# Patient Record
Sex: FEMALE | Race: BLACK OR AFRICAN AMERICAN | NOT HISPANIC OR LATINO | Employment: UNEMPLOYED | URBAN - METROPOLITAN AREA
[De-identification: names, ages, dates, MRNs, and addresses within clinical notes are randomized per-mention and may not be internally consistent; named-entity substitution may affect disease eponyms.]

---

## 2017-03-06 ENCOUNTER — OFFICE VISIT (OUTPATIENT)
Dept: HEMATOLOGY/ONCOLOGY | Facility: CLINIC | Age: 52
End: 2017-03-06
Attending: INTERNAL MEDICINE
Payer: COMMERCIAL

## 2017-03-06 VITALS
WEIGHT: 199.06 LBS | RESPIRATION RATE: 16 BRPM | SYSTOLIC BLOOD PRESSURE: 120 MMHG | DIASTOLIC BLOOD PRESSURE: 72 MMHG | BODY MASS INDEX: 31.18 KG/M2 | TEMPERATURE: 98 F | HEART RATE: 93 BPM

## 2017-03-06 DIAGNOSIS — C50.411 BREAST CANCER OF UPPER-OUTER QUADRANT OF RIGHT FEMALE BREAST: Primary | ICD-10-CM

## 2017-03-06 PROCEDURE — 99213 OFFICE O/P EST LOW 20 MIN: CPT | Mod: S$GLB,,, | Performed by: INTERNAL MEDICINE

## 2017-03-06 PROCEDURE — 3078F DIAST BP <80 MM HG: CPT | Mod: S$GLB,,, | Performed by: INTERNAL MEDICINE

## 2017-03-06 PROCEDURE — 99999 PR PBB SHADOW E&M-EST. PATIENT-LVL III: CPT | Mod: PBBFAC,,, | Performed by: INTERNAL MEDICINE

## 2017-03-06 PROCEDURE — 1160F RVW MEDS BY RX/DR IN RCRD: CPT | Mod: S$GLB,,, | Performed by: INTERNAL MEDICINE

## 2017-03-06 PROCEDURE — 3074F SYST BP LT 130 MM HG: CPT | Mod: S$GLB,,, | Performed by: INTERNAL MEDICINE

## 2017-03-06 NOTE — PROGRESS NOTES
Subjective:       Patient ID: Anette Ricci is a 51 y.o. female.    Chief Complaint: Follow-up    HPI  Ms. Ricci 51-year-old female seen for follow-up of carcinoma of the right breast.  She has a strongly ER and CO positive T2 N0 tumor.  She is on adjuvant tamoxifen endocrine therapy.  She has been feeling well and is doing a bit more exercise.  She has no shortness of breath no unusual pain.  She did have a small scab on her right breast she noticed recently.      Mammograms in Oct 2016 were negative for malignancy    History: She noted an abnormality in  the right in her right breast  and had some additional imaging including MRI.  She had a MRI guided core needle biopsy on April 5 and then underwent lumpectomy and sentinel lymph node biopsy by Dr. Keith Chauhan on April 25, 2013.  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  4 sentinel lymph nodes were removed and all were negative for malignancy.  Margins were negative.  The tumor was ER +90% CO +90% and HER-2 negative by fish.  Pathological stage TII N0 stage IIA   She underwent Oncotype testing which showed a low Oncotype score of 10.    She began tamoxifen therapy in May of 2013.  Review of Systems   Constitutional: Negative for fatigue, fever and unexpected weight change.   Respiratory: Negative for cough and shortness of breath.    Cardiovascular: Negative for chest pain.   Gastrointestinal: Negative for abdominal pain and diarrhea.   Musculoskeletal: Negative for back pain and neck pain.   Psychiatric/Behavioral: Negative for dysphoric mood. The patient is not nervous/anxious.        Objective:      Physical Exam   Constitutional: She appears well-developed and well-nourished. No distress.   HENT:   Mouth/Throat: Oropharynx is clear and moist. No oropharyngeal exudate.   Eyes: No scleral icterus.   Cardiovascular: Normal rate, regular rhythm and normal heart sounds.     Pulmonary/Chest: Effort normal and breath sounds normal. She has no wheezes. She has no rales. Right breast exhibits no mass, no nipple discharge and no skin change. Left breast exhibits no mass, no nipple discharge and no skin change.       Abdominal: Soft. There is no tenderness.   Lymphadenopathy:     She has no cervical adenopathy.   Psychiatric: She has a normal mood and affect. Her behavior is normal. Thought content normal.       Assessment:       1. Breast cancer of upper-outer quadrant of right female breast        Plan:      Continue tamoxifen and return to clinic in approximately 6 months.    she will be due for mammograms and.

## 2017-03-15 ENCOUNTER — OFFICE VISIT (OUTPATIENT)
Dept: PLASTIC SURGERY | Facility: CLINIC | Age: 52
End: 2017-03-15
Payer: COMMERCIAL

## 2017-03-15 VITALS
BODY MASS INDEX: 31.65 KG/M2 | TEMPERATURE: 98 F | SYSTOLIC BLOOD PRESSURE: 128 MMHG | DIASTOLIC BLOOD PRESSURE: 80 MMHG | HEART RATE: 78 BPM | WEIGHT: 202.06 LBS

## 2017-03-15 DIAGNOSIS — Z85.3 PERSONAL HISTORY OF BREAST CANCER: Primary | ICD-10-CM

## 2017-03-15 PROCEDURE — 99212 OFFICE O/P EST SF 10 MIN: CPT | Mod: S$GLB,,, | Performed by: PHYSICIAN ASSISTANT

## 2017-03-15 PROCEDURE — 99999 PR PBB SHADOW E&M-EST. PATIENT-LVL III: CPT | Mod: PBBFAC,,, | Performed by: PHYSICIAN ASSISTANT

## 2017-03-15 PROCEDURE — 3079F DIAST BP 80-89 MM HG: CPT | Mod: S$GLB,,, | Performed by: PHYSICIAN ASSISTANT

## 2017-03-15 PROCEDURE — 1160F RVW MEDS BY RX/DR IN RCRD: CPT | Mod: S$GLB,,, | Performed by: PHYSICIAN ASSISTANT

## 2017-03-15 PROCEDURE — 3074F SYST BP LT 130 MM HG: CPT | Mod: S$GLB,,, | Performed by: PHYSICIAN ASSISTANT

## 2017-03-15 NOTE — PROGRESS NOTES
[facility] for a follow up visit status post L breast reduction for symmetry following R breast reconstruction     Vitals:    03/15/17 1022   BP: 128/80   Pulse: 78   Temp: 98.4 °F (36.9 °C)     Current Outpatient Prescriptions on File Prior to Visit   Medication Sig Dispense Refill    amlodipine (NORVASC) 10 MG tablet Take 10 mg by mouth once daily.       atorvastatin (LIPITOR) 40 MG tablet Take 40 mg by mouth once daily.      enalapril (VASOTEC) 20 MG tablet Take 20 mg by mouth once daily.      glimepiride (AMARYL) 4 MG tablet Take 8 mg by mouth before breakfast.       hydrochlorothiazide (HYDRODIURIL) 25 MG tablet       hydrocodone-acetaminophen 10-325mg (NORCO)  mg Tab Take 1 tablet by mouth every 6 (six) hours as needed. 31 tablet 0    insulin glargine (LANTUS) 100 unit/mL injection Inject 45 Units into the skin every evening. Takes 40 units nightly      ketorolac 0.4% (ACULAR) 0.4 % Drop       LANTUS SOLOSTAR 100 unit/mL (3 mL) InPn pen       lorazepam (ATIVAN) 0.5 MG tablet Take 0.5 mg by mouth every 12 (twelve) hours as needed.       metformin (GLUCOPHAGE) 1000 MG tablet Take 1,000 mg by mouth daily with breakfast.       NOVOLOG FLEXPEN 100 unit/mL InPn pen Inject 10 Units into the skin 2 (two) times daily with meals. With biggest meal      oxycodone-acetaminophen (PERCOCET) 5-325 mg per tablet Take 1 tablet by mouth every 4 (four) hours as needed for Pain. 31 tablet 0    prednisoLONE acetate (PRED FORTE) 1 % DrpS       quetiapine (SEROQUEL) 25 MG Tab Take 25 mg by mouth once daily.       tamoxifen (NOLVADEX) 20 MG Tab TAKE ONE TABLET BY MOUTH ONCE DAILY 90 tablet 3     No current facility-administered medications on file prior to visit.      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   PREOPERATIVE DIAGNOSIS: History of breast cancer.   POSTOPERATIVE DIAGNOSIS: History of breast cancer.   PROCEDURES PERFORMED: Left breast reduction.   SURGEON: Robbin Walters M.D., F.A.C.S.   ANESTHESIA: General.    Doing well today. She has very nice symmetry. B nipples viable. She is pleased with her results.     MMG normal from 10/2016    All questions were answered. She will return to clinic in 3 months. The patient was advised to call the clinic with any questions or concerns prior to their next visit.

## 2017-06-13 ENCOUNTER — OFFICE VISIT (OUTPATIENT)
Dept: PLASTIC SURGERY | Facility: CLINIC | Age: 52
End: 2017-06-13
Payer: COMMERCIAL

## 2017-06-13 VITALS
DIASTOLIC BLOOD PRESSURE: 81 MMHG | WEIGHT: 196 LBS | HEART RATE: 77 BPM | SYSTOLIC BLOOD PRESSURE: 115 MMHG | TEMPERATURE: 99 F | BODY MASS INDEX: 30.7 KG/M2

## 2017-06-13 DIAGNOSIS — Z09 SURGERY FOLLOW-UP EXAMINATION: Primary | ICD-10-CM

## 2017-06-13 PROCEDURE — 99212 OFFICE O/P EST SF 10 MIN: CPT | Mod: S$GLB,,, | Performed by: PHYSICIAN ASSISTANT

## 2017-06-13 PROCEDURE — 99999 PR PBB SHADOW E&M-EST. PATIENT-LVL III: CPT | Mod: PBBFAC,,, | Performed by: PHYSICIAN ASSISTANT

## 2017-06-13 NOTE — PROGRESS NOTES
[facility]ette Ricci [facility] 6[facility]13[facility]2017 for a follow up visit status post L breast reduction with history of R breast cancer. Doing well today.     Review of patient's allergies indicates:   Allergen Reactions    Codeine      Current Outpatient Prescriptions on File Prior to Visit   Medication Sig Dispense Refill    amlodipine (NORVASC) 10 MG tablet Take 10 mg by mouth once daily.       atorvastatin (LIPITOR) 40 MG tablet Take 40 mg by mouth once daily.      empagliflozin (JARDIANCE) 25 mg Tab Take 25 mg by mouth once daily.      enalapril (VASOTEC) 20 MG tablet Take 20 mg by mouth once daily.      glimepiride (AMARYL) 4 MG tablet Take 8 mg by mouth before breakfast.       hydrochlorothiazide (HYDRODIURIL) 25 MG tablet       hydrocodone-acetaminophen 10-325mg (NORCO)  mg Tab Take 1 tablet by mouth every 6 (six) hours as needed. 31 tablet 0    insulin glargine (LANTUS) 100 unit/mL injection Inject 45 Units into the skin every evening. Takes 40 units nightly      ketorolac 0.4% (ACULAR) 0.4 % Drop       LANTUS SOLOSTAR 100 unit/mL (3 mL) InPn pen       lorazepam (ATIVAN) 0.5 MG tablet Take 0.5 mg by mouth every 12 (twelve) hours as needed.       metformin (GLUCOPHAGE) 1000 MG tablet Take 1,000 mg by mouth daily with breakfast.       NOVOLOG FLEXPEN 100 unit/mL InPn pen Inject 10 Units into the skin 2 (two) times daily with meals. With biggest meal      oxycodone-acetaminophen (PERCOCET) 5-325 mg per tablet Take 1 tablet by mouth every 4 (four) hours as needed for Pain. 31 tablet 0    prednisoLONE acetate (PRED FORTE) 1 % DrpS       quetiapine (SEROQUEL) 25 MG Tab Take 25 mg by mouth once daily.       tamoxifen (NOLVADEX) 20 MG Tab TAKE ONE TABLET BY MOUTH ONCE DAILY 90 tablet 3     No current facility-administered medications on file prior to visit.      Patient Active Problem List   Diagnosis    Follow-up exam    Diabetes mellitus due to underlying condition  "with diabetic retinopathy with macular edema    Hypertension    Depression, reactive    Seizure disorder    Hyperlipidemia    Other convulsions    Breast cancer of upper-outer quadrant of right female breast    Macromastia     Past Surgical History:   Procedure Laterality Date    BREAST BIOPSY      BREAST LUMPECTOMY      EYE SURGERY       DATE OF PROCEDURE:  07/25/2016.     PREOPERATIVE DIAGNOSIS:  History of breast cancer.     POSTOPERATIVE DIAGNOSIS:  History of breast cancer.     PROCEDURES PERFORMED:  Left breast reduction.     SURGEON:  Robbin Walters M.D., F.A.C.S.     ANESTHESIA:  General.    PHYSICAL EXAMINATION    WD WN NAD  VSS  Lungs - normal resp effort  L breast  R breast  Skin warm/dry, no rash    ASSESSMENT/PLAN    51 y.o. F s/p L breast redection  - doing well, no issues  - good symmetry, pleased with her results  - advised that skin changes s/p XRT are permanent as she questioned why the R breast was still "hard in areas"  - advised to c/w regular MMG (due in October)  - All questions were answered, she will RTC as needed    The patient was advised to call the clinic with any questions or concerns in the future  "

## 2017-10-23 ENCOUNTER — HOSPITAL ENCOUNTER (OUTPATIENT)
Dept: RADIOLOGY | Facility: OTHER | Age: 52
Discharge: HOME | End: 2017-10-23
Attending: INTERNAL MEDICINE
Payer: COMMERCIAL

## 2017-10-23 ENCOUNTER — OFFICE VISIT (OUTPATIENT)
Dept: HEMATOLOGY/ONCOLOGY | Facility: CLINIC | Age: 52
End: 2017-10-23
Attending: INTERNAL MEDICINE
Payer: COMMERCIAL

## 2017-10-23 VITALS
BODY MASS INDEX: 30.66 KG/M2 | HEART RATE: 85 BPM | SYSTOLIC BLOOD PRESSURE: 160 MMHG | DIASTOLIC BLOOD PRESSURE: 89 MMHG | WEIGHT: 195.75 LBS | RESPIRATION RATE: 17 BRPM | TEMPERATURE: 98 F

## 2017-10-23 DIAGNOSIS — C50.411 BREAST CANCER OF UPPER-OUTER QUADRANT OF RIGHT FEMALE BREAST: ICD-10-CM

## 2017-10-23 DIAGNOSIS — Z17.0 MALIGNANT NEOPLASM OF UPPER-OUTER QUADRANT OF RIGHT BREAST IN FEMALE, ESTROGEN RECEPTOR POSITIVE: Primary | ICD-10-CM

## 2017-10-23 DIAGNOSIS — C50.411 MALIGNANT NEOPLASM OF UPPER-OUTER QUADRANT OF RIGHT BREAST IN FEMALE, ESTROGEN RECEPTOR POSITIVE: Primary | ICD-10-CM

## 2017-10-23 PROCEDURE — 77062 BREAST TOMOSYNTHESIS BI: CPT | Mod: 26,,, | Performed by: RADIOLOGY

## 2017-10-23 PROCEDURE — 77066 DX MAMMO INCL CAD BI: CPT | Mod: 26,,, | Performed by: RADIOLOGY

## 2017-10-23 PROCEDURE — 99213 OFFICE O/P EST LOW 20 MIN: CPT | Mod: S$GLB,,, | Performed by: INTERNAL MEDICINE

## 2017-10-23 PROCEDURE — 77066 DX MAMMO INCL CAD BI: CPT | Mod: TC

## 2017-10-23 PROCEDURE — 99999 PR PBB SHADOW E&M-EST. PATIENT-LVL III: CPT | Mod: PBBFAC,,, | Performed by: INTERNAL MEDICINE

## 2017-10-23 NOTE — PROGRESS NOTES
Subjective:       Patient ID: Anette Ricci is a 51 y.o. female.    Chief Complaint: No chief complaint on file.    HPI  Ms. Ricci 51-year-old female seen for follow-up of carcinoma of the right breast.  She has a strongly ER and AK positive T2 N0 tumor.  She is on adjuvant tamoxifen endocrine therapy.    Overall, she's been feeling well.  She reports which sounds like absence seizures and recently had one while driving.  She has seen neurology in the past.  She has some occasional right breast pain but otherwise no pain.  Appetite and bowel function have been good.      History: She noted an abnormality in  the right in her right breast  and had some additional imaging including MRI.  She had a MRI guided core needle biopsy on April 5 and then underwent lumpectomy and sentinel lymph node biopsy by Dr. Keith Chauhan on April 25, 2013.  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  4 sentinel lymph nodes were removed and all were negative for malignancy.  Margins were negative.  The tumor was ER +90% AK +90% and HER-2 negative by fish.  Pathological stage TII N0 stage IIA   She underwent Oncotype testing which showed a low Oncotype score of 10.    She began tamoxifen therapy in May of 2013.  Review of Systems   Constitutional: Negative for fatigue, fever and unexpected weight change.   Respiratory: Negative for cough and shortness of breath.    Cardiovascular: Negative for chest pain.   Gastrointestinal: Negative for abdominal pain and diarrhea.   Musculoskeletal: Negative for back pain and neck pain.   Neurological: Positive for seizures (? absence).   Psychiatric/Behavioral: Negative for dysphoric mood. The patient is not nervous/anxious.        Objective:      Physical Exam   Constitutional: She appears well-developed and well-nourished. No distress.   HENT:   Mouth/Throat: Oropharynx is clear and moist. No oropharyngeal exudate.    Eyes: No scleral icterus.   Cardiovascular: Normal rate, regular rhythm and normal heart sounds.    Pulmonary/Chest: Effort normal and breath sounds normal. She has no wheezes. She has no rales. Right breast exhibits no mass, no nipple discharge and no skin change. Left breast exhibits no mass, no nipple discharge and no skin change.       Abdominal: Soft. There is no tenderness.   Lymphadenopathy:     She has no cervical adenopathy.   Psychiatric: She has a normal mood and affect. Her behavior is normal. Thought content normal.       Assessment:     mammograms are negative  1. Malignant neoplasm of upper-outer quadrant of right breast in female, estrogen receptor positive        Plan:      Continue tamoxifen and return to clinic in approximately 6 months.

## 2017-12-30 DIAGNOSIS — C50.919 MALIGNANT NEOPLASM OF BREAST, STAGE 1, ESTROGEN RECEPTOR POSITIVE, UNSPECIFIED LATERALITY: Primary | ICD-10-CM

## 2017-12-30 DIAGNOSIS — Z17.0 MALIGNANT NEOPLASM OF BREAST, STAGE 1, ESTROGEN RECEPTOR POSITIVE, UNSPECIFIED LATERALITY: Primary | ICD-10-CM

## 2017-12-30 RX ORDER — TAMOXIFEN CITRATE 20 MG/1
TABLET ORAL
Qty: 30 TABLET | Refills: 11 | Status: SHIPPED | OUTPATIENT
Start: 2017-12-30 | End: 2019-01-31 | Stop reason: SDUPTHER

## 2018-01-19 DIAGNOSIS — C50.919 BREAST CANCER ASSOCIATED WITH MUTATION IN ATM GENE: Primary | ICD-10-CM

## 2018-04-23 ENCOUNTER — TELEPHONE (OUTPATIENT)
Dept: HEMATOLOGY/ONCOLOGY | Facility: CLINIC | Age: 53
End: 2018-04-23

## 2018-04-23 ENCOUNTER — OFFICE VISIT (OUTPATIENT)
Dept: HEMATOLOGY/ONCOLOGY | Facility: CLINIC | Age: 53
End: 2018-04-23
Attending: INTERNAL MEDICINE
Payer: COMMERCIAL

## 2018-04-23 ENCOUNTER — LAB VISIT (OUTPATIENT)
Dept: LAB | Facility: OTHER | Age: 53
End: 2018-04-23
Attending: INTERNAL MEDICINE
Payer: COMMERCIAL

## 2018-04-23 VITALS
OXYGEN SATURATION: 98 % | BODY MASS INDEX: 31.15 KG/M2 | HEART RATE: 76 BPM | TEMPERATURE: 98 F | SYSTOLIC BLOOD PRESSURE: 116 MMHG | DIASTOLIC BLOOD PRESSURE: 65 MMHG | RESPIRATION RATE: 16 BRPM | HEIGHT: 67 IN | WEIGHT: 198.44 LBS

## 2018-04-23 DIAGNOSIS — C50.411 MALIGNANT NEOPLASM OF UPPER-OUTER QUADRANT OF RIGHT BREAST IN FEMALE, ESTROGEN RECEPTOR POSITIVE: Primary | ICD-10-CM

## 2018-04-23 DIAGNOSIS — Z17.0 MALIGNANT NEOPLASM OF UPPER-OUTER QUADRANT OF RIGHT BREAST IN FEMALE, ESTROGEN RECEPTOR POSITIVE: Primary | ICD-10-CM

## 2018-04-23 DIAGNOSIS — C50.411 MALIGNANT NEOPLASM OF UPPER-OUTER QUADRANT OF RIGHT BREAST IN FEMALE, ESTROGEN RECEPTOR POSITIVE: ICD-10-CM

## 2018-04-23 DIAGNOSIS — Z17.0 MALIGNANT NEOPLASM OF UPPER-OUTER QUADRANT OF RIGHT BREAST IN FEMALE, ESTROGEN RECEPTOR POSITIVE: ICD-10-CM

## 2018-04-23 DIAGNOSIS — C50.919 BREAST CANCER ASSOCIATED WITH MUTATION IN ATM GENE: ICD-10-CM

## 2018-04-23 LAB
ALBUMIN SERPL BCP-MCNC: 3.7 G/DL
ALP SERPL-CCNC: 71 U/L
ALT SERPL W/O P-5'-P-CCNC: 12 U/L
ANION GAP SERPL CALC-SCNC: 8 MMOL/L
AST SERPL-CCNC: 17 U/L
BILIRUB SERPL-MCNC: 0.2 MG/DL
BUN SERPL-MCNC: 11 MG/DL
CALCIUM SERPL-MCNC: 9.3 MG/DL
CHLORIDE SERPL-SCNC: 100 MMOL/L
CO2 SERPL-SCNC: 27 MMOL/L
CREAT SERPL-MCNC: 1 MG/DL
EST. GFR  (AFRICAN AMERICAN): >60 ML/MIN/1.73 M^2
EST. GFR  (NON AFRICAN AMERICAN): >60 ML/MIN/1.73 M^2
ESTRADIOL SERPL-MCNC: 20 PG/ML
FSH SERPL-ACNC: 19.9 MIU/ML
GLUCOSE SERPL-MCNC: 281 MG/DL
POTASSIUM SERPL-SCNC: 4 MMOL/L
PROT SERPL-MCNC: 7.1 G/DL
SODIUM SERPL-SCNC: 135 MMOL/L

## 2018-04-23 PROCEDURE — 82670 ASSAY OF TOTAL ESTRADIOL: CPT

## 2018-04-23 PROCEDURE — 3078F DIAST BP <80 MM HG: CPT | Mod: CPTII,S$GLB,, | Performed by: INTERNAL MEDICINE

## 2018-04-23 PROCEDURE — 99999 PR PBB SHADOW E&M-EST. PATIENT-LVL III: CPT | Mod: PBBFAC,,, | Performed by: INTERNAL MEDICINE

## 2018-04-23 PROCEDURE — 99213 OFFICE O/P EST LOW 20 MIN: CPT | Mod: S$GLB,,, | Performed by: INTERNAL MEDICINE

## 2018-04-23 PROCEDURE — 83001 ASSAY OF GONADOTROPIN (FSH): CPT

## 2018-04-23 PROCEDURE — 36415 COLL VENOUS BLD VENIPUNCTURE: CPT

## 2018-04-23 PROCEDURE — 80053 COMPREHEN METABOLIC PANEL: CPT

## 2018-04-23 PROCEDURE — 3074F SYST BP LT 130 MM HG: CPT | Mod: CPTII,S$GLB,, | Performed by: INTERNAL MEDICINE

## 2018-04-23 RX ORDER — LAMOTRIGINE 25 MG/1
25 TABLET ORAL 2 TIMES DAILY
COMMUNITY

## 2018-04-23 NOTE — TELEPHONE ENCOUNTER
Changed orders to standing.  Attempted to call blood bank at [facility].  Linked orders to today's labs.  Also put in standing orders for the future.  Unable to leave message with blood bank at number given.

## 2018-04-23 NOTE — TELEPHONE ENCOUNTER
----- Message from Van De La Garza sent at 4/23/2018  3:02 PM CDT -----  Contact: Blood Bank   Will like orders for estradiol and follicle stimulating hormone to be added on to today's lab as a routine order and not future     Contact::[phone]

## 2018-04-23 NOTE — PROGRESS NOTES
Subjective:       Patient ID: Anette Ricci is a 52 y.o. female.    Chief Complaint: No chief complaint on file.    HPI  Ms. Ricci 52-year-old female seen for follow-up of carcinoma of the right breast.  She has a strongly ER and SD positive T2 N0 tumor.  She is on adjuvant tamoxifen endocrine therapy.    Other than some right breast tenderness she has no other complaints.      History: She noted an abnormality in  the right in her right breast  and had some additional imaging including MRI.  She had a MRI guided core needle biopsy on April 5 and then underwent lumpectomy and sentinel lymph node biopsy by Dr. Keith Chauhan on April 25, 2013.  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  4 sentinel lymph nodes were removed and all were negative for malignancy.  Margins were negative.  The tumor was ER +90% SD +90% and HER-2 negative by fish.  Pathological stage TII N0 stage IIA   She underwent Oncotype testing which showed a low Oncotype score of 10.    She began tamoxifen therapy in May of 2013.  Review of Systems   Constitutional: Negative for fatigue, fever and unexpected weight change.   Respiratory: Negative for cough and shortness of breath.    Cardiovascular: Negative for chest pain.   Gastrointestinal: Positive for constipation (uses a stool softener). Negative for abdominal pain and diarrhea.   Musculoskeletal: Negative for back pain and neck pain.   Neurological: Positive for seizures (? absence).   Psychiatric/Behavioral: Negative for dysphoric mood. The patient is not nervous/anxious.        Objective:      Physical Exam   Constitutional: She appears well-developed and well-nourished. No distress.   HENT:   Mouth/Throat: Oropharynx is clear and moist. No oropharyngeal exudate.   Eyes: No scleral icterus.   Cardiovascular: Normal rate, regular rhythm and normal heart sounds.    Pulmonary/Chest: Effort normal and breath  sounds normal. She has no wheezes. She has no rales. Right breast exhibits mass. Right breast exhibits no nipple discharge and no skin change. Left breast exhibits no mass, no nipple discharge and no skin change.       Abdominal: Soft. There is no tenderness.   Lymphadenopathy:     She has no cervical adenopathy.   Psychiatric: She has a normal mood and affect. Her behavior is normal. Thought content normal.       Assessment:     metabolic profile was normal other than a glucose of 281  1. Malignant neoplasm of upper-outer quadrant of right breast in female, estrogen receptor positive        Plan:      We'll check estradiol and FSH.  If she is postmenopausal we will change her to letrozole, otherwise she will stay on tamoxifen.  Right breast mammogram and ultrasound to evaluate her symptoms of discomfort and also the posttreatment scarring which seems a bit more prominent.        She will follow-up with her primary care physician regarding her hyperglycemia.

## 2018-05-10 ENCOUNTER — HOSPITAL ENCOUNTER (OUTPATIENT)
Dept: RADIOLOGY | Facility: OTHER | Age: 53
Discharge: HOME | End: 2018-05-10
Attending: INTERNAL MEDICINE
Payer: COMMERCIAL

## 2018-05-10 DIAGNOSIS — Z17.0 MALIGNANT NEOPLASM OF UPPER-OUTER QUADRANT OF RIGHT BREAST IN FEMALE, ESTROGEN RECEPTOR POSITIVE: ICD-10-CM

## 2018-05-10 DIAGNOSIS — C50.411 MALIGNANT NEOPLASM OF UPPER-OUTER QUADRANT OF RIGHT BREAST IN FEMALE, ESTROGEN RECEPTOR POSITIVE: ICD-10-CM

## 2018-05-10 PROCEDURE — 77065 DX MAMMO INCL CAD UNI: CPT | Mod: TC

## 2018-05-10 PROCEDURE — 77061 BREAST TOMOSYNTHESIS UNI: CPT | Mod: 26,,, | Performed by: RADIOLOGY

## 2018-05-10 PROCEDURE — 77061 BREAST TOMOSYNTHESIS UNI: CPT | Mod: TC

## 2018-05-10 PROCEDURE — 77065 DX MAMMO INCL CAD UNI: CPT | Mod: 26,,, | Performed by: RADIOLOGY

## 2018-10-25 ENCOUNTER — HOSPITAL ENCOUNTER (OUTPATIENT)
Dept: RADIOLOGY | Facility: OTHER | Age: 53
Discharge: HOME | End: 2018-10-25
Attending: INTERNAL MEDICINE
Payer: COMMERCIAL

## 2018-10-25 DIAGNOSIS — C50.411 MALIGNANT NEOPLASM OF UPPER-OUTER QUADRANT OF RIGHT BREAST IN FEMALE, ESTROGEN RECEPTOR POSITIVE: ICD-10-CM

## 2018-10-25 DIAGNOSIS — Z17.0 MALIGNANT NEOPLASM OF UPPER-OUTER QUADRANT OF RIGHT BREAST IN FEMALE, ESTROGEN RECEPTOR POSITIVE: ICD-10-CM

## 2018-10-25 PROCEDURE — 77062 BREAST TOMOSYNTHESIS BI: CPT | Mod: TC

## 2018-10-25 PROCEDURE — 77066 DX MAMMO INCL CAD BI: CPT | Mod: 26,,, | Performed by: RADIOLOGY

## 2018-10-25 PROCEDURE — 77062 BREAST TOMOSYNTHESIS BI: CPT | Mod: 26,,, | Performed by: RADIOLOGY

## 2018-10-29 ENCOUNTER — OFFICE VISIT (OUTPATIENT)
Dept: HEMATOLOGY/ONCOLOGY | Facility: CLINIC | Age: 53
End: 2018-10-29
Attending: INTERNAL MEDICINE
Payer: COMMERCIAL

## 2018-10-29 VITALS
HEART RATE: 80 BPM | HEIGHT: 67 IN | OXYGEN SATURATION: 100 % | RESPIRATION RATE: 18 BRPM | DIASTOLIC BLOOD PRESSURE: 84 MMHG | BODY MASS INDEX: 31.56 KG/M2 | WEIGHT: 201.06 LBS | TEMPERATURE: 99 F | SYSTOLIC BLOOD PRESSURE: 180 MMHG

## 2018-10-29 DIAGNOSIS — C50.411 MALIGNANT NEOPLASM OF UPPER-OUTER QUADRANT OF RIGHT BREAST IN FEMALE, ESTROGEN RECEPTOR POSITIVE: Primary | ICD-10-CM

## 2018-10-29 DIAGNOSIS — Z17.0 MALIGNANT NEOPLASM OF UPPER-OUTER QUADRANT OF RIGHT BREAST IN FEMALE, ESTROGEN RECEPTOR POSITIVE: Primary | ICD-10-CM

## 2018-10-29 PROCEDURE — 3077F SYST BP >= 140 MM HG: CPT | Mod: CPTII,S$GLB,, | Performed by: INTERNAL MEDICINE

## 2018-10-29 PROCEDURE — 99999 PR PBB SHADOW E&M-EST. PATIENT-LVL IV: CPT | Mod: PBBFAC,,, | Performed by: INTERNAL MEDICINE

## 2018-10-29 PROCEDURE — 99213 OFFICE O/P EST LOW 20 MIN: CPT | Mod: S$GLB,,, | Performed by: INTERNAL MEDICINE

## 2018-10-29 PROCEDURE — 3008F BODY MASS INDEX DOCD: CPT | Mod: CPTII,S$GLB,, | Performed by: INTERNAL MEDICINE

## 2018-10-29 PROCEDURE — 3079F DIAST BP 80-89 MM HG: CPT | Mod: CPTII,S$GLB,, | Performed by: INTERNAL MEDICINE

## 2018-10-29 NOTE — PROGRESS NOTES
Subjective:       Patient ID: Anette iRcci is a 52 y.o. female.    Chief Complaint: No chief complaint on file.    HPI  Ms. Ricci 52-year-old female seen for follow-up of carcinoma of the right breast.  She has a strongly ER and SD positive T2 N0 tumor.  She is on adjuvant tamoxifen endocrine therapy.  Lab April of this year showed that she was still premenopausal.    Today she reports that she has been feeling well with no new issues.  She is planning to start exercising as she has gained some weight.      History: She noted an abnormality in  the right in her right breast  and had some additional imaging including MRI.  She had a MRI guided core needle biopsy on April 5 and then underwent lumpectomy and sentinel lymph node biopsy by Dr. Keith Chauhan on April 25, 2013.  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  4 sentinel lymph nodes were removed and all were negative for malignancy.  Margins were negative.  The tumor was ER +90% SD +90% and HER-2 negative by FISH.  Pathological stage TII N0 stage IIA   She underwent Oncotype testing which showed a low Oncotype score of 10.    She began tamoxifen therapy in May of 2013.  Review of Systems   Constitutional: Negative for fatigue, fever and unexpected weight change.   Respiratory: Negative for cough and shortness of breath.    Cardiovascular: Negative for chest pain.   Gastrointestinal: Negative for abdominal pain and diarrhea.   Musculoskeletal: Negative for back pain and neck pain.   Psychiatric/Behavioral: Negative for dysphoric mood. The patient is not nervous/anxious.        Objective:      Physical Exam   Constitutional: She appears well-developed and well-nourished. No distress.   HENT:   Mouth/Throat: Oropharynx is clear and moist. No oropharyngeal exudate.   Eyes: No scleral icterus.   Cardiovascular: Normal rate, regular rhythm and normal heart sounds.    Pulmonary/Chest: Effort normal and breath sounds normal. She has no wheezes. She has no rales. Right breast exhibits mass. Right breast exhibits no nipple discharge and no skin change. Left breast exhibits no mass, no nipple discharge and no skin change.       Abdominal: Soft. There is no tenderness.   Lymphadenopathy:     She has no cervical adenopathy.   Psychiatric: She has a normal mood and affect. Her behavior is normal. Thought content normal.       Assessment:     mammogram-on October 25th was negative for malignancy  1. Malignant neoplasm of upper-outer quadrant of right breast in female, estrogen receptor positive        Plan:      Continue tamoxifen and return to clinic 6 months

## 2019-01-31 DIAGNOSIS — Z17.0 MALIGNANT NEOPLASM OF BREAST, STAGE 1, ESTROGEN RECEPTOR POSITIVE, UNSPECIFIED LATERALITY: ICD-10-CM

## 2019-01-31 DIAGNOSIS — C50.919 MALIGNANT NEOPLASM OF BREAST, STAGE 1, ESTROGEN RECEPTOR POSITIVE, UNSPECIFIED LATERALITY: ICD-10-CM

## 2019-01-31 RX ORDER — TAMOXIFEN CITRATE 20 MG/1
TABLET ORAL
Qty: 30 TABLET | Refills: 11 | Status: SHIPPED | OUTPATIENT
Start: 2019-01-31 | End: 2019-07-10 | Stop reason: ALTCHOICE

## 2019-05-16 ENCOUNTER — TELEPHONE (OUTPATIENT)
Dept: HEMATOLOGY/ONCOLOGY | Facility: CLINIC | Age: 54
End: 2019-05-16

## 2019-05-16 NOTE — TELEPHONE ENCOUNTER
----- Message from Keith Valladares MD sent at 5/13/2019  3:11 PM CDT -----  Contact: ANDRE SOLO [[id number]  No mammogram until October  ----- Message -----  From: Janet Thompson  Sent: 5/13/2019   2:45 PM  To: MD Dr. Blaine Villanueva, the patient missed her 6 month follow up visit with you, wants to know should she have a mammogram scheduled prior to following up with you? And if so, can you add the order. Patient last mammogram was in October.     ----- Message -----  From: Cata Obrien RN  Sent: 5/8/2019   2:04 PM  To: Adelaide BatistaWest MA    Could either of you help to get this patient scheduled?  Thank so much,   ALISSON Ivy    ----- Message -----  From: Montse Rosas  Sent: 5/8/2019   1:38 PM  To: Blaine LATIF Staff    Name of Who is Calling:ANDRE SOLO [[id number]        What is the request in detail: pt requesting to schedule canceled appointment. Also, please advise if she's due for her mammogram. Thanks-          Can the clinic reply by [facility]: N    What Number to Call Back if not in [facility]: [phone]

## 2019-07-05 NOTE — PROGRESS NOTES
Subjective:       Patient ID: Anette Ricci is a 53 y.o. female.    Chief Complaint: No chief complaint on file.    HPI  Ms. Ricci 53-year-old female seen for follow-up of carcinoma of the right breast.  She has a strongly ER and MA positive T2 N0 tumor.  She is on adjuvant tamoxifen endocrine therapy.        Lab April of 2018 showed that she was still premenopausal.    Physically she has been feeling well. She has some occasional aching in her breasts left and right.  Appetite and bowel function has been good.  Her diabetic control has not been very good.      History: She noted an abnormality in  the right in her right breast  and had some additional imaging including MRI.  She had a MRI guided core needle biopsy on April 5 and then underwent lumpectomy and sentinel lymph node biopsy by Dr. Keith Chauhan on April 25, 2013.  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  4 sentinel lymph nodes were removed and all were negative for malignancy.  Margins were negative.  The tumor was ER +90% MA +90% and HER-2 negative by FISH.  Pathological stage TII N0 stage IIA   She underwent Oncotype testing which showed a low Oncotype score of 10.    She began tamoxifen therapy in May of 2013.  Review of Systems   Constitutional: Negative for fatigue, fever and unexpected weight change.   Respiratory: Negative for cough and shortness of breath.    Cardiovascular: Negative for chest pain.   Gastrointestinal: Negative for abdominal pain and diarrhea.   Musculoskeletal: Negative for back pain and neck pain.   Psychiatric/Behavioral: Negative for dysphoric mood. The patient is not nervous/anxious.        Objective:      Physical Exam   Constitutional: She appears well-developed and well-nourished. No distress.   HENT:   Mouth/Throat: Oropharynx is clear and moist. No oropharyngeal exudate.   Eyes: No scleral icterus.   Cardiovascular: Normal rate and  regular rhythm.   Pulmonary/Chest: Effort normal and breath sounds normal. She has no wheezes. She has no rales. Right breast exhibits skin change. Right breast exhibits no mass and no nipple discharge. Left breast exhibits no mass, no nipple discharge and no skin change.       Abdominal: Soft. There is no tenderness.   Lymphadenopathy:     She has no cervical adenopathy.   Psychiatric: She has a normal mood and affect. Her behavior is normal. Thought content normal.       Assessment:      1. Malignant neoplasm of upper-outer quadrant of right breast in female, estrogen receptor positive        Plan:     repeat labs to check menopausal status.  (Estradiol 11, FSH 20)  She requests referral to gynecology  Will plan to change to letrozole and return to clinic 6 months.

## 2019-07-08 ENCOUNTER — OFFICE VISIT (OUTPATIENT)
Dept: HEMATOLOGY/ONCOLOGY | Facility: CLINIC | Age: 54
End: 2019-07-08
Attending: INTERNAL MEDICINE
Payer: COMMERCIAL

## 2019-07-08 ENCOUNTER — LAB VISIT (OUTPATIENT)
Dept: LAB | Facility: OTHER | Age: 54
End: 2019-07-08
Attending: INTERNAL MEDICINE
Payer: COMMERCIAL

## 2019-07-08 VITALS
SYSTOLIC BLOOD PRESSURE: 132 MMHG | OXYGEN SATURATION: 98 % | HEART RATE: 88 BPM | TEMPERATURE: 99 F | WEIGHT: 202.38 LBS | DIASTOLIC BLOOD PRESSURE: 71 MMHG | HEIGHT: 67 IN | BODY MASS INDEX: 31.76 KG/M2 | RESPIRATION RATE: 16 BRPM

## 2019-07-08 DIAGNOSIS — Z17.0 MALIGNANT NEOPLASM OF UPPER-OUTER QUADRANT OF RIGHT BREAST IN FEMALE, ESTROGEN RECEPTOR POSITIVE: ICD-10-CM

## 2019-07-08 DIAGNOSIS — Z17.0 MALIGNANT NEOPLASM OF UPPER-OUTER QUADRANT OF RIGHT BREAST IN FEMALE, ESTROGEN RECEPTOR POSITIVE: Primary | ICD-10-CM

## 2019-07-08 DIAGNOSIS — C50.411 MALIGNANT NEOPLASM OF UPPER-OUTER QUADRANT OF RIGHT BREAST IN FEMALE, ESTROGEN RECEPTOR POSITIVE: ICD-10-CM

## 2019-07-08 DIAGNOSIS — C50.411 MALIGNANT NEOPLASM OF UPPER-OUTER QUADRANT OF RIGHT BREAST IN FEMALE, ESTROGEN RECEPTOR POSITIVE: Primary | ICD-10-CM

## 2019-07-08 LAB
ESTRADIOL SERPL-MCNC: 11 PG/ML
FSH SERPL-ACNC: 20 MIU/ML

## 2019-07-08 PROCEDURE — 3008F PR BODY MASS INDEX (BMI) DOCUMENTED: ICD-10-PCS | Mod: CPTII,S$GLB,, | Performed by: INTERNAL MEDICINE

## 2019-07-08 PROCEDURE — 3078F PR MOST RECENT DIASTOLIC BLOOD PRESSURE < 80 MM HG: ICD-10-PCS | Mod: CPTII,S$GLB,, | Performed by: INTERNAL MEDICINE

## 2019-07-08 PROCEDURE — 83001 ASSAY OF GONADOTROPIN (FSH): CPT

## 2019-07-08 PROCEDURE — 99999 PR PBB SHADOW E&M-EST. PATIENT-LVL IV: ICD-10-PCS | Mod: PBBFAC,,, | Performed by: INTERNAL MEDICINE

## 2019-07-08 PROCEDURE — 36415 COLL VENOUS BLD VENIPUNCTURE: CPT

## 2019-07-08 PROCEDURE — 3078F DIAST BP <80 MM HG: CPT | Mod: CPTII,S$GLB,, | Performed by: INTERNAL MEDICINE

## 2019-07-08 PROCEDURE — 99213 PR OFFICE/OUTPT VISIT, EST, LEVL III, 20-29 MIN: ICD-10-PCS | Mod: S$GLB,,, | Performed by: INTERNAL MEDICINE

## 2019-07-08 PROCEDURE — 3075F SYST BP GE 130 - 139MM HG: CPT | Mod: CPTII,S$GLB,, | Performed by: INTERNAL MEDICINE

## 2019-07-08 PROCEDURE — 3008F BODY MASS INDEX DOCD: CPT | Mod: CPTII,S$GLB,, | Performed by: INTERNAL MEDICINE

## 2019-07-08 PROCEDURE — 82670 ASSAY OF TOTAL ESTRADIOL: CPT

## 2019-07-08 PROCEDURE — 99213 OFFICE O/P EST LOW 20 MIN: CPT | Mod: S$GLB,,, | Performed by: INTERNAL MEDICINE

## 2019-07-08 PROCEDURE — 99999 PR PBB SHADOW E&M-EST. PATIENT-LVL IV: CPT | Mod: PBBFAC,,, | Performed by: INTERNAL MEDICINE

## 2019-07-08 PROCEDURE — 3075F PR MOST RECENT SYSTOLIC BLOOD PRESS GE 130-139MM HG: ICD-10-PCS | Mod: CPTII,S$GLB,, | Performed by: INTERNAL MEDICINE

## 2019-07-08 RX ORDER — GENTAMICIN SULFATE 3 MG/ML
SOLUTION/ DROPS OPHTHALMIC
Refills: 0 | COMMUNITY
Start: 2019-04-27 | End: 2019-07-17

## 2019-07-10 RX ORDER — LETROZOLE 2.5 MG/1
2.5 TABLET, FILM COATED ORAL DAILY
Qty: 30 TABLET | Refills: 11 | Status: SHIPPED | OUTPATIENT
Start: 2019-07-10 | End: 2019-08-09 | Stop reason: ALTCHOICE

## 2019-07-14 ENCOUNTER — NURSE TRIAGE (OUTPATIENT)
Dept: ADMINISTRATIVE | Facility: CLINIC | Age: 54
End: 2019-07-14

## 2019-07-15 ENCOUNTER — TELEPHONE (OUTPATIENT)
Dept: OBSTETRICS AND GYNECOLOGY | Facility: CLINIC | Age: 54
End: 2019-07-15

## 2019-07-15 DIAGNOSIS — Z79.810 LONG-TERM CURRENT USE OF TAMOXIFEN: ICD-10-CM

## 2019-07-15 DIAGNOSIS — R10.2 PELVIC PAIN IN FEMALE: Primary | ICD-10-CM

## 2019-07-15 DIAGNOSIS — C50.411 BREAST CANCER OF UPPER-OUTER QUADRANT OF RIGHT FEMALE BREAST: ICD-10-CM

## 2019-07-15 NOTE — TELEPHONE ENCOUNTER
RN Navigator phoned patient regarding her current symptoms.. Patient c/o moderate cramping in her pelvic area that started last week. Pain is inconsistent in it's pattern. Pt has a hx of breast cancer, currently taking Tamoxifen, denies bleeding, no hx of uterine fibroids or ovarian cysts, no fevers. Pt states her prior gynecologist was Dr. Jose Stallworth of [facility], [phone]. Pt denies abnormal pap smears, no pelvic surgery/. Pt  with two miscarriages, first pregnancy at age 17, first menarche at age 11, LMP  when Tamoxifen initiated. Pt would like to see Dr. Monroy for evaluation. Patient's case reviewed with Dr. Monroy. Pt will be scheduled for 2019 at 3:15pm for examination in our office followed by a pelvic ultrasound in our imaging center. Pt aware of her plan of care. Pt will report to ER if severe pelvic pain ensues. ALISSON Hou.

## 2019-07-15 NOTE — TELEPHONE ENCOUNTER
----- Message from Alexsandra Parker sent at 7/15/2019  8:18 AM CDT -----  Contact: Pt  Name of Who is Calling:ANDRE SOLO [[id number]]    What is the request in detail: Patient would like a call back regarding abdominal pain she is in , patient states she was referred by Dr. Valladares . Please contact to further discuss and advise     Can the clinic reply by [facility]: No    [facility]: [phone]

## 2019-07-17 ENCOUNTER — OFFICE VISIT (OUTPATIENT)
Dept: OBSTETRICS AND GYNECOLOGY | Facility: CLINIC | Age: 54
End: 2019-07-17
Attending: OBSTETRICS & GYNECOLOGY
Payer: COMMERCIAL

## 2019-07-17 ENCOUNTER — HOSPITAL ENCOUNTER (OUTPATIENT)
Dept: RADIOLOGY | Facility: OTHER | Age: 54
Discharge: HOME | End: 2019-07-17
Attending: OBSTETRICS & GYNECOLOGY
Payer: COMMERCIAL

## 2019-07-17 VITALS
SYSTOLIC BLOOD PRESSURE: 128 MMHG | WEIGHT: 204.81 LBS | HEIGHT: 67 IN | DIASTOLIC BLOOD PRESSURE: 76 MMHG | BODY MASS INDEX: 32.15 KG/M2

## 2019-07-17 DIAGNOSIS — N89.8 VAGINAL DISCHARGE: ICD-10-CM

## 2019-07-17 DIAGNOSIS — R10.2 PELVIC PAIN IN FEMALE: ICD-10-CM

## 2019-07-17 DIAGNOSIS — Z12.4 SCREENING FOR MALIGNANT NEOPLASM OF CERVIX: Primary | ICD-10-CM

## 2019-07-17 DIAGNOSIS — Z85.3 HISTORY OF BREAST CANCER: ICD-10-CM

## 2019-07-17 DIAGNOSIS — R10.2 CHRONIC PELVIC PAIN IN FEMALE: ICD-10-CM

## 2019-07-17 DIAGNOSIS — C50.411 BREAST CANCER OF UPPER-OUTER QUADRANT OF RIGHT FEMALE BREAST: ICD-10-CM

## 2019-07-17 DIAGNOSIS — G89.29 CHRONIC PELVIC PAIN IN FEMALE: ICD-10-CM

## 2019-07-17 DIAGNOSIS — Z79.810 LONG-TERM CURRENT USE OF TAMOXIFEN: ICD-10-CM

## 2019-07-17 DIAGNOSIS — Z01.419 ENCOUNTER FOR GYNECOLOGICAL EXAMINATION WITHOUT ABNORMAL FINDING: ICD-10-CM

## 2019-07-17 PROCEDURE — 87624 HPV HI-RISK TYP POOLED RSLT: CPT

## 2019-07-17 PROCEDURE — 99386 PR PREVENTIVE VISIT,NEW,40-64: ICD-10-PCS | Mod: S$GLB,,, | Performed by: OBSTETRICS & GYNECOLOGY

## 2019-07-17 PROCEDURE — 76830 TRANSVAGINAL US NON-OB: CPT | Mod: TC

## 2019-07-17 PROCEDURE — 3078F PR MOST RECENT DIASTOLIC BLOOD PRESSURE < 80 MM HG: ICD-10-PCS | Mod: CPTII,S$GLB,, | Performed by: OBSTETRICS & GYNECOLOGY

## 2019-07-17 PROCEDURE — 3078F DIAST BP <80 MM HG: CPT | Mod: CPTII,S$GLB,, | Performed by: OBSTETRICS & GYNECOLOGY

## 2019-07-17 PROCEDURE — 87661 TRICHOMONAS VAGINALIS AMPLIF: CPT

## 2019-07-17 PROCEDURE — 99386 PREV VISIT NEW AGE 40-64: CPT | Mod: S$GLB,,, | Performed by: OBSTETRICS & GYNECOLOGY

## 2019-07-17 PROCEDURE — 76830 TRANSVAGINAL US NON-OB: CPT | Mod: 26,,, | Performed by: RADIOLOGY

## 2019-07-17 PROCEDURE — 76856 US PELVIS COMP WITH TRANSVAG NON-OB (XPD): ICD-10-PCS | Mod: 26,,, | Performed by: RADIOLOGY

## 2019-07-17 PROCEDURE — 88175 CYTOPATH C/V AUTO FLUID REDO: CPT

## 2019-07-17 PROCEDURE — 76830 US PELVIS COMP WITH TRANSVAG NON-OB (XPD): ICD-10-PCS | Mod: 26,,, | Performed by: RADIOLOGY

## 2019-07-17 PROCEDURE — 3074F PR MOST RECENT SYSTOLIC BLOOD PRESSURE < 130 MM HG: ICD-10-PCS | Mod: CPTII,S$GLB,, | Performed by: OBSTETRICS & GYNECOLOGY

## 2019-07-17 PROCEDURE — 76856 US EXAM PELVIC COMPLETE: CPT | Mod: 26,,, | Performed by: RADIOLOGY

## 2019-07-17 PROCEDURE — 87481 CANDIDA DNA AMP PROBE: CPT | Mod: 59

## 2019-07-17 PROCEDURE — 87801 DETECT AGNT MULT DNA AMPLI: CPT

## 2019-07-17 PROCEDURE — 3074F SYST BP LT 130 MM HG: CPT | Mod: CPTII,S$GLB,, | Performed by: OBSTETRICS & GYNECOLOGY

## 2019-07-17 NOTE — PROGRESS NOTES
SUBJECTIVE:   53 y.o. female   for annual routine Pap and to discuss pelvic pain. Patient's last menstrual period was 2015..  She reports having stomach cramps like my cycle wants to start.  This started about 1 week ago and has gotten progressively worse.  She reports the cramping now lasts all day and is in her lower abdomen up to her belly button.  She reports that radiates into her back.  She denies aggravating or alleviating factors.  She does report a slight discharge. She denies bleeding or burning with urination    Patient reports she recently was tested for menopause and Dr. Valladares has changed her from tamoxifen to aromatase inhibitor.  She has not yet started taking the aromatase inhibitor    Past Medical History:   Diagnosis Date    Breast cancer     Diabetes mellitus     Genetic testing 2013    VUS    Hyperlipidemia     Hypertension     Seizure disorder      Past Surgical History:   Procedure Laterality Date    BREAST BIOPSY      BREAST LUMPECTOMY Right     BREAST RECONSTRUCTION Bilateral     EYE SURGERY      REDUCTION-MAMMOPLASTY Bilateral Bilateral 10/5/2015    Performed by Robbin Walters MD at [facility] OR [facility]    REDUCTION-MAMMOPLASTY Left Breast Reduction Second Stage Breast Reconstruction Left 2016    Performed by Robbin Walters MD at [facility] OR 2[facility]     Social History     Socioeconomic History    Marital status:      Spouse name: Not on file    Number of children: Not on file    Years of education: Not on file    Highest education level: Not on file   Occupational History    Not on file   Social Needs    Financial resource strain: Not on file    Food insecurity:     Worry: Not on file     Inability: Not on file    Transportation needs:     Medical: Not on file     Non-medical: Not on file   Tobacco Use    Smoking status: Never Smoker    Smokeless tobacco: Never Used   Substance and Sexual Activity    Alcohol use: No      Alcohol/week: 0.0 oz    Drug use: No    Sexual activity: Yes     Partners: Male     Birth control/protection: None   Lifestyle    Physical activity:     Days per week: Not on file     Minutes per session: Not on file    Stress: Not on file   Relationships    Social connections:     Talks on phone: Not on file     Gets together: Not on file     Attends Alevism service: Not on file     Active member of club or organization: Not on file     Attends meetings of clubs or organizations: Not on file     Relationship status: Not on file   Other Topics Concern    Not on file   Social History Narrative    Not on file     Family History   Problem Relation Age of Onset    Seizures Father     Breast cancer Sister 48    Cancer Neg Hx     Ovarian cancer Neg Hx     Colon cancer Neg Hx      OB History    Para Term  AB Living   2 1 1   1 1   SAB TAB Ectopic Multiple Live Births   1              # Outcome Date GA Lbr Phani/2nd Weight Sex Delivery Anes PTL Lv   2 SAB            1 Term               Obstetric Comments   1st child at age 17; menarche at age 11; LMP in ; 1 living child, 2 miscarriages; natural birth           Current Outpatient Medications   Medication Sig Dispense Refill    amlodipine (NORVASC) 10 MG tablet Take 10 mg by mouth once daily.       atorvastatin (LIPITOR) 40 MG tablet Take 40 mg by mouth once daily.      enalapril (VASOTEC) 20 MG tablet Take 20 mg by mouth once daily.      glimepiride (AMARYL) 4 MG tablet Take 8 mg by mouth before breakfast.       hydrochlorothiazide (HYDRODIURIL) 25 MG tablet       insulin glargine (LANTUS) 100 unit/mL injection Inject 45 Units into the skin every evening. Takes 40 units nightly      lamoTRIgine (LAMICTAL) 25 MG tablet Take 25 mg by mouth 2 (two) times daily.      LANTUS SOLOSTAR 100 unit/mL (3 mL) InPn pen       letrozole (FEMARA) 2.5 mg Tab Take 1 tablet (2.5 mg total) by mouth once daily. 30 tablet 11    lorazepam (ATIVAN) 0.5 MG  tablet Take 0.5 mg by mouth every 12 (twelve) hours as needed.       metformin (GLUCOPHAGE) 1000 MG tablet Take 1,000 mg by mouth daily with breakfast.       NOVOLOG FLEXPEN 100 unit/mL InPn pen Inject 10 Units into the skin 2 (two) times daily with meals. With biggest meal      quetiapine (SEROQUEL) 25 MG Tab Take 25 mg by mouth once daily.        No current facility-administered medications for this visit.      Allergies: Codeine     The ASCVD Risk score (Margarita MILEY Jr., et al., 2013) failed to calculate for the following reasons:    Cannot find a previous HDL lab    Cannot find a previous total cholesterol lab      ROS:  Constitutional: no weight loss, weight gain, fever, fatigue  Eyes:  No vision changes, glasses/contacts  ENT/Mouth: No ulcers, sinus problems, ears ringing, headache  Cardiovascular: No inability to lie flat, chest pain, exercise intolerance, swelling, heart palpitations  Respiratory: No wheezing, coughing blood, shortness of breath, or cough  Gastrointestinal: No diarrhea, bloody stool, nausea/vomiting, constipation, gas, hemorrhoids  Genitourinary: No blood in urine, painful urination, urgency of urination, frequency of urination, incomplete emptying, incontinence, abnormal bleeding, painful periods, heavy periods, vaginal discharge, vaginal odor, painful intercourse, sexual problems, bleeding after intercourse, +pelvic pain.  Musculoskeletal: No muscle weakness  Skin/Breast: No painful breasts, nipple discharge, masses, rash, ulcers  Neurological: No passing out, seizures, numbness, headache  Endocrine: No diabetes, hypothyroid, hyperthyroid, hot flashes, hair loss, abnormal hair growth, acne  Psychiatric: No depression, crying  Hematologic: No bruises, bleeding, swollen lymph nodes, anemia.      Physical Exam:   Constitutional: She is oriented to person, place, and time. She appears well-developed and well-nourished.      Neck: Normal range of motion. No tracheal deviation present. No  thyromegaly present.    Cardiovascular: Exam reveals no edema.     Pulmonary/Chest: Effort normal.        Abdominal: Soft. She exhibits no distension and no mass. There is no tenderness. There is no rebound and no guarding. No hernia. Hernia confirmed negative in the left inguinal area.     Genitourinary: Vagina normal and uterus normal. Rectal exam shows no external hemorrhoid. There is no rash, tenderness or lesion on the right labia. There is no rash, tenderness or lesion on the left labia. Uterus is not deviated. Cervix is normal. No no adexnal prolapse. Right adnexum displays no mass, no tenderness and no fullness. Left adnexum displays no mass, no tenderness and no fullness. No tenderness, bleeding, rectocele, cystocele or unspecified prolapse of vaginal walls in the vagina. No vaginal discharge found. Cervix exhibits no motion tenderness, no discharge and no friability.           Musculoskeletal: Normal range of motion and moves all extremeties. She exhibits no edema.      Lymphadenopathy:        Right: No inguinal adenopathy present.        Left: No inguinal adenopathy present.    Neurological: She is alert and oriented to person, place, and time.    Skin: No rash noted. No erythema. No pallor.    Psychiatric: She has a normal mood and affect. Her behavior is normal. Judgment and thought content normal.           ASSESSMENT:   WWE  History of  breast cancer  Use of tamoxifen  Pelvic pain    PLAN:   Pap smear  Pelvic ultrasound

## 2019-07-19 ENCOUNTER — TELEPHONE (OUTPATIENT)
Dept: OBSTETRICS AND GYNECOLOGY | Facility: CLINIC | Age: 54
End: 2019-07-19

## 2019-07-19 LAB
BACTERIAL VAGINOSIS DNA: NEGATIVE
CANDIDA GLABRATA DNA: NEGATIVE
CANDIDA KRUSEI DNA: NEGATIVE
CANDIDA RRNA VAG QL PROBE: NEGATIVE
T VAGINALIS RRNA GENITAL QL PROBE: NEGATIVE

## 2019-07-19 NOTE — TELEPHONE ENCOUNTER
RN reviewed normal pelvic ultrasound results with patient. RN relayed Dr. Monroy's recommendations regarding follow-up with Dr. Chua, patient's PCP, regarding gastrointestinal cramping and lower abdominal pain that may be attributed to Metformin side effects. RN encouraged patient to maintain her wellness care and diabetic prevention screenings. RN offered her assistance in scheduling an appt with PCP. Pt noted she will make appt on her own for later next week. RN will fax Pelvic US report to PCP for review and Dr. Monroy's visit note for continuity purposes. RN will fax pap results to PCP once resulted.     RN discussed annual follow-up, prn if s/e from Letrozole ensue. ALISSON Hou

## 2019-07-23 LAB
HPV HR 12 DNA CVX QL NAA+PROBE: NEGATIVE
HPV16 AG SPEC QL: NEGATIVE
HPV18 DNA SPEC QL NAA+PROBE: NEGATIVE

## 2019-08-02 ENCOUNTER — TELEPHONE (OUTPATIENT)
Dept: HEMATOLOGY/ONCOLOGY | Facility: CLINIC | Age: 54
End: 2019-08-02

## 2019-08-02 NOTE — TELEPHONE ENCOUNTER
Returned call to patient. Spoke with patient about new medication she has been taking (letrozole). Pt said Dr Valladares said to call in to let him know how she was doing on the medication. Pt mentioned she has a UTI and also the medication has started to make her feel dizzy at times. She is concerned with all the possible side effects of this medication since she already has diabetes and eye issues. Pt is requesting a call from Dr Valladares to speak about starting her on a new medication as she doesn't think she would like to continue on this one.     ----- Message from Khloe Martin sent at 8/2/2019 11:19 AM CDT -----  Contact: Pt   Pt called to speak with nurse about medication have some questions   Callback#[phone]  Thank You  BARRY Martin

## 2019-08-09 ENCOUNTER — TELEPHONE (OUTPATIENT)
Dept: HEMATOLOGY/ONCOLOGY | Facility: CLINIC | Age: 54
End: 2019-08-09

## 2019-08-09 DIAGNOSIS — Z17.0 MALIGNANT NEOPLASM OF UPPER-OUTER QUADRANT OF RIGHT BREAST IN FEMALE, ESTROGEN RECEPTOR POSITIVE: Primary | ICD-10-CM

## 2019-08-09 DIAGNOSIS — C50.411 MALIGNANT NEOPLASM OF UPPER-OUTER QUADRANT OF RIGHT BREAST IN FEMALE, ESTROGEN RECEPTOR POSITIVE: Primary | ICD-10-CM

## 2019-08-09 RX ORDER — EXEMESTANE 25 MG/1
25 TABLET ORAL DAILY
Qty: 30 TABLET | Refills: 11 | Status: SHIPPED | OUTPATIENT
Start: 2019-08-09 | End: 2019-09-08

## 2019-08-09 NOTE — TELEPHONE ENCOUNTER
Spoke with patient to let her know that we are discontinuing her letrozole and will be starting her on a prescription call exemestane which works the same as letrozole, but may have less side effects for her. Pt verbalized understanding of this information and will let us know how she is doing once she starts the medication.       ----- Message from Keith Valladares MD sent at 8/9/2019  3:32 PM CDT -----  Contact: Anette Ricci  Let her know that I will send in some exemestane-works the same as letrozole but may have less side effects  ----- Message -----  From: Cata Obrien RN  Sent: 8/9/2019   1:59 PM  To: Keith Valladares MD    Is there something else you can prescribe for the patient since the letrozole doesn't seem to be working in her favor.     ----- Message -----  From: Bertha Mora  Sent: 8/9/2019   1:41 PM  To: [facility] Staff    Needs Advice    Reason for call:  - currently taking Letrozole and its making the pt feel bad. She was told to contact the Md and update us on her condition but unfortunately it hasn't improved since the initial contact 8/2. She would like a f/u and obtain the alternative medication.         Communication Preference: [phone].     Additional Information:  Pt states this is urgent  Skype message - unresponsive.

## 2019-08-19 ENCOUNTER — TELEPHONE (OUTPATIENT)
Dept: OBSTETRICS AND GYNECOLOGY | Facility: CLINIC | Age: 54
End: 2019-08-19

## 2019-08-19 NOTE — TELEPHONE ENCOUNTER
Pelvic Ultrasound, Pap smear, HPV and Vag Panel results reviewed with patient, all of which are negative.     RN discussed that although pelvic US normal, f/u recommended with GI/PCP for further evaluation of cramping if it persists.     Patient states she will call if re-appear so referral to GI can be initiated. Patient states she is not interested at this time as she is not symptomatic.     Patient expressed her appreciation for Dr. Monroy and team. Patient will return annually and prn. ALISSON Hou

## 2019-11-27 ENCOUNTER — TELEPHONE (OUTPATIENT)
Dept: RADIOLOGY | Facility: OTHER | Age: 54
End: 2019-11-27

## 2019-11-27 NOTE — TELEPHONE ENCOUNTER
Patient has not called insurance to check coverage for diagnostic mammo but would like to keep appointment as diagnostic and deal with any additional charges if necessary.

## 2019-12-02 ENCOUNTER — HOSPITAL ENCOUNTER (OUTPATIENT)
Dept: RADIOLOGY | Facility: OTHER | Age: 54
Discharge: HOME | End: 2019-12-02
Attending: INTERNAL MEDICINE
Payer: COMMERCIAL

## 2019-12-02 DIAGNOSIS — C50.411 MALIGNANT NEOPLASM OF UPPER-OUTER QUADRANT OF RIGHT BREAST IN FEMALE, ESTROGEN RECEPTOR POSITIVE: ICD-10-CM

## 2019-12-02 DIAGNOSIS — Z17.0 MALIGNANT NEOPLASM OF UPPER-OUTER QUADRANT OF RIGHT BREAST IN FEMALE, ESTROGEN RECEPTOR POSITIVE: ICD-10-CM

## 2019-12-02 PROCEDURE — 77062 BREAST TOMOSYNTHESIS BI: CPT | Mod: TC

## 2019-12-02 PROCEDURE — 77062 MAMMO DIGITAL DIAGNOSTIC BILAT WITH TOMOSYNTHESIS_CAD: ICD-10-PCS | Mod: 26,,, | Performed by: RADIOLOGY

## 2019-12-02 PROCEDURE — 77066 DX MAMMO INCL CAD BI: CPT | Mod: 26,,, | Performed by: RADIOLOGY

## 2019-12-02 PROCEDURE — 77062 BREAST TOMOSYNTHESIS BI: CPT | Mod: 26,,, | Performed by: RADIOLOGY

## 2019-12-02 PROCEDURE — 77066 MAMMO DIGITAL DIAGNOSTIC BILAT WITH TOMOSYNTHESIS_CAD: ICD-10-PCS | Mod: 26,,, | Performed by: RADIOLOGY

## 2019-12-05 ENCOUNTER — OFFICE VISIT (OUTPATIENT)
Dept: HEMATOLOGY/ONCOLOGY | Facility: CLINIC | Age: 54
End: 2019-12-05
Attending: INTERNAL MEDICINE
Payer: COMMERCIAL

## 2019-12-05 VITALS
DIASTOLIC BLOOD PRESSURE: 60 MMHG | BODY MASS INDEX: 31.52 KG/M2 | SYSTOLIC BLOOD PRESSURE: 117 MMHG | RESPIRATION RATE: 16 BRPM | HEART RATE: 93 BPM | HEIGHT: 67 IN | WEIGHT: 200.81 LBS | TEMPERATURE: 98 F | OXYGEN SATURATION: 99 %

## 2019-12-05 DIAGNOSIS — C50.411 MALIGNANT NEOPLASM OF UPPER-OUTER QUADRANT OF RIGHT BREAST IN FEMALE, ESTROGEN RECEPTOR POSITIVE: Primary | ICD-10-CM

## 2019-12-05 DIAGNOSIS — Z17.0 MALIGNANT NEOPLASM OF UPPER-OUTER QUADRANT OF RIGHT BREAST IN FEMALE, ESTROGEN RECEPTOR POSITIVE: Primary | ICD-10-CM

## 2019-12-05 PROCEDURE — 3074F SYST BP LT 130 MM HG: CPT | Mod: CPTII,S$GLB,, | Performed by: INTERNAL MEDICINE

## 2019-12-05 PROCEDURE — 99999 PR PBB SHADOW E&M-EST. PATIENT-LVL IV: ICD-10-PCS | Mod: PBBFAC,,, | Performed by: INTERNAL MEDICINE

## 2019-12-05 PROCEDURE — 3078F PR MOST RECENT DIASTOLIC BLOOD PRESSURE < 80 MM HG: ICD-10-PCS | Mod: CPTII,S$GLB,, | Performed by: INTERNAL MEDICINE

## 2019-12-05 PROCEDURE — 3078F DIAST BP <80 MM HG: CPT | Mod: CPTII,S$GLB,, | Performed by: INTERNAL MEDICINE

## 2019-12-05 PROCEDURE — 99213 OFFICE O/P EST LOW 20 MIN: CPT | Mod: S$GLB,,, | Performed by: INTERNAL MEDICINE

## 2019-12-05 PROCEDURE — 99213 PR OFFICE/OUTPT VISIT, EST, LEVL III, 20-29 MIN: ICD-10-PCS | Mod: S$GLB,,, | Performed by: INTERNAL MEDICINE

## 2019-12-05 PROCEDURE — 3074F PR MOST RECENT SYSTOLIC BLOOD PRESSURE < 130 MM HG: ICD-10-PCS | Mod: CPTII,S$GLB,, | Performed by: INTERNAL MEDICINE

## 2019-12-05 PROCEDURE — 3008F BODY MASS INDEX DOCD: CPT | Mod: CPTII,S$GLB,, | Performed by: INTERNAL MEDICINE

## 2019-12-05 PROCEDURE — 99999 PR PBB SHADOW E&M-EST. PATIENT-LVL IV: CPT | Mod: PBBFAC,,, | Performed by: INTERNAL MEDICINE

## 2019-12-05 PROCEDURE — 3008F PR BODY MASS INDEX (BMI) DOCUMENTED: ICD-10-PCS | Mod: CPTII,S$GLB,, | Performed by: INTERNAL MEDICINE

## 2019-12-05 RX ORDER — EXEMESTANE 25 MG/1
25 TABLET ORAL DAILY
Refills: 11 | COMMUNITY
Start: 2019-11-07 | End: 2020-09-04

## 2019-12-05 RX ORDER — SUCRALFATE 1 G/1
2 TABLET ORAL
COMMUNITY
Start: 2019-08-04 | End: 2020-08-03

## 2019-12-05 RX ORDER — IBUPROFEN 800 MG/1
TABLET ORAL
Refills: 0 | COMMUNITY
Start: 2019-10-08 | End: 2021-04-08

## 2019-12-05 NOTE — PROGRESS NOTES
Subjective:       Patient ID: Anette Ricci is a 54 y.o. female.    Chief Complaint: No chief complaint on file.    HPI  Ms. Ricci 54-year-old female seen for follow-up of carcinoma of the right breast.  She has a strongly ER and KS positive T2 N0 tumor.  She is on adjuvant letrozole endocrine therapy.      Physically, she has been feeling well.  She has some occasional discomfort in her right axilla.    Her son passed away recently and she has been emotionally down from that.    History: She noted an abnormality in  the right in her right breast  and had some additional imaging including MRI.  She had a MRI guided core needle biopsy on April 5 and then underwent lumpectomy and sentinel lymph node biopsy by Dr. Keith Chauhan on April 25, 2013.  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  4 sentinel lymph nodes were removed and all were negative for malignancy.  Margins were negative.  The tumor was ER +90% KS +90% and HER-2 negative by FISH.  Pathological stage TII N0 stage IIA   She underwent Oncotype testing which showed a low Oncotype score of 10.    She began tamoxifen therapy in May of 2013.    She was changed to letrozole in July 2019.  Review of Systems   Constitutional: Negative for fatigue, fever and unexpected weight change.   Respiratory: Negative for cough and shortness of breath.    Cardiovascular: Negative for chest pain.   Gastrointestinal: Negative for abdominal pain and diarrhea.   Musculoskeletal: Negative for back pain and neck pain.   Psychiatric/Behavioral: Positive for dysphoric mood. The patient is not nervous/anxious.        Objective:      Physical Exam   Constitutional: She appears well-developed and well-nourished. No distress.   HENT:   Mouth/Throat: Oropharynx is clear and moist. No oropharyngeal exudate.   Eyes: No scleral icterus.   Cardiovascular: Normal rate and regular rhythm.   Pulmonary/Chest:  Effort normal and breath sounds normal. She has no wheezes. She has no rales. Right breast exhibits skin change. Right breast exhibits no mass and no nipple discharge. Left breast exhibits no mass, no nipple discharge and no skin change.       Abdominal: Soft. There is no tenderness.   Lymphadenopathy:     She has no cervical adenopathy.   Psychiatric: She has a normal mood and affect. Her behavior is normal. Thought content normal.       Assessment:     mammogram on September 2nd was unremarkable.  1. Malignant neoplasm of upper-outer quadrant of right breast in female, estrogen receptor positive        Plan:        Continue adjuvant letrozole therapy and return to clinic in 6 months.

## 2020-07-01 NOTE — PROGRESS NOTES
Subjective:       Patient ID: Anette Ricci is a 54 y.o. female.    Chief Complaint: No chief complaint on file.    HPI  Ms. Ricci 54-year-old female seen for follow-up of carcinoma of the right breast.  She has a strongly ER and MN positive T2 N0 tumor.  She is on adjuvant letrozole endocrine therapy.    She notes some occasional right breast pain.  She has been working on getting her diabetes under better control.      History: She noted an abnormality in  the right in her right breast  and had some additional imaging including MRI.  She had a MRI guided core needle biopsy on April 5 and then underwent lumpectomy and sentinel lymph node biopsy by Dr. Keith Chauhan on April 25, 2013.  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  4 sentinel lymph nodes were removed and all were negative for malignancy.  Margins were negative.  The tumor was ER +90% MN +90% and HER-2 negative by FISH.  Pathological stage TII N0 stage IIA   She underwent Oncotype testing which showed a low Oncotype score of 10.    She began tamoxifen therapy in May of 2013.    She was changed to letrozole in July 2019.  Review of Systems   Constitutional: Negative for fatigue, fever and unexpected weight change.   Respiratory: Negative for cough and shortness of breath.    Cardiovascular: Negative for chest pain.   Gastrointestinal: Negative for abdominal pain and diarrhea.   Musculoskeletal: Negative for back pain and neck pain.   Psychiatric/Behavioral: Negative for dysphoric mood. The patient is not nervous/anxious.        Objective:      Physical Exam  Constitutional:       General: She is not in acute distress.     Appearance: She is well-developed.   Eyes:      General: No scleral icterus.  Cardiovascular:      Rate and Rhythm: Normal rate and regular rhythm.   Pulmonary:      Effort: Pulmonary effort is normal.      Breath sounds: Normal breath sounds. No  wheezing or rales.   Chest:      Breasts:         Right: Skin change present. No mass or nipple discharge.         Left: No mass, nipple discharge or skin change.       Abdominal:      Palpations: Abdomen is soft.      Tenderness: There is no abdominal tenderness.   Lymphadenopathy:      Cervical: No cervical adenopathy.      Right cervical: No superficial or deep cervical adenopathy.     Left cervical: No superficial or deep cervical adenopathy.   Psychiatric:         Behavior: Behavior normal.         Thought Content: Thought content normal.         Assessment:      1. Malignant neoplasm of upper-outer quadrant of right breast in female, estrogen receptor positive        Plan:        Continue adjuvant letrozole therapy and return to clinic in 63months.  She will be due for mammograms in September.    Will plan to consider testing of her tumor in 2021 to determine whether more than 2 years of additional aromatase inhibitor therapy is required.

## 2020-07-02 ENCOUNTER — OFFICE VISIT (OUTPATIENT)
Dept: HEMATOLOGY/ONCOLOGY | Facility: CLINIC | Age: 55
End: 2020-07-02
Attending: INTERNAL MEDICINE
Payer: COMMERCIAL

## 2020-07-02 VITALS
RESPIRATION RATE: 18 BRPM | BODY MASS INDEX: 30.72 KG/M2 | WEIGHT: 195.75 LBS | HEIGHT: 67 IN | TEMPERATURE: 99 F | SYSTOLIC BLOOD PRESSURE: 117 MMHG | OXYGEN SATURATION: 100 % | DIASTOLIC BLOOD PRESSURE: 63 MMHG | HEART RATE: 80 BPM

## 2020-07-02 DIAGNOSIS — C50.411 MALIGNANT NEOPLASM OF UPPER-OUTER QUADRANT OF RIGHT BREAST IN FEMALE, ESTROGEN RECEPTOR POSITIVE: Primary | ICD-10-CM

## 2020-07-02 DIAGNOSIS — Z17.0 MALIGNANT NEOPLASM OF UPPER-OUTER QUADRANT OF RIGHT BREAST IN FEMALE, ESTROGEN RECEPTOR POSITIVE: Primary | ICD-10-CM

## 2020-07-02 PROCEDURE — 99213 PR OFFICE/OUTPT VISIT, EST, LEVL III, 20-29 MIN: ICD-10-PCS | Mod: S$GLB,,, | Performed by: INTERNAL MEDICINE

## 2020-07-02 PROCEDURE — 3008F BODY MASS INDEX DOCD: CPT | Mod: CPTII,S$GLB,, | Performed by: INTERNAL MEDICINE

## 2020-07-02 PROCEDURE — 3078F DIAST BP <80 MM HG: CPT | Mod: CPTII,S$GLB,, | Performed by: INTERNAL MEDICINE

## 2020-07-02 PROCEDURE — 99999 PR PBB SHADOW E&M-EST. PATIENT-LVL IV: ICD-10-PCS | Mod: PBBFAC,,, | Performed by: INTERNAL MEDICINE

## 2020-07-02 PROCEDURE — 3008F PR BODY MASS INDEX (BMI) DOCUMENTED: ICD-10-PCS | Mod: CPTII,S$GLB,, | Performed by: INTERNAL MEDICINE

## 2020-07-02 PROCEDURE — 99213 OFFICE O/P EST LOW 20 MIN: CPT | Mod: S$GLB,,, | Performed by: INTERNAL MEDICINE

## 2020-07-02 PROCEDURE — 3074F PR MOST RECENT SYSTOLIC BLOOD PRESSURE < 130 MM HG: ICD-10-PCS | Mod: CPTII,S$GLB,, | Performed by: INTERNAL MEDICINE

## 2020-07-02 PROCEDURE — 3078F PR MOST RECENT DIASTOLIC BLOOD PRESSURE < 80 MM HG: ICD-10-PCS | Mod: CPTII,S$GLB,, | Performed by: INTERNAL MEDICINE

## 2020-07-02 PROCEDURE — 99999 PR PBB SHADOW E&M-EST. PATIENT-LVL IV: CPT | Mod: PBBFAC,,, | Performed by: INTERNAL MEDICINE

## 2020-07-02 PROCEDURE — 3074F SYST BP LT 130 MM HG: CPT | Mod: CPTII,S$GLB,, | Performed by: INTERNAL MEDICINE

## 2020-07-02 RX ORDER — DULAGLUTIDE 0.75 MG/.5ML
0.75 INJECTION, SOLUTION SUBCUTANEOUS
COMMUNITY
End: 2024-02-22

## 2020-10-06 ENCOUNTER — HOSPITAL ENCOUNTER (OUTPATIENT)
Dept: RADIOLOGY | Facility: OTHER | Age: 55
Discharge: HOME | End: 2020-10-06
Attending: INTERNAL MEDICINE
Payer: COMMERCIAL

## 2020-10-06 DIAGNOSIS — Z17.0 MALIGNANT NEOPLASM OF UPPER-OUTER QUADRANT OF RIGHT BREAST IN FEMALE, ESTROGEN RECEPTOR POSITIVE: ICD-10-CM

## 2020-10-06 DIAGNOSIS — Z12.31 BREAST CANCER SCREENING BY MAMMOGRAM: ICD-10-CM

## 2020-10-06 DIAGNOSIS — C50.411 MALIGNANT NEOPLASM OF UPPER-OUTER QUADRANT OF RIGHT BREAST IN FEMALE, ESTROGEN RECEPTOR POSITIVE: ICD-10-CM

## 2020-10-06 PROCEDURE — 77063 BREAST TOMOSYNTHESIS BI: CPT | Mod: 26,,, | Performed by: RADIOLOGY

## 2020-10-06 PROCEDURE — 77067 SCR MAMMO BI INCL CAD: CPT | Mod: TC

## 2020-10-06 PROCEDURE — 77067 SCR MAMMO BI INCL CAD: CPT | Mod: 26,,, | Performed by: RADIOLOGY

## 2020-10-06 PROCEDURE — 77063 MAMMO DIGITAL SCREENING BILAT WITH TOMO: ICD-10-PCS | Mod: 26,,, | Performed by: RADIOLOGY

## 2020-10-06 PROCEDURE — 77067 MAMMO DIGITAL SCREENING BILAT WITH TOMO: ICD-10-PCS | Mod: 26,,, | Performed by: RADIOLOGY

## 2020-10-07 NOTE — PROGRESS NOTES
Subjective:       Patient ID: Anette Ricci is a 54 y.o. female.    Chief Complaint: No chief complaint on file.    HPI  Ms. Ricci 54-year-old female seen for follow-up of carcinoma of the right breast.  She has a strongly ER and VT positive T2 N0 tumor.  She is on adjuvant letrozole endocrine therapy.    Her major complaint is chronic right breast tenderness.  She occasionally gets some scabbing along her right inferior breast scar.      History: She noted an abnormality in  the right in her right breast  and had some additional imaging including MRI.  She had a MRI guided core needle biopsy on April 5 and then underwent lumpectomy and sentinel lymph node biopsy by Dr. Keith Chauhan on April 25, 2013.  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  4 sentinel lymph nodes were removed and all were negative for malignancy.  Margins were negative.  The tumor was ER +90% VT +90% and HER-2 negative by FISH.  Pathological stage TII N0 stage IIA   She underwent Oncotype testing which showed a low Oncotype score of 10.    She began tamoxifen therapy in May of 2013.    She was changed to letrozole in July 2019.  Review of Systems   Constitutional: Negative for fatigue, fever and unexpected weight change.   Respiratory: Negative for cough and shortness of breath.    Cardiovascular: Negative for chest pain.   Gastrointestinal: Negative for abdominal pain and diarrhea.   Musculoskeletal: Negative for back pain and neck pain.   Psychiatric/Behavioral: Negative for dysphoric mood. The patient is not nervous/anxious.        Objective:      Physical Exam  Constitutional:       General: She is not in acute distress.     Appearance: She is well-developed.   Cardiovascular:      Rate and Rhythm: Normal rate and regular rhythm.   Pulmonary:      Effort: Pulmonary effort is normal.      Breath sounds: Normal breath sounds. No wheezing or rales.   Chest:       Breasts:         Right: Skin change present. No mass or nipple discharge.         Left: No mass, nipple discharge or skin change.       Abdominal:      Palpations: Abdomen is soft.      Tenderness: There is no abdominal tenderness.   Lymphadenopathy:      Cervical: No cervical adenopathy.      Right cervical: No superficial or deep cervical adenopathy.     Left cervical: No superficial or deep cervical adenopathy.   Psychiatric:         Behavior: Behavior normal.         Thought Content: Thought content normal.         Assessment:    mammogram - negative  1. Malignant neoplasm of upper-outer quadrant of right breast in female, estrogen receptor positive        Plan:          Continue letrozole and RTC 6 months.  Will plan to consider testing of her tumor in 2021 to determine whether more than 2 years of additional aromatase inhibitor therapy is required.

## 2020-10-08 ENCOUNTER — OFFICE VISIT (OUTPATIENT)
Dept: HEMATOLOGY/ONCOLOGY | Facility: CLINIC | Age: 55
End: 2020-10-08
Attending: INTERNAL MEDICINE
Payer: COMMERCIAL

## 2020-10-08 VITALS
HEART RATE: 85 BPM | BODY MASS INDEX: 30.27 KG/M2 | SYSTOLIC BLOOD PRESSURE: 134 MMHG | WEIGHT: 192.88 LBS | TEMPERATURE: 97 F | RESPIRATION RATE: 16 BRPM | DIASTOLIC BLOOD PRESSURE: 78 MMHG | HEIGHT: 67 IN | OXYGEN SATURATION: 100 %

## 2020-10-08 DIAGNOSIS — Z17.0 MALIGNANT NEOPLASM OF UPPER-OUTER QUADRANT OF RIGHT BREAST IN FEMALE, ESTROGEN RECEPTOR POSITIVE: Primary | ICD-10-CM

## 2020-10-08 DIAGNOSIS — C50.411 MALIGNANT NEOPLASM OF UPPER-OUTER QUADRANT OF RIGHT BREAST IN FEMALE, ESTROGEN RECEPTOR POSITIVE: Primary | ICD-10-CM

## 2020-10-08 PROCEDURE — 3078F DIAST BP <80 MM HG: CPT | Mod: CPTII,S$GLB,, | Performed by: INTERNAL MEDICINE

## 2020-10-08 PROCEDURE — 99213 OFFICE O/P EST LOW 20 MIN: CPT | Mod: S$GLB,,, | Performed by: INTERNAL MEDICINE

## 2020-10-08 PROCEDURE — 99213 PR OFFICE/OUTPT VISIT, EST, LEVL III, 20-29 MIN: ICD-10-PCS | Mod: S$GLB,,, | Performed by: INTERNAL MEDICINE

## 2020-10-08 PROCEDURE — 3075F SYST BP GE 130 - 139MM HG: CPT | Mod: CPTII,S$GLB,, | Performed by: INTERNAL MEDICINE

## 2020-10-08 PROCEDURE — 3075F PR MOST RECENT SYSTOLIC BLOOD PRESS GE 130-139MM HG: ICD-10-PCS | Mod: CPTII,S$GLB,, | Performed by: INTERNAL MEDICINE

## 2020-10-08 PROCEDURE — 3078F PR MOST RECENT DIASTOLIC BLOOD PRESSURE < 80 MM HG: ICD-10-PCS | Mod: CPTII,S$GLB,, | Performed by: INTERNAL MEDICINE

## 2020-10-08 PROCEDURE — 3008F PR BODY MASS INDEX (BMI) DOCUMENTED: ICD-10-PCS | Mod: CPTII,S$GLB,, | Performed by: INTERNAL MEDICINE

## 2020-10-08 PROCEDURE — 99999 PR PBB SHADOW E&M-EST. PATIENT-LVL IV: ICD-10-PCS | Mod: PBBFAC,,, | Performed by: INTERNAL MEDICINE

## 2020-10-08 PROCEDURE — 99999 PR PBB SHADOW E&M-EST. PATIENT-LVL IV: CPT | Mod: PBBFAC,,, | Performed by: INTERNAL MEDICINE

## 2020-10-08 PROCEDURE — 3008F BODY MASS INDEX DOCD: CPT | Mod: CPTII,S$GLB,, | Performed by: INTERNAL MEDICINE

## 2020-10-08 RX ORDER — INSULIN GLARGINE 300 U/ML
60 INJECTION, SOLUTION SUBCUTANEOUS NIGHTLY
Status: ON HOLD | COMMUNITY
End: 2024-03-30 | Stop reason: HOSPADM

## 2021-04-05 ENCOUNTER — TELEPHONE (OUTPATIENT)
Dept: HEMATOLOGY/ONCOLOGY | Facility: CLINIC | Age: 56
End: 2021-04-05

## 2021-04-08 ENCOUNTER — OFFICE VISIT (OUTPATIENT)
Dept: HEMATOLOGY/ONCOLOGY | Facility: CLINIC | Age: 56
End: 2021-04-08
Attending: INTERNAL MEDICINE
Payer: COMMERCIAL

## 2021-04-08 VITALS
HEART RATE: 93 BPM | BODY MASS INDEX: 31.11 KG/M2 | WEIGHT: 198.19 LBS | HEIGHT: 67 IN | DIASTOLIC BLOOD PRESSURE: 75 MMHG | TEMPERATURE: 98 F | RESPIRATION RATE: 16 BRPM | SYSTOLIC BLOOD PRESSURE: 138 MMHG | OXYGEN SATURATION: 99 %

## 2021-04-08 DIAGNOSIS — Z17.0 MALIGNANT NEOPLASM OF UPPER-OUTER QUADRANT OF RIGHT BREAST IN FEMALE, ESTROGEN RECEPTOR POSITIVE: Primary | ICD-10-CM

## 2021-04-08 DIAGNOSIS — C50.411 MALIGNANT NEOPLASM OF UPPER-OUTER QUADRANT OF RIGHT BREAST IN FEMALE, ESTROGEN RECEPTOR POSITIVE: Primary | ICD-10-CM

## 2021-04-08 PROCEDURE — 1125F PR PAIN SEVERITY QUANTIFIED, PAIN PRESENT: ICD-10-PCS | Mod: S$GLB,,, | Performed by: INTERNAL MEDICINE

## 2021-04-08 PROCEDURE — 3008F BODY MASS INDEX DOCD: CPT | Mod: CPTII,S$GLB,, | Performed by: INTERNAL MEDICINE

## 2021-04-08 PROCEDURE — 99213 PR OFFICE/OUTPT VISIT, EST, LEVL III, 20-29 MIN: ICD-10-PCS | Mod: S$GLB,,, | Performed by: INTERNAL MEDICINE

## 2021-04-08 PROCEDURE — 99999 PR PBB SHADOW E&M-EST. PATIENT-LVL IV: CPT | Mod: PBBFAC,,, | Performed by: INTERNAL MEDICINE

## 2021-04-08 PROCEDURE — 3008F PR BODY MASS INDEX (BMI) DOCUMENTED: ICD-10-PCS | Mod: CPTII,S$GLB,, | Performed by: INTERNAL MEDICINE

## 2021-04-08 PROCEDURE — 1125F AMNT PAIN NOTED PAIN PRSNT: CPT | Mod: S$GLB,,, | Performed by: INTERNAL MEDICINE

## 2021-04-08 PROCEDURE — 99999 PR PBB SHADOW E&M-EST. PATIENT-LVL IV: ICD-10-PCS | Mod: PBBFAC,,, | Performed by: INTERNAL MEDICINE

## 2021-04-08 PROCEDURE — 99213 OFFICE O/P EST LOW 20 MIN: CPT | Mod: S$GLB,,, | Performed by: INTERNAL MEDICINE

## 2021-04-12 ENCOUNTER — TELEPHONE (OUTPATIENT)
Dept: HEMATOLOGY/ONCOLOGY | Facility: CLINIC | Age: 56
End: 2021-04-12

## 2021-04-26 ENCOUNTER — PATIENT MESSAGE (OUTPATIENT)
Dept: RESEARCH | Facility: HOSPITAL | Age: 56
End: 2021-04-26

## 2021-04-28 ENCOUNTER — TELEPHONE (OUTPATIENT)
Dept: HEMATOLOGY/ONCOLOGY | Facility: CLINIC | Age: 56
End: 2021-04-28

## 2021-06-03 ENCOUNTER — DOCUMENTATION ONLY (OUTPATIENT)
Dept: HEMATOLOGY/ONCOLOGY | Facility: CLINIC | Age: 56
End: 2021-06-03

## 2021-10-08 ENCOUNTER — HOSPITAL ENCOUNTER (OUTPATIENT)
Dept: RADIOLOGY | Facility: OTHER | Age: 56
Discharge: HOME | End: 2021-10-08
Attending: INTERNAL MEDICINE
Payer: COMMERCIAL

## 2021-10-08 DIAGNOSIS — N64.4 BREAST PAIN, RIGHT: ICD-10-CM

## 2021-10-08 DIAGNOSIS — C50.411 MALIGNANT NEOPLASM OF UPPER-OUTER QUADRANT OF RIGHT BREAST IN FEMALE, ESTROGEN RECEPTOR POSITIVE: ICD-10-CM

## 2021-10-08 DIAGNOSIS — Z17.0 MALIGNANT NEOPLASM OF UPPER-OUTER QUADRANT OF RIGHT BREAST IN FEMALE, ESTROGEN RECEPTOR POSITIVE: ICD-10-CM

## 2021-10-08 DIAGNOSIS — Z12.31 BREAST CANCER SCREENING BY MAMMOGRAM: ICD-10-CM

## 2021-10-18 ENCOUNTER — OFFICE VISIT (OUTPATIENT)
Dept: HEMATOLOGY/ONCOLOGY | Facility: CLINIC | Age: 56
End: 2021-10-18
Attending: INTERNAL MEDICINE
Payer: COMMERCIAL

## 2021-10-18 ENCOUNTER — HOSPITAL ENCOUNTER (OUTPATIENT)
Dept: RADIOLOGY | Facility: OTHER | Age: 56
Discharge: HOME | End: 2021-10-18
Attending: INTERNAL MEDICINE
Payer: COMMERCIAL

## 2021-10-18 VITALS
OXYGEN SATURATION: 100 % | HEART RATE: 76 BPM | RESPIRATION RATE: 18 BRPM | TEMPERATURE: 98 F | HEIGHT: 67 IN | SYSTOLIC BLOOD PRESSURE: 183 MMHG | DIASTOLIC BLOOD PRESSURE: 75 MMHG | BODY MASS INDEX: 31.63 KG/M2 | WEIGHT: 201.5 LBS

## 2021-10-18 DIAGNOSIS — Z17.0 MALIGNANT NEOPLASM OF UPPER-OUTER QUADRANT OF RIGHT BREAST IN FEMALE, ESTROGEN RECEPTOR POSITIVE: ICD-10-CM

## 2021-10-18 DIAGNOSIS — Z17.0 MALIGNANT NEOPLASM OF UPPER-OUTER QUADRANT OF RIGHT BREAST IN FEMALE, ESTROGEN RECEPTOR POSITIVE: Primary | ICD-10-CM

## 2021-10-18 DIAGNOSIS — N64.4 BREAST PAIN, RIGHT: ICD-10-CM

## 2021-10-18 DIAGNOSIS — C50.411 MALIGNANT NEOPLASM OF UPPER-OUTER QUADRANT OF RIGHT BREAST IN FEMALE, ESTROGEN RECEPTOR POSITIVE: ICD-10-CM

## 2021-10-18 DIAGNOSIS — C50.411 MALIGNANT NEOPLASM OF UPPER-OUTER QUADRANT OF RIGHT BREAST IN FEMALE, ESTROGEN RECEPTOR POSITIVE: Primary | ICD-10-CM

## 2021-10-18 PROCEDURE — 99213 PR OFFICE/OUTPT VISIT, EST, LEVL III, 20-29 MIN: ICD-10-PCS | Mod: S$GLB,,, | Performed by: INTERNAL MEDICINE

## 2021-10-18 PROCEDURE — 77066 DX MAMMO INCL CAD BI: CPT | Mod: TC

## 2021-10-18 PROCEDURE — 99999 PR PBB SHADOW E&M-EST. PATIENT-LVL III: CPT | Mod: PBBFAC,,, | Performed by: INTERNAL MEDICINE

## 2021-10-18 PROCEDURE — 76642 ULTRASOUND BREAST LIMITED: CPT | Mod: TC,RT

## 2021-10-18 PROCEDURE — 99213 OFFICE O/P EST LOW 20 MIN: CPT | Mod: S$GLB,,, | Performed by: INTERNAL MEDICINE

## 2021-10-18 PROCEDURE — 77062 MAMMO DIGITAL DIAGNOSTIC BILAT WITH TOMO: ICD-10-PCS | Mod: 26,,, | Performed by: RADIOLOGY

## 2021-10-18 PROCEDURE — 77062 BREAST TOMOSYNTHESIS BI: CPT | Mod: 26,,, | Performed by: RADIOLOGY

## 2021-10-18 PROCEDURE — 76642 US BREAST RIGHT LIMITED: ICD-10-PCS | Mod: 26,RT,, | Performed by: RADIOLOGY

## 2021-10-18 PROCEDURE — 99999 PR PBB SHADOW E&M-EST. PATIENT-LVL III: ICD-10-PCS | Mod: PBBFAC,,, | Performed by: INTERNAL MEDICINE

## 2021-10-18 PROCEDURE — 77066 MAMMO DIGITAL DIAGNOSTIC BILAT WITH TOMO: ICD-10-PCS | Mod: 26,,, | Performed by: RADIOLOGY

## 2021-10-18 PROCEDURE — 99213 OFFICE O/P EST LOW 20 MIN: CPT | Mod: PBBFAC | Performed by: INTERNAL MEDICINE

## 2021-10-18 PROCEDURE — 77066 DX MAMMO INCL CAD BI: CPT | Mod: 26,,, | Performed by: RADIOLOGY

## 2021-10-18 PROCEDURE — 76642 ULTRASOUND BREAST LIMITED: CPT | Mod: 26,RT,, | Performed by: RADIOLOGY

## 2021-12-01 ENCOUNTER — OFFICE VISIT (OUTPATIENT)
Dept: OBSTETRICS AND GYNECOLOGY | Facility: CLINIC | Age: 56
End: 2021-12-01
Attending: INTERNAL MEDICINE
Payer: COMMERCIAL

## 2021-12-01 VITALS
HEIGHT: 67 IN | BODY MASS INDEX: 31.49 KG/M2 | WEIGHT: 200.63 LBS | SYSTOLIC BLOOD PRESSURE: 122 MMHG | DIASTOLIC BLOOD PRESSURE: 70 MMHG

## 2021-12-01 DIAGNOSIS — N95.0 POSTMENOPAUSAL BLEEDING: ICD-10-CM

## 2021-12-01 DIAGNOSIS — N89.8 VAGINAL DISCHARGE: ICD-10-CM

## 2021-12-01 DIAGNOSIS — Z00.00 ANNUAL PHYSICAL EXAM: Primary | ICD-10-CM

## 2021-12-01 DIAGNOSIS — Z12.4 PAP SMEAR FOR CERVICAL CANCER SCREENING: ICD-10-CM

## 2021-12-01 PROCEDURE — 99396 PREV VISIT EST AGE 40-64: CPT | Mod: S$GLB,,, | Performed by: OBSTETRICS & GYNECOLOGY

## 2021-12-01 PROCEDURE — 88175 CYTOPATH C/V AUTO FLUID REDO: CPT | Performed by: OBSTETRICS & GYNECOLOGY

## 2021-12-01 PROCEDURE — 87624 HPV HI-RISK TYP POOLED RSLT: CPT | Performed by: OBSTETRICS & GYNECOLOGY

## 2021-12-01 PROCEDURE — 99396 PR PREVENTIVE VISIT,EST,40-64: ICD-10-PCS | Mod: S$GLB,,, | Performed by: OBSTETRICS & GYNECOLOGY

## 2021-12-01 PROCEDURE — 99999 PR PBB SHADOW E&M-EST. PATIENT-LVL III: ICD-10-PCS | Mod: PBBFAC,,, | Performed by: OBSTETRICS & GYNECOLOGY

## 2021-12-01 PROCEDURE — 99213 OFFICE O/P EST LOW 20 MIN: CPT | Mod: PBBFAC | Performed by: OBSTETRICS & GYNECOLOGY

## 2021-12-01 PROCEDURE — 99999 PR PBB SHADOW E&M-EST. PATIENT-LVL III: CPT | Mod: PBBFAC,,, | Performed by: OBSTETRICS & GYNECOLOGY

## 2021-12-01 PROCEDURE — 87481 CANDIDA DNA AMP PROBE: CPT | Mod: 59 | Performed by: OBSTETRICS & GYNECOLOGY

## 2021-12-03 ENCOUNTER — HOSPITAL ENCOUNTER (OUTPATIENT)
Dept: RADIOLOGY | Facility: OTHER | Age: 56
Discharge: HOME | End: 2021-12-03
Attending: OBSTETRICS & GYNECOLOGY
Payer: COMMERCIAL

## 2021-12-03 DIAGNOSIS — N95.0 POSTMENOPAUSAL BLEEDING: ICD-10-CM

## 2021-12-03 PROCEDURE — 76856 US PELVIS COMP WITH TRANSVAG NON-OB (XPD): ICD-10-PCS | Mod: 26,,, | Performed by: STUDENT IN AN ORGANIZED HEALTH CARE EDUCATION/TRAINING PROGRAM

## 2021-12-03 PROCEDURE — 76856 US EXAM PELVIC COMPLETE: CPT | Mod: TC

## 2021-12-03 PROCEDURE — 76830 TRANSVAGINAL US NON-OB: CPT | Mod: 26,,, | Performed by: STUDENT IN AN ORGANIZED HEALTH CARE EDUCATION/TRAINING PROGRAM

## 2021-12-03 PROCEDURE — 76856 US EXAM PELVIC COMPLETE: CPT | Mod: 26,,, | Performed by: STUDENT IN AN ORGANIZED HEALTH CARE EDUCATION/TRAINING PROGRAM

## 2021-12-03 PROCEDURE — 76830 US PELVIS COMP WITH TRANSVAG NON-OB (XPD): ICD-10-PCS | Mod: 26,,, | Performed by: STUDENT IN AN ORGANIZED HEALTH CARE EDUCATION/TRAINING PROGRAM

## 2021-12-08 LAB
FINAL PATHOLOGIC DIAGNOSIS: NORMAL
HPV HR 12 DNA SPEC QL NAA+PROBE: NEGATIVE
HPV16 AG SPEC QL: NEGATIVE
HPV18 DNA SPEC QL NAA+PROBE: NEGATIVE
Lab: NORMAL

## 2021-12-09 ENCOUNTER — TELEPHONE (OUTPATIENT)
Dept: OBSTETRICS AND GYNECOLOGY | Facility: CLINIC | Age: 56
End: 2021-12-09
Payer: COMMERCIAL

## 2022-10-07 ENCOUNTER — TELEPHONE (OUTPATIENT)
Dept: HEMATOLOGY/ONCOLOGY | Facility: CLINIC | Age: 57
End: 2022-10-07
Payer: COMMERCIAL

## 2022-10-07 NOTE — TELEPHONE ENCOUNTER
"Chart reviewed, noted orders are current. Pt scheduled for mammogram at [facility] and Dr. Valladares at [facility].   Called and spoke to pt. Per pt she was not aware of appointments having been made and cannot go to [facility] for mammogram, depends on others for a ride. Requests all appts to be at [facility]. After multiple attempts, this nurse was unable to align mammo and MD appt on same day. Finally found 10/28 for mammo and 10/31 for Dr. Valladares all at [facility]. Pt verbalized that she can come for both appts. Verbally agreed to dates, times and location. Appt reminder printed and mailed to per pt request.           ----- Message from Lo Paz sent at 10/7/2022  3:15 PM CDT -----  Regarding: SPEAK WITH OFFICE  Contact: ANDRE  Consult/Advisory:           Name Of Caller: ANDRE    Contact Preference?:[phone] (home)          Does patient feel the need to be seen today? NO           What is the nature of the call?:PT CALLING TO GET ORDERS FOR HER MAMMO           Additional Notes:  "Thank you for all that you do for our patients'"     "

## 2022-10-31 ENCOUNTER — TELEPHONE (OUTPATIENT)
Dept: HEMATOLOGY/ONCOLOGY | Facility: CLINIC | Age: 57
End: 2022-10-31
Payer: COMMERCIAL

## 2022-10-31 DIAGNOSIS — C50.411 MALIGNANT NEOPLASM OF UPPER-OUTER QUADRANT OF RIGHT BREAST IN FEMALE, ESTROGEN RECEPTOR POSITIVE: Primary | ICD-10-CM

## 2022-10-31 DIAGNOSIS — Z17.0 MALIGNANT NEOPLASM OF UPPER-OUTER QUADRANT OF RIGHT BREAST IN FEMALE, ESTROGEN RECEPTOR POSITIVE: Primary | ICD-10-CM

## 2022-11-09 ENCOUNTER — HOSPITAL ENCOUNTER (OUTPATIENT)
Dept: RADIOLOGY | Facility: OTHER | Age: 57
Discharge: HOME | End: 2022-11-09
Attending: INTERNAL MEDICINE
Payer: COMMERCIAL

## 2022-11-09 DIAGNOSIS — C50.411 MALIGNANT NEOPLASM OF UPPER-OUTER QUADRANT OF RIGHT BREAST IN FEMALE, ESTROGEN RECEPTOR POSITIVE: ICD-10-CM

## 2022-11-09 DIAGNOSIS — Z17.0 MALIGNANT NEOPLASM OF UPPER-OUTER QUADRANT OF RIGHT BREAST IN FEMALE, ESTROGEN RECEPTOR POSITIVE: ICD-10-CM

## 2022-11-09 PROCEDURE — 77067 MAMMO DIGITAL SCREENING BILAT WITH TOMO: ICD-10-PCS | Mod: 26,,, | Performed by: RADIOLOGY

## 2022-11-09 PROCEDURE — 77067 SCR MAMMO BI INCL CAD: CPT | Mod: 26,,, | Performed by: RADIOLOGY

## 2022-11-09 PROCEDURE — 77063 BREAST TOMOSYNTHESIS BI: CPT | Mod: 26,,, | Performed by: RADIOLOGY

## 2022-11-09 PROCEDURE — 77063 BREAST TOMOSYNTHESIS BI: CPT | Mod: TC

## 2022-11-09 PROCEDURE — 77063 MAMMO DIGITAL SCREENING BILAT WITH TOMO: ICD-10-PCS | Mod: 26,,, | Performed by: RADIOLOGY

## 2022-11-20 NOTE — PROGRESS NOTES
Subjective:       Patient ID: Anette Ricci is a 56 y.o. female.    Chief Complaint: No chief complaint on file.    HPI  Ms. Ricci 56-year-old female seen for follow-up of a history of carcinoma of the right breast.      She is had some recent right breast pain after her dog jumped on her.    Last week she had right hand surgery for a ganglion cyst and trapped tendon.    She is also had a fall in her kitchen and has a brace on her right knee due to a fractured kneecap.      History:   She noted an abnormality in  the right in her right breast  and had some additional imaging including MRI.  She had a MRI guided core needle biopsy on April 5 and then underwent lumpectomy and sentinel lymph node biopsy by Dr. Keith Chauhan on April 25, 2013.  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  4 sentinel lymph nodes were removed and all were negative for malignancy.  Margins were negative.  The tumor was ER +90% IL +90% and HER-2 negative by FISH.  Pathological stage TII N0 stage IIA   She underwent Oncotype testing which showed a low Oncotype score of 10.    She began tamoxifen therapy in May of 2013.    She was changed to letrozole in July 2019.    Breast cancer index results showed 2.5% risk of late recurrence and no benefit from extended endocrine therapy.  Letrozole was stopped in June 2021.    Review of Systems   Constitutional:  Negative for fatigue, fever and unexpected weight change.   Respiratory:  Negative for cough and shortness of breath.    Cardiovascular:  Negative for chest pain.   Gastrointestinal:  Negative for abdominal pain and diarrhea.   Musculoskeletal:  Negative for back pain and neck pain.   Psychiatric/Behavioral:  Negative for dysphoric mood. The patient is not nervous/anxious.      Objective:      Physical Exam  Constitutional:       General: She is not in acute distress.     Appearance: She is well-developed.    Cardiovascular:      Rate and Rhythm: Normal rate and regular rhythm.   Pulmonary:      Effort: Pulmonary effort is normal.      Breath sounds: Normal breath sounds. No wheezing or rales.   Chest:   Breasts:     Right: Skin change and tenderness present. No mass or nipple discharge.      Left: Normal. No mass, nipple discharge or skin change.       Abdominal:      Palpations: Abdomen is soft.      Tenderness: There is no abdominal tenderness.   Lymphadenopathy:      Cervical: No cervical adenopathy.      Right cervical: No superficial or deep cervical adenopathy.     Left cervical: No superficial or deep cervical adenopathy.      Upper Body:      Right upper body: No supraclavicular or axillary adenopathy.      Left upper body: No supraclavicular or axillary adenopathy.   Psychiatric:         Behavior: Behavior normal.         Thought Content: Thought content normal.       Assessment:    Mammogram  11/9/22 - negative  1. Malignant neoplasm of upper-outer quadrant of right breast in female, estrogen receptor positive        Plan:          RTC 12 M with mammograms    Route Chart for Scheduling    Med Onc Chart Routing      Follow up with physician 1 year.   Follow up with [facility]    Infusion scheduling note    Injection scheduling note    Labs    Imaging Mammogram      Pharmacy appointment    Other referrals

## 2022-11-21 ENCOUNTER — OFFICE VISIT (OUTPATIENT)
Dept: HEMATOLOGY/ONCOLOGY | Facility: CLINIC | Age: 57
End: 2022-11-21
Attending: INTERNAL MEDICINE
Payer: COMMERCIAL

## 2022-11-21 VITALS
WEIGHT: 195 LBS | DIASTOLIC BLOOD PRESSURE: 80 MMHG | OXYGEN SATURATION: 100 % | TEMPERATURE: 99 F | HEIGHT: 67 IN | SYSTOLIC BLOOD PRESSURE: 183 MMHG | RESPIRATION RATE: 18 BRPM | BODY MASS INDEX: 30.61 KG/M2 | HEART RATE: 79 BPM

## 2022-11-21 DIAGNOSIS — Z17.0 MALIGNANT NEOPLASM OF UPPER-OUTER QUADRANT OF RIGHT BREAST IN FEMALE, ESTROGEN RECEPTOR POSITIVE: Primary | ICD-10-CM

## 2022-11-21 DIAGNOSIS — C50.411 MALIGNANT NEOPLASM OF UPPER-OUTER QUADRANT OF RIGHT BREAST IN FEMALE, ESTROGEN RECEPTOR POSITIVE: Primary | ICD-10-CM

## 2022-11-21 PROCEDURE — 99213 OFFICE O/P EST LOW 20 MIN: CPT | Mod: S$GLB,,, | Performed by: INTERNAL MEDICINE

## 2022-11-21 PROCEDURE — 1159F MED LIST DOCD IN RCRD: CPT | Mod: CPTII,S$GLB,, | Performed by: INTERNAL MEDICINE

## 2022-11-21 PROCEDURE — 99999 PR PBB SHADOW E&M-EST. PATIENT-LVL III: CPT | Mod: PBBFAC,,, | Performed by: INTERNAL MEDICINE

## 2022-11-21 PROCEDURE — 3079F PR MOST RECENT DIASTOLIC BLOOD PRESSURE 80-89 MM HG: ICD-10-PCS | Mod: CPTII,S$GLB,, | Performed by: INTERNAL MEDICINE

## 2022-11-21 PROCEDURE — 1159F PR MEDICATION LIST DOCUMENTED IN MEDICAL RECORD: ICD-10-PCS | Mod: CPTII,S$GLB,, | Performed by: INTERNAL MEDICINE

## 2022-11-21 PROCEDURE — 3077F SYST BP >= 140 MM HG: CPT | Mod: CPTII,S$GLB,, | Performed by: INTERNAL MEDICINE

## 2022-11-21 PROCEDURE — 4010F PR ACE/ARB THEARPY RXD/TAKEN: ICD-10-PCS | Mod: CPTII,S$GLB,, | Performed by: INTERNAL MEDICINE

## 2022-11-21 PROCEDURE — 3008F PR BODY MASS INDEX (BMI) DOCUMENTED: ICD-10-PCS | Mod: CPTII,S$GLB,, | Performed by: INTERNAL MEDICINE

## 2022-11-21 PROCEDURE — 99213 PR OFFICE/OUTPT VISIT, EST, LEVL III, 20-29 MIN: ICD-10-PCS | Mod: S$GLB,,, | Performed by: INTERNAL MEDICINE

## 2022-11-21 PROCEDURE — 1160F RVW MEDS BY RX/DR IN RCRD: CPT | Mod: CPTII,S$GLB,, | Performed by: INTERNAL MEDICINE

## 2022-11-21 PROCEDURE — 3077F PR MOST RECENT SYSTOLIC BLOOD PRESSURE >= 140 MM HG: ICD-10-PCS | Mod: CPTII,S$GLB,, | Performed by: INTERNAL MEDICINE

## 2022-11-21 PROCEDURE — 3008F BODY MASS INDEX DOCD: CPT | Mod: CPTII,S$GLB,, | Performed by: INTERNAL MEDICINE

## 2022-11-21 PROCEDURE — 3079F DIAST BP 80-89 MM HG: CPT | Mod: CPTII,S$GLB,, | Performed by: INTERNAL MEDICINE

## 2022-11-21 PROCEDURE — 99999 PR PBB SHADOW E&M-EST. PATIENT-LVL III: ICD-10-PCS | Mod: PBBFAC,,, | Performed by: INTERNAL MEDICINE

## 2022-11-21 PROCEDURE — 1160F PR REVIEW ALL MEDS BY PRESCRIBER/CLIN PHARMACIST DOCUMENTED: ICD-10-PCS | Mod: CPTII,S$GLB,, | Performed by: INTERNAL MEDICINE

## 2022-11-21 PROCEDURE — 4010F ACE/ARB THERAPY RXD/TAKEN: CPT | Mod: CPTII,S$GLB,, | Performed by: INTERNAL MEDICINE

## 2023-03-29 ENCOUNTER — TELEPHONE (OUTPATIENT)
Dept: HEMATOLOGY/ONCOLOGY | Facility: CLINIC | Age: 58
End: 2023-03-29
Payer: COMMERCIAL

## 2023-03-29 NOTE — TELEPHONE ENCOUNTER
"----- Message from Hany Flores sent at 3/29/2023  1:56 PM CDT -----  Consult/Advisory:          Name Of Caller: Self      Contact Preference?: [phone] (home)       Provider Name: Blaine      Does patient feel the need to be seen today? No      What is the nature of the call?: Requesting 100% confirmation if Dr. Valladares accepts Medicare Advantage           Additional Notes: Stating "the insurance company didn't tell me"      "Thank you for all that you do for our patients"      "

## 2023-06-02 ENCOUNTER — OFFICE VISIT (OUTPATIENT)
Dept: OBSTETRICS AND GYNECOLOGY | Facility: CLINIC | Age: 58
End: 2023-06-02
Attending: STUDENT IN AN ORGANIZED HEALTH CARE EDUCATION/TRAINING PROGRAM
Payer: COMMERCIAL

## 2023-06-02 VITALS
WEIGHT: 197.75 LBS | SYSTOLIC BLOOD PRESSURE: 120 MMHG | HEIGHT: 67 IN | DIASTOLIC BLOOD PRESSURE: 78 MMHG | BODY MASS INDEX: 31.04 KG/M2

## 2023-06-02 DIAGNOSIS — Z01.419 ENCOUNTER FOR GYNECOLOGICAL EXAMINATION: Primary | ICD-10-CM

## 2023-06-02 DIAGNOSIS — Z12.4 ENCOUNTER FOR SCREENING FOR CERVICAL CANCER: ICD-10-CM

## 2023-06-02 DIAGNOSIS — R10.2 PELVIC PAIN IN FEMALE: ICD-10-CM

## 2023-06-02 DIAGNOSIS — Z11.51 ENCOUNTER FOR SCREENING FOR HUMAN PAPILLOMAVIRUS (HPV): ICD-10-CM

## 2023-06-02 PROCEDURE — 87624 HPV HI-RISK TYP POOLED RSLT: CPT | Performed by: STUDENT IN AN ORGANIZED HEALTH CARE EDUCATION/TRAINING PROGRAM

## 2023-06-02 PROCEDURE — 3078F PR MOST RECENT DIASTOLIC BLOOD PRESSURE < 80 MM HG: ICD-10-PCS | Mod: CPTII,S$GLB,, | Performed by: STUDENT IN AN ORGANIZED HEALTH CARE EDUCATION/TRAINING PROGRAM

## 2023-06-02 PROCEDURE — 3008F PR BODY MASS INDEX (BMI) DOCUMENTED: ICD-10-PCS | Mod: CPTII,S$GLB,, | Performed by: STUDENT IN AN ORGANIZED HEALTH CARE EDUCATION/TRAINING PROGRAM

## 2023-06-02 PROCEDURE — 99396 PREV VISIT EST AGE 40-64: CPT | Mod: S$GLB,,, | Performed by: STUDENT IN AN ORGANIZED HEALTH CARE EDUCATION/TRAINING PROGRAM

## 2023-06-02 PROCEDURE — 1159F MED LIST DOCD IN RCRD: CPT | Mod: CPTII,S$GLB,, | Performed by: STUDENT IN AN ORGANIZED HEALTH CARE EDUCATION/TRAINING PROGRAM

## 2023-06-02 PROCEDURE — 99999 PR PBB SHADOW E&M-EST. PATIENT-LVL III: ICD-10-PCS | Mod: PBBFAC,,, | Performed by: STUDENT IN AN ORGANIZED HEALTH CARE EDUCATION/TRAINING PROGRAM

## 2023-06-02 PROCEDURE — 1160F RVW MEDS BY RX/DR IN RCRD: CPT | Mod: CPTII,S$GLB,, | Performed by: STUDENT IN AN ORGANIZED HEALTH CARE EDUCATION/TRAINING PROGRAM

## 2023-06-02 PROCEDURE — 3008F BODY MASS INDEX DOCD: CPT | Mod: CPTII,S$GLB,, | Performed by: STUDENT IN AN ORGANIZED HEALTH CARE EDUCATION/TRAINING PROGRAM

## 2023-06-02 PROCEDURE — 3078F DIAST BP <80 MM HG: CPT | Mod: CPTII,S$GLB,, | Performed by: STUDENT IN AN ORGANIZED HEALTH CARE EDUCATION/TRAINING PROGRAM

## 2023-06-02 PROCEDURE — 99999 PR PBB SHADOW E&M-EST. PATIENT-LVL III: CPT | Mod: PBBFAC,,, | Performed by: STUDENT IN AN ORGANIZED HEALTH CARE EDUCATION/TRAINING PROGRAM

## 2023-06-02 PROCEDURE — 88175 CYTOPATH C/V AUTO FLUID REDO: CPT | Performed by: STUDENT IN AN ORGANIZED HEALTH CARE EDUCATION/TRAINING PROGRAM

## 2023-06-02 PROCEDURE — 1159F PR MEDICATION LIST DOCUMENTED IN MEDICAL RECORD: ICD-10-PCS | Mod: CPTII,S$GLB,, | Performed by: STUDENT IN AN ORGANIZED HEALTH CARE EDUCATION/TRAINING PROGRAM

## 2023-06-02 PROCEDURE — 3074F PR MOST RECENT SYSTOLIC BLOOD PRESSURE < 130 MM HG: ICD-10-PCS | Mod: CPTII,S$GLB,, | Performed by: STUDENT IN AN ORGANIZED HEALTH CARE EDUCATION/TRAINING PROGRAM

## 2023-06-02 PROCEDURE — 1160F PR REVIEW ALL MEDS BY PRESCRIBER/CLIN PHARMACIST DOCUMENTED: ICD-10-PCS | Mod: CPTII,S$GLB,, | Performed by: STUDENT IN AN ORGANIZED HEALTH CARE EDUCATION/TRAINING PROGRAM

## 2023-06-02 PROCEDURE — 99396 PR PREVENTIVE VISIT,EST,40-64: ICD-10-PCS | Mod: S$GLB,,, | Performed by: STUDENT IN AN ORGANIZED HEALTH CARE EDUCATION/TRAINING PROGRAM

## 2023-06-02 PROCEDURE — 3074F SYST BP LT 130 MM HG: CPT | Mod: CPTII,S$GLB,, | Performed by: STUDENT IN AN ORGANIZED HEALTH CARE EDUCATION/TRAINING PROGRAM

## 2023-06-02 NOTE — PROGRESS NOTES
Chief Complaint: Well Woman Exam, Pelvic Pain     HPI:      Anette Ricci is a 57 y.o.  who presents today for well woman exam. Postmenopausal. H/o breast cancer treated with lumpectomy; was previously on tamoxifen and letrozole, not anymore. Doing well today. Has pelvic pain on and off, cramping type pain. Also has constipation and unsure if pain is related to that. Does note she had some vaginal bleeding when she discontinued tamoxifen initially >1 yr ago, but none since that time. Denies other GYN complaints. Specifically, patient denies discharge, urinary problems, vaginal dryness/irritation.     Previous Pap: NILM, HPV negative (2021)  Previous Mammogram: BiRads: 2 (2022)  Most Recent Colonoscopy: Reports WNL w/i last 10 yrs, obtains with Dr Becker     Past Medical History:   Diagnosis Date    Breast cancer     Diabetes mellitus     Genetic testing 2013    VUS    Hyperlipidemia     Hypertension     Seizure disorder      Past Surgical History:   Procedure Laterality Date    BREAST BIOPSY      BREAST LUMPECTOMY Right 2013    EYE SURGERY      TOTAL REDUCTION MAMMOPLASTY Bilateral      Social History     Socioeconomic History    Marital status:    Tobacco Use    Smoking status: Never    Smokeless tobacco: Never   Substance and Sexual Activity    Alcohol use: Yes     Alcohol/week: 0.0 standard drinks     Comment: ocassional    Drug use: No    Sexual activity: Yes     Partners: Male     Birth control/protection: None     Family History   Problem Relation Age of Onset    Seizures Father     Breast cancer Sister 48    Cancer Neg Hx     Ovarian cancer Neg Hx     Colon cancer Neg Hx      Review of patient's allergies indicates:   Allergen Reactions    Codeine      OB History          1    Para   1    Term   1            AB   0    Living   1         SAB   0    IAB        Ectopic        Multiple        Live Births               Obstetric Comments   1st child at age  "17; menarche at age 11; LMP in 2014; 1 living child, 2 miscarriages; natural birth             Physical Exam:      PHYSICAL EXAM:  /78 (BP Location: Left arm, Patient Position: Sitting, BP Method: Medium (Manual))   Ht 5' 7" (1.702 m)   Wt 89.7 kg (197 lb 12 oz)   LMP 02/09/2015   BMI 30.97 kg/m²   Body mass index is 30.97 kg/m².     APPEARANCE: Well nourished, well developed, in no acute distress.  PSYCH: Appropriate mood and affect.  SKIN: No acne or hirsutism  NECK: Neck symmetric without masses  NODES: No inguinal, axillary, or supraclavicular lymph node enlargement  ABDOMEN: Soft.  No tenderness or masses.    CARDIOVASCULAR: No edema of peripheral extremities  BREASTS: Symmetrical,  well healed surgical scar right breast, no skin changes left breast . No palpable masses. Nodular underlying scar right breast. No nipple discharge bilaterally.  PELVIC: Normal external genitalia without lesions.  Normal hair distribution.  Adequate perineal body, normal urethral meatus.  Vagina moist and smooth. Without lesions. Without discharge.  Cervix pink, without lesions, discharge or tenderness.  No significant cystocele or rectocele.  Bimanual exam shows uterus to be normal size, regular, mobile and nontender.  Adnexa without masses or tenderness.      Assessment/Plan:     Encounter for gynecological examination    Encounter for screening for human papillomavirus (HPV)  -     HPV High Risk Genotypes, PCR    Encounter for screening for cervical cancer  -     Liquid-Based Pap Smear, Screening    Pelvic pain in female  -     US Pelvis Comp with Transvag NON-OB (xpd; Future; Expected date: 06/02/2023    - pelvic exam normal  - pap/hpv collected  - pelvic US to assess adnexa/endometrial stripe  - MMG screening with breast specialists  - reaching out to Dr Sargent to assess when colonoscopy due  - routine labs with PCP    Follow up in about 1 year (around 6/2/2024) for annual exam.    Counseling:     Patient was counseled " today on current ASCCP pap guidelines, the recommendation for yearly physical exams, safe driving habits, breast self awareness and annual mammograms. She is to see her PCP for other health maintenance.     Use of the [employer] Patient Portal discussed and encouraged during today's visit.     Clara Mckinney MD  Obstetrics and Gynecology  [facility]

## 2023-06-09 LAB
HPV HR 12 DNA SPEC QL NAA+PROBE: NEGATIVE
HPV16 AG SPEC QL: NEGATIVE
HPV18 DNA SPEC QL NAA+PROBE: NEGATIVE

## 2023-06-10 LAB
FINAL PATHOLOGIC DIAGNOSIS: NORMAL
Lab: NORMAL

## 2023-06-19 ENCOUNTER — TELEPHONE (OUTPATIENT)
Dept: OBSTETRICS AND GYNECOLOGY | Facility: CLINIC | Age: 58
End: 2023-06-19
Payer: COMMERCIAL

## 2023-06-19 NOTE — TELEPHONE ENCOUNTER
Called and confirmed identity. Discussed normal pap smear result. She voiced understanding and has no questions.

## 2023-11-10 ENCOUNTER — HOSPITAL ENCOUNTER (OUTPATIENT)
Dept: RADIOLOGY | Facility: OTHER | Age: 58
Discharge: HOME | End: 2023-11-10
Attending: INTERNAL MEDICINE
Payer: MEDICARE

## 2023-11-10 DIAGNOSIS — Z12.31 ENCOUNTER FOR SCREENING MAMMOGRAM FOR BREAST CANCER: ICD-10-CM

## 2023-11-10 PROCEDURE — 77067 SCR MAMMO BI INCL CAD: CPT | Mod: TC

## 2023-11-10 PROCEDURE — 77063 MAMMO DIGITAL SCREENING BILAT WITH TOMO: ICD-10-PCS | Mod: 26,,, | Performed by: RADIOLOGY

## 2023-11-10 PROCEDURE — 77063 BREAST TOMOSYNTHESIS BI: CPT | Mod: 26,,, | Performed by: RADIOLOGY

## 2023-11-10 PROCEDURE — 77067 SCR MAMMO BI INCL CAD: CPT | Mod: 26,,, | Performed by: RADIOLOGY

## 2023-11-10 PROCEDURE — 77067 MAMMO DIGITAL SCREENING BILAT WITH TOMO: ICD-10-PCS | Mod: 26,,, | Performed by: RADIOLOGY

## 2023-11-15 NOTE — PROGRESS NOTES
Subjective:       Patient ID: Anette Ricci is a 57 y.o. female.    Chief Complaint: No chief complaint on file.      HPI  Ms. Ricci 57-year-old female seen for follow-up of a history of carcinoma of the right breast.      She has some right breast tenderness along the scar.  Fell and broke her right knee cap then had a right TKR in July!        History:   She noted an abnormality in  the right in her right breast in 2013 and had some additional imaging including MRI.  She had a MRI guided core needle biopsy on April 5, 2013 and then underwent lumpectomy and sentinel lymph node biopsy by Dr. Keith Chauhan on April 25, 2013.  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  4 sentinel lymph nodes were removed and all were negative for malignancy.  Margins were negative.  The tumor was ER +90% VT +90% and HER-2 negative by FISH.  Pathological stage TII N0 stage IIA   She underwent Oncotype testing which showed a low Oncotype score of 10.    She began tamoxifen therapy in May of 2013.    She was changed to letrozole in July 2019.    Breast cancer index results showed 2.5% risk of late recurrence and no benefit from extended endocrine therapy.  Letrozole was stopped in June 2021.    Mammogram 11/10/23 -negative    Review of Systems   Constitutional:  Negative for fatigue, fever and unexpected weight change.   Respiratory:  Negative for cough and shortness of breath.    Cardiovascular:  Negative for chest pain.   Gastrointestinal:  Negative for abdominal pain and diarrhea.   Musculoskeletal:  Negative for back pain and neck pain.   Psychiatric/Behavioral:  Negative for dysphoric mood. The patient is not nervous/anxious.        Objective:      Physical Exam  Constitutional:       General: She is not in acute distress.     Appearance: She is well-developed.   Cardiovascular:      Rate and Rhythm: Normal rate and regular rhythm.   Pulmonary:       Effort: Pulmonary effort is normal.      Breath sounds: Normal breath sounds. No wheezing or rales.   Chest:   Breasts:     Right: Skin change and tenderness present. No mass or nipple discharge.      Left: Normal. No mass, nipple discharge or skin change.       Abdominal:      Palpations: Abdomen is soft.      Tenderness: There is no abdominal tenderness.   Lymphadenopathy:      Cervical: No cervical adenopathy.      Right cervical: No superficial or deep cervical adenopathy.     Left cervical: No superficial or deep cervical adenopathy.      Upper Body:      Right upper body: No supraclavicular or axillary adenopathy.      Left upper body: No supraclavicular or axillary adenopathy.   Psychiatric:         Behavior: Behavior normal.         Thought Content: Thought content normal.       Assessment:      1. Malignant neoplasm of upper-outer quadrant of right breast in female, estrogen receptor positive        Plan:        We discussed surgical removal of the right breast scar tissue - most certainly that would lead to significant breast deformity.  RTC 12 M with mammograms    Route Chart for Scheduling    Med Onc Chart Routing      Follow up with physician 1 year.   Follow up with [facility]    Infusion scheduling note    Injection scheduling note    Labs None   Scheduling:  Preferred lab:  Lab interval:     Imaging Mammogram      Pharmacy appointment No pharmacy appointment needed      Other referrals       No additional referrals needed

## 2023-11-20 ENCOUNTER — OFFICE VISIT (OUTPATIENT)
Dept: HEMATOLOGY/ONCOLOGY | Facility: CLINIC | Age: 58
End: 2023-11-20
Attending: INTERNAL MEDICINE
Payer: MEDICARE

## 2023-11-20 VITALS
RESPIRATION RATE: 18 BRPM | SYSTOLIC BLOOD PRESSURE: 132 MMHG | WEIGHT: 184.31 LBS | BODY MASS INDEX: 28.93 KG/M2 | DIASTOLIC BLOOD PRESSURE: 72 MMHG | OXYGEN SATURATION: 100 % | HEIGHT: 67 IN | HEART RATE: 80 BPM | TEMPERATURE: 98 F

## 2023-11-20 DIAGNOSIS — C50.411 MALIGNANT NEOPLASM OF UPPER-OUTER QUADRANT OF RIGHT BREAST IN FEMALE, ESTROGEN RECEPTOR POSITIVE: Primary | ICD-10-CM

## 2023-11-20 DIAGNOSIS — Z85.3 HISTORY OF BREAST CANCER IN FEMALE: ICD-10-CM

## 2023-11-20 DIAGNOSIS — Z12.31 ENCOUNTER FOR SCREENING MAMMOGRAM FOR HIGH-RISK PATIENT: ICD-10-CM

## 2023-11-20 DIAGNOSIS — Z17.0 MALIGNANT NEOPLASM OF UPPER-OUTER QUADRANT OF RIGHT BREAST IN FEMALE, ESTROGEN RECEPTOR POSITIVE: Primary | ICD-10-CM

## 2023-11-20 PROCEDURE — 3078F PR MOST RECENT DIASTOLIC BLOOD PRESSURE < 80 MM HG: ICD-10-PCS | Mod: CPTII,S$GLB,, | Performed by: INTERNAL MEDICINE

## 2023-11-20 PROCEDURE — 4010F ACE/ARB THERAPY RXD/TAKEN: CPT | Mod: CPTII,S$GLB,, | Performed by: INTERNAL MEDICINE

## 2023-11-20 PROCEDURE — 3008F BODY MASS INDEX DOCD: CPT | Mod: CPTII,S$GLB,, | Performed by: INTERNAL MEDICINE

## 2023-11-20 PROCEDURE — 99999 PR PBB SHADOW E&M-EST. PATIENT-LVL IV: ICD-10-PCS | Mod: PBBFAC,,, | Performed by: INTERNAL MEDICINE

## 2023-11-20 PROCEDURE — 4010F PR ACE/ARB THEARPY RXD/TAKEN: ICD-10-PCS | Mod: CPTII,S$GLB,, | Performed by: INTERNAL MEDICINE

## 2023-11-20 PROCEDURE — 3078F DIAST BP <80 MM HG: CPT | Mod: CPTII,S$GLB,, | Performed by: INTERNAL MEDICINE

## 2023-11-20 PROCEDURE — 1159F MED LIST DOCD IN RCRD: CPT | Mod: CPTII,S$GLB,, | Performed by: INTERNAL MEDICINE

## 2023-11-20 PROCEDURE — 3008F PR BODY MASS INDEX (BMI) DOCUMENTED: ICD-10-PCS | Mod: CPTII,S$GLB,, | Performed by: INTERNAL MEDICINE

## 2023-11-20 PROCEDURE — 99213 PR OFFICE/OUTPT VISIT, EST, LEVL III, 20-29 MIN: ICD-10-PCS | Mod: S$GLB,,, | Performed by: INTERNAL MEDICINE

## 2023-11-20 PROCEDURE — 99213 OFFICE O/P EST LOW 20 MIN: CPT | Mod: S$GLB,,, | Performed by: INTERNAL MEDICINE

## 2023-11-20 PROCEDURE — 99999 PR PBB SHADOW E&M-EST. PATIENT-LVL IV: CPT | Mod: PBBFAC,,, | Performed by: INTERNAL MEDICINE

## 2023-11-20 PROCEDURE — 3075F SYST BP GE 130 - 139MM HG: CPT | Mod: CPTII,S$GLB,, | Performed by: INTERNAL MEDICINE

## 2023-11-20 PROCEDURE — 1159F PR MEDICATION LIST DOCUMENTED IN MEDICAL RECORD: ICD-10-PCS | Mod: CPTII,S$GLB,, | Performed by: INTERNAL MEDICINE

## 2023-11-20 PROCEDURE — 3075F PR MOST RECENT SYSTOLIC BLOOD PRESS GE 130-139MM HG: ICD-10-PCS | Mod: CPTII,S$GLB,, | Performed by: INTERNAL MEDICINE

## 2023-11-20 RX ORDER — TIRZEPATIDE 5 MG/.5ML
5 INJECTION, SOLUTION SUBCUTANEOUS
COMMUNITY

## 2024-01-01 ENCOUNTER — HOSPITAL ENCOUNTER (INPATIENT)
Facility: HOSPITAL | Age: 59
LOS: 5 days | DRG: 951 | End: 2024-07-30
Attending: STUDENT IN AN ORGANIZED HEALTH CARE EDUCATION/TRAINING PROGRAM | Admitting: STUDENT IN AN ORGANIZED HEALTH CARE EDUCATION/TRAINING PROGRAM
Payer: OTHER MISCELLANEOUS

## 2024-01-01 ENCOUNTER — HOSPITAL ENCOUNTER (OUTPATIENT)
Facility: HOSPITAL | Age: 59
LOS: 1 days | Discharge: HOSPICE | End: 2024-07-25
Attending: STUDENT IN AN ORGANIZED HEALTH CARE EDUCATION/TRAINING PROGRAM | Admitting: STUDENT IN AN ORGANIZED HEALTH CARE EDUCATION/TRAINING PROGRAM
Payer: MEDICARE

## 2024-01-01 VITALS
TEMPERATURE: 98 F | WEIGHT: 191 LBS | OXYGEN SATURATION: 100 % | BODY MASS INDEX: 29.91 KG/M2 | RESPIRATION RATE: 10 BRPM | SYSTOLIC BLOOD PRESSURE: 117 MMHG | DIASTOLIC BLOOD PRESSURE: 60 MMHG | HEART RATE: 104 BPM

## 2024-01-01 VITALS
BODY MASS INDEX: 31.83 KG/M2 | DIASTOLIC BLOOD PRESSURE: 29 MMHG | SYSTOLIC BLOOD PRESSURE: 59 MMHG | OXYGEN SATURATION: 20 % | WEIGHT: 202.81 LBS | TEMPERATURE: 98 F | HEIGHT: 67 IN

## 2024-01-01 DIAGNOSIS — C56.9 MALIGNANT NEOPLASM OF OVARY, UNSPECIFIED LATERALITY: ICD-10-CM

## 2024-01-01 DIAGNOSIS — C56.9 OVARIAN CARCINOSARCOMA: ICD-10-CM

## 2024-01-01 DIAGNOSIS — E16.2 HYPOGLYCEMIA: ICD-10-CM

## 2024-01-01 DIAGNOSIS — N17.9 ACUTE KIDNEY INJURY: Primary | ICD-10-CM

## 2024-01-01 DIAGNOSIS — D64.9 ANEMIA, UNSPECIFIED TYPE: ICD-10-CM

## 2024-01-01 DIAGNOSIS — K94.23 PEG TUBE MALFUNCTION: ICD-10-CM

## 2024-01-01 DIAGNOSIS — Z51.5 END OF LIFE CARE: ICD-10-CM

## 2024-01-01 DIAGNOSIS — N17.9 AKI (ACUTE KIDNEY INJURY): ICD-10-CM

## 2024-01-01 DIAGNOSIS — R06.02 SHORTNESS OF BREATH: ICD-10-CM

## 2024-01-01 LAB
ABO + RH BLD: NORMAL
ALBUMIN SERPL BCP-MCNC: 1.8 G/DL (ref 3.5–5.2)
ALLENS TEST: ABNORMAL
ALLENS TEST: NORMAL
ALP SERPL-CCNC: 110 U/L (ref 55–135)
ALT SERPL W/O P-5'-P-CCNC: 10 U/L (ref 10–44)
ANION GAP SERPL CALC-SCNC: 18 MMOL/L (ref 8–16)
ANISOCYTOSIS BLD QL SMEAR: SLIGHT
AST SERPL-CCNC: 193 U/L (ref 10–40)
BACTERIA #/AREA URNS AUTO: ABNORMAL /HPF
BACTERIA BLD CULT: NORMAL
BACTERIA BLD CULT: NORMAL
BACTERIA UR CULT: ABNORMAL
BACTERIA UR CULT: ABNORMAL
BASO STIPL BLD QL SMEAR: ABNORMAL
BASOPHILS # BLD AUTO: 0.02 K/UL (ref 0–0.2)
BASOPHILS NFR BLD: 0.1 % (ref 0–1.9)
BILIRUB SERPL-MCNC: 1.7 MG/DL (ref 0.1–1)
BILIRUB UR QL STRIP: NEGATIVE
BLD GP AB SCN CELLS X3 SERPL QL: NORMAL
BLD PROD TYP BPU: NORMAL
BLD PROD TYP BPU: NORMAL
BLOOD UNIT EXPIRATION DATE: NORMAL
BLOOD UNIT EXPIRATION DATE: NORMAL
BLOOD UNIT TYPE CODE: 7300
BLOOD UNIT TYPE CODE: 7300
BLOOD UNIT TYPE: NORMAL
BLOOD UNIT TYPE: NORMAL
BNP SERPL-MCNC: 81 PG/ML (ref 0–99)
BUN SERPL-MCNC: 51 MG/DL (ref 6–20)
BURR CELLS BLD QL SMEAR: ABNORMAL
CALCIUM SERPL-MCNC: 8.2 MG/DL (ref 8.7–10.5)
CHLORIDE SERPL-SCNC: 96 MMOL/L (ref 95–110)
CLARITY UR REFRACT.AUTO: ABNORMAL
CO2 SERPL-SCNC: 18 MMOL/L (ref 23–29)
CODING SYSTEM: NORMAL
CODING SYSTEM: NORMAL
COLOR UR AUTO: YELLOW
CREAT SERPL-MCNC: 2.8 MG/DL (ref 0.5–1.4)
CROSSMATCH INTERPRETATION: NORMAL
CROSSMATCH INTERPRETATION: NORMAL
DACRYOCYTES BLD QL SMEAR: ABNORMAL
DIFFERENTIAL METHOD BLD: ABNORMAL
DISPENSE STATUS: NORMAL
DISPENSE STATUS: NORMAL
DOHLE BOD BLD QL SMEAR: PRESENT
EOSINOPHIL # BLD AUTO: 0 K/UL (ref 0–0.5)
EOSINOPHIL NFR BLD: 0.2 % (ref 0–8)
ERYTHROCYTE [DISTWIDTH] IN BLOOD BY AUTOMATED COUNT: 28.4 % (ref 11.5–14.5)
EST. GFR  (NO RACE VARIABLE): 18.9 ML/MIN/1.73 M^2
GLUCOSE SERPL-MCNC: 68 MG/DL (ref 70–110)
GLUCOSE UR QL STRIP: NEGATIVE
HCO3 UR-SCNC: 24.1 MMOL/L (ref 24–28)
HCT VFR BLD AUTO: 17.5 % (ref 37–48.5)
HGB BLD-MCNC: 5.2 G/DL (ref 12–16)
HGB UR QL STRIP: ABNORMAL
HYALINE CASTS UR QL AUTO: 2 /LPF
HYPOCHROMIA BLD QL SMEAR: ABNORMAL
IMM GRANULOCYTES # BLD AUTO: 0.34 K/UL (ref 0–0.04)
IMM GRANULOCYTES NFR BLD AUTO: 1.9 % (ref 0–0.5)
KETONES UR QL STRIP: NEGATIVE
LDH SERPL L TO P-CCNC: 3.69 MMOL/L (ref 0.5–2.2)
LEUKOCYTE ESTERASE UR QL STRIP: ABNORMAL
LIPASE SERPL-CCNC: 12 U/L (ref 4–60)
LYMPHOCYTES # BLD AUTO: 1.1 K/UL (ref 1–4.8)
LYMPHOCYTES NFR BLD: 5.9 % (ref 18–48)
MAGNESIUM SERPL-MCNC: 2 MG/DL (ref 1.6–2.6)
MCH RBC QN AUTO: 28.4 PG (ref 27–31)
MCHC RBC AUTO-ENTMCNC: 29.7 G/DL (ref 32–36)
MCV RBC AUTO: 96 FL (ref 82–98)
MICROSCOPIC COMMENT: ABNORMAL
MONOCYTES # BLD AUTO: 0.8 K/UL (ref 0.3–1)
MONOCYTES NFR BLD: 4.5 % (ref 4–15)
NEUTROPHILS # BLD AUTO: 16 K/UL (ref 1.8–7.7)
NEUTROPHILS NFR BLD: 87.4 % (ref 38–73)
NITRITE UR QL STRIP: NEGATIVE
NRBC BLD-RTO: 1 /100 WBC
NUM UNITS TRANS PACKED RBC: NORMAL
NUM UNITS TRANS PACKED RBC: NORMAL
OVALOCYTES BLD QL SMEAR: ABNORMAL
PCO2 BLDA: 40 MMHG (ref 35–45)
PH SMN: 7.39 [PH] (ref 7.35–7.45)
PH UR STRIP: 5 [PH] (ref 5–8)
PLATELET # BLD AUTO: 217 K/UL (ref 150–450)
PLATELET BLD QL SMEAR: ABNORMAL
PMV BLD AUTO: 11.2 FL (ref 9.2–12.9)
PO2 BLDA: 55 MMHG (ref 40–60)
POC BE: -1 MMOL/L
POC SATURATED O2: 88 % (ref 95–100)
POC TCO2: 25 MMOL/L (ref 24–29)
POCT GLUCOSE: 175 MG/DL (ref 70–110)
POIKILOCYTOSIS BLD QL SMEAR: SLIGHT
POLYCHROMASIA BLD QL SMEAR: ABNORMAL
POTASSIUM SERPL-SCNC: 5 MMOL/L (ref 3.5–5.1)
PROT SERPL-MCNC: 6.8 G/DL (ref 6–8.4)
PROT UR QL STRIP: ABNORMAL
RBC # BLD AUTO: 1.83 M/UL (ref 4–5.4)
RBC #/AREA URNS AUTO: 22 /HPF (ref 0–4)
SAMPLE: ABNORMAL
SAMPLE: NORMAL
SARS-COV-2 RDRP RESP QL NAA+PROBE: NEGATIVE
SCHISTOCYTES BLD QL SMEAR: ABNORMAL
SCHISTOCYTES BLD QL SMEAR: PRESENT
SITE: ABNORMAL
SITE: NORMAL
SODIUM SERPL-SCNC: 132 MMOL/L (ref 136–145)
SP GR UR STRIP: 1.01 (ref 1–1.03)
SPECIMEN OUTDATE: NORMAL
SPHEROCYTES BLD QL SMEAR: ABNORMAL
SQUAMOUS #/AREA URNS AUTO: 0 /HPF
TARGETS BLD QL SMEAR: ABNORMAL
TOXIC GRANULES BLD QL SMEAR: PRESENT
TROPONIN I SERPL DL<=0.01 NG/ML-MCNC: 0.01 NG/ML (ref 0–0.03)
UNSPECIFIED CRY UR QL COMP ASSIST: 38
URN SPEC COLLECT METH UR: ABNORMAL
WBC # BLD AUTO: 18.27 K/UL (ref 3.9–12.7)
WBC #/AREA URNS AUTO: 41 /HPF (ref 0–5)
WBC CLUMPS UR QL AUTO: ABNORMAL
WBC TOXIC VACUOLES BLD QL SMEAR: PRESENT
YEAST UR QL AUTO: ABNORMAL

## 2024-01-01 PROCEDURE — U0002 COVID-19 LAB TEST NON-CDC: HCPCS | Mod: HCNC | Performed by: STUDENT IN AN ORGANIZED HEALTH CARE EDUCATION/TRAINING PROGRAM

## 2024-01-01 PROCEDURE — 63600175 PHARM REV CODE 636 W HCPCS: Performed by: STUDENT IN AN ORGANIZED HEALTH CARE EDUCATION/TRAINING PROGRAM

## 2024-01-01 PROCEDURE — 63600175 PHARM REV CODE 636 W HCPCS: Mod: HCNC | Performed by: STUDENT IN AN ORGANIZED HEALTH CARE EDUCATION/TRAINING PROGRAM

## 2024-01-01 PROCEDURE — 96365 THER/PROPH/DIAG IV INF INIT: CPT | Mod: HCNC,59

## 2024-01-01 PROCEDURE — 11000001 HC ACUTE MED/SURG PRIVATE ROOM

## 2024-01-01 PROCEDURE — P9016 RBC LEUKOCYTES REDUCED: HCPCS | Mod: HCNC | Performed by: STUDENT IN AN ORGANIZED HEALTH CARE EDUCATION/TRAINING PROGRAM

## 2024-01-01 PROCEDURE — 82803 BLOOD GASES ANY COMBINATION: CPT | Mod: HCNC

## 2024-01-01 PROCEDURE — 36430 TRANSFUSION BLD/BLD COMPNT: CPT | Mod: HCNC

## 2024-01-01 PROCEDURE — 87086 URINE CULTURE/COLONY COUNT: CPT | Mod: HCNC | Performed by: STUDENT IN AN ORGANIZED HEALTH CARE EDUCATION/TRAINING PROGRAM

## 2024-01-01 PROCEDURE — 25500020 PHARM REV CODE 255: Mod: HCNC | Performed by: STUDENT IN AN ORGANIZED HEALTH CARE EDUCATION/TRAINING PROGRAM

## 2024-01-01 PROCEDURE — 86920 COMPATIBILITY TEST SPIN: CPT | Mod: HCNC | Performed by: STUDENT IN AN ORGANIZED HEALTH CARE EDUCATION/TRAINING PROGRAM

## 2024-01-01 PROCEDURE — 99233 SBSQ HOSP IP/OBS HIGH 50: CPT | Mod: GV,,, | Performed by: INTERNAL MEDICINE

## 2024-01-01 PROCEDURE — G0378 HOSPITAL OBSERVATION PER HR: HCPCS | Mod: HCNC

## 2024-01-01 PROCEDURE — 25000003 PHARM REV CODE 250: Performed by: STUDENT IN AN ORGANIZED HEALTH CARE EDUCATION/TRAINING PROGRAM

## 2024-01-01 PROCEDURE — 86900 BLOOD TYPING SEROLOGIC ABO: CPT | Mod: HCNC | Performed by: STUDENT IN AN ORGANIZED HEALTH CARE EDUCATION/TRAINING PROGRAM

## 2024-01-01 PROCEDURE — 86850 RBC ANTIBODY SCREEN: CPT | Mod: HCNC | Performed by: STUDENT IN AN ORGANIZED HEALTH CARE EDUCATION/TRAINING PROGRAM

## 2024-01-01 PROCEDURE — 81001 URINALYSIS AUTO W/SCOPE: CPT | Mod: HCNC | Performed by: STUDENT IN AN ORGANIZED HEALTH CARE EDUCATION/TRAINING PROGRAM

## 2024-01-01 PROCEDURE — 86901 BLOOD TYPING SEROLOGIC RH(D): CPT | Mod: HCNC | Performed by: STUDENT IN AN ORGANIZED HEALTH CARE EDUCATION/TRAINING PROGRAM

## 2024-01-01 PROCEDURE — 83735 ASSAY OF MAGNESIUM: CPT | Mod: HCNC | Performed by: STUDENT IN AN ORGANIZED HEALTH CARE EDUCATION/TRAINING PROGRAM

## 2024-01-01 PROCEDURE — 96376 TX/PRO/DX INJ SAME DRUG ADON: CPT | Mod: HCNC

## 2024-01-01 PROCEDURE — 87040 BLOOD CULTURE FOR BACTERIA: CPT | Mod: HCNC | Performed by: STUDENT IN AN ORGANIZED HEALTH CARE EDUCATION/TRAINING PROGRAM

## 2024-01-01 PROCEDURE — 99223 1ST HOSP IP/OBS HIGH 75: CPT | Mod: ,,, | Performed by: STUDENT IN AN ORGANIZED HEALTH CARE EDUCATION/TRAINING PROGRAM

## 2024-01-01 PROCEDURE — 87186 SC STD MICRODIL/AGAR DIL: CPT | Mod: 59,HCNC | Performed by: STUDENT IN AN ORGANIZED HEALTH CARE EDUCATION/TRAINING PROGRAM

## 2024-01-01 PROCEDURE — 99233 SBSQ HOSP IP/OBS HIGH 50: CPT | Mod: GV,,, | Performed by: STUDENT IN AN ORGANIZED HEALTH CARE EDUCATION/TRAINING PROGRAM

## 2024-01-01 PROCEDURE — 83690 ASSAY OF LIPASE: CPT | Mod: HCNC | Performed by: STUDENT IN AN ORGANIZED HEALTH CARE EDUCATION/TRAINING PROGRAM

## 2024-01-01 PROCEDURE — 80053 COMPREHEN METABOLIC PANEL: CPT | Mod: HCNC | Performed by: STUDENT IN AN ORGANIZED HEALTH CARE EDUCATION/TRAINING PROGRAM

## 2024-01-01 PROCEDURE — 83605 ASSAY OF LACTIC ACID: CPT | Mod: HCNC

## 2024-01-01 PROCEDURE — 84484 ASSAY OF TROPONIN QUANT: CPT | Mod: HCNC | Performed by: STUDENT IN AN ORGANIZED HEALTH CARE EDUCATION/TRAINING PROGRAM

## 2024-01-01 PROCEDURE — 85007 BL SMEAR W/DIFF WBC COUNT: CPT | Mod: HCNC | Performed by: STUDENT IN AN ORGANIZED HEALTH CARE EDUCATION/TRAINING PROGRAM

## 2024-01-01 PROCEDURE — 99900035 HC TECH TIME PER 15 MIN (STAT): Mod: HCNC

## 2024-01-01 PROCEDURE — 96375 TX/PRO/DX INJ NEW DRUG ADDON: CPT | Mod: HCNC

## 2024-01-01 PROCEDURE — 85027 COMPLETE CBC AUTOMATED: CPT | Mod: HCNC | Performed by: STUDENT IN AN ORGANIZED HEALTH CARE EDUCATION/TRAINING PROGRAM

## 2024-01-01 PROCEDURE — 99223 1ST HOSP IP/OBS HIGH 75: CPT | Mod: AI,HCNC,, | Performed by: STUDENT IN AN ORGANIZED HEALTH CARE EDUCATION/TRAINING PROGRAM

## 2024-01-01 PROCEDURE — 82962 GLUCOSE BLOOD TEST: CPT | Mod: HCNC

## 2024-01-01 PROCEDURE — 25000003 PHARM REV CODE 250: Mod: HCNC | Performed by: STUDENT IN AN ORGANIZED HEALTH CARE EDUCATION/TRAINING PROGRAM

## 2024-01-01 PROCEDURE — 83880 ASSAY OF NATRIURETIC PEPTIDE: CPT | Mod: HCNC | Performed by: STUDENT IN AN ORGANIZED HEALTH CARE EDUCATION/TRAINING PROGRAM

## 2024-01-01 PROCEDURE — 99285 EMERGENCY DEPT VISIT HI MDM: CPT | Mod: HCNC,25

## 2024-01-01 PROCEDURE — 87088 URINE BACTERIA CULTURE: CPT | Mod: HCNC | Performed by: STUDENT IN AN ORGANIZED HEALTH CARE EDUCATION/TRAINING PROGRAM

## 2024-01-01 RX ORDER — LORAZEPAM 2 MG/ML
0.5 INJECTION INTRAMUSCULAR EVERY 30 MIN PRN
Status: DISCONTINUED | OUTPATIENT
Start: 2024-01-01 | End: 2024-01-01 | Stop reason: HOSPADM

## 2024-01-01 RX ORDER — LORAZEPAM 2 MG/ML
0.5 INJECTION INTRAMUSCULAR EVERY 30 MIN PRN
Status: CANCELLED | OUTPATIENT
Start: 2024-01-01

## 2024-01-01 RX ORDER — ONDANSETRON HYDROCHLORIDE 2 MG/ML
4 INJECTION, SOLUTION INTRAVENOUS
Status: COMPLETED | OUTPATIENT
Start: 2024-01-01 | End: 2024-01-01

## 2024-01-01 RX ORDER — GLYCOPYRROLATE 0.2 MG/ML
0.3 INJECTION INTRAMUSCULAR; INTRAVENOUS EVERY 4 HOURS PRN
Status: DISCONTINUED | OUTPATIENT
Start: 2024-01-01 | End: 2024-01-01 | Stop reason: HOSPADM

## 2024-01-01 RX ORDER — HALOPERIDOL 5 MG/ML
1 INJECTION INTRAMUSCULAR EVERY 6 HOURS PRN
Status: CANCELLED | OUTPATIENT
Start: 2024-01-01

## 2024-01-01 RX ORDER — HYDROMORPHONE HYDROCHLORIDE 1 MG/ML
1 INJECTION, SOLUTION INTRAMUSCULAR; INTRAVENOUS; SUBCUTANEOUS ONCE
Status: COMPLETED | OUTPATIENT
Start: 2024-01-01 | End: 2024-01-01

## 2024-01-01 RX ORDER — ONDANSETRON HYDROCHLORIDE 2 MG/ML
8 INJECTION, SOLUTION INTRAVENOUS EVERY 6 HOURS PRN
Status: DISCONTINUED | OUTPATIENT
Start: 2024-01-01 | End: 2024-01-01 | Stop reason: HOSPADM

## 2024-01-01 RX ORDER — HALOPERIDOL 5 MG/ML
1 INJECTION INTRAMUSCULAR EVERY 6 HOURS PRN
Status: DISCONTINUED | OUTPATIENT
Start: 2024-01-01 | End: 2024-01-01 | Stop reason: HOSPADM

## 2024-01-01 RX ORDER — ONDANSETRON HYDROCHLORIDE 2 MG/ML
8 INJECTION, SOLUTION INTRAVENOUS EVERY 6 HOURS PRN
Status: CANCELLED | OUTPATIENT
Start: 2024-01-01

## 2024-01-01 RX ORDER — MORPHINE SULFATE 4 MG/ML
4 INJECTION, SOLUTION INTRAMUSCULAR; INTRAVENOUS
Status: COMPLETED | OUTPATIENT
Start: 2024-01-01 | End: 2024-01-01

## 2024-01-01 RX ORDER — MORPHINE SULFATE 2 MG/ML
2 INJECTION, SOLUTION INTRAMUSCULAR; INTRAVENOUS EVERY 4 HOURS PRN
Status: DISCONTINUED | OUTPATIENT
Start: 2024-01-01 | End: 2024-01-01

## 2024-01-01 RX ORDER — GLYCOPYRROLATE 0.2 MG/ML
0.1 INJECTION INTRAMUSCULAR; INTRAVENOUS 3 TIMES DAILY PRN
Status: DISCONTINUED | OUTPATIENT
Start: 2024-01-01 | End: 2024-01-01

## 2024-01-01 RX ORDER — LORAZEPAM 2 MG/ML
1 INJECTION INTRAMUSCULAR EVERY 4 HOURS PRN
Status: DISCONTINUED | OUTPATIENT
Start: 2024-01-01 | End: 2024-01-01

## 2024-01-01 RX ORDER — GLYCOPYRROLATE 0.2 MG/ML
0.3 INJECTION INTRAMUSCULAR; INTRAVENOUS EVERY 4 HOURS PRN
Status: CANCELLED | OUTPATIENT
Start: 2024-01-01

## 2024-01-01 RX ORDER — HYDROCODONE BITARTRATE AND ACETAMINOPHEN 500; 5 MG/1; MG/1
TABLET ORAL
Status: DISCONTINUED | OUTPATIENT
Start: 2024-01-01 | End: 2024-01-01 | Stop reason: HOSPADM

## 2024-01-01 RX ORDER — MORPHINE SULFATE 4 MG/ML
4 INJECTION, SOLUTION INTRAMUSCULAR; INTRAVENOUS
Status: DISCONTINUED | OUTPATIENT
Start: 2024-01-01 | End: 2024-01-01

## 2024-01-01 RX ORDER — METOCLOPRAMIDE HYDROCHLORIDE 5 MG/ML
5 INJECTION INTRAMUSCULAR; INTRAVENOUS EVERY 6 HOURS PRN
Status: DISCONTINUED | OUTPATIENT
Start: 2024-01-01 | End: 2024-01-01

## 2024-01-01 RX ORDER — HYDROMORPHONE HYDROCHLORIDE 1 MG/ML
1 INJECTION, SOLUTION INTRAMUSCULAR; INTRAVENOUS; SUBCUTANEOUS
Status: COMPLETED | OUTPATIENT
Start: 2024-01-01 | End: 2024-01-01

## 2024-01-01 RX ORDER — LORAZEPAM 2 MG/ML
0.5 INJECTION INTRAMUSCULAR EVERY 30 MIN PRN
Status: DISCONTINUED | OUTPATIENT
Start: 2024-01-01 | End: 2024-01-01

## 2024-01-01 RX ORDER — HYDROMORPHONE HYDROCHLORIDE 1 MG/ML
0.5 INJECTION, SOLUTION INTRAMUSCULAR; INTRAVENOUS; SUBCUTANEOUS EVERY 30 MIN PRN
Status: DISCONTINUED | OUTPATIENT
Start: 2024-01-01 | End: 2024-01-01 | Stop reason: HOSPADM

## 2024-01-01 RX ORDER — ONDANSETRON HYDROCHLORIDE 2 MG/ML
8 INJECTION, SOLUTION INTRAVENOUS EVERY 8 HOURS PRN
Status: DISCONTINUED | OUTPATIENT
Start: 2024-01-01 | End: 2024-01-01

## 2024-01-01 RX ORDER — HYDROMORPHONE HYDROCHLORIDE 1 MG/ML
1 INJECTION, SOLUTION INTRAMUSCULAR; INTRAVENOUS; SUBCUTANEOUS
Status: DISCONTINUED | OUTPATIENT
Start: 2024-01-01 | End: 2024-01-01 | Stop reason: HOSPADM

## 2024-01-01 RX ADMIN — HYDROMORPHONE HYDROCHLORIDE 1 MG: 1 INJECTION, SOLUTION INTRAMUSCULAR; INTRAVENOUS; SUBCUTANEOUS at 11:07

## 2024-01-01 RX ADMIN — HYDROMORPHONE HYDROCHLORIDE 0.9 MG/HR: 10 INJECTION, SOLUTION INTRAMUSCULAR; INTRAVENOUS; SUBCUTANEOUS at 11:07

## 2024-01-01 RX ADMIN — IOHEXOL 30 ML: 350 INJECTION, SOLUTION INTRAVENOUS at 01:07

## 2024-01-01 RX ADMIN — HYDROMORPHONE HYDROCHLORIDE 0.9 MG/HR: 10 INJECTION, SOLUTION INTRAMUSCULAR; INTRAVENOUS; SUBCUTANEOUS at 12:07

## 2024-01-01 RX ADMIN — ONDANSETRON 4 MG: 2 INJECTION INTRAMUSCULAR; INTRAVENOUS at 12:07

## 2024-01-01 RX ADMIN — IOHEXOL 100 ML: 350 INJECTION, SOLUTION INTRAVENOUS at 01:07

## 2024-01-01 RX ADMIN — HYDROMORPHONE HYDROCHLORIDE 0.5 MG/HR: 10 INJECTION, SOLUTION INTRAMUSCULAR; INTRAVENOUS; SUBCUTANEOUS at 12:07

## 2024-01-01 RX ADMIN — LORAZEPAM 0.5 MG: 2 INJECTION INTRAMUSCULAR; INTRAVENOUS at 12:07

## 2024-01-01 RX ADMIN — HYDROMORPHONE HYDROCHLORIDE 1 MG: 1 INJECTION, SOLUTION INTRAMUSCULAR; INTRAVENOUS; SUBCUTANEOUS at 12:07

## 2024-01-01 RX ADMIN — HYDROMORPHONE HYDROCHLORIDE 1 MG: 1 INJECTION, SOLUTION INTRAMUSCULAR; INTRAVENOUS; SUBCUTANEOUS at 05:07

## 2024-01-01 RX ADMIN — HYDROMORPHONE HYDROCHLORIDE 0.5 MG: 1 INJECTION, SOLUTION INTRAMUSCULAR; INTRAVENOUS; SUBCUTANEOUS at 10:07

## 2024-01-01 RX ADMIN — HYDROMORPHONE HYDROCHLORIDE 0.7 MG/HR: 10 INJECTION, SOLUTION INTRAMUSCULAR; INTRAVENOUS; SUBCUTANEOUS at 12:07

## 2024-01-01 RX ADMIN — HYDROMORPHONE HYDROCHLORIDE 2.8 MG/HR: 10 INJECTION, SOLUTION INTRAMUSCULAR; INTRAVENOUS; SUBCUTANEOUS at 12:07

## 2024-01-01 RX ADMIN — HYDROMORPHONE HYDROCHLORIDE 0.5 MG: 1 INJECTION, SOLUTION INTRAMUSCULAR; INTRAVENOUS; SUBCUTANEOUS at 08:07

## 2024-01-01 RX ADMIN — LORAZEPAM 0.5 MG: 2 INJECTION INTRAMUSCULAR; INTRAVENOUS at 09:07

## 2024-01-01 RX ADMIN — HYDROMORPHONE HYDROCHLORIDE 1 MG/HR: 10 INJECTION, SOLUTION INTRAMUSCULAR; INTRAVENOUS; SUBCUTANEOUS at 04:07

## 2024-01-01 RX ADMIN — LORAZEPAM 0.5 MG: 2 INJECTION INTRAMUSCULAR; INTRAVENOUS at 11:07

## 2024-01-01 RX ADMIN — HYDROMORPHONE HYDROCHLORIDE 2.4 MG/HR: 10 INJECTION, SOLUTION INTRAMUSCULAR; INTRAVENOUS; SUBCUTANEOUS at 09:07

## 2024-01-01 RX ADMIN — HYDROMORPHONE HYDROCHLORIDE 1 MG: 1 INJECTION, SOLUTION INTRAMUSCULAR; INTRAVENOUS; SUBCUTANEOUS at 01:07

## 2024-01-01 RX ADMIN — DEXTROSE MONOHYDRATE 250 ML: 100 INJECTION, SOLUTION INTRAVENOUS at 02:07

## 2024-01-01 RX ADMIN — LORAZEPAM 0.5 MG: 2 INJECTION INTRAMUSCULAR; INTRAVENOUS at 05:07

## 2024-01-01 RX ADMIN — HYDROMORPHONE HYDROCHLORIDE 2.8 MG/HR: 10 INJECTION, SOLUTION INTRAMUSCULAR; INTRAVENOUS; SUBCUTANEOUS at 07:07

## 2024-01-01 RX ADMIN — HYDROMORPHONE HYDROCHLORIDE 2.4 MG/HR: 10 INJECTION, SOLUTION INTRAMUSCULAR; INTRAVENOUS; SUBCUTANEOUS at 05:07

## 2024-01-01 RX ADMIN — HYDROMORPHONE HYDROCHLORIDE 1 MG/HR: 10 INJECTION, SOLUTION INTRAMUSCULAR; INTRAVENOUS; SUBCUTANEOUS at 12:07

## 2024-01-01 RX ADMIN — HYDROMORPHONE HYDROCHLORIDE 1 MG: 1 INJECTION, SOLUTION INTRAMUSCULAR; INTRAVENOUS; SUBCUTANEOUS at 03:07

## 2024-01-01 RX ADMIN — MORPHINE SULFATE 4 MG: 4 INJECTION INTRAVENOUS at 12:07

## 2024-01-01 RX ADMIN — HYDROMORPHONE HYDROCHLORIDE 1.1 MG/HR: 10 INJECTION, SOLUTION INTRAMUSCULAR; INTRAVENOUS; SUBCUTANEOUS at 12:07

## 2024-01-01 RX ADMIN — HYDROMORPHONE HYDROCHLORIDE 1 MG: 1 INJECTION, SOLUTION INTRAMUSCULAR; INTRAVENOUS; SUBCUTANEOUS at 02:07

## 2024-02-07 ENCOUNTER — OFFICE VISIT (OUTPATIENT)
Dept: HEMATOLOGY/ONCOLOGY | Facility: CLINIC | Age: 59
End: 2024-02-07
Payer: MEDICARE

## 2024-02-07 ENCOUNTER — TELEPHONE (OUTPATIENT)
Dept: HEMATOLOGY/ONCOLOGY | Facility: CLINIC | Age: 59
End: 2024-02-07
Payer: MEDICARE

## 2024-02-07 ENCOUNTER — LAB VISIT (OUTPATIENT)
Dept: LAB | Facility: HOSPITAL | Age: 59
End: 2024-02-07
Attending: INTERNAL MEDICINE
Payer: MEDICARE

## 2024-02-07 VITALS
DIASTOLIC BLOOD PRESSURE: 63 MMHG | BODY MASS INDEX: 30.33 KG/M2 | SYSTOLIC BLOOD PRESSURE: 113 MMHG | HEART RATE: 94 BPM | RESPIRATION RATE: 16 BRPM | OXYGEN SATURATION: 98 % | TEMPERATURE: 99 F | WEIGHT: 193.69 LBS

## 2024-02-07 DIAGNOSIS — Z85.3 HISTORY OF BREAST CANCER IN FEMALE: ICD-10-CM

## 2024-02-07 DIAGNOSIS — C78.6 METASTASIS TO PERITONEAL CAVITY: ICD-10-CM

## 2024-02-07 DIAGNOSIS — R19.00 ABDOMINAL MASS, UNSPECIFIED ABDOMINAL LOCATION: ICD-10-CM

## 2024-02-07 DIAGNOSIS — C78.80 SECONDARY MALIGNANT NEOPLASM OF UNSPECIFIED DIGESTIVE ORGAN: ICD-10-CM

## 2024-02-07 DIAGNOSIS — Z85.3 HISTORY OF BREAST CANCER IN FEMALE: Primary | ICD-10-CM

## 2024-02-07 LAB
ALBUMIN SERPL BCP-MCNC: 2.9 G/DL (ref 3.5–5.2)
ALP SERPL-CCNC: 75 U/L (ref 55–135)
ALT SERPL W/O P-5'-P-CCNC: 9 U/L (ref 10–44)
ANION GAP SERPL CALC-SCNC: 8 MMOL/L (ref 8–16)
AST SERPL-CCNC: 23 U/L (ref 10–40)
BILIRUB SERPL-MCNC: 0.5 MG/DL (ref 0.1–1)
BUN SERPL-MCNC: 9 MG/DL (ref 6–20)
CALCIUM SERPL-MCNC: 9 MG/DL (ref 8.7–10.5)
CANCER AG19-9 SERPL-ACNC: 169.4 U/ML (ref 0–40)
CEA SERPL-MCNC: <1.7 NG/ML (ref 0–5)
CHLORIDE SERPL-SCNC: 103 MMOL/L (ref 95–110)
CO2 SERPL-SCNC: 25 MMOL/L (ref 23–29)
CREAT SERPL-MCNC: 0.8 MG/DL (ref 0.5–1.4)
ERYTHROCYTE [DISTWIDTH] IN BLOOD BY AUTOMATED COUNT: 13.2 % (ref 11.5–14.5)
EST. GFR  (NO RACE VARIABLE): >60 ML/MIN/1.73 M^2
GLUCOSE SERPL-MCNC: 141 MG/DL (ref 70–110)
HCT VFR BLD AUTO: 30.8 % (ref 37–48.5)
HGB BLD-MCNC: 9.9 G/DL (ref 12–16)
IMM GRANULOCYTES # BLD AUTO: 0.02 K/UL (ref 0–0.04)
MCH RBC QN AUTO: 28.3 PG (ref 27–31)
MCHC RBC AUTO-ENTMCNC: 32.1 G/DL (ref 32–36)
MCV RBC AUTO: 88 FL (ref 82–98)
NEUTROPHILS # BLD AUTO: 6.7 K/UL (ref 1.8–7.7)
PLATELET # BLD AUTO: 332 K/UL (ref 150–450)
PMV BLD AUTO: 11.4 FL (ref 9.2–12.9)
POTASSIUM SERPL-SCNC: 3.5 MMOL/L (ref 3.5–5.1)
PROT SERPL-MCNC: 6.7 G/DL (ref 6–8.4)
RBC # BLD AUTO: 3.5 M/UL (ref 4–5.4)
SODIUM SERPL-SCNC: 136 MMOL/L (ref 136–145)
WBC # BLD AUTO: 8.47 K/UL (ref 3.9–12.7)

## 2024-02-07 PROCEDURE — 3074F SYST BP LT 130 MM HG: CPT | Mod: CPTII,S$GLB,, | Performed by: INTERNAL MEDICINE

## 2024-02-07 PROCEDURE — 3008F BODY MASS INDEX DOCD: CPT | Mod: CPTII,S$GLB,, | Performed by: INTERNAL MEDICINE

## 2024-02-07 PROCEDURE — 85027 COMPLETE CBC AUTOMATED: CPT | Performed by: INTERNAL MEDICINE

## 2024-02-07 PROCEDURE — 82378 CARCINOEMBRYONIC ANTIGEN: CPT | Performed by: INTERNAL MEDICINE

## 2024-02-07 PROCEDURE — 86300 IMMUNOASSAY TUMOR CA 15-3: CPT | Performed by: INTERNAL MEDICINE

## 2024-02-07 PROCEDURE — 99214 OFFICE O/P EST MOD 30 MIN: CPT | Mod: S$GLB,,, | Performed by: INTERNAL MEDICINE

## 2024-02-07 PROCEDURE — 3078F DIAST BP <80 MM HG: CPT | Mod: CPTII,S$GLB,, | Performed by: INTERNAL MEDICINE

## 2024-02-07 PROCEDURE — 1159F MED LIST DOCD IN RCRD: CPT | Mod: CPTII,S$GLB,, | Performed by: INTERNAL MEDICINE

## 2024-02-07 PROCEDURE — 36415 COLL VENOUS BLD VENIPUNCTURE: CPT | Performed by: INTERNAL MEDICINE

## 2024-02-07 PROCEDURE — 99999 PR PBB SHADOW E&M-EST. PATIENT-LVL IV: CPT | Mod: PBBFAC,,, | Performed by: INTERNAL MEDICINE

## 2024-02-07 PROCEDURE — 80053 COMPREHEN METABOLIC PANEL: CPT | Performed by: INTERNAL MEDICINE

## 2024-02-07 PROCEDURE — 86301 IMMUNOASSAY TUMOR CA 19-9: CPT | Performed by: INTERNAL MEDICINE

## 2024-02-07 RX ORDER — HYDROCODONE BITARTRATE AND ACETAMINOPHEN 5; 325 MG/1; MG/1
1 TABLET ORAL EVERY 6 HOURS PRN
Qty: 60 TABLET | Refills: 0 | Status: SHIPPED | OUTPATIENT
Start: 2024-02-07 | End: 2024-04-03 | Stop reason: SDUPTHER

## 2024-02-07 NOTE — TELEPHONE ENCOUNTER
"----- Message from Alexsandra Hollis sent at 2/7/2024  8:15 AM CST -----  Regarding: Pt advice  Contact: Pt    Pt requesting call back in regards to getting an appt for today with Dr. Valladares per emergency room.   Please call and adv @          Confirmed contact below:   Contact Name: Anette Ricci  Phone Number: [phone]               Additional Notes:  "Thank you for all that you do for our patients"                                           "

## 2024-02-07 NOTE — TELEPHONE ENCOUNTER
"----- Message from Hany Flores sent at 2/7/2024 10:21 AM CST -----  Consult/Advisory        Name Of Caller: Self        Contact Preference?: [phone] (home)        What is the nature of the call?:  Running late for today's appt due to accidentally going to the [facility] location. Stating she's currently on her way. Inquiring about still being seen today,         Additional Notes:  "Thank you for all that you do for our patients"          "

## 2024-02-07 NOTE — PROGRESS NOTES
Subjective:       Patient ID: Anette Ricci is a 58 y.o. female.    Chief Complaint: No chief complaint on file.      HPI  Ms. Ricci 58-year-old female seen for abnormal abdominal scan done at [place]. She has a remote history of ER+ carcinoma of the right breast.      February 1st she developed some symptoms of abdominal bloating.  She tried a laxative without any benefit.  Because of persistence of symptoms she went to the emergency room at [place].  She had a scan  at [facility] on 2/6/24 which apparently showed multiple peritoneal masses, largest an 11 cm mass in the left upper quadrant.  There was also some ascites..     She reports some mild nausea.  Bowels have been chronically irregular.    History:   She noted an abnormality in  the right in her right breast in 2013 and had some additional imaging including MRI.  She had a MRI guided core needle biopsy on April 5, 2013 and then underwent lumpectomy and sentinel lymph node biopsy by Dr. Keith Chauhan on April 25, 2013.  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  4 sentinel lymph nodes were removed and all were negative for malignancy.  Margins were negative.  The tumor was ER +90% MO +90% and HER-2 negative by FISH.  Pathological stage TII N0 stage IIA   She underwent Oncotype testing which showed a low Oncotype score of 10.    She began tamoxifen therapy in May of 2013.    She was changed to letrozole in July 2019.    Breast cancer index results showed 2.5% risk of late recurrence and no benefit from extended endocrine therapy.  Letrozole was stopped in June 2021.    Mammogram 11/10/23 -negative    Review of Systems   Constitutional:  Negative for activity change, appetite change, fatigue, fever and unexpected weight change.   Respiratory:  Negative for cough and shortness of breath.    Cardiovascular:  Negative for chest pain.    Gastrointestinal:  Positive for abdominal distention and abdominal pain. Negative for diarrhea. Constipation: bowels irregular.  Musculoskeletal:  Negative for back pain and neck pain.   Psychiatric/Behavioral:  Negative for dysphoric mood. The patient is not nervous/anxious.        Objective:      Physical Exam  Constitutional:       General: She is not in acute distress.     Appearance: She is well-developed.   Cardiovascular:      Rate and Rhythm: Normal rate and regular rhythm.   Pulmonary:      Effort: Pulmonary effort is normal.      Breath sounds: Normal breath sounds. No wheezing or rales.   Chest:   Breasts:     Right: Skin change and tenderness present. No mass or nipple discharge.      Left: Normal. No mass, nipple discharge or skin change.       Abdominal:      Palpations: Abdomen is soft. There is no hepatomegaly.      Tenderness: There is generalized abdominal tenderness and tenderness in the left upper quadrant. There is no guarding.   Lymphadenopathy:      Cervical: No cervical adenopathy.      Right cervical: No superficial or deep cervical adenopathy.     Left cervical: No superficial or deep cervical adenopathy.      Upper Body:      Right upper body: No supraclavicular or axillary adenopathy.      Left upper body: No supraclavicular or axillary adenopathy.   Psychiatric:         Behavior: Behavior normal.         Thought Content: Thought content normal.       Assessment:      1. History of breast cancer in female        Plan:          Will get scans reviewed.  IR biopsy.  PET if possible.  Labs with tumor markers          Route Chart for Scheduling    Med Onc Chart Routing      Follow up with physician    Follow up with [facility]    Infusion scheduling note    Injection scheduling note    Labs None   Scheduling:  Preferred lab:  Lab interval:     Imaging PET scan      Pharmacy appointment No pharmacy appointment needed      Other referrals no referral to On[facility] needed -  no Massage  appointment needed    No additional referrals needed

## 2024-02-08 ENCOUNTER — TELEPHONE (OUTPATIENT)
Dept: INTERVENTIONAL RADIOLOGY/VASCULAR | Facility: CLINIC | Age: 59
End: 2024-02-08
Payer: MEDICARE

## 2024-02-09 LAB — CANCER AG27-29 SERPL-ACNC: 176 U/ML

## 2024-02-13 ENCOUNTER — HOSPITAL ENCOUNTER (EMERGENCY)
Facility: HOSPITAL | Age: 59
Discharge: HOME | End: 2024-02-14
Attending: EMERGENCY MEDICINE
Payer: MEDICARE

## 2024-02-13 ENCOUNTER — NURSE TRIAGE (OUTPATIENT)
Dept: ADMINISTRATIVE | Facility: CLINIC | Age: 59
End: 2024-02-13
Payer: MEDICARE

## 2024-02-13 DIAGNOSIS — R10.12 LEFT UPPER QUADRANT ABDOMINAL PAIN: Primary | ICD-10-CM

## 2024-02-13 DIAGNOSIS — E87.6 HYPOKALEMIA: ICD-10-CM

## 2024-02-13 DIAGNOSIS — R10.13 EPIGASTRIC ABDOMINAL PAIN: ICD-10-CM

## 2024-02-13 LAB
BASOPHILS # BLD AUTO: 0.05 K/UL (ref 0–0.2)
BASOPHILS NFR BLD: 0.4 % (ref 0–1.9)
BUN SERPL-MCNC: 7 MG/DL (ref 6–30)
CHLORIDE SERPL-SCNC: 94 MMOL/L (ref 95–110)
CREAT SERPL-MCNC: 0.8 MG/DL (ref 0.5–1.4)
DIFFERENTIAL METHOD BLD: ABNORMAL
EOSINOPHIL # BLD AUTO: 0 K/UL (ref 0–0.5)
EOSINOPHIL NFR BLD: 0.1 % (ref 0–8)
ERYTHROCYTE [DISTWIDTH] IN BLOOD BY AUTOMATED COUNT: 13.7 % (ref 11.5–14.5)
GLUCOSE SERPL-MCNC: 117 MG/DL (ref 70–110)
HCT VFR BLD AUTO: 30.6 % (ref 37–48.5)
HCT VFR BLD CALC: 31 %PCV (ref 36–54)
HGB BLD-MCNC: 10.2 G/DL (ref 12–16)
IMM GRANULOCYTES # BLD AUTO: 0.04 K/UL (ref 0–0.04)
IMM GRANULOCYTES NFR BLD AUTO: 0.4 % (ref 0–0.5)
LYMPHOCYTES # BLD AUTO: 1.2 K/UL (ref 1–4.8)
LYMPHOCYTES NFR BLD: 10.3 % (ref 18–48)
MCH RBC QN AUTO: 28.9 PG (ref 27–31)
MCHC RBC AUTO-ENTMCNC: 33.3 G/DL (ref 32–36)
MCV RBC AUTO: 87 FL (ref 82–98)
MONOCYTES # BLD AUTO: 1 K/UL (ref 0.3–1)
MONOCYTES NFR BLD: 9.2 % (ref 4–15)
NEUTROPHILS # BLD AUTO: 9 K/UL (ref 1.8–7.7)
NEUTROPHILS NFR BLD: 79.6 % (ref 38–73)
NRBC BLD-RTO: 0 /100 WBC
PLATELET # BLD AUTO: 589 K/UL (ref 150–450)
PMV BLD AUTO: 10.5 FL (ref 9.2–12.9)
POC IONIZED CALCIUM: 0.99 MMOL/L (ref 1.06–1.42)
POC TCO2 (MEASURED): 33 MMOL/L (ref 23–29)
POTASSIUM BLD-SCNC: 4.9 MMOL/L (ref 3.5–5.1)
RBC # BLD AUTO: 3.53 M/UL (ref 4–5.4)
SAMPLE: ABNORMAL
SODIUM BLD-SCNC: 134 MMOL/L (ref 136–145)
WBC # BLD AUTO: 11.27 K/UL (ref 3.9–12.7)

## 2024-02-13 PROCEDURE — 81001 URINALYSIS AUTO W/SCOPE: CPT | Performed by: EMERGENCY MEDICINE

## 2024-02-13 PROCEDURE — 93010 ELECTROCARDIOGRAM REPORT: CPT | Mod: ,,, | Performed by: INTERNAL MEDICINE

## 2024-02-13 PROCEDURE — 80048 BASIC METABOLIC PNL TOTAL CA: CPT

## 2024-02-13 PROCEDURE — 85025 COMPLETE CBC W/AUTO DIFF WBC: CPT | Performed by: EMERGENCY MEDICINE

## 2024-02-13 PROCEDURE — 93005 ELECTROCARDIOGRAM TRACING: CPT

## 2024-02-13 PROCEDURE — 99285 EMERGENCY DEPT VISIT HI MDM: CPT | Mod: 25

## 2024-02-13 RX ORDER — MORPHINE SULFATE 4 MG/ML
4 INJECTION, SOLUTION INTRAMUSCULAR; INTRAVENOUS
Status: COMPLETED | OUTPATIENT
Start: 2024-02-14 | End: 2024-02-14

## 2024-02-13 RX ORDER — ONDANSETRON HYDROCHLORIDE 2 MG/ML
4 INJECTION, SOLUTION INTRAVENOUS
Status: COMPLETED | OUTPATIENT
Start: 2024-02-14 | End: 2024-02-14

## 2024-02-14 VITALS
OXYGEN SATURATION: 94 % | DIASTOLIC BLOOD PRESSURE: 56 MMHG | SYSTOLIC BLOOD PRESSURE: 121 MMHG | RESPIRATION RATE: 16 BRPM | HEART RATE: 90 BPM | TEMPERATURE: 99 F

## 2024-02-14 LAB
ALBUMIN SERPL BCP-MCNC: 2.6 G/DL (ref 3.5–5.2)
ALP SERPL-CCNC: 73 U/L (ref 55–135)
ALT SERPL W/O P-5'-P-CCNC: 6 U/L (ref 10–44)
ANION GAP SERPL CALC-SCNC: 11 MMOL/L (ref 8–16)
AST SERPL-CCNC: 26 U/L (ref 10–40)
BACTERIA #/AREA URNS AUTO: NORMAL /HPF
BILIRUB SERPL-MCNC: 0.6 MG/DL (ref 0.1–1)
BILIRUB UR QL STRIP: NEGATIVE
BUN SERPL-MCNC: 7 MG/DL (ref 6–20)
CALCIUM SERPL-MCNC: 8.7 MG/DL (ref 8.7–10.5)
CHLORIDE SERPL-SCNC: 97 MMOL/L (ref 95–110)
CLARITY UR REFRACT.AUTO: ABNORMAL
CO2 SERPL-SCNC: 27 MMOL/L (ref 23–29)
COLOR UR AUTO: YELLOW
CREAT SERPL-MCNC: 0.7 MG/DL (ref 0.5–1.4)
EST. GFR  (NO RACE VARIABLE): >60 ML/MIN/1.73 M^2
GLUCOSE SERPL-MCNC: 110 MG/DL (ref 70–110)
GLUCOSE UR QL STRIP: ABNORMAL
HGB UR QL STRIP: ABNORMAL
HYALINE CASTS UR QL AUTO: 0 /LPF
KETONES UR QL STRIP: NEGATIVE
LEUKOCYTE ESTERASE UR QL STRIP: NEGATIVE
LIPASE SERPL-CCNC: 9 U/L (ref 4–60)
MICROSCOPIC COMMENT: NORMAL
NITRITE UR QL STRIP: NEGATIVE
OHS QRS DURATION: 78 MS
OHS QTC CALCULATION: 452 MS
PH UR STRIP: 6 [PH] (ref 5–8)
POTASSIUM SERPL-SCNC: 3.3 MMOL/L (ref 3.5–5.1)
PROT SERPL-MCNC: 6.8 G/DL (ref 6–8.4)
PROT UR QL STRIP: ABNORMAL
RBC #/AREA URNS AUTO: 4 /HPF (ref 0–4)
SODIUM SERPL-SCNC: 135 MMOL/L (ref 136–145)
SP GR UR STRIP: 1.03 (ref 1–1.03)
SQUAMOUS #/AREA URNS AUTO: 4 /HPF
UNSPECIFIED CRY UR QL COMP ASSIST: 1
URN SPEC COLLECT METH UR: ABNORMAL
WBC #/AREA URNS AUTO: 4 /HPF (ref 0–5)

## 2024-02-14 PROCEDURE — 80053 COMPREHEN METABOLIC PANEL: CPT | Performed by: EMERGENCY MEDICINE

## 2024-02-14 PROCEDURE — 96375 TX/PRO/DX INJ NEW DRUG ADDON: CPT

## 2024-02-14 PROCEDURE — 25000003 PHARM REV CODE 250

## 2024-02-14 PROCEDURE — 96374 THER/PROPH/DIAG INJ IV PUSH: CPT | Mod: 59

## 2024-02-14 PROCEDURE — 83690 ASSAY OF LIPASE: CPT | Performed by: EMERGENCY MEDICINE

## 2024-02-14 PROCEDURE — 25500020 PHARM REV CODE 255: Performed by: EMERGENCY MEDICINE

## 2024-02-14 PROCEDURE — 96361 HYDRATE IV INFUSION ADD-ON: CPT

## 2024-02-14 PROCEDURE — 63600175 PHARM REV CODE 636 W HCPCS

## 2024-02-14 RX ORDER — ONDANSETRON HYDROCHLORIDE 2 MG/ML
INJECTION, SOLUTION INTRAVENOUS
Status: DISCONTINUED
Start: 2024-02-14 | End: 2024-02-14 | Stop reason: HOSPADM

## 2024-02-14 RX ORDER — MORPHINE SULFATE 4 MG/ML
INJECTION, SOLUTION INTRAMUSCULAR; INTRAVENOUS
Status: DISCONTINUED
Start: 2024-02-14 | End: 2024-02-14 | Stop reason: HOSPADM

## 2024-02-14 RX ADMIN — POTASSIUM BICARBONATE 25 MEQ: 978 TABLET, EFFERVESCENT ORAL at 04:02

## 2024-02-14 RX ADMIN — MORPHINE SULFATE 4 MG: 4 INJECTION INTRAVENOUS at 12:02

## 2024-02-14 RX ADMIN — ONDANSETRON 4 MG: 2 INJECTION INTRAMUSCULAR; INTRAVENOUS at 12:02

## 2024-02-14 RX ADMIN — IOHEXOL 75 ML: 350 INJECTION, SOLUTION INTRAVENOUS at 02:02

## 2024-02-14 RX ADMIN — SODIUM CHLORIDE 1000 ML: 9 INJECTION, SOLUTION INTRAVENOUS at 12:02

## 2024-02-14 NOTE — ED PROVIDER NOTES
Encounter Date: 2/13/2024       History     Chief Complaint   Patient presents with    Abdominal Pain     Patient reports LLQ abdominal pain into the left back. Patient reports that she was seen with provider supposed to have scan on 21st. Patient reports increased pain. Was seen in ED and abdominal mass was found.     58-year-old female with history of breast cancer, status post lumpectomy, diabetes, hypertension and hyperlipidemia is presenting to the emergency department for left-sided abdominal and flank pain.  Patient explains that last week she had a CT scan of her abdomen that showed a mesenteric mass.  She initially presented to the emergency department because of bloating.  States that this pain has worsened.  She has been in touch with her hematology oncology provider who scheduled a biopsy of the mass and a PET scan.  They also discharge her home with [place].  Patient states that she does not like taking the Norco for pain however.  She last took a dose earlier in the afternoon yesterday.  She does report some painful urination.  Also expresses concern that this pain is worsened over the past week.  Movement was 2 days prior.  This is normal for her.  She does report mild nausea without vomiting.  No fevers, chest pain, shortness of breath.    The history is provided by the patient. No  was used.     Review of patient's allergies indicates:   Allergen Reactions    Codeine      Past Medical History:   Diagnosis Date    Breast cancer 2013    Diabetes mellitus     Genetic testing 05/29/2013    VUS    Hyperlipidemia     Hypertension     Malignant neoplasm of upper-outer quadrant of right breast in female, estrogen receptor positive 12/02/2015    Seizure disorder      Past Surgical History:   Procedure Laterality Date    BREAST BIOPSY      BREAST LUMPECTOMY Right 2013    EYE SURGERY      TOTAL REDUCTION MAMMOPLASTY Bilateral 2016     Family History   Problem Relation Age of Onset    Seizures  Father     Breast cancer Sister 48    Cancer Neg Hx     Ovarian cancer Neg Hx     Colon cancer Neg Hx      Social History     Tobacco Use    Smoking status: Never    Smokeless tobacco: Never   Substance Use Topics    Alcohol use: Yes     Alcohol/week: 0.0 standard drinks of alcohol     Comment: ocassional    Drug use: No     Physical Exam     Initial Vitals [02/13/24 2148]   BP Pulse Resp Temp SpO2   (!) 169/74 109 18 98.9 °F (37.2 °C) 97 %      MAP       --         Physical Exam    Nursing note and vitals reviewed.  Constitutional: She appears well-developed and well-nourished.   HENT:   Head: Normocephalic and atraumatic.   Eyes: Conjunctivae and EOM are normal.   Neck: Neck supple.   Normal range of motion.  Cardiovascular:  Normal rate, regular rhythm, normal heart sounds and intact distal pulses.           Pulmonary/Chest: No respiratory distress. She has no wheezes. She has no rhonchi. She has no rales.   Abdominal: Abdomen is soft. Bowel sounds are normal. She exhibits distension. There is abdominal tenderness.   Left upper abdominal tenderness to palpation with voluntary guarding.  Abdomen is soft.  Palpable ascites. There is guarding. There is no rebound.   Musculoskeletal:         General: No tenderness or edema. Normal range of motion.      Cervical back: Normal range of motion and neck supple.     Neurological: She is alert and oriented to person, place, and time. She has normal strength.   Skin: Skin is warm and dry.   Psychiatric: She has a normal mood and affect. Thought content normal.         ED Course   Procedures  Labs Reviewed   CBC W/ AUTO DIFFERENTIAL - Abnormal; Notable for the following components:       Result Value    RBC 3.53 (*)     Hemoglobin 10.2 (*)     Hematocrit 30.6 (*)     Platelets 589 (*)     Gran # (ANC) 9.0 (*)     Gran % 79.6 (*)     Lymph % 10.3 (*)     All other components within normal limits   URINALYSIS, REFLEX TO URINE CULTURE - Abnormal; Notable for the following  components:    Appearance, UA Hazy (*)     Protein, UA 1+ (*)     Glucose, UA Trace (*)     Occult Blood UA 2+ (*)     All other components within normal limits    Narrative:     Specimen Source->Urine   COMPREHENSIVE METABOLIC PANEL - Abnormal; Notable for the following components:    Sodium 135 (*)     Potassium 3.3 (*)     Albumin 2.6 (*)     ALT 6 (*)     All other components within normal limits   ISTAT PROCEDURE - Abnormal; Notable for the following components:    POC Glucose 117 (*)     POC Sodium 134 (*)     POC Chloride 94 (*)     POC TCO2 (MEASURED) 33 (*)     POC Ionized Calcium 0.99 (*)     POC Hematocrit 31 (*)     All other components within normal limits   LIPASE   URINALYSIS MICROSCOPIC    Narrative:     Specimen Source->Urine   ISTAT CHEM8          Imaging Results              CT Abdomen Pelvis With IV Contrast NO Oral Contrast (Final result)  Result time 02/14/24 03:31:00      Final result by Aixa Varghese MD (02/14/24 03:31:00)                   Impression:      1. Multiple mesenteric/peritoneal masses throughout the abdomen and pelvis the largest in the left upper quadrant measuring up to 11.6 cm.  Moderate to large volume of abdominopelvic ascites with associated peritoneal thickening.  Overall, findings highly concerning for underlying peritoneal metastatic disease.  2. Diffuse gastric wall thickening which could relate to nondistention versus gastritis or infiltrating neoplastic process.  Correlation with presentation/clinical history and further assessment with EGD as warranted.  3. Ill-defined region of hypoattenuation in the posterior right hepatic lobe which could reflect underlying hepatic lesion.  4. Incompletely visualized region of calcifications/soft tissue density in the right breast as discussed above.  Correlation/further assessment with mammography advised.  5. Additional findings as above.    Electronically signed by resident: Anna  Willy  Date:    02/14/2024  Time:    02:52    Electronically signed by: Aixa Varghese MD  Date:    02/14/2024  Time:    03:31               Narrative:    EXAMINATION:  CT ABDOMEN PELVIS WITH IV CONTRAST    CLINICAL HISTORY:  Nausea/vomiting;    TECHNIQUE:  Axial images of the abdomen and pelvis were acquired  after the use of 75 cc [id number] IV contrast.  Coronal and sagittal reconstructions were also obtained.    COMPARISON:  CT abdomen and pelvis from outside facility 02/06/2024    FINDINGS:  Heart: Normal in size. No pericardial effusion.    Lungs: Trace bilateral pleural effusions.  No focal consolidation.    Liver: There is a 3.1 cm of ill-defined hypoattenuation on the posterior aspect of the right hepatic lobe (axial series 2, images 45-56 and sagittal series 602, images 81-86).  This could reflect underlying focal lesion.  The portal vein and splenic vein are patent.    Gallbladder: No calcified stones in the gallbladder lumen.    Bile Ducts: No significant intra or extrahepatic biliary ductal dilatation.    Pancreas: No significant peripancreatic inflammatory change or fat stranding.    Spleen: Unremarkable.    Stomach and duodenum: There is a small hiatal hernia.  There is diffuse gastric wall thickening which could relate to nondistention although gastritis or infiltrating neoplastic process not excluded.  Correlation with physical exam/clinical presentation advised and further assessment with EGD as warranted.    Adrenals: Unremarkable.    Kidneys/Ureters: Kidneys are normal in size and location and enhance symmetrically.  There is a 1.2 cm right renal cortical hypodensity, likely a cyst.  No evidence of hydronephrosis bilaterally.  There are multiple calcifications within the pelvis, presumably phleboliths.    Bladder: Urinary bladder demonstrates smooth margins.    Reproductive organs: The uterus and adnexal regions are within normal limits.    Bowel/Mesentery: Small bowel is normal in caliber with no  evidence of obstruction. No evidence of inflammation or wall thickening.  Colon demonstrates no focal wall thickening. The appendix is normal.    Peritoneum: There are multiple masses, grossly stable in size and distribution compared to recent prior.  The largest mass is in the left upper quadrant measuring 11.6 x 8.1 cm (series 2, image 89) with heterogeneous attenuation.  Moderate to large volume of abdominopelvic ascites.  There is associated peritoneal thickening/enhancement.  Overall, findings highly concerning for malignant ascites/peritoneal disease.  No intraperitoneal free air.    Lymph nodes: Shotty periaortic lymph nodes present.    Vasculature: The abdominal aorta is nonaneurysmal with atherosclerosis.    Abdominal wall: Tiny fat containing umbilical hernia.  Incidentally visualized extra thoracic soft tissues demonstrate a partially calcified 3.5 cm soft tissue density lesion in the right breast.  Diffuse body wall edema.    Bones: Visualized osseous structures are intact with degenerative change                                       Medications   morphine injection 4 mg (4 mg Intravenous Given 2/14/24 0031)   ondansetron injection 4 mg (4 mg Intravenous Given 2/14/24 0031)   sodium chloride 0.9% bolus 1,000 mL 1,000 mL (0 mLs Intravenous Stopped 2/14/24 0416)   iohexoL (OMNIPAQUE 350) injection 75 mL (75 mLs Intravenous Given 2/14/24 0235)   potassium bicarbonate disintegrating tablet 25 mEq (25 mEq Oral Given 2/14/24 0427)     Medical Decision Making  58-year-old female with history of breast cancer, status post lumpectomy, diabetes, hypertension and hyperlipidemia is presenting to the emergency department for left-sided abdominal and flank pain.  Patient explains that last week she had a CT scan of her abdomen that showed a mesenteric mass.  She initially presented to the emergency department because of bloating.  States that this pain has worsened.  She presents afebrile, nontoxic appearing.   Hemodynamically stable.  She does have left-sided abdominal tenderness to palpation.    Amount and/or Complexity of Data Reviewed  Labs: ordered. Decision-making details documented in ED Course.  Radiology: ordered.    Risk  Prescription drug management.              Attending Attestation:             Attending ED Notes:   Attending Note:  I have seen the patient, have repeated the key portions of the history and physical, reviewed and agree with the medical documentation, and supervised and managed the medical care of the patient. Additionally, I was present for the critical portion of any procedure(s) performed.      58 F here for left upper quadrant pain, recent ED visit for the same which showed new intraabdominal mass concerning for metastasis. Her pain has been uncontrolled but she has been limiting her pain meds at home.  Labs stable  CT w/ findings similar to recent scan.   Recommend follow up with oncologist.     VANNA Franz MD  Staff ED Physician  02/14/2024         ED Course as of 02/14/24 0651   Wed Feb 14, 2024   0057 WBC: 11.27  No leukocytosis. [KL]   0058 Hemoglobin(!): 10.2  Patient with mild anemia.  Hemoglobin is stable from last week. [KL]   0150 NITRITE UA: Negative [KL]   0150 Leukocyte Esterase, UA: Negative [KL]   0150 Blood, UA(!): 2+  Mild hematuria.  Urinalysis overall is not concerning for infection. [KL]   0151 RBC, UA: 4 [KL]   0151 WBC, UA: 4 [KL]   0151 Bacteria, UA: Rare [KL]   0151 Squam Epithel, UA: 4  Urine sample contaminated. [KL]   0357 Patient was reassessed.  She is feeling improved after medications and fluids.  Discussed results of CT scan.  Explained we are awaiting for some final labs but anticipate discharge home.  Patient is agreeable with this plan.  Will p.o. challenge now. [KL]   0413 Potassium(!): 3.3  Mild hypokalemia.  Ordered oral repletion. [KL]   0650 Lipase: 9  Not concern for pancreatitis [KL]   0650 All results were discussed with patient and .  Strict ED precautions and return instructions were discussed. All questions were answered. Instructed to follow up with primary care doctor and her oncology. Stable for discharge and outpatient follow up.   [KL]      ED Course User Index  [KL] Bianca Lance MD                           Clinical Impression:  Final diagnoses:  [R10.13] Epigastric abdominal pain  [R10.12] Left upper quadrant abdominal pain (Primary)  [E87.6] Hypokalemia          ED Disposition Condition    Discharge Stable          ED Prescriptions    None       Follow-up Information       Follow up With Specialties Details Why Contact Info    [facility] - Emergency Dept Emergency Medicine Go to  As needed, If symptoms worsen [address] [facility] [id number] [phone]    Mark Chua MD Family Medicine Schedule an appointment as soon as possible for a visit in 2 days As needed, If symptoms worsen [address] [facility] [address]  [phone]               Bianca Lance MD  Resident  02/14/24 0651       Eunice Franz MD  02/14/24 [id number]

## 2024-02-14 NOTE — ED TRIAGE NOTES
Anette Ricci, a 58 y.o. female presents to the ED w/ complaint of upper abd pain that radiates to back, was seen at  for same complaint and told she had masses in abd, increased pain     Triage note:  Chief Complaint   Patient presents with    Abdominal Pain     Patient reports LLQ abdominal pain into the left back. Patient reports that she was seen with provider supposed to have scan on 21st. Patient reports increased pain. Was seen in ED and abdominal mass was found.     Review of patient's allergies indicates:   Allergen Reactions    Codeine      Past Medical History:   Diagnosis Date    Breast cancer 2013    Diabetes mellitus     Genetic testing 05/29/2013    VUS    Hyperlipidemia     Hypertension     Malignant neoplasm of upper-outer quadrant of right breast in female, estrogen receptor positive 12/02/2015    Seizure disorder

## 2024-02-14 NOTE — ED NOTES
I-STAT Chem-8+ Results:   Value Reference Range   Sodium 134 136-145 mmol/L   Potassium  4.9 3.5-5.1 mmol/L   Chloride 94  mmol/L   Ionized Calcium 0.99 1.06-1.42 mmol/L   CO2 (measured) 33 23-29 mmol/L   Glucose 117  mg/dL   BUN 7 6-30 mg/dL   Creatinine 0.8 0.5-1.4 mg/dL   Hematocrit 31 36-54%

## 2024-02-14 NOTE — TELEPHONE ENCOUNTER
Pt reports severe pain in her left abdomen/flank 8/10. Advised, per protocol. Verbalizes understanding.     Reason for Disposition   [1] SEVERE pain (e.g., excruciating, scale 8-10) AND [2] present > 1 hour    Additional Information   Negative: Passed out (i.e., lost consciousness, collapsed and was not responding)   Negative: Shock suspected (e.g., cold/pale/clammy skin, too weak to stand, low BP, rapid pulse)   Negative: Difficult to awaken or acting confused (e.g., disoriented, slurred speech)   Negative: Sounds like a life-threatening emergency to the triager    Protocols used: Flank Pain-A-AH

## 2024-02-14 NOTE — DISCHARGE INSTRUCTIONS
Diagnosis: abdominal pain    Take the medication prescribed by your hematologist/oncologist if you are having severe pain.  Call them to schedule an appointment for close follow-up.  Also follow up with your primary care doctor in the next 2-3 days for re-evaluation.    Tests today showed:   Labs Reviewed   CBC W/ AUTO DIFFERENTIAL - Abnormal; Notable for the following components:       Result Value    RBC 3.53 (*)     Hemoglobin 10.2 (*)     Hematocrit 30.6 (*)     Platelets 589 (*)     Gran # (ANC) 9.0 (*)     Gran % 79.6 (*)     Lymph % 10.3 (*)     All other components within normal limits   URINALYSIS, REFLEX TO URINE CULTURE - Abnormal; Notable for the following components:    Appearance, UA Hazy (*)     Protein, UA 1+ (*)     Glucose, UA Trace (*)     Occult Blood UA 2+ (*)     All other components within normal limits    Narrative:     Specimen Source->Urine   COMPREHENSIVE METABOLIC PANEL - Abnormal; Notable for the following components:    Sodium 135 (*)     Potassium 3.3 (*)     Albumin 2.6 (*)     ALT 6 (*)     All other components within normal limits   ISTAT PROCEDURE - Abnormal; Notable for the following components:    POC Glucose 117 (*)     POC Sodium 134 (*)     POC Chloride 94 (*)     POC TCO2 (MEASURED) 33 (*)     POC Ionized Calcium 0.99 (*)     POC Hematocrit 31 (*)     All other components within normal limits   URINALYSIS MICROSCOPIC    Narrative:     Specimen Source->Urine   LIPASE   ISTAT CHEM8     CT Abdomen Pelvis With IV Contrast NO Oral Contrast   Final Result      1. Multiple mesenteric/peritoneal masses throughout the abdomen and pelvis the largest in the left upper quadrant measuring up to 11.6 cm.  Moderate to large volume of abdominopelvic ascites with associated peritoneal thickening.  Overall, findings highly concerning for underlying peritoneal metastatic disease.   2. Diffuse gastric wall thickening which could relate to nondistention versus gastritis or infiltrating neoplastic  Observation/Bedded Outpatient  Occupational Therapy Plan of Care Note  Primary Insurance: [facility] MEDICAID    Secondary Insurance: N/A    Note sent for required physician co-signature: Yes         Is the patient currently receiving skilled home care services (RN or therapy)?No           Diagnosis:   1. Vomiting and diarrhea    2. Dehydration    3. Hyperglycemia    4. Generalized abdominal pain    5. Syncope, unspecified syncope type        Therapy Diagnosis: Weakness    Subjective:  Pt agreeable to session reporting dizziness.  RN taking manual BP prior to session and ok's session.  Mid session RN reports that supine BP was 70/60's prior to session.  Pt was symptomatic during entire session.    Assessment:  Pt is 41 year old female, admitted with vomiting and diarrhea.  Pt fell in ER prior to admission. Pt is from home with family, typically independent with occasional use of quad cane for mobility tasks.  Pt is presenting with dizziness, weakness, pain and low BP affecting her ability to perform ADL tasks independently.  Pt will benefit from skilled OT to maximize functional independence.  Pt is from [facility], here visiting family and will need to drive self home if d/c'd.  In this therapists opinion, pt is unsteady and at significant risk for falls due to the prior mentioned.  Other co-morbidities include the following:  DM, attention deficit disorder, Bipolar, borderline personality disorder, L4 and L5 bulging discs, HTN, and anxiety. Pt does have history of at least 5 falls in the last year and has stairs to enter her home.  Pt may require LINDY depending on progress.  Will need further assessment.    Co morbidities:   Patient Active Problem List   Diagnosis   • Hypoglycemia   • Type II or unspecified type diabetes mellitus without mention of complication, not stated as uncontrolled       OT Task Modification: Minimal to moderate task modification    Precautions:      Functional Status: (as  of date/time noted)  Self Cares/ Activities of daily living:  Grooming Assistance: Supervision;Sitting at sink (04/11/18 1703)  Grooming/Oral Hygiene Deficit: Wash/dry hands (dizziness) (04/11/18 1703)  Footwear Assistance: Supervision;Edge of bed (04/11/18 1703)  Lower Body Dressing Deficit: Don/doff L sock;Don/doff R sock;Don/doff R shoe;Don/doff L shoe (extra time, dizziness, generalized wekaness) (04/11/18 1703)  Toileting Assistance: Supervision (for safety) (04/11/18 1703)  Toileting Deficit: Perineal hygiene (04/11/18 1703)    Household Mobility:  Supine to Sit: Supervision (due to [id number]) (04/11/18 1703)  Sit to Supine: Supervision (due to dizziness ) (04/11/18 1703)  Sit to Stand: Minimal Assist (Min) (for balance, dizziness) (04/11/18 1703)  Stand to Sit: Minimal Assist (Min) (for balance, eccentric lower, safety, dizziness) (04/11/18 1703)  Toilet Transfers: Minimal Assist (Min) (for balance) (04/11/18 1703)  Transfer Equipment: ww and belt (04/11/18 1703)  Sitting - Static: Supervision (for safety, dizzy) (04/11/18 1703)  Sitting - Dynamic: Supervision (for safety, dizziness) (04/11/18 1703)  Standing - Static: Minimal Assist (Min) (dizziness) (04/11/18 1703)  Standing - Dynamic: Minimal Assist (Min) (to steady, dizziness and wekaness) (04/11/18 1703)  Household Mobility Comments #1: Pt ambulates into bathroom with hand hold assist of 1, reaching for walls, and very unsteady. Pt then ambulates to sink from toilet with min assist using ww due to dizziness, requiring to sit abruptly.  Pt ambulates back to bed with ww and min assist reporting increased dizziness. BP taken mid session, see vitals (04/11/18 1703)    Home Management Skills:       See OT (occupational therapy) flowsheet for full details regarding the OT provided.    Education: On this date, the patient was educated on functional mobility tasks, ADL tasks.   The response to education was: Needs reinforcement    Barriers to Discharge:  process.  Correlation with presentation/clinical history and further assessment with EGD as warranted.   3. Ill-defined region of hypoattenuation in the posterior right hepatic lobe which could reflect underlying hepatic lesion.   4. Incompletely visualized region of calcifications/soft tissue density in the right breast as discussed above.  Correlation/further assessment with mammography advised.   5. Additional findings as above.      Electronically signed by resident: Anna Aguilera   Date:    02/14/2024   Time:    02:52      Electronically signed by: Aixa Varghese MD   Date:    02/14/2024   Time:    03:31          Treatments you had today:   Medications   potassium bicarbonate disintegrating tablet 25 mEq (has no administration in time range)   morphine injection 4 mg (4 mg Intravenous Given 2/14/24 0031)   ondansetron injection 4 mg (4 mg Intravenous Given 2/14/24 0031)   sodium chloride 0.9% bolus 1,000 mL 1,000 mL (1,000 mLs Intravenous New Bag 2/14/24 0032)   iohexoL (OMNIPAQUE 350) injection 75 mL (75 mLs Intravenous Given 2/14/24 0235)       Follow-Up Plan:  - Follow-up with primary care doctor within 3 - 5 days  - Additional testing and/or evaluation as directed by your primary doctor    Return to the Emergency Department for symptoms including but not limited to: worsening symptoms, shortness of breath or chest pain, vomiting with inability to hold down fluids, fevers greater than 100.4°F, passing out/fainting/unconsciousness, or other concerning symptoms.     weakness, dizziness, neuropathy, low BP, fall risk, history of falls, lives in [facility]    Long Term Treatment Goals:  Feeding Discharge Goal:    Grooming Discharge Goal: mod I  Bathing Discharge Goal: full body mod I  Dressing Discharge Goal: full body mod i  Toileting Discharge Goal: mod I  Home Setting Transfer Discharge Goal: on/off toilet mod I  Home Management Discharge Goal:    Upper Extremity Function Discharge Goal:    Other Discharge Goal:    Other Discharge Goal:      Treatment Interventions: ADL retraining, Functional transfer training, UE strengthening/ROM, Patient/Family training  Amount:  31-60 minutes  Frequency:  6 days/week  Duration: until d/c or goals met    Plan for Next Session: ADL tasks at the sink, balance tasks    Recommendations for Discharge: 24 Hour assist, Home, Home therapy, Sub-acute nursing home (TBD, pt very dizzy and unsteady, Low BP this date limits )    Billing Information:    Timed Procedures:   $ ADL/Self Care : 8-22 mins,  ,  ,  ,  ,  ,  ,  ,  ,  ,  , $ Therapy Activities per 15 min: 8-22 mins,  ,  ,  ,      Untimed Procedures:   ,  ,  ,  ,  ,  ,  , $ OT Eval: Moderate Complexity: 1 Procedure,      G-Codes:   ,  ,  ,  ,  ,  ,  ,  ,  ,  ,  ,  ,  ,  ,  ,  ,  ,      Total Treatment Time:  OT Time Spent: 55 minutes    Timed Treatment Minutes:  OBS Patients Only:  OT Timed Code TX Minutes: 35 minutes    The co-signature indicates that the physician certifies the need for OT furnished under this plan of treatment while under his/her care.

## 2024-02-21 ENCOUNTER — HOSPITAL ENCOUNTER (OUTPATIENT)
Dept: RADIOLOGY | Facility: HOSPITAL | Age: 59
Discharge: HOME | End: 2024-02-21
Attending: INTERNAL MEDICINE
Payer: MEDICARE

## 2024-02-21 DIAGNOSIS — C78.6 METASTASIS TO PERITONEAL CAVITY: ICD-10-CM

## 2024-02-21 LAB — POCT GLUCOSE: 117 MG/DL (ref 70–110)

## 2024-02-21 PROCEDURE — A9552 F18 FDG: HCPCS | Performed by: INTERNAL MEDICINE

## 2024-02-21 PROCEDURE — 78815 PET IMAGE W/CT SKULL-THIGH: CPT | Mod: TC,PI

## 2024-02-21 PROCEDURE — 78815 PET IMAGE W/CT SKULL-THIGH: CPT | Mod: 26,PI,, | Performed by: STUDENT IN AN ORGANIZED HEALTH CARE EDUCATION/TRAINING PROGRAM

## 2024-02-21 RX ORDER — FLUDEOXYGLUCOSE F18 500 MCI/ML
10 INJECTION INTRAVENOUS
Status: COMPLETED | OUTPATIENT
Start: 2024-02-21 | End: 2024-02-21

## 2024-02-21 RX ADMIN — FLUDEOXYGLUCOSE F-18 14.5 MILLICURIE: 500 INJECTION INTRAVENOUS at 11:02

## 2024-02-22 ENCOUNTER — OFFICE VISIT (OUTPATIENT)
Dept: INTERVENTIONAL RADIOLOGY/VASCULAR | Facility: CLINIC | Age: 59
End: 2024-02-22
Payer: MEDICARE

## 2024-02-22 ENCOUNTER — LAB VISIT (OUTPATIENT)
Dept: LAB | Facility: HOSPITAL | Age: 59
End: 2024-02-22
Payer: MEDICARE

## 2024-02-22 VITALS
SYSTOLIC BLOOD PRESSURE: 136 MMHG | HEART RATE: 95 BPM | DIASTOLIC BLOOD PRESSURE: 88 MMHG | BODY MASS INDEX: 29.9 KG/M2 | HEIGHT: 67 IN | WEIGHT: 190.5 LBS

## 2024-02-22 DIAGNOSIS — R19.00 ABDOMINAL MASS, UNSPECIFIED ABDOMINAL LOCATION: Primary | ICD-10-CM

## 2024-02-22 DIAGNOSIS — C78.6 METASTASIS TO PERITONEAL CAVITY: Primary | ICD-10-CM

## 2024-02-22 DIAGNOSIS — D49.9 NEOPLASM: ICD-10-CM

## 2024-02-22 DIAGNOSIS — C48.8 MALIGNANT NEOPLASM OF OVERLAPPING SITES OF RETROPERITONEUM AND PERITONEUM: ICD-10-CM

## 2024-02-22 DIAGNOSIS — R19.00 ABDOMINAL MASS, UNSPECIFIED ABDOMINAL LOCATION: ICD-10-CM

## 2024-02-22 LAB
INR PPP: 1.1 (ref 0.8–1.2)
PROTHROMBIN TIME: 11.9 SEC (ref 9–12.5)

## 2024-02-22 PROCEDURE — 3008F BODY MASS INDEX DOCD: CPT | Mod: CPTII,S$GLB,, | Performed by: FAMILY MEDICINE

## 2024-02-22 PROCEDURE — 1159F MED LIST DOCD IN RCRD: CPT | Mod: CPTII,S$GLB,, | Performed by: FAMILY MEDICINE

## 2024-02-22 PROCEDURE — 1160F RVW MEDS BY RX/DR IN RCRD: CPT | Mod: CPTII,S$GLB,, | Performed by: FAMILY MEDICINE

## 2024-02-22 PROCEDURE — 36415 COLL VENOUS BLD VENIPUNCTURE: CPT | Performed by: FAMILY MEDICINE

## 2024-02-22 PROCEDURE — 3075F SYST BP GE 130 - 139MM HG: CPT | Mod: CPTII,S$GLB,, | Performed by: FAMILY MEDICINE

## 2024-02-22 PROCEDURE — 99204 OFFICE O/P NEW MOD 45 MIN: CPT | Mod: S$GLB,,, | Performed by: FAMILY MEDICINE

## 2024-02-22 PROCEDURE — 85610 PROTHROMBIN TIME: CPT | Performed by: FAMILY MEDICINE

## 2024-02-22 PROCEDURE — 3079F DIAST BP 80-89 MM HG: CPT | Mod: CPTII,S$GLB,, | Performed by: FAMILY MEDICINE

## 2024-02-22 PROCEDURE — 99999 PR PBB SHADOW E&M-EST. PATIENT-LVL III: CPT | Mod: PBBFAC,,, | Performed by: FAMILY MEDICINE

## 2024-02-22 NOTE — PROGRESS NOTES
"Subjective     Patient ID: Anette Ricci is a 58 y.o. female.    Chief Complaint: Lesion    Patient referred to Interventional Radiology by Keith Valladares MD for evaluation of peritoneal masses. These masses were identified during ER workup for abdominal bloating and abdominal pain. CT scan obtained at that time on 2/6/2024 noted "Development of peritoneal masses, compatible with metastasis.  There is also ascites, likely malignant." Patient continues to have complaints of abdominal pain and distention. She also has complaints of nausea and constipation.       Review of Systems   Constitutional:  Positive for appetite change (decreased). Negative for activity change, chills, fatigue and fever.   Respiratory:  Negative for cough, shortness of breath, wheezing and stridor.    Cardiovascular:  Negative for chest pain, palpitations and leg swelling.   Gastrointestinal:  Positive for abdominal distention, abdominal pain, constipation and nausea. Negative for diarrhea and vomiting.          Objective     Physical Exam  Constitutional:       General: She is not in acute distress.     Appearance: She is well-developed. She is not diaphoretic.   HENT:      Head: Normocephalic and atraumatic.   Pulmonary:      Effort: Pulmonary effort is normal. No respiratory distress.   Neurological:      Mental Status: She is alert and oriented to person, place, and time.   Psychiatric:         Behavior: Behavior normal.         Thought Content: Thought content normal.         Judgment: Judgment normal.       Reviewed hem/onc progress note    2/6/2024       Assessment and Plan     1. Abdominal mass, unspecified abdominal location  -     IR Biopsy Abdominal/Retroperitoneal w/Fluoro (XPD); Future; Expected date: 02/22/2024  -     Protime-INR; Future; Expected date: 02/22/2024    2. Neoplasm  -     Protime-INR; Future; Expected date: 02/22/2024        Discussed case with Dr. Frazier. Explained to patient can offer biopsy of any peritoneal " clemente. Discussed how the procedure will be performed, risks (including, but not limited to, pain, bleeding, infection, damage to nearby structures, and the need for additional procedures), benefits, possible complications, pre-post procedure expectations, and alternatives. The patient voices understanding and all questions have been answered.  The patient agrees to proceed as planned. Patient scheduled for 3/4/2024. Pre-procedure handout with clinic phone number provided.

## 2024-02-22 NOTE — Clinical Note
"Thank you for referring Ms. Ricci to Interventional Radiology at the [facility]. Please don't hesitate to contact us if there are any questions regarding this evaluation at [phone]. If you have any other patients for whom you would like a consultation, please place an order for "[id number]", and we will be happy to review their case.  Sincerely, LEROY Martinez, FNP Interventional Radiology   "

## 2024-02-22 NOTE — LETTER
February 22, 2024    Anette Ricci  [address] [facility] [address]     [facility] [address]  [address]  [facility] [address]  Phone: [phone] PRE-PROCEDURE INSTRUCTIONS    Your procedure with Interventional Radiology is scheduled for 3/4/2024. Please arrive by 6:30am. Please note your appointment time in [facility] will be different.    You must check-in and receive a wristband before going to your procedure. We will call you the day before to let you know your check-in location.    **Do not eat or drink anything between midnight and the time of your procedure. This includes gum, mints, and candy lemon drops.    **Do not smoke or drink alcoholic beverages 24 hours prior to your procedure.    **If you wear contact lenses, dentures, hearing aids, or glasses, bring a container to put them in during the procedure and give them to a family member for safekeeping.    **If you have been diagnosed with sleep apnea please bring your CPAP machine.    **If your doctor has scheduled you for an overnight stay, bring a small overnight bag with any personal items that you may need.    **Make arrangements in advance for transportation home by a responsible adult. It is not safe to drive a vehicle during the 24 hours following the procedure.    **All [facility] facilities and properties are tobacco free. Smoking is NOT allowed.    PLEASE NOTE: The procedure schedule has many variables which affect the time of your procedure. Family members should be available if your surgery time changes.    If you have any questions about these instructions call Interventional Radiology at [phone] Monday - Friday between 8:00am and 4:00pm or [phone] (ask for interventional radiology resident) for after hours.

## 2024-02-27 ENCOUNTER — NURSE TRIAGE (OUTPATIENT)
Dept: ADMINISTRATIVE | Facility: CLINIC | Age: 59
End: 2024-02-27
Payer: MEDICARE

## 2024-02-27 ENCOUNTER — HOSPITAL ENCOUNTER (OUTPATIENT)
Facility: HOSPITAL | Age: 59
Discharge: HOME | End: 2024-02-28
Attending: EMERGENCY MEDICINE | Admitting: EMERGENCY MEDICINE
Payer: MEDICARE

## 2024-02-27 DIAGNOSIS — R18.0 MALIGNANT ASCITES: Primary | ICD-10-CM

## 2024-02-27 LAB
ALBUMIN SERPL BCP-MCNC: 2.4 G/DL (ref 3.5–5.2)
ALP SERPL-CCNC: 78 U/L (ref 55–135)
ALT SERPL W/O P-5'-P-CCNC: 9 U/L (ref 10–44)
ANION GAP SERPL CALC-SCNC: 13 MMOL/L (ref 8–16)
AST SERPL-CCNC: 26 U/L (ref 10–40)
BACTERIA #/AREA URNS AUTO: ABNORMAL /HPF
BASOPHILS # BLD AUTO: 0.07 K/UL (ref 0–0.2)
BASOPHILS NFR BLD: 0.6 % (ref 0–1.9)
BILIRUB SERPL-MCNC: 0.6 MG/DL (ref 0.1–1)
BILIRUB UR QL STRIP: NEGATIVE
BUN SERPL-MCNC: 6 MG/DL (ref 6–30)
BUN SERPL-MCNC: 7 MG/DL (ref 6–20)
CALCIUM SERPL-MCNC: 9 MG/DL (ref 8.7–10.5)
CAOX CRY UR QL COMP ASSIST: ABNORMAL
CHLORIDE SERPL-SCNC: 95 MMOL/L (ref 95–110)
CHLORIDE SERPL-SCNC: 97 MMOL/L (ref 95–110)
CLARITY UR REFRACT.AUTO: ABNORMAL
CO2 SERPL-SCNC: 27 MMOL/L (ref 23–29)
COLOR UR AUTO: ABNORMAL
CREAT SERPL-MCNC: 0.6 MG/DL (ref 0.5–1.4)
CREAT SERPL-MCNC: 0.8 MG/DL (ref 0.5–1.4)
DIFFERENTIAL METHOD BLD: ABNORMAL
EOSINOPHIL # BLD AUTO: 0.1 K/UL (ref 0–0.5)
EOSINOPHIL NFR BLD: 0.4 % (ref 0–8)
ERYTHROCYTE [DISTWIDTH] IN BLOOD BY AUTOMATED COUNT: 14.4 % (ref 11.5–14.5)
EST. GFR  (NO RACE VARIABLE): >60 ML/MIN/1.73 M^2
GLUCOSE SERPL-MCNC: 128 MG/DL (ref 70–110)
GLUCOSE SERPL-MCNC: 135 MG/DL (ref 70–110)
GLUCOSE UR QL STRIP: NEGATIVE
HCT VFR BLD AUTO: 30.5 % (ref 37–48.5)
HCT VFR BLD CALC: 31 %PCV (ref 36–54)
HCV AB SERPL QL IA: NORMAL
HGB BLD-MCNC: 9.8 G/DL (ref 12–16)
HGB UR QL STRIP: NEGATIVE
HIV 1+2 AB+HIV1 P24 AG SERPL QL IA: NORMAL
HYALINE CASTS UR QL AUTO: 0 /LPF
IMM GRANULOCYTES # BLD AUTO: 0.08 K/UL (ref 0–0.04)
IMM GRANULOCYTES NFR BLD AUTO: 0.6 % (ref 0–0.5)
KETONES UR QL STRIP: ABNORMAL
LEUKOCYTE ESTERASE UR QL STRIP: NEGATIVE
LIPASE SERPL-CCNC: 10 U/L (ref 4–60)
LYMPHOCYTES # BLD AUTO: 1.4 K/UL (ref 1–4.8)
LYMPHOCYTES NFR BLD: 11.5 % (ref 18–48)
MCH RBC QN AUTO: 27.1 PG (ref 27–31)
MCHC RBC AUTO-ENTMCNC: 32.1 G/DL (ref 32–36)
MCV RBC AUTO: 84 FL (ref 82–98)
MICROSCOPIC COMMENT: ABNORMAL
MONOCYTES # BLD AUTO: 0.9 K/UL (ref 0.3–1)
MONOCYTES NFR BLD: 7.4 % (ref 4–15)
NEUTROPHILS # BLD AUTO: 9.9 K/UL (ref 1.8–7.7)
NEUTROPHILS NFR BLD: 79.5 % (ref 38–73)
NITRITE UR QL STRIP: NEGATIVE
NRBC BLD-RTO: 0 /100 WBC
PH UR STRIP: 5 [PH] (ref 5–8)
PLATELET # BLD AUTO: 647 K/UL (ref 150–450)
PMV BLD AUTO: 9.6 FL (ref 9.2–12.9)
POC IONIZED CALCIUM: 1.09 MMOL/L (ref 1.06–1.42)
POC TCO2 (MEASURED): 33 MMOL/L (ref 23–27)
POCT GLUCOSE: 106 MG/DL (ref 70–110)
POTASSIUM BLD-SCNC: 4.5 MMOL/L (ref 3.5–5.1)
POTASSIUM SERPL-SCNC: 4.5 MMOL/L (ref 3.5–5.1)
PROT SERPL-MCNC: 7.5 G/DL (ref 6–8.4)
PROT UR QL STRIP: ABNORMAL
RBC # BLD AUTO: 3.62 M/UL (ref 4–5.4)
RBC #/AREA URNS AUTO: 5 /HPF (ref 0–4)
SAMPLE: ABNORMAL
SODIUM BLD-SCNC: 135 MMOL/L (ref 136–145)
SODIUM SERPL-SCNC: 137 MMOL/L (ref 136–145)
SP GR UR STRIP: 1.03 (ref 1–1.03)
SQUAMOUS #/AREA URNS AUTO: 3 /HPF
URN SPEC COLLECT METH UR: ABNORMAL
WBC # BLD AUTO: 12.38 K/UL (ref 3.9–12.7)
WBC #/AREA URNS AUTO: 2 /HPF (ref 0–5)

## 2024-02-27 PROCEDURE — 85025 COMPLETE CBC W/AUTO DIFF WBC: CPT | Performed by: EMERGENCY MEDICINE

## 2024-02-27 PROCEDURE — 80047 BASIC METABLC PNL IONIZED CA: CPT

## 2024-02-27 PROCEDURE — 87389 HIV-1 AG W/HIV-1&-2 AB AG IA: CPT | Performed by: PHYSICIAN ASSISTANT

## 2024-02-27 PROCEDURE — 99285 EMERGENCY DEPT VISIT HI MDM: CPT | Mod: 25

## 2024-02-27 PROCEDURE — 83690 ASSAY OF LIPASE: CPT | Performed by: EMERGENCY MEDICINE

## 2024-02-27 PROCEDURE — 96374 THER/PROPH/DIAG INJ IV PUSH: CPT

## 2024-02-27 PROCEDURE — 81001 URINALYSIS AUTO W/SCOPE: CPT | Performed by: EMERGENCY MEDICINE

## 2024-02-27 PROCEDURE — 86803 HEPATITIS C AB TEST: CPT | Performed by: PHYSICIAN ASSISTANT

## 2024-02-27 PROCEDURE — 96361 HYDRATE IV INFUSION ADD-ON: CPT

## 2024-02-27 PROCEDURE — G0378 HOSPITAL OBSERVATION PER HR: HCPCS

## 2024-02-27 PROCEDURE — 63600175 PHARM REV CODE 636 W HCPCS: Performed by: EMERGENCY MEDICINE

## 2024-02-27 PROCEDURE — 82962 GLUCOSE BLOOD TEST: CPT

## 2024-02-27 PROCEDURE — 80053 COMPREHEN METABOLIC PANEL: CPT | Performed by: EMERGENCY MEDICINE

## 2024-02-27 PROCEDURE — 96376 TX/PRO/DX INJ SAME DRUG ADON: CPT

## 2024-02-27 PROCEDURE — 96375 TX/PRO/DX INJ NEW DRUG ADDON: CPT

## 2024-02-27 RX ORDER — BRIMONIDINE TARTRATE AND TIMOLOL MALEATE 2; 5 MG/ML; MG/ML
SOLUTION OPHTHALMIC
Status: ON HOLD | COMMUNITY
End: 2024-03-18

## 2024-02-27 RX ORDER — MORPHINE SULFATE 4 MG/ML
4 INJECTION, SOLUTION INTRAMUSCULAR; INTRAVENOUS EVERY 4 HOURS PRN
Status: DISCONTINUED | OUTPATIENT
Start: 2024-02-27 | End: 2024-02-28 | Stop reason: HOSPADM

## 2024-02-27 RX ORDER — IBUPROFEN 200 MG
16 TABLET ORAL
Status: DISCONTINUED | OUTPATIENT
Start: 2024-02-27 | End: 2024-02-28 | Stop reason: HOSPADM

## 2024-02-27 RX ORDER — ACETAMINOPHEN 325 MG/1
650 TABLET ORAL EVERY 8 HOURS PRN
Status: DISCONTINUED | OUTPATIENT
Start: 2024-02-27 | End: 2024-02-28 | Stop reason: HOSPADM

## 2024-02-27 RX ORDER — ONDANSETRON HYDROCHLORIDE 2 MG/ML
4 INJECTION, SOLUTION INTRAVENOUS EVERY 8 HOURS PRN
Status: DISCONTINUED | OUTPATIENT
Start: 2024-02-27 | End: 2024-02-28 | Stop reason: HOSPADM

## 2024-02-27 RX ORDER — AMLODIPINE BESYLATE 10 MG/1
10 TABLET ORAL DAILY
Status: DISCONTINUED | OUTPATIENT
Start: 2024-02-28 | End: 2024-02-28 | Stop reason: HOSPADM

## 2024-02-27 RX ORDER — LORAZEPAM 0.5 MG/1
0.5 TABLET ORAL DAILY
Status: DISCONTINUED | OUTPATIENT
Start: 2024-02-28 | End: 2024-02-28 | Stop reason: HOSPADM

## 2024-02-27 RX ORDER — LISINOPRIL 20 MG/1
40 TABLET ORAL DAILY
Status: DISCONTINUED | OUTPATIENT
Start: 2024-02-28 | End: 2024-02-28 | Stop reason: HOSPADM

## 2024-02-27 RX ORDER — HYDROCHLOROTHIAZIDE 25 MG/1
25 TABLET ORAL DAILY
Status: DISCONTINUED | OUTPATIENT
Start: 2024-02-28 | End: 2024-02-28 | Stop reason: HOSPADM

## 2024-02-27 RX ORDER — MORPHINE SULFATE 4 MG/ML
4 INJECTION, SOLUTION INTRAMUSCULAR; INTRAVENOUS
Status: COMPLETED | OUTPATIENT
Start: 2024-02-27 | End: 2024-02-27

## 2024-02-27 RX ORDER — IBUPROFEN 200 MG
24 TABLET ORAL
Status: DISCONTINUED | OUTPATIENT
Start: 2024-02-27 | End: 2024-02-28 | Stop reason: HOSPADM

## 2024-02-27 RX ORDER — SODIUM CHLORIDE 0.9 % (FLUSH) 0.9 %
10 SYRINGE (ML) INJECTION
Status: DISCONTINUED | OUTPATIENT
Start: 2024-02-27 | End: 2024-02-28 | Stop reason: HOSPADM

## 2024-02-27 RX ORDER — ATORVASTATIN CALCIUM 40 MG/1
80 TABLET, FILM COATED ORAL DAILY
Status: DISCONTINUED | OUTPATIENT
Start: 2024-02-28 | End: 2024-02-28 | Stop reason: HOSPADM

## 2024-02-27 RX ORDER — NALOXONE HCL 0.4 MG/ML
0.4 VIAL (ML) INJECTION
Status: DISCONTINUED | OUTPATIENT
Start: 2024-02-27 | End: 2024-02-28 | Stop reason: HOSPADM

## 2024-02-27 RX ORDER — LAMOTRIGINE 25 MG/1
25 TABLET ORAL 2 TIMES DAILY
Status: DISCONTINUED | OUTPATIENT
Start: 2024-02-27 | End: 2024-02-28 | Stop reason: HOSPADM

## 2024-02-27 RX ORDER — ONDANSETRON HYDROCHLORIDE 2 MG/ML
4 INJECTION, SOLUTION INTRAVENOUS ONCE
Status: COMPLETED | OUTPATIENT
Start: 2024-02-27 | End: 2024-02-27

## 2024-02-27 RX ORDER — INSULIN ASPART 100 [IU]/ML
0-10 INJECTION, SOLUTION INTRAVENOUS; SUBCUTANEOUS
Status: DISCONTINUED | OUTPATIENT
Start: 2024-02-27 | End: 2024-02-28 | Stop reason: HOSPADM

## 2024-02-27 RX ORDER — GLUCAGON 1 MG
1 KIT INJECTION
Status: DISCONTINUED | OUTPATIENT
Start: 2024-02-27 | End: 2024-02-28 | Stop reason: HOSPADM

## 2024-02-27 RX ORDER — TALC
6 POWDER (GRAM) TOPICAL NIGHTLY PRN
Status: DISCONTINUED | OUTPATIENT
Start: 2024-02-27 | End: 2024-02-28 | Stop reason: HOSPADM

## 2024-02-27 RX ADMIN — SODIUM CHLORIDE, POTASSIUM CHLORIDE, SODIUM LACTATE AND CALCIUM CHLORIDE 500 ML: 600; 310; 30; 20 INJECTION, SOLUTION INTRAVENOUS at 06:02

## 2024-02-27 RX ADMIN — MORPHINE SULFATE 4 MG: 4 INJECTION INTRAVENOUS at 08:02

## 2024-02-27 RX ADMIN — MORPHINE SULFATE 4 MG: 4 INJECTION INTRAVENOUS at 06:02

## 2024-02-27 RX ADMIN — ONDANSETRON 4 MG: 2 INJECTION INTRAMUSCULAR; INTRAVENOUS at 06:02

## 2024-02-27 NOTE — ED NOTES
I-STAT Chem-8+ Results:   Value Reference Range   Sodium 135 136-145 mmol/L   Potassium  4.5 3.5-5.1 mmol/L   Chloride 95  mmol/L   Ionized Calcium 1.9 1.06-1.42 mmol/L   CO2 (measured) 33 23-29 mmol/L   Glucose 135  mg/dL   BUN 6 6-30 mg/dL   Creatinine 0.6 0.5-1.4 mg/dL   Hematocrit 31 36-54%

## 2024-02-27 NOTE — ED NOTES
Patient identifiers verified and correct for  MS Ricci  C/C: ABd pain SEE NN  APPEARANCE: awake and alert in NAD. PAIN  10/10  SKIN: warm, dry and intact. No breakdown or bruising.  MUSCULOSKELETAL: Patient moving all extremities spontaneously, no obvious swelling or deformities noted. Ambulates independently.  RESPIRATORY: Denies shortness of breath.Respirations unlabored.   CARDIAC: Denies CP, 2+ distal pulses; no peripheral edema  ABDOMEN: ABdomen large, round, pain to all over abdomen, positive nausea, BM this am   : voids spontaneously, denies difficulty  Neurologic: AAO x 4; follows commands equal strength in all extremities; denies numbness/tingling. Denies dizziness  Denies new wekaness

## 2024-02-27 NOTE — TELEPHONE ENCOUNTER
Spoke with patient who states she is experiencing severe abdominal pain.  Patient states she is scheduled to have a upcoming biopsy on March 4th.  Patient states that she has increasing abdominal distention(skin very tight). Patient states she is very miserable.  Last bowel movement was today.  Patient has a sensation that something is sticking her inside her abdomen.  Advised ED per protocol.   Patient verbalized understanding and stated she wants to speak with Dr. Valladares. Message sent to Dr. Valladares and his staff.       Reason for Disposition   SEVERE abdominal pain (e.g., excruciating)    Additional Information   Negative: Passed out (i.e., fainted, collapsed and was not responding)   Negative: Shock suspected (e.g., cold/pale/clammy skin, too weak to stand, low BP, rapid pulse)   Negative: Sounds like a life-threatening emergency to the triager    Protocols used: Abdominal Pain - Female-A-[place]

## 2024-02-27 NOTE — ED PROVIDER NOTES
Encounter Date: 2/27/2024       History     Chief Complaint   Patient presents with    Edema     Abd swelling, hemonc pt, stomach  cancer     Anette Castillo is a 58-year-old female history of diabetes, hypertension, hyperlipidemia, history of breast cancer currently being worked up for new intra-abdominal mass concerning for metastasis here today for worsening abdominal pain and distention.  On February 2nd presented to the emergency department at an outside hospital, CT abdomen pelvis showed a mass in the left upper quadrant.  She presented again to the emergency department here on 02/13 for abdominal pain and imaging at that time was concerning for multiple mesenteric/peritoneal masses in the abdomen with associated ascites.  She followed up with Dr. Valladares and interventional radiology who was scheduled a biopsy for March 4th.  For pain without significant improvement.  Today she noticed that her abdomen felt more swollen and her pain has become worse.  She feels that her stomach is tight.  She reports burning pain of her skin, denies itching.      Review of patient's allergies indicates:   Allergen Reactions    Codeine      Past Medical History:   Diagnosis Date    Breast cancer 2013    Diabetes mellitus     Genetic testing 05/29/2013    VUS    Hyperlipidemia     Hypertension     Malignant neoplasm of upper-outer quadrant of right breast in female, estrogen receptor positive 12/02/2015    Seizure disorder      Past Surgical History:   Procedure Laterality Date    BREAST BIOPSY      BREAST LUMPECTOMY Right 2013    EYE SURGERY      TOTAL REDUCTION MAMMOPLASTY Bilateral 2016     Family History   Problem Relation Age of Onset    Seizures Father     Breast cancer Sister 48    Cancer Neg Hx     Ovarian cancer Neg Hx     Colon cancer Neg Hx      Social History     Tobacco Use    Smoking status: Never    Smokeless tobacco: Never   Substance Use Topics    Alcohol use: Yes     Alcohol/week: 0.0 standard drinks of alcohol      Comment: ocassional    Drug use: No     Review of Systems    Physical Exam     Initial Vitals [02/27/24 1531]   BP Pulse Resp Temp SpO2   (!) 140/70 (!) 112 20 98.1 °F (36.7 °C) 98 %      MAP       --         Physical Exam    Nursing note and vitals reviewed.  Constitutional: She appears well-developed and well-nourished. No distress.   Eyes: No scleral icterus.   Neck: No JVD present.   Cardiovascular:  Normal rate and regular rhythm.           Pulmonary/Chest: She has no wheezes. She has no rales.   Abdominal: She exhibits distension. There is no abdominal tenderness.   Musculoskeletal:         General: No edema.     Neurological: She is alert and oriented to person, place, and time.         ED Course   Procedures  Labs Reviewed   CBC W/ AUTO DIFFERENTIAL - Abnormal; Notable for the following components:       Result Value    RBC 3.62 (*)     Hemoglobin 9.8 (*)     Hematocrit 30.5 (*)     Platelets 647 (*)     Immature Granulocytes 0.6 (*)     Gran # (ANC) 9.9 (*)     Immature Grans (Abs) 0.08 (*)     Gran % 79.5 (*)     Lymph % 11.5 (*)     All other components within normal limits   COMPREHENSIVE METABOLIC PANEL - Abnormal; Notable for the following components:    Glucose 128 (*)     Albumin 2.4 (*)     ALT 9 (*)     All other components within normal limits   URINALYSIS, REFLEX TO URINE CULTURE - Abnormal; Notable for the following components:    Appearance, UA Cloudy (*)     Protein, UA 1+ (*)     Ketones, UA Trace (*)     All other components within normal limits    Narrative:     Specimen Source->Urine   URINALYSIS MICROSCOPIC - Abnormal; Notable for the following components:    RBC, UA 5 (*)     Bacteria Many (*)     All other components within normal limits    Narrative:     Specimen Source->Urine   ISTAT PROCEDURE - Abnormal; Notable for the following components:    POC Glucose 135 (*)     POC Sodium 135 (*)     POC TCO2 (MEASURED) 33 (*)     POC Hematocrit 31 (*)     All other components within normal  limits   HIV 1 / 2 ANTIBODY    Narrative:     Release to patient->Immediate   HEPATITIS C ANTIBODY    Narrative:     Release to patient->Immediate   LIPASE   POCT GLUCOSE   POCT GLUCOSE MONITORING CONTINUOUS          Imaging Results    None          Medications   glucose chewable tablet 16 g (has no administration in time range)   glucose chewable tablet 24 g (has no administration in time range)   glucagon (human recombinant) injection 1 mg (has no administration in time range)   insulin aspart U-100 pen 0-10 Units (has no administration in time range)   dextrose 10% bolus 125 mL 125 mL (has no administration in time range)   dextrose 10% bolus 250 mL 250 mL (has no administration in time range)   amLODIPine tablet 10 mg (has no administration in time range)   atorvastatin tablet 80 mg (has no administration in time range)   lisinopriL tablet 40 mg (has no administration in time range)   hydroCHLOROthiazide tablet 25 mg (has no administration in time range)   lamoTRIgine tablet 25 mg (has no administration in time range)   LORazepam tablet 0.5 mg (has no administration in time range)   sodium chloride 0.9% flush 10 mL (has no administration in time range)   melatonin tablet 6 mg (has no administration in time range)   morphine injection 4 mg (has no administration in time range)   acetaminophen tablet 650 mg (has no administration in time range)   ondansetron injection 4 mg (has no administration in time range)   naloxone 0.4 mg/mL injection 0.4 mg (has no administration in time range)   morphine injection 4 mg (4 mg Intravenous Given 2/27/24 1824)   ondansetron injection 4 mg (4 mg Intravenous Given 2/27/24 1824)   lactated ringers bolus 500 mL (0 mLs Intravenous Stopped 2/27/24 1925)   morphine injection 4 mg (4 mg Intravenous Given 2/27/24 2053)     Medical Decision Making  Emergent evaluation a 58-year-old female presenting today with worsening abdominal distention now with decreased p.o. intake and shortness  of breath.      Scheduled for biopsy on May 4th but symptoms became too severe.    On arrival, she is afebrile, tachycardic at 112, BP elevated at 140/70, saturation 100% on room air.    She appears uncomfortable but not in distress.    Differential includes but not limited to malignant effusion, disease progression, less likely obstruction as she had a normal bowel movement this morning.    Plan for labs, pain control, bedside ultrasound.        Amount and/or Complexity of Data Reviewed  Labs: ordered. Decision-making details documented in ED Course.    Risk  OTC drugs.  Prescription drug management.               ED Course as of 02/27/24 2332 Tue Feb 27, 2024 1844 WBC: 12.38 [GM]   1844 Hemoglobin(!): 9.8 [GM]   1844 Platelet Count(!): 647 [GM]   1844 BUN: 7 [GM]   1844 Creatinine: 0.8 [GM]      ED Course User Index  [GM] Eunice Franz MD          Discussed results with patient.  Ultrasound shows pretty large pockets of fluid.  She has no abdominal tenderness, I have a low suspicion for SBP.  Will place an EDOU for IR paracentesis with diagnostics .  If her symptoms improve, okay to be discharged after the paracentesis.  She has followed up appointment on March 14th for her outpatient biopsy.                 Clinical Impression:  Final diagnoses:  [R18.0] Malignant ascites (Primary)          ED Disposition Condition    Observation Stable                Eunice Franz MD  02/27/24 2332

## 2024-02-27 NOTE — ED NOTES
"Patient states swelling in abdomen, reports she has a " growth" in stomach, states she is having a biopsy Monday. Abdomen large round, Positive SOB, nausea. Took Laxative with positive results, Noco this am   "

## 2024-02-28 VITALS
DIASTOLIC BLOOD PRESSURE: 70 MMHG | WEIGHT: 189.38 LBS | BODY MASS INDEX: 29.72 KG/M2 | RESPIRATION RATE: 19 BRPM | SYSTOLIC BLOOD PRESSURE: 122 MMHG | HEART RATE: 100 BPM | HEIGHT: 67 IN | TEMPERATURE: 98 F | OXYGEN SATURATION: 97 %

## 2024-02-28 LAB
ALBUMIN FLD-MCNC: 2.1 G/DL
APPEARANCE FLD: ABNORMAL
BODY FLD TYPE: ABNORMAL
BODY FLUID SOURCE, LDH: NORMAL
COLOR FLD: ABNORMAL
GLUCOSE FLD-MCNC: 140 MG/DL
GRAM STN SPEC: NORMAL
GRAM STN SPEC: NORMAL
LDH FLD L TO P-CCNC: 1948 U/L
LYMPHOCYTES NFR FLD MANUAL: 21 %
MONOS+MACROS NFR FLD MANUAL: 36 %
NEUTROPHILS NFR FLD MANUAL: 41 %
OTHER CELLS FLD MANUAL: 2 %
POCT GLUCOSE: 120 MG/DL (ref 70–110)
POCT GLUCOSE: 146 MG/DL (ref 70–110)
POCT GLUCOSE: 160 MG/DL (ref 70–110)
PROT FLD-MCNC: 4.8 G/DL
SPECIMEN SOURCE: NORMAL
WBC # FLD: 1551 /CU MM

## 2024-02-28 PROCEDURE — 87205 SMEAR GRAM STAIN: CPT | Performed by: NURSE PRACTITIONER

## 2024-02-28 PROCEDURE — 63600175 PHARM REV CODE 636 W HCPCS: Performed by: PHYSICIAN ASSISTANT

## 2024-02-28 PROCEDURE — P9047 ALBUMIN (HUMAN), 25%, 50ML: HCPCS | Mod: JZ,JG

## 2024-02-28 PROCEDURE — 83615 LACTATE (LD) (LDH) ENZYME: CPT | Performed by: NURSE PRACTITIONER

## 2024-02-28 PROCEDURE — G0378 HOSPITAL OBSERVATION PER HR: HCPCS

## 2024-02-28 PROCEDURE — 25000003 PHARM REV CODE 250: Performed by: PHYSICIAN ASSISTANT

## 2024-02-28 PROCEDURE — 96372 THER/PROPH/DIAG INJ SC/IM: CPT | Mod: 59 | Performed by: PHYSICIAN ASSISTANT

## 2024-02-28 PROCEDURE — 87070 CULTURE OTHR SPECIMN AEROBIC: CPT | Performed by: NURSE PRACTITIONER

## 2024-02-28 PROCEDURE — 87075 CULTR BACTERIA EXCEPT BLOOD: CPT | Performed by: NURSE PRACTITIONER

## 2024-02-28 PROCEDURE — 82945 GLUCOSE OTHER FLUID: CPT | Performed by: NURSE PRACTITIONER

## 2024-02-28 PROCEDURE — 82042 OTHER SOURCE ALBUMIN QUAN EA: CPT | Performed by: NURSE PRACTITIONER

## 2024-02-28 PROCEDURE — 96376 TX/PRO/DX INJ SAME DRUG ADON: CPT

## 2024-02-28 PROCEDURE — 82962 GLUCOSE BLOOD TEST: CPT | Mod: 91

## 2024-02-28 PROCEDURE — 63600175 PHARM REV CODE 636 W HCPCS: Mod: JZ,JG

## 2024-02-28 PROCEDURE — 25000003 PHARM REV CODE 250: Performed by: NURSE PRACTITIONER

## 2024-02-28 PROCEDURE — 89051 BODY FLUID CELL COUNT: CPT | Performed by: NURSE PRACTITIONER

## 2024-02-28 PROCEDURE — 84157 ASSAY OF PROTEIN OTHER: CPT | Performed by: NURSE PRACTITIONER

## 2024-02-28 RX ORDER — ALUMINUM HYDROXIDE, MAGNESIUM HYDROXIDE, AND SIMETHICONE 1200; 120; 1200 MG/30ML; MG/30ML; MG/30ML
5 SUSPENSION ORAL
Status: COMPLETED | OUTPATIENT
Start: 2024-02-28 | End: 2024-02-28

## 2024-02-28 RX ORDER — ALBUMIN HUMAN 250 G/1000ML
SOLUTION INTRAVENOUS
Status: COMPLETED
Start: 2024-02-28 | End: 2024-02-28

## 2024-02-28 RX ADMIN — LORAZEPAM 0.5 MG: 0.5 TABLET ORAL at 09:02

## 2024-02-28 RX ADMIN — MORPHINE SULFATE 4 MG: 4 INJECTION INTRAVENOUS at 12:02

## 2024-02-28 RX ADMIN — INSULIN ASPART 2 UNITS: 100 INJECTION, SOLUTION INTRAVENOUS; SUBCUTANEOUS at 12:02

## 2024-02-28 RX ADMIN — MORPHINE SULFATE 4 MG: 4 INJECTION INTRAVENOUS at 06:02

## 2024-02-28 RX ADMIN — ONDANSETRON 4 MG: 2 INJECTION INTRAMUSCULAR; INTRAVENOUS at 01:02

## 2024-02-28 RX ADMIN — ATORVASTATIN CALCIUM 80 MG: 40 TABLET, FILM COATED ORAL at 09:02

## 2024-02-28 RX ADMIN — ALBUMIN (HUMAN) 25 G: 12.5 SOLUTION INTRAVENOUS at 03:02

## 2024-02-28 RX ADMIN — AMLODIPINE BESYLATE 10 MG: 10 TABLET ORAL at 09:02

## 2024-02-28 RX ADMIN — HYDROCHLOROTHIAZIDE 25 MG: 25 TABLET ORAL at 09:02

## 2024-02-28 RX ADMIN — ALBUMIN (HUMAN) 12.5 G: 25 SOLUTION INTRAVENOUS at 03:02

## 2024-02-28 RX ADMIN — ONDANSETRON 4 MG: 2 INJECTION INTRAMUSCULAR; INTRAVENOUS at 12:02

## 2024-02-28 RX ADMIN — LAMOTRIGINE 25 MG: 25 TABLET ORAL at 12:02

## 2024-02-28 RX ADMIN — ALUMINUM HYDROXIDE, MAGNESIUM HYDROXIDE, AND SIMETHICONE 5 ML: 200; 200; 20 SUSPENSION ORAL at 11:02

## 2024-02-28 RX ADMIN — LAMOTRIGINE 25 MG: 25 TABLET ORAL at 09:02

## 2024-02-28 RX ADMIN — LISINOPRIL 40 MG: 20 TABLET ORAL at 09:02

## 2024-02-28 NOTE — PROGRESS NOTES
ED Observation Unit  Progress Note      HPI   58-year-old female history of diabetes, hypertension, hyperlipidemia, history of breast cancer currently being worked up for new intra-abdominal mass concerning for metastasis here today for worsening abdominal pain and distention. On February 2nd presented to the emergency department at an outside hospital, CT abdomen pelvis showed a mass in the left upper quadrant. She presented again to the emergency department here on 02/13 for abdominal pain and imaging at that time was concerning for multiple mesenteric/peritoneal masses in the abdomen with associated ascites. She followed up with Dr. Valladares and interventional radiology who was scheduled a biopsy for March 4th. For pain without significant improvement. Today she noticed that her abdomen felt more swollen and her pain has become worse. She feels that her stomach is tight. She reports burning pain of her skin, denies itching.      In the ED, vital signs were stable.  No concerning lab abnormalities.  Patient placed in observation for paracentesis with IR tomorrow.  Received morphine in the ED with improvement in pain.      I reviewed the ED Provider Note dated 02/27/2024  prior to my evaluation of this patient.  I reviewed all labs and imaging performed in the Main ED, prior to patient being placed in Observation. Patient was placed in the ED Observation Unit for Malignant ascites.    Interval History   Pt resting in bed. Awaiting paracentesis. Per IR, does not need to be NPO. Diet ordered. She is afebrile, non toxic and in no distress.    PMHx   Past Medical History:   Diagnosis Date    Breast cancer 2013    Diabetes mellitus     Genetic testing 05/29/2013    VUS    Hyperlipidemia     Hypertension     Malignant neoplasm of upper-outer quadrant of right breast in female, estrogen receptor positive 12/02/2015    Seizure disorder       Past Surgical History:   Procedure Laterality Date    BREAST BIOPSY      BREAST  LUMPECTOMY Right 2013    EYE SURGERY      TOTAL REDUCTION MAMMOPLASTY Bilateral 2016        Family Hx   Family History   Problem Relation Age of Onset    Seizures Father     Breast cancer Sister 48    Cancer Neg Hx     Ovarian cancer Neg Hx     Colon cancer Neg Hx         Social Hx   Social History     Socioeconomic History    Marital status:    Tobacco Use    Smoking status: Never    Smokeless tobacco: Never   Substance and Sexual Activity    Alcohol use: Yes     Alcohol/week: 0.0 standard drinks of alcohol     Comment: ocassional    Drug use: No    Sexual activity: Yes     Partners: Male     Birth control/protection: None     Social Determinants of Health     Financial Resource Strain: Low Risk  (2/28/2024)    Overall Financial Resource Strain (CARDIA)     Difficulty of Paying Living Expenses: Not very hard   Food Insecurity: No Food Insecurity (2/28/2024)    Hunger Vital Sign     Worried About Running Out of Food in the Last Year: Never true     Ran Out of Food in the Last Year: Never true   Transportation Needs: No Transportation Needs (2/28/2024)    PRAPARE - Transportation     Lack of Transportation (Medical): No     Lack of Transportation (Non-Medical): No   Physical Activity: Inactive (2/28/2024)    Exercise Vital Sign     Days of Exercise per Week: 0 days     Minutes of Exercise per Session: 0 min   Stress: Stress Concern Present (2/28/2024)    [place] [employer] - Occupational Stress Questionnaire     Feeling of Stress : To some extent   Social Connections: Moderately Integrated (2/28/2024)    Social Connection and Isolation Panel [NHANES]     Frequency of Communication with Friends and Family: More than three times a week     Frequency of Social Gatherings with Friends and Family: More than three times a week     Attends Hinduism Services: More than 4 times per year     Active Member of Clubs or Organizations: No     Attends Club or Organization Meetings: Never     Marital  Status:    Housing Stability: Unknown (2/28/2024)    Housing Stability Vital Sign     Unable to Pay for Housing in the Last Year: No     Unstable Housing in the Last Year: No        Vital Signs   Vitals:    02/28/24 1234 02/28/24 1554 02/28/24 1709 02/28/24 1930   BP:  119/71 123/69 122/70   BP Location:    Left arm   Patient Position:    Sitting   Pulse:  105 108 100   Resp: 16 18 18 19   Temp:    98.3 °F (36.8 °C)   TempSrc:    Oral   SpO2:  (!) 94% 97% 97%   Weight:       Height:            Review of Systems  Per HPI    Brief Physical Exam/Reassessment   Vitals reviewed.   Constitutional:       General: She is not in acute distress.     Appearance: She is not diaphoretic.   HENT:      Head: Normocephalic and atraumatic.   Cardiovascular:      Rate and Rhythm: Normal rate and regular rhythm.      Heart sounds: Heart sounds not distant. No murmur heard.     No friction rub. No gallop.   Pulmonary:      Effort: Pulmonary effort is normal. No respiratory distress.      Breath sounds: Normal breath sounds. No decreased breath sounds, wheezing, rhonchi or rales.   Chest:      Chest wall: No tenderness.   Abdominal:      General: There is distension.      Palpations: Abdomen is soft.      Tenderness: There is generalized abdominal tenderness (mild).   Musculoskeletal:      Cervical back: Neck supple.   Skin:     General: Skin is warm and dry.   Neurological:      Mental Status: She is alert.   Psychiatric:         Mood and Affect: Mood and affect normal.     Labs/Imaging   Labs Reviewed   CBC W/ AUTO DIFFERENTIAL - Abnormal; Notable for the following components:       Result Value    RBC 3.62 (*)     Hemoglobin 9.8 (*)     Hematocrit 30.5 (*)     Platelets 647 (*)     Immature Granulocytes 0.6 (*)     Gran # (ANC) 9.9 (*)     Immature Grans (Abs) 0.08 (*)     Gran % 79.5 (*)     Lymph % 11.5 (*)     All other components within normal limits   COMPREHENSIVE METABOLIC PANEL - Abnormal; Notable for the following  components:    Glucose 128 (*)     Albumin 2.4 (*)     ALT 9 (*)     All other components within normal limits   URINALYSIS, REFLEX TO URINE CULTURE - Abnormal; Notable for the following components:    Appearance, UA Cloudy (*)     Protein, UA 1+ (*)     Ketones, UA Trace (*)     All other components within normal limits    Narrative:     Specimen Source->Urine   URINALYSIS MICROSCOPIC - Abnormal; Notable for the following components:    RBC, UA 5 (*)     Bacteria Many (*)     All other components within normal limits    Narrative:     Specimen Source->Urine   WBC & DIFF, BODY FLUID - Abnormal; Notable for the following components:    Other Cells, Fluid 2 (*)     All other components within normal limits   ISTAT PROCEDURE - Abnormal; Notable for the following components:    POC Glucose 135 (*)     POC Sodium 135 (*)     POC TCO2 (MEASURED) 33 (*)     POC Hematocrit 31 (*)     All other components within normal limits   POCT GLUCOSE - Abnormal; Notable for the following components:    POCT Glucose 146 (*)     All other components within normal limits   POCT GLUCOSE - Abnormal; Notable for the following components:    POCT Glucose 160 (*)     All other components within normal limits   POCT GLUCOSE - Abnormal; Notable for the following components:    POCT Glucose 120 (*)     All other components within normal limits   CULTURE, AEROBIC  (SPECIFY SOURCE)   CULTURE, ANAEROBIC   GRAM STAIN   HIV 1 / 2 ANTIBODY    Narrative:     Release to patient->Immediate   HEPATITIS C ANTIBODY    Narrative:     Release to patient->Immediate   LIPASE   ALBUMIN, PERITONEAL, PLEURAL FLUID OR DENISE DRAINAGE, IN-HOUSE   PROTEIN, PERITONEAL, PLEURAL FLUID OR DENISE DRAINAGE, IN-HOUSE   LDH, PERITONEAL, PLEURAL FLUID OR DENISE DRAINAGE, IN-HOUSE   GLUCOSE, PERITONEAL, PLEURAL FLUID OR DENISE DRAINAGE, IN-HOUSE   PATHOLOGIST REVIEW, BODY FLUID   POCT GLUCOSE      Imaging Results              IR Paracentesis with Imaging (Final result)  Result time 02/29/24  07:00:39      Final result by Rogelio Malave MD (02/29/24 07:00:39)                   Impression:      Ultrasound-guided paracentesis with drainage of 5000 mL of serosanguinous fluid.    Plan:    Resume inpatient care    _______________________________________________________________    Electronically signed by resident: Sendy Moreno  Date:    02/28/2024  Time:    15:54    Electronically signed by: Rogelio Malave  Date:    02/29/2024  Time:    07:00               Narrative:    EXAMINATION:  Ultrasound-guided paracentesis    Procedural Personnel    Attending physician(s): Rogelio Malave MD    Fellow physician(s): None    Resident physician(s): None    Advanced practice provider(s): Sendy Moreno PA-C    Pre-procedure diagnosis: Ascites    Post-procedure diagnosis: Same    Indication: Same    Complications: No immediate complications.    TECHNIQUE:  - Ultrasound-guided paracentesis    FINDINGS:  Pre-procedure    Consent: Informed consent for the procedure was obtained and time-out was performed prior to the procedure.    Preparation: The site was prepared and draped using maximal sterile barrier technique including cutaneous antisepsis.    Anesthesia/sedation    The IR procedural team has confirmed the patient ID and re-evaluated the patient and sedation plan confirming it is suitable for the patient's condition and procedure.    Level of anesthesia/sedation: No sedation    Anesthesia/sedation administered by: Not applicable    Total intra-service sedation time (minutes): 0    Limited abdominal ultrasound    Limited abdominal ultrasound was performed.    A safe window for paracentesis was identified.    Paracentesis    Local anesthesia was administered. The peritoneal cavity was accessed in the left lower quadrant and fluid return confirmed position. Ascites was drained.    Paracentesis access technique: Real time US guidance    Catheter placed: 5 fr one step    Closure    The catheter was removed. A sterile  bandage was applied.    Post-drainage ultrasound: Not performed    Additional Details    Additional description of procedure: None    Equipment details: None    Specimens removed: Abdominal fluid    Estimated blood loss (mL): Less than 10    Standardized report: [email or web address]    Attestation    Signer name: Sendy Moreno PA-C    I attest that I was present for the entire procedure. I reviewed the stored images and agree with the report as written.                                       I reviewed all labs, imaging and EKGs pertinent to this ED/EDOU visit.    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  Remote history of breast cancer 2013.   - Admitted for diagnostic and therapeutic paracentesis  - IR paracentesis today. Will add on ascites fluid analysis labs.  - morphine q4 prn pain  - maalox prn for indigesion    I have discussed this case with Cecilia Cassidy PA-C and the IR paracentesis team.

## 2024-02-28 NOTE — CONSULTS
"IR consult received for paracentesis. Please place order for "IR paracentesis" and orders for any labs/cultures to be drawn on the ascites that is drained during the procedure. Informed ordering provider via secure chat.     LEROY Martinez, RIVKAP  Interventional Radiology       "

## 2024-02-28 NOTE — PROCEDURES
Radiology Post-Procedure Note    Pre Op Diagnosis: Ascites  Post Op Diagnosis: Same    Procedure: Ultrasound Guided Paracentesis    Procedure performed by: Sendy Moreno PA-C    Written Informed Consent Obtained: Yes  Specimen Removed: YES, serosanguinous fluid  Estimated Blood Loss: Minimal    Findings:   Successful paracentesis. Access obtained in LLQ. Albumin administered PRN per protocol.    Patient tolerated procedure well.    Sendy Moreno PA-C  Interventional Radiology   Spectra: [id number]

## 2024-02-28 NOTE — PLAN OF CARE
Pt arrived to MPU bay 2 for paracentesis. Pt oriented to unit and staff, Pt safely transferred from stretcher to procedural table. Fall risk reviewed and comfort measures utilized with interventions. Safety strap applied, position pillows to minimize pressure points. Blankets applied. Pt prepped and draped utilizing standard sterile technique. Patient placed on continuous monitoring, as required by sedation policy. Timeouts implemented utilizing standard universal time-out per department and facility policy. Pt resting comfortably. Denies pain/discomfort. Will continue to monitor. See flow sheets for monitoring, medication administration, and updates. patient verbalizes understanding.

## 2024-02-28 NOTE — H&P
ED Observation Unit  History and Physical      I assumed care of this patient from the Main ED at onset of observation time, 1945 on 02/27/2024.       History of Present Illness:    58-year-old female history of diabetes, hypertension, hyperlipidemia, history of breast cancer currently being worked up for new intra-abdominal mass concerning for metastasis here today for worsening abdominal pain and distention. On February 2nd presented to the emergency department at an outside hospital, CT abdomen pelvis showed a mass in the left upper quadrant. She presented again to the emergency department here on 02/13 for abdominal pain and imaging at that time was concerning for multiple mesenteric/peritoneal masses in the abdomen with associated ascites. She followed up with Dr. Valladares and interventional radiology who was scheduled a biopsy for March 4th. For pain without significant improvement. Today she noticed that her abdomen felt more swollen and her pain has become worse. She feels that her stomach is tight. She reports burning pain of her skin, denies itching.     In the ED, vital signs were stable.  No concerning lab abnormalities.  Patient placed in observation for paracentesis with IR tomorrow.  Received morphine in the ED with improvement in pain.     I reviewed the ED Provider Note dated 02/27/2024  prior to my evaluation of this patient.  I reviewed all labs and imaging performed in the Main ED, prior to patient being placed in Observation. Patient was placed in the ED Observation Unit for Malignant ascites.    PMHx   Past Medical History:   Diagnosis Date    Breast cancer 2013    Diabetes mellitus     Genetic testing 05/29/2013    VUS    Hyperlipidemia     Hypertension     Malignant neoplasm of upper-outer quadrant of right breast in female, estrogen receptor positive 12/02/2015    Seizure disorder       Past Surgical History:   Procedure Laterality Date    BREAST BIOPSY      BREAST LUMPECTOMY Right 2013    EYE  SURGERY      TOTAL REDUCTION MAMMOPLASTY Bilateral 2016        Family Hx   Family History   Problem Relation Age of Onset    Seizures Father     Breast cancer Sister 48    Cancer Neg Hx     Ovarian cancer Neg Hx     Colon cancer Neg Hx         Social Hx   Social History     Socioeconomic History    Marital status:    Tobacco Use    Smoking status: Never    Smokeless tobacco: Never   Substance and Sexual Activity    Alcohol use: Yes     Alcohol/week: 0.0 standard drinks of alcohol     Comment: ocassional    Drug use: No    Sexual activity: Yes     Partners: Male     Birth control/protection: None        Vital Signs   Vitals:    02/27/24 1824 02/27/24 1839 02/27/24 2053 02/27/24 2144   BP:  138/68  128/63   Pulse:  90  83   Resp: 16 16 16 16   Temp:  98 °F (36.7 °C)  98.3 °F (36.8 °C)   TempSrc:       SpO2:  98%  100%   Weight:       Height:            Review of Systems  Review of Systems   Gastrointestinal:  Positive for abdominal pain.        Abdominal swelling        Physical Exam  Physical Exam  Vitals reviewed.   Constitutional:       General: She is not in acute distress.     Appearance: She is not diaphoretic.   HENT:      Head: Normocephalic and atraumatic.   Cardiovascular:      Rate and Rhythm: Normal rate and regular rhythm.      Heart sounds: Heart sounds not distant. No murmur heard.     No friction rub. No gallop.   Pulmonary:      Effort: Pulmonary effort is normal. No respiratory distress.      Breath sounds: Normal breath sounds. No decreased breath sounds, wheezing, rhonchi or rales.   Chest:      Chest wall: No tenderness.   Abdominal:      General: There is distension.      Palpations: Abdomen is soft.      Tenderness: There is generalized abdominal tenderness (mild).   Musculoskeletal:      Cervical back: Neck supple.   Skin:     General: Skin is warm and dry.   Neurological:      Mental Status: She is alert.   Psychiatric:         Mood and Affect: Mood and affect normal.          Medications:   Scheduled Meds:   [START ON 2/28/2024] amLODIPine  10 mg Oral Daily    [START ON 2/28/2024] atorvastatin  80 mg Oral Daily    [START ON 2/28/2024] hydroCHLOROthiazide  25 mg Oral Daily    lamoTRIgine  25 mg Oral BID    [START ON 2/28/2024] lisinopriL  40 mg Oral Daily    [START ON 2/28/2024] LORazepam  0.5 mg Oral Daily     Continuous Infusions:  PRN Meds:.acetaminophen, dextrose 10%, dextrose 10%, glucagon (human recombinant), glucose, glucose, insulin aspart U-100, melatonin, morphine, naloxone, ondansetron, sodium chloride 0.9%      Assessment/Plan:  Malignant ascites  Abdominal pain  - 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  Remote history of breast cancer 2013.   - Admitted for diagnostic and therapeutic paracentesis  - IR consult placed for the AM  - morphine q4 prn pain      Case was discussed with the ED provider, Dr. Franz

## 2024-02-28 NOTE — PLAN OF CARE
Para completed per MD.  Pt tolerated procedure well.  VSS. Site CDI.  5L of ascites removed, and patient received 150ml of albumin replacement.  Report called to Ashley.

## 2024-02-28 NOTE — H&P
Inpatient Radiology Pre-procedure Note    History of Present Illness:  Anette Ricci is a 58 y.o. female who presents for ultrasound guided paracentesis.  Admission H&P reviewed.  Past Medical History:   Diagnosis Date    Breast cancer 2013    Diabetes mellitus     Genetic testing 05/29/2013    VUS    Hyperlipidemia     Hypertension     Malignant neoplasm of upper-outer quadrant of right breast in female, estrogen receptor positive 12/02/2015    Seizure disorder      Past Surgical History:   Procedure Laterality Date    BREAST BIOPSY      BREAST LUMPECTOMY Right 2013    EYE SURGERY      TOTAL REDUCTION MAMMOPLASTY Bilateral 2016       Review of Systems:   As documented in primary team H&P    Home Meds:   Prior to Admission medications    Medication Sig Start Date End Date Taking? Authorizing Provider   amlodipine (NORVASC) 10 MG tablet Take 10 mg by mouth once daily.  12/4/15  Yes Provider, Historical   atorvastatin (LIPITOR) 40 MG tablet Take 80 mg by mouth once daily.   Yes Provider, Historical   enalapril (VASOTEC) 20 MG tablet Take 20 mg by mouth once daily.   Yes Provider, Historical   hydrochlorothiazide (HYDRODIURIL) 25 MG tablet Take 25 mg by mouth once daily.  5/1/15  Yes Provider, Historical   HYDROcodone-acetaminophen (NORCO) 5-325 mg per tablet Take 1 tablet by mouth every 6 (six) hours as needed for Pain. 2/7/24  Yes Keith Valladares MD   lamoTRIgine (LAMICTAL) 25 MG tablet Take 25 mg by mouth 2 (two) times daily.   Yes Provider, Historical   metformin (GLUCOPHAGE) 1000 MG tablet Take 1,000 mg by mouth daily with breakfast.    Yes Provider, Historical   tirzepatide (MOUNJARO) 5 mg/0.5 mL PnIj Inject 5 mg into the skin every 7 days.   Yes Provider, Historical   brimonidine-timoloL (COMBIGAN) 0.2-0.5 % Drop INSTILL 1 DROP INTO EACH EYE TWICE DAILY AS DIRECTED Ophthalmic for 40 Days    Provider, Historical   insulin glargine, TOUJEO, (TOUJEO SOLOSTAR U-300 INSULIN) 300 unit/mL (1.5 mL) InPn pen  Inject 33 Units into the skin every evening.    Provider, Historical   lorazepam (ATIVAN) 0.5 MG tablet Take 0.5 mg by mouth every 12 (twelve) hours as needed.  5/31/14   Provider, Historical   NOVOLOG FLEXPEN 100 unit/mL InPn pen Inject 10 Units into the skin 2 (two) times daily with meals. With biggest meal 5/13/15   Provider, Historical   quetiapine (SEROQUEL) 25 MG Tab Take 25 mg by mouth once daily.  2/4/16   Provider, Historical     Scheduled Meds:    amLODIPine  10 mg Oral Daily    atorvastatin  80 mg Oral Daily    hydroCHLOROthiazide  25 mg Oral Daily    lamoTRIgine  25 mg Oral BID    lisinopriL  40 mg Oral Daily    LORazepam  0.5 mg Oral Daily     Continuous Infusions:   PRN Meds:acetaminophen, dextrose 10%, dextrose 10%, glucagon (human recombinant), glucose, glucose, insulin aspart U-100, melatonin, morphine, naloxone, ondansetron, sodium chloride 0.9%  Anticoagulants/Antiplatelets: no anticoagulation    Allergies:   Review of patient's allergies indicates:   Allergen Reactions    Codeine      Sedation Hx: have not been any systemic reactions    Vitals:  Temp: 98.4 °F (36.9 °C) (02/28/24 1216)  Pulse: 102 (02/28/24 1216)  Resp: 16 (02/28/24 1234)  BP: (!) 105/59 (02/28/24 1216)  SpO2: 97 % (02/28/24 1216)     Physical Exam:  ASA: 2  Mallampati: n/a    General: no acute distress  Mental Status: alert and oriented to person, place and time  HEENT: normocephalic, atraumatic  Chest: unlabored breathing  Heart: regular heart rate  Abdomen: distended  Extremity: moves all extremities    Plan: ultrasound guided paracentesis  Sedation Plan: local    Sendy Moreno PA-C  Interventional Radiology   Spectra: [id number]

## 2024-02-28 NOTE — PLAN OF CARE
[facility] - Emergency Dept  Initial Discharge Assessment       Primary Care Provider: Mark Chua MD    Admission Diagnosis: Malignant ascites [R18.0]    Admission Date: 2/27/2024  Expected Discharge Date:     Pt stated she is independent with her ambulation and ADL's and does not require assistance or equipment.    Pt stated she can d/c home with no needs and understands she can go through the PCP if she needs services on d/c     Transition of Care Barriers: (P) None    Payor: [employer] MEDICARE / Plan: [employer] MEDICARE HMO / Product Type: Capitation /     Extended Emergency Contact Information  Primary Emergency Contact: Red Ricciddeus  Address: [address], [place] [address] [place] [address]  Home Phone: [phone]  Work Phone: [phone]  Mobile Phone: [phone]  Relation: Spouse  Secondary Emergency Contact: Bruno Branch  Address: [address] [place] [address]  Mobile Phone: [phone]  Relation: Brother    Discharge Plan A: (P) Home  Discharge Plan B: (P) Home      [facility] [place] - [address]  [address] [place]) [place] [address]  Phone: [phone] Fax: [phone]      Initial Assessment (most recent)       Adult Discharge Assessment - 02/28/24 1159          Discharge Assessment    Assessment Type Discharge Planning Assessment     Confirmed/corrected address, phone number and insurance Yes     Confirmed Demographics Correct on Facesheet     Source of Information patient     Reason For Admission Malignant ascites     People in Home spouse (P)      Facility Arrived From: home (P)      Do you expect to return to your current living situation? Yes (P)      Do you have help at home or someone to help you manage your care at home? No (P)      Prior to hospitilization cognitive status: Alert/Oriented;No Deficits (P)      Current cognitive status: No Deficits;Alert/Oriented (P)       Walking or Climbing Stairs Difficulty no (P)      Dressing/Bathing Difficulty no (P)      Home Accessibility wheelchair accessible (P)      Home Layout Able to live on 1st floor (P)      Equipment Currently Used at Home none (P)      Patient currently being followed by outpatient case management? No (P)      Do you currently have service(s) that help you manage your care at home? No (P)      Do you have any problems affording any of your prescribed medications? No (P)      Is the patient taking medications as prescribed? yes (P)      Who is going to help you get home at discharge? family/friends (P)      How do you get to doctors appointments? car, drives self (P)      Are you on dialysis? No (P)      Do you take coumadin? No (P)      Discharge Plan A Home (P)      Discharge Plan B Home (P)      DME Needed Upon Discharge  none (P)      Discharge Plan discussed with: Patient (P)      Transition of Care Barriers None (P)         Physical Activity    On average, how many days per week do you engage in moderate to strenuous exercise (like a brisk walk)? 0 days (P)      On average, how many minutes do you engage in exercise at this level? 0 min (P)         Financial Resource Strain    How hard is it for you to pay for the very basics like food, housing, medical care, and heating? Not very hard (P)         Housing Stability    In the last 12 months, was there a time when you were not able to pay the mortgage or rent on time? No (P)      In the last 12 months, was there a time when you did not have a steady place to sleep or slept in a shelter (including now)? No (P)         Transportation Needs    In the past 12 months, has lack of transportation kept you from medical appointments or from getting medications? No (P)      In the past 12 months, has lack of transportation kept you from meetings, work, or from getting things needed for daily living? No (P)         Food Insecurity    Within the past 12 months, you worried  that your food would run out before you got the money to buy more. Never true (P)      Within the past 12 months, the food you bought just didn't last and you didn't have money to get more. Never true (P)         Stress    Do you feel stress - tense, restless, nervous, or anxious, or unable to sleep at night because your mind is troubled all the time - these days? To some extent (P)         Social Connections    In a typical week, how many times do you talk on the phone with family, friends, or neighbors? More than three times a week (P)      How often do you get together with friends or relatives? More than three times a week (P)      How often do you attend Zoroastrianism or Yazdanism services? More than 4 times per year (P)      Do you belong to any clubs or organizations such as Zoroastrianism groups, unions, fraternal or athletic groups, or school groups? No (P)      How often do you attend meetings of the clubs or organizations you belong to? Never (P)      Are you , , , , never , or living with a partner?  (P)         Alcohol Use    Q1: How often do you have a drink containing alcohol? Never (P)      Q2: How many drinks containing alcohol do you have on a typical day when you are drinking? Patient does not drink (P)      Q3: How often do you have six or more drinks on one occasion? Never (P)         OTHER    Name(s) of People in Home spouse Darrell (P)                    Thao Siu CD, MSW, LMSW, RSW   Case Management  [facility]  Email: [email or web address]

## 2024-02-28 NOTE — PROGRESS NOTES
ED Observation Unit  Progress Note      HPI   58-year-old female history of diabetes, hypertension, hyperlipidemia, history of breast cancer currently being worked up for new intra-abdominal mass concerning for metastasis who presents to [facility] ED on 2/27/2024 for emergent evaluation of worsening abdominal pain and distention.     On February 2nd presented to the emergency department at an outside hospital, CT abdomen pelvis showed a mass in the left upper quadrant. She presented again to the emergency department here on 02/13 for abdominal pain and imaging at that time was concerning for multiple mesenteric/peritoneal masses in the abdomen with associated ascites. She followed up with Dr. Valladares and interventional radiology who was scheduled a biopsy for March 4th. She still has pain without significant improvement. She noticed that her abdomen felt more swollen, and her pain has become worse. She feels that her stomach is tight. She reports burning pain of her skin, denies itching.      In the ED, vital signs were stable.  No concerning lab abnormalities.  Patient placed in observation for paracentesis with IR tomorrow.  Received morphine in the ED with improvement in pain.      Interval History   In the EDOU, patient had IR paracentesis. 5L of ascites removed, and patient received 150ml of albumin replacement. She reports feeling improved.    PMHx   Past Medical History:   Diagnosis Date    Breast cancer 2013    Diabetes mellitus     Genetic testing 05/29/2013    VUS    Hyperlipidemia     Hypertension     Malignant neoplasm of upper-outer quadrant of right breast in female, estrogen receptor positive 12/02/2015    Seizure disorder       Past Surgical History:   Procedure Laterality Date    BREAST BIOPSY      BREAST LUMPECTOMY Right 2013    EYE SURGERY      TOTAL REDUCTION MAMMOPLASTY Bilateral 2016        Family Hx   Family History   Problem Relation Age of Onset    Seizures Father     Breast cancer Sister 48     Cancer Neg Hx     Ovarian cancer Neg Hx     Colon cancer Neg Hx         Social Hx   Social History     Socioeconomic History    Marital status:    Tobacco Use    Smoking status: Never    Smokeless tobacco: Never   Substance and Sexual Activity    Alcohol use: Yes     Alcohol/week: 0.0 standard drinks of alcohol     Comment: ocassional    Drug use: No    Sexual activity: Yes     Partners: Male     Birth control/protection: None     Social Determinants of Health     Financial Resource Strain: Low Risk  (2/28/2024)    Overall Financial Resource Strain (CARDIA)     Difficulty of Paying Living Expenses: Not very hard   Food Insecurity: No Food Insecurity (2/28/2024)    Hunger Vital Sign     Worried About Running Out of Food in the Last Year: Never true     Ran Out of Food in the Last Year: Never true   Transportation Needs: No Transportation Needs (2/28/2024)    PRAPARE - Transportation     Lack of Transportation (Medical): No     Lack of Transportation (Non-Medical): No   Physical Activity: Inactive (2/28/2024)    Exercise Vital Sign     Days of Exercise per Week: 0 days     Minutes of Exercise per Session: 0 min   Stress: Stress Concern Present (2/28/2024)    [employer] [place] [employer] - Occupational Stress Questionnaire     Feeling of Stress : To some extent   Social Connections: Moderately Integrated (2/28/2024)    Social Connection and Isolation Panel [NHANES]     Frequency of Communication with Friends and Family: More than three times a week     Frequency of Social Gatherings with Friends and Family: More than three times a week     Attends Hinduism Services: More than 4 times per year     Active Member of Clubs or Organizations: No     Attends Club or Organization Meetings: Never     Marital Status:    Housing Stability: Unknown (2/28/2024)    Housing Stability Vital Sign     Unable to Pay for Housing in the Last Year: No     Unstable Housing in the Last Year: No        Vital Signs    Vitals:    02/28/24 0836 02/28/24 1216 02/28/24 1234 02/28/24 1554   BP: 119/72 (!) 105/59  119/71   BP Location:       Patient Position:       Pulse: 97 102  105   Resp: 18 20 16 18   Temp: 98.6 °F (37 °C) 98.4 °F (36.9 °C)     TempSrc: Oral Oral     SpO2: (!) 94% 97%  (!) 94%   Weight:       Height:            Review of Systems  Review of Systems   Constitutional:  Negative for chills, diaphoresis, fever and malaise/fatigue.   HENT:  Negative for sore throat.    Eyes:  Negative for double vision.   Respiratory:  Negative for cough, shortness of breath and wheezing.    Cardiovascular:  Negative for chest pain, palpitations, orthopnea, leg swelling and PND.   Gastrointestinal:  Positive for abdominal pain (improved). Negative for blood in stool, constipation, heartburn, nausea and vomiting.   Genitourinary:  Negative for hematuria.   Musculoskeletal:  Negative for falls and myalgias.   Neurological:  Negative for dizziness, loss of consciousness, weakness and headaches.   Psychiatric/Behavioral:  The patient is not nervous/anxious.        Brief Physical Exam/Reassessment   Physical Exam  Constitutional:       General: She is not in acute distress.     Appearance: She is not ill-appearing, toxic-appearing or diaphoretic.   HENT:      Head: Atraumatic.      Right Ear: External ear normal.      Left Ear: External ear normal.      Nose: Nose normal.   Eyes:      Extraocular Movements: Extraocular movements intact.   Pulmonary:      Effort: No respiratory distress.      Breath sounds: No wheezing.   Abdominal:      General: There is no distension.      Palpations: Abdomen is soft.      Tenderness: There is no abdominal tenderness. There is no guarding.   Musculoskeletal:         General: No swelling or tenderness. Normal range of motion.      Cervical back: Normal range of motion. No rigidity.   Skin:     Coloration: Skin is not jaundiced.      Findings: No rash.   Neurological:      General: No focal deficit present.       Mental Status: She is alert. Mental status is at baseline.      GCS: GCS eye subscore is 4. GCS verbal subscore is 5. GCS motor subscore is 6.      Comments: SURYA.         Labs/Imaging   Labs Reviewed   CBC W/ AUTO DIFFERENTIAL - Abnormal; Notable for the following components:       Result Value    RBC 3.62 (*)     Hemoglobin 9.8 (*)     Hematocrit 30.5 (*)     Platelets 647 (*)     Immature Granulocytes 0.6 (*)     Gran # (ANC) 9.9 (*)     Immature Grans (Abs) 0.08 (*)     Gran % 79.5 (*)     Lymph % 11.5 (*)     All other components within normal limits   COMPREHENSIVE METABOLIC PANEL - Abnormal; Notable for the following components:    Glucose 128 (*)     Albumin 2.4 (*)     ALT 9 (*)     All other components within normal limits   URINALYSIS, REFLEX TO URINE CULTURE - Abnormal; Notable for the following components:    Appearance, UA Cloudy (*)     Protein, UA 1+ (*)     Ketones, UA Trace (*)     All other components within normal limits    Narrative:     Specimen Source->Urine   URINALYSIS MICROSCOPIC - Abnormal; Notable for the following components:    RBC, UA 5 (*)     Bacteria Many (*)     All other components within normal limits    Narrative:     Specimen Source->Urine   ISTAT PROCEDURE - Abnormal; Notable for the following components:    POC Glucose 135 (*)     POC Sodium 135 (*)     POC TCO2 (MEASURED) 33 (*)     POC Hematocrit 31 (*)     All other components within normal limits   POCT GLUCOSE - Abnormal; Notable for the following components:    POCT Glucose 146 (*)     All other components within normal limits   POCT GLUCOSE - Abnormal; Notable for the following components:    POCT Glucose 160 (*)     All other components within normal limits   CULTURE, AEROBIC  (SPECIFY SOURCE)   CULTURE, ANAEROBIC   GRAM STAIN   HIV 1 / 2 ANTIBODY    Narrative:     Release to patient->Immediate   HEPATITIS C ANTIBODY    Narrative:     Release to patient->Immediate   LIPASE   ALBUMIN, PERITONEAL, PLEURAL FLUID OR  DENISE DRAINAGE, IN-HOUSE   PROTEIN, PERITONEAL, PLEURAL FLUID OR DENISE DRAINAGE, IN-HOUSE   LDH, PERITONEAL, PLEURAL FLUID OR DENISE DRAINAGE, IN-HOUSE   WBC & DIFF, BODY FLUID   GLUCOSE, PERITONEAL, PLEURAL FLUID OR DENISE DRAINAGE, IN-HOUSE   POCT GLUCOSE   POCT GLUCOSE MONITORING CONTINUOUS      Imaging Results              IR Paracentesis with Imaging (In process)                      I reviewed all labs.    Plan   Malignant ascites  Abdominal pain  - Patient with malignant ascites secondary to peritoneal malignancy vs Metastasis.  Remote history of breast cancer 2013.   - Admitted to EDOU for diagnostic and therapeutic paracentesis  - IR paracentesis performed today on 2/28/2024  - Will await results  - morphine q4 prn pain    I have discussed this case with previous EDOU provider.     5:18 PM  Ana Rosa Raymundo PA-C  Emergent Department  [facility]  Spectralink #[id number] or #[phone]

## 2024-02-29 ENCOUNTER — PATIENT MESSAGE (OUTPATIENT)
Dept: INTERVENTIONAL RADIOLOGY/VASCULAR | Facility: HOSPITAL | Age: 59
End: 2024-02-29
Payer: MEDICARE

## 2024-02-29 ENCOUNTER — PATIENT OUTREACH (OUTPATIENT)
Dept: ADMINISTRATIVE | Facility: CLINIC | Age: 59
End: 2024-02-29
Payer: MEDICARE

## 2024-02-29 LAB — PATH INTERP FLD-IMP: NORMAL

## 2024-02-29 NOTE — PROGRESS NOTES
C3 nurse spoke with Anette Ricci  for a TCC post hospital discharge follow up call. The patient reports does not have a scheduled HOSFU appointment. C3 nurse was unable to schedule HOSFU appointment for [facility] PCP. Patient advised to contact their PCP to schedule a HOSPFU within 5-7 days.

## 2024-02-29 NOTE — DISCHARGE SUMMARY
ED Observation Unit  Discharge Summary        History of Present Illness:    58-year-old female history of diabetes, hypertension, hyperlipidemia, history of breast cancer currently being worked up for new intra-abdominal mass concerning for metastasis who presents to [facility] ED on 2/27/2024 for emergent evaluation of worsening abdominal pain and distention.      On February 2nd presented to the emergency department at an outside hospital, CT abdomen pelvis showed a mass in the left upper quadrant. She presented again to the emergency department here on 02/13 for abdominal pain and imaging at that time was concerning for multiple mesenteric/peritoneal masses in the abdomen with associated ascites. She followed up with Dr. Valladares and interventional radiology who was scheduled a biopsy for March 4th. She still has pain without significant improvement. She noticed that her abdomen felt more swollen, and her pain has become worse. She feels that her stomach is tight. She reports burning pain of her skin, denies itching.      In the ED, vital signs were stable.  No concerning lab abnormalities.  Patient placed in observation for paracentesis with IR tomorrow.  Received morphine in the ED with improvement in pain.      Observation Course:    In the EDOU, patient had IR paracentesis. 5L of ascites removed, and patient received 150ml of albumin replacement. She reports feeling improved.  Low suspicion for SBP causing pain so patient discharged prior to all analysis results (UPDATE: gram stain with rare wbc and no organisms and WBC at 1551.  Follow up with IR procedure for abd mass biopsy.     Consultants:    IR    Final Diagnosis:  Final diagnoses:  [R18.0] Malignant ascites (Primary)      Discharge Condition: Good    Disposition: Home or Self Care     Time spent on the discharge of the patient including review of hospital course with the patient. reviewing discharge medications and arranging follow-up care 35 minutes.  Patient  was seen and examined on the date of discharge and determined to be suitable for discharge.    Follow Up:  Future Appointments   Date Time Provider Department Center   3/4/2024  8:00 AM [facility] IR5 CT-173 LIMIT 500 LBS [facility] RAD IR [facility]      Follow-up Information       Schedule an appointment as soon as possible for a visit  with Keith Valladares MD.    Specialties: Hematology and Oncology, Hematology  Contact information:  [address]  [facility] [address]  [phone]               [facility] - Emergency Dept.    Specialty: Emergency Medicine  Why: If symptoms worsen  Contact information:  [address]  [facility] [id number] [phone]

## 2024-02-29 NOTE — DISCHARGE INSTRUCTIONS
You had 5 liters drained today.   If you notice you abdominal edema coming back, you can call your Oncologist to see if they can set up outpatient paracentesis.   Follow up with Oncology and IR procedure.  Return to the ER for new or worsening symptoms.    Future Appointments   Date Time Provider Department Center   3/4/2024  8:00 AM [facility] IR5 CT-173 LIMIT 500 LBS [facility] RAD IR [facility]

## 2024-02-29 NOTE — NURSING
Pre-procedure call complete.  Pt instructed not to eat or drink anything after midnight the night before procedure.  Pt aware will need someone to provide transport home and monitor pt 8 hours post procedure.  No driving for at least 24 hours after procedure.   Patient advised to take blood pressure and seizure medications with a sip of water morning of procedure.  Patient verbalized aware of which medications to take.  Do not take oral diabetic medication, sleep medication (including OTC) and anxiety medication the night before procedure.  Arrival time and location given.  Expected length of stay reviewed.  Covid screening completed.  Pt verbalized understanding of all pre-procedure instructions.  Written instructions and directions sent to patient in Mychart/portal.

## 2024-03-01 ENCOUNTER — PATIENT MESSAGE (OUTPATIENT)
Dept: HEMATOLOGY/ONCOLOGY | Facility: CLINIC | Age: 59
End: 2024-03-01
Payer: MEDICARE

## 2024-03-01 NOTE — TELEPHONE ENCOUNTER
Left voicemail for patient, letting her know about post post hospitalization f/u next week, my [facility] message sent as well.

## 2024-03-02 LAB — BACTERIA SPEC AEROBE CULT: NO GROWTH

## 2024-03-04 ENCOUNTER — HOSPITAL ENCOUNTER (OUTPATIENT)
Dept: INTERVENTIONAL RADIOLOGY/VASCULAR | Facility: HOSPITAL | Age: 59
Discharge: HOME | End: 2024-03-04
Attending: FAMILY MEDICINE
Payer: MEDICARE

## 2024-03-04 VITALS
BODY MASS INDEX: 27.47 KG/M2 | RESPIRATION RATE: 18 BRPM | HEART RATE: 92 BPM | SYSTOLIC BLOOD PRESSURE: 121 MMHG | HEIGHT: 67 IN | DIASTOLIC BLOOD PRESSURE: 61 MMHG | OXYGEN SATURATION: 98 % | TEMPERATURE: 97 F | WEIGHT: 175 LBS

## 2024-03-04 DIAGNOSIS — R19.00 ABDOMINAL MASS, UNSPECIFIED ABDOMINAL LOCATION: ICD-10-CM

## 2024-03-04 PROCEDURE — 63600175 PHARM REV CODE 636 W HCPCS: Performed by: STUDENT IN AN ORGANIZED HEALTH CARE EDUCATION/TRAINING PROGRAM

## 2024-03-04 PROCEDURE — 25000003 PHARM REV CODE 250: Performed by: STUDENT IN AN ORGANIZED HEALTH CARE EDUCATION/TRAINING PROGRAM

## 2024-03-04 PROCEDURE — 76942 ECHO GUIDE FOR BIOPSY: CPT | Mod: 59,TC | Performed by: STUDENT IN AN ORGANIZED HEALTH CARE EDUCATION/TRAINING PROGRAM

## 2024-03-04 PROCEDURE — 99152 MOD SED SAME PHYS/QHP 5/>YRS: CPT | Performed by: STUDENT IN AN ORGANIZED HEALTH CARE EDUCATION/TRAINING PROGRAM

## 2024-03-04 PROCEDURE — 49180 BIOPSY ABDOMINAL MASS: CPT | Performed by: STUDENT IN AN ORGANIZED HEALTH CARE EDUCATION/TRAINING PROGRAM

## 2024-03-04 PROCEDURE — 99152 MOD SED SAME PHYS/QHP 5/>YRS: CPT | Mod: ,,, | Performed by: STUDENT IN AN ORGANIZED HEALTH CARE EDUCATION/TRAINING PROGRAM

## 2024-03-04 PROCEDURE — 49083 ABD PARACENTESIS W/IMAGING: CPT | Mod: ,,, | Performed by: STUDENT IN AN ORGANIZED HEALTH CARE EDUCATION/TRAINING PROGRAM

## 2024-03-04 PROCEDURE — 27201068

## 2024-03-04 PROCEDURE — 76942 ECHO GUIDE FOR BIOPSY: CPT | Mod: 26,59,, | Performed by: STUDENT IN AN ORGANIZED HEALTH CARE EDUCATION/TRAINING PROGRAM

## 2024-03-04 PROCEDURE — 49083 ABD PARACENTESIS W/IMAGING: CPT | Performed by: STUDENT IN AN ORGANIZED HEALTH CARE EDUCATION/TRAINING PROGRAM

## 2024-03-04 RX ORDER — SODIUM CHLORIDE 9 MG/ML
INJECTION, SOLUTION INTRAVENOUS CONTINUOUS
Status: DISCONTINUED | OUTPATIENT
Start: 2024-03-04 | End: 2024-03-05 | Stop reason: HOSPADM

## 2024-03-04 RX ORDER — MIDAZOLAM HYDROCHLORIDE 1 MG/ML
INJECTION INTRAMUSCULAR; INTRAVENOUS
Status: COMPLETED | OUTPATIENT
Start: 2024-03-04 | End: 2024-03-04

## 2024-03-04 RX ORDER — LIDOCAINE HYDROCHLORIDE 10 MG/ML
1 INJECTION, SOLUTION EPIDURAL; INFILTRATION; INTRACAUDAL; PERINEURAL ONCE
Status: DISCONTINUED | OUTPATIENT
Start: 2024-03-04 | End: 2024-03-05 | Stop reason: HOSPADM

## 2024-03-04 RX ORDER — LIDOCAINE HYDROCHLORIDE 10 MG/ML
INJECTION INFILTRATION; PERINEURAL
Status: COMPLETED | OUTPATIENT
Start: 2024-03-04 | End: 2024-03-04

## 2024-03-04 RX ORDER — FENTANYL CITRATE 50 UG/ML
INJECTION, SOLUTION INTRAMUSCULAR; INTRAVENOUS
Status: COMPLETED | OUTPATIENT
Start: 2024-03-04 | End: 2024-03-04

## 2024-03-04 RX ADMIN — LIDOCAINE HYDROCHLORIDE 5 ML: 10 INJECTION, SOLUTION INFILTRATION; PERINEURAL at 08:03

## 2024-03-04 RX ADMIN — MIDAZOLAM HYDROCHLORIDE 1 MG: 2 INJECTION, SOLUTION INTRAMUSCULAR; INTRAVENOUS at 08:03

## 2024-03-04 RX ADMIN — FENTANYL CITRATE 50 MCG: 50 INJECTION, SOLUTION INTRAMUSCULAR; INTRAVENOUS at 08:03

## 2024-03-04 NOTE — DISCHARGE SUMMARY
Radiology Discharge Summary      Hospital Course: No complications    Admit Date: 3/4/2024  Discharge Date: 03/04/2024     Instructions Given to Patient: Yes  Diet: Resume prior diet  Activity: activity as tolerated and no driving for today    Description of Condition on Discharge: Stable  Vital Signs (Most Recent): Temp: 96.9 °F (36.1 °C) (03/04/24 0644)  Pulse: 95 (03/04/24 0820)  Resp: 13 (03/04/24 0820)  BP: 136/65 (03/04/24 0820)  SpO2: 100 % (03/04/24 0820)    Discharge Disposition: Home    Discharge Diagnosis: peritoneal mass     Follow-up: Pathology results, repeat paracentesis as needed for patient comfort    Flavia Moore MD

## 2024-03-04 NOTE — H&P
Radiology History & Physical      SUBJECTIVE:     Chief Complaint: peritoneal mass    History of Present Illness:  Anette Ricci is a 58 y.o. female history of breast cancer with peritoneal mass presenting for CT-guided biopsy. Patient with increasing abdominal distension and requests paracentesis.    Past Medical History:   Diagnosis Date    Breast cancer 2013    Diabetes mellitus     Genetic testing 05/29/2013    VUS    Hyperlipidemia     Hypertension     Malignant neoplasm of upper-outer quadrant of right breast in female, estrogen receptor positive 12/02/2015    Seizure disorder      Past Surgical History:   Procedure Laterality Date    BREAST BIOPSY      BREAST LUMPECTOMY Right 2013    EYE SURGERY      TOTAL REDUCTION MAMMOPLASTY Bilateral 2016       Home Meds:   Prior to Admission medications    Medication Sig Start Date End Date Taking? Authorizing Provider   amlodipine (NORVASC) 10 MG tablet Take 10 mg by mouth once daily.  12/4/15  Yes Provider, Historical   atorvastatin (LIPITOR) 40 MG tablet Take 80 mg by mouth once daily.   Yes Provider, Historical   brimonidine-timoloL (COMBIGAN) 0.2-0.5 % Drop INSTILL 1 DROP INTO EACH EYE TWICE DAILY AS DIRECTED Ophthalmic for 40 Days   Yes Provider, Historical   enalapril (VASOTEC) 20 MG tablet Take 20 mg by mouth once daily.   Yes Provider, Historical   hydrochlorothiazide (HYDRODIURIL) 25 MG tablet Take 25 mg by mouth once daily.  5/1/15  Yes Provider, Historical   HYDROcodone-acetaminophen (NORCO) 5-325 mg per tablet Take 1 tablet by mouth every 6 (six) hours as needed for Pain. 2/7/24  Yes Keith Valladares MD   insulin glargine, TOUJEO, (TOUJEO SOLOSTAR U-300 INSULIN) 300 unit/mL (1.5 mL) InPn pen Inject 33 Units into the skin every evening.   Yes Provider, Historical   lamoTRIgine (LAMICTAL) 25 MG tablet Take 25 mg by mouth 2 (two) times daily.   Yes Provider, Historical   lorazepam (ATIVAN) 0.5 MG tablet Take 0.5 mg by mouth every 12 (twelve) hours as  needed.  5/31/14  Yes Provider, Historical   metformin (GLUCOPHAGE) 1000 MG tablet Take 1,000 mg by mouth daily with breakfast.    Yes Provider, Historical   NOVOLOG FLEXPEN 100 unit/mL InPn pen Inject 10 Units into the skin 2 (two) times daily with meals. With biggest meal 5/13/15   Provider, Historical   quetiapine (SEROQUEL) 25 MG Tab Take 25 mg by mouth once daily.  2/4/16   Provider, Historical   tirzepatide (MOUNJARO) 5 mg/0.5 mL PnIj Inject 5 mg into the skin every 7 days.    Provider, Historical     Anticoagulants/Antiplatelets:  reviewed    Allergies:   Review of patient's allergies indicates:   Allergen Reactions    Codeine      Sedation History:  no adverse reactions    Review of Systems:   Hematological: no known coagulopathies  Respiratory: no shortness of breath  Cardiovascular: no chest pain  Gastrointestinal: positive for - mild abdominal pain, distension  Genito-Urinary: no dysuria, trouble voiding, or hematuria  no dysuria  Musculoskeletal: negative  Neurological: no TIA or stroke symptoms         OBJECTIVE:     Vital Signs (Most Recent)  Temp: 96.9 °F (36.1 °C) (03/04/24 0644)  Pulse: 96 (03/04/24 0644)  Resp: 18 (03/04/24 0644)  BP: 133/66 (03/04/24 0657)  SpO2: 100 % (03/04/24 0644)    Physical Exam:  ASA: II  Mallampati: II    General: no acute distress  Mental Status: alert and oriented to person, place and time  HEENT: normocephalic, atraumatic  Chest: unlabored breathing  Heart: regular heart rate  Abdomen: distended, mild TTP  Extremity: moves all extremities    Laboratory  Lab Results   Component Value Date    INR 1.1 02/22/2024       Lab Results   Component Value Date    WBC 12.38 02/27/2024    HGB 9.8 (L) 02/27/2024    HCT 31 (L) 02/27/2024    MCV 84 02/27/2024     (H) 02/27/2024      Lab Results   Component Value Date     (H) 02/27/2024     02/27/2024    K 4.5 02/27/2024    CL 97 02/27/2024    CO2 27 02/27/2024    BUN 7 02/27/2024    CREATININE 0.8 02/27/2024     CALCIUM 9.0 02/27/2024    ALT 9 (L) 02/27/2024    AST 26 02/27/2024    ALBUMIN 2.4 (L) 02/27/2024    BILITOT 0.6 02/27/2024       ASSESSMENT/PLAN:     Sedation Plan: up to moderate.  Patient will undergo CT-guided biopsy of peritoneal mass and possible paracentesis.    Claude Ortiz MD PGY-3  Department of Radiology  [facility]

## 2024-03-04 NOTE — PROCEDURES
Interventional Radiology Post-Procedure Note     Pre Op Diagnosis:  peritoneal masses, ascites     Post Op Diagnosis: same     Procedure:  Imaged guided biopsy of right upper quadrant peritoneal mass, paracentesis     Procedure performed by: Flavia Moore MD     Written Informed Consent Obtained: Yes     Specimen Removed: Yes.  18ga core samples x10; 2.4L bloody ascitic fluid     Estimated Blood Loss: Minimal     Findings:   Moderate sedation was administered.     Successful image-guided coaxial core biopsy of RUQ peritoneal mass.  Pathology confirmed sample adequacy.    Ultrasound-guided paracentesis within the RLQ.      Flavia Moore MD

## 2024-03-04 NOTE — CARE UPDATE
Pt arrived to MPU 4 for recovery of a ABD biopsy and a paracentesis. Both sites CDI and pt to recover for 1hr then d/c home.

## 2024-03-04 NOTE — DISCHARGE INSTRUCTIONS
Please call with any questions or concerns.      Monday thru Friday 8:00 am - 4:30 pm    Interventional Radiology   [phone]    After Hours    Ask for the Radiology Intern on call  [phone]

## 2024-03-04 NOTE — SEDATION DOCUMENTATION
Procedure completed. Patient tolerated well; VSS. Site CDI. Patient to be transported to MPU for hour recovery; report to be given at the bedside.

## 2024-03-04 NOTE — SEDATION DOCUMENTATION
Pt arrived to IR CT for peritoneal mass bx and potential paracentesis. Pt oriented to unit and staff, Pt safely transferred from stretcher to procedural table. Fall risk reviewed and comfort measures utilized with interventions. Safety strap applied, position pillows to minimize pressure points. Blankets applied. Pt prepped and draped utilizing standard sterile technique. Patient placed on continuous monitoring, as required by sedation policy. Timeouts implemented utilizing standard universal time-out per department and facility policy. Pt resting comfortably. Denies pain/discomfort. Will continue to monitor. See flow sheets for monitoring, medication administration, and updates. patient verbalizes understanding.

## 2024-03-04 NOTE — CARE UPDATE
Pt fully recovered from procedure and states full understanding of discharge. Pt left with transport and  for garage.

## 2024-03-05 PROBLEM — C78.6 METASTASIS TO PERITONEAL CAVITY: Status: ACTIVE | Noted: 2024-03-05

## 2024-03-05 NOTE — PROGRESS NOTES
Subjective:       Patient ID: Anette Ricci is a 58 y.o. female.    Chief Complaint: No chief complaint on file.      HPI  Ms. Ricci 58-year-old female seen for abnormal abdominal scan done at [facility]. She has a remote history of ER+ carcinoma of the right breast.      She presented to the ED on 2/27/24 with abdominal distention. She had IR paracentesis on 2/28/24.    On 3/4/24 she had IR biopsy of her peritoneal disease. That pathology is pending.    Her biggest issue has been ongoing abdominal pain and distention.  She does get some improvement when she takes hydrocodone however that makes her dizzy and lightheaded and often since her to bed.  With pain medication her pain is about 7/10 in intensity.  Her appetite has been decreased.  She does have some constipation for which she was taking Senokot; however, she has run out.          Current history:  February 1st she developed some symptoms of abdominal bloating.  She tried a laxative without any benefit.  Because of persistence of symptoms she went to the emergency room at [facility].  She had a scan  at [facility] on 2/6/24 which apparently showed multiple peritoneal masses, largest an 11 cm mass in the left upper quadrant.  There was also some ascites..     PET 2/21/24 - Multifocal hypermetabolic soft tissue masses throughout the peritoneum including lesion along posterior aspect of right hepatic lobe. Moderate to large volume ascites. Overall concerning for malignant process/peritoneal carcinomatosis.     Labs:CA 27.29 - 176, CA 19-9  - 164, CEA < 1.7          History:   She noted an abnormality in  the right in her right breast in 2013 and had some additional imaging including MRI.  She had a MRI guided core needle biopsy on April 5, 2013 and then underwent lumpectomy and sentinel lymph node biopsy by Dr. Keith Chauhan on April 25, 2013.  This showed a 2.4 cm grade 1 well differentiated invasive ductal carcinoma with some  associated ductal carcinoma in situ.  4 sentinel lymph nodes were removed and all were negative for malignancy.  Margins were negative.  The tumor was ER +90% MT +90% and HER-2 negative by FISH.  Pathological stage TII N0 stage IIA   She underwent Oncotype testing which showed a low Oncotype score of 10.    She began tamoxifen therapy in May of 2013.    She was changed to letrozole in July 2019.    Breast cancer index results showed 2.5% risk of late recurrence and no benefit from extended endocrine therapy.  Letrozole was stopped in June 2021.    Mammogram 11/10/23 -negative    Review of Systems   Constitutional:  Negative for activity change, appetite change, fatigue, fever and unexpected weight change.   Respiratory:  Negative for cough and shortness of breath.    Cardiovascular:  Negative for chest pain.   Gastrointestinal:  Positive for abdominal distention and abdominal pain. Negative for diarrhea. Constipation: bowels irregular.  Musculoskeletal:  Negative for back pain and neck pain.   Psychiatric/Behavioral:  Negative for dysphoric mood. The patient is not nervous/anxious.        Objective:      Physical Exam  Constitutional:       General: She is not in acute distress.     Appearance: She is well-developed.   HENT:      Mouth/Throat:      Pharynx: Oropharynx is clear. No oropharyngeal exudate or posterior oropharyngeal erythema.   Cardiovascular:      Rate and Rhythm: Normal rate and regular rhythm.   Pulmonary:      Effort: Pulmonary effort is normal.      Breath sounds: Normal breath sounds. No wheezing or rales.   Abdominal:      General: There is distension (mild).      Palpations: Abdomen is soft. There is mass (Right upper quadrant firmness). There is no hepatomegaly.      Tenderness: There is no guarding.   Lymphadenopathy:      Cervical: No cervical adenopathy.      Right cervical: No superficial or deep cervical adenopathy.     Left cervical: No superficial or deep cervical adenopathy.      Upper  Body:      Right upper body: No supraclavicular or axillary adenopathy.      Left upper body: No supraclavicular or axillary adenopathy.   Psychiatric:         Behavior: Behavior normal.         Thought Content: Thought content normal.         Assessment:    Labs:  -416, CBC [place] 34[address], hemoglobin 9.4 platelet count [id number], metabolic profile shows creatinine 0.7, hepatic functions normal, albumin 2.1, glucose 182.   1. 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  Most likely possibilities in this setting would be either recurrent breast cancer or ovarian/peritoneal cancer.  I discussed starting chemotherapy with Taxol plus carboplatin to cover both possibilities; however, biopsy will hopefully help us determine the etiology of her CT findings and allow for a specific plan therapy.  If this appears to be a gynecological malignancy then we will refer her to gyn Oncology.      I was recommended that we if she palliative care to help her manage her symptoms.    I did discuss the incurable nature of her illness and the paliative nature of any treatment.  She may need palliative paracentesis on an ongoing basis.    I will plan to call her with her biopsy report and then to arrange definitive treatment plans.              Route Chart for Scheduling    Med Onc Chart Routing      Follow up with physician 1 week.   Follow up with SELMA    Infusion scheduling note    Injection scheduling note    Labs None   Scheduling:  Preferred lab:  Lab interval:     Imaging None      Pharmacy appointment No pharmacy appointment needed      Other referrals no referral to Oncology Primary Care needed -  no Massage appointment needed    Additional referrals needed  palliative med

## 2024-03-06 ENCOUNTER — TELEPHONE (OUTPATIENT)
Dept: INTERVENTIONAL RADIOLOGY/VASCULAR | Facility: HOSPITAL | Age: 59
End: 2024-03-06
Payer: MEDICARE

## 2024-03-06 ENCOUNTER — LAB VISIT (OUTPATIENT)
Dept: LAB | Facility: HOSPITAL | Age: 59
End: 2024-03-06
Attending: INTERNAL MEDICINE
Payer: MEDICARE

## 2024-03-06 ENCOUNTER — OFFICE VISIT (OUTPATIENT)
Dept: HEMATOLOGY/ONCOLOGY | Facility: CLINIC | Age: 59
End: 2024-03-06
Payer: MEDICARE

## 2024-03-06 ENCOUNTER — TELEPHONE (OUTPATIENT)
Dept: INTERVENTIONAL RADIOLOGY/VASCULAR | Facility: CLINIC | Age: 59
End: 2024-03-06
Payer: MEDICARE

## 2024-03-06 VITALS
SYSTOLIC BLOOD PRESSURE: 138 MMHG | HEIGHT: 67 IN | BODY MASS INDEX: 28.95 KG/M2 | RESPIRATION RATE: 17 BRPM | HEART RATE: 94 BPM | OXYGEN SATURATION: 100 % | DIASTOLIC BLOOD PRESSURE: 68 MMHG | WEIGHT: 184.44 LBS

## 2024-03-06 DIAGNOSIS — C78.6 METASTASIS TO PERITONEAL CAVITY: ICD-10-CM

## 2024-03-06 DIAGNOSIS — C78.6 METASTASIS TO PERITONEAL CAVITY: Primary | ICD-10-CM

## 2024-03-06 DIAGNOSIS — C48.8 MALIGNANT NEOPLASM OF OVERLAPPING SITES OF RETROPERITONEUM AND PERITONEUM: ICD-10-CM

## 2024-03-06 DIAGNOSIS — Z85.3 HISTORY OF BREAST CANCER IN FEMALE: Primary | ICD-10-CM

## 2024-03-06 LAB
ALBUMIN SERPL BCP-MCNC: 2.1 G/DL (ref 3.5–5.2)
ALP SERPL-CCNC: 77 U/L (ref 55–135)
ALT SERPL W/O P-5'-P-CCNC: 7 U/L (ref 10–44)
ANION GAP SERPL CALC-SCNC: 10 MMOL/L (ref 8–16)
AST SERPL-CCNC: 19 U/L (ref 10–40)
BACTERIA SPEC ANAEROBE CULT: NORMAL
BILIRUB SERPL-MCNC: 0.4 MG/DL (ref 0.1–1)
BUN SERPL-MCNC: 7 MG/DL (ref 6–20)
CALCIUM SERPL-MCNC: 8.7 MG/DL (ref 8.7–10.5)
CANCER AG125 SERPL-ACNC: 418 U/ML (ref 0–30)
CHLORIDE SERPL-SCNC: 99 MMOL/L (ref 95–110)
CO2 SERPL-SCNC: 27 MMOL/L (ref 23–29)
CREAT SERPL-MCNC: 0.7 MG/DL (ref 0.5–1.4)
ERYTHROCYTE [DISTWIDTH] IN BLOOD BY AUTOMATED COUNT: 14.4 % (ref 11.5–14.5)
EST. GFR  (NO RACE VARIABLE): >60 ML/MIN/1.73 M^2
GLUCOSE SERPL-MCNC: 182 MG/DL (ref 70–110)
HCT VFR BLD AUTO: 30.5 % (ref 37–48.5)
HGB BLD-MCNC: 9.4 G/DL (ref 12–16)
IMM GRANULOCYTES # BLD AUTO: 0.06 K/UL (ref 0–0.04)
MCH RBC QN AUTO: 26.3 PG (ref 27–31)
MCHC RBC AUTO-ENTMCNC: 30.8 G/DL (ref 32–36)
MCV RBC AUTO: 85 FL (ref 82–98)
NEUTROPHILS # BLD AUTO: 8.1 K/UL (ref 1.8–7.7)
PLATELET # BLD AUTO: 591 K/UL (ref 150–450)
PMV BLD AUTO: 9.7 FL (ref 9.2–12.9)
POTASSIUM SERPL-SCNC: 3.5 MMOL/L (ref 3.5–5.1)
PROT SERPL-MCNC: 6.1 G/DL (ref 6–8.4)
RBC # BLD AUTO: 3.58 M/UL (ref 4–5.4)
SODIUM SERPL-SCNC: 136 MMOL/L (ref 136–145)
WBC # BLD AUTO: 10.14 K/UL (ref 3.9–12.7)

## 2024-03-06 PROCEDURE — 99999 PR PBB SHADOW E&M-EST. PATIENT-LVL V: CPT | Mod: PBBFAC,,, | Performed by: INTERNAL MEDICINE

## 2024-03-06 PROCEDURE — 3008F BODY MASS INDEX DOCD: CPT | Mod: CPTII,S$GLB,, | Performed by: INTERNAL MEDICINE

## 2024-03-06 PROCEDURE — 1159F MED LIST DOCD IN RCRD: CPT | Mod: CPTII,S$GLB,, | Performed by: INTERNAL MEDICINE

## 2024-03-06 PROCEDURE — 3078F DIAST BP <80 MM HG: CPT | Mod: CPTII,S$GLB,, | Performed by: INTERNAL MEDICINE

## 2024-03-06 PROCEDURE — 36415 COLL VENOUS BLD VENIPUNCTURE: CPT | Performed by: INTERNAL MEDICINE

## 2024-03-06 PROCEDURE — 80053 COMPREHEN METABOLIC PANEL: CPT | Performed by: INTERNAL MEDICINE

## 2024-03-06 PROCEDURE — 99214 OFFICE O/P EST MOD 30 MIN: CPT | Mod: S$GLB,,, | Performed by: INTERNAL MEDICINE

## 2024-03-06 PROCEDURE — 86304 IMMUNOASSAY TUMOR CA 125: CPT | Performed by: INTERNAL MEDICINE

## 2024-03-06 PROCEDURE — 3075F SYST BP GE 130 - 139MM HG: CPT | Mod: CPTII,S$GLB,, | Performed by: INTERNAL MEDICINE

## 2024-03-06 PROCEDURE — 85027 COMPLETE CBC AUTOMATED: CPT | Performed by: INTERNAL MEDICINE

## 2024-03-06 NOTE — TELEPHONE ENCOUNTER
Left message for pt to return my call. Need to schedule IR procedure.  Please forward call to [phone]. Thanks

## 2024-03-06 NOTE — NURSING
Pre-procedure call complete.  Arrival time 7am.  Patient instructed not to eat or drink anything after midnight the night before procedure.  Patient aware will need someone to provide transport home and monitor pt 8 hours post procedure.  No driving for at least 24 hours after procedure.   Patient advised to take blood pressure medications with a sip of water morning of procedure.  Patient verbalized aware of which medications to take.  Do not take oral diabetic medication, sleep medication (including OTC) and anxiety medication the night before procedure.  Arrival time and location given.  Expected length of stay reviewed.  Covid screening completed.  Patient verbalized understanding of all pre-procedure instructions.  Written instructions and directions sent to patient in [email or web address].

## 2024-03-07 ENCOUNTER — HOSPITAL ENCOUNTER (OUTPATIENT)
Dept: INTERVENTIONAL RADIOLOGY/VASCULAR | Facility: HOSPITAL | Age: 59
Discharge: HOME | End: 2024-03-07
Attending: PHYSICIAN ASSISTANT
Payer: MEDICARE

## 2024-03-07 VITALS
BODY MASS INDEX: 28.25 KG/M2 | SYSTOLIC BLOOD PRESSURE: 130 MMHG | WEIGHT: 180 LBS | RESPIRATION RATE: 17 BRPM | TEMPERATURE: 98 F | HEIGHT: 67 IN | HEART RATE: 89 BPM | DIASTOLIC BLOOD PRESSURE: 76 MMHG | OXYGEN SATURATION: 97 %

## 2024-03-07 DIAGNOSIS — Z85.3 HISTORY OF BREAST CANCER IN FEMALE: ICD-10-CM

## 2024-03-07 LAB
POCT GLUCOSE: 170 MG/DL (ref 70–110)
POCT GLUCOSE: 198 MG/DL (ref 70–110)

## 2024-03-07 PROCEDURE — 25000003 PHARM REV CODE 250: Performed by: PHYSICIAN ASSISTANT

## 2024-03-07 PROCEDURE — 63600175 PHARM REV CODE 636 W HCPCS: Performed by: RADIOLOGY

## 2024-03-07 PROCEDURE — 99152 MOD SED SAME PHYS/QHP 5/>YRS: CPT | Performed by: RADIOLOGY

## 2024-03-07 PROCEDURE — 77001 FLUOROGUIDE FOR VEIN DEVICE: CPT | Mod: 26,,, | Performed by: RADIOLOGY

## 2024-03-07 PROCEDURE — 77001 FLUOROGUIDE FOR VEIN DEVICE: CPT | Mod: TC | Performed by: RADIOLOGY

## 2024-03-07 PROCEDURE — 94760 N-INVAS EAR/PLS OXIMETRY 1: CPT

## 2024-03-07 PROCEDURE — 99900035 HC TECH TIME PER 15 MIN (STAT)

## 2024-03-07 PROCEDURE — 25000003 PHARM REV CODE 250: Performed by: RADIOLOGY

## 2024-03-07 PROCEDURE — C1894 INTRO/SHEATH, NON-LASER: HCPCS

## 2024-03-07 PROCEDURE — 76937 US GUIDE VASCULAR ACCESS: CPT | Mod: TC | Performed by: RADIOLOGY

## 2024-03-07 PROCEDURE — 36561 INSERT TUNNELED CV CATH: CPT | Mod: LT | Performed by: RADIOLOGY

## 2024-03-07 PROCEDURE — 99152 MOD SED SAME PHYS/QHP 5/>YRS: CPT | Mod: ,,, | Performed by: RADIOLOGY

## 2024-03-07 PROCEDURE — 82962 GLUCOSE BLOOD TEST: CPT

## 2024-03-07 PROCEDURE — C1788 PORT, INDWELLING, IMP: HCPCS

## 2024-03-07 PROCEDURE — 76937 US GUIDE VASCULAR ACCESS: CPT | Mod: 26,,, | Performed by: RADIOLOGY

## 2024-03-07 PROCEDURE — 99153 MOD SED SAME PHYS/QHP EA: CPT | Performed by: RADIOLOGY

## 2024-03-07 RX ORDER — FENTANYL CITRATE 50 UG/ML
INJECTION, SOLUTION INTRAMUSCULAR; INTRAVENOUS
Status: COMPLETED | OUTPATIENT
Start: 2024-03-07 | End: 2024-03-07

## 2024-03-07 RX ORDER — LIDOCAINE HYDROCHLORIDE 20 MG/ML
INJECTION, SOLUTION INFILTRATION; PERINEURAL
Status: COMPLETED | OUTPATIENT
Start: 2024-03-07 | End: 2024-03-07

## 2024-03-07 RX ORDER — SODIUM CHLORIDE 9 MG/ML
INJECTION, SOLUTION INTRAVENOUS CONTINUOUS
Status: DISCONTINUED | OUTPATIENT
Start: 2024-03-07 | End: 2024-03-08 | Stop reason: HOSPADM

## 2024-03-07 RX ORDER — LIDOCAINE HYDROCHLORIDE 10 MG/ML
1 INJECTION, SOLUTION EPIDURAL; INFILTRATION; INTRACAUDAL; PERINEURAL ONCE AS NEEDED
Status: DISCONTINUED | OUTPATIENT
Start: 2024-03-07 | End: 2024-03-08 | Stop reason: HOSPADM

## 2024-03-07 RX ORDER — HEPARIN SODIUM 1000 [USP'U]/ML
INJECTION, SOLUTION INTRAVENOUS; SUBCUTANEOUS
Status: COMPLETED | OUTPATIENT
Start: 2024-03-07 | End: 2024-03-07

## 2024-03-07 RX ORDER — MIDAZOLAM HYDROCHLORIDE 1 MG/ML
INJECTION INTRAMUSCULAR; INTRAVENOUS
Status: COMPLETED | OUTPATIENT
Start: 2024-03-07 | End: 2024-03-07

## 2024-03-07 RX ORDER — ONDANSETRON HYDROCHLORIDE 2 MG/ML
4 INJECTION, SOLUTION INTRAVENOUS EVERY 6 HOURS PRN
Status: DISCONTINUED | OUTPATIENT
Start: 2024-03-07 | End: 2024-03-08 | Stop reason: HOSPADM

## 2024-03-07 RX ADMIN — FENTANYL CITRATE 50 MCG: 50 INJECTION, SOLUTION INTRAMUSCULAR; INTRAVENOUS at 08:03

## 2024-03-07 RX ADMIN — LIDOCAINE HYDROCHLORIDE 20 ML: 20 INJECTION, SOLUTION INFILTRATION; PERINEURAL at 08:03

## 2024-03-07 RX ADMIN — MIDAZOLAM HYDROCHLORIDE 1 MG: 1 INJECTION INTRAMUSCULAR; INTRAVENOUS at 08:03

## 2024-03-07 RX ADMIN — HEPARIN SODIUM 500 UNITS: 1000 INJECTION, SOLUTION INTRAVENOUS; SUBCUTANEOUS at 08:03

## 2024-03-07 RX ADMIN — SODIUM CHLORIDE: 9 INJECTION, SOLUTION INTRAVENOUS at 07:03

## 2024-03-07 NOTE — DISCHARGE SUMMARY
Radiology Discharge Summary      Hospital Course: No complications    Admit Date: 3/7/2024  Discharge Date: 03/07/2024     Instructions Given to Patient: Yes  Diet: Resume prior diet  Activity: activity as tolerated and no driving for today    Description of Condition on Discharge: Stable  Vital Signs (Most Recent): Temp: 97.5 °F (36.4 °C) (03/07/24 0700)  Pulse: 93 (03/07/24 0903)  Resp: 14 (03/07/24 0903)  BP: 129/72 (03/07/24 0903)  SpO2: 99 % (03/07/24 0903)    Discharge Disposition: Home    Discharge Diagnosis: Breast cancer s/p left chest port placement.    Follow up: As scheduled    Toi Frazier M.D.  Interventional Radiology  Department of Radiology

## 2024-03-07 NOTE — H&P
Radiology History & Physical      SUBJECTIVE:     Chief Complaint: Breast cancer    History of Present Illness:  Anette Ricci is a 58 y.o. female who presents for chest port placement.  Past Medical History:   Diagnosis Date    Breast cancer 2013    Diabetes mellitus     Genetic testing 05/29/2013    VUS    Hyperlipidemia     Hypertension     Malignant neoplasm of upper-outer quadrant of right breast in female, estrogen receptor positive 12/02/2015    Seizure disorder      Past Surgical History:   Procedure Laterality Date    BREAST BIOPSY      BREAST LUMPECTOMY Right 2013    EYE SURGERY      TOTAL REDUCTION MAMMOPLASTY Bilateral 2016       Home Meds:   Prior to Admission medications    Medication Sig Start Date End Date Taking? Authorizing Provider   amlodipine (NORVASC) 10 MG tablet Take 10 mg by mouth once daily.  12/4/15  Yes Provider, Historical   atorvastatin (LIPITOR) 40 MG tablet Take 80 mg by mouth once daily.   Yes Provider, Historical   enalapril (VASOTEC) 20 MG tablet Take 20 mg by mouth once daily.   Yes Provider, Historical   hydrochlorothiazide (HYDRODIURIL) 25 MG tablet Take 25 mg by mouth once daily.  5/1/15  Yes Provider, Historical   HYDROcodone-acetaminophen (NORCO) 5-325 mg per tablet Take 1 tablet by mouth every 6 (six) hours as needed for Pain. 2/7/24  Yes Keith Valladares MD   insulin glargine, TOUJEO, (TOUJEO SOLOSTAR U-300 INSULIN) 300 unit/mL (1.5 mL) InPn pen Inject 33 Units into the skin every evening.   Yes Provider, Historical   lamoTRIgine (LAMICTAL) 25 MG tablet Take 25 mg by mouth 2 (two) times daily.   Yes Provider, Historical   lorazepam (ATIVAN) 0.5 MG tablet Take 0.5 mg by mouth every 12 (twelve) hours as needed.  5/31/14  Yes Provider, Historical   metformin (GLUCOPHAGE) 1000 MG tablet Take 1,000 mg by mouth daily with breakfast.    Yes Provider, Historical   NOVOLOG FLEXPEN 100 unit/mL InPn pen Inject 10 Units into the skin 2 (two) times daily with meals. With biggest  meal 5/13/15  Yes Provider, Historical   brimonidine-timoloL (COMBIGAN) 0.2-0.5 % Drop INSTILL 1 DROP INTO EACH EYE TWICE DAILY AS DIRECTED Ophthalmic for 40 Days    Provider, Historical   quetiapine (SEROQUEL) 25 MG Tab Take 25 mg by mouth once daily.  2/4/16   Provider, Historical   tirzepatide (MOUNJARO) 5 mg/0.5 mL PnIj Inject 5 mg into the skin every 7 days.    Provider, Historical     Anticoagulants/Antiplatelets: no anticoagulation    Allergies:   Review of patient's allergies indicates:   Allergen Reactions    Codeine      Sedation History:  no adverse reactions    Review of Systems:   Hematological: no known coagulopathies  Respiratory: no shortness of breath  Cardiovascular: no chest pain  Gastrointestinal: no abdominal pain  Genito-Urinary: no dysuria  Musculoskeletal: negative  Neurological: no TIA or stroke symptoms         OBJECTIVE:     Vital Signs (Most Recent)  Temp: 97.5 °F (36.4 °C) (03/07/24 0700)  Pulse: 93 (03/07/24 0733)  Resp: 16 (03/07/24 0733)  BP: (!) 144/65 (03/07/24 0700)  SpO2: 98 % (03/07/24 0733)    Physical Exam:  ASA: 3  Mallampati: 2    General: no acute distress  Mental Status: alert and oriented to person, place and time  HEENT: normocephalic, atraumatic  Chest: unlabored breathing  Heart: regular heart rate  Abdomen: nondistended  Extremity: moves all extremities    Laboratory  Lab Results   Component Value Date    INR 1.1 02/22/2024       Lab Results   Component Value Date    WBC 10.14 03/06/2024    HGB 9.4 (L) 03/06/2024    HCT 30.5 (L) 03/06/2024    MCV 85 03/06/2024     (H) 03/06/2024      Lab Results   Component Value Date     (H) 03/06/2024     03/06/2024    K 3.5 03/06/2024    CL 99 03/06/2024    CO2 27 03/06/2024    BUN 7 03/06/2024    CREATININE 0.7 03/06/2024    CALCIUM 8.7 03/06/2024    ALT 7 (L) 03/06/2024    AST 19 03/06/2024    ALBUMIN 2.1 (L) 03/06/2024    BILITOT 0.4 03/06/2024       ASSESSMENT/PLAN:     Sedation Plan: Moderate  Patient will  undergo left chest port placement.    Toi Frazier M.D.  Interventional Radiology  Department of Radiology

## 2024-03-07 NOTE — PLAN OF CARE
Pt in preop bay 33, VSS and IV inserted. Pt denies any open wounds on body or the use of any weight loss injections. Pt needs an updated H&P, an admit order and anesthesia consents, otherwise ready to roll.

## 2024-03-07 NOTE — PROCEDURES
Radiology Post-Procedure Note    Pre Op Diagnosis: Breast cancer    Post Op Diagnosis: Same    Procedure: PORT placement    Procedure performed by: Toi Frazier MD    Written Informed Consent Obtained: Yes    Specimen Removed: No    Estimated Blood Loss: Minimal    Findings:   Using realtime U/S guidance an 8 Fr port catheter was placed into the left IJ vein with tip of the catheter in the RA.    Port is ready for use.     Toi Frazier M.D.  Interventional Radiology  Department of Radiology

## 2024-03-07 NOTE — DISCHARGE INSTRUCTIONS
Port Placement    *PLEASE READ CAREFULLY!!*              Your port insertion/removal site is dressed with gauze and Tega-Derm (looks like Saran Wrap). Underneath the dressing is Derma-Bond (skin glue) and Steri-Strips (looks like strips of white tape). There are also sutures, or stitches, on the inside - these will dissolve on their own.         Do NOT remove the dressing today. Do NOT shower/bathe tonight.          You can shower/bathe tomorrow night. Leave the dressing ON during your shower/bath. Turn the site away from the shower's spray; if you bathe, do NOT allow the site underwater. After your shower/bath, you may remove the dressing. Gently pat the site dry.         The site may remained uncovered. Allow the Steri-Strips and Derma-Bond to come off on their own. You can use a large pad-style bandage, or a 4x4 gauze dressing and tape to cover the site if your clothing (or bra straps) rubbing against it is uncomfortable to you. These items can be found at your local pharmacy or grocery/shopping center.         Until the site heals, usually within one to two weeks, DO NOT submerge in a bath tub, swimming pool, or jacuzzi. DO NOT lift/carry anything heavier than a gallon of milk. DO NOT reach excessively or repeatedly above your head.         Hand hygiene is VERY important! Anyone, including you, who touches the port site should do so with CLEAN hands.                    For INSERTIONS: Only trained personnel should access your port. At the end of whatever activity involving your port is completed (chemo, other infusion, blood draw, etc), it should be flushed with a medicine called Heparin.Port Placement    *PLEASE READ CAREFULLY!!*              Your port insertion/removal site is dressed with gauze and Tega-Derm (looks like Saran Wrap). Underneath the dressing is Derma-Bond (skin glue) and Steri-Strips (looks like strips of white tape). There are also sutures, or stitches, on the inside - these will dissolve on  their own.         Do NOT remove the dressing today. Do NOT shower/bathe tonight.          You can shower/bathe tomorrow night. Leave the dressing ON during your shower/bath. Turn the site away from the shower's spray; if you bathe, do NOT allow the site underwater. After your shower/bath, you may remove the dressing. Gently pat the site dry.         The site may remained uncovered. Allow the Steri-Strips and Derma-Bond to come off on their own. You can use a large pad-style bandage, or a 4x4 gauze dressing and tape to cover the site if your clothing (or bra straps) rubbing against it is uncomfortable to you. These items can be found at your local pharmacy or grocery/shopping center.         Until the site heals, usually within one to two weeks, DO NOT submerge in a bath tub, swimming pool, or jacuzzi. DO NOT lift/carry anything heavier than a gallon of milk. DO NOT reach excessively or repeatedly above your head.         Hand hygiene is VERY important! Anyone, including you, who touches the port site should do so with CLEAN hands.                    For INSERTIONS: Only trained personnel should access your port. At the end of whatever activity involving your port is completed (chemo, other infusion, blood draw, etc), it should be flushed with a medicine called Heparin.

## 2024-03-07 NOTE — Clinical Note
Left: Chest.   Scrubbed with ChloroPrep With Tint.   Painted with None.  Hair: N/A.  Skin prep dry before draping.  Prepped by: Gaby Maria, RT 3/7/2024 8:30 AM.

## 2024-03-08 LAB
ADEQUACY: NORMAL
COMMENT: NORMAL
FINAL PATHOLOGIC DIAGNOSIS: NORMAL
GROSS: NORMAL
Lab: NORMAL
MICROSCOPIC EXAM: NORMAL

## 2024-03-11 ENCOUNTER — TELEPHONE (OUTPATIENT)
Dept: GYNECOLOGIC ONCOLOGY | Facility: CLINIC | Age: 59
End: 2024-03-11
Payer: MEDICARE

## 2024-03-11 ENCOUNTER — TELEPHONE (OUTPATIENT)
Dept: HEMATOLOGY/ONCOLOGY | Facility: CLINIC | Age: 59
End: 2024-03-11
Payer: MEDICARE

## 2024-03-11 ENCOUNTER — OFFICE VISIT (OUTPATIENT)
Dept: GYNECOLOGIC ONCOLOGY | Facility: CLINIC | Age: 59
End: 2024-03-11
Payer: MEDICARE

## 2024-03-11 VITALS
HEIGHT: 61 IN | WEIGHT: 198 LBS | SYSTOLIC BLOOD PRESSURE: 131 MMHG | DIASTOLIC BLOOD PRESSURE: 67 MMHG | BODY MASS INDEX: 37.38 KG/M2 | HEART RATE: 107 BPM

## 2024-03-11 DIAGNOSIS — C78.6 METASTASIS TO PERITONEAL CAVITY: Primary | ICD-10-CM

## 2024-03-11 DIAGNOSIS — C80.1 CARCINOSARCOMA: Primary | ICD-10-CM

## 2024-03-11 DIAGNOSIS — C56.9 OVARIAN CARCINOSARCOMA: Primary | ICD-10-CM

## 2024-03-11 DIAGNOSIS — C78.6 METASTASIS TO PERITONEAL CAVITY: ICD-10-CM

## 2024-03-11 PROCEDURE — 3075F SYST BP GE 130 - 139MM HG: CPT | Mod: CPTII,S$GLB,, | Performed by: STUDENT IN AN ORGANIZED HEALTH CARE EDUCATION/TRAINING PROGRAM

## 2024-03-11 PROCEDURE — 99999 PR PBB SHADOW E&M-EST. PATIENT-LVL III: CPT | Mod: PBBFAC,,, | Performed by: STUDENT IN AN ORGANIZED HEALTH CARE EDUCATION/TRAINING PROGRAM

## 2024-03-11 PROCEDURE — 99459 PELVIC EXAMINATION: CPT | Mod: S$GLB,,, | Performed by: STUDENT IN AN ORGANIZED HEALTH CARE EDUCATION/TRAINING PROGRAM

## 2024-03-11 PROCEDURE — 3078F DIAST BP <80 MM HG: CPT | Mod: CPTII,S$GLB,, | Performed by: STUDENT IN AN ORGANIZED HEALTH CARE EDUCATION/TRAINING PROGRAM

## 2024-03-11 PROCEDURE — G2211 COMPLEX E/M VISIT ADD ON: HCPCS | Mod: S$GLB,,, | Performed by: STUDENT IN AN ORGANIZED HEALTH CARE EDUCATION/TRAINING PROGRAM

## 2024-03-11 PROCEDURE — 99205 OFFICE O/P NEW HI 60 MIN: CPT | Mod: S$GLB,,, | Performed by: STUDENT IN AN ORGANIZED HEALTH CARE EDUCATION/TRAINING PROGRAM

## 2024-03-11 PROCEDURE — 3008F BODY MASS INDEX DOCD: CPT | Mod: CPTII,S$GLB,, | Performed by: STUDENT IN AN ORGANIZED HEALTH CARE EDUCATION/TRAINING PROGRAM

## 2024-03-11 PROCEDURE — 1159F MED LIST DOCD IN RCRD: CPT | Mod: CPTII,S$GLB,, | Performed by: STUDENT IN AN ORGANIZED HEALTH CARE EDUCATION/TRAINING PROGRAM

## 2024-03-11 RX ORDER — MUPIROCIN 20 MG/G
OINTMENT TOPICAL
Status: CANCELLED | OUTPATIENT
Start: 2024-03-11

## 2024-03-11 RX ORDER — LIDOCAINE HYDROCHLORIDE 10 MG/ML
1 INJECTION, SOLUTION EPIDURAL; INFILTRATION; INTRACAUDAL; PERINEURAL ONCE
Status: CANCELLED | OUTPATIENT
Start: 2024-03-11 | End: 2024-03-11

## 2024-03-11 RX ORDER — HEPARIN SODIUM 5000 [USP'U]/ML
5000 INJECTION, SOLUTION INTRAVENOUS; SUBCUTANEOUS
Status: CANCELLED | OUTPATIENT
Start: 2024-03-11 | End: 2024-03-12

## 2024-03-11 RX ORDER — POLYETHYLENE GLYCOL 3350, SODIUM SULFATE ANHYDROUS, SODIUM BICARBONATE, SODIUM CHLORIDE, POTASSIUM CHLORIDE 236; 22.74; 6.74; 5.86; 2.97 G/4L; G/4L; G/4L; G/4L; G/4L
4 POWDER, FOR SOLUTION ORAL ONCE
Qty: 4000 ML | Refills: 0 | Status: SHIPPED | OUTPATIENT
Start: 2024-03-11 | End: 2024-03-11

## 2024-03-11 RX ORDER — CEFAZOLIN SODIUM 2 G/50ML
2 SOLUTION INTRAVENOUS
Status: CANCELLED | OUTPATIENT
Start: 2024-03-11

## 2024-03-11 RX ORDER — METRONIDAZOLE 500 MG/1
500 TABLET ORAL 2 TIMES DAILY
Qty: 2 TABLET | Refills: 0 | Status: SHIPPED | OUTPATIENT
Start: 2024-03-11 | End: 2024-03-12

## 2024-03-11 RX ORDER — SODIUM CHLORIDE 0.9 % (FLUSH) 0.9 %
10 SYRINGE (ML) INJECTION
Status: CANCELLED | OUTPATIENT
Start: 2024-03-11

## 2024-03-11 NOTE — H&P (VIEW-ONLY)
Referring Provider:  Keith Valladares MD  [address] [place] [address]   Subjective:      Patient ID: Anette Ricci is a 58 y.o. female.    Chief Complaint: No chief complaint on file.    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 She presents today to discuss treatment options.    Review of Systems   Constitutional:  Negative for chills, fatigue and fever.   Respiratory:  Negative for cough and shortness of breath.    Cardiovascular:  Negative for chest pain.   Gastrointestinal:  Positive for abdominal distention and nausea. Negative for abdominal pain, constipation and diarrhea.   Genitourinary:  Negative for dysuria, pelvic pain and vaginal bleeding.   Musculoskeletal:  Negative for back pain.   Psychiatric/Behavioral:  Negative for dysphoric mood. The patient is not nervous/anxious.      Past Medical History:   Diagnosis Date    Breast cancer 2013    Diabetes mellitus     Genetic testing 05/29/2013    VUS    Hyperlipidemia     Hypertension     Malignant neoplasm of upper-outer quadrant of right breast in female, estrogen receptor positive 12/02/2015    Seizure disorder       Past Surgical History:   Procedure Laterality Date    BREAST BIOPSY      BREAST LUMPECTOMY Right 2013    EYE SURGERY      TOTAL REDUCTION MAMMOPLASTY Bilateral 2016      Family History   Problem Relation Age of Onset    Seizures Father     Breast cancer Sister 48    Cancer Neg Hx     Ovarian cancer Neg Hx     Colon cancer Neg Hx       Social History     Socioeconomic History    Marital status:         Objective:      Vitals:    03/11/24 1426   BP: 131/67   Pulse: 107      Physical Exam  Constitutional:       General: She is not in acute distress.  HENT:      Head: Normocephalic.   Eyes:      Extraocular Movements: Extraocular movements intact.      Conjunctiva/sclera: Conjunctivae normal.   Cardiovascular:      Rate and  Rhythm: Normal rate.      Pulses: Normal pulses.   Pulmonary:      Effort: Pulmonary effort is normal. No respiratory distress.      Breath sounds: No wheezing.   Abdominal:      General: There is no distension.      Tenderness: There is no abdominal tenderness. There is no guarding or rebound.      Comments: Abdomen distended. Mobile abdominal masses palpated. Exam limited by presence of ascites and body habitus.   Genitourinary:     Comments: External genitalia normal. Vagina normal. Cervix with no visible lesions. Uterus mobile.  Exam limited by body habitus. Rectovaginal fullness on exam.    Musculoskeletal:         General: No deformity.   Neurological:      Mental Status: She is alert and oriented to person, place, and time.   Psychiatric:         Mood and Affect: Mood normal.         Behavior: Behavior normal.         Thought Content: Thought content normal.         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 We reviewed imaging findings and pathology reports. Discussed options for treatment including primary debulking surgery followed by adjuvant chemotherapy vs NACT with plan for IDS. Given current symptom burden, disease distribution and histology, I recommend a primary debulking surgery with goal of resection of all visible disease. This would include a CARLOS ALBERTO/BSO, omentectomy, lymph node debulking, possible colon resection, possible ostomy, and partial hepatectomy. Reviewed risks of surgery including bleeding, infection, heart complication, blood clots, adjacent organ injury, anastomotic leak if colon resection. We also reviewed the risk of aborting the surgery  if intraoperative findings revealed that surgical resection would not be feasible. Plan for bowel prep prior to surgery. Following this discussion, patient expresses agreement with the plan.   Plan for primary debulking 3/25 at [facility]  Surgical oncology will be available to assist with hepatic resection (Discussed with Dr. Farmer).  Bowel prep sent to pharmacy  Paracentesis with IR for symptomatic relief prior to surgery.    As part of the medical decision making process I reviewed the referring provides notes, relevant labs, imaging reports and independently interpreted the  CT A/P performed on 2/14 and PET scan performed on 2/21/24.    Visit today is associated with current or anticipated ongoing medical care related to this patient's single serious condition/complex condition: metastatic carcinosarcoma of mullerian origin.     Identified risk factors include obesity and metastatic carcinosarcoma.          Александр Washington MD

## 2024-03-11 NOTE — TELEPHONE ENCOUNTER
----- Message from Letty Guerra sent at 3/11/2024  9:17 AM CDT -----  Regarding: Orders  Contact: Pt  [phone]            Name of Caller:  Anette     Contact Preference: [phone]    Nature of Call:  Requesting a call back pt states she has fluid in the abdomen area would like orders to have it drained today if possible

## 2024-03-11 NOTE — TELEPHONE ENCOUNTER
I called patient to review [employer] testing, to scheduled non fasting lab on 3/13th at 9am while there seeing Dr. Valladares in the [facility]. LVM with details and sent [employer] message with additional information and FA.

## 2024-03-11 NOTE — Clinical Note
Dr. Valladares, thanks for sending Anette to see me. She is such a sweet lady. I'm planning for a primary debulking on 3/25. I'll be sure to keep you updated in her care.  Please feel free to reach out anytime with questions or referrals, and I will always work to make sure I get patients seen as quickly as possible.  Thanks, Александр Washington Cell: [phone]

## 2024-03-11 NOTE — TELEPHONE ENCOUNTER
\Spoke to pt. Stated her abd became very distended over the weekend. Had vomited x 2 (brownish tint). Having some chest discomfort. Took hydrocodone this morning (helped with the pain). Pt went to IR 3/7 to drain fluid.     Message forwarded to Dr Valladares if appropriate to resend pt back to IR to re drain fluid.

## 2024-03-11 NOTE — PROGRESS NOTES
Referring Provider:  Keith Valladares MD  [address] [place] [address]   Subjective:      Patient ID: Anette Ricci is a 58 y.o. female.    Chief Complaint: No chief complaint on file.    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 She presents today to discuss treatment options.    Review of Systems   Constitutional:  Negative for chills, fatigue and fever.   Respiratory:  Negative for cough and shortness of breath.    Cardiovascular:  Negative for chest pain.   Gastrointestinal:  Positive for abdominal distention and nausea. Negative for abdominal pain, constipation and diarrhea.   Genitourinary:  Negative for dysuria, pelvic pain and vaginal bleeding.   Musculoskeletal:  Negative for back pain.   Psychiatric/Behavioral:  Negative for dysphoric mood. The patient is not nervous/anxious.      Past Medical History:   Diagnosis Date    Breast cancer 2013    Diabetes mellitus     Genetic testing 05/29/2013    VUS    Hyperlipidemia     Hypertension     Malignant neoplasm of upper-outer quadrant of right breast in female, estrogen receptor positive 12/02/2015    Seizure disorder       Past Surgical History:   Procedure Laterality Date    BREAST BIOPSY      BREAST LUMPECTOMY Right 2013    EYE SURGERY      TOTAL REDUCTION MAMMOPLASTY Bilateral 2016      Family History   Problem Relation Age of Onset    Seizures Father     Breast cancer Sister 48    Cancer Neg Hx     Ovarian cancer Neg Hx     Colon cancer Neg Hx       Social History     Socioeconomic History    Marital status:         Objective:      Vitals:    03/11/24 1426   BP: 131/67   Pulse: 107      Physical Exam  Constitutional:       General: She is not in acute distress.  HENT:      Head: Normocephalic.   Eyes:      Extraocular Movements: Extraocular movements intact.      Conjunctiva/sclera: Conjunctivae normal.   Cardiovascular:      Rate and  Rhythm: Normal rate.      Pulses: Normal pulses.   Pulmonary:      Effort: Pulmonary effort is normal. No respiratory distress.      Breath sounds: No wheezing.   Abdominal:      General: There is no distension.      Tenderness: There is no abdominal tenderness. There is no guarding or rebound.      Comments: Abdomen distended. Mobile abdominal masses palpated. Exam limited by presence of ascites and body habitus.   Genitourinary:     Comments: External genitalia normal. Vagina normal. Cervix with no visible lesions. Uterus mobile.  Exam limited by body habitus. Rectovaginal fullness on exam.    Musculoskeletal:         General: No deformity.   Neurological:      Mental Status: She is alert and oriented to person, place, and time.   Psychiatric:         Mood and Affect: Mood normal.         Behavior: Behavior normal.         Thought Content: Thought content normal.         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 We reviewed imaging findings and pathology reports. Discussed options for treatment including primary debulking surgery followed by adjuvant chemotherapy vs NACT with plan for IDS. Given current symptom burden, disease distribution and histology, I recommend a primary debulking surgery with goal of resection of all visible disease. This would include a CARLOS ALBERTO/BSO, omentectomy, lymph node debulking, possible colon resection, possible ostomy, and partial hepatectomy. Reviewed risks of surgery including bleeding, infection, heart complication, blood clots, adjacent organ injury, anastomotic leak if colon resection. We also reviewed the risk of aborting the surgery  if intraoperative findings revealed that surgical resection would not be feasible. Plan for bowel prep prior to surgery. Following this discussion, patient expresses agreement with the plan.   Plan for primary debulking 3/25 at [facility]  Surgical oncology will be available to assist with hepatic resection (Discussed with Dr. Farmer).  Bowel prep sent to pharmacy  Paracentesis with IR for symptomatic relief prior to surgery.    As part of the medical decision making process I reviewed the referring provides notes, relevant labs, imaging reports and independently interpreted the  CT A/P performed on 2/14 and PET scan performed on 2/21/24.    Visit today is associated with current or anticipated ongoing medical care related to this patient's single serious condition/complex condition: metastatic carcinosarcoma of mullerian origin.     Identified risk factors include obesity and metastatic carcinosarcoma.          Александр Washington MD

## 2024-03-12 ENCOUNTER — TELEPHONE (OUTPATIENT)
Dept: HEMATOLOGY/ONCOLOGY | Facility: CLINIC | Age: 59
End: 2024-03-12
Payer: MEDICARE

## 2024-03-12 DIAGNOSIS — R18.0 MALIGNANT ASCITES: Primary | ICD-10-CM

## 2024-03-12 NOTE — TELEPHONE ENCOUNTER
Left voicemail. Explained that pt's answer yes on Questionnaire Submission on portal for shortness of breath. Advised pt to go to the ER immediately.

## 2024-03-13 ENCOUNTER — HOSPITAL ENCOUNTER (OUTPATIENT)
Facility: OTHER | Age: 59
Discharge: HOME | End: 2024-03-13
Attending: STUDENT IN AN ORGANIZED HEALTH CARE EDUCATION/TRAINING PROGRAM | Admitting: STUDENT IN AN ORGANIZED HEALTH CARE EDUCATION/TRAINING PROGRAM
Payer: MEDICARE

## 2024-03-13 VITALS
WEIGHT: 198 LBS | TEMPERATURE: 98 F | SYSTOLIC BLOOD PRESSURE: 125 MMHG | RESPIRATION RATE: 18 BRPM | HEIGHT: 67 IN | BODY MASS INDEX: 31.08 KG/M2 | OXYGEN SATURATION: 100 % | HEART RATE: 96 BPM | DIASTOLIC BLOOD PRESSURE: 64 MMHG

## 2024-03-13 DIAGNOSIS — R18.0 MALIGNANT ASCITES: ICD-10-CM

## 2024-03-13 DIAGNOSIS — R18.8 ASCITES: ICD-10-CM

## 2024-03-13 RX ORDER — IBUPROFEN 800 MG/1
800 TABLET ORAL EVERY 6 HOURS PRN
Status: ON HOLD | COMMUNITY
Start: 2024-02-27 | End: 2024-04-08 | Stop reason: HOSPADM

## 2024-03-13 NOTE — H&P
H&P Note  Interventional Radiology    Reason for Consult: paracentesis    SUBJECTIVE:     Chief Complaint: ascites    History of Present Illness: 58F PMHx breast cancer with recently diagnosed metastatic carcinosarcoma of gynecologic origin with recurrent ascites. Endorses some new sharp LUQ pain, intermittent.     Past Medical History:   Diagnosis Date    Breast cancer 2013    Diabetes mellitus     Genetic testing 05/29/2013    VUS    Hyperlipidemia     Hypertension     Malignant neoplasm of upper-outer quadrant of right breast in female, estrogen receptor positive 12/02/2015    Seizure disorder      Past Surgical History:   Procedure Laterality Date    BREAST BIOPSY      BREAST LUMPECTOMY Right 2013    EYE SURGERY      TOTAL REDUCTION MAMMOPLASTY Bilateral 2016     Family History   Problem Relation Age of Onset    Seizures Father     Breast cancer Sister 48    Cancer Neg Hx     Ovarian cancer Neg Hx     Colon cancer Neg Hx      Social History     Tobacco Use    Smoking status: Never    Smokeless tobacco: Never   Substance Use Topics    Alcohol use: Yes     Alcohol/week: 0.0 standard drinks of alcohol     Comment: ocassional    Drug use: No       Review of Systems:  Constitutional/General:No fever, chills, change in appetite or weight loss.  Hematological/Immuno: no known coagulopathies  Respiratory: no shortness of breath  Cardiovascular: no chest pain  Gastrointestinal: +distension  Genito-Urinary: no dysuria  Musculoskeletal: negative  Skin: Negative for rash, itching, pigmentation changes, nail or hair changes.  Neurological: no TIA or stroke symptoms  Psychiatric: normal mood/affect, good insight/judgement      OBJECTIVE:     Vital Signs Range (Last 24H):       Physical Exam:  General- Patient AAO x3 in NAD  ENT- EOMI  Neck- No masses  CV- Normal rate  Resp-  No increased WOB  GI- Non tender, +distended  Extrem- No cyanosis, clubbing, edema  Derm- No rashes, masses, or lesions noted  Neuro-  No focal deficits  "noted    Physical Exam  There is no height or weight on file to calculate BMI.    Scheduled Meds:   Continuous Infusions:   PRN Meds:    Allergies:   Review of patient's allergies indicates:   Allergen Reactions    Codeine        Labs:  No results for input(s): "INR", "PT", "PTT" in the last 168 hours.  No results for input(s): "WBC", "HGB", "HCT", "MCV", "PLT" in the last 168 hours. No results for input(s): "GLU", "NA", "K", "CL", "CO2", "BUN", "CREATININE", "CALCIUM", "MG", "ALT", "AST", "ALBUMIN", "BILITOT", "BILIDIR" in the last 168 hours.    Vitals (Most Recent):       Consent obtained.     ASSESSMENT/PLAN:     58F PMHx breast cancer with recently diagnosed metastatic carcinosarcoma of gynecologic origin with recurrent ascites.   - Paracentesis with local anesthesia    There are no hospital problems to display for this patient.          Flavia Moore MD    "

## 2024-03-13 NOTE — DISCHARGE SUMMARY
Radiology Discharge Summary      Hospital Course: No complications    Admit Date: 3/13/2024  Discharge Date: 03/13/2024     Instructions Given to Patient: Yes  Diet: Resume prior diet  Activity: activity as tolerated    Description of Condition on Discharge: Stable  Vital Signs (Most Recent): Temp: 98.1 °F (36.7 °C) (03/13/24 1253)  Pulse: 103 (03/13/24 1253)  Resp: 16 (03/13/24 1253)  BP: (!) 140/82 (03/13/24 1253)  SpO2: 99 % (03/13/24 1253)    Discharge Disposition: Home    Discharge Diagnosis: ascites     Follow-up: Repeat paracentesis PRN for patient comfort.     Flavia Moore MD

## 2024-03-13 NOTE — PROCEDURES
Interventional Radiology Post-Procedure Note    Pre Op Diagnosis: Ascites    Post Op Diagnosis: Same    Procedure: US guided paracentesis.    Procedure performed by: Flavia Moore    Written Informed Consent Obtained: Yes  Specimen Removed: YES 3525cc red-tinged fluid  Estimated Blood Loss: Minimal    Findings:   Successful paracentesis.  Albumin administered PRN per protocol. No complications.    Patient tolerated procedure well.    Flavia Moore MD  Interventional Radiology

## 2024-03-14 ENCOUNTER — TELEPHONE (OUTPATIENT)
Dept: GYNECOLOGIC ONCOLOGY | Facility: CLINIC | Age: 59
End: 2024-03-14
Payer: MEDICARE

## 2024-03-14 DIAGNOSIS — C56.9 OVARIAN CARCINOSARCOMA: Primary | ICD-10-CM

## 2024-03-14 DIAGNOSIS — R18.0 MALIGNANT ASCITES: Primary | ICD-10-CM

## 2024-03-14 DIAGNOSIS — R18.0 MALIGNANT ASCITES: ICD-10-CM

## 2024-03-14 NOTE — TELEPHONE ENCOUNTER
----- Message from Charli Byers sent at 3/14/2024  9:54 AM CDT -----   Name of Who is Calling:     What is the request in detail:  request please contact patient to verify where post op visit is scheduled Please contact to further discuss and advise      Can the clinic reply by [facility]:     What [facility]:   [phone] / via en [facility] care [facility]

## 2024-03-15 ENCOUNTER — NURSE TRIAGE (OUTPATIENT)
Dept: ADMINISTRATIVE | Facility: CLINIC | Age: 59
End: 2024-03-15
Payer: MEDICARE

## 2024-03-15 ENCOUNTER — TELEPHONE (OUTPATIENT)
Dept: GYNECOLOGIC ONCOLOGY | Facility: CLINIC | Age: 59
End: 2024-03-15
Payer: MEDICARE

## 2024-03-15 NOTE — TELEPHONE ENCOUNTER
Schedule for surgery on 25th. She was throwing up all night and has pain on her left side of her abdomen. She stated it feels like something is grabbing and sticking her. Pain 8-9/10. Pain comes and goes. Vomited 3-4 times in last 24 hours. Vomiting does not help the pain. Care advice recommends pt go to Er. Pt refused pt is requesting to speak with MD office. Please call and advise pt.   Reason for Disposition   SEVERE abdominal pain (e.g., excruciating)    Additional Information   Negative: Passed out (i.e., fainted, collapsed and was not responding)   Negative: Shock suspected (e.g., cold/pale/clammy skin, too weak to stand, low BP, rapid pulse)   Negative: Sounds like a life-threatening emergency to the triager    Protocols used: Abdominal Pain - Female-A-[place]

## 2024-03-15 NOTE — TELEPHONE ENCOUNTER
----- Message from Tete Sean sent at 3/15/2024 11:04 AM CDT -----  Regarding: Urgent  Name of Who is Calling:  Patient          What is the request in detail:  Patient stated she is vomiting and have pain on her left side, Patient is transfer to nurse on call            Can the clinic reply by [facility]: No            What Number to Call Back if not in [facility]: [phone]

## 2024-03-17 ENCOUNTER — HOSPITAL ENCOUNTER (INPATIENT)
Facility: HOSPITAL | Age: 59
LOS: 2 days | Discharge: HOME | DRG: 754 | End: 2024-03-20
Attending: EMERGENCY MEDICINE | Admitting: OBSTETRICS & GYNECOLOGY
Payer: MEDICARE

## 2024-03-17 DIAGNOSIS — R06.02 SOB (SHORTNESS OF BREATH): ICD-10-CM

## 2024-03-17 DIAGNOSIS — R07.9 CHEST PAIN: ICD-10-CM

## 2024-03-17 DIAGNOSIS — M79.89 SWELLING OF BOTH LOWER EXTREMITIES: ICD-10-CM

## 2024-03-17 DIAGNOSIS — R18.0 MALIGNANT ASCITES: Primary | ICD-10-CM

## 2024-03-17 PROCEDURE — 99285 EMERGENCY DEPT VISIT HI MDM: CPT | Mod: 25

## 2024-03-17 PROCEDURE — 83690 ASSAY OF LIPASE: CPT

## 2024-03-17 PROCEDURE — 85025 COMPLETE CBC W/AUTO DIFF WBC: CPT

## 2024-03-17 PROCEDURE — 87502 INFLUENZA DNA AMP PROBE: CPT

## 2024-03-17 PROCEDURE — 93005 ELECTROCARDIOGRAM TRACING: CPT

## 2024-03-17 PROCEDURE — 49083 ABD PARACENTESIS W/IMAGING: CPT

## 2024-03-17 PROCEDURE — 80053 COMPREHEN METABOLIC PANEL: CPT

## 2024-03-17 PROCEDURE — 82962 GLUCOSE BLOOD TEST: CPT

## 2024-03-17 PROCEDURE — 93010 ELECTROCARDIOGRAM REPORT: CPT | Mod: ,,, | Performed by: INTERNAL MEDICINE

## 2024-03-17 PROCEDURE — 83605 ASSAY OF LACTIC ACID: CPT

## 2024-03-17 PROCEDURE — 80047 BASIC METABLC PNL IONIZED CA: CPT

## 2024-03-17 RX ORDER — FUROSEMIDE 10 MG/ML
40 INJECTION INTRAMUSCULAR; INTRAVENOUS
Status: COMPLETED | OUTPATIENT
Start: 2024-03-17 | End: 2024-03-18

## 2024-03-17 RX ORDER — ONDANSETRON HYDROCHLORIDE 2 MG/ML
4 INJECTION, SOLUTION INTRAVENOUS
Status: COMPLETED | OUTPATIENT
Start: 2024-03-17 | End: 2024-03-18

## 2024-03-17 NOTE — Clinical Note
Diagnosis: SOB (shortness of breath) [917725]   Future Attending Provider: ROLAND FRAZIER [[id number]]   Reason for IP Medical Treatment  (Clinical interventions that can only be accomplished in the IP setting? ) :: paracentesis, iv abx   I certify that Inpatient services for greater than or equal to 2 midnights are medically necessary:: Yes   Plans for Post-Acute care--if anticipated (pick the single best option):: A. No post acute care anticipated at this time

## 2024-03-18 PROBLEM — N17.9 AKI (ACUTE KIDNEY INJURY): Status: ACTIVE | Noted: 2024-03-18

## 2024-03-18 PROBLEM — R65.20 SEVERE SEPSIS: Status: ACTIVE | Noted: 2024-03-18

## 2024-03-18 PROBLEM — R06.02 SOB (SHORTNESS OF BREATH): Status: ACTIVE | Noted: 2024-03-18

## 2024-03-18 PROBLEM — A41.9 SEVERE SEPSIS: Status: ACTIVE | Noted: 2024-03-18

## 2024-03-18 LAB
ALBUMIN SERPL BCP-MCNC: 1.8 G/DL (ref 3.5–5.2)
ALP SERPL-CCNC: 100 U/L (ref 55–135)
ALT SERPL W/O P-5'-P-CCNC: 9 U/L (ref 10–44)
ANION GAP SERPL CALC-SCNC: 11 MMOL/L (ref 8–16)
ANION GAP SERPL CALC-SCNC: 12 MMOL/L (ref 8–16)
ASCENDING AORTA: 2.87 CM
AST SERPL-CCNC: 28 U/L (ref 10–40)
AV INDEX (PROSTH): 0.94
AV MEAN GRADIENT: 2 MMHG
AV PEAK GRADIENT: 5 MMHG
AV VALVE AREA BY VELOCITY RATIO: 2.43 CM²
AV VALVE AREA: 3.31 CM²
AV VELOCITY RATIO: 0.69
BASOPHILS # BLD AUTO: 0.05 K/UL (ref 0–0.2)
BASOPHILS # BLD AUTO: 0.06 K/UL (ref 0–0.2)
BASOPHILS NFR BLD: 0.4 % (ref 0–1.9)
BASOPHILS NFR BLD: 0.4 % (ref 0–1.9)
BILIRUB SERPL-MCNC: 0.6 MG/DL (ref 0.1–1)
BILIRUB UR QL STRIP: NEGATIVE
BSA FOR ECHO PROCEDURE: 1.96 M2
BUN SERPL-MCNC: 15 MG/DL (ref 6–20)
BUN SERPL-MCNC: 16 MG/DL (ref 6–20)
BUN SERPL-MCNC: 16 MG/DL (ref 6–30)
CALCIUM SERPL-MCNC: 8.1 MG/DL (ref 8.7–10.5)
CALCIUM SERPL-MCNC: 8.4 MG/DL (ref 8.7–10.5)
CHLORIDE SERPL-SCNC: 91 MMOL/L (ref 95–110)
CHLORIDE SERPL-SCNC: 92 MMOL/L (ref 95–110)
CHLORIDE SERPL-SCNC: 93 MMOL/L (ref 95–110)
CLARITY UR REFRACT.AUTO: ABNORMAL
CO2 SERPL-SCNC: 25 MMOL/L (ref 23–29)
CO2 SERPL-SCNC: 28 MMOL/L (ref 23–29)
COLOR UR AUTO: YELLOW
CREAT SERPL-MCNC: 0.9 MG/DL (ref 0.5–1.4)
CREAT SERPL-MCNC: 1.1 MG/DL (ref 0.5–1.4)
CREAT SERPL-MCNC: 1.2 MG/DL (ref 0.5–1.4)
CREAT UR-MCNC: 37 MG/DL (ref 15–325)
CV ECHO LV RWT: 0.27 CM
DIFFERENTIAL METHOD BLD: ABNORMAL
DIFFERENTIAL METHOD BLD: ABNORMAL
DOP CALC AO PEAK VEL: 1.16 M/S
DOP CALC AO VTI: 16.15 CM
DOP CALC LVOT AREA: 3.5 CM2
DOP CALC LVOT DIAMETER: 2.12 CM
DOP CALC LVOT PEAK VEL: 0.8 M/S
DOP CALC LVOT STROKE VOLUME: 53.49 CM3
DOP CALCLVOT PEAK VEL VTI: 15.16 CM
E WAVE DECELERATION TIME: 134.91 MSEC
E/A RATIO: 0.68
E/E' RATIO: 6.75 M/S
ECHO LV POSTERIOR WALL: 0.69 CM (ref 0.6–1.1)
EOSINOPHIL # BLD AUTO: 0 K/UL (ref 0–0.5)
EOSINOPHIL # BLD AUTO: 0 K/UL (ref 0–0.5)
EOSINOPHIL NFR BLD: 0.1 % (ref 0–8)
EOSINOPHIL NFR BLD: 0.1 % (ref 0–8)
ERYTHROCYTE [DISTWIDTH] IN BLOOD BY AUTOMATED COUNT: 15.9 % (ref 11.5–14.5)
ERYTHROCYTE [DISTWIDTH] IN BLOOD BY AUTOMATED COUNT: 16.1 % (ref 11.5–14.5)
EST. GFR  (NO RACE VARIABLE): 52.1 ML/MIN/1.73 M^2
EST. GFR  (NO RACE VARIABLE): >60 ML/MIN/1.73 M^2
FRACTIONAL SHORTENING: 26 % (ref 28–44)
GLOBAL LONGITUIDAL STRAIN: 18 %
GLUCOSE SERPL-MCNC: 236 MG/DL (ref 70–110)
GLUCOSE SERPL-MCNC: 274 MG/DL (ref 70–110)
GLUCOSE SERPL-MCNC: 285 MG/DL (ref 70–110)
GLUCOSE UR QL STRIP: NEGATIVE
HCT VFR BLD AUTO: 26.3 % (ref 37–48.5)
HCT VFR BLD AUTO: 27.4 % (ref 37–48.5)
HCT VFR BLD CALC: 28 %PCV (ref 36–54)
HGB BLD-MCNC: 8.3 G/DL (ref 12–16)
HGB BLD-MCNC: 8.5 G/DL (ref 12–16)
HGB UR QL STRIP: NEGATIVE
IMM GRANULOCYTES # BLD AUTO: 0.07 K/UL (ref 0–0.04)
IMM GRANULOCYTES # BLD AUTO: 0.08 K/UL (ref 0–0.04)
IMM GRANULOCYTES NFR BLD AUTO: 0.5 % (ref 0–0.5)
IMM GRANULOCYTES NFR BLD AUTO: 0.6 % (ref 0–0.5)
INFLUENZA A, MOLECULAR: NEGATIVE
INFLUENZA B, MOLECULAR: NEGATIVE
INTERVENTRICULAR SEPTUM: 0.67 CM (ref 0.6–1.1)
KETONES UR QL STRIP: NEGATIVE
LA MAJOR: 5.19 CM
LA MINOR: 5.08 CM
LA WIDTH: 4.22 CM
LACTATE SERPL-SCNC: 2 MMOL/L (ref 0.5–2.2)
LACTATE SERPL-SCNC: 2.5 MMOL/L (ref 0.5–2.2)
LEFT ATRIUM SIZE: 4.3 CM
LEFT ATRIUM VOLUME INDEX MOD: 40.9 ML/M2
LEFT ATRIUM VOLUME INDEX: 41 ML/M2
LEFT ATRIUM VOLUME MOD: 79 CM3
LEFT ATRIUM VOLUME: 79.19 CM3
LEFT INTERNAL DIMENSION IN SYSTOLE: 3.83 CM (ref 2.1–4)
LEFT VENTRICLE DIASTOLIC VOLUME INDEX: 65.57 ML/M2
LEFT VENTRICLE DIASTOLIC VOLUME: 126.55 ML
LEFT VENTRICLE MASS INDEX: 60 G/M2
LEFT VENTRICLE SYSTOLIC VOLUME INDEX: 32.7 ML/M2
LEFT VENTRICLE SYSTOLIC VOLUME: 63.13 ML
LEFT VENTRICULAR INTERNAL DIMENSION IN DIASTOLE: 5.15 CM (ref 3.5–6)
LEFT VENTRICULAR MASS: 116.5 G
LEUKOCYTE ESTERASE UR QL STRIP: NEGATIVE
LIPASE SERPL-CCNC: 6 U/L (ref 4–60)
LV LATERAL E/E' RATIO: 4.91 M/S
LV SEPTAL E/E' RATIO: 10.8 M/S
LYMPHOCYTES # BLD AUTO: 1.2 K/UL (ref 1–4.8)
LYMPHOCYTES # BLD AUTO: 1.2 K/UL (ref 1–4.8)
LYMPHOCYTES NFR BLD: 8.8 % (ref 18–48)
LYMPHOCYTES NFR BLD: 9.2 % (ref 18–48)
MCH RBC QN AUTO: 25.2 PG (ref 27–31)
MCH RBC QN AUTO: 25.9 PG (ref 27–31)
MCHC RBC AUTO-ENTMCNC: 31 G/DL (ref 32–36)
MCHC RBC AUTO-ENTMCNC: 31.6 G/DL (ref 32–36)
MCV RBC AUTO: 81 FL (ref 82–98)
MCV RBC AUTO: 82 FL (ref 82–98)
MONOCYTES # BLD AUTO: 1 K/UL (ref 0.3–1)
MONOCYTES # BLD AUTO: 1 K/UL (ref 0.3–1)
MONOCYTES NFR BLD: 7.3 % (ref 4–15)
MONOCYTES NFR BLD: 7.4 % (ref 4–15)
MV A" WAVE DURATION": 8.28 MSEC
MV PEAK A VEL: 0.8 M/S
MV PEAK E VEL: 0.54 M/S
MV STENOSIS PRESSURE HALF TIME: 39.12 MS
MV VALVE AREA P 1/2 METHOD: 5.62 CM2
NEUTROPHILS # BLD AUTO: 11.1 K/UL (ref 1.8–7.7)
NEUTROPHILS # BLD AUTO: 11.1 K/UL (ref 1.8–7.7)
NEUTROPHILS NFR BLD: 82.4 % (ref 38–73)
NEUTROPHILS NFR BLD: 82.8 % (ref 38–73)
NITRITE UR QL STRIP: NEGATIVE
NRBC BLD-RTO: 0 /100 WBC
NRBC BLD-RTO: 0 /100 WBC
OHS LV EJECTION FRACTION SIMPSONS BIPLANE MOD: 50 %
OHS QRS DURATION: 72 MS
OHS QTC CALCULATION: 435 MS
PH UR STRIP: 5 [PH] (ref 5–8)
PLATELET # BLD AUTO: 608 K/UL (ref 150–450)
PLATELET # BLD AUTO: 631 K/UL (ref 150–450)
PMV BLD AUTO: 9.5 FL (ref 9.2–12.9)
PMV BLD AUTO: 9.6 FL (ref 9.2–12.9)
POC IONIZED CALCIUM: 1.02 MMOL/L (ref 1.06–1.42)
POC TCO2 (MEASURED): 27 MMOL/L (ref 23–29)
POCT GLUCOSE: 242 MG/DL (ref 70–110)
POCT GLUCOSE: 342 MG/DL (ref 70–110)
POTASSIUM BLD-SCNC: 3.9 MMOL/L (ref 3.5–5.1)
POTASSIUM SERPL-SCNC: 3.7 MMOL/L (ref 3.5–5.1)
POTASSIUM SERPL-SCNC: 4 MMOL/L (ref 3.5–5.1)
PROT SERPL-MCNC: 6.4 G/DL (ref 6–8.4)
PROT UR QL STRIP: NEGATIVE
PULM VEIN S/D RATIO: 1.58
PV PEAK D VEL: 0.64 M/S
PV PEAK S VEL: 1.01 M/S
RA MAJOR: 4 CM
RA PRESSURE ESTIMATED: 3 MMHG
RA WIDTH: 3.44 CM
RBC # BLD AUTO: 3.21 M/UL (ref 4–5.4)
RBC # BLD AUTO: 3.37 M/UL (ref 4–5.4)
RIGHT VENTRICULAR END-DIASTOLIC DIMENSION: 3.45 CM
SAMPLE: ABNORMAL
SARS-COV-2 RDRP RESP QL NAA+PROBE: NEGATIVE
SINUS: 3.16 CM
SODIUM BLD-SCNC: 131 MMOL/L (ref 136–145)
SODIUM SERPL-SCNC: 130 MMOL/L (ref 136–145)
SODIUM SERPL-SCNC: 130 MMOL/L (ref 136–145)
SODIUM UR-SCNC: 90 MMOL/L (ref 20–250)
SP GR UR STRIP: 1.02 (ref 1–1.03)
SPECIMEN SOURCE: NORMAL
STJ: 2.47 CM
TDI LATERAL: 0.11 M/S
TDI SEPTAL: 0.05 M/S
TDI: 0.08 M/S
TRICUSPID ANNULAR PLANE SYSTOLIC EXCURSION: 1.96 CM
URN SPEC COLLECT METH UR: ABNORMAL
WBC # BLD AUTO: 13.43 K/UL (ref 3.9–12.7)
WBC # BLD AUTO: 13.51 K/UL (ref 3.9–12.7)
Z-SCORE OF LEFT VENTRICULAR DIMENSION IN END DIASTOLE: -0.59
Z-SCORE OF LEFT VENTRICULAR DIMENSION IN END SYSTOLE: 1.03

## 2024-03-18 PROCEDURE — 96372 THER/PROPH/DIAG INJ SC/IM: CPT

## 2024-03-18 PROCEDURE — 80048 BASIC METABOLIC PNL TOTAL CA: CPT

## 2024-03-18 PROCEDURE — G0378 HOSPITAL OBSERVATION PER HR: HCPCS

## 2024-03-18 PROCEDURE — 25000003 PHARM REV CODE 250

## 2024-03-18 PROCEDURE — 96375 TX/PRO/DX INJ NEW DRUG ADDON: CPT

## 2024-03-18 PROCEDURE — 96365 THER/PROPH/DIAG IV INF INIT: CPT

## 2024-03-18 PROCEDURE — 82570 ASSAY OF URINE CREATININE: CPT

## 2024-03-18 PROCEDURE — 96376 TX/PRO/DX INJ SAME DRUG ADON: CPT

## 2024-03-18 PROCEDURE — 87040 BLOOD CULTURE FOR BACTERIA: CPT | Mod: 59

## 2024-03-18 PROCEDURE — 63600175 PHARM REV CODE 636 W HCPCS

## 2024-03-18 PROCEDURE — 96366 THER/PROPH/DIAG IV INF ADDON: CPT

## 2024-03-18 PROCEDURE — 87502 INFLUENZA DNA AMP PROBE: CPT

## 2024-03-18 PROCEDURE — 83605 ASSAY OF LACTIC ACID: CPT

## 2024-03-18 PROCEDURE — 99223 1ST HOSP IP/OBS HIGH 75: CPT | Mod: AI,,, | Performed by: OBSTETRICS & GYNECOLOGY

## 2024-03-18 PROCEDURE — 25500020 PHARM REV CODE 255: Performed by: EMERGENCY MEDICINE

## 2024-03-18 PROCEDURE — 81003 URINALYSIS AUTO W/O SCOPE: CPT

## 2024-03-18 PROCEDURE — 84300 ASSAY OF URINE SODIUM: CPT

## 2024-03-18 PROCEDURE — 87086 URINE CULTURE/COLONY COUNT: CPT

## 2024-03-18 PROCEDURE — 0W9G3ZZ DRAINAGE OF PERITONEAL CAVITY, PERCUTANEOUS APPROACH: ICD-10-PCS | Performed by: EMERGENCY MEDICINE

## 2024-03-18 PROCEDURE — U0002 COVID-19 LAB TEST NON-CDC: HCPCS | Performed by: EMERGENCY MEDICINE

## 2024-03-18 PROCEDURE — 96367 TX/PROPH/DG ADDL SEQ IV INF: CPT

## 2024-03-18 PROCEDURE — 85025 COMPLETE CBC W/AUTO DIFF WBC: CPT

## 2024-03-18 RX ORDER — LORAZEPAM 0.5 MG/1
0.5 TABLET ORAL EVERY 12 HOURS PRN
Status: DISCONTINUED | OUTPATIENT
Start: 2024-03-18 | End: 2024-03-20 | Stop reason: HOSPADM

## 2024-03-18 RX ORDER — HYDROMORPHONE HYDROCHLORIDE 1 MG/ML
0.2 INJECTION, SOLUTION INTRAMUSCULAR; INTRAVENOUS; SUBCUTANEOUS EVERY 6 HOURS PRN
Status: DISCONTINUED | OUTPATIENT
Start: 2024-03-18 | End: 2024-03-20 | Stop reason: HOSPADM

## 2024-03-18 RX ORDER — ACETAMINOPHEN 325 MG/1
650 TABLET ORAL EVERY 6 HOURS PRN
Status: DISCONTINUED | OUTPATIENT
Start: 2024-03-18 | End: 2024-03-20 | Stop reason: HOSPADM

## 2024-03-18 RX ORDER — ONDANSETRON 8 MG/1
8 TABLET, ORALLY DISINTEGRATING ORAL EVERY 8 HOURS PRN
Status: DISCONTINUED | OUTPATIENT
Start: 2024-03-18 | End: 2024-03-20 | Stop reason: HOSPADM

## 2024-03-18 RX ORDER — GLUCAGON 1 MG
1 KIT INJECTION
Status: DISCONTINUED | OUTPATIENT
Start: 2024-03-18 | End: 2024-03-20 | Stop reason: HOSPADM

## 2024-03-18 RX ORDER — PROCHLORPERAZINE EDISYLATE 5 MG/ML
5 INJECTION INTRAMUSCULAR; INTRAVENOUS ONCE
Status: COMPLETED | OUTPATIENT
Start: 2024-03-18 | End: 2024-03-18

## 2024-03-18 RX ORDER — IBUPROFEN 200 MG
24 TABLET ORAL
Status: DISCONTINUED | OUTPATIENT
Start: 2024-03-18 | End: 2024-03-20 | Stop reason: HOSPADM

## 2024-03-18 RX ORDER — HYDRALAZINE HYDROCHLORIDE 20 MG/ML
10 INJECTION INTRAMUSCULAR; INTRAVENOUS EVERY 6 HOURS PRN
Status: DISCONTINUED | OUTPATIENT
Start: 2024-03-18 | End: 2024-03-20 | Stop reason: HOSPADM

## 2024-03-18 RX ORDER — SODIUM CHLORIDE 0.9 % (FLUSH) 0.9 %
3 SYRINGE (ML) INJECTION
Status: DISCONTINUED | OUTPATIENT
Start: 2024-03-18 | End: 2024-03-20 | Stop reason: HOSPADM

## 2024-03-18 RX ORDER — LIDOCAINE HYDROCHLORIDE 10 MG/ML
100 INJECTION, SOLUTION EPIDURAL; INFILTRATION; INTRACAUDAL; PERINEURAL
Status: COMPLETED | OUTPATIENT
Start: 2024-03-18 | End: 2024-03-18

## 2024-03-18 RX ORDER — FAMOTIDINE 20 MG/1
20 TABLET, FILM COATED ORAL 2 TIMES DAILY
Status: DISCONTINUED | OUTPATIENT
Start: 2024-03-18 | End: 2024-03-20 | Stop reason: HOSPADM

## 2024-03-18 RX ORDER — KETOROLAC TROMETHAMINE 5 MG/ML
1 SOLUTION OPHTHALMIC
COMMUNITY

## 2024-03-18 RX ORDER — LAMOTRIGINE 100 MG/1
100 TABLET ORAL DAILY
Status: DISCONTINUED | OUTPATIENT
Start: 2024-03-18 | End: 2024-03-20 | Stop reason: HOSPADM

## 2024-03-18 RX ORDER — MORPHINE SULFATE 4 MG/ML
4 INJECTION, SOLUTION INTRAMUSCULAR; INTRAVENOUS
Status: COMPLETED | OUTPATIENT
Start: 2024-03-18 | End: 2024-03-18

## 2024-03-18 RX ORDER — ATORVASTATIN CALCIUM 40 MG/1
80 TABLET, FILM COATED ORAL DAILY
Status: DISCONTINUED | OUTPATIENT
Start: 2024-03-18 | End: 2024-03-20 | Stop reason: HOSPADM

## 2024-03-18 RX ORDER — QUETIAPINE FUMARATE 25 MG/1
25 TABLET, FILM COATED ORAL DAILY
Status: DISCONTINUED | OUTPATIENT
Start: 2024-03-18 | End: 2024-03-20 | Stop reason: HOSPADM

## 2024-03-18 RX ORDER — OXYCODONE HYDROCHLORIDE 5 MG/1
5 TABLET ORAL EVERY 4 HOURS PRN
Status: DISCONTINUED | OUTPATIENT
Start: 2024-03-18 | End: 2024-03-20 | Stop reason: HOSPADM

## 2024-03-18 RX ORDER — ONDANSETRON HYDROCHLORIDE 2 MG/ML
4 INJECTION, SOLUTION INTRAVENOUS
Status: COMPLETED | OUTPATIENT
Start: 2024-03-18 | End: 2024-03-18

## 2024-03-18 RX ORDER — INSULIN ASPART 100 [IU]/ML
0-5 INJECTION, SOLUTION INTRAVENOUS; SUBCUTANEOUS
Status: DISCONTINUED | OUTPATIENT
Start: 2024-03-18 | End: 2024-03-20 | Stop reason: HOSPADM

## 2024-03-18 RX ORDER — IBUPROFEN 200 MG
16 TABLET ORAL
Status: DISCONTINUED | OUTPATIENT
Start: 2024-03-18 | End: 2024-03-20 | Stop reason: HOSPADM

## 2024-03-18 RX ORDER — FLUTICASONE PROPIONATE 50 MCG
1 SPRAY, SUSPENSION (ML) NASAL DAILY
COMMUNITY

## 2024-03-18 RX ADMIN — LAMOTRIGINE 100 MG: 100 TABLET ORAL at 10:03

## 2024-03-18 RX ADMIN — ONDANSETRON 8 MG: 8 TABLET, ORALLY DISINTEGRATING ORAL at 08:03

## 2024-03-18 RX ADMIN — FUROSEMIDE 40 MG: 10 INJECTION, SOLUTION INTRAVENOUS at 12:03

## 2024-03-18 RX ADMIN — OXYCODONE 5 MG: 5 TABLET ORAL at 09:03

## 2024-03-18 RX ADMIN — FAMOTIDINE 20 MG: 20 TABLET, FILM COATED ORAL at 09:03

## 2024-03-18 RX ADMIN — ONDANSETRON 4 MG: 2 INJECTION INTRAMUSCULAR; INTRAVENOUS at 04:03

## 2024-03-18 RX ADMIN — INSULIN DETEMIR 33 UNITS: 100 INJECTION, SOLUTION SUBCUTANEOUS at 10:03

## 2024-03-18 RX ADMIN — HYDROMORPHONE HYDROCHLORIDE 0.2 MG: 1 INJECTION, SOLUTION INTRAMUSCULAR; INTRAVENOUS; SUBCUTANEOUS at 10:03

## 2024-03-18 RX ADMIN — ATORVASTATIN CALCIUM 80 MG: 40 TABLET, FILM COATED ORAL at 10:03

## 2024-03-18 RX ADMIN — PROCHLORPERAZINE EDISYLATE 5 MG: 5 INJECTION INTRAMUSCULAR; INTRAVENOUS at 10:03

## 2024-03-18 RX ADMIN — INSULIN ASPART 2 UNITS: 100 INJECTION, SOLUTION INTRAVENOUS; SUBCUTANEOUS at 10:03

## 2024-03-18 RX ADMIN — INSULIN ASPART 2 UNITS: 100 INJECTION, SOLUTION INTRAVENOUS; SUBCUTANEOUS at 06:03

## 2024-03-18 RX ADMIN — OXYCODONE 5 MG: 5 TABLET ORAL at 03:03

## 2024-03-18 RX ADMIN — IOHEXOL 100 ML: 350 INJECTION, SOLUTION INTRAVENOUS at 02:03

## 2024-03-18 RX ADMIN — VANCOMYCIN HYDROCHLORIDE 1750 MG: 500 INJECTION, POWDER, LYOPHILIZED, FOR SOLUTION INTRAVENOUS at 04:03

## 2024-03-18 RX ADMIN — LIDOCAINE HYDROCHLORIDE 100 MG: 10 SOLUTION INTRAVENOUS at 01:03

## 2024-03-18 RX ADMIN — PIPERACILLIN SODIUM AND TAZOBACTAM SODIUM 4.5 G: 4; .5 INJECTION, POWDER, FOR SOLUTION INTRAVENOUS at 01:03

## 2024-03-18 RX ADMIN — MORPHINE SULFATE 4 MG: 4 INJECTION INTRAVENOUS at 04:03

## 2024-03-18 RX ADMIN — ONDANSETRON 4 MG: 2 INJECTION INTRAMUSCULAR; INTRAVENOUS at 12:03

## 2024-03-18 NOTE — H&P
[facility] - Emergency Dept  Gynecologic Oncology  H&P    Patient Name: Anette Ricci  MRN: [id number]  Admission Date: 3/17/2024  Primary Care Provider: Mark Chua MD   Principal Problem: Malignant ascites    Subjective:     Chief Complaint/Reason for Admission: SOB, Edema, Ascites     History of Present Illness:  Patient is a 57 y/o  with a history of metastatic carcinosarcoma with scheduled primary debulking scheduled for 3/25/24 with Dr. Washington, who initially presented to the ED with SOB and edema to her bilateral lower extremities, onset 3/15/23. Patient reports that she first noticed the increased leg swelling and then she began to get acutely short of breath. She states that she is short of breath with exertion and at rest. She is reporting intermittent constipation and states that her last BM was yesterday after taking Miralax. She states that her BM was hard and difficult to pass. She denies any fevers, chills, CP, URI symptoms, pain with urination, nausea, vomiting, or vaginal bleeding.     Patient is postmenopausal as of 2018 and reports no vaginal bleeding since that time    Hospital Course:  No notes on file    Oncology Treatment Plan:   OP BREAST PACLITAXEL CARBOPLATIN Q3W    Oncology History:   2013: ER+ Carcinoma of R Breast   S/p Lympectomy, SLNB- Grade 1 Well Differentiated Invasive Ductal Carcinoma with Associated Ductal Carcinoma in Situ, Negative Margins/ SLNB Negative                                       ER+ 90%, W[place]+ 90%, HER2 Neg                           TII N0 Stage 2A   [id number]: Tamoxifen   2019-2021: Letrozole   2023: Mammogram Neg   2024: ED Nathan Marinelli (Abdominal Bloating, Constipation)- imaging consistent with multiple peritoneal masses (11 cm in LUQ), large volume ascites   2024: PET consistent with multifocal hypermetabolic soft tissue masses throughout the peritoneum including lesion along posterior aspect of right hepatic lobe, moderate to  large volume ascites   02/29: IR paracentesis 5L removed   03/04: 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, paracentesis 2.4L removed               Cells positive for PAX8, CK7, AE1/AE3, SMA, CK5/6, MA,               PD1 and molecular studies pending               No loss of nuclear expression of MMR (low probability of microsatelllite instability-high   03/06: : 418   03/13: IR paracentesis 3.5L   03/21: Scheduled for IR paracentesis   03/25: Scheduled for primary debulking followed by adjuvant chemotherapy     Past Medical History:   Diagnosis Date    Breast cancer 2013    Diabetes mellitus     Genetic testing 05/29/2013    VUS    Hyperlipidemia     Hypertension     Malignant neoplasm of upper-outer quadrant of right breast in female, estrogen receptor positive 12/02/2015    Seizure disorder      Past Surgical History:   Procedure Laterality Date    BREAST BIOPSY      BREAST LUMPECTOMY Right 2013    EYE SURGERY      PERITONEOCENTESIS N/A 3/13/2024    Procedure: PARACENTESIS, ABDOMINAL;  Surgeon: Flavia Moore MD;  Location: [facility] CATH LAB;  Service: Radiology;  Laterality: N/A;    TOTAL REDUCTION MAMMOPLASTY Bilateral 2016     Family History       Problem Relation (Age of Onset)    Breast cancer Sister (48)    Seizures Father          Tobacco Use    Smoking status: Never    Smokeless tobacco: Never   Substance and Sexual Activity    Alcohol use: Yes     Alcohol/week: 0.0 standard drinks of alcohol     Comment: ocassional    Drug use: No    Sexual activity: Yes     Partners: Male     Birth control/protection: None       (Not in a hospital admission)      Review of patient's allergies indicates:   Allergen Reactions    Codeine        Review of Systems   Constitutional:  Negative for chills and fever.   Eyes:  Negative for visual disturbance.   Respiratory:  Positive for shortness of breath. Negative for cough.    Cardiovascular:  Positive for leg swelling. Negative  for chest pain and palpitations.   Gastrointestinal:  Positive for abdominal pain and constipation. Negative for diarrhea, nausea and vomiting.   Genitourinary:  Negative for dysuria, hematuria, vaginal bleeding and vaginal discharge.   Musculoskeletal:  Negative for back pain.   Neurological:  Negative for headaches.      Objective:     Vital Signs (Most Recent):  Temp: 98.4 °F (36.9 °C) (03/17/24 2214)  Pulse: 110 (03/18/24 0004)  Resp: 20 (03/18/24 0403)  BP: 114/65 (03/18/24 0002)  SpO2: 98 % (03/18/24 0004) Vital Signs (24h Range):  Temp:  [98.4 °F (36.9 °C)] 98.4 °F (36.9 °C)  Pulse:  [109-120] 110  Resp:  [15-22] 20  SpO2:  [98 %-100 %] 98 %  BP: (114-133)/(65-77) 114/65     Weight: 81.6 kg (180 lb)  Body mass index is 28.19 kg/m².       Physical Exam:   Constitutional: She is oriented to person, place, and time. She appears well-developed and well-nourished.    HENT:   Head: Normocephalic and atraumatic.      Cardiovascular:  Normal rate, regular rhythm and normal heart sounds.      Exam reveals edema.       3+ pitting edema to the BLE extending to the mid thigh region    Pulmonary/Chest: Effort normal and breath sounds normal. No respiratory distress.        Abdominal: Soft. She exhibits distension (significant abdominal distension noted). There is no abdominal tenderness. There is no rebound and no guarding.   Bowel sounds difficult to discern             Musculoskeletal: Normal range of motion.       Neurological: She is alert and oriented to person, place, and time.    Skin: Skin is warm and dry.    Psychiatric: She has a normal mood and affect. Her behavior is normal. Thought content normal.          Laboratory:  Recent Labs   Lab 03/17/24 2357 03/17/24 2358   WBC 13.43*  --    HGB 8.5*  --    HCT 27.4* 28*   MCV 81*  --    *  --       Recent Labs   Lab 03/17/24 2357   *   K 4.0   CL 93*   CO2 25   BUN 16   CREATININE 1.2   *   PROT 6.4   BILITOT 0.6   ALKPHOS 100   ALT 9*   AST  28      Lactic Acid: 2.5    Lipase: 6    UA Reflex to Culture: WNL    Urine Culture: pending    Blood Cultures: pending    COVID: Negative    Influenza: pending    Urine Cr and Na: pending    Diagnostic Results:  Results for orders placed or performed during the hospital encounter of 03/17/24   X-Ray Chest AP Portable    Narrative    EXAMINATION:  XR CHEST AP PORTABLE    CLINICAL HISTORY:  SOB;    TECHNIQUE:  Single frontal view of the chest was performed.    COMPARISON:  05/26/2021.    FINDINGS:  Left-sided port catheter present with tip overlying the upper right atrium.  No pneumothorax.    Underinflated lungs with hypoventilatory change.  No consolidation or pleural effusion.    Heart and lungs otherwise appear unchanged when allowing for differences in technique and positioning.      Impression    As above.      Electronically signed by: Eduard Araujo MD  Date:    03/18/2024  Time:    01:48     Results for orders placed or performed during the hospital encounter of 03/17/24   CTA Chest Non-Coronary (PE Studies)    Narrative    EXAMINATION:  CTA CHEST NON CORONARY (PE STUDIES)    CLINICAL HISTORY:  Pulmonary embolism (PE) suspected, unknown D-dimer;    TECHNIQUE:  Low dose axial images, sagittal and coronal reformations were obtained from the thoracic inlet to the lung bases following the IV administration of 100 mL of Omnipaque 350.  Contrast timing was optimized to evaluate the pulmonary arteries.    COMPARISON:  Chest radiograph 03/18/2024.    FINDINGS:  PULMONARY VASCULATURE: Suboptimal evaluation of the segmental and subsegmental pulmonary arteries secondary to streak artifact from dense contrast bolus, respiratory motion artifact, and suboptimal contrast mixing.The pulmonary arteries distribute normally without evidence of filling defect through the level of the lobar pulmonary arteries to indicate pulmonary thromboembolism.  Eccentric filling defect in a subsegmental branch of the right lower  lobe.    Base of Neck:  No significant abnormality.    Thoracic soft tissues:  Left anterior wall chest port.  Calcifications in the bilateral breasts.  Partially calcified 3 cm mass right breast, similar compared to PET-CT 02/21/2024.  Correlation with prior mammographic studies is recommended.  There is scattered soft tissue edema.    Aorta: Left-sided 3 vessel non-aneurysmal aortic arch with mild calcific atherosclerosis.    Heart/Pericardium: Heart is not enlarged.  No pericardial effusion.  Multivessel coronary artery calcifications.    Sita/Mediastinum:  No hilar or mediastinal lymphadenopathy.    Airways:  Trachea is midline and proximal airways are patent without significant abnormality.    Pleura/Lungs: No pneumothorax.  Small left pleural effusion.  No suspicious pulmonary mass.    Esophagus: Normal in course and caliber.  Small hiatal hernia.    Upper abdomen: Partially imaged moderate volume upper abdominal ascites.    Bones:  Degenerative changes of the visualized osseous structures without acute fracture or lytic or sclerotic lesions.      Impression    Study limitations as above.  No central pulmonary thromboembolism through the level of the lobar pulmonary arterial branches.  Segmental and subsegmental pulmonary arteries are not well evaluated.    Eccentric filling defect in a subsegmental branch of the right lower lobe.  Not optimally evaluated due to above limitations.  Typically, eccentric filling defects like this are more typical of chronic pulmonary embolism changes.  Correlate clinically.    Small left pleural effusion.    Moderate volume upper abdominal ascites.    Calcifications in the bilateral breasts.  Partially calcified 3 cm mass right breast, similar compared to PET-CT 02/21/2024.  Correlation with prior mammographic studies is recommended.    Additional findings in the body of the report.    Electronically signed by resident: Kit  Zara  Date:    03/18/2024  Time:    03:11    Electronically signed by: Eduard Araujo MD  Date:    03/18/2024  Time:    03:56     Results for orders placed or performed during the hospital encounter of 03/17/24   US Lower Extremity Veins Bilateral    Narrative    EXAMINATION:  US LOWER EXTREMITY VEINS BILATERAL    CLINICAL HISTORY:  Other specified soft tissue disorders    TECHNIQUE:  Duplex and color flow Doppler and dynamic compression was performed of the bilateral lower extremity veins was performed.    COMPARISON:  None    FINDINGS:  Right thigh veins: The common femoral, femoral, popliteal, upper greater saphenous, and deep femoral veins are patent and free of thrombus. The veins are normally compressible and have normal phasic flow and augmentation response.    Right calf veins: The visualized calf veins are patent.    Left thigh veins: The common femoral, femoral, popliteal, upper greater saphenous, and deep femoral veins are patent and free of thrombus. The veins are normally compressible and have normal phasic flow and augmentation response.    Left calf veins: The visualized calf veins are patent.    Miscellaneous: None      Impression    No evidence of deep venous thrombosis in either lower extremity.      Electronically signed by: Abdullahi Herrera MD  Date:    03/18/2024  Time:    09:42     Assessment/Plan:     * Malignant ascites  - Differential: Ascites, SBP  - Physical exam: non-peritoneal, no rebound or guarding noted  - Patient has had 3 paracentesis with 3.5-5L removed each time  - Last Paracentesis 3/13: 3525cc removed  - Patient is scheduled for repeat paracentesis 3/21/24  - Attempted paracentesis in ED without fluid return  - S/P lasix 20mg IV in the ED     Plan  - IR consulted for paracentesis with fluid/ cytology studies ordered to rule out SBP, however low suspicion at this time    SOB (shortness of breath)  - Differential: Pneumonia, Pulmonary Embolism, Pulmonary Edema, MI,   -  Saturating % on RA   - CXR: Neg   - Covid: Negative  - Flu: pending  - EKG: Normal sinus rhythm, abnormal R wave progression, low voltage QRS     Plan:  - Echo ordered  - IR Paracentesis to be performed    Carcinosarcoma  - See Onc History Above  - Scheduled for primary debulking with Dr. Washington 3/25/24    History of breast cancer in female  2013: ER+ Carcinoma of R Breast   S/p Lympectomy, SLNB- Grade 1 Well Differentiated Invasive Ductal Carcinoma with Associated Ductal Carcinoma in Situ, Negative Margins/ SLNB Negative                                       ER+ 90%, [place]+ 90%, HER2 Neg                           TII N0 Stage 2A   [id number]: Tamoxifen   07/2019-06/2021: Letrozole   11/2023: Mammogram Negative    Hyperlipidemia  - Continue home Lipitor    Seizure disorder  - Continue home Lamictal    Depression, reactive  - Continue home Seroquel, Ativan PRN      Hypertension  - Held home meds: Amlodipine, Enalapril, HCTZ  - Hydral PRN ordered       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

## 2024-03-18 NOTE — ASSESSMENT & PLAN NOTE
- See Onc History Above  - Scheduled for primary debulking with Dr. Washington 3/25/24, followed by adjuvant chemotherapy. While inpatient, will continue the optimize prior to surgery, including if any abnormalities with workup. Will consider consulting hospital medicine for additional assistance.

## 2024-03-18 NOTE — ASSESSMENT & PLAN NOTE
- Home Lantus 33u, Metformin, Mounjaro, Novalog held  - Detamir 33u QHS ordered  - SSI ordered  - POCT glucose AC/QHS  - Diet: Diabetic

## 2024-03-18 NOTE — CONSULTS
Please see H&P from 3/18 for full assessment and plan.    In short:   Patient admitted to Gyn Oncology with a history of metastatic carcinosarcoma with scheduled primary debulking scheduled for 3/25/24 with Dr. Washington, who initially presented to the ED with SOB and edema to her bilateral lower extremities, onset 3/15/23.       Plan:  Malignant ascites  - Differential: Ascites, SBP  - Physical exam: non-peritoneal, no rebound or guarding noted  - Patient has had 3 paracentesis with 3.5-5L removed each time  - Last Paracentesis 3/13: 3525cc removed  - Patient is scheduled for repeat paracentesis 3/21/24  - Attempted paracentesis in ED without fluid return  - S/P lasix 20mg IV in the ED     Plan  - IR consulted for paracentesis with fluid/ cytology studies ordered to rule out SBP, however low suspicion at this time     SOB (shortness of breath)  - Differential: Pneumonia, Pulmonary Embolism, Pulmonary Edema, MI,   - Saturating % on RA   - CXR: Neg   - CTA: No central pulmonary thromboembolism. Segmental and subsegmental pulmonary arteries are not well evaluated. Eccentric filling defect in a subsegmental branch of the right lower lobe.  Not optimally evaluated due to above limitations. Small left pleural effusion. Moderate volume upper abdominal ascites.  - Bilateral LE Dopplers: without signs of DVT  - Covid: Negative  - Flu: pending  - EKG: Normal sinus rhythm, abnormal R wave progression, low voltage QRS     Plan:  - Echo ordered  - IR Paracentesis to be performed     Carcinosarcoma  - See Onc History Above  - Scheduled for primary debulking with Dr. Washington 3/25/24     History of breast cancer in female  2013: ER+ Carcinoma of R Breast   S/p Lympectomy, SLNB- Grade 1 Well Differentiated Invasive Ductal Carcinoma with Associated Ductal Carcinoma in Situ, Negative Margins/ SLNB Negative                                       ER+ 90%, [place]+ 90%, HER2 Neg                           TII N0 Stage 2A   3498-5204:  Tamoxifen   07/2019-06/2021: Letrozole   11/2023: Mammogram Negative     Hyperlipidemia  - Continue home Lipitor     Seizure disorder  - Continue home Lamictal     Depression, reactive  - Continue home Seroquel, Ativan PRN        Hypertension  - Held home meds: Amlodipine, Enalapril, HCTZ  - Hydral PRN ordered        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

## 2024-03-18 NOTE — CARE UPDATE
Afternoon Assessment:    Resident to bedside for afternoon assessment. Patient resting in bed. She reports that she is having abdominal pain at this time. She states that the pain is similar to previous pain that she gets prior to paracentesis drainage. Transport at bedside to take patient for ECHO. Patient denies any fevers/chills, CP, nausea or vomiting at this time.     Temp:  [97.7 °F (36.5 °C)-98.4 °F (36.9 °C)] 97.7 °F (36.5 °C)  Pulse:  [100-120] 100  Resp:  [15-22] 18  SpO2:  [97 %-100 %] 97 %  BP: (107-133)/(65-77) 107/74    PE:  Abdomen: significant abdominal distension noted. Mild tenderness to palpation diffusely. No rebound or guarding noted.   Extremities: 3+ PE to the BLE extending to the mid thigh    Labs:  Recent Labs   Lab 03/18/24  1102   WBC 13.51*   RBC 3.21*   HGB 8.3*   HCT 26.3*   *   MCV 82   MCH 25.9*   MCHC 31.6*       Recent Labs   Lab 03/17/24  2357 03/18/24  1102   * 130*   K 4.0 3.7   CL 93* 91*   CO2 25 28   BUN 16 15   CREATININE 1.2 0.9   * 236*   PROT 6.4  --    BILITOT 0.6  --    ALKPHOS 100  --    ALT 9*  --    AST 28  --       Lactic Acid: 2.5 > 2.0    A/P:  Malignant ascites  - Differential: Ascites, SBP  - Physical exam: non-peritoneal, no rebound or guarding noted  - Patient has had 3 paracentesis with 3.5-5L removed each time  - Last Paracentesis 3/13: 3525cc removed  - Patient is scheduled for repeat paracentesis 3/21/24  - Attempted paracentesis in ED without fluid return  - S/P lasix 20mg IV in the ED     Plan  - IR consulted for paracentesis with fluid/ cytology studies ordered to rule out SBP, however low suspicion at this time in the setting of benign abdominal exam and afebrile     Sepsis  - Leukocytosis of 13, Tachycardia, LA of 2.5  - Patient meets criteria for sepsis, however based on review of previous admissions and prior clinic visits, the patient has had a leukocytosis and tachycardia  - Physical Exam: Abdomen distended, however not  peritoneal, and without rebound or guarding  - Patient received one dose of Vancomycin and Zosyn in the ED  - Low suspicion for SBP or other causes of infection at this time given benign abdominal exam and prior occurences of tachycardia and leukocytosis  - LA: 2.5>2.0  - CBC: 13/8.5/27/631 > 13/8.3/26.3/608  - CXR: Neg   - UA: Neg   - Blood Cultures: in process   - Urine Cultures: in process  - COVID: Negative  - Influenza: in process  - Vanc/Zosyn administered in ED  - IVF: None      Plan:   - LA normalized to 2.0  - Leukocytosis remains stable at 13  - As LA normalized and leukocytosis stable, will not continue antibiotics at this time  - AM CBC ordered     SOB (shortness of breath)  - Differential: Pneumonia, Pulmonary Embolism, Pulmonary Edema, MI,   - Saturating % on RA   - CXR: Neg   - CTA: No central pulmonary thromboembolism. Segmental and subsegmental pulmonary arteries are not well evaluated. Eccentric filling defect in a subsegmental branch of the right lower lobe.  Not optimally evaluated due to above limitations. Small left pleural effusion. Moderate volume upper abdominal ascites.  - Bilateral LE Dopplers: without signs of DVT  - Covid: Negative  - Flu: Negative  - EKG: Normal sinus rhythm, abnormal R wave progression, low voltage QRS  - Overall patient does not appear to be in acute distress and is sating % on RA   - Suspicious that SOB is related to accumulation of ascites. Following IR paracentesis, will re-evaluate patient for improvement. If not improved, will continue to determine source of SOB.     Plan:  - Echo ordered to evaluate for right heat failure. Will consult cardiology/MH if any abnormalities.   - IR Paracentesis to be performed     JASON (acute kidney injury)  - Cr: 1.2 yesterday  - Baseline Cr: 0.7-0.8  - Repeat Cr: 0.9  - Urine Na: 90  - Urine Cr: 37  - FeNa: 1.7% (intrinsic vs prerenal)     Plan:  - Resolved     Carcinosarcoma  - See Onc History Above  - Scheduled for  primary debulking with Dr. Washington 3/25/24, followed by adjuvant chemotherapy. While inpatient, will continue the optimize prior to surgery, including if any abnormalities with workup. Will consider consulting hospital medicine for additional assistance.     History of breast cancer in female  2013: ER+ Carcinoma of R Breast   S/p Lympectomy, SLNB- Grade 1 Well Differentiated Invasive Ductal Carcinoma with Associated Ductal Carcinoma in Situ, Negative Margins/ SLNB Negative                                       ER+ 90%, NM+ 90%, HER2 Neg                           TII N0 Stage 2A   [id number]: Tamoxifen   07/2019-06/2021: Letrozole   11/2023: Mammogram Negative     Hyperlipidemia  - Continue home Lipitor     Seizure disorder  - Continue home Lamictal     Depression, reactive  - Continue home Seroquel, Ativan PRN     Hypertension  - Held home meds: Amlodipine, Enalapril, HCTZ  - If hypertensive in patient, will consider adding back home medications  - Hydral PRN ordered     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

## 2024-03-18 NOTE — ASSESSMENT & PLAN NOTE
2013: ER+ Carcinoma of R Breast   S/p Lympectomy, SLNB- Grade 1 Well Differentiated Invasive Ductal Carcinoma with Associated Ductal Carcinoma in Situ, Negative Margins/ SLNB Negative                                       ER+ 90%, [place]+ 90%, HER2 Neg                           TII N0 Stage 2A   7511-8642: Tamoxifen   07/2019-06/2021: Letrozole   11/2023: Mammogram Negative

## 2024-03-18 NOTE — HPI
Patient is a 57 y/o  with a history of metastatic carcinosarcoma with scheduled primary debulking scheduled for 3/25/24 with Dr. Washington, who initially presented to the ED with SOB and edema to her bilateral lower extremities, onset 3/15/23. Patient reports that she first noticed the increased leg swelling and then she began to get acutely short of breath. She states that she is short of breath with exertion and at rest. She is reporting intermittent constipation and states that her last BM was yesterday after taking Miralax. She states that her BM was hard and difficult to pass. She denies any fevers, chills, CP, URI symptoms, pain with urination, nausea, vomiting, or vaginal bleeding.     Patient is postmenopausal as of 2018 and reports no vaginal bleeding since that time

## 2024-03-18 NOTE — ASSESSMENT & PLAN NOTE
- Leukocytosis of 13, Tachycardia, LA of 2.5  - Patient meets criteria for sepsis, however based on review of previous admissions and prior clinic visits, the patient has had a leukocytosis and tachycardia  - Physical Exam: Abdomen distended, however not peritoneal, and without rebound or guarding  - Patient received one dose of Vancomycin and Zosyn in the ED  - Low suspicion for SBP or other causes of infection at this time given benign abdominal exam and prior occurences of tachycardia and leukocytosis  - LA: 2.5  - CBC: 13/8.5/27/631  - CXR: Neg   - UA: Neg   - Blood Cultures: in process   - Urine Cultures: in process  - COVID: Negative  - Influenza: in process  - Vanc/Zosyn administered in ED   - IVF: None     Plan:   - Will repeat CBC, LA at this time  - If leukocytosis and LA down-trending will not continue antibiotics

## 2024-03-18 NOTE — ASSESSMENT & PLAN NOTE
- Differential: Pneumonia, Pulmonary Embolism, Pulmonary Edema, MI,   - Saturating % on RA   - CXR: Neg   - Covid: Negative  - Flu: pending  - EKG: Normal sinus rhythm, abnormal R wave progression, low voltage QRS     Plan:  - Echo ordered  - IR Paracentesis to be performed

## 2024-03-18 NOTE — H&P
[facility] - Emergency Dept  Gynecologic Oncology  H&P    Patient Name: Anette Ricci  MRN: [id number]  Admission Date: 3/17/2024  Primary Care Provider: Mark Chua MD   Principal Problem: Malignant ascites    Subjective:     Chief Complaint/Reason for Admission: SOB/Sepsis/Malignant Ascites/LE Edema     History of Present Illness:  Patient is a 57 y/o  with a history of metastatic carcinosarcoma with scheduled primary debulking scheduled for 3/25/24 with Dr. Washington, who initially presented to the ED with SOB and edema to her bilateral lower extremities, onset 3/15/23. Patient reports that she first noticed the increased leg swelling and then she began to get acutely short of breath. She states that she is short of breath with exertion and at rest. She is reporting intermittent constipation and states that her last BM was yesterday after taking Miralax. She states that her BM was hard and difficult to pass. She denies any fevers, chills, CP, URI symptoms, pain with urination, nausea, vomiting, or vaginal bleeding.     Patient is postmenopausal as of 2018 and reports no vaginal bleeding since that time    Hospital Course:  No notes on file    Oncology Treatment Plan:   OP BREAST PACLITAXEL CARBOPLATIN Q3W    Oncology History:   2013: ER+ Carcinoma of R Breast   S/p Lympectomy, SLNB- Grade 1 Well Differentiated Invasive Ductal Carcinoma with Associated Ductal Carcinoma in Situ, Negative Margins/ SLNB Negative                                       ER+ 90%, NC+ 90%, HER2 Neg                           TII N0 Stage 2A   5042-6294: Tamoxifen   2019-2021: Letrozole   2023: Mammogram Neg   2024: ED Nathan Marinelli (Abdominal Bloating, Constipation)- imaging consistent with multiple peritoneal masses (11 cm in LUQ), large volume ascites   2024: PET consistent with multifocal hypermetabolic soft tissue masses throughout the peritoneum including lesion along posterior aspect of right hepatic  lobe, moderate to large volume ascites   02/29: IR paracentesis 5L removed   03/04: 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, paracentesis 2.4L removed               Cells positive for PAX8, CK7, AE1/AE3, SMA, CK5/6, ME,               PD1 and molecular studies pending               No loss of nuclear expression of MMR (low probability of microsatelllite instability-high   03/06: : 418   03/13: IR paracentesis 3.5L   03/21: Scheduled for IR paracentesis   03/25: Scheduled for primary debulking followed by adjuvant chemotherapy     Past Medical History:   Diagnosis Date    Breast cancer 2013    Diabetes mellitus     Genetic testing 05/29/2013    VUS    Hyperlipidemia     Hypertension     Malignant neoplasm of upper-outer quadrant of right breast in female, estrogen receptor positive 12/02/2015    Seizure disorder      Past Surgical History:   Procedure Laterality Date    BREAST BIOPSY      BREAST LUMPECTOMY Right 2013    EYE SURGERY      PERITONEOCENTESIS N/A 3/13/2024    Procedure: PARACENTESIS, ABDOMINAL;  Surgeon: Flavia Moore MD;  Location: [facility] CATH LAB;  Service: Radiology;  Laterality: N/A;    TOTAL REDUCTION MAMMOPLASTY Bilateral 2016     Family History       Problem Relation (Age of Onset)    Breast cancer Sister (48)    Seizures Father          Tobacco Use    Smoking status: Never    Smokeless tobacco: Never   Substance and Sexual Activity    Alcohol use: Yes     Alcohol/week: 0.0 standard drinks of alcohol     Comment: ocassional    Drug use: No    Sexual activity: Yes     Partners: Male     Birth control/protection: None       (Not in a hospital admission)      Review of patient's allergies indicates:   Allergen Reactions    Codeine        Review of Systems   Constitutional:  Negative for chills and fever.   Eyes:  Negative for visual disturbance.   Respiratory:  Positive for shortness of breath. Negative for cough.    Cardiovascular:  Positive for leg  swelling. Negative for chest pain and palpitations.   Gastrointestinal:  Positive for abdominal pain and constipation. Negative for diarrhea, nausea and vomiting.   Genitourinary:  Negative for dysuria, hematuria, vaginal bleeding and vaginal discharge.   Musculoskeletal:  Negative for back pain.   Neurological:  Negative for headaches.      Objective:     Vital Signs (Most Recent):  Temp: 98.4 °F (36.9 °C) (03/17/24 2214)  Pulse: 110 (03/18/24 0004)  Resp: 20 (03/18/24 0403)  BP: 114/65 (03/18/24 0002)  SpO2: 98 % (03/18/24 0004) Vital Signs (24h Range):  Temp:  [98.4 °F (36.9 °C)] 98.4 °F (36.9 °C)  Pulse:  [109-120] 110  Resp:  [15-22] 20  SpO2:  [98 %-100 %] 98 %  BP: (114-133)/(65-77) 114/65     Weight: 81.6 kg (180 lb)  Body mass index is 28.19 kg/m².       Physical Exam:   Constitutional: She is oriented to person, place, and time. She appears well-developed and well-nourished.    HENT:   Head: Normocephalic and atraumatic.      Cardiovascular:  Normal rate, regular rhythm and normal heart sounds.      Exam reveals edema.       3+ pitting edema to the BLE extending to the mid thigh region    Pulmonary/Chest: Effort normal and breath sounds normal. No respiratory distress.        Abdominal: Soft. She exhibits distension (significant abdominal distension noted). There is no abdominal tenderness. There is no rebound and no guarding.   Bowel sounds difficult to discern             Musculoskeletal: Normal range of motion.       Neurological: She is alert and oriented to person, place, and time.    Skin: Skin is warm and dry.    Psychiatric: She has a normal mood and affect. Her behavior is normal. Thought content normal.          Laboratory:  Recent Labs   Lab 03/17/24 2357 03/17/24 2358   WBC 13.43*  --    HGB 8.5*  --    HCT 27.4* 28*   MCV 81*  --    *  --       Recent Labs   Lab 03/17/24 2357   *   K 4.0   CL 93*   CO2 25   BUN 16   CREATININE 1.2   *   PROT 6.4   BILITOT 0.6   ALKPHOS  100   ALT 9*   AST 28      Lactic Acid: 2.5    Lipase: 6    UA Reflex to Culture: WNL    Urine Culture: pending    Blood Cultures: pending    COVID: Negative    Influenza: pending    Urine Cr and Na: pending    Diagnostic Results:  Results for orders placed or performed during the hospital encounter of 03/17/24   X-Ray Chest AP Portable    Narrative    EXAMINATION:  XR CHEST AP PORTABLE    CLINICAL HISTORY:  SOB;    TECHNIQUE:  Single frontal view of the chest was performed.    COMPARISON:  05/26/2021.    FINDINGS:  Left-sided port catheter present with tip overlying the upper right atrium.  No pneumothorax.    Underinflated lungs with hypoventilatory change.  No consolidation or pleural effusion.    Heart and lungs otherwise appear unchanged when allowing for differences in technique and positioning.      Impression    As above.      Electronically signed by: Eduard Araujo MD  Date:    03/18/2024  Time:    01:48     Results for orders placed or performed during the hospital encounter of 03/17/24   CTA Chest Non-Coronary (PE Studies)    Narrative    EXAMINATION:  CTA CHEST NON CORONARY (PE STUDIES)    CLINICAL HISTORY:  Pulmonary embolism (PE) suspected, unknown D-dimer;    TECHNIQUE:  Low dose axial images, sagittal and coronal reformations were obtained from the thoracic inlet to the lung bases following the IV administration of 100 mL of Omnipaque 350.  Contrast timing was optimized to evaluate the pulmonary arteries.    COMPARISON:  Chest radiograph 03/18/2024.    FINDINGS:  PULMONARY VASCULATURE: Suboptimal evaluation of the segmental and subsegmental pulmonary arteries secondary to streak artifact from dense contrast bolus, respiratory motion artifact, and suboptimal contrast mixing.The pulmonary arteries distribute normally without evidence of filling defect through the level of the lobar pulmonary arteries to indicate pulmonary thromboembolism.  Eccentric filling defect in a subsegmental branch of the  right lower lobe.    Base of Neck:  No significant abnormality.    Thoracic soft tissues:  Left anterior wall chest port.  Calcifications in the bilateral breasts.  Partially calcified 3 cm mass right breast, similar compared to PET-CT 02/21/2024.  Correlation with prior mammographic studies is recommended.  There is scattered soft tissue edema.    Aorta: Left-sided 3 vessel non-aneurysmal aortic arch with mild calcific atherosclerosis.    Heart/Pericardium: Heart is not enlarged.  No pericardial effusion.  Multivessel coronary artery calcifications.    Sita/Mediastinum:  No hilar or mediastinal lymphadenopathy.    Airways:  Trachea is midline and proximal airways are patent without significant abnormality.    Pleura/Lungs: No pneumothorax.  Small left pleural effusion.  No suspicious pulmonary mass.    Esophagus: Normal in course and caliber.  Small hiatal hernia.    Upper abdomen: Partially imaged moderate volume upper abdominal ascites.    Bones:  Degenerative changes of the visualized osseous structures without acute fracture or lytic or sclerotic lesions.      Impression    Study limitations as above.  No central pulmonary thromboembolism through the level of the lobar pulmonary arterial branches.  Segmental and subsegmental pulmonary arteries are not well evaluated.    Eccentric filling defect in a subsegmental branch of the right lower lobe.  Not optimally evaluated due to above limitations.  Typically, eccentric filling defects like this are more typical of chronic pulmonary embolism changes.  Correlate clinically.    Small left pleural effusion.    Moderate volume upper abdominal ascites.    Calcifications in the bilateral breasts.  Partially calcified 3 cm mass right breast, similar compared to PET-CT 02/21/2024.  Correlation with prior mammographic studies is recommended.    Additional findings in the body of the report.    Electronically signed by resident: Kit  Zara  Date:    03/18/2024  Time:    03:11    Electronically signed by: Eduard Araujo MD  Date:    03/18/2024  Time:    03:56     Results for orders placed or performed during the hospital encounter of 03/17/24   US Lower Extremity Veins Bilateral    Narrative    EXAMINATION:  US LOWER EXTREMITY VEINS BILATERAL    CLINICAL HISTORY:  Other specified soft tissue disorders    TECHNIQUE:  Duplex and color flow Doppler and dynamic compression was performed of the bilateral lower extremity veins was performed.    COMPARISON:  None    FINDINGS:  Right thigh veins: The common femoral, femoral, popliteal, upper greater saphenous, and deep femoral veins are patent and free of thrombus. The veins are normally compressible and have normal phasic flow and augmentation response.    Right calf veins: The visualized calf veins are patent.    Left thigh veins: The common femoral, femoral, popliteal, upper greater saphenous, and deep femoral veins are patent and free of thrombus. The veins are normally compressible and have normal phasic flow and augmentation response.    Left calf veins: The visualized calf veins are patent.    Miscellaneous: None      Impression    No evidence of deep venous thrombosis in either lower extremity.      Electronically signed by: Abdullahi Herrera MD  Date:    03/18/2024  Time:    09:42     Assessment/Plan:     * Malignant ascites  - Differential: Ascites, SBP  - Physical exam: non-peritoneal, no rebound or guarding noted  - Patient has had 3 paracentesis with 3.5-5L removed each time  - Last Paracentesis 3/13: 3525cc removed  - Patient is scheduled for repeat paracentesis 3/21/24  - Attempted paracentesis in ED without fluid return  - S/P lasix 20mg IV in the ED     Plan  - IR consulted for paracentesis with fluid/ cytology studies ordered to rule out SBP, however low suspicion at this time in the setting of benign abdominal exam and afebrile    Sepsis  - Leukocytosis of 13, Tachycardia, LA of  2.5  - Patient meets criteria for sepsis, however based on review of previous admissions and prior clinic visits, the patient has had a leukocytosis and tachycardia  - Physical Exam: Abdomen distended, however not peritoneal, and without rebound or guarding  - Patient received one dose of Vancomycin and Zosyn in the ED  - Low suspicion for SBP or other causes of infection at this time given benign abdominal exam and prior occurences of tachycardia and leukocytosis  - LA: 2.5  - CBC: 13/8.5/27/631  - CXR: Neg   - UA: Neg   - Blood Cultures: in process   - Urine Cultures: in process  - COVID: Negative  - Influenza: in process  - Vanc/Zosyn administered in ED   - IVF: None     Plan:   - Will repeat CBC, LA at this time  - If leukocytosis and LA down-trending will not continue antibiotics    SOB (shortness of breath)  - Differential: Pneumonia, Pulmonary Embolism, Pulmonary Edema, MI,   - Saturating % on RA   - CXR: Neg   - CTA: No central pulmonary thromboembolism. Segmental and subsegmental pulmonary arteries are not well evaluated. Eccentric filling defect in a subsegmental branch of the right lower lobe.  Not optimally evaluated due to above limitations. Small left pleural effusion. Moderate volume upper abdominal ascites.  - Bilateral LE Dopplers: without signs of DVT  - Covid: Negative  - Flu: pending  - EKG: Normal sinus rhythm, abnormal R wave progression, low voltage QRS  - Overall patient does not appear to be in acute distress and is sating % on RA   - Suspicious that SOB is related to accumulation of ascites. Following IR paracentesis, will re-evaluate patient for improvement. If not improved, will continue to determine source of SOB.     Plan:  - Echo ordered to evaluate for right heat failure. Will consult cardiology/ if any abnormalities.   - IR Paracentesis to be performed    JASON (acute kidney injury)  - Cr: 1.2 yesterday  - Baseline Cr: 0.7-0.8    Plan:  - Repeat BMP pending  - Urine Na  and Urine Cr pending    Carcinosarcoma  - See Onc History Above  - Scheduled for primary debulking with Dr. Washington 3/25/24, followed by adjuvant chemotherapy. While inpatient, will continue the optimize prior to surgery, including if any abnormalities with workup. Will consider consulting hospital medicine for additional assistance.      History of breast cancer in female  2013: ER+ Carcinoma of R Breast   S/p Lympectomy, SLNB- Grade 1 Well Differentiated Invasive Ductal Carcinoma with Associated Ductal Carcinoma in Situ, Negative Margins/ SLNB Negative                                       ER+ 90%, MI+ 90%, HER2 Neg                           TII N0 Stage 2A   [id number]: Tamoxifen   07/2019-06/2021: Letrozole   11/2023: Mammogram Negative    Hyperlipidemia  - Continue home Lipitor    Seizure disorder  - Continue home Lamictal    Depression, reactive  - Continue home Seroquel, Ativan PRN      Hypertension  - Held home meds: Amlodipine, Enalapril, HCTZ  - If hypertensive in patient, will consider adding back home medications  - Hydral PRN ordered       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

## 2024-03-18 NOTE — H&P
[facility] - Emergency Dept  Gynecologic Oncology  H&P    Patient Name: Anette Ricci  MRN: [id number]  Admission Date: 3/17/2024  Primary Care Provider: Mark Chua MD   Principal Problem: Malignant ascites    Subjective:     Chief Complaint/Reason for Admission: SOB, Ascites, LE Edema    History of Present Illness:  Patient is a 57 y/o  with a history of metastatic carcinosarcoma with scheduled primary debulking scheduled for 3/25/24 with Dr. Washington, who initially presented to the ED with SOB and edema to her bilateral lower extremities, onset 3/15/23. Patient reports that she first noticed the increased leg swelling and then she began to get acutely short of breath. She states that she is short of breath with exertion and at rest. She is reporting intermittent constipation and states that her last BM was yesterday after taking Miralax. She states that her BM was hard and difficult to pass. She denies any fevers, chills, CP, URI symptoms, pain with urination, nausea, vomiting, or vaginal bleeding.     Patient is postmenopausal as of 2018 and reports no vaginal bleeding since that time    Hospital Course:  No notes on file    Oncology Treatment Plan:   OP BREAST PACLITAXEL CARBOPLATIN Q3W    Oncology History:   2013: ER+ Carcinoma of R Breast   S/p Lympectomy, SLNB- Grade 1 Well Differentiated Invasive Ductal Carcinoma with Associated Ductal Carcinoma in Situ, Negative Margins/ SLNB Negative                                       ER+ 90%, CA+ 90%, HER2 Neg                           TII N0 Stage 2A   [id number]: Tamoxifen   2019-2021: Letrozole   2023: Mammogram Neg   2024: ED Nathan Marinelli (Abdominal Bloating, Constipation)- imaging consistent with multiple peritoneal masses (11 cm in LUQ), large volume ascites   2024: PET consistent with multifocal hypermetabolic soft tissue masses throughout the peritoneum including lesion along posterior aspect of right hepatic lobe, moderate to  large volume ascites   02/29: IR paracentesis 5L removed   03/04: 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, paracentesis 2.4L removed               Cells positive for PAX8, CK7, AE1/AE3, SMA, CK5/6, RI,               PD1 and molecular studies pending               No loss of nuclear expression of MMR (low probability of microsatelllite instability-high   03/06: : 418   03/13: IR paracentesis 3.5L   03/21: Scheduled for IR paracentesis   03/25: Scheduled for primary debulking followed by adjuvant chemotherapy     Past Medical History:   Diagnosis Date    Breast cancer 2013    Diabetes mellitus     Genetic testing 05/29/2013    VUS    Hyperlipidemia     Hypertension     Malignant neoplasm of upper-outer quadrant of right breast in female, estrogen receptor positive 12/02/2015    Seizure disorder      Past Surgical History:   Procedure Laterality Date    BREAST BIOPSY      BREAST LUMPECTOMY Right 2013    EYE SURGERY      PERITONEOCENTESIS N/A 3/13/2024    Procedure: PARACENTESIS, ABDOMINAL;  Surgeon: Flavia Moore MD;  Location: [facility] CATH LAB;  Service: Radiology;  Laterality: N/A;    TOTAL REDUCTION MAMMOPLASTY Bilateral 2016     Family History       Problem Relation (Age of Onset)    Breast cancer Sister (48)    Seizures Father          Tobacco Use    Smoking status: Never    Smokeless tobacco: Never   Substance and Sexual Activity    Alcohol use: Yes     Alcohol/week: 0.0 standard drinks of alcohol     Comment: ocassional    Drug use: No    Sexual activity: Yes     Partners: Male     Birth control/protection: None       (Not in a hospital admission)      Review of patient's allergies indicates:   Allergen Reactions    Codeine        Review of Systems   Constitutional:  Negative for chills and fever.   Eyes:  Negative for visual disturbance.   Respiratory:  Positive for shortness of breath. Negative for cough.    Cardiovascular:  Positive for leg swelling. Negative  for chest pain and palpitations.   Gastrointestinal:  Positive for abdominal pain and constipation. Negative for diarrhea, nausea and vomiting.   Genitourinary:  Negative for dysuria, hematuria, vaginal bleeding and vaginal discharge.   Musculoskeletal:  Negative for back pain.   Neurological:  Negative for headaches.      Objective:     Vital Signs (Most Recent):  Temp: 98.4 °F (36.9 °C) (03/17/24 2214)  Pulse: 110 (03/18/24 0004)  Resp: 20 (03/18/24 0403)  BP: 114/65 (03/18/24 0002)  SpO2: 98 % (03/18/24 0004) Vital Signs (24h Range):  Temp:  [98.4 °F (36.9 °C)] 98.4 °F (36.9 °C)  Pulse:  [109-120] 110  Resp:  [15-22] 20  SpO2:  [98 %-100 %] 98 %  BP: (114-133)/(65-77) 114/65     Weight: 81.6 kg (180 lb)  Body mass index is 28.19 kg/m².       Physical Exam:   Constitutional: She is oriented to person, place, and time. She appears well-developed and well-nourished.    HENT:   Head: Normocephalic and atraumatic.      Cardiovascular:  Normal rate, regular rhythm and normal heart sounds.      Exam reveals edema.       3+ pitting edema to the BLE extending to the mid thigh region    Pulmonary/Chest: Effort normal and breath sounds normal. No respiratory distress.        Abdominal: Soft. She exhibits distension (significant abdominal distension noted). There is no abdominal tenderness. There is no rebound and no guarding.   Bowel sounds difficult to discern             Musculoskeletal: Normal range of motion.       Neurological: She is alert and oriented to person, place, and time.    Skin: Skin is warm and dry.    Psychiatric: She has a normal mood and affect. Her behavior is normal. Thought content normal.          Laboratory:  Recent Labs   Lab 03/17/24 2357 03/17/24 2358   WBC 13.43*  --    HGB 8.5*  --    HCT 27.4* 28*   MCV 81*  --    *  --       Recent Labs   Lab 03/17/24 2357   *   K 4.0   CL 93*   CO2 25   BUN 16   CREATININE 1.2   *   PROT 6.4   BILITOT 0.6   ALKPHOS 100   ALT 9*   AST  28      Lactic Acid: 2.5    Lipase: 6    UA Reflex to Culture: WNL    Urine Culture: pending    Blood Cultures: pending    COVID: Negative    Influenza: pending    Urine Cr and Na: pending    Diagnostic Results:  Results for orders placed or performed during the hospital encounter of 03/17/24   X-Ray Chest AP Portable    Narrative    EXAMINATION:  XR CHEST AP PORTABLE    CLINICAL HISTORY:  SOB;    TECHNIQUE:  Single frontal view of the chest was performed.    COMPARISON:  05/26/2021.    FINDINGS:  Left-sided port catheter present with tip overlying the upper right atrium.  No pneumothorax.    Underinflated lungs with hypoventilatory change.  No consolidation or pleural effusion.    Heart and lungs otherwise appear unchanged when allowing for differences in technique and positioning.      Impression    As above.      Electronically signed by: Eduard Araujo MD  Date:    03/18/2024  Time:    01:48     Results for orders placed or performed during the hospital encounter of 03/17/24   CTA Chest Non-Coronary (PE Studies)    Narrative    EXAMINATION:  CTA CHEST NON CORONARY (PE STUDIES)    CLINICAL HISTORY:  Pulmonary embolism (PE) suspected, unknown D-dimer;    TECHNIQUE:  Low dose axial images, sagittal and coronal reformations were obtained from the thoracic inlet to the lung bases following the IV administration of 100 mL of Omnipaque 350.  Contrast timing was optimized to evaluate the pulmonary arteries.    COMPARISON:  Chest radiograph 03/18/2024.    FINDINGS:  PULMONARY VASCULATURE: Suboptimal evaluation of the segmental and subsegmental pulmonary arteries secondary to streak artifact from dense contrast bolus, respiratory motion artifact, and suboptimal contrast mixing.The pulmonary arteries distribute normally without evidence of filling defect through the level of the lobar pulmonary arteries to indicate pulmonary thromboembolism.  Eccentric filling defect in a subsegmental branch of the right lower  lobe.    Base of Neck:  No significant abnormality.    Thoracic soft tissues:  Left anterior wall chest port.  Calcifications in the bilateral breasts.  Partially calcified 3 cm mass right breast, similar compared to PET-CT 02/21/2024.  Correlation with prior mammographic studies is recommended.  There is scattered soft tissue edema.    Aorta: Left-sided 3 vessel non-aneurysmal aortic arch with mild calcific atherosclerosis.    Heart/Pericardium: Heart is not enlarged.  No pericardial effusion.  Multivessel coronary artery calcifications.    Sita/Mediastinum:  No hilar or mediastinal lymphadenopathy.    Airways:  Trachea is midline and proximal airways are patent without significant abnormality.    Pleura/Lungs: No pneumothorax.  Small left pleural effusion.  No suspicious pulmonary mass.    Esophagus: Normal in course and caliber.  Small hiatal hernia.    Upper abdomen: Partially imaged moderate volume upper abdominal ascites.    Bones:  Degenerative changes of the visualized osseous structures without acute fracture or lytic or sclerotic lesions.      Impression    Study limitations as above.  No central pulmonary thromboembolism through the level of the lobar pulmonary arterial branches.  Segmental and subsegmental pulmonary arteries are not well evaluated.    Eccentric filling defect in a subsegmental branch of the right lower lobe.  Not optimally evaluated due to above limitations.  Typically, eccentric filling defects like this are more typical of chronic pulmonary embolism changes.  Correlate clinically.    Small left pleural effusion.    Moderate volume upper abdominal ascites.    Calcifications in the bilateral breasts.  Partially calcified 3 cm mass right breast, similar compared to PET-CT 02/21/2024.  Correlation with prior mammographic studies is recommended.    Additional findings in the body of the report.    Electronically signed by resident: Kit  Zara  Date:    03/18/2024  Time:    03:11    Electronically signed by: Eduard Araujo MD  Date:    03/18/2024  Time:    03:56     Results for orders placed or performed during the hospital encounter of 03/17/24   US Lower Extremity Veins Bilateral    Narrative    EXAMINATION:  US LOWER EXTREMITY VEINS BILATERAL    CLINICAL HISTORY:  Other specified soft tissue disorders    TECHNIQUE:  Duplex and color flow Doppler and dynamic compression was performed of the bilateral lower extremity veins was performed.    COMPARISON:  None    FINDINGS:  Right thigh veins: The common femoral, femoral, popliteal, upper greater saphenous, and deep femoral veins are patent and free of thrombus. The veins are normally compressible and have normal phasic flow and augmentation response.    Right calf veins: The visualized calf veins are patent.    Left thigh veins: The common femoral, femoral, popliteal, upper greater saphenous, and deep femoral veins are patent and free of thrombus. The veins are normally compressible and have normal phasic flow and augmentation response.    Left calf veins: The visualized calf veins are patent.    Miscellaneous: None      Impression    No evidence of deep venous thrombosis in either lower extremity.      Electronically signed by: Abdullahi Herrera MD  Date:    03/18/2024  Time:    09:42     Assessment/Plan:     * Malignant ascites  - Differential: Ascites, SBP  - Physical exam: non-peritoneal, no rebound or guarding noted  - Patient has had 3 paracentesis with 3.5-5L removed each time  - Last Paracentesis 3/13: 3525cc removed  - Patient is scheduled for repeat paracentesis 3/21/24  - Attempted paracentesis in ED without fluid return  - S/P lasix 20mg IV in the ED     Plan  - IR consulted for paracentesis with fluid/ cytology studies ordered to rule out SBP, however low suspicion at this time    SOB (shortness of breath)  - Differential: Pneumonia, Pulmonary Embolism, Pulmonary Edema, MI,   -  Saturating % on RA   - CXR: Neg   - CTA: No central pulmonary thromboembolism. Segmental and subsegmental pulmonary arteries are not well evaluated. Eccentric filling defect in a subsegmental branch of the right lower lobe.  Not optimally evaluated due to above limitations. Small left pleural effusion. Moderate volume upper abdominal ascites.  - Bilateral LE Dopplers: without signs of DVT  - Covid: Negative  - Flu: pending  - EKG: Normal sinus rhythm, abnormal R wave progression, low voltage QRS     Plan:  - Echo ordered  - IR Paracentesis to be performed    Carcinosarcoma  - See Onc History Above  - Scheduled for primary debulking with Dr. Washington 3/25/24    History of breast cancer in female  2013: ER+ Carcinoma of R Breast   S/p Lympectomy, SLNB- Grade 1 Well Differentiated Invasive Ductal Carcinoma with Associated Ductal Carcinoma in Situ, Negative Margins/ SLNB Negative                                       ER+ 90%, RI+ 90%, HER2 Neg                           TII N0 Stage 2A   [id number]: Tamoxifen   07/2019-06/2021: Letrozole   11/2023: Mammogram Negative    Hyperlipidemia  - Continue home Lipitor    Seizure disorder  - Continue home Lamictal    Depression, reactive  - Continue home Seroquel, Ativan PRN      Hypertension  - Held home meds: Amlodipine, Enalapril, HCTZ  - Hydral PRN ordered       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

## 2024-03-18 NOTE — CLINICAL REVIEW
IP Sepsis Screen (most recent)       Sepsis Screen (IP) - 03/18/24 6428       Is the patient's history or complaint suggestive of a possible infection? Yes  -    Are there at least two of the following signs and symptoms present? Yes  -    Sepsis signs/symptoms - Tachycardia Tachycardia     >90  -    Sepsis signs/symptoms - WBC WBC < 4,000 or WBC > 12,000  -    Are any of the following organ dysfunction criteria present and not considered to be due to a chronic condition? Yes  -    Organ Dysfunction Criteria Lactate > 2.0  -    Initiate Sepsis Protocol No  -    Reason sepsis not considered Pt. receiving appropriate management  -              User Key  (r) = Recorded By, (t) = Taken By, (c) = Cosigned By      Initials Name    Lashawn Oamlley RN

## 2024-03-18 NOTE — ASSESSMENT & PLAN NOTE
- Differential: Ascites, SBP  - Physical exam: non-peritoneal, no rebound or guarding noted  - Patient has had 3 paracentesis with 3.5-5L removed each time  - Last Paracentesis 3/13: 3525cc removed  - Patient is scheduled for repeat paracentesis 3/21/24  - Attempted paracentesis in ED without fluid return  - S/P lasix 20mg IV in the ED     Plan  - IR consulted for paracentesis with fluid/ cytology studies ordered to rule out SBP, however low suspicion at this time in the setting of benign abdominal exam and afebrile

## 2024-03-18 NOTE — ED PROVIDER NOTES
Encounter Date: 3/17/2024       History     Chief Complaint   Patient presents with    Shortness of Breath     C/o SOB and fluid retention x 2 days. Paracentesis 3/13. Ovarian Cancer, not on chemo yet     This is a very pleasant 58-year-old female presenting to [facility] ED with primary complaint of distended abdomen, lower leg swelling bilaterally and shortness of breath times 24 hours.  History pertinent for breast cancer with chart review alluding to peritoneal and gynecological metastasis not yet on chemo, diabetes mellitus, hypertension, hyperlipidemia and seizure disorder.  Patient endorses shortness of breath while ambulating and orthopnea.  She denies productive cough, fever, headache, chest pain, abdominal pain or lower extremity pain or dysuria.  Her last bowel movement was earlier this morning that was normal.  She is taken no other medications for this but is on home dose Lasix of 20 mg of which she did not take today.  Patient recently underwent IR paracentesis on 03/13.  VSS/HDS and afebrile.    The history is provided by the patient, medical records and the spouse. No  was used.     Review of patient's allergies indicates:   Allergen Reactions    Codeine      Past Medical History:   Diagnosis Date    Breast cancer 2013    Diabetes mellitus     Genetic testing 05/29/2013    VUS    Hyperlipidemia     Hypertension     Malignant neoplasm of upper-outer quadrant of right breast in female, estrogen receptor positive 12/02/2015    Seizure disorder      Past Surgical History:   Procedure Laterality Date    BREAST BIOPSY      BREAST LUMPECTOMY Right 2013    EYE SURGERY      PERITONEOCENTESIS N/A 3/13/2024    Procedure: PARACENTESIS, ABDOMINAL;  Surgeon: Flavia Moore MD;  Location: [facility] CATH LAB;  Service: Radiology;  Laterality: N/A;    TOTAL REDUCTION MAMMOPLASTY Bilateral 2016     Family History   Problem Relation Age of Onset    Seizures Father     Breast cancer Sister 48    Cancer Neg Hx      Ovarian cancer Neg Hx     Colon cancer Neg Hx      Social History     Tobacco Use    Smoking status: Never    Smokeless tobacco: Never   Substance Use Topics    Alcohol use: Yes     Alcohol/week: 0.0 standard drinks of alcohol     Comment: ocassional    Drug use: No     Review of Systems    Physical Exam     Initial Vitals [03/17/24 2214]   BP Pulse Resp Temp SpO2   131/77 (!) 120 20 98.4 °F (36.9 °C) 98 %      MAP       --         Physical Exam    Nursing note and vitals reviewed.  Constitutional: She appears well-developed and well-nourished.   HENT:   Head: Normocephalic and atraumatic.   Eyes: Conjunctivae and EOM are normal. Pupils are equal, round, and reactive to light.   Neck: Neck supple. No JVD present.   Normal range of motion.  Cardiovascular:  Normal rate, regular rhythm, normal heart sounds and intact distal pulses.           Abdominal: She exhibits distension. There is abdominal tenderness.   Large volume ascites There is no rebound and no guarding.   Musculoskeletal:         General: Edema present. Normal range of motion.      Cervical back: Normal range of motion and neck supple.      Comments: Bilateral lower leg swelling with right greater than left     Neurological: She is alert and oriented to person, place, and time. She has normal strength. GCS score is 15. GCS eye subscore is 4. GCS verbal subscore is 5. GCS motor subscore is 6.   Skin: Skin is warm and dry. Capillary refill takes less than 2 seconds.   Psychiatric: She has a normal mood and affect. Her behavior is normal. Judgment and thought content normal.         ED Course   Paracentesis    Date/Time: 3/18/2024 6:39 AM  Location procedure was performed: [facility] EMERGENCY DEPARTMENT    Performed by: Alisha Ayon MD  Authorized by: Stephie Daley MD  Assisting provider: Stephie Daley MD  Pre-operative diagnosis: Metastatic ascites  Post-operative diagnosis: Metastatic ascites  Consent Done: Yes  Consent: Written consent  "obtained.  Consent given by: patient  Patient understanding: patient states understanding of the procedure being performed  Patient consent: the patient's understanding of the procedure matches consent given  Procedure consent: procedure consent matches procedure scheduled  Relevant documents: relevant documents present and verified  Test results: test results available and properly labeled  Site marked: the operative site was marked  Imaging studies: imaging studies available  Required items: required blood products, implants, devices, and special equipment available  Patient identity confirmed: , MRN, name, provided demographic data and verbally with patient  Time out: Immediately prior to procedure a "time out" was called to verify the correct patient, procedure, equipment, support staff and site/side marked as required.  Initial or subsequent exam: initial  Procedure purpose: diagnostic  Indications: abdominal discomfort secondary to ascites  Anesthesia: local infiltration    Anesthesia:  Local Anesthetic: lidocaine 1% without epinephrine  Anesthetic total: 0 mL    Patient sedated: no  Preparation: Patient was prepped and draped in the usual sterile fashion.  Description of findings: Adequate pocket fluid on ultrasound   Needle gauge: 18  Ultrasound guidance: yes  Puncture site: right lower quadrant  Fluid removed: 0(ml)  Dressing: 4x4 sterile gauze  Patient tolerance: Patient tolerated the procedure well with no immediate complications        Labs Reviewed   CBC W/ AUTO DIFFERENTIAL - Abnormal; Notable for the following components:       Result Value    WBC 13.43 (*)     RBC 3.37 (*)     Hemoglobin 8.5 (*)     Hematocrit 27.4 (*)     MCV 81 (*)     MCH 25.2 (*)     MCHC 31.0 (*)     RDW 16.1 (*)     Platelets 631 (*)     Gran # (ANC) 11.1 (*)     Immature Grans (Abs) 0.07 (*)     Gran % 82.8 (*)     Lymph % 8.8 (*)     All other components within normal limits   COMPREHENSIVE METABOLIC PANEL - Abnormal; " Notable for the following components:    Sodium 130 (*)     Chloride 93 (*)     Glucose 274 (*)     Calcium 8.4 (*)     Albumin 1.8 (*)     ALT 9 (*)     eGFR 52.1 (*)     All other components within normal limits   URINALYSIS, REFLEX TO URINE CULTURE - Abnormal; Notable for the following components:    Appearance, UA Hazy (*)     All other components within normal limits    Narrative:     Specimen Source->Urine   LACTIC ACID, PLASMA - Abnormal; Notable for the following components:    Lactate (Lactic Acid) 2.5 (*)     All other components within normal limits   ISTAT PROCEDURE - Abnormal; Notable for the following components:    POC Glucose 285 (*)     POC Sodium 131 (*)     POC Chloride 92 (*)     POC Ionized Calcium 1.02 (*)     POC Hematocrit 28 (*)     All other components within normal limits   CULTURE, BLOOD   CULTURE, BLOOD   LIPASE   SARS-COV-2 RNA AMPLIFICATION, QUAL   ISTAT CHEM8     EKG Readings: (Independently Interpreted)   Initial Reading: No STEMI. Rhythm: Normal Sinus Rhythm. Ectopy: No Ectopy. Conduction: Normal. ST Segments: Normal ST Segments. T Waves: Normal. Clinical Impression: Normal Sinus Rhythm       Imaging Results              CTA Chest Non-Coronary (PE Studies) (Final result)  Result time 03/18/24 03:56:01      Final result by Eduard Araujo MD (03/18/24 03:56:01)                   Impression:      Study limitations as above.  No central pulmonary thromboembolism through the level of the lobar pulmonary arterial branches.  Segmental and subsegmental pulmonary arteries are not well evaluated.    Eccentric filling defect in a subsegmental branch of the right lower lobe.  Not optimally evaluated due to above limitations.  Typically, eccentric filling defects like this are more typical of chronic pulmonary embolism changes.  Correlate clinically.    Small left pleural effusion.    Moderate volume upper abdominal ascites.    Calcifications in the bilateral breasts.  Partially calcified 3  cm mass right breast, similar compared to PET-CT 02/21/2024.  Correlation with prior mammographic studies is recommended.    Additional findings in the body of the report.    Electronically signed by resident: Kit Zuñiga  Date:    03/18/2024  Time:    03:11    Electronically signed by: Eduard Araujo MD  Date:    03/18/2024  Time:    03:56               Narrative:    EXAMINATION:  CTA CHEST NON CORONARY (PE STUDIES)    CLINICAL HISTORY:  Pulmonary embolism (PE) suspected, unknown D-dimer;    TECHNIQUE:  Low dose axial images, sagittal and coronal reformations were obtained from the thoracic inlet to the lung bases following the IV administration of 100 mL of Omnipaque 350.  Contrast timing was optimized to evaluate the pulmonary arteries.    COMPARISON:  Chest radiograph 03/18/2024.    FINDINGS:  PULMONARY VASCULATURE: Suboptimal evaluation of the segmental and subsegmental pulmonary arteries secondary to streak artifact from dense contrast bolus, respiratory motion artifact, and suboptimal contrast mixing.The pulmonary arteries distribute normally without evidence of filling defect through the level of the lobar pulmonary arteries to indicate pulmonary thromboembolism.  Eccentric filling defect in a subsegmental branch of the right lower lobe.    Base of Neck:  No significant abnormality.    Thoracic soft tissues:  Left anterior wall chest port.  Calcifications in the bilateral breasts.  Partially calcified 3 cm mass right breast, similar compared to PET-CT 02/21/2024.  Correlation with prior mammographic studies is recommended.  There is scattered soft tissue edema.    Aorta: Left-sided 3 vessel non-aneurysmal aortic arch with mild calcific atherosclerosis.    Heart/Pericardium: Heart is not enlarged.  No pericardial effusion.  Multivessel coronary artery calcifications.    Sita/Mediastinum:  No hilar or mediastinal lymphadenopathy.    Airways:  Trachea is midline and proximal airways are patent without  significant abnormality.    Pleura/Lungs: No pneumothorax.  Small left pleural effusion.  No suspicious pulmonary mass.    Esophagus: Normal in course and caliber.  Small hiatal hernia.    Upper abdomen: Partially imaged moderate volume upper abdominal ascites.    Bones:  Degenerative changes of the visualized osseous structures without acute fracture or lytic or sclerotic lesions.                                       X-Ray Chest AP Portable (Final result)  Result time 03/18/24 01:48:44      Final result by Eduard Araujo MD (03/18/24 01:48:44)                   Impression:      As above.      Electronically signed by: Eduard Araujo MD  Date:    03/18/2024  Time:    01:48               Narrative:    EXAMINATION:  XR CHEST AP PORTABLE    CLINICAL HISTORY:  SOB;    TECHNIQUE:  Single frontal view of the chest was performed.    COMPARISON:  05/26/2021.    FINDINGS:  Left-sided port catheter present with tip overlying the upper right atrium.  No pneumothorax.    Underinflated lungs with hypoventilatory change.  No consolidation or pleural effusion.    Heart and lungs otherwise appear unchanged when allowing for differences in technique and positioning.                                    X-Rays:   Independently Interpreted Readings:   Chest X-Ray: Normal heart size.  No infiltrates.  No acute abnormalities.     Medications   LIDOcaine (PF) 10 mg/ml (1%) injection 100 mg (has no administration in time range)   ondansetron injection 4 mg (4 mg Intravenous Given 3/18/24 0023)   furosemide injection 40 mg (40 mg Intravenous Given 3/18/24 0024)   vancomycin 1.75 g in 5 % dextrose 500 mL IVPB (0 mg Intravenous Stopped 3/18/24 0631)   piperacillin-tazobactam (ZOSYN) 4.5 g in dextrose 5 % in water (D5W) 100 mL IVPB (MB+) (0 g Intravenous Stopped 3/18/24 0231)   iohexoL (OMNIPAQUE 350) injection 100 mL (100 mLs Intravenous Given 3/18/24 0257)   ondansetron injection 4 mg (4 mg Intravenous Given 3/18/24 0403)   morphine  injection 4 mg (4 mg Intravenous Given 3/18/24 0403)     Medical Decision Making  58F presented with SOB and lower leg edema for the past 24 hours. she takes lasix at home but has not improved her symptoms of fluid retention and swelling. pt has breast and ovarian CA and recently underwent therapeutic paracentesis with IR on 03/13. Patient is well known to gyn/onc service for debulking surgery with goal of resection of all visible disease. Patient was worked up for infection versus DVT/PE. Elevated inflammatory markers found on labs of which we started empiric antibiotics.  Diagnostic paracentesis was attempted at bedside in ED with ultrasound guidance after consent was obtained however was unsuccessful.  CTA PE inconclusive formal ultrasound for DVT study ordered for completeness. Vital signs stable and afebrile satting 98% on RA.  Spoke with gynecology/oncology team who agreed to see the patient admit to their service for ongoing care.    Amount and/or Complexity of Data Reviewed  Labs: ordered. Decision-making details documented in ED Course.  Radiology: ordered. Decision-making details documented in ED Course.  ECG/medicine tests:  Decision-making details documented in ED Course.  Discussion of management or test interpretation with external provider(s): Discussed the case with gynecology Oncology who agreed to evaluate the patient and admit to their service.  Appreciate recs    Risk  Prescription drug management.  Parenteral controlled substances.  Decision regarding hospitalization.      Additional MDM:   PERC Rule:   Age is greater than or equal to 50 = 1.0  Heart Rate is greater than or equal to 100 = 1.0  SaO2 on room air < 95% = 0.0  Unilateral leg swelling = 1.0  Hemoptysis = 0.0  Recent surgery or trauma = 1.0  Prior PE or DVT =  0.0  Hormone use = 0.00  PERC Score = 4        Well's Criteria Score:  -Clinical symptoms of DVT (leg swelling, pain with palpation) = 3.0  -PE is #1 diagnosis OR equally likely  =            3.0  -Heart Rate >100 =   1.5  -Immobilization (= or > than 3 days) or surgery in the previous 4 weeks = 0.0  -Previous DVT/PE = 0.0  -Hemoptysis =          0.0  -Malignancy =           1.0  Well's Probability Score =    8.5              ED Course as of 03/18/24 0645   Mon Mar 18, 2024   0627 EKG 12-lead  Normal sinus rhythm normal access ST elevations or depressions or ectopic morphologies [JL]   0628 CBC W/ AUTO DIFFERENTIAL(!)  Leukocytosis with left shift present.  Moderately downtrending H&H when compared to prior though not requiring transfusion.  And asymptomatic for anemia [JL]   0629 Lactic acid, plasma(!)  Elevated lactate, possible disseminated infection [JL]   0629 Comp. Metabolic Panel(!)  Hyponatremia and normal potassium [JL]   0629 COVID-19 Rapid Screening  COVID negative [JL]   0629 Urinalysis, Reflex to Urine Culture Urine, Clean Catch(!)  UA grossly normal no evidence of infection. [JL]   0630 X-Ray Chest AP Portable  Chest x-ray grossly normal when compared to prior with no acute changes. [JL]   0630 CTA Chest Non-Coronary (PE Studies)  CTA chest without blurring evidence pulmonary embolism.  We will follow up on lower extremity DVT study with ultrasound. [JL]      ED Course User Index  [JL] Alisha Ayon MD                           Clinical Impression:  Final diagnoses:  [R06.02] SOB (shortness of breath)  [M79.89] Swelling of both lower extremities          ED Disposition Condition    Admit Stable                Alisha Ayon MD  Resident  03/18/24 0645

## 2024-03-18 NOTE — ASSESSMENT & PLAN NOTE
- Differential: Ascites, SBP  - Physical exam: non-peritoneal, no rebound or guarding noted  - Patient has had 3 paracentesis with 3.5-5L removed each time  - Last Paracentesis 3/13: 3525cc removed  - Patient is scheduled for repeat paracentesis 3/21/24  - Attempted paracentesis in ED without fluid return  - S/P lasix 20mg IV in the ED     Plan  - IR consulted for paracentesis with fluid/ cytology studies ordered to rule out SBP, however low suspicion at this time

## 2024-03-18 NOTE — ED NOTES
Tele box 1136 applied to pt. Joe in war room states able to see pt on monitor, rhythm Atrial Tachycardia with .

## 2024-03-18 NOTE — CONSULTS
IR consulted for a paracentesis. Ordering team to place order for IR paracentesis and any labs/micro for send off.     Kay Polk PA-C  Interventional Radiology  Spectra: [id number]  3/18/2024

## 2024-03-18 NOTE — ASSESSMENT & PLAN NOTE
- Differential: Pneumonia, Pulmonary Embolism, Pulmonary Edema, MI,   - Saturating % on RA   - CXR: Neg   - CTA: No central pulmonary thromboembolism. Segmental and subsegmental pulmonary arteries are not well evaluated. Eccentric filling defect in a subsegmental branch of the right lower lobe.  Not optimally evaluated due to above limitations. Small left pleural effusion. Moderate volume upper abdominal ascites.  - Bilateral LE Dopplers: without signs of DVT  - Covid: Negative  - Flu: pending  - EKG: Normal sinus rhythm, abnormal R wave progression, low voltage QRS     Plan:  - Echo ordered  - IR Paracentesis to be performed

## 2024-03-18 NOTE — ASSESSMENT & PLAN NOTE
- Differential: Pneumonia, Pulmonary Embolism, Pulmonary Edema, MI,   - Saturating % on RA   - CXR: Neg   - CTA: No central pulmonary thromboembolism. Segmental and subsegmental pulmonary arteries are not well evaluated. Eccentric filling defect in a subsegmental branch of the right lower lobe.  Not optimally evaluated due to above limitations. Small left pleural effusion. Moderate volume upper abdominal ascites.  - Bilateral LE Dopplers: without signs of DVT  - Covid: Negative  - Flu: pending  - EKG: Normal sinus rhythm, abnormal R wave progression, low voltage QRS  - Overall patient does not appear to be in acute distress and is sating % on RA   - Suspicious that SOB is related to accumulation of ascites. Following IR paracentesis, will re-evaluate patient for improvement. If not improved, will continue to determine source of SOB.     Plan:  - Echo ordered to evaluate for right heat failure. Will consult cardiology/ if any abnormalities.   - IR Paracentesis to be performed

## 2024-03-18 NOTE — ED TRIAGE NOTES
C/o SOB and maya lower extremity edema x 2 days. Pt reports being compliant with lasix, but states she's never had edema like this previously. Also reporting abdominal pain. Had paracentesis 3/13. Hx ovarian Cancer, not on chemo yet

## 2024-03-18 NOTE — ED NOTES
I-STAT Chem-8+ Results:   Value Reference Range   Sodium 131 136-145 mmol/L   Potassium  3.9 3.5-5.1 mmol/L   Chloride 92  mmol/L   Ionized Calcium 1.02 1.06-1.42 mmol/L   CO2 (measured) 27 23-29 mmol/L   Glucose 285  mg/dL   BUN 16 6-30 mg/dL   Creatinine 1.1 0.5-1.4 mg/dL   Hematocrit 28 36-54%

## 2024-03-18 NOTE — ASSESSMENT & PLAN NOTE
- Leukocytosis of 13, Tachycardia, LA of 2.5  - Patient meets criteria for sepsis, however based on review of previous admissions and prior clinic visits, the patient has had a leukocytosis and tachycardia  - Physical Exam: Abdomen distended, however not peritoneal, and without rebound or guarding  - Patient received one dose of Vancomycin and Zosyn in the ED  - Low suspicion for SBP or other causes of infection at this time given benign abdominal exam and prior occurences of tachycardia and leukocytosis  - LA: 2.5 > 2.0  - CBC: 13/8.5/27/631 > 13/8.3/26.3/608 > 14.9/8/25.4/604   - CXR: Neg   - UA: Neg   - Blood Cultures: in process   - Urine Cultures: in process  - COVID: Negative  - Influenza: Negative  - Vanc/Zosyn administered in ED   - IVF: None     Plan:   - Low suspicion for SBP given abdominal exam  - Will consider adding antibiotics given increase in leukocytosis this AM

## 2024-03-18 NOTE — ED NOTES
Assumed care for pt after recieving report from nightshift RN. Pt. resting in bed in NAD, RR e/u. Vital signs stable and within desired limits at this time of assessment. Pt. offered bathroom assistance and denies need at this time. Explanation of care/wait provided. Pt verbalizes no needs at this time. Bed in low, locked position with rails up and call bell in reach. Pt's white board updated with today's care team and plan.     Patient identifiers for Anette Ricci 58 y.o. female checked and correct.  Chief Complaint   Patient presents with    Shortness of Breath     C/o SOB and fluid retention x 2 days. Paracentesis 3/13. Ovarian Cancer, not on chemo yet     Past Medical History:   Diagnosis Date    Breast cancer 2013    Diabetes mellitus     Genetic testing 05/29/2013    VUS    Hyperlipidemia     Hypertension     Malignant neoplasm of upper-outer quadrant of right breast in female, estrogen receptor positive 12/02/2015    Seizure disorder      Allergies reported:   Review of patient's allergies indicates:   Allergen Reactions    Codeine          LOC: Patient is awake, alert, and aware of environment with an appropriate affect. Patient is oriented x 4 and speaking appropriately.  APPEARANCE: Patient resting comfortably and in no acute distress. Patient is clean and well groomed, patient's clothing is properly fastened.  HEENT: WDL  SKIN: The skin is warm and dry. Patient has normal skin turgor and moist mucus membranes.   MUSKULOSKELETAL: Patient is moving all extremities well, no obvious deformities noted. Pulses intact.   RESPIRATORY: Airway is open and patent. Respirations are spontaneous and non-labored with normal effort and rate.  CARDIAC: Patient has a normal rate and rhythm. 92 on cardiac monitor. No peripheral edema noted.   ABDOMEN: Significant distention noted. Soft and tender upon palpation.  NEUROLOGICAL: pupils 3mm, PERRL. Facial expression is symmetrical. Hand grasps are equal bilaterally.  Normal sensation in all extremities when touched with finger.

## 2024-03-18 NOTE — SUBJECTIVE & OBJECTIVE
Oncology Treatment Plan:   OP BREAST PACLITAXEL CARBOPLATIN Q3W    Oncology History:   2013: ER+ Carcinoma of R Breast   S/p Lympectomy, SLNB- Grade 1 Well Differentiated Invasive Ductal Carcinoma with Associated Ductal Carcinoma in Situ, Negative Margins/ SLNB Negative                                       ER+ 90%, CT+ 90%, HER2 Neg                           TII N0 Stage 2A   [id number]: Tamoxifen   07/2019-06/2021: Letrozole   11/2023: Mammogram Neg   02/06/2024: ED Nathan Marinelli (Abdominal Bloating, Constipation)- imaging consistent with multiple peritoneal masses (11 cm in LUQ), large volume ascites   02/21/2024: PET consistent with multifocal hypermetabolic soft tissue masses throughout the peritoneum including lesion along posterior aspect of right hepatic lobe, moderate to large volume ascites   02/29: IR paracentesis 5L removed   03/04: 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, paracentesis 2.4L removed               Cells positive for PAX8, CK7, AE1/AE3, SMA, CK5/6, CT,               PD1 and molecular studies pending               No loss of nuclear expression of MMR (low probability of microsatelllite instability-high   03/06: : 418   03/13: IR paracentesis 3.5L   03/21: Scheduled for IR paracentesis   03/25: Scheduled for primary debulking followed by adjuvant chemotherapy     Past Medical History:   Diagnosis Date    Breast cancer 2013    Diabetes mellitus     Genetic testing 05/29/2013    VUS    Hyperlipidemia     Hypertension     Malignant neoplasm of upper-outer quadrant of right breast in female, estrogen receptor positive 12/02/2015    Seizure disorder      Past Surgical History:   Procedure Laterality Date    BREAST BIOPSY      BREAST LUMPECTOMY Right 2013    EYE SURGERY      PERITONEOCENTESIS N/A 3/13/2024    Procedure: PARACENTESIS, ABDOMINAL;  Surgeon: Flavia Moore MD;  Location: [facility] CATH LAB;  Service: Radiology;  Laterality: N/A;    TOTAL  REDUCTION MAMMOPLASTY Bilateral 2016     Family History       Problem Relation (Age of Onset)    Breast cancer Sister (48)    Seizures Father          Tobacco Use    Smoking status: Never    Smokeless tobacco: Never   Substance and Sexual Activity    Alcohol use: Yes     Alcohol/week: 0.0 standard drinks of alcohol     Comment: ocassional    Drug use: No    Sexual activity: Yes     Partners: Male     Birth control/protection: None       (Not in a hospital admission)      Review of patient's allergies indicates:   Allergen Reactions    Codeine        Review of Systems   Constitutional:  Negative for chills and fever.   Eyes:  Negative for visual disturbance.   Respiratory:  Positive for shortness of breath. Negative for cough.    Cardiovascular:  Positive for leg swelling. Negative for chest pain and palpitations.   Gastrointestinal:  Positive for abdominal pain and constipation. Negative for diarrhea, nausea and vomiting.   Genitourinary:  Negative for dysuria, hematuria, vaginal bleeding and vaginal discharge.   Musculoskeletal:  Negative for back pain.   Neurological:  Negative for headaches.      Objective:     Vital Signs (Most Recent):  Temp: 98.4 °F (36.9 °C) (03/17/24 2214)  Pulse: 110 (03/18/24 0004)  Resp: 20 (03/18/24 0403)  BP: 114/65 (03/18/24 0002)  SpO2: 98 % (03/18/24 0004) Vital Signs (24h Range):  Temp:  [98.4 °F (36.9 °C)] 98.4 °F (36.9 °C)  Pulse:  [109-120] 110  Resp:  [15-22] 20  SpO2:  [98 %-100 %] 98 %  BP: (114-133)/(65-77) 114/65     Weight: 81.6 kg (180 lb)  Body mass index is 28.19 kg/m².       Physical Exam:   Constitutional: She is oriented to person, place, and time. She appears well-developed and well-nourished.    HENT:   Head: Normocephalic and atraumatic.      Cardiovascular:  Normal rate, regular rhythm and normal heart sounds.      Exam reveals edema.       3+ pitting edema to the BLE extending to the mid thigh region    Pulmonary/Chest: Effort normal and breath sounds normal. No  respiratory distress.        Abdominal: Soft. She exhibits distension (significant abdominal distension noted). There is no abdominal tenderness. There is no rebound and no guarding.   Bowel sounds difficult to discern             Musculoskeletal: Normal range of motion.       Neurological: She is alert and oriented to person, place, and time.    Skin: Skin is warm and dry.    Psychiatric: She has a normal mood and affect. Her behavior is normal. Thought content normal.          Laboratory:  Recent Labs   Lab 03/17/24  2357 03/17/24  2358   WBC 13.43*  --    HGB 8.5*  --    HCT 27.4* 28*   MCV 81*  --    *  --       Recent Labs   Lab 03/17/24  2357   *   K 4.0   CL 93*   CO2 25   BUN 16   CREATININE 1.2   *   PROT 6.4   BILITOT 0.6   ALKPHOS 100   ALT 9*   AST 28      Lactic Acid: 2.5    Lipase: 6    UA Reflex to Culture: WNL    Urine Culture: pending    Blood Cultures: pending    COVID: Negative    Influenza: pending    Urine Cr and Na: pending    Diagnostic Results:  Results for orders placed or performed during the hospital encounter of 03/17/24   X-Ray Chest AP Portable    Narrative    EXAMINATION:  XR CHEST AP PORTABLE    CLINICAL HISTORY:  SOB;    TECHNIQUE:  Single frontal view of the chest was performed.    COMPARISON:  05/26/2021.    FINDINGS:  Left-sided port catheter present with tip overlying the upper right atrium.  No pneumothorax.    Underinflated lungs with hypoventilatory change.  No consolidation or pleural effusion.    Heart and lungs otherwise appear unchanged when allowing for differences in technique and positioning.      Impression    As above.      Electronically signed by: Eduard Araujo MD  Date:    03/18/2024  Time:    01:48     Results for orders placed or performed during the hospital encounter of 03/17/24   CTA Chest Non-Coronary (PE Studies)    Narrative    EXAMINATION:  CTA CHEST NON CORONARY (PE STUDIES)    CLINICAL HISTORY:  Pulmonary embolism (PE) suspected,  unknown D-dimer;    TECHNIQUE:  Low dose axial images, sagittal and coronal reformations were obtained from the thoracic inlet to the lung bases following the IV administration of 100 mL of Omnipaque 350.  Contrast timing was optimized to evaluate the pulmonary arteries.    COMPARISON:  Chest radiograph 03/18/2024.    FINDINGS:  PULMONARY VASCULATURE: Suboptimal evaluation of the segmental and subsegmental pulmonary arteries secondary to streak artifact from dense contrast bolus, respiratory motion artifact, and suboptimal contrast mixing.The pulmonary arteries distribute normally without evidence of filling defect through the level of the lobar pulmonary arteries to indicate pulmonary thromboembolism.  Eccentric filling defect in a subsegmental branch of the right lower lobe.    Base of Neck:  No significant abnormality.    Thoracic soft tissues:  Left anterior wall chest port.  Calcifications in the bilateral breasts.  Partially calcified 3 cm mass right breast, similar compared to PET-CT 02/21/2024.  Correlation with prior mammographic studies is recommended.  There is scattered soft tissue edema.    Aorta: Left-sided 3 vessel non-aneurysmal aortic arch with mild calcific atherosclerosis.    Heart/Pericardium: Heart is not enlarged.  No pericardial effusion.  Multivessel coronary artery calcifications.    Sita/Mediastinum:  No hilar or mediastinal lymphadenopathy.    Airways:  Trachea is midline and proximal airways are patent without significant abnormality.    Pleura/Lungs: No pneumothorax.  Small left pleural effusion.  No suspicious pulmonary mass.    Esophagus: Normal in course and caliber.  Small hiatal hernia.    Upper abdomen: Partially imaged moderate volume upper abdominal ascites.    Bones:  Degenerative changes of the visualized osseous structures without acute fracture or lytic or sclerotic lesions.      Impression    Study limitations as above.  No central pulmonary thromboembolism through the level  of the lobar pulmonary arterial branches.  Segmental and subsegmental pulmonary arteries are not well evaluated.    Eccentric filling defect in a subsegmental branch of the right lower lobe.  Not optimally evaluated due to above limitations.  Typically, eccentric filling defects like this are more typical of chronic pulmonary embolism changes.  Correlate clinically.    Small left pleural effusion.    Moderate volume upper abdominal ascites.    Calcifications in the bilateral breasts.  Partially calcified 3 cm mass right breast, similar compared to PET-CT 02/21/2024.  Correlation with prior mammographic studies is recommended.    Additional findings in the body of the report.    Electronically signed by resident: Kit Zuñiga  Date:    03/18/2024  Time:    03:11    Electronically signed by: Eduard Araujo MD  Date:    03/18/2024  Time:    03:56     Results for orders placed or performed during the hospital encounter of 03/17/24   US Lower Extremity Veins Bilateral    Narrative    EXAMINATION:  US LOWER EXTREMITY VEINS BILATERAL    CLINICAL HISTORY:  Other specified soft tissue disorders    TECHNIQUE:  Duplex and color flow Doppler and dynamic compression was performed of the bilateral lower extremity veins was performed.    COMPARISON:  None    FINDINGS:  Right thigh veins: The common femoral, femoral, popliteal, upper greater saphenous, and deep femoral veins are patent and free of thrombus. The veins are normally compressible and have normal phasic flow and augmentation response.    Right calf veins: The visualized calf veins are patent.    Left thigh veins: The common femoral, femoral, popliteal, upper greater saphenous, and deep femoral veins are patent and free of thrombus. The veins are normally compressible and have normal phasic flow and augmentation response.    Left calf veins: The visualized calf veins are patent.    Miscellaneous: None      Impression    No evidence of deep venous thrombosis in either  lower extremity.      Electronically signed by: Abdullahi Herrera MD  Date:    03/18/2024  Time:    09:42

## 2024-03-18 NOTE — ASSESSMENT & PLAN NOTE
- Cr: 1.2 yesterday  - Baseline Cr: 0.7-0.8    Plan:  - Repeat BMP pending  - Urine Na and Urine Cr pending

## 2024-03-18 NOTE — ASSESSMENT & PLAN NOTE
- Cr: 1.2 yesterday  - Baseline Cr: 0.7-0.8  - Repeat BMP:    - Na: 130   - Cr: 0.9   - Urine Na: 90    - Urine Cr: 37   - FeNa: 1.7% - indeterminate; could be intrinsic or prerenal     Plan:  - JASON resolved

## 2024-03-19 PROBLEM — Z01.810 PREOP CARDIOVASCULAR EXAM: Status: ACTIVE | Noted: 2024-03-19

## 2024-03-19 PROBLEM — Z01.818 PREOP EXAMINATION: Status: ACTIVE | Noted: 2024-03-19

## 2024-03-19 PROBLEM — Z01.810 PREOP CARDIOVASCULAR EXAM: Status: RESOLVED | Noted: 2024-03-19 | Resolved: 2024-03-19

## 2024-03-19 LAB
ALBUMIN FLD-MCNC: 1.5 G/DL
APPEARANCE FLD: NORMAL
BACTERIA UR CULT: NO GROWTH
BASOPHILS # BLD AUTO: 0.07 K/UL (ref 0–0.2)
BASOPHILS NFR BLD: 0.5 % (ref 0–1.9)
BODY FLD TYPE: NORMAL
BODY FLUID SOURCE, LDH: NORMAL
COLOR FLD: NORMAL
DIFFERENTIAL METHOD BLD: ABNORMAL
EOSINOPHIL # BLD AUTO: 0 K/UL (ref 0–0.5)
EOSINOPHIL NFR BLD: 0.1 % (ref 0–8)
ERYTHROCYTE [DISTWIDTH] IN BLOOD BY AUTOMATED COUNT: 16.2 % (ref 11.5–14.5)
ESTIMATED AVG GLUCOSE: 163 MG/DL (ref 68–131)
GRAM STN SPEC: NORMAL
GRAM STN SPEC: NORMAL
HBA1C MFR BLD: 7.3 % (ref 4–5.6)
HCT VFR BLD AUTO: 25.4 % (ref 37–48.5)
HGB BLD-MCNC: 8 G/DL (ref 12–16)
IMM GRANULOCYTES # BLD AUTO: 0.11 K/UL (ref 0–0.04)
IMM GRANULOCYTES NFR BLD AUTO: 0.7 % (ref 0–0.5)
LDH FLD L TO P-CCNC: 2675 U/L
LYMPHOCYTES # BLD AUTO: 1.3 K/UL (ref 1–4.8)
LYMPHOCYTES NFR BLD: 8.8 % (ref 18–48)
LYMPHOCYTES NFR FLD MANUAL: 31 %
MCH RBC QN AUTO: 25.5 PG (ref 27–31)
MCHC RBC AUTO-ENTMCNC: 31.5 G/DL (ref 32–36)
MCV RBC AUTO: 81 FL (ref 82–98)
MONOCYTES # BLD AUTO: 1.3 K/UL (ref 0.3–1)
MONOCYTES NFR BLD: 8.4 % (ref 4–15)
MONOS+MACROS NFR FLD MANUAL: 30 %
NEUTROPHILS # BLD AUTO: 12.2 K/UL (ref 1.8–7.7)
NEUTROPHILS NFR BLD: 81.5 % (ref 38–73)
NEUTROPHILS NFR FLD MANUAL: 39 %
NRBC BLD-RTO: 0 /100 WBC
PLATELET # BLD AUTO: 604 K/UL (ref 150–450)
PMV BLD AUTO: 9.6 FL (ref 9.2–12.9)
POCT GLUCOSE: 100 MG/DL (ref 70–110)
POCT GLUCOSE: 109 MG/DL (ref 70–110)
POCT GLUCOSE: 159 MG/DL (ref 70–110)
POCT GLUCOSE: 159 MG/DL (ref 70–110)
POCT GLUCOSE: 211 MG/DL (ref 70–110)
PROT FLD-MCNC: 3.9 G/DL
RBC # BLD AUTO: 3.14 M/UL (ref 4–5.4)
SPECIMEN SOURCE: NORMAL
SPECIMEN SOURCE: NORMAL
WBC # BLD AUTO: 14.92 K/UL (ref 3.9–12.7)
WBC # FLD: 981 /CU MM

## 2024-03-19 PROCEDURE — 25000003 PHARM REV CODE 250

## 2024-03-19 PROCEDURE — 87070 CULTURE OTHR SPECIMN AEROBIC: CPT

## 2024-03-19 PROCEDURE — 84157 ASSAY OF PROTEIN OTHER: CPT

## 2024-03-19 PROCEDURE — 20600001 HC STEP DOWN PRIVATE ROOM

## 2024-03-19 PROCEDURE — 63600175 PHARM REV CODE 636 W HCPCS

## 2024-03-19 PROCEDURE — 88305 TISSUE EXAM BY PATHOLOGIST: CPT | Performed by: PATHOLOGY

## 2024-03-19 PROCEDURE — 88112 CYTOPATH CELL ENHANCE TECH: CPT | Performed by: PATHOLOGY

## 2024-03-19 PROCEDURE — 88112 CYTOPATH CELL ENHANCE TECH: CPT | Mod: 26,,, | Performed by: PATHOLOGY

## 2024-03-19 PROCEDURE — 88342 IMHCHEM/IMCYTCHM 1ST ANTB: CPT | Mod: 26,,, | Performed by: PATHOLOGY

## 2024-03-19 PROCEDURE — 83615 LACTATE (LD) (LDH) ENZYME: CPT

## 2024-03-19 PROCEDURE — 88341 IMHCHEM/IMCYTCHM EA ADD ANTB: CPT | Mod: 59 | Performed by: PATHOLOGY

## 2024-03-19 PROCEDURE — 83036 HEMOGLOBIN GLYCOSYLATED A1C: CPT

## 2024-03-19 PROCEDURE — 82042 OTHER SOURCE ALBUMIN QUAN EA: CPT

## 2024-03-19 PROCEDURE — 85025 COMPLETE CBC W/AUTO DIFF WBC: CPT

## 2024-03-19 PROCEDURE — 88342 IMHCHEM/IMCYTCHM 1ST ANTB: CPT | Performed by: PATHOLOGY

## 2024-03-19 PROCEDURE — 88305 TISSUE EXAM BY PATHOLOGIST: CPT | Mod: 26,,, | Performed by: PATHOLOGY

## 2024-03-19 PROCEDURE — 87075 CULTR BACTERIA EXCEPT BLOOD: CPT

## 2024-03-19 PROCEDURE — 99233 SBSQ HOSP IP/OBS HIGH 50: CPT | Mod: ,,, | Performed by: OBSTETRICS & GYNECOLOGY

## 2024-03-19 PROCEDURE — 36415 COLL VENOUS BLD VENIPUNCTURE: CPT

## 2024-03-19 PROCEDURE — 88341 IMHCHEM/IMCYTCHM EA ADD ANTB: CPT | Mod: 26,,, | Performed by: PATHOLOGY

## 2024-03-19 PROCEDURE — 89051 BODY FLUID CELL COUNT: CPT

## 2024-03-19 PROCEDURE — 87205 SMEAR GRAM STAIN: CPT

## 2024-03-19 PROCEDURE — 96376 TX/PRO/DX INJ SAME DRUG ADON: CPT

## 2024-03-19 RX ORDER — OXYCODONE HYDROCHLORIDE 5 MG/1
5 TABLET ORAL EVERY 4 HOURS PRN
Status: DISCONTINUED | OUTPATIENT
Start: 2024-03-19 | End: 2024-03-19

## 2024-03-19 RX ORDER — OXYCODONE HYDROCHLORIDE 10 MG/1
10 TABLET ORAL EVERY 4 HOURS PRN
Status: DISCONTINUED | OUTPATIENT
Start: 2024-03-19 | End: 2024-03-20 | Stop reason: HOSPADM

## 2024-03-19 RX ORDER — ENOXAPARIN SODIUM 100 MG/ML
40 INJECTION SUBCUTANEOUS EVERY 24 HOURS
Status: DISCONTINUED | OUTPATIENT
Start: 2024-03-19 | End: 2024-03-20 | Stop reason: HOSPADM

## 2024-03-19 RX ADMIN — INSULIN ASPART 1 UNITS: 100 INJECTION, SOLUTION INTRAVENOUS; SUBCUTANEOUS at 02:03

## 2024-03-19 RX ADMIN — OXYCODONE 5 MG: 5 TABLET ORAL at 02:03

## 2024-03-19 RX ADMIN — OXYCODONE HYDROCHLORIDE 10 MG: 10 TABLET ORAL at 01:03

## 2024-03-19 RX ADMIN — HYDROMORPHONE HYDROCHLORIDE 0.2 MG: 1 INJECTION, SOLUTION INTRAMUSCULAR; INTRAVENOUS; SUBCUTANEOUS at 06:03

## 2024-03-19 RX ADMIN — QUETIAPINE FUMARATE 25 MG: 25 TABLET ORAL at 09:03

## 2024-03-19 RX ADMIN — ENOXAPARIN SODIUM 40 MG: 40 INJECTION SUBCUTANEOUS at 05:03

## 2024-03-19 RX ADMIN — FAMOTIDINE 20 MG: 20 TABLET, FILM COATED ORAL at 08:03

## 2024-03-19 RX ADMIN — INSULIN DETEMIR 33 UNITS: 100 INJECTION, SOLUTION SUBCUTANEOUS at 08:03

## 2024-03-19 RX ADMIN — ATORVASTATIN CALCIUM 80 MG: 40 TABLET, FILM COATED ORAL at 09:03

## 2024-03-19 RX ADMIN — LAMOTRIGINE 100 MG: 100 TABLET ORAL at 09:03

## 2024-03-19 RX ADMIN — FAMOTIDINE 20 MG: 20 TABLET, FILM COATED ORAL at 09:03

## 2024-03-19 NOTE — ASSESSMENT & PLAN NOTE
Home regimen: metformin, novalog 10u TID, toujeo 60u qhs, mounjaro weekly     Patient's FSGs are controlled on current medication regimen.  Last A1c reviewed-   Lab Results   Component Value Date    HGBA1C 7.3 (H) 03/19/2024     Most recent fingerstick glucose reviewed-   Recent Labs   Lab 03/18/24  1811 03/18/24  2211 03/19/24  0207 03/19/24  0938   POCTGLUCOSE 242* 342* 211* 159*     Current correctional scale  Low  Maintain anti-hyperglycemic dose as follows-   Antihyperglycemics (From admission, onward)      Start     Stop Route Frequency Ordered    03/18/24 2100  insulin detemir U-100 (Levemir) pen 33 Units         -- SubQ Nightly 03/18/24 0910    03/18/24 1006  insulin aspart U-100 pen 0-5 Units         -- SubQ Before meals & nightly PRN 03/18/24 0909          Blood glucose controlled on current regimen, will continue to monitor and adjust as necessary  - If discharged, recommend continuing home regimen AND holding prandial insulin and metformin day of surgery and reduce long acting to half on day of surgery   - If discharged prior to surgery, recommend consulting medicine or endocrine for further management when readmitted

## 2024-03-19 NOTE — ASSESSMENT & PLAN NOTE
- Per gyn onc  - Scheduled for primary debulking with Dr. Washington 3/25/24 followed by adjuvant chemotherapy   - Medical optimization  - See preop exam

## 2024-03-19 NOTE — PHARMACY MED REC
"    Admission Medication History     The home medication history was taken by Lamar Valdez.    You may go to "Admission" then "Reconcile Home Medications" tabs to review and/or act upon these items.     The home medication list has been updated by the Pharmacy department.   Please read ALL comments highlighted in yellow.   Please address this information as you see fit.    Feel free to contact us if you have any questions or require assistance.      The medications listed below were removed from the home medication list. Please reorder if appropriate:  Patient reports no longer taking the following medication(s):  BRIMONIDINE-TIMOLOL 0.2-0.5 % OPHTHALMIC SOLUTION    Medications listed below were obtained from: Patient/family and Analytic software- [employer] Medications   Medication Sig    amlodipine (NORVASC) 10 MG tablet   Take 10 mg by mouth once daily.     atorvastatin (LIPITOR) 40 MG tablet   Take 80 mg by mouth once daily.    enalapril (VASOTEC) 20 MG tablet   Take 20 mg by mouth once daily.    fluticasone propionate (FLONASE) 50 mcg/actuation nasal spray   Instill 1 spray by each nostril route once daily.    hydrochlorothiazide (HYDRODIURIL) 25 MG tablet   Take 25 mg by mouth once daily.     HYDROcodone-acetaminophen (NORCO) 5-325 mg per tablet   Take 1 tablet by mouth every 6 (six) hours as needed for pain.    ibuprofen (ADVIL,MOTRIN) 800 MG tablet   Take 800 mg by mouth every 6 (six) hours as needed for pain.    insulin glargine, TOUJEO, (TOUJEO SOLOSTAR U-300 INSULIN) 300 unit/mL (1.5 mL) InPn pen   Inject 60 Units into the skin every evening.    ketorolac 0.5% (ACULAR) 0.5 % Drop   Place 1 drop into both eyes for pain after eye injections.    lamoTRIgine (LAMICTAL) 25 MG tablet   Take 25 mg by mouth 2 (two) times daily.    lorazepam (ATIVAN) 0.5 MG tablet   Take 0.5 mg by mouth every 12 (twelve) hours as needed for anxiety.    metformin (GLUCOPHAGE) 1000 MG tablet   Take 1,000 mg by mouth daily with " breakfast.     NOVOLOG FLEXPEN 100 unit/mL InPn pen   Inject 5 Units into the skin 2 (two) times daily with meals. With biggest meal    quetiapine (SEROQUEL) 25 MG Tab   Take 25 mg by mouth daily as needed.    tirzepatide (MOUNJARO) 5 mg/0.5 mL PnIj   Inject 5 mg into the skin every 7 days on Sunday.       Potential issues to be addressed PRIOR TO DISCHARGE  Please discuss with the patient barriers to adherence with medication treatment plans  Patient requires education regarding drug therapies     Lamar Valdez  EXT [id number]              .

## 2024-03-19 NOTE — PLAN OF CARE
Patient is AAOX4. Up, independent, stand by assist, non skid shocks on, bed alarm refused, family at the bed side. Call light and personal items within reach. Patient involved in care. Diabetic diet with good intake. POCT glucose monitored, prn insulin provided. Paracentesis done, 3.6 L removed. IMANI hose applied. Telemetry monitored. Complain of left pain, notified Gyn/Onc team, prn oxycodone provided. Patient stable at this time.

## 2024-03-19 NOTE — PLAN OF CARE
Problem: Adult Inpatient Plan of Care  Goal: Plan of Care Review  Outcome: Ongoing, Progressing  Goal: Absence of Hospital-Acquired Illness or Injury  Outcome: Ongoing, Progressing  Goal: Optimal Comfort and Wellbeing  Outcome: Ongoing, Progressing     Problem: Adjustment to Illness (Sepsis/Septic Shock)  Goal: Optimal Coping  Outcome: Ongoing, Progressing     Problem: Nutrition Impaired (Sepsis/Septic Shock)  Goal: Optimal Nutrition Intake  Outcome: Ongoing, Progressing     Problem: Renal Function Impairment (Acute Kidney Injury/Impairment)  Goal: Effective Renal Function  Outcome: Ongoing, Progressing     Pt AAOx4. ST on tele. VSS. Pt vomited x3 times with relief from prn nausea meds. Pain controlled with prn pain meds. BG covered with scheduled and prn insulin. Pt planned to go to IR for para today. Safety maintained. Call bell within reach. Spouse at bedside.

## 2024-03-19 NOTE — ASSESSMENT & PLAN NOTE
2013: ER+ Carcinoma of R Breast   S/p Lympectomy, SLNB- Grade 1 Well Differentiated Invasive Ductal Carcinoma with Associated Ductal Carcinoma in Situ, Negative Margins/ SLNB Negative                          ER+ 90%, PA+ 90%, HER2 Neg              TII N0 Stage 2A   [id number]: Tamoxifen   7/2019-6/2021: Letrozole   11/2023: Mammogram Negative

## 2024-03-19 NOTE — ASSESSMENT & PLAN NOTE
Cardiovascular Risk Assessment:  Active cardiac issues:  Active decompensated heart failure? No   Unstable angina?  No   Significant uncontrolled arrhythmias? No   Severe valvular heart disease-Aortic or Mitral Stenosis? No   Recent MI or coronary revascularization < 30 days? No     Other Issues:       Anticoagulation: N/A       Coronary Stenting: N/A    Revised Cardiac Risk Index   High risk surgery: Yes  History of ischemic heart disease: No  History of congestive heart failure: No  History of stroke: No  Insulin dependent diabetes: Yes  Cr > 2: No  2 points, class III risk, 10.1% risk of cardiac event      RCRI Calculator Class and Risk percentage: 2 points, class III risk, 10.1% risk of cardiac event    Cardiac  - Denies chest pain  - EKG sinus tach  - Echo w/ 50-55% EF and normal CVP   Pulmonary  - Reports SOB improved following paracentesis  - No O2 needs   Endocrine  - See 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  - Optimizing blood glucose  - If discharged, recommend continuing home regimen AND holding prandial insulin and metformin day of surgery and reduce long acting to half on day of surgery   - If discharged prior to surgery, recommend consulting medicine or endocrine for further management when readmitted   Renal  - Cr stable  - Recommend repeat BMP tomorrow following para if pt is still inpatient   - Recommend post-op labs to ensure stable, maintain euvolemia  DVT ppx  - Hold as necessary and resume as able  - Does not smoke or use alcohol  - No prior reaction to anesthesia per pt

## 2024-03-19 NOTE — NURSING
Nurses Note -- 4 Eyes      03/18/2024   08:30 PM      Skin assessed during: Admit      [x] No Altered Skin Integrity Present    [x]Prevention Measures Documented      [] Yes- Altered Skin Integrity Present or Discovered   [] LDA Added if Not in Epic (Describe Wound)   [] New Altered Skin Integrity was Present on Admit and Documented in LDA   [] Wound Image Taken    Wound Care Consulted? No    Attending Nurse:  Heather ROD RN    Second RN/Staff Member:  Heather RIDER RN

## 2024-03-19 NOTE — ASSESSMENT & PLAN NOTE
- Differential: Ascites, SBP  - Physical exam: non-peritoneal, no rebound or guarding noted  - Patient has had 3 paracentesis with 3.5-5L removed each time  - Last Paracentesis 3/13: 3525cc removed  - Patient is scheduled for repeat paracentesis 3/21/24  - Attempted paracentesis in ED without fluid return  - S/P lasix 20mg IV in the ED     Plan  - IR consulted for paracentesis with fluid/ cytology studies ordered to rule out SBP, however low suspicion at this time in the setting of benign abdominal exam and afebrile  - Paracentesis was not performed yesterday, however anticipate today

## 2024-03-19 NOTE — HPI
Anette Ricci is a 57 yo F w/ hx of metastatic carcinosarcoma, breast cancer, seizures, T2DM, HTN, and HLD who presented with presented to the ED with SOB and edema to her bilateral lower extremities. Per chart review, she first noticed increased leg swelling and then became acutely short of breath present at rest and on exertion. Admitted to Gyn Onc for further evaluation. She is scheduled for primary debulking on 3/25/24. Prior workup done includes EKG which showed sinus tach, echo w/ EF of 50-55% and normal CVP, CTA w/ no evidence of PE but moderate ascites seen, and BLE US w/ no evidence of DVTs. Pt is currently undergoing a paracentesis. Medicine consulted for pre-op optimization.      At the time of the consult, pt is slightly hypertensive (150s/80) but other VSS, satting well on RA, and afebrile. Pt reports improvement in her SOB following paracentesis.

## 2024-03-19 NOTE — NURSING
Shelli Shah MD notified this RN regarding pts blood sugar of 342. Pt covered with SSI and scheduled Levimir per orders.    Per MD, would like to get a spot blood sugar check on pt at 2AM and cover with SSI if needed. WCTM

## 2024-03-19 NOTE — PLAN OF CARE
Post procedure report given to primary nurse, Carri, via secure message. Pt transported back to inpt room via stretcher w/ transporter.

## 2024-03-19 NOTE — ASSESSMENT & PLAN NOTE
2013: ER+ Carcinoma of R Breast   S/p Lympectomy, SLNB- Grade 1 Well Differentiated Invasive Ductal Carcinoma with Associated Ductal Carcinoma in Situ, Negative Margins/ SLNB Negative                                       ER+ 90%, MN+ 90%, HER2 Neg                           TII N0 Stage 2A   [id number]: Tamoxifen   07/2019-06/2021: Letrozole   11/2023: Mammogram Negative

## 2024-03-19 NOTE — NURSING
Patient refused bed alarm, educated about fall risk and importance of bed alarm. Spouse at the bed side. Patient says she wants her  to help her.

## 2024-03-19 NOTE — CARE UPDATE
PM assessment      S: patient reports feeling better since paracentesis. Abdominal pain and SOB have improved. Denies CP. She complains of sharp pain of her left leg. Reports this has been happening for a few weeks. Pain is over her thigh, not her calf. She ate well for lunch and is voiding and passing gas.     O: Temp:  [97.4 °F (36.3 °C)-98.3 °F (36.8 °C)] 97.4 °F (36.3 °C)  Pulse:  [] 110  Resp:  [15-20] 18  SpO2:  [92 %-100 %] 100 %  BP: (110-152)/(67-80) 112/69  Extremities: bilateral equal LE edema. Non-erythematous. Left thigh is tender to palpation. There is no calf tenderness bilaterally.       A/P:  -Paracentesis with IR today, 3.6L fluid drained. Fluid gram stain and cultures pending.   -O2 sat 100% on RA. She reports symptomatic improvement following paracentesis  -HM consulted for pre-op eval/optimization. Surgery scheduled with Dr. Washington for 3/25   - recs: upon discharge, continue home diabetic regimen. Hold metformin and meal time insulin DOS, take half of long acting insulin DOS. Re-consult HM or endocrine in post op period.    AM BMP  -Patient had negative DVT studies yesterday. Given chronic nature of pain and bilateral uniform swelling, will defer DVT studies for now. Patient will alert RN if pain is not improved with prn oxy.   -DVT ppx- Lovenox 40 qD  -Anticipate DC tomorrow.       Jeannette York MD  [facility]   OBGYN PGY2

## 2024-03-19 NOTE — ASSESSMENT & PLAN NOTE
- Differential: Pneumonia, Pulmonary Embolism, Pulmonary Edema, MI,   - Saturating % on RA   - CXR: Neg   - CTA: No central pulmonary thromboembolism. Segmental and subsegmental pulmonary arteries are not well evaluated. Eccentric filling defect in a subsegmental branch of the right lower lobe.  Not optimally evaluated due to above limitations. Small left pleural effusion. Moderate volume upper abdominal ascites.  - Bilateral LE Dopplers: without signs of DVT  - Covid: Negative  - Flu: Negative  - EKG: Normal sinus rhythm, abnormal R wave progression, low voltage QRS  - Overall patient does not appear to be in acute distress and is sating % on RA   - Suspicious that SOB is related to accumulation of ascites. Following IR paracentesis, will re-evaluate patient for improvement. If not improved, will continue to determine source of SOB.  - Echo: ejection fraction of 50 - 55%. Left atrium is mildly dilated. There is mild regurgitation. Pericardium: Left pleural effusion. Ascites present.     Plan:  - IR Paracentesis to be performed today. If no symptomatic improvement, will investigate alternatives for SOB

## 2024-03-19 NOTE — ASSESSMENT & PLAN NOTE
Chronic, controlled. Latest blood pressure and vitals reviewed-     Temp:  [97.4 °F (36.3 °C)-98.3 °F (36.8 °C)]   Pulse:  []   Resp:  [15-20]   BP: (110-152)/(67-80)   SpO2:  [92 %-100 %] .   Home meds for hypertension were reviewed and noted below.   Hypertension Medications               amlodipine (NORVASC) 10 MG tablet Take 10 mg by mouth once daily.     enalapril (VASOTEC) 20 MG tablet Take 20 mg by mouth once daily.    hydrochlorothiazide (HYDRODIURIL) 25 MG tablet Take 25 mg by mouth once daily.             While in the hospital, will manage blood pressure as follows; Adjust home antihypertensive regimen as follows- hold home meds in setting of normotension, resume when clinically appropriate     Will utilize p.r.n. blood pressure medication only if patient's blood pressure greater than 180/110 and she develops symptoms such as worsening chest pain or shortness of breath.

## 2024-03-19 NOTE — ASSESSMENT & PLAN NOTE
- Held home meds: Amlodipine, Enalapril, HCTZ  - If hypertensive in patient, will consider adding back home medications  - Hydral PRN ordered

## 2024-03-19 NOTE — HOSPITAL COURSE
3/18/2024 HD1: Patient admitted to the GYN Oncology service for management of SOB/Edema/Ascites and Sepsis. Patient got 1 dose of Vanc/Zosyn in the ED, but due to low suspicion for infection etiology this was discontinued. ECHO, EKG, CTA, BLE Dopplers and Paracentesis ordered.   03/19/2024 HD2: Patient well overnight. Paracentesis with 3.6L removed. Fluids studies not consistent with SBP  03/20/2024 HD3: Episode of CP overnight, however relieve with protonix and simethicone.

## 2024-03-19 NOTE — PROGRESS NOTES
[facility] On[facility] ([facility])  Gynecologic Oncology  Progress Note      Patient Name: Anette Ricci  MRN: [id number]  Admission Date: 3/17/2024  Hospital Length of Stay: 1 days  Attending Provider: Catia Molina MD  Primary Care Provider: Mark Chua MD  Principal Problem: Malignant ascites    Follow-up For: * No surgery found *  Post-Operative Day:    Subjective:      History of Present Illness:  Patient is a 59 y/o  with a history of metastatic carcinosarcoma with scheduled primary debulking scheduled for 3/25/24 with Dr. Washington, who initially presented to the ED with SOB and edema to her bilateral lower extremities, onset 3/15/23. Patient reports that she first noticed the increased leg swelling and then she began to get acutely short of breath. She states that she is short of breath with exertion and at rest. She is reporting intermittent constipation and states that her last BM was yesterday after taking Miralax. She states that her BM was hard and difficult to pass. She denies any fevers, chills, CP, URI symptoms, pain with urination, nausea, vomiting, or vaginal bleeding.     Patient is postmenopausal as of 2018 and reports no vaginal bleeding since that time    Hospital Course:  3/18/2024 HD1: Patient admitted to the GYN Oncology service for management of SOB/Edema/Ascites and Sepsis. Patient got 1 dose of Vanc/Zosyn in the ED, but due to low suspicion for infection etiology this was discontinued. ECHO, EKG, CTA, BLE Dopplers and Paracentesis ordered.   2024 HD2: Patient well overnight. Did not get paracentesis yesterday, anticipate procedure today.     Interval History:   Patient reports 1 episode of emesis overnight which was relieved with zofran x1. Patient states that her abdominal pain is sharp and stabbing, and is relieved by oral pain medications. She took oxy IR 5mg x3 yesterday, most recently at 0230 this AM. She did require 1 dose of dilaudid 0.2mg at 2200 last night.  She has had 2 unmeasured voids overnight. She reports a subjective improvement in her lower extremity edema.     Oncology Treatment Plan:   OP BREAST PACLITAXEL CARBOPLATIN Q3W    Oncology History:   2013: ER+ Carcinoma of R Breast   S/p Lympectomy, SLNB- Grade 1 Well Differentiated Invasive Ductal Carcinoma with Associated Ductal Carcinoma in Situ, Negative Margins/ SLNB Negative                                       ER+ 90%, AR+ 90%, HER2 Neg                           TII N0 Stage 2A   [id number]: Tamoxifen   07/2019-06/2021: Letrozole   11/2023: Mammogram Neg   02/06/2024: ED Nathan Marinelli (Abdominal Bloating, Constipation)- imaging consistent with multiple peritoneal masses (11 cm in LUQ), large volume ascites   02/21/2024: PET consistent with multifocal hypermetabolic soft tissue masses throughout the peritoneum including lesion along posterior aspect of right hepatic lobe, moderate to large volume ascites   02/29: IR paracentesis 5L removed   03/04: 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, paracentesis 2.4L removed               Cells positive for PAX8, CK7, AE1/AE3, SMA, CK5/6, AR,               PD1 and molecular studies pending               No loss of nuclear expression of MMR (low probability of microsatelllite instability-high   03/06: : 418   03/13: IR paracentesis 3.5L   03/21: Scheduled for IR paracentesis   03/25: Scheduled for primary debulking followed by adjuvant chemotherapy     Past Medical History:   Diagnosis Date    Breast cancer 2013    Diabetes mellitus     Genetic testing 05/29/2013    VUS    Hyperlipidemia     Hypertension     Malignant neoplasm of upper-outer quadrant of right breast in female, estrogen receptor positive 12/02/2015    Seizure disorder      Past Surgical History:   Procedure Laterality Date    BREAST BIOPSY      BREAST LUMPECTOMY Right 2013    EYE SURGERY      PERITONEOCENTESIS N/A 3/13/2024    Procedure: PARACENTESIS,  ABDOMINAL;  Surgeon: Flavia Moore MD;  Location: [facility] CATH LAB;  Service: Radiology;  Laterality: N/A;    TOTAL REDUCTION MAMMOPLASTY Bilateral 2016     Family History       Problem Relation (Age of Onset)    Breast cancer Sister (48)    Seizures Father          Tobacco Use    Smoking status: Never    Smokeless tobacco: Never   Substance and Sexual Activity    Alcohol use: Yes     Alcohol/week: 0.0 standard drinks of alcohol     Comment: ocassional    Drug use: No    Sexual activity: Yes     Partners: Male     Birth control/protection: None       PTA Medications   Medication Sig    amlodipine (NORVASC) 10 MG tablet Take 10 mg by mouth once daily.     atorvastatin (LIPITOR) 40 MG tablet Take 80 mg by mouth once daily.    enalapril (VASOTEC) 20 MG tablet Take 20 mg by mouth once daily.    fluticasone propionate (FLONASE) 50 mcg/actuation nasal spray 1 spray by Each Nostril route once daily.    hydrochlorothiazide (HYDRODIURIL) 25 MG tablet Take 25 mg by mouth once daily.     HYDROcodone-acetaminophen (NORCO) 5-325 mg per tablet Take 1 tablet by mouth every 6 (six) hours as needed for Pain.    ibuprofen (ADVIL,MOTRIN) 800 MG tablet Take 800 mg by mouth every 6 (six) hours as needed for Pain.    insulin glargine, TOUJEO, (TOUJEO SOLOSTAR U-300 INSULIN) 300 unit/mL (1.5 mL) InPn pen Inject 60 Units into the skin every evening.    ketorolac 0.5% (ACULAR) 0.5 % Drop Place 1 drop into both eyes. For pain after eye injections.    lamoTRIgine (LAMICTAL) 25 MG tablet Take 25 mg by mouth 2 (two) times daily.    lorazepam (ATIVAN) 0.5 MG tablet Take 0.5 mg by mouth every 12 (twelve) hours as needed for Anxiety.    metformin (GLUCOPHAGE) 1000 MG tablet Take 1,000 mg by mouth daily with breakfast.     NOVOLOG FLEXPEN 100 unit/mL InPn pen Inject 5 Units into the skin 2 (two) times daily with meals. With biggest meal    quetiapine (SEROQUEL) 25 MG Tab Take 25 mg by mouth daily as needed.    tirzepatide (MOUNJARO) 5 mg/0.5 mL  PnIj Inject 5 mg into the skin every 7 days. On Sunday.         Review of patient's allergies indicates:   Allergen Reactions    Codeine        Review of Systems   Constitutional:  Negative for chills and fever.   Eyes:  Negative for visual disturbance.   Respiratory:  Positive for shortness of breath. Negative for cough.    Cardiovascular:  Positive for leg swelling. Negative for chest pain and palpitations.   Gastrointestinal:  Positive for abdominal pain and constipation. Negative for diarrhea, nausea and vomiting.   Genitourinary:  Negative for dysuria, hematuria, vaginal bleeding and vaginal discharge.   Musculoskeletal:  Negative for back pain.   Neurological:  Negative for headaches.      Objective:     Vital Signs (Most Recent):  Temp: 98.1 °F (36.7 °C) (03/19/24 0343)  Pulse: 105 (03/19/24 0421)  Resp: 18 (03/19/24 0343)  BP: 132/71 (03/19/24 0343)  SpO2: (!) 93 % (03/19/24 0343) Vital Signs (24h Range):  Temp:  [97.7 °F (36.5 °C)-98.3 °F (36.8 °C)] 98.1 °F (36.7 °C)  Pulse:  [100-113] 105  Resp:  [15-18] 18  SpO2:  [92 %-97 %] 93 %  BP: (107-132)/(70-77) 132/71     Weight: 95.6 kg (210 lb 12.2 oz)  Body mass index is 33.01 kg/m².      Physical Exam:   Constitutional: She is oriented to person, place, and time. She appears well-developed and well-nourished.    HENT:   Head: Normocephalic and atraumatic.      Cardiovascular:  Normal rate, regular rhythm and normal heart sounds.      Exam reveals edema (1+ to the BLE (improving)).        Pulmonary/Chest: Effort normal and breath sounds normal. No respiratory distress.        Abdominal: Soft. She exhibits distension (significant abdominal distension noted). There is no abdominal tenderness. There is no rebound and no guarding.   Bowel sounds difficult to discern             Musculoskeletal: Normal range of motion.       Neurological: She is alert and oriented to person, place, and time.    Skin: Skin is warm and dry.    Psychiatric: She has a normal mood and  affect. Her behavior is normal. Thought content normal.          Laboratory:  Recent Labs   Lab 03/17/24  2357 03/17/24  2358 03/18/24  1102 03/19/24  0454   WBC 13.43*  --  13.51* 14.92*   HGB 8.5*  --  8.3* 8.0*   HCT 27.4* 28* 26.3* 25.4*   MCV 81*  --  82 81*   *  --  608* 604*        Recent Labs   Lab 03/17/24  2357 03/18/24  1102   * 130*   K 4.0 3.7   CL 93* 91*   CO2 25 28   BUN 16 15   CREATININE 1.2 0.9   * 236*   PROT 6.4  --    BILITOT 0.6  --    ALKPHOS 100  --    ALT 9*  --    AST 28  --         Lactic Acid: 2.5 > 2.0    Lipase: 6    UA Reflex to Culture: WNL    Urine Culture: pending    Blood Cultures: pending    COVID: Negative    Influenza: Negative    Urine Cr and Na: pending    Diagnostic Results:  Results for orders placed or performed during the hospital encounter of 03/17/24   X-Ray Chest AP Portable    Narrative    EXAMINATION:  XR CHEST AP PORTABLE    CLINICAL HISTORY:  SOB;    TECHNIQUE:  Single frontal view of the chest was performed.    COMPARISON:  05/26/2021.    FINDINGS:  Left-sided port catheter present with tip overlying the upper right atrium.  No pneumothorax.    Underinflated lungs with hypoventilatory change.  No consolidation or pleural effusion.    Heart and lungs otherwise appear unchanged when allowing for differences in technique and positioning.      Impression    As above.      Electronically signed by: Eduard Araujo MD  Date:    03/18/2024  Time:    01:48     Results for orders placed or performed during the hospital encounter of 03/17/24   CTA Chest Non-Coronary (PE Studies)    Narrative    EXAMINATION:  CTA CHEST NON CORONARY (PE STUDIES)    CLINICAL HISTORY:  Pulmonary embolism (PE) suspected, unknown D-dimer;    TECHNIQUE:  Low dose axial images, sagittal and coronal reformations were obtained from the thoracic inlet to the lung bases following the IV administration of 100 mL of Omnipaque 350.  Contrast timing was optimized to evaluate the  pulmonary arteries.    COMPARISON:  Chest radiograph 03/18/2024.    FINDINGS:  PULMONARY VASCULATURE: Suboptimal evaluation of the segmental and subsegmental pulmonary arteries secondary to streak artifact from dense contrast bolus, respiratory motion artifact, and suboptimal contrast mixing.The pulmonary arteries distribute normally without evidence of filling defect through the level of the lobar pulmonary arteries to indicate pulmonary thromboembolism.  Eccentric filling defect in a subsegmental branch of the right lower lobe.    Base of Neck:  No significant abnormality.    Thoracic soft tissues:  Left anterior wall chest port.  Calcifications in the bilateral breasts.  Partially calcified 3 cm mass right breast, similar compared to PET-CT 02/21/2024.  Correlation with prior mammographic studies is recommended.  There is scattered soft tissue edema.    Aorta: Left-sided 3 vessel non-aneurysmal aortic arch with mild calcific atherosclerosis.    Heart/Pericardium: Heart is not enlarged.  No pericardial effusion.  Multivessel coronary artery calcifications.    Sita/Mediastinum:  No hilar or mediastinal lymphadenopathy.    Airways:  Trachea is midline and proximal airways are patent without significant abnormality.    Pleura/Lungs: No pneumothorax.  Small left pleural effusion.  No suspicious pulmonary mass.    Esophagus: Normal in course and caliber.  Small hiatal hernia.    Upper abdomen: Partially imaged moderate volume upper abdominal ascites.    Bones:  Degenerative changes of the visualized osseous structures without acute fracture or lytic or sclerotic lesions.      Impression    Study limitations as above.  No central pulmonary thromboembolism through the level of the lobar pulmonary arterial branches.  Segmental and subsegmental pulmonary arteries are not well evaluated.    Eccentric filling defect in a subsegmental branch of the right lower lobe.  Not optimally evaluated due to above limitations.   Typically, eccentric filling defects like this are more typical of chronic pulmonary embolism changes.  Correlate clinically.    Small left pleural effusion.    Moderate volume upper abdominal ascites.    Calcifications in the bilateral breasts.  Partially calcified 3 cm mass right breast, similar compared to PET-CT 02/21/2024.  Correlation with prior mammographic studies is recommended.    Additional findings in the body of the report.    Electronically signed by resident: Kit Zuñiga  Date:    03/18/2024  Time:    03:11    Electronically signed by: Eduard Araujo MD  Date:    03/18/2024  Time:    03:56     Results for orders placed or performed during the hospital encounter of 03/17/24   US Lower Extremity Veins Bilateral    Narrative    EXAMINATION:  US LOWER EXTREMITY VEINS BILATERAL    CLINICAL HISTORY:  Other specified soft tissue disorders    TECHNIQUE:  Duplex and color flow Doppler and dynamic compression was performed of the bilateral lower extremity veins was performed.    COMPARISON:  None    FINDINGS:  Right thigh veins: The common femoral, femoral, popliteal, upper greater saphenous, and deep femoral veins are patent and free of thrombus. The veins are normally compressible and have normal phasic flow and augmentation response.    Right calf veins: The visualized calf veins are patent.    Left thigh veins: The common femoral, femoral, popliteal, upper greater saphenous, and deep femoral veins are patent and free of thrombus. The veins are normally compressible and have normal phasic flow and augmentation response.    Left calf veins: The visualized calf veins are patent.    Miscellaneous: None      Impression    No evidence of deep venous thrombosis in either lower extremity.      Electronically signed by: Abdullahi Herrera MD  Date:    03/18/2024  Time:    09:42     Echo 3/18/24:     Left Ventricle: There is low normal systolic function with a visually estimated ejection fraction of 50 - 55%.  Biplane (2D) method of discs ejection fraction is 50%. Global longitudinal strain is -18.0%.    Right Ventricle: Normal right ventricular cavity size. Wall thickness is normal. Right ventricle wall motion  is normal. Systolic function is normal.    Left Atrium: Left atrium is mildly dilated.    Mitral Valve: There is mild regurgitation.    IVC/SVC: Normal venous pressure at 3 mmHg.    Pericardium: Left pleural effusion. Ascites present.    Assessment/Plan:     * Malignant ascites  - Differential: Ascites, SBP  - Physical exam: non-peritoneal, no rebound or guarding noted  - Patient has had 3 paracentesis with 3.5-5L removed each time  - Last Paracentesis 3/13: 3525cc removed  - Patient is scheduled for repeat paracentesis 3/21/24  - Attempted paracentesis in ED without fluid return  - S/P lasix 20mg IV in the ED     Plan  - IR consulted for paracentesis with fluid/ cytology studies ordered to rule out SBP, however low suspicion at this time in the setting of benign abdominal exam and afebrile  - Paracentesis was not performed yesterday, however anticipate today    Sepsis  - Leukocytosis of 13, Tachycardia, LA of 2.5  - Patient meets criteria for sepsis, however based on review of previous admissions and prior clinic visits, the patient has had a leukocytosis and tachycardia  - Physical Exam: Abdomen distended, however not peritoneal, and without rebound or guarding  - Patient received one dose of Vancomycin and Zosyn in the ED  - Low suspicion for SBP or other causes of infection at this time given benign abdominal exam and prior occurences of tachycardia and leukocytosis  - LA: 2.5 > 2.0  - CBC: 13/8.5/27/631 > 13/8.3/26.3/608 > 14.9/8/25.4/604   - CXR: Neg   - UA: Neg   - Blood Cultures: in process   - Urine Cultures: in process  - COVID: Negative  - Influenza: Negative  - Vanc/Zosyn administered in ED   - IVF: None     Plan:   - Low suspicion for SBP given abdominal exam  - Will consider adding antibiotics given  increase in leukocytosis this AM    SOB (shortness of breath)  - Differential: Pneumonia, Pulmonary Embolism, Pulmonary Edema, MI,   - Saturating % on RA   - CXR: Neg   - CTA: No central pulmonary thromboembolism. Segmental and subsegmental pulmonary arteries are not well evaluated. Eccentric filling defect in a subsegmental branch of the right lower lobe.  Not optimally evaluated due to above limitations. Small left pleural effusion. Moderate volume upper abdominal ascites.  - Bilateral LE Dopplers: without signs of DVT  - Covid: Negative  - Flu: Negative  - EKG: Normal sinus rhythm, abnormal R wave progression, low voltage QRS  - Overall patient does not appear to be in acute distress and is sating % on RA   - Suspicious that SOB is related to accumulation of ascites. Following IR paracentesis, will re-evaluate patient for improvement. If not improved, will continue to determine source of SOB.  - Echo: ejection fraction of 50 - 55%. Left atrium is mildly dilated. There is mild regurgitation. Pericardium: Left pleural effusion. Ascites present.     Plan:  - IR Paracentesis to be performed today. If no symptomatic improvement, will investigate alternatives for SOB    JASON (acute kidney injury)  - Cr: 1.2 yesterday  - Baseline Cr: 0.7-0.8  - Repeat BMP:    - Na: 130   - Cr: 0.9   - Urine Na: 90    - Urine Cr: 37   - FeNa: 1.7% - indeterminate; could be intrinsic or prerenal     Plan:  - JASON resolved    Carcinosarcoma  - See Onc History Above  - Scheduled for primary debulking with Dr. Washington 3/25/24, followed by adjuvant chemotherapy. While inpatient, will continue the optimize prior to surgery, including if any abnormalities with workup. Will consider consulting hospital medicine for additional assistance.      History of breast cancer in female  2013: ER+ Carcinoma of R Breast   S/p Lympectomy, SLNB- Grade 1 Well Differentiated Invasive Ductal Carcinoma with Associated Ductal Carcinoma in Situ,  Negative Margins/ SLNB Negative                                       ER+ 90%, NJ+ 90%, HER2 Neg                           TII N0 Stage 2A   [id number]: Tamoxifen   07/2019-06/2021: Letrozole   11/2023: Mammogram Negative    Hyperlipidemia  - Continue home Lipitor    Seizure disorder  - Continue home Lamictal    Depression, reactive  - Continue home Seroquel, Ativan PRN      Hypertension  - Held home meds: Amlodipine, Enalapril, HCTZ  - If hypertensive in patient, will consider adding back home medications  - Hydral PRN ordered       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 - Oncology ([facility])

## 2024-03-19 NOTE — ASSESSMENT & PLAN NOTE
Cardiovascular Risk Assessment:  Active cardiac issues:  Active decompensated heart failure? No   Unstable angina?  No   Significant uncontrolled arrhythmias? No   Severe valvular heart disease-Aortic or Mitral Stenosis? No   Recent MI or coronary revascularization < 30 days? No     Other Issues:       Anticoagulation: N/A       Coronary Stenting: N/A    Revised Cardiac Risk Index   High risk surgery: Yes  History of ischemic heart disease: No  History of congestive heart failure: No  History of stroke: No  Insulin dependent diabetes: Yes  Cr > 2: No  2 points, class III risk, 10.1% risk of cardiac event      RCRI Calculator Class and Risk percentage:                        Cardiac: Denies chest pain, EKG NSR  Pulmonary: Denies SOB, no O2 needs   Endocrine: see Type 2 diabetes mellitus, without long-term current use of insulin for management of blood glucose, controlling blood glucose, recommend holding insulin day of surgery   Renal: Cr stable, recommend post-op labs to ensure stable, maintain euvolemia, recommend holding lisinopril and HCTZ morning of surgery  DVT ppx: hold as necessary and resume as able  Does not smoke or use alcohol  No prior reaction to anesthesia per pt     - Will continue to medically optimize

## 2024-03-19 NOTE — SUBJECTIVE & OBJECTIVE
Past Medical History:   Diagnosis Date    Breast cancer 2013    Diabetes mellitus     Genetic testing 05/29/2013    VUS    Hyperlipidemia     Hypertension     Malignant neoplasm of upper-outer quadrant of right breast in female, estrogen receptor positive 12/02/2015    Seizure disorder        Past Surgical History:   Procedure Laterality Date    BREAST BIOPSY      BREAST LUMPECTOMY Right 2013    EYE SURGERY      PERITONEOCENTESIS N/A 3/13/2024    Procedure: PARACENTESIS, ABDOMINAL;  Surgeon: Flavia Moore MD;  Location: [facility] CATH LAB;  Service: Radiology;  Laterality: N/A;    TOTAL REDUCTION MAMMOPLASTY Bilateral 2016       Review of patient's allergies indicates:   Allergen Reactions    Codeine        No current facility-administered medications on file prior to encounter.     Current Outpatient Medications on File Prior to Encounter   Medication Sig    amlodipine (NORVASC) 10 MG tablet Take 10 mg by mouth once daily.     atorvastatin (LIPITOR) 40 MG tablet Take 80 mg by mouth once daily.    enalapril (VASOTEC) 20 MG tablet Take 20 mg by mouth once daily.    fluticasone propionate (FLONASE) 50 mcg/actuation nasal spray 1 spray by Each Nostril route once daily.    hydrochlorothiazide (HYDRODIURIL) 25 MG tablet Take 25 mg by mouth once daily.     HYDROcodone-acetaminophen (NORCO) 5-325 mg per tablet Take 1 tablet by mouth every 6 (six) hours as needed for Pain.    ibuprofen (ADVIL,MOTRIN) 800 MG tablet Take 800 mg by mouth every 6 (six) hours as needed for Pain.    insulin glargine, TOUJEO, (TOUJEO SOLOSTAR U-300 INSULIN) 300 unit/mL (1.5 mL) InPn pen Inject 60 Units into the skin every evening.    ketorolac 0.5% (ACULAR) 0.5 % Drop Place 1 drop into both eyes. For pain after eye injections.    lamoTRIgine (LAMICTAL) 25 MG tablet Take 25 mg by mouth 2 (two) times daily.    lorazepam (ATIVAN) 0.5 MG tablet Take 0.5 mg by mouth every 12 (twelve) hours as needed for Anxiety.    metformin (GLUCOPHAGE) 1000 MG tablet  Take 1,000 mg by mouth daily with breakfast.     NOVOLOG FLEXPEN 100 unit/mL InPn pen Inject 5 Units into the skin 2 (two) times daily with meals. With biggest meal    quetiapine (SEROQUEL) 25 MG Tab Take 25 mg by mouth daily as needed.    tirzepatide (MOUNJARO) 5 mg/0.5 mL PnIj Inject 5 mg into the skin every 7 days. On Sunday.     Family History       Problem Relation (Age of Onset)    Breast cancer Sister (48)    Seizures Father          Tobacco Use    Smoking status: Never    Smokeless tobacco: Never   Substance and Sexual Activity    Alcohol use: Yes     Alcohol/week: 0.0 standard drinks of alcohol     Comment: ocassional    Drug use: No    Sexual activity: Yes     Partners: Male     Birth control/protection: None     Review of Systems   Constitutional:  Positive for activity change, appetite change and fatigue. Negative for chills and fever.   Respiratory:  Positive for shortness of breath (improved). Negative for cough.    Cardiovascular:  Positive for leg swelling. Negative for chest pain.   Gastrointestinal:  Positive for abdominal distention and nausea. Negative for abdominal pain, diarrhea and vomiting.   Musculoskeletal:  Positive for myalgias (left thigh).     Objective:     Vital Signs (Most Recent):  Temp: 98.3 °F (36.8 °C) (03/19/24 0706)  Pulse: 99 (03/19/24 0745)  Resp: 20 (03/19/24 0745)  BP: (!) 152/80 (03/19/24 0745)  SpO2: 100 % (03/19/24 0745) Vital Signs (24h Range):  Temp:  [97.7 °F (36.5 °C)-98.3 °F (36.8 °C)] 98.3 °F (36.8 °C)  Pulse:  [] 99  Resp:  [15-20] 20  SpO2:  [92 %-100 %] 100 %  BP: (107-152)/(67-80) 152/80     Weight: 95.6 kg (210 lb 12.2 oz)  Body mass index is 33.01 kg/m².     Physical Exam  Vitals and nursing note reviewed.   Constitutional:       General: She is not in acute distress.     Appearance: Normal appearance. She is obese. She is ill-appearing. She is not toxic-appearing or diaphoretic.   HENT:      Head: Normocephalic and atraumatic.      Mouth/Throat:       Mouth: Mucous membranes are moist.   Eyes:      Conjunctiva/sclera: Conjunctivae normal.   Cardiovascular:      Rate and Rhythm: Normal rate and regular rhythm.   Pulmonary:      Effort: Pulmonary effort is normal. No respiratory distress.      Breath sounds: No wheezing or rales.   Chest:      Chest wall: No tenderness.   Abdominal:      General: There is distension.      Tenderness: There is no abdominal tenderness.   Musculoskeletal:         General: No swelling or tenderness.      Right lower leg: Edema present.      Left lower leg: Edema present.      Comments: IMANI hose present bilaterally   Skin:     General: Skin is warm and dry.   Neurological:      Mental Status: She is alert and oriented to person, place, and time.          Significant Labs: All pertinent labs within the past 24 hours have been reviewed.    Significant Imaging: I have reviewed all pertinent imaging results/findings within the past 24 hours.

## 2024-03-19 NOTE — SUBJECTIVE & OBJECTIVE
Interval History:   Patient reports 1 episode of emesis overnight which was relieved with zofran x1. Patient states that her abdominal pain is sharp and stabbing, and is relieved by oral pain medications. She took oxy IR 5mg x3 yesterday, most recently at 0230 this AM. She did require 1 dose of dilaudid 0.2mg at 2200 last night. She has had 2 unmeasured voids overnight. She reports a subjective improvement in her lower extremity edema.     Oncology Treatment Plan:   OP BREAST PACLITAXEL CARBOPLATIN Q3W    Oncology History:   2013: ER+ Carcinoma of R Breast   S/p Lympectomy, SLNB- Grade 1 Well Differentiated Invasive Ductal Carcinoma with Associated Ductal Carcinoma in Situ, Negative Margins/ SLNB Negative                                       ER+ 90%, FL+ 90%, HER2 Neg                           TII N0 Stage 2A   [id number]: Tamoxifen   07/2019-06/2021: Letrozole   11/2023: Mammogram Neg   02/06/2024: ED Nathan Marinelli (Abdominal Bloating, Constipation)- imaging consistent with multiple peritoneal masses (11 cm in LUQ), large volume ascites   02/21/2024: PET consistent with multifocal hypermetabolic soft tissue masses throughout the peritoneum including lesion along posterior aspect of right hepatic lobe, moderate to large volume ascites   02/29: IR paracentesis 5L removed   03/04: 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, paracentesis 2.4L removed               Cells positive for PAX8, CK7, AE1/AE3, SMA, CK5/6, FL,               PD1 and molecular studies pending               No loss of nuclear expression of MMR (low probability of microsatelllite instability-high   03/06: : 418   03/13: IR paracentesis 3.5L   03/21: Scheduled for IR paracentesis   03/25: Scheduled for primary debulking followed by adjuvant chemotherapy     Past Medical History:   Diagnosis Date    Breast cancer 2013    Diabetes mellitus     Genetic testing 05/29/2013    VUS    Hyperlipidemia      Hypertension     Malignant neoplasm of upper-outer quadrant of right breast in female, estrogen receptor positive 12/02/2015    Seizure disorder      Past Surgical History:   Procedure Laterality Date    BREAST BIOPSY      BREAST LUMPECTOMY Right 2013    EYE SURGERY      PERITONEOCENTESIS N/A 3/13/2024    Procedure: PARACENTESIS, ABDOMINAL;  Surgeon: Flavia Moore MD;  Location: [facility] CATH LAB;  Service: Radiology;  Laterality: N/A;    TOTAL REDUCTION MAMMOPLASTY Bilateral 2016     Family History       Problem Relation (Age of Onset)    Breast cancer Sister (48)    Seizures Father          Tobacco Use    Smoking status: Never    Smokeless tobacco: Never   Substance and Sexual Activity    Alcohol use: Yes     Alcohol/week: 0.0 standard drinks of alcohol     Comment: ocassional    Drug use: No    Sexual activity: Yes     Partners: Male     Birth control/protection: None       PTA Medications   Medication Sig    amlodipine (NORVASC) 10 MG tablet Take 10 mg by mouth once daily.     atorvastatin (LIPITOR) 40 MG tablet Take 80 mg by mouth once daily.    enalapril (VASOTEC) 20 MG tablet Take 20 mg by mouth once daily.    fluticasone propionate (FLONASE) 50 mcg/actuation nasal spray 1 spray by Each Nostril route once daily.    hydrochlorothiazide (HYDRODIURIL) 25 MG tablet Take 25 mg by mouth once daily.     HYDROcodone-acetaminophen (NORCO) 5-325 mg per tablet Take 1 tablet by mouth every 6 (six) hours as needed for Pain.    ibuprofen (ADVIL,MOTRIN) 800 MG tablet Take 800 mg by mouth every 6 (six) hours as needed for Pain.    insulin glargine, TOUJEO, (TOUJEO SOLOSTAR U-300 INSULIN) 300 unit/mL (1.5 mL) InPn pen Inject 60 Units into the skin every evening.    ketorolac 0.5% (ACULAR) 0.5 % Drop Place 1 drop into both eyes. For pain after eye injections.    lamoTRIgine (LAMICTAL) 25 MG tablet Take 25 mg by mouth 2 (two) times daily.    lorazepam (ATIVAN) 0.5 MG tablet Take 0.5 mg by mouth every 12 (twelve) hours as needed  for Anxiety.    metformin (GLUCOPHAGE) 1000 MG tablet Take 1,000 mg by mouth daily with breakfast.     NOVOLOG FLEXPEN 100 unit/mL InPn pen Inject 5 Units into the skin 2 (two) times daily with meals. With biggest meal    quetiapine (SEROQUEL) 25 MG Tab Take 25 mg by mouth daily as needed.    tirzepatide (MOUNJARO) 5 mg/0.5 mL PnIj Inject 5 mg into the skin every 7 days. On Sunday.         Review of patient's allergies indicates:   Allergen Reactions    Codeine        Review of Systems   Constitutional:  Negative for chills and fever.   Eyes:  Negative for visual disturbance.   Respiratory:  Positive for shortness of breath. Negative for cough.    Cardiovascular:  Positive for leg swelling. Negative for chest pain and palpitations.   Gastrointestinal:  Positive for abdominal pain and constipation. Negative for diarrhea, nausea and vomiting.   Genitourinary:  Negative for dysuria, hematuria, vaginal bleeding and vaginal discharge.   Musculoskeletal:  Negative for back pain.   Neurological:  Negative for headaches.      Objective:     Vital Signs (Most Recent):  Temp: 98.1 °F (36.7 °C) (03/19/24 0343)  Pulse: 105 (03/19/24 0421)  Resp: 18 (03/19/24 0343)  BP: 132/71 (03/19/24 0343)  SpO2: (!) 93 % (03/19/24 0343) Vital Signs (24h Range):  Temp:  [97.7 °F (36.5 °C)-98.3 °F (36.8 °C)] 98.1 °F (36.7 °C)  Pulse:  [100-113] 105  Resp:  [15-18] 18  SpO2:  [92 %-97 %] 93 %  BP: (107-132)/(70-77) 132/71     Weight: 95.6 kg (210 lb 12.2 oz)  Body mass index is 33.01 kg/m².       Physical Exam:   Constitutional: She is oriented to person, place, and time. She appears well-developed and well-nourished.    HENT:   Head: Normocephalic and atraumatic.      Cardiovascular:  Normal rate, regular rhythm and normal heart sounds.      Exam reveals edema (1+ to the BLE (improving)).        Pulmonary/Chest: Effort normal and breath sounds normal. No respiratory distress.        Abdominal: Soft. She exhibits distension (significant  abdominal distension noted). There is no abdominal tenderness. There is no rebound and no guarding.   Bowel sounds difficult to discern             Musculoskeletal: Normal range of motion.       Neurological: She is alert and oriented to person, place, and time.    Skin: Skin is warm and dry.    Psychiatric: She has a normal mood and affect. Her behavior is normal. Thought content normal.          Laboratory:  Recent Labs   Lab 03/17/24 2357 03/17/24 2358 03/18/24  1102 03/19/24  0454   WBC 13.43*  --  13.51* 14.92*   HGB 8.5*  --  8.3* 8.0*   HCT 27.4* 28* 26.3* 25.4*   MCV 81*  --  82 81*   *  --  608* 604*        Recent Labs   Lab 03/17/24 2357 03/18/24  1102   * 130*   K 4.0 3.7   CL 93* 91*   CO2 25 28   BUN 16 15   CREATININE 1.2 0.9   * 236*   PROT 6.4  --    BILITOT 0.6  --    ALKPHOS 100  --    ALT 9*  --    AST 28  --         Lactic Acid: 2.5 > 2.0    Lipase: 6    UA Reflex to Culture: WNL    Urine Culture: pending    Blood Cultures: pending    COVID: Negative    Influenza: Negative    Urine Cr and Na: pending    Diagnostic Results:  Results for orders placed or performed during the hospital encounter of 03/17/24   X-Ray Chest AP Portable    Narrative    EXAMINATION:  XR CHEST AP PORTABLE    CLINICAL HISTORY:  SOB;    TECHNIQUE:  Single frontal view of the chest was performed.    COMPARISON:  05/26/2021.    FINDINGS:  Left-sided port catheter present with tip overlying the upper right atrium.  No pneumothorax.    Underinflated lungs with hypoventilatory change.  No consolidation or pleural effusion.    Heart and lungs otherwise appear unchanged when allowing for differences in technique and positioning.      Impression    As above.      Electronically signed by: Eduard Araujo MD  Date:    03/18/2024  Time:    01:48     Results for orders placed or performed during the hospital encounter of 03/17/24   CTA Chest Non-Coronary (PE Studies)    Narrative    EXAMINATION:  CTA CHEST NON  CORONARY (PE STUDIES)    CLINICAL HISTORY:  Pulmonary embolism (PE) suspected, unknown D-dimer;    TECHNIQUE:  Low dose axial images, sagittal and coronal reformations were obtained from the thoracic inlet to the lung bases following the IV administration of 100 mL of Omnipaque 350.  Contrast timing was optimized to evaluate the pulmonary arteries.    COMPARISON:  Chest radiograph 03/18/2024.    FINDINGS:  PULMONARY VASCULATURE: Suboptimal evaluation of the segmental and subsegmental pulmonary arteries secondary to streak artifact from dense contrast bolus, respiratory motion artifact, and suboptimal contrast mixing.The pulmonary arteries distribute normally without evidence of filling defect through the level of the lobar pulmonary arteries to indicate pulmonary thromboembolism.  Eccentric filling defect in a subsegmental branch of the right lower lobe.    Base of Neck:  No significant abnormality.    Thoracic soft tissues:  Left anterior wall chest port.  Calcifications in the bilateral breasts.  Partially calcified 3 cm mass right breast, similar compared to PET-CT 02/21/2024.  Correlation with prior mammographic studies is recommended.  There is scattered soft tissue edema.    Aorta: Left-sided 3 vessel non-aneurysmal aortic arch with mild calcific atherosclerosis.    Heart/Pericardium: Heart is not enlarged.  No pericardial effusion.  Multivessel coronary artery calcifications.    Sita/Mediastinum:  No hilar or mediastinal lymphadenopathy.    Airways:  Trachea is midline and proximal airways are patent without significant abnormality.    Pleura/Lungs: No pneumothorax.  Small left pleural effusion.  No suspicious pulmonary mass.    Esophagus: Normal in course and caliber.  Small hiatal hernia.    Upper abdomen: Partially imaged moderate volume upper abdominal ascites.    Bones:  Degenerative changes of the visualized osseous structures without acute fracture or lytic or sclerotic lesions.      Impression     Study limitations as above.  No central pulmonary thromboembolism through the level of the lobar pulmonary arterial branches.  Segmental and subsegmental pulmonary arteries are not well evaluated.    Eccentric filling defect in a subsegmental branch of the right lower lobe.  Not optimally evaluated due to above limitations.  Typically, eccentric filling defects like this are more typical of chronic pulmonary embolism changes.  Correlate clinically.    Small left pleural effusion.    Moderate volume upper abdominal ascites.    Calcifications in the bilateral breasts.  Partially calcified 3 cm mass right breast, similar compared to PET-CT 02/21/2024.  Correlation with prior mammographic studies is recommended.    Additional findings in the body of the report.    Electronically signed by resident: Kit Zuñiga  Date:    03/18/2024  Time:    03:11    Electronically signed by: Eduard Araujo MD  Date:    03/18/2024  Time:    03:56     Results for orders placed or performed during the hospital encounter of 03/17/24   US Lower Extremity Veins Bilateral    Narrative    EXAMINATION:  US LOWER EXTREMITY VEINS BILATERAL    CLINICAL HISTORY:  Other specified soft tissue disorders    TECHNIQUE:  Duplex and color flow Doppler and dynamic compression was performed of the bilateral lower extremity veins was performed.    COMPARISON:  None    FINDINGS:  Right thigh veins: The common femoral, femoral, popliteal, upper greater saphenous, and deep femoral veins are patent and free of thrombus. The veins are normally compressible and have normal phasic flow and augmentation response.    Right calf veins: The visualized calf veins are patent.    Left thigh veins: The common femoral, femoral, popliteal, upper greater saphenous, and deep femoral veins are patent and free of thrombus. The veins are normally compressible and have normal phasic flow and augmentation response.    Left calf veins: The visualized calf veins are  patent.    Miscellaneous: None      Impression    No evidence of deep venous thrombosis in either lower extremity.      Electronically signed by: Abdullahi Herrera MD  Date:    03/18/2024  Time:    09:42     Echo 3/18/24:     Left Ventricle: There is low normal systolic function with a visually estimated ejection fraction of 50 - 55%. Biplane (2D) method of discs ejection fraction is 50%. Global longitudinal strain is -18.0%.    Right Ventricle: Normal right ventricular cavity size. Wall thickness is normal. Right ventricle wall motion  is normal. Systolic function is normal.    Left Atrium: Left atrium is mildly dilated.    Mitral Valve: There is mild regurgitation.    IVC/SVC: Normal venous pressure at 3 mmHg.    Pericardium: Left pleural effusion. Ascites present.

## 2024-03-19 NOTE — CONSULTS
[facility] - Oncology ([facility])  [facility] Medicine  Consult Note    Patient Name: Anette Ricci  MRN: [id number]  Admission Date: 3/17/2024  Hospital Length of Stay: 1 days  Attending Physician: ER records  Primary Care Provider: Mark Chua MD           Patient information was obtained from patient and primary team.     Inpatient consult to [facility] Medicine-General  Consult performed by: France Urbina MD  Consult ordered by: Francheska Shirley MD        Subjective:     Principal Problem: Malignant ascites    Chief Complaint:   Chief Complaint   Patient presents with    Shortness of Breath     C/o SOB and fluid retention x 2 days. Paracentesis 3/13. Ovarian Cancer, not on chemo yet        HPI: Anette Ricci is a 57 yo F w/ hx of metastatic carcinosarcoma, breast cancer, seizures, T2DM, HTN, and HLD who presented with presented to the ED with SOB and edema to her bilateral lower extremities. Per chart review, she first noticed increased leg swelling and then became acutely short of breath present at rest and on exertion. Admitted to Gyn Onc for further evaluation. She is scheduled for primary debulking on 3/25/24. Prior workup done includes EKG which showed sinus tach, echo w/ EF of 50-55% and normal CVP, CTA w/ no evidence of PE but moderate ascites seen, and BLE US w/ no evidence of DVTs. Pt is currently undergoing a paracentesis. Medicine consulted for pre-op optimization.      At the time of the consult, pt is slightly hypertensive (150s/80) but other VSS, satting well on RA, and afebrile. Pt reports improvement in her SOB following paracentesis.               Past Medical History:   Diagnosis Date    Breast cancer 2013    Diabetes mellitus     Genetic testing 05/29/2013    VUS    Hyperlipidemia     Hypertension     Malignant neoplasm of upper-outer quadrant of right breast in female, estrogen receptor positive 12/02/2015    Seizure disorder        Past Surgical History:   Procedure Laterality Date     BREAST BIOPSY      BREAST LUMPECTOMY Right 2013    EYE SURGERY      PERITONEOCENTESIS N/A 3/13/2024    Procedure: PARACENTESIS, ABDOMINAL;  Surgeon: Flavia Moore MD;  Location: [facility] CATH LAB;  Service: Radiology;  Laterality: N/A;    TOTAL REDUCTION MAMMOPLASTY Bilateral 2016       Review of patient's allergies indicates:   Allergen Reactions    Codeine        No current facility-administered medications on file prior to encounter.     Current Outpatient Medications on File Prior to Encounter   Medication Sig    amlodipine (NORVASC) 10 MG tablet Take 10 mg by mouth once daily.     atorvastatin (LIPITOR) 40 MG tablet Take 80 mg by mouth once daily.    enalapril (VASOTEC) 20 MG tablet Take 20 mg by mouth once daily.    fluticasone propionate (FLONASE) 50 mcg/actuation nasal spray 1 spray by Each Nostril route once daily.    hydrochlorothiazide (HYDRODIURIL) 25 MG tablet Take 25 mg by mouth once daily.     HYDROcodone-acetaminophen (NORCO) 5-325 mg per tablet Take 1 tablet by mouth every 6 (six) hours as needed for Pain.    ibuprofen (ADVIL,MOTRIN) 800 MG tablet Take 800 mg by mouth every 6 (six) hours as needed for Pain.    insulin glargine, TOUJEO, (TOUJEO SOLOSTAR U-300 INSULIN) 300 unit/mL (1.5 mL) InPn pen Inject 60 Units into the skin every evening.    ketorolac 0.5% (ACULAR) 0.5 % Drop Place 1 drop into both eyes. For pain after eye injections.    lamoTRIgine (LAMICTAL) 25 MG tablet Take 25 mg by mouth 2 (two) times daily.    lorazepam (ATIVAN) 0.5 MG tablet Take 0.5 mg by mouth every 12 (twelve) hours as needed for Anxiety.    metformin (GLUCOPHAGE) 1000 MG tablet Take 1,000 mg by mouth daily with breakfast.     NOVOLOG FLEXPEN 100 unit/mL InPn pen Inject 5 Units into the skin 2 (two) times daily with meals. With biggest meal    quetiapine (SEROQUEL) 25 MG Tab Take 25 mg by mouth daily as needed.    tirzepatide (MOUNJARO) 5 mg/0.5 mL PnIj Inject 5 mg into the skin every 7 days. On Sunday.     Family  History       Problem Relation (Age of Onset)    Breast cancer Sister (48)    Seizures Father          Tobacco Use    Smoking status: Never    Smokeless tobacco: Never   Substance and Sexual Activity    Alcohol use: Yes     Alcohol/week: 0.0 standard drinks of alcohol     Comment: ocassional    Drug use: No    Sexual activity: Yes     Partners: Male     Birth control/protection: None     Review of Systems   Constitutional:  Positive for activity change, appetite change and fatigue. Negative for chills and fever.   Respiratory:  Positive for shortness of breath (improved). Negative for cough.    Cardiovascular:  Positive for leg swelling. Negative for chest pain.   Gastrointestinal:  Positive for abdominal distention and nausea. Negative for abdominal pain, diarrhea and vomiting.   Musculoskeletal:  Positive for myalgias (left thigh).     Objective:     Vital Signs (Most Recent):  Temp: 98.3 °F (36.8 °C) (03/19/24 0706)  Pulse: 99 (03/19/24 0745)  Resp: 20 (03/19/24 0745)  BP: (!) 152/80 (03/19/24 0745)  SpO2: 100 % (03/19/24 0745) Vital Signs (24h Range):  Temp:  [97.7 °F (36.5 °C)-98.3 °F (36.8 °C)] 98.3 °F (36.8 °C)  Pulse:  [] 99  Resp:  [15-20] 20  SpO2:  [92 %-100 %] 100 %  BP: (107-152)/(67-80) 152/80     Weight: 95.6 kg (210 lb 12.2 oz)  Body mass index is 33.01 kg/m².     Physical Exam  Vitals and nursing note reviewed.   Constitutional:       General: She is not in acute distress.     Appearance: Normal appearance. She is obese. She is ill-appearing. She is not toxic-appearing or diaphoretic.   HENT:      Head: Normocephalic and atraumatic.      Mouth/Throat:      Mouth: Mucous membranes are moist.   Eyes:      Conjunctiva/sclera: Conjunctivae normal.   Cardiovascular:      Rate and Rhythm: Normal rate and regular rhythm.   Pulmonary:      Effort: Pulmonary effort is normal. No respiratory distress.      Breath sounds: No wheezing or rales.   Chest:      Chest wall: No tenderness.   Abdominal:       General: There is distension.      Tenderness: There is no abdominal tenderness.   Musculoskeletal:         General: No swelling or tenderness.      Right lower leg: Edema present.      Left lower leg: Edema present.      Comments: IMANI hose present bilaterally   Skin:     General: Skin is warm and dry.   Neurological:      Mental Status: She is alert and oriented to person, place, and time.          Significant Labs: All pertinent labs within the past 24 hours have been reviewed.    Significant Imaging: I have reviewed all pertinent imaging results/findings within the past 24 hours.  Assessment/Plan:     Preop examination  Cardiovascular Risk Assessment:  Active cardiac issues:  Active decompensated heart failure? No   Unstable angina?  No   Significant uncontrolled arrhythmias? No   Severe valvular heart disease-Aortic or Mitral Stenosis? No   Recent MI or coronary revascularization < 30 days? No     Other Issues:       Anticoagulation: N/A       Coronary Stenting: N/A    Revised Cardiac Risk Index   High risk surgery: Yes  History of ischemic heart disease: No  History of congestive heart failure: No  History of stroke: No  Insulin dependent diabetes: Yes  Cr > 2: No  2 points, class III risk, 10.1% risk of cardiac event      RCRI Calculator Class and Risk percentage: 2 points, class III risk, 10.1% risk of cardiac event    Cardiac  - Denies chest pain  - EKG sinus tach  - Echo w/ 50-55% EF and normal CVP   Pulmonary  - Reports SOB improved following paracentesis  - No O2 needs   Endocrine  - See 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  - Optimizing blood glucose  - If discharged, recommend continuing home regimen AND holding prandial insulin and metformin day of surgery and reduce long acting to half on day of surgery   - If discharged prior to surgery, recommend consulting medicine or endocrine for further management when readmitted   Renal  - Cr stable  -  Recommend repeat BMP tomorrow following para if pt is still inpatient   - Recommend post-op labs to ensure stable, maintain euvolemia  DVT ppx  - Hold as necessary and resume as able  - Does not smoke or use alcohol  - No prior reaction to anesthesia per pt           Carcinosarcoma  - Per gyn onc  - Scheduled for primary debulking with Dr. Washington 3/25/24 followed by adjuvant chemotherapy   - Medical optimization  - See preop exam       Hypertension  Chronic, controlled. Latest blood pressure and vitals reviewed-     Temp:  [97.4 °F (36.3 °C)-98.3 °F (36.8 °C)]   Pulse:  []   Resp:  [15-20]   BP: (110-152)/(67-80)   SpO2:  [92 %-100 %] .   Home meds for hypertension were reviewed and noted below.   Hypertension Medications               amlodipine (NORVASC) 10 MG tablet Take 10 mg by mouth once daily.     enalapril (VASOTEC) 20 MG tablet Take 20 mg by mouth once daily.    hydrochlorothiazide (HYDRODIURIL) 25 MG tablet Take 25 mg by mouth once daily.             While in the hospital, will manage blood pressure as follows; Adjust home antihypertensive regimen as follows- hold home meds in setting of normotension, resume when clinically appropriate     Will utilize p.r.n. blood pressure medication only if patient's blood pressure greater than 180/110 and she develops symptoms such as worsening chest pain or shortness of breath.    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  Last A1c reviewed-   Lab Results   Component Value Date    HGBA1C 7.3 (H) 03/19/2024     Most recent fingerstick glucose reviewed-   Recent Labs   Lab 03/18/24  1811 03/18/24  2211 03/19/24  0207 03/19/24  0938   POCTGLUCOSE 242* 342* 211* 159*     Current correctional scale  Low  Maintain anti-hyperglycemic dose as follows-   Antihyperglycemics (From  admission, onward)      Start     Stop Route Frequency Ordered    03/18/24 2100  insulin detemir U-100 (Levemir) pen 33 Units         -- SubQ Nightly 03/18/24 0910    03/18/24 1006  insulin aspart U-100 pen 0-5 Units         -- SubQ Before meals & nightly PRN 03/18/24 0909          Blood glucose controlled on current regimen, will continue to monitor and adjust as necessary  - If discharged, recommend continuing home regimen AND holding prandial insulin and metformin day of surgery and reduce long acting to half on day of surgery   - If discharged prior to surgery, recommend consulting medicine or endocrine for further management when readmitted       VTE Risk Mitigation (From admission, onward)           Ordered     enoxaparin injection 40 mg  Every 24 hours         03/19/24 0906     IP VTE HIGH RISK PATIENT  Once         03/18/24 0911     Place sequential compression device  Until discontinued         03/18/24 0911                        Thank you for your consult. I will follow-up with patient. Please contact us if you have any additional questions.    France Urbina MD  Department of Hospital Medicine   [facility] - Oncology ([facility])

## 2024-03-20 VITALS
OXYGEN SATURATION: 95 % | TEMPERATURE: 99 F | RESPIRATION RATE: 18 BRPM | HEIGHT: 67 IN | DIASTOLIC BLOOD PRESSURE: 56 MMHG | WEIGHT: 210.75 LBS | SYSTOLIC BLOOD PRESSURE: 113 MMHG | HEART RATE: 102 BPM | BODY MASS INDEX: 33.08 KG/M2

## 2024-03-20 PROBLEM — A41.9 SEVERE SEPSIS: Status: RESOLVED | Noted: 2024-03-18 | Resolved: 2024-03-20

## 2024-03-20 PROBLEM — R65.20 SEVERE SEPSIS: Status: RESOLVED | Noted: 2024-03-18 | Resolved: 2024-03-20

## 2024-03-20 LAB
ANION GAP SERPL CALC-SCNC: 8 MMOL/L (ref 8–16)
BUN SERPL-MCNC: 17 MG/DL (ref 6–20)
CALCIUM SERPL-MCNC: 7.6 MG/DL (ref 8.7–10.5)
CHLORIDE SERPL-SCNC: 95 MMOL/L (ref 95–110)
CO2 SERPL-SCNC: 28 MMOL/L (ref 23–29)
CREAT SERPL-MCNC: 0.9 MG/DL (ref 0.5–1.4)
EST. GFR  (NO RACE VARIABLE): >60 ML/MIN/1.73 M^2
GLUCOSE SERPL-MCNC: 110 MG/DL (ref 70–110)
OHS QRS DURATION: 70 MS
OHS QTC CALCULATION: 399 MS
POCT GLUCOSE: 97 MG/DL (ref 70–110)
POTASSIUM SERPL-SCNC: 3.9 MMOL/L (ref 3.5–5.1)
SODIUM SERPL-SCNC: 131 MMOL/L (ref 136–145)

## 2024-03-20 PROCEDURE — 25000003 PHARM REV CODE 250

## 2024-03-20 PROCEDURE — 93010 ELECTROCARDIOGRAM REPORT: CPT | Mod: ,,, | Performed by: INTERNAL MEDICINE

## 2024-03-20 PROCEDURE — 63600175 PHARM REV CODE 636 W HCPCS

## 2024-03-20 PROCEDURE — 99238 HOSP IP/OBS DSCHRG MGMT 30/<: CPT | Mod: ,,, | Performed by: STUDENT IN AN ORGANIZED HEALTH CARE EDUCATION/TRAINING PROGRAM

## 2024-03-20 PROCEDURE — 36415 COLL VENOUS BLD VENIPUNCTURE: CPT

## 2024-03-20 PROCEDURE — 80048 BASIC METABOLIC PNL TOTAL CA: CPT

## 2024-03-20 PROCEDURE — C9113 INJ PANTOPRAZOLE SODIUM, VIA: HCPCS

## 2024-03-20 PROCEDURE — 93005 ELECTROCARDIOGRAM TRACING: CPT

## 2024-03-20 PROCEDURE — 1111F DSCHRG MED/CURRENT MED MERGE: CPT | Mod: CPTII,,, | Performed by: STUDENT IN AN ORGANIZED HEALTH CARE EDUCATION/TRAINING PROGRAM

## 2024-03-20 PROCEDURE — 27000492 HC SLEEVE, SCD T/L

## 2024-03-20 RX ORDER — POLYETHYLENE GLYCOL 3350 17 G/17G
17 POWDER, FOR SOLUTION ORAL DAILY
Status: DISCONTINUED | OUTPATIENT
Start: 2024-03-20 | End: 2024-03-20 | Stop reason: HOSPADM

## 2024-03-20 RX ORDER — SIMETHICONE 80 MG
1 TABLET,CHEWABLE ORAL 3 TIMES DAILY PRN
Status: DISCONTINUED | OUTPATIENT
Start: 2024-03-20 | End: 2024-03-20 | Stop reason: HOSPADM

## 2024-03-20 RX ORDER — PANTOPRAZOLE SODIUM 40 MG/10ML
40 INJECTION, POWDER, LYOPHILIZED, FOR SOLUTION INTRAVENOUS ONCE
Status: COMPLETED | OUTPATIENT
Start: 2024-03-20 | End: 2024-03-20

## 2024-03-20 RX ADMIN — SIMETHICONE 80 MG: 80 TABLET, CHEWABLE ORAL at 03:03

## 2024-03-20 RX ADMIN — POLYETHYLENE GLYCOL 3350 17 G: 17 POWDER, FOR SOLUTION ORAL at 09:03

## 2024-03-20 RX ADMIN — PANTOPRAZOLE SODIUM 40 MG: 40 INJECTION, POWDER, FOR SOLUTION INTRAVENOUS at 03:03

## 2024-03-20 RX ADMIN — LORAZEPAM 0.5 MG: 0.5 TABLET ORAL at 03:03

## 2024-03-20 RX ADMIN — FAMOTIDINE 20 MG: 20 TABLET, FILM COATED ORAL at 09:03

## 2024-03-20 RX ADMIN — LAMOTRIGINE 100 MG: 100 TABLET ORAL at 09:03

## 2024-03-20 RX ADMIN — ATORVASTATIN CALCIUM 80 MG: 40 TABLET, FILM COATED ORAL at 09:03

## 2024-03-20 RX ADMIN — OXYCODONE 5 MG: 5 TABLET ORAL at 09:03

## 2024-03-20 NOTE — ASSESSMENT & PLAN NOTE
Home regimen: metformin, novalog 10u TID, toujeo 60u qhs, mounjaro weekly     Patient's FSGs are controlled on current medication regimen.  Last A1c reviewed-   Lab Results   Component Value Date    HGBA1C 7.3 (H) 03/19/2024     Most recent fingerstick glucose reviewed-   Recent Labs   Lab 03/19/24  1300 03/19/24  1720 03/19/24  2053 03/20/24  0900   POCTGLUCOSE 109 100 159* 97       Current correctional scale  Low  Maintain anti-hyperglycemic dose as follows-   Antihyperglycemics (From admission, onward)      Start     Stop Route Frequency Ordered    03/18/24 2100  insulin detemir U-100 (Levemir) pen 33 Units         -- SubQ Nightly 03/18/24 0910    03/18/24 1006  insulin aspart U-100 pen 0-5 Units         -- SubQ Before meals & nightly PRN 03/18/24 0909          Blood glucose controlled on current regimen, will continue to monitor and adjust as necessary  - On discharge, recommend continuing home regimen then holding prandial insulin and metformin day of surgery and reduce long acting to half of normal dose night prior to surgery  - Recommend consulting medicine or endocrine for further management for insulin resumption/titration post-operatively

## 2024-03-20 NOTE — ASSESSMENT & PLAN NOTE
- Differential: Pneumonia, Pulmonary Embolism, Pulmonary Edema, MI,   - Saturating % on RA   - CXR: Neg   - CTA: No central pulmonary thromboembolism. Segmental and subsegmental pulmonary arteries are not well evaluated. Eccentric filling defect in a subsegmental branch of the right lower lobe.  Not optimally evaluated due to above limitations. Small left pleural effusion. Moderate volume upper abdominal ascites.  - Bilateral LE Dopplers: without signs of DVT  - Covid: Negative  - Flu: Negative  - EKG: Normal sinus rhythm, abnormal R wave progression, low voltage QRS  - Overall patient does not appear to be in acute distress and is sating % on RA   - Suspicious that SOB is related to accumulation of ascites. Following IR paracentesis, will re-evaluate patient for improvement. If not improved, will continue to determine source of SOB.  - Echo: ejection fraction of 50 - 55%. Left atrium is mildly dilated. There is mild regurgitation. Pericardium: Left pleural effusion. Ascites present.  - IR Paracentesis 3/19 with 3.6L removed   - Patient with significant symptomatic improvement

## 2024-03-20 NOTE — ASSESSMENT & PLAN NOTE
Cardiovascular Risk Assessment:  Active cardiac issues:  Active decompensated heart failure? No   Unstable angina?  No   Significant uncontrolled arrhythmias? No   Severe valvular heart disease-Aortic or Mitral Stenosis? No   Recent MI or coronary revascularization < 30 days? No     Other Issues:       Anticoagulation: N/A       Coronary Stenting: N/A    Revised Cardiac Risk Index   High risk surgery: Yes  History of ischemic heart disease: No  History of congestive heart failure: No  History of stroke: No  Insulin dependent diabetes: Yes  Cr > 2: No  2 points, class III risk, 10.1% risk of cardiac event      RCRI Calculator Class and Risk percentage: 2 points, class III risk, 10.1% risk of cardiac event    Cardiac  - Denies chest pain  - EKG sinus tach  - Echo w/ 50-55% EF and normal CVP   - Recommend holding enalapril and HCTZ morning of surgery   Pulmonary  - Reports SOB improved following paracentesis  - No O2 needs   Endocrine  - See 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  - Optimizing blood glucose  - On discharge, recommend continuing home regimen then holding prandial insulin and metformin day of surgery and reduce long acting to half of normal dose night prior to surgery  - Recommend consulting medicine or endocrine for further management for insulin resumption/titration post-operatively   Renal  - Cr stable  - Recommend post-op labs to ensure stable  - Maintain euvolemia  DVT ppx  - Hold as necessary and resume as able  - Does not smoke or use alcohol  - No prior reaction to anesthesia per pt

## 2024-03-20 NOTE — PLAN OF CARE
Pt AAOX4, VSS, afebrile, medications tolerated, pt walks independently, bed alarm refused, family at bedside, diabetic diet with adequate intake, blood glucose monitoring performed, scheduled insulin given, IMANI hose and SCD's applied and assessed, pt c/o chest pain that radiates to her back, MD notified and EKG ordered, results were sinus tachy, MD ordered 40mg IV protonix push and simethicone for patient, prn ativan also given, pt reported medications were effective, I/O recorded, frequent rounds performed, pt in bed resting, call light in reach, pt instructed to call for assistance, NAD, safety maintained.

## 2024-03-20 NOTE — ASSESSMENT & PLAN NOTE
- Leukocytosis of 13, Tachycardia, LA of 2.5  - Patient meets criteria for sepsis, however based on review of previous admissions and prior clinic visits, the patient has had a leukocytosis and tachycardia  - Physical Exam: Abdomen distended, however not peritoneal, and without rebound or guarding  - Patient received one dose of Vancomycin and Zosyn in the ED  - Low suspicion for SBP or other causes of infection at this time given benign abdominal exam and prior occurences of tachycardia and leukocytosis  - LA: 2.5 > 2.0  - CBC: 13/8.5/27/631 > 13/8.3/26.3/608 > 14.9/8/25.4/604   - CXR: Neg   - UA: Neg   - Blood Cultures: in process   - Urine Cultures: in process  - COVID: Negative  - Influenza: Negative  - Vanc/Zosyn administered in ED   - IVF: None   - Paracentesis 3/19 with 3.6L removed    - Fluids studies not consistent with SBP

## 2024-03-20 NOTE — ASSESSMENT & PLAN NOTE
- Cr: 1.2 yesterday  - Baseline Cr: 0.7-0.8  - Repeat BMP:    - Na: 130   - Cr: 0.9   - Urine Na: 90    - Urine Cr: 37   - FeNa: 1.7% - indeterminate; could be intrinsic or prerenal     Plan:  - JASON resolved  - Repeat BMP pending this AM

## 2024-03-20 NOTE — ASSESSMENT & PLAN NOTE
Chronic, controlled. Latest blood pressure and vitals reviewed-     Temp:  [97.4 °F (36.3 °C)-99.4 °F (37.4 °C)]   Pulse:  []   Resp:  [16-18]   BP: (112-150)/(56-74)   SpO2:  [92 %-100 %] .   Home meds for hypertension were reviewed and noted below.   Hypertension Medications               amlodipine (NORVASC) 10 MG tablet Take 10 mg by mouth once daily.     enalapril (VASOTEC) 20 MG tablet Take 20 mg by mouth once daily.    hydrochlorothiazide (HYDRODIURIL) 25 MG tablet Take 25 mg by mouth once daily.             - While in the hospital, will manage blood pressure as follows; Adjust home antihypertensive regimen as follows- hold home meds in setting of normotension, resume when clinically appropriate   - Will utilize p.r.n. blood pressure medication only if patient's blood pressure greater than 180/110 and she develops symptoms such as worsening chest pain or shortness of breat  - Recommend holding enalapril and HCTZ morning of surgery

## 2024-03-20 NOTE — ASSESSMENT & PLAN NOTE
- Differential: Ascites, SBP  - Physical exam: non-peritoneal, no rebound or guarding noted  - Patient has had 3 paracentesis with 3.5-5L removed each time  - Last Paracentesis 3/13: 3525cc removed  - Patient is scheduled for repeat paracentesis 3/21/24  - Attempted paracentesis in ED without fluid return  - S/P lasix 20mg IV in the ED  - IR Paracentesis 3/19 with 3.6L removed   - Fluid studies WNL as above   - No SBP

## 2024-03-20 NOTE — PROGRESS NOTES
[facility] - Oncology ([facility])  [facility] Medicine  Progress Note    Patient Name: Anette Ricci  MRN: [id number]  Patient Class: IP- Inpatient   Admission Date: 3/17/2024  Length of Stay: 2 days  Attending Physician: Александр Washington MD  Primary Care Provider: Mark Chua MD        Subjective:     Principal Problem:Malignant ascites        HPI:  Anette Ricci is a 57 yo F w/ hx of metastatic carcinosarcoma, breast cancer, seizures, T2DM, HTN, and HLD who presented with presented to the ED with SOB and edema to her bilateral lower extremities. Per chart review, she first noticed increased leg swelling and then became acutely short of breath present at rest and on exertion. Admitted to Gyn Onc for further evaluation. She is scheduled for primary debulking on 3/25/24. Prior workup done includes EKG which showed sinus tach, echo w/ EF of 50-55% and normal CVP, CTA w/ no evidence of PE but moderate ascites seen, and BLE US w/ no evidence of DVTs. Pt is currently undergoing a paracentesis. Medicine consulted for pre-op optimization.      At the time of the consult, pt is slightly hypertensive (150s/80) but other VSS, satting well on RA, and afebrile. Pt reports improvement in her SOB following paracentesis.               Overview/Hospital Course:  No notes on file    Interval History: NAEON. VSS. Afebrile. Reports SOB has improved. Able to ambulate w/ no SOB. Plan per primary team to d/c today and return for surgery Monday.     Review of Systems   Constitutional:  Positive for activity change, appetite change and fatigue. Negative for chills and fever.   Respiratory:  Positive for shortness of breath (improved). Negative for cough.    Cardiovascular:  Positive for leg swelling. Negative for chest pain.   Gastrointestinal:  Positive for abdominal distention and nausea. Negative for abdominal pain, diarrhea and vomiting.   Musculoskeletal:  Positive for myalgias (left thigh).   Objective:     Vital  Signs (Most Recent):  Temp: 98.8 °F (37.1 °C) (03/20/24 0733)  Pulse: 102 (03/20/24 0733)  Resp: 18 (03/20/24 0910)  BP: (!) 113/56 (03/20/24 0733)  SpO2: 95 % (03/20/24 0733) Vital Signs (24h Range):  Temp:  [97.4 °F (36.3 °C)-99.4 °F (37.4 °C)] 98.8 °F (37.1 °C)  Pulse:  [] 102  Resp:  [16-18] 18  SpO2:  [92 %-100 %] 95 %  BP: (112-150)/(56-74) 113/56     Weight: 95.6 kg (210 lb 12.2 oz)  Body mass index is 33.01 kg/m².    Intake/Output Summary (Last 24 hours) at 3/20/2024 0998  Last data filed at 3/20/2024 0433  Gross per 24 hour   Intake 988 ml   Output 1050 ml   Net -62 ml         Physical Exam  Vitals and nursing note reviewed.   Constitutional:       General: She is not in acute distress.     Appearance: Normal appearance. She is ill-appearing. She is not toxic-appearing or diaphoretic.   HENT:      Head: Normocephalic and atraumatic.      Mouth/Throat:      Mouth: Mucous membranes are moist.   Eyes:      Conjunctiva/sclera: Conjunctivae normal.   Cardiovascular:      Rate and Rhythm: Normal rate and regular rhythm.   Pulmonary:      Effort: Pulmonary effort is normal. No respiratory distress.      Breath sounds: No wheezing or rales.   Chest:      Chest wall: No tenderness.   Abdominal:      General: There is distension.      Tenderness: There is no abdominal tenderness.   Musculoskeletal:         General: No swelling or tenderness.      Right lower leg: Edema present.      Left lower leg: Edema present.   Skin:     General: Skin is warm and dry.   Neurological:      Mental Status: She is alert and oriented to person, place, and time.         Significant Labs: All pertinent labs within the past 24 hours have been reviewed.    Significant Imaging: I have reviewed all pertinent imaging results/findings within the past 24 hours.    Assessment/Plan:      Preop examination  Cardiovascular Risk Assessment:  Active cardiac issues:  Active decompensated heart failure? No   Unstable angina?  No   Significant  uncontrolled arrhythmias? No   Severe valvular heart disease-Aortic or Mitral Stenosis? No   Recent MI or coronary revascularization < 30 days? No     Other Issues:       Anticoagulation: N/A       Coronary Stenting: N/A    Revised Cardiac Risk Index   High risk surgery: Yes  History of ischemic heart disease: No  History of congestive heart failure: No  History of stroke: No  Insulin dependent diabetes: Yes  Cr > 2: No  2 points, class III risk, 10.1% risk of cardiac event      RCRI Calculator Class and Risk percentage: 2 points, class III risk, 10.1% risk of cardiac event    Cardiac  - Denies chest pain  - EKG sinus tach  - Echo w/ 50-55% EF and normal CVP   - Recommend holding enalapril and HCTZ morning of surgery   Pulmonary  - Reports SOB improved following paracentesis  - No O2 needs   Endocrine  - See 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  - Optimizing blood glucose  - On discharge, recommend continuing home regimen then holding prandial insulin and metformin day of surgery and reduce long acting to half of normal dose night prior to surgery  - Recommend consulting medicine or endocrine for further management for insulin resumption/titration post-operatively   Renal  - Cr stable  - Recommend post-op labs to ensure stable  - Maintain euvolemia  DVT ppx  - Hold as necessary and resume as able  - Does not smoke or use alcohol  - No prior reaction to anesthesia per pt           Carcinosarcoma  - Per gyn onc  - Scheduled for primary debulking with Dr. Washington 3/25/24 followed by adjuvant chemotherapy   - Medical optimization  - See preop exam       Hypertension  Chronic, controlled. Latest blood pressure and vitals reviewed-     Temp:  [97.4 °F (36.3 °C)-99.4 °F (37.4 °C)]   Pulse:  []   Resp:  [16-18]   BP: (112-150)/(56-74)   SpO2:  [92 %-100 %] .   Home meds for hypertension were reviewed and noted below.   Hypertension Medications                amlodipine (NORVASC) 10 MG tablet Take 10 mg by mouth once daily.     enalapril (VASOTEC) 20 MG tablet Take 20 mg by mouth once daily.    hydrochlorothiazide (HYDRODIURIL) 25 MG tablet Take 25 mg by mouth once daily.             - While in the hospital, will manage blood pressure as follows; Adjust home antihypertensive regimen as follows- hold home meds in setting of normotension, resume when clinically appropriate   - Will utilize p.r.n. 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  Last A1c reviewed-   Lab Results   Component Value Date    HGBA1C 7.3 (H) 03/19/2024     Most recent fingerstick glucose reviewed-   Recent Labs   Lab 03/19/24  1300 03/19/24  1720 03/19/24  2053 03/20/24  0900   POCTGLUCOSE 109 100 159* 97       Current correctional scale  Low  Maintain anti-hyperglycemic dose as follows-   Antihyperglycemics (From admission, onward)      Start     Stop Route Frequency Ordered    03/18/24 2100  insulin detemir U-100 (Levemir) pen 33 Units         -- SubQ Nightly 03/18/24 0910    03/18/24 1006  insulin aspart U-100 pen 0-5 Units         -- SubQ Before meals & nightly PRN 03/18/24 0909          Blood glucose controlled on current regimen, will continue to monitor and adjust as necessary  - On discharge, recommend continuing home regimen then holding prandial insulin and metformin day of surgery and reduce long acting to half of normal dose night prior to surgery  - Recommend consulting medicine or endocrine for further management for insulin resumption/titration post-operatively       VTE Risk Mitigation  (From admission, onward)           Ordered     enoxaparin injection 40 mg  Every 24 hours         03/19/24 0906     IP VTE HIGH RISK PATIENT  Once         03/18/24 0911     Place sequential compression device  Until discontinued         03/18/24 0911                    Discharge Planning   ROS: 3/20/2024     Code Status: Full Code   Is the patient medically ready for discharge?:     Reason for patient still in hospital (select all that apply): Treatment                     France Urbina MD  Department of Hospital Medicine   [facility] - Oncology ([facility])

## 2024-03-20 NOTE — SUBJECTIVE & OBJECTIVE
Interval History:   Patient reports 1 episode of chest pain overnight. She stated that the pain was in her epigastric region and radiated to her back. She states that the pain was aggravated by deep breathing. EKG with sinus tachycardia, anterolateral infarct (age undetermined), similar to previous. Patient reports that her pain improved after protonix, simethicone. Patient reports that overall her SOB has improved since her paracentesis yesterday. She reports that her pain is well controlled with oral pain medications. She required 1 oxy 10mg yesterday (1400) and 1 oxy 5mg overnight (0230). Patient reports that she is ambulating without difficulty and she is voiding spontaneously with a UOP of 0.7cc/kg/hr. She reports that her last BM was Sunday. She is eating and drinking without nausea or vomiting, however she reports that her appetite is diminished.     Oncology Treatment Plan:   OP BREAST PACLITAXEL CARBOPLATIN Q3W    Oncology History:   2013: ER+ Carcinoma of R Breast   S/p Lympectomy, SLNB- Grade 1 Well Differentiated Invasive Ductal Carcinoma with Associated Ductal Carcinoma in Situ, Negative Margins/ SLNB Negative                                       ER+ 90%, VT+ 90%, HER2 Neg                           TII N0 Stage 2A   [id number]: Tamoxifen   07/2019-06/2021: Letrozole   11/2023: Mammogram Neg   02/06/2024: ED Nathan Marinelli (Abdominal Bloating, Constipation)- imaging consistent with multiple peritoneal masses (11 cm in LUQ), large volume ascites   02/21/2024: PET consistent with multifocal hypermetabolic soft tissue masses throughout the peritoneum including lesion along posterior aspect of right hepatic lobe, moderate to large volume ascites   02/29: IR paracentesis 5L removed   03/04: 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, paracentesis 2.4L removed               Cells positive for PAX8, CK7, AE1/AE3, SMA, CK5/6, VT,               PD1 and molecular studies pending                No loss of nuclear expression of MMR (low probability of microsatelllite instability-high   03/06: : 418   03/13: IR paracentesis 3.5L   03/21: Scheduled for IR paracentesis   03/25: Scheduled for primary debulking followed by adjuvant chemotherapy     Past Medical History:   Diagnosis Date    Breast cancer 2013    Diabetes mellitus     Genetic testing 05/29/2013    VUS    Hyperlipidemia     Hypertension     Malignant neoplasm of upper-outer quadrant of right breast in female, estrogen receptor positive 12/02/2015    Seizure disorder      Past Surgical History:   Procedure Laterality Date    BREAST BIOPSY      BREAST LUMPECTOMY Right 2013    EYE SURGERY      PERITONEOCENTESIS N/A 3/13/2024    Procedure: PARACENTESIS, ABDOMINAL;  Surgeon: Flavia Moore MD;  Location: [facility], [place] CATH LAB;  Service: Radiology;  Laterality: N/A;    TOTAL REDUCTION MAMMOPLASTY Bilateral 2016     Family History       Problem Relation (Age of Onset)    Breast cancer Sister (48)    Seizures Father          Tobacco Use    Smoking status: Never    Smokeless tobacco: Never   Substance and Sexual Activity    Alcohol use: Yes     Alcohol/week: 0.0 standard drinks of alcohol     Comment: ocassional    Drug use: No    Sexual activity: Yes     Partners: Male     Birth control/protection: None       PTA Medications   Medication Sig    amlodipine (NORVASC) 10 MG tablet Take 10 mg by mouth once daily.     atorvastatin (LIPITOR) 40 MG tablet Take 80 mg by mouth once daily.    enalapril (VASOTEC) 20 MG tablet Take 20 mg by mouth once daily.    fluticasone propionate (FLONASE) 50 mcg/actuation nasal spray 1 spray by Each Nostril route once daily.    hydrochlorothiazide (HYDRODIURIL) 25 MG tablet Take 25 mg by mouth once daily.     HYDROcodone-acetaminophen (NORCO) 5-325 mg per tablet Take 1 tablet by mouth every 6 (six) hours as needed for Pain.    ibuprofen (ADVIL,MOTRIN) 800 MG tablet Take 800 mg by mouth every 6 (six) hours as needed  for Pain.    insulin glargine, TOUJEO, (TOUJEO SOLOSTAR U-300 INSULIN) 300 unit/mL (1.5 mL) InPn pen Inject 60 Units into the skin every evening.    ketorolac 0.5% (ACULAR) 0.5 % Drop Place 1 drop into both eyes. For pain after eye injections.    lamoTRIgine (LAMICTAL) 25 MG tablet Take 25 mg by mouth 2 (two) times daily.    lorazepam (ATIVAN) 0.5 MG tablet Take 0.5 mg by mouth every 12 (twelve) hours as needed for Anxiety.    metformin (GLUCOPHAGE) 1000 MG tablet Take 1,000 mg by mouth daily with breakfast.     NOVOLOG FLEXPEN 100 unit/mL InPn pen Inject 5 Units into the skin 2 (two) times daily with meals. With biggest meal    quetiapine (SEROQUEL) 25 MG Tab Take 25 mg by mouth daily as needed.    tirzepatide (MOUNJARO) 5 mg/0.5 mL PnIj Inject 5 mg into the skin every 7 days. On Sunday.         Review of patient's allergies indicates:   Allergen Reactions    Codeine        Review of Systems   Constitutional:  Negative for chills and fever.   Eyes:  Negative for visual disturbance.   Respiratory:  Positive for shortness of breath. Negative for cough.    Cardiovascular:  Positive for leg swelling. Negative for chest pain and palpitations.   Gastrointestinal:  Positive for abdominal pain and constipation. Negative for diarrhea, nausea and vomiting.   Genitourinary:  Negative for dysuria, hematuria, vaginal bleeding and vaginal discharge.   Musculoskeletal:  Negative for back pain.   Neurological:  Negative for headaches.      Objective:     Vital Signs (Most Recent):  Temp: 99.1 °F (37.3 °C) (03/20/24 0438)  Pulse: 106 (03/20/24 0438)  Resp: 18 (03/20/24 0438)  BP: (!) 150/74 (03/20/24 0438)  SpO2: (!) 92 % (03/20/24 0438) Vital Signs (24h Range):  Temp:  [97.4 °F (36.3 °C)-99.4 °F (37.4 °C)] 99.1 °F (37.3 °C)  Pulse:  [] 106  Resp:  [15-20] 18  SpO2:  [92 %-100 %] 92 %  BP: (112-152)/(67-80) 150/74     Weight: 95.6 kg (210 lb 12.2 oz)  Body mass index is 33.01 kg/m².       Physical Exam:   Constitutional: She  is oriented to person, place, and time. She appears well-developed and well-nourished.    HENT:   Head: Normocephalic and atraumatic.      Cardiovascular:  Normal rate, regular rhythm and normal heart sounds.      Exam reveals edema (1+ to the BLE (improving)).        Pulmonary/Chest: Effort normal and breath sounds normal. No respiratory distress.        Abdominal: Soft. She exhibits distension (abdominal distension noted, however greatly improved since paracentesis yesterday). There is no abdominal tenderness. There is no rebound and no guarding.   Bowel sounds active             Musculoskeletal: Normal range of motion.       Neurological: She is alert and oriented to person, place, and time.    Skin: Skin is warm and dry.    Psychiatric: She has a normal mood and affect. Her behavior is normal. Thought content normal.          Laboratory:  Recent Labs   Lab 03/17/24 2357 03/17/24 2358 03/18/24  1102 03/19/24  0454   WBC 13.43*  --  13.51* 14.92*   HGB 8.5*  --  8.3* 8.0*   HCT 27.4* 28* 26.3* 25.4*   MCV 81*  --  82 81*   *  --  608* 604*      Recent Labs   Lab 03/17/24 2357 03/18/24  1102   * 130*   K 4.0 3.7   CL 93* 91*   CO2 25 28   BUN 16 15   CREATININE 1.2 0.9   * 236*   PROT 6.4  --    BILITOT 0.6  --    ALKPHOS 100  --    ALT 9*  --    AST 28  --       Lactic Acid: 2.5 > 2.0    Lipase: 6    UA Reflex to Culture: WNL    Urine Culture: pending    Blood Cultures: pending    COVID: Negative    Influenza: Negative    Urine Cr and Na: pending    Ascites Fluid Studies:   Results for orders placed or performed during the hospital encounter of 03/17/24   WBC & Diff,Body Fluid Ascites   Result Value Ref Range    Body Fluid Type Ascites     Fluid Appearance Cloudy     Fluid Color Honey     WBC, Body Fluid 981 /cu mm    Segs, Fluid 39 %    Lymphs, Fluid 31 %    Monocytes/Macrophages, Fluid 30 %     Results for orders placed or performed during the hospital encounter of 03/17/24   Albumin,  Peritoneal, Pleural Fluid or DENISE Drainage, In-House Ascites   Result Value Ref Range    Body Fluid Source, Albumin Ascites     Body Fluid, Albumin 1.5 See text g/dL    Narrative    To rule out SBP order labs: Aerobic culture [OMB824],  Culture, Anaerobic [UYF181], Gram stain [VIA016], Albumin  [FDI628], Protein [WRC655], LDH [XGR990], WBC \T\ Dff  [[id number]].     Results for orders placed or performed during the hospital encounter of 03/17/24   Protein, Peritoneal, Pleural Fluid or DENISE Drainage, In-House Ascites   Result Value Ref Range    Body Fluid Source, Total Protein Ascites     Body Fluid, Protein 3.9 Not established g/dL    Narrative    To rule out SBP order labs: Aerobic culture [ZWY960],  Culture, Anaerobic [MBD349], Gram stain [XEP585], Albumin  [ZEC629], Protein [QCL463], LDH [[id number]], WBC \T\ Dff  [[id number]].       Diagnostic Results:  Results for orders placed or performed during the hospital encounter of 03/17/24   X-Ray Chest AP Portable    Narrative    EXAMINATION:  XR CHEST AP PORTABLE    CLINICAL HISTORY:  SOB;    TECHNIQUE:  Single frontal view of the chest was performed.    COMPARISON:  05/26/2021.    FINDINGS:  Left-sided port catheter present with tip overlying the upper right atrium.  No pneumothorax.    Underinflated lungs with hypoventilatory change.  No consolidation or pleural effusion.    Heart and lungs otherwise appear unchanged when allowing for differences in technique and positioning.      Impression    As above.      Electronically signed by: Eduard Araujo MD  Date:    03/18/2024  Time:    01:48     Results for orders placed or performed during the hospital encounter of 03/17/24   CTA Chest Non-Coronary (PE Studies)    Narrative    EXAMINATION:  CTA CHEST NON CORONARY (PE STUDIES)    CLINICAL HISTORY:  Pulmonary embolism (PE) suspected, unknown D-dimer;    TECHNIQUE:  Low dose axial images, sagittal and coronal reformations were obtained from the thoracic inlet to the lung bases  following the IV administration of 100 mL of Omnipaque 350.  Contrast timing was optimized to evaluate the pulmonary arteries.    COMPARISON:  Chest radiograph 03/18/2024.    FINDINGS:  PULMONARY VASCULATURE: Suboptimal evaluation of the segmental and subsegmental pulmonary arteries secondary to streak artifact from dense contrast bolus, respiratory motion artifact, and suboptimal contrast mixing.The pulmonary arteries distribute normally without evidence of filling defect through the level of the lobar pulmonary arteries to indicate pulmonary thromboembolism.  Eccentric filling defect in a subsegmental branch of the right lower lobe.    Base of Neck:  No significant abnormality.    Thoracic soft tissues:  Left anterior wall chest port.  Calcifications in the bilateral breasts.  Partially calcified 3 cm mass right breast, similar compared to PET-CT 02/21/2024.  Correlation with prior mammographic studies is recommended.  There is scattered soft tissue edema.    Aorta: Left-sided 3 vessel non-aneurysmal aortic arch with mild calcific atherosclerosis.    Heart/Pericardium: Heart is not enlarged.  No pericardial effusion.  Multivessel coronary artery calcifications.    Sita/Mediastinum:  No hilar or mediastinal lymphadenopathy.    Airways:  Trachea is midline and proximal airways are patent without significant abnormality.    Pleura/Lungs: No pneumothorax.  Small left pleural effusion.  No suspicious pulmonary mass.    Esophagus: Normal in course and caliber.  Small hiatal hernia.    Upper abdomen: Partially imaged moderate volume upper abdominal ascites.    Bones:  Degenerative changes of the visualized osseous structures without acute fracture or lytic or sclerotic lesions.      Impression    Study limitations as above.  No central pulmonary thromboembolism through the level of the lobar pulmonary arterial branches.  Segmental and subsegmental pulmonary arteries are not well evaluated.    Eccentric filling defect in  a subsegmental branch of the right lower lobe.  Not optimally evaluated due to above limitations.  Typically, eccentric filling defects like this are more typical of chronic pulmonary embolism changes.  Correlate clinically.    Small left pleural effusion.    Moderate volume upper abdominal ascites.    Calcifications in the bilateral breasts.  Partially calcified 3 cm mass right breast, similar compared to PET-CT 02/21/2024.  Correlation with prior mammographic studies is recommended.    Additional findings in the body of the report.    Electronically signed by resident: Kit Zuñiga  Date:    03/18/2024  Time:    03:11    Electronically signed by: Eduard Araujo MD  Date:    03/18/2024  Time:    03:56     Results for orders placed or performed during the hospital encounter of 03/17/24   US Lower Extremity Veins Bilateral    Narrative    EXAMINATION:  US LOWER EXTREMITY VEINS BILATERAL    CLINICAL HISTORY:  Other specified soft tissue disorders    TECHNIQUE:  Duplex and color flow Doppler and dynamic compression was performed of the bilateral lower extremity veins was performed.    COMPARISON:  None    FINDINGS:  Right thigh veins: The common femoral, femoral, popliteal, upper greater saphenous, and deep femoral veins are patent and free of thrombus. The veins are normally compressible and have normal phasic flow and augmentation response.    Right calf veins: The visualized calf veins are patent.    Left thigh veins: The common femoral, femoral, popliteal, upper greater saphenous, and deep femoral veins are patent and free of thrombus. The veins are normally compressible and have normal phasic flow and augmentation response.    Left calf veins: The visualized calf veins are patent.    Miscellaneous: None      Impression    No evidence of deep venous thrombosis in either lower extremity.      Electronically signed by: Abdullahi Herrera MD  Date:    03/18/2024  Time:    09:42     Echo 3/18/24:     Left Ventricle:  There is low normal systolic function with a visually estimated ejection fraction of 50 - 55%. Biplane (2D) method of discs ejection fraction is 50%. Global longitudinal strain is -18.0%.    Right Ventricle: Normal right ventricular cavity size. Wall thickness is normal. Right ventricle wall motion  is normal. Systolic function is normal.    Left Atrium: Left atrium is mildly dilated.    Mitral Valve: There is mild regurgitation.    IVC/SVC: Normal venous pressure at 3 mmHg.    Pericardium: Left pleural effusion. Ascites present.

## 2024-03-20 NOTE — ASSESSMENT & PLAN NOTE
2013: ER+ Carcinoma of R Breast   S/p Lympectomy, SLNB- Grade 1 Well Differentiated Invasive Ductal Carcinoma with Associated Ductal Carcinoma in Situ, Negative Margins/ SLNB Negative                                       ER+ 90%, OR+ 90%, HER2 Neg                           TII N0 Stage 2A   [id number]: Tamoxifen   07/2019-06/2021: Letrozole   11/2023: Mammogram Negative

## 2024-03-20 NOTE — PROGRESS NOTES
[facility] - Oncology ([facility])  Gynecologic Oncology  Progress Note      Patient Name: Anette Ricci  MRN: [id number]  Admission Date: 3/17/2024  Hospital Length of Stay: 2 days  Attending Provider: Александр Washington MD  Primary Care Provider: Mark Chua MD  Principal Problem: Malignant ascites    Follow-up For: * No surgery found *  Post-Operative Day:    Subjective:      History of Present Illness:  Patient is a 57 y/o  with a history of metastatic carcinosarcoma with scheduled primary debulking scheduled for 3/25/24 with Dr. Washington, who initially presented to the ED with SOB and edema to her bilateral lower extremities, onset 3/15/23. Patient reports that she first noticed the increased leg swelling and then she began to get acutely short of breath. She states that she is short of breath with exertion and at rest. She is reporting intermittent constipation and states that her last BM was yesterday after taking Miralax. She states that her BM was hard and difficult to pass. She denies any fevers, chills, CP, URI symptoms, pain with urination, nausea, vomiting, or vaginal bleeding.     Patient is postmenopausal as of 2018 and reports no vaginal bleeding since that time    Hospital Course:  3/18/2024 HD1: Patient admitted to the GYN Oncology service for management of SOB/Edema/Ascites and Sepsis. Patient got 1 dose of Vanc/Zosyn in the ED, but due to low suspicion for infection etiology this was discontinued. ECHO, EKG, CTA, BLE Dopplers and Paracentesis ordered.   2024 HD2: Patient well overnight. Paracentesis with 3.6L removed. Fluids studies not consistent with SBP  2024 HD3: Episode of CP overnight, however relieve with protonix and simethicone.    Interval History:   Patient reports 1 episode of chest pain overnight. She stated that the pain was in her epigastric region and radiated to her back. She states that the pain was aggravated by deep breathing. EKG with sinus  tachycardia, anterolateral infarct (age undetermined), similar to previous. Patient reports that her pain improved after protonix, simethicone. Patient reports that overall her SOB has improved since her paracentesis yesterday. She reports that her pain is well controlled with oral pain medications. She required 1 oxy 10mg yesterday (1400) and 1 oxy 5mg overnight (0230). Patient reports that she is ambulating without difficulty and she is voiding spontaneously with a UOP of 0.7cc/kg/hr. She reports that her last BM was Sunday. She is eating and drinking without nausea or vomiting, however she reports that her appetite is diminished.     Oncology Treatment Plan:   OP BREAST PACLITAXEL CARBOPLATIN Q3W    Oncology History:   2013: ER+ Carcinoma of R Breast   S/p Lympectomy, SLNB- Grade 1 Well Differentiated Invasive Ductal Carcinoma with Associated Ductal Carcinoma in Situ, Negative Margins/ SLNB Negative                                       ER+ 90%, NH+ 90%, HER2 Neg                           TII N0 Stage 2A   [id number]: Tamoxifen   07/2019-06/2021: Letrozole   11/2023: Mammogram Neg   02/06/2024: ED Nathan Marinelli (Abdominal Bloating, Constipation)- imaging consistent with multiple peritoneal masses (11 cm in LUQ), large volume ascites   02/21/2024: PET consistent with multifocal hypermetabolic soft tissue masses throughout the peritoneum including lesion along posterior aspect of right hepatic lobe, moderate to large volume ascites   02/29: IR paracentesis 5L removed   03/04: 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, paracentesis 2.4L removed               Cells positive for PAX8, CK7, AE1/AE3, SMA, CK5/6, NH,               PD1 and molecular studies pending               No loss of nuclear expression of MMR (low probability of microsatelllite instability-high   03/06: : 418   03/13: IR paracentesis 3.5L   03/21: Scheduled for IR paracentesis   03/25: Scheduled for primary  debulking followed by adjuvant chemotherapy     Past Medical History:   Diagnosis Date    Breast cancer 2013    Diabetes mellitus     Genetic testing 05/29/2013    VUS    Hyperlipidemia     Hypertension     Malignant neoplasm of upper-outer quadrant of right breast in female, estrogen receptor positive 12/02/2015    Seizure disorder      Past Surgical History:   Procedure Laterality Date    BREAST BIOPSY      BREAST LUMPECTOMY Right 2013    EYE SURGERY      PERITONEOCENTESIS N/A 3/13/2024    Procedure: PARACENTESIS, ABDOMINAL;  Surgeon: Flavia Moore MD;  Location: [facility] CATH LAB;  Service: Radiology;  Laterality: N/A;    TOTAL REDUCTION MAMMOPLASTY Bilateral 2016     Family History       Problem Relation (Age of Onset)    Breast cancer Sister (48)    Seizures Father          Tobacco Use    Smoking status: Never    Smokeless tobacco: Never   Substance and Sexual Activity    Alcohol use: Yes     Alcohol/week: 0.0 standard drinks of alcohol     Comment: ocassional    Drug use: No    Sexual activity: Yes     Partners: Male     Birth control/protection: None       PTA Medications   Medication Sig    amlodipine (NORVASC) 10 MG tablet Take 10 mg by mouth once daily.     atorvastatin (LIPITOR) 40 MG tablet Take 80 mg by mouth once daily.    enalapril (VASOTEC) 20 MG tablet Take 20 mg by mouth once daily.    fluticasone propionate (FLONASE) 50 mcg/actuation nasal spray 1 spray by Each Nostril route once daily.    hydrochlorothiazide (HYDRODIURIL) 25 MG tablet Take 25 mg by mouth once daily.     HYDROcodone-acetaminophen (NORCO) 5-325 mg per tablet Take 1 tablet by mouth every 6 (six) hours as needed for Pain.    ibuprofen (ADVIL,MOTRIN) 800 MG tablet Take 800 mg by mouth every 6 (six) hours as needed for Pain.    insulin glargine, TOUJEO, (TOUJEO SOLOSTAR U-300 INSULIN) 300 unit/mL (1.5 mL) InPn pen Inject 60 Units into the skin every evening.    ketorolac 0.5% (ACULAR) 0.5 % Drop Place 1 drop into both eyes. For pain  after eye injections.    lamoTRIgine (LAMICTAL) 25 MG tablet Take 25 mg by mouth 2 (two) times daily.    lorazepam (ATIVAN) 0.5 MG tablet Take 0.5 mg by mouth every 12 (twelve) hours as needed for Anxiety.    metformin (GLUCOPHAGE) 1000 MG tablet Take 1,000 mg by mouth daily with breakfast.     NOVOLOG FLEXPEN 100 unit/mL InPn pen Inject 5 Units into the skin 2 (two) times daily with meals. With biggest meal    quetiapine (SEROQUEL) 25 MG Tab Take 25 mg by mouth daily as needed.    tirzepatide (MOUNJARO) 5 mg/0.5 mL PnIj Inject 5 mg into the skin every 7 days. On Sunday.         Review of patient's allergies indicates:   Allergen Reactions    Codeine        Review of Systems   Constitutional:  Negative for chills and fever.   Eyes:  Negative for visual disturbance.   Respiratory:  Positive for shortness of breath. Negative for cough.    Cardiovascular:  Positive for leg swelling. Negative for chest pain and palpitations.   Gastrointestinal:  Positive for abdominal pain and constipation. Negative for diarrhea, nausea and vomiting.   Genitourinary:  Negative for dysuria, hematuria, vaginal bleeding and vaginal discharge.   Musculoskeletal:  Negative for back pain.   Neurological:  Negative for headaches.      Objective:     Vital Signs (Most Recent):  Temp: 99.1 °F (37.3 °C) (03/20/24 0438)  Pulse: 106 (03/20/24 0438)  Resp: 18 (03/20/24 0438)  BP: (!) 150/74 (03/20/24 0438)  SpO2: (!) 92 % (03/20/24 0438) Vital Signs (24h Range):  Temp:  [97.4 °F (36.3 °C)-99.4 °F (37.4 °C)] 99.1 °F (37.3 °C)  Pulse:  [] 106  Resp:  [15-20] 18  SpO2:  [92 %-100 %] 92 %  BP: (112-152)/(67-80) 150/74     Weight: 95.6 kg (210 lb 12.2 oz)  Body mass index is 33.01 kg/m².       Physical Exam:   Constitutional: She is oriented to person, place, and time. She appears well-developed and well-nourished.    HENT:   Head: Normocephalic and atraumatic.      Cardiovascular:  Normal rate, regular rhythm and normal heart sounds.      Exam  reveals edema (1+ to the BLE (improving)).        Pulmonary/Chest: Effort normal and breath sounds normal. No respiratory distress.        Abdominal: Soft. She exhibits distension (abdominal distension noted, however greatly improved since paracentesis yesterday). There is no abdominal tenderness. There is no rebound and no guarding.   Bowel sounds active             Musculoskeletal: Normal range of motion.       Neurological: She is alert and oriented to person, place, and time.    Skin: Skin is warm and dry.    Psychiatric: She has a normal mood and affect. Her behavior is normal. Thought content normal.          Laboratory:  Recent Labs   Lab 03/17/24 2357 03/17/24 2358 03/18/24  1102 03/19/24  0454   WBC 13.43*  --  13.51* 14.92*   HGB 8.5*  --  8.3* 8.0*   HCT 27.4* 28* 26.3* 25.4*   MCV 81*  --  82 81*   *  --  608* 604*      Recent Labs   Lab 03/17/24 2357 03/18/24  1102   * 130*   K 4.0 3.7   CL 93* 91*   CO2 25 28   BUN 16 15   CREATININE 1.2 0.9   * 236*   PROT 6.4  --    BILITOT 0.6  --    ALKPHOS 100  --    ALT 9*  --    AST 28  --       Lactic Acid: 2.5 > 2.0    Lipase: 6    UA Reflex to Culture: WNL    Urine Culture: pending    Blood Cultures: pending    COVID: Negative    Influenza: Negative    Urine Cr and Na: pending    Ascites Fluid Studies:   Results for orders placed or performed during the hospital encounter of 03/17/24   WBC & Diff,Body Fluid Ascites   Result Value Ref Range    Body Fluid Type Ascites     Fluid Appearance Cloudy     Fluid Color Honey     WBC, Body Fluid 981 /cu mm    Segs, Fluid 39 %    Lymphs, Fluid 31 %    Monocytes/Macrophages, Fluid 30 %     Results for orders placed or performed during the hospital encounter of 03/17/24   Albumin, Peritoneal, Pleural Fluid or DENISE Drainage, In-House Ascites   Result Value Ref Range    Body Fluid Source, Albumin Ascites     Body Fluid, Albumin 1.5 See text g/dL    Narrative    To rule out SBP order labs: Aerobic culture  [PLK371],  Culture, Anaerobic [ODA690], Gram stain [EKX895], Albumin  [YPA408], Protein [BYB495], LDH [ACQ248], WBC \T\ Dff  [[id number]].     Results for orders placed or performed during the hospital encounter of 03/17/24   Protein, Peritoneal, Pleural Fluid or DENISE Drainage, In-House Ascites   Result Value Ref Range    Body Fluid Source, Total Protein Ascites     Body Fluid, Protein 3.9 Not established g/dL    Narrative    To rule out SBP order labs: Aerobic culture [EHO591],  Culture, Anaerobic [RWY192], Gram stain [HWP099], Albumin  [OLF189], Protein [WWV069], LDH [BTI900], WBC \T\ Dff  [[id number]].       Diagnostic Results:  Results for orders placed or performed during the hospital encounter of 03/17/24   X-Ray Chest AP Portable    Narrative    EXAMINATION:  XR CHEST AP PORTABLE    CLINICAL HISTORY:  SOB;    TECHNIQUE:  Single frontal view of the chest was performed.    COMPARISON:  05/26/2021.    FINDINGS:  Left-sided port catheter present with tip overlying the upper right atrium.  No pneumothorax.    Underinflated lungs with hypoventilatory change.  No consolidation or pleural effusion.    Heart and lungs otherwise appear unchanged when allowing for differences in technique and positioning.      Impression    As above.      Electronically signed by: Eduard Araujo MD  Date:    03/18/2024  Time:    01:48     Results for orders placed or performed during the hospital encounter of 03/17/24   CTA Chest Non-Coronary (PE Studies)    Narrative    EXAMINATION:  CTA CHEST NON CORONARY (PE STUDIES)    CLINICAL HISTORY:  Pulmonary embolism (PE) suspected, unknown D-dimer;    TECHNIQUE:  Low dose axial images, sagittal and coronal reformations were obtained from the thoracic inlet to the lung bases following the IV administration of 100 mL of Omnipaque 350.  Contrast timing was optimized to evaluate the pulmonary arteries.    COMPARISON:  Chest radiograph 03/18/2024.    FINDINGS:  PULMONARY VASCULATURE: Suboptimal  evaluation of the segmental and subsegmental pulmonary arteries secondary to streak artifact from dense contrast bolus, respiratory motion artifact, and suboptimal contrast mixing.The pulmonary arteries distribute normally without evidence of filling defect through the level of the lobar pulmonary arteries to indicate pulmonary thromboembolism.  Eccentric filling defect in a subsegmental branch of the right lower lobe.    Base of Neck:  No significant abnormality.    Thoracic soft tissues:  Left anterior wall chest port.  Calcifications in the bilateral breasts.  Partially calcified 3 cm mass right breast, similar compared to PET-CT 02/21/2024.  Correlation with prior mammographic studies is recommended.  There is scattered soft tissue edema.    Aorta: Left-sided 3 vessel non-aneurysmal aortic arch with mild calcific atherosclerosis.    Heart/Pericardium: Heart is not enlarged.  No pericardial effusion.  Multivessel coronary artery calcifications.    Sita/Mediastinum:  No hilar or mediastinal lymphadenopathy.    Airways:  Trachea is midline and proximal airways are patent without significant abnormality.    Pleura/Lungs: No pneumothorax.  Small left pleural effusion.  No suspicious pulmonary mass.    Esophagus: Normal in course and caliber.  Small hiatal hernia.    Upper abdomen: Partially imaged moderate volume upper abdominal ascites.    Bones:  Degenerative changes of the visualized osseous structures without acute fracture or lytic or sclerotic lesions.      Impression    Study limitations as above.  No central pulmonary thromboembolism through the level of the lobar pulmonary arterial branches.  Segmental and subsegmental pulmonary arteries are not well evaluated.    Eccentric filling defect in a subsegmental branch of the right lower lobe.  Not optimally evaluated due to above limitations.  Typically, eccentric filling defects like this are more typical of chronic pulmonary embolism changes.  Correlate  clinically.    Small left pleural effusion.    Moderate volume upper abdominal ascites.    Calcifications in the bilateral breasts.  Partially calcified 3 cm mass right breast, similar compared to PET-CT 02/21/2024.  Correlation with prior mammographic studies is recommended.    Additional findings in the body of the report.    Electronically signed by resident: Kit Zuñiga  Date:    03/18/2024  Time:    03:11    Electronically signed by: Eduard Araujo MD  Date:    03/18/2024  Time:    03:56     Results for orders placed or performed during the hospital encounter of 03/17/24   US Lower Extremity Veins Bilateral    Narrative    EXAMINATION:  US LOWER EXTREMITY VEINS BILATERAL    CLINICAL HISTORY:  Other specified soft tissue disorders    TECHNIQUE:  Duplex and color flow Doppler and dynamic compression was performed of the bilateral lower extremity veins was performed.    COMPARISON:  None    FINDINGS:  Right thigh veins: The common femoral, femoral, popliteal, upper greater saphenous, and deep femoral veins are patent and free of thrombus. The veins are normally compressible and have normal phasic flow and augmentation response.    Right calf veins: The visualized calf veins are patent.    Left thigh veins: The common femoral, femoral, popliteal, upper greater saphenous, and deep femoral veins are patent and free of thrombus. The veins are normally compressible and have normal phasic flow and augmentation response.    Left calf veins: The visualized calf veins are patent.    Miscellaneous: None      Impression    No evidence of deep venous thrombosis in either lower extremity.      Electronically signed by: Abdullahi Herrera MD  Date:    03/18/2024  Time:    09:42     Echo 3/18/24:     Left Ventricle: There is low normal systolic function with a visually estimated ejection fraction of 50 - 55%. Biplane (2D) method of discs ejection fraction is 50%. Global longitudinal strain is -18.0%.    Right Ventricle: Normal  right ventricular cavity size. Wall thickness is normal. Right ventricle wall motion  is normal. Systolic function is normal.    Left Atrium: Left atrium is mildly dilated.    Mitral Valve: There is mild regurgitation.    IVC/SVC: Normal venous pressure at 3 mmHg.    Pericardium: Left pleural effusion. Ascites present.    Assessment/Plan:     * Malignant ascites  - Differential: Ascites, SBP  - Physical exam: non-peritoneal, no rebound or guarding noted  - Patient has had 3 paracentesis with 3.5-5L removed each time  - Last Paracentesis 3/13: 3525cc removed  - Patient is scheduled for repeat paracentesis 3/21/24  - Attempted paracentesis in ED without fluid return  - S/P lasix 20mg IV in the ED  - IR Paracentesis 3/19 with 3.6L removed   - Fluid studies WNL as above   - No SBP    Sepsis  - Leukocytosis of 13, Tachycardia, LA of 2.5  - Patient meets criteria for sepsis, however based on review of previous admissions and prior clinic visits, the patient has had a leukocytosis and tachycardia  - Physical Exam: Abdomen distended, however not peritoneal, and without rebound or guarding  - Patient received one dose of Vancomycin and Zosyn in the ED  - Low suspicion for SBP or other causes of infection at this time given benign abdominal exam and prior occurences of tachycardia and leukocytosis  - LA: 2.5 > 2.0  - CBC: 13/8.5/27/631 > 13/8.3/26.3/608 > 14.9/8/25.4/604   - CXR: Neg   - UA: Neg   - Blood Cultures: in process   - Urine Cultures: in process  - COVID: Negative  - Influenza: Negative  - Vanc/Zosyn administered in ED   - IVF: None   - Paracentesis 3/19 with 3.6L removed    - Fluids studies not consistent with SBP    SOB (shortness of breath)  - Differential: Pneumonia, Pulmonary Embolism, Pulmonary Edema, MI,   - Saturating % on RA   - CXR: Neg   - CTA: No central pulmonary thromboembolism. Segmental and subsegmental pulmonary arteries are not well evaluated. Eccentric filling defect in a subsegmental  branch of the right lower lobe.  Not optimally evaluated due to above limitations. Small left pleural effusion. Moderate volume upper abdominal ascites.  - Bilateral LE Dopplers: without signs of DVT  - Covid: Negative  - Flu: Negative  - EKG: Normal sinus rhythm, abnormal R wave progression, low voltage QRS  - Overall patient does not appear to be in acute distress and is sating % on RA   - Suspicious that SOB is related to accumulation of ascites. Following IR paracentesis, will re-evaluate patient for improvement. If not improved, will continue to determine source of SOB.  - Echo: ejection fraction of 50 - 55%. Left atrium is mildly dilated. There is mild regurgitation. Pericardium: Left pleural effusion. Ascites present.  - IR Paracentesis 3/19 with 3.6L removed   - Patient with significant symptomatic improvement    JASON (acute kidney injury)  - Cr: 1.2 yesterday  - Baseline Cr: 0.7-0.8  - Repeat BMP:    - Na: 130   - Cr: 0.9   - Urine Na: 90    - Urine Cr: 37   - FeNa: 1.7% - indeterminate; could be intrinsic or prerenal     Plan:  - JASON resolved  - Repeat BMP pending this AM    Carcinosarcoma  - See Onc History Above  - Scheduled for primary debulking with Dr. Washington 3/25/24, followed by adjuvant chemotherapy. While inpatient, will continue the optimize prior to surgery, including if any abnormalities with workup. Will consider consulting hospital medicine for additional assistance.      History of breast cancer in female  2013: ER+ Carcinoma of R Breast   S/p Lympectomy, SLNB- Grade 1 Well Differentiated Invasive Ductal Carcinoma with Associated Ductal Carcinoma in Situ, Negative Margins/ SLNB Negative                                       ER+ 90%, VA+ 90%, HER2 Neg                           TII N0 Stage 2A   [id number]: Tamoxifen   07/2019-06/2021: Letrozole   11/2023: Mammogram Negative    Hyperlipidemia  - Continue home Lipitor    Seizure disorder  - Continue home Lamictal    Depression,  reactive  - Continue home Seroquel, Ativan PRN      Hypertension  - Held home meds: Amlodipine, Enalapril, HCTZ  - If hypertensive in patient, will consider adding back home medications  - Hydral PRN ordered       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

## 2024-03-20 NOTE — DISCHARGE SUMMARY
[facility] - Oncology ([facility])  Gynecologic Oncology  Discharge Summary    Patient Name: Anette Ricci  MRN: [id number]  Admission Date: 3/17/2024  Hospital Length of Stay: 2 days  Discharge Date and Time:  2024 9:23 AM  Attending Physician: Александр Washington MD   Discharging Provider: Francheska Sheth MD  Primary Care Provider: Mark Chua MD    HPI:   Patient is a 59 y/o  with a history of metastatic carcinosarcoma with scheduled primary debulking scheduled for 3/25/24 with Dr. Washington, who initially presented to the ED with SOB and edema to her bilateral lower extremities, onset 3/15/23. Patient reports that she first noticed the increased leg swelling and then she began to get acutely short of breath. She states that she is short of breath with exertion and at rest. She is reporting intermittent constipation and states that her last BM was yesterday after taking Miralax. She states that her BM was hard and difficult to pass. She denies any fevers, chills, CP, URI symptoms, pain with urination, nausea, vomiting, or vaginal bleeding.     Patient is postmenopausal as of 2018 and reports no vaginal bleeding since that time    Hospital Course:  3/18/2024 HD1: Patient admitted to the GYN Oncology service for management of SOB/Edema/Ascites and Sepsis. Patient got 1 dose of Vanc/Zosyn in the ED, but due to low suspicion for infection etiology this was discontinued. ECHO, EKG, CTA, BLE Dopplers and Paracentesis ordered.   2024 HD2: Patient well overnight. Paracentesis with 3.6L removed. Fluids studies not consistent with SBP  2024 HD3: Episode of CP overnight, however relieve with protonix and simethicone.    Goals of Care Treatment Preferences:  Code Status: Full Code      * No surgery found *     Consults (From admission, onward)          Status Ordering Provider     Inpatient consult to Hospital Medicine-General  Once        Provider:  (Not yet assigned)    Completed FRANCHESKA SHETH      Inpatient consult to Interventional Radiology  Once        Provider:  (Not yet assigned)    MARQUISE Watson     Inpatient consult to Gynecologic Oncology  Once        Provider:  (Not yet assigned)    Completed MARQUISE MINAYA            Significant Diagnostic Studies:     Ascites Fluid Studies:         Results for orders placed or performed during the hospital encounter of 03/17/24   WBC & Diff,Body Fluid Ascites   Result Value Ref Range     Body Fluid Type Ascites       Fluid Appearance Cloudy       Fluid Color Honey       WBC, Body Fluid 981 /cu mm     Segs, Fluid 39 %     Lymphs, Fluid 31 %     Monocytes/Macrophages, Fluid 30 %            Results for orders placed or performed during the hospital encounter of 03/17/24   Albumin, Peritoneal, Pleural Fluid or DENISE Drainage, In-House Ascites   Result Value Ref Range     Body Fluid Source, Albumin Ascites       Body Fluid, Albumin 1.5 See text g/dL     Narrative     To rule out SBP order labs: Aerobic culture [IYA778],  Culture, Anaerobic [OGU590], Gram stain [AFY055], Albumin  [JYA585], Protein [GKM401], LDH [ITB609], WBC \T\ Dff  [[id number]].            Results for orders placed or performed during the hospital encounter of 03/17/24   Protein, Peritoneal, Pleural Fluid or DENISE Drainage, In-House Ascites   Result Value Ref Range     Body Fluid Source, Total Protein Ascites       Body Fluid, Protein 3.9 Not established g/dL     Narrative     To rule out SBP order labs: Aerobic culture [YBJ194],  Culture, Anaerobic [MFJ448], Gram stain [LPG934], Albumin  [RPH289], Protein [WTF438], LDH [DYR173], WBC \T\ Dff  [[id number]].         Diagnostic Results:      Results for orders placed or performed during the hospital encounter of 03/17/24   X-Ray Chest AP Portable     Narrative     EXAMINATION:  XR CHEST AP PORTABLE     CLINICAL HISTORY:  SOB;     TECHNIQUE:  Single frontal view of the chest was performed.     COMPARISON:  05/26/2021.     FINDINGS:  Left-sided port  catheter present with tip overlying the upper right atrium.  No pneumothorax.     Underinflated lungs with hypoventilatory change.  No consolidation or pleural effusion.     Heart and lungs otherwise appear unchanged when allowing for differences in technique and positioning.        Impression     As above.        Electronically signed by:       Eduard Araujo MD  Date:                                        03/18/2024  Time:                                       01:48          Results for orders placed or performed during the hospital encounter of 03/17/24   CTA Chest Non-Coronary (PE Studies)     Narrative     EXAMINATION:  CTA CHEST NON CORONARY (PE STUDIES)     CLINICAL HISTORY:  Pulmonary embolism (PE) suspected, unknown D-dimer;     TECHNIQUE:  Low dose axial images, sagittal and coronal reformations were obtained from the thoracic inlet to the lung bases following the IV administration of 100 mL of Omnipaque 350.  Contrast timing was optimized to evaluate the pulmonary arteries.     COMPARISON:  Chest radiograph 03/18/2024.     FINDINGS:  PULMONARY VASCULATURE: Suboptimal evaluation of the segmental and subsegmental pulmonary arteries secondary to streak artifact from dense contrast bolus, respiratory motion artifact, and suboptimal contrast mixing.The pulmonary arteries distribute normally without evidence of filling defect through the level of the lobar pulmonary arteries to indicate pulmonary thromboembolism.  Eccentric filling defect in a subsegmental branch of the right lower lobe.     Base of Neck:  No significant abnormality.     Thoracic soft tissues:  Left anterior wall chest port.  Calcifications in the bilateral breasts.  Partially calcified 3 cm mass right breast, similar compared to PET-CT 02/21/2024.  Correlation with prior mammographic studies is recommended.  There is scattered soft tissue edema.     Aorta: Left-sided 3 vessel non-aneurysmal aortic arch with mild calcific atherosclerosis.      Heart/Pericardium: Heart is not enlarged.  No pericardial effusion.  Multivessel coronary artery calcifications.     Sita/Mediastinum:  No hilar or mediastinal lymphadenopathy.     Airways:  Trachea is midline and proximal airways are patent without significant abnormality.     Pleura/Lungs: No pneumothorax.  Small left pleural effusion.  No suspicious pulmonary mass.     Esophagus: Normal in course and caliber.  Small hiatal hernia.     Upper abdomen: Partially imaged moderate volume upper abdominal ascites.     Bones:  Degenerative changes of the visualized osseous structures without acute fracture or lytic or sclerotic lesions.        Impression     Study limitations as above.  No central pulmonary thromboembolism through the level of the lobar pulmonary arterial branches.  Segmental and subsegmental pulmonary arteries are not well evaluated.     Eccentric filling defect in a subsegmental branch of the right lower lobe.  Not optimally evaluated due to above limitations.  Typically, eccentric filling defects like this are more typical of chronic pulmonary embolism changes.  Correlate clinically.     Small left pleural effusion.     Moderate volume upper abdominal ascites.     Calcifications in the bilateral breasts.  Partially calcified 3 cm mass right breast, similar compared to PET-CT 02/21/2024.  Correlation with prior mammographic studies is recommended.     Additional findings in the body of the report.     Electronically signed by resident: Kit Zuñiga  Date:                                        03/18/2024  Time:                                       03:11     Electronically signed by:       Eduard Araujo MD  Date:                                        03/18/2024  Time:                                       03:56          Results for orders placed or performed during the hospital encounter of 03/17/24   US Lower Extremity Veins Bilateral     Narrative     EXAMINATION:  US LOWER EXTREMITY VEINS  BILATERAL     CLINICAL HISTORY:  Other specified soft tissue disorders     TECHNIQUE:  Duplex and color flow Doppler and dynamic compression was performed of the bilateral lower extremity veins was performed.     COMPARISON:  None     FINDINGS:  Right thigh veins: The common femoral, femoral, popliteal, upper greater saphenous, and deep femoral veins are patent and free of thrombus. The veins are normally compressible and have normal phasic flow and augmentation response.     Right calf veins: The visualized calf veins are patent.     Left thigh veins: The common femoral, femoral, popliteal, upper greater saphenous, and deep femoral veins are patent and free of thrombus. The veins are normally compressible and have normal phasic flow and augmentation response.     Left calf veins: The visualized calf veins are patent.     Miscellaneous: None        Impression     No evidence of deep venous thrombosis in either lower extremity.        Electronically signed by:       Abdullahi Herrera MD  Date:                                        03/18/2024  Time:                                       09:42      Echo 3/18/24:     Left Ventricle: There is low normal systolic function with a visually estimated ejection fraction of 50 - 55%. Biplane (2D) method of discs ejection fraction is 50%. Global longitudinal strain is -18.0%.    Right Ventricle: Normal right ventricular cavity size. Wall thickness is normal. Right ventricle wall motion  is normal. Systolic function is normal.    Left Atrium: Left atrium is mildly dilated.    Mitral Valve: There is mild regurgitation.    IVC/SVC: Normal venous pressure at 3 mmHg.    Pericardium: Left pleural effusion. Ascites present.    Pending Diagnostic Studies:       Procedure Component Value Units Date/Time    Cytology, Fluid/Wash/Brush [[id number]] Collected: 03/19/24 0840    Order Status: Sent Lab Status: In process Updated: 03/19/24 1544    Specimen: Body Fluid     IR Paracentesis with  Imaging [[id number]] Resulted: 03/19/24 0752    Order Status: Sent Lab Status: In process Updated: 03/19/24 0910          Final Active Diagnoses:    Diagnosis Date Noted POA    PRINCIPAL PROBLEM:  Malignant ascites [R18.0] 02/27/2024 Yes    SOB (shortness of breath) [R06.02] 03/18/2024 Yes    Preop examination [Z01.818] 03/19/2024 Not Applicable    JASON (acute kidney injury) [N17.9] 03/18/2024 Yes    Carcinosarcoma [C80.1] 03/11/2024 Yes    History of breast cancer in female [Z85.3] 11/20/2023 Not Applicable    Hyperlipidemia [E78.5]  Yes    Seizure disorder [G40.909]  Yes    Depression, reactive [F32.9] 05/20/2013 Yes    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 [E08.311] 05/20/2013 Yes    Hypertension [I10] 05/20/2013 Yes      Problems Resolved During this Admission:    Diagnosis Date Noted Date Resolved POA    Sepsis [A41.9, R65.20] 03/18/2024 03/20/2024 Yes    Preop cardiovascular exam [Z01.810] 03/19/2024 03/19/2024 Not Applicable        Does this patient meet criteria for extended DVT prophylaxis? No    Discharged Condition: good    Disposition: Home or Self Care    Follow Up:   Follow-up Information       PRE-ADMIT, [facility] Follow up on 3/25/2024.    Why: You should recieve a call stating the time to present to PreOp on Monday morning for surgery                         Patient Instructions:      Diet Adult Regular     No driving until:   Order Comments: No driving until not taking narcotic pain medication.     Pelvic Rest   Order Comments: Pelvic rest until 6 weeks after discharge. Nothing in vagina -no sex, tampons, douching, etc.     Notify your health care provider if you experience any of the following:  temperature >100.4     Notify your health care provider if you experience any of the following:  persistent nausea and vomiting or diarrhea     Notify your health care provider if you experience any of the following:  severe uncontrolled pain      Notify your health care provider if you experience any of the following:  redness, tenderness, or signs of infection (pain, swelling, redness, odor or green/yellow discharge around incision site)     Notify your health care provider if you experience any of the following:  difficulty breathing or increased cough     Notify your health care provider if you experience any of the following:  severe persistent headache     Notify your health care provider if you experience any of the following:  worsening rash     Notify your health care provider if you experience any of the following:  persistent dizziness, light-headedness, or visual disturbances     Notify your health care provider if you experience any of the following:  increased confusion or weakness     Notify your health care provider if you experience any of the following:   Order Comments: Heavy vaginal bleeding saturating more than 1 pad per hr for at least consecutive 2 hrs.     Activity as tolerated     Medications:  Reconciled Home Medications:      Medication List        CONTINUE taking these medications      amLODIPine 10 MG tablet  Commonly known as: NORVASC  Take 10 mg by mouth once daily.     atorvastatin 40 MG tablet  Commonly known as: LIPITOR  Take 80 mg by mouth once daily.     enalapril 20 MG tablet  Commonly known as: VASOTEC  Take 20 mg by mouth once daily.     fluticasone propionate 50 mcg/actuation nasal spray  Commonly known as: FLONASE  1 spray by Each Nostril route once daily.     hydroCHLOROthiazide 25 MG tablet  Commonly known as: HYDRODIURIL  Take 25 mg by mouth once daily.     HYDROcodone-acetaminophen 5-325 mg per tablet  Commonly known as: NORCO  Take 1 tablet by mouth every 6 (six) hours as needed for Pain.     ibuprofen 800 MG tablet  Commonly known as: ADVIL,MOTRIN  Take 800 mg by mouth every 6 (six) hours as needed for Pain.     ketorolac 0.5% 0.5 % Drop  Commonly known as: ACULAR  Place 1 drop into both eyes. For pain after  eye injections.     lamoTRIgine 25 MG tablet  Commonly known as: LAMICTAL  Take 25 mg by mouth 2 (two) times daily.     LORazepam 0.5 MG tablet  Commonly known as: ATIVAN  Take 0.5 mg by mouth every 12 (twelve) hours as needed for Anxiety.     metFORMIN 1000 MG tablet  Commonly known as: GLUCOPHAGE  Take 1,000 mg by mouth daily with breakfast.     MOUNJARO 5 mg/0.5 mL Pnij  Generic drug: tirzepatide  Inject 5 mg into the skin every 7 days. On Sunday.     NovoLOG Flexpen U-100 Insulin 100 unit/mL (3 mL) Inpn pen  Generic drug: insulin aspart U-100  Inject 5 Units into the skin 2 (two) times daily with meals. With biggest meal     QUEtiapine 25 MG Tab  Commonly known as: SEROQUEL  Take 25 mg by mouth daily as needed.     TOUJEO SOLOSTAR U-300 INSULIN 300 unit/mL (1.5 mL) Inpn pen  Generic drug: insulin glargine (TOUJEO)  Inject 60 Units into the skin every evening.              Francheska Shirley MD  Gynecologic Oncology  [facility] - Oncology ([facility])

## 2024-03-20 NOTE — SUBJECTIVE & OBJECTIVE
Interval History: NAEON. VSS. Afebrile. Reports SOB has improved. Able to ambulate w/ no SOB. Plan per primary team to d/c today and return for surgery Monday.     Review of Systems   Constitutional:  Positive for activity change, appetite change and fatigue. Negative for chills and fever.   Respiratory:  Positive for shortness of breath (improved). Negative for cough.    Cardiovascular:  Positive for leg swelling. Negative for chest pain.   Gastrointestinal:  Positive for abdominal distention and nausea. Negative for abdominal pain, diarrhea and vomiting.   Musculoskeletal:  Positive for myalgias (left thigh).   Objective:     Vital Signs (Most Recent):  Temp: 98.8 °F (37.1 °C) (03/20/24 0733)  Pulse: 102 (03/20/24 0733)  Resp: 18 (03/20/24 0910)  BP: (!) 113/56 (03/20/24 0733)  SpO2: 95 % (03/20/24 0733) Vital Signs (24h Range):  Temp:  [97.4 °F (36.3 °C)-99.4 °F (37.4 °C)] 98.8 °F (37.1 °C)  Pulse:  [] 102  Resp:  [16-18] 18  SpO2:  [92 %-100 %] 95 %  BP: (112-150)/(56-74) 113/56     Weight: 95.6 kg (210 lb 12.2 oz)  Body mass index is 33.01 kg/m².    Intake/Output Summary (Last 24 hours) at 3/20/2024 0995  Last data filed at 3/20/2024 0433  Gross per 24 hour   Intake 988 ml   Output 1050 ml   Net -62 ml         Physical Exam  Vitals and nursing note reviewed.   Constitutional:       General: She is not in acute distress.     Appearance: Normal appearance. She is ill-appearing. She is not toxic-appearing or diaphoretic.   HENT:      Head: Normocephalic and atraumatic.      Mouth/Throat:      Mouth: Mucous membranes are moist.   Eyes:      Conjunctiva/sclera: Conjunctivae normal.   Cardiovascular:      Rate and Rhythm: Normal rate and regular rhythm.   Pulmonary:      Effort: Pulmonary effort is normal. No respiratory distress.      Breath sounds: No wheezing or rales.   Chest:      Chest wall: No tenderness.   Abdominal:      General: There is distension.      Tenderness: There is no abdominal tenderness.    Musculoskeletal:         General: No swelling or tenderness.      Right lower leg: Edema present.      Left lower leg: Edema present.   Skin:     General: Skin is warm and dry.   Neurological:      Mental Status: She is alert and oriented to person, place, and time.         Significant Labs: All pertinent labs within the past 24 hours have been reviewed.    Significant Imaging: I have reviewed all pertinent imaging results/findings within the past 24 hours.

## 2024-03-21 ENCOUNTER — TELEPHONE (OUTPATIENT)
Dept: GYNECOLOGIC ONCOLOGY | Facility: CLINIC | Age: 59
End: 2024-03-21
Payer: MEDICARE

## 2024-03-21 ENCOUNTER — HOSPITAL ENCOUNTER (OUTPATIENT)
Facility: OTHER | Age: 59
Discharge: HOME | End: 2024-03-21
Attending: RADIOLOGY | Admitting: RADIOLOGY
Payer: MEDICARE

## 2024-03-21 VITALS
RESPIRATION RATE: 16 BRPM | DIASTOLIC BLOOD PRESSURE: 64 MMHG | TEMPERATURE: 99 F | OXYGEN SATURATION: 95 % | HEART RATE: 105 BPM | SYSTOLIC BLOOD PRESSURE: 132 MMHG

## 2024-03-21 DIAGNOSIS — C56.9 OVARIAN CARCINOSARCOMA: ICD-10-CM

## 2024-03-21 DIAGNOSIS — R18.0 MALIGNANT ASCITES: ICD-10-CM

## 2024-03-21 LAB
DNA RANGE(S) EXAMINED NAR: NORMAL
GENE DIS ANL INTERP-IMP: NORMAL
GENE DIS ASSESSED: NORMAL
GENE MUT TESTED BLD/T: 5.3 M/MB
MSI CA SPEC-IMP: NORMAL
PD-L1 BY 22C3 TISS IMSTN DOC: NEGATIVE
REASON FOR STUDY: NORMAL
TEMPUS FUSIONADDENDUM: NORMAL
TEMPUS LCA: NORMAL
TEMPUS PD-L1 (22C3) COMBINED POSITIVE SCORE: <1
TEMPUS PD-L1 (22C3) TUMOR PROPORTION SCORE: <1 %
TEMPUS PORTAL: NORMAL
TEMPUS TRIAL1: NORMAL
TEMPUS TRIAL2: NORMAL
TEMPUS TRIAL3: NORMAL
TEMPUS TRIALCOUNT: 3

## 2024-03-21 NOTE — PROCEDURES
Radiology Post-Procedure Note    Pre Op Diagnosis: ascites  Post Op Diagnosis: Same    Procedure: paracentesis    Procedure performed by: Jorge Jolley MD    Written Informed Consent Obtained: Yes  Specimen Removed: YES 3.5 L bloody ascites  Estimated Blood Loss: Minimal    Findings:   Paracentesis.    Patient tolerated procedure well.    Jorge Jolley MD

## 2024-03-21 NOTE — H&P
Consult/H&P Note  Interventional Radiology    Consult Requested By: hepatology    Reason for Consult: ascites    SUBJECTIVE:     Chief Complaint: distension    History of Present Illness: 57 yo F with recurrent ascites    Past Medical History:   Diagnosis Date    Breast cancer 2013    Diabetes mellitus     Genetic testing 05/29/2013    VUS    Hyperlipidemia     Hypertension     Malignant neoplasm of upper-outer quadrant of right breast in female, estrogen receptor positive 12/02/2015    Seizure disorder      Past Surgical History:   Procedure Laterality Date    BREAST BIOPSY      BREAST LUMPECTOMY Right 2013    EYE SURGERY      PERITONEOCENTESIS N/A 3/13/2024    Procedure: PARACENTESIS, ABDOMINAL;  Surgeon: Flavia Moore MD;  Location: [facility] CATH LAB;  Service: Radiology;  Laterality: N/A;    TOTAL REDUCTION MAMMOPLASTY Bilateral 2016     Family History   Problem Relation Age of Onset    Seizures Father     Breast cancer Sister 48    Cancer Neg Hx     Ovarian cancer Neg Hx     Colon cancer Neg Hx      Social History     Tobacco Use    Smoking status: Never    Smokeless tobacco: Never   Substance Use Topics    Alcohol use: Yes     Alcohol/week: 0.0 standard drinks of alcohol     Comment: ocassional    Drug use: No       Review of Systems:  Constitutional/General:Negative for fever.  Hematological/Immuno: no known coagulopathies  Respiratory: no shortness of breath  Cardiovascular: no chest pain  Gastrointestinal: positive for - abdominal pain and gas/bloating  Genito-Urinary: no dysuria  Musculoskeletal: negative  Skin: Negative for rash, itching, pigmentation changes, nail or hair changes.  Neurological: no TIA or stroke symptoms  Psychiatric: normal mood/affect, good insight/judgement      OBJECTIVE:     Vital Signs Range (Last 24H):  Resp:  [18]   BP: (136)/(65)     Physical Exam:  General- Patient alert and oriented x3 in NAD  ENT- PERRLA,  Neck- No masses  CV- Regular rate and rhythm  Resp-  No increased  "WOB  GI- Non tender/+ distended  Extrem- No cyanosis, clubbing, edema.   Derm- No rashes, masses, or lesions noted  Neuro-  No focal deficits noted.     Physical Exam  There is no height or weight on file to calculate BMI.    Scheduled Meds:   Continuous Infusions:   PRN Meds:    Allergies:   Review of patient's allergies indicates:   Allergen Reactions    Codeine        Labs:  No results for input(s): "INR", "PT", "PTT" in the last 168 hours.    Recent Labs   Lab 03/19/24  0454   WBC 14.92*   HGB 8.0*   HCT 25.4*   MCV 81*   *      Recent Labs   Lab 03/17/24  2357 03/18/24  1102 03/20/24  0607   *   < > 110   *   < > 131*   K 4.0   < > 3.9   CL 93*   < > 95   CO2 25   < > 28   BUN 16   < > 17   CREATININE 1.2   < > 0.9   CALCIUM 8.4*   < > 7.6*   ALT 9*  --   --    AST 28  --   --    ALBUMIN 1.8*  --   --    BILITOT 0.6  --   --     < > = values in this interval not displayed.       Vitals (Most Recent):  Resp: 18 (03/21/24 0919)  BP: 136/65 (03/21/24 0919)        Consent obtained    ASSESSMENT/PLAN:     Paracentesis. Local anesthesia.    There are no hospital problems to display for this patient.          Jorge Jollye MD   "

## 2024-03-21 NOTE — DISCHARGE SUMMARY
Radiology Discharge Summary      Hospital Course: No complications    Admit Date: 3/21/2024  Discharge Date: 03/21/2024     Instructions Given to Patient: Yes  Diet: Resume prior diet  Activity: activity as tolerated    Description of Condition on Discharge: Stable  Vital Signs (Most Recent): Resp: 18 (03/21/24 0919)  BP: 136/65 (03/21/24 0919)    Discharge Disposition: Home    Discharge Diagnosis: ascites     Follow-up: paracentesis prn patient comfort    Jorge Jolley MD

## 2024-03-22 ENCOUNTER — ANESTHESIA EVENT (OUTPATIENT)
Dept: SURGERY | Facility: HOSPITAL | Age: 59
DRG: 737 | End: 2024-03-22
Payer: MEDICARE

## 2024-03-22 LAB
BACTERIA SPEC AEROBE CULT: NO GROWTH
COMMENT: ABNORMAL
FINAL PATHOLOGIC DIAGNOSIS: ABNORMAL
Lab: ABNORMAL

## 2024-03-22 NOTE — PRE-PROCEDURE INSTRUCTIONS
PreOp Instructions given:    -- Medication information (what to hold and what to take)   -- NPO guidelines as follows: (or as per your Surgeon)  Stop ALL solid food, gum, candy 8 hours before surgery/procedure time.  Stop all CLOUDY liquids: coffee with creamer, cloudy juices, 8 hours prior to surgery/procedure  time.  The patient should be ENCOURAGED to drink carbohydrate-rich clear liquids (sports drinks, clear juices) until 2 hours prior to surgery/procedure  time.  CLEAR liquids include water, black coffee NO creamer, clear oral rehydration drinks, clear sports drinks and clear fruit juices (no orange juice, no pulpy juices, no apple cider).   IF IN DOUBT, drink water instead.   NOTHING TO DRINK 2 hours before to surgery/procedure  time. If you are told to take medication on the morning of surgery, it may be taken with a sip of water.   -- *Arrival place and directions given*.  Time to be given the day before procedure by the Surgeon's Office   -- Bathe with antibacterial soap (dial or Hibiclens as instructed)  -- Don't wear any jewelry or valuables and not metals on skin or hair AM of surgery   -- No makeup or moisturizer to face   -- No perfume/cologne, powder, lotions, aftershave or deodorant    Pt verbalized understanding.     >>>pt took Mounjaro on Thurs 3/21.  She will be on a bowel prep all day Sunday<<<        *If going to , see below:     Directions and Instructions for [facility]   At [facility], we have an outstanding team of physicians, anesthesiologists, CRNAs, Registered Nurses, Surgical Technologists, and other ancillary team members all focused on your surgical and procedural care.   Before Your Procedure:   The physician's office will call you with a specific arrival time and directions a day or two before your scheduled procedure. You may also receive these instructions through your [facility] portal.   Day of Procedure:   Please be sure to arrive at the  arrival time given or you may risk your surgery being delayed or canceled. The arrival time is earlier than your scheduled surgery or procedure time. In the winter months please dress warm and bring blankets for you or your child as the waiting room may be cold. If you have difficulty locating the facility, please give us a call at [phone].   Directions:   The [facility] is located on the 1st floor of the hospital building near the [place] entrance.   Parking:   You will park in the South Parking Garage (note location on map). [facility] opens at 5:00 a.m. and has a drop off area by the entrance.  parking is available starting at 7:00 a.m. Please see below for further  parking instructions.   Directions from the parking garage elevators   Blue [facility] Elevators: From the parking garage, take the blue [employer] elevators (located in the center of the parking garage) to the 1st floor of the garage. You will then take a right once off the elevators then another right to the outside of the parking garage. You will be across from the [facility]. You will walk down the sidewalk, pass the  curve at the [place] entrance and continue to follow the sidewalk. You will pass the radiation oncology entrance on your right. Continue to follow the sidewalk to the [facility] glass door entrance.   Hospital Entrance (Inside Route): If a mostly inside route is preferred: Take the inside elevator bank (located at the far north end of the garage) from the parking garage to the 1st floor. On the 1st floor walk past [employer]. Keep walking down the center of the hallway towards the hospital elevators. Once you reach the red brick prosper, take a left and go past the hospital elevators. Take another left and follow the blue and white [place] Sheets signs around the hallway to the end. Go outside of the door. You will see the [facility]  entrance to your right.   Drop Off:   There is a drop off area at the doors of the [facility] surgery [place] for your convenience. If utilized for pediatric patients, an adult must accompany the patient into the surgery center while another adult briceno the vehicle.    (at 7:00 a.m.):   Upon check-in, please let the  know that you are utilizing [employer] parking which is free. The . will then call [employer] for your car to be picked up. Your keys and phone number will be collected and given to [employer] services. You will then be given a ticket. Upon discharge, [employer] will be notified to bring your vehicle back when you are ready.   2/6/2024      If going to 2nd floor surgery center, see below:    Directions to the 2nd floor ([facility]) Surgery Center  The hallway to get to the surgery center is on the 2nd fl between the gold elevators in the atrium.  Follow the hallway into the waiting room (has a fish tank) and check in at desk.

## 2024-03-22 NOTE — ANESTHESIA PREPROCEDURE EVALUATION
Pre-operative evaluation for Procedure(s) (LRB):  LAPAROTOMY, EXPLORATORY (N/A)  HYSTERECTOMY, TOTAL, ABDOMINAL (N/A)  SALPINGO-OOPHORECTOMY, BILATERAL (N/A)  OMENTECTOMY (N/A)  DEBULKING, NEOPLASM (N/A)  EXCISION, LIVER - partial (N/A)    Anette Ricci is a 58 y.o. female     - metastatic carcinosarcoma  - breast cancer  - seizure disorder (on AED- lamictal)  - T2DM (metformin + Mounjaro + insulin)  - HTN  - HLD  - obesity    Presents for primary debulking of large peritoneal mass. Co-surgery w/ gyn-onc/surg onc given poss liver resection. Has recently undergone paracentesis for recurrent ascites.     Most recent labs reviewed: No apparent renal dysfxn, starting Hct 25, Plt 600. Cardiac studies reviewed. EF 50-55%, no valvular dysfxn. Pleural effusion present.     She has not held her Mounjaro appropriately (last dose most recent Thursday) and symptoms today include abdominal distention, fullness, mild nausea this morning. Dr Washington, my staff Dr Phillips, and patient aware of the increased risk for aspiration given her recent GLP1 agonist dose and recurrent ascites. Patient and surgeon understand increased risk; decision made to proceed.     ECHO (if on file):    Left Ventricle: There is low normal systolic function with a visually estimated ejection fraction of 50 - 55%. Biplane (2D) method of discs ejection fraction is 50%. Global longitudinal strain is -18.0%.    Right Ventricle: Normal right ventricular cavity size. Wall thickness is normal. Right ventricle wall motion  is normal. Systolic function is normal.    Left Atrium: Left atrium is mildly dilated.    Mitral Valve: There is mild regurgitation.    IVC/SVC: Normal venous pressure at 3 mmHg.    Pericardium: Left pleural effusion. Ascites present.    CT CAP (Feb 2024)  Impression:     1. Multiple mesenteric/peritoneal masses throughout the abdomen and pelvis the largest in the left upper quadrant measuring up to 11.6 cm.  Moderate to large volume of  abdominopelvic ascites with associated peritoneal thickening.  Overall, findings highly concerning for underlying peritoneal metastatic disease.  2. Diffuse gastric wall thickening which could relate to nondistention versus gastritis or infiltrating neoplastic process.  Correlation with presentation/clinical history and further assessment with EGD as warranted.  3. Ill-defined region of hypoattenuation in the posterior right hepatic lobe which could reflect underlying hepatic lesion.  4. Incompletely visualized region of calcifications/soft tissue density in the right breast as discussed above.  Correlation/further assessment with mammography advised.  5. Additional findings as above.    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     Current Outpatient Medications on File Prior to Encounter   Medication Sig Dispense Refill    amlodipine (NORVASC) 10 MG tablet Take 10 mg by mouth once daily.       atorvastatin (LIPITOR) 40 MG tablet Take 80 mg by mouth once daily.      enalapril (VASOTEC) 20 MG tablet Take 20 mg by mouth once daily.      hydrochlorothiazide (HYDRODIURIL) 25 MG tablet Take 25 mg by mouth once daily.       HYDROcodone-acetaminophen (NORCO) 5-325 mg per tablet Take 1 tablet by mouth every 6 (six) hours as needed for Pain. 60 tablet 0    insulin glargine, TOUJEO, (TOUJEO SOLOSTAR U-300 INSULIN) 300 unit/mL  (1.5 mL) InPn pen Inject 60 Units into the skin every evening.      lamoTRIgine (LAMICTAL) 25 MG tablet Take 25 mg by mouth 2 (two) times daily.      metformin (GLUCOPHAGE) 1000 MG tablet Take 1,000 mg by mouth daily with breakfast.       NOVOLOG FLEXPEN 100 unit/mL InPn pen Inject 5 Units into the skin 2 (two) times daily with meals. With biggest meal      tirzepatide (MOUNJARO) 5 mg/0.5 mL PnIj Inject 5 mg into the skin every 7 days. On Sunday.      lorazepam (ATIVAN) 0.5 MG tablet Take 0.5 mg by mouth every 12 (twelve) hours as needed for Anxiety.      quetiapine (SEROQUEL) 25 MG Tab Take 25 mg by mouth daily as needed.         Past Surgical History:   Procedure Laterality Date    BREAST BIOPSY      BREAST LUMPECTOMY Right 2013    EYE SURGERY      PERITONEOCENTESIS N/A 3/13/2024    Procedure: PARACENTESIS, ABDOMINAL;  Surgeon: Flavia Moore MD;  Location: [facility] CATH LAB;  Service: Radiology;  Laterality: N/A;    TOTAL REDUCTION MAMMOPLASTY Bilateral 2016       Social History     Socioeconomic History    Marital status:    Tobacco Use    Smoking status: Never    Smokeless tobacco: Never   Substance and Sexual Activity    Alcohol use: Yes     Alcohol/week: 0.0 standard drinks of alcohol     Comment: ocassional    Drug use: No    Sexual activity: Yes     Partners: Male     Birth control/protection: None     Social Determinants of Health     Financial Resource Strain: Low Risk  (3/19/2024)    Overall Financial Resource Strain (CARDIA)     Difficulty of Paying Living Expenses: Not very hard   Food Insecurity: No Food Insecurity (3/19/2024)    Hunger Vital Sign     Worried About Running Out of Food in the Last Year: Never true     Ran Out of Food in the Last Year: Never true   Transportation Needs: No Transportation Needs (3/19/2024)    PRAPARE - Transportation     Lack of Transportation (Medical): No     Lack of Transportation (Non-Medical): No   Physical Activity: Inactive (2/28/2024)    Exercise Vital Sign  "    Days of Exercise per Week: 0 days     Minutes of Exercise per Session: 0 min   Stress: No Stress Concern Present (3/19/2024)    [employer] [place] [employer] - Occupational Stress Questionnaire     Feeling of Stress : Not at all   Recent Concern: Stress - Stress Concern Present (2024)    [employer] [place] [employer] - Occupational Stress Questionnaire     Feeling of Stress : To some extent   Social Connections: Moderately Integrated (2024)    Social Connection and Isolation Panel [NHANES]     Frequency of Communication with Friends and Family: More than three times a week     Frequency of Social Gatherings with Friends and Family: More than three times a week     Attends Sikhism Services: More than 4 times per year     Active Member of Clubs or Organizations: No     Attends Club or Organization Meetings: Never     Marital Status:    Housing Stability: Low Risk  (3/19/2024)    Housing Stability Vital Sign     Unable to Pay for Housing in the Last Year: No     Number of Places Lived in the Last Year: 1     Unstable Housing in the Last Year: No         CBC: No results for input(s): "WBC", "RBC", "HGB", "HCT", "PLT", "MCV", "MCH", "MCHC" in the last 72 hours.    CMP:   Recent Labs     24  0607   *   K 3.9   CL 95   CO2 28   BUN 17   CREATININE 0.9      CALCIUM 7.6*       INR  No results for input(s): "PT", "INR", "PROTIME", "APTT" in the last 72 hours.        Diagnostic Studies:      EKD Echo:  No results found for this or any previous visit.        Pre-op Assessment    I have reviewed the Patient Summary Reports.     I have reviewed the Nursing Notes. I have reviewed the NPO Status.   I have reviewed the Medications.     Review of Systems  Anesthesia Hx:   History of prior surgery of interest to airway management or planning:          Denies Family Hx of Anesthesia complications.    Denies Personal Hx of Anesthesia complications.                  "   Cardiovascular:     Hypertension                     Shortness of Breath                    Hypertension         Pulmonary:      Shortness of breath                  Renal/:  Chronic Renal Disease        Kidney Function/Disease             Neurological:       Seizures           Seizure Disorder                          Endocrine:  Diabetes    Diabetes                      Psych:  Psychiatric History                  Physical Exam  General: Cooperative, Well nourished, Alert and Oriented    Airway:  Mallampati: II / II/ I  Mouth Opening: Normal  TM Distance: Normal  Tongue: Normal  Neck ROM: Normal ROM    Dental:  Intact    Chest/Lungs:  Normal Respiratory Rate    Heart:  Rate: Normal        Anesthesia Plan  Type of Anesthesia, risks & benefits discussed:    Anesthesia Type: Gen ETT  Intra-op Monitoring Plan: Standard ASA Monitors and Art Line  Post Op Pain Control Plan: multimodal analgesia and IV/PO Opioids PRN  Induction:  IV  Airway Plan: Direct, Video and Fiberoptic, Post-Induction  Informed Consent: Informed consent signed with the Patient and all parties understand the risks and agree with anesthesia plan.  All questions answered.   ASA Score: 3  Day of Surgery Review of History & Physical: H&P Update referred to the surgeon/provider.    Ready For Surgery From Anesthesia Perspective.     .

## 2024-03-23 LAB
BACTERIA BLD CULT: NORMAL
BACTERIA BLD CULT: NORMAL

## 2024-03-25 ENCOUNTER — HOSPITAL ENCOUNTER (INPATIENT)
Facility: HOSPITAL | Age: 59
LOS: 5 days | Discharge: HOME | DRG: 737 | End: 2024-03-30
Attending: STUDENT IN AN ORGANIZED HEALTH CARE EDUCATION/TRAINING PROGRAM | Admitting: STUDENT IN AN ORGANIZED HEALTH CARE EDUCATION/TRAINING PROGRAM
Payer: MEDICARE

## 2024-03-25 ENCOUNTER — ANESTHESIA (OUTPATIENT)
Dept: SURGERY | Facility: HOSPITAL | Age: 59
DRG: 737 | End: 2024-03-25
Payer: MEDICARE

## 2024-03-25 DIAGNOSIS — Z90.722 STATUS POST TOTAL ABDOMINAL HYSTERECTOMY AND BILATERAL SALPINGO-OOPHORECTOMY (TAH-BSO): ICD-10-CM

## 2024-03-25 DIAGNOSIS — Z90.79 STATUS POST TOTAL ABDOMINAL HYSTERECTOMY AND BILATERAL SALPINGO-OOPHORECTOMY (TAH-BSO): Primary | ICD-10-CM

## 2024-03-25 DIAGNOSIS — R19.00 ABDOMINAL MASS, UNSPECIFIED ABDOMINAL LOCATION: ICD-10-CM

## 2024-03-25 DIAGNOSIS — Z98.890 POSTOPERATIVE STATE: ICD-10-CM

## 2024-03-25 DIAGNOSIS — C80.1 CARCINOSARCOMA: ICD-10-CM

## 2024-03-25 DIAGNOSIS — Z74.09 IMMOBILITY: ICD-10-CM

## 2024-03-25 DIAGNOSIS — Z90.710 STATUS POST TOTAL ABDOMINAL HYSTERECTOMY AND BILATERAL SALPINGO-OOPHORECTOMY (TAH-BSO): Primary | ICD-10-CM

## 2024-03-25 DIAGNOSIS — E08.3219: ICD-10-CM

## 2024-03-25 DIAGNOSIS — Z90.79 STATUS POST TOTAL ABDOMINAL HYSTERECTOMY AND BILATERAL SALPINGO-OOPHORECTOMY (TAH-BSO): ICD-10-CM

## 2024-03-25 DIAGNOSIS — E78.5 HYPERLIPIDEMIA, UNSPECIFIED HYPERLIPIDEMIA TYPE: ICD-10-CM

## 2024-03-25 DIAGNOSIS — C78.6 METASTASIS TO PERITONEAL CAVITY: ICD-10-CM

## 2024-03-25 DIAGNOSIS — Z90.722 STATUS POST TOTAL ABDOMINAL HYSTERECTOMY AND BILATERAL SALPINGO-OOPHORECTOMY (TAH-BSO): Primary | ICD-10-CM

## 2024-03-25 DIAGNOSIS — Z79.4: ICD-10-CM

## 2024-03-25 DIAGNOSIS — Z90.710 STATUS POST TOTAL ABDOMINAL HYSTERECTOMY AND BILATERAL SALPINGO-OOPHORECTOMY (TAH-BSO): ICD-10-CM

## 2024-03-25 LAB
ABO + RH BLD: NORMAL
ALBUMIN SERPL BCP-MCNC: 2.1 G/DL (ref 3.5–5.2)
ALP SERPL-CCNC: 52 U/L (ref 55–135)
ALT SERPL W/O P-5'-P-CCNC: 49 U/L (ref 10–44)
ANION GAP SERPL CALC-SCNC: 8 MMOL/L (ref 8–16)
APTT PPP: 49 SEC (ref 21–32)
AST SERPL-CCNC: 258 U/L (ref 10–40)
BASOPHILS # BLD AUTO: 0.03 K/UL (ref 0–0.2)
BASOPHILS # BLD AUTO: 0.04 K/UL (ref 0–0.2)
BASOPHILS # BLD AUTO: 0.06 K/UL (ref 0–0.2)
BASOPHILS NFR BLD: 0.2 % (ref 0–1.9)
BASOPHILS NFR BLD: 0.2 % (ref 0–1.9)
BASOPHILS NFR BLD: 0.3 % (ref 0–1.9)
BILIRUB SERPL-MCNC: 2.4 MG/DL (ref 0.1–1)
BLD GP AB SCN CELLS X3 SERPL QL: NORMAL
BLD PROD TYP BPU: NORMAL
BLOOD UNIT EXPIRATION DATE: NORMAL
BLOOD UNIT TYPE CODE: 7300
BLOOD UNIT TYPE: NORMAL
BUN SERPL-MCNC: 8 MG/DL (ref 6–20)
CALCIUM SERPL-MCNC: 7.5 MG/DL (ref 8.7–10.5)
CHLORIDE SERPL-SCNC: 100 MMOL/L (ref 95–110)
CO2 SERPL-SCNC: 25 MMOL/L (ref 23–29)
CODING SYSTEM: NORMAL
CREAT SERPL-MCNC: 0.6 MG/DL (ref 0.5–1.4)
CROSSMATCH INTERPRETATION: NORMAL
DIFFERENTIAL METHOD BLD: ABNORMAL
DISPENSE STATUS: NORMAL
EOSINOPHIL # BLD AUTO: 0 K/UL (ref 0–0.5)
EOSINOPHIL NFR BLD: 0.1 % (ref 0–8)
ERYTHROCYTE [DISTWIDTH] IN BLOOD BY AUTOMATED COUNT: 16.5 % (ref 11.5–14.5)
ERYTHROCYTE [DISTWIDTH] IN BLOOD BY AUTOMATED COUNT: 16.9 % (ref 11.5–14.5)
ERYTHROCYTE [DISTWIDTH] IN BLOOD BY AUTOMATED COUNT: 20.6 % (ref 11.5–14.5)
EST. GFR  (NO RACE VARIABLE): >60 ML/MIN/1.73 M^2
FIBRINOGEN PPP-MCNC: 142 MG/DL (ref 182–400)
FIBRINOGEN PPP-MCNC: 85 MG/DL (ref 182–400)
GLUCOSE SERPL-MCNC: 248 MG/DL (ref 70–110)
HCT VFR BLD AUTO: 23.6 % (ref 37–48.5)
HCT VFR BLD AUTO: 23.8 % (ref 37–48.5)
HCT VFR BLD AUTO: 25.3 % (ref 37–48.5)
HGB BLD-MCNC: 7.3 G/DL (ref 12–16)
HGB BLD-MCNC: 7.8 G/DL (ref 12–16)
HGB BLD-MCNC: 8.7 G/DL (ref 12–16)
IMM GRANULOCYTES # BLD AUTO: 0.36 K/UL (ref 0–0.04)
IMM GRANULOCYTES # BLD AUTO: 0.42 K/UL (ref 0–0.04)
IMM GRANULOCYTES # BLD AUTO: 0.51 K/UL (ref 0–0.04)
IMM GRANULOCYTES NFR BLD AUTO: 2.2 % (ref 0–0.5)
IMM GRANULOCYTES NFR BLD AUTO: 2.3 % (ref 0–0.5)
IMM GRANULOCYTES NFR BLD AUTO: 2.6 % (ref 0–0.5)
INR PPP: 1.3 (ref 0.8–1.2)
INR PPP: 1.5 (ref 0.8–1.2)
LYMPHOCYTES # BLD AUTO: 0.9 K/UL (ref 1–4.8)
LYMPHOCYTES # BLD AUTO: 1.3 K/UL (ref 1–4.8)
LYMPHOCYTES # BLD AUTO: 2.6 K/UL (ref 1–4.8)
LYMPHOCYTES NFR BLD: 11.1 % (ref 18–48)
LYMPHOCYTES NFR BLD: 6.6 % (ref 18–48)
LYMPHOCYTES NFR BLD: 6.9 % (ref 18–48)
MAGNESIUM SERPL-MCNC: 1.5 MG/DL (ref 1.6–2.6)
MCH RBC QN AUTO: 24.8 PG (ref 27–31)
MCH RBC QN AUTO: 27 PG (ref 27–31)
MCH RBC QN AUTO: 27.2 PG (ref 27–31)
MCHC RBC AUTO-ENTMCNC: 30.9 G/DL (ref 32–36)
MCHC RBC AUTO-ENTMCNC: 32.8 G/DL (ref 32–36)
MCHC RBC AUTO-ENTMCNC: 34.4 G/DL (ref 32–36)
MCV RBC AUTO: 79 FL (ref 82–98)
MCV RBC AUTO: 80 FL (ref 82–98)
MCV RBC AUTO: 83 FL (ref 82–98)
MONOCYTES # BLD AUTO: 1.1 K/UL (ref 0.3–1)
MONOCYTES # BLD AUTO: 1.5 K/UL (ref 0.3–1)
MONOCYTES # BLD AUTO: 1.7 K/UL (ref 0.3–1)
MONOCYTES NFR BLD: 7.6 % (ref 4–15)
MONOCYTES NFR BLD: 7.6 % (ref 4–15)
MONOCYTES NFR BLD: 8.4 % (ref 4–15)
NEUTROPHILS # BLD AUTO: 11.6 K/UL (ref 1.8–7.7)
NEUTROPHILS # BLD AUTO: 15.1 K/UL (ref 1.8–7.7)
NEUTROPHILS # BLD AUTO: 18.1 K/UL (ref 1.8–7.7)
NEUTROPHILS NFR BLD: 78.8 % (ref 38–73)
NEUTROPHILS NFR BLD: 82 % (ref 38–73)
NEUTROPHILS NFR BLD: 82.9 % (ref 38–73)
NRBC BLD-RTO: 1 /100 WBC
NUM UNITS TRANS FFP: NORMAL
NUM UNITS TRANS FFP: NORMAL
NUM UNITS TRANS PACKED RBC: NORMAL
PHOSPHATE SERPL-MCNC: 2.5 MG/DL (ref 2.7–4.5)
PLATELET # BLD AUTO: 172 K/UL (ref 150–450)
PLATELET # BLD AUTO: 358 K/UL (ref 150–450)
PLATELET # BLD AUTO: 484 K/UL (ref 150–450)
PMV BLD AUTO: 9.1 FL (ref 9.2–12.9)
PMV BLD AUTO: 9.3 FL (ref 9.2–12.9)
PMV BLD AUTO: 9.7 FL (ref 9.2–12.9)
POCT GLUCOSE: 213 MG/DL (ref 70–110)
POCT GLUCOSE: 227 MG/DL (ref 70–110)
POCT GLUCOSE: 236 MG/DL (ref 70–110)
POCT GLUCOSE: 250 MG/DL (ref 70–110)
POTASSIUM SERPL-SCNC: 3.9 MMOL/L (ref 3.5–5.1)
PROT SERPL-MCNC: 3.6 G/DL (ref 6–8.4)
PROTHROMBIN TIME: 14.2 SEC (ref 9–12.5)
PROTHROMBIN TIME: 15.3 SEC (ref 9–12.5)
RBC # BLD AUTO: 2.87 M/UL (ref 4–5.4)
RBC # BLD AUTO: 2.94 M/UL (ref 4–5.4)
RBC # BLD AUTO: 3.22 M/UL (ref 4–5.4)
SARS-COV-2 RDRP RESP QL NAA+PROBE: NEGATIVE
SODIUM SERPL-SCNC: 133 MMOL/L (ref 136–145)
SPECIMEN OUTDATE: NORMAL
UNIT NUMBER: NORMAL
WBC # BLD AUTO: 14.01 K/UL (ref 3.9–12.7)
WBC # BLD AUTO: 18.39 K/UL (ref 3.9–12.7)
WBC # BLD AUTO: 23.01 K/UL (ref 3.9–12.7)

## 2024-03-25 PROCEDURE — 88342 IMHCHEM/IMCYTCHM 1ST ANTB: CPT | Mod: 26,,, | Performed by: PATHOLOGY

## 2024-03-25 PROCEDURE — 27000221 HC OXYGEN, UP TO 24 HOURS

## 2024-03-25 PROCEDURE — 85025 COMPLETE CBC W/AUTO DIFF WBC: CPT | Mod: 91 | Performed by: STUDENT IN AN ORGANIZED HEALTH CARE EDUCATION/TRAINING PROGRAM

## 2024-03-25 PROCEDURE — P9016 RBC LEUKOCYTES REDUCED: HCPCS | Performed by: STUDENT IN AN ORGANIZED HEALTH CARE EDUCATION/TRAINING PROGRAM

## 2024-03-25 PROCEDURE — 36415 COLL VENOUS BLD VENIPUNCTURE: CPT | Mod: XB | Performed by: ANESTHESIOLOGY

## 2024-03-25 PROCEDURE — D9220A PRA ANESTHESIA: Mod: ,,, | Performed by: ANESTHESIOLOGY

## 2024-03-25 PROCEDURE — 64999 UNLISTED PX NERVOUS SYSTEM: CPT | Mod: ,,, | Performed by: ANESTHESIOLOGY

## 2024-03-25 PROCEDURE — 63600175 PHARM REV CODE 636 W HCPCS: Performed by: ANESTHESIOLOGY

## 2024-03-25 PROCEDURE — 76942 ECHO GUIDE FOR BIOPSY: CPT | Mod: 26,,, | Performed by: ANESTHESIOLOGY

## 2024-03-25 PROCEDURE — 86920 COMPATIBILITY TEST SPIN: CPT | Performed by: STUDENT IN AN ORGANIZED HEALTH CARE EDUCATION/TRAINING PROGRAM

## 2024-03-25 PROCEDURE — 25000003 PHARM REV CODE 250: Performed by: STUDENT IN AN ORGANIZED HEALTH CARE EDUCATION/TRAINING PROGRAM

## 2024-03-25 PROCEDURE — 84100 ASSAY OF PHOSPHORUS: CPT

## 2024-03-25 PROCEDURE — U0002 COVID-19 LAB TEST NON-CDC: HCPCS | Performed by: STUDENT IN AN ORGANIZED HEALTH CARE EDUCATION/TRAINING PROGRAM

## 2024-03-25 PROCEDURE — P9045 ALBUMIN (HUMAN), 5%, 250 ML: HCPCS | Mod: JZ,JG | Performed by: STUDENT IN AN ORGANIZED HEALTH CARE EDUCATION/TRAINING PROGRAM

## 2024-03-25 PROCEDURE — 86901 BLOOD TYPING SEROLOGIC RH(D): CPT | Performed by: STUDENT IN AN ORGANIZED HEALTH CARE EDUCATION/TRAINING PROGRAM

## 2024-03-25 PROCEDURE — 58953 TAH RAD DISSECT FOR DEBULK: CPT | Mod: 22,82,, | Performed by: OBSTETRICS & GYNECOLOGY

## 2024-03-25 PROCEDURE — 37000008 HC ANESTHESIA 1ST 15 MINUTES: Performed by: STUDENT IN AN ORGANIZED HEALTH CARE EDUCATION/TRAINING PROGRAM

## 2024-03-25 PROCEDURE — 80053 COMPREHEN METABOLIC PANEL: CPT

## 2024-03-25 PROCEDURE — 25000003 PHARM REV CODE 250

## 2024-03-25 PROCEDURE — 85610 PROTHROMBIN TIME: CPT | Performed by: ANESTHESIOLOGY

## 2024-03-25 PROCEDURE — 85384 FIBRINOGEN ACTIVITY: CPT | Performed by: ANESTHESIOLOGY

## 2024-03-25 PROCEDURE — 58953 TAH RAD DISSECT FOR DEBULK: CPT | Mod: 22,,, | Performed by: STUDENT IN AN ORGANIZED HEALTH CARE EDUCATION/TRAINING PROGRAM

## 2024-03-25 PROCEDURE — 63600175 PHARM REV CODE 636 W HCPCS

## 2024-03-25 PROCEDURE — P9017 PLASMA 1 DONOR FRZ W/IN 8 HR: HCPCS

## 2024-03-25 PROCEDURE — 0UT70ZZ RESECTION OF BILATERAL FALLOPIAN TUBES, OPEN APPROACH: ICD-10-PCS | Performed by: STUDENT IN AN ORGANIZED HEALTH CARE EDUCATION/TRAINING PROGRAM

## 2024-03-25 PROCEDURE — 82962 GLUCOSE BLOOD TEST: CPT | Performed by: STUDENT IN AN ORGANIZED HEALTH CARE EDUCATION/TRAINING PROGRAM

## 2024-03-25 PROCEDURE — 88305 TISSUE EXAM BY PATHOLOGIST: CPT | Performed by: PATHOLOGY

## 2024-03-25 PROCEDURE — 86965 POOLING BLOOD PLATELETS: CPT

## 2024-03-25 PROCEDURE — 0FB00ZZ EXCISION OF LIVER, OPEN APPROACH: ICD-10-PCS | Performed by: STUDENT IN AN ORGANIZED HEALTH CARE EDUCATION/TRAINING PROGRAM

## 2024-03-25 PROCEDURE — 37000009 HC ANESTHESIA EA ADD 15 MINS: Performed by: STUDENT IN AN ORGANIZED HEALTH CARE EDUCATION/TRAINING PROGRAM

## 2024-03-25 PROCEDURE — 39560 RESECT DIAPHRAGM SIMPLE: CPT | Mod: 51,,, | Performed by: STUDENT IN AN ORGANIZED HEALTH CARE EDUCATION/TRAINING PROGRAM

## 2024-03-25 PROCEDURE — 85610 PROTHROMBIN TIME: CPT | Mod: 91 | Performed by: STUDENT IN AN ORGANIZED HEALTH CARE EDUCATION/TRAINING PROGRAM

## 2024-03-25 PROCEDURE — 88307 TISSUE EXAM BY PATHOLOGIST: CPT | Mod: 26,,, | Performed by: PATHOLOGY

## 2024-03-25 PROCEDURE — 20000000 HC ICU ROOM

## 2024-03-25 PROCEDURE — 88304 TISSUE EXAM BY PATHOLOGIST: CPT | Performed by: PATHOLOGY

## 2024-03-25 PROCEDURE — 0UT20ZZ RESECTION OF BILATERAL OVARIES, OPEN APPROACH: ICD-10-PCS | Performed by: STUDENT IN AN ORGANIZED HEALTH CARE EDUCATION/TRAINING PROGRAM

## 2024-03-25 PROCEDURE — 85730 THROMBOPLASTIN TIME PARTIAL: CPT | Performed by: STUDENT IN AN ORGANIZED HEALTH CARE EDUCATION/TRAINING PROGRAM

## 2024-03-25 PROCEDURE — P9017 PLASMA 1 DONOR FRZ W/IN 8 HR: HCPCS | Performed by: STUDENT IN AN ORGANIZED HEALTH CARE EDUCATION/TRAINING PROGRAM

## 2024-03-25 PROCEDURE — 99223 1ST HOSP IP/OBS HIGH 75: CPT | Mod: GC,,, | Performed by: SURGERY

## 2024-03-25 PROCEDURE — 88305 TISSUE EXAM BY PATHOLOGIST: CPT | Mod: 26,,, | Performed by: PATHOLOGY

## 2024-03-25 PROCEDURE — 63600175 PHARM REV CODE 636 W HCPCS: Performed by: STUDENT IN AN ORGANIZED HEALTH CARE EDUCATION/TRAINING PROGRAM

## 2024-03-25 PROCEDURE — P9012 CRYOPRECIPITATE EACH UNIT: HCPCS

## 2024-03-25 PROCEDURE — 88304 TISSUE EXAM BY PATHOLOGIST: CPT | Mod: 26,,, | Performed by: PATHOLOGY

## 2024-03-25 PROCEDURE — 36415 COLL VENOUS BLD VENIPUNCTURE: CPT | Performed by: STUDENT IN AN ORGANIZED HEALTH CARE EDUCATION/TRAINING PROGRAM

## 2024-03-25 PROCEDURE — 85384 FIBRINOGEN ACTIVITY: CPT | Mod: 91 | Performed by: STUDENT IN AN ORGANIZED HEALTH CARE EDUCATION/TRAINING PROGRAM

## 2024-03-25 PROCEDURE — 39560 RESECT DIAPHRAGM SIMPLE: CPT | Mod: 82,51,, | Performed by: OBSTETRICS & GYNECOLOGY

## 2024-03-25 PROCEDURE — 63600175 PHARM REV CODE 636 W HCPCS: Mod: JZ,JG | Performed by: STUDENT IN AN ORGANIZED HEALTH CARE EDUCATION/TRAINING PROGRAM

## 2024-03-25 PROCEDURE — C1729 CATH, DRAINAGE: HCPCS | Performed by: STUDENT IN AN ORGANIZED HEALTH CARE EDUCATION/TRAINING PROGRAM

## 2024-03-25 PROCEDURE — 83735 ASSAY OF MAGNESIUM: CPT

## 2024-03-25 PROCEDURE — 47120 PARTIAL REMOVAL OF LIVER: CPT | Mod: ,,, | Performed by: SURGERY

## 2024-03-25 PROCEDURE — 64450 NJX AA&/STRD OTHER PN/BRANCH: CPT | Performed by: ANESTHESIOLOGY

## 2024-03-25 PROCEDURE — 0FT40ZZ RESECTION OF GALLBLADDER, OPEN APPROACH: ICD-10-PCS | Performed by: STUDENT IN AN ORGANIZED HEALTH CARE EDUCATION/TRAINING PROGRAM

## 2024-03-25 PROCEDURE — 0UT90ZZ RESECTION OF UTERUS, OPEN APPROACH: ICD-10-PCS | Performed by: STUDENT IN AN ORGANIZED HEALTH CARE EDUCATION/TRAINING PROGRAM

## 2024-03-25 PROCEDURE — 88309 TISSUE EXAM BY PATHOLOGIST: CPT | Performed by: PATHOLOGY

## 2024-03-25 PROCEDURE — 94799 UNLISTED PULMONARY SVC/PX: CPT | Mod: XB

## 2024-03-25 PROCEDURE — 36620 INSERTION CATHETER ARTERY: CPT | Mod: 59,,, | Performed by: ANESTHESIOLOGY

## 2024-03-25 PROCEDURE — 27201423 OPTIME MED/SURG SUP & DEVICES STERILE SUPPLY: Performed by: STUDENT IN AN ORGANIZED HEALTH CARE EDUCATION/TRAINING PROGRAM

## 2024-03-25 PROCEDURE — 0DBU0ZZ EXCISION OF OMENTUM, OPEN APPROACH: ICD-10-PCS | Performed by: STUDENT IN AN ORGANIZED HEALTH CARE EDUCATION/TRAINING PROGRAM

## 2024-03-25 PROCEDURE — 88342 IMHCHEM/IMCYTCHM 1ST ANTB: CPT | Performed by: PATHOLOGY

## 2024-03-25 PROCEDURE — 94761 N-INVAS EAR/PLS OXIMETRY MLT: CPT

## 2024-03-25 PROCEDURE — 36000709 HC OR TIME LEV III EA ADD 15 MIN: Performed by: STUDENT IN AN ORGANIZED HEALTH CARE EDUCATION/TRAINING PROGRAM

## 2024-03-25 PROCEDURE — 36000708 HC OR TIME LEV III 1ST 15 MIN: Performed by: STUDENT IN AN ORGANIZED HEALTH CARE EDUCATION/TRAINING PROGRAM

## 2024-03-25 PROCEDURE — 85025 COMPLETE CBC W/AUTO DIFF WBC: CPT | Performed by: ANESTHESIOLOGY

## 2024-03-25 PROCEDURE — 88309 TISSUE EXAM BY PATHOLOGIST: CPT | Mod: 26,,, | Performed by: PATHOLOGY

## 2024-03-25 PROCEDURE — 88307 TISSUE EXAM BY PATHOLOGIST: CPT | Mod: 59 | Performed by: PATHOLOGY

## 2024-03-25 RX ORDER — GLUCAGON 1 MG
1 KIT INJECTION
Status: DISCONTINUED | OUTPATIENT
Start: 2024-03-25 | End: 2024-03-26

## 2024-03-25 RX ORDER — HYDROCODONE BITARTRATE AND ACETAMINOPHEN 500; 5 MG/1; MG/1
TABLET ORAL
Status: DISCONTINUED | OUTPATIENT
Start: 2024-03-25 | End: 2024-03-26

## 2024-03-25 RX ORDER — KETAMINE HCL IN 0.9 % NACL 50 MG/5 ML
SYRINGE (ML) INTRAVENOUS
Status: DISCONTINUED | OUTPATIENT
Start: 2024-03-25 | End: 2024-03-25

## 2024-03-25 RX ORDER — ALBUMIN HUMAN 50 G/1000ML
SOLUTION INTRAVENOUS
Status: DISCONTINUED | OUTPATIENT
Start: 2024-03-25 | End: 2024-03-25

## 2024-03-25 RX ORDER — CALCIUM GLUCONATE 20 MG/ML
1 INJECTION, SOLUTION INTRAVENOUS
Status: DISCONTINUED | OUTPATIENT
Start: 2024-03-25 | End: 2024-03-26

## 2024-03-25 RX ORDER — DEXAMETHASONE SODIUM PHOSPHATE 4 MG/ML
INJECTION, SOLUTION INTRA-ARTICULAR; INTRALESIONAL; INTRAMUSCULAR; INTRAVENOUS; SOFT TISSUE
Status: DISCONTINUED | OUTPATIENT
Start: 2024-03-25 | End: 2024-03-25

## 2024-03-25 RX ORDER — LIDOCAINE HYDROCHLORIDE 10 MG/ML
1 INJECTION, SOLUTION EPIDURAL; INFILTRATION; INTRACAUDAL; PERINEURAL ONCE
Status: DISCONTINUED | OUTPATIENT
Start: 2024-03-25 | End: 2024-03-25 | Stop reason: HOSPADM

## 2024-03-25 RX ORDER — MUPIROCIN 20 MG/G
OINTMENT TOPICAL 2 TIMES DAILY
Status: COMPLETED | OUTPATIENT
Start: 2024-03-25 | End: 2024-03-27

## 2024-03-25 RX ORDER — CEFAZOLIN SODIUM 1 G/3ML
INJECTION, POWDER, FOR SOLUTION INTRAMUSCULAR; INTRAVENOUS
Status: DISCONTINUED | OUTPATIENT
Start: 2024-03-25 | End: 2024-03-25

## 2024-03-25 RX ORDER — PROCHLORPERAZINE EDISYLATE 5 MG/ML
5 INJECTION INTRAMUSCULAR; INTRAVENOUS EVERY 6 HOURS PRN
Status: DISCONTINUED | OUTPATIENT
Start: 2024-03-25 | End: 2024-03-31 | Stop reason: HOSPADM

## 2024-03-25 RX ORDER — OXYCODONE HYDROCHLORIDE 10 MG/1
10 TABLET ORAL EVERY 4 HOURS PRN
Status: DISCONTINUED | OUTPATIENT
Start: 2024-03-25 | End: 2024-03-25

## 2024-03-25 RX ORDER — PHENYLEPHRINE HYDROCHLORIDE 10 MG/ML
INJECTION INTRAVENOUS
Status: DISCONTINUED | OUTPATIENT
Start: 2024-03-25 | End: 2024-03-25

## 2024-03-25 RX ORDER — FENTANYL CITRATE 50 UG/ML
INJECTION, SOLUTION INTRAMUSCULAR; INTRAVENOUS
Status: DISCONTINUED | OUTPATIENT
Start: 2024-03-25 | End: 2024-03-25

## 2024-03-25 RX ORDER — INSULIN ASPART 100 [IU]/ML
2 INJECTION, SOLUTION INTRAVENOUS; SUBCUTANEOUS EVERY 4 HOURS PRN
Status: DISCONTINUED | OUTPATIENT
Start: 2024-03-25 | End: 2024-03-25 | Stop reason: HOSPADM

## 2024-03-25 RX ORDER — PROPOFOL 10 MG/ML
VIAL (ML) INTRAVENOUS
Status: DISCONTINUED | OUTPATIENT
Start: 2024-03-25 | End: 2024-03-25

## 2024-03-25 RX ORDER — DEXTROSE MONOHYDRATE AND SODIUM CHLORIDE 5; .45 G/100ML; G/100ML
INJECTION, SOLUTION INTRAVENOUS CONTINUOUS PRN
Status: DISCONTINUED | OUTPATIENT
Start: 2024-03-25 | End: 2024-03-25

## 2024-03-25 RX ORDER — CALCIUM GLUCONATE 20 MG/ML
2 INJECTION, SOLUTION INTRAVENOUS
Status: DISCONTINUED | OUTPATIENT
Start: 2024-03-25 | End: 2024-03-26

## 2024-03-25 RX ORDER — CALCIUM GLUCONATE 20 MG/ML
3 INJECTION, SOLUTION INTRAVENOUS
Status: DISCONTINUED | OUTPATIENT
Start: 2024-03-25 | End: 2024-03-26

## 2024-03-25 RX ORDER — ONDANSETRON 2 MG/ML
INJECTION INTRAMUSCULAR; INTRAVENOUS
Status: DISCONTINUED | OUTPATIENT
Start: 2024-03-25 | End: 2024-03-25

## 2024-03-25 RX ORDER — ROCURONIUM BROMIDE 10 MG/ML
INJECTION, SOLUTION INTRAVENOUS
Status: DISCONTINUED | OUTPATIENT
Start: 2024-03-25 | End: 2024-03-25

## 2024-03-25 RX ORDER — OXYCODONE HYDROCHLORIDE 5 MG/1
5 TABLET ORAL EVERY 4 HOURS PRN
Status: DISCONTINUED | OUTPATIENT
Start: 2024-03-25 | End: 2024-03-28

## 2024-03-25 RX ORDER — OXYMETAZOLINE HCL 0.05 %
SPRAY, NON-AEROSOL (ML) NASAL
Status: DISCONTINUED | OUTPATIENT
Start: 2024-03-25 | End: 2024-03-25

## 2024-03-25 RX ORDER — MIDAZOLAM HYDROCHLORIDE 1 MG/ML
.5-4 INJECTION, SOLUTION INTRAMUSCULAR; INTRAVENOUS
Status: DISCONTINUED | OUTPATIENT
Start: 2024-03-25 | End: 2024-03-25 | Stop reason: HOSPADM

## 2024-03-25 RX ORDER — NALOXONE HCL 0.4 MG/ML
0.02 VIAL (ML) INJECTION
Status: DISCONTINUED | OUTPATIENT
Start: 2024-03-25 | End: 2024-03-31 | Stop reason: HOSPADM

## 2024-03-25 RX ORDER — LIDOCAINE HYDROCHLORIDE 40 MG/ML
SOLUTION TOPICAL
Status: DISCONTINUED | OUTPATIENT
Start: 2024-03-25 | End: 2024-03-25

## 2024-03-25 RX ORDER — FENTANYL CITRATE 50 UG/ML
25-200 INJECTION, SOLUTION INTRAMUSCULAR; INTRAVENOUS
Status: DISCONTINUED | OUTPATIENT
Start: 2024-03-25 | End: 2024-03-25 | Stop reason: HOSPADM

## 2024-03-25 RX ORDER — MAGNESIUM SULFATE HEPTAHYDRATE 40 MG/ML
2 INJECTION, SOLUTION INTRAVENOUS
Status: DISCONTINUED | OUTPATIENT
Start: 2024-03-25 | End: 2024-03-26

## 2024-03-25 RX ORDER — LAMOTRIGINE 25 MG/1
25 TABLET ORAL 2 TIMES DAILY
Status: DISCONTINUED | OUTPATIENT
Start: 2024-03-25 | End: 2024-03-31 | Stop reason: HOSPADM

## 2024-03-25 RX ORDER — VASOPRESSIN 20 [USP'U]/ML
INJECTION, SOLUTION INTRAMUSCULAR; SUBCUTANEOUS
Status: DISCONTINUED | OUTPATIENT
Start: 2024-03-25 | End: 2024-03-25

## 2024-03-25 RX ORDER — MAGNESIUM SULFATE HEPTAHYDRATE 40 MG/ML
4 INJECTION, SOLUTION INTRAVENOUS
Status: DISCONTINUED | OUTPATIENT
Start: 2024-03-25 | End: 2024-03-26

## 2024-03-25 RX ORDER — HYDRALAZINE HYDROCHLORIDE 20 MG/ML
10 INJECTION INTRAMUSCULAR; INTRAVENOUS EVERY 6 HOURS PRN
Status: DISCONTINUED | OUTPATIENT
Start: 2024-03-25 | End: 2024-03-31 | Stop reason: HOSPADM

## 2024-03-25 RX ORDER — LIDOCAINE HYDROCHLORIDE 20 MG/ML
INJECTION, SOLUTION EPIDURAL; INFILTRATION; INTRACAUDAL; PERINEURAL
Status: DISCONTINUED | OUTPATIENT
Start: 2024-03-25 | End: 2024-03-25

## 2024-03-25 RX ORDER — SENNOSIDES 8.6 MG/1
8.6 TABLET ORAL 2 TIMES DAILY
Status: DISCONTINUED | OUTPATIENT
Start: 2024-03-25 | End: 2024-03-31 | Stop reason: HOSPADM

## 2024-03-25 RX ORDER — ADHESIVE BANDAGE
30 BANDAGE TOPICAL 2 TIMES DAILY
Status: DISCONTINUED | OUTPATIENT
Start: 2024-03-25 | End: 2024-03-29

## 2024-03-25 RX ORDER — ACETAMINOPHEN 500 MG
1000 TABLET ORAL EVERY 6 HOURS
Status: DISCONTINUED | OUTPATIENT
Start: 2024-03-26 | End: 2024-03-31 | Stop reason: HOSPADM

## 2024-03-25 RX ORDER — MIDAZOLAM HYDROCHLORIDE 5 MG/ML
INJECTION INTRAMUSCULAR; INTRAVENOUS
Status: DISCONTINUED | OUTPATIENT
Start: 2024-03-25 | End: 2024-03-25

## 2024-03-25 RX ORDER — LORAZEPAM 0.5 MG/1
0.5 TABLET ORAL EVERY 12 HOURS PRN
Status: DISCONTINUED | OUTPATIENT
Start: 2024-03-25 | End: 2024-03-31 | Stop reason: HOSPADM

## 2024-03-25 RX ORDER — SODIUM CHLORIDE 0.9 % (FLUSH) 0.9 %
10 SYRINGE (ML) INJECTION
Status: DISCONTINUED | OUTPATIENT
Start: 2024-03-25 | End: 2024-03-25

## 2024-03-25 RX ORDER — HYDROMORPHONE HYDROCHLORIDE 1 MG/ML
0.4 INJECTION, SOLUTION INTRAMUSCULAR; INTRAVENOUS; SUBCUTANEOUS
Status: DISCONTINUED | OUTPATIENT
Start: 2024-03-25 | End: 2024-03-31 | Stop reason: HOSPADM

## 2024-03-25 RX ORDER — SODIUM CHLORIDE 9 MG/ML
1000 INJECTION, SOLUTION INTRAVENOUS CONTINUOUS
Status: DISCONTINUED | OUTPATIENT
Start: 2024-03-25 | End: 2024-03-25

## 2024-03-25 RX ORDER — ATORVASTATIN CALCIUM 40 MG/1
80 TABLET, FILM COATED ORAL DAILY
Status: DISCONTINUED | OUTPATIENT
Start: 2024-03-26 | End: 2024-03-31 | Stop reason: HOSPADM

## 2024-03-25 RX ORDER — SUCCINYLCHOLINE CHLORIDE 20 MG/ML
INJECTION INTRAMUSCULAR; INTRAVENOUS
Status: DISCONTINUED | OUTPATIENT
Start: 2024-03-25 | End: 2024-03-25

## 2024-03-25 RX ORDER — FLUTICASONE PROPIONATE 50 MCG
1 SPRAY, SUSPENSION (ML) NASAL DAILY
Status: DISCONTINUED | OUTPATIENT
Start: 2024-03-26 | End: 2024-03-31 | Stop reason: HOSPADM

## 2024-03-25 RX ORDER — POTASSIUM CHLORIDE 7.45 MG/ML
60 INJECTION INTRAVENOUS
Status: DISCONTINUED | OUTPATIENT
Start: 2024-03-25 | End: 2024-03-26

## 2024-03-25 RX ORDER — LEVALBUTEROL INHALATION SOLUTION 0.63 MG/3ML
0.63 SOLUTION RESPIRATORY (INHALATION)
Status: DISCONTINUED | OUTPATIENT
Start: 2024-03-25 | End: 2024-03-25

## 2024-03-25 RX ORDER — TALC
6 POWDER (GRAM) TOPICAL NIGHTLY PRN
Status: DISCONTINUED | OUTPATIENT
Start: 2024-03-25 | End: 2024-03-31 | Stop reason: HOSPADM

## 2024-03-25 RX ORDER — ACETAMINOPHEN 500 MG
1000 TABLET ORAL
Status: COMPLETED | OUTPATIENT
Start: 2024-03-25 | End: 2024-03-25

## 2024-03-25 RX ORDER — ACETAMINOPHEN 10 MG/ML
INJECTION, SOLUTION INTRAVENOUS
Status: DISCONTINUED | OUTPATIENT
Start: 2024-03-25 | End: 2024-03-25

## 2024-03-25 RX ORDER — INSULIN ASPART 100 [IU]/ML
0-10 INJECTION, SOLUTION INTRAVENOUS; SUBCUTANEOUS EVERY 6 HOURS PRN
Status: DISCONTINUED | OUTPATIENT
Start: 2024-03-25 | End: 2024-03-26

## 2024-03-25 RX ORDER — POTASSIUM CHLORIDE 7.45 MG/ML
40 INJECTION INTRAVENOUS
Status: DISCONTINUED | OUTPATIENT
Start: 2024-03-25 | End: 2024-03-26

## 2024-03-25 RX ORDER — MUPIROCIN 20 MG/G
OINTMENT TOPICAL
Status: DISCONTINUED | OUTPATIENT
Start: 2024-03-25 | End: 2024-03-25 | Stop reason: HOSPADM

## 2024-03-25 RX ORDER — OXYCODONE HYDROCHLORIDE 10 MG/1
10 TABLET ORAL EVERY 4 HOURS PRN
Status: DISCONTINUED | OUTPATIENT
Start: 2024-03-25 | End: 2024-03-28

## 2024-03-25 RX ORDER — SODIUM CHLORIDE 0.9 % (FLUSH) 0.9 %
10 SYRINGE (ML) INJECTION
Status: DISCONTINUED | OUTPATIENT
Start: 2024-03-25 | End: 2024-03-25 | Stop reason: HOSPADM

## 2024-03-25 RX ORDER — OXYCODONE HYDROCHLORIDE 5 MG/1
5 TABLET ORAL EVERY 4 HOURS PRN
Status: DISCONTINUED | OUTPATIENT
Start: 2024-03-25 | End: 2024-03-25

## 2024-03-25 RX ORDER — POTASSIUM CHLORIDE 7.45 MG/ML
80 INJECTION INTRAVENOUS
Status: DISCONTINUED | OUTPATIENT
Start: 2024-03-25 | End: 2024-03-26

## 2024-03-25 RX ORDER — HEPARIN SODIUM 5000 [USP'U]/ML
5000 INJECTION, SOLUTION INTRAVENOUS; SUBCUTANEOUS
Status: COMPLETED | OUTPATIENT
Start: 2024-03-25 | End: 2024-03-25

## 2024-03-25 RX ORDER — HYDROCODONE BITARTRATE AND ACETAMINOPHEN 500; 5 MG/1; MG/1
TABLET ORAL
Status: DISCONTINUED | OUTPATIENT
Start: 2024-03-25 | End: 2024-03-25

## 2024-03-25 RX ORDER — SODIUM CHLORIDE, SODIUM LACTATE, POTASSIUM CHLORIDE, CALCIUM CHLORIDE 600; 310; 30; 20 MG/100ML; MG/100ML; MG/100ML; MG/100ML
INJECTION, SOLUTION INTRAVENOUS CONTINUOUS
Status: DISCONTINUED | OUTPATIENT
Start: 2024-03-25 | End: 2024-03-26

## 2024-03-25 RX ORDER — ONDANSETRON HYDROCHLORIDE 2 MG/ML
4 INJECTION, SOLUTION INTRAVENOUS EVERY 6 HOURS PRN
Status: DISCONTINUED | OUTPATIENT
Start: 2024-03-25 | End: 2024-03-31 | Stop reason: HOSPADM

## 2024-03-25 RX ORDER — METRONIDAZOLE 500 MG/100ML
INJECTION, SOLUTION INTRAVENOUS
Status: DISCONTINUED | OUTPATIENT
Start: 2024-03-25 | End: 2024-03-25

## 2024-03-25 RX ORDER — CELECOXIB 200 MG/1
400 CAPSULE ORAL
Status: COMPLETED | OUTPATIENT
Start: 2024-03-25 | End: 2024-03-25

## 2024-03-25 RX ORDER — ONDANSETRON HYDROCHLORIDE 2 MG/ML
INJECTION, SOLUTION INTRAVENOUS
Status: DISCONTINUED | OUTPATIENT
Start: 2024-03-25 | End: 2024-03-25

## 2024-03-25 RX ADMIN — ONDANSETRON 250 MG: 2 INJECTION INTRAMUSCULAR; INTRAVENOUS at 09:03

## 2024-03-25 RX ADMIN — ALBUMIN (HUMAN) 250 ML: 12.5 SOLUTION INTRAVENOUS at 01:03

## 2024-03-25 RX ADMIN — SODIUM CHLORIDE: 0.9 INJECTION, SOLUTION INTRAVENOUS at 07:03

## 2024-03-25 RX ADMIN — HYDROMORPHONE HYDROCHLORIDE 0.4 MG: 1 INJECTION, SOLUTION INTRAMUSCULAR; INTRAVENOUS; SUBCUTANEOUS at 10:03

## 2024-03-25 RX ADMIN — VASOPRESSIN 2 UNITS: 20 INJECTION INTRAVENOUS at 08:03

## 2024-03-25 RX ADMIN — ONDANSETRON 4 MG: 2 INJECTION INTRAMUSCULAR; INTRAVENOUS at 02:03

## 2024-03-25 RX ADMIN — PHENYLEPHRINE HYDROCHLORIDE 100 MCG: 10 INJECTION INTRAVENOUS at 11:03

## 2024-03-25 RX ADMIN — VASOPRESSIN 4 UNITS: 20 INJECTION INTRAVENOUS at 10:03

## 2024-03-25 RX ADMIN — VASOPRESSIN 2 UNITS: 20 INJECTION INTRAVENOUS at 11:03

## 2024-03-25 RX ADMIN — VASOPRESSIN 2 UNITS: 20 INJECTION INTRAVENOUS at 01:03

## 2024-03-25 RX ADMIN — SODIUM CHLORIDE, POTASSIUM CHLORIDE, SODIUM LACTATE AND CALCIUM CHLORIDE 1000 ML: 600; 310; 30; 20 INJECTION, SOLUTION INTRAVENOUS at 07:03

## 2024-03-25 RX ADMIN — MUPIROCIN 1 TUBE: 20 OINTMENT TOPICAL at 06:03

## 2024-03-25 RX ADMIN — ACETAMINOPHEN 1000 MG: 10 INJECTION, SOLUTION INTRAVENOUS at 02:03

## 2024-03-25 RX ADMIN — OXYCODONE 5 MG: 5 TABLET ORAL at 07:03

## 2024-03-25 RX ADMIN — VASOPRESSIN 5 UNITS: 20 INJECTION INTRAVENOUS at 10:03

## 2024-03-25 RX ADMIN — MAGNESIUM SULFATE HEPTAHYDRATE 2 G: 40 INJECTION, SOLUTION INTRAVENOUS at 10:03

## 2024-03-25 RX ADMIN — ROCURONIUM BROMIDE 50 MG: 10 INJECTION, SOLUTION INTRAVENOUS at 07:03

## 2024-03-25 RX ADMIN — PROPOFOL 120 MG: 10 INJECTION, EMULSION INTRAVENOUS at 07:03

## 2024-03-25 RX ADMIN — INSULIN DETEMIR 27 UNITS: 100 INJECTION, SOLUTION SUBCUTANEOUS at 08:03

## 2024-03-25 RX ADMIN — ROCURONIUM BROMIDE 20 MG: 10 INJECTION, SOLUTION INTRAVENOUS at 01:03

## 2024-03-25 RX ADMIN — PHENYLEPHRINE HYDROCHLORIDE 200 MCG: 10 INJECTION INTRAVENOUS at 08:03

## 2024-03-25 RX ADMIN — VASOPRESSIN 4 UNITS: 20 INJECTION INTRAVENOUS at 09:03

## 2024-03-25 RX ADMIN — INSULIN ASPART 2 UNITS: 100 INJECTION, SOLUTION INTRAVENOUS; SUBCUTANEOUS at 07:03

## 2024-03-25 RX ADMIN — SODIUM CHLORIDE, SODIUM ACETATE ANHYDROUS, SODIUM GLUCONATE, POTASSIUM CHLORIDE, AND MAGNESIUM CHLORIDE: 526; 222; 502; 37; 30 INJECTION, SOLUTION INTRAVENOUS at 07:03

## 2024-03-25 RX ADMIN — ACETAMINOPHEN 1000 MG: 500 TABLET ORAL at 06:03

## 2024-03-25 RX ADMIN — SENNOSIDES 8.6 MG: 8.6 TABLET, FILM COATED ORAL at 09:03

## 2024-03-25 RX ADMIN — CEFAZOLIN 2 G: 330 INJECTION, POWDER, FOR SOLUTION INTRAMUSCULAR; INTRAVENOUS at 12:03

## 2024-03-25 RX ADMIN — ROCURONIUM BROMIDE 40 MG: 10 INJECTION, SOLUTION INTRAVENOUS at 09:03

## 2024-03-25 RX ADMIN — MIDAZOLAM 1 MG: 5 INJECTION INTRAMUSCULAR; INTRAVENOUS at 07:03

## 2024-03-25 RX ADMIN — Medication 10 MG: at 02:03

## 2024-03-25 RX ADMIN — ONDANSETRON 250 MG: 2 INJECTION INTRAMUSCULAR; INTRAVENOUS at 01:03

## 2024-03-25 RX ADMIN — LIDOCAINE HYDROCHLORIDE 2 ML: 40 SOLUTION ORAL at 07:03

## 2024-03-25 RX ADMIN — FENTANYL CITRATE 25 MCG: 50 INJECTION, SOLUTION INTRAMUSCULAR; INTRAVENOUS at 07:03

## 2024-03-25 RX ADMIN — SUCCINYLCHOLINE CHLORIDE 120 MG: 20 INJECTION, SOLUTION INTRAMUSCULAR; INTRAVENOUS at 07:03

## 2024-03-25 RX ADMIN — ALBUMIN (HUMAN) 250 ML: 12.5 SOLUTION INTRAVENOUS at 11:03

## 2024-03-25 RX ADMIN — SUGAMMADEX 200 MG: 100 INJECTION, SOLUTION INTRAVENOUS at 03:03

## 2024-03-25 RX ADMIN — PHENYLEPHRINE HYDROCHLORIDE 100 MCG: 10 INJECTION INTRAVENOUS at 08:03

## 2024-03-25 RX ADMIN — SODIUM CHLORIDE, POTASSIUM CHLORIDE, SODIUM LACTATE AND CALCIUM CHLORIDE: 600; 310; 30; 20 INJECTION, SOLUTION INTRAVENOUS at 04:03

## 2024-03-25 RX ADMIN — VASOPRESSIN 1 UNITS: 20 INJECTION INTRAVENOUS at 08:03

## 2024-03-25 RX ADMIN — MUPIROCIN: 20 OINTMENT TOPICAL at 09:03

## 2024-03-25 RX ADMIN — VASOPRESSIN 2 UNITS: 20 INJECTION INTRAVENOUS at 02:03

## 2024-03-25 RX ADMIN — VASOPRESSIN 2 UNITS: 20 INJECTION INTRAVENOUS at 12:03

## 2024-03-25 RX ADMIN — SODIUM PHOSPHATE, MONOBASIC, MONOHYDRATE AND SODIUM PHOSPHATE, DIBASIC, ANHYDROUS 15 MMOL: 142; 276 INJECTION, SOLUTION INTRAVENOUS at 10:03

## 2024-03-25 RX ADMIN — VASOPRESSIN 2 UNITS: 20 INJECTION INTRAVENOUS at 10:03

## 2024-03-25 RX ADMIN — ROCURONIUM BROMIDE 20 MG: 10 INJECTION, SOLUTION INTRAVENOUS at 11:03

## 2024-03-25 RX ADMIN — ROCURONIUM BROMIDE 30 MG: 10 INJECTION, SOLUTION INTRAVENOUS at 10:03

## 2024-03-25 RX ADMIN — Medication 20 MG: at 10:03

## 2024-03-25 RX ADMIN — METRONIDAZOLE 500 MG: 5 INJECTION, SOLUTION INTRAVENOUS at 09:03

## 2024-03-25 RX ADMIN — HEPARIN SODIUM 5000 UNITS: 5000 INJECTION INTRAVENOUS; SUBCUTANEOUS at 06:03

## 2024-03-25 RX ADMIN — LIDOCAINE HYDROCHLORIDE 80 MG: 20 INJECTION, SOLUTION EPIDURAL; INFILTRATION; INTRACAUDAL; PERINEURAL at 07:03

## 2024-03-25 RX ADMIN — CELECOXIB 400 MG: 200 CAPSULE ORAL at 06:03

## 2024-03-25 RX ADMIN — SODIUM CHLORIDE 2 UNITS/HR: 9 INJECTION, SOLUTION INTRAVENOUS at 09:03

## 2024-03-25 RX ADMIN — LAMOTRIGINE 25 MG: 25 TABLET ORAL at 09:03

## 2024-03-25 RX ADMIN — MAGNESIUM HYDROXIDE 2400 MG: 400 SUSPENSION ORAL at 09:03

## 2024-03-25 RX ADMIN — ONDANSETRON 250 MG: 2 INJECTION INTRAMUSCULAR; INTRAVENOUS at 10:03

## 2024-03-25 RX ADMIN — DEXAMETHASONE SODIUM PHOSPHATE 4 MG: 4 INJECTION, SOLUTION INTRAMUSCULAR; INTRAVENOUS at 07:03

## 2024-03-25 RX ADMIN — INSULIN ASPART 4 UNITS: 100 INJECTION, SOLUTION INTRAVENOUS; SUBCUTANEOUS at 04:03

## 2024-03-25 RX ADMIN — CEFAZOLIN 2 G: 330 INJECTION, POWDER, FOR SOLUTION INTRAMUSCULAR; INTRAVENOUS at 08:03

## 2024-03-25 RX ADMIN — Medication 20 MG: at 11:03

## 2024-03-25 RX ADMIN — ALBUMIN (HUMAN) 250 ML: 12.5 SOLUTION INTRAVENOUS at 10:03

## 2024-03-25 RX ADMIN — ACETAMINOPHEN 1000 MG: 500 TABLET ORAL at 11:03

## 2024-03-25 RX ADMIN — FENTANYL CITRATE 75 MCG: 50 INJECTION, SOLUTION INTRAMUSCULAR; INTRAVENOUS at 07:03

## 2024-03-25 RX ADMIN — VASOPRESSIN 5 UNITS: 20 INJECTION INTRAVENOUS at 11:03

## 2024-03-25 RX ADMIN — VASOPRESSIN 2 UNITS: 20 INJECTION INTRAVENOUS at 09:03

## 2024-03-25 RX ADMIN — SODIUM CHLORIDE, POTASSIUM CHLORIDE, SODIUM LACTATE AND CALCIUM CHLORIDE: 600; 310; 30; 20 INJECTION, SOLUTION INTRAVENOUS at 08:03

## 2024-03-25 RX ADMIN — SODIUM CHLORIDE 0.5 MCG/KG/MIN: 9 INJECTION, SOLUTION INTRAVENOUS at 08:03

## 2024-03-25 RX ADMIN — OXYMETAZOLINE HYDROCHLORIDE 2 SPRAY: 0.05 SPRAY NASAL at 07:03

## 2024-03-25 RX ADMIN — INSULIN ASPART 4 UNITS: 100 INJECTION, SOLUTION INTRAVENOUS; SUBCUTANEOUS at 08:03

## 2024-03-25 NOTE — OP NOTE
[facility] - Surgery ([facility])  Surgery Department  Operative Note       Date of Procedure: 3/25/2024     Procedure: Procedure(s) (LRB):  LAPAROTOMY, EXPLORATORY (N/A)  HYSTERECTOMY, TOTAL, ABDOMINAL (N/A)  SALPINGO-OOPHORECTOMY, BILATERAL (N/A)  OMENTECTOMY (N/A)  DEBULKING, NEOPLASM (N/A)  EXCISION, LIVER - partial (N/A)  CHOLECYSTECTOMY     Surgeon(s) and Role:  Panel 1:     * Александр Washington MD - Primary     * Kelsey Ramirez MD - Resident - Assisting     * Kuldip Chen MD - Co-Surgeon  Panel 2:     * Bo Farmer MD - Primary        Pre-Operative Diagnosis:   Ovarian carcinosarcoma [C56.9]    Post-Operative Diagnosis:   Same    Comorbidities:  History of breast cancer  Diabetes mellitus  Hyperlipidemia  Hypertension  Seizure disorder  Moderate protein calorie malnutrition    Anesthesia: General    Operative Findings:   There were extensive tumor implants on the right diaphragm, invading segment 6 of the liver, on the surface of the gallbladder, surrounding the kelvin hepatis, and on the lesser omentum.    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 She presents for cytoreductive surgery with Dr. Washington. Dr. Washington asked me to assist with liver resection. I met the patient in the preoperative holding area. Risks and benefits of partial hepatectomy were reviewed including but not limited to: bleeding, infection, bile leak, damage to local structures, cardiovascular and pulmonary complications, need for additional procedures, death, and imponderables.  She understands and signed written informed consent to proceed.    Details:  When I entered the operating room, Dr. Washington had already made a midline incision and mobilized the right liver.  There was extensive tumor on the right diaphragm and tumor invading segment 6 of the liver. There was also tumor on the surface of the gallbladder. Dr. Washington thought he could achieve a complete debulking such that any remaining disease was < 1 cm, so we decided to proceed with cholecystectomy and partial hepatectomy.     I started with removing additional tumor along the falciform ligament. Thin attachments surrounding the falciform were divided with cautery into the umbilical fissure. The falciform was then ligated deep in the umbilical fissure with a silk tie and the Ligasure. The falciform was sent to pathology. The cholecystectomy was performed next. The gallbladder was removed in top down fashion. The gallbladder was dissected off the cystic plate. The cystic duct and artery were identified and ligated with silk ties. There was also tumor involving the right lateral and posterior aspects of the kelvin hepatis. The tumor was removed from the lateral aspect with the Ligasure. There was a small amount of tumor all < 1 cm posterior to the kelvin hepatis that was not resected. Attention was then turned to the liver resection. The right liver had already been mostly mobilized. Remaining attachments overlying the IVC were divided with cautery. There was tumor involving the right retroperitoneum that was invading segment 6 of the liver. This was dissected off the retroperitoneum using cautery. The tumor was left attached only to the liver. A liver wedge resection was then performed using cautery and the Ligasure. Tammy knit was placed on the transection surface.     I then assisted Dr. Washington with resection of additional tumor from the right diaphragm. See his operative note for details. The gastrohepatic ligament was opened next. There was some tumor on the lesser omentum. This was resected with the Ligasure and sent to pathology. I left at that point, and Dr. Washington completed the case. See his operative note  for details.     Implants: * No implants in log *    Specimens:   Specimen (24h ago, onward)       Start     Ordered    Pending  Specimen to Pathology, Surgery Gynecology and Obstetrics  Once        Comments: Pre-op Diagnosis: Ovarian carcinosarcoma [C56.9]Procedure(s):LAPAROTOMY, EXPLORATORYHYSTERECTOMY, TOTAL, ABDOMINALSALPINGO-OOPHORECTOMY, BILATERALOMENTECTOMYDEBULKING, NEOPLASMEXCISION, LIVER - partial Number of specimens: 6Name of specimens: 1. Omentum and tumor - perm 2. Gallbladder - perm 3. Segment 6 liver wedge resection - perm 4. GASTRIC HEPATIC LIGAMENT - PERM 5. RIGHT DIAPHRAGM PERITONEUM - PERM 6. PELVIC PERITONEUM - PERM     References:    Click here for ordering Quick Tip   Question Answer Comment   Procedure Type: Gynecology and Obstetrics    Specimen Class: Known or suspected malignancy    Which provider would you like to cc? ZULEIKA FARMER    Which provider would you like to cc? JULIANA MORA    Release to patient Immediate        Pending                            Condition: Good    Disposition:  The patient remained in the operating room with Dr. Mora.    Attestation:  I was present and scrubbed for my entire portion of the procedure.    Zuleika Farmer MD  Staff Surgeon  Surgical Oncology

## 2024-03-25 NOTE — OPERATIVE NOTE ADDENDUM
Certification of Assistant at Surgery       Surgery Date: 3/25/2024     Participating Surgeons:  Surgeon(s) and Role:  Panel 1:     * Александр Washington MD - Primary     * Kelsey Ramirez MD - Resident - Assisting     * Kuldip Chen MD - Co-Surgeon  Panel 2:     * Bo Farmer MD - Primary    Procedures:  Procedure(s) (LRB):  LAPAROTOMY, EXPLORATORY (N/A)  HYSTERECTOMY, TOTAL, ABDOMINAL (N/A)  SALPINGO-OOPHORECTOMY, BILATERAL (N/A)  OMENTECTOMY (N/A)  DEBULKING, NEOPLASM (N/A)  EXCISION, LIVER - partial (N/A)  CHOLECYSTECTOMY    Assistant Surgeon's Certification of Necessity:  I understand that section 1842 (b) (6) (d) of the Social Security Act generally prohibits Medicare Part B reasonable charge payment for the services of assistants at surgery in teaching hospitals when qualified residents are available to furnish such services. I certify that the services for which payment is claimed were medically necessary, and that no qualified resident was available to perform the services. I further understand that these services are subject to post-payment review by the Medicare carrier.      Kuldip Chen MD    03/25/2024  5:48 PM

## 2024-03-25 NOTE — ED NOTES
To OR @ 0700   Admission Dx: carcinosarcoma  POD#0 s/p Ex-lap/CARLOS ALBERTO/BSO/OMX/Partial hepatectomy/**  Onc Path: carcinosarcoma  Stage: IVB  Chemo Reg: Adjuvant C/T   PMH: HTN, HLD, T2DM, h/o BCA, seizure dz  Meds: tylenol/toradol>ibuprofen, oxy IR PRN, dilaudid BTP, nausea PRN, sim/sagar/melatonin PRN, hydral PRN, SSI  Home Meds: (amlodipine), statin, (enalapril), flonase, (HCTZ), lantus 33 QHS, lamictal, (metformin), seroquel, (mounjaro)  Diet/Fluids: DM + boost breeze  Labs/Imagin.0/25.4 > EBL**        Cr 0.9   VTE ppx: SCDs + heparin>lovenox   Code Status: Full   F/U:  [ ] AM CBC/BMP [ ] 2u held  [ ] BG TIDWM/QHS

## 2024-03-25 NOTE — ASSESSMENT & PLAN NOTE
Neuro/Psych:   -- Sedation: none   -- Pain: shelby tylenol 1 g q6h, prn oxy 5/10, dilaudid             Cards:   -- HDS  -- off pressors      Pulm:   -- Goal O2 sat > 90%  -- Wean as able      Renal:  -- Keep junior for strict I/O  -- Trend BUN/Cr   --  ml in OR      FEN / GI:   -- Will start regular diet  -- Replace lytes as needed  -- Zofran prn  -- Abdominal dressings c/d/I, appropriately tender to palpation      ID:   -- Tm: afebrile  -- Daily CBC  -- Abx none      Heme/Onc:   -- Will obtain stat postop CBC and coags  -- Daily CBC  -- Holding DVT ppx overnight for oozing in OR      Endo:   -- Pt with Hx DMII with gastroparesis  -- Gluc goal 140-180  -- insulin gtt at 1 unit/hr  -- wean drip as tolerated      PPx:   Feeding: Regular diet  Analgesia/Sedation: shelby tylenol, prn oxy 5/10, dilaudid  Thromboembolic prevention: held  HOB >30: yes  Stress Ulcer ppx: n/a  Glucose control: Critical care goal 140-180 g/dl, ISS    Lines/Drains/Airway: REJ PIV, left radial art line, PIV, junior      Dispo/Code Status/Palliative:   -- SICU / Full Code

## 2024-03-25 NOTE — SUBJECTIVE & OBJECTIVE
Follow-up For: Procedure(s) (LRB):  LAPAROTOMY, EXPLORATORY (N/A)  HYSTERECTOMY, TOTAL, ABDOMINAL (N/A)  SALPINGO-OOPHORECTOMY, BILATERAL (N/A)  OMENTECTOMY (N/A)  DEBULKING, NEOPLASM (N/A)  EXCISION, LIVER - partial (N/A)  CHOLECYSTECTOMY    Post-Operative Day: Day of Surgery     Past Medical History:   Diagnosis Date    Breast cancer 2013    Diabetes mellitus     Genetic testing 05/29/2013    VUS    Hyperlipidemia     Hypertension     Malignant neoplasm of upper-outer quadrant of right breast in female, estrogen receptor positive 12/02/2015    Seizure disorder        Past Surgical History:   Procedure Laterality Date    BREAST BIOPSY      BREAST LUMPECTOMY Right 2013    EYE SURGERY      PERITONEOCENTESIS N/A 3/13/2024    Procedure: PARACENTESIS, ABDOMINAL;  Surgeon: Flavia Moore MD;  Location: [facility] CATH LAB;  Service: Radiology;  Laterality: N/A;    PERITONEOCENTESIS N/A 3/21/2024    Procedure: PARACENTESIS, ABDOMINAL;  Surgeon: Jorge Jolley MD;  Location: [facility] CATH LAB;  Service: Radiology;  Laterality: N/A;    TOTAL REDUCTION MAMMOPLASTY Bilateral 2016       Review of patient's allergies indicates:   Allergen Reactions    Codeine Other (See Comments)     Flu like s/s       Family History       Problem Relation (Age of Onset)    Breast cancer Sister (48)    Seizures Father          Tobacco Use    Smoking status: Never    Smokeless tobacco: Never   Substance and Sexual Activity    Alcohol use: Yes     Alcohol/week: 0.0 standard drinks of alcohol     Comment: ocassional    Drug use: No    Sexual activity: Yes     Partners: Male     Birth control/protection: None      Review of Systems   All other systems reviewed and are negative.    Objective:     Vital Signs (Most Recent):  Temp: 98 °F (36.7 °C) (03/25/24 0609)  Pulse: (!) 112 (03/25/24 0609)  Resp: 18 (03/25/24 0609)  BP: 134/63 (03/25/24 0610)  SpO2: 97 % (03/25/24 0609) Vital Signs (24h Range):  Temp:  [98 °F (36.7 °C)] 98 °F (36.7 °C)  Pulse:   [112] 112  Resp:  [18] 18  SpO2:  [97 %] 97 %  BP: (134)/(63) 134/63     Weight: 95.3 kg (210 lb)  Body mass index is 32.89 kg/m².      Intake/Output Summary (Last 24 hours) at 3/25/2024 1632  Last data filed at 3/25/2024 1532  Gross per 24 hour   Intake 6300 ml   Output 3130 ml   Net 3170 ml          Physical Exam  Constitutional:       General: She is not in acute distress.     Appearance: She is not ill-appearing or toxic-appearing.   HENT:      Head: Normocephalic and atraumatic.      Mouth/Throat:      Mouth: Mucous membranes are moist.      Pharynx: Oropharynx is clear.   Eyes:      General: No scleral icterus.     Conjunctiva/sclera: Conjunctivae normal.      Pupils: Pupils are equal, round, and reactive to light.   Cardiovascular:      Rate and Rhythm: Normal rate and regular rhythm.   Pulmonary:      Effort: Pulmonary effort is normal. No respiratory distress.      Comments: Satting well on 3 L face mask  Abdominal:      General: Abdomen is flat. There is no distension.      Palpations: Abdomen is soft.      Tenderness: There is no guarding.      Comments: Abdominal dressings c/d/I, no signs of active bleeding, appropriately tender to palpation   Skin:     General: Skin is warm and dry.      Coloration: Skin is not jaundiced.   Neurological:      General: No focal deficit present.      Mental Status: She is alert. Mental status is at baseline.       Lines/Drains/Airways       Central Venous Catheter Line  Duration                  PowerPort A Cath Single Lumen 03/07/24 0853 Internal Jugular Left 18 days              Drain  Duration                  NG/OG Tube 03/25/24 0733 nasogastric 14 Fr. Right nostril <1 day         Urethral Catheter 03/25/24 Non-latex 16 Fr. <1 day              Arterial Line  Duration             Arterial Line 03/25/24 1133 Left Radial <1 day              Peripheral Intravenous Line  Duration                  Peripheral IV - Single Lumen 03/25/24 0642 18 G Anterior;Left Forearm <1 day  "        Peripheral IV - Single Lumen 03/25/24 0750 18 G;1 3/4 in Left  <1 day                    Significant Labs:    CBC/Anemia Profile:  Recent Labs   Lab 03/25/24  0647 03/25/24  0959   WBC 18.39* 23.01*   HGB 7.3* 7.8*   HCT 23.6* 23.8*   * 358   MCV 80* 83   RDW 16.9* 16.5*        Chemistries:  No results for input(s): "NA", "K", "CL", "CO2", "BUN", "CREATININE", "CALCIUM", "ALBUMIN", "PROT", "BILITOT", "ALKPHOS", "ALT", "AST", "GLUCOSE", "MG", "PHOS" in the last 48 hours.    All pertinent labs within the past 24 hours have been reviewed.    Significant Imaging: I have reviewed all pertinent imaging results/findings within the past 24 hours.  "

## 2024-03-25 NOTE — HPI
Anette Ricci is a 58 y.o. 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 She is planned for CARLOS ALBERTO/BSO, omentectomy, lymph node debulking, possible colon resection, possible ostomy, and partial hepatectomy. She did have recent paracentesis on 3/19 for symptomatic relief. Imaging demonstrates multiple mesenteric and peritoneal masses, as well as a 3.1 cm hepatic lesion in segment VI.

## 2024-03-25 NOTE — NURSING
Nurses Note -- 4 Eyes      3/25/2024   6:29 PM      Skin assessed during: Admit      [x] No Altered Skin Integrity Present    [x]Prevention Measures Documented      [] Yes- Altered Skin Integrity Present or Discovered   [] LDA Added if Not in Epic (Describe Wound)   [] New Altered Skin Integrity was Present on Admit and Documented in LDA   [] Wound Image Taken    Wound Care Consulted? No    Attending Nurse:  Sendy Leach RN/Staff Member:   Silvana PIPER

## 2024-03-25 NOTE — HPI
Anette Ricci is a 58 y.o. 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 She did have recent paracentesis on 3/19 for symptomatic relief.    The patient presents to the SICU s/p TAHBSO, omentectomy, lymph node debulking, peritoneal/diaphragmatic stripping, cholecystectomy, and segment VI liver resection with Dr. Washington and Dr. Farmer on 03/25/2024. She was extubated postoperatively and is currently satting well on 3 L face mask. She required pressor support intra-operatively but is currently maintaining MAPs > 65 off pressors.     Intraoperatively, they received 6 L of crystalloid, 5 PRBCs, 4 FFP, 1 L albumin. Urine output intraoperatively was 600 cc.     Immediate post-operative plans include hemodynamic stabilization and weaning of respiratory support. Plan to wean oxygen as tolerated, and closely monitor fluid status with strict Is/Os and continued fluid resuscitation as needed.

## 2024-03-25 NOTE — TRANSFER OF CARE
"Anesthesia Transfer of Care Note    Patient: Anette Ricci    Procedure(s) Performed: Procedure(s) (LRB):  LAPAROTOMY, EXPLORATORY (N/A)  HYSTERECTOMY, TOTAL, ABDOMINAL (N/A)  SALPINGO-OOPHORECTOMY, BILATERAL (N/A)  OMENTECTOMY (N/A)  DEBULKING, NEOPLASM (N/A)  EXCISION, LIVER - partial (N/A)  CHOLECYSTECTOMY    Patient location: ICU    Anesthesia Type: general    Transport from OR: Transported from OR on 6-10 L/min O2 by face mask with adequate spontaneous ventilation    Post pain: adequate analgesia    Post assessment: no apparent anesthetic complications and tolerated procedure well    Post vital signs: stable    Level of consciousness: awake, alert and oriented    Nausea/Vomiting: no nausea/vomiting    Complications: none    Transfer of care protocol was followedComments: Tolerated well. No complications. Brought to SICU s/p 5u prbc, 1u ffp, 5L crystalloid, 1L albumin. SICU to perform stat labs and transfuse PRN. No vasopressors. Insulin gtt at 1u per hour. Following commands in all extremities. IV sites CDI. Rest of care per SICU.      Last vitals: Visit Vitals  /63   Pulse (!) 112   Temp 36.7 °C (98 °F) (Oral)   Resp 18   Ht 5' 7" (1.702 m)   Wt 95.3 kg (210 lb)   LMP 02/09/2015   SpO2 97%   Breastfeeding No   BMI 32.89 kg/m²     "

## 2024-03-25 NOTE — ANESTHESIA PROCEDURE NOTES
Arterial    Diagnosis: Peritoneal carcinoma    Patient location during procedure: done in OR    Staffing  Authorizing Provider: Santana Phillips MD  Performing Provider: Félix Whitt MD    Staffing  Performed by: Félix Whitt MD  Authorized by: Santana Phillips MD    Anesthesiologist was present at the time of the procedure.    Preanesthetic Checklist  Completed: patient identified, IV checked, site marked, risks and benefits discussed, surgical consent, monitors and equipment checked, pre-op evaluation, timeout performed and anesthesia consent givenArterial  Skin Prep: chlorhexidine gluconate  Local Infiltration: none  Orientation: left  Location: radial    Catheter Size: 20 G  Catheter placement by Ultrasound guidance. Heme positive aspiration all ports.   Vessel Caliber: small, patent, compressibility normal  Vascular Doppler:  not done  Needle advanced into vessel with real time Ultrasound guidance.  Guidewire confirmed in vessel.Insertion Attempts: 2  Assessment  Dressing: secured with tape and tegaderm  Patient: Tolerated well

## 2024-03-25 NOTE — INTERVAL H&P NOTE
Anette Ricci is 58 y.o.  presenting for scheduled Ex-lap/CARLOS ALBERTO/BSO/Possible partial liver resection/Debulking for ovarian neoplasm. Of note, since this H&P was written, patient was admitted to GYN ONC service from 3/18-3/20 for management of leg swelling, JASON, and ascites. She underwent workup for DVT which was negative. She also had IR guided paracentesis with removal of 3.5L of malignant ascites on 3/19. She has been doing well since discharge and is ready to proceed with surgery today as planned.     Temp:  [98 °F (36.7 °C)] 98 °F (36.7 °C)  Pulse:  [112] 112  Resp:  [18] 18  SpO2:  [97 %] 97 %  BP: (134)/(63) 134/63    General: NAD, alert, oriented, cooperative  HEENT: NCAT, EOM grossly intact  Lungs: Normal WOB  Heart: regular rate  Abdomen: soft, nondistended, nontender, no rebound or guarding    Consents in chart. Pre-operative heparin ordered, to be given prior to OR. All questions answered and concerns addressed. To OR for planned procedure.    Kelsey Ramirez MD   PGY-4, OB-GYN                 There are no hospital problems to display for this patient.

## 2024-03-25 NOTE — H&P
[facility] - Surgical Intensive Care  Critical Care - Surgery  History & Physical    Patient Name: Anette Ricci  MRN: [id number]  Admission Date: 3/25/2024  Code Status: Full Code  Attending Physician: Александр Washington MD   Primary Care Provider: Mark Chua MD   Principal Problem: Status post total abdominal hysterectomy and bilateral salpingo-oophorectomy (CARLOS ALBERTO-BSO)    Subjective:     HPI:  Anette Ricci is a 58 y.o. 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 She did have recent paracentesis on 3/19 for symptomatic relief.    The patient presents to the SICU s/p TAHBSO, omentectomy, lymph node debulking, peritoneal/diaphragmatic stripping, cholecystectomy, and segment VI liver resection with Dr. Washington and Dr. Farmer on 03/25/2024. She was extubated postoperatively and is currently satting well on 3 L face mask. She required pressor support intra-operatively but is currently maintaining MAPs > 65 off pressors.     Intraoperatively, they received 6 L of crystalloid, 5 PRBCs, 4 FFP, 1 L albumin. Urine output intraoperatively was 600 cc.     Immediate post-operative plans include hemodynamic stabilization and weaning of respiratory support. Plan to wean oxygen as tolerated, and closely monitor fluid status with strict Is/Os and continued fluid resuscitation as needed.      Hospital/ICU Course:  No notes on file    Follow-up For: Procedure(s) (LRB):  LAPAROTOMY, EXPLORATORY (N/A)  HYSTERECTOMY, TOTAL, ABDOMINAL (N/A)  SALPINGO-OOPHORECTOMY, BILATERAL (N/A)  OMENTECTOMY (N/A)  DEBULKING, NEOPLASM (N/A)  EXCISION, LIVER - partial (N/A)  CHOLECYSTECTOMY    Post-Operative Day: Day of Surgery     Past Medical History:   Diagnosis Date    Breast cancer 2013    Diabetes mellitus     Genetic testing 05/29/2013    VUS    Hyperlipidemia     Hypertension     Malignant  neoplasm of upper-outer quadrant of right breast in female, estrogen receptor positive 12/02/2015    Seizure disorder        Past Surgical History:   Procedure Laterality Date    BREAST BIOPSY      BREAST LUMPECTOMY Right 2013    EYE SURGERY      PERITONEOCENTESIS N/A 3/13/2024    Procedure: PARACENTESIS, ABDOMINAL;  Surgeon: Flavia Moore MD;  Location: [facility] CATH LAB;  Service: Radiology;  Laterality: N/A;    PERITONEOCENTESIS N/A 3/21/2024    Procedure: PARACENTESIS, ABDOMINAL;  Surgeon: Jorge Jolley MD;  Location: [facility] CATH LAB;  Service: Radiology;  Laterality: N/A;    TOTAL REDUCTION MAMMOPLASTY Bilateral 2016       Review of patient's allergies indicates:   Allergen Reactions    Codeine Other (See Comments)     Flu like s/s       Family History       Problem Relation (Age of Onset)    Breast cancer Sister (48)    Seizures Father          Tobacco Use    Smoking status: Never    Smokeless tobacco: Never   Substance and Sexual Activity    Alcohol use: Yes     Alcohol/week: 0.0 standard drinks of alcohol     Comment: ocassional    Drug use: No    Sexual activity: Yes     Partners: Male     Birth control/protection: None      Review of Systems   All other systems reviewed and are negative.    Objective:     Vital Signs (Most Recent):  Temp: 98 °F (36.7 °C) (03/25/24 0609)  Pulse: (!) 112 (03/25/24 0609)  Resp: 18 (03/25/24 0609)  BP: 134/63 (03/25/24 0610)  SpO2: 97 % (03/25/24 0609) Vital Signs (24h Range):  Temp:  [98 °F (36.7 °C)] 98 °F (36.7 °C)  Pulse:  [112] 112  Resp:  [18] 18  SpO2:  [97 %] 97 %  BP: (134)/(63) 134/63     Weight: 95.3 kg (210 lb)  Body mass index is 32.89 kg/m².      Intake/Output Summary (Last 24 hours) at 3/25/2024 1632  Last data filed at 3/25/2024 1532  Gross per 24 hour   Intake 6300 ml   Output 3130 ml   Net 3170 ml          Physical Exam  Constitutional:       General: She is not in acute distress.     Appearance: She is not ill-appearing or toxic-appearing.   HENT:       "Head: Normocephalic and atraumatic.      Mouth/Throat:      Mouth: Mucous membranes are moist.      Pharynx: Oropharynx is clear.   Eyes:      General: No scleral icterus.     Conjunctiva/sclera: Conjunctivae normal.      Pupils: Pupils are equal, round, and reactive to light.   Cardiovascular:      Rate and Rhythm: Normal rate and regular rhythm.   Pulmonary:      Effort: Pulmonary effort is normal. No respiratory distress.      Comments: Satting well on 3 L face mask  Abdominal:      General: Abdomen is flat. There is no distension.      Palpations: Abdomen is soft.      Tenderness: There is no guarding.      Comments: Abdominal dressings c/d/I, no signs of active bleeding, appropriately tender to palpation   Skin:     General: Skin is warm and dry.      Coloration: Skin is not jaundiced.   Neurological:      General: No focal deficit present.      Mental Status: She is alert. Mental status is at baseline.       Lines/Drains/Airways       Central Venous Catheter Line  Duration                  PowerPort A Cath Single Lumen 03/07/24 0853 Internal Jugular Left 18 days              Drain  Duration                  NG/OG Tube 03/25/24 0733 nasogastric 14 Fr. Right nostril <1 day         Urethral Catheter 03/25/24 Non-latex 16 Fr. <1 day              Arterial Line  Duration             Arterial Line 03/25/24 1133 Left Radial <1 day              Peripheral Intravenous Line  Duration                  Peripheral IV - Single Lumen 03/25/24 0642 18 G Anterior;Left Forearm <1 day         Peripheral IV - Single Lumen 03/25/24 0750 18 G;1 3/4 in Left  <1 day                    Significant Labs:    CBC/Anemia Profile:  Recent Labs   Lab 03/25/24  0647 03/25/24  0959   WBC 18.39* 23.01*   HGB 7.3* 7.8*   HCT 23.6* 23.8*   * 358   MCV 80* 83   RDW 16.9* 16.5*        Chemistries:  No results for input(s): "NA", "K", "CL", "CO2", "BUN", "CREATININE", "CALCIUM", "ALBUMIN", "PROT", "BILITOT", "ALKPHOS", "ALT", "AST", " ""GLUCOSE", "MG", "PHOS" in the last 48 hours.    All pertinent labs within the past 24 hours have been reviewed.    Significant Imaging: I have reviewed all pertinent imaging results/findings within the past 24 hours.  Assessment/Plan:     Renal/  * S/p CARLOS ALBERTO/BSO/OMX/Debulking/Partial liver resection/Cholecystectomy    Neuro/Psych:   -- Sedation: none   -- Pain: shelby tylenol 1 g q6h, prn oxy 5/10, dilaudid             Cards:   -- HDS  -- off pressors      Pulm:   -- Goal O2 sat > 90%  -- Wean as able      Renal:  -- Keep junior for strict I/O  -- Trend BUN/Cr   --  ml in OR      FEN / GI:   -- Will start regular diet  -- Replace lytes as needed  -- Zofran prn  -- Abdominal dressings c/d/I, appropriately tender to palpation      ID:   -- Tm: afebrile  -- Daily CBC  -- Abx none      Heme/Onc:   -- Will obtain stat postop CBC and coags  -- Daily CBC  -- Holding DVT ppx overnight for oozing in OR      Endo:   -- Pt with Hx DMII with gastroparesis  -- Gluc goal 140-180  -- insulin gtt at 1 unit/hr  -- wean drip as tolerated      PPx:   Feeding: Regular diet  Analgesia/Sedation: shelby tylenol, prn oxy 5/10, dilaudid  Thromboembolic prevention: held  HOB >30: yes  Stress Ulcer ppx: n/a  Glucose control: Critical care goal 140-180 g/dl, ISS    Lines/Drains/Airway: REJ PIV, left radial art line, PIV, junior      Dispo/Code Status/Palliative:   -- SICU / Full Code       Risa Almonte MD  Critical Care - Surgery  [facility] - Surgical Intensive Care  "

## 2024-03-25 NOTE — OP NOTE
[facility] - Surgery (2nd Fl)    Procedure:   Total abdominal hysterectomy and bilateral salpingo-oophorectomy with radical dissection for tumor debulking  Omentectomy  Liver mobilization  Right diaphragm stripping  Pelvic peritoneal stripping  Hepatic wedge resection ([place])  Cholecystectomy ([place])      DATE OF SURGERY  3/25/2024     Surgeon(s) and Role  Primary: Александр Washington MD  Resident - Assisting: Kelsey Ramirez MD  Co-Surgeon: Kuldip Chen MD     ANESTHESIA TYPE  General     PRE-OPERATIVE DIAGNOSIS  Ovarian carcinosarcoma [C56.9]    POST-OPERATIVE DIAGNOSIS  Post-Op Diagnosis Codes:     * Ovarian carcinosarcoma [C56.9]    FINDINGS:  3L of bloody ascites. Bulky tumor entirely replacing the omentum and extending up to the greater curvature of the stomach. Tumor implants on right diaphragm and segment 6 liver capsule. Peritoneal disease in the pelvis. At the conclusion of the case, small volume (<1 cm) remained in the kelvin hepatis, right diaphragm, transverse colon and rectosigmoid colon.    Procedure in Detail: The patient was prepped and draped in the dorsal lithotomy position, taking care to position the patient in a manner that minimize risk of neurologic injury. A junior catheter was placed. A vertical midline incision was made and carried down to the peritoneum. The abdomen was entered. Survey revealed findings as above.     A Bookwalter retractor was inserted. Attention was turned to the omentum. The hepatic flexure of the colon was mobilized by incising the line of toldt. The omentum was mobilized off the liver and the lesser sac was entered near the greater curvature of the stomach. The splenic flexure was similarly mobilized by incising the line of toldt and the omentum was resected with use of the ligasure from the hepatic flexure to the splenic flexure. Once the omentum was freed from the stomach and tip of the spleen, the mass was elevated and this was dissected off the transverse  colon. The mass was sent for pathology. All remaining tumor implants greater than 1 cm were resected off the transverse colon. The liver was mobilized by incising the falciform ligament, right coronary, and triangular ligaments. This was challenging as the presence of tumor on the right diphragm peritoneum presented difficulties in identifyin the appropriate plane. Once the liver was medialized, Dr. Farmer and his team approached the operative field and performed the cholecystectomy, and partial hepatectomy, and resected a portion of the lesser omentum. See separate dictations. Dr. Farmer also assisted with the resection of the right diaphragm peritoneum. The involved diaphragm was circumferentially incised to incorportate the peritoneum involved with tumor. The peritoneum was grasped and the  from the underlying diaphragm muscle with electrocautery.The peritoneum was sent for pathology. After the bulk of the tumor had been resected and only small <1 cm disease remained, the remaining disease was ablated with the argon beam.     Attention was turned to the pelvis. The small bowel was packed in the upper abdomen. The round ligaments were divided, broad ligaments incised, and ureters identified. The ureters were mobilized and bilaterally and tagged with vessel loops. The infundibulopelvic ligaments were isolated, desiccated and transected with the Ligasure. The bladder was dissected off the lower uterine segment, cervix, and upper 2 cm of the vagina. The uterine arteries were desiccated and transected with the Ligasure, ensuring the ureters were out of the field of dissection. The cardinal ligaments were serially clamped desiccated and transected with the Ligasure, ensuring the ureters, bladder and colon were out of the field of dissection. Zeppelin clamps were placed across the upper vagina and the specimen was amputated from the upper vagina and sent for pathology. . The vagina was closed with interrupted  figure of eight sutures of 0 vicryl.  Hemostasis was secured. The bladder peritoneum and posterior cul de sac peritoneum were stripped taking care to avoid injury to the bladder or the rectum. Small volume <1 cm disease remained in the pelvis abutting the rectosigmoid mesentery.  The abdomen and pelvis were then irrigated with warm normal saline, and hemostasis was assured. Surgiflo was applied to the pelvis.    The fascia was closed with #1 PDS in a running fashion, the subcutaneous tissue was irrigated and hemostasis was ensured. This layer was reapproximated with 2-0 vicryl and the skin was closed with 4-0 Monocryl in a subcuticular fashion. The patient was transferred to recovery in stable condition.  Sponge, salt, needle, and instrument count was correct.  I was present and scrubbed for the entirety of the case.     [employer]SIST[employer] SURGEONS  Dr. Chen's presence was critical to the completion of this case due to it's complexity.        SPECIMENS  Specimens (From admission, onward)       Start     Ordered    03/25/24 1511  Specimen to Pathology, Surgery Gynecology and Obstetrics  Once        Comments: Pre-op Diagnosis: Ovarian carcinosarcoma [C56.9]Procedure(s):LAPAROTOMY, EXPLORATORYHYSTERECTOMY, TOTAL, ABDOMINALSALPINGO-OOPHORECTOMY, BILATERALOMENTECTOMYDEBULKING, NEOPLASMEXCISION, LIVER - partial Number of specimens: Name of specimens: 1. Omentum and tumor - perm 2. Gallbladder - perm 3. Segment 6 liver wedge resection - perm 4. GASTRIC HEPATIC LIGAMENT - PERM 5. RIGHT DIAPHRAGM PERITONEUM - PERM 6. PELVIC PERITONEUM - PERM 7. UTERUS, CERVIX, BILATERAL FALLOPIAN TUBES AND OVARIES - PERM 8. TRANSVERSE COLON IMPLANT- PERM 9. Recto sigmoid - perm     References:    Click here for ordering Quick Tip   Question Answer Comment   Procedure Type: Gynecology and Obstetrics    Specimen Class: Known or suspected malignancy    Which provider would you like to cc? ZULEIKA LAKHANI    Which provider would you like to cc?  JULIANA MORA    Release to patient Immediate        03/25/24 1511                     DRAINS       NG/OG Tube 03/25/24 0733 nasogastric 14 Fr. Right nostril (Active)            Urethral Catheter 03/25/24 Non-latex 16 Fr. (Active)        ESTIMATED BLOOD LOSS  500 ml           IMPLANTS  * No implants in log *

## 2024-03-25 NOTE — ANESTHESIA PROCEDURE NOTES
Intubation    Date/Time: 3/25/2024 7:42 AM    Performed by: Félix Whitt MD  Authorized by: Santana Phillips MD    Intubation:     Induction:  Rapid sequence induction    Intubated:  Postinduction    Mask Ventilation:  Not attempted    Attempts:  1    Attempted By:  Resident anesthesiologist    Method of Intubation:  Video laryngoscopy    Blade:  Dickson 3    Laryngeal View Grade: Grade I - full view of cords      Difficult Airway Encountered?: No      Complications:  None    Airway Device:  Oral endotracheal tube    Airway Device Size:  7.0    Style/Cuff Inflation:  Cuffed    Inflation Amount (mL):  5    Tube secured:  22    Secured at:  The lips    Placement Verified By:  Capnometry    Complicating Factors:  None    Findings Post-Intubation:  BS equal bilateral and atraumatic/condition of teeth unchanged

## 2024-03-25 NOTE — H&P
[facility] - Surgery ([facility])  General Surgery  History & Physical    Patient Name: Anette Ricci  MRN: [id number]  Admission Date: 3/25/2024  Attending Physician: Александр Washington MD   Primary Care Provider: Mark Chua MD    Patient information was obtained from patient and past medical records.     Subjective:     Chief Complaint/Reason for Admission: Carcinosarcoma    History of Present Illness: Anette Ricci is a 58 y.o. 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 She is planned for CARLOS ALBERTO/BSO, omentectomy, lymph node debulking, possible colon resection, possible ostomy, and partial hepatectomy. She did have recent paracentesis on 3/19 for symptomatic relief. Imaging demonstrates multiple mesenteric and peritoneal masses, as well as a 3.1 cm hepatic lesion in segment VI.     No current facility-administered medications on file prior to encounter.     Current Outpatient Medications on File Prior to Encounter   Medication Sig    amlodipine (NORVASC) 10 MG tablet Take 10 mg by mouth once daily.     atorvastatin (LIPITOR) 40 MG tablet Take 80 mg by mouth once daily.    enalapril (VASOTEC) 20 MG tablet Take 20 mg by mouth once daily.    hydrochlorothiazide (HYDRODIURIL) 25 MG tablet Take 25 mg by mouth once daily.     HYDROcodone-acetaminophen (NORCO) 5-325 mg per tablet Take 1 tablet by mouth every 6 (six) hours as needed for Pain.    insulin glargine, TOUJEO, (TOUJEO SOLOSTAR U-300 INSULIN) 300 unit/mL (1.5 mL) InPn pen Inject 60 Units into the skin every evening.    lamoTRIgine (LAMICTAL) 25 MG tablet Take 25 mg by mouth 2 (two) times daily.    metformin (GLUCOPHAGE) 1000 MG tablet Take 1,000 mg by mouth daily with breakfast.     NOVOLOG FLEXPEN 100 unit/mL InPn pen Inject 5 Units into the skin 2 (two) times daily with meals. With biggest meal    tirzepatide (MOUNJARO) 5 mg/0.5 mL PnIj Inject 5  mg into the skin every 7 days. On Sunday.    lorazepam (ATIVAN) 0.5 MG tablet Take 0.5 mg by mouth every 12 (twelve) hours as needed for Anxiety.    quetiapine (SEROQUEL) 25 MG Tab Take 25 mg by mouth daily as needed.       Review of patient's allergies indicates:   Allergen Reactions    Codeine Other (See Comments)     Flu like s/s       Past Medical History:   Diagnosis Date    Breast cancer 2013    Diabetes mellitus     Genetic testing 05/29/2013    VUS    Hyperlipidemia     Hypertension     Malignant neoplasm of upper-outer quadrant of right breast in female, estrogen receptor positive 12/02/2015    Seizure disorder      Past Surgical History:   Procedure Laterality Date    BREAST BIOPSY      BREAST LUMPECTOMY Right 2013    EYE SURGERY      PERITONEOCENTESIS N/A 3/13/2024    Procedure: PARACENTESIS, ABDOMINAL;  Surgeon: Flavia Moore MD;  Location: [facility] CATH LAB;  Service: Radiology;  Laterality: N/A;    PERITONEOCENTESIS N/A 3/21/2024    Procedure: PARACENTESIS, ABDOMINAL;  Surgeon: Jorge Jolley MD;  Location: [facility] CATH LAB;  Service: Radiology;  Laterality: N/A;    TOTAL REDUCTION MAMMOPLASTY Bilateral 2016     Family History       Problem Relation (Age of Onset)    Breast cancer Sister (48)    Seizures Father          Tobacco Use    Smoking status: Never    Smokeless tobacco: Never   Substance and Sexual Activity    Alcohol use: Yes     Alcohol/week: 0.0 standard drinks of alcohol     Comment: ocassional    Drug use: No    Sexual activity: Yes     Partners: Male     Birth control/protection: None     Review of Systems   All other systems reviewed and are negative.    Objective:     Vital Signs (Most Recent):  Temp: 98 °F (36.7 °C) (03/25/24 0609)  Pulse: (!) 112 (03/25/24 0609)  Resp: 18 (03/25/24 0609)  BP: 134/63 (03/25/24 0610)  SpO2: 97 % (03/25/24 0609) Vital Signs (24h Range):  Temp:  [98 °F (36.7 °C)] 98 °F (36.7 °C)  Pulse:  [112] 112  Resp:  [18] 18  SpO2:  [97 %] 97 %  BP: (134)/(63)  134/63     Weight: 95.3 kg (210 lb)  Body mass index is 32.89 kg/m².     Physical Exam  Constitutional:       General: She is not in acute distress.     Appearance: She is well-developed.   HENT:      Head: Normocephalic and atraumatic.   Eyes:      General:         Right eye: No discharge.         Left eye: No discharge.      Conjunctiva/sclera: Conjunctivae normal.   Cardiovascular:      Rate and Rhythm: Normal rate and regular rhythm.   Pulmonary:      Effort: Pulmonary effort is normal. No respiratory distress.   Abdominal:      General: There is no distension.      Palpations: Abdomen is soft.      Tenderness: There is no abdominal tenderness.   Musculoskeletal:         General: No deformity. Normal range of motion.      Cervical back: Normal range of motion.   Skin:     General: Skin is warm and dry.   Neurological:      Mental Status: She is alert and oriented to person, place, and time.   Psychiatric:         Behavior: Behavior normal.            I have reviewed all pertinent lab results within the past 24 hours.  CBC:   Recent Labs   Lab 03/19/24  0454   WBC 14.92*   RBC 3.14*   HGB 8.0*   HCT 25.4*   *   MCV 81*   MCH 25.5*   MCHC 31.5*     CMP:   Recent Labs   Lab 03/20/24  0607      CALCIUM 7.6*   *   K 3.9   CO2 28   CL 95   BUN 17   CREATININE 0.9       Significant Diagnostics:  I have reviewed all pertinent imaging results/findings within the past 24 hours.    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

## 2024-03-25 NOTE — ASSESSMENT & PLAN NOTE
58 y.o. 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

## 2024-03-25 NOTE — SUBJECTIVE & OBJECTIVE
No current facility-administered medications on file prior to encounter.     Current Outpatient Medications on File Prior to Encounter   Medication Sig    amlodipine (NORVASC) 10 MG tablet Take 10 mg by mouth once daily.     atorvastatin (LIPITOR) 40 MG tablet Take 80 mg by mouth once daily.    enalapril (VASOTEC) 20 MG tablet Take 20 mg by mouth once daily.    hydrochlorothiazide (HYDRODIURIL) 25 MG tablet Take 25 mg by mouth once daily.     HYDROcodone-acetaminophen (NORCO) 5-325 mg per tablet Take 1 tablet by mouth every 6 (six) hours as needed for Pain.    insulin glargine, TOUJEO, (TOUJEO SOLOSTAR U-300 INSULIN) 300 unit/mL (1.5 mL) InPn pen Inject 60 Units into the skin every evening.    lamoTRIgine (LAMICTAL) 25 MG tablet Take 25 mg by mouth 2 (two) times daily.    metformin (GLUCOPHAGE) 1000 MG tablet Take 1,000 mg by mouth daily with breakfast.     NOVOLOG FLEXPEN 100 unit/mL InPn pen Inject 5 Units into the skin 2 (two) times daily with meals. With biggest meal    tirzepatide (MOUNJARO) 5 mg/0.5 mL PnIj Inject 5 mg into the skin every 7 days. On Sunday.    lorazepam (ATIVAN) 0.5 MG tablet Take 0.5 mg by mouth every 12 (twelve) hours as needed for Anxiety.    quetiapine (SEROQUEL) 25 MG Tab Take 25 mg by mouth daily as needed.       Review of patient's allergies indicates:   Allergen Reactions    Codeine Other (See Comments)     Flu like s/s       Past Medical History:   Diagnosis Date    Breast cancer 2013    Diabetes mellitus     Genetic testing 05/29/2013    VUS    Hyperlipidemia     Hypertension     Malignant neoplasm of upper-outer quadrant of right breast in female, estrogen receptor positive 12/02/2015    Seizure disorder      Past Surgical History:   Procedure Laterality Date    BREAST BIOPSY      BREAST LUMPECTOMY Right 2013    EYE SURGERY      PERITONEOCENTESIS N/A 3/13/2024    Procedure: PARACENTESIS, ABDOMINAL;  Surgeon: Flavia Moore MD;  Location: [facility] CATH LAB;  Service: Radiology;   Laterality: N/A;    PERITONEOCENTESIS N/A 3/21/2024    Procedure: PARACENTESIS, ABDOMINAL;  Surgeon: Jorge Jolley MD;  Location: [facility] CATH LAB;  Service: Radiology;  Laterality: N/A;    TOTAL REDUCTION MAMMOPLASTY Bilateral 2016     Family History       Problem Relation (Age of Onset)    Breast cancer Sister (48)    Seizures Father          Tobacco Use    Smoking status: Never    Smokeless tobacco: Never   Substance and Sexual Activity    Alcohol use: Yes     Alcohol/week: 0.0 standard drinks of alcohol     Comment: ocassional    Drug use: No    Sexual activity: Yes     Partners: Male     Birth control/protection: None     Review of Systems   All other systems reviewed and are negative.    Objective:     Vital Signs (Most Recent):  Temp: 98 °F (36.7 °C) (03/25/24 0609)  Pulse: (!) 112 (03/25/24 0609)  Resp: 18 (03/25/24 0609)  BP: 134/63 (03/25/24 0610)  SpO2: 97 % (03/25/24 0609) Vital Signs (24h Range):  Temp:  [98 °F (36.7 °C)] 98 °F (36.7 °C)  Pulse:  [112] 112  Resp:  [18] 18  SpO2:  [97 %] 97 %  BP: (134)/(63) 134/63     Weight: 95.3 kg (210 lb)  Body mass index is 32.89 kg/m².     Physical Exam  Constitutional:       General: She is not in acute distress.     Appearance: She is well-developed.   HENT:      Head: Normocephalic and atraumatic.   Eyes:      General:         Right eye: No discharge.         Left eye: No discharge.      Conjunctiva/sclera: Conjunctivae normal.   Cardiovascular:      Rate and Rhythm: Normal rate and regular rhythm.   Pulmonary:      Effort: Pulmonary effort is normal. No respiratory distress.   Abdominal:      General: There is no distension.      Palpations: Abdomen is soft.      Tenderness: There is no abdominal tenderness.   Musculoskeletal:         General: No deformity. Normal range of motion.      Cervical back: Normal range of motion.   Skin:     General: Skin is warm and dry.   Neurological:      Mental Status: She is alert and oriented to person, place, and time.    Psychiatric:         Behavior: Behavior normal.            I have reviewed all pertinent lab results within the past 24 hours.  CBC:   Recent Labs   Lab 03/19/24  0454   WBC 14.92*   RBC 3.14*   HGB 8.0*   HCT 25.4*   *   MCV 81*   MCH 25.5*   MCHC 31.5*     CMP:   Recent Labs   Lab 03/20/24  0607      CALCIUM 7.6*   *   K 3.9   CO2 28   CL 95   BUN 17   CREATININE 0.9       Significant Diagnostics:  I have reviewed all pertinent imaging results/findings within the past 24 hours.

## 2024-03-25 NOTE — ANESTHESIA PROCEDURE NOTES
BL QL single shot    Patient location during procedure: pre-op   Block not for primary anesthetic.  Reason for block: at surgeon's request and post-op pain management   Post-op Pain Location: abdominal wall pain   Start time: 3/25/2024 7:53 AM  Timeout: 3/25/2024 7:53 AM   End time: 3/25/2024 8:03 AM    Staffing  Authorizing Provider: Mercy Mercado MD  Performing Provider: Mercy Mercado MD    Staffing  Performed by: Mercy Mercado MD  Authorized by: Mercy Mercado MD    Preanesthetic Checklist  Completed: patient identified, IV checked, site marked, risks and benefits discussed, surgical consent, monitors and equipment checked, pre-op evaluation and timeout performed  Peripheral Block  Patient position: supine  Prep: ChloraPrep  Patient monitoring: cardiac monitor, heart rate, continuous pulse ox, continuous capnometry and frequent blood pressure checks  Block type: Quadratus Lumborum  Laterality: bilateral  Injection technique: single shot  Needle  Needle type: Stimuplex   Needle gauge: 21 G  Needle length: 4 in  Needle localization: ultrasound guidance   -ultrasound image captured on disc.  Assessment  Injection assessment: negative aspiration, negative parasthesia and local visualized surrounding nerve  Paresthesia pain: none  Heart rate change: no  Slow fractionated injection: yes  Pain Tolerance: no complaints and comfortable throughout block      Additional Notes  VSS.  [facility] RN monitoring vitals throughout procedure.  Patient tolerated procedure well.    30 cc of 0.375% bupivacaine with 1:200,000 epi administered bilaterally

## 2024-03-26 DIAGNOSIS — C56.9 OVARIAN CARCINOSARCOMA: Primary | ICD-10-CM

## 2024-03-26 PROBLEM — D62 ACUTE POSTOPERATIVE ANEMIA DUE TO EXPECTED BLOOD LOSS: Status: ACTIVE | Noted: 2024-03-26

## 2024-03-26 PROBLEM — T45.1X5A ANTINEOPLASTIC CHEMOTHERAPY INDUCED ANEMIA: Status: ACTIVE | Noted: 2024-03-26

## 2024-03-26 PROBLEM — Z98.890 POSTOPERATIVE STATE: Status: ACTIVE | Noted: 2024-03-26

## 2024-03-26 PROBLEM — Z78.9 ON SUPPLEMENTAL OXYGEN BY NASAL CANNULA: Status: ACTIVE | Noted: 2024-03-26

## 2024-03-26 PROBLEM — D64.81 ANTINEOPLASTIC CHEMOTHERAPY INDUCED ANEMIA: Status: ACTIVE | Noted: 2024-03-26

## 2024-03-26 PROBLEM — E87.1 HYPONATREMIA: Status: ACTIVE | Noted: 2024-03-26

## 2024-03-26 PROBLEM — E88.09 HYPOALBUMINEMIA: Status: ACTIVE | Noted: 2024-03-26

## 2024-03-26 LAB
ANION GAP SERPL CALC-SCNC: 4 MMOL/L (ref 8–16)
ANION GAP SERPL CALC-SCNC: 4 MMOL/L (ref 8–16)
APTT PPP: 36.2 SEC (ref 21–32)
BACTERIA SPEC ANAEROBE CULT: NORMAL
BASOPHILS # BLD AUTO: 0.01 K/UL (ref 0–0.2)
BASOPHILS # BLD AUTO: 0.01 K/UL (ref 0–0.2)
BASOPHILS # BLD AUTO: 0.02 K/UL (ref 0–0.2)
BASOPHILS NFR BLD: 0.1 % (ref 0–1.9)
BLD PROD TYP BPU: NORMAL
BLD PROD TYP BPU: NORMAL
BLOOD UNIT EXPIRATION DATE: NORMAL
BLOOD UNIT EXPIRATION DATE: NORMAL
BLOOD UNIT TYPE CODE: 7300
BLOOD UNIT TYPE CODE: 7300
BLOOD UNIT TYPE: NORMAL
BLOOD UNIT TYPE: NORMAL
BUN SERPL-MCNC: 8 MG/DL (ref 6–20)
BUN SERPL-MCNC: 8 MG/DL (ref 6–20)
CALCIUM SERPL-MCNC: 7.1 MG/DL (ref 8.7–10.5)
CALCIUM SERPL-MCNC: 7.1 MG/DL (ref 8.7–10.5)
CHLORIDE SERPL-SCNC: 98 MMOL/L (ref 95–110)
CHLORIDE SERPL-SCNC: 98 MMOL/L (ref 95–110)
CO2 SERPL-SCNC: 28 MMOL/L (ref 23–29)
CO2 SERPL-SCNC: 28 MMOL/L (ref 23–29)
CODING SYSTEM: NORMAL
CODING SYSTEM: NORMAL
CREAT SERPL-MCNC: 0.6 MG/DL (ref 0.5–1.4)
CREAT SERPL-MCNC: 0.6 MG/DL (ref 0.5–1.4)
CROSSMATCH INTERPRETATION: NORMAL
CROSSMATCH INTERPRETATION: NORMAL
DIFFERENTIAL METHOD BLD: ABNORMAL
DISPENSE STATUS: NORMAL
DISPENSE STATUS: NORMAL
EOSINOPHIL # BLD AUTO: 0 K/UL (ref 0–0.5)
EOSINOPHIL NFR BLD: 0 % (ref 0–8)
EOSINOPHIL NFR BLD: 0 % (ref 0–8)
EOSINOPHIL NFR BLD: 0.1 % (ref 0–8)
ERYTHROCYTE [DISTWIDTH] IN BLOOD BY AUTOMATED COUNT: 20.6 % (ref 11.5–14.5)
ERYTHROCYTE [DISTWIDTH] IN BLOOD BY AUTOMATED COUNT: 21.6 % (ref 11.5–14.5)
ERYTHROCYTE [DISTWIDTH] IN BLOOD BY AUTOMATED COUNT: 21.6 % (ref 11.5–14.5)
EST. GFR  (NO RACE VARIABLE): >60 ML/MIN/1.73 M^2
EST. GFR  (NO RACE VARIABLE): >60 ML/MIN/1.73 M^2
FIBRINOGEN PPP-MCNC: 219 MG/DL (ref 182–400)
GLUCOSE SERPL-MCNC: 124 MG/DL (ref 70–110)
GLUCOSE SERPL-MCNC: 124 MG/DL (ref 70–110)
GLUCOSE SERPL-MCNC: 186 MG/DL (ref 70–110)
GLUCOSE SERPL-MCNC: 225 MG/DL (ref 70–110)
GLUCOSE SERPL-MCNC: 250 MG/DL (ref 70–110)
GLUCOSE SERPL-MCNC: 255 MG/DL (ref 70–110)
GLUCOSE SERPL-MCNC: 262 MG/DL (ref 70–110)
HCO3 UR-SCNC: 23.9 MMOL/L (ref 24–28)
HCO3 UR-SCNC: 24.5 MMOL/L (ref 24–28)
HCO3 UR-SCNC: 25 MMOL/L (ref 24–28)
HCO3 UR-SCNC: 25.6 MMOL/L (ref 24–28)
HCO3 UR-SCNC: 29.6 MMOL/L (ref 24–28)
HCT VFR BLD AUTO: 20.9 % (ref 37–48.5)
HCT VFR BLD AUTO: 20.9 % (ref 37–48.5)
HCT VFR BLD AUTO: 25.7 % (ref 37–48.5)
HCT VFR BLD CALC: 15 %PCV (ref 36–54)
HCT VFR BLD CALC: 15 %PCV (ref 36–54)
HCT VFR BLD CALC: 19 %PCV (ref 36–54)
HCT VFR BLD CALC: 20 %PCV (ref 36–54)
HCT VFR BLD CALC: 25 %PCV (ref 36–54)
HGB BLD-MCNC: 7.2 G/DL (ref 12–16)
HGB BLD-MCNC: 7.2 G/DL (ref 12–16)
HGB BLD-MCNC: 8.6 G/DL (ref 12–16)
IMM GRANULOCYTES # BLD AUTO: 0.19 K/UL (ref 0–0.04)
IMM GRANULOCYTES # BLD AUTO: 0.19 K/UL (ref 0–0.04)
IMM GRANULOCYTES # BLD AUTO: 0.33 K/UL (ref 0–0.04)
IMM GRANULOCYTES NFR BLD AUTO: 1.6 % (ref 0–0.5)
IMM GRANULOCYTES NFR BLD AUTO: 1.6 % (ref 0–0.5)
IMM GRANULOCYTES NFR BLD AUTO: 1.9 % (ref 0–0.5)
INR PPP: 1.2 (ref 0.8–1.2)
LYMPHOCYTES # BLD AUTO: 0.9 K/UL (ref 1–4.8)
LYMPHOCYTES # BLD AUTO: 0.9 K/UL (ref 1–4.8)
LYMPHOCYTES # BLD AUTO: 1 K/UL (ref 1–4.8)
LYMPHOCYTES NFR BLD: 5.6 % (ref 18–48)
LYMPHOCYTES NFR BLD: 7.3 % (ref 18–48)
LYMPHOCYTES NFR BLD: 7.3 % (ref 18–48)
MCH RBC QN AUTO: 26.1 PG (ref 27–31)
MCHC RBC AUTO-ENTMCNC: 33.5 G/DL (ref 32–36)
MCHC RBC AUTO-ENTMCNC: 34.4 G/DL (ref 32–36)
MCHC RBC AUTO-ENTMCNC: 34.4 G/DL (ref 32–36)
MCV RBC AUTO: 76 FL (ref 82–98)
MCV RBC AUTO: 76 FL (ref 82–98)
MCV RBC AUTO: 78 FL (ref 82–98)
MONOCYTES # BLD AUTO: 0.7 K/UL (ref 0.3–1)
MONOCYTES # BLD AUTO: 0.7 K/UL (ref 0.3–1)
MONOCYTES # BLD AUTO: 1.1 K/UL (ref 0.3–1)
MONOCYTES NFR BLD: 5.9 % (ref 4–15)
MONOCYTES NFR BLD: 5.9 % (ref 4–15)
MONOCYTES NFR BLD: 6.1 % (ref 4–15)
NEUTROPHILS # BLD AUTO: 10.4 K/UL (ref 1.8–7.7)
NEUTROPHILS # BLD AUTO: 10.4 K/UL (ref 1.8–7.7)
NEUTROPHILS # BLD AUTO: 15.2 K/UL (ref 1.8–7.7)
NEUTROPHILS NFR BLD: 85.1 % (ref 38–73)
NEUTROPHILS NFR BLD: 85.1 % (ref 38–73)
NEUTROPHILS NFR BLD: 86.2 % (ref 38–73)
NRBC BLD-RTO: 1 /100 WBC
NRBC BLD-RTO: 2 /100 WBC
NRBC BLD-RTO: 2 /100 WBC
NUM UNITS TRANS PACKED RBC: NORMAL
NUM UNITS TRANS PACKED RBC: NORMAL
PCO2 BLDA: 37.6 MMHG (ref 35–45)
PCO2 BLDA: 37.8 MMHG (ref 35–45)
PCO2 BLDA: 38.5 MMHG (ref 35–45)
PCO2 BLDA: 41.5 MMHG (ref 35–45)
PCO2 BLDA: 42.2 MMHG (ref 35–45)
PH SMN: 7.39 [PH] (ref 7.35–7.45)
PH SMN: 7.41 [PH] (ref 7.35–7.45)
PH SMN: 7.41 [PH] (ref 7.35–7.45)
PH SMN: 7.44 [PH] (ref 7.35–7.45)
PH SMN: 7.45 [PH] (ref 7.35–7.45)
PLATELET # BLD AUTO: 234 K/UL (ref 150–450)
PLATELET # BLD AUTO: 234 K/UL (ref 150–450)
PLATELET # BLD AUTO: 261 K/UL (ref 150–450)
PMV BLD AUTO: 9.4 FL (ref 9.2–12.9)
PMV BLD AUTO: 9.7 FL (ref 9.2–12.9)
PMV BLD AUTO: 9.7 FL (ref 9.2–12.9)
PO2 BLDA: 170 MMHG (ref 80–100)
PO2 BLDA: 220 MMHG (ref 80–100)
PO2 BLDA: 222 MMHG (ref 80–100)
PO2 BLDA: 227 MMHG (ref 80–100)
PO2 BLDA: 235 MMHG (ref 80–100)
POC BE: -1 MMOL/L
POC BE: 0 MMOL/L
POC BE: 0 MMOL/L
POC BE: 1 MMOL/L
POC BE: 6 MMOL/L
POC IONIZED CALCIUM: 0.96 MMOL/L (ref 1.06–1.42)
POC IONIZED CALCIUM: 0.96 MMOL/L (ref 1.06–1.42)
POC IONIZED CALCIUM: 1.02 MMOL/L (ref 1.06–1.42)
POC IONIZED CALCIUM: 1.1 MMOL/L (ref 1.06–1.42)
POC IONIZED CALCIUM: 1.22 MMOL/L (ref 1.06–1.42)
POC SATURATED O2: 100 % (ref 95–100)
POC TCO2: 25 MMOL/L (ref 23–27)
POC TCO2: 26 MMOL/L (ref 23–27)
POC TCO2: 26 MMOL/L (ref 23–27)
POC TCO2: 27 MMOL/L (ref 23–27)
POC TCO2: 31 MMOL/L (ref 23–27)
POCT GLUCOSE: 124 MG/DL (ref 70–110)
POCT GLUCOSE: 66 MG/DL (ref 70–110)
POCT GLUCOSE: 76 MG/DL (ref 70–110)
POCT GLUCOSE: 79 MG/DL (ref 70–110)
POCT GLUCOSE: 80 MG/DL (ref 70–110)
POCT GLUCOSE: 83 MG/DL (ref 70–110)
POTASSIUM BLD-SCNC: 3.7 MMOL/L (ref 3.5–5.1)
POTASSIUM BLD-SCNC: 3.8 MMOL/L (ref 3.5–5.1)
POTASSIUM BLD-SCNC: 4.1 MMOL/L (ref 3.5–5.1)
POTASSIUM BLD-SCNC: 4.4 MMOL/L (ref 3.5–5.1)
POTASSIUM BLD-SCNC: 4.6 MMOL/L (ref 3.5–5.1)
POTASSIUM SERPL-SCNC: 4.3 MMOL/L (ref 3.5–5.1)
POTASSIUM SERPL-SCNC: 4.3 MMOL/L (ref 3.5–5.1)
PROTHROMBIN TIME: 12.3 SEC (ref 9–12.5)
RBC # BLD AUTO: 2.76 M/UL (ref 4–5.4)
RBC # BLD AUTO: 2.76 M/UL (ref 4–5.4)
RBC # BLD AUTO: 3.29 M/UL (ref 4–5.4)
SAMPLE: ABNORMAL
SODIUM BLD-SCNC: 129 MMOL/L (ref 136–145)
SODIUM BLD-SCNC: 130 MMOL/L (ref 136–145)
SODIUM BLD-SCNC: 131 MMOL/L (ref 136–145)
SODIUM BLD-SCNC: 132 MMOL/L (ref 136–145)
SODIUM BLD-SCNC: 133 MMOL/L (ref 136–145)
SODIUM SERPL-SCNC: 130 MMOL/L (ref 136–145)
SODIUM SERPL-SCNC: 130 MMOL/L (ref 136–145)
WBC # BLD AUTO: 12.16 K/UL (ref 3.9–12.7)
WBC # BLD AUTO: 12.16 K/UL (ref 3.9–12.7)
WBC # BLD AUTO: 17.66 K/UL (ref 3.9–12.7)

## 2024-03-26 PROCEDURE — 25000003 PHARM REV CODE 250: Performed by: STUDENT IN AN ORGANIZED HEALTH CARE EDUCATION/TRAINING PROGRAM

## 2024-03-26 PROCEDURE — 20600001 HC STEP DOWN PRIVATE ROOM

## 2024-03-26 PROCEDURE — 25000242 PHARM REV CODE 250 ALT 637 W/ HCPCS: Performed by: STUDENT IN AN ORGANIZED HEALTH CARE EDUCATION/TRAINING PROGRAM

## 2024-03-26 PROCEDURE — 25000003 PHARM REV CODE 250: Performed by: PHYSICIAN ASSISTANT

## 2024-03-26 PROCEDURE — P9016 RBC LEUKOCYTES REDUCED: HCPCS | Performed by: STUDENT IN AN ORGANIZED HEALTH CARE EDUCATION/TRAINING PROGRAM

## 2024-03-26 PROCEDURE — 63600175 PHARM REV CODE 636 W HCPCS

## 2024-03-26 PROCEDURE — 51798 US URINE CAPACITY MEASURE: CPT

## 2024-03-26 PROCEDURE — 36430 TRANSFUSION BLD/BLD COMPNT: CPT

## 2024-03-26 PROCEDURE — 99223 1ST HOSP IP/OBS HIGH 75: CPT | Mod: ,,, | Performed by: PHYSICIAN ASSISTANT

## 2024-03-26 PROCEDURE — 99024 POSTOP FOLLOW-UP VISIT: CPT | Mod: ,,, | Performed by: OBSTETRICS & GYNECOLOGY

## 2024-03-26 PROCEDURE — 80048 BASIC METABOLIC PNL TOTAL CA: CPT | Performed by: STUDENT IN AN ORGANIZED HEALTH CARE EDUCATION/TRAINING PROGRAM

## 2024-03-26 PROCEDURE — 99233 SBSQ HOSP IP/OBS HIGH 50: CPT | Mod: GC,,, | Performed by: SURGERY

## 2024-03-26 PROCEDURE — 36415 COLL VENOUS BLD VENIPUNCTURE: CPT

## 2024-03-26 PROCEDURE — 25000003 PHARM REV CODE 250

## 2024-03-26 PROCEDURE — 85610 PROTHROMBIN TIME: CPT

## 2024-03-26 PROCEDURE — 63600175 PHARM REV CODE 636 W HCPCS: Performed by: STUDENT IN AN ORGANIZED HEALTH CARE EDUCATION/TRAINING PROGRAM

## 2024-03-26 PROCEDURE — 94761 N-INVAS EAR/PLS OXIMETRY MLT: CPT

## 2024-03-26 PROCEDURE — 85025 COMPLETE CBC W/AUTO DIFF WBC: CPT | Performed by: STUDENT IN AN ORGANIZED HEALTH CARE EDUCATION/TRAINING PROGRAM

## 2024-03-26 PROCEDURE — 85025 COMPLETE CBC W/AUTO DIFF WBC: CPT | Mod: 91

## 2024-03-26 PROCEDURE — 85384 FIBRINOGEN ACTIVITY: CPT

## 2024-03-26 PROCEDURE — 85730 THROMBOPLASTIN TIME PARTIAL: CPT

## 2024-03-26 RX ORDER — ENOXAPARIN SODIUM 100 MG/ML
40 INJECTION SUBCUTANEOUS EVERY 24 HOURS
Status: DISCONTINUED | OUTPATIENT
Start: 2024-03-26 | End: 2024-03-26

## 2024-03-26 RX ORDER — GLUCAGON 1 MG
1 KIT INJECTION
Status: DISCONTINUED | OUTPATIENT
Start: 2024-03-26 | End: 2024-03-31 | Stop reason: HOSPADM

## 2024-03-26 RX ORDER — IBUPROFEN 200 MG
16 TABLET ORAL
Status: DISCONTINUED | OUTPATIENT
Start: 2024-03-26 | End: 2024-03-31 | Stop reason: HOSPADM

## 2024-03-26 RX ORDER — SODIUM CHLORIDE 9 MG/ML
INJECTION, SOLUTION INTRAVENOUS CONTINUOUS
Status: DISCONTINUED | OUTPATIENT
Start: 2024-03-26 | End: 2024-03-26

## 2024-03-26 RX ORDER — GUAIFENESIN 600 MG/1
600 TABLET, EXTENDED RELEASE ORAL 2 TIMES DAILY
Status: DISCONTINUED | OUTPATIENT
Start: 2024-03-26 | End: 2024-03-31 | Stop reason: HOSPADM

## 2024-03-26 RX ORDER — HEPARIN SODIUM 5000 [USP'U]/ML
5000 INJECTION, SOLUTION INTRAVENOUS; SUBCUTANEOUS EVERY 8 HOURS
Status: DISCONTINUED | OUTPATIENT
Start: 2024-03-26 | End: 2024-03-27

## 2024-03-26 RX ORDER — INSULIN ASPART 100 [IU]/ML
0-10 INJECTION, SOLUTION INTRAVENOUS; SUBCUTANEOUS
Status: DISCONTINUED | OUTPATIENT
Start: 2024-03-26 | End: 2024-03-27

## 2024-03-26 RX ORDER — IBUPROFEN 200 MG
24 TABLET ORAL
Status: DISCONTINUED | OUTPATIENT
Start: 2024-03-26 | End: 2024-03-31 | Stop reason: HOSPADM

## 2024-03-26 RX ORDER — HYDROCODONE BITARTRATE AND ACETAMINOPHEN 500; 5 MG/1; MG/1
TABLET ORAL
Status: DISCONTINUED | OUTPATIENT
Start: 2024-03-26 | End: 2024-03-26

## 2024-03-26 RX ADMIN — MUPIROCIN: 20 OINTMENT TOPICAL at 08:03

## 2024-03-26 RX ADMIN — SODIUM CHLORIDE, POTASSIUM CHLORIDE, SODIUM LACTATE AND CALCIUM CHLORIDE: 600; 310; 30; 20 INJECTION, SOLUTION INTRAVENOUS at 02:03

## 2024-03-26 RX ADMIN — FLUTICASONE PROPIONATE 50 MCG: 50 SPRAY, METERED NASAL at 08:03

## 2024-03-26 RX ADMIN — HEPARIN SODIUM 5000 UNITS: 5000 INJECTION INTRAVENOUS; SUBCUTANEOUS at 10:03

## 2024-03-26 RX ADMIN — ATORVASTATIN CALCIUM 80 MG: 40 TABLET, FILM COATED ORAL at 08:03

## 2024-03-26 RX ADMIN — Medication 16 G: at 10:03

## 2024-03-26 RX ADMIN — SODIUM CHLORIDE: 9 INJECTION, SOLUTION INTRAVENOUS at 08:03

## 2024-03-26 RX ADMIN — MAGNESIUM HYDROXIDE 2400 MG: 400 SUSPENSION ORAL at 08:03

## 2024-03-26 RX ADMIN — ACETAMINOPHEN 1000 MG: 500 TABLET ORAL at 06:03

## 2024-03-26 RX ADMIN — ACETAMINOPHEN 1000 MG: 500 TABLET ORAL at 11:03

## 2024-03-26 RX ADMIN — MUPIROCIN: 20 OINTMENT TOPICAL at 11:03

## 2024-03-26 RX ADMIN — GUAIFENESIN 600 MG: 600 TABLET, EXTENDED RELEASE ORAL at 10:03

## 2024-03-26 RX ADMIN — ACETAMINOPHEN 1000 MG: 500 TABLET ORAL at 12:03

## 2024-03-26 RX ADMIN — SODIUM CHLORIDE, POTASSIUM CHLORIDE, SODIUM LACTATE AND CALCIUM CHLORIDE: 600; 310; 30; 20 INJECTION, SOLUTION INTRAVENOUS at 05:03

## 2024-03-26 RX ADMIN — HYDROMORPHONE HYDROCHLORIDE 0.4 MG: 1 INJECTION, SOLUTION INTRAMUSCULAR; INTRAVENOUS; SUBCUTANEOUS at 05:03

## 2024-03-26 RX ADMIN — GUAIFENESIN 600 MG: 600 TABLET, EXTENDED RELEASE ORAL at 08:03

## 2024-03-26 RX ADMIN — POTASSIUM CHLORIDE 40 MEQ: 7.46 INJECTION, SOLUTION INTRAVENOUS at 12:03

## 2024-03-26 RX ADMIN — ACETAMINOPHEN 1000 MG: 500 TABLET ORAL at 05:03

## 2024-03-26 RX ADMIN — OXYCODONE HYDROCHLORIDE 10 MG: 10 TABLET ORAL at 01:03

## 2024-03-26 RX ADMIN — SENNOSIDES 8.6 MG: 8.6 TABLET, FILM COATED ORAL at 08:03

## 2024-03-26 RX ADMIN — HYDROMORPHONE HYDROCHLORIDE 0.4 MG: 1 INJECTION, SOLUTION INTRAMUSCULAR; INTRAVENOUS; SUBCUTANEOUS at 10:03

## 2024-03-26 RX ADMIN — INSULIN ASPART 2 UNITS: 100 INJECTION, SOLUTION INTRAVENOUS; SUBCUTANEOUS at 12:03

## 2024-03-26 RX ADMIN — MAGNESIUM HYDROXIDE 2400 MG: 400 SUSPENSION ORAL at 10:03

## 2024-03-26 RX ADMIN — LAMOTRIGINE 25 MG: 25 TABLET ORAL at 10:03

## 2024-03-26 RX ADMIN — HYDROMORPHONE HYDROCHLORIDE 0.4 MG: 1 INJECTION, SOLUTION INTRAMUSCULAR; INTRAVENOUS; SUBCUTANEOUS at 03:03

## 2024-03-26 RX ADMIN — OXYCODONE 5 MG: 5 TABLET ORAL at 07:03

## 2024-03-26 RX ADMIN — SENNOSIDES 8.6 MG: 8.6 TABLET, FILM COATED ORAL at 10:03

## 2024-03-26 RX ADMIN — INSULIN DETEMIR 22 UNITS: 100 INJECTION, SOLUTION SUBCUTANEOUS at 11:03

## 2024-03-26 RX ADMIN — LAMOTRIGINE 25 MG: 25 TABLET ORAL at 08:03

## 2024-03-26 NOTE — ASSESSMENT & PLAN NOTE
Neuro/Psych:   -- Sedation: none   -- Pain: shelby tylenol 1 g q6h, prn oxy 5/10, dilaudid breakthrough  -- Home lamotrigine             Cards:   -- HDS  -- off pressors  -- Home atorvastatin      Pulm:   -- Goal O2 sat > 90%      Renal:  -- Keep junior for strict I/O  -- Trend BUN/Cr   -- d/c mIVF  -- UOP 1L since OR, dropped to 30cc/hr recently - overall +8L, consider 1x lasix 40 3/26      FEN / GI:   -- Regular diet  -- Replace lytes as needed  -- Zofran prn  -- Abdominal dressings c/d/I, appropriately tender to palpation  -- Bowel reg: senna      ID:   -- Tm: afebrile  -- Daily CBC  -- Abx none      Heme/Onc:   -- Will obtain stat postop CBC and coags  -- Daily CBC  -- Holding DVT ppx overnight for oozing in OR      Endo:   -- Pt with Hx DMII with gastroparesis  -- Gluc goal 140-180  -- insulin gtt at 1 unit/hr -> transitioned to flat dose subq insulin 27 qhs, start SSI       PPx:   Feeding: Regular diet  Analgesia/Sedation: shelby tylenol, prn oxy 5/10, dilaudid  Thromboembolic prevention: ok for lovenox 40 qd  HOB >30: yes  Stress Ulcer ppx: n/a  Glucose control: Critical care goal 140-180 g/dl, SSI    Lines/Drains/Airway: REJ PIV (consider removal and midline placement), left radial art line (remove), PIV, junior      Dispo/Code Status/Palliative:   -- step down to floor / Full Code

## 2024-03-26 NOTE — ASSESSMENT & PLAN NOTE
BG goal: 140-180  T2DM.  s/p Ex-lap/CARLOS ALBERTO/BSO/OMX/Segment 6 liver resection/Cholecystectomy/Peritoneal stripping/Diaphragm stripping. Antionette op dex.  On high doses of insulin at home.  Below goal here on much reduced regimen.  Poor appetite.  Will decrease basal and monitor on correction.  Suspect pt will require mealtime insulin but given bg below goal throughout the day will hold off for now.    - Decrease Levemir 22 units daily (20% reduction)  - Continue Novolog Moderate dose correction with ISF 25 starting at 150   - POCT Glucose before meals, at bedtime and at 2 am  - Hypoglycemia protocol in place      ** Please notify Endocrine for any change and/or advance in diet**  ** Please call Endocrine for any BG related issues **     Discharge Planning:   TBD. Please notify endocrinology prior to discharge.

## 2024-03-26 NOTE — HPI
Reason for Consult: Management of T2DM, Hyperglycemia     Surgical Procedure and Date: CARLOS ALBERTO-BSO  3/26/24    Diabetes diagnosis year: > 5 years ago    Home Diabetes Medications:  Toujeo 60 units evening, Novolog 10 units w/ meals Breakfast and Dinner, Metformin 1000 mg , Mounjaro 7.5 mg.    How often checking glucose at home? 1-3 x day   BG readings on regimen: 100s-200s  Hypoglycemia on the regimen?  No  Missed doses on regimen?  No    Diabetes Complications include:     Hyperglycemia and Diabetic retinopathy     Complicating diabetes co morbidities:   HLD, HTN      HPI:   Patient is a 58 y.o. female with  presenting for scheduled Ex-lap/CARLOS ALBERTO/BSO/Possible partial liver resection/Debulking for ovarian neoplasm. 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 Of note, since this H&P was written, patient was admitted to GYN ONC service from 3/18-3/20 for management of leg swelling, JASON, and ascites. S/p  Ex-lap/CARLOS ALBERTO/BSO/OMX/Segment 6 liver resection/Cholecystectomy/Peritoneal stripping/Diaphragm stripping.  Endocrine consulted for bg management.

## 2024-03-26 NOTE — PROGRESS NOTES
[facility]  Gynecologic Oncology  Progress Note      Patient Name: Anette Ricci  MRN: [id number]  Admission Date: 3/25/2024  Hospital Length of Stay: 1 days  Attending Provider: Александр Washington MD  Primary Care Provider: Mark Chua MD  Principal Problem: Status post total abdominal hysterectomy and bilateral salpingo-oophorectomy (CARLOS ALBERTO-BSO)    Follow-up For: Procedure(s) (LRB):  LAPAROTOMY, EXPLORATORY (N/A)  HYSTERECTOMY, TOTAL, ABDOMINAL (N/A)  SALPINGO-OOPHORECTOMY, BILATERAL (N/A)  OMENTECTOMY (N/A)  DEBULKING, NEOPLASM (N/A)  EXCISION, LIVER - partial (N/A)  CHOLECYSTECTOMY  Post-Operative Day: 1 Day Post-Op  Subjective:      History of Present Illness:  Anette Ricci is 58 y.o.  presenting for scheduled Ex-lap/CARLOS ALBERTO/BSO/Possible partial liver resection/Debulking for ovarian neoplasm. 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 Of note, since this H&P was written, patient was admitted to GYN ONC service from 3/18-3/20 for management of leg swelling, JSAON, and ascites. She underwent workup for DVT which was negative. She also had IR guided paracentesis with removal of 3.5L of malignant ascites on 3/19. She has been doing well since discharge and is ready to proceed with surgery today as planned.     Hospital Course:  2024 POD#1 s/p Ex-lap/CARLOS ALBERTO/BSO/OMX/Segment 6 liver resection/Cholecystectomy/Peritoneal stripping/Diaphragm stripping. Transported to ICU immediately postop for close monitoring overnight. Pain well controlled overnight. Denies n/v. Tolerated bites of pudding and sips of water. Has not ambulated. Endorses some chest congestion. UOP 0.8 cc/kg/hr overnight. BG elevated to 200s overnight, SSI ordered.     Interval History: POD#1. NAEON. Pain well controlled on scheduled and PRN regimen. Tolerated two bites of sugar free pudding and several sips of water. No nausea or vomiting. Endorses slight  chest congestion this AM, denies cough, fevers, chills. Has not attempted to ambulating. Voiding via junior. Unsure about vaginal bleeding, but none reported by nursing.     Scheduled Meds:   acetaminophen  1,000 mg Oral Q6H    atorvastatin  80 mg Oral Daily    fluticasone propionate  1 spray Each Nostril Daily    insulin detemir U-100  27 Units Subcutaneous QHS    lamoTRIgine  25 mg Oral BID    magnesium hydroxide 400 mg/5 ml  30 mL Oral BID    mupirocin   Nasal BID    senna  8.6 mg Oral BID     Continuous Infusions:   lactated ringers 100 mL/hr at 03/26/24 0701     PRN Meds:0.9%  NaCl infusion (for blood administration), 0.9%  NaCl infusion (for blood administration), 0.9%  NaCl infusion (for blood administration), calcium gluconate IVPB, calcium gluconate IVPB, calcium gluconate IVPB, dextrose 10%, dextrose 10%, glucagon (human recombinant), hydrALAZINE, HYDROmorphone, insulin aspart U-100, LORazepam, magnesium sulfate IVPB, magnesium sulfate IVPB, melatonin, naloxone, ondansetron, oxyCODONE, oxyCODONE, potassium chloride **AND** potassium chloride **AND** potassium chloride, prochlorperazine, sodium phosphate 15 mmol in dextrose 5 % (D5W) 250 mL IVPB, sodium phosphate 20.01 mmol in dextrose 5 % (D5W) 250 mL IVPB, sodium phosphate 30 mmol in dextrose 5 % (D5W) 250 mL IVPB    Review of patient's allergies indicates:   Allergen Reactions    Codeine Other (See Comments)     Flu like s/s       Objective:     Vital Signs (Most Recent):  Temp: 97.9 °F (36.6 °C) (03/26/24 0300)  Pulse: 96 (03/26/24 0715)  Resp: (!) 22 (03/26/24 0715)  BP: (!) 99/59 (03/26/24 0700)  SpO2: 100 % (03/26/24 0715) Vital Signs (24h Range):  Temp:  [97.3 °F (36.3 °C)-98.1 °F (36.7 °C)] 97.9 °F (36.6 °C)  Pulse:  [] 96  Resp:  [11-32] 22  SpO2:  [97 %-100 %] 100 %  BP: ()/(50-77) 99/59  Arterial Line BP: (100-147)/(48-69) 105/52     Weight: 97 kg (213 lb 13.5 oz)  Body mass index is 33.49 kg/m².    Intake/Output - Last 3 Shifts          03/24 0700  03/25 0659 03/25 0700  03/26 0659 03/26 0700  03/27 0659    P.O.  480     I.V. (mL/kg)  4897.3 (50.5) 101.7 (1)    Blood  1829.4     IV Piggyback  5132.6     Total Intake(mL/kg)  49620.3 (127.2) 101.7 (1)    Urine (mL/kg/hr)  1875 (0.8) 30 (0.6)    Drains  2500     Total Output  4375 30    Net  +7964.3 +71.7                         Physical Exam:   Constitutional: She is oriented to person, place, and time. She appears well-developed and well-nourished.    HENT:   Head: Normocephalic and atraumatic.     Neck:   L EJ in place, covered w/ dressing    Cardiovascular:  Normal rate.      Exam reveals no edema.        Pulmonary/Chest: Effort normal. No respiratory distress. She has no wheezes. She has no rales.        Abdominal: Soft. Bowel sounds are normal. She exhibits abdominal incision (VML covered with island dressing with minimal, dry shadowing present). She exhibits no distension. There is no abdominal tenderness. There is no rebound and no guarding.     Genitourinary:    Genitourinary Comments: Mccartney in place, concentrated, clear yellow urine in bag  (recently emptied)             Musculoskeletal: No edema.       Neurological: She is alert and oriented to person, place, and time.    Skin: Skin is warm and dry.    Psychiatric: She has a normal mood and affect. Her behavior is normal.          Lines/Drains/Airways       Central Venous Catheter Line  Duration                  PowerPort A Cath Single Lumen 03/07/24 0853 Internal Jugular Left 18 days              Drain  Duration                  Urethral Catheter 03/25/24 Non-latex 16 Fr. 1 day              Arterial Line  Duration             Arterial Line 03/25/24 1133 Left Radial <1 day              Peripheral Intravenous Line  Duration                  Peripheral IV - Single Lumen 03/25/24 0642 18 G Anterior;Left Forearm 1 day         Peripheral IV - Single Lumen 03/25/24 0750 18 G;1 3/4 in Left  <1 day                    Laboratory:  BMP:   Recent  Labs   Lab 03/25/24  1948 03/26/24  0508   * 124*  124*   * 130*  130*   K 3.9 4.3  4.3    98  98   CO2 25 28  28   BUN 8 8  8   CREATININE 0.6 0.6  0.6   CALCIUM 7.5* 7.1*  7.1*   MG 1.5*  --    , CBC:   Recent Labs   Lab 03/25/24  0959 03/25/24  1042 03/25/24  1416 03/25/24  1626 03/26/24  0508   WBC 23.01*  --   --  14.01* 12.16  12.16   HGB 7.8*  --   --  8.7* 7.2*  7.2*   HCT 23.8*   < > 15* 25.3* 20.9*  20.9*     --   --  172 234  234    < > = values in this interval not displayed.   , and Coagulation:   Recent Labs   Lab 03/25/24  0959 03/25/24  1626 03/26/24  0508   INR 1.3* 1.5* 1.2   APTT  --  49.0* 36.2*       Diagnostic Results:  No new results  Assessment/Plan:     * S/p CARLOS ALBERTO/BSO/OMX/Debulking/Partial liver resection/Cholecystectomy  - POD#1, overall doing well this AM   - Pain well controlled. NSAIDs held overnight in the setting of low fibrinogen/ABLA. Can consider restarting today if pain not well controlled   - Labs as above. H/H stable this AM 7.2/20.9, one additional unit pRBC held if needs additional transfusion.  - Total blood products: 5u pRBC, 2 FFP, 1 cryo   - UOP adequate, 0.8 cc/kg/hr overnight   - mIVF @ 100cc overnight given hypovolemia, decrease to 40cc/hr this AM (NS) and stop when tolerating full diet   - VTE ppx: SCD on LLE (bp cuff RLE 2/2 limb alert). Begin lovenox when H/H deemed stable for pharmacologic ppx.   - Plan for void trial later today, await spontaneous void   - Patient doing well this AM. Feel she can likely be stepped down to the floor today.     Hypoalbuminemia  - Most recent albumin 2.1   - continue mIVF until tolerating full diet   - Boost ordered TIDWM to promote optimal nutrition   - consider nutrition consult when moved to floor if concern for persistent hypoalbuminemia     Hyponatremia  - Na 130 this AM  - continue NS infusion     Acute postoperative anemia due to expected blood loss  - Preop H/H 7.3/23.8  - S/p 5u pRBC, 1  FFP, 1L albumin intra-op. Suspect combination of acute hypovolemia secondary to large volume ascites (3L intra-op) and blood loss (EBL 500cc). Received additional 1 FFP, 1 cryo overnight.   - AM CBC stable 7.2/20.9   - 1 additional unit prBC held   - VSS   - Plan for repeat CBC in AM POD#2    History of breast cancer in female  - no home meds  - Limb alert RUE. 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 No: to be removed later today.     Kelsey Ramirez MD  Gynecologic Oncology  [facility]

## 2024-03-26 NOTE — SUBJECTIVE & OBJECTIVE
Interval History/Significant Events: NAEON, resuscitated with 3L LR and 1u of cryo for low fibrinogen. Feels well overall, pain is controlled. Tolerating regular diet.    Follow-up For: Procedure(s) (LRB):  LAPAROTOMY, EXPLORATORY (N/A)  HYSTERECTOMY, TOTAL, ABDOMINAL (N/A)  SALPINGO-OOPHORECTOMY, BILATERAL (N/A)  OMENTECTOMY (N/A)  DEBULKING, NEOPLASM (N/A)  EXCISION, LIVER - partial (N/A)  CHOLECYSTECTOMY    Post-Operative Day: 1 Day Post-Op    Objective:     Vital Signs (Most Recent):  Temp: 97.9 °F (36.6 °C) (03/26/24 0300)  Pulse: 92 (03/26/24 0630)  Resp: 16 (03/26/24 0630)  BP: 104/60 (03/26/24 0600)  SpO2: 100 % (03/26/24 0630) Vital Signs (24h Range):  Temp:  [97.3 °F (36.3 °C)-98.1 °F (36.7 °C)] 97.9 °F (36.6 °C)  Pulse:  [] 92  Resp:  [11-32] 16  SpO2:  [97 %-100 %] 100 %  BP: ()/(50-77) 104/60  Arterial Line BP: (100-147)/(48-69) 105/52     Weight: 97 kg (213 lb 13.5 oz)  Body mass index is 33.49 kg/m².      Intake/Output Summary (Last 24 hours) at 3/26/2024 0720  Last data filed at 3/26/2024 0600  Gross per 24 hour   Intake 45562.3 ml   Output 4375 ml   Net 7964.3 ml          Physical Exam  Constitutional:       Appearance: Normal appearance.   Neck:      Comments: LIJ port a cath  Cardiovascular:      Rate and Rhythm: Normal rate and regular rhythm.      Comments: L radial lovely  Pulmonary:      Effort: Pulmonary effort is normal. No respiratory distress.   Abdominal:      General: Abdomen is flat. There is no distension.      Palpations: Abdomen is soft.      Tenderness: There is no abdominal tenderness.      Comments: Incision clean, dry, intact     Genitourinary:     Comments: Mccartney catheter in place draining clear yellow urine    Musculoskeletal:      Right lower leg: No edema.      Left lower leg: No edema.   Skin:     General: Skin is warm and dry.   Neurological:      General: No focal deficit present.      Mental Status: She is alert and oriented to person, place, and time.             Vents:       Lines/Drains/Airways       Central Venous Catheter Line  Duration                  PowerPort A Cath Single Lumen 03/07/24 0853 Internal Jugular Left 18 days              Drain  Duration                  Urethral Catheter 03/25/24 Non-latex 16 Fr. 1 day              Arterial Line  Duration             Arterial Line 03/25/24 1133 Left Radial <1 day              Peripheral Intravenous Line  Duration                  Peripheral IV - Single Lumen 03/25/24 0642 18 G Anterior;Left Forearm 1 day         Peripheral IV - Single Lumen 03/25/24 0750 18 G;1 3/4 in Left  <1 day                    Significant Labs:    CBC/Anemia Profile:  Recent Labs   Lab 03/25/24  0959 03/25/24  1042 03/25/24  1416 03/25/24  1626 03/26/24  0508   WBC 23.01*  --   --  14.01* 12.16  12.16   HGB 7.8*  --   --  8.7* 7.2*  7.2*   HCT 23.8*   < > 15* 25.3* 20.9*  20.9*     --   --  172 234  234   MCV 83  --   --  79* 76*  76*   RDW 16.5*  --   --  20.6* 21.6*  21.6*    < > = values in this interval not displayed.        Chemistries:  Recent Labs   Lab 03/25/24  1948 03/26/24  0508   * 130*  130*   K 3.9 4.3  4.3    98  98   CO2 25 28  28   BUN 8 8  8   CREATININE 0.6 0.6  0.6   CALCIUM 7.5* 7.1*  7.1*   ALBUMIN 2.1*  --    PROT 3.6*  --    BILITOT 2.4*  --    ALKPHOS 52*  --    ALT 49*  --    *  --    MG 1.5*  --    PHOS 2.5*  --        All pertinent labs within the past 24 hours have been reviewed.    Significant Imaging:  I have reviewed all pertinent imaging results/findings within the past 24 hours.

## 2024-03-26 NOTE — PROGRESS NOTES
[facility]  Critical Care - Surgery  Progress Note    Patient Name: Anette Ricci  MRN: [id number]  Admission Date: 3/25/2024  Hospital Length of Stay: 1 days  Code Status: Full Code  Attending Provider: Александр Washington MD  Primary Care Provider: Mark Chua MD   Principal Problem: Status post total abdominal hysterectomy and bilateral salpingo-oophorectomy (CARLOS ALBERTO-BSO)    Subjective:     Hospital/ICU Course:  No notes on file    Interval History/Significant Events: NAEON, resuscitated with 3L LR and 1u of cryo for low fibrinogen. Feels well overall, pain is controlled. Tolerating regular diet.    Follow-up For: Procedure(s) (LRB):  LAPAROTOMY, EXPLORATORY (N/A)  HYSTERECTOMY, TOTAL, ABDOMINAL (N/A)  SALPINGO-OOPHORECTOMY, BILATERAL (N/A)  OMENTECTOMY (N/A)  DEBULKING, NEOPLASM (N/A)  EXCISION, LIVER - partial (N/A)  CHOLECYSTECTOMY    Post-Operative Day: 1 Day Post-Op    Objective:     Vital Signs (Most Recent):  Temp: 97.9 °F (36.6 °C) (03/26/24 0300)  Pulse: 92 (03/26/24 0630)  Resp: 16 (03/26/24 0630)  BP: 104/60 (03/26/24 0600)  SpO2: 100 % (03/26/24 0630) Vital Signs (24h Range):  Temp:  [97.3 °F (36.3 °C)-98.1 °F (36.7 °C)] 97.9 °F (36.6 °C)  Pulse:  [] 92  Resp:  [11-32] 16  SpO2:  [97 %-100 %] 100 %  BP: ()/(50-77) 104/60  Arterial Line BP: (100-147)/(48-69) 105/52     Weight: 97 kg (213 lb 13.5 oz)  Body mass index is 33.49 kg/m².      Intake/Output Summary (Last 24 hours) at 3/26/2024 0720  Last data filed at 3/26/2024 0600  Gross per 24 hour   Intake 16871.3 ml   Output 4375 ml   Net 7964.3 ml          Physical Exam  Constitutional:       Appearance: Normal appearance.   Neck:      Comments: LIJ port a cath  Cardiovascular:      Rate and Rhythm: Normal rate and regular rhythm.      Comments: L radial lovely  Pulmonary:      Effort: Pulmonary effort is normal. No respiratory distress.   Abdominal:      General: Abdomen is flat. There is no distension.       Palpations: Abdomen is soft.      Tenderness: There is no abdominal tenderness.      Comments: Incision clean, dry, intact     Genitourinary:     Comments: Mccartney catheter in place draining clear yellow urine    Musculoskeletal:      Right lower leg: No edema.      Left lower leg: No edema.   Skin:     General: Skin is warm and dry.   Neurological:      General: No focal deficit present.      Mental Status: She is alert and oriented to person, place, and time.            Vents:       Lines/Drains/Airways       Central Venous Catheter Line  Duration                  PowerPort A Cath Single Lumen 03/07/24 0853 Internal Jugular Left 18 days              Drain  Duration                  Urethral Catheter 03/25/24 Non-latex 16 Fr. 1 day              Arterial Line  Duration             Arterial Line 03/25/24 1133 Left Radial <1 day              Peripheral Intravenous Line  Duration                  Peripheral IV - Single Lumen 03/25/24 0642 18 G Anterior;Left Forearm 1 day         Peripheral IV - Single Lumen 03/25/24 0750 18 G;1 3/4 in Left  <1 day                    Significant Labs:    CBC/Anemia Profile:  Recent Labs   Lab 03/25/24  0959 03/25/24  1042 03/25/24  1416 03/25/24  1626 03/26/24  0508   WBC 23.01*  --   --  14.01* 12.16  12.16   HGB 7.8*  --   --  8.7* 7.2*  7.2*   HCT 23.8*   < > 15* 25.3* 20.9*  20.9*     --   --  172 234  234   MCV 83  --   --  79* 76*  76*   RDW 16.5*  --   --  20.6* 21.6*  21.6*    < > = values in this interval not displayed.        Chemistries:  Recent Labs   Lab 03/25/24  1948 03/26/24  0508   * 130*  130*   K 3.9 4.3  4.3    98  98   CO2 25 28  28   BUN 8 8  8   CREATININE 0.6 0.6  0.6   CALCIUM 7.5* 7.1*  7.1*   ALBUMIN 2.1*  --    PROT 3.6*  --    BILITOT 2.4*  --    ALKPHOS 52*  --    ALT 49*  --    *  --    MG 1.5*  --    PHOS 2.5*  --        All pertinent labs within the past 24 hours have been reviewed.    Significant Imaging:  I have  reviewed all pertinent imaging results/findings within the past 24 hours.  Assessment/Plan:     Renal/  * S/p CARLOS ALBERTO/BSO/OMX/Debulking/Partial liver resection/Cholecystectomy    Neuro/Psych:   -- Sedation: none   -- Pain: shelby tylenol 1 g q6h, prn oxy 5/10, dilaudid breakthrough  -- Home lamotrigine             Cards:   -- HDS  -- off pressors  -- Home atorvastatin      Pulm:   -- Goal O2 sat > 90%      Renal:  -- Keep junior for strict I/O  -- Trend BUN/Cr   -- d/c mIVF  -- UOP 1L since OR, dropped to 30cc/hr recently - overall +8L, consider 1x lasix 40 3/26      FEN / GI:   -- Regular diet  -- Replace lytes as needed  -- Zofran prn  -- Abdominal dressings c/d/I, appropriately tender to palpation  -- Bowel reg: senna      ID:   -- Tm: afebrile  -- Daily CBC  -- Abx none      Heme/Onc:   -- Will obtain stat postop CBC and coags  -- Daily CBC  -- Holding DVT ppx overnight for oozing in OR      Endo:   -- Pt with Hx DMII with gastroparesis  -- Gluc goal 140-180  -- insulin gtt at 1 unit/hr -> transitioned to flat dose subq insulin 27 qhs, start SSI       PPx:   Feeding: Regular diet  Analgesia/Sedation: shelby tylenol, prn oxy 5/10, dilaudid  Thromboembolic prevention: ok for lovenox 40 qd  HOB >30: yes  Stress Ulcer ppx: n/a  Glucose control: Critical care goal 140-180 g/dl, SSI    Lines/Drains/Airway: REJ PIV (consider removal and midline placement), left radial art line (remove), PIV, junior      Dispo/Code Status/Palliative:   -- step down to floor / Full Code         Shelli Vasquez MD  Critical Care - Surgery  [facility]

## 2024-03-26 NOTE — ASSESSMENT & PLAN NOTE
- Home regimen: Lantus 33 QHS, aspart 5u BID WM (largest meals), mounjaro, metformin 1000mg QD  - Current regimen: Detemir 27u QHS (20% reduction per endocrine), SSI   - BG elevated overnight, persistently >200  -  this AM   - Will plan for Endocrinology consult upon stepdown to the floor for comanagement of complex diabetes

## 2024-03-26 NOTE — PLAN OF CARE
Pt A&Ox4. Transferred from ICU s/p Lap., total Abd Hysterectomy, partial liver resection. Ambulates from wheelchair to bed. Pt able to sit on bedside chair. Attempted to void in bedside commode without success. Bladder scan shows 20ml of urine. Will continue to try to void in bedside commode. Incision bandage has small amount of drainage which was outlined by ICU nurse. 0.4mg mg Dilaudid IV push given for pain. Bed remains in lowest position with wheels locked and call light within reach. Plan of care reviewed.

## 2024-03-26 NOTE — ASSESSMENT & PLAN NOTE
- Most recent albumin 2.1   - continue mIVF until tolerating full diet   - Boost ordered TIDWM to promote optimal nutrition   - consider nutrition consult when moved to floor if concern for persistent hypoalbuminemia

## 2024-03-26 NOTE — ASSESSMENT & PLAN NOTE
58 y.o. female with PMHx of DM, HTN, HLD, seizure disorder and metastatic carcinosarcoma managed by Gyn Onc, surgical oncology was consulted in her care for resection of the segment VI hepatic lesions    - patient seen and examined  - labs reviewed  - pain appropriately controlled  - on diabetic diet  - rest of care per primary team  - please call with questions

## 2024-03-26 NOTE — SUBJECTIVE & OBJECTIVE
Interval History: Patient seen and examined. No acute events overnight. Pain controlled. H/H down to 7.2 this morning. No other complaints.     Medications:  Continuous Infusions:   lactated ringers 100 mL/hr at 03/26/24 0600     Scheduled Meds:   acetaminophen  1,000 mg Oral Q6H    atorvastatin  80 mg Oral Daily    fluticasone propionate  1 spray Each Nostril Daily    insulin detemir U-100  27 Units Subcutaneous QHS    lamoTRIgine  25 mg Oral BID    magnesium hydroxide 400 mg/5 ml  30 mL Oral BID    mupirocin   Nasal BID    senna  8.6 mg Oral BID     PRN Meds:0.9%  NaCl infusion (for blood administration), 0.9%  NaCl infusion (for blood administration), 0.9%  NaCl infusion (for blood administration), calcium gluconate IVPB, calcium gluconate IVPB, calcium gluconate IVPB, dextrose 10%, dextrose 10%, glucagon (human recombinant), hydrALAZINE, HYDROmorphone, insulin aspart U-100, LORazepam, magnesium sulfate IVPB, magnesium sulfate IVPB, melatonin, naloxone, ondansetron, oxyCODONE, oxyCODONE, potassium chloride **AND** potassium chloride **AND** potassium chloride, prochlorperazine, sodium phosphate 15 mmol in dextrose 5 % (D5W) 250 mL IVPB, sodium phosphate 20.01 mmol in dextrose 5 % (D5W) 250 mL IVPB, sodium phosphate 30 mmol in dextrose 5 % (D5W) 250 mL IVPB     Review of patient's allergies indicates:   Allergen Reactions    Codeine Other (See Comments)     Flu like s/s     Objective:     Vital Signs (Most Recent):  Temp: 97.9 °F (36.6 °C) (03/26/24 0300)  Pulse: 92 (03/26/24 0630)  Resp: 16 (03/26/24 0630)  BP: 104/60 (03/26/24 0600)  SpO2: 100 % (03/26/24 0630) Vital Signs (24h Range):  Temp:  [97.3 °F (36.3 °C)-98.1 °F (36.7 °C)] 97.9 °F (36.6 °C)  Pulse:  [] 92  Resp:  [11-32] 16  SpO2:  [97 %-100 %] 100 %  BP: ()/(50-77) 104/60  Arterial Line BP: (100-147)/(48-69) 105/52     Weight: 97 kg (213 lb 13.5 oz)  Body mass index is 33.49 kg/m².    Intake/Output - Last 3 Shifts         03/24 0700  03/25  0659 03/25 0700  03/26 0659    P.O.  480    I.V. (mL/kg)  4897.3 (50.5)    Blood  1829.4    IV Piggyback  5132.6    Total Intake(mL/kg)  18940.3 (127.2)    Urine (mL/kg/hr)  1875 (0.8)    Drains  2500    Total Output  4375    Net  +7964.3                   Physical Exam  Vitals and nursing note reviewed.   Constitutional:       General: She is not in acute distress.     Appearance: Normal appearance.   HENT:      Head: Normocephalic and atraumatic.      Mouth/Throat:      Mouth: Mucous membranes are moist.   Cardiovascular:      Rate and Rhythm: Normal rate and regular rhythm.   Pulmonary:      Effort: Pulmonary effort is normal. No respiratory distress.   Abdominal:      Comments: Abdomen soft, non-distended  Appropriately tender to palpation  Incision without concern   Musculoskeletal:         General: Normal range of motion.   Skin:     General: Skin is warm and dry.   Neurological:      Mental Status: She is alert.          Significant Labs:  I have reviewed all pertinent lab results within the past 24 hours.  CBC:   Recent Labs   Lab 03/26/24  0508   WBC 12.16  12.16   RBC 2.76*  2.76*   HGB 7.2*  7.2*   HCT 20.9*  20.9*     234   MCV 76*  76*   MCH 26.1*  26.1*   MCHC 34.4  34.4     BMP:   Recent Labs   Lab 03/25/24  1948 03/26/24  0508   * 124*  124*   * 130*  130*   K 3.9 4.3  4.3    98  98   CO2 25 28  28   BUN 8 8  8   CREATININE 0.6 0.6  0.6   CALCIUM 7.5* 7.1*  7.1*   MG 1.5*  --        Significant Diagnostics:  I have reviewed all pertinent imaging results/findings within the past 24 hours.

## 2024-03-26 NOTE — PLAN OF CARE
SICU PLAN OF CARE NOTE    Dx: Status post total abdominal hysterectomy and bilateral salpingo-oophorectomy (CARLOS ALBERTO-BSO)    Shift Events: no acute events this shift    Gtts: LR @ 100ml/hr, x3 Liters of LR boluses, x1 cryo    Access: x2 PIVs, portacath not accessed, x1 a line.    Neuro: AAO x4, Follows Commands, and Moves All Extremities    Cardiac: SR-ST 90s-115    Respiratory: Room Air    GI: Diabetic Diet    : Urinary Catheter 1020 cc/shift    Drains: none     Labs/Accuchecks: daily labs and q4hr accuchecks    Skin: midline surgical site incision covered with island boarder dressing with light colored drainage marked/outlined    Last documentation =  Temp: 97.9 °F (36.6 °C) (03/26/24 0300)  Pulse: 96 (03/26/24 0600)  Resp: 13 (03/26/24 0600)  BP: 104/60 (03/26/24 0600)  SpO2: 98 % (03/26/24 0600)       normal...

## 2024-03-26 NOTE — ASSESSMENT & PLAN NOTE
- Preop H/H 7.3/23.8  - S/p 5u pRBC, 1 FFP, 1L albumin intra-op. Suspect combination of acute hypovolemia secondary to large volume ascites (3L intra-op) and blood loss (EBL 500cc). Received additional 1 FFP, 1 cryo overnight.   - AM CBC stable 7.2/20.9   - 1 additional unit prBC held   - VSS   - Plan for repeat CBC in AM POD#2

## 2024-03-26 NOTE — ANESTHESIA POSTPROCEDURE EVALUATION
Anesthesia Post Evaluation    Patient: Anette Ricci    Procedure(s) Performed: Procedure(s) (LRB):  LAPAROTOMY, EXPLORATORY (N/A)  HYSTERECTOMY, TOTAL, ABDOMINAL (N/A)  SALPINGO-OOPHORECTOMY, BILATERAL (N/A)  OMENTECTOMY (N/A)  DEBULKING, NEOPLASM (N/A)  EXCISION, LIVER - partial (N/A)  CHOLECYSTECTOMY    Final Anesthesia Type: general      Patient location during evaluation: ICU  Patient participation: Yes- Able to Participate  Level of consciousness: awake and alert  Post-procedure vital signs: reviewed and stable  Pain management: adequate  Airway patency: patent    PONV status at discharge: No PONV  Anesthetic complications: no      Cardiovascular status: blood pressure returned to baseline  Respiratory status: unassisted  Hydration status: euvolemic  Follow-up not needed.            Vitals Value Taken Time   /60 03/26/24 0602   Temp 36.6 °C (97.9 °F) 03/26/24 0300   Pulse 95 03/26/24 0610   Resp 16 03/26/24 0610   SpO2 98 % 03/26/24 0610   Vitals shown include unvalidated device data.      No case tracking events are documented in the log.      Pain/Hugo Score: Pain Rating Prior to Med Admin: 8 (3/26/2024  5:19 AM)  Pain Rating Post Med Admin: 0 (3/26/2024 12:02 AM)

## 2024-03-26 NOTE — HPI
Anette Ricci is 58 y.o.  presenting for scheduled Ex-lap/CARLOS ALBERTO/BSO/Possible partial liver resection/Debulking for ovarian neoplasm. 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 Of note, since this H&P was written, patient was admitted to GYN ONC service from 3/18-3/20 for management of leg swelling, JASON, and ascites. She underwent workup for DVT which was negative. She also had IR guided paracentesis with removal of 3.5L of malignant ascites on 3/19. She has been doing well since discharge and is ready to proceed with surgery today as planned.    Fax was received in regards to a refill requesting for Baclofen 5mg to be sent to [facility] [address].      Last prescription refill: 02.25.2020 # 30, 0 refill.  Per fax it states last refill 01.28.2020

## 2024-03-26 NOTE — SUBJECTIVE & OBJECTIVE
Interval HPI:   No acute events overnight. Patient in room 20142/00659 A. Blood glucose stable. BG at and below goal on current insulin regimen (SSI, prandial, and basal insulin ). Steroid use- Dexamethasone  perio.   1 Day Post-Op  Renal function- Normal   Vasopressors-  None     Diet diabetic 2000 Calorie     Eating:   <25%  Nausea: No  Hypoglycemia and intervention: No  Fever: No  TPN and/or TF: No    PMH, PSH, FH, SH updated and reviewed     ROS:    Review of Systems   Constitutional:  Negative for chills, fatigue and fever.   Respiratory:  Negative for cough and shortness of breath.    Gastrointestinal:  Negative for nausea and vomiting.       Current Medications and/or Treatments Impacting Glycemic Control  Immunotherapy:    Immunosuppressants       None          Steroids:   Hormones (From admission, onward)      Start     Stop Route Frequency Ordered    03/25/24 1705  melatonin tablet 6 mg         -- Oral Nightly PRN 03/25/24 1623          Pressors:    Autonomic Drugs (From admission, onward)      None          Hyperglycemia/Diabetes Medications:   Antihyperglycemics (From admission, onward)      Start     Stop Route Frequency Ordered    03/26/24 2100  insulin detemir U-100 (Levemir) pen 22 Units         -- SubQ Nightly 03/26/24 1415    03/26/24 1514  insulin aspart U-100 pen 0-10 Units         -- SubQ Before meals, nightly and at 0200 PRN 03/26/24 1415             PHYSICAL EXAMINATION:  Vitals:    03/26/24 1330   BP: (!) 129/56   Pulse: 93   Resp: 19   Temp:      Body mass index is 33.49 kg/m².     Physical Exam  Constitutional:       General: She is not in acute distress.  HENT:      Head: Normocephalic and atraumatic.      Right Ear: External ear normal.      Left Ear: External ear normal.   Pulmonary:      Effort: No respiratory distress.   Psychiatric:         Mood and Affect: Mood normal.         Behavior: Behavior normal.

## 2024-03-26 NOTE — SUBJECTIVE & OBJECTIVE
Interval History: POD#1. NAEON. Pain well controlled on scheduled and PRN regimen. Tolerated two bites of sugar free pudding and several sips of water. No nausea or vomiting. Endorses slight chest congestion this AM, denies cough, fevers, chills. Has not attempted to ambulating. Voiding via junior. Unsure about vaginal bleeding, but none reported by nursing.     Scheduled Meds:   acetaminophen  1,000 mg Oral Q6H    atorvastatin  80 mg Oral Daily    fluticasone propionate  1 spray Each Nostril Daily    insulin detemir U-100  27 Units Subcutaneous QHS    lamoTRIgine  25 mg Oral BID    magnesium hydroxide 400 mg/5 ml  30 mL Oral BID    mupirocin   Nasal BID    senna  8.6 mg Oral BID     Continuous Infusions:   lactated ringers 100 mL/hr at 03/26/24 0701     PRN Meds:0.9%  NaCl infusion (for blood administration), 0.9%  NaCl infusion (for blood administration), 0.9%  NaCl infusion (for blood administration), calcium gluconate IVPB, calcium gluconate IVPB, calcium gluconate IVPB, dextrose 10%, dextrose 10%, glucagon (human recombinant), hydrALAZINE, HYDROmorphone, insulin aspart U-100, LORazepam, magnesium sulfate IVPB, magnesium sulfate IVPB, melatonin, naloxone, ondansetron, oxyCODONE, oxyCODONE, potassium chloride **AND** potassium chloride **AND** potassium chloride, prochlorperazine, sodium phosphate 15 mmol in dextrose 5 % (D5W) 250 mL IVPB, sodium phosphate 20.01 mmol in dextrose 5 % (D5W) 250 mL IVPB, sodium phosphate 30 mmol in dextrose 5 % (D5W) 250 mL IVPB    Review of patient's allergies indicates:   Allergen Reactions    Codeine Other (See Comments)     Flu like s/s       Objective:     Vital Signs (Most Recent):  Temp: 97.9 °F (36.6 °C) (03/26/24 0300)  Pulse: 96 (03/26/24 0715)  Resp: (!) 22 (03/26/24 0715)  BP: (!) 99/59 (03/26/24 0700)  SpO2: 100 % (03/26/24 0715) Vital Signs (24h Range):  Temp:  [97.3 °F (36.3 °C)-98.1 °F (36.7 °C)] 97.9 °F (36.6 °C)  Pulse:  [] 96  Resp:  [11-32] 22  SpO2:  [97  %-100 %] 100 %  BP: ()/(50-77) 99/59  Arterial Line BP: (100-147)/(48-69) 105/52     Weight: 97 kg (213 lb 13.5 oz)  Body mass index is 33.49 kg/m².    Intake/Output - Last 3 Shifts         03/24 0700  03/25 0659 03/25 0700  03/26 0659 03/26 0700  03/27 0659    P.O.  480     I.V. (mL/kg)  4897.3 (50.5) 101.7 (1)    Blood  1829.4     IV Piggyback  5132.6     Total Intake(mL/kg)  45242.3 (127.2) 101.7 (1)    Urine (mL/kg/hr)  1875 (0.8) 30 (0.6)    Drains  2500     Total Output  4375 30    Net  +7964.3 +71.7                         Physical Exam:   Constitutional: She is oriented to person, place, and time. She appears well-developed and well-nourished.    HENT:   Head: Normocephalic and atraumatic.     Neck:   L EJ in place, covered w/ dressing    Cardiovascular:  Normal rate.      Exam reveals no edema.        Pulmonary/Chest: Effort normal. No respiratory distress. She has no wheezes. She has no rales.        Abdominal: Soft. Bowel sounds are normal. She exhibits abdominal incision (VML covered with island dressing with minimal, dry shadowing present). She exhibits no distension. There is no abdominal tenderness. There is no rebound and no guarding.     Genitourinary:    Genitourinary Comments: Mccartney in place, concentrated, clear yellow urine in bag  (recently emptied)             Musculoskeletal: No edema.       Neurological: She is alert and oriented to person, place, and time.    Skin: Skin is warm and dry.    Psychiatric: She has a normal mood and affect. Her behavior is normal.          Lines/Drains/Airways       Central Venous Catheter Line  Duration                  PowerPort A Cath Single Lumen 03/07/24 0853 Internal Jugular Left 18 days              Drain  Duration                  Urethral Catheter 03/25/24 Non-latex 16 Fr. 1 day              Arterial Line  Duration             Arterial Line 03/25/24 1133 Left Radial <1 day              Peripheral Intravenous Line  Duration                   Peripheral IV - Single Lumen 03/25/24 0642 18 G Anterior;Left Forearm 1 day         Peripheral IV - Single Lumen 03/25/24 0750 18 G;1 3/4 in Left  <1 day                    Laboratory:  BMP:   Recent Labs   Lab 03/25/24  1948 03/26/24  0508   * 124*  124*   * 130*  130*   K 3.9 4.3  4.3    98  98   CO2 25 28  28   BUN 8 8  8   CREATININE 0.6 0.6  0.6   CALCIUM 7.5* 7.1*  7.1*   MG 1.5*  --    , CBC:   Recent Labs   Lab 03/25/24  0959 03/25/24  1042 03/25/24  1416 03/25/24  1626 03/26/24  0508   WBC 23.01*  --   --  14.01* 12.16  12.16   HGB 7.8*  --   --  8.7* 7.2*  7.2*   HCT 23.8*   < > 15* 25.3* 20.9*  20.9*     --   --  172 234  234    < > = values in this interval not displayed.   , and Coagulation:   Recent Labs   Lab 03/25/24  0959 03/25/24  1626 03/26/24  0508   INR 1.3* 1.5* 1.2   APTT  --  49.0* 36.2*       Diagnostic Results:  No new results

## 2024-03-26 NOTE — CARE UPDATE
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

## 2024-03-26 NOTE — CONSULTS
[facility] ([facility])  Endocrinology  Diabetes Consult Note    Consult Requested by: Александр Washington MD   Reason for admit: Status post total abdominal hysterectomy and bilateral salpingo-oophorectomy (CARLOS ALBERTO-BSO)    HISTORY OF PRESENT ILLNESS:  Reason for Consult: Management of T2DM, Hyperglycemia     Surgical Procedure and Date: CARLOS ALBERTO-BSO  3/26/24    Diabetes diagnosis year: > 5 years ago    Home Diabetes Medications:  Toujeo 60 units evening, Novolog 10 units w/ meals Breakfast and Dinner, Metformin 1000 mg , Mounjaro 7.5 mg.    How often checking glucose at home? 1-3 x day   BG readings on regimen: 100s-200s  Hypoglycemia on the regimen?  No  Missed doses on regimen?  No    Diabetes Complications include:     Hyperglycemia and Diabetic retinopathy     Complicating diabetes co morbidities:   HLD, HTN      HPI:   Patient is a 58 y.o. female with  presenting for scheduled Ex-lap/CARLOS ALBERTO/BSO/Possible partial liver resection/Debulking for ovarian neoplasm. 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 Of note, since this H&P was written, patient was admitted to GYN ONC service from 3/18-3/20 for management of leg swelling, JASON, and ascites. S/p  Ex-lap/CARLOS ALBERTO/BSO/OMX/Segment 6 liver resection/Cholecystectomy/Peritoneal stripping/Diaphragm stripping.  Endocrine consulted for bg management.     Interval HPI:   No acute events overnight. Patient in room 92205/23239 A. Blood glucose stable. BG at and below goal on current insulin regimen (SSI, prandial, and basal insulin ). Steroid use- Dexamethasone  perio.   1 Day Post-Op  Renal function- Normal   Vasopressors-  None     Diet diabetic  Calorie     Eating:   <25%  Nausea: No  Hypoglycemia and intervention: No  Fever: No  TPN and/or TF: No    PMH, PSH, FH, SH updated and reviewed     ROS:    Review of Systems   Constitutional:  Negative for chills, fatigue and fever.   Respiratory:  Negative for cough and  shortness of breath.    Gastrointestinal:  Negative for nausea and vomiting.       Current Medications and/or Treatments Impacting Glycemic Control  Immunotherapy:    Immunosuppressants       None          Steroids:   Hormones (From admission, onward)      Start     Stop Route Frequency Ordered    03/25/24 1705  melatonin tablet 6 mg         -- Oral Nightly PRN 03/25/24 1623          Pressors:    Autonomic Drugs (From admission, onward)      None          Hyperglycemia/Diabetes Medications:   Antihyperglycemics (From admission, onward)      Start     Stop Route Frequency Ordered    03/26/24 2100  insulin detemir U-100 (Levemir) pen 22 Units         -- SubQ Nightly 03/26/24 1415    03/26/24 1514  insulin aspart U-100 pen 0-10 Units         -- SubQ Before meals, nightly and at 0200 PRN 03/26/24 1415             PHYSICAL EXAMINATION:  Vitals:    03/26/24 1330   BP: (!) 129/56   Pulse: 93   Resp: 19   Temp:      Body mass index is 33.49 kg/m².     Physical Exam  Constitutional:       General: She is not in acute distress.  HENT:      Head: Normocephalic and atraumatic.      Right Ear: External ear normal.      Left Ear: External ear normal.   Pulmonary:      Effort: No respiratory distress.   Psychiatric:         Mood and Affect: Mood normal.         Behavior: Behavior normal.            Labs Reviewed and Include   Recent Labs   Lab 03/25/24 1948 03/26/24  0508   * 124*  124*   CALCIUM 7.5* 7.1*  7.1*   ALBUMIN 2.1*  --    PROT 3.6*  --    * 130*  130*   K 3.9 4.3  4.3   CO2 25 28  28    98  98   BUN 8 8  8   CREATININE 0.6 0.6  0.6   ALKPHOS 52*  --    ALT 49*  --    *  --    BILITOT 2.4*  --      Lab Results   Component Value Date    WBC 12.16 03/26/2024    WBC 12.16 03/26/2024    HGB 7.2 (L) 03/26/2024    HGB 7.2 (L) 03/26/2024    HCT 20.9 (L) 03/26/2024    HCT 20.9 (L) 03/26/2024    MCV 76 (L) 03/26/2024    MCV 76 (L) 03/26/2024     03/26/2024     03/26/2024     No  "results for input(s): "TSH", "FREET4" in the last 168 hours.  Lab Results   Component Value Date    HGBA1C 7.3 (H) 03/19/2024       Nutritional status:   Body mass index is 33.49 kg/m².  Lab Results   Component Value Date    ALBUMIN 2.1 (L) 03/25/2024    ALBUMIN 1.8 (L) 03/17/2024    ALBUMIN 2.1 (L) 03/06/2024     No results found for: "PREALBUMIN"    Estimated Creatinine Clearance: 120.8 mL/min (based on SCr of 0.6 mg/dL).    Accu-Checks  Recent Labs     03/25/24  0634 03/25/24  1618 03/25/24  1952 03/25/24  2305 03/26/24  0511 03/26/24  0817 03/26/24  1210   POCTGLUCOSE 227* 236* 250* 213* 124* 83 79        ASSESSMENT and PLAN    Cardiac/Vascular  Hyperlipidemia    Managed per primary.   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  s/p Ex-lap/CARLOS ALBERTO/BSO/OMX/Segment 6 liver resection/Cholecystectomy/Peritoneal stripping/Diaphragm stripping. Antionette op dex.  On high doses of insulin at home.  Below goal here on much reduced regimen.  Poor appetite.  Will decrease basal and monitor on correction.  Suspect pt will require mealtime insulin but given bg below goal throughout the day will hold off for now.    - Decrease Levemir 22 units daily (20% reduction)  - Continue Novolog Moderate dose correction with ISF 25 starting at 150   - POCT Glucose before meals, at bedtime and at 2 am  - Hypoglycemia protocol in place      ** Please notify Endocrine for any change and/or advance in diet**  ** Please call Endocrine for any BG related issues **     Discharge Planning:   TBD. Please notify endocrinology prior to discharge.            Plan discussed with patient, family, and RN at bedside.     Robbin Rios PA-C  Endocrinology  [facility] - Oncology ([facility])  "

## 2024-03-26 NOTE — PROGRESS NOTES
[facility] - [facility]  General Surgery  Progress Note    Subjective:     History of Present Illness:  Anette Ricci is a 58 y.o. 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 She is planned for CARLOS ALBERTO/BSO, omentectomy, lymph node debulking, possible colon resection, possible ostomy, and partial hepatectomy. She did have recent paracentesis on 3/19 for symptomatic relief. Imaging demonstrates multiple mesenteric and peritoneal masses, as well as a 3.1 cm hepatic lesion in segment VI.     Post-Op Info:  Procedure(s) (LRB):  LAPAROTOMY, EXPLORATORY (N/A)  HYSTERECTOMY, TOTAL, ABDOMINAL (N/A)  SALPINGO-OOPHORECTOMY, BILATERAL (N/A)  OMENTECTOMY (N/A)  DEBULKING, NEOPLASM (N/A)  EXCISION, LIVER - partial (N/A)  CHOLECYSTECTOMY   1 Day Post-Op     Interval History: Patient seen and examined. No acute events overnight. Pain controlled. H/H down to 7.2 this morning. No other complaints.     Medications:  Continuous Infusions:   lactated ringers 100 mL/hr at 03/26/24 0600     Scheduled Meds:   acetaminophen  1,000 mg Oral Q6H    atorvastatin  80 mg Oral Daily    fluticasone propionate  1 spray Each Nostril Daily    insulin detemir U-100  27 Units Subcutaneous QHS    lamoTRIgine  25 mg Oral BID    magnesium hydroxide 400 mg/5 ml  30 mL Oral BID    mupirocin   Nasal BID    senna  8.6 mg Oral BID     PRN Meds:0.9%  NaCl infusion (for blood administration), 0.9%  NaCl infusion (for blood administration), 0.9%  NaCl infusion (for blood administration), calcium gluconate IVPB, calcium gluconate IVPB, calcium gluconate IVPB, dextrose 10%, dextrose 10%, glucagon (human recombinant), hydrALAZINE, HYDROmorphone, insulin aspart U-100, LORazepam, magnesium sulfate IVPB, magnesium sulfate IVPB, melatonin, naloxone, ondansetron, oxyCODONE, oxyCODONE, potassium chloride **AND** potassium chloride **AND** potassium  chloride, prochlorperazine, sodium phosphate 15 mmol in dextrose 5 % (D5W) 250 mL IVPB, sodium phosphate 20.01 mmol in dextrose 5 % (D5W) 250 mL IVPB, sodium phosphate 30 mmol in dextrose 5 % (D5W) 250 mL IVPB     Review of patient's allergies indicates:   Allergen Reactions    Codeine Other (See Comments)     Flu like s/s     Objective:     Vital Signs (Most Recent):  Temp: 97.9 °F (36.6 °C) (03/26/24 0300)  Pulse: 92 (03/26/24 0630)  Resp: 16 (03/26/24 0630)  BP: 104/60 (03/26/24 0600)  SpO2: 100 % (03/26/24 0630) Vital Signs (24h Range):  Temp:  [97.3 °F (36.3 °C)-98.1 °F (36.7 °C)] 97.9 °F (36.6 °C)  Pulse:  [] 92  Resp:  [11-32] 16  SpO2:  [97 %-100 %] 100 %  BP: ()/(50-77) 104/60  Arterial Line BP: (100-147)/(48-69) 105/52     Weight: 97 kg (213 lb 13.5 oz)  Body mass index is 33.49 kg/m².    Intake/Output - Last 3 Shifts         03/24 0700  03/25 0659 03/25 0700 03/26 0659    P.O.  480    I.V. (mL/kg)  4897.3 (50.5)    Blood  1829.4    IV Piggyback  5132.6    Total Intake(mL/kg)  54401.3 (127.2)    Urine (mL/kg/hr)  1875 (0.8)    Drains  2500    Total Output  4375    Net  +7964.3                   Physical Exam  Vitals and nursing note reviewed.   Constitutional:       General: She is not in acute distress.     Appearance: Normal appearance.   HENT:      Head: Normocephalic and atraumatic.      Mouth/Throat:      Mouth: Mucous membranes are moist.   Cardiovascular:      Rate and Rhythm: Normal rate and regular rhythm.   Pulmonary:      Effort: Pulmonary effort is normal. No respiratory distress.   Abdominal:      Comments: Abdomen soft, non-distended  Appropriately tender to palpation  Incision without concern   Musculoskeletal:         General: Normal range of motion.   Skin:     General: Skin is warm and dry.   Neurological:      Mental Status: She is alert.          Significant Labs:  I have reviewed all pertinent lab results within the past 24 hours.  CBC:   Recent Labs   Lab 03/26/24  0500    WBC 12.16  12.16   RBC 2.76*  2.76*   HGB 7.2*  7.2*   HCT 20.9*  20.9*     234   MCV 76*  76*   MCH 26.1*  26.1*   MCHC 34.4  34.4     BMP:   Recent Labs   Lab 03/25/24 1948 03/26/24  0508   * 124*  124*   * 130*  130*   K 3.9 4.3  4.3    98  98   CO2 25 28  28   BUN 8 8  8   CREATININE 0.6 0.6  0.6   CALCIUM 7.5* 7.1*  7.1*   MG 1.5*  --        Significant Diagnostics:  I have reviewed all pertinent imaging results/findings within the past 24 hours.  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 female with PMHx of DM, HTN, HLD, seizure disorder and metastatic carcinosarcoma managed by Gyn Onc, surgical oncology was consulted in her care for resection of the segment VI hepatic lesions    - patient seen and examined  - labs reviewed  - pain appropriately controlled  - on diabetic diet  - rest of care per primary team  - please call with questions        Bo Bello MD  General Surgery  [facility] - [facility]

## 2024-03-26 NOTE — ASSESSMENT & PLAN NOTE
- POD#1, overall doing well this AM   - Pain well controlled. NSAIDs held overnight in the setting of low fibrinogen/ABLA. Can consider restarting today if pain not well controlled   - Labs as above. H/H stable this AM 7.2/20.9, one additional unit pRBC held if needs additional transfusion.  - Total blood products: 5u pRBC, 2 FFP, 1 cryo   - UOP adequate, 0.8 cc/kg/hr overnight   - mIVF @ 100cc overnight given hypovolemia, decrease to 40cc/hr this AM (NS) and stop when tolerating full diet   - VTE ppx: SCD on LLE (bp cuff RLE 2/2 limb alert). Begin lovenox when H/H deemed stable for pharmacologic ppx.   - Plan for void trial later today, await spontaneous void   - Patient doing well this AM. Feel she can likely be stepped down to the floor today.

## 2024-03-26 NOTE — HOSPITAL COURSE
03/26/2024 POD#1 s/p Ex-lap/CARLOS ALBERTO/BSO/OMX/Segment 6 liver resection/Cholecystectomy/Peritoneal stripping/Diaphragm stripping. Transported to ICU immediately postop for close monitoring overnight. Pain well controlled overnight. Denies n/v. Tolerated bites of pudding and sips of water. Has not ambulated. Endorses some chest congestion. UOP 0.8 cc/kg/hr overnight. BG elevated to 200s overnight, SSI ordered.   03/27/2024 POD#2. NAEON. Stepped down to floor on PM POD#1. Pain well controlled. Tolerating regular diet, appetite still diminished. Ambulating minimally around room. Failed void trial on POD#1, junior replaced overnight. UOP 0.3cc/kg/hr overnight. Endocrine consulted for BG control.  03/28/2024 POD#3. NAEON. Patient resting comfortably in bed. Pain is well controlled with pain medications. She is tolerating PO without nausea or vomiting. She is voiding via junior catheter. UOP 40 cc/hr overnight  03/29/2024 POD#4: NAEON. Patient resting comfortably in bed. Pain well controlled with pain medications. Tolerating PO without nausea or vomiting. She is voiding spontaneously. UOP 45cc/hr overnight. New found leaking of ascites through old paracentesis spot. Noticeable serosanguinous draiange from top 3cm of MVL. Will obtain CTAP to further assess physical exam findings. CMP ordered. Lasix 40mg.    03/30/2024 POD#5: NAEON. Patient resting comfortably in bed. Pain well controlled with pain medications. Tolerating PO without nausea or vomiting. She is voiding spontaneously. UOP 80cc/hr for last 24 hours. 80cc output over last 24 hrs from paracentesis site. Wound similar to yesterday. Will re-eval in PM.      Dutasteride Pregnancy And Lactation Text: This medication is absolutely contraindicated in women, especially during pregnancy and breast feeding. Feminization of male fetuses is possible if taking while pregnant.

## 2024-03-26 NOTE — NURSING TRANSFER
Nursing Transfer Note      3/26/2024   3:03 PM    Nurse giving handoff:YURY Vega RN  Nurse receiving handoff:ALISSON Younger    Reason patient is being transferred: Stepdown    Transfer To: Oncology    Transfer via wheelchair    Transfer with cardiac monitoring    Transported by RN    Transfer Vital Signs:  Blood Pressure:115/71  Heart Rate:89  O2:99  Temperature:97.9  Respirations:18    Telemetry: Box Number 0252, Rate 89, Rhythm NSR, and Telemetry  La  Order for Tele Monitor? Yes    4eyes on Skin: yes    Medicines sent: Insulin, mupirocin, flonase    Any special needs or follow-up needed: N/a    Patient belongings transferred with patient: Yes    Chart send with patient: Yes    Notified: spouse at bedside    Patient reassessed at: 1505     Upon arrival to floor: cardiac monitor applied, patient oriented to room, and call bell in reach, pt OOBTC

## 2024-03-27 LAB
ANION GAP SERPL CALC-SCNC: 8 MMOL/L (ref 8–16)
BASOPHILS # BLD AUTO: 0.03 K/UL (ref 0–0.2)
BASOPHILS NFR BLD: 0.2 % (ref 0–1.9)
BUN SERPL-MCNC: 11 MG/DL (ref 6–20)
CALCIUM SERPL-MCNC: 7.3 MG/DL (ref 8.7–10.5)
CHLORIDE SERPL-SCNC: 99 MMOL/L (ref 95–110)
CO2 SERPL-SCNC: 27 MMOL/L (ref 23–29)
CREAT SERPL-MCNC: 0.7 MG/DL (ref 0.5–1.4)
DIFFERENTIAL METHOD BLD: ABNORMAL
EOSINOPHIL # BLD AUTO: 0 K/UL (ref 0–0.5)
EOSINOPHIL NFR BLD: 0.2 % (ref 0–8)
ERYTHROCYTE [DISTWIDTH] IN BLOOD BY AUTOMATED COUNT: 21 % (ref 11.5–14.5)
EST. GFR  (NO RACE VARIABLE): >60 ML/MIN/1.73 M^2
GLUCOSE SERPL-MCNC: 32 MG/DL (ref 70–110)
HCT VFR BLD AUTO: 27.2 % (ref 37–48.5)
HGB BLD-MCNC: 9 G/DL (ref 12–16)
IMM GRANULOCYTES # BLD AUTO: 0.35 K/UL (ref 0–0.04)
IMM GRANULOCYTES NFR BLD AUTO: 1.8 % (ref 0–0.5)
LYMPHOCYTES # BLD AUTO: 1.3 K/UL (ref 1–4.8)
LYMPHOCYTES NFR BLD: 6.4 % (ref 18–48)
MCH RBC QN AUTO: 26.5 PG (ref 27–31)
MCHC RBC AUTO-ENTMCNC: 33.1 G/DL (ref 32–36)
MCV RBC AUTO: 80 FL (ref 82–98)
MONOCYTES # BLD AUTO: 1 K/UL (ref 0.3–1)
MONOCYTES NFR BLD: 5.1 % (ref 4–15)
NEUTROPHILS # BLD AUTO: 17.1 K/UL (ref 1.8–7.7)
NEUTROPHILS NFR BLD: 86.3 % (ref 38–73)
NRBC BLD-RTO: 1 /100 WBC
PLATELET # BLD AUTO: 327 K/UL (ref 150–450)
PMV BLD AUTO: 9.6 FL (ref 9.2–12.9)
POCT GLUCOSE: 105 MG/DL (ref 70–110)
POCT GLUCOSE: 114 MG/DL (ref 70–110)
POCT GLUCOSE: 120 MG/DL (ref 70–110)
POCT GLUCOSE: 48 MG/DL (ref 70–110)
POCT GLUCOSE: 62 MG/DL (ref 70–110)
POCT GLUCOSE: 70 MG/DL (ref 70–110)
POCT GLUCOSE: 73 MG/DL (ref 70–110)
POCT GLUCOSE: 84 MG/DL (ref 70–110)
POCT GLUCOSE: 95 MG/DL (ref 70–110)
POCT GLUCOSE: 98 MG/DL (ref 70–110)
POTASSIUM SERPL-SCNC: 3.8 MMOL/L (ref 3.5–5.1)
RBC # BLD AUTO: 3.39 M/UL (ref 4–5.4)
SODIUM SERPL-SCNC: 134 MMOL/L (ref 136–145)
WBC # BLD AUTO: 19.73 K/UL (ref 3.9–12.7)

## 2024-03-27 PROCEDURE — 97530 THERAPEUTIC ACTIVITIES: CPT

## 2024-03-27 PROCEDURE — 97116 GAIT TRAINING THERAPY: CPT

## 2024-03-27 PROCEDURE — 97535 SELF CARE MNGMENT TRAINING: CPT

## 2024-03-27 PROCEDURE — 25000003 PHARM REV CODE 250: Performed by: PHYSICIAN ASSISTANT

## 2024-03-27 PROCEDURE — 20600001 HC STEP DOWN PRIVATE ROOM

## 2024-03-27 PROCEDURE — 97112 NEUROMUSCULAR REEDUCATION: CPT

## 2024-03-27 PROCEDURE — 97161 PT EVAL LOW COMPLEX 20 MIN: CPT

## 2024-03-27 PROCEDURE — 25000003 PHARM REV CODE 250: Performed by: STUDENT IN AN ORGANIZED HEALTH CARE EDUCATION/TRAINING PROGRAM

## 2024-03-27 PROCEDURE — 85025 COMPLETE CBC W/AUTO DIFF WBC: CPT | Performed by: STUDENT IN AN ORGANIZED HEALTH CARE EDUCATION/TRAINING PROGRAM

## 2024-03-27 PROCEDURE — 80048 BASIC METABOLIC PNL TOTAL CA: CPT

## 2024-03-27 PROCEDURE — P9045 ALBUMIN (HUMAN), 5%, 250 ML: HCPCS | Mod: JG | Performed by: STUDENT IN AN ORGANIZED HEALTH CARE EDUCATION/TRAINING PROGRAM

## 2024-03-27 PROCEDURE — 63600175 PHARM REV CODE 636 W HCPCS: Performed by: STUDENT IN AN ORGANIZED HEALTH CARE EDUCATION/TRAINING PROGRAM

## 2024-03-27 PROCEDURE — 99232 SBSQ HOSP IP/OBS MODERATE 35: CPT | Mod: ,,, | Performed by: PHYSICIAN ASSISTANT

## 2024-03-27 PROCEDURE — 97165 OT EVAL LOW COMPLEX 30 MIN: CPT

## 2024-03-27 PROCEDURE — 36415 COLL VENOUS BLD VENIPUNCTURE: CPT

## 2024-03-27 RX ORDER — ENOXAPARIN SODIUM 100 MG/ML
40 INJECTION SUBCUTANEOUS EVERY 24 HOURS
Status: DISCONTINUED | OUTPATIENT
Start: 2024-03-27 | End: 2024-03-31 | Stop reason: HOSPADM

## 2024-03-27 RX ORDER — ALBUMIN HUMAN 50 G/1000ML
12.5 SOLUTION INTRAVENOUS ONCE
Status: COMPLETED | OUTPATIENT
Start: 2024-03-27 | End: 2024-03-27

## 2024-03-27 RX ORDER — IBUPROFEN 600 MG/1
600 TABLET ORAL
Status: DISCONTINUED | OUTPATIENT
Start: 2024-03-27 | End: 2024-03-31 | Stop reason: HOSPADM

## 2024-03-27 RX ORDER — INSULIN ASPART 100 [IU]/ML
0-5 INJECTION, SOLUTION INTRAVENOUS; SUBCUTANEOUS
Status: DISCONTINUED | OUTPATIENT
Start: 2024-03-27 | End: 2024-03-31 | Stop reason: HOSPADM

## 2024-03-27 RX ADMIN — ONDANSETRON 4 MG: 2 INJECTION INTRAMUSCULAR; INTRAVENOUS at 05:03

## 2024-03-27 RX ADMIN — LAMOTRIGINE 25 MG: 25 TABLET ORAL at 09:03

## 2024-03-27 RX ADMIN — MAGNESIUM HYDROXIDE 2400 MG: 400 SUSPENSION ORAL at 09:03

## 2024-03-27 RX ADMIN — SENNOSIDES 8.6 MG: 8.6 TABLET, FILM COATED ORAL at 09:03

## 2024-03-27 RX ADMIN — IBUPROFEN 600 MG: 600 TABLET, FILM COATED ORAL at 09:03

## 2024-03-27 RX ADMIN — FLUTICASONE PROPIONATE 50 MCG: 50 SPRAY, METERED NASAL at 09:03

## 2024-03-27 RX ADMIN — IBUPROFEN 600 MG: 600 TABLET, FILM COATED ORAL at 03:03

## 2024-03-27 RX ADMIN — Medication 16 G: at 05:03

## 2024-03-27 RX ADMIN — GUAIFENESIN 600 MG: 600 TABLET, EXTENDED RELEASE ORAL at 09:03

## 2024-03-27 RX ADMIN — MUPIROCIN: 20 OINTMENT TOPICAL at 09:03

## 2024-03-27 RX ADMIN — ATORVASTATIN CALCIUM 80 MG: 40 TABLET, FILM COATED ORAL at 09:03

## 2024-03-27 RX ADMIN — ALBUMIN (HUMAN) 12.5 G: 12.5 SOLUTION INTRAVENOUS at 09:03

## 2024-03-27 RX ADMIN — ENOXAPARIN SODIUM 40 MG: 40 INJECTION SUBCUTANEOUS at 05:03

## 2024-03-27 RX ADMIN — PROCHLORPERAZINE EDISYLATE 5 MG: 5 INJECTION INTRAMUSCULAR; INTRAVENOUS at 07:03

## 2024-03-27 RX ADMIN — ACETAMINOPHEN 1000 MG: 500 TABLET ORAL at 05:03

## 2024-03-27 RX ADMIN — HEPARIN SODIUM 5000 UNITS: 5000 INJECTION INTRAVENOUS; SUBCUTANEOUS at 05:03

## 2024-03-27 RX ADMIN — Medication 16 G: at 10:03

## 2024-03-27 RX ADMIN — ACETAMINOPHEN 1000 MG: 500 TABLET ORAL at 11:03

## 2024-03-27 RX ADMIN — LORAZEPAM 0.5 MG: 0.5 TABLET ORAL at 10:03

## 2024-03-27 RX ADMIN — Medication 24 G: at 07:03

## 2024-03-27 NOTE — PROGRESS NOTES
"[facility] - Oncology ([facility])  Endocrinology  Progress Note    Admit Date: 3/25/2024     Reason for Consult: Management of T2DM, Hyperglycemia     Surgical Procedure and Date: CARLOS ALBERTO-BSO  3/26/24    Diabetes diagnosis year: > 5 years ago    Home Diabetes Medications:  Toujeo 60 units evening, Novolog 10 units w/ meals Breakfast and Dinner, Metformin 1000 mg , Mounjaro 7.5 mg.    How often checking glucose at home? 1-3 x day   BG readings on regimen: 100s-200s  Hypoglycemia on the regimen? Yes bg  32 serum, required glucose tablets  Missed doses on regimen?  No    Diabetes Complications include:     Hyperglycemia and Diabetic retinopathy     Complicating diabetes co morbidities:   HLD, HTN      HPI:   Patient is a 58 y.o. female with  presenting for scheduled Ex-lap/CARLOS ALBERTO/BSO/Possible partial liver resection/Debulking for ovarian neoplasm. 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 Of note, since this H&P was written, patient was admitted to GYN ONC service from 3/18-3/20 for management of leg swelling, JASON, and ascites. S/p  Ex-lap/CARLOS ALBERTO/BSO/OMX/Segment 6 liver resection/Cholecystectomy/Peritoneal stripping/Diaphragm stripping.  Endocrine consulted for bg management.     Interval HPI:   No acute events overnight. Patient in room 87123/75376 A. Blood glucose stable. BG below goal on current insulin regimen (SSI, and basal insulin ). Steroid use- Dexamethasone  perio.   1 Day Post-Op  Renal function- Normal   Vasopressors-  None      Diet diabetic  Calorie      Eating:   <25%  Nausea: No  Hypoglycemia and intervention: No  Fever: No  TPN and/or TF: No    BP (!) 117/55 (BP Location: Left arm, Patient Position: Lying)   Pulse 90   Temp 98.4 °F (36.9 °C) (Oral)   Resp 16   Ht 5' 7" (1.702 m)   Wt 98.1 kg (216 lb 4.3 oz)   LMP 2015   SpO2 97%   Breastfeeding No   BMI 33.87 kg/m²     Labs Reviewed and Include    Recent Labs   Lab 24  0526   GLU " "32*   CALCIUM 7.3*   *   K 3.8   CO2 27   CL 99   BUN 11   CREATININE 0.7     Lab Results   Component Value Date    WBC 19.73 (H) 03/27/2024    HGB 9.0 (L) 03/27/2024    HCT 27.2 (L) 03/27/2024    MCV 80 (L) 03/27/2024     03/27/2024     No results for input(s): "TSH", "FREET4" in the last 168 hours.  Lab Results   Component Value Date    HGBA1C 7.3 (H) 03/19/2024       Nutritional status:   Body mass index is 33.87 kg/m².  Lab Results   Component Value Date    ALBUMIN 2.1 (L) 03/25/2024    ALBUMIN 1.8 (L) 03/17/2024    ALBUMIN 2.1 (L) 03/06/2024     No results found for: "PREALBUMIN"    Estimated Creatinine Clearance: 104.1 mL/min (based on SCr of 0.7 mg/dL).    Accu-Checks  Recent Labs     03/25/24  1952 03/25/24  2305 03/26/24  0511 03/26/24  0817 03/26/24  1210 03/26/24  1830 03/26/24  2229 03/26/24  2339 03/27/24  0748 03/27/24  0811   POCTGLUCOSE 250* 213* 124* 83 79 76 66* 80 48* 62*       Current Medications and/or Treatments Impacting Glycemic Control  Immunotherapy:    Immunosuppressants       None          Steroids:   Hormones (From admission, onward)      Start     Stop Route Frequency Ordered    03/25/24 1705  melatonin tablet 6 mg         -- Oral Nightly PRN 03/25/24 1623          Pressors:    Autonomic Drugs (From admission, onward)      None          Hyperglycemia/Diabetes Medications:   Antihyperglycemics (From admission, onward)      Start     Stop Route Frequency Ordered    03/26/24 2100  insulin detemir U-100 (Levemir) pen 22 Units         -- SubQ Nightly 03/26/24 1415    03/26/24 1514  insulin aspart U-100 pen 0-10 Units         -- SubQ Before meals, nightly and at 0200 PRN 03/26/24 1415            ASSESSMENT and PLAN    Cardiac/Vascular  Hyperlipidemia    Managed per primary.   On statin per ADA      Renal/  * S/p CARLOS ALBERTO/BSO/OMX/Debulking/Partial liver resection/Cholecystectomy  Managed per primary team  Optimize bg control        Endocrine  Diabetes mellitus due to underlying " condition with diabetic retinopathy with macular edema  BG goal: 140-180  T2DM.  s/p Ex-lap/CARLOS ALBERTO/BSO/OMX/Segment 6 liver resection/Cholecystectomy/Peritoneal stripping/Diaphragm stripping. Antionette op dex.  On high doses of insulin at home.  Below goal here with lows on much reduced regimen.  Appetite improving.  Will decrease basa by 50%l and monitor on correction.      - Decrease Levemir 11 units daily (50% reduction)  - Adjust to Novolog Moderate dose correction with ISF 50 starting at 200   - POCT Glucose before meals, at bedtime and at 2 am  - Hypoglycemia protocol in place      ** Please notify Endocrine for any change and/or advance in diet**  ** Please call Endocrine for any BG related issues **     Discharge Planning:   TBD. Please notify endocrinology prior to discharge.            Robbin Rios PA-C  Endocrinology  [facility] - Oncology ([facility])

## 2024-03-27 NOTE — ASSESSMENT & PLAN NOTE
- Preop H/H 7.3/23.8  - Total blood products: 6u pRBC (5 intra-op, 1 POD#1), 2 FFP, 1 cryo.   - Repeat CBC on POD#1 PM 8.6/25.7  - VSS   - AM CBC pending   - UOP decreased overnight 0.3cc/kg/hr

## 2024-03-27 NOTE — ASSESSMENT & PLAN NOTE
- Most recent albumin 2.1    - Boost ordered TIDWM to promote optimal nutrition   - consider nutrition consult when moved to floor if concern for persistent hypoalbuminemia

## 2024-03-27 NOTE — PT/OT/SLP EVAL
"Physical Therapy Co-Evaluation    Patient Name:  Anette Ricci   MRN:  [id number]    Recommendations:     Discharge Recommendations: Low Intensity Therapy   Discharge Equipment Recommendations: none   Barriers to discharge: None    Assessment:     Anette Ricci is a 58 y.o. female admitted with a medical diagnosis of Status post total abdominal hysterectomy and bilateral salpingo-oophorectomy (CARLOS ALBERTO-BSO).  She presents with the following impairments/functional limitations: weakness, impaired endurance, impaired functional mobility, gait instability, impaired balance, decreased upper extremity function, decreased lower extremity function, pain, edema.    Pt presented with abdominal pain limiting mobility this session. Pt amb 12' RW with 1 person for safety and 1 mild increased postural sway towards end of bout however no LOB. Pt will benefit from skilled PT services while in house in order to address the aforementioned deficits.      Rehab Prognosis: Good; patient would benefit from acute skilled PT services to address these deficits and reach maximum level of function.    Recent Surgery: Procedure(s) (LRB):  LAPAROTOMY, EXPLORATORY (N/A)  HYSTERECTOMY, TOTAL, ABDOMINAL (N/A)  SALPINGO-OOPHORECTOMY, BILATERAL (N/A)  OMENTECTOMY (N/A)  DEBULKING, NEOPLASM (N/A)  EXCISION, LIVER - partial (N/A)  CHOLECYSTECTOMY 2 Days Post-Op    Plan:     During this hospitalization, patient to be seen 3 x/week to address the identified rehab impairments via gait training, therapeutic activities, therapeutic exercises, neuromuscular re-education and progress toward the following goals:    Plan of Care Expires:  04/27/24    Subjective     "I feel weak"    Pain/Comfort:  Pain Rating 1: 6/10  Location - Orientation 1: generalized  Location 1: abdomen  Pain Addressed 1: Reposition, Distraction  Pain Rating Post-Intervention 1: 8/10    Patients cultural, spiritual, Muslim conflicts given the current situation: no    Living " Environment:  Per pt, pt and spouse reside in [facility] with no JIA. Pt has both walk-in shower and tub/shower combo.  Prior to admission, patients level of function was I.  Equipment used at home: walker, rolling, shower chair.  DME owned (not currently used): rolling walker.  Upon discharge, patient will have assistance from spouse.    Objective:     Communicated with RN prior to session.  Patient found HOB elevated with junior catheter, peripheral IV  upon PT entry to room.    General Precautions: Standard, fall  Orthopedic Precautions:N/A   Braces: N/A  Respiratory Status: Room air    Exams:  RLE ROM: WFL  RLE Strength: grossly 3/5  LLE ROM: WFL  LLE Strength: grossly 3/5    Functional Mobility:  Bed Mobility:     Supine to Sit: minimum assistance  Transfers:     Sit to Stand:  contact guard assistance with rolling walker  Bed to Chair: contact guard assistance with  rolling walker  using  Step Transfer  Gait: pt amb 12' RW CGA and presented with mild antalgic gait, decreased edie, mild increased postural sway with turns.  Balance:   Good sitting balance  Fair-good standing balance      AM-PAC 6 CLICK MOBILITY  Total Score:18       Treatment & Education:  Discussed sitting upright in chair >1hr, pt agreeable  Discussed RW management, fall prevention, and safety    Educated pt on PT role/POC  Educated pt on importance of OOB activity and daily ambulation   Pt educated on proper body mechanics, safety techniques, and energy conservation with PT facilitation and cueing throughout session   Pt verbalized understanding      Patient left up in chair with all lines intact, call button in reach, RN notified, and OT and spouse present.    GOALS:   Multidisciplinary Problems       Physical Therapy Goals          Problem: Physical Therapy    Goal Priority Disciplines Outcome Goal Variances Interventions   Physical Therapy Goal     PT, PT/OT Ongoing, Progressing     Description: Goals to be met by: 04/27/2024     Patient will  increase functional independence with mobility by performin. Supine to sit with Modified [place]  2. Sit to supine with Modified [place]  3. Sit to stand transfer with Supervision  4. Bed to chair transfer with Supervision using LRAD  5. Gait  x 100 feet with Supervision using LRAD.                          History:     Past Medical History:   Diagnosis Date    Breast cancer     Diabetes mellitus     Genetic testing 2013    VUS    Hyperlipidemia     Hypertension     Malignant neoplasm of upper-outer quadrant of right breast in female, estrogen receptor positive 2015    Seizure disorder        Past Surgical History:   Procedure Laterality Date    BILATERAL SALPINGO-OOPHORECTOMY (BSO) N/A 3/25/2024    Procedure: SALPINGO-OOPHORECTOMY, BILATERAL;  Surgeon: Александр Washington MD;  [facility];  Service: OB/GYN;  Laterality: N/A;    BREAST BIOPSY      BREAST LUMPECTOMY Right     CHOLECYSTECTOMY  3/25/2024    Procedure: CHOLECYSTECTOMY;  Surgeon: Bo Farmer MD;  [facility];  Service: General;;    DEBULKING OF TUMOR N/A 3/25/2024    Procedure: DEBULKING, NEOPLASM;  Surgeon: Александр Washington MD;  [facility];  Service: OB/GYN;  Laterality: N/A;    EXCISION, LIVER N/A 3/25/2024    Procedure: EXCISION, LIVER - partial;  Surgeon: Bo Farmer MD;  [facility];  Service: General;  Laterality: N/A;  bk ultrasound  [place] book by TA on 3-21-24  7:00 a.m.  Conf #  [id number]    EYE SURGERY      LAPAROTOMY, EXPLORATORY N/A 3/25/2024    Procedure: LAPAROTOMY, EXPLORATORY;  Surgeon: Александр Washington MD;  [facility];  Service: OB/GYN;  Laterality: N/A;    OMENTECTOMY N/A 3/25/2024    Procedure: OMENTECTOMY;  Surgeon: Александр Washington MD;  [facility];  Service: OB/GYN;  Laterality: N/A;    PERITONEOCENTESIS N/A 3/13/2024    Procedure: PARACENTESIS, ABDOMINAL;  Surgeon: Flavia Moore  MD RAGHU;  Location: [facility] CATH LAB;  Service: Radiology;  Laterality: N/A;    PERITONEOCENTESIS N/A 3/21/2024    Procedure: PARACENTESIS, ABDOMINAL;  Surgeon: Jorge Jolley MD;  Location: [facility] CATH LAB;  Service: Radiology;  Laterality: N/A;    TOTAL ABDOMINAL HYSTERECTOMY N/A 3/25/2024    Procedure: HYSTERECTOMY, TOTAL, ABDOMINAL;  Surgeon: Александр Washington MD;  Location: [facility] OR [address] [place] [address];  Service: OB/GYN;  Laterality: N/A;    TOTAL REDUCTION MAMMOPLASTY Bilateral 2016       Time Tracking:     PT Received On: 03/27/24  PT Start Time: 1019     PT Stop Time: 1050  PT Total Time (min): 31 min     Billable Minutes: Evaluation 8, Gait Training 13, and Neuromuscular Re-education 10    Co-treatment performed due to patient's multiple deficits requiring two skilled therapists to appropriately and safely assess patient's strength and endurance while facilitating functional tasks in addition to accommodating for patient's activity tolerance.         03/27/2024

## 2024-03-27 NOTE — PLAN OF CARE
Evaluation completed, plan of care established.     Problem: Occupational Therapy  Goal: Occupational Therapy Goal  Description: Goals to be met by: 4/27/24     Patient will increase functional independence with ADLs by performing:    UE Dressing with Modified [place].  LE Dressing with Modified [place].  Grooming while standing at sink with Modified [place].  Toileting from toilet with Modified [place] for hygiene and clothing management.   Supine to sit with Modified [place].  Step transfer with Modified [place]  Toilet transfer to toilet with Modified [place].    Outcome: Ongoing, Progressing

## 2024-03-27 NOTE — ASSESSMENT & PLAN NOTE
- POD#2, overall doing well   - Pain moderately controlled, patient declined PO narcotics overnight. Discussed importance of PO narcotics to help w/ pain control and progressing toward postop milestones. Reassured pt of strict bowel regimen to help prevent constipation.  Will plan to add ibuprofen Q6h if AM CBC stable   - Labs as above. AM CBC, BMP pending   - Total blood products: 6u pRBC, 2 FFP, 1 cryo   - UOP diminished, 0.3 cc/kg/hr overnight. Awaiting AM CBC to assess if related to ABLA.   - Diabetic diet + Boost TIDWM   - VTE ppx: SCDs + heparin. Plan to transition to lovenox today if AM CBC stable   - Failed void trial on POD#1, junior replaced overnight. Plan for repeat VT today.

## 2024-03-27 NOTE — PLAN OF CARE
PT Evaluation completed and PT POC established.    Problem: Physical Therapy  Goal: Physical Therapy Goal  Description: Goals to be met by: 2024     Patient will increase functional independence with mobility by performin. Supine to sit with Modified [place]  2. Sit to supine with Modified [place]  3. Sit to stand transfer with Supervision  4. Bed to chair transfer with Supervision using LRAD  5. Gait  x 100 feet with Supervision using LRAD.     Outcome: Ongoing, Progressing

## 2024-03-27 NOTE — PROGRESS NOTES
[facility] ([facility])  Gynecologic Oncology  Progress Note      Patient Name: Anette Ricci  MRN: [id number]  Admission Date: 3/25/2024  Hospital Length of Stay: 2 days  Attending Provider: Александр Washington MD  Primary Care Provider: Mark Chua MD  Principal Problem: Status post total abdominal hysterectomy and bilateral salpingo-oophorectomy (CARLOS ALBERTO-BSO)    Follow-up For: Procedure(s) (LRB):  LAPAROTOMY, EXPLORATORY (N/A)  HYSTERECTOMY, TOTAL, ABDOMINAL (N/A)  SALPINGO-OOPHORECTOMY, BILATERAL (N/A)  OMENTECTOMY (N/A)  DEBULKING, NEOPLASM (N/A)  EXCISION, LIVER - partial (N/A)  CHOLECYSTECTOMY  Post-Operative Day: 2 Days Post-Op  Subjective:      History of Present Illness:  Anette Ricci is 58 y.o.  presenting for scheduled Ex-lap/CARLOS ALBERTO/BSO/Possible partial liver resection/Debulking for ovarian neoplasm. 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 Of note, since this H&P was written, patient was admitted to GYN ONC service from 3/18-3/20 for management of leg swelling, JASON, and ascites. She underwent workup for DVT which was negative. She also had IR guided paracentesis with removal of 3.5L of malignant ascites on 3/19. She has been doing well since discharge and is ready to proceed with surgery today as planned.     Hospital Course:  2024 POD#1 s/p Ex-lap/CARLOS ALBERTO/BSO/OMX/Segment 6 liver resection/Cholecystectomy/Peritoneal stripping/Diaphragm stripping. Transported to ICU immediately postop for close monitoring overnight. Pain well controlled overnight. Denies n/v. Tolerated bites of pudding and sips of water. Has not ambulated. Endorses some chest congestion. UOP 0.8 cc/kg/hr overnight. BG elevated to 200s overnight, SSI ordered.   2024 POD#2. NAEON. Stepped down to floor on PM POD#1. Pain well controlled. Tolerating regular diet, appetite still diminished. Ambulating minimally around room. Failed void trial on POD#1,  junior replaced overnight. UOP 0.3cc/kg/hr overnight. Endocrine consulted for BG control.     Interval History: POD#2.  NAEON. Pain mostly controlled, though patient did decline all PO narcotics overnight due to history of constipation with these medications. Reports pain is 6/10 this AM after PRN dilaudid. Ate a turkey sandwich and cranberry juice for dinner. Attempted to have another turkey sandwich overnight and this caused some nausea. Endorses persistent nausea currently after PRN zofran. Denies emesis. Sat in chair a good bit yesterday, ambulating only to and from bedside commode.  Voiding via junior. Reports minimal vaginal bleeding.     Scheduled Meds:   acetaminophen  1,000 mg Oral Q6H    atorvastatin  80 mg Oral Daily    fluticasone propionate  1 spray Each Nostril Daily    guaiFENesin  600 mg Oral BID    heparin (porcine)  5,000 Units Subcutaneous Q8H    insulin detemir U-100  22 Units Subcutaneous QHS    lamoTRIgine  25 mg Oral BID    magnesium hydroxide 400 mg/5 ml  30 mL Oral BID    mupirocin   Nasal BID    senna  8.6 mg Oral BID     Continuous Infusions:      PRN Meds:dextrose 10%, dextrose 10%, glucagon (human recombinant), glucose, glucose, hydrALAZINE, HYDROmorphone, insulin aspart U-100, LORazepam, melatonin, naloxone, ondansetron, oxyCODONE, oxyCODONE, prochlorperazine    Review of patient's allergies indicates:   Allergen Reactions    Codeine Other (See Comments)     Flu like s/s       Objective:     Vital Signs (Most Recent):  Temp: 97.5 °F (36.4 °C) (03/27/24 0349)  Pulse: 87 (03/27/24 0349)  Resp: 20 (03/27/24 0349)  BP: (!) 117/56 (03/27/24 0349)  SpO2: (!) 94 % (03/27/24 0349) Vital Signs (24h Range):  Temp:  [97.5 °F (36.4 °C)-98 °F (36.7 °C)] 97.5 °F (36.4 °C)  Pulse:  [85-96] 87  Resp:  [10-24] 20  SpO2:  [94 %-100 %] 94 %  BP: ()/(51-77) 117/56  Arterial Line BP: ()/(51-61) 122/54     Weight: 98.1 kg (216 lb 4.3 oz)  Body mass index is 33.87 kg/m².    Intake/Output - Last 3  Shifts         03/25 0700  03/26 0659 03/26 0700  03/27 0659 03/27 0700 03/28 0659    P.O. 480 584     I.V. (mL/kg) 4897.3 (50.5) 242.5 (2.5)     Blood 1829.4 331     IV Piggyback 5132.6      Total Intake(mL/kg) 30391.3 (127.2) 1157.5 (11.8)     Urine (mL/kg/hr) 1875 (0.8) 580 (0.2)     Drains 2500      Stool  0     Total Output 4375 580     Net +7964.3 +577.5            Stool Occurrence  0 x           Bladder Scan Volume (mL): 25 mL (03/26/24 1630)       Physical Exam:   Constitutional: She is oriented to person, place, and time. She appears well-developed and well-nourished.    HENT:   Head: Normocephalic and atraumatic.     Neck:   L EJ in place, covered w/ dressing    Cardiovascular:  Normal rate.      Exam reveals no edema.        Pulmonary/Chest: Effort normal. No respiratory distress. She has no wheezes. She has no rales.        Abdominal: Soft. Bowel sounds are normal. She exhibits abdominal incision (VML clean, dry, intact. no surrounding erythema or drainage.). She exhibits no distension. There is no abdominal tenderness. There is no rebound and no guarding.     Genitourinary:    Genitourinary Comments: Mccartney in place, concentrated, clear yellow urine in bag  (recently emptied)             Musculoskeletal: No edema.       Neurological: She is alert and oriented to person, place, and time.    Skin: Skin is warm and dry.    Psychiatric: She has a normal mood and affect. Her behavior is normal.          Lines/Drains/Airways       Central Venous Catheter Line  Duration                  PowerPort A Cath Single Lumen 03/07/24 0853 Internal Jugular Left 19 days              Drain  Duration                  Urethral Catheter 03/26/24 2100 <1 day              Peripheral Intravenous Line  Duration                  Peripheral IV - Single Lumen 03/25/24 0642 18 G Anterior;Left Forearm 2 days         Peripheral IV - Single Lumen 03/25/24 0750 18 G;1 3/4 in Left  1 day                    Laboratory:  BMP:   Recent Labs    Lab 03/25/24  1948 03/26/24  0508   * 124*  124*   * 130*  130*   K 3.9 4.3  4.3    98  98   CO2 25 28  28   BUN 8 8  8   CREATININE 0.6 0.6  0.6   CALCIUM 7.5* 7.1*  7.1*   MG 1.5*  --    , CBC:   Recent Labs   Lab 03/25/24  1626 03/26/24  0508 03/26/24  1716   WBC 14.01* 12.16  12.16 17.66*   HGB 8.7* 7.2*  7.2* 8.6*   HCT 25.3* 20.9*  20.9* 25.7*    234  234 261   , and Coagulation:   Recent Labs   Lab 03/25/24  0959 03/25/24  1626 03/26/24  0508   INR 1.3* 1.5* 1.2   APTT  --  49.0* 36.2*       Diagnostic Results:  No new results  Assessment/Plan:     * S/p CARLOS ALBERTO/BSO/OMX/Debulking/Partial liver resection/Cholecystectomy  - POD#2, overall doing well   - Pain moderately controlled, patient declined PO narcotics overnight. Discussed importance of PO narcotics to help w/ pain control and progressing toward postop milestones. Reassured pt of strict bowel regimen to help prevent constipation.  Will plan to add ibuprofen Q6h if AM CBC stable   - Labs as above. AM CBC, BMP pending   - Total blood products: 6u pRBC, 2 FFP, 1 cryo   - UOP diminished, 0.3 cc/kg/hr overnight. Awaiting AM CBC to assess if related to ABLA.   - Diabetic diet + Boost TIDWM   - VTE ppx: SCDs + heparin. Plan to transition to lovenox today if AM CBC stable   - Failed void trial on POD#1, junior replaced overnight. Plan for repeat VT today.     Hypoalbuminemia  - Most recent albumin 2.1    - Boost ordered TIDWM to promote optimal nutrition   - consider nutrition consult when moved to floor if concern for persistent hypoalbuminemia     Hyponatremia  - Na 130 yesterday AM, awaiting AM BMP   - continue NS infusion     Acute postoperative anemia due to expected blood loss  - Preop H/H 7.3/23.8  - Total blood products: 6u pRBC (5 intra-op, 1 POD#1), 2 FFP, 1 cryo.   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 BG 66-83 over last 24h      VTE Risk Mitigation (From admission, onward)           Ordered     heparin (porcine) injection 5,000 Units  Every 8 hours         03/26/24 1802     IP VTE HIGH RISK PATIENT  Once         03/25/24 1623     Place sequential compression device  Until discontinued         03/25/24 1623                    Was junior catheter removed? No: Will attempt second VT later today.     Kelsey Ramirez MD  Gynecologic Oncology  [facility] - On[facility] ([facility])

## 2024-03-27 NOTE — ASSESSMENT & PLAN NOTE
BG goal: 140-180  T2DM.  s/p Ex-lap/CARLOS ALBERTO/BSO/OMX/Segment 6 liver resection/Cholecystectomy/Peritoneal stripping/Diaphragm stripping. Antionette op dex.  On high doses of insulin at home.  Below goal here on much reduced regimen.  Appetite improving.  Will decrease basal and monitor on correction.  S    - Decrease Levemir 11 units daily (50% reduction)  - Adjust to Novolog Moderate dose correction with ISF 50 starting at 200   - POCT Glucose before meals, at bedtime and at 2 am  - Hypoglycemia protocol in place      ** Please notify Endocrine for any change and/or advance in diet**  ** Please call Endocrine for any BG related issues **     Discharge Planning:   TBD. Please notify endocrinology prior to discharge.

## 2024-03-27 NOTE — SUBJECTIVE & OBJECTIVE
"Interval HPI:   No acute events overnight. Patient in room 38240/54516 A. Blood glucose stable. BG below goal on current insulin regimen (SSI, and basal insulin ). Steroid use- Dexamethasone  perio.   1 Day Post-Op  Renal function- Normal   Vasopressors-  None      Diet diabetic 2000 Calorie      Eating:   <25%  Nausea: No  Hypoglycemia and intervention: No  Fever: No  TPN and/or TF: No    BP (!) 117/55 (BP Location: Left arm, Patient Position: Lying)   Pulse 90   Temp 98.4 °F (36.9 °C) (Oral)   Resp 16   Ht 5' 7" (1.702 m)   Wt 98.1 kg (216 lb 4.3 oz)   LMP 02/09/2015   SpO2 97%   Breastfeeding No   BMI 33.87 kg/m²     Labs Reviewed and Include    Recent Labs   Lab 03/27/24  0526   GLU 32*   CALCIUM 7.3*   *   K 3.8   CO2 27   CL 99   BUN 11   CREATININE 0.7     Lab Results   Component Value Date    WBC 19.73 (H) 03/27/2024    HGB 9.0 (L) 03/27/2024    HCT 27.2 (L) 03/27/2024    MCV 80 (L) 03/27/2024     03/27/2024     No results for input(s): "TSH", "FREET4" in the last 168 hours.  Lab Results   Component Value Date    HGBA1C 7.3 (H) 03/19/2024       Nutritional status:   Body mass index is 33.87 kg/m².  Lab Results   Component Value Date    ALBUMIN 2.1 (L) 03/25/2024    ALBUMIN 1.8 (L) 03/17/2024    ALBUMIN 2.1 (L) 03/06/2024     No results found for: "PREALBUMIN"    Estimated Creatinine Clearance: 104.1 mL/min (based on SCr of 0.7 mg/dL).    Accu-Checks  Recent Labs     03/25/24  1952 03/25/24  2305 03/26/24  0511 03/26/24  0817 03/26/24  1210 03/26/24  1830 03/26/24  2229 03/26/24  2339 03/27/24  0748 03/27/24  0811   POCTGLUCOSE 250* 213* 124* 83 79 76 66* 80 48* 62*       Current Medications and/or Treatments Impacting Glycemic Control  Immunotherapy:    Immunosuppressants       None          Steroids:   Hormones (From admission, onward)      Start     Stop Route Frequency Ordered    03/25/24 1705  melatonin tablet 6 mg         -- Oral Nightly PRN 03/25/24 1623          Pressors:  "   Autonomic Drugs (From admission, onward)      None          Hyperglycemia/Diabetes Medications:   Antihyperglycemics (From admission, onward)      Start     Stop Route Frequency Ordered    03/26/24 2100  insulin detemir U-100 (Levemir) pen 22 Units         -- SubQ Nightly 03/26/24 1415    03/26/24 1514  insulin aspart U-100 pen 0-10 Units         -- SubQ Before meals, nightly and at 0200 PRN 03/26/24 1415

## 2024-03-27 NOTE — PROGRESS NOTES
Nurses Note -- 4 Eyes      3/26/2024   1700 PM      Skin assessed during: Transfer      [x] No Altered Skin Integrity Present    []Prevention Measures Documented      [] Yes- Altered Skin Integrity Present or Discovered   [] LDA Added if Not in Epic (Describe Wound)   [] New Altered Skin Integrity was Present on Admit and Documented in LDA   [] Wound Image Taken    Wound Care Consulted? No    Attending Nurse:  Mo Sultana RN     Second RN/Staff Member:  ISAMAR Younger

## 2024-03-27 NOTE — PLAN OF CARE
Plan of care reviewed with pt. Pt with difficulty to void, as per provider order, junior placed. Pt with c/o pain, PRN dilaudid administered as pt refusing oxy due to past experiences with severe constipation. BG -66, 4 tablets of glucose administered, rechecked BG-80. On call provider notified about hypoglycemic episode, provider approved to administer Levemir insulin. Incision dressing dry & intact, marked old drainage noted, no new drainage. All VSS, afebrile, free from falls or injuries; no acute events so far this shift. Pt in bed with non-skid socks on, bed locked and in lowest position, side rails up x2, call light and personal items within reach. Pt instructed to call for assistance as needed.

## 2024-03-27 NOTE — ASSESSMENT & PLAN NOTE
- Home regimen: Lantus 33 QHS, aspart 5u BID WM (largest meals), mounjaro, metformin 1000mg QD  - Endocrinology consulted on POD#1 to assist with BG control, appreciate assistance   - Current regimen: Detemir 22u QHS, SSI   - isolate hypoglycemic episode overnight w/ BG 66, asymptomatic and improved with GC. BG 66-83 over last 24h

## 2024-03-27 NOTE — SUBJECTIVE & OBJECTIVE
Interval History: POD#2.  NAEON. Pain mostly controlled, though patient did decline all PO narcotics overnight due to history of constipation with these medications. Reports pain is 6/10 this AM after PRN dilaudid. Ate a turkey sandwich and cranberry juice for dinner. Attempted to have another turkey sandwich overnight and this caused some nausea. Endorses persistent nausea currently after PRN zofran. Denies emesis. Sat in chair a good bit yesterday, ambulating only to and from bedside commode.  Voiding via junior. Reports minimal vaginal bleeding.     Scheduled Meds:   acetaminophen  1,000 mg Oral Q6H    atorvastatin  80 mg Oral Daily    fluticasone propionate  1 spray Each Nostril Daily    guaiFENesin  600 mg Oral BID    heparin (porcine)  5,000 Units Subcutaneous Q8H    insulin detemir U-100  22 Units Subcutaneous QHS    lamoTRIgine  25 mg Oral BID    magnesium hydroxide 400 mg/5 ml  30 mL Oral BID    mupirocin   Nasal BID    senna  8.6 mg Oral BID     Continuous Infusions:      PRN Meds:dextrose 10%, dextrose 10%, glucagon (human recombinant), glucose, glucose, hydrALAZINE, HYDROmorphone, insulin aspart U-100, LORazepam, melatonin, naloxone, ondansetron, oxyCODONE, oxyCODONE, prochlorperazine    Review of patient's allergies indicates:   Allergen Reactions    Codeine Other (See Comments)     Flu like s/s       Objective:     Vital Signs (Most Recent):  Temp: 97.5 °F (36.4 °C) (03/27/24 0349)  Pulse: 87 (03/27/24 0349)  Resp: 20 (03/27/24 0349)  BP: (!) 117/56 (03/27/24 0349)  SpO2: (!) 94 % (03/27/24 0349) Vital Signs (24h Range):  Temp:  [97.5 °F (36.4 °C)-98 °F (36.7 °C)] 97.5 °F (36.4 °C)  Pulse:  [85-96] 87  Resp:  [10-24] 20  SpO2:  [94 %-100 %] 94 %  BP: ()/(51-77) 117/56  Arterial Line BP: ()/(51-61) 122/54     Weight: 98.1 kg (216 lb 4.3 oz)  Body mass index is 33.87 kg/m².    Intake/Output - Last 3 Shifts         03/25 0700 03/26 0659 03/26 0700 03/27 0659 03/27 0700 03/28 0659    P.O. 480  584     I.V. (mL/kg) 4897.3 (50.5) 242.5 (2.5)     Blood 1829.4 331     IV Piggyback 5132.6      Total Intake(mL/kg) 58033.3 (127.2) 1157.5 (11.8)     Urine (mL/kg/hr) 1875 (0.8) 580 (0.2)     Drains 2500      Stool  0     Total Output 4375 580     Net +7964.3 +577.5            Stool Occurrence  0 x           Bladder Scan Volume (mL): 25 mL (03/26/24 1630)       Physical Exam:   Constitutional: She is oriented to person, place, and time. She appears well-developed and well-nourished.    HENT:   Head: Normocephalic and atraumatic.     Neck:   L EJ in place, covered w/ dressing    Cardiovascular:  Normal rate.      Exam reveals no edema.        Pulmonary/Chest: Effort normal. No respiratory distress. She has no wheezes. She has no rales.        Abdominal: Soft. Bowel sounds are normal. She exhibits abdominal incision (VML clean, dry, intact. no surrounding erythema or drainage.). She exhibits no distension. There is no abdominal tenderness. There is no rebound and no guarding.     Genitourinary:    Genitourinary Comments: Mccartney in place, concentrated, clear yellow urine in bag  (recently emptied)             Musculoskeletal: No edema.       Neurological: She is alert and oriented to person, place, and time.    Skin: Skin is warm and dry.    Psychiatric: She has a normal mood and affect. Her behavior is normal.          Lines/Drains/Airways       Central Venous Catheter Line  Duration                  PowerPort A Cath Single Lumen 03/07/24 0853 Internal Jugular Left 19 days              Drain  Duration                  Urethral Catheter 03/26/24 2100 <1 day              Peripheral Intravenous Line  Duration                  Peripheral IV - Single Lumen 03/25/24 0642 18 G Anterior;Left Forearm 2 days         Peripheral IV - Single Lumen 03/25/24 0750 18 G;1 3/4 in Left  1 day                    Laboratory:  BMP:   Recent Labs   Lab 03/25/24 1948 03/26/24  0508   * 124*  124*   * 130*  130*   K 3.9 4.3   4.3    98  98   CO2 25 28  28   BUN 8 8  8   CREATININE 0.6 0.6  0.6   CALCIUM 7.5* 7.1*  7.1*   MG 1.5*  --    , CBC:   Recent Labs   Lab 03/25/24  1626 03/26/24  0508 03/26/24  1716   WBC 14.01* 12.16  12.16 17.66*   HGB 8.7* 7.2*  7.2* 8.6*   HCT 25.3* 20.9*  20.9* 25.7*    234  234 261   , and Coagulation:   Recent Labs   Lab 03/25/24  0959 03/25/24  1626 03/26/24  0508   INR 1.3* 1.5* 1.2   APTT  --  49.0* 36.2*       Diagnostic Results:  No new results

## 2024-03-27 NOTE — PT/OT/SLP EVAL
"Occupational Therapy   Evaluation    Name: Anette Ricci  MRN: [id number]  Admitting Diagnosis: Status post total abdominal hysterectomy and bilateral salpingo-oophorectomy (CARLOS ALBERTO-BSO)  Recent Surgery: Procedure(s) (LRB):  LAPAROTOMY, EXPLORATORY (N/A)  HYSTERECTOMY, TOTAL, ABDOMINAL (N/A)  SALPINGO-OOPHORECTOMY, BILATERAL (N/A)  OMENTECTOMY (N/A)  DEBULKING, NEOPLASM (N/A)  EXCISION, LIVER - partial (N/A)  CHOLECYSTECTOMY 2 Days Post-Op    Recommendations:     Discharge Recommendations: Low Intensity Therapy  Discharge Equipment Recommendations:  none  Barriers to discharge:   none    Assessment:     Anette Ricci is a 58 y.o. female with a medical diagnosis of Status post total abdominal hysterectomy and bilateral salpingo-oophorectomy (CARLOS ALBERTO-BSO).  She presents with the following performance deficits affecting function: weakness, impaired endurance, impaired self care skills, impaired functional mobility, gait instability, impaired balance, decreased lower extremity function, pain, impaired cardiopulmonary response to activity, edema, impaired coordination.      Rehab Prognosis: Good; patient would benefit from acute skilled OT services to address these deficits and reach maximum level of function.       Plan:     Patient to be seen   to address the above listed problems via self-care/home management, therapeutic activities, therapeutic exercises, neuromuscular re-education  Plan of Care Expires:    Plan of Care Reviewed with: patient, spouse    Subjective     Chief Complaint: "It hurts to cough."  Patient/Family Comments/goals: Participate with therapy/return home    Occupational Profile:  Living Environment: Pt lives with spouse, SSH, 0STE, WIC and t/s combo available  Previous level of function: Independent  Roles and Routines: None stated  Equipment Used at Home: walker, rolling, shower chair  Assistance upon Discharge: Spouse    Pain/Comfort:  Pain Rating 1: 6/10  Location - Orientation 1: " generalized  Location 1: abdomen  Pain Addressed 1: Reposition, Distraction  Pain Rating Post-Intervention 1: 8/10    Patients cultural, spiritual, Sikh conflicts given the current situation: no    Objective:     Communicated with: Nursing prior to session.  Patient found HOB elevated with junior catheter, peripheral IV upon OT entry to room.    General Precautions: Standard, fall  Orthopedic Precautions: N/A  Braces: N/A  Respiratory Status: Room air    Occupational Performance:    Bed Mobility:    Patient completed Scooting/Bridging with minimum assistance  Patient completed Supine to Sit with minimum assistance    Functional Mobility/Transfers:  Patient completed Sit <> Stand Transfer with contact guard assistance  with  rolling walker   Patient completed Bed <> Chair Transfer using Step Transfer technique with contact guard assistance with rolling walker  Functional Mobility: CGA with rolling walker; mild sway upon attempt to change direction, corrected with cues for safewty with rolling walker use     Activities of Daily Living:  Grooming: stand by assistance seated EIOB  Upper Body Dressing: minimum assistance seated EOB  Lower Body Dressing: maximal assistance seated EOB; patient BLE edema and pain, unable to attempt adaptive strategy on this date     Cognitive/Visual Perceptual:  A&Ox4    Physical Exam:  Postural examination/scapula alignment:    -       No postural abnormalities identified  Upper Extremity Range of Motion:     -       Right Upper Extremity: WFL  -       Left Upper Extremity: WFL   Strength:    -       Right Upper Extremity: WFL  -       Left Upper Extremity: WFL    AMPAC 6 Click ADL:  AMPAC Total Score: 15    Treatment & Education:  -Evaluation completed, plan of care established.  -Patient and family educated on roles/goals of OT and POC.  -White board updated.  -Therapist provided time for questions and answered within scope of practice.  -Patient educated on importance of  EOB/OOB activity to maximize recovery.    Patient left up in chair with all lines intact, call button in reach, nurinsg notified, and patient's spouse present    GOALS:   Multidisciplinary Problems       Occupational Therapy Goals          Problem: Occupational Therapy    Goal Priority Disciplines Outcome Interventions   Occupational Therapy Goal     OT, PT/OT Ongoing, Progressing    Description: Goals to be met by: 4/27/24     Patient will increase functional independence with ADLs by performing:    UE Dressing with Modified [place].  LE Dressing with Modified [place].  Grooming while standing at sink with Modified [place].  Toileting from toilet with Modified [place] for hygiene and clothing management.   Supine to sit with Modified [place].  Step transfer with Modified [place]  Toilet transfer to toilet with Modified [place].                         History:     Past Medical History:   Diagnosis Date    Breast cancer 2013    Diabetes mellitus     Genetic testing 05/29/2013    VUS    Hyperlipidemia     Hypertension     Malignant neoplasm of upper-outer quadrant of right breast in female, estrogen receptor positive 12/02/2015    Seizure disorder          Past Surgical History:   Procedure Laterality Date    BILATERAL SALPINGO-OOPHORECTOMY (BSO) N/A 3/25/2024    Procedure: SALPINGO-OOPHORECTOMY, BILATERAL;  Surgeon: Александр Washington MD;  Location: [address];  Service: OB/GYN;  Laterality: N/A;    BREAST BIOPSY      BREAST LUMPECTOMY Right 2013    CHOLECYSTECTOMY  3/25/2024    Procedure: CHOLECYSTECTOMY;  Surgeon: Bo Farmer MD;  Location: [address];  Service: General;;    DEBULKING OF TUMOR N/A 3/25/2024    Procedure: DEBULKING, NEOPLASM;  Surgeon: Александр Washington MD;  Location: [address];  Service: OB/GYN;  Laterality: N/A;    EXCISION, LIVER N/A 3/25/2024    Procedure: EXCISION, LIVER - partial;  Surgeon: Bo Farmer MD;  Location: [facility]  OR 2ND FLR;  Service: General;  Laterality: N/A;  bk ultrasound  [place] book by TA on 3-21-24  7:00 a.m.  Conf #  [id number]    EYE SURGERY      LAPAROTOMY, EXPLORATORY N/A 3/25/2024    Procedure: LAPAROTOMY, EXPLORATORY;  Surgeon: Александр Washington MD;  Location: [facility] OR 2ND FLR;  Service: OB/GYN;  Laterality: N/A;    OMENTECTOMY N/A 3/25/2024    Procedure: OMENTECTOMY;  Surgeon: Александр Washington MD;  Location: [facility] OR 2ND FLR;  Service: OB/GYN;  Laterality: N/A;    PERITONEOCENTESIS N/A 3/13/2024    Procedure: PARACENTESIS, ABDOMINAL;  Surgeon: Flavia Moore MD;  Location: [facility] CATH LAB;  Service: Radiology;  Laterality: N/A;    PERITONEOCENTESIS N/A 3/21/2024    Procedure: PARACENTESIS, ABDOMINAL;  Surgeon: Jorge Jolley MD;  Location: [facility] CATH LAB;  Service: Radiology;  Laterality: N/A;    TOTAL ABDOMINAL HYSTERECTOMY N/A 3/25/2024    Procedure: HYSTERECTOMY, TOTAL, ABDOMINAL;  Surgeon: Александр Washington MD;  Location: [facility] OR 2ND FLR;  Service: OB/GYN;  Laterality: N/A;    TOTAL REDUCTION MAMMOPLASTY Bilateral 2016       Time Tracking:     OT Date of Treatment: 03/27/24  OT Start Time: 1019  OT Stop Time: 1050  OT Total Time (min): 31 min    Billable Minutes:Evaluation 1 unit  Self Care/Home Management 1 unit  Therapeutic Activity 1 unit    3/27/2024

## 2024-03-28 DIAGNOSIS — C56.9 OVARIAN CARCINOSARCOMA: Primary | ICD-10-CM

## 2024-03-28 LAB
POCT GLUCOSE: 181 MG/DL (ref 70–110)
POCT GLUCOSE: 181 MG/DL (ref 70–110)
POCT GLUCOSE: 192 MG/DL (ref 70–110)
POCT GLUCOSE: 41 MG/DL (ref 70–110)
POCT GLUCOSE: 85 MG/DL (ref 70–110)

## 2024-03-28 PROCEDURE — 25000242 PHARM REV CODE 250 ALT 637 W/ HCPCS

## 2024-03-28 PROCEDURE — 20600001 HC STEP DOWN PRIVATE ROOM

## 2024-03-28 PROCEDURE — 63600175 PHARM REV CODE 636 W HCPCS: Performed by: STUDENT IN AN ORGANIZED HEALTH CARE EDUCATION/TRAINING PROGRAM

## 2024-03-28 PROCEDURE — 25000003 PHARM REV CODE 250: Performed by: STUDENT IN AN ORGANIZED HEALTH CARE EDUCATION/TRAINING PROGRAM

## 2024-03-28 PROCEDURE — 99232 SBSQ HOSP IP/OBS MODERATE 35: CPT | Mod: ,,, | Performed by: NURSE PRACTITIONER

## 2024-03-28 PROCEDURE — 25000003 PHARM REV CODE 250: Performed by: PHYSICIAN ASSISTANT

## 2024-03-28 PROCEDURE — 97535 SELF CARE MNGMENT TRAINING: CPT

## 2024-03-28 PROCEDURE — 97116 GAIT TRAINING THERAPY: CPT

## 2024-03-28 PROCEDURE — 97530 THERAPEUTIC ACTIVITIES: CPT

## 2024-03-28 RX ORDER — OXYCODONE HCL 5 MG/5 ML
2.5 SOLUTION, ORAL ORAL EVERY 4 HOURS PRN
Status: DISCONTINUED | OUTPATIENT
Start: 2024-03-28 | End: 2024-03-31 | Stop reason: HOSPADM

## 2024-03-28 RX ORDER — TRAMADOL HYDROCHLORIDE 50 MG/1
50 TABLET ORAL EVERY 4 HOURS PRN
Status: DISCONTINUED | OUTPATIENT
Start: 2024-03-28 | End: 2024-03-28

## 2024-03-28 RX ADMIN — FLUTICASONE PROPIONATE 50 MCG: 50 SPRAY, METERED NASAL at 08:03

## 2024-03-28 RX ADMIN — OXYCODONE HYDROCHLORIDE 2.5 MG: 5 SOLUTION ORAL at 08:03

## 2024-03-28 RX ADMIN — ENOXAPARIN SODIUM 40 MG: 40 INJECTION SUBCUTANEOUS at 05:03

## 2024-03-28 RX ADMIN — SENNOSIDES 8.6 MG: 8.6 TABLET, FILM COATED ORAL at 08:03

## 2024-03-28 RX ADMIN — GUAIFENESIN 600 MG: 600 TABLET, EXTENDED RELEASE ORAL at 08:03

## 2024-03-28 RX ADMIN — IBUPROFEN 600 MG: 600 TABLET, FILM COATED ORAL at 08:03

## 2024-03-28 RX ADMIN — LAMOTRIGINE 25 MG: 25 TABLET ORAL at 08:03

## 2024-03-28 RX ADMIN — MAGNESIUM HYDROXIDE 2400 MG: 400 SUSPENSION ORAL at 08:03

## 2024-03-28 RX ADMIN — IBUPROFEN 600 MG: 600 TABLET, FILM COATED ORAL at 03:03

## 2024-03-28 RX ADMIN — ATORVASTATIN CALCIUM 80 MG: 40 TABLET, FILM COATED ORAL at 08:03

## 2024-03-28 RX ADMIN — OXYCODONE HYDROCHLORIDE 2.5 MG: 5 SOLUTION ORAL at 12:03

## 2024-03-28 RX ADMIN — HYDROMORPHONE HYDROCHLORIDE 0.4 MG: 1 INJECTION, SOLUTION INTRAMUSCULAR; INTRAVENOUS; SUBCUTANEOUS at 01:03

## 2024-03-28 RX ADMIN — IBUPROFEN 600 MG: 600 TABLET, FILM COATED ORAL at 04:03

## 2024-03-28 RX ADMIN — Medication 24 G: at 07:03

## 2024-03-28 NOTE — PT/OT/SLP PROGRESS
Occupational Therapy   Treatment    Name: Anette Ricci  MRN: [id number]  Admitting Diagnosis:  Status post total abdominal hysterectomy and bilateral salpingo-oophorectomy (CARLOS ALBERTO-BSO)  3 Days Post-Op    Recommendations:     Discharge Recommendations: Low Intensity Therapy  Discharge Equipment Recommendations:  none  Barriers to discharge:  None    Assessment:     Anette Ricci is a 58 y.o. female with a medical diagnosis of Status post total abdominal hysterectomy and bilateral salpingo-oophorectomy (CARLOS ALBERTO-BSO).  She presents with deficits in self-care tasks as well as mobility. Pt. Making good improvements with self-care tasks. Patient would benefit from continued OT services to maximize level of safety and independence with self-care tasks.   . Performance deficits affecting function are weakness, impaired endurance, impaired self care skills, impaired functional mobility, gait instability.     Rehab Prognosis:  Good; patient would benefit from acute skilled OT services to address these deficits and reach maximum level of function.       Plan:     Patient to be seen 3 x/week to address the above listed problems via self-care/home management, therapeutic activities, therapeutic exercises  Plan of Care Expires:    Plan of Care Reviewed with: patient, spouse    Subjective     Chief Complaint: Pt. Reported feeling better after washing face by sink  Patient/Family Comments/goals: to get better  Pain/Comfort:  Pain Rating 1: 0/10  Pain Rating Post-Intervention 1: 0/10    Objective:     Communicated with: nurse prior to session.  Patient found up in chair with junior catheter upon OT entry to room. Spouse present    General Precautions: Standard, fall    Orthopedic Precautions:N/A  Braces: N/A  Respiratory Status: Room air     Occupational Performance:     Bed Mobility:    Not tested     Functional Mobility/Transfers:  Patient completed Sit <> Stand Transfer with minimum assistance  with  rolling walker    Patient completed Toilet Transfer Step Transfer technique with minimum assistance with  rolling walker and grab bars  Functional Mobility: Pt. Ambulated to and from the bathroom with use of RW and CGA    Activities of Daily Living:  Grooming: stand by assistance in stand at sink to wash face and brush teeth        UBD: Min A to don gown        Toileting: Min A for balance when managing clothing in stand    [facility] 6 Click ADL: 19    Treatment & Education:  Pt. Educated on importance of OOB/therapy  Pt. Educated on need for staff assist/transfers    Patient left up in chair with all lines intact, call button in reach, nurse notified, and family present    GOALS:   Multidisciplinary Problems       Occupational Therapy Goals          Problem: Occupational Therapy    Goal Priority Disciplines Outcome Interventions   Occupational Therapy Goal     OT, PT/OT Ongoing, Progressing    Description: Goals to be met by: 4/27/24     Patient will increase functional independence with ADLs by performing:    UE Dressing with Modified [place].  LE Dressing with Modified [place].  Grooming while standing at sink with Modified [place].  Toileting from toilet with Modified [place] for hygiene and clothing management.   Supine to sit with Modified [place].  Step transfer with Modified [place]  Toilet transfer to toilet with Modified [place].                         Time Tracking:     OT Date of Treatment: 03/28/24  OT Start Time: 0958  OT Stop Time: 1036  OT Total Time (min): 38 min    Billable Minutes:Self Care/Home Management 38    OT/ZAHEER: OT          3/28/2024

## 2024-03-28 NOTE — SUBJECTIVE & OBJECTIVE
Interval History: POD#3. NAEON. Patient resting comfortably in bed. Pain is well controlled with pain medications. She received 0.4mg of dilaudid overnight. She is tolerating PO without nausea or vomiting. She is voiding via junior catheter. UOP 40 cc/hr overnight. Patient is ambulating with assistance, and has not yet passed flatus.     Scheduled Meds:   acetaminophen  1,000 mg Oral Q6H    atorvastatin  80 mg Oral Daily    enoxparin  40 mg Subcutaneous Q24H (prophylaxis, 1700)    fluticasone propionate  1 spray Each Nostril Daily    guaiFENesin  600 mg Oral BID    ibuprofen  600 mg Oral Q6H    insulin detemir U-100  11 Units Subcutaneous QHS    lamoTRIgine  25 mg Oral BID    magnesium hydroxide 400 mg/5 ml  30 mL Oral BID    senna  8.6 mg Oral BID     Continuous Infusions:      PRN Meds:dextrose 10%, dextrose 10%, glucagon (human recombinant), glucose, glucose, hydrALAZINE, HYDROmorphone, insulin aspart U-100, LORazepam, melatonin, naloxone, ondansetron, oxyCODONE, oxyCODONE, prochlorperazine    Review of patient's allergies indicates:   Allergen Reactions    Codeine Other (See Comments)     Flu like s/s       Objective:     Vital Signs (Most Recent):  Temp: 98.1 °F (36.7 °C) (03/28/24 0339)  Pulse: 102 (03/28/24 0339)  Resp: 18 (03/28/24 0339)  BP: 124/65 (03/28/24 0339)  SpO2: 95 % (03/28/24 0339) Vital Signs (24h Range):  Temp:  [98.1 °F (36.7 °C)-98.6 °F (37 °C)] 98.1 °F (36.7 °C)  Pulse:  [] 102  Resp:  [16-19] 18  SpO2:  [95 %-99 %] 95 %  BP: (117-146)/(55-73) 124/65     Weight: 98.1 kg (216 lb 4.3 oz)  Body mass index is 33.87 kg/m².    Intake/Output - Last 3 Shifts         03/26 0700  03/27 0659 03/27 0700  03/28 0659    P.O. 584 1080    I.V. (mL/kg) 242.5 (2.5)     Blood 331     Total Intake(mL/kg) 1157.5 (11.8) 1080 (11)    Urine (mL/kg/hr) 580 (0.2) 1900 (0.8)    Stool 0     Total Output 580 1900    Net +577.5 -820          Stool Occurrence 0 x 0 x          Bladder Scan Volume (mL): 25 mL (03/26/24  1630)       Physical Exam:   Constitutional: She is oriented to person, place, and time. She appears well-developed and well-nourished.    HENT:   Head: Normocephalic and atraumatic.     Neck:   L EJ in place, covered w/ dressing    Cardiovascular:  Normal rate.      Exam reveals no edema.        Pulmonary/Chest: Effort normal. No respiratory distress. She has no wheezes. She has no rales.        Abdominal: Soft. Bowel sounds are normal. She exhibits abdominal incision (VML clean, dry, intact. no surrounding erythema or drainage. ABD covering incision for patient comfort). She exhibits no distension. There is no abdominal tenderness. There is no rebound and no guarding.     Genitourinary:    Genitourinary Comments: [place] in place draining clear yellow urine in bag             Musculoskeletal: No edema.       Neurological: She is alert and oriented to person, place, and time.    Skin: Skin is warm and dry.    Psychiatric: She has a normal mood and affect. Her behavior is normal.          Lines/Drains/Airways       Drain  Duration                  Urethral Catheter 03/26/24 2100 1 day              Peripheral Intravenous Line  Duration                  Peripheral IV - Single Lumen 03/25/24 0642 18 G Anterior;Left Forearm 2 days                    Laboratory:  BMP:   Recent Labs   Lab 03/27/24  0526   GLU 32*   *   K 3.8   CL 99   CO2 27   BUN 11   CREATININE 0.7   CALCIUM 7.3*     , CBC:   Recent Labs   Lab 03/26/24  1716 03/27/24  0526   WBC 17.66* 19.73*   HGB 8.6* 9.0*   HCT 25.7* 27.2*    327

## 2024-03-28 NOTE — PLAN OF CARE
Plan of care reviewed with pt.Pt with c/o pain, BG was low tablets of glucose administered,as per prn order and rechecked. On call provider notified about hypoglycemic episode. Patient worked with PT/OT Incision dressing dry & intact, marked old drainage noted, no new drainage. All VSS, afebrile, free from falls or injuries; no acute events so far this shift. Pt in bed with non-skid socks on, bed locked and in lowest position, side rails up x2, call light and personal items within reach. Pt instructed to call for assistance as needed.

## 2024-03-28 NOTE — PT/OT/SLP PROGRESS
Physical Therapy Treatment    Patient Name:  Anette Ricci   MRN:  [id number]    Recommendations:     Discharge Recommendations: Low Intensity Therapy  Discharge Equipment Recommendations: none  Barriers to discharge: None    Assessment:     Anette Ricci is a 58 y.o. female admitted with a medical diagnosis of Status post total abdominal hysterectomy and bilateral salpingo-oophorectomy (CARLOS ALBERTO-BSO).  She presents with the following impairments/functional limitations: weakness, impaired endurance, impaired functional mobility, gait instability, decreased lower extremity function, pain, orthopedic precautions. Patient demonstrating increased motivation to participate in PT session, with good response to completed activities and provided education. Session emphasis on gait training, including in room & hallway ambulation, both of which were of good quality. Patient deferring supine<>sit attempt this date in order to trial log roll technique, however patient & spouse education provided. Patient demonstrates that they will greatly benefit from low intensity skilled physical therapy services post-acutely    Rehab Prognosis: Good; patient would benefit from acute skilled PT services to address these deficits and reach maximum level of function.    Recent Surgery: Procedure(s) (LRB):  LAPAROTOMY, EXPLORATORY (N/A)  HYSTERECTOMY, TOTAL, ABDOMINAL (N/A)  SALPINGO-OOPHORECTOMY, BILATERAL (N/A)  OMENTECTOMY (N/A)  DEBULKING, NEOPLASM (N/A)  EXCISION, LIVER - partial (N/A)  CHOLECYSTECTOMY 3 Days Post-Op    Plan:     During this hospitalization, patient to be seen 3 x/week to address the identified rehab impairments via gait training, therapeutic activities, neuromuscular re-education and progress toward the following goals:    Plan of Care Expires:  03/29/24    Subjective     Chief Complaint: None stated  Patient/Family Comments/goals: Hallway ambulation  Pain/Comfort:  Pain Rating 1: Intensity not specified, however  "pt indicating minimal pain  Location - Orientation 1: generalized  Location 1: abdomen  Pain Addressed 1: Pre-medicate for activity  Pain Rating Post-Intervention 1: Unchanged      Objective:     Communicated with RN prior to session.  Patient found up in chair with  (no active lines) upon PT entry to room.     General Precautions: Standard, fall (abdominal sx)   Orthopedic Precautions: (abdominal sx)   Braces: N/A   Body mass index is 33.87 kg/m².  Oxygen Device: Room Air  Vitals: /60 (BP Location: Left arm, Patient Position: Sitting)   Pulse 105   Temp 97.9 °F (36.6 °C) (Oral)   Resp 18   Ht 5' 7" (1.702 m)   Wt 98.1 kg (216 lb 4.3 oz)   LMP 02/09/2015   SpO2 97%   Breastfeeding No   BMI 33.87 kg/m²      Functional Mobility:  Bed Mobility:     EOB Scooting:   Anterior: SBA  Supine>Sit: Not assessed d/t to PT receiving pt UIC & pt deferred trial utilizing log roll technique. Verbal review & demonstration completed.  Sit>Supine: Not assessed d/t pt deferred trial utilizing log roll technique. Verbal review & demonstration completed.    Transfers:     Sit<>Stand:   x1, CGA from Bedside Chair (raised surface) with Rolling Walker  x1, ModA from Toilet with Rolling Walker and LUE utilizing grab bar    Gait:   x8ft, CGA with Rolling Walker  x200ft, CGA with Rolling Walker and chair follow    Total Distance: 208ft  Gait Assessment: decreased step length , decreased edie, and decreased gait speed    Balance:   Static Sitting: Independent  Dynamic Sitting:  SBA-Supervision  Static Standing: CGA with Rolling Walker  Dynamic Standing:  CGA  with Rolling Walker    AM-PAC 6 CLICK MOBILITY  Turning over in bed (including adjusting bedclothes, sheets and blankets)?: 3  Sitting down on and standing up from a chair with arms (e.g., wheelchair, bedside commode, etc.): 3  Moving from lying on back to sitting on the side of the bed?: 3  Moving to and from a bed to a chair (including a wheelchair)?: 3  Need to walk in " hospital room?: 3  Climbing 3-5 steps with a railing?: 3  Basic Mobility Total Score: 18     Treatment & Education:  Increased time required d/t RN removing junior  Increased time required for toileting    RW adjustment completed this session    Patient Education Provided on:  The role of physical therapy and how the patient can benefit from skilled services  The negative effects of prolonged bed rest/sedentary behavior, along with the importance of OOB activity & patient participation with PT  The importance of contacting RN, via call light, for mobility throughout the day  Pt white board updated with current therapists name and level of mobility assistance needed.     Patient and spouse Verbalized and Demonstrated understanding of all topics touched on this date. All questions answered within the PT scope of practice    Patient left up in chair with all lines intact, call button in reach, and spouse present..    GOALS:   Multidisciplinary Problems       Physical Therapy Goals          Problem: Physical Therapy    Goal Priority Disciplines Outcome Goal Variances Interventions   Physical Therapy Goal     PT, PT/OT Ongoing, Progressing     Description: Goals to be met by: 2024     Patient will increase functional independence with mobility by performin. Supine to sit with Modified [place]  2. Sit to supine with Modified [place]  3. Sit to stand transfer with Supervision  4. Bed to chair transfer with Supervision using LRAD  5. Gait  x 100 feet with Supervision using LRAD.                          Time Tracking:     PT Received On: 24  PT Start Time: 1342     PT Stop Time: 1413  PT Total Time (min): 31 min     Billable Minutes: Gait Training 15 and Therapeutic Activity 16    Treatment Type: Treatment  PT/PTA: PT     Number of PTA visits since last PT visit: 0     2024

## 2024-03-28 NOTE — ASSESSMENT & PLAN NOTE
BG goal: 140-180  T2DM.  s/p Ex-lap/CARLOS ALBERTO/BSO/OMX/Segment 6 liver resection/Cholecystectomy/Peritoneal stripping/Diaphragm stripping. Antionette op dex.  On high doses of insulin at home.  Below goal here on much reduced regimen.  Appetite poor per patient. Hypoglycemic this morning. Will d/c basal insulin and monitor closely.     - Discontinue Levemir 11 units daily   - Continue Low Dose Correction Scale   - POCT Glucose before meals, at bedtime and at 2 am  - Hypoglycemia protocol in place      ** Please notify Endocrine for any change and/or advance in diet**  ** Please call Endocrine for any BG related issues **     Discharge Planning:   TBD. Please notify endocrinology prior to discharge.

## 2024-03-28 NOTE — PROGRESS NOTES
"[facility] ([facility])  Endocrinology  Progress Note    Admit Date: 3/25/2024     Reason for Consult: Management of T2DM, Hyperglycemia     Surgical Procedure and Date: CARLOS ALBERTO-BSO  3/26/24    Diabetes diagnosis year: > 5 years ago    Home Diabetes Medications:  Toujeo 60 units evening, Novolog 10 units w/ meals Breakfast and Dinner, Metformin 1000 mg , Mounjaro 7.5 mg.    How often checking glucose at home? 1-3 x day   BG readings on regimen: 100s-200s  Hypoglycemia on the regimen?  No  Missed doses on regimen?  No    Diabetes Complications include:     Hyperglycemia and Diabetic retinopathy     Complicating diabetes co morbidities:   HLD, HTN      HPI:   Patient is a 58 y.o. female with  presenting for scheduled Ex-lap/CARLOS ALBERTO/BSO/Possible partial liver resection/Debulking for ovarian neoplasm. 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 Of note, since this H&P was written, patient was admitted to GYN ONC service from 3/18-3/20 for management of leg swelling, JASON, and ascites. S/p  Ex-lap/CARLOS ALBERTO/BSO/OMX/Segment 6 liver resection/Cholecystectomy/Peritoneal stripping/Diaphragm stripping.  Endocrine consulted for bg management.     Interval HPI:   Overnight events: NAEON. Remains in 804A. BG below goal ranges on current SQ insulin regimen. POD 2. Diet diabetic 2000 Calorie    Eatin%  Nausea: No  Hypoglycemia and intervention: Yes, BG 41 this morning. Received oral glucose tabs   Fever: No  TPN and/or TF: No  If yes, type of TF/TPN and rate: n/a    BP (!) 120/56 (BP Location: Left arm, Patient Position: Lying)   Pulse 104   Temp 97.4 °F (36.3 °C) (Oral)   Resp 16   Ht 5' 7" (1.702 m)   Wt 98.1 kg (216 lb 4.3 oz)   LMP 2015   SpO2 95%   Breastfeeding No   BMI 33.87 kg/m²     Labs Reviewed and Include    No results for input(s): "GLU", "CALCIUM", "ALBUMIN", "PROT", "NA", "K", "CO2", "CL", "BUN", "CREATININE", "ALKPHOS", "ALT", "AST", "BILITOT" " "in the last 24 hours.  Lab Results   Component Value Date    WBC 19.73 (H) 03/27/2024    HGB 9.0 (L) 03/27/2024    HCT 27.2 (L) 03/27/2024    MCV 80 (L) 03/27/2024     03/27/2024     No results for input(s): "TSH", "FREET4" in the last 168 hours.  Lab Results   Component Value Date    HGBA1C 7.3 (H) 03/19/2024       Nutritional status:   Body mass index is 33.87 kg/m².  Lab Results   Component Value Date    ALBUMIN 2.1 (L) 03/25/2024    ALBUMIN 1.8 (L) 03/17/2024    ALBUMIN 2.1 (L) 03/06/2024     No results found for: "PREALBUMIN"    Estimated Creatinine Clearance: 104.1 mL/min (based on SCr of 0.7 mg/dL).    Accu-Checks  Recent Labs     03/27/24  0811 03/27/24  0845 03/27/24  0912 03/27/24  1110 03/27/24  1724 03/27/24  1801 03/27/24  1927 03/27/24  2120 03/27/24  2247 03/28/24  0747   POCTGLUCOSE 62* 95 105 120* 70 73 84 98 114* 41*       Current Medications and/or Treatments Impacting Glycemic Control  Immunotherapy:    Immunosuppressants       None          Steroids:   Hormones (From admission, onward)      Start     Stop Route Frequency Ordered    03/25/24 1705  melatonin tablet 6 mg         -- Oral Nightly PRN 03/25/24 1623          Pressors:    Autonomic Drugs (From admission, onward)      None          Hyperglycemia/Diabetes Medications:   Antihyperglycemics (From admission, onward)      Start     Stop Route Frequency Ordered    03/27/24 1112  insulin aspart U-100 pen 0-5 Units         -- SubQ Before meals, nightly and at 0200 PRN 03/27/24 1013            ASSESSMENT and PLAN    Cardiac/Vascular  Hyperlipidemia    Managed per primary.   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  s/p Ex-lap/CARLOS ALBERTO/BSO/OMX/Segment 6 liver resection/Cholecystectomy/Peritoneal " stripping/Diaphragm stripping. Antionette op dex.  On high doses of insulin at home.  Below goal here on much reduced regimen.  Appetite poor per patient. Hypoglycemic this morning. Will d/c basal insulin and monitor closely.     - Discontinue Levemir 11 units daily   - Continue Low Dose Correction Scale   - POCT Glucose before meals, at bedtime and at 2 am  - Hypoglycemia protocol in place      ** Please notify Endocrine for any change and/or advance in diet**  ** Please call Endocrine for any BG related issues **     Discharge Planning:   TBD. Please notify endocrinology prior to discharge.            Beatrice Hunter NP  Endocrinology  [facility] ([facility])

## 2024-03-28 NOTE — PLAN OF CARE
Plan of care reviewed with pt.Pt with c/o pain gave oxycodone x 1. BG was low tablets of glucose administered,as per prn order and rechecked.Mccartney's removed voiding spontaneously.provider notified about hypoglycemic episode. Patient sit up on  chair most of the shift.Patient worked with PT/OT . Incision dressing dry & intact, marked old drainage noted.All VSS, afebrile, free from falls or injuries; no acute events so far this shift. Pt in bed with non-skid socks on, bed locked and in lowest position, side rails up x2, call light and personal items within reach. Pt instructed to call for assistance as needed.

## 2024-03-28 NOTE — ASSESSMENT & PLAN NOTE
- Home regimen: Lantus 33 QHS, aspart 5u BID WM (largest meals), mounjaro, metformin 1000mg QD  - Endocrinology consulted on POD#1 to assist with BG control, appreciate assistance   - Current regimen: Detemir 11u QHS, SSI   - Endocrine decreased detemir from 22u to 11u on 3/27 due to episode of hypoglycemia overnight  - BG  over last 24h

## 2024-03-28 NOTE — PROGRESS NOTES
[facility] ([facility])  Gynecologic Oncology  Progress Note      Patient Name: Anette Ricci  MRN: [id number]  Admission Date: 3/25/2024  Hospital Length of Stay: 3 days  Attending Provider: Александр Washington MD  Primary Care Provider: Mark Chua MD  Principal Problem: Status post total abdominal hysterectomy and bilateral salpingo-oophorectomy (CARLOS ALBERTO-BSO)    Follow-up For: Procedure(s) (LRB):  LAPAROTOMY, EXPLORATORY (N/A)  HYSTERECTOMY, TOTAL, ABDOMINAL (N/A)  SALPINGO-OOPHORECTOMY, BILATERAL (N/A)  OMENTECTOMY (N/A)  DEBULKING, NEOPLASM (N/A)  EXCISION, LIVER - partial (N/A)  CHOLECYSTECTOMY  Post-Operative Day: 3 Days Post-Op  Subjective:      History of Present Illness:  Anette Ricci is 58 y.o.  presenting for scheduled Ex-lap/CARLOS ALBERTO/BSO/Possible partial liver resection/Debulking for ovarian neoplasm. 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 Of note, since this H&P was written, patient was admitted to GYN ONC service from 3/18-3/20 for management of leg swelling, JASON, and ascites. She underwent workup for DVT which was negative. She also had IR guided paracentesis with removal of 3.5L of malignant ascites on 3/19. She has been doing well since discharge and is ready to proceed with surgery today as planned.     Hospital Course:  2024 POD#1 s/p Ex-lap/CARLOS ALBERTO/BSO/OMX/Segment 6 liver resection/Cholecystectomy/Peritoneal stripping/Diaphragm stripping. Transported to ICU immediately postop for close monitoring overnight. Pain well controlled overnight. Denies n/v. Tolerated bites of pudding and sips of water. Has not ambulated. Endorses some chest congestion. UOP 0.8 cc/kg/hr overnight. BG elevated to 200s overnight, SSI ordered.   2024 POD#2. NAEON. Stepped down to floor on PM POD#1. Pain well controlled. Tolerating regular diet, appetite still diminished. Ambulating minimally around room. Failed void trial on POD#1,  junior replaced overnight. UOP 0.3cc/kg/hr overnight. Endocrine consulted for BG control.  03/28/2024 POD#3. NAEON. Patient resting comfortably in bed. Pain is well controlled with pain medications. She is tolerating PO without nausea or vomiting. She is voiding via junior catheter. UOP 40 cc/hr overnight    Interval History: POD#3. NAEON. Patient resting comfortably in bed. Pain is well controlled with pain medications. She received 0.4mg of dilaudid overnight. She is tolerating PO without nausea or vomiting. She is voiding via junior catheter. UOP 40 cc/hr overnight. Patient is ambulating with assistance, and has not yet passed flatus.     Scheduled Meds:   acetaminophen  1,000 mg Oral Q6H    atorvastatin  80 mg Oral Daily    enoxparin  40 mg Subcutaneous Q24H (prophylaxis, 1700)    fluticasone propionate  1 spray Each Nostril Daily    guaiFENesin  600 mg Oral BID    ibuprofen  600 mg Oral Q6H    insulin detemir U-100  11 Units Subcutaneous QHS    lamoTRIgine  25 mg Oral BID    magnesium hydroxide 400 mg/5 ml  30 mL Oral BID    senna  8.6 mg Oral BID     Continuous Infusions:      PRN Meds:dextrose 10%, dextrose 10%, glucagon (human recombinant), glucose, glucose, hydrALAZINE, HYDROmorphone, insulin aspart U-100, LORazepam, melatonin, naloxone, ondansetron, oxyCODONE, oxyCODONE, prochlorperazine    Review of patient's allergies indicates:   Allergen Reactions    Codeine Other (See Comments)     Flu like s/s       Objective:     Vital Signs (Most Recent):  Temp: 98.1 °F (36.7 °C) (03/28/24 0339)  Pulse: 102 (03/28/24 0339)  Resp: 18 (03/28/24 0339)  BP: 124/65 (03/28/24 0339)  SpO2: 95 % (03/28/24 0339) Vital Signs (24h Range):  Temp:  [98.1 °F (36.7 °C)-98.6 °F (37 °C)] 98.1 °F (36.7 °C)  Pulse:  [] 102  Resp:  [16-19] 18  SpO2:  [95 %-99 %] 95 %  BP: (117-146)/(55-73) 124/65     Weight: 98.1 kg (216 lb 4.3 oz)  Body mass index is 33.87 kg/m².    Intake/Output - Last 3 Shifts         03/26 0700  03/27 0659  03/27 0700  03/28 0659    P.O. 584 1080    I.V. (mL/kg) 242.5 (2.5)     Blood 331     Total Intake(mL/kg) 1157.5 (11.8) 1080 (11)    Urine (mL/kg/hr) 580 (0.2) 1900 (0.8)    Stool 0     Total Output 580 1900    Net +577.5 -820          Stool Occurrence 0 x 0 x          Bladder Scan Volume (mL): 25 mL (03/26/24 1630)      Physical Exam:   Constitutional: She is oriented to person, place, and time. She appears well-developed and well-nourished.    HENT:   Head: Normocephalic and atraumatic.     Neck:   L EJ in place, covered w/ dressing    Cardiovascular:  Normal rate.      Exam reveals no edema.        Pulmonary/Chest: Effort normal. No respiratory distress. She has no wheezes. She has no rales.        Abdominal: Soft. Bowel sounds are normal. She exhibits abdominal incision (VML clean, dry, intact. no surrounding erythema or drainage. ABD covering incision for patient comfort). She exhibits no distension. There is no abdominal tenderness. There is no rebound and no guarding.     Genitourinary:    Genitourinary Comments: Mccartney in place draining clear yellow urine in bag             Musculoskeletal: No edema.       Neurological: She is alert and oriented to person, place, and time.    Skin: Skin is warm and dry.    Psychiatric: She has a normal mood and affect. Her behavior is normal.          Lines/Drains/Airways       Drain  Duration                  Urethral Catheter 03/26/24 2100 1 day              Peripheral Intravenous Line  Duration                  Peripheral IV - Single Lumen 03/25/24 0642 18 G Anterior;Left Forearm 2 days                    Laboratory:  BMP:   Recent Labs   Lab 03/27/24  0526   GLU 32*   *   K 3.8   CL 99   CO2 27   BUN 11   CREATININE 0.7   CALCIUM 7.3*     , CBC:   Recent Labs   Lab 03/26/24  1716 03/27/24  0526   WBC 17.66* 19.73*   HGB 8.6* 9.0*   HCT 25.7* 27.2*    327         Assessment/Plan:     * S/p CARLOS ALBERTO/BSO/OMX/Debulking/Partial liver resection/Cholecystectomy  - POD#3,  overall doing well   - Pain moderately controlled this Am - patient requiring dilaudid 0.4mg overnight  - no new labs this AM  - Total blood products: 6u pRBC, 2 FFP, 1 cryo   - UOP 40 cc/hr  - Diabetic diet + Boost TIDWM   - VTE ppx: SCDs + Lovenox  - Failed void trial on POD#1, Plan for repeat void trial today    Hypoalbuminemia  - Most recent albumin 2.1    - Boost ordered TIDWM to promote optimal nutrition   - consider nutrition consult when moved to floor if concern for persistent hypoalbuminemia      Hyponatremia  - Na 130 on 3/26  - Na 134 on 3/27  - NS discontinued  - Patient tolerating PO at this time    Acute postoperative anemia due to expected blood loss  - Preop H/H 7.3/23.8  - Total blood products: 6u pRBC (5 intra-op, 1 POD#1), 2 FFP, 1 cryo.   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  - Home regimen: Lantus 33 QHS, aspart 5u BID WM (largest meals), mounjaro, metformin 1000mg QD  - Endocrinology consulted on POD#1 to assist with BG control, appreciate assistance   - Current regimen: Detemir 11u QHS, SSI   - Endocrine decreased detemir from 22u to 11u on 3/27 due to episode of hypoglycemia overnight  - BG  over last 24h      VTE Risk Mitigation (From admission, onward)           Ordered     enoxaparin injection 40 mg  Every 24 hours         03/27/24 0750     IP VTE HIGH RISK PATIENT  Once         03/25/24 1623     Place sequential compression device  Until discontinued         03/25/24 1623                  Francheska Shirley MD  Gynecologic Oncology  [facility] (Hospital)

## 2024-03-28 NOTE — ASSESSMENT & PLAN NOTE
- POD#3, overall doing well   - Pain moderately controlled this Am - patient requiring dilaudid 0.4mg overnight  - no new labs this AM  - Total blood products: 6u pRBC, 2 FFP, 1 cryo   - UOP 40 cc/hr  - Diabetic diet + Boost TIDWM   - VTE ppx: SCDs + Lovenox  - Failed void trial on POD#1, Plan for repeat void trial today

## 2024-03-28 NOTE — PLAN OF CARE
Problem: Physical Therapy  Goal: Physical Therapy Goal  Description: Goals to be met by: 2024     Patient will increase functional independence with mobility by performin. Supine to sit with Modified [place]  2. Sit to supine with Modified [place]  3. Sit to stand transfer with Supervision  4. Bed to chair transfer with Supervision using LRAD  5. Gait  x 100 feet with Supervision using LRAD.     Outcome: Ongoing, Progressing

## 2024-03-28 NOTE — ASSESSMENT & PLAN NOTE
- HM consulted for PreOp Evaluation  RCRI Calculator Class and Risk percentage: 2 points, class III risk, 10.1% risk of cardiac event     Cardiac  - Denies chest pain  - EKG sinus tach  - Echo w/ 50-55% EF and normal CVP   Pulmonary  - Reports SOB improved following paracentesis  - No O2 needs   Endocrine  - Optimizing blood glucose  - If discharged, recommend continuing home regimen AND holding prandial insulin and metformin day of surgery and reduce long acting to half on day of surgery   - If discharged prior to surgery, recommend consulting medicine or endocrine for further management when readmitted   Renal  - Cr stable  - Recommend repeat BMP today following para if pt is still inpatient   - Recommend post-op labs to ensure stable, maintain euvolemia  DVT ppx  - Hold as necessary and resume as able  - Does not smoke or use alcohol  - No prior reaction to anesthesia per pt     - Appreciate recommendations   Female

## 2024-03-28 NOTE — SUBJECTIVE & OBJECTIVE
"Interval HPI:   Overnight events: NAEON. Remains in 804A. BG below goal ranges on current SQ insulin regimen. POD 2. Diet diabetic  Calorie    Eatin%  Nausea: No  Hypoglycemia and intervention: Yes, BG 41 this morning. Received oral glucose tabs   Fever: No  TPN and/or TF: No  If yes, type of TF/TPN and rate: n/a    BP (!) 120/56 (BP Location: Left arm, Patient Position: Lying)   Pulse 104   Temp 97.4 °F (36.3 °C) (Oral)   Resp 16   Ht 5' 7" (1.702 m)   Wt 98.1 kg (216 lb 4.3 oz)   LMP 2015   SpO2 95%   Breastfeeding No   BMI 33.87 kg/m²     Labs Reviewed and Include    No results for input(s): "GLU", "CALCIUM", "ALBUMIN", "PROT", "NA", "K", "CO2", "CL", "BUN", "CREATININE", "ALKPHOS", "ALT", "AST", "BILITOT" in the last 24 hours.  Lab Results   Component Value Date    WBC 19.73 (H) 2024    HGB 9.0 (L) 2024    HCT 27.2 (L) 2024    MCV 80 (L) 2024     2024     No results for input(s): "TSH", "FREET4" in the last 168 hours.  Lab Results   Component Value Date    HGBA1C 7.3 (H) 2024       Nutritional status:   Body mass index is 33.87 kg/m².  Lab Results   Component Value Date    ALBUMIN 2.1 (L) 2024    ALBUMIN 1.8 (L) 2024    ALBUMIN 2.1 (L) 2024     No results found for: "PREALBUMIN"    Estimated Creatinine Clearance: 104.1 mL/min (based on SCr of 0.7 mg/dL).    Accu-Checks  Recent Labs     24  0811 24  0845 24  0912 24  1110 24  1724 24  1801 24  1927 24  2120 24  2247 24  0747   POCTGLUCOSE 62* 95 105 120* 70 73 84 98 114* 41*       Current Medications and/or Treatments Impacting Glycemic Control  Immunotherapy:    Immunosuppressants       None          Steroids:   Hormones (From admission, onward)      Start     Stop Route Frequency Ordered    24 1705  melatonin tablet 6 mg         -- Oral Nightly PRN 24 1623          Pressors:    Autonomic Drugs (From " admission, onward)      None          Hyperglycemia/Diabetes Medications:   Antihyperglycemics (From admission, onward)      Start     Stop Route Frequency Ordered    03/27/24 1112  insulin aspart U-100 pen 0-5 Units         -- SubQ Before meals, nightly and at 0200 PRN 03/27/24 1013

## 2024-03-28 NOTE — ASSESSMENT & PLAN NOTE
- Preop H/H 7.3/23.8  - Total blood products: 6u pRBC (5 intra-op, 1 POD#1), 2 FFP, 1 cryo.   - Repeat CBC on POD#1 PM 8.6/25.7  - VSS   - CBC 9/27 9/27 - stable  - UOP overnight 40 cc/hr   - No repeat labs this AM

## 2024-03-29 LAB
ALBUMIN SERPL BCP-MCNC: 1.2 G/DL (ref 3.5–5.2)
ALP SERPL-CCNC: 119 U/L (ref 55–135)
ALT SERPL W/O P-5'-P-CCNC: 27 U/L (ref 10–44)
ANION GAP SERPL CALC-SCNC: 7 MMOL/L (ref 8–16)
AST SERPL-CCNC: 56 U/L (ref 10–40)
BILIRUB SERPL-MCNC: 0.7 MG/DL (ref 0.1–1)
BLD PROD TYP BPU: NORMAL
BLD PROD TYP BPU: NORMAL
BLOOD UNIT EXPIRATION DATE: NORMAL
BLOOD UNIT EXPIRATION DATE: NORMAL
BLOOD UNIT TYPE CODE: 7300
BLOOD UNIT TYPE CODE: 7300
BLOOD UNIT TYPE: NORMAL
BLOOD UNIT TYPE: NORMAL
BUN SERPL-MCNC: 11 MG/DL (ref 6–20)
CALCIUM SERPL-MCNC: 7.4 MG/DL (ref 8.7–10.5)
CHLORIDE SERPL-SCNC: 97 MMOL/L (ref 95–110)
CO2 SERPL-SCNC: 30 MMOL/L (ref 23–29)
CODING SYSTEM: NORMAL
CODING SYSTEM: NORMAL
CREAT SERPL-MCNC: 0.6 MG/DL (ref 0.5–1.4)
CROSSMATCH INTERPRETATION: NORMAL
CROSSMATCH INTERPRETATION: NORMAL
DISPENSE STATUS: NORMAL
DISPENSE STATUS: NORMAL
EST. GFR  (NO RACE VARIABLE): >60 ML/MIN/1.73 M^2
GLUCOSE SERPL-MCNC: 168 MG/DL (ref 70–110)
NUM UNITS TRANS PACKED RBC: NORMAL
NUM UNITS TRANS PACKED RBC: NORMAL
POCT GLUCOSE: 181 MG/DL (ref 70–110)
POCT GLUCOSE: 182 MG/DL (ref 70–110)
POCT GLUCOSE: 185 MG/DL (ref 70–110)
POCT GLUCOSE: 198 MG/DL (ref 70–110)
POCT GLUCOSE: 234 MG/DL (ref 70–110)
POTASSIUM SERPL-SCNC: 4 MMOL/L (ref 3.5–5.1)
PROT SERPL-MCNC: 4.2 G/DL (ref 6–8.4)
SODIUM SERPL-SCNC: 134 MMOL/L (ref 136–145)

## 2024-03-29 PROCEDURE — 36415 COLL VENOUS BLD VENIPUNCTURE: CPT

## 2024-03-29 PROCEDURE — 25000242 PHARM REV CODE 250 ALT 637 W/ HCPCS

## 2024-03-29 PROCEDURE — 80053 COMPREHEN METABOLIC PANEL: CPT

## 2024-03-29 PROCEDURE — 63600175 PHARM REV CODE 636 W HCPCS: Performed by: STUDENT IN AN ORGANIZED HEALTH CARE EDUCATION/TRAINING PROGRAM

## 2024-03-29 PROCEDURE — 99232 SBSQ HOSP IP/OBS MODERATE 35: CPT | Mod: ,,, | Performed by: NURSE PRACTITIONER

## 2024-03-29 PROCEDURE — 63600175 PHARM REV CODE 636 W HCPCS

## 2024-03-29 PROCEDURE — 20600001 HC STEP DOWN PRIVATE ROOM

## 2024-03-29 PROCEDURE — 63600175 PHARM REV CODE 636 W HCPCS: Performed by: PHYSICIAN ASSISTANT

## 2024-03-29 PROCEDURE — 25000003 PHARM REV CODE 250: Performed by: NURSE PRACTITIONER

## 2024-03-29 PROCEDURE — 25000003 PHARM REV CODE 250: Performed by: STUDENT IN AN ORGANIZED HEALTH CARE EDUCATION/TRAINING PROGRAM

## 2024-03-29 RX ORDER — FUROSEMIDE 10 MG/ML
40 INJECTION INTRAMUSCULAR; INTRAVENOUS ONCE
Status: COMPLETED | OUTPATIENT
Start: 2024-03-29 | End: 2024-03-29

## 2024-03-29 RX ADMIN — SENNOSIDES 8.6 MG: 8.6 TABLET, FILM COATED ORAL at 08:03

## 2024-03-29 RX ADMIN — FUROSEMIDE 40 MG: 10 INJECTION, SOLUTION INTRAVENOUS at 10:03

## 2024-03-29 RX ADMIN — GUAIFENESIN 600 MG: 600 TABLET, EXTENDED RELEASE ORAL at 08:03

## 2024-03-29 RX ADMIN — IBUPROFEN 600 MG: 600 TABLET, FILM COATED ORAL at 03:03

## 2024-03-29 RX ADMIN — LAMOTRIGINE 25 MG: 25 TABLET ORAL at 08:03

## 2024-03-29 RX ADMIN — FLUTICASONE PROPIONATE 50 MCG: 50 SPRAY, METERED NASAL at 09:03

## 2024-03-29 RX ADMIN — ENOXAPARIN SODIUM 40 MG: 40 INJECTION SUBCUTANEOUS at 05:03

## 2024-03-29 RX ADMIN — INSULIN DETEMIR 5 UNITS: 100 INJECTION, SOLUTION SUBCUTANEOUS at 12:03

## 2024-03-29 RX ADMIN — OXYCODONE HYDROCHLORIDE 2.5 MG: 5 SOLUTION ORAL at 05:03

## 2024-03-29 RX ADMIN — IBUPROFEN 600 MG: 600 TABLET, FILM COATED ORAL at 02:03

## 2024-03-29 RX ADMIN — IBUPROFEN 600 MG: 600 TABLET, FILM COATED ORAL at 08:03

## 2024-03-29 RX ADMIN — OXYCODONE HYDROCHLORIDE 2.5 MG: 5 SOLUTION ORAL at 02:03

## 2024-03-29 RX ADMIN — ATORVASTATIN CALCIUM 80 MG: 40 TABLET, FILM COATED ORAL at 08:03

## 2024-03-29 RX ADMIN — INSULIN ASPART 2 UNITS: 100 INJECTION, SOLUTION INTRAVENOUS; SUBCUTANEOUS at 08:03

## 2024-03-29 NOTE — ASSESSMENT & PLAN NOTE
- Preop H/H 7.3/23.8  - Total blood products: 6u pRBC (5 intra-op, 1 POD#1), 2 FFP, 1 cryo.   - Repeat CBC on POD#1 PM 8.6/25.7  - VSS   - CBC 9/27 9/27 - stable  - UOP overnight 50 cc/hr   - No repeat labs this AM

## 2024-03-29 NOTE — SUBJECTIVE & OBJECTIVE
Interval History: Tolerating regular diet overnight without nausea or vomiting. Pain well controlled on oral pain medication. Voiding spontaneously. Having bowel function. Ambulating. Patient complaining of significant leakage through old paracentesis site.    Scheduled Meds:   acetaminophen  1,000 mg Oral Q6H    atorvastatin  80 mg Oral Daily    enoxparin  40 mg Subcutaneous Q24H (prophylaxis, 1700)    fluticasone propionate  1 spray Each Nostril Daily    furosemide (LASIX) injection  40 mg Intravenous Once    guaiFENesin  600 mg Oral BID    ibuprofen  600 mg Oral Q6H    lamoTRIgine  25 mg Oral BID    senna  8.6 mg Oral BID     Continuous Infusions:  PRN Meds:dextrose 10%, dextrose 10%, glucagon (human recombinant), glucose, glucose, hydrALAZINE, HYDROmorphone, insulin aspart U-100, LORazepam, melatonin, naloxone, ondansetron, oxyCODONE, prochlorperazine    Review of patient's allergies indicates:   Allergen Reactions    Codeine Other (See Comments)     Flu like s/s       Objective:     Vital Signs (Most Recent):  Temp: 98.2 °F (36.8 °C) (03/29/24 0822)  Pulse: 99 (03/29/24 0822)  Resp: 18 (03/29/24 0822)  BP: 109/61 (03/29/24 0822)  SpO2: 98 % (03/29/24 0822) Vital Signs (24h Range):  Temp:  [97.6 °F (36.4 °C)-98.3 °F (36.8 °C)] 98.2 °F (36.8 °C)  Pulse:  [] 99  Resp:  [18-20] 18  SpO2:  [97 %-99 %] 98 %  BP: (107-129)/(60-70) 109/61     Weight: 97.5 kg (214 lb 15.2 oz)  Body mass index is 33.67 kg/m².    Intake/Output - Last 3 Shifts         03/27 0700 03/28 0659 03/28 0700 03/29 0659 03/29 0700 03/30 0659    P.O. 1080 770     I.V. (mL/kg)       Blood       Total Intake(mL/kg) 1080 (11) 770 (7.9)     Urine (mL/kg/hr) 1900 (0.8) 1450 (0.6)     Stool  0     Total Output 1900 1450     Net -820 -680            Urine Occurrence  1 x     Stool Occurrence 0 x 1 x           Bladder Scan Volume (mL): 25 mL (03/26/24 1630)       Physical Exam:   Constitutional: She is oriented to person, place, and time. She  appears well-developed and well-nourished.    HENT:   Head: Normocephalic.      Cardiovascular:  Normal rate.      Exam reveals edema.       2+ pitting edema to mid thigh    Pulmonary/Chest: Effort normal.        Abdominal: She exhibits abdominal incision.   Abdomen soft and mildly distended. MVL covered in abd pad with shadowing present above umbilicus. Abd pad removed. Very superficial breakdown of top 3cm of MVL noted. On left flank, small pinprick prior paracentesis site note with active clear drainage. Small drops of fluid seen when expressed             Musculoskeletal: Normal range of motion.       Neurological: She is alert and oriented to person, place, and time.    Skin: Skin is warm and dry.    Psychiatric: She has a normal mood and affect.          Lines/Drains/Airways       Peripheral Intravenous Line  Duration                  Peripheral IV - Single Lumen 03/25/24 0642 18 G Anterior;Left Forearm 4 days                    Laboratory:  Blood glucoses 180s overnight

## 2024-03-29 NOTE — PROGRESS NOTES
[facility] ([facility])  Gynecologic Oncology  Progress Note      Patient Name: Anette Ricci  MRN: [id number]  Admission Date: 3/25/2024  Hospital Length of Stay: 4 days  Attending Provider: Александр Washington MD  Primary Care Provider: Mark Chua MD  Principal Problem: Status post total abdominal hysterectomy and bilateral salpingo-oophorectomy (CARLOS ALBERTO-BSO)    Follow-up For: Procedure(s) (LRB):  LAPAROTOMY, EXPLORATORY (N/A)  HYSTERECTOMY, TOTAL, ABDOMINAL (N/A)  SALPINGO-OOPHORECTOMY, BILATERAL (N/A)  OMENTECTOMY (N/A)  DEBULKING, NEOPLASM (N/A)  EXCISION, LIVER - partial (N/A)  CHOLECYSTECTOMY  Post-Operative Day: 4 Days Post-Op  Subjective:      History of Present Illness:  Anette Ricci is 58 y.o.  presenting for scheduled Ex-lap/CARLOS ALBERTO/BSO/Possible partial liver resection/Debulking for ovarian neoplasm. 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 Of note, since this H&P was written, patient was admitted to GYN ONC service from 3/18-3/20 for management of leg swelling, JASON, and ascites. She underwent workup for DVT which was negative. She also had IR guided paracentesis with removal of 3.5L of malignant ascites on 3/19. She has been doing well since discharge and is ready to proceed with surgery today as planned.     Hospital Course:  2024 POD#1 s/p Ex-lap/CARLOS ALBERTO/BSO/OMX/Segment 6 liver resection/Cholecystectomy/Peritoneal stripping/Diaphragm stripping. Transported to ICU immediately postop for close monitoring overnight. Pain well controlled overnight. Denies n/v. Tolerated bites of pudding and sips of water. Has not ambulated. Endorses some chest congestion. UOP 0.8 cc/kg/hr overnight. BG elevated to 200s overnight, SSI ordered.   2024 POD#2. NAEON. Stepped down to floor on PM POD#1. Pain well controlled. Tolerating regular diet, appetite still diminished. Ambulating minimally around room. Failed void trial on POD#1,  junior replaced overnight. UOP 0.3cc/kg/hr overnight. Endocrine consulted for BG control.  03/28/2024 POD#3. NAEON. Patient resting comfortably in bed. Pain is well controlled with pain medications. She is tolerating PO without nausea or vomiting. She is voiding via junior catheter. UOP 40 cc/hr overnight  03/29/2024 POD#4: NAEON. Patient resting comfortably in bed. Pain well controlled with pain medications. Tolerating PO without nausea or vomiting. She is voiding spontaneously. UOP 45cc/hr overnight. New found leaking of ascites through old paracentesis spot. Noticeable serosanguinous draiange from top 3cm of MVL. Will obtain CTAP to further assess physical exam findings. CMP ordered. Lasix 40mg.        Interval History: Tolerating regular diet overnight without nausea or vomiting. Pain well controlled on oral pain medication. Voiding spontaneously. Having bowel function. Ambulating. Patient complaining of significant leakage through old paracentesis site.    Scheduled Meds:   acetaminophen  1,000 mg Oral Q6H    atorvastatin  80 mg Oral Daily    enoxparin  40 mg Subcutaneous Q24H (prophylaxis, 1700)    fluticasone propionate  1 spray Each Nostril Daily    furosemide (LASIX) injection  40 mg Intravenous Once    guaiFENesin  600 mg Oral BID    ibuprofen  600 mg Oral Q6H    lamoTRIgine  25 mg Oral BID    senna  8.6 mg Oral BID     Continuous Infusions:  PRN Meds:dextrose 10%, dextrose 10%, glucagon (human recombinant), glucose, glucose, hydrALAZINE, HYDROmorphone, insulin aspart U-100, LORazepam, melatonin, naloxone, ondansetron, oxyCODONE, prochlorperazine    Review of patient's allergies indicates:   Allergen Reactions    Codeine Other (See Comments)     Flu like s/s       Objective:     Vital Signs (Most Recent):  Temp: 98.2 °F (36.8 °C) (03/29/24 0822)  Pulse: 99 (03/29/24 0822)  Resp: 18 (03/29/24 0822)  BP: 109/61 (03/29/24 0822)  SpO2: 98 % (03/29/24 0822) Vital Signs (24h Range):  Temp:  [97.6 °F (36.4 °C)-98.3  °F (36.8 °C)] 98.2 °F (36.8 °C)  Pulse:  [] 99  Resp:  [18-20] 18  SpO2:  [97 %-99 %] 98 %  BP: (107-129)/(60-70) 109/61     Weight: 97.5 kg (214 lb 15.2 oz)  Body mass index is 33.67 kg/m².    Intake/Output - Last 3 Shifts         03/27 0700 03/28 0659 03/28 0700 03/29 0659 03/29 0700 03/30 0659    P.O. 1080 770     I.V. (mL/kg)       Blood       Total Intake(mL/kg) 1080 (11) 770 (7.9)     Urine (mL/kg/hr) 1900 (0.8) 1450 (0.6)     Stool  0     Total Output 1900 1450     Net -820 -680            Urine Occurrence  1 x     Stool Occurrence 0 x 1 x           Bladder Scan Volume (mL): 25 mL (03/26/24 1630)       Physical Exam:   Constitutional: She is oriented to person, place, and time. She appears well-developed and well-nourished.    HENT:   Head: Normocephalic.      Cardiovascular:  Normal rate.      Exam reveals edema.       2+ pitting edema to mid thigh    Pulmonary/Chest: Effort normal.        Abdominal: She exhibits abdominal incision.   Abdomen soft and mildly distended. MVL covered in abd pad with shadowing present above umbilicus. Abd pad removed. Very superficial breakdown of top 3cm of MVL noted. On left flank, small pinprick prior paracentesis site note with active clear drainage. Small drops of fluid seen when expressed             Musculoskeletal: Normal range of motion.       Neurological: She is alert and oriented to person, place, and time.    Skin: Skin is warm and dry.    Psychiatric: She has a normal mood and affect.          Lines/Drains/Airways       Peripheral Intravenous Line  Duration                  Peripheral IV - Single Lumen 03/25/24 0642 18 G Anterior;Left Forearm 4 days                    Laboratory:  Blood glucoses 180s overnight   Assessment/Plan:     * S/p CARLOS ALBERTO/BSO/OMX/Debulking/Partial liver resection/Cholecystectomy  - POD#4 from above surgery  - Pain well controlled on oral pain medication. Received oxy 5 x2 overnight  - no new labs this AM  - Total blood products: 6u  pRBC, 2 FFP, 1 cryo   - Voiding spontaneously. UOP 50cc/hr overnight  - Diabetic diet + Boost TIDWM   - VTE ppx: SCDs + Lovenox    Hypoalbuminemia  - Most recent albumin 2.1    - Boost ordered TIDWM to promote optimal nutrition   - consider nutrition consult when moved to floor if concern for persistent hypoalbuminemia      Hyponatremia  - Na 130 on 3/26  - Na 134 on 3/27  - NS discontinued  - Patient tolerating PO at this time    Acute postoperative anemia due to expected blood loss  - Preop H/H 7.3/23.8  - Total blood products: 6u pRBC (5 intra-op, 1 POD#1), 2 FFP, 1 cryo.   - Repeat CBC on POD#1 PM 8.6/25.7  - VSS   - CBC 9/27 9/27 - stable  - UOP overnight 50 cc/hr   - No repeat labs this AM        Malignant ascites  - Concern for leaking of ascites through prior paracentesis site on POD#4  - CMP ordered to assess albumin and Cr  - CTAP ordered to assess fistula formation   - Lasix 40mg ordered  - Strict I&Os. Will monitor output from paracentesis site.   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       VTE Risk Mitigation (From admission, onward)           Ordered     enoxaparin injection 40 mg  Every 24 hours         03/27/24 0750     IP VTE HIGH RISK PATIENT  Once         03/25/24 1623     Place sequential compression device  Until  discontinued         03/25/24 6040                    Was junior catheter removed? Yes    Mel Boateng MD  Gynecologic Oncology  [facility] - Oncology ([facility])

## 2024-03-29 NOTE — NURSING
Received callback from Dr. Boateng in regards to pt's left side of abdomen with clear drainage come from it. It appears to be a pinprick sized hole with clear drainage leaking. Per Dr. Boateng to place a 4x4 dressing with tape over it and she will assess it in the am. RB and verbalized. Monitoring continues.

## 2024-03-29 NOTE — ASSESSMENT & PLAN NOTE
- Concern for leaking of ascites through prior paracentesis site on POD#4  - CMP ordered to assess albumin and Cr  - CTAP ordered to assess fistula formation   - Lasix 40mg ordered  - Strict I&Os. Will monitor output from paracentesis site.   - Can consider IR drain placement pending CTAP results

## 2024-03-29 NOTE — SUBJECTIVE & OBJECTIVE
"Interval HPI:   Overnight events: Remains in 804A. POD 4. BG on higher end of goal ranges with prn SQ insulin given this morning. Diet diabetic  Calorie    Eatin- 50%  Nausea: No  Hypoglycemia and intervention: No  Fever: No  TPN and/or TF: No  If yes, type of TF/TPN and rate: n/a    /61 (BP Location: Left arm, Patient Position: Lying)   Pulse 99   Temp 98.2 °F (36.8 °C) (Oral)   Resp 18   Ht 5' 7" (1.702 m)   Wt 97.5 kg (214 lb 15.2 oz)   LMP 2015   SpO2 98%   Breastfeeding No   BMI 33.67 kg/m²     Labs Reviewed and Include    Recent Labs   Lab 24  0847   *   CALCIUM 7.4*   ALBUMIN 1.2*   PROT 4.2*   *   K 4.0   CO2 30*   CL 97   BUN 11   CREATININE 0.6   ALKPHOS 119   ALT 27   AST 56*   BILITOT 0.7     Lab Results   Component Value Date    WBC 19.73 (H) 2024    HGB 9.0 (L) 2024    HCT 27.2 (L) 2024    MCV 80 (L) 2024     2024     No results for input(s): "TSH", "FREET4" in the last 168 hours.  Lab Results   Component Value Date    HGBA1C 7.3 (H) 2024       Nutritional status:   Body mass index is 33.67 kg/m².  Lab Results   Component Value Date    ALBUMIN 1.2 (L) 2024    ALBUMIN 2.1 (L) 2024    ALBUMIN 1.8 (L) 2024     No results found for: "PREALBUMIN"    Estimated Creatinine Clearance: 121.1 mL/min (based on SCr of 0.6 mg/dL).    Accu-Checks  Recent Labs     24  1927 24  2120 24  2247 24  0747 24  0852 24  1136 24  1711 24  2101 24  0208 24  0811   POCTGLUCOSE 84 98 114* 41* 85 192* 181* 181* 185* 234*       Current Medications and/or Treatments Impacting Glycemic Control  Immunotherapy:    Immunosuppressants       None          Steroids:   Hormones (From admission, onward)      Start     Stop Route Frequency Ordered    24 1705  melatonin tablet 6 mg         -- Oral Nightly PRN 24 1623          Pressors:    Autonomic Drugs (From " admission, onward)      None          Hyperglycemia/Diabetes Medications:   Antihyperglycemics (From admission, onward)      Start     Stop Route Frequency Ordered    03/29/24 1100  insulin detemir U-100 (Levemir) pen 5 Units         -- SubQ Daily 03/29/24 1055    03/27/24 1112  insulin aspart U-100 pen 0-5 Units         -- SubQ Before meals, nightly and at 0200 PRN 03/27/24 1013

## 2024-03-29 NOTE — PLAN OF CARE
[facility] (Hospital)    HOME HEALTH ORDERS  FACE TO FACE ENCOUNTER    Patient Name: Anette Ricci  YOB: 1965    PCP: Mark Chua MD   PCP Address: [address] / [place]  PCP Phone Number: [phone]  PCP Fax: [phone]    Discharging Team(s): [facility] ENDOCRINOLOGY    Encounter Date: 03/29/2024    Admit to Home Health    Diagnoses:  Active Hospital Problems    Diagnosis  POA    *S/p CARLOS ALBERTO/BSO/OMX/Debulking/Partial liver resection/Cholecystectomy [Z90.710, Z90.79, Z90.722]  Yes    Acute postoperative anemia due to expected blood loss [D62]  No    Hyponatremia [E87.1]  Yes    Hypoalbuminemia [E88.09]  Yes    History of breast cancer in female [Z85.3]  Not Applicable    Hyperlipidemia [E78.5]  Yes    Seizure disorder [G40.909]  Yes    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       Future Appointments   Date Time Provider Department Center   4/8/2024  9:30 AM Александр Washington MD [facility] GYN ONC [facility]   4/11/2024  8:00 AM Asuncion Jurado MD [facility]   4/22/2024  9:30 AM [facility]   4/22/2024  9:45 AM [facility]   4/22/2024 10:45 AM Александр Washington MD [facility] GYN ONC [facility]   4/23/2024 11:00 AM CHAIR 13, [facility] BMT INF [facility] BMT INF [facility]           I have seen and examined this patient face to face today. My clinical findings that support the need for the home health skilled services and home bound status are the following:  Weakness/numbness causing balance and gait disturbance due to Malignancy/Cancer making it taxing to leave home.    Allergies:  Review of patient's allergies indicates:   Allergen Reactions    Codeine Other (See Comments)     Flu like s/s       Diet: regular  diet    Activities: activity as tolerated    Nursing:   SN to complete comprehensive assessment including routine vital signs. Instruct on disease process and s/s of complications to report to MD. Review/verify medication list sent home with the patient at time of discharge  and instruct patient/caregiver as needed. Frequency may be adjusted depending on start of care date.    Notify MD if SBP > 160 or < 90; DBP > 90 or < 50; HR > 120 or < 50; Temp > 101;       CONSULTS:    Physical Therapy to evaluate and treat. Evaluate for home safety and equipment needs; Establish/upgrade home exercise program. Perform / instruct on therapeutic exercises, gait training, transfer training, and Range of Motion.  Occupational Therapy to evaluate and treat. Evaluate home environment for safety and equipment needs. Perform/Instruct on transfers, ADL training, ROM, and therapeutic exercises.    Equipment: he mobility limitation cannot be sufficiently resolved by the use of a cane. Patient's functional mobility deficit can be sufficiently resolved with the use of a (Rolling Walker or Walker). Patient's mobility limitation significantly impairs their ability to participate in one of more activities of daily living. The use of a (RW or Walker) will significantly improve the patient's ability to participate in MRADLS and the patient will use it on regular basis in the home.     Medications: Review discharge medications with patient and family and provide education.      Current Discharge Medication List        CONTINUE these medications which have NOT CHANGED    Details   amlodipine (NORVASC) 10 MG tablet Take 10 mg by mouth once daily.       atorvastatin (LIPITOR) 40 MG tablet Take 80 mg by mouth once daily.      enalapril (VASOTEC) 20 MG tablet Take 20 mg by mouth once daily.      fluticasone propionate (FLONASE) 50 mcg/actuation nasal spray 1 spray by Each Nostril route once daily.      hydrochlorothiazide (HYDRODIURIL) 25 MG tablet  Take 25 mg by mouth once daily.       HYDROcodone-acetaminophen (NORCO) 5-325 mg per tablet Take 1 tablet by mouth every 6 (six) hours as needed for Pain.  Qty: 60 tablet, Refills: 0    Comments: Quantity prescribed more than 7 day supply? Yes, quantity medically necessary  Associated Diagnoses: Abdominal mass, unspecified abdominal location      ibuprofen (ADVIL,MOTRIN) 800 MG tablet Take 800 mg by mouth every 6 (six) hours as needed for Pain.      insulin glargine, TOUJEO, (TOUJEO SOLOSTAR U-300 INSULIN) 300 unit/mL (1.5 mL) InPn pen Inject 60 Units into the skin every evening.      lamoTRIgine (LAMICTAL) 25 MG tablet Take 25 mg by mouth 2 (two) times daily.      metformin (GLUCOPHAGE) 1000 MG tablet Take 1,000 mg by mouth daily with breakfast.       NOVOLOG FLEXPEN 100 unit/mL InPn pen Inject 5 Units into the skin 2 (two) times daily with meals. With biggest meal      tirzepatide (MOUNJARO) 5 mg/0.5 mL PnIj Inject 5 mg into the skin every 7 days. On Sunday.      ketorolac 0.5% (ACULAR) 0.5 % Drop Place 1 drop into both eyes. For pain after eye injections.      lorazepam (ATIVAN) 0.5 MG tablet Take 0.5 mg by mouth every 12 (twelve) hours as needed for Anxiety.      quetiapine (SEROQUEL) 25 MG Tab Take 25 mg by mouth daily as needed.             I certify that this patient is confined to her home and needs physical therapy and occupational therapy.

## 2024-03-29 NOTE — ASSESSMENT & PLAN NOTE
BG goal: 140-180  T2DM.  s/p Ex-lap/CARLOS ALBERTO/BSO/OMX/Segment 6 liver resection/Cholecystectomy/Peritoneal stripping/Diaphragm stripping. Antionette op dex.  On high doses of insulin at home.  Below goal here on much reduced regimen.  Appetite poor per patient. Basal insulin d/c as patient was hypoglycemic.    Plan:  - Start Levemir 5 units daily   - Continue Low Dose Correction Scale   - POCT Glucose before meals, at bedtime and at 2 am  - Hypoglycemia protocol in place      ** Please notify Endocrine for any change and/or advance in diet**  ** Please call Endocrine for any BG related issues **     Discharge Planning:   TBD. Please notify endocrinology prior to discharge.

## 2024-03-29 NOTE — ASSESSMENT & PLAN NOTE
- POD#4 from above surgery  - Pain well controlled on oral pain medication. Received oxy 5 x2 overnight  - no new labs this AM  - Total blood products: 6u pRBC, 2 FFP, 1 cryo   - Voiding spontaneously. UOP 50cc/hr overnight  - Diabetic diet + Boost TIDWM   - VTE ppx: SCDs + Lovenox

## 2024-03-29 NOTE — PROGRESS NOTES
"[facility] - Oncology ([facility])  Endocrinology  Progress Note    Admit Date: 3/25/2024     Reason for Consult: Management of T2DM, Hyperglycemia     Surgical Procedure and Date: CARLOS ALBERTO-BSO  3/26/24    Diabetes diagnosis year: > 5 years ago    Home Diabetes Medications:  Toujeo 60 units evening, Novolog 10 units w/ meals Breakfast and Dinner, Metformin 1000 mg , Mounjaro 7.5 mg.    How often checking glucose at home? 1-3 x day   BG readings on regimen: 100s-200s  Hypoglycemia on the regimen?  No  Missed doses on regimen?  No    Diabetes Complications include:     Hyperglycemia and Diabetic retinopathy     Complicating diabetes co morbidities:   HLD, HTN      HPI:   Patient is a 58 y.o. female with  presenting for scheduled Ex-lap/CARLOS ALBERTO/BSO/Possible partial liver resection/Debulking for ovarian neoplasm. 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 Of note, since this H&P was written, patient was admitted to GYN ONC service from 3/18-3/20 for management of leg swelling, JASON, and ascites. S/p  Ex-lap/CARLOS ALBERTO/BSO/OMX/Segment 6 liver resection/Cholecystectomy/Peritoneal stripping/Diaphragm stripping.  Endocrine consulted for bg management.     Interval HPI:   Overnight events: Remains in 804A. POD 4. BG on higher end of goal ranges with prn SQ insulin given this morning. Diet diabetic  Calorie    Eatin- 50%  Nausea: No  Hypoglycemia and intervention: No  Fever: No  TPN and/or TF: No  If yes, type of TF/TPN and rate: n/a    /61 (BP Location: Left arm, Patient Position: Lying)   Pulse 99   Temp 98.2 °F (36.8 °C) (Oral)   Resp 18   Ht 5' 7" (1.702 m)   Wt 97.5 kg (214 lb 15.2 oz)   LMP 2015   SpO2 98%   Breastfeeding No   BMI 33.67 kg/m²     Labs Reviewed and Include    Recent Labs   Lab 24  0847   *   CALCIUM 7.4*   ALBUMIN 1.2*   PROT 4.2*   *   K 4.0   CO2 30*   CL 97   BUN 11   CREATININE 0.6   ALKPHOS 119   ALT 27   AST 56* " "  BILITOT 0.7     Lab Results   Component Value Date    WBC 19.73 (H) 03/27/2024    HGB 9.0 (L) 03/27/2024    HCT 27.2 (L) 03/27/2024    MCV 80 (L) 03/27/2024     03/27/2024     No results for input(s): "TSH", "FREET4" in the last 168 hours.  Lab Results   Component Value Date    HGBA1C 7.3 (H) 03/19/2024       Nutritional status:   Body mass index is 33.67 kg/m².  Lab Results   Component Value Date    ALBUMIN 1.2 (L) 03/29/2024    ALBUMIN 2.1 (L) 03/25/2024    ALBUMIN 1.8 (L) 03/17/2024     No results found for: "PREALBUMIN"    Estimated Creatinine Clearance: 121.1 mL/min (based on SCr of 0.6 mg/dL).    Accu-Checks  Recent Labs     03/27/24  1927 03/27/24  2120 03/27/24  2247 03/28/24  0747 03/28/24  0852 03/28/24  1136 03/28/24  1711 03/28/24  2101 03/29/24  0208 03/29/24  0811   POCTGLUCOSE 84 98 114* 41* 85 192* 181* 181* 185* 234*       Current Medications and/or Treatments Impacting Glycemic Control  Immunotherapy:    Immunosuppressants       None          Steroids:   Hormones (From admission, onward)      Start     Stop Route Frequency Ordered    03/25/24 1705  melatonin tablet 6 mg         -- Oral Nightly PRN 03/25/24 1623          Pressors:    Autonomic Drugs (From admission, onward)      None          Hyperglycemia/Diabetes Medications:   Antihyperglycemics (From admission, onward)      Start     Stop Route Frequency Ordered    03/29/24 1100  insulin detemir U-100 (Levemir) pen 5 Units         -- SubQ Daily 03/29/24 1055    03/27/24 1112  insulin aspart U-100 pen 0-5 Units         -- SubQ Before meals, nightly and at 0200 PRN 03/27/24 1013            ASSESSMENT and PLAN    Cardiac/Vascular  Hyperlipidemia    Managed per primary.   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  s/p Ex-lap/CARLOS ALBERTO/BSO/OMX/Segment 6 liver resection/Cholecystectomy/Peritoneal stripping/Diaphragm stripping. Antionette op dex.  On high doses of insulin at home.  Below goal here on much reduced regimen.  Appetite poor per patient. Basal insulin d/c as patient was hypoglycemic.    Plan:  - Start Levemir 5 units daily   - Continue Low Dose Correction Scale   - POCT Glucose before meals, at bedtime and at 2 am  - Hypoglycemia protocol in place      ** Please notify Endocrine for any change and/or advance in diet**  ** Please call Endocrine for any BG related issues **     Discharge Planning:   TBD. Please notify endocrinology prior to discharge.            Beatrice Hunter, NP  Endocrinology  [facility] ([facility])

## 2024-03-29 NOTE — ASSESSMENT & PLAN NOTE
- Home regimen: Lantus 33 QHS, aspart 5u BID WM (largest meals), mounjaro, metformin 1000mg QD  - Endocrinology consulted on POD#1 to assist with BG control, appreciate assistance   - Current regimen: SSI   - Endocrine discontinued levermir on 03/28 due to episode of hypoglycemia overnight  - BG  over last 24h.

## 2024-03-29 NOTE — PLAN OF CARE
Problem: Adult Inpatient Plan of Care  Goal: Plan of Care Review  Outcome: Ongoing, Progressing  Goal: Patient-Specific Goal (Individualized)  Outcome: Ongoing, Progressing  Goal: Absence of Hospital-Acquired Illness or Injury  Outcome: Ongoing, Progressing  Goal: Optimal Comfort and Wellbeing  Outcome: Ongoing, Progressing  Goal: Readiness for Transition of Care  Outcome: Ongoing, Progressing     Problem: Adjustment to Illness (Sepsis/Septic Shock)  Goal: Optimal Coping  Outcome: Ongoing, Progressing     Problem: Glycemic Control Impaired (Sepsis/Septic Shock)  Goal: Blood Glucose Level Within Desired Range  Outcome: Ongoing, Progressing     Problem: Fall Injury Risk  Goal: Absence of Fall and Fall-Related Injury  Outcome: Ongoing, Progressing     Problem: Skin Injury Risk Increased  Goal: Skin Health and Integrity  Outcome: Ongoing, Progressing     Problem: Pain Acute  Goal: Acceptable Pain Control and Functional Ability  Outcome: Ongoing, Progressing    Pt AAOx4. RA. Blood glucose continues to be monitored before means, at bedtime and 0200; s/s insulin available if needed. Midline incision C/D/I. Pt ambulates to bathroom with walker; standby assist. 4x4 gauzed dressing placed to L side of abdomen as directed by Dr. Boateng to stop drainage (clear) from pin prick sized hole. Bed in lowest position, wheels locked, side rails up x2 and call light within reach. Spouse at bedside. Pain controlled w/ Ibuprofen, Oxy and Dilaudid. No s/s of distress noted. Monitoring continues.

## 2024-03-30 VITALS
OXYGEN SATURATION: 99 % | DIASTOLIC BLOOD PRESSURE: 56 MMHG | HEIGHT: 67 IN | SYSTOLIC BLOOD PRESSURE: 122 MMHG | TEMPERATURE: 99 F | RESPIRATION RATE: 20 BRPM | BODY MASS INDEX: 33.1 KG/M2 | WEIGHT: 210.88 LBS | HEART RATE: 99 BPM

## 2024-03-30 LAB
ANION GAP SERPL CALC-SCNC: 5 MMOL/L (ref 8–16)
BUN SERPL-MCNC: 10 MG/DL (ref 6–20)
CA-I BLDV-SCNC: 0.95 MMOL/L (ref 1.06–1.42)
CALCIUM SERPL-MCNC: 6.8 MG/DL (ref 8.7–10.5)
CHLORIDE SERPL-SCNC: 98 MMOL/L (ref 95–110)
CO2 SERPL-SCNC: 29 MMOL/L (ref 23–29)
CREAT SERPL-MCNC: 0.6 MG/DL (ref 0.5–1.4)
EST. GFR  (NO RACE VARIABLE): >60 ML/MIN/1.73 M^2
GLUCOSE SERPL-MCNC: 126 MG/DL (ref 70–110)
POCT GLUCOSE: 174 MG/DL (ref 70–110)
POCT GLUCOSE: 208 MG/DL (ref 70–110)
POCT GLUCOSE: 209 MG/DL (ref 70–110)
POTASSIUM SERPL-SCNC: 3.9 MMOL/L (ref 3.5–5.1)
SODIUM SERPL-SCNC: 132 MMOL/L (ref 136–145)

## 2024-03-30 PROCEDURE — 94760 N-INVAS EAR/PLS OXIMETRY 1: CPT

## 2024-03-30 PROCEDURE — 25000003 PHARM REV CODE 250: Performed by: STUDENT IN AN ORGANIZED HEALTH CARE EDUCATION/TRAINING PROGRAM

## 2024-03-30 PROCEDURE — 20600001 HC STEP DOWN PRIVATE ROOM

## 2024-03-30 PROCEDURE — 25000242 PHARM REV CODE 250 ALT 637 W/ HCPCS

## 2024-03-30 PROCEDURE — 80048 BASIC METABOLIC PNL TOTAL CA: CPT

## 2024-03-30 PROCEDURE — 25000003 PHARM REV CODE 250

## 2024-03-30 PROCEDURE — 82330 ASSAY OF CALCIUM: CPT

## 2024-03-30 PROCEDURE — 97530 THERAPEUTIC ACTIVITIES: CPT | Mod: CO

## 2024-03-30 PROCEDURE — 99232 SBSQ HOSP IP/OBS MODERATE 35: CPT | Mod: ,,, | Performed by: NURSE PRACTITIONER

## 2024-03-30 PROCEDURE — 87186 SC STD MICRODIL/AGAR DIL: CPT

## 2024-03-30 PROCEDURE — 87086 URINE CULTURE/COLONY COUNT: CPT

## 2024-03-30 PROCEDURE — 63600175 PHARM REV CODE 636 W HCPCS: Performed by: STUDENT IN AN ORGANIZED HEALTH CARE EDUCATION/TRAINING PROGRAM

## 2024-03-30 PROCEDURE — 87088 URINE BACTERIA CULTURE: CPT

## 2024-03-30 PROCEDURE — 87077 CULTURE AEROBIC IDENTIFY: CPT

## 2024-03-30 PROCEDURE — 97535 SELF CARE MNGMENT TRAINING: CPT | Mod: CO

## 2024-03-30 PROCEDURE — 36415 COLL VENOUS BLD VENIPUNCTURE: CPT

## 2024-03-30 PROCEDURE — 63600175 PHARM REV CODE 636 W HCPCS

## 2024-03-30 RX ORDER — INSULIN LISPRO 100 [IU]/ML
INJECTION, SOLUTION INTRAVENOUS; SUBCUTANEOUS
Qty: 10 ML | Refills: 1 | Status: SHIPPED | OUTPATIENT
Start: 2024-03-30 | End: 2024-03-30

## 2024-03-30 RX ORDER — INSULIN DETEMIR 100 [IU]/ML
5 INJECTION, SOLUTION SUBCUTANEOUS NIGHTLY
Qty: 4.5 ML | Refills: 3 | Status: SHIPPED | OUTPATIENT
Start: 2024-03-30 | End: 2024-03-30

## 2024-03-30 RX ORDER — HYDROCODONE BITARTRATE AND ACETAMINOPHEN 5; 325 MG/1; MG/1
1 TABLET ORAL EVERY 6 HOURS PRN
Qty: 30 TABLET | Refills: 0 | Status: SHIPPED | OUTPATIENT
Start: 2024-03-30 | End: 2024-04-03 | Stop reason: ALTCHOICE

## 2024-03-30 RX ORDER — HYDROCODONE BITARTRATE AND ACETAMINOPHEN 5; 325 MG/1; MG/1
1 TABLET ORAL EVERY 6 HOURS PRN
Qty: 30 TABLET | Refills: 0 | Status: SHIPPED | OUTPATIENT
Start: 2024-03-30 | End: 2024-03-30

## 2024-03-30 RX ORDER — INSULIN LISPRO 100 [IU]/ML
INJECTION, SOLUTION INTRAVENOUS; SUBCUTANEOUS
Qty: 10 ML | Refills: 1 | OUTPATIENT
Start: 2024-03-30

## 2024-03-30 RX ORDER — FUROSEMIDE 10 MG/ML
40 INJECTION INTRAMUSCULAR; INTRAVENOUS ONCE
Status: COMPLETED | OUTPATIENT
Start: 2024-03-30 | End: 2024-03-30

## 2024-03-30 RX ORDER — IBUPROFEN 600 MG/1
600 TABLET ORAL EVERY 6 HOURS PRN
Qty: 30 TABLET | Refills: 1 | Status: SHIPPED | OUTPATIENT
Start: 2024-03-30 | End: 2024-05-21 | Stop reason: SDUPTHER

## 2024-03-30 RX ORDER — IBUPROFEN 600 MG/1
600 TABLET ORAL EVERY 6 HOURS PRN
Qty: 30 TABLET | Refills: 1 | Status: SHIPPED | OUTPATIENT
Start: 2024-03-30 | End: 2024-03-30

## 2024-03-30 RX ORDER — SIMETHICONE 80 MG
2 TABLET,CHEWABLE ORAL ONCE
Status: COMPLETED | OUTPATIENT
Start: 2024-03-30 | End: 2024-03-30

## 2024-03-30 RX ORDER — INSULIN DETEMIR 100 [IU]/ML
5 INJECTION, SOLUTION SUBCUTANEOUS NIGHTLY
Qty: 4.5 ML | Refills: 3 | Status: ON HOLD | OUTPATIENT
Start: 2024-03-30 | End: 2024-04-08

## 2024-03-30 RX ADMIN — IBUPROFEN 600 MG: 600 TABLET, FILM COATED ORAL at 02:03

## 2024-03-30 RX ADMIN — INSULIN DETEMIR 5 UNITS: 100 INJECTION, SOLUTION SUBCUTANEOUS at 09:03

## 2024-03-30 RX ADMIN — GUAIFENESIN 600 MG: 600 TABLET, EXTENDED RELEASE ORAL at 09:03

## 2024-03-30 RX ADMIN — IBUPROFEN 600 MG: 600 TABLET, FILM COATED ORAL at 09:03

## 2024-03-30 RX ADMIN — LAMOTRIGINE 25 MG: 25 TABLET ORAL at 09:03

## 2024-03-30 RX ADMIN — OXYCODONE HYDROCHLORIDE 2.5 MG: 5 SOLUTION ORAL at 09:03

## 2024-03-30 RX ADMIN — SIMETHICONE 160 MG: 80 TABLET, CHEWABLE ORAL at 09:03

## 2024-03-30 RX ADMIN — ENOXAPARIN SODIUM 40 MG: 40 INJECTION SUBCUTANEOUS at 04:03

## 2024-03-30 RX ADMIN — FUROSEMIDE 40 MG: 10 INJECTION, SOLUTION INTRAVENOUS at 12:03

## 2024-03-30 RX ADMIN — IBUPROFEN 600 MG: 600 TABLET, FILM COATED ORAL at 03:03

## 2024-03-30 RX ADMIN — SENNOSIDES 8.6 MG: 8.6 TABLET, FILM COATED ORAL at 09:03

## 2024-03-30 RX ADMIN — INSULIN ASPART 1 UNITS: 100 INJECTION, SOLUTION INTRAVENOUS; SUBCUTANEOUS at 09:03

## 2024-03-30 RX ADMIN — OXYCODONE HYDROCHLORIDE 2.5 MG: 5 SOLUTION ORAL at 02:03

## 2024-03-30 RX ADMIN — ATORVASTATIN CALCIUM 80 MG: 40 TABLET, FILM COATED ORAL at 09:03

## 2024-03-30 RX ADMIN — INSULIN ASPART 2 UNITS: 100 INJECTION, SOLUTION INTRAVENOUS; SUBCUTANEOUS at 12:03

## 2024-03-30 NOTE — PLAN OF CARE
Problem: Adult Inpatient Plan of Care  Goal: Plan of Care Review  Outcome: Ongoing, Progressing  Goal: Patient-Specific Goal (Individualized)  Outcome: Ongoing, Progressing  Goal: Absence of Hospital-Acquired Illness or Injury  Outcome: Ongoing, Progressing  Goal: Optimal Comfort and Wellbeing  Outcome: Ongoing, Progressing  Goal: Readiness for Transition of Care  Outcome: Ongoing, Progressing     Problem: Adjustment to Illness (Sepsis/Septic Shock)  Goal: Optimal Coping  Outcome: Ongoing, Progressing     Problem: Glycemic Control Impaired (Sepsis/Septic Shock)  Goal: Blood Glucose Level Within Desired Range  Outcome: Ongoing, Progressing     Problem: Infection Progression (Sepsis/Septic Shock)  Goal: Absence of Infection Signs and Symptoms  Outcome: Ongoing, Progressing     Problem: Fall Injury Risk  Goal: Absence of Fall and Fall-Related Injury  Outcome: Ongoing, Progressing     Problem: Skin Injury Risk Increased  Goal: Skin Health and Integrity  Outcome: Ongoing, Progressing     Problem: Pain Acute  Goal: Acceptable Pain Control and Functional Ability  Outcome: Ongoing, Progressing     Patient ambulated in swan with sister, pain controlled. Addressed patients pain, pump, position, need for potty, and possessions in reach. Patient remains free of falls, and verbalizes understanding in fall prevention and safety. Safety measures remain in place. Reinforced fall prevention and safety education. Call light and personal belongings within reach, bed in lowest position with wheels locked, side rails up x2, Instructed to call with any needs or complaints, verbalized understanding. Continue current plan of care.

## 2024-03-30 NOTE — ASSESSMENT & PLAN NOTE
- POD#4 from above surgery  - Pain well controlled on oral pain medication. Received oxy 5 x2 overnight  - Total blood products: 6u pRBC, 2 FFP, 1 cryo   - Voiding spontaneously. UOP 50cc/hr overnight  - Diabetic diet + Boost TIDWM   - VTE ppx: SCDs + Lovenox

## 2024-03-30 NOTE — ASSESSMENT & PLAN NOTE
- Concern for leaking of ascites through prior paracentesis site on POD#4  - 80cc of output over last 24 hrs  - CTAP yesterday notable for moderate ascites  - Lasix 40mg x2 with appropriate UOP  - Strict I&Os. Will monitor output from paracentesis site.

## 2024-03-30 NOTE — PLAN OF CARE
Plan of care reviewed with patient and .  Fall precautions maintained, side rails upx2, call light in reach, bed in low position and locked, nonskid socks on.  Patient sat up today in her chair at the bedside for hours.  Need assistance getting up to the bedside commode.  Had ct of abdomen and pelvis today. Voiding without difficulty.

## 2024-03-30 NOTE — PROGRESS NOTES
"[facility] ([facility])  Endocrinology  Progress Note    Admit Date: 3/25/2024     Reason for Consult: Management of T2DM, Hyperglycemia     Surgical Procedure and Date: CARLOS ALBERTO-BSO  3/26/24    Diabetes diagnosis year: > 5 years ago    Home Diabetes Medications:  Toujeo 60 units evening, Novolog 10 units w/ meals Breakfast and Dinner, Metformin 1000 mg , Mounjaro 7.5 mg.    How often checking glucose at home? 1-3 x day   BG readings on regimen: 100s-200s  Hypoglycemia on the regimen?  No  Missed doses on regimen?  No    Diabetes Complications include:     Hyperglycemia and Diabetic retinopathy     Complicating diabetes co morbidities:   HLD, HTN      HPI:   Patient is a 58 y.o. female with  presenting for scheduled Ex-lap/CARLOS ALBERTO/BSO/Possible partial liver resection/Debulking for ovarian neoplasm. 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 Of note, since this H&P was written, patient was admitted to GYN ONC service from 3/18-3/20 for management of leg swelling, JASON, and ascites. S/p  Ex-lap/CARLOS ALBERTO/BSO/OMX/Segment 6 liver resection/Cholecystectomy/Peritoneal stripping/Diaphragm stripping.  Endocrine consulted for bg management.     Interval HPI:   Overnight events: Remains in 804A. POD 5. BG within goal ranges on current SQ insulin regimen (basal and prn correction scale). Diet diabetic  Calorie    Eatin%  Nausea: No  Hypoglycemia and intervention: No  Fever: No  TPN and/or TF: No  If yes, type of TF/TPN and rate: n/a    /60 (BP Location: Left arm, Patient Position: Lying)   Pulse 92   Temp 97.8 °F (36.6 °C) (Oral)   Resp 18   Ht 5' 7" (1.702 m)   Wt 95.7 kg (210 lb 13.9 oz)   LMP 2015   SpO2 99%   Breastfeeding No   BMI 33.03 kg/m²     Labs Reviewed and Include    Recent Labs   Lab 24  0458   *   CALCIUM 6.8*   *   K 3.9   CO2 29   CL 98   BUN 10   CREATININE 0.6     Lab Results   Component Value Date    WBC " "19.73 (H) 03/27/2024    HGB 9.0 (L) 03/27/2024    HCT 27.2 (L) 03/27/2024    MCV 80 (L) 03/27/2024     03/27/2024     No results for input(s): "TSH", "FREET4" in the last 168 hours.  Lab Results   Component Value Date    HGBA1C 7.3 (H) 03/19/2024       Nutritional status:   Body mass index is 33.03 kg/m².  Lab Results   Component Value Date    ALBUMIN 1.2 (L) 03/29/2024    ALBUMIN 2.1 (L) 03/25/2024    ALBUMIN 1.8 (L) 03/17/2024     No results found for: "PREALBUMIN"    Estimated Creatinine Clearance: 119.9 mL/min (based on SCr of 0.6 mg/dL).    Accu-Checks  Recent Labs     03/28/24  0852 03/28/24  1136 03/28/24  1711 03/28/24  2101 03/29/24  0208 03/29/24  0811 03/29/24  1214 03/29/24  1628 03/29/24  2046 03/30/24  0220   POCTGLUCOSE 85 192* 181* 181* 185* 234* 181* 182* 198* 174*       Current Medications and/or Treatments Impacting Glycemic Control  Immunotherapy:    Immunosuppressants       None          Steroids:   Hormones (From admission, onward)      Start     Stop Route Frequency Ordered    03/25/24 1705  melatonin tablet 6 mg         -- Oral Nightly PRN 03/25/24 1623          Pressors:    Autonomic Drugs (From admission, onward)      None          Hyperglycemia/Diabetes Medications:   Antihyperglycemics (From admission, onward)      Start     Stop Route Frequency Ordered    03/29/24 1100  insulin detemir U-100 (Levemir) pen 5 Units         -- SubQ Daily 03/29/24 1055    03/27/24 1112  insulin aspart U-100 pen 0-5 Units         -- SubQ Before meals, nightly and at 0200 PRN 03/27/24 1013            ASSESSMENT and PLAN    Cardiac/Vascular  Hyperlipidemia    Managed per primary.   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  s/p Ex-lap/CARLOS ALBERTO/BSO/OMX/Segment 6 liver " resection/Cholecystectomy/Peritoneal stripping/Diaphragm stripping. Antionette op dex.  On high doses of insulin at home.  Below goal here on much reduced regimen.  Appetite improving some.     Plan:  - Continue Levemir 5 units daily   - Continue Low Dose Correction Scale   - POCT Glucose before meals, at bedtime and at 2 am  - Hypoglycemia protocol in place      ** Please notify Endocrine for any change and/or advance in diet**  ** Please call Endocrine for any BG related issues **     Discharge Planning:   TBD. Please notify endocrinology prior to discharge.            Beatrice Hunter NP  Endocrinology  [facility] ([facility])

## 2024-03-30 NOTE — ASSESSMENT & PLAN NOTE
BG goal: 140-180  T2DM.  s/p Ex-lap/CARLOS ALBERTO/BSO/OMX/Segment 6 liver resection/Cholecystectomy/Peritoneal stripping/Diaphragm stripping. Antionette op dex.  On high doses of insulin at home.  Below goal here on much reduced regimen.  Appetite improving some.     Plan:  - Continue Levemir 5 units daily   - Continue Low Dose Correction Scale   - POCT Glucose before meals, at bedtime and at 2 am  - Hypoglycemia protocol in place      ** Please notify Endocrine for any change and/or advance in diet**  ** Please call Endocrine for any BG related issues **     Discharge Planning:   TBD. Please notify endocrinology prior to discharge.

## 2024-03-30 NOTE — PROGRESS NOTES
ANDREY notified that patient will dc today and needs HH arranged. ANDREY spoke to patient, explained HH. Patient open to HH but request SW choose an in network agency.     ANDREY reached out to [facility], answering service explained SW will receive a call back. No call received, ANDREY called again to request a call back.    ANDREY notified patient who decided she did not need HH. She did request a walker because her floors a slippery. [facility] contacted and working with MD to obtain the walker.     [facility] spoke to patient, co-pay and delivery will be set up on Monday.

## 2024-03-30 NOTE — PT/OT/SLP PROGRESS
Occupational Therapy   Treatment  A client care conference was completed by the OTR and the SAHU prior to treatment by the OTR to discuss the patient's POC and current status.     Name: Anette Ricci  MRN: [id number]  Admitting Diagnosis:  Status post total abdominal hysterectomy and bilateral salpingo-oophorectomy (CARLOS ALBERTO-BSO)  5 Days Post-Op    Recommendations:     Discharge Recommendations: Low Intensity Therapy  Discharge Equipment Recommendations:  none  Barriers to discharge:  None    Assessment:     Anette Ricci is a 58 y.o. female with a medical diagnosis of Status post total abdominal hysterectomy and bilateral salpingo-oophorectomy (CARLOS ALBERTO-BSO).  She presents with the following performance deficits affecting function are weakness, impaired endurance, impaired self care skills, impaired functional mobility, decreased lower extremity function, decreased ROM, decreased safety awareness, pain.   Pt demonstrated ability to perform showering in standing with no LOB and requires varying levels of  assistance for B LE's. Edema noted of B LEs and decreased ROM of B LE's. Functional mobility performed from bathroom >chair with no LOB observed and slightly forward flexed posture.     Rehab Prognosis:  Good; patient would benefit from acute skilled OT services to address these deficits and reach maximum level of function.       Plan:     Patient to be seen 3 x/week to address the above listed problems via self-care/home management, therapeutic activities, therapeutic exercises  Plan of Care Expires:    Plan of Care Reviewed with: patient, spouse, family    Subjective     Chief Complaint: Pt reports her RW she was given for knee surgery is not sturdy enough  Patient/Family Comments/goals: Pt says she needs a RW for home and would like dressings to be changed after completion of shower  Pain/Comfort:  Pain Rating 1: other (see comments) (Not provided by pt)  Location - Orientation 1: generalized  Location 1:  abdomen    Objective:     Communicated with: Nurse (Randi) prior to session.  Patient found  standing in shower  with  (no active lines) and spouse present in bathroom upon OT entry to room. Mepilex dressings on heels of B feet and sacral spine region. Family member, a lady present in room. Incision of abdominal region appears to be healing well with no drainage.    General Precautions: Standard, fall    Orthopedic Precautions: (s/p CARLOS ALBERTO)  Braces: N/A  Respiratory Status: Room air     Occupational Performance:     Bed Mobility:    Not assessed this date 2/2 pt found in shower     Functional Mobility/Transfers:  Patient completed Sit <> Stand Transfer with stand by assistance  with  rolling walker x 2 trials  Patient completed  Shower Transfer Step Transfer technique with stand by assistance with no AD over threshold  Pt performed static standing with grab bars in shower while drying of B LE's and back  Functional Mobility: Pt performed in room mobility with RW SBA (bathroom > chair) to simulate household/community distances to improve safety, endurance, static/dynamic standing balance, and be able to perform occupations of choice.     Activities of Daily Living:  Grooming: pt reports she brushed teeth prior to session   Bathing: pt performed showering in standing with varying levels of assistance, SBA to maximal assistance to dry B LEs in standing  Upper Body Dressing: minimum assistance to tie back of  gown standing  Lower Body Dressing: total assistance to don socks on B feet      AMPAC 6 Click ADL: 18    Treatment & Education:  Pt performed bed mobility, functional mobility/transfers, and ADLs as documented above.   Treatment session focused on functional mobility, transfers, and ADLs.  Pt educated and instructed on the following:  OT POC  Role of SAHU  Use of call bell for all assistance  Log roll for bed mobility 2/2 abdominal incision and to ensure proper body mechanics  Addressed questions and /or concerns  within SAHU scope of practice  Pt and family verbalized understanding     Patient left up in chair with call button in reach, nurse notified about pt's request for dressing changes, and pt's nurse present in room for dressing changes. Pt's family present in room and appears in NAD.    GOALS:   Multidisciplinary Problems       Occupational Therapy Goals          Problem: Occupational Therapy    Goal Priority Disciplines Outcome Interventions   Occupational Therapy Goal     OT, PT/OT Ongoing, Progressing    Description: Goals to be met by: 4/27/24     Patient will increase functional independence with ADLs by performing:    UE Dressing with Modified [place].  LE Dressing with Modified [place].  Grooming while standing at sink with Modified [place].  Toileting from toilet with Modified [place] for hygiene and clothing management.   Supine to sit with Modified [place].  Step transfer with Modified [place]  Toilet transfer to toilet with Modified [place].                         Time Tracking:     OT Date of Treatment: 03/30/24  OT Start Time: 1025  OT Stop Time: 1050  OT Total Time (min): 25 min    Billable Minutes:Self Care/Home Management 15  Therapeutic Activity 10    OT/ZAHEER: ZAHEER     Number of ZAHEER visits since last OT visit: 1    3/30/2024

## 2024-03-30 NOTE — PROGRESS NOTES
[facility] ([facility])  Gynecologic Oncology  Progress Note      Patient Name: Anette Ricci  MRN: [id number]  Admission Date: 3/25/2024  Hospital Length of Stay: 5 days  Attending Provider: Александр Washington MD  Primary Care Provider: Mark Chua MD  Principal Problem: Status post total abdominal hysterectomy and bilateral salpingo-oophorectomy (CARLOS ALBERTO-BSO)    Follow-up For: Procedure(s) (LRB):  LAPAROTOMY, EXPLORATORY (N/A)  HYSTERECTOMY, TOTAL, ABDOMINAL (N/A)  SALPINGO-OOPHORECTOMY, BILATERAL (N/A)  OMENTECTOMY (N/A)  DEBULKING, NEOPLASM (N/A)  EXCISION, LIVER - partial (N/A)  CHOLECYSTECTOMY  Post-Operative Day: 5 Days Post-Op  Subjective:      History of Present Illness:  Anette Ricci is 58 y.o.  presenting for scheduled Ex-lap/CARLOS ALBERTO/BSO/Possible partial liver resection/Debulking for ovarian neoplasm. 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 Of note, since this H&P was written, patient was admitted to GYN ONC service from 3/18-3/20 for management of leg swelling, JASON, and ascites. She underwent workup for DVT which was negative. She also had IR guided paracentesis with removal of 3.5L of malignant ascites on 3/19. She has been doing well since discharge and is ready to proceed with surgery today as planned.     Hospital Course:  2024 POD#1 s/p Ex-lap/CARLOS ALBERTO/BSO/OMX/Segment 6 liver resection/Cholecystectomy/Peritoneal stripping/Diaphragm stripping. Transported to ICU immediately postop for close monitoring overnight. Pain well controlled overnight. Denies n/v. Tolerated bites of pudding and sips of water. Has not ambulated. Endorses some chest congestion. UOP 0.8 cc/kg/hr overnight. BG elevated to 200s overnight, SSI ordered.   2024 POD#2. NAEON. Stepped down to floor on PM POD#1. Pain well controlled. Tolerating regular diet, appetite still diminished. Ambulating minimally around room. Failed void trial on POD#1,  junior replaced overnight. UOP 0.3cc/kg/hr overnight. Endocrine consulted for BG control.  03/28/2024 POD#3. NAEON. Patient resting comfortably in bed. Pain is well controlled with pain medications. She is tolerating PO without nausea or vomiting. She is voiding via junior catheter. UOP 40 cc/hr overnight  03/29/2024 POD#4: NAEON. Patient resting comfortably in bed. Pain well controlled with pain medications. Tolerating PO without nausea or vomiting. She is voiding spontaneously. UOP 45cc/hr overnight. New found leaking of ascites through old paracentesis spot. Noticeable serosanguinous draiange from top 3cm of MVL. Will obtain CTAP to further assess physical exam findings. CMP ordered. Lasix 40mg.    03/30/2024 POD#5: NAEON. Patient resting comfortably in bed. Pain well controlled with pain medications. Tolerating PO without nausea or vomiting. She is voiding spontaneously. UOP 80cc/hr for last 24 hours. 80cc output over last 24 hrs from paracentesis site. Wound similar to yesterday. Will re-eval in PM.       Interval History: Resting comfortably in bed. Tolerating PO without nausea/vomiting. Reports some ongoing hematuria since junior removal. + bowel fnc. Pain well controlled with po pain meds.    Scheduled Meds:   acetaminophen  1,000 mg Oral Q6H    atorvastatin  80 mg Oral Daily    enoxparin  40 mg Subcutaneous Q24H (prophylaxis, 1700)    fluticasone propionate  1 spray Each Nostril Daily    furosemide (LASIX) injection  40 mg Intravenous Once    guaiFENesin  600 mg Oral BID    ibuprofen  600 mg Oral Q6H    insulin detemir U-100  5 Units Subcutaneous Daily    lamoTRIgine  25 mg Oral BID    senna  8.6 mg Oral BID     Continuous Infusions:  PRN Meds:dextrose 10%, dextrose 10%, glucagon (human recombinant), glucose, glucose, hydrALAZINE, HYDROmorphone, insulin aspart U-100, LORazepam, melatonin, naloxone, ondansetron, oxyCODONE, prochlorperazine    Review of patient's allergies indicates:   Allergen Reactions     Codeine Other (See Comments)     Flu like s/s       Objective:     Vital Signs (Most Recent):  Temp: 97.9 °F (36.6 °C) (03/30/24 1133)  Pulse: 92 (03/30/24 1133)  Resp: 20 (03/30/24 1133)  BP: (!) 117/59 (03/30/24 1133)  SpO2: 100 % (03/30/24 1133) Vital Signs (24h Range):  Temp:  [97.6 °F (36.4 °C)-98.4 °F (36.9 °C)] 97.9 °F (36.6 °C)  Pulse:  [90-99] 92  Resp:  [16-20] 20  SpO2:  [93 %-100 %] 100 %  BP: (106-121)/(55-72) 117/59     Weight: 95.7 kg (210 lb 13.9 oz)  Body mass index is 33.03 kg/m².    Intake/Output - Last 3 Shifts         03/28 0700  03/29 0659 03/29 0700  03/30 0659 03/30 0700  03/31 0659    P.O. 770 740 240    Total Intake(mL/kg) 770 (7.9) 740 (7.7) 240 (2.5)    Urine (mL/kg/hr) 1450 (0.6) 2150 (0.9) 400 (0.9)    Stool 0 80 100    Total Output 1450 2230 500    Net -680 -1490 -260           Urine Occurrence 1 x      Stool Occurrence 1 x 1 x           Bladder Scan Volume (mL): 25 mL (03/26/24 1630)       Physical Exam:   Constitutional: She is oriented to person, place, and time. She appears well-developed and well-nourished.    HENT:   Head: Normocephalic.      Cardiovascular:  Normal rate.             Pulmonary/Chest: Effort normal.        Abdominal: Soft. She exhibits abdominal incision. There is abdominal tenderness. There is no guarding.   Abdomen soft and mildly distended. MVL covered in abd pad with shadowing present above umbilicus. Abd pad removed. Very superficial breakdown of top 3cm of MVL noted. On left flank, small pinprick prior paracentesis site note with active clear drainage into colostomy bag.             Musculoskeletal: Normal range of motion.       Neurological: She is alert and oriented to person, place, and time.    Skin: Skin is warm and dry.    Psychiatric: She has a normal mood and affect.          Lines/Drains/Airways       Drain  Duration                  Colostomy 03/29/24 1740 LUQ <1 day              Peripheral Intravenous Line  Duration                  Peripheral IV  - Single Lumen 03/25/24 0642 18 G Anterior;Left Forearm 5 days                    Laboratory:  BMP  Lab Results   Component Value Date     (L) 03/30/2024    K 3.9 03/30/2024    CL 98 03/30/2024    CO2 29 03/30/2024    BUN 10 03/30/2024    CREATININE 0.6 03/30/2024    CALCIUM 6.8 (LL) 03/30/2024    ANIONGAP 5 (L) 03/30/2024    EGFRNORACEVR >60.0 03/30/2024         Diagnostic Results:  CT Abdomen Pelvis  Without Contrast  Order: [id number]  Status: Final result       Visible to patient: Yes (not seen)       Next appt: 04/08/2024 at 09:30 AM in Gynecologic Oncology (Александр Washington MD)    0 Result Notes  Details    Reading Physician Reading Date Result Priority   Abdullahi Herrera MD  [phone] 3/29/2024 STAT     Narrative & Impression  EXAMINATION:  CT ABDOMEN PELVIS WITHOUT CONTRAST     CLINICAL HISTORY:  Abdominal abscess/infection suspected;     TECHNIQUE:  Low dose axial images, sagittal and coronal reformations were obtained from the lung bases to the pubic symphysis.  Contrast was not administered.     COMPARISON:  CT abdomen pelvis from 02/14/2024     FINDINGS:  Inferior thorax: Partially calcified focus within the right breast measuring 2.1 x 3.0 cm similar to prior exam.     Lung Bases: Small bilateral pleural effusions with atelectasis of the lower lobes bilaterally.     Liver: Normal in size and attenuation, with no focal hepatic lesions.     Gallbladder: Not definitively visualized.     Bile Ducts: No evidence of dilated ducts.     Pancreas: No mass or peripancreatic fat stranding.     Spleen: Unremarkable.     Adrenals: Unremarkable.     Kidneys/ Ureters: No hydronephrosis.  No renal stones.  No significant abnormalities.     Bladder: No evidence of wall thickening.     Reproductive organs: Unremarkable.     GI Tract/Mesentery: No evidence of bowel obstruction or inflammation.     Moderate volume of ascites with scattered free air, findings may be iatrogenic from prior paracentesis versus  perforation.     Previously identified peritoneal thickening and peritoneal mass is not visualized on today's exam.     Lymph nodes: No lymphadenopathy.     Vasculature: No aneurysm.  Moderate calcific atherosclerosis.     Abdominal wall: Diffuse body wall anasarca.  Postsurgical changes of the midline.     Bones: No acute fracture.     Impression:     Moderate volume of ascites with scattered intraperitoneal free air.  Findings may be iatrogenic from prior paracentesis versus perforation.  Clinical correlation is recommended.     Previously identified mesenteric masses and peritoneal thickening is not well appreciated on today's exam.     Previously identified right hepatic hypodensity is not well appreciated on today's exam.     Small bilateral pleural effusions, increased compared to prior exam from 02/14/2024.     Additional findings as described above.     This report was flagged in Epic as abnormal.        Electronically signed by: Abdullahi Herrera MD  Date:                                            03/29/2024  Time:                                           16:06           Exam Ended: 03/29/24 15:41 CDT Last Resulted: 03/29/24 16:06 CDT           Assessment/Plan:     * S/p CARLOS ALBERTO/BSO/OMX/Debulking/Partial liver resection/Cholecystectomy  - POD#4 from above surgery  - Pain well controlled on oral pain medication. Received oxy 5 x2 overnight  - Total blood products: 6u pRBC, 2 FFP, 1 cryo   - Voiding spontaneously. UOP 50cc/hr overnight  - Diabetic diet + Boost TIDWM   - VTE ppx: SCDs + Lovenox    Hypoalbuminemia  - Most recent albumin 2.1    - Boost ordered TIDWM to promote optimal nutrition   - consider nutrition consult when moved to floor if concern for persistent hypoalbuminemia      Hyponatremia  - Na 130 on 3/26  - Na 134 on 3/27  - NS discontinued  - Patient tolerating PO at this time    Acute postoperative anemia due to expected blood loss  - Preop H/H 7.3/23.8  - Total blood products: 6u pRBC (5  intra-op, 1 POD#1), 2 FFP, 1 cryo.   - Repeat CBC on POD#1 PM 8.6/25.7  - VSS   - CBC 9/27 9/27 - stable  - UOP overnight 50 cc/hr   - No repeat labs this AM        Malignant ascites  - Concern for leaking of ascites through prior paracentesis site on POD#4  - 80cc of output over last 24 hrs  - CTAP yesterday notable for moderate ascites  - Lasix 40mg x2 with appropriate UOP  - Strict I&Os. Will monitor output from paracentesis site.     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     Mobility limitation requiring walker  -The mobility limitation cannot be sufficiently resolved by the use of a cane. Patient's functional mobility deficit can be sufficiently resolved with the use of a (Rolling Walker or Walker). Patient's mobility limitation significantly impairs their ability to participate in one of more activities of daily living. The use of a (RW or Walker) will significantly improve the patient's ability to participate in MRADLS and the patient will use it on regular basis in the home.       VTE Risk Mitigation (From admission, onward)           Ordered     enoxaparin injection 40 mg  Every 24 hours         03/27/24 0750     IP VTE HIGH RISK PATIENT  Once         03/25/24 1623     Place sequential compression device  Until discontinued         03/25/24 1623                    Was junior catheter removed? Yes    Mel Boateng  MD  Gynecologic Oncology  [facility] ([facility])

## 2024-03-30 NOTE — PLAN OF CARE
No acute events this shift, pt AAOX4, VSS, afebrile, medications tolerated, family at bedside, pt ambulates to bathroom, hat in toilet, I/O recorded, blood glucose monitored, SCD's applied, pain medication given, medication effective, tylenol refused, extension given on IV, pt in bed resting, call light in reach, pt instructed to call for assistance, NAD, safety maintained

## 2024-03-30 NOTE — SUBJECTIVE & OBJECTIVE
"Interval HPI:   Overnight events: Remains in 804A. POD 5. BG within goal ranges on current SQ insulin regimen (basal and prn correction scale). Diet diabetic 2000 Calorie    Eatin%  Nausea: No  Hypoglycemia and intervention: No  Fever: No  TPN and/or TF: No  If yes, type of TF/TPN and rate: n/a    /60 (BP Location: Left arm, Patient Position: Lying)   Pulse 92   Temp 97.8 °F (36.6 °C) (Oral)   Resp 18   Ht 5' 7" (1.702 m)   Wt 95.7 kg (210 lb 13.9 oz)   LMP 2015   SpO2 99%   Breastfeeding No   BMI 33.03 kg/m²     Labs Reviewed and Include    Recent Labs   Lab 24  0458   *   CALCIUM 6.8*   *   K 3.9   CO2 29   CL 98   BUN 10   CREATININE 0.6     Lab Results   Component Value Date    WBC 19.73 (H) 2024    HGB 9.0 (L) 2024    HCT 27.2 (L) 2024    MCV 80 (L) 2024     2024     No results for input(s): "TSH", "FREET4" in the last 168 hours.  Lab Results   Component Value Date    HGBA1C 7.3 (H) 2024       Nutritional status:   Body mass index is 33.03 kg/m².  Lab Results   Component Value Date    ALBUMIN 1.2 (L) 2024    ALBUMIN 2.1 (L) 2024    ALBUMIN 1.8 (L) 2024     No results found for: "PREALBUMIN"    Estimated Creatinine Clearance: 119.9 mL/min (based on SCr of 0.6 mg/dL).    Accu-Checks  Recent Labs     24  0852 24  1136 24  1711 24  2101 24  0208 24  0811 24  1214 24  1628 24  2046 24  0220   POCTGLUCOSE 85 192* 181* 181* 185* 234* 181* 182* 198* 174*       Current Medications and/or Treatments Impacting Glycemic Control  Immunotherapy:    Immunosuppressants       None          Steroids:   Hormones (From admission, onward)      Start     Stop Route Frequency Ordered    24 1705  melatonin tablet 6 mg         -- Oral Nightly PRN 24 1623          Pressors:    Autonomic Drugs (From admission, onward)      None          Hyperglycemia/Diabetes " Medications:   Antihyperglycemics (From admission, onward)      Start     Stop Route Frequency Ordered    03/29/24 1100  insulin detemir U-100 (Levemir) pen 5 Units         -- SubQ Daily 03/29/24 1055    03/27/24 1112  insulin aspart U-100 pen 0-5 Units         -- SubQ Before meals, nightly and at 0200 PRN 03/27/24 1013

## 2024-03-30 NOTE — ASSESSMENT & PLAN NOTE
- Home regimen: Lantus 33 QHS, aspart 5u BID WM (largest meals), mounjaro, metformin 1000mg QD  - Endocrinology consulted on POD#1 to assist with BG control, appreciate assistance   - Current regimen: SSI   - Endocrine recommended levemir 5  - BG BGS: 181-198over last 24h.

## 2024-03-31 ENCOUNTER — NURSE TRIAGE (OUTPATIENT)
Dept: ADMINISTRATIVE | Facility: CLINIC | Age: 59
End: 2024-03-31
Payer: MEDICARE

## 2024-03-31 RX ORDER — ONDANSETRON 4 MG/1
4 TABLET, ORALLY DISINTEGRATING ORAL EVERY 6 HOURS PRN
Qty: 20 TABLET | Refills: 0 | Status: SHIPPED | OUTPATIENT
Start: 2024-03-31

## 2024-03-31 NOTE — TELEPHONE ENCOUNTER
Patient states she was discharged last night, 3/30/24, and is s/p Total Abdominal Hysterectomy and Bilateral Salpingo-Oophorectomy on 3/25/24. Patient states c/o severe nausea and is requesting an anti-nausea prescription be sent to her Pharmacy. Patient denies fever and vomiting.    On Call Provider, Dr. BENIGNO Boateng, advised of patient's request and states she will call in a prescription for Zofran to patient's Pharmacy.     Patient advised that a prescription will be submitted to her Pharmacy for nausea/Zofran for  today. Patient also advised to contact the O Triage Service for any worsening symptoms. Patient states understanding of care advice.     Reason for Disposition   Taking prescription medication that could cause nausea (e.g., narcotics/opiates, antibiotics, OCPs, many others)    Additional Information   Negative: Shock suspected (e.g., cold/pale/clammy skin, too weak to stand, low BP, rapid pulse)   Negative: Sounds like a life-threatening emergency to the triager   Negative: [1] Nausea or vomiting AND [2] pregnancy < 20 weeks   Negative: Menstrual Period - Missed or Late (i.e., pregnancy suspected)   Negative: Heat exhaustion suspected (i.e., dehydration from heat exposure)   Negative: Motion sickness suspected (i.e., nausea with car, plane, boat, or train travel)   Negative: Anxiety or stress suspected (i.e., nausea with anxiety attacks or stressful situations)   Negative: Traumatic Brain Injury (TBI) suspected   Negative: Nausea (or Vomiting) in a cancer patient who is currently (or recently) receiving chemotherapy or radiation therapy, or cancer patient who has metastatic or end-stage cancer and is receiving palliative care   Negative: Vomiting occurs   Negative: Other symptom is present, see that guideline (e.g., chest pain, headache, dizziness, abdominal pain, colds, sore throat, etc.).   Negative: Unable to walk, or can only walk with assistance (e.g., requires support)   Negative:  Difficulty breathing   Negative: [1] Insulin-dependent diabetes (Type I) AND [2] glucose > 400 mg/dl (22 mmol/l)   Negative: [1] Drinking very little AND [2] dehydration suspected (e.g., no urine > 12 hours, very dry mouth, very lightheaded)   Negative: Patient sounds very sick or weak to the triager   Negative: Fever > 104 F (40 C)   Negative: [1] Fever > 101 F (38.3 C) AND [2] age > 60 years   Negative: [1] Fever > 100.0 F (37.8 C) AND [2] bedridden (e.g., CVA, chronic illness, recovering from surgery)   Negative: [1] Fever > 100.0 F (37.8 C) AND [2] diabetes mellitus or weak immune system (e.g., HIV positive, cancer chemo, splenectomy, organ transplant, chronic steroids)   Negative: Taking any of the following medications: digoxin (Lanoxin), lithium, theophylline, phenytoin (Dilantin)   Negative: Yellowish color of the skin or white of the eye (i.e., jaundice)   Negative: Fever present > 3 days (72 hours)   Negative: Receiving cancer chemotherapy medication    Protocols used: Nausea-A-AH

## 2024-03-31 NOTE — NURSING
Patient was discharged home. AVS completed and went over with patient and spouse at bedside. Night meds given before discharge. Patient belongings packed, IV removed. Patient left unit via transport by wheelchair to the garage.

## 2024-04-01 ENCOUNTER — TELEPHONE (OUTPATIENT)
Dept: GYNECOLOGIC ONCOLOGY | Facility: CLINIC | Age: 59
End: 2024-04-01
Payer: MEDICARE

## 2024-04-01 NOTE — TELEPHONE ENCOUNTER
----- Message from Shane Hollis sent at 4/1/2024  3:44 PM CDT -----      Name of Who is Calling: ANDRE SOLO [[id number]]      What is the request in detail: Pt called regarding elevated BP and would like to speak with the office regarding what to do during the day time.Pt also want what she was receiving in the hospital as well.Please contact to further discuss and advise.          Can the clinic reply by [facility]: Y      What Number to Call Back if not in [facility]: [phone]

## 2024-04-02 LAB — BACTERIA UR CULT: ABNORMAL

## 2024-04-03 ENCOUNTER — TELEPHONE (OUTPATIENT)
Dept: GYNECOLOGIC ONCOLOGY | Facility: CLINIC | Age: 59
End: 2024-04-03
Payer: MEDICARE

## 2024-04-03 DIAGNOSIS — G89.18 POST-OP PAIN: ICD-10-CM

## 2024-04-03 DIAGNOSIS — C56.9 OVARIAN CARCINOSARCOMA: Primary | ICD-10-CM

## 2024-04-03 DIAGNOSIS — C56.9 OVARIAN CARCINOSARCOMA: ICD-10-CM

## 2024-04-03 RX ORDER — HYDROMORPHONE HYDROCHLORIDE 2 MG/1
2 TABLET ORAL EVERY 8 HOURS PRN
Qty: 20 TABLET | Refills: 0 | Status: SHIPPED | OUTPATIENT
Start: 2024-04-03 | End: 2024-04-03 | Stop reason: SDUPTHER

## 2024-04-03 RX ORDER — HYDROCODONE BITARTRATE AND ACETAMINOPHEN 7.5; 325 MG/15ML; MG/15ML
15 SOLUTION ORAL EVERY 8 HOURS PRN
Qty: 118 ML | Refills: 0 | Status: SHIPPED | OUTPATIENT
Start: 2024-04-03 | End: 2024-04-03 | Stop reason: ALTCHOICE

## 2024-04-03 RX ORDER — HYDROMORPHONE HYDROCHLORIDE 2 MG/1
2 TABLET ORAL EVERY 8 HOURS PRN
Qty: 20 TABLET | Refills: 0 | Status: SHIPPED | OUTPATIENT
Start: 2024-04-03 | End: 2025-04-03

## 2024-04-03 NOTE — TELEPHONE ENCOUNTER
Returned call and checked with pt. Pharmacy changed to [facility]    ----- Message from Donya Tobin sent at 4/3/2024 10:24 AM CDT -----  Name of Who is Calling:[facility] calling for ANDRE SOLO [[id number]]                   What is the request in detail:the pharmacy doesn't have the meds in stock and wanted to know how long it can be before PT starts the meds please assist                   Can the clinic reply by [facility]: No                   What Number to Call Back if not in [facility]:[phone]

## 2024-04-03 NOTE — TELEPHONE ENCOUNTER
Returned call. Has been taking ibuprofen ATC but doesn't tolerate codeine. Reports flu like symptoms but did not relay this to Dr. Valladares or resident post op when prescribed. Also reports cannot take tramadol.  Those meds deleted from her med list and allergies updated. Will try a short course of dilaudid prn while she recovers and encouraged continued ibuprofen ATC.  She'll see Dr. Washington on 4/8.  Mary Mccloud, FNP-C, AOCNP  Gynecologic Oncology    ----- Message from Tello Tang RN sent at 4/3/2024  9:37 AM CDT -----  PA request.      ----- Message -----  From: Juliet Robles  Sent: 4/3/2024   8:48 AM CDT  To: Александр Washington Staff    Type:  Needs Medical Advice/PA    Who Called: pt    Pharmacy name and phone #:  [facility] - [place] - [address]  [address] [place] [address]  Phone: [phone] Fax: [phone]  Hours: Not open 24 hours      Would the patient rather a call back or a response via [facility]? Call     Best Call Back Number: [phone]    Additional Information: pt requesting to have PA sent into pharmacy HYDROcodone-acetaminophen (NORCO) 5-325 mg per tablet pt would like to change medication to liquid if possible

## 2024-04-05 ENCOUNTER — NURSE TRIAGE (OUTPATIENT)
Dept: ADMINISTRATIVE | Facility: CLINIC | Age: 59
End: 2024-04-05
Payer: MEDICARE

## 2024-04-05 ENCOUNTER — HOSPITAL ENCOUNTER (INPATIENT)
Facility: HOSPITAL | Age: 59
LOS: 2 days | Discharge: HOME | DRG: 863 | End: 2024-04-08
Attending: EMERGENCY MEDICINE | Admitting: STUDENT IN AN ORGANIZED HEALTH CARE EDUCATION/TRAINING PROGRAM
Payer: MEDICARE

## 2024-04-05 DIAGNOSIS — E87.6 HYPOKALEMIA: ICD-10-CM

## 2024-04-05 DIAGNOSIS — E08.311 DIABETES MELLITUS DUE TO UNDERLYING CONDITION WITH RETINOPATHY AND MACULAR EDEMA, WITH LONG-TERM CURRENT USE OF INSULIN, UNSPECIFIED LATERALITY, UNSPECIFIED RETINOPATHY SEVERITY: ICD-10-CM

## 2024-04-05 DIAGNOSIS — R73.9 HYPERGLYCEMIA: ICD-10-CM

## 2024-04-05 DIAGNOSIS — Z79.4 DIABETES MELLITUS DUE TO UNDERLYING CONDITION WITH RETINOPATHY AND MACULAR EDEMA, WITH LONG-TERM CURRENT USE OF INSULIN, UNSPECIFIED LATERALITY, UNSPECIFIED RETINOPATHY SEVERITY: ICD-10-CM

## 2024-04-05 DIAGNOSIS — N73.9 PELVIC ABSCESS IN FEMALE: ICD-10-CM

## 2024-04-05 DIAGNOSIS — Z90.722 STATUS POST TOTAL ABDOMINAL HYSTERECTOMY AND BILATERAL SALPINGO-OOPHORECTOMY (TAH-BSO): ICD-10-CM

## 2024-04-05 DIAGNOSIS — Z90.710 STATUS POST TOTAL ABDOMINAL HYSTERECTOMY AND BILATERAL SALPINGO-OOPHORECTOMY (TAH-BSO): ICD-10-CM

## 2024-04-05 DIAGNOSIS — Z90.79 STATUS POST TOTAL ABDOMINAL HYSTERECTOMY AND BILATERAL SALPINGO-OOPHORECTOMY (TAH-BSO): ICD-10-CM

## 2024-04-05 DIAGNOSIS — M79.662 PAIN OF LEFT CALF: ICD-10-CM

## 2024-04-05 DIAGNOSIS — N30.00 ACUTE CYSTITIS WITHOUT HEMATURIA: ICD-10-CM

## 2024-04-05 DIAGNOSIS — M79.89 LEG SWELLING: ICD-10-CM

## 2024-04-05 DIAGNOSIS — D72.829 LEUKOCYTOSIS, UNSPECIFIED TYPE: ICD-10-CM

## 2024-04-05 DIAGNOSIS — D72.829 LEUKOCYTOSIS: ICD-10-CM

## 2024-04-05 DIAGNOSIS — N73.9 PELVIC ABSCESS IN FEMALE: Primary | ICD-10-CM

## 2024-04-05 DIAGNOSIS — D64.9 ACUTE ANEMIA: ICD-10-CM

## 2024-04-05 PROBLEM — E87.1 HYPONATREMIA: Status: RESOLVED | Noted: 2024-03-26 | Resolved: 2024-04-05

## 2024-04-05 PROBLEM — N39.0 UTI (URINARY TRACT INFECTION): Status: ACTIVE | Noted: 2024-04-05

## 2024-04-05 PROBLEM — T81.30XA SUPERFICIAL DEHISCENCE OF WOUND: Status: ACTIVE | Noted: 2024-04-05

## 2024-04-05 PROBLEM — N17.9 AKI (ACUTE KIDNEY INJURY): Status: RESOLVED | Noted: 2024-03-18 | Resolved: 2024-04-05

## 2024-04-05 PROBLEM — Z01.818 PREOP EXAMINATION: Status: RESOLVED | Noted: 2024-03-19 | Resolved: 2024-04-05

## 2024-04-05 LAB
ABO + RH BLD: NORMAL
ALBUMIN SERPL BCP-MCNC: 1.1 G/DL (ref 3.5–5.2)
ALP SERPL-CCNC: 165 U/L (ref 55–135)
ALT SERPL W/O P-5'-P-CCNC: 12 U/L (ref 10–44)
ANION GAP SERPL CALC-SCNC: 9 MMOL/L (ref 8–16)
AST SERPL-CCNC: 20 U/L (ref 10–40)
B-OH-BUTYR BLD STRIP-SCNC: 0 MMOL/L (ref 0–0.5)
BACTERIA #/AREA URNS AUTO: ABNORMAL /HPF
BILIRUB SERPL-MCNC: 0.3 MG/DL (ref 0.1–1)
BILIRUB UR QL STRIP: NEGATIVE
BLD GP AB SCN CELLS X3 SERPL QL: NORMAL
BLD PROD TYP BPU: NORMAL
BLOOD UNIT EXPIRATION DATE: NORMAL
BLOOD UNIT TYPE CODE: 7300
BLOOD UNIT TYPE: NORMAL
BNP SERPL-MCNC: 40 PG/ML (ref 0–99)
BUN SERPL-MCNC: 7 MG/DL (ref 6–20)
CALCIUM SERPL-MCNC: 7.2 MG/DL (ref 8.7–10.5)
CHLORIDE SERPL-SCNC: 92 MMOL/L (ref 95–110)
CLARITY UR REFRACT.AUTO: ABNORMAL
CO2 SERPL-SCNC: 34 MMOL/L (ref 23–29)
CODING SYSTEM: NORMAL
COLOR UR AUTO: ABNORMAL
CREAT SERPL-MCNC: 0.6 MG/DL (ref 0.5–1.4)
CROSSMATCH INTERPRETATION: NORMAL
DISPENSE STATUS: NORMAL
ERYTHROCYTE [DISTWIDTH] IN BLOOD BY AUTOMATED COUNT: 21.6 % (ref 11.5–14.5)
EST. GFR  (NO RACE VARIABLE): >60 ML/MIN/1.73 M^2
GLUCOSE SERPL-MCNC: 273 MG/DL (ref 70–110)
GLUCOSE UR QL STRIP: ABNORMAL
HCT VFR BLD AUTO: 23.1 % (ref 37–48.5)
HGB BLD-MCNC: 7.2 G/DL (ref 12–16)
HGB UR QL STRIP: ABNORMAL
HYALINE CASTS UR QL AUTO: 0 /LPF
KETONES UR QL STRIP: NEGATIVE
LACTATE SERPL-SCNC: 2.2 MMOL/L (ref 0.5–2.2)
LEUKOCYTE ESTERASE UR QL STRIP: ABNORMAL
MCH RBC QN AUTO: 25.8 PG (ref 27–31)
MCHC RBC AUTO-ENTMCNC: 31.2 G/DL (ref 32–36)
MCV RBC AUTO: 83 FL (ref 82–98)
MICROSCOPIC COMMENT: ABNORMAL
NITRITE UR QL STRIP: POSITIVE
NON-SQ EPI CELLS #/AREA URNS AUTO: 2 /HPF
NUM UNITS TRANS PACKED RBC: NORMAL
PH UR STRIP: 5 [PH] (ref 5–8)
PLATELET # BLD AUTO: 784 K/UL (ref 150–450)
PMV BLD AUTO: 8.7 FL (ref 9.2–12.9)
POCT GLUCOSE: 201 MG/DL (ref 70–110)
POCT GLUCOSE: 226 MG/DL (ref 70–110)
POCT GLUCOSE: 294 MG/DL (ref 70–110)
POTASSIUM SERPL-SCNC: 2.9 MMOL/L (ref 3.5–5.1)
PROT SERPL-MCNC: 5.1 G/DL (ref 6–8.4)
PROT UR QL STRIP: ABNORMAL
RBC # BLD AUTO: 2.79 M/UL (ref 4–5.4)
RBC #/AREA URNS AUTO: 6 /HPF (ref 0–4)
SODIUM SERPL-SCNC: 135 MMOL/L (ref 136–145)
SP GR UR STRIP: 1.02 (ref 1–1.03)
SPECIMEN OUTDATE: NORMAL
SQUAMOUS #/AREA URNS AUTO: 1 /HPF
TROPONIN I SERPL DL<=0.01 NG/ML-MCNC: <0.006 NG/ML (ref 0–0.03)
URN SPEC COLLECT METH UR: ABNORMAL
WBC # BLD AUTO: 21.53 K/UL (ref 3.9–12.7)
WBC #/AREA URNS AUTO: >100 /HPF (ref 0–5)
WBC CLUMPS UR QL AUTO: ABNORMAL

## 2024-04-05 PROCEDURE — 96365 THER/PROPH/DIAG IV INF INIT: CPT

## 2024-04-05 PROCEDURE — G0378 HOSPITAL OBSERVATION PER HR: HCPCS

## 2024-04-05 PROCEDURE — 63600175 PHARM REV CODE 636 W HCPCS: Performed by: STUDENT IN AN ORGANIZED HEALTH CARE EDUCATION/TRAINING PROGRAM

## 2024-04-05 PROCEDURE — 87186 SC STD MICRODIL/AGAR DIL: CPT | Performed by: EMERGENCY MEDICINE

## 2024-04-05 PROCEDURE — 87040 BLOOD CULTURE FOR BACTERIA: CPT | Mod: 59 | Performed by: EMERGENCY MEDICINE

## 2024-04-05 PROCEDURE — 84484 ASSAY OF TROPONIN QUANT: CPT | Performed by: EMERGENCY MEDICINE

## 2024-04-05 PROCEDURE — 86850 RBC ANTIBODY SCREEN: CPT

## 2024-04-05 PROCEDURE — 80053 COMPREHEN METABOLIC PANEL: CPT | Performed by: EMERGENCY MEDICINE

## 2024-04-05 PROCEDURE — 25500020 PHARM REV CODE 255: Performed by: EMERGENCY MEDICINE

## 2024-04-05 PROCEDURE — 85027 COMPLETE CBC AUTOMATED: CPT | Performed by: EMERGENCY MEDICINE

## 2024-04-05 PROCEDURE — 96366 THER/PROPH/DIAG IV INF ADDON: CPT

## 2024-04-05 PROCEDURE — P9016 RBC LEUKOCYTES REDUCED: HCPCS

## 2024-04-05 PROCEDURE — 96372 THER/PROPH/DIAG INJ SC/IM: CPT | Mod: 59

## 2024-04-05 PROCEDURE — 25000003 PHARM REV CODE 250

## 2024-04-05 PROCEDURE — 87088 URINE BACTERIA CULTURE: CPT | Performed by: EMERGENCY MEDICINE

## 2024-04-05 PROCEDURE — 96367 TX/PROPH/DG ADDL SEQ IV INF: CPT

## 2024-04-05 PROCEDURE — 99285 EMERGENCY DEPT VISIT HI MDM: CPT | Mod: 25

## 2024-04-05 PROCEDURE — 83605 ASSAY OF LACTIC ACID: CPT | Performed by: EMERGENCY MEDICINE

## 2024-04-05 PROCEDURE — 82962 GLUCOSE BLOOD TEST: CPT

## 2024-04-05 PROCEDURE — 25000003 PHARM REV CODE 250: Performed by: STUDENT IN AN ORGANIZED HEALTH CARE EDUCATION/TRAINING PROGRAM

## 2024-04-05 PROCEDURE — 83880 ASSAY OF NATRIURETIC PEPTIDE: CPT | Performed by: EMERGENCY MEDICINE

## 2024-04-05 PROCEDURE — 25000003 PHARM REV CODE 250: Performed by: EMERGENCY MEDICINE

## 2024-04-05 PROCEDURE — 86920 COMPATIBILITY TEST SPIN: CPT

## 2024-04-05 PROCEDURE — 81001 URINALYSIS AUTO W/SCOPE: CPT | Performed by: EMERGENCY MEDICINE

## 2024-04-05 PROCEDURE — 82010 KETONE BODYS QUAN: CPT | Performed by: EMERGENCY MEDICINE

## 2024-04-05 PROCEDURE — 63600175 PHARM REV CODE 636 W HCPCS: Performed by: EMERGENCY MEDICINE

## 2024-04-05 PROCEDURE — 63600175 PHARM REV CODE 636 W HCPCS

## 2024-04-05 PROCEDURE — 87086 URINE CULTURE/COLONY COUNT: CPT | Performed by: EMERGENCY MEDICINE

## 2024-04-05 PROCEDURE — 87077 CULTURE AEROBIC IDENTIFY: CPT | Performed by: EMERGENCY MEDICINE

## 2024-04-05 RX ORDER — LAMOTRIGINE 25 MG/1
25 TABLET ORAL 2 TIMES DAILY
Status: DISCONTINUED | OUTPATIENT
Start: 2024-04-05 | End: 2024-04-08 | Stop reason: HOSPADM

## 2024-04-05 RX ORDER — PROCHLORPERAZINE MALEATE 5 MG
5 TABLET ORAL EVERY 6 HOURS PRN
Status: DISCONTINUED | OUTPATIENT
Start: 2024-04-05 | End: 2024-04-08 | Stop reason: HOSPADM

## 2024-04-05 RX ORDER — HYDROCODONE BITARTRATE AND ACETAMINOPHEN 500; 5 MG/1; MG/1
TABLET ORAL
Status: DISCONTINUED | OUTPATIENT
Start: 2024-04-05 | End: 2024-04-07

## 2024-04-05 RX ORDER — IBUPROFEN 200 MG
16 TABLET ORAL
Status: DISCONTINUED | OUTPATIENT
Start: 2024-04-05 | End: 2024-04-06

## 2024-04-05 RX ORDER — OXYCODONE HYDROCHLORIDE 5 MG/1
5 TABLET ORAL EVERY 6 HOURS PRN
Status: DISCONTINUED | OUTPATIENT
Start: 2024-04-05 | End: 2024-04-08 | Stop reason: HOSPADM

## 2024-04-05 RX ORDER — HYDRALAZINE HYDROCHLORIDE 20 MG/ML
10 INJECTION INTRAMUSCULAR; INTRAVENOUS EVERY 6 HOURS PRN
Status: DISCONTINUED | OUTPATIENT
Start: 2024-04-05 | End: 2024-04-08 | Stop reason: HOSPADM

## 2024-04-05 RX ORDER — ATORVASTATIN CALCIUM 40 MG/1
80 TABLET, FILM COATED ORAL DAILY
Status: DISCONTINUED | OUTPATIENT
Start: 2024-04-06 | End: 2024-04-08 | Stop reason: HOSPADM

## 2024-04-05 RX ORDER — IBUPROFEN 600 MG/1
600 TABLET ORAL EVERY 6 HOURS PRN
Status: DISCONTINUED | OUTPATIENT
Start: 2024-04-05 | End: 2024-04-05

## 2024-04-05 RX ORDER — POTASSIUM CHLORIDE 20 MEQ/1
40 TABLET, EXTENDED RELEASE ORAL ONCE
Status: COMPLETED | OUTPATIENT
Start: 2024-04-05 | End: 2024-04-05

## 2024-04-05 RX ORDER — SODIUM CHLORIDE 0.9 % (FLUSH) 0.9 %
10 SYRINGE (ML) INJECTION
Status: DISCONTINUED | OUTPATIENT
Start: 2024-04-05 | End: 2024-04-08 | Stop reason: HOSPADM

## 2024-04-05 RX ORDER — POTASSIUM CHLORIDE 7.45 MG/ML
10 INJECTION INTRAVENOUS
Status: COMPLETED | OUTPATIENT
Start: 2024-04-05 | End: 2024-04-05

## 2024-04-05 RX ORDER — GLUCAGON 1 MG
1 KIT INJECTION
Status: DISCONTINUED | OUTPATIENT
Start: 2024-04-05 | End: 2024-04-06

## 2024-04-05 RX ORDER — ACETAMINOPHEN 325 MG/1
650 TABLET ORAL EVERY 6 HOURS PRN
Status: DISCONTINUED | OUTPATIENT
Start: 2024-04-05 | End: 2024-04-08 | Stop reason: HOSPADM

## 2024-04-05 RX ORDER — ONDANSETRON 4 MG/1
8 TABLET, FILM COATED ORAL EVERY 6 HOURS PRN
Status: DISCONTINUED | OUTPATIENT
Start: 2024-04-05 | End: 2024-04-08 | Stop reason: HOSPADM

## 2024-04-05 RX ORDER — OXYCODONE HYDROCHLORIDE 10 MG/1
10 TABLET ORAL EVERY 6 HOURS PRN
Status: DISCONTINUED | OUTPATIENT
Start: 2024-04-05 | End: 2024-04-08 | Stop reason: HOSPADM

## 2024-04-05 RX ORDER — AMOXICILLIN 250 MG
1 CAPSULE ORAL 2 TIMES DAILY
Status: DISCONTINUED | OUTPATIENT
Start: 2024-04-05 | End: 2024-04-08 | Stop reason: HOSPADM

## 2024-04-05 RX ORDER — IBUPROFEN 200 MG
24 TABLET ORAL
Status: DISCONTINUED | OUTPATIENT
Start: 2024-04-05 | End: 2024-04-06

## 2024-04-05 RX ORDER — LORAZEPAM 0.5 MG/1
0.5 TABLET ORAL EVERY 12 HOURS PRN
Status: DISCONTINUED | OUTPATIENT
Start: 2024-04-05 | End: 2024-04-08 | Stop reason: HOSPADM

## 2024-04-05 RX ORDER — ADHESIVE BANDAGE
30 BANDAGE TOPICAL DAILY PRN
Status: DISCONTINUED | OUTPATIENT
Start: 2024-04-05 | End: 2024-04-08 | Stop reason: HOSPADM

## 2024-04-05 RX ORDER — SULFAMETHOXAZOLE AND TRIMETHOPRIM 800; 160 MG/1; MG/1
1 TABLET ORAL 2 TIMES DAILY
Status: DISCONTINUED | OUTPATIENT
Start: 2024-04-05 | End: 2024-04-06

## 2024-04-05 RX ORDER — INSULIN ASPART 100 [IU]/ML
0-5 INJECTION, SOLUTION INTRAVENOUS; SUBCUTANEOUS
Status: DISCONTINUED | OUTPATIENT
Start: 2024-04-05 | End: 2024-04-06

## 2024-04-05 RX ADMIN — OXYCODONE HYDROCHLORIDE 10 MG: 10 TABLET ORAL at 07:04

## 2024-04-05 RX ADMIN — CEFTRIAXONE 1 G: 1 INJECTION, POWDER, FOR SOLUTION INTRAMUSCULAR; INTRAVENOUS at 11:04

## 2024-04-05 RX ADMIN — POTASSIUM CHLORIDE 40 MEQ: 1500 TABLET, EXTENDED RELEASE ORAL at 12:04

## 2024-04-05 RX ADMIN — IOHEXOL 100 ML: 350 INJECTION, SOLUTION INTRAVENOUS at 12:04

## 2024-04-05 RX ADMIN — OXYCODONE 5 MG: 5 TABLET ORAL at 09:04

## 2024-04-05 RX ADMIN — POTASSIUM CHLORIDE 10 MEQ: 7.46 INJECTION, SOLUTION INTRAVENOUS at 10:04

## 2024-04-05 RX ADMIN — ONDANSETRON HYDROCHLORIDE 8 MG: 4 TABLET, FILM COATED ORAL at 07:04

## 2024-04-05 RX ADMIN — PIPERACILLIN SODIUM AND TAZOBACTAM SODIUM 4.5 G: 4; .5 INJECTION, POWDER, FOR SOLUTION INTRAVENOUS at 07:04

## 2024-04-05 RX ADMIN — INSULIN DETEMIR 5 UNITS: 100 INJECTION, SOLUTION SUBCUTANEOUS at 01:04

## 2024-04-05 RX ADMIN — SULFAMETHOXAZOLE AND TRIMETHOPRIM 1 TABLET: 800; 160 TABLET ORAL at 09:04

## 2024-04-05 RX ADMIN — POTASSIUM CHLORIDE 40 MEQ: 1500 TABLET, EXTENDED RELEASE ORAL at 10:04

## 2024-04-05 RX ADMIN — INSULIN ASPART 1 UNITS: 100 INJECTION, SOLUTION INTRAVENOUS; SUBCUTANEOUS at 09:04

## 2024-04-05 RX ADMIN — DOCUSATE SODIUM AND SENNOSIDES 1 TABLET: 8.6; 5 TABLET, FILM COATED ORAL at 09:04

## 2024-04-05 NOTE — DISCHARGE SUMMARY
[facility] - Oncology ([facility])  Gynecologic Oncology  Discharge Summary    Patient Name: Anette Ricci  MRN: [id number]  Admission Date: 3/25/2024  Hospital Length of Stay: 5 days  Discharge Date and Time: 3/30/2024 10:35 PM  Attending Physician: Annel att. providers found   Discharging Provider: Mel Boateng MD  Primary Care Provider: Mark Chua MD    HPI:   Anette Ricci is 58 y.o.  presenting for scheduled Ex-lap/CARLOS ALBERTO/BSO/Possible partial liver resection/Debulking for ovarian neoplasm. 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 Of note, since this H&P was written, patient was admitted to GYN ONC service from 3/18-3/20 for management of leg swelling, JASON, and ascites. She underwent workup for DVT which was negative. She also had IR guided paracentesis with removal of 3.5L of malignant ascites on 3/19. She has been doing well since discharge and is ready to proceed with surgery today as planned.     Hospital Course:  2024 POD#1 s/p Ex-lap/CARLOS ALBERTO/BSO/OMX/Segment 6 liver resection/Cholecystectomy/Peritoneal stripping/Diaphragm stripping. Transported to ICU immediately postop for close monitoring overnight. Pain well controlled overnight. Denies n/v. Tolerated bites of pudding and sips of water. Has not ambulated. Endorses some chest congestion. UOP 0.8 cc/kg/hr overnight. BG elevated to 200s overnight, SSI ordered.   2024 POD#2. NAEON. Stepped down to floor on PM POD#1. Pain well controlled. Tolerating regular diet, appetite still diminished. Ambulating minimally around room. Failed void trial on POD#1, junoir replaced overnight. UOP 0.3cc/kg/hr overnight. Endocrine consulted for BG control.  2024 POD#3. NAEON. Patient resting comfortably in bed. Pain is well controlled with pain medications. She is tolerating PO without nausea or vomiting. She is voiding via junior catheter. UOP 40 cc/hr overnight  2024 POD#4: NAEON.  Patient resting comfortably in bed. Pain well controlled with pain medications. Tolerating PO without nausea or vomiting. She is voiding spontaneously. UOP 45cc/hr overnight. New found leaking of ascites through old paracentesis spot. Noticeable serosanguinous draiange from top 3cm of MVL. Will obtain CTAP to further assess physical exam findings. CMP ordered. Lasix 40mg.    03/30/2024 POD#5: NAEON. Patient resting comfortably in bed. Pain well controlled with pain medications. Tolerating PO without nausea or vomiting. She is voiding spontaneously. UOP 80cc/hr for last 24 hours. 80cc output over last 24 hrs from paracentesis site. Wound similar to yesterday. Will re-eval in PM for discharge.       Goals of Care Treatment Preferences:  Code Status: Full Code      Procedure(s) (LRB):  LAPAROTOMY, EXPLORATORY (N/A)  HYSTERECTOMY, TOTAL, ABDOMINAL (N/A)  SALPINGO-OOPHORECTOMY, BILATERAL (N/A)  OMENTECTOMY (N/A)  DEBULKING, NEOPLASM (N/A)  EXCISION, LIVER - partial (N/A)  CHOLECYSTECTOMY     Consults (From admission, onward)          Status Ordering Provider     Inpatient consult to Endocrinology  Once        Provider:  (Not yet assigned)    Completed STACY KRISHNAN            Pending Diagnostic Studies:       Procedure Component Value Units Date/Time    Specimen to Pathology, Surgery Gynecology and Obstetrics [[id number]] Collected: 03/25/24 1512    Order Status: Sent Lab Status: In process Updated: 03/26/24 0622    Specimen: Tissue     Urinalysis [[id number]] Collected: 03/30/24 1320    Order Status: Sent Lab Status: In process Updated: 03/30/24 1350    Specimen: Urine           Final Active Diagnoses:    Diagnosis Date Noted POA    PRINCIPAL PROBLEM:  S/p CARLOS ALBERTO/BSO/OMX/Debulking/Partial liver resection/Cholecystectomy [Z90.710, Z90.79, Z90.722] 03/25/2024 Yes    Acute postoperative anemia due to expected blood loss [D62] 03/26/2024 No    Hyponatremia [E87.1] 03/26/2024 Yes    Hypoalbuminemia [E88.09] 03/26/2024 Yes  "   Malignant ascites [R18.0] 02/27/2024 Yes    History of breast cancer in female [Z85.3] 11/20/2023 Not Applicable    Hyperlipidemia [E78.5]  Yes    Seizure disorder [G40.909]  Yes    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 [E08.311] 05/20/2013 Yes    Hypertension [I10] 05/20/2013 Yes      Problems Resolved During this Admission:        Does this patient meet criteria for extended DVT prophylaxis? Yes, prescribed elliquis     Discharged Condition: good    Disposition: Home or Self Care    Follow Up:   Follow-up Information       Александр Washington MD Follow up in 6 week(s).    Specialty: Obstetrics and Gynecology  Why: Post-operative visit  Contact information:  [address]  [facility] [address]  [place] [address] [phone]                           Patient Instructions:      WALKER FOR HOME USE     Order Specific Question Answer Comments   Type of Walker: Adult (5'4"-6'6")    With wheels? Yes    Height: 5' 7" (1.702 m)    Weight: 95.7 kg (210 lb 13.9 oz)    Length of need (1-99 months): 12    Does patient have medical equipment at home? shower chair    Does patient have medical equipment at home? walker, rolling    Please check all that apply: Patient's condition impairs ambulation.    Please check all that apply: Patient is unable to safely ambulate without equipment.      Diet general     Lifting restrictions   Order Comments: No lifting greater than 15 pounds for six weeks.     Other restrictions (specify):   Order Comments: PELVIC REST (IF YOU HAD A HYSTERECTOMY):  No douching, tampons, or intercourse for 6 weeks.    If prescribed, vaginal estrogen cream may be used during the postoperative period.     DRIVING:  No driving while on narcotics. Driving may be resumed initially with a competent passenger one to two weeks after surgery if no longer taking narcotics.     EXERCISE:  For six weeks your exercise should be limited to walking. You may walk as far as " you wish, as long as you increase your level of exertion gradually and avoid slippery surfaces. You may climb stairs as needed to get around, but should not use stair climbing for exercise.     Remove dressing in 24 hours   Order Comments: If you have a bandage on wound, you may remove it the day after dismissal.  If you had steri-strips remove them once they begin to peel off (usually 2 weeks).  If your steri-strips still haven't come off in 2 weeks, please remove them.  Keep incision clean and dry.  Inspect the incision daily for signs and symptoms of infection.     Wound care routine (specify)   Order Comments: WOUND CARE:  If you have a band-aid or bandage on your wound, you may remove it the day after dismissal.  You may wash the wound with mild soap and water.   You may shower at any time but should avoid immersing any abdominal incisions in water for at least two weeks after surgery or until the wound is completely healed. If given, please shower with Hibiclens soap until bottle is completely finished. Keep your wound clean and dry.  You should observe your incision for signs of infection which include redness, warmth, drainage or fever.     Call MD for:  temperature >100.4     Call MD for:  persistent nausea and vomiting     Call MD for:  severe uncontrolled pain     Call MD for:  difficulty breathing, headache or visual disturbances     Call MD for:  redness, tenderness, or signs of infection (pain, swelling, redness, odor or green/yellow discharge around incision site)     Call MD for:  hives     Call MD for:   Order Comments: inability to void,urine is ketchup colored or you have large clots, vaginal bleeding is heavier than a period.    VAGINAL DISCHARGE: You may develop a vaginal discharge and intermittent vaginal spotting after surgery and up to 6 weeks postoperatively.  The discharge may have an odor and may change in color but it is normal.  This is due to dissolving stiches.  Contact your surgical  team if you develop vaginal or vulvar irritation along with a discharge.  Also contact your surgical team if you have vaginal discharge that smells like urine or stool.    REMOVE SUPPOSITORY AND ENEMA IF THEY HAD A COLON RESECTION  CONSTIPATION REMEDIES: Patients are often constipated after surgery or with use of oral narcotic medicine. You should continue to take the stool softener, Senokot-S during the next six weeks, and consume adequate amounts of water.  If you have not had a bowel movement for 3 days after dismissal, or are uncomfortable and unable to pass stool, please try one or all of the following measures:  1.  Milk of Magnesia - 30 cc by mouth every 12 hours   2.  Dulcolax suppository - One suppository per rectum every 4-6 hours   3.  Metamucil, Fibercon or other bulk former - use as directed  4.  Fleets Enema      PAIN MEDICATIONS:     Take your pain medications as instructed. It is best to take pain medications before your pain becomes severe. This will allow you to take less medication yet have better pain relief. For the first 2 or 3 days it may be helpful to take your pain medications on a regular schedule (e.g. every 4 to 6 hours). This will help you to keep your pain under better control. You should then begin to take fewer medications each day until you no longer need them. Do not take pain medication on an empty stomach. This may lead to nausea and vomiting.     Activity as tolerated   Order Comments: PELVIC REST:  No douching, tampons, or intercourse for 6 weeks.    If prescribed, vaginal estrogen cream may be used during the postoperative period.     DRIVING:  No driving while on narcotics. Driving may be resumed initially with a competent passenger one to two weeks after surgery if no longer taking narcotics.     EXERCISE:  For six weeks your exercise should be limited to walking. You may walk as far as you wish, as long as you increase your level of exertion gradually and avoid slippery  surfaces. You may climb stairs as needed to get around, but should not use stair climbing for exercise.     Shower on day dressing removed (No bath)   Order Comments: You may shower at any time but should avoid immersing any abdominal incisions in water for at least 2 weeks after surgery or until the wound is completely healed.  If given, please shower with Hibaclens soap until bottle is completely finish     Medications:  Reconciled Home Medications:      Medication List        START taking these medications      apixaban 2.5 mg Tab  Commonly known as: ELIQUIS  Take 1 tablet (2.5 mg total) by mouth 2 (two) times daily.     insulin lispro 100 unit/mL injection  Commonly known as: HumaLOG U-100 Insulin  **LOW CORRECTION DOSE**  Blood Glucose  mg/dL                  Pre-meal                  151-200                0 unit                        201-250                2 units                      251-300                3 units                      301-350                4 units                      >350                     5 units                      Administer subcutaneously if needed at times designated by monitoring schedule.     LEVEMIR FLEXPEN 100 unit/mL (3 mL) Inpn pen  Generic drug: insulin detemir U-100 (Levemir)  Inject 5 Units into the skin every evening.            CHANGE how you take these medications      * ibuprofen 800 MG tablet  Commonly known as: ADVIL,MOTRIN  Take 800 mg by mouth every 6 (six) hours as needed for Pain.  What changed: Another medication with the same name was added. Make sure you understand how and when to take each.     * ibuprofen 600 MG tablet  Commonly known as: ADVIL,MOTRIN  Take 1 tablet (600 mg total) by mouth every 6 (six) hours as needed for Pain.  What changed: You were already taking a medication with the same name, and this prescription was added. Make sure you understand how and when to take each.           * This list has 2 medication(s) that are the same as other  medications prescribed for you. Read the directions carefully, and ask your doctor or other care provider to review them with you.                CONTINUE taking these medications      amLODIPine 10 MG tablet  Commonly known as: NORVASC  Take 10 mg by mouth once daily.     atorvastatin 40 MG tablet  Commonly known as: LIPITOR  Take 80 mg by mouth once daily.     enalapril 20 MG tablet  Commonly known as: VASOTEC  Take 20 mg by mouth once daily.     fluticasone propionate 50 mcg/actuation nasal spray  Commonly known as: FLONASE  1 spray by Each Nostril route once daily.     hydroCHLOROthiazide 25 MG tablet  Commonly known as: HYDRODIURIL  Take 25 mg by mouth once daily.     ketorolac 0.5% 0.5 % Drop  Commonly known as: ACULAR  Place 1 drop into both eyes. For pain after eye injections.     lamoTRIgine 25 MG tablet  Commonly known as: LAMICTAL  Take 25 mg by mouth 2 (two) times daily.     LORazepam 0.5 MG tablet  Commonly known as: ATIVAN  Take 0.5 mg by mouth every 12 (twelve) hours as needed for Anxiety.     metFORMIN 1000 MG tablet  Commonly known as: GLUCOPHAGE  Take 1,000 mg by mouth daily with breakfast.     MOUNJARO 5 mg/0.5 mL Pnij  Generic drug: tirzepatide  Inject 5 mg into the skin every 7 days. On Sunday.     QUEtiapine 25 MG Tab  Commonly known as: SEROQUEL  Take 25 mg by mouth daily as needed.            STOP taking these medications      NovoLOG Flexpen U-100 Insulin 100 unit/mL (3 mL) Inpn pen  Generic drug: insulin aspart U-100     TOUJEO SOLOSTAR U-300 INSULIN 300 unit/mL (1.5 mL) Inpn pen  Generic drug: insulin glargine (TOUJEO)              Mel Boateng MD  Gynecologic Oncology  [facility] - Oncology ([facility])

## 2024-04-05 NOTE — ASSESSMENT & PLAN NOTE
- K on admit 2.9   - EKG pending   - Replaced in ED with IV 10mEq and PO 40 mEq  - Will give additional 40 mEq   - Will obtain BMP in AM and replace to maintain K > 3.0

## 2024-04-05 NOTE — PROGRESS NOTES
Pharmacist Renal Dose Adjustment Note    Anette Ricci is a 58 y.o. female being treated with piperacillin / tazobactam for intra-abdominal infection.      Patient Data:    Vital Signs (Most Recent):  Temp: 98.2 °F (36.8 °C) (04/05/24 1634)  Pulse: 101 (04/05/24 1634)  Resp: 18 (04/05/24 1634)  BP: (!) 133/58 (04/05/24 1634)  SpO2: 98 % (04/05/24 1634) Vital Signs (72h Range):  Temp:  [98 °F (36.7 °C)-98.8 °F (37.1 °C)]   Pulse:  []   Resp:  [15-20]   BP: (118-147)/(58-74)   SpO2:  [95 %-100 %]      Recent Labs   Lab 03/30/24  0458 04/05/24  0746   CREATININE 0.6 0.6     Serum creatinine:  0.6 mg/dL 04/05/24 0746  Estimated creatinine clearance:  119.7 mL/min    Piperacillin / tazobactam 3.375 grams IVPB every 6 hours will be changed to piperacillin / tazobactam 4.5 grams IVPB every 8 hours extended infusion.      Pharmacist's Name:  Prasanna Ying  Pharmacist's Extension:  [phone]

## 2024-04-05 NOTE — ASSESSMENT & PLAN NOTE
- CTAP with ascites visualized.   - CXR: consistent with small bilateral pleural effusions   - Will consider consult for paracentesis

## 2024-04-05 NOTE — SUBJECTIVE & OBJECTIVE
Oncology Treatment Plan:   OP GYN bevacizumab PACLItaxel CARBOplatin (AUC 5) followed by bevacizumab maintenance Q3W    Oncology History:     2013: ER+ Carcinoma of R Breast   S/p Lympectomy, SLNB- Grade 1 Well Differentiated Invasive Ductal Carcinoma with Associated Ductal Carcinoma in Situ, Negative Margins/ SLNB Negative                                       ER+ 90%, LA+ 90%, HER2 Neg                           TII N0 Stage 2A   [id number]: Tamoxifen   07/2019-06/2021: Letrozole   11/2023: Mammogram Neg   02/06/2024: ED Nathan Marinelli (Abdominal Bloating, Constipation)- imaging consistent with multiple peritoneal masses (11 cm in LUQ), large volume ascites   02/21/2024: PET consistent with multifocal hypermetabolic soft tissue masses throughout the peritoneum including lesion along posterior aspect of right hepatic lobe, moderate to large volume ascites   02/29: IR paracentesis 5L removed   03/04: 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, paracentesis 2.4L removed               Cells positive for PAX8, CK7, AE1/AE3, SMA, CK5/6, LA,               PD1 and molecular studies pending               No loss of nuclear expression of MMR (low probability of microsatelllite instability-high   03/06: : 418   03/13: IR paracentesis 3.5L   03/21: Scheduled for IR paracentesis   03/25: s/p CARLOS ALBERTO/BSO/Omx/Segment 6 liver resection/Cholecystectomy/Peritoneal and Diaphragm stripping. 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    Past Medical History:   Diagnosis Date    Breast cancer 2013    Diabetes mellitus     Genetic testing 05/29/2013    VUS    Hyperlipidemia     Hypertension     Malignant neoplasm of upper-outer quadrant of right breast in female, estrogen receptor positive 12/02/2015    Seizure disorder      Past Surgical History:   Procedure Laterality Date    BILATERAL SALPINGO-OOPHORECTOMY (BSO) N/A 3/25/2024    Procedure: SALPINGO-OOPHORECTOMY, BILATERAL;   Surgeon: Александр Washington MD;  [facility];  Service: OB/GYN;  Laterality: N/A;    BREAST BIOPSY      BREAST LUMPECTOMY Right 2013    CHOLECYSTECTOMY  3/25/2024    Procedure: CHOLECYSTECTOMY;  Surgeon: Bo Farmer MD;  [facility];  Service: General;;    DEBULKING OF TUMOR N/A 3/25/2024    Procedure: DEBULKING, NEOPLASM;  Surgeon: Александр Washington MD;  [facility];  Service: OB/GYN;  Laterality: N/A;    EXCISION, LIVER N/A 3/25/2024    Procedure: EXCISION, LIVER - partial;  Surgeon: Bo Farmer MD;  Location: [facility] OR [address] [place] [address];  Service: General;  Laterality: N/A;  bk ultrasound  [place] book by TA on 3-21-24  7:00 a.m.  Conf #  [id number]    EYE SURGERY      LAPAROTOMY, EXPLORATORY N/A 3/25/2024    Procedure: LAPAROTOMY, EXPLORATORY;  Surgeon: Александр Washington MD;  [facility];  Service: OB/GYN;  Laterality: N/A;    OMENTECTOMY N/A 3/25/2024    Procedure: OMENTECTOMY;  Surgeon: Александр Washington MD;  [facility];  Service: OB/GYN;  Laterality: N/A;    PERITONEOCENTESIS N/A 3/13/2024    Procedure: PARACENTESIS, ABDOMINAL;  Surgeon: Flavia Moore MD;  Location: [facility] CATH LAB;  Service: Radiology;  Laterality: N/A;    PERITONEOCENTESIS N/A 3/21/2024    Procedure: PARACENTESIS, ABDOMINAL;  Surgeon: Jorge Jolley MD;  Location: [facility] CATH LAB;  Service: Radiology;  Laterality: N/A;    TOTAL ABDOMINAL HYSTERECTOMY N/A 3/25/2024    Procedure: HYSTERECTOMY, TOTAL, ABDOMINAL;  Surgeon: Александр Washington MD;  [facility];  Service: OB/GYN;  Laterality: N/A;    TOTAL REDUCTION MAMMOPLASTY Bilateral 2016     Family History       Problem Relation (Age of Onset)    Breast cancer Sister (48)    Seizures Father          Tobacco Use    Smoking status: Never    Smokeless tobacco: Never   Substance and Sexual Activity    Alcohol use: Yes     Alcohol/week: 0.0 standard drinks of alcohol     Comment: ocassional     Drug use: No    Sexual activity: Yes     Partners: Male     Birth control/protection: None       (Not in a hospital admission)      Review of patient's allergies indicates:   Allergen Reactions    Codeine Other (See Comments)     Flu like s/s    Tramadol Other (See Comments)     Flu like symptoms similar to codeine reaction       Review of Systems   Constitutional:  Positive for activity change and fatigue. Negative for chills, diaphoresis and fever.   HENT:  Negative for nasal congestion.    Eyes:  Negative for visual disturbance.   Respiratory:  Positive for cough and shortness of breath. Negative for wheezing.    Cardiovascular:  Negative for chest pain.   Gastrointestinal:  Positive for bloating. Negative for diarrhea, nausea and vomiting.   Genitourinary:  Positive for dysuria. Negative for pelvic pain, vaginal bleeding, vaginal discharge and vaginal pain.   Musculoskeletal:  Negative for back pain.   Integumentary:  Negative for rash.   Neurological:  Negative for syncope.   Hematological:  Negative for adenopathy.   Psychiatric/Behavioral:  Negative for depression.       Objective:     Vital Signs (Most Recent):  Temp: 98.2 °F (36.8 °C) (04/05/24 1634)  Pulse: 101 (04/05/24 1634)  Resp: 18 (04/05/24 1634)  BP: (!) 133/58 (04/05/24 1634)  SpO2: 98 % (04/05/24 1634) Vital Signs (24h Range):  Temp:  [98 °F (36.7 °C)-98.8 °F (37.1 °C)] 98.2 °F (36.8 °C)  Pulse:  [] 101  Resp:  [15-20] 18  SpO2:  [95 %-100 %] 98 %  BP: (118-147)/(58-74) 133/58     Weight: 95.3 kg (210 lb)  Body mass index is 32.89 kg/m².       Physical Exam:   Constitutional: She is oriented to person, place, and time. She appears well-developed and well-nourished.    HENT:   Head: Normocephalic.      Cardiovascular:  Normal rate.             Pulmonary/Chest: Effort normal.        Abdominal: She exhibits distension and abdominal incision. There is abdominal tenderness. There is no rebound and no guarding.   Abdomen tympanic and mildly  distended. Midline vertical incision superificially dehisced as seen in photos below. Exudate noted, no evidence of serosanguinous fluid. Incision probed in length and no fascial defect noted.              Musculoskeletal: Normal range of motion.       Neurological: She is alert and oriented to person, place, and time.    Skin: Skin is warm and dry.    Psychiatric: She has a normal mood and affect.          Laboratory:  Lab Results   Component Value Date    WBC 21.53 (H) 04/05/2024    HGB 7.2 (L) 04/05/2024    HCT 23.1 (L) 04/05/2024    MCV 83 04/05/2024     (H) 04/05/2024       CMP  Sodium   Date Value Ref Range Status   04/05/2024 135 (L) 136 - 145 mmol/L Final     Potassium   Date Value Ref Range Status   04/05/2024 2.9 (L) 3.5 - 5.1 mmol/L Final     Chloride   Date Value Ref Range Status   04/05/2024 92 (L) 95 - 110 mmol/L Final     CO2   Date Value Ref Range Status   04/05/2024 34 (H) 23 - 29 mmol/L Final     Glucose   Date Value Ref Range Status   04/05/2024 273 (H) 70 - 110 mg/dL Final     BUN   Date Value Ref Range Status   04/05/2024 7 6 - 20 mg/dL Final     Creatinine   Date Value Ref Range Status   04/05/2024 0.6 0.5 - 1.4 mg/dL Final     Calcium   Date Value Ref Range Status   04/05/2024 7.2 (L) 8.7 - 10.5 mg/dL Final     Total Protein   Date Value Ref Range Status   04/05/2024 5.1 (L) 6.0 - 8.4 g/dL Final     Albumin   Date Value Ref Range Status   04/05/2024 1.1 (L) 3.5 - 5.2 g/dL Final     Total Bilirubin   Date Value Ref Range Status   04/05/2024 0.3 0.1 - 1.0 mg/dL Final     Comment:     For infants and newborns, interpretation of results should be based  on gestational age, weight and in agreement with clinical  observations.    Premature Infant recommended reference ranges:  Up to 24 hours.............<8.0 mg/dL  Up to 48 hours............<12.0 mg/dL  3-5 days..................<15.0 mg/dL  6-29 days.................<15.0 mg/dL       Alkaline Phosphatase   Date Value Ref Range Status    04/05/2024 165 (H) 55 - 135 U/L Final     AST   Date Value Ref Range Status   04/05/2024 20 10 - 40 U/L Final     ALT   Date Value Ref Range Status   04/05/2024 12 10 - 44 U/L Final     Anion Gap   Date Value Ref Range Status   04/05/2024 9 8 - 16 mmol/L Final     eGFR   Date Value Ref Range Status   04/05/2024 >60.0 >60 mL/min/1.73 m^2 Final     Lactic Acid 2.2     Trop/BNP neg     BHS neg     UA with + nitrites, + 3 leukocytes    Diagnostic Results:  CT Abdomen Pelvis With IV Contrast NO Oral Contrast  Order: [id number]  Status: Final result       Visible to patient: Yes (not seen)       Next appt: 04/08/2024 at 09:30 AM in Gynecologic Oncology (Александр Washington MD)    0 Result Notes  Details    Reading Physician Reading Date Result Priority   Emperatriz Ochoa MD  [phone] 4/5/2024 STAT     Narrative & Impression  EXAMINATION:  CT ABDOMEN PELVIS WITH IV CONTRAST     CLINICAL HISTORY:  Abdominal abscess/infection suspected;Abdominal pain, post-op;     TECHNIQUE:  Low dose axial images, sagittal and coronal reformations were obtained from the lung bases to the pubic symphysis following the IV administration of 100 mL of Omnipaque 350 .  minimal oral contrast noted in the stomach.  Axial and coronal images reformatted.     COMPARISON:  03/29/2024     FINDINGS:  Bilateral small pleural effusion and subsegmental atelectasis at the bilateral lung basis, unchanged.  Trace of pericardial effusion, unchanged.  Partially calcified right breast mass 2.9 cm, unchanged.     The liver appears normal.  The biliary ducts are not dilated.  The gallbladder is removed.     The distal esophagus and stomach appear normal.     The pancreas, spleen and bilateral adrenal glands appear normal.     The bilateral kidneys enhance normally.  No hydronephrosis or kidney mass.     The abdominal aorta tapers normally, there is circumferential calcified atherosclerotic plaque.  No para-aortic lymphadenopathy.     Mild stool  retention.  The small bowel is not dilated.  The appendix is not seen.  Fluid and air scattered throughout the abdomen similar compared to the prior exam.  Anterior midline incision containing minimal amount of air, no measurable fluid collection.  The inguinal regions appear normal.     The bladder is nondistended.  The uterus is removed.  There is an air fluid collection at the surgical site measuring 7.8 x 6.6 x 5.2 cm.  The ovaries are not definitely seen.     There is body wall anasarca.     The osseous structures demonstrate no osseous lesions.     Impression:     Air and fluid scattered within the abdomen and pelvis, it is unchanged from 03/29/2024.  There is a measurable air and fluid collection within the mid pelvis 7.8 x 6.6 cm x 5.2 cm.  Dehiscence of the surgical site cannot be excluded or other origin perforation.  An abscess could have this appearance.  The patient had a total abdominal hysterectomy and bilateral salpingo-oophorectomy, omentectomy, cholecystectomy.     Unchanged partially calcified right breast mass, is which correlates with mammogram 11/10/2023.        Electronically signed by: Emperatriz Ochoa MD  Date:                                            04/05/2024  Time:                                           12:56           Exam Ended: 04/05/24 12:23 CDT Last Resulted: 04/05/24 12:56 CDT               X-Ray Chest AP Portable  Order: [id number]  Status: Final result       Visible to patient: Yes (not seen)       Next appt: 04/08/2024 at 09:30 AM in Gynecologic Oncology (Александр Washington MD)    0 Result Notes  Details    Reading Physician Reading Date Result Priority   Abdullahi Arana MD  [phone] 4/5/2024 STAT     Narrative & Impression  EXAMINATION:  XR CHEST AP PORTABLE     CLINICAL HISTORY:  hyperglycemia;     TECHNIQUE:  Single frontal view of the chest was performed.     COMPARISON:  Chest radiograph performed 03/18/2024, 01:24 hours.     FINDINGS:  Grossly unchanged  position of the left chest port.  Similar cardiomediastinal contours.  Atherosclerosis of the aorta.     Grossly unchanged cardiomediastinal contours, again noting enlargement the cardiac silhouette.     Layering at least small bilateral pleural effusions appear new when compared to 03/18/2024 radiograph.  Ill-defined opacities the lung bases may relate to atelectasis, noting that aspiration or pneumonia would be difficult to exclude.     No definite pneumothorax.     No acute findings in the visualized abdomen.     Osseous and soft tissue structures appear without definite acute change.  Operative sequela in the left chest wall again appreciated.     Impression:     Please see discussion above.        Electronically signed by: Abdullahi Arana  Date:                                            04/05/2024  Time:                                           07:29           Exam Ended: 04/05/24 07:02 CDT Last Resulted: 04/05/24 07:29 CDT

## 2024-04-05 NOTE — ASSESSMENT & PLAN NOTE
- Glucose >250 on admit; Reports not taking meds following discharge due to confusion about medications   - BHB: 0.0   - A   - BG AC/qHS   - Patient previously on Levemir 5u qD, SSI; Restarted   - Will consider Endocrinology consult if continued uncontrolled blood glucoses

## 2024-04-05 NOTE — NURSING
Nurses Note -- 4 Eyes      4/5/2024   6:33 PM      Skin assessed during: Admit      [] No Altered Skin Integrity Present    []Prevention Measures Documented      [x] Yes- Altered Skin Integrity Present or Discovered   [] LDA Added if Not in Epic (Describe Wound)   [] New Altered Skin Integrity was Present on Admit and Documented in LDA   [x] Wound Image Taken    Wound Care Consulted? Yes    Attending Nurse:  Kimberly Leach RN/Staff Member:  Elly

## 2024-04-05 NOTE — ASSESSMENT & PLAN NOTE
- UA: Positive Nitrite, 3+ Leuks   - Received rocephin 1g in ED, however previous Ucx resistent to rocephin and zosyn.  - Will begin bactrim treatment.

## 2024-04-05 NOTE — H&P
[facility] Emergency Dept  Gynecologic Oncology  H&P    Patient Name: Anette Ricci  MRN: [id number]  Admission Date: 2024  Primary Care Provider: Mark Chua MD   Principal Problem: Leukocytosis    Subjective:     Chief Complaint/Reason for Admission: Hyperglycemia and Weakness    History of Present Illness:  Anette Ricci is a 59yo  who is POD#11 from an Ex-lap/CARLOS ALBERTO/BSO/Omx/Segment 6 liver resection/Cholecystectomy/Peritoneal and Diaphragm stripping for ovarian neoplasm who presents to the ED for hyperglycemia, fatigue, and generalized weakness. Her post-op course was complicated by ICU stay and failed void trial on POD#1. She was discharged on POD#5. During her hospitalization, blood sugars were closely monitored with assistance of endocrine team. She was discharged on the following insulin regimen: levemir 5 units nightly and sliding scale insulin for meals. Per history in ED, patient reports taking her nightly 5 units of levemir. Her blood sugar this morning was 400 so she took 4 units of leftover novolog and presented to the ED.      Patient has known abdominal ascites and bilateral leg swelling likely from malignancy. She was admitted inpatient - for these concerns. Full work-up including echo, EKG, CTA, BLE dopplers were negative. She had paracentesis with 3.6L removed. Fluid studies at that time showed no sign of SBP  .   Today she endorses worsening bilateral leg swelling left greater than right and SOB. Denies chest pain. Denies fever/cills. Denies nausea/vomiting. Denies abdominal pain. Reports ambulating at home. Reports mild dysuria. Denies any vaginal bleeding, discharge, or pain.     Hospital Course:  No notes on file    Oncology Treatment Plan:   OP GYN bevacizumab PACLItaxel CARBOplatin (AUC 5) followed by bevacizumab maintenance Q3W    Oncology History:     2013: ER+ Carcinoma of R Breast   S/p Lympectomy, SLNB- Grade 1 Well Differentiated Invasive Ductal Carcinoma with  Associated Ductal Carcinoma in Situ, Negative Margins/ SLNB Negative                                       ER+ 90%, VT+ 90%, HER2 Neg                           TII N0 Stage 2A   [id number]: Tamoxifen   07/2019-06/2021: Letrozole   11/2023: Mammogram Neg   02/06/2024: ED Nathan Marinelli (Abdominal Bloating, Constipation)- imaging consistent with multiple peritoneal masses (11 cm in LUQ), large volume ascites   02/21/2024: PET consistent with multifocal hypermetabolic soft tissue masses throughout the peritoneum including lesion along posterior aspect of right hepatic lobe, moderate to large volume ascites   02/29: IR paracentesis 5L removed   03/04: 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, paracentesis 2.4L removed               Cells positive for PAX8, CK7, AE1/AE3, SMA, CK5/6, VT,               PD1 and molecular studies pending               No loss of nuclear expression of MMR (low probability of microsatelllite instability-high   03/06: : 418   03/13: IR paracentesis 3.5L   03/21: Scheduled for IR paracentesis   03/25: s/p CARLOS ALBERTO/BSO/Omx/Segment 6 liver resection/Cholecystectomy/Peritoneal and Diaphragm stripping. 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    Past Medical History:   Diagnosis Date    Breast cancer 2013    Diabetes mellitus     Genetic testing 05/29/2013    VUS    Hyperlipidemia     Hypertension     Malignant neoplasm of upper-outer quadrant of right breast in female, estrogen receptor positive 12/02/2015    Seizure disorder      Past Surgical History:   Procedure Laterality Date    BILATERAL SALPINGO-OOPHORECTOMY (BSO) N/A 3/25/2024    Procedure: SALPINGO-OOPHORECTOMY, BILATERAL;  Surgeon: Александр Washington MD;  Location: [facility] OR [address] [place] [address];  Service: OB/GYN;  Laterality: N/A;    BREAST BIOPSY      BREAST LUMPECTOMY Right 2013    CHOLECYSTECTOMY  3/25/2024    Procedure: CHOLECYSTECTOMY;  Surgeon: Bo Farmer MD;  Location: [facility] OR  2ND FLR;  Service: General;;    DEBULKING OF TUMOR N/A 3/25/2024    Procedure: DEBULKING, NEOPLASM;  Surgeon: Александр Washington MD;  Location: [facility] OR 2ND FLR;  Service: OB/GYN;  Laterality: N/A;    EXCISION, LIVER N/A 3/25/2024    Procedure: EXCISION, LIVER - partial;  Surgeon: Bo Farmer MD;  [facility];  Service: General;  Laterality: N/A;  bk ultrasound  [place] book by TA on 3-21-24  7:00 a.m.  Conf #  [id number]    EYE SURGERY      LAPAROTOMY, EXPLORATORY N/A 3/25/2024    Procedure: LAPAROTOMY, EXPLORATORY;  Surgeon: Александр Washington MD;  [facility];  Service: OB/GYN;  Laterality: N/A;    OMENTECTOMY N/A 3/25/2024    Procedure: OMENTECTOMY;  Surgeon: Александр Washington MD;  [facility];  Service: OB/GYN;  Laterality: N/A;    PERITONEOCENTESIS N/A 3/13/2024    Procedure: PARACENTESIS, ABDOMINAL;  Surgeon: Flavia Moore MD;  Location: [facility] CATH LAB;  Service: Radiology;  Laterality: N/A;    PERITONEOCENTESIS N/A 3/21/2024    Procedure: PARACENTESIS, ABDOMINAL;  Surgeon: Jorge Jolley MD;  Location: [facility] CATH LAB;  Service: Radiology;  Laterality: N/A;    TOTAL ABDOMINAL HYSTERECTOMY N/A 3/25/2024    Procedure: HYSTERECTOMY, TOTAL, ABDOMINAL;  Surgeon: Александр Washington MD;  [facility];  Service: OB/GYN;  Laterality: N/A;    TOTAL REDUCTION MAMMOPLASTY Bilateral 2016     Family History       Problem Relation (Age of Onset)    Breast cancer Sister (48)    Seizures Father          Tobacco Use    Smoking status: Never    Smokeless tobacco: Never   Substance and Sexual Activity    Alcohol use: Yes     Alcohol/week: 0.0 standard drinks of alcohol     Comment: ocassional    Drug use: No    Sexual activity: Yes     Partners: Male     Birth control/protection: None       (Not in a hospital admission)      Review of patient's allergies indicates:   Allergen Reactions    Codeine Other (See Comments)     Flu like s/s    Tramadol Other  (See Comments)     Flu like symptoms similar to codeine reaction       Review of Systems   Constitutional:  Positive for activity change and fatigue. Negative for chills, diaphoresis and fever.   HENT:  Negative for nasal congestion.    Eyes:  Negative for visual disturbance.   Respiratory:  Positive for cough and shortness of breath. Negative for wheezing.    Cardiovascular:  Negative for chest pain.   Gastrointestinal:  Positive for bloating. Negative for diarrhea, nausea and vomiting.   Genitourinary:  Positive for dysuria. Negative for pelvic pain, vaginal bleeding, vaginal discharge and vaginal pain.   Musculoskeletal:  Negative for back pain.   Integumentary:  Negative for rash.   Neurological:  Negative for syncope.   Hematological:  Negative for adenopathy.   Psychiatric/Behavioral:  Negative for depression.       Objective:     Vital Signs (Most Recent):  Temp: 98.2 °F (36.8 °C) (04/05/24 1634)  Pulse: 101 (04/05/24 1634)  Resp: 18 (04/05/24 1634)  BP: (!) 133/58 (04/05/24 1634)  SpO2: 98 % (04/05/24 1634) Vital Signs (24h Range):  Temp:  [98 °F (36.7 °C)-98.8 °F (37.1 °C)] 98.2 °F (36.8 °C)  Pulse:  [] 101  Resp:  [15-20] 18  SpO2:  [95 %-100 %] 98 %  BP: (118-147)/(58-74) 133/58     Weight: 95.3 kg (210 lb)  Body mass index is 32.89 kg/m².       Physical Exam:   Constitutional: She is oriented to person, place, and time. She appears well-developed and well-nourished.    HENT:   Head: Normocephalic.      Cardiovascular:  Normal rate.             Pulmonary/Chest: Effort normal.        Abdominal: She exhibits distension and abdominal incision. There is abdominal tenderness. There is no rebound and no guarding.   Abdomen tympanic and mildly distended. Midline vertical incision superificially dehisced as seen in photos below. Exudate noted, no evidence of serosanguinous fluid. Incision probed in length and no fascial defect noted.              Musculoskeletal: Normal range of motion.       Neurological:  She is alert and oriented to person, place, and time.    Skin: Skin is warm and dry.    Psychiatric: She has a normal mood and affect.          Laboratory:  Lab Results   Component Value Date    WBC 21.53 (H) 04/05/2024    HGB 7.2 (L) 04/05/2024    HCT 23.1 (L) 04/05/2024    MCV 83 04/05/2024     (H) 04/05/2024       CMP  Sodium   Date Value Ref Range Status   04/05/2024 135 (L) 136 - 145 mmol/L Final     Potassium   Date Value Ref Range Status   04/05/2024 2.9 (L) 3.5 - 5.1 mmol/L Final     Chloride   Date Value Ref Range Status   04/05/2024 92 (L) 95 - 110 mmol/L Final     CO2   Date Value Ref Range Status   04/05/2024 34 (H) 23 - 29 mmol/L Final     Glucose   Date Value Ref Range Status   04/05/2024 273 (H) 70 - 110 mg/dL Final     BUN   Date Value Ref Range Status   04/05/2024 7 6 - 20 mg/dL Final     Creatinine   Date Value Ref Range Status   04/05/2024 0.6 0.5 - 1.4 mg/dL Final     Calcium   Date Value Ref Range Status   04/05/2024 7.2 (L) 8.7 - 10.5 mg/dL Final     Total Protein   Date Value Ref Range Status   04/05/2024 5.1 (L) 6.0 - 8.4 g/dL Final     Albumin   Date Value Ref Range Status   04/05/2024 1.1 (L) 3.5 - 5.2 g/dL Final     Total Bilirubin   Date Value Ref Range Status   04/05/2024 0.3 0.1 - 1.0 mg/dL Final     Comment:     For infants and newborns, interpretation of results should be based  on gestational age, weight and in agreement with clinical  observations.    Premature Infant recommended reference ranges:  Up to 24 hours.............<8.0 mg/dL  Up to 48 hours............<12.0 mg/dL  3-5 days..................<15.0 mg/dL  6-29 days.................<15.0 mg/dL       Alkaline Phosphatase   Date Value Ref Range Status   04/05/2024 165 (H) 55 - 135 U/L Final     AST   Date Value Ref Range Status   04/05/2024 20 10 - 40 U/L Final     ALT   Date Value Ref Range Status   04/05/2024 12 10 - 44 U/L Final     Anion Gap   Date Value Ref Range Status   04/05/2024 9 8 - 16 mmol/L Final     eGFR    Date Value Ref Range Status   04/05/2024 >60.0 >60 mL/min/1.73 m^2 Final     Lactic Acid 2.2     Trop/BNP neg     BHS neg     UA with + nitrites, + 3 leukocytes    Diagnostic Results:  CT Abdomen Pelvis With IV Contrast NO Oral Contrast  Order: [id number]  Status: Final result       Visible to patient: Yes (not seen)       Next appt: 04/08/2024 at 09:30 AM in Gynecologic Oncology (Александр Washington MD)    0 Result Notes  Details    Reading Physician Reading Date Result Priority   Emperatriz Ochoa MD  [phone] 4/5/2024 STAT     Narrative & Impression  EXAMINATION:  CT ABDOMEN PELVIS WITH IV CONTRAST     CLINICAL HISTORY:  Abdominal abscess/infection suspected;Abdominal pain, post-op;     TECHNIQUE:  Low dose axial images, sagittal and coronal reformations were obtained from the lung bases to the pubic symphysis following the IV administration of 100 mL of Omnipaque 350 .  minimal oral contrast noted in the stomach.  Axial and coronal images reformatted.     COMPARISON:  03/29/2024     FINDINGS:  Bilateral small pleural effusion and subsegmental atelectasis at the bilateral lung basis, unchanged.  Trace of pericardial effusion, unchanged.  Partially calcified right breast mass 2.9 cm, unchanged.     The liver appears normal.  The biliary ducts are not dilated.  The gallbladder is removed.     The distal esophagus and stomach appear normal.     The pancreas, spleen and bilateral adrenal glands appear normal.     The bilateral kidneys enhance normally.  No hydronephrosis or kidney mass.     The abdominal aorta tapers normally, there is circumferential calcified atherosclerotic plaque.  No para-aortic lymphadenopathy.     Mild stool retention.  The small bowel is not dilated.  The appendix is not seen.  Fluid and air scattered throughout the abdomen similar compared to the prior exam.  Anterior midline incision containing minimal amount of air, no measurable fluid collection.  The inguinal regions appear  normal.     The bladder is nondistended.  The uterus is removed.  There is an air fluid collection at the surgical site measuring 7.8 x 6.6 x 5.2 cm.  The ovaries are not definitely seen.     There is body wall anasarca.     The osseous structures demonstrate no osseous lesions.     Impression:     Air and fluid scattered within the abdomen and pelvis, it is unchanged from 03/29/2024.  There is a measurable air and fluid collection within the mid pelvis 7.8 x 6.6 cm x 5.2 cm.  Dehiscence of the surgical site cannot be excluded or other origin perforation.  An abscess could have this appearance.  The patient had a total abdominal hysterectomy and bilateral salpingo-oophorectomy, omentectomy, cholecystectomy.     Unchanged partially calcified right breast mass, is which correlates with mammogram 11/10/2023.        Electronically signed by: Emperatriz Ochoa MD  Date:                                            04/05/2024  Time:                                           12:56           Exam Ended: 04/05/24 12:23 CDT Last Resulted: 04/05/24 12:56 CDT               X-Ray Chest AP Portable  Order: [id number]  Status: Final result       Visible to patient: Yes (not seen)       Next appt: 04/08/2024 at 09:30 AM in Gynecologic Oncology (Александр Washington MD)    0 Result Notes  Details    Reading Physician Reading Date Result Priority   Abdullahi Arana MD  [phone] 4/5/2024 STAT     Narrative & Impression  EXAMINATION:  XR CHEST AP PORTABLE     CLINICAL HISTORY:  hyperglycemia;     TECHNIQUE:  Single frontal view of the chest was performed.     COMPARISON:  Chest radiograph performed 03/18/2024, 01:24 hours.     FINDINGS:  Grossly unchanged position of the left chest port.  Similar cardiomediastinal contours.  Atherosclerosis of the aorta.     Grossly unchanged cardiomediastinal contours, again noting enlargement the cardiac silhouette.     Layering at least small bilateral pleural effusions appear new when  compared to 03/18/2024 radiograph.  Ill-defined opacities the lung bases may relate to atelectasis, noting that aspiration or pneumonia would be difficult to exclude.     No definite pneumothorax.     No acute findings in the visualized abdomen.     Osseous and soft tissue structures appear without definite acute change.  Operative sequela in the left chest wall again appreciated.     Impression:     Please see discussion above.        Electronically signed by: Abdullahi Arana  Date:                                            04/05/2024  Time:                                           07:29           Exam Ended: 04/05/24 07:02 CDT Last Resulted: 04/05/24 07:29 CDT           Assessment/Plan:     * Leukocytosis  - Leukocytosis on admit.   - No signs/symptoms of sepsis.   - Abdominal exam in ED overall benign. On exam, superficial wound dehiscence noted. On probing, no signs of facial dehiscence noted.   - CTAP consistent with measurable air and fluid collection within the mid pelvis 7.8 x 6.6 x 5.2 cm at the surgical site, could be abscess.   - UA consistent with UTI.   - LA: 2.2   - Ucx/ Bcx in process.   - Will empirically treat fluid and air collection seen on CT with IV zosyn. Can consider IR drainage if signs of clinical deterioration.     Superficial dehiscence of wound  - Pictures in media.    - Exam consistent with superficial wound dehiscence at the superior and inferior portion of midline vertical incision, exudate noted, no evidence of serosanguinous fluid. Probed with no evidence of fascial dehiscence.    - Wound care consult placed for possible wound vac placement. Appreciate assistance.     Hypokalemia  - K on admit 2.9   - EKG pending   - Replaced in ED with IV 10mEq and PO 40 mEq  - Will give additional 40 mEq   - Will obtain BMP in AM and replace to maintain K > 3.0     UTI (urinary tract infection)  - UA: Positive Nitrite, 3+ Leuks   - Received rocephin 1g in ED, however previous Ucx resistent to  rocephin and zosyn.  - Will begin bactrim treatment.     Hyperglycemia  - Glucose >250 on admit; Reports not taking meds following discharge due to confusion about medications   - BHB: 0.0   - A   - BG AC/qHS   - Patient previously on Levemir 5u qD, SSI; Restarted   - Will consider Endocrinology consult if continued uncontrolled blood glucoses    Acute postoperative anemia due to expected blood loss  - H/H on admit 7.   - Endorses weakness, Denies other symptoms of anemia  - 1u pRBC given in ED  - Will repeat CBC in AM and continue to transfuse to maintain H/H > 8/24     S/p CARLOS ALBERTO/BSO/OMX/Debulking/Partial liver resection/Cholecystectomy  - Diet: Diabetic Diet + Boost TIDWM  - IS ordered   - Pain Regimen: Tylenol PRN, Ibuprofen PRN, Oxy IR 5/10 PRN   - Anti-Emetics: Zofran PRN, Compazine PRN    - Bowel Regimen: Senna qD, MoM PRN   - Consults: PT/OT (while inpatient)     Stage 4B Carcinoscaroma, Suspected ovarian origin  - POD#11 s/p Ex-Lap/CARLOS ALBERTO/BSO/Partial Liver Resection (Segment 6)/ Omentectomy/Diaphragm Stripping/Peritoneal Stripping/Cholecystectomy   - Beginning IV paclitaxel/carboplatin outpatient  - VTE ppx: SCDs. Holding lovenox for possible IR drainage in AM.  - Will hold prophylactic eliquis while inpatient     Malignant ascites  - CTAP with ascites visualized.   - CXR: consistent with small bilateral pleural effusions   - Will consider consult for paracentesis     History of breast cancer in female  - No home meds     Hyperlipidemia  - Continue home statin     Seizure disorder  - Continue home lamictal     Hypertension  - Hold home meds (amlodipine, enalapril, HCTZ)  - Hydral 10 PRN for SBP > 180         Mel Boateng MD  Gynecologic Oncology  [facility] - [facility] Dept

## 2024-04-05 NOTE — ED PROVIDER NOTES
"Emergency Department Encounter  Provider Note    Anette Ricci  [id number]  4/5/2024    Evaluation:    History Acquisition:     Chief Complaint   Patient presents with    Hyperglycemia     Reports cbg PTA >400. Took 5units of novolog. 294 CBG in triage . Hx of Ca. "04/19/24 I start chemo for ovarian cancer. "       History of Present Illness:  Anette Ricci is a 58 y.o. female who has a past medical history of Breast cancer (2013), Diabetes mellitus, Genetic testing (05/29/2013), Hyperlipidemia, Hypertension, Malignant neoplasm of upper-outer quadrant of right breast in female, estrogen receptor positive (12/02/2015), and Seizure disorder.    The patient presents to the ED due to elevated blood sugar.   Patient reports symptoms started after her recent surgery.  She has a history of ovarian cancer and recently underwent hysterectomy, partial hepatectomy, cholecystectomy.  She was taken off of her diabetes medications including Mounjaro and NovoLog prior to surgery, and she was not restarted on her medications afterward.  She had some leftover NovoLog that she took this morning.  Her blood sugar was over 400.    She also endorses worsening bilateral leg swelling, cough, and shortness of breath.  Denies any chest pain, fever, nausea vomiting/diarrhea, or urinary complaints.  No history of cardiac issues.  She feels her abdominal incision has been healing well and she is continuing to recover from her surgery.    Additional historians utilized:   at bedside, states her breathing has been more labored recently.    Prior medical records were reviewed:   Ex-lap for hysterectomy, debulking, partial liver resection, cholecystectomy on 03/25 with OB-GYN.  History of malignant ascites from ovarian cancer, last paracentesis 3/21  Admitted 03/17 to 3/24 management of shortness of breath, edema, ascites, underwent paracentesis while inpatient, SBP was ruled out.  Paracentesis 3/13, 3/7, 3/4, and 2/28    The " patient's list of active medical history, family/social history, medications, and allergies as documented has been reviewed.     Past Medical History:   Diagnosis Date    Breast cancer 2013    Diabetes mellitus     Genetic testing 05/29/2013    VUS    Hyperlipidemia     Hypertension     Malignant neoplasm of upper-outer quadrant of right breast in female, estrogen receptor positive 12/02/2015    Seizure disorder      Past Surgical History:   Procedure Laterality Date    BILATERAL SALPINGO-OOPHORECTOMY (BSO) N/A 3/25/2024    Procedure: SALPINGO-OOPHORECTOMY, BILATERAL;  Surgeon: Александр Washington MD;  [facility];  Service: OB/GYN;  Laterality: N/A;    BREAST BIOPSY      BREAST LUMPECTOMY Right 2013    CHOLECYSTECTOMY  3/25/2024    Procedure: CHOLECYSTECTOMY;  Surgeon: Bo Farmer MD;  [facility];  Service: General;;    DEBULKING OF TUMOR N/A 3/25/2024    Procedure: DEBULKING, NEOPLASM;  Surgeon: Александр Washington MD;  [facility];  Service: OB/GYN;  Laterality: N/A;    EXCISION, LIVER N/A 3/25/2024    Procedure: EXCISION, LIVER - partial;  Surgeon: Bo Farmer MD;  [facility];  Service: General;  Laterality: N/A;  bk ultrasound  [place] book by TA on 3-21-24  7:00 a.m.  Conf #  [id number]    EYE SURGERY      LAPAROTOMY, EXPLORATORY N/A 3/25/2024    Procedure: LAPAROTOMY, EXPLORATORY;  Surgeon: Александр Washington MD;  [facility];  Service: OB/GYN;  Laterality: N/A;    OMENTECTOMY N/A 3/25/2024    Procedure: OMENTECTOMY;  Surgeon: Александр Washington MD;  [facility];  Service: OB/GYN;  Laterality: N/A;    PERITONEOCENTESIS N/A 3/13/2024    Procedure: PARACENTESIS, ABDOMINAL;  Surgeon: Flavia Moore MD;  Location: [facility] CATH LAB;  Service: Radiology;  Laterality: N/A;    PERITONEOCENTESIS N/A 3/21/2024    Procedure: PARACENTESIS, ABDOMINAL;  Surgeon: Jorge Jolley MD;  Location: [facility] CATH LAB;   Service: Radiology;  Laterality: N/A;    TOTAL ABDOMINAL HYSTERECTOMY N/A 3/25/2024    Procedure: HYSTERECTOMY, TOTAL, ABDOMINAL;  Surgeon: Александр Washington MD;  Location: [facility] OR [address] [place] [address];  Service: OB/GYN;  Laterality: N/A;    TOTAL REDUCTION MAMMOPLASTY Bilateral 2016     Family History   Problem Relation Age of Onset    Seizures Father     Breast cancer Sister 48    Cancer Neg Hx     Ovarian cancer Neg Hx     Colon cancer Neg Hx      Social History     Socioeconomic History    Marital status:    Tobacco Use    Smoking status: Never    Smokeless tobacco: Never   Substance and Sexual Activity    Alcohol use: Yes     Alcohol/week: 0.0 standard drinks of alcohol     Comment: ocassional    Drug use: No    Sexual activity: Yes     Partners: Male     Birth control/protection: None     Social Determinants of Health     Financial Resource Strain: Patient Declined (3/26/2024)    Overall Financial Resource Strain (CARDIA)     Difficulty of Paying Living Expenses: Patient declined   Food Insecurity: Patient Declined (3/26/2024)    Hunger Vital Sign     Worried About Running Out of Food in the Last Year: Patient declined     Ran Out of Food in the Last Year: Patient declined   Transportation Needs: Patient Declined (3/26/2024)    PRAPARE - Transportation     Lack of Transportation (Medical): Patient declined     Lack of Transportation (Non-Medical): Patient declined   Physical Activity: Inactive (2/28/2024)    Exercise Vital Sign     Days of Exercise per Week: 0 days     Minutes of Exercise per Session: 0 min   Stress: Patient Declined (3/26/2024)    [employer] - Occupational Stress Questionnaire     Feeling of Stress : Patient declined   Recent Concern: Stress - Stress Concern Present (2/28/2024)    [employer] - Occupational Stress Questionnaire     Feeling of Stress : To some extent   Social Connections: Moderately Integrated (2/28/2024)    Social  Connection and Isolation Panel [NHANES]     Frequency of Communication with Friends and Family: More than three times a week     Frequency of Social Gatherings with Friends and Family: More than three times a week     Attends Mosque Services: More than 4 times per year     Active Member of Clubs or Organizations: No     Attends Club or Organization Meetings: Never     Marital Status:    Housing Stability: Patient Declined (3/26/2024)    Housing Stability Vital Sign     Unable to Pay for Housing in the Last Year: Patient declined     Number of Places Lived in the Last Year: 1     Unstable Housing in the Last Year: Patient declined       Medications:  Current Discharge Medication List        CONTINUE these medications which have NOT CHANGED    Details   amlodipine (NORVASC) 10 MG tablet Take 10 mg by mouth once daily.       apixaban (ELIQUIS) 2.5 mg Tab Take 1 tablet (2.5 mg total) by mouth 2 (two) times daily.  Qty: 56 tablet, Refills: 0      atorvastatin (LIPITOR) 40 MG tablet Take 80 mg by mouth once daily.      enalapril (VASOTEC) 20 MG tablet Take 20 mg by mouth once daily.      fluticasone propionate (FLONASE) 50 mcg/actuation nasal spray 1 spray by Each Nostril route once daily.      hydrochlorothiazide (HYDRODIURIL) 25 MG tablet Take 25 mg by mouth once daily.       HYDROmorphone (DILAUDID) 2 MG tablet Take 1 tablet (2 mg total) by mouth every 8 (eight) hours as needed for Pain.  Qty: 20 tablet, Refills: 0    Comments: Quantity prescribed more than 7 day supply? Yes, quantity medically necessary  Associated Diagnoses: Ovarian carcinosarcoma; Post-op pain      !! ibuprofen (ADVIL,MOTRIN) 600 MG tablet Take 1 tablet (600 mg total) by mouth every 6 (six) hours as needed for Pain.  Qty: 30 tablet, Refills: 1      !! ibuprofen (ADVIL,MOTRIN) 800 MG tablet Take 800 mg by mouth every 6 (six) hours as needed for Pain.      insulin detemir U-100, Levemir, (LEVEMIR FLEXPEN) 100 unit/mL (3 mL) InPn pen Inject 5  Units into the skin every evening.  Qty: 4.5 mL, Refills: 3      insulin lispro (HUMALOG U-100 INSULIN) 100 unit/mL injection **LOW CORRECTION DOSE**  Blood Glucose  mg/dL                  Pre-meal                  151-200                0 unit                        201-250                2 units                      251-300                3 units                      301-350                4 units                      >350                     5 units                      Administer subcutaneously if needed at times designated by monitoring schedule.  Qty: 10 mL, Refills: 1      ketorolac 0.5% (ACULAR) 0.5 % Drop Place 1 drop into both eyes. For pain after eye injections.      lamoTRIgine (LAMICTAL) 25 MG tablet Take 25 mg by mouth 2 (two) times daily.      lorazepam (ATIVAN) 0.5 MG tablet Take 0.5 mg by mouth every 12 (twelve) hours as needed for Anxiety.      metformin (GLUCOPHAGE) 1000 MG tablet Take 1,000 mg by mouth daily with breakfast.       ondansetron (ZOFRAN-ODT) 4 MG TbDL Take 1 tablet (4 mg total) by mouth every 6 (six) hours as needed (for nausea).  Qty: 20 tablet, Refills: 0      quetiapine (SEROQUEL) 25 MG Tab Take 25 mg by mouth daily as needed.      tirzepatide (MOUNJARO) 5 mg/0.5 mL PnIj Inject 5 mg into the skin every 7 days. On Sunday.       !! - Potential duplicate medications found. Please discuss with provider.          Allergies:  Review of patient's allergies indicates:   Allergen Reactions    Codeine Other (See Comments)     Flu like s/s    Tramadol Other (See Comments)     Flu like symptoms similar to codeine reaction       Review of Systems   Respiratory:  Positive for cough and shortness of breath.    Cardiovascular:  Positive for leg swelling.         Physical Exam:     Initial Vitals [04/05/24 0622]   BP Pulse Resp Temp SpO2   (!) 143/64 97 16 98.1 °F (36.7 °C) 100 %      MAP       --         Physical Exam    Nursing note and vitals reviewed.  Constitutional: She appears  well-developed and well-nourished. She is not diaphoretic. No distress.   Pleasant, well-appearing, in no distress.   HENT:   Head: Normocephalic and atraumatic.   Mouth/Throat: Oropharynx is clear and moist.   Eyes: EOM are normal. Pupils are equal, round, and reactive to light.   Neck: No tracheal deviation present.   Cardiovascular:  Normal rate, regular rhythm, normal heart sounds and intact distal pulses.           Pulmonary/Chest: Breath sounds normal. No stridor. No respiratory distress. She has no wheezes.   Abdominal: Abdomen is soft and protuberant. Bowel sounds are normal. She exhibits no distension and no mass. A surgical scar is present. There is no abdominal tenderness.   Obese abdomen, nontender.  Vertical ex-lap incision, with multiple areas of mild, superficial wound dehiscence.  Well-appearing granulation tissue present, no erythema, purulence, drainage, or bleeding noted.       Musculoskeletal:         General: No edema. Normal range of motion.     Neurological: She is alert and oriented to person, place, and time. She has normal strength. No cranial nerve deficit or sensory deficit.   Skin: Skin is warm and dry. Capillary refill takes less than 2 seconds. No pallor.   Psychiatric: She has a normal mood and affect. Her behavior is normal. Thought content normal.         Differential Diagnoses:   Based on available information and initial assessment, Differential Diagnosis includes, but is not limited to:  DKA, hyperglycemic hyperosmotic non-ketotic state, sepsis/infection, dehydration, UTI, dietary cause/increased sugar intake, device malfunction, alcohol/drug abuse, medication non-compliance.      ED Management:   Procedures    Orders Placed This Encounter    Blood Culture #1 **CANNOT BE ORDERED STAT**    Blood Culture #2 **CANNOT BE ORDERED STAT**    Urine culture    X-Ray Chest AP Portable    CT Abdomen Pelvis With IV Contrast NO Oral Contrast    CBC Without Differential    Comprehensive  metabolic panel    Urinalysis, Reflex to Urine Culture Urine, Clean Catch    Beta-Hydroxybutyrate, Serum    Troponin I    Brain natriuretic peptide    Troponin I    BNP    Lactic Acid, Plasma    Urinalysis Microscopic    Basic metabolic panel    CBC auto differential    Diet diabetic 2000 Calorie    If any glucose result is less than 50 or greater than 400:    If 2nd result is less than 50 or greater than 400:    If glucose greater than 400 mg/dL treat per correction scale.  If glucose remains elevated above 400 mg/dL at next scheduled check, notify provider    Do not admin Aspart correction between scheduled prandial Aspart    Recheck Blood Glucose:    Place sequential compression device    Vital signs    Vital signs    Notify Clinical Provider    Full code    Inpatient consult to Gynecologic Oncology    IP Wound Care consult Nurse    Dietary nutrition supplements Boost Glucose Control - Any flavor    Incentive spirometry    CV Ultrasound doppler venous DVT leg left    Type & Screen    Insert peripheral IV    Insert peripheral IV    Possible Hospitalization    Place in Observation    Transfuse RBC 1 Unit    POCT glucose    POCT glucose    POCT glucose    POCT glucose    potassium chloride 10 mEq in 100 mL IVPB    potassium chloride SA CR tablet 40 mEq    cefTRIAXone (Rocephin) 1 g in dextrose 5 % in water (D5W) 100 mL IVPB (MB+)    0.9%  NaCl infusion (for blood administration)    potassium chloride SA CR tablet 40 mEq    iohexoL (OMNIPAQUE 350) injection 100 mL    insulin detemir U-100 (Levemir) pen 5 Units    acetaminophen tablet 650 mg    oxyCODONE immediate release tablet 5 mg    oxyCODONE immediate release tablet Tab 10 mg    ondansetron tablet 8 mg    prochlorperazine tablet 5 mg    glucose chewable tablet 16 g    glucose chewable tablet 24 g    glucagon (human recombinant) injection 1 mg    insulin aspart U-100 pen 0-5 Units    dextrose 10% bolus 125 mL 125 mL    dextrose 10% bolus 250 mL 250 mL     hydrALAZINE injection 10 mg    atorvastatin tablet 80 mg    lamoTRIgine tablet 25 mg    LORazepam tablet 0.5 mg    sodium chloride 0.9% flush 10 mL    senna-docusate 8.6-50 mg per tablet 1 tablet    magnesium hydroxide 400 mg/5 ml suspension 2,400 mg    sulfamethoxazole-trimethoprim 800-160mg per tablet 1 tablet    piperacillin-tazobactam (ZOSYN) 4.5 g in dextrose 5 % in water (D5W) 100 mL IVPB (MB+)    Prepare RBC 1 Unit    IP VTE HIGH RISK PATIENT          EKG:       Labs:     Labs Reviewed   CBC WITHOUT DIFFERENTIAL - Abnormal; Notable for the following components:       Result Value    WBC 21.53 (*)     RBC 2.79 (*)     Hemoglobin 7.2 (*)     Hematocrit 23.1 (*)     MCH 25.8 (*)     MCHC 31.2 (*)     RDW 21.6 (*)     Platelets 784 (*)     MPV 8.7 (*)     All other components within normal limits   COMPREHENSIVE METABOLIC PANEL - Abnormal; Notable for the following components:    Sodium 135 (*)     Potassium 2.9 (*)     Chloride 92 (*)     CO2 34 (*)     Glucose 273 (*)     Calcium 7.2 (*)     Total Protein 5.1 (*)     Albumin 1.1 (*)     Alkaline Phosphatase 165 (*)     All other components within normal limits    Narrative:     add on troponin per Monique Aceves RN/Mark Will MD order#   [id number] 04/05/2024 @ 08:06    URINALYSIS, REFLEX TO URINE CULTURE - Abnormal; Notable for the following components:    Appearance, UA Cloudy (*)     Protein, UA 2+ (*)     Glucose, UA 2+ (*)     Occult Blood UA 1+ (*)     Nitrite, UA Positive (*)     Leukocytes, UA 3+ (*)     All other components within normal limits    Narrative:     Specimen Source->Urine   URINALYSIS MICROSCOPIC - Abnormal; Notable for the following components:    RBC, UA 6 (*)     WBC, UA >100 (*)     WBC Clumps, UA Few (*)     Bacteria Many (*)     Non-Squam Epith 2 (*)     All other components within normal limits    Narrative:     Specimen Source->Urine   POCT GLUCOSE - Abnormal; Notable for the following components:    POCT Glucose 294 (*)     All  other components within normal limits   POCT GLUCOSE - Abnormal; Notable for the following components:    POCT Glucose 201 (*)     All other components within normal limits   CULTURE, BLOOD   CULTURE, BLOOD   CULTURE, URINE   BETA - HYDROXYBUTYRATE, SERUM   TROPONIN I   B-TYPE NATRIURETIC PEPTIDE   TROPONIN I    Narrative:     add on troponin per Monique Aceves RN/Mark Will MD order#   [id number] 04/05/2024 @ 08:06    B-TYPE NATRIURETIC PEPTIDE    Narrative:     add on troponin per Monique Aceves RN/Mark Will MD order#   [id number] 04/05/2024 @ 08:06     add on BNP per Monique Aceves RN  04/05/2024  08:32    LACTIC ACID, PLASMA   TYPE & SCREEN   POCT GLUCOSE MONITORING CONTINUOUS   PREPARE RBC SOFT     Independent review of the labs ordered include:   See ED course    Imaging:     Imaging Results              CT Abdomen Pelvis With IV Contrast NO Oral Contrast (Final result)  Result time 04/05/24 12:56:34      Final result by Emperatriz Ochoa MD (04/05/24 12:56:34)                   Impression:      Air and fluid scattered within the abdomen and pelvis, it is unchanged from 03/29/2024.  There is a measurable air and fluid collection within the mid pelvis 7.8 x 6.6 cm x 5.2 cm.  Dehiscence of the surgical site cannot be excluded or other origin perforation.  An abscess could have this appearance.  The patient had a total abdominal hysterectomy and bilateral salpingo-oophorectomy, omentectomy, cholecystectomy.    Unchanged partially calcified right breast mass, is which correlates with mammogram 11/10/2023.      Electronically signed by: Emperatriz Ochoa MD  Date:    04/05/2024  Time:    12:56               Narrative:    EXAMINATION:  CT ABDOMEN PELVIS WITH IV CONTRAST    CLINICAL HISTORY:  Abdominal abscess/infection suspected;Abdominal pain, post-op;    TECHNIQUE:  Low dose axial images, sagittal and coronal reformations were obtained from the lung bases to the pubic symphysis following the IV  administration of 100 mL of Omnipaque 350 .  minimal oral contrast noted in the stomach.  Axial and coronal images reformatted.    COMPARISON:  03/29/2024    FINDINGS:  Bilateral small pleural effusion and subsegmental atelectasis at the bilateral lung basis, unchanged.  Trace of pericardial effusion, unchanged.  Partially calcified right breast mass 2.9 cm, unchanged.    The liver appears normal.  The biliary ducts are not dilated.  The gallbladder is removed.    The distal esophagus and stomach appear normal.    The pancreas, spleen and bilateral adrenal glands appear normal.    The bilateral kidneys enhance normally.  No hydronephrosis or kidney mass.    The abdominal aorta tapers normally, there is circumferential calcified atherosclerotic plaque.  No para-aortic lymphadenopathy.    Mild stool retention.  The small bowel is not dilated.  The appendix is not seen.  Fluid and air scattered throughout the abdomen similar compared to the prior exam.  Anterior midline incision containing minimal amount of air, no measurable fluid collection.  The inguinal regions appear normal.    The bladder is nondistended.  The uterus is removed.  There is an air fluid collection at the surgical site measuring 7.8 x 6.6 x 5.2 cm.  The ovaries are not definitely seen.    There is body wall anasarca.    The osseous structures demonstrate no osseous lesions.                                       X-Ray Chest AP Portable (Final result)  Result time 04/05/24 07:29:12      Final result by Abdullahi Arana MD (04/05/24 07:29:12)                   Impression:      Please see discussion above.      Electronically signed by: Abdullahi Arana  Date:    04/05/2024  Time:    07:29               Narrative:    EXAMINATION:  XR CHEST AP PORTABLE    CLINICAL HISTORY:  hyperglycemia;    TECHNIQUE:  Single frontal view of the chest was performed.    COMPARISON:  Chest radiograph performed 03/18/2024, 01:24 hours.    FINDINGS:  Grossly unchanged  position of the left chest port.  Similar cardiomediastinal contours.  Atherosclerosis of the aorta.    Grossly unchanged cardiomediastinal contours, again noting enlargement the cardiac silhouette.    Layering at least small bilateral pleural effusions appear new when compared to 03/18/2024 radiograph.  Ill-defined opacities the lung bases may relate to atelectasis, noting that aspiration or pneumonia would be difficult to exclude.    No definite pneumothorax.    No acute findings in the visualized abdomen.    Osseous and soft tissue structures appear without definite acute change.  Operative sequela in the left chest wall again appreciated.                                    X-Rays:   Independently Interpreted Readings:   Other Readings:  CT A/P independently interpreted: intra-abdominal fluid collection vs post-op findings. No high-grade bowel obstruction.  Agree with radiologist interpretation.         Medications Given:     Medications   0.9%  NaCl infusion (for blood administration) (has no administration in time range)   acetaminophen tablet 650 mg (has no administration in time range)   oxyCODONE immediate release tablet 5 mg (5 mg Oral Not Given 4/5/24 1928)   oxyCODONE immediate release tablet Tab 10 mg (10 mg Oral Given 4/5/24 1940)   ondansetron tablet 8 mg (8 mg Oral Given 4/5/24 1940)   prochlorperazine tablet 5 mg (has no administration in time range)   glucose chewable tablet 16 g (has no administration in time range)   glucose chewable tablet 24 g (has no administration in time range)   glucagon (human recombinant) injection 1 mg (has no administration in time range)   insulin aspart U-100 pen 0-5 Units (has no administration in time range)   dextrose 10% bolus 125 mL 125 mL (has no administration in time range)   dextrose 10% bolus 250 mL 250 mL (has no administration in time range)   hydrALAZINE injection 10 mg (has no administration in time range)   atorvastatin tablet 80 mg (has no  administration in time range)   lamoTRIgine tablet 25 mg (has no administration in time range)   LORazepam tablet 0.5 mg (has no administration in time range)   sodium chloride 0.9% flush 10 mL (has no administration in time range)   senna-docusate 8.6-50 mg per tablet 1 tablet (has no administration in time range)   magnesium hydroxide 400 mg/5 ml suspension 2,400 mg (has no administration in time range)   sulfamethoxazole-trimethoprim 800-160mg per tablet 1 tablet (has no administration in time range)   piperacillin-tazobactam (ZOSYN) 4.5 g in dextrose 5 % in water (D5W) 100 mL IVPB (MB+) (4.5 g Intravenous New Bag 4/5/24 1955)   potassium chloride 10 mEq in 100 mL IVPB (0 mEq Intravenous Stopped 4/5/24 1100)   potassium chloride SA CR tablet 40 mEq (40 mEq Oral Given 4/5/24 1004)   cefTRIAXone (Rocephin) 1 g in dextrose 5 % in water (D5W) 100 mL IVPB (MB+) (0 g Intravenous Stopped 4/5/24 1133)   potassium chloride SA CR tablet 40 mEq (40 mEq Oral Given 4/5/24 1237)   iohexoL (OMNIPAQUE 350) injection 100 mL (100 mLs Intravenous Given 4/5/24 1229)   insulin detemir U-100 (Levemir) pen 5 Units (5 Units Subcutaneous Given 4/5/24 1304)        Medical Decision Making:    Additional Consideration:   Additional testing considered during clinical course: abdominal imaging considered given recent surgery, however, no signs of infection, no focal tenderness, vitals normal, imaging deferred at this time.    Social determinants of health considered during development of treatment plan include: poor access to care    Current co-morbidities considered which impacted clinical decision making:  ovarian cancer, malignant ascites, hypertension, diabetes, seizures, hyperlipidemia, depression    Case discussed with additional provider: Gyn-Onc service, will evaluate and admit for further management of anemia, hypokalemia, UTI, post-op infection    ED Course as of 04/05/24 2103 Fri Apr 05, 2024   0730 SpO2: 100 % [SS]   0730 Resp:  16 [SS]   0730 Pulse: 97 [SS]   0730 Temp Source: Oral [SS]   0730 Temp: 98.1 °F (36.7 °C) [SS]   0730 BP(!): 143/64  Vitals reassuring [SS]   0730 POCT glucose(!)  Glucose elevated [SS]   0925 SpO2: 100 % [SS]   0925 Resp: 16 [SS]   0925 Pulse: 97 [SS]   0925 Temp Source: Oral [SS]   0925 Temp: 98.1 °F (36.7 °C) [SS]   0925 BP(!): 143/64  Vitals reassuring [SS]   0925 Comprehensive metabolic panel(!)  Glucose elevated, K low [SS]   0925 Troponin I  WNL [SS]   0925 Beta-Hydroxybutyrate, Serum  WNL [SS]   0925 CBC Without Differential(!)  WBC elevated, H/H 7.2/23 from prior 9/27.  [SS]   1021 Urinalysis, Reflex to Urine Culture Urine, Clean Catch(!) [SS]   1021 Urinalysis Microscopic(!)  UA consistent with UTI, IV Rocephin given [SS]   1222 Discussed patient with Gyn-Onc resident on call, who will evaluate at bedside. Planning on observation for management of hypokalemia, UTI, weakness.  [SS]      ED Course User Index  [SS] Mark Will MD            Medical Decision Making  57 yo F with history of ovarian cancer s/p ex-lap with hysterectomy, partial hepatectomy, cholecystectomy presenting with worsening fatigue, weakness, blood sugar lability, and leg swelling.  Vitals stable.  Labs with acute on chronic anemia, hypokalemia, IV and PO replacement ordered.  UA + UTI. IV ABX given.  CT A/P ordered. Gyn Onc service consulted and will admit for further management.       Problems Addressed:  Acute anemia: acute illness or injury  Acute cystitis without hematuria: acute illness or injury  Hyperglycemia: acute illness or injury  Hypokalemia: acute illness or injury  Leg swelling: chronic illness or injury with exacerbation, progression, or side effects of treatment  Leukocytosis: acute illness or injury    Amount and/or Complexity of Data Reviewed  External Data Reviewed: notes.  Labs: ordered. Decision-making details documented in ED Course.  Radiology: ordered and independent interpretation performed. Decision-making  details documented in ED Course.    Risk  Prescription drug management.  Decision regarding hospitalization.  Diagnosis or treatment significantly limited by social determinants of health.    Critical Care  Total time providing critical care: 60 minutes        Clinical Impression:       ICD-10-CM ICD-9-CM   1. Acute cystitis without hematuria  N30.00 595.0   2. Hyperglycemia  R73.9 790.29   3. Leg swelling  M79.89 729.81   4. Hypokalemia  E87.6 276.8   5. Acute anemia  D64.9 285.9   6. Pain of left calf  M79.662 729.5   7. Leukocytosis  D72.829 288.60         Follow-up Information    None          ED Disposition Condition    Observation Stable              On re-evaluation, the patient's status has remained stable.  At this time, I believe the patient should be admitted to the hospital for further evaluation and management.  The consulting physician/team agrees with plan and will admit under their service.   The patient and family were updated with test results, overall impression, and further plan of care. All questions were answered.       Mark Will MD  04/05/24 [id number]

## 2024-04-05 NOTE — ED NOTES
Nurses Note -- 4 Eyes      4/5/2024   9:13 AM      Skin assessed during: Admit      [] No Altered Skin Integrity Present    []Prevention Measures Documented      [x] Yes- Altered Skin Integrity Present or Discovered   [] LDA Added if Not in Epic (Describe Wound)   [] New Altered Skin Integrity was Present on Admit and Documented in LDA   [x] Wound Image Taken    Wound Care Consulted? No    Attending Nurse:  ALISSON Elaine    Second RN/Staff Member:   ALISSON Amaya

## 2024-04-05 NOTE — ASSESSMENT & PLAN NOTE
- Diet: Diabetic Diet + Boost TIDWM  - IS ordered   - Pain Regimen: Tylenol PRN, Ibuprofen PRN, Oxy IR 5/10 PRN   - Anti-Emetics: Zofran PRN, Compazine PRN    - Bowel Regimen: Senna qD, MoM PRN   - Consults: PT/OT (while inpatient)

## 2024-04-05 NOTE — ED NOTES
"Anette Ricci, a 58 y.o. female presents to the ED w/ complaint of hyperglycemia in the 400s at home, BLE edema, and HA. Reports having Sx on 3/25 to remove ovarian CA, gallbladder, and part of liver. Since Sx has been having worsening abd swelling and BLE. Nonintact midline abdominal incision with clear/yellow drainage noted. No redness or warmth noted to incision. Denies fever/chills. Denies n/v    Triage note:  Chief Complaint   Patient presents with    Hyperglycemia     Reports cbg PTA >400. Took 5units of novolog. 294 CBG in triage . Hx of Ca. "04/19/24 I start chemo for ovarian cancer. "     Review of patient's allergies indicates:   Allergen Reactions    Codeine Other (See Comments)     Flu like s/s    Tramadol Other (See Comments)     Flu like symptoms similar to codeine reaction     Past Medical History:   Diagnosis Date    Breast cancer 2013    Diabetes mellitus     Genetic testing 05/29/2013    VUS    Hyperlipidemia     Hypertension     Malignant neoplasm of upper-outer quadrant of right breast in female, estrogen receptor positive 12/02/2015    Seizure disorder    Patient identifiers for Anette Ricci checked and correct.    LOC: The patient is awake, alert and aware of environment with an appropriate affect, the patient is oriented x 4 and speaking appropriately.  APPEARANCE: Patient resting comfortably and in no acute distress, patient is clean and well groomed, patient's clothing is properly fastened.  SKIN: The skin is warm and dry, color consistent with ethnicity, patient has normal skin turgor and moist mucus membranes, +incision to abdomen with drainage  MUSCULOSKELETAL: Patient moving all extremities well, no obvious swelling or deformities noted.  RESPIRATORY: Airway is open and patent, respirations are spontaneous and even, patient has a normal effort and rate.  CARDIAC: Patient has a normal rate and rhythm, BLE peripheral edema noted, capillary refill < 3 seconds. Normal +2 " pedal pulses present.  ABDOMEN: Soft and tender to palpation, distention noted. Patient denies any nausea, vomiting, diarrhea, or constipation. +abd pain  NEUROLOGIC: Eyes open spontaneously, PERRL, behavior appropriate to situation, follows commands, facial expression symmetrical, bilateral hand grasp equal and even, purposeful motor response noted, normal sensation in all extremities.

## 2024-04-05 NOTE — HPI
Anette Ricci is a 59yo  who is POD#14 from an Ex-lap/CARLOS ALBERTO/BSO/Omx/Segment 6 liver resection/Cholecystectomy/Peritoneal and Diaphragm stripping for ovarian neoplasm who presents to the ED for hyperglycemia, fatigue, and generalized weakness. Her post-op course was complicated by ICU stay and failed void trial on POD#1. She was discharged on POD#5. During her hospitalization, blood sugars were closely monitored with assistance of endocrine team. She was discharged on the following insulin regimen: levemir 5 units nightly and sliding scale insulin for meals. Per history in ED, patient reports taking her nightly 5 units of levemir. Her blood sugar this morning was 400 so she took 4 units of leftover novolog and presented to the ED.      Patient has known abdominal ascites and bilateral leg swelling likely from malignancy. She was admitted inpatient /- for these concerns. Full work-up including echo, EKG, CTA, BLE dopplers were negative. She had paracentesis with 3.6L removed. Fluid studies at that time showed no sign of SBP  .   Today she endorses worsening bilateral leg swelling left greater than right and SOB. Denies chest pain. Denies fever/cills. Denies nausea/vomiting. Denies abdominal pain. Reports ambulating at home. Reports mild dysuria. Denies any vaginal bleeding, discharge, or pain.

## 2024-04-05 NOTE — ASSESSMENT & PLAN NOTE
- Leukocytosis on admit.   - No signs/symptoms of sepsis.   - Abdominal exam in ED overall benign. On exam, superficial wound dehiscence noted. On probing, no signs of facial dehiscence noted.   - CTAP consistent with measurable air and fluid collection within the mid pelvis 7.8 x 6.6 x 5.2 cm at the surgical site, could be abscess.   - UA consistent with UTI.   - LA: 2.2   - Ucx/ Bcx in process.   - Will empirically treat fluid and air collection seen on CT with IV zosyn. Can consider IR drainage if signs of clinical deterioration.

## 2024-04-05 NOTE — ASSESSMENT & PLAN NOTE
- POD#11 s/p Ex-Lap/CARLOS ALBERTO/BSO/Partial Liver Resection (Segment 6)/ Omentectomy/Diaphragm Stripping/Peritoneal Stripping/Cholecystectomy   - Beginning IV paclitaxel/carboplatin outpatient  - VTE ppx: SCDs. Holding lovenox for possible IR drainage in AM.  - Will hold prophylactic eliquis while inpatient

## 2024-04-05 NOTE — TELEPHONE ENCOUNTER
Had surgery on Monday 3/25. Now experiencing a lot of swelling in her leg and CBG is registering high. Was taken off of her Novolog and was started on Levemir.   Reason for Disposition   Blood glucose > 500 mg/dL (27.8 mmol/L)    Additional Information   Negative: Unconscious or difficult to awaken   Negative: Acting confused (e.g., disoriented, slurred speech)   Negative: Very weak (e.g., can't stand)   Negative: Sounds like a life-threatening emergency to the triager   Negative: [1] Vomiting AND [2] signs of dehydration (e.g., very dry mouth, lightheaded, dark urine)   Negative: [1] Blood glucose > 240 mg/dL (13.3 mmol/L) AND [2] rapid breathing    Protocols used: Diabetes - High Blood Sugar-A-AH

## 2024-04-05 NOTE — ASSESSMENT & PLAN NOTE
- Pictures in media.    - Exam consistent with superficial wound dehiscence at the superior and inferior portion of midline vertical incision, exudate noted, no evidence of serosanguinous fluid. Probed with no evidence of fascial dehiscence.    - Wound care consult placed for possible wound vac placement. Appreciate assistance.

## 2024-04-05 NOTE — ED NOTES
Per night shift, unable to obtain IV access/blood work. Able to obtain iv access at this time, but unable to obtain blood work after 2 RN attempts. Awaiting U/S IV.

## 2024-04-05 NOTE — ED NOTES
"Blood bank contacted at this time, stated "we received the type and screen and we are working on the one unit of blood right now".  "

## 2024-04-06 LAB
ANION GAP SERPL CALC-SCNC: 11 MMOL/L (ref 8–16)
BASOPHILS # BLD AUTO: 0.09 K/UL (ref 0–0.2)
BASOPHILS NFR BLD: 0.4 % (ref 0–1.9)
BUN SERPL-MCNC: 6 MG/DL (ref 6–20)
CALCIUM SERPL-MCNC: 7.3 MG/DL (ref 8.7–10.5)
CHLORIDE SERPL-SCNC: 88 MMOL/L (ref 95–110)
CO2 SERPL-SCNC: 32 MMOL/L (ref 23–29)
CREAT SERPL-MCNC: 0.6 MG/DL (ref 0.5–1.4)
DIFFERENTIAL METHOD BLD: ABNORMAL
EOSINOPHIL # BLD AUTO: 0.2 K/UL (ref 0–0.5)
EOSINOPHIL NFR BLD: 0.7 % (ref 0–8)
ERYTHROCYTE [DISTWIDTH] IN BLOOD BY AUTOMATED COUNT: 21 % (ref 11.5–14.5)
EST. GFR  (NO RACE VARIABLE): >60 ML/MIN/1.73 M^2
GLUCOSE SERPL-MCNC: 382 MG/DL (ref 70–110)
HCT VFR BLD AUTO: 27.2 % (ref 37–48.5)
HGB BLD-MCNC: 8.7 G/DL (ref 12–16)
IMM GRANULOCYTES # BLD AUTO: 0.25 K/UL (ref 0–0.04)
IMM GRANULOCYTES NFR BLD AUTO: 1.2 % (ref 0–0.5)
LYMPHOCYTES # BLD AUTO: 1.2 K/UL (ref 1–4.8)
LYMPHOCYTES NFR BLD: 5.8 % (ref 18–48)
MCH RBC QN AUTO: 26 PG (ref 27–31)
MCHC RBC AUTO-ENTMCNC: 32 G/DL (ref 32–36)
MCV RBC AUTO: 81 FL (ref 82–98)
MONOCYTES # BLD AUTO: 1.1 K/UL (ref 0.3–1)
MONOCYTES NFR BLD: 5.6 % (ref 4–15)
NEUTROPHILS # BLD AUTO: 17.5 K/UL (ref 1.8–7.7)
NEUTROPHILS NFR BLD: 86.3 % (ref 38–73)
NRBC BLD-RTO: 0 /100 WBC
PLATELET # BLD AUTO: 772 K/UL (ref 150–450)
PMV BLD AUTO: 8.8 FL (ref 9.2–12.9)
POCT GLUCOSE: 130 MG/DL (ref 70–110)
POCT GLUCOSE: 172 MG/DL (ref 70–110)
POCT GLUCOSE: 289 MG/DL (ref 70–110)
POTASSIUM SERPL-SCNC: 3 MMOL/L (ref 3.5–5.1)
RBC # BLD AUTO: 3.35 M/UL (ref 4–5.4)
SODIUM SERPL-SCNC: 131 MMOL/L (ref 136–145)
WBC # BLD AUTO: 20.28 K/UL (ref 3.9–12.7)

## 2024-04-06 PROCEDURE — 63600175 PHARM REV CODE 636 W HCPCS: Mod: JZ,JG

## 2024-04-06 PROCEDURE — 25000003 PHARM REV CODE 250: Performed by: PHYSICIAN ASSISTANT

## 2024-04-06 PROCEDURE — 96372 THER/PROPH/DIAG INJ SC/IM: CPT | Performed by: PHYSICIAN ASSISTANT

## 2024-04-06 PROCEDURE — 63600175 PHARM REV CODE 636 W HCPCS: Performed by: STUDENT IN AN ORGANIZED HEALTH CARE EDUCATION/TRAINING PROGRAM

## 2024-04-06 PROCEDURE — 96366 THER/PROPH/DIAG IV INF ADDON: CPT

## 2024-04-06 PROCEDURE — 63600175 PHARM REV CODE 636 W HCPCS: Performed by: PHYSICIAN ASSISTANT

## 2024-04-06 PROCEDURE — 94640 AIRWAY INHALATION TREATMENT: CPT

## 2024-04-06 PROCEDURE — 85025 COMPLETE CBC W/AUTO DIFF WBC: CPT

## 2024-04-06 PROCEDURE — 25000242 PHARM REV CODE 250 ALT 637 W/ HCPCS

## 2024-04-06 PROCEDURE — 80048 BASIC METABOLIC PNL TOTAL CA: CPT

## 2024-04-06 PROCEDURE — 99222 1ST HOSP IP/OBS MODERATE 55: CPT | Mod: ,,, | Performed by: PHYSICIAN ASSISTANT

## 2024-04-06 PROCEDURE — 36415 COLL VENOUS BLD VENIPUNCTURE: CPT

## 2024-04-06 PROCEDURE — 20600001 HC STEP DOWN PRIVATE ROOM

## 2024-04-06 PROCEDURE — 25000003 PHARM REV CODE 250

## 2024-04-06 PROCEDURE — 25000003 PHARM REV CODE 250: Performed by: STUDENT IN AN ORGANIZED HEALTH CARE EDUCATION/TRAINING PROGRAM

## 2024-04-06 RX ORDER — IBUPROFEN 200 MG
16 TABLET ORAL
Status: DISCONTINUED | OUTPATIENT
Start: 2024-04-06 | End: 2024-04-07

## 2024-04-06 RX ORDER — ENOXAPARIN SODIUM 100 MG/ML
40 INJECTION SUBCUTANEOUS EVERY 24 HOURS
Status: DISCONTINUED | OUTPATIENT
Start: 2024-04-06 | End: 2024-04-06

## 2024-04-06 RX ORDER — IBUPROFEN 200 MG
24 TABLET ORAL
Status: DISCONTINUED | OUTPATIENT
Start: 2024-04-06 | End: 2024-04-07

## 2024-04-06 RX ORDER — GLUCAGON 1 MG
1 KIT INJECTION
Status: DISCONTINUED | OUTPATIENT
Start: 2024-04-06 | End: 2024-04-07

## 2024-04-06 RX ORDER — IBUPROFEN 600 MG/1
600 TABLET ORAL EVERY 6 HOURS PRN
Status: DISCONTINUED | OUTPATIENT
Start: 2024-04-06 | End: 2024-04-08 | Stop reason: HOSPADM

## 2024-04-06 RX ORDER — ALBUTEROL SULFATE 90 UG/1
1 AEROSOL, METERED RESPIRATORY (INHALATION) EVERY 6 HOURS PRN
Status: DISCONTINUED | OUTPATIENT
Start: 2024-04-06 | End: 2024-04-08 | Stop reason: HOSPADM

## 2024-04-06 RX ORDER — CIPROFLOXACIN 2 MG/ML
400 INJECTION, SOLUTION INTRAVENOUS
Status: DISCONTINUED | OUTPATIENT
Start: 2024-04-06 | End: 2024-04-08

## 2024-04-06 RX ORDER — BENZONATATE 100 MG/1
100 CAPSULE ORAL 3 TIMES DAILY PRN
Status: DISCONTINUED | OUTPATIENT
Start: 2024-04-06 | End: 2024-04-08 | Stop reason: HOSPADM

## 2024-04-06 RX ORDER — METRONIDAZOLE 500 MG/100ML
500 INJECTION, SOLUTION INTRAVENOUS
Status: DISCONTINUED | OUTPATIENT
Start: 2024-04-06 | End: 2024-04-08

## 2024-04-06 RX ORDER — INSULIN ASPART 100 [IU]/ML
3 INJECTION, SOLUTION INTRAVENOUS; SUBCUTANEOUS
Status: DISCONTINUED | OUTPATIENT
Start: 2024-04-06 | End: 2024-04-07

## 2024-04-06 RX ORDER — INSULIN ASPART 100 [IU]/ML
0-5 INJECTION, SOLUTION INTRAVENOUS; SUBCUTANEOUS
Status: DISCONTINUED | OUTPATIENT
Start: 2024-04-06 | End: 2024-04-07

## 2024-04-06 RX ADMIN — IBUPROFEN 600 MG: 600 TABLET, FILM COATED ORAL at 08:04

## 2024-04-06 RX ADMIN — ACETAMINOPHEN 650 MG: 325 TABLET ORAL at 09:04

## 2024-04-06 RX ADMIN — INSULIN DETEMIR 10 UNITS: 100 INJECTION, SOLUTION SUBCUTANEOUS at 11:04

## 2024-04-06 RX ADMIN — DOCUSATE SODIUM AND SENNOSIDES 1 TABLET: 8.6; 5 TABLET, FILM COATED ORAL at 09:04

## 2024-04-06 RX ADMIN — PROCHLORPERAZINE MALEATE 5 MG: 5 TABLET ORAL at 10:04

## 2024-04-06 RX ADMIN — PIPERACILLIN SODIUM AND TAZOBACTAM SODIUM 4.5 G: 4; .5 INJECTION, POWDER, FOR SOLUTION INTRAVENOUS at 12:04

## 2024-04-06 RX ADMIN — ATORVASTATIN CALCIUM 80 MG: 40 TABLET, FILM COATED ORAL at 09:04

## 2024-04-06 RX ADMIN — ONDANSETRON HYDROCHLORIDE 8 MG: 4 TABLET, FILM COATED ORAL at 09:04

## 2024-04-06 RX ADMIN — POTASSIUM BICARBONATE 25 MEQ: 978 TABLET, EFFERVESCENT ORAL at 02:04

## 2024-04-06 RX ADMIN — OXYCODONE HYDROCHLORIDE 10 MG: 10 TABLET ORAL at 08:04

## 2024-04-06 RX ADMIN — LAMOTRIGINE 25 MG: 25 TABLET ORAL at 09:04

## 2024-04-06 RX ADMIN — ALBUTEROL SULFATE 1 PUFF: 108 INHALANT RESPIRATORY (INHALATION) at 04:04

## 2024-04-06 RX ADMIN — INSULIN ASPART 5 UNITS: 100 INJECTION, SOLUTION INTRAVENOUS; SUBCUTANEOUS at 11:04

## 2024-04-06 RX ADMIN — INSULIN ASPART 3 UNITS: 100 INJECTION, SOLUTION INTRAVENOUS; SUBCUTANEOUS at 11:04

## 2024-04-06 RX ADMIN — POTASSIUM BICARBONATE 25 MEQ: 978 TABLET, EFFERVESCENT ORAL at 09:04

## 2024-04-06 RX ADMIN — BENZONATATE 100 MG: 100 CAPSULE ORAL at 03:04

## 2024-04-06 RX ADMIN — SULFAMETHOXAZOLE AND TRIMETHOPRIM 1 TABLET: 800; 160 TABLET ORAL at 09:04

## 2024-04-06 RX ADMIN — METRONIDAZOLE 500 MG: 5 INJECTION, SOLUTION INTRAVENOUS at 09:04

## 2024-04-06 RX ADMIN — OXYCODONE HYDROCHLORIDE 10 MG: 10 TABLET ORAL at 01:04

## 2024-04-06 RX ADMIN — PIPERACILLIN SODIUM AND TAZOBACTAM SODIUM 4.5 G: 4; .5 INJECTION, POWDER, FOR SOLUTION INTRAVENOUS at 03:04

## 2024-04-06 RX ADMIN — CIPROFLOXACIN 400 MG: 400 INJECTION, SOLUTION INTRAVENOUS at 09:04

## 2024-04-06 RX ADMIN — ENOXAPARIN SODIUM 40 MG: 40 INJECTION SUBCUTANEOUS at 05:04

## 2024-04-06 RX ADMIN — INSULIN ASPART 3 UNITS: 100 INJECTION, SOLUTION INTRAVENOUS; SUBCUTANEOUS at 05:04

## 2024-04-06 NOTE — HOSPITAL COURSE
4/5/2024 HD#1: Admitted for leukocytosis, fluid collection seen on CT, and hyperglycemia. IV zosyn given. 1u pRBC given. Levemir 5 and sliding scale ordered.   4/6/2024 HD#2: Febrile to 100.4. Starting cipro/flagyl. Considering IR drainage. H/H zac appropriately. Continuing blood glucose management.  4/7/2024 HD#3: POD#0 from IR drainage and drain placement. Continuing IV cipro/flagyl. Continuing wound care and blood glucose management.   4/8/2024 HD#4: POD#1 from IR drainage and drain placement. Drain output 55/24hr. Continuing IV cipro/flagyl. Afebrile > 24 hrs. Continuing wound care and blood glucose management. Anticipate discharge home today.

## 2024-04-06 NOTE — NURSING
Patient with intermittent dry cough and wheezing, Dr. Mel Boateng was notified and albuterol inhaler PRN and tessalon perles PRN were ordered

## 2024-04-06 NOTE — CONSULTS
For full consult note, please see H&P by myself dated 24.     In short, 58 y.o.  who is POD#11 from an Ex-lap/CARLOS ALBERTO/BSO/Omx/Segment 6 liver resection/Cholecystectomy/Peritoneal and Diaphragm stripping for ovarian neoplasm who presents to the ED for hyperglycemia, fatigue, and generalized weakness. ED workup significant for leukocytosis, possible abscess seen on CT, superficial wound dehiscence and hyperglycemia. Patient admitted for IV abx, wound care consult, and blood glucose monitoring.     Mel Boateng MD PGY-2  Obstetrics and Gynecology  [facility]

## 2024-04-06 NOTE — HPI
Reason for Consult: Management of T2DM, Hyperglycemia      Surgical Procedure and Date: CARLOS ALBERTO-BSO  3/26/24     Diabetes diagnosis year: > 5 years ago     Home Diabetes Medications:      Discharged on Levemir 5 units and Humalog SSI    Prev on: Toujeo 60 units evening, Novolog 10 units w/ meals Breakfast and Dinner, Metformin 1000 mg , Mounjaro 7.5 mg.     How often checking glucose at home? Candice  BG readings on regimen: 200s-400s  Hypoglycemia on the regimen?  No  Missed doses on regimen?  No     Diabetes Complications include:     Hyperglycemia and Diabetic retinopathy      Complicating diabetes co morbidities:   HLD, HTN        HPI:   Patient is a 58 y.o. 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 Her post-op course was complicated by ICU stay and failed void trial on POD#1. She was discharged on POD#5. During her hospitalization, blood sugars were closely monitored with assistance of endocrine team. She was discharged on the following insulin regimen: levemir 5 units nightly and sliding scale insulin for meals. Per history in ED, patient reports taking her nightly 5 units of levemir. Her blood sugar this morning was 400 so she took 4 units of leftover novolog and presented to the ED.   Endocrine consulted for bg management.

## 2024-04-06 NOTE — PLAN OF CARE
[facility] - Telemetry Stepdown    HOME HEALTH ORDERS  FACE TO FACE ENCOUNTER    Patient Name: Anette Ricci  YOB: 1965    PCP: Mark Chua MD   PCP Address: [address] / [place] [address]  PCP Phone Number: [phone]  PCP Fax: [phone]    Discharging Team(s): [facility]OLOG[facility]    Encounter Date: 04/06/2024    Admit to Home Health    Diagnoses:  Active Hospital Problems    Diagnosis  POA    *Leukocytosis [D72.829]  Yes    Hyperglycemia [R73.9]  Yes    UTI (urinary tract infection) [N39.0]  Yes    Hypokalemia [E87.6]  Yes    Superficial dehiscence of wound [T81.30XA]  Yes    Acute postoperative anemia due to expected blood loss [D62]  Yes    S/p CARLOS ALBERTO/BSO/OMX/[place]ulking/Partial liver resection/Cholecystectomy [Z90.710, Z90.79, Z90.722]  Yes    Stage 4B Carcinoscaroma, 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       Future Appointments   Date Time Provider Department Center   4/8/2024  9:30 AM Александр Washington MD [facility] GYN ONC [facility]   4/8/2024 10:30 AM [facility] CHEMO CLASS [facility] GYN ON[facility]   4/11/2024  8:00 AM Asuncion Jurado MD [facility]   4/19/2024  9:00 AM LAB, [facility]DR[facility]   4/19/2024 10:00 AM NURSE Monse [facility] CHEMO [facility] CHEMO [facility]           I have seen and examined this patient face to face today. My clinical findings that support the need for the home health skilled services and home bound status are the following:  Medical restrictions requiring assistance of another human to leave home due to  Wound care  needs.    Allergies:  Review of patient's allergies indicates:   Allergen Reactions    Codeine Other (See Comments)     Flu like s/s    Tramadol Other (See Comments)     Flu like symptoms similar to codeine reaction       Diet: regular diet    Activities: activity as tolerated    Nursing:   SN to complete comprehensive assessment including routine vital signs. Instruct on disease process and s/s of complications to report to MD. Review/verify medication list sent home with the patient at time of discharge  and instruct patient/caregiver as needed. Frequency may be adjusted depending on start of care date.    Notify MD if SBP > 160 or < 90; DBP > 90 or < 50; HR > 120 or < 50; Temp > 101;         WOUND CARE ORDERS  yes:  Surgical Wound:  Location: midline vertical incision.     Consult ET nurse        Apply the following to wound:   Other: aquacel dressing once a day (frequency)      Medications: Review discharge medications with patient and family and provide education.      Current Discharge Medication List        CONTINUE these medications which have NOT CHANGED    Details   amlodipine (NORVASC) 10 MG tablet Take 10 mg by mouth once daily.       apixaban (ELIQUIS) 2.5 mg Tab Take 1 tablet (2.5 mg total) by mouth 2 (two) times daily.  Qty: 56 tablet, Refills: 0      atorvastatin (LIPITOR) 40 MG tablet Take 80 mg by mouth once daily.      enalapril (VASOTEC) 20 MG tablet Take 20 mg by mouth once daily.      fluticasone propionate (FLONASE) 50 mcg/actuation nasal spray 1 spray by Each Nostril route once daily.      hydrochlorothiazide (HYDRODIURIL) 25 MG tablet Take 25 mg by mouth once daily.       HYDROmorphone (DILAUDID) 2 MG tablet Take 1 tablet (2 mg total) by mouth every 8 (eight) hours as needed for Pain.  Qty: 20 tablet, Refills: 0    Comments: Quantity prescribed more than 7 day supply? Yes, quantity medically necessary  Associated Diagnoses: Ovarian carcinosarcoma; Post-op pain      !! ibuprofen  (ADVIL,MOTRIN) 600 MG tablet Take 1 tablet (600 mg total) by mouth every 6 (six) hours as needed for Pain.  Qty: 30 tablet, Refills: 1      !! ibuprofen (ADVIL,MOTRIN) 800 MG tablet Take 800 mg by mouth every 6 (six) hours as needed for Pain.      insulin detemir U-100, Levemir, (LEVEMIR FLEXPEN) 100 unit/mL (3 mL) InPn pen Inject 5 Units into the skin every evening.  Qty: 4.5 mL, Refills: 3      insulin lispro (HUMALOG U-100 INSULIN) 100 unit/mL injection **LOW CORRECTION DOSE**  Blood Glucose  mg/dL                  Pre-meal                  151-200                0 unit                        201-250                2 units                      251-300                3 units                      301-350                4 units                      >350                     5 units                      Administer subcutaneously if needed at times designated by monitoring schedule.  Qty: 10 mL, Refills: 1      ketorolac 0.5% (ACULAR) 0.5 % Drop Place 1 drop into both eyes. For pain after eye injections.      lamoTRIgine (LAMICTAL) 25 MG tablet Take 25 mg by mouth 2 (two) times daily.      lorazepam (ATIVAN) 0.5 MG tablet Take 0.5 mg by mouth every 12 (twelve) hours as needed for Anxiety.      metformin (GLUCOPHAGE) 1000 MG tablet Take 1,000 mg by mouth daily with breakfast.       ondansetron (ZOFRAN-ODT) 4 MG TbDL Take 1 tablet (4 mg total) by mouth every 6 (six) hours as needed (for nausea).  Qty: 20 tablet, Refills: 0      quetiapine (SEROQUEL) 25 MG Tab Take 25 mg by mouth daily as needed.      tirzepatide (MOUNJARO) 5 mg/0.5 mL PnIj Inject 5 mg into the skin every 7 days. On Sunday.       !! - Potential duplicate medications found. Please discuss with provider.          I certify that this patient is confined to her home and needs intermittent skilled nursing care.      Mel Boateng MD PGY-2  Obstetrics and Gynecology  [facility]

## 2024-04-06 NOTE — ASSESSMENT & PLAN NOTE
- Leukocytosis on admit.   - No signs/symptoms of sepsis.   - Abdominal exam in ED overall benign. On exam, superficial wound dehiscence noted. On probing, no signs of facial dehiscence noted.   - CTAP consistent with measurable air and fluid collection within the mid pelvis 7.8 x 6.6 x 5.2 cm at the surgical site, could be abscess.   - UA consistent with UTI.   - LA: 2.2   - Ucx/ Bcx in process.   - Will empirically treat fluid and air   - Empirically treating with IV zosyn.   - Can consider IR drainage if signs of clinical deterioration.

## 2024-04-06 NOTE — CONSULTS
[facility] - Telemetry Stepdown  Endocrinology  Diabetes Consult Note    Consult Requested by: Александр Washington MD   Reason for admit: Leukocytosis    HISTORY OF PRESENT ILLNESS:  Reason for Consult: Management of T2DM, Hyperglycemia      Surgical Procedure and Date: CARLOS ALBERTO-BSO  3/26/24     Diabetes diagnosis year: > 5 years ago     Home Diabetes Medications:      Discharged on Levemir 5 units and Humalog SSI    Prev on: Toujeo 60 units evening, Novolog 10 units w/ meals Breakfast and Dinner, Metformin 1000 mg , Mounjaro 7.5 mg.     How often checking glucose at home? Candice  BG readings on regimen: 200s-400s  Hypoglycemia on the regimen?  No  Missed doses on regimen?  No     Diabetes Complications include:     Hyperglycemia and Diabetic retinopathy      Complicating diabetes co morbidities:   HLD, HTN        HPI:   Patient is a 58 y.o. 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 Her post-op course was complicated by ICU stay and failed void trial on POD#1. She was discharged on POD#5. During her hospitalization, blood sugars were closely monitored with assistance of endocrine team. She was discharged on the following insulin regimen: levemir 5 units nightly and sliding scale insulin for meals. Per history in ED, patient reports taking her nightly 5 units of levemir. Her blood sugar this morning was 400 so she took 4 units of leftover novolog and presented to the ED.   Endocrine consulted for bg management.      Interval HPI:   No acute events overnight. Patient in room 8069/8069 A. Blood glucose worsening. BG above goal on current insulin regimen (SSI and basal). Steroid use- None .      Renal function- Normal   Vasopressors-  None     Diet diabetic  Calorie     Eatin%  Nausea: No  Hypoglycemia and intervention: No  Fever:  "No  TPN and/or TF: No    PMH, PSH, FH, SH updated and reviewed     ROS:    Review of Systems   Constitutional:  Positive for fatigue. Negative for chills.   Respiratory:  Negative for cough and shortness of breath.    Gastrointestinal:  Positive for abdominal distention and abdominal pain. Negative for nausea and vomiting.       Current Medications and/or Treatments Impacting Glycemic Control  Immunotherapy:    Immunosuppressants       None          Steroids:   Hormones (From admission, onward)      None          Pressors:    Autonomic Drugs (From admission, onward)      None          Hyperglycemia/Diabetes Medications:   Antihyperglycemics (From admission, onward)      Start     Stop Route Frequency Ordered    04/05/24 1738  insulin aspart U-100 pen 0-5 Units         -- SubQ Before meals & nightly PRN 04/05/24 1639             PHYSICAL EXAMINATION:  Vitals:    04/06/24 0806   BP: 119/74   Pulse: 102   Resp: 20   Temp: 99.6 °F (37.6 °C)     Body mass index is 32.89 kg/m².     Physical Exam  HENT:      Head: Normocephalic and atraumatic.      Right Ear: External ear normal.      Left Ear: External ear normal.   Pulmonary:      Effort: No respiratory distress.   Abdominal:      General: There is distension.      Tenderness: There is abdominal tenderness.         Labs Reviewed and Include   Recent Labs   Lab 04/06/24  0424   *   CALCIUM 7.3*   *   K 3.0*   CO2 32*   CL 88*   BUN 6   CREATININE 0.6     Lab Results   Component Value Date    WBC 20.28 (H) 04/06/2024    HGB 8.7 (L) 04/06/2024    HCT 27.2 (L) 04/06/2024    MCV 81 (L) 04/06/2024     (H) 04/06/2024     No results for input(s): "TSH", "FREET4" in the last 168 hours.  Lab Results   Component Value Date    HGBA1C 7.3 (H) 03/19/2024       Nutritional status:   Body mass index is 32.89 kg/m².  Lab Results   Component Value Date    ALBUMIN 1.1 (L) 04/05/2024    ALBUMIN 1.2 (L) 03/29/2024    ALBUMIN 2.1 (L) 03/25/2024     No results found for: " ""PREALBUMIN"    Estimated Creatinine Clearance: 119.7 mL/min (based on SCr of 0.6 mg/dL).    Accu-Checks  Recent Labs     04/05/24  0621 04/05/24  1514 04/05/24 2024 04/06/24  0833   POCTGLUCOSE 294* 201* 226* 289*        ASSESSMENT and PLAN    Cardiac/Vascular  Hyperlipidemia    Managed per primary.   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 Hx of  recent CARLOS ALBERTO BSO w/ liver resection.  Prev to sx pt reported high insulin requirements, but needs post sx were minimal with frequent lows and eventually d/x on Levemir 5 units+ SSI.  Pt now w/ reported hyperglycemia in 400s at home, here above goal in 200s-300s. Will start on increased regimen conservatively given lows at recent admission. WBD ~0.2.      - Start Levemir 10 units daily,   - Start Novolog 3 units with meals,  - Start Novolog correction dose with ISF 50 starting at 200    - POCT Glucose before meals, at bedtime and at 2 am  - Hypoglycemia protocol in place      ** Please notify Endocrine for any change and/or advance in diet**  ** Please call Endocrine for any BG related issues **     Discharge Planning:   TBD. Please notify endocrinology prior to discharge.          Plan discussed with patient, family, and RN at bedside.     Robbin Rios PA-C  Endocrinology  [facility] - Tele[facility] Step[facility]  "

## 2024-04-06 NOTE — CLINICAL REVIEW
IP Sepsis Screen (most recent)       Sepsis Screen (IP) - 04/06/24 0850       Is the patient's history or complaint suggestive of a possible infection? Yes  -CB    Are there at least two of the following signs and symptoms present? Yes  -CB    Sepsis signs/symptoms - Tachycardia Tachycardia     >90  -CB    Sepsis signs/symptoms - WBC WBC < 4,000 or WBC > 12,000  -CB    Are any of the following organ dysfunction criteria present and not considered to be due to a chronic condition? Yes  -CB    Organ Dysfunction Criteria Lactate > 2.0  -CB    Initiate Sepsis Protocol No  -CB    Reason sepsis not considered Pt. receiving appropriate management  -CB              User Key  (r) = Recorded By, (t) = Taken By, (c) = Cosigned By      Initials Name    CB Sia Ahuja, RN

## 2024-04-06 NOTE — ASSESSMENT & PLAN NOTE
- K on admit 2.9 > replacement > 3.0  - EKG wnl  - Replaced in ED with IV 10mEq, PO 40 mEq  - Will replace again, with goal > 4.0

## 2024-04-06 NOTE — SUBJECTIVE & OBJECTIVE
Interval History: Patient resting comfortably overnight. No fevers/chills. Tolerating regular diet without nausea/vomiting. Voiding spontaneously. Passing flatus. Reports of cough that worsened overnight. Required two oxy 10 overnight.     Past Medical History:   Diagnosis Date    Breast cancer 2013    Diabetes mellitus     Genetic testing 05/29/2013    VUS    Hyperlipidemia     Hypertension     Malignant neoplasm of upper-outer quadrant of right breast in female, estrogen receptor positive 12/02/2015    Seizure disorder      Past Surgical History:   Procedure Laterality Date    BILATERAL SALPINGO-OOPHORECTOMY (BSO) N/A 3/25/2024    Procedure: SALPINGO-OOPHORECTOMY, BILATERAL;  Surgeon: Александр Washington MD;  Location: [facility] OR [address] [place] [address];  Service: OB/GYN;  Laterality: N/A;    BREAST BIOPSY      BREAST LUMPECTOMY Right 2013    CHOLECYSTECTOMY  3/25/2024    Procedure: CHOLECYSTECTOMY;  Surgeon: Bo Farmer MD;  Location: [facility] OR [address] [place] [address];  Service: General;;    DEBULKING OF TUMOR N/A 3/25/2024    Procedure: DEBULKING, NEOPLASM;  Surgeon: Александр Washington MD;  Location: [facility] OR [address] [place] [address];  Service: OB/GYN;  Laterality: N/A;    EXCISION, LIVER N/A 3/25/2024    Procedure: EXCISION, LIVER - partial;  Surgeon: Bo Farmer MD;  Location: [facility] OR [address] [place] [address];  Service: General;  Laterality: N/A;  bk ultrasound  [place] book by TA on 3-21-24  7:00 a.m.  Conf #  [id number]    EYE SURGERY      LAPAROTOMY, EXPLORATORY N/A 3/25/2024    Procedure: LAPAROTOMY, EXPLORATORY;  Surgeon: Александр Washington MD;  [facility];  Service: OB/GYN;  Laterality: N/A;    OMENTECTOMY N/A 3/25/2024    Procedure: OMENTECTOMY;  Surgeon: Александр Washington MD;  [facility];  Service: OB/GYN;  Laterality: N/A;    PERITONEOCENTESIS N/A 3/13/2024    Procedure: PARACENTESIS, ABDOMINAL;  Surgeon: Flavia Moore MD;  Location: [facility] CATH LAB;  Service: Radiology;  Laterality: N/A;    PERITONEOCENTESIS N/A  3/21/2024    Procedure: PARACENTESIS, ABDOMINAL;  Surgeon: Jorge Jolley MD;  Location: [facility] CATH LAB;  Service: Radiology;  Laterality: N/A;    TOTAL ABDOMINAL HYSTERECTOMY N/A 3/25/2024    Procedure: HYSTERECTOMY, TOTAL, ABDOMINAL;  Surgeon: Александр Washington MD;  Location: [facility] OR [address] [place] [address];  Service: OB/GYN;  Laterality: N/A;    TOTAL REDUCTION MAMMOPLASTY Bilateral 2016     Family History       Problem Relation (Age of Onset)    Breast cancer Sister (48)    Seizures Father          Tobacco Use    Smoking status: Never    Smokeless tobacco: Never   Substance and Sexual Activity    Alcohol use: Yes     Alcohol/week: 0.0 standard drinks of alcohol     Comment: ocassional    Drug use: No    Sexual activity: Yes     Partners: Male     Birth control/protection: None       PTA Medications   Medication Sig    amlodipine (NORVASC) 10 MG tablet Take 10 mg by mouth once daily.     apixaban (ELIQUIS) 2.5 mg Tab Take 1 tablet (2.5 mg total) by mouth 2 (two) times daily.    atorvastatin (LIPITOR) 40 MG tablet Take 80 mg by mouth once daily.    enalapril (VASOTEC) 20 MG tablet Take 20 mg by mouth once daily.    fluticasone propionate (FLONASE) 50 mcg/actuation nasal spray 1 spray by Each Nostril route once daily.    hydrochlorothiazide (HYDRODIURIL) 25 MG tablet Take 25 mg by mouth once daily.     HYDROmorphone (DILAUDID) 2 MG tablet Take 1 tablet (2 mg total) by mouth every 8 (eight) hours as needed for Pain.    ibuprofen (ADVIL,MOTRIN) 600 MG tablet Take 1 tablet (600 mg total) by mouth every 6 (six) hours as needed for Pain.    ibuprofen (ADVIL,MOTRIN) 800 MG tablet Take 800 mg by mouth every 6 (six) hours as needed for Pain.    insulin detemir U-100, Levemir, (LEVEMIR FLEXPEN) 100 unit/mL (3 mL) InPn pen Inject 5 Units into the skin every evening.    insulin lispro (HUMALOG U-100 INSULIN) 100 unit/mL injection **LOW CORRECTION DOSE**  Blood Glucose  mg/dL                  Pre-meal                  151-200                 0 unit                        201-250                2 units                      251-300                3 units                      301-350                4 units                      >350                     5 units                      Administer subcutaneously if needed at times designated by monitoring schedule.    ketorolac 0.5% (ACULAR) 0.5 % Drop Place 1 drop into both eyes. For pain after eye injections.    lamoTRIgine (LAMICTAL) 25 MG tablet Take 25 mg by mouth 2 (two) times daily.    lorazepam (ATIVAN) 0.5 MG tablet Take 0.5 mg by mouth every 12 (twelve) hours as needed for Anxiety.    metformin (GLUCOPHAGE) 1000 MG tablet Take 1,000 mg by mouth daily with breakfast.     ondansetron (ZOFRAN-ODT) 4 MG TbDL Take 1 tablet (4 mg total) by mouth every 6 (six) hours as needed (for nausea).    quetiapine (SEROQUEL) 25 MG Tab Take 25 mg by mouth daily as needed.    tirzepatide (MOUNJARO) 5 mg/0.5 mL PnIj Inject 5 mg into the skin every 7 days. On Sunday.         Review of patient's allergies indicates:   Allergen Reactions    Codeine Other (See Comments)     Flu like s/s    Tramadol Other (See Comments)     Flu like symptoms similar to codeine reaction       Review of Systems   Constitutional:  Positive for activity change and fatigue. Negative for chills, diaphoresis and fever.   HENT:  Negative for nasal congestion.    Eyes:  Negative for visual disturbance.   Respiratory:  Positive for cough and shortness of breath. Negative for wheezing.    Cardiovascular:  Negative for chest pain.   Gastrointestinal:  Positive for bloating. Negative for diarrhea, nausea and vomiting.   Genitourinary:  Positive for dysuria. Negative for pelvic pain, vaginal bleeding, vaginal discharge and vaginal pain.   Musculoskeletal:  Negative for back pain.   Integumentary:  Negative for rash.   Neurological:  Negative for syncope.   Hematological:  Negative for adenopathy.   Psychiatric/Behavioral:  Negative for depression.        Objective:     Vital Signs (Most Recent):  Temp: 98.4 °F (36.9 °C) (04/06/24 0328)  Pulse: 103 (04/06/24 0413)  Resp: 19 (04/06/24 0413)  BP: 130/63 (04/06/24 0328)  SpO2: (!) 93 % (04/06/24 0413) Vital Signs (24h Range):  Temp:  [98 °F (36.7 °C)-98.8 °F (37.1 °C)] 98.4 °F (36.9 °C)  Pulse:  [] 103  Resp:  [15-20] 19  SpO2:  [93 %-100 %] 93 %  BP: (110-147)/(57-70) 130/63     Weight: 95.3 kg (210 lb)  Body mass index is 32.89 kg/m².       Physical Exam:   Constitutional: She is oriented to person, place, and time. She appears well-developed and well-nourished.    HENT:   Head: Normocephalic.      Cardiovascular:  Normal rate.             Pulmonary/Chest: Effort normal.        Abdominal: She exhibits distension and abdominal incision. There is abdominal tenderness. There is no rebound and no guarding.   Abdomen tympanic and mildly distended. Midline vertical incision superificially dehisced as seen in photos below. Exudate noted, no evidence of serosanguinous fluid. Incision probed in length and no fascial defect noted.              Musculoskeletal: Normal range of motion.       Neurological: She is alert and oriented to person, place, and time.    Skin: Skin is warm and dry.    Psychiatric: She has a normal mood and affect.          Laboratory:  Lab Results   Component Value Date    WBC 20.28 (H) 04/06/2024    HGB 8.7 (L) 04/06/2024    HCT 27.2 (L) 04/06/2024    MCV 81 (L) 04/06/2024     (H) 04/06/2024       BMP  Lab Results   Component Value Date     (L) 04/06/2024    K 3.0 (L) 04/06/2024    CL 88 (L) 04/06/2024    CO2 32 (H) 04/06/2024    BUN 6 04/06/2024    CREATININE 0.6 04/06/2024    CALCIUM 7.3 (L) 04/06/2024    ANIONGAP 11 04/06/2024    EGFRNORACEVR >60.0 04/06/2024         Diagnostic Results:  CT Abdomen Pelvis With IV Contrast NO Oral Contrast  Order: [id number]  Status: Final result       Visible to patient: Yes (not seen)       Next appt: 04/08/2024 at 09:30 AM in Gynecologic Oncology  (Александр Washington MD)    0 Result Notes  Details    Reading Physician Reading Date Result Priority   Emperatriz Ochoa MD  [phone] 4/5/2024 STAT     Narrative & Impression  EXAMINATION:  CT ABDOMEN PELVIS WITH IV CONTRAST     CLINICAL HISTORY:  Abdominal abscess/infection suspected;Abdominal pain, post-op;     TECHNIQUE:  Low dose axial images, sagittal and coronal reformations were obtained from the lung bases to the pubic symphysis following the IV administration of 100 mL of Omnipaque 350 .  minimal oral contrast noted in the stomach.  Axial and coronal images reformatted.     COMPARISON:  03/29/2024     FINDINGS:  Bilateral small pleural effusion and subsegmental atelectasis at the bilateral lung basis, unchanged.  Trace of pericardial effusion, unchanged.  Partially calcified right breast mass 2.9 cm, unchanged.     The liver appears normal.  The biliary ducts are not dilated.  The gallbladder is removed.     The distal esophagus and stomach appear normal.     The pancreas, spleen and bilateral adrenal glands appear normal.     The bilateral kidneys enhance normally.  No hydronephrosis or kidney mass.     The abdominal aorta tapers normally, there is circumferential calcified atherosclerotic plaque.  No para-aortic lymphadenopathy.     Mild stool retention.  The small bowel is not dilated.  The appendix is not seen.  Fluid and air scattered throughout the abdomen similar compared to the prior exam.  Anterior midline incision containing minimal amount of air, no measurable fluid collection.  The inguinal regions appear normal.     The bladder is nondistended.  The uterus is removed.  There is an air fluid collection at the surgical site measuring 7.8 x 6.6 x 5.2 cm.  The ovaries are not definitely seen.     There is body wall anasarca.     The osseous structures demonstrate no osseous lesions.     Impression:     Air and fluid scattered within the abdomen and pelvis, it is unchanged from 03/29/2024.   There is a measurable air and fluid collection within the mid pelvis 7.8 x 6.6 cm x 5.2 cm.  Dehiscence of the surgical site cannot be excluded or other origin perforation.  An abscess could have this appearance.  The patient had a total abdominal hysterectomy and bilateral salpingo-oophorectomy, omentectomy, cholecystectomy.     Unchanged partially calcified right breast mass, is which correlates with mammogram 11/10/2023.        Electronically signed by: Emperatriz Ochoa MD  Date:                                            04/05/2024  Time:                                           12:56           Exam Ended: 04/05/24 12:23 CDT Last Resulted: 04/05/24 12:56 CDT               X-Ray Chest AP Portable  Order: [id number]  Status: Final result       Visible to patient: Yes (not seen)       Next appt: 04/08/2024 at 09:30 AM in Gynecologic Oncology (Александр Washington MD)    0 Result Notes  Details    Reading Physician Reading Date Result Priority   Abdullahi Arana MD  [phone] 4/5/2024 STAT     Narrative & Impression  EXAMINATION:  XR CHEST AP PORTABLE     CLINICAL HISTORY:  hyperglycemia;     TECHNIQUE:  Single frontal view of the chest was performed.     COMPARISON:  Chest radiograph performed 03/18/2024, 01:24 hours.     FINDINGS:  Grossly unchanged position of the left chest port.  Similar cardiomediastinal contours.  Atherosclerosis of the aorta.     Grossly unchanged cardiomediastinal contours, again noting enlargement the cardiac silhouette.     Layering at least small bilateral pleural effusions appear new when compared to 03/18/2024 radiograph.  Ill-defined opacities the lung bases may relate to atelectasis, noting that aspiration or pneumonia would be difficult to exclude.     No definite pneumothorax.     No acute findings in the visualized abdomen.     Osseous and soft tissue structures appear without definite acute change.  Operative sequela in the left chest wall again appreciated.      Impression:     Please see discussion above.        Electronically signed by: Abdullahi Arana  Date:                                            04/05/2024  Time:                                           07:29           Exam Ended: 04/05/24 07:02 CDT Last Resulted: 04/05/24 07:29 CDT

## 2024-04-06 NOTE — PLAN OF CARE
Patient in bed, no complaints voiced at this time, safety measures in place, call light in reach, NADN, POC ongoing, and  at the bedside    Problem: Adult Inpatient Plan of Care  Goal: Plan of Care Review  Outcome: Ongoing, Progressing  Goal: Patient-Specific Goal (Individualized)  Outcome: Ongoing, Progressing  Goal: Absence of Hospital-Acquired Illness or Injury  Outcome: Ongoing, Progressing  Goal: Optimal Comfort and Wellbeing  Outcome: Ongoing, Progressing  Goal: Readiness for Transition of Care  Outcome: Ongoing, Progressing     Problem: Fall Injury Risk  Goal: Absence of Fall and Fall-Related Injury  Outcome: Ongoing, Progressing

## 2024-04-06 NOTE — PROGRESS NOTES
[facility] - Telemetry Stepdown  Gynecologic Oncology  Progress Note      Patient Name: Anette Ricci  MRN: [id number]  Admission Date: 2024  Hospital Length of Stay: 0 days  Attending Provider: Александр Washington MD  Primary Care Provider: Mark Chua MD  Principal Problem: Leukocytosis    Follow-up For: * No surgery found *  Post-Operative Day:    Subjective:      History of Present Illness:  Anette Ricci is a 59yo  who is POD#11 from an Ex-lap/CARLOS ALBERTO/BSO/Omx/Segment 6 liver resection/Cholecystectomy/Peritoneal and Diaphragm stripping for ovarian neoplasm who presents to the ED for hyperglycemia, fatigue, and generalized weakness. Her post-op course was complicated by ICU stay and failed void trial on POD#1. She was discharged on POD#5. During her hospitalization, blood sugars were closely monitored with assistance of endocrine team. She was discharged on the following insulin regimen: levemir 5 units nightly and sliding scale insulin for meals. Per history in ED, patient reports taking her nightly 5 units of levemir. Her blood sugar this morning was 400 so she took 4 units of leftover novolog and presented to the ED.      Patient has known abdominal ascites and bilateral leg swelling likely from malignancy. She was admitted inpatient - for these concerns. Full work-up including echo, EKG, CTA, BLE dopplers were negative. She had paracentesis with 3.6L removed. Fluid studies at that time showed no sign of SBP  .   Today she endorses worsening bilateral leg swelling left greater than right and SOB. Denies chest pain. Denies fever/cills. Denies nausea/vomiting. Denies abdominal pain. Reports ambulating at home. Reports mild dysuria. Denies any vaginal bleeding, discharge, or pain.     Hospital Course:  2024 HD#1: Admitted for leukocytosis, fluid collection seen on CT, and hyperglycemia. IV zosyn given. 1u pRBC given. Levemir 5 and sliding scale ordered.   2024 HD#2: Continuing IV  zosyn. Considering IR drainage. H/H zac appropriately. Continuing blood glucose management.       Interval History: Patient resting comfortably overnight. No fevers/chills. Tolerating regular diet without nausea/vomiting. Voiding spontaneously. Passing flatus. Reports of cough that worsened overnight. Required two oxy 10 overnight.     Past Medical History:   Diagnosis Date    Breast cancer 2013    Diabetes mellitus     Genetic testing 05/29/2013    VUS    Hyperlipidemia     Hypertension     Malignant neoplasm of upper-outer quadrant of right breast in female, estrogen receptor positive 12/02/2015    Seizure disorder      Past Surgical History:   Procedure Laterality Date    BILATERAL SALPINGO-OOPHORECTOMY (BSO) N/A 3/25/2024    Procedure: SALPINGO-OOPHORECTOMY, BILATERAL;  Surgeon: Александр Washington MD;  Location: [facility] OR [address] [place] [address];  Service: OB/GYN;  Laterality: N/A;    BREAST BIOPSY      BREAST LUMPECTOMY Right 2013    CHOLECYSTECTOMY  3/25/2024    Procedure: CHOLECYSTECTOMY;  Surgeon: Bo Farmer MD;  Location: [facility] OR [address] [place] [address];  Service: General;;    DEBULKING OF TUMOR N/A 3/25/2024    Procedure: DEBULKING, NEOPLASM;  Surgeon: Александр Washington MD;  [facility];  Service: OB/GYN;  Laterality: N/A;    EXCISION, LIVER N/A 3/25/2024    Procedure: EXCISION, LIVER - partial;  Surgeon: Bo Farmer MD;  [facility];  Service: General;  Laterality: N/A;  bk ultrasound  [place] book by SAMINA on 3-21-24  7:00 a.m.  Conf #  [id number]    EYE SURGERY      LAPAROTOMY, EXPLORATORY N/A 3/25/2024    Procedure: LAPAROTOMY, EXPLORATORY;  Surgeon: Александр Washington MD;  [facility];  Service: OB/GYN;  Laterality: N/A;    OMENTECTOMY N/A 3/25/2024    Procedure: OMENTECTOMY;  Surgeon: Александр Washington MD;  [facility];  Service: OB/GYN;  Laterality: N/A;    PERITONEOCENTESIS N/A 3/13/2024    Procedure: PARACENTESIS, ABDOMINAL;  Surgeon: Flavia Moore  MD;  Location: [facility] CATH LAB;  Service: Radiology;  Laterality: N/A;    PERITONEOCENTESIS N/A 3/21/2024    Procedure: PARACENTESIS, ABDOMINAL;  Surgeon: Jorge Jolley MD;  Location: [facility] CATH LAB;  Service: Radiology;  Laterality: N/A;    TOTAL ABDOMINAL HYSTERECTOMY N/A 3/25/2024    Procedure: HYSTERECTOMY, TOTAL, ABDOMINAL;  Surgeon: Александр Washington MD;  Location: [facility] OR [address] [place] [address];  Service: OB/GYN;  Laterality: N/A;    TOTAL REDUCTION MAMMOPLASTY Bilateral 2016     Family History       Problem Relation (Age of Onset)    Breast cancer Sister (48)    Seizures Father          Tobacco Use    Smoking status: Never    Smokeless tobacco: Never   Substance and Sexual Activity    Alcohol use: Yes     Alcohol/week: 0.0 standard drinks of alcohol     Comment: ocassional    Drug use: No    Sexual activity: Yes     Partners: Male     Birth control/protection: None       PTA Medications   Medication Sig    amlodipine (NORVASC) 10 MG tablet Take 10 mg by mouth once daily.     apixaban (ELIQUIS) 2.5 mg Tab Take 1 tablet (2.5 mg total) by mouth 2 (two) times daily.    atorvastatin (LIPITOR) 40 MG tablet Take 80 mg by mouth once daily.    enalapril (VASOTEC) 20 MG tablet Take 20 mg by mouth once daily.    fluticasone propionate (FLONASE) 50 mcg/actuation nasal spray 1 spray by Each Nostril route once daily.    hydrochlorothiazide (HYDRODIURIL) 25 MG tablet Take 25 mg by mouth once daily.     HYDROmorphone (DILAUDID) 2 MG tablet Take 1 tablet (2 mg total) by mouth every 8 (eight) hours as needed for Pain.    ibuprofen (ADVIL,MOTRIN) 600 MG tablet Take 1 tablet (600 mg total) by mouth every 6 (six) hours as needed for Pain.    ibuprofen (ADVIL,MOTRIN) 800 MG tablet Take 800 mg by mouth every 6 (six) hours as needed for Pain.    insulin detemir U-100, Levemir, (LEVEMIR FLEXPEN) 100 unit/mL (3 mL) InPn pen Inject 5 Units into the skin every evening.    insulin lispro (HUMALOG U-100 INSULIN) 100 unit/mL injection **LOW  CORRECTION DOSE**  Blood Glucose  mg/dL                  Pre-meal                  151-200                0 unit                        201-250                2 units                      251-300                3 units                      301-350                4 units                      >350                     5 units                      Administer subcutaneously if needed at times designated by monitoring schedule.    ketorolac 0.5% (ACULAR) 0.5 % Drop Place 1 drop into both eyes. For pain after eye injections.    lamoTRIgine (LAMICTAL) 25 MG tablet Take 25 mg by mouth 2 (two) times daily.    lorazepam (ATIVAN) 0.5 MG tablet Take 0.5 mg by mouth every 12 (twelve) hours as needed for Anxiety.    metformin (GLUCOPHAGE) 1000 MG tablet Take 1,000 mg by mouth daily with breakfast.     ondansetron (ZOFRAN-ODT) 4 MG TbDL Take 1 tablet (4 mg total) by mouth every 6 (six) hours as needed (for nausea).    quetiapine (SEROQUEL) 25 MG Tab Take 25 mg by mouth daily as needed.    tirzepatide (MOUNJARO) 5 mg/0.5 mL PnIj Inject 5 mg into the skin every 7 days. On Sunday.         Review of patient's allergies indicates:   Allergen Reactions    Codeine Other (See Comments)     Flu like s/s    Tramadol Other (See Comments)     Flu like symptoms similar to codeine reaction       Review of Systems   Constitutional:  Positive for activity change and fatigue. Negative for chills, diaphoresis and fever.   HENT:  Negative for nasal congestion.    Eyes:  Negative for visual disturbance.   Respiratory:  Positive for cough and shortness of breath. Negative for wheezing.    Cardiovascular:  Negative for chest pain.   Gastrointestinal:  Positive for bloating. Negative for diarrhea, nausea and vomiting.   Genitourinary:  Positive for dysuria. Negative for pelvic pain, vaginal bleeding, vaginal discharge and vaginal pain.   Musculoskeletal:  Negative for back pain.   Integumentary:  Negative for rash.   Neurological:  Negative for syncope.    Hematological:  Negative for adenopathy.   Psychiatric/Behavioral:  Negative for depression.       Objective:     Vital Signs (Most Recent):  Temp: 98.4 °F (36.9 °C) (04/06/24 0328)  Pulse: 103 (04/06/24 0413)  Resp: 19 (04/06/24 0413)  BP: 130/63 (04/06/24 0328)  SpO2: (!) 93 % (04/06/24 0413) Vital Signs (24h Range):  Temp:  [98 °F (36.7 °C)-98.8 °F (37.1 °C)] 98.4 °F (36.9 °C)  Pulse:  [] 103  Resp:  [15-20] 19  SpO2:  [93 %-100 %] 93 %  BP: (110-147)/(57-70) 130/63     Weight: 95.3 kg (210 lb)  Body mass index is 32.89 kg/m².      Physical Exam:   Constitutional: She is oriented to person, place, and time. She appears well-developed and well-nourished.    HENT:   Head: Normocephalic.      Cardiovascular:  Normal rate.             Pulmonary/Chest: Effort normal.        Abdominal: She exhibits distension and abdominal incision. There is abdominal tenderness. There is no rebound and no guarding.   Abdomen tympanic and mildly distended. Midline vertical incision superificially dehisced as seen in photos below. Exudate noted, no evidence of serosanguinous fluid. Incision probed in length and no fascial defect noted.              Musculoskeletal: Normal range of motion.       Neurological: She is alert and oriented to person, place, and time.    Skin: Skin is warm and dry.    Psychiatric: She has a normal mood and affect.          Laboratory:  Lab Results   Component Value Date    WBC 20.28 (H) 04/06/2024    HGB 8.7 (L) 04/06/2024    HCT 27.2 (L) 04/06/2024    MCV 81 (L) 04/06/2024     (H) 04/06/2024       BMP  Lab Results   Component Value Date     (L) 04/06/2024    K 3.0 (L) 04/06/2024    CL 88 (L) 04/06/2024    CO2 32 (H) 04/06/2024    BUN 6 04/06/2024    CREATININE 0.6 04/06/2024    CALCIUM 7.3 (L) 04/06/2024    ANIONGAP 11 04/06/2024    EGFRNORACEVR >60.0 04/06/2024         Diagnostic Results:  CT Abdomen Pelvis With IV Contrast NO Oral Contrast  Order: [id number]  Status: Final result        Visible to patient: Yes (not seen)       Next appt: 04/08/2024 at 09:30 AM in Gynecologic Oncology (Александр Washington MD)    0 Result Notes  Details    Reading Physician Reading Date Result Priority   Emperatriz Ochoa MD  [phone] 4/5/2024 STAT     Narrative & Impression  EXAMINATION:  CT ABDOMEN PELVIS WITH IV CONTRAST     CLINICAL HISTORY:  Abdominal abscess/infection suspected;Abdominal pain, post-op;     TECHNIQUE:  Low dose axial images, sagittal and coronal reformations were obtained from the lung bases to the pubic symphysis following the IV administration of 100 mL of Omnipaque 350 .  minimal oral contrast noted in the stomach.  Axial and coronal images reformatted.     COMPARISON:  03/29/2024     FINDINGS:  Bilateral small pleural effusion and subsegmental atelectasis at the bilateral lung basis, unchanged.  Trace of pericardial effusion, unchanged.  Partially calcified right breast mass 2.9 cm, unchanged.     The liver appears normal.  The biliary ducts are not dilated.  The gallbladder is removed.     The distal esophagus and stomach appear normal.     The pancreas, spleen and bilateral adrenal glands appear normal.     The bilateral kidneys enhance normally.  No hydronephrosis or kidney mass.     The abdominal aorta tapers normally, there is circumferential calcified atherosclerotic plaque.  No para-aortic lymphadenopathy.     Mild stool retention.  The small bowel is not dilated.  The appendix is not seen.  Fluid and air scattered throughout the abdomen similar compared to the prior exam.  Anterior midline incision containing minimal amount of air, no measurable fluid collection.  The inguinal regions appear normal.     The bladder is nondistended.  The uterus is removed.  There is an air fluid collection at the surgical site measuring 7.8 x 6.6 x 5.2 cm.  The ovaries are not definitely seen.     There is body wall anasarca.     The osseous structures demonstrate no osseous lesions.      Impression:     Air and fluid scattered within the abdomen and pelvis, it is unchanged from 03/29/2024.  There is a measurable air and fluid collection within the mid pelvis 7.8 x 6.6 cm x 5.2 cm.  Dehiscence of the surgical site cannot be excluded or other origin perforation.  An abscess could have this appearance.  The patient had a total abdominal hysterectomy and bilateral salpingo-oophorectomy, omentectomy, cholecystectomy.     Unchanged partially calcified right breast mass, is which correlates with mammogram 11/10/2023.        Electronically signed by: Emperatriz Ochoa MD  Date:                                            04/05/2024  Time:                                           12:56           Exam Ended: 04/05/24 12:23 CDT Last Resulted: 04/05/24 12:56 CDT               X-Ray Chest AP Portable  Order: [id number]  Status: Final result       Visible to patient: Yes (not seen)       Next appt: 04/08/2024 at 09:30 AM in Gynecologic Oncology (Александр Washington MD)    0 Result Notes  Details    Reading Physician Reading Date Result Priority   Abdullahi Arana MD  [phone] 4/5/2024 STAT     Narrative & Impression  EXAMINATION:  XR CHEST AP PORTABLE     CLINICAL HISTORY:  hyperglycemia;     TECHNIQUE:  Single frontal view of the chest was performed.     COMPARISON:  Chest radiograph performed 03/18/2024, 01:24 hours.     FINDINGS:  Grossly unchanged position of the left chest port.  Similar cardiomediastinal contours.  Atherosclerosis of the aorta.     Grossly unchanged cardiomediastinal contours, again noting enlargement the cardiac silhouette.     Layering at least small bilateral pleural effusions appear new when compared to 03/18/2024 radiograph.  Ill-defined opacities the lung bases may relate to atelectasis, noting that aspiration or pneumonia would be difficult to exclude.     No definite pneumothorax.     No acute findings in the visualized abdomen.     Osseous and soft tissue structures  appear without definite acute change.  Operative sequela in the left chest wall again appreciated.     Impression:     Please see discussion above.        Electronically signed by: Abdullahi Arana  Date:                                            2024  Time:                                           07:29           Exam Ended: 24 07:02 CDT Last Resulted: 24 07:29 CDT           Assessment/Plan:     * Leukocytosis  - Leukocytosis on admit.   - No signs/symptoms of sepsis.   - Abdominal exam in ED overall benign. On exam, superficial wound dehiscence noted. On probing, no signs of facial dehiscence noted.   - CTAP consistent with measurable air and fluid collection within the mid pelvis 7.8 x 6.6 x 5.2 cm at the surgical site, could be abscess.   - UA consistent with UTI.   - LA: 2.2   - Ucx/ Bcx in process.   - Empirically treating with IV zosyn.   - Can consider IR drainage if signs of clinical deterioration.     Superficial dehiscence of wound  - Pictures in media.    - Exam consistent with superficial wound dehiscence at the superior and inferior portion of midline vertical incision, exudate noted, no evidence of serosanguinous fluid. Probed with no evidence of fascial dehiscence.    - Wound care consult placed for possible wound vac placement. Appreciate assistance.     Hypokalemia  - K on admit 2.9 > replacement > 3.0  - EKG wnl  - Replaced in ED with IV 10mEq, PO 40 mEq  - Will replace again, with goal > 4.0    UTI (urinary tract infection)  - UA: Positive Nitrite, 3+ Leuks   - Received rocephin 1g in ED, however previous Ucx resistent to rocephin and zosyn.  - Will begin bactrim treatment.     Hyperglycemia  - Glucose >250 on admit; Reports not taking meds following discharge due to confusion about medications   - BHB: 0.0   - A   - BG AC/qHS   - Patient previously on Levemir 5u qD, SSI; Restarted   - Sugars persistently above 200 overnight and this morning. Will consult endocrinology,  appreciate recommendations.    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 Holding lovenox for possible IR drainage in AM.  - Will hold prophylactic eliquis while inpatient     Malignant ascites  - CTAP with ascites visualized.   - CXR: consistent with small bilateral pleural effusions   - Will consider consult for paracentesis     History of breast cancer in female  - No home meds     Hyperlipidemia  - Continue home statin     Seizure disorder  - Continue home lamictal     Hypertension  - Hold home meds (amlodipine, enalapril, HCTZ)  - Hydral 10 PRN for SBP > 180       VTE Risk Mitigation (From admission, onward)           Ordered     IP VTE HIGH RISK PATIENT  Once         04/05/24 1643     Place sequential compression device  Until discontinued         04/05/24 1641                    Was junior catheter removed? Yes    Mel Boateng MD  Gynecologic Oncology  [facility] - Telemetry Stepdown

## 2024-04-06 NOTE — ASSESSMENT & PLAN NOTE
- H/H  7.2/23 > 1u pRBC> 8.7/27.2  - Endorses weakness, Denies other symptoms of anemia  - Will continue to transfuse to maintain > 8/24

## 2024-04-06 NOTE — ASSESSMENT & PLAN NOTE
- Glucose >250 on admit; Reports not taking meds following discharge due to confusion about medications   - BHB: 0.0   - A   - BG AC/qHS   - Patient previously on Levemir 5u qD, SSI; Restarted   - Sugars persistently above 200 overnight and this morning. Will consult endocrinology, appreciate recommendations.

## 2024-04-06 NOTE — SUBJECTIVE & OBJECTIVE
Interval HPI:   No acute events overnight. Patient in room 8069/8069 A. Blood glucose worsening. BG above goal on current insulin regimen (SSI and basal). Steroid use- None .      Renal function- Normal   Vasopressors-  None     Diet diabetic  Calorie     Eatin%  Nausea: No  Hypoglycemia and intervention: No  Fever: No  TPN and/or TF: No    PMH, PSH, FH, SH updated and reviewed     ROS:    Review of Systems   Constitutional:  Positive for fatigue. Negative for chills.   Respiratory:  Negative for cough and shortness of breath.    Gastrointestinal:  Positive for abdominal distention and abdominal pain. Negative for nausea and vomiting.       Current Medications and/or Treatments Impacting Glycemic Control  Immunotherapy:    Immunosuppressants       None          Steroids:   Hormones (From admission, onward)      None          Pressors:    Autonomic Drugs (From admission, onward)      None          Hyperglycemia/Diabetes Medications:   Antihyperglycemics (From admission, onward)      Start     Stop Route Frequency Ordered    24 1738  insulin aspart U-100 pen 0-5 Units         -- SubQ Before meals & nightly PRN 24 1639             PHYSICAL EXAMINATION:  Vitals:    24 0806   BP: 119/74   Pulse: 102   Resp: 20   Temp: 99.6 °F (37.6 °C)     Body mass index is 32.89 kg/m².     Physical Exam  HENT:      Head: Normocephalic and atraumatic.      Right Ear: External ear normal.      Left Ear: External ear normal.   Pulmonary:      Effort: No respiratory distress.   Abdominal:      General: There is distension.      Tenderness: There is abdominal tenderness.

## 2024-04-06 NOTE — ASSESSMENT & PLAN NOTE
BG goal: 140-180  T2DM. Hx of  recent CARLOS ALBERTO BSO w/ liver resection.  Prev to sx pt reported high insulin requirements, but needs post sx were minimal with frequent lows and eventually d/x on Levemir 5 units+ SSI.  Pt now w/ reported hyperglycemia in 400s at home, here above goal in 200s-300s. Will start on increased regimen conservatively given lows at recent admission. WBD ~0.2.      - Start Levemir 10 units daily,   - Start Novolog 3 units with meals,  - Start Novolog correction dose with ISF 50 starting at 200    - POCT Glucose before meals, at bedtime and at 2 am  - Hypoglycemia protocol in place      ** Please notify Endocrine for any change and/or advance in diet**  ** Please call Endocrine for any BG related issues **     Discharge Planning:   TBD. Please notify endocrinology prior to discharge.

## 2024-04-07 PROBLEM — N73.9 PELVIC ABSCESS IN FEMALE: Status: ACTIVE | Noted: 2024-04-07

## 2024-04-07 LAB
ANION GAP SERPL CALC-SCNC: 8 MMOL/L (ref 8–16)
APPEARANCE FLD: NORMAL
BACTERIA UR CULT: ABNORMAL
BASOPHILS # BLD AUTO: 0.11 K/UL (ref 0–0.2)
BASOPHILS NFR BLD: 0.6 % (ref 0–1.9)
BODY FLD TYPE: NORMAL
BODY FLUID COMMENTS: NORMAL
BUN SERPL-MCNC: 6 MG/DL (ref 6–20)
CALCIUM SERPL-MCNC: 7.8 MG/DL (ref 8.7–10.5)
CHLORIDE SERPL-SCNC: 94 MMOL/L (ref 95–110)
CO2 SERPL-SCNC: 29 MMOL/L (ref 23–29)
COLOR FLD: NORMAL
CREAT SERPL-MCNC: 0.6 MG/DL (ref 0.5–1.4)
DIFFERENTIAL METHOD BLD: ABNORMAL
EOSINOPHIL # BLD AUTO: 0.1 K/UL (ref 0–0.5)
EOSINOPHIL NFR BLD: 0.6 % (ref 0–8)
ERYTHROCYTE [DISTWIDTH] IN BLOOD BY AUTOMATED COUNT: 20.8 % (ref 11.5–14.5)
EST. GFR  (NO RACE VARIABLE): >60 ML/MIN/1.73 M^2
GLUCOSE SERPL-MCNC: 82 MG/DL (ref 70–110)
GRAM STN SPEC: NORMAL
GRAM STN SPEC: NORMAL
HCT VFR BLD AUTO: 31.3 % (ref 37–48.5)
HGB BLD-MCNC: 9.7 G/DL (ref 12–16)
IMM GRANULOCYTES # BLD AUTO: 0.16 K/UL (ref 0–0.04)
IMM GRANULOCYTES NFR BLD AUTO: 0.9 % (ref 0–0.5)
INFLUENZA A, MOLECULAR: NEGATIVE
INFLUENZA B, MOLECULAR: NEGATIVE
LYMPHOCYTES # BLD AUTO: 1.4 K/UL (ref 1–4.8)
LYMPHOCYTES NFR BLD: 7.3 % (ref 18–48)
MCH RBC QN AUTO: 25.9 PG (ref 27–31)
MCHC RBC AUTO-ENTMCNC: 31 G/DL (ref 32–36)
MCV RBC AUTO: 84 FL (ref 82–98)
MONOCYTES # BLD AUTO: 1 K/UL (ref 0.3–1)
MONOCYTES NFR BLD: 5.5 % (ref 4–15)
NEUTROPHILS # BLD AUTO: 15.8 K/UL (ref 1.8–7.7)
NEUTROPHILS NFR BLD: 85.1 % (ref 38–73)
NEUTROPHILS NFR FLD MANUAL: 100 %
NRBC BLD-RTO: 0 /100 WBC
PLATELET # BLD AUTO: 682 K/UL (ref 150–450)
PMV BLD AUTO: 8.5 FL (ref 9.2–12.9)
POCT GLUCOSE: 138 MG/DL (ref 70–110)
POCT GLUCOSE: 367 MG/DL (ref 70–110)
POCT GLUCOSE: 86 MG/DL (ref 70–110)
POCT GLUCOSE: 90 MG/DL (ref 70–110)
POCT GLUCOSE: 92 MG/DL (ref 70–110)
POCT GLUCOSE: 96 MG/DL (ref 70–110)
POTASSIUM SERPL-SCNC: 3.8 MMOL/L (ref 3.5–5.1)
RBC # BLD AUTO: 3.75 M/UL (ref 4–5.4)
SARS-COV-2 RNA RESP QL NAA+PROBE: NOT DETECTED
SODIUM SERPL-SCNC: 131 MMOL/L (ref 136–145)
SPECIMEN SOURCE: NORMAL
WBC # BLD AUTO: 18.52 K/UL (ref 3.9–12.7)
WBC # FLD: NORMAL /CU MM

## 2024-04-07 PROCEDURE — 80048 BASIC METABOLIC PNL TOTAL CA: CPT

## 2024-04-07 PROCEDURE — 25000003 PHARM REV CODE 250: Performed by: STUDENT IN AN ORGANIZED HEALTH CARE EDUCATION/TRAINING PROGRAM

## 2024-04-07 PROCEDURE — 87102 FUNGUS ISOLATION CULTURE: CPT | Performed by: STUDENT IN AN ORGANIZED HEALTH CARE EDUCATION/TRAINING PROGRAM

## 2024-04-07 PROCEDURE — 20600001 HC STEP DOWN PRIVATE ROOM

## 2024-04-07 PROCEDURE — 87635 SARS-COV-2 COVID-19 AMP PRB: CPT

## 2024-04-07 PROCEDURE — 87205 SMEAR GRAM STAIN: CPT | Performed by: STUDENT IN AN ORGANIZED HEALTH CARE EDUCATION/TRAINING PROGRAM

## 2024-04-07 PROCEDURE — 36415 COLL VENOUS BLD VENIPUNCTURE: CPT

## 2024-04-07 PROCEDURE — 87075 CULTR BACTERIA EXCEPT BLOOD: CPT | Performed by: STUDENT IN AN ORGANIZED HEALTH CARE EDUCATION/TRAINING PROGRAM

## 2024-04-07 PROCEDURE — 25000003 PHARM REV CODE 250

## 2024-04-07 PROCEDURE — 99222 1ST HOSP IP/OBS MODERATE 55: CPT | Mod: ,,, | Performed by: STUDENT IN AN ORGANIZED HEALTH CARE EDUCATION/TRAINING PROGRAM

## 2024-04-07 PROCEDURE — 87070 CULTURE OTHR SPECIMN AEROBIC: CPT | Performed by: STUDENT IN AN ORGANIZED HEALTH CARE EDUCATION/TRAINING PROGRAM

## 2024-04-07 PROCEDURE — 0W9J30Z DRAINAGE OF PELVIC CAVITY WITH DRAINAGE DEVICE, PERCUTANEOUS APPROACH: ICD-10-PCS | Performed by: STUDENT IN AN ORGANIZED HEALTH CARE EDUCATION/TRAINING PROGRAM

## 2024-04-07 PROCEDURE — 85025 COMPLETE CBC W/AUTO DIFF WBC: CPT

## 2024-04-07 PROCEDURE — 99232 SBSQ HOSP IP/OBS MODERATE 35: CPT | Mod: ,,, | Performed by: PHYSICIAN ASSISTANT

## 2024-04-07 PROCEDURE — 87186 SC STD MICRODIL/AGAR DIL: CPT | Performed by: STUDENT IN AN ORGANIZED HEALTH CARE EDUCATION/TRAINING PROGRAM

## 2024-04-07 PROCEDURE — 87077 CULTURE AEROBIC IDENTIFY: CPT | Performed by: STUDENT IN AN ORGANIZED HEALTH CARE EDUCATION/TRAINING PROGRAM

## 2024-04-07 PROCEDURE — 87502 INFLUENZA DNA AMP PROBE: CPT

## 2024-04-07 PROCEDURE — 89051 BODY FLUID CELL COUNT: CPT | Performed by: STUDENT IN AN ORGANIZED HEALTH CARE EDUCATION/TRAINING PROGRAM

## 2024-04-07 PROCEDURE — 63600175 PHARM REV CODE 636 W HCPCS: Performed by: STUDENT IN AN ORGANIZED HEALTH CARE EDUCATION/TRAINING PROGRAM

## 2024-04-07 PROCEDURE — 63600175 PHARM REV CODE 636 W HCPCS

## 2024-04-07 PROCEDURE — 87107 FUNGI IDENTIFICATION MOLD: CPT | Performed by: STUDENT IN AN ORGANIZED HEALTH CARE EDUCATION/TRAINING PROGRAM

## 2024-04-07 RX ORDER — INSULIN ASPART 100 [IU]/ML
3 INJECTION, SOLUTION INTRAVENOUS; SUBCUTANEOUS
Status: DISCONTINUED | OUTPATIENT
Start: 2024-04-07 | End: 2024-04-08 | Stop reason: HOSPADM

## 2024-04-07 RX ORDER — INSULIN ASPART 100 [IU]/ML
3 INJECTION, SOLUTION INTRAVENOUS; SUBCUTANEOUS
Status: DISCONTINUED | OUTPATIENT
Start: 2024-04-07 | End: 2024-04-07

## 2024-04-07 RX ORDER — GLUCAGON 1 MG
1 KIT INJECTION
Status: DISCONTINUED | OUTPATIENT
Start: 2024-04-07 | End: 2024-04-08

## 2024-04-07 RX ORDER — LIDOCAINE HYDROCHLORIDE 10 MG/ML
INJECTION INFILTRATION; PERINEURAL
Status: COMPLETED | OUTPATIENT
Start: 2024-04-07 | End: 2024-04-07

## 2024-04-07 RX ORDER — INSULIN ASPART 100 [IU]/ML
0-5 INJECTION, SOLUTION INTRAVENOUS; SUBCUTANEOUS EVERY 4 HOURS PRN
Status: DISCONTINUED | OUTPATIENT
Start: 2024-04-07 | End: 2024-04-08

## 2024-04-07 RX ORDER — DIPHENHYDRAMINE HYDROCHLORIDE 50 MG/ML
INJECTION INTRAMUSCULAR; INTRAVENOUS
Status: COMPLETED | OUTPATIENT
Start: 2024-04-07 | End: 2024-04-07

## 2024-04-07 RX ORDER — ENOXAPARIN SODIUM 100 MG/ML
40 INJECTION SUBCUTANEOUS EVERY 24 HOURS
Status: DISCONTINUED | OUTPATIENT
Start: 2024-04-07 | End: 2024-04-08 | Stop reason: HOSPADM

## 2024-04-07 RX ORDER — FENTANYL CITRATE 50 UG/ML
INJECTION, SOLUTION INTRAMUSCULAR; INTRAVENOUS
Status: COMPLETED | OUTPATIENT
Start: 2024-04-07 | End: 2024-04-07

## 2024-04-07 RX ADMIN — ATORVASTATIN CALCIUM 80 MG: 40 TABLET, FILM COATED ORAL at 09:04

## 2024-04-07 RX ADMIN — LAMOTRIGINE 25 MG: 25 TABLET ORAL at 09:04

## 2024-04-07 RX ADMIN — DOCUSATE SODIUM AND SENNOSIDES 1 TABLET: 8.6; 5 TABLET, FILM COATED ORAL at 09:04

## 2024-04-07 RX ADMIN — LIDOCAINE HYDROCHLORIDE 10 ML: 10 INJECTION, SOLUTION INFILTRATION; PERINEURAL at 08:04

## 2024-04-07 RX ADMIN — DIPHENHYDRAMINE HYDROCHLORIDE 25 MG: 50 INJECTION, SOLUTION INTRAMUSCULAR; INTRAVENOUS at 08:04

## 2024-04-07 RX ADMIN — FENTANYL CITRATE 50 MCG: 50 INJECTION, SOLUTION INTRAMUSCULAR; INTRAVENOUS at 08:04

## 2024-04-07 RX ADMIN — METRONIDAZOLE 500 MG: 5 INJECTION, SOLUTION INTRAVENOUS at 05:04

## 2024-04-07 RX ADMIN — ENOXAPARIN SODIUM 40 MG: 40 INJECTION SUBCUTANEOUS at 05:04

## 2024-04-07 RX ADMIN — METRONIDAZOLE 500 MG: 5 INJECTION, SOLUTION INTRAVENOUS at 01:04

## 2024-04-07 RX ADMIN — CIPROFLOXACIN 400 MG: 400 INJECTION, SOLUTION INTRAVENOUS at 09:04

## 2024-04-07 RX ADMIN — IBUPROFEN 600 MG: 600 TABLET, FILM COATED ORAL at 04:04

## 2024-04-07 RX ADMIN — IBUPROFEN 600 MG: 600 TABLET, FILM COATED ORAL at 06:04

## 2024-04-07 RX ADMIN — INSULIN DETEMIR 10 UNITS: 100 INJECTION, SOLUTION SUBCUTANEOUS at 09:04

## 2024-04-07 RX ADMIN — METRONIDAZOLE 500 MG: 5 INJECTION, SOLUTION INTRAVENOUS at 10:04

## 2024-04-07 NOTE — PLAN OF CARE
Patient in bed, no complaints voiced at this time, safety measures in place, patient now NPO, call light in reach, NADN, POC ongoing    Problem: Adult Inpatient Plan of Care  Goal: Plan of Care Review  Outcome: Ongoing, Progressing  Goal: Patient-Specific Goal (Individualized)  Outcome: Ongoing, Progressing  Goal: Absence of Hospital-Acquired Illness or Injury  Outcome: Ongoing, Progressing  Goal: Optimal Comfort and Wellbeing  Outcome: Ongoing, Progressing  Goal: Readiness for Transition of Care  Outcome: Ongoing, Progressing     Problem: Fall Injury Risk  Goal: Absence of Fall and Fall-Related Injury  Outcome: Ongoing, Progressing

## 2024-04-07 NOTE — PROGRESS NOTES
"[facility] - Telemetry Stepdown  Endocrinology  Progress Note    Admit Date: 2024     Reason for Consult: Management of T2DM, Hyperglycemia      Surgical Procedure and Date: CARLOS ALBERTO-BSO  3/26/24     Diabetes diagnosis year: > 5 years ago     Home Diabetes Medications:      Discharged on Levemir 5 units and Humalog SSI    Prev on: Toujeo 60 units evening, Novolog 10 units w/ meals Breakfast and Dinner, Metformin 1000 mg , Mounjaro 7.5 mg.     How often checking glucose at home? Candice  BG readings on regimen: 200s-400s  Hypoglycemia on the regimen?  No  Missed doses on regimen?  No     Diabetes Complications include:     Hyperglycemia and Diabetic retinopathy      Complicating diabetes co morbidities:   HLD, HTN        HPI:   Patient is a 58 y.o. 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 Her post-op course was complicated by ICU stay and failed void trial on POD#1. She was discharged on POD#5. During her hospitalization, blood sugars were closely monitored with assistance of endocrine team. She was discharged on the following insulin regimen: levemir 5 units nightly and sliding scale insulin for meals. Per history in ED, patient reports taking her nightly 5 units of levemir. Her blood sugar this morning was 400 so she took 4 units of leftover novolog and presented to the ED.   Endocrine consulted for bg management.      Interval HPI:   No acute events overnight. Patient in room 8069/8069 A. Blood glucose improving BG at and below goal on current insulin regimen (SS, prandialI and basal). Steroid use- None .   Renal function- Normal   Vasopressors-  None      NPO     Eating:   NPO  Nausea: No  Hypoglycemia and intervention: No  Fever: No  TPN and/or TF: No       /70   Pulse 82   Temp 98.2 °F (36.8 °C) (Oral)   Resp 18   Ht 5' 7" " "(1.702 m)   Wt 95.3 kg (210 lb)   LMP 02/09/2015   SpO2 97%   Breastfeeding No   BMI 32.89 kg/m²     Labs Reviewed and Include    No results for input(s): "GLU", "CALCIUM", "ALBUMIN", "PROT", "NA", "K", "CO2", "CL", "BUN", "CREATININE", "ALKPHOS", "ALT", "AST", "BILITOT" in the last 24 hours.  Lab Results   Component Value Date    WBC 20.28 (H) 04/06/2024    HGB 8.7 (L) 04/06/2024    HCT 27.2 (L) 04/06/2024    MCV 81 (L) 04/06/2024     (H) 04/06/2024     No results for input(s): "TSH", "FREET4" in the last 168 hours.  Lab Results   Component Value Date    HGBA1C 7.3 (H) 03/19/2024       Nutritional status:   Body mass index is 32.89 kg/m².  Lab Results   Component Value Date    ALBUMIN 1.1 (L) 04/05/2024    ALBUMIN 1.2 (L) 03/29/2024    ALBUMIN 2.1 (L) 03/25/2024     No results found for: "PREALBUMIN"    Estimated Creatinine Clearance: 119.7 mL/min (based on SCr of 0.6 mg/dL).    Accu-Checks  Recent Labs     04/05/24  0621 04/05/24  1514 04/05/24  2024 04/06/24  0833 04/06/24  1102 04/06/24  1715 04/06/24  2214 04/07/24  0248   POCTGLUCOSE 294* 201* 226* 289* 367* 172* 130* 86       Current Medications and/or Treatments Impacting Glycemic Control  Immunotherapy:    Immunosuppressants       None          Steroids:   Hormones (From admission, onward)      None          Pressors:    Autonomic Drugs (From admission, onward)      None          Hyperglycemia/Diabetes Medications:   Antihyperglycemics (From admission, onward)      Start     Stop Route Frequency Ordered    04/07/24 0900  insulin detemir U-100 (Levemir) pen 5 Units         -- SubQ Daily 04/07/24 0722    04/07/24 0821  insulin aspart U-100 pen 0-5 Units         -- SubQ Every 4 hours PRN 04/07/24 0721            ASSESSMENT and PLAN    Renal/  S/p CARLOS ALBERTO/BSO/OMX/Debulking/Partial liver resection/Cholecystectomy  Managed per primary team  Optimize bg control        Oncology  * Leukocytosis  Managed per primary team  Optimize bg " control          Endocrine  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 Hx of  recent CARLOS ALBERTO BSO w/ liver resection.  Prev to sx pt reported high insulin requirements, but needs post sx were minimal with frequent lows and eventually d/x on Levemir 5 units+ SSI.  Pt now w/ reported hyperglycemia in 400s at home.  BG improved here, now below goal.  Made NPO.  Will adjust regimen given below goal and NPO status.      - Decrease Levemir 7 units daily,   - Hold Novolog 3 units with meals while NPO  - Continue Novolog correction dose with ISF 50 starting at 200    - POCT Glucose every 4 hours  - Hypoglycemia protocol in place      ** Please notify Endocrine for any change and/or advance in diet**  ** Please call Endocrine for any BG related issues **     Discharge Planning:   TBD. Please notify endocrinology prior to discharge.          Robbin Rios PA-C  Endocrinology  [facility] - Telemetry Stepdown

## 2024-04-07 NOTE — ASSESSMENT & PLAN NOTE
- Leukocytosis on admit.   - No signs/symptoms of sepsis on admit, however on HD#1, patient febrile to 100.4  - Abdominal exam in ED overall benign. On exam, superficial wound dehiscence noted. On probing, no signs of facial dehiscence noted.   - CTAP consistent with measurable air and fluid collection within the mid pelvis 7.8 x 6.6 x 5.2 cm at the surgical site, could be abscess.   - UA consistent with UTI.   - LA: 2.2   - Ucx/ Bcx in process.   - Covid/Flu negative     Plan:   - POD #1 from IR drainage and drain placement. 75cc purulent fluid evacuated. Gram stain + for gram negative rods. Cultures pending.   - Continue IV cipro/flagyl  - Continue to monitor drain output

## 2024-04-07 NOTE — PLAN OF CARE
[facility] - Telemetry Stepdown  Initial Discharge Assessment       Primary Care Provider: Mark Chua MD    Admission Diagnosis: Hypokalemia [E87.6]  Leukocytosis [D72.829]  Leg swelling [M79.89]  Hyperglycemia [R73.9]  Acute cystitis without hematuria [N30.00]  Pain of left calf [M79.662]  Acute anemia [D64.9]    Admission Date: 4/5/2024  Expected Discharge Date:     Transition of Care Barriers: None    Payor: [employer] MANAGED MEDICARE / Plan: [employer] MEDICARE HMO / Product Type: Capitation /     Extended Emergency Contact Information  Primary Emergency Contact: Darrell Ricci  Address: [address] [place] [address] [facility]  Home Phone: [phone]  Work Phone: [phone]  Mobile Phone: [phone]  Relation: Spouse  Secondary Emergency Contact: Bruno Branch  Address: [address] [place] [address]  Mobile Phone: [phone]  Relation: Brother    Discharge Plan A: Home Health  Discharge Plan B: Skilled Nursing Facility      [facility] [place] - [address]  [address] [place] [address]  Phone: [phone] Fax: [phone]    [facility] [address] [place] [address]  Phone: [phone] Fax: [phone]    [address] [facility]  [address] [facility] [address]  Phone: [phone] Fax: [phone]     met with patient and patient's spouse Darrell Ricci at bedside to complete discharge planning assessment.  Patient alert and oriented xs 4.  Patient verified all demographic information on facesheet is correct.  Patient verified PCP is Dr. hCua.  Patient verified primary health insurance is [employer].  Patient with NO home health but has listed DME.  Patient states rolling walker to be delivered to home address.  Patient with NO POA or Living Will.  Patient not on dialysis or  medication coumadin.  Patient with no 30 day admission.  Patient with no financial issues at this time.  Patient family will provide transportation upon discharge from facility.  Patient independent with ADLs, live with spouse, drives self.      Initial Assessment (most recent)       Adult Discharge Assessment - 04/07/24 1437          Discharge Assessment    Assessment Type Discharge Planning Assessment     Confirmed/corrected address, phone number and insurance Yes     Confirmed Demographics Correct on Facesheet     Source of Information patient;family     Communicated ROS with patient/caregiver Date not available/Unable to determine     People in Home spouse     Facility Arrived From: home     Do you expect to return to your current living situation? Yes     Do you have help at home or someone to help you manage your care at home? Yes     Who are your caregiver(s) and their phone number(s)? spouse     Prior to hospitilization cognitive status: Alert/Oriented     Current cognitive status: Alert/Oriented     Walking or Climbing Stairs Difficulty no     Dressing/Bathing Difficulty no     Equipment Currently Used at Home walker, rolling     Readmission within 30 days? No     Patient currently being followed by outpatient case management? No     Do you currently have service(s) that help you manage your care at home? No     Do you take prescription medications? Yes     Do you have prescription coverage? Yes     Do you have any problems affording any of your prescribed medications? No     Is the patient taking medications as prescribed? yes     Who is going to help you get home at discharge? spouse     How do you get to doctors appointments? car, drives self     Are you on dialysis? No     Do you take coumadin? No     Discharge Plan A Home Health     Discharge Plan B Skilled Nursing Facility     DME Needed Upon Discharge  negative pressure wound therapy device     Discharge Plan discussed with: Patient;Spouse/sig other      Transition of Care Barriers None

## 2024-04-07 NOTE — ASSESSMENT & PLAN NOTE
- Glucose >250 on admit; Reports not taking meds following discharge due to confusion about medications   - BG AC/qHS   - Patient previously on Levemir 5u qD, SSI; Restarted   - Current regimen: 7u levemir, 3 aspart TIDWM.   - endocrinology following, appreciate assistance.

## 2024-04-07 NOTE — PT/OT/SLP PROGRESS
Physical Therapy      Patient Name:  Anette Ricci   MRN:  [id number]    Patient not seen today secondary to Patient fatigue post-IR procedure. 1st attempt 9:49 RN hold as patient had just returned from IR for drain placement, 2nd attempt 10:08 patient drowsy from procedure, 3rd attempt 14:10 patient eating lunch and reported fatigue and pain- requested to defer eval. Will follow-up as appropriate.

## 2024-04-07 NOTE — PT/OT/SLP PROGRESS
Occupational Therapy      Patient Name:  Anette Ricci   MRN:  [id number]    Patient not seen today secondary to patient unwilling to participate during am and pm attempts due to fatigue and pain from drain placement procedure earlier in the day. Will follow-up 4/8/2024.    4/7/2024

## 2024-04-07 NOTE — PROCEDURES
"  Pre Op Diagnosis: Pelvic abscess  Post Op Diagnosis: Same    Procedure: drain placement    Procedure performed by: Frieda    Written Informed Consent Obtained: Yes  Specimen Removed: YES 75 cc of purulent material  Estimated Blood Loss: Minimal    Findings:   Successful placement of 10 Dr drain in pelvic fluid collection. 75cc of purulent material was collected and sent to lab.    Patient tolerated procedure well.    Rogelio Malave MD ([facility])  Interventional Radiology  [phone]      "

## 2024-04-07 NOTE — PLAN OF CARE
Patient AAOx3, no distress noted, respirations even and unlabored, will continue to monitor. VSS. Acceptance of education, consents signed, H/P done. Labs reviewed. Patient in IR Room CT for image guided abdominal abscess insertion procedure. Warm blankets applied to patient. Patient prepped and draped in sterile fashion.

## 2024-04-07 NOTE — ASSESSMENT & PLAN NOTE
- Pictures in media.    - Exam consistent with superficial wound dehiscence at the superior and inferior portion of midline vertical incision, exudate noted, no evidence of serosanguinous fluid. Probed with no evidence of fascial dehiscence.    - Wound care consulted. Recommend daily aquacel dressing changes. Appreciate assistance.

## 2024-04-07 NOTE — PROGRESS NOTES
[facility] - Telemetry Stepdown  Gynecologic Oncology  Progress Note      Patient Name: Anette Ricci  MRN: [id number]  Admission Date: 2024  Hospital Length of Stay: 1 days  Attending Provider: Александр Washington MD  Primary Care Provider: Mark Chua MD  Principal Problem: Leukocytosis    Follow-up For: * No surgery found *  Post-Operative Day:    Subjective:      History of Present Illness:  Anette Ricci is a 59yo  who is POD#11 from an Ex-lap/CARLOS ALBERTO/BSO/Omx/Segment 6 liver resection/Cholecystectomy/Peritoneal and Diaphragm stripping for ovarian neoplasm who presents to the ED for hyperglycemia, fatigue, and generalized weakness. Her post-op course was complicated by ICU stay and failed void trial on POD#1. She was discharged on POD#5. During her hospitalization, blood sugars were closely monitored with assistance of endocrine team. She was discharged on the following insulin regimen: levemir 5 units nightly and sliding scale insulin for meals. Per history in ED, patient reports taking her nightly 5 units of levemir. Her blood sugar this morning was 400 so she took 4 units of leftover novolog and presented to the ED.      Patient has known abdominal ascites and bilateral leg swelling likely from malignancy. She was admitted inpatient - for these concerns. Full work-up including echo, EKG, CTA, BLE dopplers were negative. She had paracentesis with 3.6L removed. Fluid studies at that time showed no sign of SBP  .   Today she endorses worsening bilateral leg swelling left greater than right and SOB. Denies chest pain. Denies fever/cills. Denies nausea/vomiting. Denies abdominal pain. Reports ambulating at home. Reports mild dysuria. Denies any vaginal bleeding, discharge, or pain.     Hospital Course:  2024 HD#1: Admitted for leukocytosis, fluid collection seen on CT, and hyperglycemia. IV zosyn given. 1u pRBC given. Levemir 5 and sliding scale ordered.   2024 HD#2: Continuing IV  zosyn. Considering IR drainage. H/H zac appropriately. Continuing blood glucose management.   4/7/2024: HD#3: Febrile yesterday afternoon. POD#1 from IR drainage and drain placement. Continuing IV cipro/flagyl. Continuing blood glucose management.       Interval History: Reports fevers and chills overnight. Has been NPO since midnight for IR procedure. Voiding spontaneously. Passing flatus. Required two oxy 10s for pain control.     Past Medical History:   Diagnosis Date    Breast cancer 2013    Diabetes mellitus     Genetic testing 05/29/2013    VUS    Hyperlipidemia     Hypertension     Malignant neoplasm of upper-outer quadrant of right breast in female, estrogen receptor positive 12/02/2015    Seizure disorder      Past Surgical History:   Procedure Laterality Date    BILATERAL SALPINGO-OOPHORECTOMY (BSO) N/A 3/25/2024    Procedure: SALPINGO-OOPHORECTOMY, BILATERAL;  Surgeon: Александр Washington MD;  Location: [facility] OR [address] [place] [address];  Service: OB/GYN;  Laterality: N/A;    BREAST BIOPSY      BREAST LUMPECTOMY Right 2013    CHOLECYSTECTOMY  3/25/2024    Procedure: CHOLECYSTECTOMY;  Surgeon: Bo Farmer MD;  Location: [facility] OR [address] [place] [address];  Service: General;;    DEBULKING OF TUMOR N/A 3/25/2024    Procedure: DEBULKING, NEOPLASM;  Surgeon: Александр Washington MD;  Location: [facility] OR [address] [place] [address];  Service: OB/GYN;  Laterality: N/A;    EXCISION, LIVER N/A 3/25/2024    Procedure: EXCISION, LIVER - partial;  Surgeon: Bo Farmer MD;  Location: [facility] OR [address] [place] [address];  Service: General;  Laterality: N/A;  bk ultrasound  [place] book by SAMINA on 3-21-24  7:00 a.m.  Conf #  [id number]    EYE SURGERY      LAPAROTOMY, EXPLORATORY N/A 3/25/2024    Procedure: LAPAROTOMY, EXPLORATORY;  Surgeon: Александр Washington MD;  Location: [facility] OR [address] [place] [address];  Service: OB/GYN;  Laterality: N/A;    OMENTECTOMY N/A 3/25/2024    Procedure: OMENTECTOMY;  Surgeon: Александр Washington MD;  Location: [facility] OR [address] [place] [address];  Service: OB/GYN;  Laterality: N/A;     PERITONEOCENTESIS N/A 3/13/2024    Procedure: PARACENTESIS, ABDOMINAL;  Surgeon: Flavia Moore MD;  Location: [facility] CATH LAB;  Service: Radiology;  Laterality: N/A;    PERITONEOCENTESIS N/A 3/21/2024    Procedure: PARACENTESIS, ABDOMINAL;  Surgeon: Jorge Jolley MD;  Location: [facility] CATH LAB;  Service: Radiology;  Laterality: N/A;    TOTAL ABDOMINAL HYSTERECTOMY N/A 3/25/2024    Procedure: HYSTERECTOMY, TOTAL, ABDOMINAL;  Surgeon: Александр Washington MD;  [facility];  Service: OB/GYN;  Laterality: N/A;    TOTAL REDUCTION MAMMOPLASTY Bilateral 2016     Family History       Problem Relation (Age of Onset)    Breast cancer Sister (48)    Seizures Father          Tobacco Use    Smoking status: Never    Smokeless tobacco: Never   Substance and Sexual Activity    Alcohol use: Yes     Alcohol/week: 0.0 standard drinks of alcohol     Comment: ocassional    Drug use: No    Sexual activity: Yes     Partners: Male     Birth control/protection: None       PTA Medications   Medication Sig    amlodipine (NORVASC) 10 MG tablet Take 10 mg by mouth once daily.     apixaban (ELIQUIS) 2.5 mg Tab Take 1 tablet (2.5 mg total) by mouth 2 (two) times daily.    atorvastatin (LIPITOR) 40 MG tablet Take 80 mg by mouth once daily.    enalapril (VASOTEC) 20 MG tablet Take 20 mg by mouth once daily.    fluticasone propionate (FLONASE) 50 mcg/actuation nasal spray 1 spray by Each Nostril route once daily.    hydrochlorothiazide (HYDRODIURIL) 25 MG tablet Take 25 mg by mouth once daily.     HYDROmorphone (DILAUDID) 2 MG tablet Take 1 tablet (2 mg total) by mouth every 8 (eight) hours as needed for Pain.    ibuprofen (ADVIL,MOTRIN) 600 MG tablet Take 1 tablet (600 mg total) by mouth every 6 (six) hours as needed for Pain.    ibuprofen (ADVIL,MOTRIN) 800 MG tablet Take 800 mg by mouth every 6 (six) hours as needed for Pain.    insulin detemir U-100, Levemir, (LEVEMIR FLEXPEN) 100 unit/mL (3 mL) InPn pen Inject 5 Units  into the skin every evening.    insulin lispro (HUMALOG U-100 INSULIN) 100 unit/mL injection **LOW CORRECTION DOSE**  Blood Glucose  mg/dL                  Pre-meal                  151-200                0 unit                        201-250                2 units                      251-300                3 units                      301-350                4 units                      >350                     5 units                      Administer subcutaneously if needed at times designated by monitoring schedule.    ketorolac 0.5% (ACULAR) 0.5 % Drop Place 1 drop into both eyes. For pain after eye injections.    lamoTRIgine (LAMICTAL) 25 MG tablet Take 25 mg by mouth 2 (two) times daily.    lorazepam (ATIVAN) 0.5 MG tablet Take 0.5 mg by mouth every 12 (twelve) hours as needed for Anxiety.    metformin (GLUCOPHAGE) 1000 MG tablet Take 1,000 mg by mouth daily with breakfast.     ondansetron (ZOFRAN-ODT) 4 MG TbDL Take 1 tablet (4 mg total) by mouth every 6 (six) hours as needed (for nausea).    quetiapine (SEROQUEL) 25 MG Tab Take 25 mg by mouth daily as needed.    tirzepatide (MOUNJARO) 5 mg/0.5 mL PnIj Inject 5 mg into the skin every 7 days. On Sunday.         Review of patient's allergies indicates:   Allergen Reactions    Codeine Other (See Comments)     Flu like s/s    Tramadol Other (See Comments)     Flu like symptoms similar to codeine reaction       Review of Systems   Constitutional:  Positive for activity change, chills, fatigue and fever. Negative for diaphoresis.   HENT:  Negative for nasal congestion.    Eyes:  Negative for visual disturbance.   Respiratory:  Positive for cough and shortness of breath. Negative for wheezing.    Cardiovascular:  Negative for chest pain.   Gastrointestinal:  Positive for bloating. Negative for diarrhea, nausea and vomiting.   Genitourinary:  Positive for dysuria. Negative for pelvic pain, vaginal bleeding, vaginal discharge and vaginal pain.   Musculoskeletal:   Negative for back pain.   Integumentary:  Negative for rash.   Neurological:  Negative for syncope.   Hematological:  Negative for adenopathy.   Psychiatric/Behavioral:  Negative for depression.       Objective:     Vital Signs (Most Recent):  Temp: 98.3 °F (36.8 °C) (04/07/24 1134)  Pulse: 85 (04/07/24 1134)  Resp: 20 (04/07/24 1134)  BP: 125/74 (04/07/24 1134)  SpO2: 97 % (04/07/24 1134) Vital Signs (24h Range):  Temp:  [98.2 °F (36.8 °C)-100.4 °F (38 °C)] 98.3 °F (36.8 °C)  Pulse:  [] 85  Resp:  [17-20] 20  SpO2:  [95 %-100 %] 97 %  BP: (111-146)/(66-84) 125/74     Weight: 95.3 kg (210 lb)  Body mass index is 32.89 kg/m².      Physical Exam:   Constitutional: She is oriented to person, place, and time. She appears well-developed and well-nourished.    HENT:   Head: Normocephalic.      Cardiovascular:  Normal rate.             Pulmonary/Chest: Effort normal.        Abdominal: She exhibits distension and abdominal incision. There is abdominal tenderness. There is no rebound and no guarding.   Abdomen tympanic and mildly distended. Midline vertical incision superificially dehisced as seen in photos below. Exudate noted, no evidence of serosanguinous fluid. Incision probed in length and no fascial defect noted.              Musculoskeletal: Normal range of motion.       Neurological: She is alert and oriented to person, place, and time.    Skin: Skin is warm and dry.    Psychiatric: She has a normal mood and affect.          Laboratory:  Lab Results   Component Value Date    WBC 20.28 (H) 04/06/2024    HGB 8.7 (L) 04/06/2024    HCT 27.2 (L) 04/06/2024    MCV 81 (L) 04/06/2024     (H) 04/06/2024       BMP  Lab Results   Component Value Date     (L) 04/06/2024    K 3.0 (L) 04/06/2024    CL 88 (L) 04/06/2024    CO2 32 (H) 04/06/2024    BUN 6 04/06/2024    CREATININE 0.6 04/06/2024    CALCIUM 7.3 (L) 04/06/2024    ANIONGAP 11 04/06/2024    EGFRNORACEVR >60.0 04/06/2024         Diagnostic Results:  CT  Abdomen Pelvis With IV Contrast NO Oral Contrast  Order: [id number]  Status: Final result       Visible to patient: Yes (not seen)       Next appt: 04/08/2024 at 09:30 AM in Gynecologic Oncology (Александр Washington MD)    0 Result Notes  Details    Reading Physician Reading Date Result Priority   Emperatriz Ochoa MD  [phone] 4/5/2024 STAT     Narrative & Impression  EXAMINATION:  CT ABDOMEN PELVIS WITH IV CONTRAST     CLINICAL HISTORY:  Abdominal abscess/infection suspected;Abdominal pain, post-op;     TECHNIQUE:  Low dose axial images, sagittal and coronal reformations were obtained from the lung bases to the pubic symphysis following the IV administration of 100 mL of Omnipaque 350 .  minimal oral contrast noted in the stomach.  Axial and coronal images reformatted.     COMPARISON:  03/29/2024     FINDINGS:  Bilateral small pleural effusion and subsegmental atelectasis at the bilateral lung basis, unchanged.  Trace of pericardial effusion, unchanged.  Partially calcified right breast mass 2.9 cm, unchanged.     The liver appears normal.  The biliary ducts are not dilated.  The gallbladder is removed.     The distal esophagus and stomach appear normal.     The pancreas, spleen and bilateral adrenal glands appear normal.     The bilateral kidneys enhance normally.  No hydronephrosis or kidney mass.     The abdominal aorta tapers normally, there is circumferential calcified atherosclerotic plaque.  No para-aortic lymphadenopathy.     Mild stool retention.  The small bowel is not dilated.  The appendix is not seen.  Fluid and air scattered throughout the abdomen similar compared to the prior exam.  Anterior midline incision containing minimal amount of air, no measurable fluid collection.  The inguinal regions appear normal.     The bladder is nondistended.  The uterus is removed.  There is an air fluid collection at the surgical site measuring 7.8 x 6.6 x 5.2 cm.  The ovaries are not definitely seen.      There is body wall anasarca.     The osseous structures demonstrate no osseous lesions.     Impression:     Air and fluid scattered within the abdomen and pelvis, it is unchanged from 03/29/2024.  There is a measurable air and fluid collection within the mid pelvis 7.8 x 6.6 cm x 5.2 cm.  Dehiscence of the surgical site cannot be excluded or other origin perforation.  An abscess could have this appearance.  The patient had a total abdominal hysterectomy and bilateral salpingo-oophorectomy, omentectomy, cholecystectomy.     Unchanged partially calcified right breast mass, is which correlates with mammogram 11/10/2023.        Electronically signed by: Emperatriz Ochoa MD  Date:                                            04/05/2024  Time:                                           12:56           Exam Ended: 04/05/24 12:23 CDT Last Resulted: 04/05/24 12:56 CDT               X-Ray Chest AP Portable  Order: [id number]  Status: Final result       Visible to patient: Yes (not seen)       Next appt: 04/08/2024 at 09:30 AM in Gynecologic Oncology (Александр Washington MD)    0 Result Notes  Details    Reading Physician Reading Date Result Priority   Abdullahi Arana MD  [phone] 4/5/2024 STAT     Narrative & Impression  EXAMINATION:  XR CHEST AP PORTABLE     CLINICAL HISTORY:  hyperglycemia;     TECHNIQUE:  Single frontal view of the chest was performed.     COMPARISON:  Chest radiograph performed 03/18/2024, 01:24 hours.     FINDINGS:  Grossly unchanged position of the left chest port.  Similar cardiomediastinal contours.  Atherosclerosis of the aorta.     Grossly unchanged cardiomediastinal contours, again noting enlargement the cardiac silhouette.     Layering at least small bilateral pleural effusions appear new when compared to 03/18/2024 radiograph.  Ill-defined opacities the lung bases may relate to atelectasis, noting that aspiration or pneumonia would be difficult to exclude.     No definite  pneumothorax.     No acute findings in the visualized abdomen.     Osseous and soft tissue structures appear without definite acute change.  Operative sequela in the left chest wall again appreciated.     Impression:     Please see discussion above.        Electronically signed by: Abdullahi Arana  Date:                                            04/05/2024  Time:                                           07:29           Exam Ended: 04/05/24 07:02 CDT Last Resulted: 04/05/24 07:29 CDT           Assessment/Plan:     * Pelvic abscess  - Leukocytosis on admit.   - No signs/symptoms of sepsis on admit, however on HD#1, patient febrile to 100.4  - Abdominal exam in ED overall benign. On exam, superficial wound dehiscence noted. On probing, no signs of facial dehiscence noted.   - CTAP consistent with measurable air and fluid collection within the mid pelvis 7.8 x 6.6 x 5.2 cm at the surgical site, could be abscess.   - UA consistent with UTI.   - LA: 2.2   - Ucx/ Bcx in process.   - Covid/Flu negative     Plan:   - POD #1 from IR drainage and drain placement. 75cc purulent fluid evacuated. Gram stain + for gram negative rods. Cultures pending.   - Continue IV cipro/flagyl  - Continue to monitor drain output    Superficial dehiscence of wound  - Pictures in media.    - Exam consistent with superficial wound dehiscence at the superior and inferior portion of midline vertical incision, exudate noted, no evidence of serosanguinous fluid. Probed with no evidence of fascial dehiscence.    - Wound care consulted. Recommend daily aquacel dressing changes. Appreciate assistance.     Hypokalemia  - AM BMP Pending   - EKG wnl  - K 2.9 > replacement > 3.0  - Will replace again, with goal > 4.0    UTI (urinary tract infection)  - UA: Positive Nitrite, 3+ Leuks   - Received rocephin 1g in ED, however previous Ucx resistent to rocephin and zosyn.  - Continuing IV cipro/flagyl    Hyperglycemia  - Glucose >250 on admit; Reports not taking  meds following discharge due to confusion about medications   - BG AC/qHS   - Patient previously on Levemir 5u qD, SSI; Restarted   - Current regimen: 7u levemir, 3 aspart TIDWM.   - endocrinology following, appreciate assistance.     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 Continue prophylactic lovenox  - Will hold prophylactic eliquis while inpatient     Malignant ascites  - CTAP with ascites visualized.   - CXR: consistent with small bilateral pleural effusions   - Symptomatically stable    History of breast cancer in female  - No home meds     Hyperlipidemia  - Continue home statin     Seizure disorder  - Continue home lamictal     Hypertension  - Hold home meds (amlodipine, enalapril, HCTZ)  - Hydral 10 PRN for SBP > 180       VTE Risk Mitigation (From admission, onward)           Ordered     IP VTE HIGH RISK PATIENT  Once         04/05/24 1643     Place sequential compression device  Until discontinued         04/05/24 1641                    Was junior catheter removed? Yes    Mel Boateng MD  Gynecologic Oncology  [facility] - Telemetry Stepdown

## 2024-04-07 NOTE — SUBJECTIVE & OBJECTIVE
"Interval HPI:   No acute events overnight. Patient in room 8069/8069 A. Blood glucose improving BG at and below goal on current insulin regimen (SS, prandialI and basal). Steroid use- None .   Renal function- Normal   Vasopressors-  None      NPO     Eating:   NPO  Nausea: No  Hypoglycemia and intervention: No  Fever: No  TPN and/or TF: No       /70   Pulse 82   Temp 98.2 °F (36.8 °C) (Oral)   Resp 18   Ht 5' 7" (1.702 m)   Wt 95.3 kg (210 lb)   LMP 02/09/2015   SpO2 97%   Breastfeeding No   BMI 32.89 kg/m²     Labs Reviewed and Include    No results for input(s): "GLU", "CALCIUM", "ALBUMIN", "PROT", "NA", "K", "CO2", "CL", "BUN", "CREATININE", "ALKPHOS", "ALT", "AST", "BILITOT" in the last 24 hours.  Lab Results   Component Value Date    WBC 20.28 (H) 04/06/2024    HGB 8.7 (L) 04/06/2024    HCT 27.2 (L) 04/06/2024    MCV 81 (L) 04/06/2024     (H) 04/06/2024     No results for input(s): "TSH", "FREET4" in the last 168 hours.  Lab Results   Component Value Date    HGBA1C 7.3 (H) 03/19/2024       Nutritional status:   Body mass index is 32.89 kg/m².  Lab Results   Component Value Date    ALBUMIN 1.1 (L) 04/05/2024    ALBUMIN 1.2 (L) 03/29/2024    ALBUMIN 2.1 (L) 03/25/2024     No results found for: "PREALBUMIN"    Estimated Creatinine Clearance: 119.7 mL/min (based on SCr of 0.6 mg/dL).    Accu-Checks  Recent Labs     04/05/24  0621 04/05/24  1514 04/05/24 2024 04/06/24  0833 04/06/24  1102 04/06/24  1715 04/06/24  2214 04/07/24  0248   POCTGLUCOSE 294* 201* 226* 289* 367* 172* 130* 86       Current Medications and/or Treatments Impacting Glycemic Control  Immunotherapy:    Immunosuppressants       None          Steroids:   Hormones (From admission, onward)      None          Pressors:    Autonomic Drugs (From admission, onward)      None          Hyperglycemia/Diabetes Medications:   Antihyperglycemics (From admission, onward)      Start     Stop Route Frequency Ordered    04/07/24 0900  " insulin detemir U-100 (Levemir) pen 5 Units         -- SubQ Daily 04/07/24 0722 04/07/24 0821  insulin aspart U-100 pen 0-5 Units         -- SubQ Every 4 hours PRN 04/07/24 0721

## 2024-04-07 NOTE — PLAN OF CARE
Met with patient and patient's spouse to review discharge recommendation of home health and is agreeable to plan    Patient/family provided list of facilities in-network with patient's payor plan. Providers that are owned, operated, or affiliated with [facility] are included on the list.     Notified that referral sent to below listed facilities from in-network list based on proximity to home/family support:    [facility]  2.    [facility]  3.    [facility]  4.    [facility]  5.    [facility]    Patient/family instructed to identify preference.    Preferred Facility: (if more than 1, listed in order of descending preference)   No Preference    If an additional preferred facility not listed above is identified, additional referral to be sent. If above facilities unable to accept, will send additional referrals to in-network providers.        04/07/24 1445   Post-Acute Status   Post-Acute Authorization Home Health   Home Health Status Referrals Sent   Patient choice form signed by patient/caregiver List with quality metrics by geographic area provided

## 2024-04-07 NOTE — ASSESSMENT & PLAN NOTE
- CTAP with ascites visualized.   - CXR: consistent with small bilateral pleural effusions   - Symptomatically stable

## 2024-04-07 NOTE — ASSESSMENT & PLAN NOTE
- POD#11 s/p Ex-Lap/CARLOS ALBERTO/BSO/Partial Liver Resection (Segment 6)/ Omentectomy/Diaphragm Stripping/Peritoneal Stripping/Cholecystectomy   - Beginning IV paclitaxel/carboplatin outpatient  - VTE ppx: SCDs. Continue prophylactic lovenox  - Will hold prophylactic eliquis while inpatient

## 2024-04-07 NOTE — ASSESSMENT & PLAN NOTE
- UA: Positive Nitrite, 3+ Leuks   - Received rocephin 1g in ED, however previous Ucx resistent to rocephin and zosyn.  - Continuing IV cipro/flagyl

## 2024-04-07 NOTE — ASSESSMENT & PLAN NOTE
BG goal: 140-180  T2DM. Hx of  recent CARLOS ALBERTO BSO w/ liver resection.  Prev to sx pt reported high insulin requirements, but needs post sx were minimal with frequent lows and eventually d/x on Levemir 5 units+ SSI.  Pt now w/ reported hyperglycemia in 400s at home.  BG improved here, now below goal.  Made NPO.  Will adjust regimen given below goal and NPO status.      - Decrease Levemir 5 units daily,   - Hold Novolog 3 units with meals while NPO  - Continue Novolog correction dose with ISF 50 starting at 200    - POCT Glucose every 4 hours  - Hypoglycemia protocol in place      ** Please notify Endocrine for any change and/or advance in diet**  ** Please call Endocrine for any BG related issues **     Discharge Planning:   TBD. Please notify endocrinology prior to discharge.

## 2024-04-07 NOTE — SUBJECTIVE & OBJECTIVE
Interval History: Reports fevers and chills overnight. Has been NPO since midnight for IR procedure. Voiding spontaneously. Passing flatus. Required two oxy 10s for pain control.     Past Medical History:   Diagnosis Date    Breast cancer 2013    Diabetes mellitus     Genetic testing 05/29/2013    VUS    Hyperlipidemia     Hypertension     Malignant neoplasm of upper-outer quadrant of right breast in female, estrogen receptor positive 12/02/2015    Seizure disorder      Past Surgical History:   Procedure Laterality Date    BILATERAL SALPINGO-OOPHORECTOMY (BSO) N/A 3/25/2024    Procedure: SALPINGO-OOPHORECTOMY, BILATERAL;  Surgeon: Александр Washington MD;  Location: [facility] OR [address] [place] [address];  Service: OB/GYN;  Laterality: N/A;    BREAST BIOPSY      BREAST LUMPECTOMY Right 2013    CHOLECYSTECTOMY  3/25/2024    Procedure: CHOLECYSTECTOMY;  Surgeon: Bo Farmer MD;  Location: [facility] OR [address] [place] [address];  Service: General;;    DEBULKING OF TUMOR N/A 3/25/2024    Procedure: DEBULKING, NEOPLASM;  Surgeon: Александр Washington MD;  Location: [facility] OR [address] [place] [address];  Service: OB/GYN;  Laterality: N/A;    EXCISION, LIVER N/A 3/25/2024    Procedure: EXCISION, LIVER - partial;  Surgeon: Bo Farmer MD;  Location: [facility] OR [address] [place] [address];  Service: General;  Laterality: N/A;  bk ultrasound  [place] book by TA on 3-21-24  7:00 a.m.  Conf #  [id number]    EYE SURGERY      LAPAROTOMY, EXPLORATORY N/A 3/25/2024    Procedure: LAPAROTOMY, EXPLORATORY;  Surgeon: Александр Washington MD;  Location: [facility] OR [address] [place] [address];  Service: OB/GYN;  Laterality: N/A;    OMENTECTOMY N/A 3/25/2024    Procedure: OMENTECTOMY;  Surgeon: Александр Washington MD;  Location: [facility] OR [address] [place] [address];  Service: OB/GYN;  Laterality: N/A;    PERITONEOCENTESIS N/A 3/13/2024    Procedure: PARACENTESIS, ABDOMINAL;  Surgeon: Flavia Moore MD;  Location: [facility] CATH LAB;  Service: Radiology;  Laterality: N/A;    PERITONEOCENTESIS N/A 3/21/2024    Procedure: PARACENTESIS, ABDOMINAL;  Surgeon:  Jorge Jolley MD;  Location: [facility] CATH LAB;  Service: Radiology;  Laterality: N/A;    TOTAL ABDOMINAL HYSTERECTOMY N/A 3/25/2024    Procedure: HYSTERECTOMY, TOTAL, ABDOMINAL;  Surgeon: Александр Washington MD;  Location: [facility] OR [address] [place] [address];  Service: OB/GYN;  Laterality: N/A;    TOTAL REDUCTION MAMMOPLASTY Bilateral 2016     Family History       Problem Relation (Age of Onset)    Breast cancer Sister (48)    Seizures Father          Tobacco Use    Smoking status: Never    Smokeless tobacco: Never   Substance and Sexual Activity    Alcohol use: Yes     Alcohol/week: 0.0 standard drinks of alcohol     Comment: ocassional    Drug use: No    Sexual activity: Yes     Partners: Male     Birth control/protection: None       PTA Medications   Medication Sig    amlodipine (NORVASC) 10 MG tablet Take 10 mg by mouth once daily.     apixaban (ELIQUIS) 2.5 mg Tab Take 1 tablet (2.5 mg total) by mouth 2 (two) times daily.    atorvastatin (LIPITOR) 40 MG tablet Take 80 mg by mouth once daily.    enalapril (VASOTEC) 20 MG tablet Take 20 mg by mouth once daily.    fluticasone propionate (FLONASE) 50 mcg/actuation nasal spray 1 spray by Each Nostril route once daily.    hydrochlorothiazide (HYDRODIURIL) 25 MG tablet Take 25 mg by mouth once daily.     HYDROmorphone (DILAUDID) 2 MG tablet Take 1 tablet (2 mg total) by mouth every 8 (eight) hours as needed for Pain.    ibuprofen (ADVIL,MOTRIN) 600 MG tablet Take 1 tablet (600 mg total) by mouth every 6 (six) hours as needed for Pain.    ibuprofen (ADVIL,MOTRIN) 800 MG tablet Take 800 mg by mouth every 6 (six) hours as needed for Pain.    insulin detemir U-100, Levemir, (LEVEMIR FLEXPEN) 100 unit/mL (3 mL) InPn pen Inject 5 Units into the skin every evening.    insulin lispro (HUMALOG U-100 INSULIN) 100 unit/mL injection **LOW CORRECTION DOSE**  Blood Glucose  mg/dL                  Pre-meal                  151-200                0 unit                        201-250                 2 units                      251-300                3 units                      301-350                4 units                      >350                     5 units                      Administer subcutaneously if needed at times designated by monitoring schedule.    ketorolac 0.5% (ACULAR) 0.5 % Drop Place 1 drop into both eyes. For pain after eye injections.    lamoTRIgine (LAMICTAL) 25 MG tablet Take 25 mg by mouth 2 (two) times daily.    lorazepam (ATIVAN) 0.5 MG tablet Take 0.5 mg by mouth every 12 (twelve) hours as needed for Anxiety.    metformin (GLUCOPHAGE) 1000 MG tablet Take 1,000 mg by mouth daily with breakfast.     ondansetron (ZOFRAN-ODT) 4 MG TbDL Take 1 tablet (4 mg total) by mouth every 6 (six) hours as needed (for nausea).    quetiapine (SEROQUEL) 25 MG Tab Take 25 mg by mouth daily as needed.    tirzepatide (MOUNJARO) 5 mg/0.5 mL PnIj Inject 5 mg into the skin every 7 days. On Sunday.         Review of patient's allergies indicates:   Allergen Reactions    Codeine Other (See Comments)     Flu like s/s    Tramadol Other (See Comments)     Flu like symptoms similar to codeine reaction       Review of Systems   Constitutional:  Positive for activity change, chills, fatigue and fever. Negative for diaphoresis.   HENT:  Negative for nasal congestion.    Eyes:  Negative for visual disturbance.   Respiratory:  Positive for cough and shortness of breath. Negative for wheezing.    Cardiovascular:  Negative for chest pain.   Gastrointestinal:  Positive for bloating. Negative for diarrhea, nausea and vomiting.   Genitourinary:  Positive for dysuria. Negative for pelvic pain, vaginal bleeding, vaginal discharge and vaginal pain.   Musculoskeletal:  Negative for back pain.   Integumentary:  Negative for rash.   Neurological:  Negative for syncope.   Hematological:  Negative for adenopathy.   Psychiatric/Behavioral:  Negative for depression.       Objective:     Vital Signs (Most Recent):  Temp: 98.3 °F  (36.8 °C) (04/07/24 1134)  Pulse: 85 (04/07/24 1134)  Resp: 20 (04/07/24 1134)  BP: 125/74 (04/07/24 1134)  SpO2: 97 % (04/07/24 1134) Vital Signs (24h Range):  Temp:  [98.2 °F (36.8 °C)-100.4 °F (38 °C)] 98.3 °F (36.8 °C)  Pulse:  [] 85  Resp:  [17-20] 20  SpO2:  [95 %-100 %] 97 %  BP: (111-146)/(66-84) 125/74     Weight: 95.3 kg (210 lb)  Body mass index is 32.89 kg/m².       Physical Exam:   Constitutional: She is oriented to person, place, and time. She appears well-developed and well-nourished.    HENT:   Head: Normocephalic.      Cardiovascular:  Normal rate.             Pulmonary/Chest: Effort normal.        Abdominal: She exhibits distension and abdominal incision. There is abdominal tenderness. There is no rebound and no guarding.   Abdomen tympanic and mildly distended. Midline vertical incision superificially dehisced as seen in photos below. Exudate noted, no evidence of serosanguinous fluid. Incision probed in length and no fascial defect noted.              Musculoskeletal: Normal range of motion.       Neurological: She is alert and oriented to person, place, and time.    Skin: Skin is warm and dry.    Psychiatric: She has a normal mood and affect.          Laboratory:  Lab Results   Component Value Date    WBC 20.28 (H) 04/06/2024    HGB 8.7 (L) 04/06/2024    HCT 27.2 (L) 04/06/2024    MCV 81 (L) 04/06/2024     (H) 04/06/2024       BMP  Lab Results   Component Value Date     (L) 04/06/2024    K 3.0 (L) 04/06/2024    CL 88 (L) 04/06/2024    CO2 32 (H) 04/06/2024    BUN 6 04/06/2024    CREATININE 0.6 04/06/2024    CALCIUM 7.3 (L) 04/06/2024    ANIONGAP 11 04/06/2024    EGFRNORACEVR >60.0 04/06/2024         Diagnostic Results:  CT Abdomen Pelvis With IV Contrast NO Oral Contrast  Order: [id number]  Status: Final result       Visible to patient: Yes (not seen)       Next appt: 04/08/2024 at 09:30 AM in Gynecologic Oncology (Александр Washington MD)    0 Result Notes  Details    Reading  Physician Reading Date Result Priority   Emperatriz Ochoa MD  [phone] 4/5/2024 STAT     Narrative & Impression  EXAMINATION:  CT ABDOMEN PELVIS WITH IV CONTRAST     CLINICAL HISTORY:  Abdominal abscess/infection suspected;Abdominal pain, post-op;     TECHNIQUE:  Low dose axial images, sagittal and coronal reformations were obtained from the lung bases to the pubic symphysis following the IV administration of 100 mL of Omnipaque 350 .  minimal oral contrast noted in the stomach.  Axial and coronal images reformatted.     COMPARISON:  03/29/2024     FINDINGS:  Bilateral small pleural effusion and subsegmental atelectasis at the bilateral lung basis, unchanged.  Trace of pericardial effusion, unchanged.  Partially calcified right breast mass 2.9 cm, unchanged.     The liver appears normal.  The biliary ducts are not dilated.  The gallbladder is removed.     The distal esophagus and stomach appear normal.     The pancreas, spleen and bilateral adrenal glands appear normal.     The bilateral kidneys enhance normally.  No hydronephrosis or kidney mass.     The abdominal aorta tapers normally, there is circumferential calcified atherosclerotic plaque.  No para-aortic lymphadenopathy.     Mild stool retention.  The small bowel is not dilated.  The appendix is not seen.  Fluid and air scattered throughout the abdomen similar compared to the prior exam.  Anterior midline incision containing minimal amount of air, no measurable fluid collection.  The inguinal regions appear normal.     The bladder is nondistended.  The uterus is removed.  There is an air fluid collection at the surgical site measuring 7.8 x 6.6 x 5.2 cm.  The ovaries are not definitely seen.     There is body wall anasarca.     The osseous structures demonstrate no osseous lesions.     Impression:     Air and fluid scattered within the abdomen and pelvis, it is unchanged from 03/29/2024.  There is a measurable air and fluid collection within the mid  pelvis 7.8 x 6.6 cm x 5.2 cm.  Dehiscence of the surgical site cannot be excluded or other origin perforation.  An abscess could have this appearance.  The patient had a total abdominal hysterectomy and bilateral salpingo-oophorectomy, omentectomy, cholecystectomy.     Unchanged partially calcified right breast mass, is which correlates with mammogram 11/10/2023.        Electronically signed by: Emperatriz Ochoa MD  Date:                                            04/05/2024  Time:                                           12:56           Exam Ended: 04/05/24 12:23 CDT Last Resulted: 04/05/24 12:56 CDT               X-Ray Chest AP Portable  Order: [id number]  Status: Final result       Visible to patient: Yes (not seen)       Next appt: 04/08/2024 at 09:30 AM in Gynecologic Oncology (Александр Washington MD)    0 Result Notes  Details    Reading Physician Reading Date Result Priority   Abdullahi Arana MD  [phone] 4/5/2024 STAT     Narrative & Impression  EXAMINATION:  XR CHEST AP PORTABLE     CLINICAL HISTORY:  hyperglycemia;     TECHNIQUE:  Single frontal view of the chest was performed.     COMPARISON:  Chest radiograph performed 03/18/2024, 01:24 hours.     FINDINGS:  Grossly unchanged position of the left chest port.  Similar cardiomediastinal contours.  Atherosclerosis of the aorta.     Grossly unchanged cardiomediastinal contours, again noting enlargement the cardiac silhouette.     Layering at least small bilateral pleural effusions appear new when compared to 03/18/2024 radiograph.  Ill-defined opacities the lung bases may relate to atelectasis, noting that aspiration or pneumonia would be difficult to exclude.     No definite pneumothorax.     No acute findings in the visualized abdomen.     Osseous and soft tissue structures appear without definite acute change.  Operative sequela in the left chest wall again appreciated.     Impression:     Please see discussion above.        Electronically  signed by: Abdullahi Arana  Date:                                            04/05/2024  Time:                                           07:29           Exam Ended: 04/05/24 07:02 CDT Last Resulted: 04/05/24 07:29 CDT

## 2024-04-08 VITALS
HEIGHT: 67 IN | BODY MASS INDEX: 32.96 KG/M2 | HEART RATE: 81 BPM | OXYGEN SATURATION: 98 % | SYSTOLIC BLOOD PRESSURE: 126 MMHG | DIASTOLIC BLOOD PRESSURE: 70 MMHG | TEMPERATURE: 98 F | RESPIRATION RATE: 18 BRPM | WEIGHT: 210 LBS

## 2024-04-08 LAB
POCT GLUCOSE: 105 MG/DL (ref 70–110)
POCT GLUCOSE: 110 MG/DL (ref 70–110)
POCT GLUCOSE: 122 MG/DL (ref 70–110)
POCT GLUCOSE: 131 MG/DL (ref 70–110)
POCT GLUCOSE: 153 MG/DL (ref 70–110)

## 2024-04-08 PROCEDURE — 25000003 PHARM REV CODE 250

## 2024-04-08 PROCEDURE — 99231 SBSQ HOSP IP/OBS SF/LOW 25: CPT | Mod: ,,, | Performed by: OBSTETRICS & GYNECOLOGY

## 2024-04-08 PROCEDURE — 97530 THERAPEUTIC ACTIVITIES: CPT

## 2024-04-08 PROCEDURE — 97165 OT EVAL LOW COMPLEX 30 MIN: CPT

## 2024-04-08 PROCEDURE — 63600175 PHARM REV CODE 636 W HCPCS: Mod: JZ,JG

## 2024-04-08 PROCEDURE — 97116 GAIT TRAINING THERAPY: CPT

## 2024-04-08 PROCEDURE — 99232 SBSQ HOSP IP/OBS MODERATE 35: CPT | Mod: ,,, | Performed by: PHYSICIAN ASSISTANT

## 2024-04-08 PROCEDURE — 97162 PT EVAL MOD COMPLEX 30 MIN: CPT

## 2024-04-08 PROCEDURE — 63600175 PHARM REV CODE 636 W HCPCS: Performed by: STUDENT IN AN ORGANIZED HEALTH CARE EDUCATION/TRAINING PROGRAM

## 2024-04-08 RX ORDER — METRONIDAZOLE 500 MG/1
500 TABLET ORAL EVERY 12 HOURS
Qty: 20 TABLET | Refills: 0 | Status: ON HOLD | OUTPATIENT
Start: 2024-04-08 | End: 2024-05-22 | Stop reason: HOSPADM

## 2024-04-08 RX ORDER — INSULIN ASPART 100 [IU]/ML
3 INJECTION, SOLUTION INTRAVENOUS; SUBCUTANEOUS
Qty: 3 ML | Refills: 4 | Status: SHIPPED | OUTPATIENT
Start: 2024-04-08 | End: 2025-04-08

## 2024-04-08 RX ORDER — METRONIDAZOLE 500 MG/1
500 TABLET ORAL EVERY 12 HOURS
Status: DISCONTINUED | OUTPATIENT
Start: 2024-04-08 | End: 2024-04-08 | Stop reason: HOSPADM

## 2024-04-08 RX ORDER — INSULIN DETEMIR 100 [IU]/ML
7 INJECTION, SOLUTION SUBCUTANEOUS NIGHTLY
Qty: 6 ML | Refills: 3 | Status: SHIPPED | OUTPATIENT
Start: 2024-04-08 | End: 2025-04-08

## 2024-04-08 RX ORDER — INSULIN ASPART 100 [IU]/ML
0-5 INJECTION, SOLUTION INTRAVENOUS; SUBCUTANEOUS
Status: DISCONTINUED | OUTPATIENT
Start: 2024-04-08 | End: 2024-04-08 | Stop reason: HOSPADM

## 2024-04-08 RX ORDER — IBUPROFEN 200 MG
24 TABLET ORAL
Status: DISCONTINUED | OUTPATIENT
Start: 2024-04-08 | End: 2024-04-08 | Stop reason: HOSPADM

## 2024-04-08 RX ORDER — IBUPROFEN 200 MG
16 TABLET ORAL
Status: DISCONTINUED | OUTPATIENT
Start: 2024-04-08 | End: 2024-04-08 | Stop reason: HOSPADM

## 2024-04-08 RX ORDER — CIPROFLOXACIN 500 MG/1
500 TABLET ORAL EVERY 12 HOURS
Status: DISCONTINUED | OUTPATIENT
Start: 2024-04-08 | End: 2024-04-08 | Stop reason: HOSPADM

## 2024-04-08 RX ORDER — GLUCAGON 1 MG
1 KIT INJECTION
Status: DISCONTINUED | OUTPATIENT
Start: 2024-04-08 | End: 2024-04-08 | Stop reason: HOSPADM

## 2024-04-08 RX ORDER — CIPROFLOXACIN 500 MG/1
500 TABLET ORAL EVERY 12 HOURS
Qty: 20 TABLET | Refills: 0 | Status: ON HOLD | OUTPATIENT
Start: 2024-04-08 | End: 2024-05-22 | Stop reason: HOSPADM

## 2024-04-08 RX ORDER — AMOXICILLIN AND CLAVULANATE POTASSIUM 875; 125 MG/1; MG/1
1 TABLET, FILM COATED ORAL EVERY 12 HOURS
Qty: 20 TABLET | Refills: 0 | Status: SHIPPED | OUTPATIENT
Start: 2024-04-08 | End: 2024-04-08 | Stop reason: HOSPADM

## 2024-04-08 RX ADMIN — LAMOTRIGINE 25 MG: 25 TABLET ORAL at 08:04

## 2024-04-08 RX ADMIN — INSULIN ASPART 3 UNITS: 100 INJECTION, SOLUTION INTRAVENOUS; SUBCUTANEOUS at 08:04

## 2024-04-08 RX ADMIN — CIPROFLOXACIN 500 MG: 500 TABLET ORAL at 08:04

## 2024-04-08 RX ADMIN — ONDANSETRON HYDROCHLORIDE 8 MG: 4 TABLET, FILM COATED ORAL at 08:04

## 2024-04-08 RX ADMIN — INSULIN ASPART 3 UNITS: 100 INJECTION, SOLUTION INTRAVENOUS; SUBCUTANEOUS at 05:04

## 2024-04-08 RX ADMIN — ATORVASTATIN CALCIUM 80 MG: 40 TABLET, FILM COATED ORAL at 08:04

## 2024-04-08 RX ADMIN — INSULIN ASPART 3 UNITS: 100 INJECTION, SOLUTION INTRAVENOUS; SUBCUTANEOUS at 12:04

## 2024-04-08 RX ADMIN — IBUPROFEN 600 MG: 600 TABLET, FILM COATED ORAL at 06:04

## 2024-04-08 RX ADMIN — METRONIDAZOLE 500 MG: 5 INJECTION, SOLUTION INTRAVENOUS at 06:04

## 2024-04-08 RX ADMIN — IBUPROFEN 600 MG: 600 TABLET, FILM COATED ORAL at 12:04

## 2024-04-08 NOTE — PROGRESS NOTES
[facility] - Telemetry Stepdown  Endocrinology  Progress Note    Admit Date: 2024     Reason for Consult: Management of T2DM, Hyperglycemia      Surgical Procedure and Date: CARLOS ALBERTO-BSO  3/26/24     Diabetes diagnosis year: > 5 years ago     Home Diabetes Medications:      Discharged on Levemir 5 units and Humalog SSI    Prev on: Toujeo 60 units evening, Novolog 10 units w/ meals Breakfast and Dinner, Metformin 1000 mg , Mounjaro 7.5 mg.     How often checking glucose at home? Candice  BG readings on regimen: 200s-400s  Hypoglycemia on the regimen?  No  Missed doses on regimen?  No     Diabetes Complications include:     Hyperglycemia and Diabetic retinopathy      Complicating diabetes co morbidities:   HLD, HTN        HPI:   Patient is a 58 y.o. 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 Her post-op course was complicated by ICU stay and failed void trial on POD#1. She was discharged on POD#5. During her hospitalization, blood sugars were closely monitored with assistance of endocrine team. She was discharged on the following insulin regimen: levemir 5 units nightly and sliding scale insulin for meals. Per history in ED, patient reports taking her nightly 5 units of levemir. Her blood sugar this morning was 400 so she took 4 units of leftover novolog and presented to the ED.   Endocrine consulted for bg management.      Interval HPI:   No acute events overnight. Patient in room 8069/8069 A. Blood glucose stable. BG at and below goal on current insulin regimen (SSI, prandial, and basal insulin ). Steroid use- None .      Renal function- Normal   Vasopressors-  None     Diet diabetic  Calorie     Eatin%  Nausea: No  Hypoglycemia and intervention: No  Fever: No  TPN and/or TF: No    /69 (BP Location: Left arm, Patient  "Position: Lying)   Pulse 88   Temp 98.2 °F (36.8 °C) (Oral)   Resp 18   Ht 5' 7" (1.702 m)   Wt 95.3 kg (210 lb)   LMP 02/09/2015   SpO2 99%   Breastfeeding No   BMI 32.89 kg/m²     Labs Reviewed and Include    Recent Labs   Lab 04/07/24  1416   GLU 82   CALCIUM 7.8*   *   K 3.8   CO2 29   CL 94*   BUN 6   CREATININE 0.6     Lab Results   Component Value Date    WBC 18.52 (H) 04/07/2024    HGB 9.7 (L) 04/07/2024    HCT 31.3 (L) 04/07/2024    MCV 84 04/07/2024     (H) 04/07/2024     No results for input(s): "TSH", "FREET4" in the last 168 hours.  Lab Results   Component Value Date    HGBA1C 7.3 (H) 03/19/2024       Nutritional status:   Body mass index is 32.89 kg/m².  Lab Results   Component Value Date    ALBUMIN 1.1 (L) 04/05/2024    ALBUMIN 1.2 (L) 03/29/2024    ALBUMIN 2.1 (L) 03/25/2024     No results found for: "PREALBUMIN"    Estimated Creatinine Clearance: 119.7 mL/min (based on SCr of 0.6 mg/dL).    Accu-Checks  Recent Labs     04/05/24 2024 04/06/24  0833 04/06/24  1102 04/06/24  1715 04/06/24  2214 04/07/24  0248 04/07/24  1004 04/07/24  1228 04/07/24  1704 04/07/24  2149   POCTGLUCOSE 226* 289* 367* 172* 130* 86 92 90 96 138*       Current Medications and/or Treatments Impacting Glycemic Control  Immunotherapy:    Immunosuppressants       None          Steroids:   Hormones (From admission, onward)      None          Pressors:    Autonomic Drugs (From admission, onward)      None          Hyperglycemia/Diabetes Medications:   Antihyperglycemics (From admission, onward)      Start     Stop Route Frequency Ordered    04/08/24 2100  insulin detemir U-100 (Levemir) pen 7 Units         -- SubQ Nightly 04/08/24 0625    04/08/24 0720  insulin aspart U-100 pen 0-5 Units         -- SubQ Before meals & nightly PRN 04/08/24 0621    04/07/24 1815  insulin aspart U-100 pen 3 Units         -- SubQ 3 times daily with meals 04/07/24 1800            ASSESSMENT and PLAN    Renal/  S/p " CARLOS ALBERTO/BSO/OMX/[place]ulking/Partial liver resection/Cholecystectomy  Managed per primary team  Optimize bg control        Oncology  * Pelvic abscess  Managed per primary team  Optimize bg control          Endocrine  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 Hx of  recent CARLOS ALBERTO BSO w/ liver resection.  Prev to sx pt reported high insulin requirements, but needs post sx were minimal with frequent lows and eventually d/c on Levemir 5 units+ SSI.  Pt w/ reported hyperglycemia in 400s at home.  BG improved here, now below goal.      - Continue Levemir 7 units daily,   - Continue Novolog 3 units with meals   - Continue Novolog correction dose with ISF 50 starting at 200    - POCT Glucose before meals and at bedtime  - Hypoglycemia protocol in place      ** Please notify Endocrine for any change and/or advance in diet**  ** Please call Endocrine for any BG related issues **     Discharge Planning:   Notified by gyn onc that pt to be discharged on the below regimen.  Given very poor po intake, recommended that pt hold her Mounjaro until followup    Toujeo 7 units daily  Novolog 3 units with meals  Add correction scale as needed.  Blood sugar 200 to 250 add 2 units  Blood sugar 251 to 300 add 3 units  Blood sugar 301 to 350 add 4 units  Blood sugar greater than 350 add 5 units              Robbin Rios PA-C  Endocrinology  [facility] - Telemetry Stepdown

## 2024-04-08 NOTE — ASSESSMENT & PLAN NOTE
- POD#14 s/p Ex-Lap/CARLOS ALBERTO/BSO/Partial Liver Resection (Segment 6)/ Omentectomy/Diaphragm Stripping/Peritoneal Stripping/Cholecystectomy   - Beginning IV paclitaxel/carboplatin outpatient  - VTE ppx: SCDs. Continue prophylactic lovenox  - Will hold prophylactic eliquis while inpatient

## 2024-04-08 NOTE — PT/OT/SLP EVAL
Physical Therapy Evaluation    Patient Name:  Anette Ricci   MRN:  [id number]    Recommendations:     Discharge Recommendations: Low Intensity Therapy   Discharge Equipment Recommendations: none   Barriers to discharge: None    Assessment:     Anette Ricci is a 58 y.o. female admitted with a medical diagnosis of Leukocytosis.  She presents with the following impairments/functional limitations: weakness, impaired balance, pain, impaired endurance, impaired functional mobility, gait instability, decreased lower extremity function, edema, decreased coordination, decreased ROM, impaired self care skills Patient with motivation to participate in therapy evaluation with encouragement from supportive . She tolerated the session well, which consisted of bed mobility, ambulation, and multiple transfers from various surface heights. Patient will continue to benefit from skilled PT during this admit to address BLE strength and endurance deficits, and maximize independence with functional mobility.    Rehab Prognosis: Good; patient would benefit from acute skilled PT services to address these deficits and reach maximum level of function.    Recent Surgery: * No surgery found *      Plan:     During this hospitalization, patient to be seen 4 x/week to address the identified rehab impairments via gait training, therapeutic activities, therapeutic exercises, neuromuscular re-education and progress toward the following goals:    Plan of Care Expires:  05/08/24    Subjective     Chief Complaint: needing to have BM  Patient/Family Comments/goals: return to PLOF, decrease BLE edema  Pain/Comfort:  Pain Rating 1: 5/10  Location - Orientation 1: generalized  Location 1: abdomen  Pain Addressed 1: Reposition, Distraction  Pain Rating Post-Intervention 1: 5/10    Patients cultural, spiritual, Anglican conflicts given the current situation: no    Living Environment:  Patient lives with her  in a [facility] with 0  JIA. The bathroom contains a walk-in shower as well as a tub-shower combo with a shower chair inside.   Prior to admission, patients level of function was independent prior to surgery,  assisting since surgery. Equipment used at home: shower chair, walker, rolling ((RW to be delivered at patient residence.)).  DME owned (not currently used): none.  Upon discharge, patient will have assistance from .    Objective:     Communicated with RN prior to session.  Patient found HOB elevated with DENISE drain, peripheral IV, colostomy, PureWick  upon PT entry to room.    General Precautions: Standard, fall, diabetic  Orthopedic Precautions:N/A   Braces: N/A  Respiratory Status: Room air    Exams:  Cognitive Exam:  Patient is oriented to Person, Place, Time, and Situation  Gross Motor Coordination:  WFL  Postural Exam:  Patient presented with the following abnormalities:    -       Rounded shoulders  -       Forward head  Skin Integrity/Edema:      -       Edema: Pitting BLE  RLE ROM: WFL  RLE Strength: WFL  LLE ROM: WFL  LLE Strength: WFL    Functional Mobility:  Bed Mobility:     Rolling Left:  minimum assistance and using log roll technique  Scooting: minimum assistance and anterior direction toward EOB in seated to plant feet on floor   Supine to Sit: minimum assistance and using log roll technique, HOB slightly elevated, no bed rail to simulate home environment  Transfers:     Sit to Stand:  contact guard assistance with rolling walker and from EOB  Toilet Transfer: minimum assistance with  rolling walker and grab bars  using  Step Transfer (for standing from lower surface)  Gait: 15 ft, 28 ft in room with CGA and RW, decreased edie, decreased floor clearance, no LOB  Balance:   Sitting: SBA at EOB  Standing: CGA with BUE support on RW      AM-PAC 6 CLICK MOBILITY  Total Score:16       Treatment & Education:  Patient educated on calling for assistance for any needs to improve overall safety awareness.    Patient educated on importance of OOB activity to promote overall endurance.  Patient educated on role of PT in acute care, PT POC, and PT goals.  Patient and  educated on safety awareness in home environment  X20 ankle pumps, ankle circumduction    Patient left up in chair with all lines intact, call button in reach, RN notified, and  present.    GOALS:   Multidisciplinary Problems       Physical Therapy Goals          Problem: Physical Therapy    Goal Priority Disciplines Outcome Goal Variances Interventions   Physical Therapy Goal     PT, PT/OT Ongoing, Progressing     Description: Goals to be met by: 24     Patient will increase functional independence with mobility by performin. Supine to sit with Stand-by Assistance  2. Sit to supine with Stand-by Assistance  3. Sit to stand transfer with Stand-by Assistance  4. Bed to chair transfer with Stand-by Assistance using Rolling Walker  5. Gait  x 100 feet with Stand-by Assistance using Rolling Walker.   6. Lower extremity exercise program x15 reps per handout, with assistance as needed                         History:     Past Medical History:   Diagnosis Date    Breast cancer     Diabetes mellitus     Genetic testing 2013    VUS    Hyperlipidemia     Hypertension     Malignant neoplasm of upper-outer quadrant of right breast in female, estrogen receptor positive 2015    Seizure disorder        Past Surgical History:   Procedure Laterality Date    BILATERAL SALPINGO-OOPHORECTOMY (BSO) N/A 3/25/2024    Procedure: SALPINGO-OOPHORECTOMY, BILATERAL;  Surgeon: Александр Washington MD;  Location: [address];  Service: OB/GYN;  Laterality: N/A;    BREAST BIOPSY      BREAST LUMPECTOMY Right     CHOLECYSTECTOMY  3/25/2024    Procedure: CHOLECYSTECTOMY;  Surgeon: Bo Farmer MD;  Location: [facility] OR [address] [place] [address];  Service: General;;    DEBULKING OF TUMOR N/A 3/25/2024    Procedure: DEBULKING, NEOPLASM;  Surgeon:  Александр Washington MD;  [facility];  Service: OB/GYN;  Laterality: N/A;    EXCISION, LIVER N/A 3/25/2024    Procedure: EXCISION, LIVER - partial;  Surgeon: Bo Farmer MD;  [facility];  Service: General;  Laterality: N/A;  bk ultrasound  [place] book by TA on 3-21-24  7:00 a.m.  Conf #  [id number]    EYE SURGERY      LAPAROTOMY, EXPLORATORY N/A 3/25/2024    Procedure: LAPAROTOMY, EXPLORATORY;  Surgeon: Александр Washington MD;  [facility];  Service: OB/GYN;  Laterality: N/A;    OMENTECTOMY N/A 3/25/2024    Procedure: OMENTECTOMY;  Surgeon: Александр Washington MD;  [facility];  Service: OB/GYN;  Laterality: N/A;    PERITONEOCENTESIS N/A 3/13/2024    Procedure: PARACENTESIS, ABDOMINAL;  Surgeon: Flavia Moore MD;  Location: [facility] CATH LAB;  Service: Radiology;  Laterality: N/A;    PERITONEOCENTESIS N/A 3/21/2024    Procedure: PARACENTESIS, ABDOMINAL;  Surgeon: Jorge Jolley MD;  Location: [facility] CATH LAB;  Service: Radiology;  Laterality: N/A;    TOTAL ABDOMINAL HYSTERECTOMY N/A 3/25/2024    Procedure: HYSTERECTOMY, TOTAL, ABDOMINAL;  Surgeon: Александр Washington MD;  [facility];  Service: OB/GYN;  Laterality: N/A;    TOTAL REDUCTION MAMMOPLASTY Bilateral 2016       Time Tracking:     PT Received On: 04/08/24  PT Start Time: 0814     PT Stop Time: 0848  PT Total Time (min): 34 min     Billable Minutes: Evaluation 10, Gait Training 14, and Therapeutic Activity 10      04/08/2024

## 2024-04-08 NOTE — PLAN OF CARE
PT alert and oriented x 4. Able to voice all wants and needs. All needs met.  Drain cont to have a small amount of tanish pink drainage noted/ She has remained free of falls and injuries. Safety eduction and plan of care reviewed with PT and she voiced understanding. Bed is low and locked with call light with in easy reach.

## 2024-04-08 NOTE — PLAN OF CARE
[facility] - Telemetry Stepdown    HOME HEALTH ORDERS  FACE TO FACE ENCOUNTER    Patient Name: Anette Ricci  YOB: 1965    PCP: Mark Chua MD   PCP Address: [address]  PCP Phone Number: [phone]  PCP Fax: [phone]    Discharging Team(s): [facility]OCRINOLOGY    Encounter Date: 04/08/2024    Admit to Home Health    Diagnoses:  Active Hospital Problems    Diagnosis  POA    *Pelvic abscess [D72.829]  Yes    Pelvic abscess in female [N73.9]  Yes    Hyperglycemia [R73.9]  Yes    UTI (urinary tract infection) [N39.0]  Yes    Hypokalemia [E87.6]  Yes    Superficial dehiscence of wound [T81.30XA]  Yes    Acute postoperative anemia due to expected blood loss [D62]  Yes    S/p CARLOS ALBERTO/BSO/OMX/Debulking/Partial liver resection/Cholecystectomy [Z90.710, Z90.79, Z90.722]  Yes    Stage 4B Carcinoscaroma, 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       Future Appointments   Date Time Provider Department Center   4/8/2024  9:30 AM Александр Washington MD [facility] GYN ONC [facility]   4/8/2024 10:30 AM [facility] CHEMO CLASS [facility] GYN ON[facility]   4/11/2024  8:00 AM Asuncion Jurado MD [facility] KRISTYN [facility]aly   4/19/2024  9:00 AM LAB, [facility]   4/19/2024 10:00 AM NURSE [facility] CHEMO [facility] CARYN Daly           I have seen and examined this patient face to face today. My clinical findings that support the need for the home health skilled services and home bound status are the following:  Weakness/numbness causing balance and gait disturbance due to  Malignancy/Cancer making it taxing to leave home.    Allergies:  Review of patient's allergies indicates:   Allergen Reactions    Codeine Other (See Comments)     Flu like s/s    Tramadol Other (See Comments)     Flu like symptoms similar to codeine reaction       Diet: regular diet    Activities: activity as tolerated    Nursing:   SN to complete comprehensive assessment including routine vital signs. Instruct on disease process and s/s of complications to report to MD. Review/verify medication list sent home with the patient at time of discharge  and instruct patient/caregiver as needed. Frequency may be adjusted depending on start of care date.    Notify MD if SBP > 160 or < 90; DBP > 90 or < 50; HR > 120 or < 50; Temp > 101;      CONSULTS:    Physical Therapy to evaluate and treat. Evaluate for home safety and equipment needs; Establish/upgrade home exercise program. Perform / instruct on therapeutic exercises, gait training, transfer training, and Range of Motion.  Occupational Therapy to evaluate and treat. Evaluate home environment for safety and equipment needs. Perform/Instruct on transfers, ADL training, ROM, and therapeutic exercises.    MISCELLANEOUS CARE:  Drain Care:  Instruct patient/caregiver to empty bag when full and PRN and change and clean site every 48 hours    WOUND CARE ORDERS  Clean wound with Vashe wound cleanser. Apply hydrafera blue ready transfer to wound bed. Cover with dry sterile dressing (eg ABD pad) and secure with tape. If dressing becomes saturated may change outer dressing but change whole dressing including hydrafera blue ready transfer Monday, Wednesday, Friday     Medications: Review discharge medications with patient and family and provide education.      Current Discharge Medication List        CONTINUE these medications which have NOT CHANGED    Details   amlodipine (NORVASC) 10 MG tablet Take 10 mg by mouth once daily.       apixaban (ELIQUIS) 2.5 mg Tab Take 1 tablet (2.5  mg total) by mouth 2 (two) times daily.  Qty: 56 tablet, Refills: 0      atorvastatin (LIPITOR) 40 MG tablet Take 80 mg by mouth once daily.      enalapril (VASOTEC) 20 MG tablet Take 20 mg by mouth once daily.      fluticasone propionate (FLONASE) 50 mcg/actuation nasal spray 1 spray by Each Nostril route once daily.      hydrochlorothiazide (HYDRODIURIL) 25 MG tablet Take 25 mg by mouth once daily.       HYDROmorphone (DILAUDID) 2 MG tablet Take 1 tablet (2 mg total) by mouth every 8 (eight) hours as needed for Pain.  Qty: 20 tablet, Refills: 0    Comments: Quantity prescribed more than 7 day supply? Yes, quantity medically necessary  Associated Diagnoses: Ovarian carcinosarcoma; Post-op pain      !! ibuprofen (ADVIL,MOTRIN) 600 MG tablet Take 1 tablet (600 mg total) by mouth every 6 (six) hours as needed for Pain.  Qty: 30 tablet, Refills: 1      !! ibuprofen (ADVIL,MOTRIN) 800 MG tablet Take 800 mg by mouth every 6 (six) hours as needed for Pain.      insulin detemir U-100, Levemir, (LEVEMIR FLEXPEN) 100 unit/mL (3 mL) InPn pen Inject 5 Units into the skin every evening.  Qty: 4.5 mL, Refills: 3      insulin lispro (HUMALOG U-100 INSULIN) 100 unit/mL injection **LOW CORRECTION DOSE**  Blood Glucose  mg/dL                  Pre-meal                  151-200                0 unit                        201-250                2 units                      251-300                3 units                      301-350                4 units                      >350                     5 units                      Administer subcutaneously if needed at times designated by monitoring schedule.  Qty: 10 mL, Refills: 1      ketorolac 0.5% (ACULAR) 0.5 % Drop Place 1 drop into both eyes. For pain after eye injections.      lamoTRIgine (LAMICTAL) 25 MG tablet Take 25 mg by mouth 2 (two) times daily.      lorazepam (ATIVAN) 0.5 MG tablet Take 0.5 mg by mouth every 12 (twelve) hours as needed for Anxiety.      metformin  (GLUCOPHAGE) 1000 MG tablet Take 1,000 mg by mouth daily with breakfast.       ondansetron (ZOFRAN-ODT) 4 MG TbDL Take 1 tablet (4 mg total) by mouth every 6 (six) hours as needed (for nausea).  Qty: 20 tablet, Refills: 0      quetiapine (SEROQUEL) 25 MG Tab Take 25 mg by mouth daily as needed.      tirzepatide (MOUNJARO) 5 mg/0.5 mL PnIj Inject 5 mg into the skin every 7 days. On Sunday.       !! - Potential duplicate medications found. Please discuss with provider.          I certify that this patient is confined to her home and needs intermittent skilled nursing care, physical therapy, and occupational therapy.

## 2024-04-08 NOTE — DISCHARGE SUMMARY
[facility] - Telemetry Stepdown  Gynecologic Oncology  Discharge Summary    Patient Name: Anette Ricci  MRN: [id number]  Admission Date: 2024  Hospital Length of Stay: 2 days  Discharge Date and Time:  2024 4:41 PM  Attending Physician: Александр Washington MD   Discharging Provider: Mel Boateng MD  Primary Care Provider: Mark Chua MD    HPI:   Anette Ricci is a 57yo  who is POD#14 from an Ex-lap/CARLOS ALBERTO/BSO/Omx/Segment 6 liver resection/Cholecystectomy/Peritoneal and Diaphragm stripping for ovarian neoplasm who presents to the ED for hyperglycemia, fatigue, and generalized weakness. Her post-op course was complicated by ICU stay and failed void trial on POD#1. She was discharged on POD#5. During her hospitalization, blood sugars were closely monitored with assistance of endocrine team. She was discharged on the following insulin regimen: levemir 5 units nightly and sliding scale insulin for meals. Per history in ED, patient reports taking her nightly 5 units of levemir. Her blood sugar this morning was 400 so she took 4 units of leftover novolog and presented to the ED.      Patient has known abdominal ascites and bilateral leg swelling likely from malignancy. She was admitted inpatient - for these concerns. Full work-up including echo, EKG, CTA, BLE dopplers were negative. She had paracentesis with 3.6L removed. Fluid studies at that time showed no sign of SBP  .   Today she endorses worsening bilateral leg swelling left greater than right and SOB. Denies chest pain. Denies fever/cills. Denies nausea/vomiting. Denies abdominal pain. Reports ambulating at home. Reports mild dysuria. Denies any vaginal bleeding, discharge, or pain.     Hospital Course:  2024 HD#1: Admitted for leukocytosis, fluid collection seen on CT, and hyperglycemia. IV zosyn given. 1u pRBC given. Levemir 5 and sliding scale ordered.   2024 HD#2: Febrile to 100.4. Starting cipro/flagyl. Considering  IR drainage. H/H zac appropriately. Continuing blood glucose management.  4/7/2024 HD#3: POD#0 from IR drainage and drain placement. Continuing IV cipro/flagyl. Continuing wound care and blood glucose management.   4/8/2024 HD#4: POD#1 from IR drainage and drain placement. Drain output 55/24hr. Continuing IV cipro/flagyl. Afebrile > 24 hrs. Continuing wound care and blood glucose management. Will discharge home on PO cipro/flagyl given newly diagnosed ESBL UTI. Home health set up. Will follow-up with Dr. Washington on 04/11/24.       Goals of Care Treatment Preferences:  Code Status: Full Code    Consults (From admission, onward)          Status Ordering Provider     Inpatient consult to Interventional Radiology  Once        Provider:  (Not yet assigned)    Completed EBVERLY HARRISON     Inpatient consult to Endocrinology  Once        Provider:  (Not yet assigned)    Completed BEVERLY HARRISON     Inpatient consult to Gynecologic Oncology  Once        Provider:  (Not yet assigned)    Completed BEVERLY HARRISON            Significant Diagnostic Studies: N/A    Pending Diagnostic Studies:       None          Final Active Diagnoses:    Diagnosis Date Noted POA    PRINCIPAL PROBLEM:  Pelvic abscess [D72.829] 04/05/2024 Yes    Pelvic abscess in female [N73.9] 04/07/2024 Yes    Hyperglycemia [R73.9] 04/05/2024 Yes    UTI (urinary tract infection) [N39.0] 04/05/2024 Yes    Hypokalemia [E87.6] 04/05/2024 Yes    Superficial dehiscence of wound [T81.30XA] 04/05/2024 Yes    Acute postoperative anemia due to expected blood loss [D62] 03/26/2024 Yes    S/p CARLOS ALBERTO/BSO/OMX/Debulking/Partial liver resection/Cholecystectomy [Z90.710, Z90.79, Z90.722] 03/25/2024 Yes    Stage 4B Carcinoscaroma, 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 Yes, patient was prescribed elliquis. She is continuing her past prescription to complete a 28 day post-operative course of prophylactic eliquis.    Discharged Condition: good    Disposition: Home or Self Care    Follow Up:   Follow-up Information       Александр Washington MD Follow up on 4/11/2024.    Specialty: Gynecologic Oncology  Why: Please attend post-hopsital follow-up with Dr. Washington on 04/11/2024.  Contact information:  [address]  [address] [address] [phone]                           Patient Instructions:      Diet Adult Regular     Notify your health care provider if you experience any of the following:  temperature >100.4     Notify your health care provider if you experience any of the following:  persistent nausea and vomiting or diarrhea     Notify your health care provider if you experience any of the following:  severe uncontrolled pain     Notify your health care provider if you experience any of the following:  redness, tenderness, or signs of infection (pain, swelling, redness, odor or green/yellow discharge around incision site)     Notify your health care provider if you experience any of the following:  persistent dizziness, light-headedness, or visual disturbances     Notify your health care provider if you experience any of the following:  increased confusion or weakness     Activity as tolerated     Medications:  Reconciled Home Medications:      Medication List        START taking these medications      ciprofloxacin HCl 500 MG tablet  Commonly known as: CIPRO  Take 1 tablet (500 mg total) by mouth every 12 (twelve) hours.     insulin aspart U-100 100 unit/mL (3 mL) Inpn pen  Commonly known as:  "NovoLOG  Inject 3 Units into the skin 3 (three) times daily with meals.  (Discard pen 28 days after initial use)     metroNIDAZOLE 500 MG tablet  Commonly known as: FLAGYL  Take 1 tablet (500 mg total) by mouth every 12 (twelve) hours.     pen needle, diabetic 32 gauge x 5/32" Ndle  Use to inject insulin 4 time daily            CHANGE how you take these medications      ibuprofen 600 MG tablet  Commonly known as: ADVIL,MOTRIN  Take 1 tablet (600 mg total) by mouth every 6 (six) hours as needed for Pain.  What changed: Another medication with the same name was removed. Continue taking this medication, and follow the directions you see here.     LEVEMIR FLEXPEN 100 unit/mL (3 mL) Inpn pen  Generic drug: insulin detemir U-100 (Levemir)  Inject 7 Units into the skin every evening.  (Discard pen 28 days after initial use)  What changed: how much to take            CONTINUE taking these medications      amLODIPine 10 MG tablet  Commonly known as: NORVASC  Take 10 mg by mouth once daily.     apixaban 2.5 mg Tab  Commonly known as: ELIQUIS  Take 1 tablet (2.5 mg total) by mouth 2 (two) times daily.     atorvastatin 40 MG tablet  Commonly known as: LIPITOR  Take 80 mg by mouth once daily.     enalapril 20 MG tablet  Commonly known as: VASOTEC  Take 20 mg by mouth once daily.     fluticasone propionate 50 mcg/actuation nasal spray  Commonly known as: FLONASE  1 spray by Each Nostril route once daily.     hydroCHLOROthiazide 25 MG tablet  Commonly known as: HYDRODIURIL  Take 25 mg by mouth once daily.     HYDROmorphone 2 MG tablet  Commonly known as: DILAUDID  Take 1 tablet (2 mg total) by mouth every 8 (eight) hours as needed for Pain.     insulin lispro 100 unit/mL injection  Commonly known as: HumaLOG U-100 Insulin  **LOW CORRECTION DOSE**  Blood Glucose  mg/dL                  Pre-meal                  151-200                0 unit                        201-250                2 units                      251-300           "      3 units                      301-350                4 units                      >350                     5 units                      Administer subcutaneously if needed at times designated by monitoring schedule.     ketorolac 0.5% 0.5 % Drop  Commonly known as: ACULAR  Place 1 drop into both eyes. For pain after eye injections.     lamoTRIgine 25 MG tablet  Commonly known as: LAMICTAL  Take 25 mg by mouth 2 (two) times daily.     LORazepam 0.5 MG tablet  Commonly known as: ATIVAN  Take 0.5 mg by mouth every 12 (twelve) hours as needed for Anxiety.     metFORMIN 1000 MG tablet  Commonly known as: GLUCOPHAGE  Take 1,000 mg by mouth daily with breakfast.     MOUNJARO 5 mg/0.5 mL Pnij  Generic drug: tirzepatide  Inject 5 mg into the skin every 7 days. On Sunday.     ondansetron 4 MG Tbdl  Commonly known as: ZOFRAN-ODT  Take 1 tablet (4 mg total) by mouth every 6 (six) hours as needed (for nausea).     QUEtiapine 25 MG Tab  Commonly known as: SEROQUEL  Take 25 mg by mouth daily as needed.              Mel Boateng MD  Gynecologic Oncology  [facility] - Telemetry Stepdown

## 2024-04-08 NOTE — ASSESSMENT & PLAN NOTE
BG goal: 140-180  T2DM. Hx of  recent CARLOS ALBERTO BSO w/ liver resection.  Prev to sx pt reported high insulin requirements, but needs post sx were minimal with frequent lows and eventually d/c on Levemir 5 units+ SSI.  Pt w/ reported hyperglycemia in 400s at home.  BG improved here, now below goal.      - Continue Levemir 7 units daily,   - Continue Novolog 3 units with meals   - Continue Novolog correction dose with ISF 50 starting at 200    - POCT Glucose before meals and at bedtime  - Hypoglycemia protocol in place      ** Please notify Endocrine for any change and/or advance in diet**  ** Please call Endocrine for any BG related issues **     Discharge Planning:   Notified by gyn onc that pt to be discharged on the below regimen.  Toujeo 7 units daily  Novolog 3 units with meals  Add correction scale as needed.  Blood sugar 200 to 250 add 2 units  Blood sugar 251 to 300 add 3 units  Blood sugar 301 to 350 add 4 units  Blood sugar greater than 350 add 5 units

## 2024-04-08 NOTE — PLAN OF CARE
Problem: Adult Inpatient Plan of Care  Goal: Plan of Care Review  Outcome: Adequate for Care Transition  Goal: Patient-Specific Goal (Individualized)  Outcome: Adequate for Care Transition  Goal: Absence of Hospital-Acquired Illness or Injury  Outcome: Adequate for Care Transition  Goal: Optimal Comfort and Wellbeing  Outcome: Adequate for Care Transition  Goal: Readiness for Transition of Care  Outcome: Adequate for Care Transition     Problem: Fall Injury Risk  Goal: Absence of Fall and Fall-Related Injury  Outcome: Adequate for Care Transition     Problem: Pain Acute  Goal: Acceptable Pain Control and Functional Ability  Outcome: Adequate for Care Transition     Problem: Infection  Goal: Absence of Infection Signs and Symptoms  Outcome: Adequate for Care Transition     Problem: Fatigue  Goal: Improved Activity Tolerance  Outcome: Adequate for Care Transition     Problem: Skin Injury Risk Increased  Goal: Skin Health and Integrity  Outcome: Adequate for Care Transition   Pt discharged home with home health via personal car at 1820 . All discharge instructions where reviewed, bedside medications delivered and reviewed. Informed of follow up appointments and personally transported pt to car via wheel chair.

## 2024-04-08 NOTE — PLAN OF CARE
Problem: Occupational Therapy  Goal: Occupational Therapy Goal  Description: Goals to be met by: 4/22/2024     Patient will increase functional independence with ADLs by performing:    UE Dressing with Set-up Assistance.  LE Dressing with Stand-by Assistance.  Grooming while standing at sink with Supervision.  Toileting from toilet with Supervision for hygiene and clothing management.   Supine to sit with Supervision.  Step transfer with Supervision with RW.  Toilet transfer to toilet with Supervision with RW.    Outcome: Ongoing, Progressing     Pt evaluated and OT goals established.

## 2024-04-08 NOTE — PT/OT/SLP EVAL
"Occupational Therapy   Evaluation and Treatment    Name: Anette Ricci  MRN: [id number]  Admitting Diagnosis: Leukocytosis  Recent Surgery: * No surgery found *      Recommendations:     Discharge Recommendations: Low Intensity Therapy  Discharge Equipment Recommendations:  none  Barriers to discharge:  None    Assessment:     Anette Ricci is a 58 y.o. female with a medical diagnosis of Leukocytosis.  Pt tolerated session well and without incident, but she requires assistance with ADLs and mobility and is performing below her functional baseline.  Due to her current level of function compared to her PLOF and her home environment, low intensity therapy recommended at d/c to ensure pt's safety in her home.  She presents with the following.  Performance deficits affecting function: weakness, impaired endurance, impaired self care skills, impaired functional mobility, gait instability, impaired balance, pain, impaired skin, edema, decreased lower extremity function, decreased ROM.      Rehab Prognosis: Good; patient would benefit from acute skilled OT services to address these deficits and reach maximum level of function.       Plan:     Patient to be seen 3 x/week to address the above listed problems via self-care/home management, therapeutic exercises, therapeutic activities  Plan of Care Expires: 05/06/24  Plan of Care Reviewed with: patient, spouse    Subjective     Chief Complaint: abdominal and drain site pain  Patient/Family Comments/goals: "My legs are so heavy because of all the fluid."    Occupational Profile:  Living Environment: Pt lives with her  in a [facility] with one threshold to enter.  She has a walk-in shower with a shower chair as well as a tub/shower combo.    Previous level of function: Independent with ADLs and mobility; pt drives.  Roles and Routines: wife;retired   Equipment Used at Home: shower chair, other (see comments) (Pt is already having a RW delivered.)  Assistance upon " Discharge: Her     Pain/Comfort:  Pain Rating 1: 5/10  Location - Orientation 1: generalized  Location 1: abdomen (and at drain site in buttocks)  Pain Addressed 1: Reposition, Distraction  Pain Rating Post-Intervention 1: other (see comments) (not rated)    Patients cultural, spiritual, Uatsdin conflicts given the current situation: no    Objective:     Communicated with: nurse prior to session.  Patient found up in chair with her  present with peripheral IV (gluteal drain) upon OT entry to room.    General Precautions: Standard, fall, diabetic  Orthopedic Precautions: N/A  Braces: N/A  Respiratory Status: Room air    Occupational Performance:    Bed Mobility:    Patient completed Sit to Supine from the L with moderate assistance for BLE management    Functional Mobility/Transfers:  Patient completed Sit <> Stand Transfer from bedside chair and from toilet x 1 trial each with minimum assistance  with  rolling walker and with grab bar at the toilet  Patient completed Chair <> Bed Transfer using Step Transfer technique with contact guard assistance with rolling walker  Patient completed Toilet Transfer using Step Transfer technique with Min A with  rolling walker and grab bar  Functional Mobility: Pt ambulated ~40 ft in her room with CGA with RW.  She had no LOB     Activities of Daily Living:  Grooming: stand by assistance to wash her hand while seated on the toilet  Upper Body Dressing: minimum assistance to doff back gown/manage lines while seated EOB  Toileting: Min A for gown management.  SBA to perform hygiene while seated on toilet.  Pt was able to urinate.    Cognitive/Visual Perceptual:  Cognitive/Psychosocial Skills:     -       Oriented to: Person, Place, Time, and Situation   -       Follows Commands/attention:Follows multistep  commands  -       Communication: clear/fluent    Physical Exam:  Upper Extremity Range of Motion:     -       Right Upper Extremity: WFL  -       Left Upper  Extremity: WFL  Upper Extremity Strength:    -       Right Upper Extremity: WFL  -       Left Upper Extremity: WFL   Strength:    -       Right Upper Extremity: WFL  -       Left Upper Extremity: WFL    AMPAC 6 Click ADL:  AMPAC Total Score: 17    Treatment & Education:  Pt edu on role of OT, POC, safety when performing self care tasks, benefit of performing OOB activity, and safety when performing functional transfers and mobility.    - Self care tasks completed-- as noted above      Patient left left sidelying with HOB elevated with all lines intact, call button in reach, nurse notified, and her nurse and  present    GOALS:   Multidisciplinary Problems       Occupational Therapy Goals          Problem: Occupational Therapy    Goal Priority Disciplines Outcome Interventions   Occupational Therapy Goal     OT, PT/OT Ongoing, Progressing    Description: Goals to be met by: 4/22/2024     Patient will increase functional independence with ADLs by performing:    UE Dressing with Set-up Assistance.  LE Dressing with Stand-by Assistance.  Grooming while standing at sink with Supervision.  Toileting from toilet with Supervision for hygiene and clothing management.   Supine to sit with Supervision.  Step transfer with Supervision with RW.  Toilet transfer to toilet with Supervision with RW.                         History:     Past Medical History:   Diagnosis Date    Breast cancer 2013    Diabetes mellitus     Genetic testing 05/29/2013    VUS    Hyperlipidemia     Hypertension     Malignant neoplasm of upper-outer quadrant of right breast in female, estrogen receptor positive 12/02/2015    Seizure disorder          Past Surgical History:   Procedure Laterality Date    BILATERAL SALPINGO-OOPHORECTOMY (BSO) N/A 3/25/2024    Procedure: SALPINGO-OOPHORECTOMY, BILATERAL;  Surgeon: Александр Washington MD;  Location: [facility] OR [address] [place] [address];  Service: OB/GYN;  Laterality: N/A;    BREAST BIOPSY      BREAST LUMPECTOMY Right  2013    CHOLECYSTECTOMY  3/25/2024    Procedure: CHOLECYSTECTOMY;  Surgeon: Bo Farmer MD;  Location: [facility] OR 2ND FLR;  Service: General;;    DEBULKING OF TUMOR N/A 3/25/2024    Procedure: DEBULKING, NEOPLASM;  Surgeon: Александр Washington MD;  Location: [facility] OR 2ND FLR;  Service: OB/GYN;  Laterality: N/A;    EXCISION, LIVER N/A 3/25/2024    Procedure: EXCISION, LIVER - partial;  Surgeon: Bo Farmer MD;  Location: [facility] OR [facility] FLR;  Service: General;  Laterality: N/A;  bk ultrasound  [place] book by TA on 3-21-24  7:00 a.m.  Conf #  [id number]    EYE SURGERY      LAPAROTOMY, EXPLORATORY N/A 3/25/2024    Procedure: LAPAROTOMY, EXPLORATORY;  Surgeon: Александр Washington MD;  [facility];  Service: OB/GYN;  Laterality: N/A;    OMENTECTOMY N/A 3/25/2024    Procedure: OMENTECTOMY;  Surgeon: Александр Washington MD;  Location: [facility];  Service: OB/GYN;  Laterality: N/A;    PERITONEOCENTESIS N/A 3/13/2024    Procedure: PARACENTESIS, ABDOMINAL;  Surgeon: Flavia Moore MD;  Location: [facility] CATH LAB;  Service: Radiology;  Laterality: N/A;    PERITONEOCENTESIS N/A 3/21/2024    Procedure: PARACENTESIS, ABDOMINAL;  Surgeon: Jorge Jolley MD;  Location: [facility] CATH LAB;  Service: Radiology;  Laterality: N/A;    TOTAL ABDOMINAL HYSTERECTOMY N/A 3/25/2024    Procedure: HYSTERECTOMY, TOTAL, ABDOMINAL;  Surgeon: Александр Washington MD;  Location: [facility] OR [facility];  Service: OB/GYN;  Laterality: N/A;    TOTAL REDUCTION MAMMOPLASTY Bilateral 2016       Time Tracking:     OT Date of Treatment: 04/08/24  OT Start Time: 1059  OT Stop Time: 1127  OT Total Time (min): 28 min    Billable Minutes:Evaluation 10 min  Therapeutic Activity 18 min    4/8/2024

## 2024-04-08 NOTE — PROGRESS NOTES
Consult received to arrange for home health for wound care. Referral for HH made to several different agencies ([facility], [facility], [facility], [facility], [facility], [facility] and [facility]. [facility] ([phone])  able to accept referral. Spoke with Linda who informs that they can accept the pt. With SOC on 4/10/24. All the other agencies did not have staff to assist. Pt. Informed of the above and in agreement with the plan. Will follow as needed.

## 2024-04-08 NOTE — SUBJECTIVE & OBJECTIVE
"Interval HPI:   No acute events overnight. Patient in room 8069/8069 A. Blood glucose stable. BG at and below goal on current insulin regimen (SSI, prandial, and basal insulin ). Steroid use- None .      Renal function- Normal   Vasopressors-  None     Diet diabetic  Calorie     Eatin%  Nausea: No  Hypoglycemia and intervention: No  Fever: No  TPN and/or TF: No    /69 (BP Location: Left arm, Patient Position: Lying)   Pulse 88   Temp 98.2 °F (36.8 °C) (Oral)   Resp 18   Ht 5' 7" (1.702 m)   Wt 95.3 kg (210 lb)   LMP 2015   SpO2 99%   Breastfeeding No   BMI 32.89 kg/m²     Labs Reviewed and Include    Recent Labs   Lab 24  1416   GLU 82   CALCIUM 7.8*   *   K 3.8   CO2 29   CL 94*   BUN 6   CREATININE 0.6     Lab Results   Component Value Date    WBC 18.52 (H) 2024    HGB 9.7 (L) 2024    HCT 31.3 (L) 2024    MCV 84 2024     (H) 2024     No results for input(s): "TSH", "FREET4" in the last 168 hours.  Lab Results   Component Value Date    HGBA1C 7.3 (H) 2024       Nutritional status:   Body mass index is 32.89 kg/m².  Lab Results   Component Value Date    ALBUMIN 1.1 (L) 2024    ALBUMIN 1.2 (L) 2024    ALBUMIN 2.1 (L) 2024     No results found for: "PREALBUMIN"    Estimated Creatinine Clearance: 119.7 mL/min (based on SCr of 0.6 mg/dL).    Accu-Checks  Recent Labs     24  0833 24  1102 24  1715 24  2214 24  0248 24  1004 24  1228 24  1704 24  2149   POCTGLUCOSE 226* 289* 367* 172* 130* 86 92 90 96 138*       Current Medications and/or Treatments Impacting Glycemic Control  Immunotherapy:    Immunosuppressants       None          Steroids:   Hormones (From admission, onward)      None          Pressors:    Autonomic Drugs (From admission, onward)      None          Hyperglycemia/Diabetes Medications:   Antihyperglycemics (From admission, onward) "      Start     Stop Route Frequency Ordered    04/08/24 2100  insulin detemir U-100 (Levemir) pen 7 Units         -- SubQ Nightly 04/08/24 0625    04/08/24 0720  insulin aspart U-100 pen 0-5 Units         -- SubQ Before meals & nightly PRN 04/08/24 0621    04/07/24 1815  insulin aspart U-100 pen 3 Units         -- SubQ 3 times daily with meals 04/07/24 1800

## 2024-04-08 NOTE — SUBJECTIVE & OBJECTIVE
Interval History: NAEON. Denies fevers/chills overnight. Tolerating dinner with some nausea but no vomiting. Voiding spontaneously. Did not ambulate with PT yesterday 2/2 fatigue. Passing flatus and reports one episode of diarrhea overnight.    Past Medical History:   Diagnosis Date    Breast cancer 2013    Diabetes mellitus     Genetic testing 05/29/2013    VUS    Hyperlipidemia     Hypertension     Malignant neoplasm of upper-outer quadrant of right breast in female, estrogen receptor positive 12/02/2015    Seizure disorder      Past Surgical History:   Procedure Laterality Date    BILATERAL SALPINGO-OOPHORECTOMY (BSO) N/A 3/25/2024    Procedure: SALPINGO-OOPHORECTOMY, BILATERAL;  Surgeon: Александр Washington MD;  Location: [facility] OR [address] [place] [address];  Service: OB/GYN;  Laterality: N/A;    BREAST BIOPSY      BREAST LUMPECTOMY Right 2013    CHOLECYSTECTOMY  3/25/2024    Procedure: CHOLECYSTECTOMY;  Surgeon: Bo Farmer MD;  Location: [facility] OR [address] [place] [address];  Service: General;;    DEBULKING OF TUMOR N/A 3/25/2024    Procedure: DEBULKING, NEOPLASM;  Surgeon: Александр Washington MD;  Location: [facility] OR [address] [place] [address];  Service: OB/GYN;  Laterality: N/A;    EXCISION, LIVER N/A 3/25/2024    Procedure: EXCISION, LIVER - partial;  Surgeon: Bo Farmer MD;  Location: [facility] OR [address] [place] [address];  Service: General;  Laterality: N/A;  bk ultrasound  [place] book by TA on 3-21-24  7:00 a.m.  Conf #  [id number]    EYE SURGERY      LAPAROTOMY, EXPLORATORY N/A 3/25/2024    Procedure: LAPAROTOMY, EXPLORATORY;  Surgeon: Александр Washington MD;  Location: [facility] OR [address] [place] [address];  Service: OB/GYN;  Laterality: N/A;    OMENTECTOMY N/A 3/25/2024    Procedure: OMENTECTOMY;  Surgeon: Александр Washington MD;  Location: [facility] OR [address] [place] [address];  Service: OB/GYN;  Laterality: N/A;    PERITONEOCENTESIS N/A 3/13/2024    Procedure: PARACENTESIS, ABDOMINAL;  Surgeon: Flavia Moore MD;  Location: [facility] CATH LAB;  Service: Radiology;  Laterality: N/A;    PERITONEOCENTESIS N/A  3/21/2024    Procedure: PARACENTESIS, ABDOMINAL;  Surgeon: Jorge Jolley MD;  Location: [facility] CATH LAB;  Service: Radiology;  Laterality: N/A;    TOTAL ABDOMINAL HYSTERECTOMY N/A 3/25/2024    Procedure: HYSTERECTOMY, TOTAL, ABDOMINAL;  Surgeon: Александр Washington MD;  Location: [facility] OR [address] [place] [address];  Service: OB/GYN;  Laterality: N/A;    TOTAL REDUCTION MAMMOPLASTY Bilateral 2016     Family History       Problem Relation (Age of Onset)    Breast cancer Sister (48)    Seizures Father          Tobacco Use    Smoking status: Never    Smokeless tobacco: Never   Substance and Sexual Activity    Alcohol use: Yes     Alcohol/week: 0.0 standard drinks of alcohol     Comment: ocassional    Drug use: No    Sexual activity: Yes     Partners: Male     Birth control/protection: None       PTA Medications   Medication Sig    amlodipine (NORVASC) 10 MG tablet Take 10 mg by mouth once daily.     apixaban (ELIQUIS) 2.5 mg Tab Take 1 tablet (2.5 mg total) by mouth 2 (two) times daily.    atorvastatin (LIPITOR) 40 MG tablet Take 80 mg by mouth once daily.    enalapril (VASOTEC) 20 MG tablet Take 20 mg by mouth once daily.    fluticasone propionate (FLONASE) 50 mcg/actuation nasal spray 1 spray by Each Nostril route once daily.    hydrochlorothiazide (HYDRODIURIL) 25 MG tablet Take 25 mg by mouth once daily.     HYDROmorphone (DILAUDID) 2 MG tablet Take 1 tablet (2 mg total) by mouth every 8 (eight) hours as needed for Pain.    ibuprofen (ADVIL,MOTRIN) 600 MG tablet Take 1 tablet (600 mg total) by mouth every 6 (six) hours as needed for Pain.    ibuprofen (ADVIL,MOTRIN) 800 MG tablet Take 800 mg by mouth every 6 (six) hours as needed for Pain.    insulin detemir U-100, Levemir, (LEVEMIR FLEXPEN) 100 unit/mL (3 mL) InPn pen Inject 5 Units into the skin every evening.    insulin lispro (HUMALOG U-100 INSULIN) 100 unit/mL injection **LOW CORRECTION DOSE**  Blood Glucose  mg/dL                  Pre-meal                  151-200                 0 unit                        201-250                2 units                      251-300                3 units                      301-350                4 units                      >350                     5 units                      Administer subcutaneously if needed at times designated by monitoring schedule.    ketorolac 0.5% (ACULAR) 0.5 % Drop Place 1 drop into both eyes. For pain after eye injections.    lamoTRIgine (LAMICTAL) 25 MG tablet Take 25 mg by mouth 2 (two) times daily.    lorazepam (ATIVAN) 0.5 MG tablet Take 0.5 mg by mouth every 12 (twelve) hours as needed for Anxiety.    metformin (GLUCOPHAGE) 1000 MG tablet Take 1,000 mg by mouth daily with breakfast.     ondansetron (ZOFRAN-ODT) 4 MG TbDL Take 1 tablet (4 mg total) by mouth every 6 (six) hours as needed (for nausea).    quetiapine (SEROQUEL) 25 MG Tab Take 25 mg by mouth daily as needed.    tirzepatide (MOUNJARO) 5 mg/0.5 mL PnIj Inject 5 mg into the skin every 7 days. On Sunday.         Review of patient's allergies indicates:   Allergen Reactions    Codeine Other (See Comments)     Flu like s/s    Tramadol Other (See Comments)     Flu like symptoms similar to codeine reaction     Objective:     Vital Signs (Most Recent):  Temp: 98.1 °F (36.7 °C) (04/08/24 0453)  Pulse: 91 (04/08/24 0453)  Resp: 18 (04/08/24 0453)  BP: 127/74 (04/08/24 0453)  SpO2: 97 % (04/08/24 0453) Vital Signs (24h Range):  Temp:  [98.1 °F (36.7 °C)-99.2 °F (37.3 °C)] 98.1 °F (36.7 °C)  Pulse:  [] 91  Resp:  [17-20] 18  SpO2:  [95 %-100 %] 97 %  BP: (117-145)/(66-80) 127/74     Weight: 95.3 kg (210 lb)  Body mass index is 32.89 kg/m².       Physical Exam:   Constitutional: She is oriented to person, place, and time. She appears well-developed and well-nourished.    HENT:   Head: Normocephalic.      Cardiovascular:  Normal rate.             Pulmonary/Chest: Effort normal.        Abdominal: She exhibits distension and abdominal incision. There is  abdominal tenderness. There is no rebound and no guarding.   Abdomen soft and mildly distended. Superior and inferior aspects of MVI were opened 04/06 and dressed with aquacel dressing and covered in abd pad. Mildly tender to palpation.              Musculoskeletal: Normal range of motion.       Neurological: She is alert and oriented to person, place, and time.    Skin: Skin is warm and dry.    Psychiatric: She has a normal mood and affect.          Laboratory:  Lab Results   Component Value Date    WBC 18.52 (H) 04/07/2024    HGB 9.7 (L) 04/07/2024    HCT 31.3 (L) 04/07/2024    MCV 84 04/07/2024     (H) 04/07/2024       BMP  Lab Results   Component Value Date     (L) 04/07/2024    K 3.8 04/07/2024    CL 94 (L) 04/07/2024    CO2 29 04/07/2024    BUN 6 04/07/2024    CREATININE 0.6 04/07/2024    CALCIUM 7.8 (L) 04/07/2024    ANIONGAP 8 04/07/2024    EGFRNORACEVR >60.0 04/07/2024         Diagnostic Results:  CT Abdomen Pelvis With IV Contrast NO Oral Contrast  Order: [id number]  Status: Final result       Visible to patient: Yes (not seen)       Next appt: 04/08/2024 at 09:30 AM in Gynecologic Oncology (Александр Washington MD)    0 Result Notes  Details    Reading Physician Reading Date Result Priority   Emperatriz Ochoa MD  [phone] 4/5/2024 STAT     Narrative & Impression  EXAMINATION:  CT ABDOMEN PELVIS WITH IV CONTRAST     CLINICAL HISTORY:  Abdominal abscess/infection suspected;Abdominal pain, post-op;     TECHNIQUE:  Low dose axial images, sagittal and coronal reformations were obtained from the lung bases to the pubic symphysis following the IV administration of 100 mL of Omnipaque 350 .  minimal oral contrast noted in the stomach.  Axial and coronal images reformatted.     COMPARISON:  03/29/2024     FINDINGS:  Bilateral small pleural effusion and subsegmental atelectasis at the bilateral lung basis, unchanged.  Trace of pericardial effusion, unchanged.  Partially calcified right breast  mass 2.9 cm, unchanged.     The liver appears normal.  The biliary ducts are not dilated.  The gallbladder is removed.     The distal esophagus and stomach appear normal.     The pancreas, spleen and bilateral adrenal glands appear normal.     The bilateral kidneys enhance normally.  No hydronephrosis or kidney mass.     The abdominal aorta tapers normally, there is circumferential calcified atherosclerotic plaque.  No para-aortic lymphadenopathy.     Mild stool retention.  The small bowel is not dilated.  The appendix is not seen.  Fluid and air scattered throughout the abdomen similar compared to the prior exam.  Anterior midline incision containing minimal amount of air, no measurable fluid collection.  The inguinal regions appear normal.     The bladder is nondistended.  The uterus is removed.  There is an air fluid collection at the surgical site measuring 7.8 x 6.6 x 5.2 cm.  The ovaries are not definitely seen.     There is body wall anasarca.     The osseous structures demonstrate no osseous lesions.     Impression:     Air and fluid scattered within the abdomen and pelvis, it is unchanged from 03/29/2024.  There is a measurable air and fluid collection within the mid pelvis 7.8 x 6.6 cm x 5.2 cm.  Dehiscence of the surgical site cannot be excluded or other origin perforation.  An abscess could have this appearance.  The patient had a total abdominal hysterectomy and bilateral salpingo-oophorectomy, omentectomy, cholecystectomy.     Unchanged partially calcified right breast mass, is which correlates with mammogram 11/10/2023.        Electronically signed by: Emperatriz Ochoa MD  Date:                                            04/05/2024  Time:                                           12:56           Exam Ended: 04/05/24 12:23 CDT Last Resulted: 04/05/24 12:56 CDT               X-Ray Chest AP Portable  Order: [id number]  Status: Final result       Visible to patient: Yes (not seen)       Next appt:  04/08/2024 at 09:30 AM in Gynecologic Oncology (Александр Washington MD)    0 Result Notes  Details    Reading Physician Reading Date Result Priority   Abdullahi Arana MD  [phone] 4/5/2024 STAT     Narrative & Impression  EXAMINATION:  XR CHEST AP PORTABLE     CLINICAL HISTORY:  hyperglycemia;     TECHNIQUE:  Single frontal view of the chest was performed.     COMPARISON:  Chest radiograph performed 03/18/2024, 01:24 hours.     FINDINGS:  Grossly unchanged position of the left chest port.  Similar cardiomediastinal contours.  Atherosclerosis of the aorta.     Grossly unchanged cardiomediastinal contours, again noting enlargement the cardiac silhouette.     Layering at least small bilateral pleural effusions appear new when compared to 03/18/2024 radiograph.  Ill-defined opacities the lung bases may relate to atelectasis, noting that aspiration or pneumonia would be difficult to exclude.     No definite pneumothorax.     No acute findings in the visualized abdomen.     Osseous and soft tissue structures appear without definite acute change.  Operative sequela in the left chest wall again appreciated.     Impression:     Please see discussion above.        Electronically signed by: Abdullahi Arana  Date:                                            04/05/2024  Time:                                           07:29           Exam Ended: 04/05/24 07:02 CDT Last Resulted: 04/05/24 07:29 CDT

## 2024-04-08 NOTE — PLAN OF CARE
Problem: Physical Therapy  Goal: Physical Therapy Goal  Description: Goals to be met by: 24     Patient will increase functional independence with mobility by performin. Supine to sit with Stand-by Assistance  2. Sit to supine with Stand-by Assistance  3. Sit to stand transfer with Stand-by Assistance  4. Bed to chair transfer with Stand-by Assistance using Rolling Walker  5. Gait  x 100 feet with Stand-by Assistance using Rolling Walker.   6. Lower extremity exercise program x15 reps per handout, with assistance as needed    PT Evaluation completed 2024   PT goals and POC established.     Outcome: Ongoing, Progressing

## 2024-04-08 NOTE — PROGRESS NOTES
[facility] - Telemetry Stepdown  Gynecologic Oncology  Progress Note      Patient Name: Anette Ricci  MRN: [id number]  Admission Date: 2024  Hospital Length of Stay: 2 days  Attending Provider: Александр Washington MD  Primary Care Provider: Mark Chua MD  Principal Problem: Leukocytosis    Follow-up For: * No surgery found *  Post-Operative Day:    Subjective:      History of Present Illness:  Anette Ricci is a 59yo  who is POD#14 from an Ex-lap/CARLOS ALBERTO/BSO/Omx/Segment 6 liver resection/Cholecystectomy/Peritoneal and Diaphragm stripping for ovarian neoplasm who presents to the ED for hyperglycemia, fatigue, and generalized weakness. Her post-op course was complicated by ICU stay and failed void trial on POD#1. She was discharged on POD#5. During her hospitalization, blood sugars were closely monitored with assistance of endocrine team. She was discharged on the following insulin regimen: levemir 5 units nightly and sliding scale insulin for meals. Per history in ED, patient reports taking her nightly 5 units of levemir. Her blood sugar this morning was 400 so she took 4 units of leftover novolog and presented to the ED.      Patient has known abdominal ascites and bilateral leg swelling likely from malignancy. She was admitted inpatient - for these concerns. Full work-up including echo, EKG, CTA, BLE dopplers were negative. She had paracentesis with 3.6L removed. Fluid studies at that time showed no sign of SBP  .   Today she endorses worsening bilateral leg swelling left greater than right and SOB. Denies chest pain. Denies fever/cills. Denies nausea/vomiting. Denies abdominal pain. Reports ambulating at home. Reports mild dysuria. Denies any vaginal bleeding, discharge, or pain.     Hospital Course:  2024 HD#1: Admitted for leukocytosis, fluid collection seen on CT, and hyperglycemia. IV zosyn given. 1u pRBC given. Levemir 5 and sliding scale ordered.   2024 HD#2: Febrile to  100.4. Starting cipro/flagyl. Considering IR drainage. H/H zac appropriately. Continuing blood glucose management.  4/7/2024 HD#3: POD#0 from IR drainage and drain placement. Continuing IV cipro/flagyl. Continuing wound care and blood glucose management.   4/8/2024 HD#4: POD#1 from IR drainage and drain placement. Drain output 55/24hr. Continuing IV cipro/flagyl. Afebrile > 24 hrs. Continuing wound care and blood glucose management. Anticipate discharge home today.       Interval History: NAEON. Denies fevers/chills overnight. Tolerating dinner with some nausea but no vomiting. Voiding spontaneously. Did not ambulate with PT yesterday 2/2 fatigue. Passing flatus and reports one episode of diarrhea overnight.    Past Medical History:   Diagnosis Date    Breast cancer 2013    Diabetes mellitus     Genetic testing 05/29/2013    VUS    Hyperlipidemia     Hypertension     Malignant neoplasm of upper-outer quadrant of right breast in female, estrogen receptor positive 12/02/2015    Seizure disorder      Past Surgical History:   Procedure Laterality Date    BILATERAL SALPINGO-OOPHORECTOMY (BSO) N/A 3/25/2024    Procedure: SALPINGO-OOPHORECTOMY, BILATERAL;  Surgeon: Александр Washington MD;  Location: [facility] OR [address] [place] [address];  Service: OB/GYN;  Laterality: N/A;    BREAST BIOPSY      BREAST LUMPECTOMY Right 2013    CHOLECYSTECTOMY  3/25/2024    Procedure: CHOLECYSTECTOMY;  Surgeon: Bo Farmer MD;  Location: [address];  Service: General;;    DEBULKING OF TUMOR N/A 3/25/2024    Procedure: DEBULKING, NEOPLASM;  Surgeon: Александр Washington MD;  Location: [facility] OR [address] [place] [address];  Service: OB/GYN;  Laterality: N/A;    EXCISION, LIVER N/A 3/25/2024    Procedure: EXCISION, LIVER - partial;  Surgeon: Bo Farmer MD;  Location: [facility] OR [address] [place] [address];  Service: General;  Laterality: N/A;  bk ultrasound  [place] book by SAMINA on 3-21-24  7:00 a.m.  Conf #  [id number]    EYE SURGERY      LAPAROTOMY, EXPLORATORY N/A 3/25/2024     Procedure: LAPAROTOMY, EXPLORATORY;  Surgeon: Александр Washington MD;  Location: [facility] OR 2ND FLR;  Service: OB/GYN;  Laterality: N/A;    OMENTECTOMY N/A 3/25/2024    Procedure: OMENTECTOMY;  Surgeon: Александр Washington MD;  Location: [facility] OR 2ND FLR;  Service: OB/GYN;  Laterality: N/A;    PERITONEOCENTESIS N/A 3/13/2024    Procedure: PARACENTESIS, ABDOMINAL;  Surgeon: Flavia Moore MD;  Location: [facility] CATH LAB;  Service: Radiology;  Laterality: N/A;    PERITONEOCENTESIS N/A 3/21/2024    Procedure: PARACENTESIS, ABDOMINAL;  Surgeon: Jorge Jolley MD;  Location: [facility] CATH LAB;  Service: Radiology;  Laterality: N/A;    TOTAL ABDOMINAL HYSTERECTOMY N/A 3/25/2024    Procedure: HYSTERECTOMY, TOTAL, ABDOMINAL;  Surgeon: Александр Washington MD;  [facility];  Service: OB/GYN;  Laterality: N/A;    TOTAL REDUCTION MAMMOPLASTY Bilateral 2016     Family History       Problem Relation (Age of Onset)    Breast cancer Sister (48)    Seizures Father          Tobacco Use    Smoking status: Never    Smokeless tobacco: Never   Substance and Sexual Activity    Alcohol use: Yes     Alcohol/week: 0.0 standard drinks of alcohol     Comment: ocassional    Drug use: No    Sexual activity: Yes     Partners: Male     Birth control/protection: None       PTA Medications   Medication Sig    amlodipine (NORVASC) 10 MG tablet Take 10 mg by mouth once daily.     apixaban (ELIQUIS) 2.5 mg Tab Take 1 tablet (2.5 mg total) by mouth 2 (two) times daily.    atorvastatin (LIPITOR) 40 MG tablet Take 80 mg by mouth once daily.    enalapril (VASOTEC) 20 MG tablet Take 20 mg by mouth once daily.    fluticasone propionate (FLONASE) 50 mcg/actuation nasal spray 1 spray by Each Nostril route once daily.    hydrochlorothiazide (HYDRODIURIL) 25 MG tablet Take 25 mg by mouth once daily.     HYDROmorphone (DILAUDID) 2 MG tablet Take 1 tablet (2 mg total) by mouth every 8 (eight) hours as needed for Pain.    ibuprofen (ADVIL,MOTRIN)  600 MG tablet Take 1 tablet (600 mg total) by mouth every 6 (six) hours as needed for Pain.    ibuprofen (ADVIL,MOTRIN) 800 MG tablet Take 800 mg by mouth every 6 (six) hours as needed for Pain.    insulin detemir U-100, Levemir, (LEVEMIR FLEXPEN) 100 unit/mL (3 mL) InPn pen Inject 5 Units into the skin every evening.    insulin lispro (HUMALOG U-100 INSULIN) 100 unit/mL injection **LOW CORRECTION DOSE**  Blood Glucose  mg/dL                  Pre-meal                  151-200                0 unit                        201-250                2 units                      251-300                3 units                      301-350                4 units                      >350                     5 units                      Administer subcutaneously if needed at times designated by monitoring schedule.    ketorolac 0.5% (ACULAR) 0.5 % Drop Place 1 drop into both eyes. For pain after eye injections.    lamoTRIgine (LAMICTAL) 25 MG tablet Take 25 mg by mouth 2 (two) times daily.    lorazepam (ATIVAN) 0.5 MG tablet Take 0.5 mg by mouth every 12 (twelve) hours as needed for Anxiety.    metformin (GLUCOPHAGE) 1000 MG tablet Take 1,000 mg by mouth daily with breakfast.     ondansetron (ZOFRAN-ODT) 4 MG TbDL Take 1 tablet (4 mg total) by mouth every 6 (six) hours as needed (for nausea).    quetiapine (SEROQUEL) 25 MG Tab Take 25 mg by mouth daily as needed.    tirzepatide (MOUNJARO) 5 mg/0.5 mL PnIj Inject 5 mg into the skin every 7 days. On Sunday.         Review of patient's allergies indicates:   Allergen Reactions    Codeine Other (See Comments)     Flu like s/s    Tramadol Other (See Comments)     Flu like symptoms similar to codeine reaction     Objective:     Vital Signs (Most Recent):  Temp: 98.1 °F (36.7 °C) (04/08/24 0453)  Pulse: 91 (04/08/24 0453)  Resp: 18 (04/08/24 0453)  BP: 127/74 (04/08/24 0453)  SpO2: 97 % (04/08/24 0453) Vital Signs (24h Range):  Temp:  [98.1 °F (36.7 °C)-99.2 °F (37.3 °C)] 98.1 °F  (36.7 °C)  Pulse:  [] 91  Resp:  [17-20] 18  SpO2:  [95 %-100 %] 97 %  BP: (117-145)/(66-80) 127/74     Weight: 95.3 kg (210 lb)  Body mass index is 32.89 kg/m².      Physical Exam:   Constitutional: She is oriented to person, place, and time. She appears well-developed and well-nourished.    HENT:   Head: Normocephalic.      Cardiovascular:  Normal rate.             Pulmonary/Chest: Effort normal.        Abdominal: She exhibits distension and abdominal incision. There is abdominal tenderness. There is no rebound and no guarding.   Abdomen soft and mildly distended. Superior and inferior aspects of MVI were opened 04/06 and dressed with aquacel dressing and covered in abd pad. Mildly tender to palpation.              Musculoskeletal: Normal range of motion.       Neurological: She is alert and oriented to person, place, and time.    Skin: Skin is warm and dry.    Psychiatric: She has a normal mood and affect.          Laboratory:  Lab Results   Component Value Date    WBC 18.52 (H) 04/07/2024    HGB 9.7 (L) 04/07/2024    HCT 31.3 (L) 04/07/2024    MCV 84 04/07/2024     (H) 04/07/2024       BMP  Lab Results   Component Value Date     (L) 04/07/2024    K 3.8 04/07/2024    CL 94 (L) 04/07/2024    CO2 29 04/07/2024    BUN 6 04/07/2024    CREATININE 0.6 04/07/2024    CALCIUM 7.8 (L) 04/07/2024    ANIONGAP 8 04/07/2024    EGFRNORACEVR >60.0 04/07/2024         Diagnostic Results:  CT Abdomen Pelvis With IV Contrast NO Oral Contrast  Order: [id number]  Status: Final result       Visible to patient: Yes (not seen)       Next appt: 04/08/2024 at 09:30 AM in Gynecologic Oncology (Александр Washington MD)    0 Result Notes  Details    Reading Physician Reading Date Result Priority   Emperatriz Ochoa MD  [phone] 4/5/2024 STAT     Narrative & Impression  EXAMINATION:  CT ABDOMEN PELVIS WITH IV CONTRAST     CLINICAL HISTORY:  Abdominal abscess/infection suspected;Abdominal pain, post-op;      TECHNIQUE:  Low dose axial images, sagittal and coronal reformations were obtained from the lung bases to the pubic symphysis following the IV administration of 100 mL of Omnipaque 350 .  minimal oral contrast noted in the stomach.  Axial and coronal images reformatted.     COMPARISON:  03/29/2024     FINDINGS:  Bilateral small pleural effusion and subsegmental atelectasis at the bilateral lung basis, unchanged.  Trace of pericardial effusion, unchanged.  Partially calcified right breast mass 2.9 cm, unchanged.     The liver appears normal.  The biliary ducts are not dilated.  The gallbladder is removed.     The distal esophagus and stomach appear normal.     The pancreas, spleen and bilateral adrenal glands appear normal.     The bilateral kidneys enhance normally.  No hydronephrosis or kidney mass.     The abdominal aorta tapers normally, there is circumferential calcified atherosclerotic plaque.  No para-aortic lymphadenopathy.     Mild stool retention.  The small bowel is not dilated.  The appendix is not seen.  Fluid and air scattered throughout the abdomen similar compared to the prior exam.  Anterior midline incision containing minimal amount of air, no measurable fluid collection.  The inguinal regions appear normal.     The bladder is nondistended.  The uterus is removed.  There is an air fluid collection at the surgical site measuring 7.8 x 6.6 x 5.2 cm.  The ovaries are not definitely seen.     There is body wall anasarca.     The osseous structures demonstrate no osseous lesions.     Impression:     Air and fluid scattered within the abdomen and pelvis, it is unchanged from 03/29/2024.  There is a measurable air and fluid collection within the mid pelvis 7.8 x 6.6 cm x 5.2 cm.  Dehiscence of the surgical site cannot be excluded or other origin perforation.  An abscess could have this appearance.  The patient had a total abdominal hysterectomy and bilateral salpingo-oophorectomy, omentectomy,  cholecystectomy.     Unchanged partially calcified right breast mass, is which correlates with mammogram 11/10/2023.        Electronically signed by: Emperatriz Ochoa MD  Date:                                            04/05/2024  Time:                                           12:56           Exam Ended: 04/05/24 12:23 CDT Last Resulted: 04/05/24 12:56 CDT               X-Ray Chest AP Portable  Order: [id number]  Status: Final result       Visible to patient: Yes (not seen)       Next appt: 04/08/2024 at 09:30 AM in Gynecologic Oncology (Александр Washington MD)    0 Result Notes  Details    Reading Physician Reading Date Result Priority   Abdullahi Arana MD  [phone] 4/5/2024 STAT     Narrative & Impression  EXAMINATION:  XR CHEST AP PORTABLE     CLINICAL HISTORY:  hyperglycemia;     TECHNIQUE:  Single frontal view of the chest was performed.     COMPARISON:  Chest radiograph performed 03/18/2024, 01:24 hours.     FINDINGS:  Grossly unchanged position of the left chest port.  Similar cardiomediastinal contours.  Atherosclerosis of the aorta.     Grossly unchanged cardiomediastinal contours, again noting enlargement the cardiac silhouette.     Layering at least small bilateral pleural effusions appear new when compared to 03/18/2024 radiograph.  Ill-defined opacities the lung bases may relate to atelectasis, noting that aspiration or pneumonia would be difficult to exclude.     No definite pneumothorax.     No acute findings in the visualized abdomen.     Osseous and soft tissue structures appear without definite acute change.  Operative sequela in the left chest wall again appreciated.     Impression:     Please see discussion above.        Electronically signed by: Abdullahi Arana  Date:                                            04/05/2024  Time:                                           07:29           Exam Ended: 04/05/24 07:02 CDT Last Resulted: 04/05/24 07:29 CDT           Assessment/Plan:     *  Pelvic abscess  - Leukocytosis on admit.   - No signs/symptoms of sepsis on admit, however on HD#1, patient febrile to 100.4  - Abdominal exam in ED overall benign. On exam, superficial wound dehiscence noted. On probing, no signs of facial dehiscence noted.   - CTAP consistent with measurable air and fluid collection within the mid pelvis 7.8 x 6.6 x 5.2 cm at the surgical site, could be abscess.   - UA consistent with UTI.   - LA: 2.2   - Ucx positive for MDR e. Coli   - Bcx NGTD   - Covid/Flu negative     Plan:   - POD #1 from IR drainage and drain placement. 75cc purulent fluid evacuated. Gram stain + for gram negative rods. Cultures pending.   - Continue IV cipro/flagyl. Abscess cultures are still pending. Recent Ucx showed ESBL e. Coli resistant to Augmentin. Thus, will plan to transition to PO cipro/flagyl.  - Drain output 55cc/24hr. Continue to monitor drain output.     Superficial dehiscence of wound  - Pictures in media.    - Exam consistent with superficial wound dehiscence at the superior and inferior portion of midline vertical incision, exudate noted, no evidence of serosanguinous fluid. Probed with no evidence of fascial dehiscence.    - Wound care consulted. Recommend daily aquacel dressing changes. Appreciate assistance.     Hypokalemia  - K 2.9 >> 3.8   - EKG wnl  - Now resolved    UTI (urinary tract infection)  - UA: Positive Nitrite, 3+ Leuks   - Received rocephin 1g in ED, however previous Ucx resistent to rocephin and zosyn.  - Continuing IV cipro/flagyl    Hyperglycemia  - Glucose >250 on admit; Reports not taking meds following discharge due to confusion about medications   - BG AC/qHS   - Patient previously on Levemir 5u qD, SSI; Restarted   - Current regimen: 7u levemir, 3 aspart TIDWM.   - endocrinology following, appreciate assistance.     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 Continue prophylactic lovenox  - Will hold prophylactic eliquis while inpatient     Malignant ascites  - CTAP with ascites visualized.   - CXR: consistent with small bilateral pleural effusions   - Symptomatically stable    History of breast cancer in female  - No home meds     Hyperlipidemia  - Continue home statin     Seizure disorder  - Continue home lamictal     Hypertension  - Hold home meds (amlodipine, enalapril, HCTZ)  - Hydral 10 PRN for SBP > 180       VTE Risk Mitigation (From admission, onward)           Ordered     enoxaparin injection 40 mg  Every 24 hours         04/07/24 1209     IP VTE HIGH RISK PATIENT  Once         04/05/24 1643     Place sequential compression device  Until discontinued         04/05/24 1641                    Was junior catheter removed? Yes    Mel Boateng MD  Gynecologic Oncology  [facility] - Telemetry Stepdown

## 2024-04-08 NOTE — ASSESSMENT & PLAN NOTE
- Leukocytosis on admit.   - No signs/symptoms of sepsis on admit, however on HD#1, patient febrile to 100.4  - Abdominal exam in ED overall benign. On exam, superficial wound dehiscence noted. On probing, no signs of facial dehiscence noted.   - CTAP consistent with measurable air and fluid collection within the mid pelvis 7.8 x 6.6 x 5.2 cm at the surgical site, could be abscess.   - UA consistent with UTI.   - LA: 2.2   - Ucx positive for MDR e. Coli   - Bcx NGTD   - Covid/Flu negative     Plan:   - POD #1 from IR drainage and drain placement. 75cc purulent fluid evacuated. Gram stain + for gram negative rods. Cultures pending.   - Continue IV cipro/flagyl. Plan to transition to PO augmentin on discharge.   - Drain output 55cc/24hr. Continue to monitor drain output.

## 2024-04-09 ENCOUNTER — PATIENT MESSAGE (OUTPATIENT)
Dept: ENDOCRINOLOGY | Facility: HOSPITAL | Age: 59
End: 2024-04-09
Payer: MEDICARE

## 2024-04-09 LAB — BACTERIA SPEC AEROBE CULT: ABNORMAL

## 2024-04-10 LAB
BACTERIA BLD CULT: NORMAL
BACTERIA BLD CULT: NORMAL

## 2024-04-11 ENCOUNTER — LAB VISIT (OUTPATIENT)
Dept: LAB | Facility: HOSPITAL | Age: 59
End: 2024-04-11
Attending: STUDENT IN AN ORGANIZED HEALTH CARE EDUCATION/TRAINING PROGRAM
Payer: MEDICARE

## 2024-04-11 ENCOUNTER — TELEPHONE (OUTPATIENT)
Dept: GYNECOLOGIC ONCOLOGY | Facility: CLINIC | Age: 59
End: 2024-04-11
Payer: MEDICARE

## 2024-04-11 ENCOUNTER — OFFICE VISIT (OUTPATIENT)
Dept: GYNECOLOGIC ONCOLOGY | Facility: CLINIC | Age: 59
End: 2024-04-11
Payer: MEDICARE

## 2024-04-11 ENCOUNTER — PATIENT MESSAGE (OUTPATIENT)
Dept: PALLIATIVE MEDICINE | Facility: OTHER | Age: 59
End: 2024-04-11
Payer: MEDICARE

## 2024-04-11 VITALS
HEIGHT: 65 IN | SYSTOLIC BLOOD PRESSURE: 130 MMHG | WEIGHT: 204.81 LBS | HEART RATE: 100 BPM | BODY MASS INDEX: 34.12 KG/M2 | DIASTOLIC BLOOD PRESSURE: 60 MMHG

## 2024-04-11 DIAGNOSIS — C56.9 OVARIAN CARCINOSARCOMA: Primary | ICD-10-CM

## 2024-04-11 DIAGNOSIS — C56.9 OVARIAN CARCINOSARCOMA: ICD-10-CM

## 2024-04-11 DIAGNOSIS — C78.6 METASTASIS TO PERITONEAL CAVITY: ICD-10-CM

## 2024-04-11 DIAGNOSIS — C80.1 CARCINOSARCOMA: Primary | ICD-10-CM

## 2024-04-11 DIAGNOSIS — C80.1 CARCINOSARCOMA: ICD-10-CM

## 2024-04-11 LAB
ALBUMIN SERPL BCP-MCNC: 1.4 G/DL (ref 3.5–5.2)
ALP SERPL-CCNC: 150 U/L (ref 55–135)
ALT SERPL W/O P-5'-P-CCNC: 12 U/L (ref 10–44)
ANION GAP SERPL CALC-SCNC: 9 MMOL/L (ref 8–16)
AST SERPL-CCNC: 24 U/L (ref 10–40)
BACTERIA SPEC ANAEROBE CULT: NORMAL
BILIRUB SERPL-MCNC: 0.2 MG/DL (ref 0.1–1)
BUN SERPL-MCNC: 4 MG/DL (ref 6–20)
CALCIUM SERPL-MCNC: 7.7 MG/DL (ref 8.7–10.5)
CANCER AG125 SERPL-ACNC: 248 U/ML (ref 0–30)
CHLORIDE SERPL-SCNC: 98 MMOL/L (ref 95–110)
CO2 SERPL-SCNC: 26 MMOL/L (ref 23–29)
CREAT SERPL-MCNC: 0.6 MG/DL (ref 0.5–1.4)
ERYTHROCYTE [DISTWIDTH] IN BLOOD BY AUTOMATED COUNT: 19.9 % (ref 11.5–14.5)
EST. GFR  (NO RACE VARIABLE): >60 ML/MIN/1.73 M^2
GLUCOSE SERPL-MCNC: 237 MG/DL (ref 70–110)
HCT VFR BLD AUTO: 29.7 % (ref 37–48.5)
HGB BLD-MCNC: 9.1 G/DL (ref 12–16)
IMM GRANULOCYTES # BLD AUTO: 0.16 K/UL (ref 0–0.04)
MCH RBC QN AUTO: 25.3 PG (ref 27–31)
MCHC RBC AUTO-ENTMCNC: 30.6 G/DL (ref 32–36)
MCV RBC AUTO: 83 FL (ref 82–98)
NEUTROPHILS # BLD AUTO: 10.6 K/UL (ref 1.8–7.7)
PLATELET # BLD AUTO: 713 K/UL (ref 150–450)
PMV BLD AUTO: 8.6 FL (ref 9.2–12.9)
POTASSIUM SERPL-SCNC: 3.9 MMOL/L (ref 3.5–5.1)
PROT SERPL-MCNC: 5.9 G/DL (ref 6–8.4)
RBC # BLD AUTO: 3.59 M/UL (ref 4–5.4)
SODIUM SERPL-SCNC: 133 MMOL/L (ref 136–145)
WBC # BLD AUTO: 13.18 K/UL (ref 3.9–12.7)

## 2024-04-11 PROCEDURE — 3075F SYST BP GE 130 - 139MM HG: CPT | Mod: CPTII,S$GLB,, | Performed by: STUDENT IN AN ORGANIZED HEALTH CARE EDUCATION/TRAINING PROGRAM

## 2024-04-11 PROCEDURE — 36415 COLL VENOUS BLD VENIPUNCTURE: CPT | Performed by: STUDENT IN AN ORGANIZED HEALTH CARE EDUCATION/TRAINING PROGRAM

## 2024-04-11 PROCEDURE — G2211 COMPLEX E/M VISIT ADD ON: HCPCS | Mod: S$GLB,,, | Performed by: STUDENT IN AN ORGANIZED HEALTH CARE EDUCATION/TRAINING PROGRAM

## 2024-04-11 PROCEDURE — 1111F DSCHRG MED/CURRENT MED MERGE: CPT | Mod: CPTII,S$GLB,, | Performed by: STUDENT IN AN ORGANIZED HEALTH CARE EDUCATION/TRAINING PROGRAM

## 2024-04-11 PROCEDURE — 1159F MED LIST DOCD IN RCRD: CPT | Mod: CPTII,S$GLB,, | Performed by: STUDENT IN AN ORGANIZED HEALTH CARE EDUCATION/TRAINING PROGRAM

## 2024-04-11 PROCEDURE — 85027 COMPLETE CBC AUTOMATED: CPT | Performed by: STUDENT IN AN ORGANIZED HEALTH CARE EDUCATION/TRAINING PROGRAM

## 2024-04-11 PROCEDURE — 3051F HG A1C>EQUAL 7.0%<8.0%: CPT | Mod: CPTII,S$GLB,, | Performed by: STUDENT IN AN ORGANIZED HEALTH CARE EDUCATION/TRAINING PROGRAM

## 2024-04-11 PROCEDURE — 3078F DIAST BP <80 MM HG: CPT | Mod: CPTII,S$GLB,, | Performed by: STUDENT IN AN ORGANIZED HEALTH CARE EDUCATION/TRAINING PROGRAM

## 2024-04-11 PROCEDURE — 80053 COMPREHEN METABOLIC PANEL: CPT | Performed by: STUDENT IN AN ORGANIZED HEALTH CARE EDUCATION/TRAINING PROGRAM

## 2024-04-11 PROCEDURE — 99999 PR PBB SHADOW E&M-EST. PATIENT-LVL III: CPT | Mod: PBBFAC,,, | Performed by: STUDENT IN AN ORGANIZED HEALTH CARE EDUCATION/TRAINING PROGRAM

## 2024-04-11 PROCEDURE — 86304 IMMUNOASSAY TUMOR CA 125: CPT | Performed by: STUDENT IN AN ORGANIZED HEALTH CARE EDUCATION/TRAINING PROGRAM

## 2024-04-11 PROCEDURE — 3008F BODY MASS INDEX DOCD: CPT | Mod: CPTII,S$GLB,, | Performed by: STUDENT IN AN ORGANIZED HEALTH CARE EDUCATION/TRAINING PROGRAM

## 2024-04-11 PROCEDURE — 99215 OFFICE O/P EST HI 40 MIN: CPT | Mod: 24,S$GLB,, | Performed by: STUDENT IN AN ORGANIZED HEALTH CARE EDUCATION/TRAINING PROGRAM

## 2024-04-11 RX ORDER — SODIUM CHLORIDE 0.9 % (FLUSH) 0.9 %
10 SYRINGE (ML) INJECTION
Status: CANCELLED | OUTPATIENT
Start: 2024-04-19

## 2024-04-11 RX ORDER — HEPARIN 100 UNIT/ML
500 SYRINGE INTRAVENOUS
Status: CANCELLED | OUTPATIENT
Start: 2024-04-19

## 2024-04-11 RX ORDER — EPINEPHRINE 0.3 MG/.3ML
0.3 INJECTION SUBCUTANEOUS ONCE AS NEEDED
Status: CANCELLED | OUTPATIENT
Start: 2024-04-19

## 2024-04-11 RX ORDER — PROCHLORPERAZINE EDISYLATE 5 MG/ML
10 INJECTION INTRAMUSCULAR; INTRAVENOUS ONCE AS NEEDED
Status: CANCELLED | OUTPATIENT
Start: 2024-04-19

## 2024-04-11 RX ORDER — DEXAMETHASONE 4 MG/1
8 TABLET ORAL DAILY
Qty: 6 TABLET | Refills: 5 | Status: SHIPPED | OUTPATIENT
Start: 2024-04-23

## 2024-04-11 RX ORDER — DIPHENHYDRAMINE HYDROCHLORIDE 50 MG/ML
50 INJECTION INTRAMUSCULAR; INTRAVENOUS ONCE AS NEEDED
Status: CANCELLED | OUTPATIENT
Start: 2024-04-19

## 2024-04-11 RX ORDER — FAMOTIDINE 10 MG/ML
20 INJECTION INTRAVENOUS
Status: CANCELLED | OUTPATIENT
Start: 2024-04-19

## 2024-04-11 RX ORDER — OLANZAPINE 5 MG/1
5 TABLET ORAL NIGHTLY
Qty: 4 TABLET | Refills: 5 | Status: SHIPPED | OUTPATIENT
Start: 2024-04-22

## 2024-04-11 RX ORDER — PROCHLORPERAZINE MALEATE 10 MG
10 TABLET ORAL EVERY 6 HOURS PRN
Qty: 20 TABLET | Refills: 5 | Status: SHIPPED | OUTPATIENT
Start: 2024-04-22

## 2024-04-11 NOTE — PROGRESS NOTES
Referring Provider:  Mark Chua MD  [facility]  [place] [address]   Subjective:      Patient ID: Anette Ricci is a 58 y.o. female.    Chief Complaint: No chief complaint on file.    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 (<1cm residual disease: sigmoid, transverse colon, diaphragm, kelvin hepatis)    Adjuvant therapy: Carboplatin AUC 6, paclitaxel 175 mg/m2, bevacizumab 15 mg/kg  C1D1: (holding yaneli) planned for 4/19/24, CA-125 248         Relevant Medications    OLANZapine (ZYPREXA) 5 MG tablet (Start on 4/22/2024)    dexAMETHasone (DECADRON) 4 MG Tab (Start on 4/23/2024)    prochlorperazine (COMPAZINE) 10 MG tablet (Start on 4/22/2024)      HPI Reports her appetite and strength are improving. She is trying to walk more at home. Home health has not been set up for PT, wound care, and drain care. Denies fevers.     Review of Systems   Constitutional:  Negative for chills, fatigue and fever.   Respiratory:  Negative for cough and shortness of breath.    Cardiovascular:  Negative for chest pain.   Gastrointestinal:  Positive for abdominal distention. Negative for abdominal pain, constipation and diarrhea.   Genitourinary:  Negative for dysuria, pelvic pain and vaginal bleeding.   Musculoskeletal:  Negative for back pain.   Psychiatric/Behavioral:  Negative for dysphoric mood. The patient is not nervous/anxious.      Past Medical History:   Diagnosis Date    Breast cancer 2013    Diabetes mellitus     Genetic  testing 05/29/2013    VUS    Hyperlipidemia     Hypertension     Malignant neoplasm of upper-outer quadrant of right breast in female, estrogen receptor positive 12/02/2015    Seizure disorder       Past Surgical History:   Procedure Laterality Date    BILATERAL SALPINGO-OOPHORECTOMY (BSO) N/A 3/25/2024    Procedure: SALPINGO-OOPHORECTOMY, BILATERAL;  Surgeon: Александр Washington MD;  [facility];  Service: OB/GYN;  Laterality: N/A;    BREAST BIOPSY      BREAST LUMPECTOMY Right 2013    CHOLECYSTECTOMY  3/25/2024    Procedure: CHOLECYSTECTOMY;  Surgeon: Bo Farmer MD;  Location: [facility] OR [address] [place] [address];  Service: General;;    DEBULKING OF TUMOR N/A 3/25/2024    Procedure: DEBULKING, NEOPLASM;  Surgeon: Александр Washington MD;  Location: [facility] OR [address] [place] [address];  Service: OB/GYN;  Laterality: N/A;    EXCISION, LIVER N/A 3/25/2024    Procedure: EXCISION, LIVER - partial;  Surgeon: Bo Farmer MD;  Location: [facility] OR [address] [place] [address];  Service: General;  Laterality: N/A;  bk ultrasound  [place] book by TA on 3-21-24  7:00 a.m.  Conf #  [id number]    EYE SURGERY      LAPAROTOMY, EXPLORATORY N/A 3/25/2024    Procedure: LAPAROTOMY, EXPLORATORY;  Surgeon: Александр Washington MD;  Location: [facility] OR [address] [place] [address];  Service: OB/GYN;  Laterality: N/A;    OMENTECTOMY N/A 3/25/2024    Procedure: OMENTECTOMY;  Surgeon: Александр Washington MD;  [facility];  Service: OB/GYN;  Laterality: N/A;    PERITONEOCENTESIS N/A 3/13/2024    Procedure: PARACENTESIS, ABDOMINAL;  Surgeon: Flavia Moore MD;  Location: [facility] CATH LAB;  Service: Radiology;  Laterality: N/A;    PERITONEOCENTESIS N/A 3/21/2024    Procedure: PARACENTESIS, ABDOMINAL;  Surgeon: Jorge Jolley MD;  Location: [facility] CATH LAB;  Service: Radiology;  Laterality: N/A;    TOTAL ABDOMINAL HYSTERECTOMY N/A 3/25/2024    Procedure: HYSTERECTOMY, TOTAL, ABDOMINAL;  Surgeon: Александр Washington MD;  Location: [facility] OR [address] [place] [address];  Service: OB/GYN;  Laterality:  N/A;    TOTAL REDUCTION MAMMOPLASTY Bilateral 2016      Family History   Problem Relation Age of Onset    Hypertension Mother     Seizures Father     Breast cancer Sister 48    Colon cancer Sister     Hypertension Brother     Hypertension Brother     Cancer Neg Hx     Ovarian cancer Neg Hx       Social History     Socioeconomic History    Marital status:         Objective:      Vitals:    04/11/24 1034   BP: 130/60   Pulse: 100      Physical Exam  Constitutional:       General: She is not in acute distress.  HENT:      Head: Normocephalic.   Eyes:      Extraocular Movements: Extraocular movements intact.      Conjunctiva/sclera: Conjunctivae normal.   Cardiovascular:      Rate and Rhythm: Normal rate.      Pulses: Normal pulses.   Pulmonary:      Effort: Pulmonary effort is normal. No respiratory distress.      Breath sounds: No wheezing.   Abdominal:      General: There is distension.      Tenderness: There is no abdominal tenderness. There is no guarding or rebound.      Comments: Incision with healthy appearing granulation tissue.    Musculoskeletal:         General: No deformity.   Neurological:      Mental Status: She is alert and oriented to person, place, and time.   Psychiatric:         Mood and Affect: Mood normal.         Behavior: Behavior normal.         Thought Content: Thought content normal.         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 nightly on days 1-4 of each chemotherapy cycle.  Dispense: 4 tablet; Refill: 5  -     dexAMETHasone (DECADRON) 4 MG Tab; Take 2 tablets (8 mg total) by mouth once daily. daily on days 2-4 of your chemotherapy cycle.  Dispense: 6 tablet; Refill: 5  -     prochlorperazine  (COMPAZINE) 10 MG tablet; Take 1 tablet (10 mg total) by mouth every 6 (six) hours as needed (Nausea).  Dispense: 20 tablet; Refill: 5    Carcinosarcoma  -     OLANZapine (ZYPREXA) 5 MG tablet; Take 1 tablet (5 mg total) by mouth every evening. nightly on days 1-4 of each chemotherapy cycle.  Dispense: 4 tablet; Refill: 5  -     dexAMETHasone (DECADRON) 4 MG Tab; Take 2 tablets (8 mg total) by mouth once daily. daily on days 2-4 of your chemotherapy cycle.  Dispense: 6 tablet; Refill: 5  -     prochlorperazine (COMPAZINE) 10 MG tablet; Take 1 tablet (10 mg total) by mouth every 6 (six) hours as needed (Nausea).  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 nightly on days 1-4 of each chemotherapy cycle.  Dispense: 4 tablet; Refill: 5  -     dexAMETHasone (DECADRON) 4 MG Tab; Take 2 tablets (8 mg total) by mouth once daily. daily on days 2-4 of your chemotherapy cycle.  Dispense: 6 tablet; Refill: 5  -     prochlorperazine (COMPAZINE) 10 MG tablet; Take 1 tablet (10 mg total) by mouth every 6 (six) hours as needed (Nausea).  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 Path IVB ovarian carcinosarcoma. HER2 1+. pMMR, TP53 mutated. We reviewed her pathologic findings in detail and discussed the rationale for adjuvant chemotherapy. We reviewed the high risk of relapse of ovarian cancer. I recommend adjuvant therapy with carboplatin AUC 6, paclitaxel 175 mg/m2 and bevacizumab 15 mg/kg every 3 weeks. Will plan to omit bevacizumab with C1 to allow for additional post operative healing. We reviewed the potential side effects of chemotherapy including hematologic effects, alopecia, neuropathy,  hypersensitivity reactions, arthralgias, nausea, and fatigue. We reviewed the risks of bevacizumab including HTN, rhinorrhea, proteinuria, and rarely fistula formation and GI perforation. Following this discussion patient is agreeable to the plan.  C1D1 planned for 4/19  [facility] today  Caris testing pending  Chemo class with Lashawn  Refer to palliative care    Though this visit took place within the global post op period, counseling today covered issues not related to the surgery, specifically the prognosis of her pathology and the pros and cons of adjuvant treatment including chemotherapy, radiation, or additional surgery.    Visit today is associated with current or anticipated ongoing medical care related to this patient's single serious condition/complex condition: ovarian carcinosarcoma.          Александр Washington MD

## 2024-04-16 ENCOUNTER — TELEPHONE (OUTPATIENT)
Dept: GYNECOLOGIC ONCOLOGY | Facility: CLINIC | Age: 59
End: 2024-04-16
Payer: MEDICARE

## 2024-04-16 ENCOUNTER — OFFICE VISIT (OUTPATIENT)
Dept: PALLIATIVE MEDICINE | Facility: CLINIC | Age: 59
End: 2024-04-16
Payer: MEDICARE

## 2024-04-16 VITALS
BODY MASS INDEX: 35.59 KG/M2 | WEIGHT: 213.88 LBS | DIASTOLIC BLOOD PRESSURE: 67 MMHG | SYSTOLIC BLOOD PRESSURE: 156 MMHG | HEART RATE: 104 BPM

## 2024-04-16 DIAGNOSIS — Z71.89 ADVANCED CARE PLANNING/COUNSELING DISCUSSION: Primary | ICD-10-CM

## 2024-04-16 DIAGNOSIS — C56.9 OVARIAN CARCINOSARCOMA: Primary | ICD-10-CM

## 2024-04-16 DIAGNOSIS — G89.3 CANCER RELATED PAIN: ICD-10-CM

## 2024-04-16 DIAGNOSIS — F43.20 ADJUSTMENT DISORDER, UNSPECIFIED TYPE: ICD-10-CM

## 2024-04-16 DIAGNOSIS — R53.0 NEOPLASTIC (MALIGNANT) RELATED FATIGUE: ICD-10-CM

## 2024-04-16 DIAGNOSIS — R18.0 MALIGNANT ASCITES: ICD-10-CM

## 2024-04-16 DIAGNOSIS — C80.1 CARCINOSARCOMA: ICD-10-CM

## 2024-04-16 DIAGNOSIS — R19.4 ALTERED BOWEL HABITS: ICD-10-CM

## 2024-04-16 PROCEDURE — 3078F DIAST BP <80 MM HG: CPT | Mod: CPTII,S$GLB,, | Performed by: FAMILY MEDICINE

## 2024-04-16 PROCEDURE — 3077F SYST BP >= 140 MM HG: CPT | Mod: CPTII,S$GLB,, | Performed by: FAMILY MEDICINE

## 2024-04-16 PROCEDURE — 99215 OFFICE O/P EST HI 40 MIN: CPT | Mod: S$GLB,,, | Performed by: FAMILY MEDICINE

## 2024-04-16 PROCEDURE — 3051F HG A1C>EQUAL 7.0%<8.0%: CPT | Mod: CPTII,S$GLB,, | Performed by: FAMILY MEDICINE

## 2024-04-16 PROCEDURE — 3008F BODY MASS INDEX DOCD: CPT | Mod: CPTII,S$GLB,, | Performed by: FAMILY MEDICINE

## 2024-04-16 PROCEDURE — 1111F DSCHRG MED/CURRENT MED MERGE: CPT | Mod: CPTII,S$GLB,, | Performed by: FAMILY MEDICINE

## 2024-04-16 PROCEDURE — 99497 ADVNCD CARE PLAN 30 MIN: CPT | Mod: S$GLB,,, | Performed by: FAMILY MEDICINE

## 2024-04-16 PROCEDURE — 99999 PR PBB SHADOW E&M-EST. PATIENT-LVL I: CPT | Mod: PBBFAC,,, | Performed by: FAMILY MEDICINE

## 2024-04-16 NOTE — PROGRESS NOTES
"Consult Note  Palliative Care      Consult Requested By: Dr. Александр Washington  Reason for Consult: Goals of care/ symptom management       ASSESSMENT/PLAN:     Plan/Recommendations:  Diagnoses and all orders for this visit:    Advanced care planning/counseling discussion  - Patient is decisional and accompanied by her , Darrell, on today's visit.  - Seen alongside Felicita Walsh RN  - ACP documents not uploaded into EMR  - Goals: continuing disease directed treatment, spending time with family, remaining independent.   - Philosophy of palliative medicine reviewed with patient at first visit  - New patient folder and ACP booklet given to and reviewed with patient at first visit  - Discussed HCPOA. Patient would desire her , Vineet, to be HCPOA. Vineet is her legally  spouse and surrogate decision maker would default to him.   - Code status not specifically discussed today   - Discussed with patient about what is important to her at first visit. Patient spoke about spending time with her family, continuing to be independent, and continue disease directed treatment.   - Mrs. Ricci has been  to her spouse for 38 years. They stated the secret to a successful marriage is "give and take". They enjoy going to dinner and spending time with family. Mrs. Ricci is number 8 out of ten children and Vineet is number 11 of 11 children. They had one son, Vineet, who is . Mrs. Ricci spoke about the loss of her father who suffered a stroke in , 10 months later her mother  of a massive heart attack, and 4 months later her son was killed. Mrs. Ricci shared the schools her son went to and how he was working at a [employer] shop and robbed and killed by someone who was aware he recently was paid. Mrs. Ricci stated "I wonder why me". Emotional support provided  - She identifies as Congregational and they are active in their Oriental orthodox.   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  - She also helps care for her brother with early onset dementia  - She spoke about her cancer diagnosis and how she has struggled with constipation for years and she presented to the ED for what she thought was constipation, but she was diagnosed with cancer.     Cancer related pain  - Opioid safety sheet in new patient folder  - Patient reports incisional pain is improving. She reports a constant, aching pain, which is improving. She also reports skin is tender to touch. She is taking tylenol and has hydrocodone liquid, which she may take 1 x day. She reports she has taken oxy in the past and it made her feel "flu like".     Adjustment disorder, unspecified type  - Patient spoke about her previous diagnosis of breast cancer and new diagnosis of ovarian cancer. She was appropriately tearful during today's visit.  - She understandably feels overwhelmed and nervous about what to expect from chemo. She has not taken the chemo class. I will reach out to Dr. Washington's office and ask them to call Mrs. Ricci and discuss what she can expect when beginning chemo/ how to take medications.     Neoplastic (malignant) related fatigue  - Discussed with patient about being as active as possible and maximizing pain relief with functionality   - Sleep training, such as a set schedule and how to adjust schedule for early sleep time and tips for better sleep in new patient folder for patient review    Altered bowel habits  - Continue adequate hydration for good bowel movements  - Constipation tip sheet in new patient folder for patient to review   - She reports constipation has been manageable since surgery.     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 Washington, last seen on 4/11/24  - Patient recently diagnosed in March 2024, she underwent a primary debulking surgery on 3/25/24 with pathology revealing stage IVB ovarian Carcinosarcoma. Patient is starting Carboplatin/ Paclitaxel on " "4/19/24. Plan is to add on Bevacizumab once she has wound healing.   - Mrs. Ricci is very insightful regarding seriousness of disease. Despite the several losses of family members, she stated she has told her son, Vineet, "I am not ready to come yet".   - She also reports associated lower extremity swelling and warmth to legs. We discussed low albumin and nutrition. Mrs. Ricci reports her appetite is improving, she is drinking 1 Shade a day. I discussed increasing this to two.   - Mrs. Ricci reports trouble with home health agency not coming. She reports when home health does not come to perform wound care, then her  is doing her wound care.     Malignant ascites  - Known abdominal ascites and bilateral leg swelling  - Paracentesis on 4/7/24 with 3.6 L fluid removed   - Mrs. Cheema reports increased abdominal distention and pain with associated SOB. She reports this was not present when she saw Dr. Washington. I will reach out to his office to see if Mrs. Ricci needs paracentesis     Understanding of illness: Very insightful     Prognosis: Fair    Goals of care: Continuing disease directed treatment, remaining independent, spending time with family.     Follow up: May 21 at 10 AM     Patient's encounter and above plan of care discussed with patient and her .     SUBJECTIVE:     History of Present Illness:  Patient is a 58 y.o. year old female presenting with stage IV ovarian cancer recently diagnosed in March 2024 and subsequently had debulking surgery, patient is beginning adjunct chemotherapy this week.     LA  reviewed and summarized:  4/3/24 Hydromorphone 2mg Disp: 20 was prescribed by Mary Mccloud, however it does not show up on  and I am unsure if the patient ever received this medication-- appears it may not have been dispensed due to needing a prior auth. However, she is not taking it.   4/6/24 Hydrocodone- Acetaminophen 7.5/325 mg solution Disp: 118 for 3 days   2/7/24 " Hydrocodone- Acetaminophen 5/325 mg Disp: 60 for 15 days   11/6/23 Lorazepam 0.5 mg Disp: 60 for 30 days  8/30/23 Lorazepam 0.5 mg Disp: 60 for 30 days    Past Medical History:   Diagnosis Date    Breast cancer 2013    Diabetes mellitus     Genetic testing 05/29/2013    VUS    Hyperlipidemia     Hypertension     Malignant neoplasm of upper-outer quadrant of right breast in female, estrogen receptor positive 12/02/2015    Seizure disorder      Past Surgical History:   Procedure Laterality Date    BILATERAL SALPINGO-OOPHORECTOMY (BSO) N/A 3/25/2024    Procedure: SALPINGO-OOPHORECTOMY, BILATERAL;  Surgeon: Александр Washington MD;  Location: [facility] OR [address] [place] [address];  Service: OB/GYN;  Laterality: N/A;    BREAST BIOPSY      BREAST LUMPECTOMY Right 2013    CHOLECYSTECTOMY  3/25/2024    Procedure: CHOLECYSTECTOMY;  Surgeon: Bo Farmer MD;  Location: [facility] OR [address] [place] [address];  Service: General;;    DEBULKING OF TUMOR N/A 3/25/2024    Procedure: DEBULKING, NEOPLASM;  Surgeon: Александр Washington MD;  Location: [facility] OR [address] [place] [address];  Service: OB/GYN;  Laterality: N/A;    EXCISION, LIVER N/A 3/25/2024    Procedure: EXCISION, LIVER - partial;  Surgeon: Bo Farmer MD;  Location: [facility] OR [address] [place] [address];  Service: General;  Laterality: N/A;  bk ultrasound  [place] book by TA on 3-21-24  7:00 a.m.  Conf #  [id number]    EYE SURGERY      LAPAROTOMY, EXPLORATORY N/A 3/25/2024    Procedure: LAPAROTOMY, EXPLORATORY;  Surgeon: Александр Washington MD;  [facility];  Service: OB/GYN;  Laterality: N/A;    OMENTECTOMY N/A 3/25/2024    Procedure: OMENTECTOMY;  Surgeon: Александр Washington MD;  [facility];  Service: OB/GYN;  Laterality: N/A;    PERITONEOCENTESIS N/A 3/13/2024    Procedure: PARACENTESIS, ABDOMINAL;  Surgeon: Flavia Moore MD;  Location: [facility] CATH LAB;  Service: Radiology;  Laterality: N/A;    PERITONEOCENTESIS N/A 3/21/2024    Procedure: PARACENTESIS, ABDOMINAL;  Surgeon: Jorge Jolley MD;   Location: [facility] CATH LAB;  Service: Radiology;  Laterality: N/A;    TOTAL ABDOMINAL HYSTERECTOMY N/A 3/25/2024    Procedure: HYSTERECTOMY, TOTAL, ABDOMINAL;  Surgeon: Александр Washington MD;  Location: [facility] OR [address] [place] [address];  Service: OB/GYN;  Laterality: N/A;    TOTAL REDUCTION MAMMOPLASTY Bilateral 2016     Family History   Problem Relation Name Age of Onset    Hypertension Mother      Seizures Father      Breast cancer Sister  48    Colon cancer Sister      Hypertension Brother      Hypertension Brother      Cancer Neg Hx      Ovarian cancer Neg Hx       Review of patient's allergies indicates:   Allergen Reactions    Codeine Other (See Comments)     Flu like s/s    Tramadol Other (See Comments)     Flu like symptoms similar to codeine reaction       Medications:    Current Outpatient Medications:     amlodipine (NORVASC) 10 MG tablet, Take 10 mg by mouth once daily. , Disp: , Rfl:     apixaban (ELIQUIS) 2.5 mg Tab, Take 1 tablet (2.5 mg total) by mouth 2 (two) times daily., Disp: 56 tablet, Rfl: 0    atorvastatin (LIPITOR) 40 MG tablet, Take 80 mg by mouth once daily., Disp: , Rfl:     ciprofloxacin HCl (CIPRO) 500 MG tablet, Take 1 tablet (500 mg total) by mouth every 12 (twelve) hours., Disp: 20 tablet, Rfl: 0    [START ON 4/23/2024] dexAMETHasone (DECADRON) 4 MG Tab, Take 2 tablets (8 mg total) by mouth once daily. daily on days 2-4 of your chemotherapy cycle., Disp: 6 tablet, Rfl: 5    enalapril (VASOTEC) 20 MG tablet, Take 20 mg by mouth once daily., Disp: , Rfl:     fluticasone propionate (FLONASE) 50 mcg/actuation nasal spray, 1 spray by Each Nostril route once daily., Disp: , Rfl:     hydrochlorothiazide (HYDRODIURIL) 25 MG tablet, Take 25 mg by mouth once daily. , Disp: , Rfl:     HYDROmorphone (DILAUDID) 2 MG tablet, Take 1 tablet (2 mg total) by mouth every 8 (eight) hours as needed for Pain., Disp: 20 tablet, Rfl: 0    ibuprofen (ADVIL,MOTRIN) 600 MG tablet, Take 1 tablet (600 mg total) by mouth every 6  "(six) hours as needed for Pain., Disp: 30 tablet, Rfl: 1    insulin aspart U-100 (NOVOLOG) 100 unit/mL (3 mL) InPn pen, Inject 3 Units into the skin 3 (three) times daily with meals.  (Discard pen 28 days after initial use), Disp: 3 mL, Rfl: 4    insulin detemir U-100, Levemir, (LEVEMIR FLEXPEN) 100 unit/mL (3 mL) InPn pen, Inject 7 Units into the skin every evening.  (Discard pen 28 days after initial use), Disp: 6 mL, Rfl: 3    insulin lispro (HUMALOG U-100 INSULIN) 100 unit/mL injection, **LOW CORRECTION DOSE** Blood Glucose mg/dL                  Pre-meal                 151-200                0 unit                       201-250                2 units                     251-300                3 units                     301-350                4 units                     >350                     5 units                     Administer subcutaneously if needed at times designated by monitoring schedule., Disp: 10 mL, Rfl: 1    ketorolac 0.5% (ACULAR) 0.5 % Drop, Place 1 drop into both eyes. For pain after eye injections., Disp: , Rfl:     lamoTRIgine (LAMICTAL) 25 MG tablet, Take 25 mg by mouth 2 (two) times daily., Disp: , Rfl:     lorazepam (ATIVAN) 0.5 MG tablet, Take 0.5 mg by mouth every 12 (twelve) hours as needed for Anxiety., Disp: , Rfl:     metformin (GLUCOPHAGE) 1000 MG tablet, Take 1,000 mg by mouth daily with breakfast. , Disp: , Rfl:     metroNIDAZOLE (FLAGYL) 500 MG tablet, Take 1 tablet (500 mg total) by mouth every 12 (twelve) hours., Disp: 20 tablet, Rfl: 0    [START ON 4/22/2024] OLANZapine (ZYPREXA) 5 MG tablet, Take 1 tablet (5 mg total) by mouth every evening. nightly on days 1-4 of each chemotherapy cycle., Disp: 4 tablet, Rfl: 5    ondansetron (ZOFRAN-ODT) 4 MG TbDL, Take 1 tablet (4 mg total) by mouth every 6 (six) hours as needed (for nausea)., Disp: 20 tablet, Rfl: 0    pen needle, diabetic 32 gauge x 5/32" Ndle, Use to inject insulin 4 time daily, Disp: 100 each, Rfl: 0    [START ON " 4/22/2024] prochlorperazine (COMPAZINE) 10 MG tablet, Take 1 tablet (10 mg total) by mouth every 6 (six) hours as needed (Nausea)., Disp: 20 tablet, Rfl: 5    quetiapine (SEROQUEL) 25 MG Tab, Take 25 mg by mouth daily as needed., Disp: , Rfl:     tirzepatide (MOUNJARO) 5 mg/0.5 mL PnIj, Inject 5 mg into the skin every 7 days. On Sunday., Disp: , Rfl:     OBJECTIVE:       ROS:  Review of Systems   Constitutional:  Positive for activity change, appetite change and fatigue.   Respiratory:  Positive for shortness of breath. Negative for cough.    Cardiovascular:  Positive for leg swelling.   Gastrointestinal:  Positive for abdominal distention.       Review of Symptoms      Symptom Assessment (ESAS 0-10 Scale)  Dyspnea:  3  Anorexia:  3  Fatigue:  4         Living Arrangements:  Lives with spouse    Psychosocial/Cultural:   See Palliative Psychosocial Note: Yes  - Lives with her , Vineet  - Worked as a nursing assistant at [employer]   **Primary  to Follow**  Palliative Care  Consult: No      Advance Care Planning   Advance Directives:   Goals of Care: The patient endorses that what is most important right now is to focus on spending time at home, remaining as independent as possible, and extending life as long as possible.    Accordingly, we have decided that the best plan to meet the patient's goals includes continuing with treatment          Physical Exam:  Vitals:    Physical Exam  Constitutional:       Comments: Chronically ill appearing   Cardiovascular:      Rate and Rhythm: Normal rate.   Pulmonary:      Effort: No respiratory distress.   Abdominal:      General: There is distension.   Neurological:      Mental Status: She is alert and oriented to person, place, and time.      Comments: Alert, awake, pleasant  Appropriately tearful at times   Using a walker          Labs:  CBC:   WBC   Date Value Ref Range Status   04/11/2024 13.18 (H) 3.90 - 12.70 K/uL Final  "    Hemoglobin   Date Value Ref Range Status   04/11/2024 9.1 (L) 12.0 - 16.0 g/dL Final     POC Hematocrit   Date Value Ref Range Status   03/25/2024 15 (LL) 36 - 54 %PCV Final     Hematocrit   Date Value Ref Range Status   04/11/2024 29.7 (L) 37.0 - 48.5 % Final     MCV   Date Value Ref Range Status   04/11/2024 83 82 - 98 fL Final     Platelets   Date Value Ref Range Status   04/11/2024 713 (H) 150 - 450 K/uL Final       LFT:   Lab Results   Component Value Date    AST 24 04/11/2024    ALKPHOS 150 (H) 04/11/2024    BILITOT 0.2 04/11/2024       Albumin:   Albumin   Date Value Ref Range Status   04/11/2024 1.4 (L) 3.5 - 5.2 g/dL Final     Protein:   Total Protein   Date Value Ref Range Status   04/11/2024 5.9 (L) 6.0 - 8.4 g/dL Final       Radiology: I have reviewed the CT Abdomen pelvis from 4/5/24: "Air and fluid scattered within the abdomen and pelvis, it is unchanged from 03/29/2024.  There is a measurable air and fluid collection within the mid pelvis 7.8 x 6.6 cm x 5.2 cm.  Dehiscence of the surgical site cannot be excluded or other origin perforation.  An abscess could have this appearance.  The patient had a total abdominal hysterectomy and bilateral salpingo-oophorectomy, omentectomy, cholecystectomy.     Unchanged partially calcified right breast mass, is which correlates with mammogram 11/10/2023."    Total visit time: 80 minutes   > 50% of 60 min visit spent in chart review, face to face discussion of symptom assessment, coordination of care with other specialists, and d/c planning  20 minutes ACP time spent: goals of care, emotional support, formulating and communication prognosis and goals of care, exploring burden/ benefit of various approaches of treatment.       Signature: Rachel Altamirano DNP    "

## 2024-04-17 DIAGNOSIS — C56.9 OVARIAN CARCINOSARCOMA: Primary | ICD-10-CM

## 2024-04-17 DIAGNOSIS — R06.02 SOB (SHORTNESS OF BREATH): ICD-10-CM

## 2024-04-17 DIAGNOSIS — R18.0 MALIGNANT ASCITES: Primary | ICD-10-CM

## 2024-04-18 ENCOUNTER — PATIENT MESSAGE (OUTPATIENT)
Dept: GYNECOLOGIC ONCOLOGY | Facility: CLINIC | Age: 59
End: 2024-04-18
Payer: MEDICARE

## 2024-04-18 ENCOUNTER — HOSPITAL ENCOUNTER (OUTPATIENT)
Facility: OTHER | Age: 59
Discharge: HOME | End: 2024-04-18
Attending: STUDENT IN AN ORGANIZED HEALTH CARE EDUCATION/TRAINING PROGRAM | Admitting: STUDENT IN AN ORGANIZED HEALTH CARE EDUCATION/TRAINING PROGRAM
Payer: MEDICARE

## 2024-04-18 ENCOUNTER — DOCUMENTATION ONLY (OUTPATIENT)
Dept: GYNECOLOGIC ONCOLOGY | Facility: CLINIC | Age: 59
End: 2024-04-18
Payer: MEDICARE

## 2024-04-18 ENCOUNTER — TELEPHONE (OUTPATIENT)
Dept: GYNECOLOGIC ONCOLOGY | Facility: CLINIC | Age: 59
End: 2024-04-18
Payer: MEDICARE

## 2024-04-18 VITALS
HEART RATE: 94 BPM | OXYGEN SATURATION: 99 % | DIASTOLIC BLOOD PRESSURE: 63 MMHG | TEMPERATURE: 98 F | RESPIRATION RATE: 18 BRPM | SYSTOLIC BLOOD PRESSURE: 130 MMHG

## 2024-04-18 DIAGNOSIS — R18.0 MALIGNANT ASCITES: ICD-10-CM

## 2024-04-18 NOTE — DISCHARGE SUMMARY
Radiology Discharge Summary      Hospital Course: No complications    Admit Date: 4/18/2024  Discharge Date: 04/18/2024     Instructions Given to Patient: Yes  Diet: Resume prior diet  Activity: activity as tolerated    Description of Condition on Discharge: Stable  Vital Signs (Most Recent): Temp: 98.1 °F (36.7 °C) (04/18/24 0738)  Pulse: 94 (04/18/24 0738)  Resp: 18 (04/18/24 0738)  BP: 130/63 (04/18/24 0738)  SpO2: 99 % (04/18/24 0738)    Discharge Disposition: Home    Discharge Diagnosis: ascites    Follow up: As scheduled    Yeimi Rodriguez MD  Interventional Radiology

## 2024-04-18 NOTE — TELEPHONE ENCOUNTER
Contacted pt via phone to provide chemotherapy education prior to scheduled infusion tomorrow. Pt sent my chart message that contains chemo education virtual class, slides and [facility] handouts on ordered regimen (Carbo/Taxol and Avastin starting at C2). Common side effects reviewed, when to contact provider/present to ED and reviewed how to administer home medication ordered by provider (Decadron, Zyprexa, and Compazine). Pt decided to come in today to bring in disability paperwork. Offered pt option to have labs today versus waiting until tomorrow prior to infusion. Pt agreed and arrived in office to deliver disability paperwork (form labeled and emailed to [facility] disability dept. Requested original completed form be mailed to pt and once provider signs completed form requested it be sent to me via email so that I can upload to pt via portal.) Reviewed pt portal message with her and showed her how to open attachment sent today. Pt scheduled for upcoming appt with palliative care and notified of shared services available; she and spouse expressed verbal understanding for any future desired referrals to contact provider's office. Pt informed me paracentesis was not able to be completed this morning due to their not being adequately fluid collection to drain. Pt informed her chemo class appt will remain on schedule for 4/22, and she can attend class if she desires. Pt aware of infusion appt tomorrow at [facility]- 10am.    Yes

## 2024-04-18 NOTE — H&P
Interventional Radiology Pre-Procedure History & Physical      Chief Complaint/Reason for Referral: ascites    History of Present Illness:  Anette Ricci is a 58 y.o. female who presents for paracentesis    Past Medical History:   Diagnosis Date    Breast cancer 2013    Diabetes mellitus     Genetic testing 05/29/2013    VUS    Hyperlipidemia     Hypertension     Malignant neoplasm of upper-outer quadrant of right breast in female, estrogen receptor positive 12/02/2015    Seizure disorder      Past Surgical History:   Procedure Laterality Date    BILATERAL SALPINGO-OOPHORECTOMY (BSO) N/A 3/25/2024    Procedure: SALPINGO-OOPHORECTOMY, BILATERAL;  Surgeon: Александр Washington MD;  Location: [facility] OR [address] [place] [address];  Service: OB/GYN;  Laterality: N/A;    BREAST BIOPSY      BREAST LUMPECTOMY Right 2013    CHOLECYSTECTOMY  3/25/2024    Procedure: CHOLECYSTECTOMY;  Surgeon: Bo Farmer MD;  Location: [facility] OR [address] [place] [address];  Service: General;;    DEBULKING OF TUMOR N/A 3/25/2024    Procedure: DEBULKING, NEOPLASM;  Surgeon: Александр Washington MD;  Location: [facility] OR [address] [place] [address];  Service: OB/GYN;  Laterality: N/A;    EXCISION, LIVER N/A 3/25/2024    Procedure: EXCISION, LIVER - partial;  Surgeon: Bo Farmer MD;  Location: [facility] OR [address] [place] [address];  Service: General;  Laterality: N/A;  bk ultrasound  [place] book by TA on 3-21-24  7:00 a.m.  Conf #  [id number]    EYE SURGERY      LAPAROTOMY, EXPLORATORY N/A 3/25/2024    Procedure: LAPAROTOMY, EXPLORATORY;  Surgeon: Александр Washington MD;  [facility];  Service: OB/GYN;  Laterality: N/A;    OMENTECTOMY N/A 3/25/2024    Procedure: OMENTECTOMY;  Surgeon: Александр Washington MD;  Location: [facility] OR [address] [place] [address];  Service: OB/GYN;  Laterality: N/A;    PERITONEOCENTESIS N/A 3/13/2024    Procedure: PARACENTESIS, ABDOMINAL;  Surgeon: Flavia Moore MD;  Location: [facility] CATH LAB;  Service: Radiology;  Laterality: N/A;    PERITONEOCENTESIS N/A 3/21/2024    Procedure:  PARACENTESIS, ABDOMINAL;  Surgeon: Jorge Jolley MD;  Location: [facility] CATH LAB;  Service: Radiology;  Laterality: N/A;    TOTAL ABDOMINAL HYSTERECTOMY N/A 3/25/2024    Procedure: HYSTERECTOMY, TOTAL, ABDOMINAL;  Surgeon: Александр Washington MD;  Location: [facility] OR [address] [place] [address];  Service: OB/GYN;  Laterality: N/A;    TOTAL REDUCTION MAMMOPLASTY Bilateral 2016       Allergies:   Review of patient's allergies indicates:   Allergen Reactions    Codeine Other (See Comments)     Flu like s/s    Tramadol Other (See Comments)     Flu like symptoms similar to codeine reaction        Home Meds:   Prior to Admission medications    Medication Sig Start Date End Date Taking? Authorizing Provider   amlodipine (NORVASC) 10 MG tablet Take 10 mg by mouth once daily.  12/4/15  Yes Provider, Historical   apixaban (ELIQUIS) 2.5 mg Tab Take 1 tablet (2.5 mg total) by mouth 2 (two) times daily. 3/30/24 4/27/24 Yes Mel Boateng MD   atorvastatin (LIPITOR) 40 MG tablet Take 80 mg by mouth once daily.   Yes Provider, Historical   ciprofloxacin HCl (CIPRO) 500 MG tablet Take 1 tablet (500 mg total) by mouth every 12 (twelve) hours. 4/8/24  Yes Mel Boateng MD   hydrochlorothiazide (HYDRODIURIL) 25 MG tablet Take 25 mg by mouth once daily.  5/1/15  Yes Provider, Historical   ibuprofen (ADVIL,MOTRIN) 600 MG tablet Take 1 tablet (600 mg total) by mouth every 6 (six) hours as needed for Pain. 3/30/24  Yes Mel Boateng MD   insulin aspart U-100 (NOVOLOG) 100 unit/mL (3 mL) InPn pen Inject 3 Units into the skin 3 (three) times daily with meals.  (Discard pen 28 days after initial use) 4/8/24 4/8/25 Yes Mel Boateng MD   lamoTRIgine (LAMICTAL) 25 MG tablet Take 25 mg by mouth 2 (two) times daily.   Yes Provider, Historical   lorazepam (ATIVAN) 0.5 MG tablet Take 0.5 mg by mouth every 12 (twelve) hours as needed for Anxiety. 5/31/14  Yes Provider, Historical   metroNIDAZOLE (FLAGYL) 500 MG tablet Take 1 tablet (500 mg  "total) by mouth every 12 (twelve) hours. 4/8/24  Yes Mel Boateng MD   ondansetron (ZOFRAN-ODT) 4 MG TbDL Take 1 tablet (4 mg total) by mouth every 6 (six) hours as needed (for nausea). 3/31/24  Yes Mel Boateng MD   dexAMETHasone (DECADRON) 4 MG Tab Take 2 tablets (8 mg total) by mouth once daily. daily on days 2-4 of your chemotherapy cycle. 4/23/24   Александр Washington MD   enalapril (VASOTEC) 20 MG tablet Take 20 mg by mouth once daily.    Provider, Historical   fluticasone propionate (FLONASE) 50 mcg/actuation nasal spray 1 spray by Each Nostril route once daily.    Provider, Historical   HYDROmorphone (DILAUDID) 2 MG tablet Take 1 tablet (2 mg total) by mouth every 8 (eight) hours as needed for Pain. 4/3/24 4/3/25  Mary Mccloud, NP   insulin detemir U-100, Levemir, (LEVEMIR FLEXPEN) 100 unit/mL (3 mL) InPn pen Inject 7 Units into the skin every evening.  (Discard pen 28 days after initial use) 4/8/24 4/8/25  Mel Boateng MD   insulin lispro (HUMALOG U-100 INSULIN) 100 unit/mL injection **LOW CORRECTION DOSE**  Blood Glucose  mg/dL                  Pre-meal                  151-200                0 unit                        201-250                2 units                      251-300                3 units                      301-350                4 units                      >350                     5 units                      Administer subcutaneously if needed at times designated by monitoring schedule. 3/30/24   Mel Boateng MD   ketorolac 0.5% (ACULAR) 0.5 % Drop Place 1 drop into both eyes. For pain after eye injections.    Provider, Historical   metformin (GLUCOPHAGE) 1000 MG tablet Take 1,000 mg by mouth daily with breakfast.     Provider, Historical   OLANZapine (ZYPREXA) 5 MG tablet Take 1 tablet (5 mg total) by mouth every evening. nightly on days 1-4 of each chemotherapy cycle. 4/22/24   Александр Washington MD   pen needle, diabetic 32 gauge x 5/32" " Ndle Use to inject insulin 4 time daily 4/8/24   eMl Boateng MD   prochlorperazine (COMPAZINE) 10 MG tablet Take 1 tablet (10 mg total) by mouth every 6 (six) hours as needed (Nausea). 4/22/24   Александр Washington MD   quetiapine (SEROQUEL) 25 MG Tab Take 25 mg by mouth daily as needed. 2/4/16   Provider, Historical   tirzepatide (MOUNJARO) 5 mg/0.5 mL PnIj Inject 5 mg into the skin every 7 days. On Sunday.    Provider, Historical       Anticoagulation/Antiplatelet Meds:  as above    Review of Systems:   Hematological: no known coagulopathies  Respiratory: no shortness of breath  Cardiovascular: no chest pain  Gastrointestinal: no abdominal pain  Genitourinary: no dysuria  Musculoskeletal: negative  Neurological: no TIA or stroke symptoms     Physical Exam:  Temp: 98.1 °F (36.7 °C) (04/18/24 0738)  Pulse: 94 (04/18/24 0738)  Resp: 18 (04/18/24 0738)  BP: 130/63 (04/18/24 0738)  SpO2: 99 % (04/18/24 0738)    General: WNWD, NAD  HEENT: Normocephalic, sclera anicteric  Neck: Supple, no palpable lymphadenopathy  Heart: RRR  Lungs:  breathing unlabored  Abd: slightly distended   Neuro: Gross nonfocal    Laboratory:  Lab Results   Component Value Date    INR 1.2 03/26/2024       Lab Results   Component Value Date    WBC 13.18 (H) 04/11/2024    HGB 9.1 (L) 04/11/2024    HCT 29.7 (L) 04/11/2024    MCV 83 04/11/2024     (H) 04/11/2024      Lab Results   Component Value Date     (H) 04/11/2024     (L) 04/11/2024    K 3.9 04/11/2024    CL 98 04/11/2024    CO2 26 04/11/2024    BUN 4 (L) 04/11/2024    CREATININE 0.6 04/11/2024    CALCIUM 7.7 (L) 04/11/2024    MG 1.5 (L) 03/25/2024    ALT 12 04/11/2024    AST 24 04/11/2024    ALBUMIN 1.4 (L) 04/11/2024    BILITOT 0.2 04/11/2024       Imaging:  CT was reviewed.    Assessment/Plan:  58 y.o. female with ascites. Will undergo paracentesis today.    Sedation:  local    Risks (including, but not limited to, pain, bleeding, infection, damage to nearby  structures, treatment failure/recurrence, and the need for additional procedures), potential benefits, and alternatives were discussed with the patient. All questions were answered to the best of my abilities. The patient wishes to proceed. Written informed consent was obtained.      Yeimi Rodriguez MD

## 2024-04-18 NOTE — BRIEF OP NOTE
Limited ultrasound of all four quadrants was performed which demonstrated lack of sufficient fluid for paracentesis.     Yeimi Rodriguez

## 2024-04-19 ENCOUNTER — INFUSION (OUTPATIENT)
Dept: INFUSION THERAPY | Facility: HOSPITAL | Age: 59
End: 2024-04-19
Payer: MEDICARE

## 2024-04-19 VITALS
HEIGHT: 67 IN | BODY MASS INDEX: 33.59 KG/M2 | SYSTOLIC BLOOD PRESSURE: 155 MMHG | HEART RATE: 90 BPM | TEMPERATURE: 99 F | DIASTOLIC BLOOD PRESSURE: 74 MMHG | RESPIRATION RATE: 18 BRPM | OXYGEN SATURATION: 99 % | WEIGHT: 214 LBS

## 2024-04-19 DIAGNOSIS — C80.1 CARCINOSARCOMA: Primary | ICD-10-CM

## 2024-04-19 DIAGNOSIS — C78.6 METASTASIS TO PERITONEAL CAVITY: ICD-10-CM

## 2024-04-19 PROCEDURE — A4216 STERILE WATER/SALINE, 10 ML: HCPCS | Performed by: STUDENT IN AN ORGANIZED HEALTH CARE EDUCATION/TRAINING PROGRAM

## 2024-04-19 PROCEDURE — 96367 TX/PROPH/DG ADDL SEQ IV INF: CPT

## 2024-04-19 PROCEDURE — 63600175 PHARM REV CODE 636 W HCPCS: Performed by: STUDENT IN AN ORGANIZED HEALTH CARE EDUCATION/TRAINING PROGRAM

## 2024-04-19 PROCEDURE — 96415 CHEMO IV INFUSION ADDL HR: CPT

## 2024-04-19 PROCEDURE — 96417 CHEMO IV INFUS EACH ADDL SEQ: CPT

## 2024-04-19 PROCEDURE — 96375 TX/PRO/DX INJ NEW DRUG ADDON: CPT

## 2024-04-19 PROCEDURE — 96413 CHEMO IV INFUSION 1 HR: CPT

## 2024-04-19 PROCEDURE — 25000003 PHARM REV CODE 250: Performed by: STUDENT IN AN ORGANIZED HEALTH CARE EDUCATION/TRAINING PROGRAM

## 2024-04-19 RX ORDER — FAMOTIDINE 10 MG/ML
20 INJECTION INTRAVENOUS
Status: COMPLETED | OUTPATIENT
Start: 2024-04-19 | End: 2024-04-19

## 2024-04-19 RX ORDER — HEPARIN 100 UNIT/ML
500 SYRINGE INTRAVENOUS
Status: DISCONTINUED | OUTPATIENT
Start: 2024-04-19 | End: 2024-04-19 | Stop reason: HOSPADM

## 2024-04-19 RX ORDER — EPINEPHRINE 0.3 MG/.3ML
0.3 INJECTION SUBCUTANEOUS ONCE AS NEEDED
Status: DISCONTINUED | OUTPATIENT
Start: 2024-04-19 | End: 2024-04-19 | Stop reason: HOSPADM

## 2024-04-19 RX ORDER — SODIUM CHLORIDE 0.9 % (FLUSH) 0.9 %
10 SYRINGE (ML) INJECTION
Status: DISCONTINUED | OUTPATIENT
Start: 2024-04-19 | End: 2024-04-19 | Stop reason: HOSPADM

## 2024-04-19 RX ORDER — DIPHENHYDRAMINE HYDROCHLORIDE 50 MG/ML
50 INJECTION INTRAMUSCULAR; INTRAVENOUS ONCE AS NEEDED
Status: DISCONTINUED | OUTPATIENT
Start: 2024-04-19 | End: 2024-04-19 | Stop reason: HOSPADM

## 2024-04-19 RX ORDER — PROCHLORPERAZINE EDISYLATE 5 MG/ML
10 INJECTION INTRAMUSCULAR; INTRAVENOUS ONCE AS NEEDED
Status: DISCONTINUED | OUTPATIENT
Start: 2024-04-19 | End: 2024-04-19 | Stop reason: HOSPADM

## 2024-04-19 RX ADMIN — CARBOPLATIN 900 MG: 10 INJECTION, SOLUTION INTRAVENOUS at 03:04

## 2024-04-19 RX ADMIN — PACLITAXEL 372 MG: 6 INJECTION, SOLUTION INTRAVENOUS at 12:04

## 2024-04-19 RX ADMIN — APREPITANT 130 MG: 130 INJECTION, EMULSION INTRAVENOUS at 10:04

## 2024-04-19 RX ADMIN — HEPARIN 500 UNITS: 100 SYRINGE at 03:04

## 2024-04-19 RX ADMIN — DIPHENHYDRAMINE HYDROCHLORIDE 50 MG: 50 INJECTION, SOLUTION INTRAMUSCULAR; INTRAVENOUS at 11:04

## 2024-04-19 RX ADMIN — FAMOTIDINE 20 MG: 10 INJECTION INTRAVENOUS at 10:04

## 2024-04-19 RX ADMIN — Medication 10 ML: at 03:04

## 2024-04-19 RX ADMIN — DEXAMETHASONE SODIUM PHOSPHATE 0.25 MG: 4 INJECTION, SOLUTION INTRA-ARTICULAR; INTRALESIONAL; INTRAMUSCULAR; INTRAVENOUS; SOFT TISSUE at 10:04

## 2024-04-19 RX ADMIN — SODIUM CHLORIDE: 9 INJECTION, SOLUTION INTRAVENOUS at 10:04

## 2024-04-19 NOTE — PLAN OF CARE
0008 Pt tolerated C1 Taxol/Carbo infusion well with no complaints or complications, VSS throughout treatment. Educated patient on medications administered including side effects, possible reactions, and chemotherapy precautions, verbalized understanding. Pt aware to call provider with any questions or concerns, aware of upcoming appts, escorted from clinic via wheel chair, no distress noted.

## 2024-04-19 NOTE — PLAN OF CARE
1000 Labs, hx, and medications reviewed, pt meets parameters for treatment today. Assessment completed and plan of care reviewed. Pt verbalized understanding. PAC accessed with no complications. Pt voices no new complaints or concerns, will continue to monitor for safety.

## 2024-04-19 NOTE — PROGRESS NOTES
In basket message received from MD cespedes: status of HH and services that they are providing. Contacted pt. To discuss, pt. Reports that she is supposed to have nursing for wound care 3x/week but that HH has not shown up for the requested visits and she and her  have been trying to manage the wound care as best they can. Pt. Concerned because she did have a visit Wednesday (4/17) and is due for one tomorrow (4/19) but she has chemo and she is not sure if she will miss the visit and when HH will return. Encouraged pt. To call [facility] in the morning before she leaves for chemo appt. To inform of treatment and need to re-schedule. This 'er will also call the office in the am to discuss and re-assure that the pt. Is on schedule. MD notified. Will follow.

## 2024-04-20 ENCOUNTER — TELEPHONE (OUTPATIENT)
Dept: GYNECOLOGIC ONCOLOGY | Facility: OTHER | Age: 59
End: 2024-04-20
Payer: MEDICARE

## 2024-04-20 ENCOUNTER — NURSE TRIAGE (OUTPATIENT)
Dept: ADMINISTRATIVE | Facility: CLINIC | Age: 59
End: 2024-04-20
Payer: MEDICARE

## 2024-04-20 RX ORDER — HYDROCODONE BITARTRATE AND ACETAMINOPHEN 7.5; 325 MG/15ML; MG/15ML
30 SOLUTION ORAL EVERY 6 HOURS PRN
Qty: 473 ML | Refills: 0 | Status: SHIPPED | OUTPATIENT
Start: 2024-04-20

## 2024-04-21 NOTE — TELEPHONE ENCOUNTER
Reason for Disposition   Sounds like a serious complication to the triager    Additional Information   Negative: Sounds like a life-threatening emergency to the triager   Negative: Chest pain   Negative: Difficulty breathing   Negative: Acting confused (e.g., disoriented, slurred speech) or excessively sleepy   Negative: Post-Op tonsil and adenoid surgery, symptoms or questions about   Negative: Surgical incision symptoms and questions   Negative: [1] Pain or burning with passing urine (urination) AND [2] male   Negative: [1] Pain or burning with passing urine (urination) AND [2] female   Negative: Constipation   Negative: New or worsening leg (calf, thigh) pain   Negative: New or worsening leg swelling   Negative: Dizziness is severe, or persists > 24 hours after surgery   Negative: Pain, redness, swelling, or pus at IV Site   Negative: Symptoms arising from use of a urinary catheter (e.g., Coude, Mccartney)   Negative: Cast problems or questions   Negative: Medication question   Negative: [1] Widespread rash AND [2] bright red, sunburn-like   Negative: [1] SEVERE headache AND [2] after spinal (epidural) anesthesia   Negative: [1] Vomiting AND [2] persists > 4 hours   Negative: [1] Vomiting AND [2] abdomen looks much more swollen than usual   Negative: [1] Drinking very little AND [2] dehydration suspected (e.g., no urine > 12 hours, very dry mouth, very lightheaded)   Negative: Patient sounds very sick or weak to the triager    Protocols used: Post-Op Symptoms and [id number]  Admit 4/18   Pt called re recent total hyst, pt states she also had GB out and part of liver. now pt having vaginal bleeding only noticed when wiping in past 30 mins. Pt states blood is not from rectum or urine. Fri had first chemo. pt states she is not really in pain. took ibuprofen this evening at 10pm. blood on tissue when wiping twice -noticed bleeding once and then wiped again and noticed less blood on tissue. no flatus. passing urine.  last BM yest. has drain in backside-placed due to pocket of fluid in abd area - pt reports she is emptying drain once daily. last emptied earlier today. Pt reports drain is not putting out a lot. Material in drain is clear fluid with a little blood in it. legs at bottom joe sore to touch due to swelling - swelling & leg pain ongoing. dr aware. Drinking liquids. eating reg and diabetic diet. rec ED or option to get in touch with dr. pt asking to speak with dr.  Spoke with dr Rivers re above. MD suarez transferred to speak with pt.

## 2024-04-21 NOTE — TELEPHONE ENCOUNTER
Returned call through nurse on call line. Patient is now POD#26 s/p CARLOS ALBERTO/BSO/OMX/Segment 6 liver resection/cholecystectomy for management of stage IVB ovarian carcinosarcoma. Called nurse on call as earlier this evening she noticed two streaks of bright red blood on the toilet tissue after urinating. States the blood was bright red, but did not fill a tissue nor drip into the toilet. Denies any pelvic pain, abnormal discharge, dysuria, fevers, chills. She is also C1D2 from Carbo/Taxol on 4/19 that she tolerated well. Tolerating regular diet, denies n/v, having regular bladder and bowel function. Has DENISE drain in place for persistent ascites that has been putting out clear/serosanguinous fluid, requiring drainage once per day.     Given that this is her first episode of vaginal bleeding, discussed that this could be due to either normal healing process vs. Postop complication. Given first episode of bleeding with overall minimal blood loss and absence of other concerning symptoms (I.e. discharge, pelvic pain, continued episodes of bleeding), do not feel she needs to seek emergency medical attention. Recommended she continue to monitor bleeding overnight and tomorrow and if persistent or worsening, call back nurse on call to speak with provider again. Advised if acute change in bleeding or new onset pain or other concerning symptoms to present to nearest ED. If no further bleeding occurs, advised to call Dr. Washington's office on Monday to schedule follow up for cuff check.     All questions answered. ED precautions reviewed. Patient also requesting refill of Hycet 7.5-325 for pain control as she is almost out. She is taking on average about twice per day and states it works well to control her pain. Refill sent to pharmacy.     Kelsey Ramirez MD   PGY-4, OB-GYN

## 2024-04-22 ENCOUNTER — TELEPHONE (OUTPATIENT)
Dept: GYNECOLOGIC ONCOLOGY | Facility: CLINIC | Age: 59
End: 2024-04-22
Payer: MEDICARE

## 2024-04-22 ENCOUNTER — CLINICAL SUPPORT (OUTPATIENT)
Dept: GYNECOLOGIC ONCOLOGY | Facility: CLINIC | Age: 59
End: 2024-04-22
Payer: MEDICARE

## 2024-04-22 ENCOUNTER — TELEPHONE (OUTPATIENT)
Dept: GYNECOLOGIC ONCOLOGY | Facility: CLINIC | Age: 59
End: 2024-04-22

## 2024-04-22 DIAGNOSIS — C56.9 OVARIAN CARCINOSARCOMA: Primary | ICD-10-CM

## 2024-04-22 NOTE — TELEPHONE ENCOUNTER
----- Message from Tete Sean sent at 4/22/2024  3:14 PM CDT -----  Regarding: Telephone Call  Name of Who is Calling:  Patient          What is the request in detail:  Patient would like to have Sheron give her a call, Patient did  not state the reason            Can the clinic reply by [facility]: No            What Number to Call Back [facility]:[phone]

## 2024-04-22 NOTE — TELEPHONE ENCOUNTER
Followed up with pt to inquire how chemo went and she stated it went well and she had an episode of vaginal bleeding that was reported to provider and she was informed to contact office if it continued. Pt denies continuance of vaginal bleeding and confirmed she will attend chemo education in person today. Requested pt bring chemo education binder and handouts provided last week with her to class.

## 2024-04-22 NOTE — TELEPHONE ENCOUNTER
F/u w/pt regarding triage call @ [phone] on 4/20.. swelling to ble and pain now subsiding. Spotting present, but continues to be minimal, when wiping. Pt advised to call if symptoms persist or worsen. Understands to report to ED with emergent syptoms. PA notified.

## 2024-04-23 ENCOUNTER — TELEPHONE (OUTPATIENT)
Dept: OBSTETRICS AND GYNECOLOGY | Facility: HOSPITAL | Age: 59
End: 2024-04-23
Payer: MEDICARE

## 2024-04-24 ENCOUNTER — PATIENT MESSAGE (OUTPATIENT)
Dept: GYNECOLOGIC ONCOLOGY | Facility: CLINIC | Age: 59
End: 2024-04-24
Payer: MEDICARE

## 2024-04-24 ENCOUNTER — TELEPHONE (OUTPATIENT)
Dept: GYNECOLOGIC ONCOLOGY | Facility: CLINIC | Age: 59
End: 2024-04-24
Payer: MEDICARE

## 2024-04-24 NOTE — PROGRESS NOTES
Met with patient in person to discuss upcoming systemic therapy initiation. Also discussed that therapy regimen prescribed involves the following drugs: Carboplatin/Taxol/Avastin, and timeline of therapy administration.  Reiterated that Avastin would only be initiated at C2 if wound healing/drain has improved. Went over what to expect on first day of treatment, including what to bring with you, visitor policy, and infusion suite guidelines. Also discussed supportive and shared services available to patient, including social work, financial counseling, oncology nutrition, and oncology psychology.       Covered with patient potential side effects and symptom management. Reviewed supportive medications that will be given before, during, and after treatment. Also stressed that other side effects are possible outside of those listed. Spent additional time on signs of infection, infection prevention, and proper nutrition/hydration.     Education provided to patient via chemotherapy education binder and [facility] handouts on ordered regimen. Also provided contact information for clinic and information related to [facility] communication. Discussed communication process for after-hours needs and some common scenarios in which patient should call provider for guidance vs. immediately report to the emergency room.      Pt stated she is feeling fatigued after receiving C1 on Friday. Pt informed this is to be expected at day 3 and symptoms should resolve over the next 3-5 days. Pt expressed verbal understanding to stay physically active despite fatigue and contact office for worsening or new/concerning symptoms. Pt informed provider's staff will be scheduling upcoming C2. Patient verbalized understanding of all above information.

## 2024-04-24 NOTE — TELEPHONE ENCOUNTER
----- Message from Katherine C Boecking, MD sent at 4/23/2024 10:03 PM CDT -----  Regarding: Follow up  Hi!    This patient called with concerns for vaginal spotting. She is s/p hysterectomy on 3/24. Can she be scheduled for follow up in clinic in the next day or so?     Thank you!    Katherine Boecking MD   Ob/Gyn PGY-4

## 2024-04-24 NOTE — TELEPHONE ENCOUNTER
Returned patient's call to on call pager.    Patient is s/p CARLOS ALBERTO/BSO/OMX/Segment 6 liver resection/cholecystectomy for management of stage IVB ovarian carcinosarcoma on 3/25/24. She is C1D5 Carbo/Taxol which she tolerated well.     Reporting intermittent vaginal spotting with wiping.  She denies heavy bleeding or abnormal discharge. She denies bright red bleeding. She denies anything in vagina since surgery.  She is also reporting low BP with dizziness. She reports mild abdominal cramping, she has not taken any pain medications. Patient denies fever/chills, tolerating PO, passing flatus, urinating without difficulty. Last BM yesterday. She is ambulating with walker without difficulty. Patient reporting DENISE drain continuing to drain clear fluid.     Patient took blood pressure while on phone and reported /70. Discussed reassured by blood pressure, however if she continues to have low blood pressure and dizziness she should present to ED for evaluation. Discussed concerning symptoms for post op complications. Strict ED precautions given including abdominal pain, persistent vaginal bleeding, N/V, fevers/chills. Message sent to Dr. Washington's clinic to schedule follow up given patient's second report of vaginal spotting.     All questions answered. Precautions again discussed.    Katherine Boecking MD   Ob/Gyn PGY-4

## 2024-05-01 ENCOUNTER — TELEPHONE (OUTPATIENT)
Dept: GYNECOLOGIC ONCOLOGY | Facility: CLINIC | Age: 59
End: 2024-05-01
Payer: MEDICARE

## 2024-05-01 NOTE — TELEPHONE ENCOUNTER
----- Message from Camelia Harper sent at 5/1/2024  8:52 AM CDT -----  Contact: [phone]  PATIENTCALL     Pt is calling to speak with the providers nurse office she is asking for a return call please call.

## 2024-05-02 ENCOUNTER — OFFICE VISIT (OUTPATIENT)
Dept: GYNECOLOGIC ONCOLOGY | Facility: CLINIC | Age: 59
End: 2024-05-02
Payer: MEDICARE

## 2024-05-02 VITALS
WEIGHT: 214 LBS | HEIGHT: 67 IN | HEART RATE: 97 BPM | SYSTOLIC BLOOD PRESSURE: 140 MMHG | BODY MASS INDEX: 33.59 KG/M2 | DIASTOLIC BLOOD PRESSURE: 80 MMHG

## 2024-05-02 DIAGNOSIS — C80.1 CARCINOSARCOMA: Primary | ICD-10-CM

## 2024-05-02 PROCEDURE — 1111F DSCHRG MED/CURRENT MED MERGE: CPT | Mod: HCNC,CPTII,S$GLB, | Performed by: STUDENT IN AN ORGANIZED HEALTH CARE EDUCATION/TRAINING PROGRAM

## 2024-05-02 PROCEDURE — 99459 PELVIC EXAMINATION: CPT | Mod: HCNC,S$GLB,, | Performed by: STUDENT IN AN ORGANIZED HEALTH CARE EDUCATION/TRAINING PROGRAM

## 2024-05-02 PROCEDURE — G2211 COMPLEX E/M VISIT ADD ON: HCPCS | Mod: HCNC,S$GLB,, | Performed by: STUDENT IN AN ORGANIZED HEALTH CARE EDUCATION/TRAINING PROGRAM

## 2024-05-02 PROCEDURE — 99999 PR PBB SHADOW E&M-EST. PATIENT-LVL IV: CPT | Mod: PBBFAC,HCNC,, | Performed by: STUDENT IN AN ORGANIZED HEALTH CARE EDUCATION/TRAINING PROGRAM

## 2024-05-02 PROCEDURE — 3051F HG A1C>EQUAL 7.0%<8.0%: CPT | Mod: HCNC,CPTII,S$GLB, | Performed by: STUDENT IN AN ORGANIZED HEALTH CARE EDUCATION/TRAINING PROGRAM

## 2024-05-02 PROCEDURE — 3008F BODY MASS INDEX DOCD: CPT | Mod: HCNC,CPTII,S$GLB, | Performed by: STUDENT IN AN ORGANIZED HEALTH CARE EDUCATION/TRAINING PROGRAM

## 2024-05-02 PROCEDURE — 3079F DIAST BP 80-89 MM HG: CPT | Mod: HCNC,CPTII,S$GLB, | Performed by: STUDENT IN AN ORGANIZED HEALTH CARE EDUCATION/TRAINING PROGRAM

## 2024-05-02 PROCEDURE — 1159F MED LIST DOCD IN RCRD: CPT | Mod: HCNC,CPTII,S$GLB, | Performed by: STUDENT IN AN ORGANIZED HEALTH CARE EDUCATION/TRAINING PROGRAM

## 2024-05-02 PROCEDURE — 99215 OFFICE O/P EST HI 40 MIN: CPT | Mod: HCNC,24,S$GLB, | Performed by: STUDENT IN AN ORGANIZED HEALTH CARE EDUCATION/TRAINING PROGRAM

## 2024-05-02 PROCEDURE — 3077F SYST BP >= 140 MM HG: CPT | Mod: HCNC,CPTII,S$GLB, | Performed by: STUDENT IN AN ORGANIZED HEALTH CARE EDUCATION/TRAINING PROGRAM

## 2024-05-02 NOTE — PROGRESS NOTES
Referring Provider:  Mark Chua MD  [facility]  [place] [address]   Subjective:      Patient ID: Anette Ricci is a 58 y.o. female.    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 (<1cm residual disease: sigmoid, transverse colon, diaphragm, kelvin hepatis)    Adjuvant therapy: Carboplatin AUC 6, paclitaxel 175 mg/m2, bevacizumab 15 mg/kg  C1D1: (holding yaneli) planned for 4/19/24, CA-125 248             HPI Reports her appetite and strength are continuing to improve. Leg swelling much improved. Reported some light vaginal spotting. Drain output is minimal. Abdominal incision continues to heal. Had some fatigue D2-3 following chemotherapy but otherwise tolerated very well.    Review of Systems   Constitutional:  Negative for chills, fatigue and fever.   Respiratory:  Negative for cough and shortness of breath.    Cardiovascular:  Negative for chest pain.   Gastrointestinal:  Negative for abdominal distention, abdominal pain, constipation and diarrhea.   Genitourinary:  Negative for dysuria, pelvic pain and vaginal bleeding.   Musculoskeletal:  Negative for back pain.   Psychiatric/Behavioral:  Negative for dysphoric mood. The patient is not nervous/anxious.      Past Medical History:   Diagnosis Date    Breast cancer 2013    Diabetes mellitus     Genetic testing 05/29/2013    VUS    Hyperlipidemia     Hypertension     Malignant neoplasm of upper-outer quadrant of right breast in female, estrogen receptor positive 12/02/2015    Seizure disorder       Past Surgical History:   Procedure Laterality Date    BILATERAL SALPINGO-OOPHORECTOMY (BSO) N/A 3/25/2024    Procedure: SALPINGO-OOPHORECTOMY, BILATERAL;   Surgeon: Александр Washington MD;  [facility];  Service: OB/GYN;  Laterality: N/A;    BREAST BIOPSY      BREAST LUMPECTOMY Right 2013    CHOLECYSTECTOMY  3/25/2024    Procedure: CHOLECYSTECTOMY;  Surgeon: Bo Farmer MD;  [facility];  Service: General;;    DEBULKING OF TUMOR N/A 3/25/2024    Procedure: DEBULKING, NEOPLASM;  Surgeon: Александр Washington MD;  [facility];  Service: OB/GYN;  Laterality: N/A;    EXCISION, LIVER N/A 3/25/2024    Procedure: EXCISION, LIVER - partial;  Surgeon: Bo Farmer MD;  Location: [facility] OR [address] [place] [address];  Service: General;  Laterality: N/A;  bk ultrasound  [place] book by TA on 3-21-24  7:00 a.m.  Conf #  [id number]    EYE SURGERY      LAPAROTOMY, EXPLORATORY N/A 3/25/2024    Procedure: LAPAROTOMY, EXPLORATORY;  Surgeon: Александр Washington MD;  [facility];  Service: OB/GYN;  Laterality: N/A;    OMENTECTOMY N/A 3/25/2024    Procedure: OMENTECTOMY;  Surgeon: Александр Washington MD;  [facility];  Service: OB/GYN;  Laterality: N/A;    PERITONEOCENTESIS N/A 3/13/2024    Procedure: PARACENTESIS, ABDOMINAL;  Surgeon: Flavia Moore MD;  Location: [facility] CATH LAB;  Service: Radiology;  Laterality: N/A;    PERITONEOCENTESIS N/A 3/21/2024    Procedure: PARACENTESIS, ABDOMINAL;  Surgeon: Jorge Jolley MD;  Location: [facility] CATH LAB;  Service: Radiology;  Laterality: N/A;    TOTAL ABDOMINAL HYSTERECTOMY N/A 3/25/2024    Procedure: HYSTERECTOMY, TOTAL, ABDOMINAL;  Surgeon: Александр Washington MD;  [facility];  Service: OB/GYN;  Laterality: N/A;    TOTAL REDUCTION MAMMOPLASTY Bilateral 2016      Family History   Problem Relation Name Age of Onset    Hypertension Mother      Seizures Father      Breast cancer Sister  48    Colon cancer Sister      Hypertension Brother      Hypertension Brother      Cancer Neg Hx      Ovarian cancer Neg Hx        Social History     Socioeconomic History     Marital status:         Objective:      Vitals:    05/02/24 0819   BP: (!) 140/80   Pulse: 97      Physical Exam  Constitutional:       General: She is not in acute distress.  HENT:      Head: Normocephalic.   Eyes:      Extraocular Movements: Extraocular movements intact.      Conjunctiva/sclera: Conjunctivae normal.   Cardiovascular:      Rate and Rhythm: Normal rate.      Pulses: Normal pulses.   Pulmonary:      Effort: Pulmonary effort is normal. No respiratory distress.      Breath sounds: No wheezing.   Abdominal:      General: There is no distension.      Tenderness: There is no abdominal tenderness. There is no guarding or rebound.      Comments: Incision with healthy appearing granulation tissue.    Genitourinary:     Comments: External genitalia normal. Vagina normal. Uterus, cervix, and adnexa surgically absent. Vaginal cuff intact. A female staff member was present for the exam.   Musculoskeletal:         General: No deformity.   Skin:     Comments: Pelvic drain removed and gluteal site covered with bandage     Neurological:      Mental Status: She is alert and oriented to person, place, and time.   Psychiatric:         Mood and Affect: Mood normal.         Behavior: Behavior normal.         Thought Content: Thought content normal.         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 Path IVB ovarian carcinosarcoma. HER2 1+. pMMR, TP53 mutated. Adjuvant chemotherapy carboplatin AUC 6, paclitaxel 175 mg/m2 and bevacizumab 15 mg/kg every 3 weeks. (Velma omitted with C1). We again reviewed  the potential side effects of chemotherapy including hematologic effects, alopecia, neuropathy, hypersensitivity reactions, arthralgias, nausea, and fatigue. We reviewed the risks of bevacizumab including HTN, rhinorrhea, proteinuria, and rarely fistula formation and GI perforation. Proceed with chemotherapy as planned.  C1D1 4/19  C2 D1 planned 5/10/24.  Inna Summers pending  Caris testing pending  Palliative care with Rachel 5/21      Visit today is associated with current or anticipated ongoing medical care related to this patient's single serious condition/complex condition: ovarian carcinosarcoma.     She is tolerating treatment without unexpected or unmanageable side effects. I will review all relevant labs and diagnostic tests prior to signing chemotherapy orders. Proceed with cycle #2 without modification or dose reduction (including bevacizumab with this cycle and going forwards) pending labs.            Александр Washington MD

## 2024-05-06 LAB — FUNGUS SPEC CULT: ABNORMAL

## 2024-05-07 ENCOUNTER — PATIENT MESSAGE (OUTPATIENT)
Dept: INTERVENTIONAL RADIOLOGY/VASCULAR | Facility: OTHER | Age: 59
End: 2024-05-07
Payer: MEDICARE

## 2024-05-07 LAB
FINAL PATHOLOGIC DIAGNOSIS: NORMAL
GROSS: NORMAL
Lab: NORMAL
SUPPLEMENTAL DIAGNOSIS: NORMAL

## 2024-05-10 ENCOUNTER — INFUSION (OUTPATIENT)
Dept: INFUSION THERAPY | Facility: HOSPITAL | Age: 59
End: 2024-05-10
Payer: MEDICARE

## 2024-05-10 VITALS
DIASTOLIC BLOOD PRESSURE: 78 MMHG | TEMPERATURE: 98 F | RESPIRATION RATE: 18 BRPM | HEIGHT: 67 IN | OXYGEN SATURATION: 99 % | BODY MASS INDEX: 27.71 KG/M2 | WEIGHT: 176.56 LBS | HEART RATE: 78 BPM | SYSTOLIC BLOOD PRESSURE: 150 MMHG

## 2024-05-10 DIAGNOSIS — C80.1 CARCINOSARCOMA: Primary | ICD-10-CM

## 2024-05-10 DIAGNOSIS — C78.6 METASTASIS TO PERITONEAL CAVITY: ICD-10-CM

## 2024-05-10 DIAGNOSIS — C56.9 OVARIAN CARCINOSARCOMA: ICD-10-CM

## 2024-05-10 PROCEDURE — 96413 CHEMO IV INFUSION 1 HR: CPT | Mod: HCNC

## 2024-05-10 PROCEDURE — 63600175 PHARM REV CODE 636 W HCPCS: Mod: JZ,JG,HCNC | Performed by: STUDENT IN AN ORGANIZED HEALTH CARE EDUCATION/TRAINING PROGRAM

## 2024-05-10 PROCEDURE — 96367 TX/PROPH/DG ADDL SEQ IV INF: CPT | Mod: HCNC

## 2024-05-10 PROCEDURE — 96417 CHEMO IV INFUS EACH ADDL SEQ: CPT | Mod: HCNC

## 2024-05-10 PROCEDURE — 96415 CHEMO IV INFUSION ADDL HR: CPT | Mod: HCNC

## 2024-05-10 PROCEDURE — 25000003 PHARM REV CODE 250: Mod: HCNC | Performed by: STUDENT IN AN ORGANIZED HEALTH CARE EDUCATION/TRAINING PROGRAM

## 2024-05-10 PROCEDURE — 96375 TX/PRO/DX INJ NEW DRUG ADDON: CPT | Mod: HCNC

## 2024-05-10 RX ORDER — FAMOTIDINE 10 MG/ML
20 INJECTION INTRAVENOUS
Status: CANCELLED | OUTPATIENT
Start: 2024-05-10

## 2024-05-10 RX ORDER — PROCHLORPERAZINE EDISYLATE 5 MG/ML
10 INJECTION INTRAMUSCULAR; INTRAVENOUS ONCE AS NEEDED
Status: CANCELLED | OUTPATIENT
Start: 2024-05-10

## 2024-05-10 RX ORDER — SODIUM CHLORIDE 0.9 % (FLUSH) 0.9 %
10 SYRINGE (ML) INJECTION
Status: CANCELLED | OUTPATIENT
Start: 2024-05-10

## 2024-05-10 RX ORDER — DIPHENHYDRAMINE HYDROCHLORIDE 50 MG/ML
50 INJECTION INTRAMUSCULAR; INTRAVENOUS ONCE AS NEEDED
Status: DISCONTINUED | OUTPATIENT
Start: 2024-05-10 | End: 2024-05-10 | Stop reason: HOSPADM

## 2024-05-10 RX ORDER — HEPARIN 100 UNIT/ML
500 SYRINGE INTRAVENOUS
Status: DISCONTINUED | OUTPATIENT
Start: 2024-05-10 | End: 2024-05-10 | Stop reason: HOSPADM

## 2024-05-10 RX ORDER — PROCHLORPERAZINE EDISYLATE 5 MG/ML
10 INJECTION INTRAMUSCULAR; INTRAVENOUS ONCE AS NEEDED
Status: DISCONTINUED | OUTPATIENT
Start: 2024-05-10 | End: 2024-05-10 | Stop reason: HOSPADM

## 2024-05-10 RX ORDER — DIPHENHYDRAMINE HYDROCHLORIDE 50 MG/ML
50 INJECTION INTRAMUSCULAR; INTRAVENOUS ONCE AS NEEDED
Status: CANCELLED | OUTPATIENT
Start: 2024-05-10

## 2024-05-10 RX ORDER — EPINEPHRINE 0.3 MG/.3ML
0.3 INJECTION SUBCUTANEOUS ONCE AS NEEDED
Status: CANCELLED | OUTPATIENT
Start: 2024-05-10

## 2024-05-10 RX ORDER — SODIUM CHLORIDE 0.9 % (FLUSH) 0.9 %
10 SYRINGE (ML) INJECTION
Status: DISCONTINUED | OUTPATIENT
Start: 2024-05-10 | End: 2024-05-10 | Stop reason: HOSPADM

## 2024-05-10 RX ORDER — EPINEPHRINE 0.3 MG/.3ML
0.3 INJECTION SUBCUTANEOUS ONCE AS NEEDED
Status: DISCONTINUED | OUTPATIENT
Start: 2024-05-10 | End: 2024-05-10 | Stop reason: HOSPADM

## 2024-05-10 RX ORDER — HEPARIN 100 UNIT/ML
500 SYRINGE INTRAVENOUS
Status: CANCELLED | OUTPATIENT
Start: 2024-05-10

## 2024-05-10 RX ORDER — FAMOTIDINE 10 MG/ML
20 INJECTION INTRAVENOUS
Status: COMPLETED | OUTPATIENT
Start: 2024-05-10 | End: 2024-05-10

## 2024-05-10 RX ADMIN — DEXAMETHASONE SODIUM PHOSPHATE 0.25 MG: 4 INJECTION, SOLUTION INTRA-ARTICULAR; INTRALESIONAL; INTRAMUSCULAR; INTRAVENOUS; SOFT TISSUE at 12:05

## 2024-05-10 RX ADMIN — BEVACIZUMAB-AWWB 1200 MG: 400 INJECTION, SOLUTION INTRAVENOUS at 11:05

## 2024-05-10 RX ADMIN — APREPITANT 130 MG: 130 INJECTION, EMULSION INTRAVENOUS at 12:05

## 2024-05-10 RX ADMIN — CARBOPLATIN 725 MG: 10 INJECTION, SOLUTION INTRAVENOUS at 04:05

## 2024-05-10 RX ADMIN — DIPHENHYDRAMINE HYDROCHLORIDE 50 MG: 50 INJECTION, SOLUTION INTRAMUSCULAR; INTRAVENOUS at 12:05

## 2024-05-10 RX ADMIN — PACLITAXEL 342 MG: 6 INJECTION, SOLUTION, CONCENTRATE INTRAVENOUS at 01:05

## 2024-05-10 RX ADMIN — SODIUM CHLORIDE: 9 INJECTION, SOLUTION INTRAVENOUS at 10:05

## 2024-05-10 RX ADMIN — HEPARIN 500 UNITS: 100 SYRINGE at 04:05

## 2024-05-10 RX ADMIN — FAMOTIDINE 20 MG: 10 INJECTION INTRAVENOUS at 12:05

## 2024-05-10 NOTE — PLAN OF CARE
Pt admitted for C2 Mvasi/Taxol/Carbo. Labs reviewed and assessment performed. Fatigue addressed. Side effects and self care tips reviewed. Pt tolerated treatment well. Port de-accessed and Pt discharged home in W/C with , AVS reviewed with patient and her spouse.

## 2024-05-11 ENCOUNTER — NURSE TRIAGE (OUTPATIENT)
Dept: ADMINISTRATIVE | Facility: CLINIC | Age: 59
End: 2024-05-11
Payer: MEDICARE

## 2024-05-11 NOTE — TELEPHONE ENCOUNTER
Pt had Chemo on yesterday and 4 medications were suppose to be called in but one of them needs Md authorization. Pt doesn't know what medication needs authorization. She needs a refill on her Eliquis 2.5 mg twice a day that was prescribed by Dr Tasneem Leal per pt. Pt is also asking for ibuprofen 800 mg instead of 600 mg. She said the 600 mg is not working like the 800 mg. Pt will call the pharmacist then to find out which meds needs aurthrization and call the triage line back.

## 2024-05-11 NOTE — TELEPHONE ENCOUNTER
Reason for Disposition   Information only question and nurse able to answer    Protocols used: No Protocol Available - Information Only-A-[place]

## 2024-05-11 NOTE — TELEPHONE ENCOUNTER
Patient states she received Chemotherapy on yesterday, 5/10/24 and four (4) medications were submitted to her Pharmacy. Patient states a prescription for Decadron, Zyprexa, Compazine and Zofran were submitted and the prescription for Zofran needs a Prior Authorization completed. Patient states her Pharmacy has submitted the request to her ordering Provider, Dr. Александр Washington. Patient states the prescriptions for Decadron, Zyprexa and Compazine have been filled.     Patient also states she needs a refill of Eliquis 2.5 mg and is requesting to increase her dosage of Ibuprofen 600 mg to Ibuprofen 800 mg. Patient does not have refills available for Eliquis 2.5 mg. Patient states she was prescribed Eliquis after a surgical procedure and received 56 tabs for post op care.     Patient advised that case encounter will be sent to the offices of Dr. Mel Boateng to follow up with refill of Eliquis 2.5 mg and Dr. Mark Chua regarding an increase in her dosage of Ibuprofen. Case encounter will also be sent to Dr. Александр Washington regarding the Prior Authorization for Zofran. Patient advised to follow up with Providers on Monday, 5/13/24 and to contact the [facility] Triage Service for any additional questions or worsening symptoms. Patient states understanding of care advice.     Reason for Disposition   [1] Caller has NON-URGENT medicine question about med that PCP prescribed AND [2] triager unable to answer question    Additional Information   Negative: [1] Intentional drug overdose AND [2] suicidal thoughts or ideas   Negative: Drug overdose and triager unable to answer question   Negative: Caller requesting a renewal or refill of a medicine patient is currently taking   Negative: Caller requesting information unrelated to medicine   Negative: Caller requesting information about COVID-19 Vaccine   Negative: Caller requesting information about Emergency Contraception   Negative: Caller requesting information about Combined  Birth Control Pills   Negative: Caller requesting information about Progestin Birth Control Pills   Negative: Caller requesting information about Post-Op pain or medicines   Negative: Caller requesting a prescription antibiotic (such as Penicillin) for Strep throat and has a positive culture result   Negative: Caller requesting a prescription anti-viral med (such as Tamiflu) and has influenza (flu) symptoms   Negative: Immunization reaction suspected   Negative: Rash while taking a medicine or within 3 days of stopping it   Negative: [1] Asthma and [2] having symptoms of asthma (cough, wheezing, etc.)   Negative: [1] Symptom of illness (e.g., headache, abdominal pain, earache, vomiting) AND [2] more than mild   Negative: Breastfeeding questions about mother's medicines and diet   Negative: MORE THAN A DOUBLE DOSE of a prescription or over-the-counter (OTC) drug   Negative: [1] DOUBLE DOSE (an extra dose or lesser amount) of prescription drug AND [2] any symptoms (e.g., dizziness, nausea, pain, sleepiness)   Negative: [1] DOUBLE DOSE (an extra dose or lesser amount) of over-the-counter (OTC) drug AND [2] any symptoms (e.g., dizziness, nausea, pain, sleepiness)   Negative: Took another person's prescription drug   Negative: [1] DOUBLE DOSE (an extra dose or lesser amount) of prescription drug AND [2] NO symptoms  (Exception: A double dose of antibiotics.)   Negative: Diabetes drug error or overdose (e.g., took wrong type of insulin or took extra dose)   Negative: [1] Prescription not at pharmacy AND [2] was prescribed by PCP recently (Exception: Triager has access to EMR and prescription is recorded there. Go to Home Care and confirm for pharmacy.)   Negative: [1] Pharmacy calling with prescription question AND [2] triager unable to answer question   Negative: [1] Caller has URGENT medicine question about med that PCP or specialist prescribed AND [2] triager unable to answer question   Negative: Medicine patch causing  local rash or itching   Negative: [1] Caller has medicine question about med NOT prescribed by PCP AND [2] triager unable to answer question (e.g., compatibility with other med, storage)   Negative: Prescription request for new medicine (not a refill)    Protocols used: Medication Question Call-A-

## 2024-05-13 ENCOUNTER — TELEPHONE (OUTPATIENT)
Dept: GYNECOLOGIC ONCOLOGY | Facility: CLINIC | Age: 59
End: 2024-05-13
Payer: MEDICARE

## 2024-05-13 NOTE — TELEPHONE ENCOUNTER
"----- Message from Nava Maya sent at 5/13/2024  1:33 PM CDT -----  Regarding: Rx  RX Name and Strength:      apixaban (ELIQUIS) 2.5 mg Tab    How is the patient currently taking it?    Take 1 tablet (2.5 mg total) by mouth 2 (two) times daily.     Is this a 30 day or 90 day Rx?        56 tablet      RX Name and Strength:    ibuprofen (ADVIL,MOTRIN) 800 mg    How is the patient currently taking it?   Take 1 tablet (800 mg total) by mouth every 6 (six) hours as needed for Pain.     Is this a 30 day or 90 day Rx?     30 tablet        Preferred Pharmacy with phone number:    [facility] - [place] - [address]  [address] [address]  Phone: [phone] Fax: [phone]      Local or Mail Order:   Local      Ordering Provider:   Dr. Washington      Contact Preference:   Anette @ [phone]     Additional Information:   Pt is requesting a script for Ibuprofen 800 mg tablets and for Eliquis sent to the pharmacy above.      "Thank you for all that you do for our patients"  "

## 2024-05-21 ENCOUNTER — OFFICE VISIT (OUTPATIENT)
Dept: PALLIATIVE MEDICINE | Facility: CLINIC | Age: 59
End: 2024-05-21
Payer: MEDICARE

## 2024-05-21 VITALS
HEART RATE: 121 BPM | SYSTOLIC BLOOD PRESSURE: 169 MMHG | BODY MASS INDEX: 26.72 KG/M2 | DIASTOLIC BLOOD PRESSURE: 73 MMHG | WEIGHT: 170.63 LBS

## 2024-05-21 DIAGNOSIS — Z71.89 ADVANCED CARE PLANNING/COUNSELING DISCUSSION: Primary | ICD-10-CM

## 2024-05-21 PROBLEM — M65.331 TRIGGER MIDDLE FINGER OF RIGHT HAND: Status: ACTIVE | Noted: 2022-11-14

## 2024-05-21 PROBLEM — M65.4 DE QUERVAIN'S SYNDROME (TENOSYNOVITIS): Status: ACTIVE | Noted: 2022-11-14

## 2024-05-21 LAB
OHS QRS DURATION: 66 MS
OHS QTC CALCULATION: 459 MS

## 2024-05-21 PROCEDURE — 3077F SYST BP >= 140 MM HG: CPT | Mod: HCNC,CPTII,S$GLB, | Performed by: FAMILY MEDICINE

## 2024-05-21 PROCEDURE — 99999 PR PBB SHADOW E&M-EST. PATIENT-LVL I: CPT | Mod: PBBFAC,HCNC,, | Performed by: FAMILY MEDICINE

## 2024-05-21 PROCEDURE — 3051F HG A1C>EQUAL 7.0%<8.0%: CPT | Mod: HCNC,CPTII,S$GLB, | Performed by: FAMILY MEDICINE

## 2024-05-21 PROCEDURE — 99215 OFFICE O/P EST HI 40 MIN: CPT | Mod: HCNC,S$GLB,, | Performed by: FAMILY MEDICINE

## 2024-05-21 PROCEDURE — 3008F BODY MASS INDEX DOCD: CPT | Mod: HCNC,CPTII,S$GLB, | Performed by: FAMILY MEDICINE

## 2024-05-21 PROCEDURE — 3078F DIAST BP <80 MM HG: CPT | Mod: HCNC,CPTII,S$GLB, | Performed by: FAMILY MEDICINE

## 2024-05-21 RX ORDER — IBUPROFEN 600 MG/1
600 TABLET ORAL EVERY 6 HOURS PRN
Qty: 30 TABLET | Refills: 1 | Status: ON HOLD | OUTPATIENT
Start: 2024-05-21 | End: 2024-05-22 | Stop reason: HOSPADM

## 2024-05-21 NOTE — PROGRESS NOTES
"Consult Note  Palliative Care      Consult Requested By: No ref. provider found  Reason for Consult: Goals of care/ symptom management       ASSESSMENT/PLAN:     Plan/Recommendations:  Diagnoses and all orders for this visit:    Advanced care planning/counseling discussion  - Patient is decisional and accompanied by her , Darrell, on today's visit.  - Seen alongside Felicita Walsh RN  - ACP documents not uploaded into EMR  - Goals: continuing disease directed treatment, spending time with family, remaining independent.   - Philosophy of palliative medicine reviewed with patient at first visit  - New patient folder and ACP booklet given to and reviewed with patient at first visit  - Discussed HCPOA. Patient would desire her , Vineet, to be HCPOA. Vineet is her legally  spouse and surrogate decision maker would default to him.   - Code status not specifically discussed today   - Discussed with patient about what is important to her at first visit. Patient spoke about spending time with her family, continuing to be independent, and continue disease directed treatment.   - Mrs. Ricci has been  to her spouse for 38 years. They stated the secret to a successful marriage is "give and take". They enjoy going to dinner and spending time with family. Mrs. Ricci is number 8 out of ten children and Vineet is number 11 of 11 children. They had one son, Vineet, who is . Mrs. Ricci spoke about the loss of her father who suffered a stroke in , 10 months later her mother  of a massive heart attack, and 4 months later her son was killed. Mrs. Ricci shared the schools her son went to and how he was working at a chowdary shop and robbed and killed by someone who was aware he recently was paid. Mrs. Ricci stated "I wonder why me". Emotional support provided  - She identifies as Congregational and they are active in their Mosque.   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  - She also helps care for her brother with early onset dementia  - She spoke about her cancer diagnosis and how she has struggled with constipation for years and she presented to the ED for what she thought was constipation, but she was diagnosed with cancer.     Cancer related pain  - Opioid safety sheet in new patient folder  - Patient reports incisional pain is improving.     Adjustment disorder, unspecified type  - Patient spoke about her previous diagnosis of breast cancer and new diagnosis of ovarian cancer.    Neoplastic (malignant) related fatigue  - Discussed with patient about being as active as possible and maximizing pain relief with functionality   - Mrs. Cheema reports she is sleeping well  - Sleep training, such as a set schedule and how to adjust schedule for early sleep time and tips for better sleep in new patient folder for patient review    Altered bowel habits  - Continue adequate hydration for good bowel movements  - Constipation tip sheet in new patient folder for patient to review   - She reports constipation has been manageable. She took Miralax on Sunday and reports soft bowel movement today. We discussed adjusting miralax dose as needed.     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 Washington, last seen on 5/2/24  - Patient recently diagnosed in March 2024, she underwent a primary debulking surgery on 3/25/24 with pathology revealing stage IVB ovarian Carcinosarcoma. Patient is currently on Mvasi/Taxol/Carbo, last infusion 5/10/24.  - Mrs. Ricci is very insightful regarding seriousness of disease. Despite the several losses of family members, she stated she has told her son, Vineet, "I am not ready to come yet".   : 89, previously 418    Malignant ascites  - Known abdominal ascites and bilateral leg swelling  - Paracentesis on 4/7/24 with 3.6 L fluid removed   - At my last visit with Mrs. Cheema she reported increasing abdominal distention and pain " associated with SOB. I spoke to Dr. Washington's office and para was attempted with IR, but she did have enough fluid present.      5/21/24:  Mrs. Cheema reports improvement in SOB and lower extremity swelling. She has not needed another paracentesis. Her drain was removed recently, which she is glad about. She is still receiving HH for wound care, however the RN coming out has been coming inconsistent. She reports her appetite is improving and she is drinking 2 Shade per day. Albumin improved from 1.1 to 2.3, which I discussed with Mrs. Cheema. She reports incisional pain is improving also. She is still using a walker due to weakness.    This weekend it was her brothers birthday and she was able to dance with him, which she enjoyed. She is hopeful to continue getting her strength back.    She reports second cycle of chemo she was much more symptomatic than the first one. She reports fatigue/ weakness. We discussed that her  is improving, which is good. We also discussed prior CT and that we remain hopeful that next imaging will show decrease in size of masses and no new growth.    Mrs. Cheema reports feeling fatigue/ weak and that she experienced a chest pain from port site. Overall, she has lost a significant amount of weight and she does appear to not be feeling well. She appears pale and her HR is elevated at 120. She stated she is planning to go to the ED at [facility] to get checked out. I did let Dr. Washington's staff know this as well.     She will follow up with me in 10 weeks. She has my number to contact if any issues/ concerns arise.     Understanding of illness: Very insightful     Prognosis: Fair    Goals of care: Continuing disease directed treatment, remaining independent, spending time with family.     Follow up: July 23 at 10 AM     Patient's encounter and above plan of care discussed with patient and her .     SUBJECTIVE:     History of Present Illness:  Patient is a 58 y.o. year old  female presenting with stage IV ovarian cancer recently diagnosed in March 2024 and subsequently had debulking surgery, patient is beginning adjunct chemotherapy this week.     LA  reviewed and summarized:  4/22/24 Hydrocodone- Acetaminophen 7.5/325 mg solution Disp: 473 for 4 days   4/3/24 Hydromorphone 2mg Disp: 20 was prescribed by Mary Mccloud, however it does not show up on  and I am unsure if the patient ever received this medication-- appears it may not have been dispensed due to needing a prior auth. However, she is not taking it.   4/6/24 Hydrocodone- Acetaminophen 7.5/325 mg solution Disp: 118 for 3 days   2/7/24 Hydrocodone- Acetaminophen 5/325 mg Disp: 60 for 15 days   11/6/23 Lorazepam 0.5 mg Disp: 60 for 30 days  8/30/23 Lorazepam 0.5 mg Disp: 60 for 30 days    Past Medical History:   Diagnosis Date    Breast cancer 2013    Diabetes mellitus     Genetic testing 05/29/2013    VUS    Hyperlipidemia     Hypertension     Malignant neoplasm of upper-outer quadrant of right breast in female, estrogen receptor positive 12/02/2015    Seizure disorder      Past Surgical History:   Procedure Laterality Date    BILATERAL SALPINGO-OOPHORECTOMY (BSO) N/A 3/25/2024    Procedure: SALPINGO-OOPHORECTOMY, BILATERAL;  Surgeon: Александр Washington MD;  Location: [facility] OR [address] [place] [address];  Service: OB/GYN;  Laterality: N/A;    BREAST BIOPSY      BREAST LUMPECTOMY Right 2013    CHOLECYSTECTOMY  3/25/2024    Procedure: CHOLECYSTECTOMY;  Surgeon: Bo Farmer MD;  Location: [facility] OR [address] [place] [address];  Service: General;;    DEBULKING OF TUMOR N/A 3/25/2024    Procedure: DEBULKING, NEOPLASM;  Surgeon: Александр Washington MD;  [facility];  Service: OB/GYN;  Laterality: N/A;    EXCISION, LIVER N/A 3/25/2024    Procedure: EXCISION, LIVER - partial;  Surgeon: Bo Farmer MD;  [facility];  Service: General;  Laterality: N/A;  bk ultrasound  [place] book by SAMINA on 3-21-24  7:00 a.m.  Conf #   [id number]    EYE SURGERY      LAPAROTOMY, EXPLORATORY N/A 3/25/2024    Procedure: LAPAROTOMY, EXPLORATORY;  Surgeon: Александр Washington MD;  [facility];  Service: OB/GYN;  Laterality: N/A;    OMENTECTOMY N/A 3/25/2024    Procedure: OMENTECTOMY;  Surgeon: Александр Washington MD;  [facility];  Service: OB/GYN;  Laterality: N/A;    PERITONEOCENTESIS N/A 3/13/2024    Procedure: PARACENTESIS, ABDOMINAL;  Surgeon: Flavia Moore MD;  Location: [facility] CATH LAB;  Service: Radiology;  Laterality: N/A;    PERITONEOCENTESIS N/A 3/21/2024    Procedure: PARACENTESIS, ABDOMINAL;  Surgeon: Jorge Jolley MD;  Location: [facility] CATH LAB;  Service: Radiology;  Laterality: N/A;    TOTAL ABDOMINAL HYSTERECTOMY N/A 3/25/2024    Procedure: HYSTERECTOMY, TOTAL, ABDOMINAL;  Surgeon: Александр Washington MD;  [facility];  Service: OB/GYN;  Laterality: N/A;    TOTAL REDUCTION MAMMOPLASTY Bilateral 2016     Family History   Problem Relation Name Age of Onset    Hypertension Mother      Seizures Father      Breast cancer Sister  48    Colon cancer Sister      Hypertension Brother      Hypertension Brother      Cancer Neg Hx      Ovarian cancer Neg Hx       Review of patient's allergies indicates:   Allergen Reactions    Codeine Other (See Comments)     Flu like s/s    Tramadol Other (See Comments)     Flu like symptoms similar to codeine reaction       Medications:    Current Outpatient Medications:     amlodipine (NORVASC) 10 MG tablet, Take 10 mg by mouth once daily. , Disp: , Rfl:     atorvastatin (LIPITOR) 40 MG tablet, Take 80 mg by mouth once daily., Disp: , Rfl:     ciprofloxacin HCl (CIPRO) 500 MG tablet, Take 1 tablet (500 mg total) by mouth every 12 (twelve) hours., Disp: 20 tablet, Rfl: 0    dexAMETHasone (DECADRON) 4 MG Tab, Take 2 tablets (8 mg total) by mouth once daily. daily on days 2-4 of your chemotherapy cycle., Disp: 6 tablet, Rfl: 5    enalapril (VASOTEC) 20 MG tablet,  Take 20 mg by mouth once daily., Disp: , Rfl:     fluticasone propionate (FLONASE) 50 mcg/actuation nasal spray, 1 spray by Each Nostril route once daily., Disp: , Rfl:     hydrochlorothiazide (HYDRODIURIL) 25 MG tablet, Take 25 mg by mouth once daily. , Disp: , Rfl:     hydrocodone-acetaminophen (HYCET) solution 7.5-325 mg/15mL, Take 30 mLs by mouth every 6 (six) hours as needed for Pain., Disp: 473 mL, Rfl: 0    HYDROmorphone (DILAUDID) 2 MG tablet, Take 1 tablet (2 mg total) by mouth every 8 (eight) hours as needed for Pain., Disp: 20 tablet, Rfl: 0    ibuprofen (ADVIL,MOTRIN) 600 MG tablet, Take 1 tablet (600 mg total) by mouth every 6 (six) hours as needed for Pain., Disp: 30 tablet, Rfl: 1    insulin aspart U-100 (NOVOLOG) 100 unit/mL (3 mL) InPn pen, Inject 3 Units into the skin 3 (three) times daily with meals.  (Discard pen 28 days after initial use), Disp: 3 mL, Rfl: 4    insulin detemir U-100, Levemir, (LEVEMIR FLEXPEN) 100 unit/mL (3 mL) InPn pen, Inject 7 Units into the skin every evening.  (Discard pen 28 days after initial use), Disp: 6 mL, Rfl: 3    insulin lispro (HUMALOG U-100 INSULIN) 100 unit/mL injection, **LOW CORRECTION DOSE** Blood Glucose mg/dL                  Pre-meal                 151-200                0 unit                       201-250                2 units                     251-300                3 units                     301-350                4 units                     >350                     5 units                     Administer subcutaneously if needed at times designated by monitoring schedule., Disp: 10 mL, Rfl: 1    ketorolac 0.5% (ACULAR) 0.5 % Drop, Place 1 drop into both eyes. For pain after eye injections., Disp: , Rfl:     lamoTRIgine (LAMICTAL) 25 MG tablet, Take 25 mg by mouth 2 (two) times daily., Disp: , Rfl:     lorazepam (ATIVAN) 0.5 MG tablet, Take 0.5 mg by mouth every 12 (twelve) hours as needed for Anxiety., Disp: , Rfl:     metformin (GLUCOPHAGE) 1000  "MG tablet, Take 1,000 mg by mouth daily with breakfast. , Disp: , Rfl:     metroNIDAZOLE (FLAGYL) 500 MG tablet, Take 1 tablet (500 mg total) by mouth every 12 (twelve) hours., Disp: 20 tablet, Rfl: 0    OLANZapine (ZYPREXA) 5 MG tablet, Take 1 tablet (5 mg total) by mouth every evening. nightly on days 1-4 of each chemotherapy cycle., Disp: 4 tablet, Rfl: 5    ondansetron (ZOFRAN-ODT) 4 MG TbDL, Take 1 tablet (4 mg total) by mouth every 6 (six) hours as needed (for nausea)., Disp: 20 tablet, Rfl: 0    pen needle, diabetic 32 gauge x 5/32" Ndle, Use to inject insulin 4 time daily, Disp: 100 each, Rfl: 0    prochlorperazine (COMPAZINE) 10 MG tablet, Take 1 tablet (10 mg total) by mouth every 6 (six) hours as needed (Nausea)., Disp: 20 tablet, Rfl: 5    quetiapine (SEROQUEL) 25 MG Tab, Take 25 mg by mouth daily as needed., Disp: , Rfl:     tirzepatide (MOUNJARO) 5 mg/0.5 mL PnIj, Inject 5 mg into the skin every 7 days. On Sunday., Disp: , Rfl:     OBJECTIVE:       ROS:  Review of Systems   Constitutional:  Positive for activity change and fatigue. Negative for appetite change.   Respiratory:  Negative for cough and shortness of breath.    Cardiovascular:  Negative for leg swelling.   Gastrointestinal:  Negative for abdominal distention.       Review of Symptoms      Symptom Assessment (ESAS 0-10 Scale)  Dyspnea:  0  Anorexia:  3  Fatigue:  4         Living Arrangements:  Lives with spouse    Psychosocial/Cultural:   See Palliative Psychosocial Note: Yes  - Lives with her , Vineet  - Worked as a nursing assistant at [employer]   **Primary  to Follow**  Palliative Care  Consult: No      Advance Care Planning   Advance Directives:   Goals of Care: The patient endorses that what is most important right now is to focus on spending time at home, remaining as independent as possible, and extending life as long as possible.    Accordingly, we have decided that the best plan to meet the " "patient's goals includes continuing with treatment          Physical Exam:  Vitals:    Physical Exam  Constitutional:       Appearance: She is ill-appearing.      Comments: Chronically ill appearing with temporal lobe wasting    Cardiovascular:      Rate and Rhythm: Normal rate.   Pulmonary:      Effort: No respiratory distress.   Abdominal:      General: There is no distension.   Skin:     Coloration: Skin is pale.   Neurological:      Mental Status: She is alert and oriented to person, place, and time.      Comments: Alert, awake, pleasant  Using a walker          Labs:  CBC:   WBC   Date Value Ref Range Status   05/10/2024 13.74 (H) 3.90 - 12.70 K/uL Final     Hemoglobin   Date Value Ref Range Status   05/10/2024 9.0 (L) 12.0 - 16.0 g/dL Final     POC Hematocrit   Date Value Ref Range Status   03/25/2024 15 (LL) 36 - 54 %PCV Final     Hematocrit   Date Value Ref Range Status   05/10/2024 29.4 (L) 37.0 - 48.5 % Final     MCV   Date Value Ref Range Status   05/10/2024 84 82 - 98 fL Final     Platelets   Date Value Ref Range Status   05/10/2024 464 (H) 150 - 450 K/uL Final       LFT:   Lab Results   Component Value Date    AST 16 05/10/2024    ALKPHOS 132 05/10/2024    BILITOT 0.4 05/10/2024       Albumin:   Albumin   Date Value Ref Range Status   05/10/2024 2.3 (L) 3.5 - 5.2 g/dL Final     Protein:   Total Protein   Date Value Ref Range Status   05/10/2024 6.8 6.0 - 8.4 g/dL Final       Radiology: I have reviewed the CT Abdomen pelvis from 4/5/24: "Air and fluid scattered within the abdomen and pelvis, it is unchanged from 03/29/2024.  There is a measurable air and fluid collection within the mid pelvis 7.8 x 6.6 cm x 5.2 cm.  Dehiscence of the surgical site cannot be excluded or other origin perforation.  An abscess could have this appearance.  The patient had a total abdominal hysterectomy and bilateral salpingo-oophorectomy, omentectomy, cholecystectomy.     Unchanged partially calcified right breast mass, is " "which correlates with mammogram 11/10/2023."    I have reviewed the US Lower extremity from 4/6/24: "No evidence of deep venous thrombosis in either lower extremity."    > 50% of 65 min visit spent in chart review, face to face discussion of symptom assessment, coordination of care with other specialists, and d/c planning    Signature: Rachel Altamirano DNP      "

## 2024-05-22 ENCOUNTER — TELEPHONE (OUTPATIENT)
Dept: GYNECOLOGIC ONCOLOGY | Facility: HOSPITAL | Age: 59
End: 2024-05-22
Payer: MEDICARE

## 2024-05-22 PROBLEM — J98.8 VIRAL RESPIRATORY INFECTION: Status: ACTIVE | Noted: 2024-05-22

## 2024-05-22 PROBLEM — J18.9 COMMUNITY ACQUIRED PNEUMONIA OF RIGHT LOWER LOBE OF LUNG: Status: RESOLVED | Noted: 2024-05-22 | Resolved: 2024-05-22

## 2024-05-22 PROBLEM — J18.9 COMMUNITY ACQUIRED PNEUMONIA OF RIGHT LOWER LOBE OF LUNG: Status: ACTIVE | Noted: 2024-05-22

## 2024-05-22 PROBLEM — B97.89 VIRAL RESPIRATORY INFECTION: Status: ACTIVE | Noted: 2024-05-22

## 2024-05-23 ENCOUNTER — HOSPITAL ENCOUNTER (INPATIENT)
Facility: HOSPITAL | Age: 59
LOS: 1 days | Discharge: HOME | DRG: 872 | End: 2024-05-24
Attending: STUDENT IN AN ORGANIZED HEALTH CARE EDUCATION/TRAINING PROGRAM | Admitting: STUDENT IN AN ORGANIZED HEALTH CARE EDUCATION/TRAINING PROGRAM
Payer: MEDICARE

## 2024-05-23 DIAGNOSIS — J18.9 LUNG INFECTION: ICD-10-CM

## 2024-05-23 DIAGNOSIS — R78.81 BACTEREMIA: Primary | ICD-10-CM

## 2024-05-23 DIAGNOSIS — R00.0 TACHYCARDIA: ICD-10-CM

## 2024-05-23 LAB
ADENOVIRUS: NOT DETECTED
ALBUMIN SERPL BCP-MCNC: 2.3 G/DL (ref 3.5–5.2)
ALBUMIN SERPL BCP-MCNC: 2.6 G/DL (ref 3.5–5.2)
ALLENS TEST: NORMAL
ALP SERPL-CCNC: 100 U/L (ref 55–135)
ALP SERPL-CCNC: 104 U/L (ref 55–135)
ALT SERPL W/O P-5'-P-CCNC: 10 U/L (ref 10–44)
ALT SERPL W/O P-5'-P-CCNC: 11 U/L (ref 10–44)
ANION GAP SERPL CALC-SCNC: 8 MMOL/L (ref 8–16)
ANION GAP SERPL CALC-SCNC: 9 MMOL/L (ref 8–16)
AST SERPL-CCNC: 15 U/L (ref 10–40)
AST SERPL-CCNC: 19 U/L (ref 10–40)
BASOPHILS # BLD AUTO: 0.03 K/UL (ref 0–0.2)
BASOPHILS NFR BLD: 0.6 % (ref 0–1.9)
BILIRUB SERPL-MCNC: 0.2 MG/DL (ref 0.1–1)
BILIRUB SERPL-MCNC: 0.2 MG/DL (ref 0.1–1)
BORDETELLA PARAPERTUSSIS (IS1001): NOT DETECTED
BORDETELLA PERTUSSIS (PTXP): NOT DETECTED
BUN SERPL-MCNC: 6 MG/DL (ref 6–20)
BUN SERPL-MCNC: 9 MG/DL (ref 6–20)
CALCIUM SERPL-MCNC: 7.9 MG/DL (ref 8.7–10.5)
CALCIUM SERPL-MCNC: 8.7 MG/DL (ref 8.7–10.5)
CHLAMYDIA PNEUMONIAE: NOT DETECTED
CHLORIDE SERPL-SCNC: 100 MMOL/L (ref 95–110)
CHLORIDE SERPL-SCNC: 102 MMOL/L (ref 95–110)
CO2 SERPL-SCNC: 20 MMOL/L (ref 23–29)
CO2 SERPL-SCNC: 27 MMOL/L (ref 23–29)
CORONAVIRUS 229E, COMMON COLD VIRUS: NOT DETECTED
CORONAVIRUS HKU1, COMMON COLD VIRUS: NOT DETECTED
CORONAVIRUS NL63, COMMON COLD VIRUS: NOT DETECTED
CORONAVIRUS OC43, COMMON COLD VIRUS: NOT DETECTED
CREAT SERPL-MCNC: 0.7 MG/DL (ref 0.5–1.4)
CREAT SERPL-MCNC: 0.7 MG/DL (ref 0.5–1.4)
DIFFERENTIAL METHOD BLD: ABNORMAL
EOSINOPHIL # BLD AUTO: 0.1 K/UL (ref 0–0.5)
EOSINOPHIL NFR BLD: 1.2 % (ref 0–8)
ERYTHROCYTE [DISTWIDTH] IN BLOOD BY AUTOMATED COUNT: 18.8 % (ref 11.5–14.5)
EST. GFR  (NO RACE VARIABLE): >60 ML/MIN/1.73 M^2
EST. GFR  (NO RACE VARIABLE): >60 ML/MIN/1.73 M^2
FLUBV RNA NPH QL NAA+NON-PROBE: NOT DETECTED
GLUCOSE SERPL-MCNC: 215 MG/DL (ref 70–110)
GLUCOSE SERPL-MCNC: 482 MG/DL (ref 70–110)
HCT VFR BLD AUTO: 27 % (ref 37–48.5)
HGB BLD-MCNC: 8.4 G/DL (ref 12–16)
HPIV1 RNA NPH QL NAA+NON-PROBE: NOT DETECTED
HPIV2 RNA NPH QL NAA+NON-PROBE: NOT DETECTED
HPIV3 RNA NPH QL NAA+NON-PROBE: DETECTED
HPIV4 RNA NPH QL NAA+NON-PROBE: NOT DETECTED
HUMAN METAPNEUMOVIRUS: NOT DETECTED
IMM GRANULOCYTES # BLD AUTO: 0.02 K/UL (ref 0–0.04)
IMM GRANULOCYTES NFR BLD AUTO: 0.4 % (ref 0–0.5)
INFLUENZA A (SUBTYPES H1,H1-2009,H3): NOT DETECTED
INFLUENZA A, MOLECULAR: NOT DETECTED
INFLUENZA B, MOLECULAR: NOT DETECTED
LACTATE SERPL-SCNC: 1.7 MMOL/L (ref 0.5–2.2)
LDH SERPL L TO P-CCNC: 2.1 MMOL/L (ref 0.5–2.2)
LYMPHOCYTES # BLD AUTO: 2 K/UL (ref 1–4.8)
LYMPHOCYTES NFR BLD: 40.6 % (ref 18–48)
MCH RBC QN AUTO: 25.5 PG (ref 27–31)
MCHC RBC AUTO-ENTMCNC: 31.1 G/DL (ref 32–36)
MCV RBC AUTO: 82 FL (ref 82–98)
MONOCYTES # BLD AUTO: 0.8 K/UL (ref 0.3–1)
MONOCYTES NFR BLD: 17.2 % (ref 4–15)
MYCOPLASMA PNEUMONIAE: NOT DETECTED
NEUTROPHILS # BLD AUTO: 2 K/UL (ref 1.8–7.7)
NEUTROPHILS NFR BLD: 40 % (ref 38–73)
NRBC BLD-RTO: 0 /100 WBC
OHS QRS DURATION: 74 MS
OHS QTC CALCULATION: 461 MS
PLATELET # BLD AUTO: 474 K/UL (ref 150–450)
PMV BLD AUTO: 9.6 FL (ref 9.2–12.9)
POCT GLUCOSE: 144 MG/DL (ref 70–110)
POCT GLUCOSE: 200 MG/DL (ref 70–110)
POCT GLUCOSE: 225 MG/DL (ref 70–110)
POCT GLUCOSE: 226 MG/DL (ref 70–110)
POCT GLUCOSE: 329 MG/DL (ref 70–110)
POTASSIUM SERPL-SCNC: 3.3 MMOL/L (ref 3.5–5.1)
POTASSIUM SERPL-SCNC: 3.6 MMOL/L (ref 3.5–5.1)
PROT SERPL-MCNC: 5.9 G/DL (ref 6–8.4)
PROT SERPL-MCNC: 6.8 G/DL (ref 6–8.4)
RBC # BLD AUTO: 3.3 M/UL (ref 4–5.4)
RESPIRATORY INFECTION PANEL SOURCE: ABNORMAL
RSV AG BY MOLECULAR METHOD: NOT DETECTED
RSV RNA NPH QL NAA+NON-PROBE: NOT DETECTED
RV+EV RNA NPH QL NAA+NON-PROBE: NOT DETECTED
SAMPLE: NORMAL
SARS-COV-2 RNA RESP QL NAA+PROBE: NOT DETECTED
SARS-COV-2 RNA RESP QL NAA+PROBE: NOT DETECTED
SITE: NORMAL
SODIUM SERPL-SCNC: 131 MMOL/L (ref 136–145)
SODIUM SERPL-SCNC: 135 MMOL/L (ref 136–145)
WBC # BLD AUTO: 4.88 K/UL (ref 3.9–12.7)

## 2024-05-23 PROCEDURE — 93005 ELECTROCARDIOGRAM TRACING: CPT | Mod: HCNC

## 2024-05-23 PROCEDURE — 80053 COMPREHEN METABOLIC PANEL: CPT | Mod: HCNC | Performed by: STUDENT IN AN ORGANIZED HEALTH CARE EDUCATION/TRAINING PROGRAM

## 2024-05-23 PROCEDURE — 63600175 PHARM REV CODE 636 W HCPCS: Mod: HCNC | Performed by: GENERAL PRACTICE

## 2024-05-23 PROCEDURE — 25000003 PHARM REV CODE 250: Mod: HCNC

## 2024-05-23 PROCEDURE — 0241U SARS-COV2 (COVID) WITH FLU/RSV BY PCR: CPT | Mod: HCNC

## 2024-05-23 PROCEDURE — 63600175 PHARM REV CODE 636 W HCPCS: Mod: HCNC | Performed by: STUDENT IN AN ORGANIZED HEALTH CARE EDUCATION/TRAINING PROGRAM

## 2024-05-23 PROCEDURE — 96365 THER/PROPH/DIAG IV INF INIT: CPT | Mod: HCNC

## 2024-05-23 PROCEDURE — 25000242 PHARM REV CODE 250 ALT 637 W/ HCPCS: Mod: HCNC | Performed by: GENERAL PRACTICE

## 2024-05-23 PROCEDURE — 25000003 PHARM REV CODE 250: Mod: HCNC | Performed by: GENERAL PRACTICE

## 2024-05-23 PROCEDURE — 85025 COMPLETE CBC W/AUTO DIFF WBC: CPT | Mod: HCNC

## 2024-05-23 PROCEDURE — 99223 1ST HOSP IP/OBS HIGH 75: CPT | Mod: HCNC,,, | Performed by: INTERNAL MEDICINE

## 2024-05-23 PROCEDURE — 25000003 PHARM REV CODE 250: Mod: HCNC | Performed by: STUDENT IN AN ORGANIZED HEALTH CARE EDUCATION/TRAINING PROGRAM

## 2024-05-23 PROCEDURE — 80053 COMPREHEN METABOLIC PANEL: CPT | Mod: 91,HCNC | Performed by: GENERAL PRACTICE

## 2024-05-23 PROCEDURE — 93010 ELECTROCARDIOGRAM REPORT: CPT | Mod: HCNC,,, | Performed by: INTERNAL MEDICINE

## 2024-05-23 PROCEDURE — 83605 ASSAY OF LACTIC ACID: CPT | Mod: HCNC

## 2024-05-23 PROCEDURE — 99900035 HC TECH TIME PER 15 MIN (STAT): Mod: HCNC

## 2024-05-23 PROCEDURE — 87798 DETECT AGENT NOS DNA AMP: CPT | Mod: 59,HCNC | Performed by: INTERNAL MEDICINE

## 2024-05-23 PROCEDURE — 96366 THER/PROPH/DIAG IV INF ADDON: CPT | Mod: HCNC

## 2024-05-23 PROCEDURE — 99285 EMERGENCY DEPT VISIT HI MDM: CPT | Mod: 25,HCNC

## 2024-05-23 PROCEDURE — 83605 ASSAY OF LACTIC ACID: CPT | Mod: HCNC | Performed by: PHYSICIAN ASSISTANT

## 2024-05-23 PROCEDURE — 87040 BLOOD CULTURE FOR BACTERIA: CPT | Mod: HCNC

## 2024-05-23 PROCEDURE — 63600175 PHARM REV CODE 636 W HCPCS: Mod: HCNC

## 2024-05-23 PROCEDURE — 87486 CHLMYD PNEUM DNA AMP PROBE: CPT | Mod: HCNC | Performed by: INTERNAL MEDICINE

## 2024-05-23 PROCEDURE — 20600001 HC STEP DOWN PRIVATE ROOM: Mod: HCNC

## 2024-05-23 PROCEDURE — 99223 1ST HOSP IP/OBS HIGH 75: CPT | Mod: AI,HCNC,24, | Performed by: STUDENT IN AN ORGANIZED HEALTH CARE EDUCATION/TRAINING PROGRAM

## 2024-05-23 RX ORDER — SODIUM CHLORIDE 0.9 % (FLUSH) 0.9 %
10 SYRINGE (ML) INJECTION
Status: DISCONTINUED | OUTPATIENT
Start: 2024-05-23 | End: 2024-05-24 | Stop reason: HOSPADM

## 2024-05-23 RX ORDER — IBUPROFEN 600 MG/1
600 TABLET ORAL EVERY 6 HOURS PRN
Status: DISCONTINUED | OUTPATIENT
Start: 2024-05-23 | End: 2024-05-24 | Stop reason: HOSPADM

## 2024-05-23 RX ORDER — IBUPROFEN 200 MG
16 TABLET ORAL
Status: DISCONTINUED | OUTPATIENT
Start: 2024-05-23 | End: 2024-05-24 | Stop reason: HOSPADM

## 2024-05-23 RX ORDER — VANCOMYCIN HCL IN 5 % DEXTROSE 1.25 G/25
15 PLASTIC BAG, INJECTION (ML) INTRAVENOUS
Status: DISCONTINUED | OUTPATIENT
Start: 2024-05-23 | End: 2024-05-23

## 2024-05-23 RX ORDER — PROMETHAZINE HYDROCHLORIDE 25 MG/1
25 TABLET ORAL EVERY 6 HOURS PRN
Status: DISCONTINUED | OUTPATIENT
Start: 2024-05-23 | End: 2024-05-24 | Stop reason: HOSPADM

## 2024-05-23 RX ORDER — AMLODIPINE BESYLATE 10 MG/1
10 TABLET ORAL DAILY
Status: DISCONTINUED | OUTPATIENT
Start: 2024-05-23 | End: 2024-05-24 | Stop reason: HOSPADM

## 2024-05-23 RX ORDER — INSULIN ASPART 100 [IU]/ML
3 INJECTION, SOLUTION INTRAVENOUS; SUBCUTANEOUS
Status: DISCONTINUED | OUTPATIENT
Start: 2024-05-23 | End: 2024-05-24 | Stop reason: HOSPADM

## 2024-05-23 RX ORDER — HYDROCODONE BITARTRATE AND ACETAMINOPHEN 5; 325 MG/1; MG/1
1 TABLET ORAL EVERY 4 HOURS PRN
Status: DISCONTINUED | OUTPATIENT
Start: 2024-05-23 | End: 2024-05-24 | Stop reason: HOSPADM

## 2024-05-23 RX ORDER — INSULIN GLARGINE 100 [IU]/ML
7 INJECTION, SOLUTION SUBCUTANEOUS NIGHTLY
Status: DISCONTINUED | OUTPATIENT
Start: 2024-05-23 | End: 2024-05-24 | Stop reason: HOSPADM

## 2024-05-23 RX ORDER — INSULIN ASPART 100 [IU]/ML
0-5 INJECTION, SOLUTION INTRAVENOUS; SUBCUTANEOUS
Status: DISCONTINUED | OUTPATIENT
Start: 2024-05-23 | End: 2024-05-23

## 2024-05-23 RX ORDER — POTASSIUM CHLORIDE 20 MEQ/1
40 TABLET, EXTENDED RELEASE ORAL ONCE
Status: COMPLETED | OUTPATIENT
Start: 2024-05-23 | End: 2024-05-23

## 2024-05-23 RX ORDER — INSULIN ASPART 100 [IU]/ML
0-5 INJECTION, SOLUTION INTRAVENOUS; SUBCUTANEOUS EVERY 4 HOURS PRN
Status: DISCONTINUED | OUTPATIENT
Start: 2024-05-23 | End: 2024-05-23

## 2024-05-23 RX ORDER — POTASSIUM CHLORIDE 7.45 MG/ML
10 INJECTION INTRAVENOUS
Status: COMPLETED | OUTPATIENT
Start: 2024-05-23 | End: 2024-05-23

## 2024-05-23 RX ORDER — IBUPROFEN 200 MG
24 TABLET ORAL
Status: DISCONTINUED | OUTPATIENT
Start: 2024-05-23 | End: 2024-05-24 | Stop reason: HOSPADM

## 2024-05-23 RX ORDER — SIMETHICONE 80 MG
1 TABLET,CHEWABLE ORAL 3 TIMES DAILY PRN
Status: DISCONTINUED | OUTPATIENT
Start: 2024-05-23 | End: 2024-05-24 | Stop reason: HOSPADM

## 2024-05-23 RX ORDER — ENOXAPARIN SODIUM 100 MG/ML
40 INJECTION SUBCUTANEOUS EVERY 24 HOURS
Status: DISCONTINUED | OUTPATIENT
Start: 2024-05-23 | End: 2024-05-24 | Stop reason: HOSPADM

## 2024-05-23 RX ORDER — INSULIN ASPART 100 [IU]/ML
0-5 INJECTION, SOLUTION INTRAVENOUS; SUBCUTANEOUS
Status: DISCONTINUED | OUTPATIENT
Start: 2024-05-23 | End: 2024-05-24 | Stop reason: HOSPADM

## 2024-05-23 RX ORDER — LAMOTRIGINE 25 MG/1
100 TABLET ORAL DAILY
Status: DISCONTINUED | OUTPATIENT
Start: 2024-05-23 | End: 2024-05-24 | Stop reason: HOSPADM

## 2024-05-23 RX ORDER — ACETAMINOPHEN 325 MG/1
650 TABLET ORAL EVERY 4 HOURS PRN
Status: DISCONTINUED | OUTPATIENT
Start: 2024-05-23 | End: 2024-05-24 | Stop reason: HOSPADM

## 2024-05-23 RX ORDER — GUAIFENESIN AND DEXTROMETHORPHAN HYDROBROMIDE 10; 100 MG/5ML; MG/5ML
5 SYRUP ORAL EVERY 4 HOURS PRN
Status: DISCONTINUED | OUTPATIENT
Start: 2024-05-23 | End: 2024-05-24 | Stop reason: HOSPADM

## 2024-05-23 RX ORDER — LISINOPRIL 5 MG/1
40 TABLET ORAL DAILY
Status: DISCONTINUED | OUTPATIENT
Start: 2024-05-23 | End: 2024-05-24 | Stop reason: HOSPADM

## 2024-05-23 RX ORDER — GLUCAGON 1 MG
1 KIT INJECTION
Status: DISCONTINUED | OUTPATIENT
Start: 2024-05-23 | End: 2024-05-24 | Stop reason: HOSPADM

## 2024-05-23 RX ORDER — CALCIUM CARBONATE 200(500)MG
500 TABLET,CHEWABLE ORAL 3 TIMES DAILY PRN
Status: DISCONTINUED | OUTPATIENT
Start: 2024-05-23 | End: 2024-05-24 | Stop reason: HOSPADM

## 2024-05-23 RX ORDER — ONDANSETRON 8 MG/1
8 TABLET, ORALLY DISINTEGRATING ORAL EVERY 8 HOURS PRN
Status: DISCONTINUED | OUTPATIENT
Start: 2024-05-23 | End: 2024-05-24 | Stop reason: HOSPADM

## 2024-05-23 RX ORDER — BENZONATATE 100 MG/1
100 CAPSULE ORAL 3 TIMES DAILY
Status: DISCONTINUED | OUTPATIENT
Start: 2024-05-23 | End: 2024-05-24 | Stop reason: HOSPADM

## 2024-05-23 RX ORDER — ATORVASTATIN CALCIUM 40 MG/1
80 TABLET, FILM COATED ORAL DAILY
Status: DISCONTINUED | OUTPATIENT
Start: 2024-05-23 | End: 2024-05-24 | Stop reason: HOSPADM

## 2024-05-23 RX ORDER — OLANZAPINE 2.5 MG/1
5 TABLET ORAL NIGHTLY
Status: DISCONTINUED | OUTPATIENT
Start: 2024-05-23 | End: 2024-05-24 | Stop reason: HOSPADM

## 2024-05-23 RX ORDER — HYDROCODONE BITARTRATE AND ACETAMINOPHEN 10; 325 MG/1; MG/1
1 TABLET ORAL EVERY 4 HOURS PRN
Status: DISCONTINUED | OUTPATIENT
Start: 2024-05-23 | End: 2024-05-24 | Stop reason: HOSPADM

## 2024-05-23 RX ORDER — GUAIFENESIN 600 MG/1
1200 TABLET, EXTENDED RELEASE ORAL 2 TIMES DAILY
Status: DISCONTINUED | OUTPATIENT
Start: 2024-05-23 | End: 2024-05-24 | Stop reason: HOSPADM

## 2024-05-23 RX ORDER — TALC
6 POWDER (GRAM) TOPICAL NIGHTLY PRN
Status: DISCONTINUED | OUTPATIENT
Start: 2024-05-23 | End: 2024-05-24 | Stop reason: HOSPADM

## 2024-05-23 RX ORDER — FLUTICASONE PROPIONATE 50 MCG
1 SPRAY, SUSPENSION (ML) NASAL DAILY
Status: DISCONTINUED | OUTPATIENT
Start: 2024-05-23 | End: 2024-05-24 | Stop reason: HOSPADM

## 2024-05-23 RX ORDER — QUETIAPINE FUMARATE 25 MG/1
25 TABLET, FILM COATED ORAL DAILY PRN
Status: DISCONTINUED | OUTPATIENT
Start: 2024-05-23 | End: 2024-05-24 | Stop reason: HOSPADM

## 2024-05-23 RX ORDER — HYDROCHLOROTHIAZIDE 12.5 MG/1
25 TABLET ORAL DAILY
Status: DISCONTINUED | OUTPATIENT
Start: 2024-05-23 | End: 2024-05-24 | Stop reason: HOSPADM

## 2024-05-23 RX ADMIN — VANCOMYCIN HYDROCHLORIDE 1250 MG: 1.25 INJECTION, POWDER, LYOPHILIZED, FOR SOLUTION INTRAVENOUS at 07:05

## 2024-05-23 RX ADMIN — ENOXAPARIN SODIUM 40 MG: 40 INJECTION SUBCUTANEOUS at 06:05

## 2024-05-23 RX ADMIN — POTASSIUM CHLORIDE 10 MEQ: 7.46 INJECTION, SOLUTION INTRAVENOUS at 06:05

## 2024-05-23 RX ADMIN — POTASSIUM CHLORIDE 40 MEQ: 1500 TABLET, EXTENDED RELEASE ORAL at 12:05

## 2024-05-23 RX ADMIN — Medication 6 MG: at 10:05

## 2024-05-23 RX ADMIN — IBUPROFEN 600 MG: 600 TABLET ORAL at 09:05

## 2024-05-23 RX ADMIN — POTASSIUM BICARBONATE 25 MEQ: 978 TABLET, EFFERVESCENT ORAL at 05:05

## 2024-05-23 RX ADMIN — POTASSIUM CHLORIDE 10 MEQ: 7.46 INJECTION, SOLUTION INTRAVENOUS at 05:05

## 2024-05-23 RX ADMIN — BENZONATATE 100 MG: 100 CAPSULE ORAL at 04:05

## 2024-05-23 RX ADMIN — GUAIFENESIN 1200 MG: 600 TABLET, EXTENDED RELEASE ORAL at 09:05

## 2024-05-23 RX ADMIN — LISINOPRIL 40 MG: 20 TABLET ORAL at 09:05

## 2024-05-23 RX ADMIN — BENZONATATE 100 MG: 100 CAPSULE ORAL at 09:05

## 2024-05-23 RX ADMIN — INSULIN ASPART 3 UNITS: 100 INJECTION, SOLUTION INTRAVENOUS; SUBCUTANEOUS at 12:05

## 2024-05-23 RX ADMIN — INSULIN ASPART 3 UNITS: 100 INJECTION, SOLUTION INTRAVENOUS; SUBCUTANEOUS at 06:05

## 2024-05-23 RX ADMIN — INSULIN ASPART 4 UNITS: 100 INJECTION, SOLUTION INTRAVENOUS; SUBCUTANEOUS at 06:05

## 2024-05-23 RX ADMIN — VANCOMYCIN HYDROCHLORIDE 2000 MG: 500 INJECTION, POWDER, LYOPHILIZED, FOR SOLUTION INTRAVENOUS at 02:05

## 2024-05-23 RX ADMIN — OLANZAPINE 5 MG: 2.5 TABLET, FILM COATED ORAL at 09:05

## 2024-05-23 RX ADMIN — SODIUM CHLORIDE, POTASSIUM CHLORIDE, SODIUM LACTATE AND CALCIUM CHLORIDE 500 ML: 600; 310; 30; 20 INJECTION, SOLUTION INTRAVENOUS at 05:05

## 2024-05-23 RX ADMIN — LAMOTRIGINE 100 MG: 100 TABLET ORAL at 09:05

## 2024-05-23 RX ADMIN — ATORVASTATIN CALCIUM 80 MG: 40 TABLET, FILM COATED ORAL at 09:05

## 2024-05-23 RX ADMIN — INSULIN GLARGINE 7 UNITS: 100 INJECTION, SOLUTION SUBCUTANEOUS at 09:05

## 2024-05-23 RX ADMIN — IBUPROFEN 600 MG: 600 TABLET ORAL at 10:05

## 2024-05-23 RX ADMIN — FLUTICASONE PROPIONATE 50 MCG: 50 SPRAY, METERED NASAL at 10:05

## 2024-05-23 NOTE — ED TRIAGE NOTES
Anette Castillo Radhames, a 58 y.o. female presents to the ED w/ complaint of positive blood cultures.     Triage note:  Chief Complaint   Patient presents with    doctor referral     Referred by pcp for abnormal labs, unsure what it was. Hx of ovarian CA. +blood culture results noted in chart     Review of patient's allergies indicates:   Allergen Reactions    Codeine Other (See Comments)     Flu like s/s    Tramadol Other (See Comments)     Flu like symptoms similar to codeine reaction     Past Medical History:   Diagnosis Date    Breast cancer 2013    Diabetes mellitus     Genetic testing 05/29/2013    VUS    Hyperlipidemia     Hypertension     Malignant neoplasm of upper-outer quadrant of right breast in female, estrogen receptor positive 12/02/2015    Seizure disorder

## 2024-05-23 NOTE — CARE UPDATE
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

## 2024-05-23 NOTE — SUBJECTIVE & OBJECTIVE
Past Medical History:   Diagnosis Date    Breast cancer 2013    Diabetes mellitus     Genetic testing 05/29/2013    VUS    Hyperlipidemia     Hypertension     Malignant neoplasm of upper-outer quadrant of right breast in female, estrogen receptor positive 12/02/2015    Seizure disorder        Past Surgical History:   Procedure Laterality Date    BILATERAL SALPINGO-OOPHORECTOMY (BSO) N/A 3/25/2024    Procedure: SALPINGO-OOPHORECTOMY, BILATERAL;  Surgeon: Александр Washington MD;  Location: [facility] OR [address] [place] [address];  Service: OB/GYN;  Laterality: N/A;    BREAST BIOPSY      BREAST LUMPECTOMY Right 2013    CHOLECYSTECTOMY  3/25/2024    Procedure: CHOLECYSTECTOMY;  Surgeon: Bo Farmer MD;  Location: [facility] OR [address] [place] [address];  Service: General;;    DEBULKING OF TUMOR N/A 3/25/2024    Procedure: DEBULKING, NEOPLASM;  Surgeon: Александр Washington MD;  Location: [facility] OR [address] [place] [address];  Service: OB/GYN;  Laterality: N/A;    EXCISION, LIVER N/A 3/25/2024    Procedure: EXCISION, LIVER - partial;  Surgeon: Bo Farmer MD;  Location: [facility] OR [address] [place] [address];  Service: General;  Laterality: N/A;  bk ultrasound  [place] book by TA on 3-21-24  7:00 a.m.  Conf #  [id number]    EYE SURGERY      LAPAROTOMY, EXPLORATORY N/A 3/25/2024    Procedure: LAPAROTOMY, EXPLORATORY;  Surgeon: Александр Washington MD;  Location: [facility] OR [address] [place] [address];  Service: OB/GYN;  Laterality: N/A;    OMENTECTOMY N/A 3/25/2024    Procedure: OMENTECTOMY;  Surgeon: Александр Washington MD;  Location: [facility] OR [address] [place] [address];  Service: OB/GYN;  Laterality: N/A;    PERITONEOCENTESIS N/A 3/13/2024    Procedure: PARACENTESIS, ABDOMINAL;  Surgeon: Flavia Moore MD;  Location: [facility] CATH LAB;  Service: Radiology;  Laterality: N/A;    PERITONEOCENTESIS N/A 3/21/2024    Procedure: PARACENTESIS, ABDOMINAL;  Surgeon: Jorge Jolley MD;  Location: [facility] CATH LAB;  Service: Radiology;  Laterality: N/A;    TOTAL ABDOMINAL HYSTERECTOMY N/A 3/25/2024    Procedure: HYSTERECTOMY, TOTAL, ABDOMINAL;   "Surgeon: Александр Washington MD;  Location: [facility] OR [address] [place] [address];  Service: OB/GYN;  Laterality: N/A;    TOTAL REDUCTION MAMMOPLASTY Bilateral 2016       Review of patient's allergies indicates:   Allergen Reactions    Codeine Other (See Comments)     Flu like s/s    Tramadol Other (See Comments)     Flu like symptoms similar to codeine reaction       Medications:  (Not in a hospital admission)    Antibiotics (From admission, onward)      Start     Stop Route Frequency Ordered    05/23/24 1800  vancomycin 1,250 mg in dextrose 5 % (D5W) 250 mL IVPB (Vial-Mate)         -- IV Every 12 hours (non-standard times) 05/23/24 1117    05/23/24 1010  vancomycin - pharmacy to dose  (vancomycin IVPB (PEDS and ADULTS))        Placed in "And" Linked Group    -- IV pharmacy to manage frequency 05/23/24 0911          Antifungals (From admission, onward)      None          Antivirals (From admission, onward)      None             Immunization History   Administered Date(s) Administered    COVID-19 MRNA, LN-S PF (MODERNA HALF 0.25 ML DOSE) 12/11/2021    COVID-19, MRNA, LN-S, PF (MODERNA FULL 0.5 ML DOSE) 03/01/2021, 03/29/2021    COVID-19, mRNA, LNP-S, PF (Moderna 2023)Ages 12+ 01/15/2024    Influenza - Quadrivalent - MDCK - PF 10/06/2017       Family History       Problem Relation (Age of Onset)    Breast cancer Sister (48)    Colon cancer Sister    Hypertension Mother, Brother, Brother    Seizures Father          Social History     Socioeconomic History    Marital status:    Tobacco Use    Smoking status: Never    Smokeless tobacco: Never   Substance and Sexual Activity    Alcohol use: Yes     Alcohol/week: 0.0 standard drinks of alcohol     Comment: ocassional    Drug use: No    Sexual activity: Not Currently     Partners: Male     Birth control/protection: None     Social Determinants of Health     Financial Resource Strain: Low Risk  (4/7/2024)    Overall Financial Resource Strain (CARDIA)     Difficulty of Paying Living " Expenses: Not hard at all   Food Insecurity: No Food Insecurity (4/7/2024)    Hunger Vital Sign     Worried About Running Out of Food in the Last Year: Never true     Ran Out of Food in the Last Year: Never true   Transportation Needs: No Transportation Needs (4/7/2024)    PRAPARE - Transportation     Lack of Transportation (Medical): No     Lack of Transportation (Non-Medical): No   Physical Activity: Inactive (2/28/2024)    Exercise Vital Sign     Days of Exercise per Week: 0 days     Minutes of Exercise per Session: 0 min   Stress: No Stress Concern Present (4/7/2024)    [employer] [employer] [employer] [facility] [employer] - Occupational Stress Questionnaire     Feeling of Stress : Not at all   Recent Concern: Stress - Stress Concern Present (2/28/2024)    [employer] [employer] [facility] [employer] - Occupational Stress Questionnaire     Feeling of Stress : To some extent   Housing Stability: Low Risk  (4/7/2024)    Housing Stability Vital Sign     Unable to Pay for Housing in the Last Year: No     Number of Places Lived in the Last Year: 1     Unstable Housing in the Last Year: No     Review of Systems   Constitutional:  Negative for activity change, chills and fever.   HENT:  Positive for postnasal drip, sinus pressure and sore throat. Negative for congestion, mouth sores and rhinorrhea.    Eyes:  Negative for photophobia, pain and redness.   Respiratory:  Positive for cough. Negative for chest tightness, shortness of breath and wheezing.    Cardiovascular:  Negative for chest pain and leg swelling.   Gastrointestinal:  Negative for abdominal distention, abdominal pain, diarrhea, nausea and vomiting.   Endocrine: Negative for polyuria.   Genitourinary:  Negative for decreased urine volume, dysuria and flank pain.   Musculoskeletal:  Negative for joint swelling and neck pain.   Skin:  Positive for wound. Negative for color change.   Allergic/Immunologic: Negative for food allergies.   Neurological:  Negative for dizziness,  weakness and headaches.   Hematological:  Negative for adenopathy.   Psychiatric/Behavioral:  Negative for agitation and confusion. The patient is not nervous/anxious.      Objective:     Vital Signs (Most Recent):  Temp: 98.4 °F (36.9 °C) (05/23/24 1310)  Pulse: 103 (05/23/24 1310)  Resp: 20 (05/23/24 1310)  BP: 135/78 (05/23/24 1310)  SpO2: 99 % (05/23/24 1310) Vital Signs (24h Range):  Temp:  [98.1 °F (36.7 °C)-98.8 °F (37.1 °C)] 98.4 °F (36.9 °C)  Pulse:  [] 103  Resp:  [18-21] 20  SpO2:  [96 %-100 %] 99 %  BP: (134-180)/() 135/78     Weight: 78 kg (171 lb 15.3 oz)  Body mass index is 26.93 kg/m².    Estimated Creatinine Clearance: 93.2 mL/min (based on SCr of 0.7 mg/dL).     Physical Exam  Constitutional:       Appearance: She is well-developed.   HENT:      Head: Normocephalic and atraumatic.      Comments: Maxillary sinus TTP  Eyes:      Pupils: Pupils are equal, round, and reactive to light.   Cardiovascular:      Rate and Rhythm: Normal rate.   Pulmonary:      Effort: Pulmonary effort is normal.      Breath sounds: Normal breath sounds.   Abdominal:      General: Bowel sounds are normal.      Palpations: Abdomen is soft.      Comments: Lower abd op site completely healed, midline superior op site healing well without drainage or surrounding erythema.   Musculoskeletal:         General: No tenderness.      Cervical back: Normal range of motion and neck supple.   Skin:     General: Skin is warm and dry.      Comments: L chest wall port unremarkable   Neurological:      Mental Status: She is alert and oriented to person, place, and time.          Significant Labs: CBC:   Recent Labs   Lab 05/22/24  0606 05/23/24  0236   WBC 4.76 4.88   HGB 8.2* 8.4*   HCT 26.4* 27.0*   * 474*     CMP:   Recent Labs   Lab 05/22/24  0606 05/23/24  0331 05/23/24  0929    131* 135*   K 3.2* 3.3* 3.6    102 100   CO2 26 20* 27   * 482* 215*   BUN 9 9 6   CREATININE 0.6 0.7 0.7   CALCIUM 8.5*  7.9* 8.7   PROT  --  5.9* 6.8   ALBUMIN  --  2.3* 2.6*   BILITOT  --  0.2 0.2   ALKPHOS  --  100 104   AST  --  15 19   ALT  --  10 11   ANIONGAP 9 9 8       Significant Imaging: I have reviewed all pertinent imaging results/findings within the past 24 hours.

## 2024-05-23 NOTE — HPI
59 y/o F h/o stage IVB ovarian carcinosarcoma s/p PDS on 3/25/24 (CARLOS ALBERTO BSO omx liver mobilization, peritoneal & R diaphragm stripping, hepatic wedge resection, cholecystectomy) malignant ascites, C2 carbo/taxol/bevacizumab, post-op infected pelvic fluid collection drained by IR which grew ESBL Ecoli and wound grew aspergillus sp (has not been treated), presented to the ED after sent from clinic with tachycardia, tachypnea and URI symptoms (cough, SOB, general malaise) x 2 days with blood cultures growing staph epi.  CXR with R pleural effusion, surgical abdominal wounds healing very well and she denies abdominal pain

## 2024-05-23 NOTE — ASSESSMENT & PLAN NOTE
57 y/o F h/o stage IVB ovarian carcinosarcoma s/p PDS on 3/25/24 (CARLOS ALBERTO BSO omx liver mobilization, peritoneal & R diaphragm stripping, hepatic wedge resection, cholecystectomy) malignant ascites, C2 carbo/taxol/bevacizumab, post-op infected pelvic fluid collection drained by IR which grew ESBL Ecoli and wound grew aspergillus sp (has not been treated), presented to the ED after sent from clinic with tachycardia, tachypnea and URI symptoms (cough, SOB, general malaise) x 2 days with blood cultures growing staph epi.  CXR with R pleural effusion, surgical abdominal wounds healing very well and she denies abdominal pain  - unclear if staph epi is pathogen given only having URI symptoms  - check RIP   - can continue vancomycin for now  - f/u repeat cultures  - at this time her abdominal wounds have healed nicely, her abdominal exam is unremarkable and therefore it is extremely unlikely that aspergillus sp growing from wound swab represents pathogen.  The wounds would not have improved without antifungal therapy.  She also has no other signs/symptoms of invasive aspergillosis and at this time would monitor and repeat imaging at some point in future per gyn onc team.   - discussed with team will follow   NP Note discussed with  Primary Attending    Patient is a 79y old  Male who presents with a chief complaint of low hb level in NH (05 Oct 2017 19:36)      INTERVAL HPI/OVERNIGHT EVENTS: no new complaints    MEDICATIONS  (STANDING):  acetaminophen  IVPB. 1000 milliGRAM(s) IV Intermittent once  ALBUTerol    90 MICROgram(s) HFA Inhaler 2 Puff(s) Inhalation every 6 hours  ascorbic acid 500 milliGRAM(s) Oral daily  aspirin enteric coated 81 milliGRAM(s) Oral daily  dextrose 5%. 1000 milliLiter(s) (50 mL/Hr) IV Continuous <Continuous>  dextrose 50% Injectable 12.5 Gram(s) IV Push once  docusate sodium 100 milliGRAM(s) Oral two times a day  ertapenem  IVPB      ertapenem  IVPB 1000 milliGRAM(s) IV Intermittent every 24 hours  ferrous    sulfate 325 milliGRAM(s) Oral two times a day with meals  folic acid 1 milliGRAM(s) Oral daily  heparin  Injectable 5000 Unit(s) SubCutaneous every 12 hours  influenza   Vaccine 0.5 milliLiter(s) IntraMuscular once  insulin lispro (HumaLOG) corrective regimen sliding scale   SubCutaneous three times a day before meals  multivitamin 1 Tablet(s) Oral daily  pantoprazole    Tablet 40 milliGRAM(s) Oral before breakfast  polyethylene glycol 3350 17 Gram(s) Oral daily  sucralfate suspension 1 Gram(s) Oral every 6 hours  tamsulosin 0.4 milliGRAM(s) Oral at bedtime  tiotropium 18 MICROgram(s) Capsule 1 Capsule(s) Inhalation daily  warfarin 5 milliGRAM(s) Oral once  zinc sulfate 220 milliGRAM(s) Oral daily    MEDICATIONS  (PRN):  dextrose Gel 1 Dose(s) Oral once PRN Blood Glucose LESS THAN 70 milliGRAM(s)/deciLiter  glucagon  Injectable 1 milliGRAM(s) IntraMuscular once PRN Glucose <70 milliGRAM(s)/deciLiter      __________________________________________________  REVIEW OF SYSTEMS:    CONSTITUTIONAL: No fever,   EYES: no acute visual disturbances  NECK: No pain or stiffness  RESPIRATORY: No cough; No shortness of breath  CARDIOVASCULAR: No chest pain, no palpitations  GASTROINTESTINAL: No pain. No nausea or vomiting; No diarrhea   NEUROLOGICAL: No headache or numbness, no tremors  MUSCULOSKELETAL: No joint pain, no muscle pain  GENITOURINARY: no dysuria, no frequency, no hesitancy  PSYCHIATRY: no depression , no anxiety  ALL OTHER  ROS negative    T(C): 37.3 (10-14-17 @ 05:39), Max: 37.3 (10-14-17 @ 05:39)  HR: 110 (10-14-17 @ 05:39) (99 - 110)  BP: 117/88 (10-14-17 @ 05:39) (117/88 - 127/93)  RR: 16 (10-14-17 @ 05:39) (16 - 17)  SpO2: 99% (10-14-17 @ 05:39) (99% - 100%)  Wt(kg): --    ________________________________________________  PHYSICAL EXAM:  GENERAL: NAD  HEENT: Normocephalic;  conjunctivae and sclerae clear; moist mucous membranes;   NECK : supple  CHEST/LUNG: Clear to auscultation bilaterally with good air entry   HEART: S1 S2  regular; no murmurs, gallops or rubs  ABDOMEN: Soft, Nontender, Nondistended; Bowel sounds present.    EXTREMITIES: no cyanosis; no edema; no calf tenderness  SKIN: warm and dry; no rash.  Sacral dressing changed  NERVOUS SYSTEM:  Awake and alert; Oriented  to place, person and time ; no new deficits    _________________________________________________  LABS:  10-13    145  |  111<H>  |  26<H>  ----------------------------<  148<H>  4.7   |  25  |  0.96    Ca    9.0      13 Oct 2017 07:29    10-13    145  |  111<H>  |  26<H>  ----------------------------<  148<H>  4.7   |  25  |  0.96    Ca    9.0      13 Oct 2017 07:29          PT/INR - ( 13 Oct 2017 07:29 )   PT: 16.5 sec;   INR: 1.50 ratio             CAPILLARY BLOOD GLUCOSE      POCT Blood Glucose.: 169 mg/dL (13 Oct 2017 07:27)  POCT Blood Glucose.: 153 mg/dL (12 Oct 2017 21:45)  POCT Blood Glucose.: 128 mg/dL (12 Oct 2017 16:14)        RADIOLOGY & ADDITIONAL TESTS:    Imaging Personally Reviewed:  YES    Consultant(s) Notes Reviewed:   YES    Care Discussed with Consultants :     Plan of care was discussed with patient and /or primary care giver; all questions and concerns were addressed and care was aligned with patient's wishes.

## 2024-05-23 NOTE — CARE UPDATE
Afternoon Assessment: Patient reports she is coughing less. Continues to endorse headache/congestion and sore throat. Denies fever or chills. Tolerating a regular diet without nausea or vomitting. Overall, feeling better and has no further complaints.     Temp:  [98.1 °F (36.7 °C)-98.8 °F (37.1 °C)] 98.4 °F (36.9 °C)  Pulse:  [] 103  Resp:  [18-21] 20  SpO2:  [96 %-100 %] 99 %  BP: (134-180)/() 135/78    PE:  Abdomen: Midline incision bandage clean/dry/intact    Labs:  Recent Labs   Lab 05/23/24  0236   WBC 4.88   RBC 3.30*   HGB 8.4*   HCT 27.0*   *   MCV 82   MCH 25.5*   MCHC 31.1*       A/P:  - Patient afebrile, VSS.   - Continue IV Vanc  - Continue supportive care.   - Will f/u respiratory cultures and blood cultures.      Joel Green MD  OBGYN PGY-2

## 2024-05-23 NOTE — TELEPHONE ENCOUNTER
Called patient to discuss prelim positive blood culture results resembling staph. Recommend patient return for IV antibiotics. Patient voiced understanding and will report to ED. All questions answered.    Joel Green MD  OBGYN PGY-2

## 2024-05-23 NOTE — ED TRIAGE NOTES
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  She came to the ED with symptoms of an upper respiratory infection and her workup was largely unremarkable apart from a slight elevation in her lactic acid.  She was discharged with symptomatic medications after a discussion with the gynecology oncology service.  She was negative for COVID and flu a and B at that time. Blood cultures collected on 5/21 positive for gram positive cocci in clusters resempling staph. Rapid ID showing staph epidermidis, mec A/C.    Her last chemotherapy 5/10/24.  Other notable history includes a poorly healing abdominal incision after her abdominal hysterectomy. Surgery complicated by readmission at POD#14 for pelvic fluid accumulation and leukocytosis requiring abxs and IR drainage. She denies any changes in her port site including erythema, purulent discharge, or tenderness

## 2024-05-23 NOTE — ED PROVIDER NOTES
Encounter Date: 5/23/2024       History     Chief Complaint   Patient presents with    doctor referral     Referred by pcp for abnormal labs, unsure what it was. Hx of ovarian CA. 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  She came to the ED with symptoms of an upper respiratory infection and her workup was largely unremarkable apart from a slight elevation in her lactic acid.  She was discharged with symptomatic medications after a discussion with the gynecology oncology service.  She was negative for COVID and flu a and B at that time. Blood cultures collected on 5/21 positive for gram positive cocci in clusters resempling staph. Rapid ID showing staph epidermidis, mec A/C.     Her last chemotherapy 5/10/24.  Other notable history includes a poorly healing abdominal incision after her abdominal hysterectomy. Surgery complicated by readmission at POD#14 for pelvic fluid accumulation and leukocytosis requiring abxs and IR drainage. She denies any changes in her port site including erythema, purulent discharge, or tenderness.        The history is provided by the patient and medical records.     Review of patient's allergies indicates:   Allergen Reactions    Codeine Other (See Comments)     Flu like s/s    Tramadol Other (See Comments)     Flu like symptoms similar to codeine reaction     Past Medical History:   Diagnosis Date    Breast cancer 2013    Diabetes mellitus     Genetic testing 05/29/2013    VUS    Hyperlipidemia     Hypertension     Malignant neoplasm of upper-outer quadrant of right breast in female, estrogen receptor positive 12/02/2015    Seizure disorder       Past Surgical History:   Procedure Laterality Date    BILATERAL SALPINGO-OOPHORECTOMY (BSO) N/A 3/25/2024    Procedure: SALPINGO-OOPHORECTOMY, BILATERAL;  Surgeon: Александр Washington MD;  [facility];  Service: OB/GYN;  Laterality: N/A;    BREAST BIOPSY      BREAST LUMPECTOMY Right 2013    CHOLECYSTECTOMY  3/25/2024    Procedure: CHOLECYSTECTOMY;  Surgeon: Bo Farmer MD;  [facility];  Service: General;;    DEBULKING OF TUMOR N/A 3/25/2024    Procedure: DEBULKING, NEOPLASM;  Surgeon: Александр Washington MD;  [facility];  Service: OB/GYN;  Laterality: N/A;    EXCISION, LIVER N/A 3/25/2024    Procedure: EXCISION, LIVER - partial;  Surgeon: Bo Farmer MD;  [facility];  Service: General;  Laterality: N/A;  bk ultrasound  [employer] book by TA on 3-21-24  7:00 a.m.  Conf #  [id number]    EYE SURGERY      LAPAROTOMY, EXPLORATORY N/A 3/25/2024    Procedure: LAPAROTOMY, EXPLORATORY;  Surgeon: Александр Washington MD;  [facility];  Service: OB/GYN;  Laterality: N/A;    OMENTECTOMY N/A 3/25/2024    Procedure: OMENTECTOMY;  Surgeon: Александр Washington MD;  [facility];  Service: OB/GYN;  Laterality: N/A;    PERITONEOCENTESIS N/A 3/13/2024    Procedure: PARACENTESIS, ABDOMINAL;  Surgeon: Flavia Moore MD;  Location: [facility] CATH LAB;  Service: Radiology;  Laterality: N/A;    PERITONEOCENTESIS N/A 3/21/2024    Procedure: PARACENTESIS, ABDOMINAL;  Surgeon: Jorge Jolley MD;  Location: [facility] CATH LAB;  Service: Radiology;  Laterality: N/A;    TOTAL ABDOMINAL HYSTERECTOMY N/A 3/25/2024    Procedure: HYSTERECTOMY, TOTAL, ABDOMINAL;  Surgeon: Александр Washington MD;  [facility];  Service: OB/GYN;  Laterality: N/A;    TOTAL REDUCTION MAMMOPLASTY Bilateral 2016     Family History   Problem Relation Name Age of Onset    Hypertension Mother      Seizures Father      Breast cancer Sister  48    Colon cancer  Sister      Hypertension Brother      Hypertension Brother      Cancer Neg Hx      Ovarian cancer Neg Hx       Social History     Tobacco Use    Smoking status: Never    Smokeless tobacco: Never   Substance Use Topics    Alcohol use: Yes     Alcohol/week: 0.0 standard drinks of alcohol     Comment: ocassional    Drug use: No     Review of Systems   Constitutional:  Positive for fatigue. Negative for chills, diaphoresis and fever.   HENT:  Positive for congestion, rhinorrhea, sore throat and voice change.    Eyes:  Negative for visual disturbance.   Respiratory:  Positive for cough and shortness of breath.    Cardiovascular:  Negative for chest pain and leg swelling.   Gastrointestinal:  Positive for constipation and nausea. Negative for abdominal pain, diarrhea and vomiting.   Genitourinary:  Negative for dysuria.   Neurological:  Negative for dizziness, syncope and light-headedness.   Psychiatric/Behavioral:  Negative for confusion.        Physical Exam     Initial Vitals [05/23/24 0014]   BP Pulse Resp Temp SpO2   (!) 180/116 (!) 122 20 98.2 °F (36.8 °C) 100 %      MAP       --         Physical Exam    Nursing note and vitals reviewed.  Constitutional: She appears well-developed. No distress.   HENT:   Head: Normocephalic and atraumatic.   Nose: Rhinorrhea present.   Mouth/Throat: Mucous membranes are normal.   Eyes: Conjunctivae and EOM are normal. Pupils are equal, round, and reactive to light. No scleral icterus.   Neck:   Normal range of motion.  Cardiovascular:  Regular rhythm, normal heart sounds and intact distal pulses.   Tachycardia present.   Exam reveals no friction rub.       No murmur heard.  Pulmonary/Chest: Breath sounds normal. No respiratory distress. She has no wheezes.   Wet sounding cough   Abdominal: Abdomen is soft. Bowel sounds are normal. She exhibits no distension. There is abdominal tenderness.   Midline abdominal incision. Well healed bottom half. Top half covered with bandage. Picture  of wound placed in media.    Musculoskeletal:         General: No edema.      Cervical back: Normal range of motion.     Neurological: She is alert and oriented to person, place, and time. She has normal strength. No cranial nerve deficit.   Skin: Skin is warm.   Left sided chest port without erythema, purulence or tenderness to palpation    Psychiatric: She has a normal mood and affect.         ED Course   Procedures  Labs Reviewed   CBC W/ AUTO DIFFERENTIAL - Abnormal; Notable for the following components:       Result Value    RBC 3.30 (*)     Hemoglobin 8.4 (*)     Hematocrit 27.0 (*)     MCH 25.5 (*)     MCHC 31.1 (*)     RDW 18.8 (*)     Platelets 474 (*)     Mono % 17.2 (*)     All other components within normal limits   CULTURE, BLOOD   CULTURE, BLOOD   COMPREHENSIVE METABOLIC PANEL   ISTAT LACTATE          Imaging Results              X-Ray Chest AP Portable (In process)                      Medications   vancomycin 2 g in dextrose 5 % 500 mL IVPB (2,000 mg Intravenous New Bag 5/23/24 0256)     Medical Decision Making  58F hx 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  Blood cultures positive for staph epidermidis.    Repeat blood cultures drawn.  Patient given weight based vancomycin dosing NAD.  We will order CBC and CMP pending admission for IV antibiotics and possible ID consult. CXR ordered to assess for possible URI progression to lung infection given immunocompromised state secondary to chemotherapy. At this time suspect possible infection source could be from open incision and upper abdomen from prior surgery, intra-abdominal fluid collection given she required IR drainage and had postoperative complications, or much less likely a port associated infection, though on physical exam this  appears to be without erythema, purulence, or tenderness.    At the time of sign-out, patient is pending chest x-ray read, and CMP.  Anticipate admission to gynecology oncology service. Signed out to Dr. Jossy Hollis.     Amount and/or Complexity of Data Reviewed  Labs: ordered. Decision-making details documented in ED Course.  Radiology: ordered.     Details: CXR read pending.     Risk  Prescription drug management.               ED Course as of 05/23/24 0335   Thu May 23, 2024   0319 ISTAT Lactate [JS]      ED Course User Index  [JS] Fabian Ordoñez MD                           Clinical Impression:  Final diagnoses:  [R00.0] Tachycardia  [J18.9] Lung infection  [R78.81] Bacteremia (Primary)                 Fabian Ordoñez MD  Resident  05/23/24 0335

## 2024-05-23 NOTE — CONSULTS
[facility] - Emergency Dept  Infectious Disease  Consult Note    Patient Name: Anette Ricci  MRN: [id number]  Admission Date: 5/23/2024  Hospital Length of Stay: 0 days  Attending Physician: Александр Washington MD  Primary Care Provider: Mark Chua MD     Isolation Status: No active isolations    Patient information was obtained from patient and past medical records.      Inpatient consult to Infectious Diseases  Consult performed by: Ivan Epstein MD  Consult ordered by: Supriya Bell MD  Reason for consult: staph epi bacteremia        Assessment/Plan:     ID  * Bacteremia  59 y/o F h/o stage IVB ovarian carcinosarcoma s/p PDS on 3/25/24 (CARLOS ALBERTO BSO omx liver mobilization, peritoneal & R diaphragm stripping, hepatic wedge resection, cholecystectomy) malignant ascites, C2 carbo/taxol/bevacizumab, post-op infected pelvic fluid collection drained by IR which grew ESBL Ecoli and wound grew aspergillus sp (has not been treated), presented to the ED after sent from clinic with tachycardia, tachypnea and URI symptoms (cough, SOB, general malaise) x 2 days with blood cultures growing staph epi.  CXR with R pleural effusion, surgical abdominal wounds healing very well and she denies abdominal pain  - unclear if staph epi is pathogen given only having URI symptoms  - check RIP   - can continue vancomycin for now  - f/u repeat cultures  - at this time her abdominal wounds have healed nicely, her abdominal exam is unremarkable and therefore it is extremely unlikely that aspergillus sp growing from wound swab represents pathogen.  The wounds would not have improved without antifungal therapy.  She also has no other signs/symptoms of invasive aspergillosis and at this time would monitor and repeat imaging at some point in future per gyn onc team.   - discussed with team will follow        Thank you for your consult. I will follow-up with patient. Please contact us if you have any additional questions.    Ivan  NISHA Epstein MD  Infectious Disease  [facility] - Emergency Dept    Subjective:     Principal Problem: Bacteremia    HPI: 57 y/o F h/o stage IVB ovarian carcinosarcoma s/p PDS on 3/25/24 (CARLOS ALBERTO BSO omx liver mobilization, peritoneal & R diaphragm stripping, hepatic wedge resection, cholecystectomy) malignant ascites, C2 carbo/taxol/bevacizumab, post-op infected pelvic fluid collection drained by IR which grew ESBL Ecoli and wound grew aspergillus sp (has not been treated), presented to the ED after sent from clinic with tachycardia, tachypnea and URI symptoms (cough, SOB, general malaise) x 2 days with blood cultures growing staph epi.  CXR with R pleural effusion, surgical abdominal wounds healing very well and she denies abdominal pain    Past Medical History:   Diagnosis Date    Breast cancer 2013    Diabetes mellitus     Genetic testing 05/29/2013    VUS    Hyperlipidemia     Hypertension     Malignant neoplasm of upper-outer quadrant of right breast in female, estrogen receptor positive 12/02/2015    Seizure disorder        Past Surgical History:   Procedure Laterality Date    BILATERAL SALPINGO-OOPHORECTOMY (BSO) N/A 3/25/2024    Procedure: SALPINGO-OOPHORECTOMY, BILATERAL;  Surgeon: Александр Washington MD;  [place] [facility] [address] [place] [address];  Service: OB/GYN;  Laterality: N/A;    BREAST BIOPSY      BREAST LUMPECTOMY Right 2013    CHOLECYSTECTOMY  3/25/2024    Procedure: CHOLECYSTECTOMY;  Surgeon: Bo Farmer MD;  [facility] [address] [place] [address];  Service: General;;    DEBULKING OF TUMOR N/A 3/25/2024    Procedure: DEBULKING, NEOPLASM;  Surgeon: Александр Washington MD;  [facility] [address] [place] [address];  Service: OB/GYN;  Laterality: N/A;    EXCISION, LIVER N/A 3/25/2024    Procedure: EXCISION, LIVER - partial;  Surgeon: Bo Farmer MD;  [facility] [address] [place] [address];  Service: General;  Laterality: N/A;  bk ultrasound  [employer] book by SAMINA on 3-21-24  7:00 a.m.  Conf #  [id number]    EYE SURGERY      LAPAROTOMY, EXPLORATORY N/A  "3/25/2024    Procedure: LAPAROTOMY, EXPLORATORY;  Surgeon: Александр Washington MD;  Location: [facility] OR [facility]ND FLR;  Service: OB/GYN;  Laterality: N/A;    OMENTECTOMY N/A 3/25/2024    Procedure: OMENTECTOMY;  Surgeon: Александр Washington MD;  [facility]: [facility];  Service: OB/GYN;  Laterality: N/A;    PERITONEOCENTESIS N/A 3/13/2024    Procedure: PARACENTESIS, ABDOMINAL;  Surgeon: Flavia Moore MD;  Location: [facility] CATH LAB;  Service: Radiology;  Laterality: N/A;    PERITONEOCENTESIS N/A 3/21/2024    Procedure: PARACENTESIS, ABDOMINAL;  Surgeon: Jorge Jolley MD;  Location: [facility] CATH LAB;  Service: Radiology;  Laterality: N/A;    TOTAL ABDOMINAL HYSTERECTOMY N/A 3/25/2024    Procedure: HYSTERECTOMY, TOTAL, ABDOMINAL;  Surgeon: Александр Washington MD;  [facility];  Service: OB/GYN;  Laterality: N/A;    TOTAL REDUCTION MAMMOPLASTY Bilateral 2016       Review of patient's allergies indicates:   Allergen Reactions    Codeine Other (See Comments)     Flu like s/s    Tramadol Other (See Comments)     Flu like symptoms similar to codeine reaction       Medications:  (Not in a hospital admission)    Antibiotics (From admission, onward)      Start     Stop Route Frequency Ordered    05/23/24 1800  vancomycin 1,250 mg in dextrose 5 % (D5W) 250 mL IVPB (Vial-Mate)         -- IV Every 12 hours (non-standard times) 05/23/24 1117    05/23/24 1010  vancomycin - pharmacy to dose  (vancomycin IVPB (PEDS and ADULTS))        Placed in "And" Linked Group    -- IV pharmacy to manage frequency 05/23/24 0911          Antifungals (From admission, onward)      None          Antivirals (From admission, onward)      None             Immunization History   Administered Date(s) Administered    COVID-19 MRNA, LN-S PF (MODERNA HALF 0.25 ML DOSE) 12/11/2021    COVID-19, MRNA, LN-S, PF (MODERNA FULL 0.5 ML DOSE) 03/01/2021, 03/29/2021    COVID-19, mRNA, LNP-S, PF (Moderna 2023)Ages 12+ 01/15/2024    Influenza - " Quadrivalent - MDCK - PF 10/06/2017       Family History       Problem Relation (Age of Onset)    Breast cancer Sister (48)    Colon cancer Sister    Hypertension Mother, Brother, Brother    Seizures Father          Social History     Socioeconomic History    Marital status:    Tobacco Use    Smoking status: Never    Smokeless tobacco: Never   Substance and Sexual Activity    Alcohol use: Yes     Alcohol/week: 0.0 standard drinks of alcohol     Comment: ocassional    Drug use: No    Sexual activity: Not Currently     Partners: Male     Birth control/protection: None     Social Determinants of Health     Financial Resource Strain: Low Risk  (4/7/2024)    Overall Financial Resource Strain (CARDIA)     Difficulty of Paying Living Expenses: Not hard at all   Food Insecurity: No Food Insecurity (4/7/2024)    Hunger Vital Sign     Worried About Running Out of Food in the Last Year: Never true     Ran Out of Food in the Last Year: Never true   Transportation Needs: No Transportation Needs (4/7/2024)    PRAPARE - Transportation     Lack of Transportation (Medical): No     Lack of Transportation (Non-Medical): No   Physical Activity: Inactive (2/28/2024)    Exercise Vital Sign     Days of Exercise per Week: 0 days     Minutes of Exercise per Session: 0 min   Stress: No Stress Concern Present (4/7/2024)    [employer] [place] [employer] - Occupational Stress Questionnaire     Feeling of Stress : Not at all   Recent Concern: Stress - Stress Concern Present (2/28/2024)    [employer] [place] [employer] - Occupational Stress Questionnaire     Feeling of Stress : To some extent   Housing Stability: Low Risk  (4/7/2024)    Housing Stability Vital Sign     Unable to Pay for Housing in the Last Year: No     Number of Places Lived in the Last Year: 1     Unstable Housing in the Last Year: No     Review of Systems   Constitutional:  Negative for activity change, chills and fever.   HENT:  Positive for  postnasal drip, sinus pressure and sore throat. Negative for congestion, mouth sores and rhinorrhea.    Eyes:  Negative for photophobia, pain and redness.   Respiratory:  Positive for cough. Negative for chest tightness, shortness of breath and wheezing.    Cardiovascular:  Negative for chest pain and leg swelling.   Gastrointestinal:  Negative for abdominal distention, abdominal pain, diarrhea, nausea and vomiting.   Endocrine: Negative for polyuria.   Genitourinary:  Negative for decreased urine volume, dysuria and flank pain.   Musculoskeletal:  Negative for joint swelling and neck pain.   Skin:  Positive for wound. Negative for color change.   Allergic/Immunologic: Negative for food allergies.   Neurological:  Negative for dizziness, weakness and headaches.   Hematological:  Negative for adenopathy.   Psychiatric/Behavioral:  Negative for agitation and confusion. The patient is not nervous/anxious.      Objective:     Vital Signs (Most Recent):  Temp: 98.4 °F (36.9 °C) (05/23/24 1310)  Pulse: 103 (05/23/24 1310)  Resp: 20 (05/23/24 1310)  BP: 135/78 (05/23/24 1310)  SpO2: 99 % (05/23/24 1310) Vital Signs (24h Range):  Temp:  [98.1 °F (36.7 °C)-98.8 °F (37.1 °C)] 98.4 °F (36.9 °C)  Pulse:  [] 103  Resp:  [18-21] 20  SpO2:  [96 %-100 %] 99 %  BP: (134-180)/() 135/78     Weight: 78 kg (171 lb 15.3 oz)  Body mass index is 26.93 kg/m².    Estimated Creatinine Clearance: 93.2 mL/min (based on SCr of 0.7 mg/dL).     Physical Exam  Constitutional:       Appearance: She is well-developed.   HENT:      Head: Normocephalic and atraumatic.      Comments: Maxillary sinus TTP  Eyes:      Pupils: Pupils are equal, round, and reactive to light.   Cardiovascular:      Rate and Rhythm: Normal rate.   Pulmonary:      Effort: Pulmonary effort is normal.      Breath sounds: Normal breath sounds.   Abdominal:      General: Bowel sounds are normal.      Palpations: Abdomen is soft.      Comments: Lower abd op site  completely healed, midline superior op site healing well without drainage or surrounding erythema.   Musculoskeletal:         General: No tenderness.      Cervical back: Normal range of motion and neck supple.   Skin:     General: Skin is warm and dry.      Comments: L chest wall port unremarkable   Neurological:      Mental Status: She is alert and oriented to person, place, and time.          Significant Labs: CBC:   Recent Labs   Lab 05/22/24  0606 05/23/24  0236   WBC 4.76 4.88   HGB 8.2* 8.4*   HCT 26.4* 27.0*   * 474*     CMP:   Recent Labs   Lab 05/22/24  0606 05/23/24  0331 05/23/24  0929    131* 135*   K 3.2* 3.3* 3.6    102 100   CO2 26 20* 27   * 482* 215*   BUN 9 9 6   CREATININE 0.6 0.7 0.7   CALCIUM 8.5* 7.9* 8.7   PROT  --  5.9* 6.8   ALBUMIN  --  2.3* 2.6*   BILITOT  --  0.2 0.2   ALKPHOS  --  100 104   AST  --  15 19   ALT  --  10 11   ANIONGAP 9 9 8       Significant Imaging: I have reviewed all pertinent imaging results/findings within the past 24 hours.

## 2024-05-23 NOTE — PROGRESS NOTES
Pharmacokinetic Initial Assessment: IV Vancomycin    Assessment/Plan:    Initiate intravenous vancomycin with loading dose of 2000 mg once in ED followed by a maintenance dose of vancomycin 1250 mg IV every 12 hours  Desired empiric serum trough concentration is 15 to 20 mcg/mL  Draw vancomycin trough level 60 min prior to fourth dose on 05/24/24 at approximately 1700.  Pharmacy will continue to follow and monitor vancomycin.      Please contact pharmacy at extension [phone] with any questions regarding this assessment.     Thank you for the consult,   Ruddy Calle       Patient brief summary:  Anette Ricci is a 58 y.o. female initiated on antimicrobial therapy with IV Vancomycin for treatment of suspected bacteremia    Drug Allergies:   Review of patient's allergies indicates:   Allergen Reactions    Codeine Other (See Comments)     Flu like s/s    Tramadol Other (See Comments)     Flu like symptoms similar to codeine reaction       Actual Body Weight:   78 kg    Renal Function:   Estimated Creatinine Clearance: 93.2 mL/min (based on SCr of 0.7 mg/dL).,     Dialysis Method (if applicable):  N/A    CBC (last 72 hours):  Recent Labs   Lab Result Units 05/21/24  1324 05/22/24  0606 05/23/24  0236   WBC K/uL 4.20 4.76 4.88   Hemoglobin g/dL 9.1* 8.2* 8.4*   Hematocrit % 30.4* 26.4* 27.0*   Platelets K/uL 536* 480* 474*   Gran % % 51.8  --  40.0   Lymph % % 28.6  --  40.6   Mono % % 17.4*  --  17.2*   Eosinophil % % 0.5  --  1.2   Basophil % % 1.0  --  0.6   Differential Method  Automated  --  Automated       Metabolic Panel (last 72 hours):  Recent Labs   Lab Result Units 05/21/24  1324 05/21/24  2051 05/22/24  0606 05/23/24  0331 05/23/24  0929   Sodium mmol/L 132*  --  138 131* 135*   Potassium mmol/L 4.1  --  3.2* 3.3* 3.6   Chloride mmol/L 97  --  103 102 100   CO2 mmol/L 27  --  26 20* 27   Glucose mg/dL 246*  --  165* 482* 215*   Glucose, UA   --  Negative  --   --   --    BUN mg/dL 14  --  9 9 6  "  Creatinine mg/dL 0.9  --  0.6 0.7 0.7   Albumin g/dL 2.7*  --   --  2.3* 2.6*   Total Bilirubin mg/dL 0.3  --   --  0.2 0.2   Alkaline Phosphatase U/L 112  --   --  100 104   AST U/L 18  --   --  15 19   ALT U/L 15  --   --  10 11       Drug levels (last 3 results):  No results for input(s): "VANCOMYCINRA", "VANCORANDOM", "VANCOMYCINPE", "VANCOPEAK", "VANCOMYCINTR", "VANCOTROUGH" in the last 72 hours.    Microbiologic Results:  Microbiology Results (last 7 days)       Procedure Component Value Units Date/Time    Blood Culture #1 **CANNOT BE ORDERED STAT** [[id number]] Collected: 05/23/24 0236    Order Status: Sent Specimen: Blood from Peripheral, Hand, Left Updated: 05/23/24 0239    Blood Culture #1 **CANNOT BE ORDERED STAT** [[id number]] Collected: 05/23/24 0236    Order Status: Sent Specimen: Blood from Peripheral, Antecubital, Left Updated: 05/23/24 0239            "

## 2024-05-23 NOTE — H&P
.[facility] - Emergency Dept  Gynecologic Oncology  H&P    Patient Name: Anette Ricci  MRN: [id number]  Admission Date: 2024  Primary Care Provider: Mark Chua MD   Principal Problem: Bacteremia    Subjective:     Chief Complaint/Reason for Admission: bacteremia     History of Present Illness:     Anette Ricci is a 58 y.o.  with stage IVB ovarian carcinosacoma, s/p PDS on 3/25/24 with Dr Washington ([facility] omx liver mobilization, peritoneal & R diaphragm stripping, hepatic wedge resection, cholecystectomy) C2D13 carbo/taxol/bevacizumab who presented to the ED for tachycardia, tachypnea and URI symptoms on . Code sepsis was initiated and patient was subsequently admitted. Her hospital course was uncomplicated. On HD#2, lactate normalized, tachypnea and tachycardia resolved. Patient also had improvement in symptoms and blood cultures at that time were NGTD. Antibiotics were discontinued. Patient was subsequently discharged in stable condition with return precautions.     Following discharge, blood cultures collected on  returned positive for gram positive cocci in clusters resempling staph. Rapid ID PCR showed staph epidermidis, mec A/C. Patient was contacted by gynecology oncology team to return for admission for IV antibiotics     Upon arrival, patient reports her symptoms have continued to improve. Continues to endorse cough, congestion, and sore throat. But reports the supportive measures are helping. Denies fever, chills, nausea, vomiting, CP, or SOB.      Onc History:  3/4/24: peritoneal biopsy with carcinosarcoma  3/6/24: CA-125 418  3/25/24: Primary debulking surgery [facility]/BSO, omentectomy, partial hepatectomy, debulking. (<1cm residual disease: sigmoid, transverse colon, diaphragm, kelvin hepatis)     Adjuvant therapy: Carboplatin AUC 6, paclitaxel 175 mg/m2, bevacizumab 15 mg/kg  24: C1D1: Carbo/Taxol/(Velma held), CA-125 248  5/10/24: C2D1: Carbo/Taxol/Velma  23:  C3D1: pending     Past Medical History:   Diagnosis Date    Breast cancer 2013    Diabetes mellitus     Genetic testing 05/29/2013    VUS    Hyperlipidemia     Hypertension     Malignant neoplasm of upper-outer quadrant of right breast in female, estrogen receptor positive 12/02/2015    Seizure disorder      Past Surgical History:   Procedure Laterality Date    BILATERAL SALPINGO-OOPHORECTOMY (BSO) N/A 3/25/2024    Procedure: SALPINGO-OOPHORECTOMY, BILATERAL;  Surgeon: Александр Washington MD;  [facility];  Service: OB/GYN;  Laterality: N/A;    BREAST BIOPSY      BREAST LUMPECTOMY Right 2013    CHOLECYSTECTOMY  3/25/2024    Procedure: CHOLECYSTECTOMY;  Surgeon: Bo Farmer MD;  Location: [facility] OR [address] [place] [address];  Service: General;;    DEBULKING OF TUMOR N/A 3/25/2024    Procedure: DEBULKING, NEOPLASM;  Surgeon: Александр Washington MD;  Location: [facility] OR [address] [place] [address];  Service: OB/GYN;  Laterality: N/A;    EXCISION, LIVER N/A 3/25/2024    Procedure: EXCISION, LIVER - partial;  Surgeon: Bo Farmer MD;  Location: [facility] OR [address] [place] [address];  Service: General;  Laterality: N/A;  bk ultrasound  [place] book by TA on 3-21-24  7:00 a.m.  Conf #  [id number]    EYE SURGERY      LAPAROTOMY, EXPLORATORY N/A 3/25/2024    Procedure: LAPAROTOMY, EXPLORATORY;  Surgeon: Александр Washington MD;  [facility];  Service: OB/GYN;  Laterality: N/A;    OMENTECTOMY N/A 3/25/2024    Procedure: OMENTECTOMY;  Surgeon: Александр Washington MD;  Location: [facility] OR [address] [place] [address];  Service: OB/GYN;  Laterality: N/A;    PERITONEOCENTESIS N/A 3/13/2024    Procedure: PARACENTESIS, ABDOMINAL;  Surgeon: Flavia Moore MD;  Location: [facility] CATH LAB;  Service: Radiology;  Laterality: N/A;    PERITONEOCENTESIS N/A 3/21/2024    Procedure: PARACENTESIS, ABDOMINAL;  Surgeon: Jorge Jolley MD;  Location: [facility] CATH LAB;  Service: Radiology;  Laterality: N/A;    TOTAL ABDOMINAL HYSTERECTOMY N/A 3/25/2024    Procedure: HYSTERECTOMY,  TOTAL, ABDOMINAL;  Surgeon: Александр Washington MD;  Location: [facility] OR [address] [place] [address];  Service: OB/GYN;  Laterality: N/A;    TOTAL REDUCTION MAMMOPLASTY Bilateral 2016     Family History       Problem Relation (Age of Onset)    Breast cancer Sister (48)    Colon cancer Sister    Hypertension Mother, Brother, Brother    Seizures Father          Tobacco Use    Smoking status: Never    Smokeless tobacco: Never   Substance and Sexual Activity    Alcohol use: Yes     Alcohol/week: 0.0 standard drinks of alcohol     Comment: ocassional    Drug use: No    Sexual activity: Not Currently     Partners: Male     Birth control/protection: None       (Not in a hospital admission)      Review of patient's allergies indicates:   Allergen Reactions    Codeine Other (See Comments)     Flu like s/s    Tramadol Other (See Comments)     Flu like symptoms similar to codeine reaction       Review of Systems   Constitutional:  Positive for fatigue. Negative for fever.   HENT:  Positive for nasal congestion.         Sore throat   Respiratory:  Positive for cough. Negative for shortness of breath.    Cardiovascular:  Negative for chest pain.   Gastrointestinal:  Negative for abdominal pain, nausea and vomiting.   Endocrine: Negative for hot flashes.   Genitourinary:  Positive for vaginal bleeding (spotting). Negative for dysuria, flank pain and vaginal discharge.   Integumentary:  Negative for breast mass, nipple discharge and breast skin changes.   Neurological:  Positive for headaches.   Hematological:  Does not bruise/bleed easily.   Psychiatric/Behavioral:  Negative for depression.    Breast: Negative for mass, mastodynia, nipple discharge and skin changes     Objective:     Vital Signs (Most Recent):  Temp: 98.3 °F (36.8 °C) (05/23/24 0700)  Pulse: 96 (05/23/24 0700)  Resp: 20 (05/23/24 0700)  BP: (!) 163/83 (05/23/24 0700)  SpO2: 99 % (05/23/24 0700) Vital Signs (24h Range):  Temp:  [98.1 °F (36.7 °C)-98.8 °F (37.1 °C)] 98.3 °F (36.8  °C)  Pulse:  [] 96  Resp:  [18-21] 20  SpO2:  [96 %-100 %] 99 %  BP: (134-180)/() 163/83        There is no height or weight on file to calculate BMI.    Physical Exam:   Constitutional: She is oriented to person, place, and time. She appears well-developed and well-nourished.    HENT:   Head: Normocephalic and atraumatic.    Eyes: EOM are normal.     Cardiovascular:  Normal rate.             Pulmonary/Chest: Effort normal. No respiratory distress.        Abdominal: She exhibits abdominal incision (midline bandage c/d/i). There is abdominal tenderness (minimal). There is no guarding.                 Neurological: She is alert and oriented to person, place, and time.    Skin: Skin is warm and dry.    Psychiatric: She has a normal mood and affect. Her behavior is normal. Thought content normal.       Laboratory:  Recent Lab Results  (Last 5 results in the past 24 hours)        05/23/24  0612   05/23/24  0340   05/23/24  0331   05/23/24  0238   05/23/24  0236        RSV Ag by Molecular Method   Not Detected             Influenza A, Molecular   Not Detected             Influenza B, Molecular   Not Detected             Albumin     2.3           ALP     100           Allens Test       N/A         ALT     10           Anion Gap     9           AST     15           Baso #         0.03       Basophil %         0.6       BILIRUBIN TOTAL     0.2  Comment: For infants and newborns, interpretation of results should be based  on gestational age, weight and in agreement with clinical  observations.    Premature Infant recommended reference ranges:  Up to 24 hours.............<8.0 mg/dL  Up to 48 hours............<12.0 mg/dL  3-5 days..................<15.0 mg/dL  6-29 days.................<15.0 mg/dL             Site       Other         BUN     9           Calcium     7.9           Chloride     102           CO2     20           Creatinine     0.7           Differential Method         Automated       eGFR     >60.0            Eos #         0.1       Eos %         1.2       Glucose     482  Comment: _ acknowledged and accepted results on test(s) _ via secure chat.     penny saleem rn by WPT 05/23/2024 04:31             Gran # (ANC)         2.0       Gran %         40.0       Hematocrit         27.0       Hemoglobin         8.4       Immature Grans (Abs)         0.02  Comment: Mild elevation in immature granulocytes is non specific and   can be seen in a variety of conditions including stress response,   acute inflammation, trauma and pregnancy. Correlation with other   laboratory and clinical findings is essential.         Immature Granulocytes         0.4       Lymph #         2.0       Lymph %         40.6       MCH         25.5       MCHC         31.1       MCV         82       Mono #         0.8       Mono %         17.2       MPV         9.6       nRBC         0       Platelet Count         474       POC Lactate       2.10         POCT Glucose 329               Potassium     3.3           PROTEIN TOTAL     5.9           RBC         3.30       RDW         18.8       Sample       VENOUS         SARS-CoV2 (COVID-19) Qualitative PCR   Not Detected  Comment: This test utilizes a real-time reverse transcription  polymerase chain reaction procedure to amplify and  detect the SARS-CoV-2 and detect the SARS-CoV-2 N2 and E nucleic  acid targets. The analytical sensitivity (limit of detection) of  this assay is 250 copies/mL.    A Detected result implies that the patient is infected with the  SARS-CoV-2 virus and is presumed to be contagious.  A Not Detected result implies that the SARS-CoV-2 target nucleic  acids are not present above the limit of detection. It does not  rule out the possibility of COVID-19 and should not be the sole  basis for treatment decisions. If COVID-19 is strongly suspected  based on clinical and epidemiological history, re-testing should  be considered.    This test is Food and Drug Administration (FDA)  "approved. Performance   characteristics of this has been independently verified by [facility] Department of Pathology and Laboratory Medicine.               Sodium     131           WBC         4.88                            Diagnostic Results:    Imaging Results              X-Ray Chest AP Portable (Final result)  Result time 24 03:37:18      Final result by Michael Duckworth MD (24 03:37:18)                   Impression:      No detrimental change when compared with 2024.      Electronically signed by: Michael Duckworth MD  Date:    2024  Time:    03:37               Narrative:    EXAMINATION:  XR CHEST AP PORTABLE    CLINICAL HISTORY:  Provided history is "  Pneumonia, unspecified organism".    TECHNIQUE:  One view of the chest.    COMPARISON:  2024.    FINDINGS:  Cardiac wires overlie the chest.  Cardiomediastinal silhouette is stable.  Atherosclerotic calcifications overlie the aortic arch.  Left chest wall port catheter is present with the tip overlying the SVC.  Central vascular congestion.  Small right pleural effusion with passive atelectasis in the right lung base.  Superimposed airspace disease felt less likely but difficult to entirely exclude on this limited single view.  Mild left basilar subsegmental atelectasis.  No confluent area of consolidation.  No sizable left pleural effusion.  No distinct pneumothorax.                                       Assessment/Plan:     Active Diagnoses:    Diagnosis Date Noted POA    PRINCIPAL PROBLEM:  Bacteremia [R78.81] 2024 Unknown      Problems Resolved During this Admission:     Patient is a 58 y.o.  with stage IVB ovarian carcinosacoma, s/p PDS on 3/25/24 with Dr Washington ([facility] BSO omx liver mobilization, peritoneal & R diaphragm stripping, hepatic wedge resection, cholecystectomy) C2D13 carbo/taxol/bevacizumab who presents for treatment of bacteremia.      1.Bacteremia   - On admit, vitals tachycardic " and tachypneic. HR normalized with fluids, Tachypnea resolved. Normal O2 saturation. BP hypertensive. Afebrile.   - PE:  Abdomen,soft, NT, ND. Midline bandage in place.   - Labs:   -           - Bood cultures Rapid ID PCR showed staph epidermidis, mec A/C.               - CBC 4/8.4/27/474; ANC 2.0               - Lactate 2.1              - BMP Cr 0.6 Na 131 K 3.3 (replaced) LFTs wnl               - Repeat blood cultures collected              - Flu/RSV/GAS/COVID19 negative;   - Imaging:               CXR with R sided atelectasis and small pleural effusion. No detrimental changes from 5/21.      Plan:  - Admit for 24-48 hours of IV antibiotics  - IV Vanc first dose initiated in ED.   - Infectious disease consulted. Appreciate recommendations.   - Daily CBC with oncology  - Tessalon pearls and mucinex for respiratory symptoms.      2. HTN   - Cont home meds; amlodipine/enalapril/HCTZ     3. HLD   - Cont home statin     4. DM2   - Holding home meds: metformin, monjouro   - Cont home levemir 7u QHS, aspart 3u TID WM  - SSI   - POCT BG Q4h     5. Stage IV ovarian carcinosarcoma   - s/p PDS on 3/25/24 with Dr Washington ([facility] BSO omx liver mobilization, peritoneal & R diaphragm stripping, hepatic wedge resection, cholecystectomy) suboptimal <1cm residual disease: sigmoid, transverse colon, diaphragm, kelvin hepatis   - currently receiving carbo/taxol/bevacizumab   4/19/24: C1D1: Carbo/Taxol/(Velma held), CA-125 248  5/10/24: C2D1: Carbo/Taxol/Velma     6. Seizure disorder   - Cont home lamotrigine 25mg      7. Mood disorder   - Cont Zyprexa QHS, Seroquel PRN     Joel Green MD - PGY-2   Gynecologic Oncology  [facility] - Emergency Dept

## 2024-05-23 NOTE — PROVIDER PROGRESS NOTES - EMERGENCY DEPT.
Encounter Date: 5/23/2024    ED Physician Progress Notes            ED Resident HAND-OFF NOTE:  3:23 AM 5/23/2024  Anette Ricci is a 58 y.o. female who presented to the ED on 5/23/2024 and has been managed by Dr. Whitehead, who reports patient C/O blood cultures . I assumed care of patient from off-going ED physician team at 3:23 AM.    58 year old F with past medical history of ovarian sarcoma with peritoneum mets upper respiratory infection, blood cultures positive.  Started on IV vancomycin, repeat cultures ordered.  Lactate within normal limits.  CXR pending.    [ ] CXR   [ ] CMP admit to gyn onc    - She was admitted to gyn onc in stable condition.   On my evaluation, Anette Ricci appears well, hemodynamically stable and in NAD. Thus far, Anette Ricci has received:  Medications   atorvastatin tablet 80 mg (has no administration in time range)   hydroCHLOROthiazide tablet 25 mg (has no administration in time range)   amLODIPine tablet 10 mg (has no administration in time range)   QUEtiapine tablet 25 mg (has no administration in time range)   fluticasone propionate 50 mcg/actuation nasal spray 50 mcg (has no administration in time range)   insulin aspart U-100 pen 3 Units (has no administration in time range)   OLANZapine tablet 5 mg (has no administration in time range)   insulin glargine U-100 (Lantus) pen 7 Units (has no administration in time range)   lisinopriL tablet 40 mg (has no administration in time range)   sodium chloride 0.9% flush 10 mL (has no administration in time range)   ondansetron disintegrating tablet 8 mg (has no administration in time range)   acetaminophen tablet 650 mg (has no administration in time range)   promethazine tablet 25 mg (has no administration in time range)   HYDROcodone-acetaminophen 5-325 mg per tablet 1 tablet (has no administration in time range)   HYDROcodone-acetaminophen  mg per tablet 1 tablet (has no administration in time range)    simethicone chewable tablet 80 mg (has no administration in time range)   calcium carbonate 200 mg calcium (500 mg) chewable tablet 500 mg (has no administration in time range)   melatonin tablet 6 mg (has no administration in time range)   benzonatate capsule 100 mg (has no administration in time range)   ibuprofen tablet 600 mg (has no administration in time range)   glucose chewable tablet 16 g (has no administration in time range)   glucose chewable tablet 24 g (has no administration in time range)   glucagon (human recombinant) injection 1 mg (has no administration in time range)   dextrose 10% bolus 125 mL 125 mL (has no administration in time range)   dextrose 10% bolus 250 mL 250 mL (has no administration in time range)   dextromethorphan-guaiFENesin  mg/5 ml liquid 5 mL (has no administration in time range)   enoxaparin injection 40 mg (has no administration in time range)   (Magic mouthwash) 1:1:1 diphenhydrAMINE(Benadryl) 12.5mg/5ml liq, aluminum & magnesium hydroxide-simethicone (Maalox), LIDOcaine viscous 2% (has no administration in time range)   lamoTRIgine tablet 100 mg (has no administration in time range)   guaiFENesin 12 hr tablet 1,200 mg (has no administration in time range)   sodium chloride 0.9% flush 10 mL (has no administration in time range)   insulin aspart U-100 pen 0-5 Units (4 Units Subcutaneous Given 5/23/24 0613)   vancomycin 2 g in dextrose 5 % 500 mL IVPB (0 mg Intravenous Stopped 5/23/24 0550)   potassium chloride 10 mEq in 100 mL IVPB (10 mEq Intravenous New Bag 5/23/24 0526)   potassium chloride 10 mEq in 100 mL IVPB (10 mEq Intravenous New Bag 5/23/24 0649)   lactated ringers bolus 500 mL (0 mLs Intravenous Stopped 5/23/24 0624)   potassium bicarbonate disintegrating tablet 25 mEq (25 mEq Oral Given 5/23/24 0516)       On my exam, I appreciate:  BP (!) 163/83   Pulse 96   Temp 98.3 °F (36.8 °C) (Oral)   Resp 20   LMP 02/09/2015   SpO2 99%   Breastfeeding No   ED  Course as of 05/23/24 0804   Thu May 23, 2024   0319 ISTAT Lactate [JS]   0359 X-Ray Chest AP Portable  R pleural effusion  [KB]      ED Course User Index  [JS] Fabian Ordoñez MD  [KB] Jossy Hollis MD         Disposition Patient was admitted   I have discussed and counseled Anette Ricci regarding exam, results, diagnosis, treatment, and plan.  ______________________  Jossy Hollis DO  Emergency Medicine Resident  3:23 AM 5/23/2024

## 2024-05-24 VITALS
BODY MASS INDEX: 27.16 KG/M2 | SYSTOLIC BLOOD PRESSURE: 132 MMHG | WEIGHT: 173.06 LBS | HEART RATE: 109 BPM | HEIGHT: 67 IN | TEMPERATURE: 98 F | OXYGEN SATURATION: 98 % | RESPIRATION RATE: 18 BRPM | DIASTOLIC BLOOD PRESSURE: 80 MMHG

## 2024-05-24 PROBLEM — B34.8 PARAINFLUENZA INFECTION: Status: ACTIVE | Noted: 2024-05-24

## 2024-05-24 PROBLEM — R78.81 BACTEREMIA: Status: RESOLVED | Noted: 2024-05-23 | Resolved: 2024-05-24

## 2024-05-24 LAB
ALBUMIN SERPL BCP-MCNC: 2.3 G/DL (ref 3.5–5.2)
ALP SERPL-CCNC: 92 U/L (ref 55–135)
ALT SERPL W/O P-5'-P-CCNC: 9 U/L (ref 10–44)
ANION GAP SERPL CALC-SCNC: 9 MMOL/L (ref 8–16)
AST SERPL-CCNC: 16 U/L (ref 10–40)
BILIRUB SERPL-MCNC: 0.2 MG/DL (ref 0.1–1)
BUN SERPL-MCNC: 7 MG/DL (ref 6–20)
CALCIUM SERPL-MCNC: 8.1 MG/DL (ref 8.7–10.5)
CHLORIDE SERPL-SCNC: 103 MMOL/L (ref 95–110)
CO2 SERPL-SCNC: 23 MMOL/L (ref 23–29)
CREAT SERPL-MCNC: 0.7 MG/DL (ref 0.5–1.4)
ERYTHROCYTE [DISTWIDTH] IN BLOOD BY AUTOMATED COUNT: 18.4 % (ref 11.5–14.5)
EST. GFR  (NO RACE VARIABLE): >60 ML/MIN/1.73 M^2
GLUCOSE SERPL-MCNC: 154 MG/DL (ref 70–110)
HCT VFR BLD AUTO: 26.5 % (ref 37–48.5)
HGB BLD-MCNC: 8.3 G/DL (ref 12–16)
IMM GRANULOCYTES # BLD AUTO: 0.03 K/UL (ref 0–0.04)
MAGNESIUM SERPL-MCNC: 1.2 MG/DL (ref 1.6–2.6)
MCH RBC QN AUTO: 25.1 PG (ref 27–31)
MCHC RBC AUTO-ENTMCNC: 31.3 G/DL (ref 32–36)
MCV RBC AUTO: 80 FL (ref 82–98)
NEUTROPHILS # BLD AUTO: 2.9 K/UL (ref 1.8–7.7)
PHOSPHATE SERPL-MCNC: 3.5 MG/DL (ref 2.7–4.5)
PLATELET # BLD AUTO: 506 K/UL (ref 150–450)
PMV BLD AUTO: 10.2 FL (ref 9.2–12.9)
POCT GLUCOSE: 182 MG/DL (ref 70–110)
POCT GLUCOSE: 208 MG/DL (ref 70–110)
POTASSIUM SERPL-SCNC: 3.7 MMOL/L (ref 3.5–5.1)
PROT SERPL-MCNC: 6 G/DL (ref 6–8.4)
RBC # BLD AUTO: 3.31 M/UL (ref 4–5.4)
SODIUM SERPL-SCNC: 135 MMOL/L (ref 136–145)
WBC # BLD AUTO: 5.95 K/UL (ref 3.9–12.7)

## 2024-05-24 PROCEDURE — 63600175 PHARM REV CODE 636 W HCPCS: Mod: HCNC

## 2024-05-24 PROCEDURE — 84100 ASSAY OF PHOSPHORUS: CPT | Mod: HCNC | Performed by: GENERAL PRACTICE

## 2024-05-24 PROCEDURE — 85027 COMPLETE CBC AUTOMATED: CPT | Mod: HCNC | Performed by: GENERAL PRACTICE

## 2024-05-24 PROCEDURE — 99233 SBSQ HOSP IP/OBS HIGH 50: CPT | Mod: 24,HCNC,, | Performed by: OBSTETRICS & GYNECOLOGY

## 2024-05-24 PROCEDURE — 83735 ASSAY OF MAGNESIUM: CPT | Mod: HCNC | Performed by: GENERAL PRACTICE

## 2024-05-24 PROCEDURE — 80053 COMPREHEN METABOLIC PANEL: CPT | Mod: HCNC | Performed by: GENERAL PRACTICE

## 2024-05-24 PROCEDURE — 25000003 PHARM REV CODE 250: Mod: HCNC | Performed by: GENERAL PRACTICE

## 2024-05-24 PROCEDURE — 25000003 PHARM REV CODE 250: Mod: HCNC

## 2024-05-24 PROCEDURE — 25000003 PHARM REV CODE 250: Mod: HCNC | Performed by: STUDENT IN AN ORGANIZED HEALTH CARE EDUCATION/TRAINING PROGRAM

## 2024-05-24 PROCEDURE — 63600175 PHARM REV CODE 636 W HCPCS: Mod: HCNC | Performed by: STUDENT IN AN ORGANIZED HEALTH CARE EDUCATION/TRAINING PROGRAM

## 2024-05-24 PROCEDURE — 99233 SBSQ HOSP IP/OBS HIGH 50: CPT | Mod: HCNC,,, | Performed by: INTERNAL MEDICINE

## 2024-05-24 PROCEDURE — 63600175 PHARM REV CODE 636 W HCPCS: Mod: HCNC | Performed by: GENERAL PRACTICE

## 2024-05-24 PROCEDURE — 36415 COLL VENOUS BLD VENIPUNCTURE: CPT | Mod: HCNC | Performed by: GENERAL PRACTICE

## 2024-05-24 RX ORDER — BENZONATATE 100 MG/1
100 CAPSULE ORAL 3 TIMES DAILY
Qty: 30 CAPSULE | Refills: 0 | Status: SHIPPED | OUTPATIENT
Start: 2024-05-24 | End: 2024-06-03

## 2024-05-24 RX ORDER — IBUPROFEN 600 MG/1
600 TABLET ORAL EVERY 6 HOURS PRN
Qty: 30 TABLET | Refills: 1 | Status: SHIPPED | OUTPATIENT
Start: 2024-05-24

## 2024-05-24 RX ORDER — ACETAMINOPHEN 325 MG/1
650 TABLET ORAL EVERY 4 HOURS PRN
Qty: 30 TABLET | Refills: 1 | Status: SHIPPED | OUTPATIENT
Start: 2024-05-24

## 2024-05-24 RX ORDER — SODIUM CHLORIDE 0.9 % (FLUSH) 0.9 %
20 SYRINGE (ML) INJECTION
Status: DISCONTINUED | OUTPATIENT
Start: 2024-05-24 | End: 2024-05-24 | Stop reason: HOSPADM

## 2024-05-24 RX ORDER — LANOLIN ALCOHOL/MO/W.PET/CERES
400 CREAM (GRAM) TOPICAL 4 TIMES DAILY
Status: DISCONTINUED | OUTPATIENT
Start: 2024-05-24 | End: 2024-05-24 | Stop reason: HOSPADM

## 2024-05-24 RX ORDER — SODIUM CHLORIDE 0.9 % (FLUSH) 0.9 %
10 SYRINGE (ML) INJECTION
Status: DISCONTINUED | OUTPATIENT
Start: 2024-05-24 | End: 2024-05-24 | Stop reason: HOSPADM

## 2024-05-24 RX ORDER — HEPARIN 100 UNIT/ML
500 SYRINGE INTRAVENOUS
Status: DISCONTINUED | OUTPATIENT
Start: 2024-05-24 | End: 2024-05-24 | Stop reason: HOSPADM

## 2024-05-24 RX ADMIN — BENZONATATE 100 MG: 100 CAPSULE ORAL at 02:05

## 2024-05-24 RX ADMIN — INSULIN ASPART 2 UNITS: 100 INJECTION, SOLUTION INTRAVENOUS; SUBCUTANEOUS at 02:05

## 2024-05-24 RX ADMIN — LISINOPRIL 40 MG: 20 TABLET ORAL at 09:05

## 2024-05-24 RX ADMIN — Medication 400 MG: at 06:05

## 2024-05-24 RX ADMIN — ATORVASTATIN CALCIUM 80 MG: 40 TABLET, FILM COATED ORAL at 09:05

## 2024-05-24 RX ADMIN — LAMOTRIGINE 100 MG: 100 TABLET ORAL at 09:05

## 2024-05-24 RX ADMIN — INSULIN ASPART 3 UNITS: 100 INJECTION, SOLUTION INTRAVENOUS; SUBCUTANEOUS at 09:05

## 2024-05-24 RX ADMIN — Medication 400 MG: at 02:05

## 2024-05-24 RX ADMIN — VANCOMYCIN HYDROCHLORIDE 1250 MG: 1.25 INJECTION, POWDER, LYOPHILIZED, FOR SOLUTION INTRAVENOUS at 05:05

## 2024-05-24 RX ADMIN — BENZONATATE 100 MG: 100 CAPSULE ORAL at 09:05

## 2024-05-24 RX ADMIN — IBUPROFEN 600 MG: 600 TABLET ORAL at 07:05

## 2024-05-24 RX ADMIN — HEPARIN 500 UNITS: 100 SYRINGE at 05:05

## 2024-05-24 RX ADMIN — GUAIFENESIN 1200 MG: 600 TABLET, EXTENDED RELEASE ORAL at 09:05

## 2024-05-24 RX ADMIN — AMLODIPINE BESYLATE 10 MG: 10 TABLET ORAL at 09:05

## 2024-05-24 RX ADMIN — INSULIN ASPART 3 UNITS: 100 INJECTION, SOLUTION INTRAVENOUS; SUBCUTANEOUS at 02:05

## 2024-05-24 RX ADMIN — Medication 400 MG: at 11:05

## 2024-05-24 RX ADMIN — FLUTICASONE PROPIONATE 50 MCG: 50 SPRAY, METERED NASAL at 09:05

## 2024-05-24 NOTE — PLAN OF CARE
Afebrile. Parainfluenza virus 3 +. Contact and droplet isolation implemented this am. Tolerating po well. Voiding without difficulty. ML abd incision dressing dry and intact. K+=3.7. Cr=0.7. Phos=3.5. Mg+2=1.2. MgOx po started. Glucoses monitored. Insulin given as ordered. C/O HA. Pain dec with Ibuprofen. Plan to discharge home this evening.

## 2024-05-24 NOTE — DISCHARGE SUMMARY
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 y.o.  female who presented on 2024 per provider request secondary to positive blood cultures after recent hospital admission. PMH is significant for stage IVB ovarian carcinosarcoma, breast cancer, T2DM, HLD, HTN and seizure disorder. She was started on empiric antibiotics, however, blood cultures were found to likely be contaminated and her initial symptoms later attributed to parainfluenza virus infection. Hospital course was uncomplicated.  On day of discharge (HD#2), patient was in stable condition, having met all discharge criteria. She was urinating spontaneously, ambulating, and tolerating a regular diet without nausea/vomiting. Pain was well-controlled on oral medication. Her port was appropriately de-accessed and heparin-locked.  She was discharged with medications and follow up as  listed below.     On afternoon rounding the day of discharge,    Temp:  [97.4 °F (36.3 °C)-98.6 °F (37 °C)] 98.3 °F (36.8 °C)  Pulse:  [] 109  Resp:  [16-20] 18  SpO2:  [93 %-99 %] 98 %  BP: (132-178)/(74-92) 132/80    Physical Exam: wnl; VMI continuing to heal well.  Port site w/o signs of infection.    Consults: Infectious Disease, please see notes and recommendations    Significant Diagnostic Studies:  Recent Labs   Lab 05/22/24  0606 05/23/24  0236 05/24/24  0607   WBC 4.76 4.88 5.95   HGB 8.2* 8.4* 8.3*   HCT 26.4* 27.0* 26.5*   MCV 81* 82 80*   * 474* 506*      Treatments:   1. none    Disposition: Home or Self Care    Patient Instructions:   Current Discharge Medication List        START taking these medications    Details   acetaminophen (TYLENOL) 325 MG tablet Take 2 tablets (650 mg total) by mouth every 4 (four) hours as needed for Pain.  Qty: 30 tablet, Refills: 1           CONTINUE these medications which have CHANGED    Details   benzonatate (TESSALON) 100 MG capsule Take 1 capsule (100 mg total) by mouth 3 (three) times daily. for 10 days  Qty: 30 capsule, Refills: 0      ibuprofen (ADVIL,MOTRIN) 600 MG tablet Take 1 tablet (600 mg total) by mouth every 6 (six) hours as needed for Pain.  Qty: 30 tablet, Refills: 1           CONTINUE these medications which have NOT CHANGED    Details   amlodipine (NORVASC) 10 MG tablet Take 10 mg by mouth once daily.       atorvastatin (LIPITOR) 40 MG tablet Take 80 mg by mouth once daily.      dexAMETHasone (DECADRON) 4 MG Tab Take 2 tablets (8 mg total) by mouth once daily. daily on days 2-4 of your chemotherapy cycle.  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      fluticasone propionate (FLONASE) 50 mcg/actuation nasal spray 1 spray by Each Nostril route once daily.      guaiFENesin (MUCINEX) 600 mg 12 hr tablet Take 2 tablets  (1,200 mg total) by mouth 2 (two) times daily. for 5 days  Qty: 20 tablet, Refills: 0      hydrochlorothiazide (HYDRODIURIL) 25 MG tablet Take 25 mg by mouth once daily.       hydrocodone-acetaminophen (HYCET) solution 7.5-325 mg/15mL Take 30 mLs by mouth every 6 (six) hours as needed for Pain.  Qty: 473 mL, Refills: 0    Comments: Quantity prescribed more than 7 day supply? No      HYDROmorphone (DILAUDID) 2 MG tablet Take 1 tablet (2 mg total) by mouth every 8 (eight) hours as needed for Pain.  Qty: 20 tablet, Refills: 0    Comments: Quantity prescribed more than 7 day supply? Yes, quantity medically necessary  Associated Diagnoses: Ovarian carcinosarcoma; Post-op pain      insulin aspart U-100 (NOVOLOG) 100 unit/mL (3 mL) InPn pen Inject 3 Units into the skin 3 (three) times daily with meals.  (Discard pen 28 days after initial use)  Qty: 3 mL, Refills: 4      insulin detemir U-100, Levemir, (LEVEMIR FLEXPEN) 100 unit/mL (3 mL) InPn pen Inject 7 Units into the skin every evening.  (Discard pen 28 days after initial use)  Qty: 6 mL, Refills: 3      insulin lispro (HUMALOG U-100 INSULIN) 100 unit/mL injection **LOW CORRECTION DOSE**  Blood Glucose  mg/dL                  Pre-meal                  151-200                0 unit                        201-250                2 units                      251-300                3 units                      301-350                4 units                      >350                     5 units                      Administer subcutaneously if needed at times designated by monitoring schedule.  Qty: 10 mL, Refills: 1      ketorolac 0.5% (ACULAR) 0.5 % Drop Place 1 drop into both eyes. For pain after eye injections.      lamoTRIgine (LAMICTAL) 25 MG tablet Take 25 mg by mouth 2 (two) times daily.      lorazepam (ATIVAN) 0.5 MG tablet Take 0.5 mg by mouth every 12 (twelve) hours as needed for Anxiety.      metformin (GLUCOPHAGE) 1000 MG tablet Take 1,000 mg by mouth daily with  breakfast.       OLANZapine (ZYPREXA) 5 MG tablet Take 1 tablet (5 mg total) by mouth every evening. nightly on days 1-4 of each chemotherapy cycle.  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  Qty: 20 tablet, Refills: 0      prochlorperazine (COMPAZINE) 10 MG tablet Take 1 tablet (10 mg total) by mouth every 6 (six) hours as needed (Nausea).  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            Follow-up Information       Александр Washington MD Follow up.    Specialty: Gynecologic Oncology  Why: As needed  Contact information:  [address]  [address] [id number] [phone]                             Art Dao MD MS  OB/Gyn  PGY-1

## 2024-05-24 NOTE — PROGRESS NOTES
[facility] - Transplant Stepdown  Gynecologic Oncology  Progress Note    Patient Name: Anette Ricci  MRN: [id number]  Admission Date: 5/23/2024  Hospital Length of Stay: 1 days  Attending Provider: Александр Washington MD  Primary Care Provider: Mark Chua MD  Principal Problem: Bacteremia    Subjective:     Interval History: NAEON.  Denies significant pain.  Denies N/V/F/C.  Was able to sleep through the night. Reports still having a cough, but considerably less frequent nor bothersome over the last 24 hours. Tolerating diet w/o issue.  Ambulating to restroom w/o issue.  Last BM yesterday.  Endorses a HA this morning, which has been relieved with IBU prior.  No other concerns at this time.    Scheduled Meds:   amLODIPine  10 mg Oral Daily    atorvastatin  80 mg Oral Daily    benzonatate  100 mg Oral TID    enoxparin  40 mg Subcutaneous Q24H (prophylaxis, 1700)    fluticasone propionate  1 spray Each Nostril Daily    guaiFENesin  1,200 mg Oral BID    hydroCHLOROthiazide  25 mg Oral Daily    insulin aspart U-100  0-5 Units Subcutaneous TIDWM    insulin aspart U-100  3 Units Subcutaneous TID WM    insulin glargine U-100  7 Units Subcutaneous QHS    lamoTRIgine  100 mg Oral Daily    lisinopriL  40 mg Oral Daily    OLANZapine  5 mg Oral QHS    vancomycin (VANCOCIN) IV (PEDS and ADULTS)  15 mg/kg Intravenous Q12H     Continuous Infusions:  PRN Meds:  Current Facility-Administered Medications:     (Magic mouthwash) 1:1:1 diphenhydrAMINE(Benadryl) 12.5mg/5ml liq, aluminum & magnesium hydroxide-simethicone (Maalox), LIDOcaine viscous 2%, 10 mL, Swish & Spit, Q4H PRN    acetaminophen, 650 mg, Oral, Q4H PRN    calcium carbonate, 500 mg, Oral, TID PRN    dextromethorphan-guaiFENesin  mg/5 ml, 5 mL, Oral, Q4H PRN    dextrose 10%, 12.5 g, Intravenous, PRN    dextrose 10%, 25 g, Intravenous, PRN    glucagon (human recombinant), 1 mg, Intramuscular, PRN    glucose, 16 g, Oral, PRN    glucose, 24 g, Oral, PRN     HYDROcodone-acetaminophen, 1 tablet, Oral, Q4H PRN    HYDROcodone-acetaminophen, 1 tablet, Oral, Q4H PRN    ibuprofen, 600 mg, Oral, Q6H PRN    melatonin, 6 mg, Oral, Nightly PRN    ondansetron, 8 mg, Oral, Q8H PRN    promethazine, 25 mg, Oral, Q6H PRN    QUEtiapine, 25 mg, Oral, Daily PRN    simethicone, 1 tablet, Oral, TID PRN    sodium chloride 0.9%, 10 mL, Intravenous, PRN    sodium chloride 0.9%, 10 mL, Intravenous, PRN    Pharmacy to dose Vancomycin consult, , , Once **AND** vancomycin - pharmacy to dose, , Intravenous, pharmacy to manage frequency    Review of patient's allergies indicates:   Allergen Reactions    Codeine Other (See Comments)     Flu like s/s    Tramadol Other (See Comments)     Flu like symptoms similar to codeine reaction     Objective:     Vital Signs (Most Recent):  Temp: 98.2 °F (36.8 °C) (05/24/24 0531)  Pulse: 96 (05/24/24 0531)  Resp: 20 (05/24/24 0531)  BP: 137/81 (05/24/24 0531)  SpO2: (!) 93 % (05/24/24 0531) Vital Signs (24h Range):  Temp:  [98.2 °F (36.8 °C)-98.6 °F (37 °C)] 98.2 °F (36.8 °C)  Pulse:  [] 96  Resp:  [16-20] 20  SpO2:  [93 %-99 %] 93 %  BP: (135-178)/(74-92) 137/81     Weight: 78.5 kg (173 lb 1 oz)  Body mass index is 27.11 kg/m².    Intake/Output - Last 3 Shifts         05/22 0700 05/23 0659 05/23 0700 05/24 0659 05/24 0700 05/25 0659    P.O.  220     IV Piggyback 500 445.5     Total Intake(mL/kg) 500 665.5 (8.5)     Net +500 +665.5            Urine Occurrence  2 x     Stool Occurrence  0 x           Physical Exam:   Constitutional: She is oriented to person, place, and time. No distress.    HENT:   Head: Normocephalic.      Cardiovascular:  Normal rate.             Pulmonary/Chest: Effort normal. No respiratory distress.        Abdominal: Soft. She exhibits no distension. There is no abdominal tenderness. There is no rebound and no guarding.   Well healing VMI; covered with NAB; wound bed pink and moist; no surrounding erythema              Musculoskeletal: Normal range of motion.       Neurological: She is alert and oriented to person, place, and time.    Skin: Skin is warm and dry. She is not diaphoretic.    Psychiatric: She has a normal mood and affect. Her behavior is normal. Judgment and thought content normal.     Lines/Drains/Airways       Central Venous Catheter Line  Duration             Port A Cath Single Lumen Subclavian Left -- days              Peripheral Intravenous Line  Duration                  Peripheral IV - Single Lumen 05/23/24 0241 22 G Anterior;Left Hand 1 day                  Laboratory:  CBC:   Recent Labs   Lab 05/23/24  0236   WBC 4.88   HGB 8.4*   HCT 27.0*   *    and CMP:   Recent Labs   Lab 05/23/24  0331 05/23/24  0929   * 135*   K 3.3* 3.6    100   CO2 20* 27   * 215*   BUN 9 6   CREATININE 0.7 0.7   CALCIUM 7.9* 8.7   PROT 5.9* 6.8   ALBUMIN 2.3* 2.6*   BILITOT 0.2 0.2   ALKPHOS 100 104   AST 15 19   ALT 10 11   ANIONGAP 9 8     Diagnostic Results:  X-Ray: Reviewed  (5/23/24): Cardiac wires overlie the chest. Cardiomediastinal silhouette is stable. Atherosclerotic calcifications overlie the aortic arch. Left chest wall port catheter is present with the tip overlying the SVC. Central vascular congestion. Small right pleural effusion with passive atelectasis in the right lung base. Superimposed airspace disease felt less likely but difficult to entirely exclude on this limited single view. Mild left basilar subsegmental atelectasis. No confluent area of consolidation. No sizable left pleural effusion. No distinct pneumothorax.     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 Ricci is a 59yo  with stage IVB ovarian carcinosacoma, s/p PDS on 3/25/24 with Dr Washington (CARLOS ALBERTO BSO omx liver mobilization, peritoneal & R diaphragm stripping, hepatic wedge resection, cholecystectomy) C2D14 carbo/taxol/bevacizumab who presents for treatment of bacteremia.      1.Bacteremia   - On admit, vitals tachycardic and tachypneic. HR normalized with fluids, Tachypnea resolved. Normal O2 saturation. BP hypertensive. Afebrile.  - Has remained afebrile and hypertensive (has cHTN) over the last 24 hours; all other VSS  - PE:  Abdomen,soft, NT, ND. Midline bandage in place.   - Labs:   Bood cultures Rapid ID PCR showed staph epidermidis, mec A/C.   CBC ./; ANC 2.0 >  pending  Lactate 2.1  CMP Cr 0.7 Na 135 K 3.7 LFTs wnl Mg 1.2 Phos wnl  Repeat blood cultures collected  Flu/RSV/GAS/COVID19 negative; respiratory panel positive for parainfluenza virus 3  Imaging:   CXR with R sided atelectasis and small pleural effusion. No detrimental changes from .      Plan:  - Admit for 24-48 hours of IV antibiotics  - IV Vanc first dose initiated in ED; last dose scheduled for 1800 today  - Infectious disease consulted. Recs as follow:  - unclear if staph epi is pathogen given only having URI symptoms  - check RIP   - can continue vancomycin for now  - f/u repeat cultures; currently NGTD  - Daily CBC with oncology  - Tessalon pearls and mucinex for respiratory symptoms  - Replace Mag     2. HTN   - Cont home meds; amlodipine/enalapril/HCTZ     3. HLD   - Cont home statin     4. DM2   - Holding home meds: metformin, monjouro   - Cont home levemir 7u QHS, aspart 3u TID WM  - SSI   - POCT BG Q4h     5. Stage IV ovarian carcinosarcoma   - s/p PDS on 3/25/24 with Dr Washington (CARLOS ALBERTO BSO omx liver mobilization, peritoneal & R diaphragm stripping, hepatic wedge resection, cholecystectomy) suboptimal <1cm residual disease: sigmoid, transverse colon,  diaphragm, kelvin hepatis   - currently receiving carbo/taxol/bevacizumab   4/19/24: C1D1: Carbo/Taxol/(Velma held), CA-125 248  5/10/24: C2D1: Carbo/Taxol/Velma     6. Seizure disorder   - Cont home lamotrigine 25mg      7. Mood disorder   - Cont Zyprexa QHS, Seroquel PRN     VTE Risk Mitigation (From admission, onward)           Ordered     enoxaparin injection 40 mg  Every 24 hours         05/23/24 0507     Place sequential compression device  Until discontinued         05/23/24 0507                  Was junior catheter removed? Yes      Art Dao MD  Gynecologic Oncology  PGY 1

## 2024-05-24 NOTE — ED NOTES
Received report from Stephen VINSON. Assumed care of patient. Patient is alert and resting comfortably in bed in NAD. BP, cardiac, and O2 monitoring continued. Family member at bedside. Patient updated on plan of care, pt denies any needs or complaints at this time. Bed locked in lowest position, side rails up x2, call light within reach. VSS.

## 2024-05-24 NOTE — NURSING
Discharge instructions given and reviewed with pt and . Needed home meds delivered to pt room. [facility] has glucose monitor and all supplies needed to monitor glucose at home. States has all needed Insulin. Plan to check glucose at home and then eat supper. Will give self needed Insulin. SW arranged HH to be resumed. Verbalized understanding.

## 2024-05-24 NOTE — PROGRESS NOTES
Admit Assessment    Patient Identification  Anette Ricci   :  1965  Admit Date:  2024  Attending Provider:  Александр Washington MD              Referral:   Pt was admitted to  with a diagnosis of Bacteremia, and was admitted this hospital stay due to Bacteremia [R78.81]  Tachycardia [R00.0]  Lung infection [J18.9].   is involved was referred to the Social Work Department via routine referral.  Patient presents as a 58 y.o. year old  female.    Persons interviewed: patient    Living Situation:  Pt. States that she resides at home with her spouse (Darrell Ricci-[phone]). Pt. States that she has been fairly independent with all adl's prior to admission. She states that her  is retired and home with her full time and helps with all of her needs.    Resides at [address] [place] [address], phone: [phone] (home).      (RETIRED) Functional Status Prior  Ambulation Prior: 0-->independent  Transferrin-->independent  Toiletin-->independent  Bathin-->independent  Dressin-->independent  Eatin-->independent  Communication: understands/communicates without difficulty  Swallowing: swallows foods/liquids without difficulty    Current or Past Agencies and Description of Services/Supplies    DME  Agency Name: none  Agency Phone Number: n/a       Home Health  Agency Name: [facility]  Agency Phone Number: [phone]  Services: nursing    IV Infusion  Agency Name: none  Agency Phone Number: n/a    Nutrition: oral    Outpatient Pharmacy:     [facility]  [place] - [address]  [address] [place]) [place] [address]  Phone: [phone] Fax: [phone]    [facility] [address] [place] [address]  Phone: [phone] Fax: [phone]    [address], [place]  [address] [facility]  [address] [facility]  [place]  [place] [address]  Phone: [phone] Fax: [phone]      Patient Preference of agencies include: None noted    Patient/Caregiver informed of right to choose providers or agencies.  Patient provides permission to release any necessary information to [facility] and to Non-[facility] agencies as needed to facilitate patient care, treatment planning, and patient discharge planning.  Written and verbal resources provided.      Coping: doing well. Spouse very supportive          Adjustment to Diagnosis and Treatment: appropriate      Emotional/Behavioral/Cognitive Issues: none noted            History/Current Symptoms of Anxiety/Depression: No:   History/Current Substance Use:   Social History     Tobacco Use    Smoking status: Never    Smokeless tobacco: Never   Substance and Sexual Activity    Alcohol use: Yes     Alcohol/week: 0.0 standard drinks of alcohol     Comment: ocassional    Drug use: No    Sexual activity: Not Currently     Partners: Male     Birth control/protection: None       Indications of Abuse/Neglect: No:   Abuse Screen (yes response referral indicated)  Feels Unsafe at Home or Work/School: no  Feels Threatened by Someone: no  Does anyone try to keep you from having contact with others or doing things outside your home?: no  Physical Signs of Abuse Present: no    Financial:  Payer/Plan Subscr  Sex Relation Sub. Ins. ID Effective Group Num   1. [employer] MANAGE* EDILANDRE SI* 1965 Female Self [id number] 10/1/23 [id number]                                   [address] [address]   2. [employer] * EDILANDRE SI* 1965 Female Self [id number] 4/15/13                                    [address] [id number], [facility] [address]                            Other identified concerns/needs: May need to resume HH upon Dc for wound care.     Plan: to return home with help from spouse.    Interventions/Referrals: TBD  Patient/caregiver engaged in treatment planning process.     providing  psychosocial and supportive counseling, resources, education, assistance and discharge planning as appropriate.  Patient/caregiver state understanding of  available resources,  following, remains available. Provided pt. With sw'er phone# and encouraged her to call if needed. Will follow.

## 2024-05-24 NOTE — PROGRESS NOTES
[facility] Transplant Stepdown  Infectious Disease  Progress Note    Patient Name: Anette Ricci  MRN: [id number]  Admission Date: 5/23/2024  Length of Stay: 1 days  Attending Physician: Александр Washington MD  Primary Care Provider: Mark Chua MD    Isolation Status: Contact and Droplet  Assessment/Plan:      ID  * Bacteremia  59 y/o F h/o stage IVB ovarian carcinosarcoma s/p PDS on 3/25/24 ([facility] omx liver mobilization, peritoneal & R diaphragm stripping, hepatic wedge resection, cholecystectomy) malignant ascites, C2 carbo/taxol/bevacizumab, post-op infected pelvic fluid collection drained by IR which grew ESBL Ecoli and wound grew aspergillus sp (has not been treated), presented to the ED after sent from clinic with tachycardia, tachypnea and URI symptoms (cough, SOB, general malaise) x 2 days with blood cultures growing staph epi.  CXR with R pleural effusion, surgical abdominal wounds healing very well and she denies abdominal pain.  Repeat cultures NGTD prior to active antimicrobials (vancomycin) suspect staph epi is likely contaminant.  PIV3 + - which suspect is most likely reason for her presentation  - can stop antibiotics  - at this time her abdominal wounds have healed nicely, her abdominal exam is unremarkable and therefore it is extremely unlikely that aspergillus sp growing from wound swab represents pathogen.  The wounds would not have improved without antifungal therapy.  She also has no other signs/symptoms of invasive aspergillosis and at this time would monitor and repeat imaging at some point in future per gyn onc team.   - discussed with team will sign off        Anticipated Disposition: pending    Thank you for your consult. I will sign off. Please contact us if you have any additional questions.    Ivan Epstein MD  Infectious Disease  [facility]    Subjective:     Principal Problem:Bacteremia    HPI: 59 y/o F h/o stage IVB ovarian carcinosarcoma s/p PDS  on 3/25/24 ([facility] omx liver mobilization, peritoneal & R diaphragm stripping, hepatic wedge resection, cholecystectomy) malignant ascites, C2 carbo/taxol/bevacizumab, post-op infected pelvic fluid collection drained by IR which grew ESBL Ecoli and wound grew aspergillus sp (has not been treated), presented to the ED after sent from clinic with tachycardia, tachypnea and URI symptoms (cough, SOB, general malaise) x 2 days with blood cultures growing staph epi.  CXR with R pleural effusion, surgical abdominal wounds healing very well and she denies abdominal pain  Interval History: PIV3+, repeat blood cultures nGTD, she feels better    Review of Systems   Constitutional:  Negative for activity change, chills and fever.   HENT:  Positive for postnasal drip, sinus pressure and sore throat. Negative for congestion, mouth sores and rhinorrhea.    Eyes:  Negative for photophobia, pain and redness.   Respiratory:  Positive for cough. Negative for chest tightness, shortness of breath and wheezing.    Cardiovascular:  Negative for chest pain and leg swelling.   Gastrointestinal:  Negative for abdominal distention, abdominal pain, diarrhea, nausea and vomiting.   Endocrine: Negative for polyuria.   Genitourinary:  Negative for decreased urine volume, dysuria and flank pain.   Musculoskeletal:  Negative for joint swelling and neck pain.   Skin:  Positive for wound. Negative for color change.   Allergic/Immunologic: Negative for food allergies.   Neurological:  Negative for dizziness, weakness and headaches.   Hematological:  Negative for adenopathy.   Psychiatric/Behavioral:  Negative for agitation and confusion. The patient is not nervous/anxious.      Objective:     Vital Signs (Most Recent):  Temp: 97.4 °F (36.3 °C) (05/24/24 0810)  Pulse: 95 (05/24/24 0810)  Resp: 18 (05/24/24 0810)  BP: (!) 166/85 (05/24/24 0810)  SpO2: 98 % (05/24/24 0810) Vital Signs (24h Range):  Temp:  [97.4 °F (36.3 °C)-98.6 °F (37 °C)] 97.4 °F  (36.3 °C)  Pulse:  [] 95  Resp:  [16-20] 18  SpO2:  [93 %-99 %] 98 %  BP: (135-178)/(74-92) 166/85     Weight: 78.5 kg (173 lb 1 oz)  Body mass index is 27.11 kg/m².    Estimated Creatinine Clearance: 93.4 mL/min (based on SCr of 0.7 mg/dL).     Physical Exam  Constitutional:       Appearance: She is well-developed.   HENT:      Head: Normocephalic and atraumatic.      Comments: Maxillary sinus TTP  Eyes:      Pupils: Pupils are equal, round, and reactive to light.   Cardiovascular:      Rate and Rhythm: Normal rate.   Pulmonary:      Effort: Pulmonary effort is normal.      Breath sounds: Normal breath sounds.   Abdominal:      General: Bowel sounds are normal.      Palpations: Abdomen is soft.      Comments: Lower abd op site completely healed, midline superior op site healing well without drainage or surrounding erythema.   Musculoskeletal:         General: No tenderness.      Cervical back: Normal range of motion and neck supple.   Skin:     General: Skin is warm and dry.      Comments: L chest wall port unremarkable   Neurological:      Mental Status: She is alert and oriented to person, place, and time.          Significant Labs: CBC:   Recent Labs   Lab 05/23/24  0236   WBC 4.88   HGB 8.4*   HCT 27.0*   *     CMP:   Recent Labs   Lab 05/23/24  0331 05/23/24  0929 05/24/24  0608   * 135* 135*   K 3.3* 3.6 3.7    100 103   CO2 20* 27 23   * 215* 154*   BUN 9 6 7   CREATININE 0.7 0.7 0.7   CALCIUM 7.9* 8.7 8.1*   PROT 5.9* 6.8 6.0   ALBUMIN 2.3* 2.6* 2.3*   BILITOT 0.2 0.2 0.2   ALKPHOS 100 104 92   AST 15 19 16   ALT 10 11 9*   ANIONGAP 9 8 9       Significant Imaging: I have reviewed all pertinent imaging results/findings within the past 24 hours.

## 2024-05-24 NOTE — SUBJECTIVE & OBJECTIVE
Interval History: PIV3+, repeat blood cultures nGTD, she feels better    Review of Systems   Constitutional:  Negative for activity change, chills and fever.   HENT:  Positive for postnasal drip, sinus pressure and sore throat. Negative for congestion, mouth sores and rhinorrhea.    Eyes:  Negative for photophobia, pain and redness.   Respiratory:  Positive for cough. Negative for chest tightness, shortness of breath and wheezing.    Cardiovascular:  Negative for chest pain and leg swelling.   Gastrointestinal:  Negative for abdominal distention, abdominal pain, diarrhea, nausea and vomiting.   Endocrine: Negative for polyuria.   Genitourinary:  Negative for decreased urine volume, dysuria and flank pain.   Musculoskeletal:  Negative for joint swelling and neck pain.   Skin:  Positive for wound. Negative for color change.   Allergic/Immunologic: Negative for food allergies.   Neurological:  Negative for dizziness, weakness and headaches.   Hematological:  Negative for adenopathy.   Psychiatric/Behavioral:  Negative for agitation and confusion. The patient is not nervous/anxious.      Objective:     Vital Signs (Most Recent):  Temp: 97.4 °F (36.3 °C) (05/24/24 0810)  Pulse: 95 (05/24/24 0810)  Resp: 18 (05/24/24 0810)  BP: (!) 166/85 (05/24/24 0810)  SpO2: 98 % (05/24/24 0810) Vital Signs (24h Range):  Temp:  [97.4 °F (36.3 °C)-98.6 °F (37 °C)] 97.4 °F (36.3 °C)  Pulse:  [] 95  Resp:  [16-20] 18  SpO2:  [93 %-99 %] 98 %  BP: (135-178)/(74-92) 166/85     Weight: 78.5 kg (173 lb 1 oz)  Body mass index is 27.11 kg/m².    Estimated Creatinine Clearance: 93.4 mL/min (based on SCr of 0.7 mg/dL).     Physical Exam  Constitutional:       Appearance: She is well-developed.   HENT:      Head: Normocephalic and atraumatic.      Comments: Maxillary sinus TTP  Eyes:      Pupils: Pupils are equal, round, and reactive to light.   Cardiovascular:      Rate and Rhythm: Normal rate.   Pulmonary:      Effort: Pulmonary effort is  normal.      Breath sounds: Normal breath sounds.   Abdominal:      General: Bowel sounds are normal.      Palpations: Abdomen is soft.      Comments: Lower abd op site completely healed, midline superior op site healing well without drainage or surrounding erythema.   Musculoskeletal:         General: No tenderness.      Cervical back: Normal range of motion and neck supple.   Skin:     General: Skin is warm and dry.      Comments: L chest wall port unremarkable   Neurological:      Mental Status: She is alert and oriented to person, place, and time.          Significant Labs: CBC:   Recent Labs   Lab 05/23/24  0236   WBC 4.88   HGB 8.4*   HCT 27.0*   *     CMP:   Recent Labs   Lab 05/23/24  0331 05/23/24  0929 05/24/24  0608   * 135* 135*   K 3.3* 3.6 3.7    100 103   CO2 20* 27 23   * 215* 154*   BUN 9 6 7   CREATININE 0.7 0.7 0.7   CALCIUM 7.9* 8.7 8.1*   PROT 5.9* 6.8 6.0   ALBUMIN 2.3* 2.6* 2.3*   BILITOT 0.2 0.2 0.2   ALKPHOS 100 104 92   AST 15 19 16   ALT 10 11 9*   ANIONGAP 9 8 9       Significant Imaging: I have reviewed all pertinent imaging results/findings within the past 24 hours.

## 2024-05-24 NOTE — PROGRESS NOTES
Consult received to arrange for HH for wound care. Pt. Currently seen by [facility] ([phone]). Contacted [facility] and spoke with Linda, all necessary information given and orders faxed to the office ([phone]). Pt. Will be seen following DC. Will follow as needed.

## 2024-05-24 NOTE — ASSESSMENT & PLAN NOTE
59 y/o F h/o stage IVB ovarian carcinosarcoma s/p PDS on 3/25/24 ([facility] omx liver mobilization, peritoneal & R diaphragm stripping, hepatic wedge resection, cholecystectomy) malignant ascites, C2 carbo/taxol/bevacizumab, post-op infected pelvic fluid collection drained by IR which grew ESBL Ecoli and wound grew aspergillus sp (has not been treated), presented to the ED after sent from clinic with tachycardia, tachypnea and URI symptoms (cough, SOB, general malaise) x 2 days with blood cultures growing staph epi.  CXR with R pleural effusion, surgical abdominal wounds healing very well and she denies abdominal pain.  Repeat cultures NGTD prior to active antimicrobials (vancomycin) suspect staph epi is likely contaminant.  PIV3 + - which suspect is most likely reason for her presentation  - can stop antibiotics  - at this time her abdominal wounds have healed nicely, her abdominal exam is unremarkable and therefore it is extremely unlikely that aspergillus sp growing from wound swab represents pathogen.  The wounds would not have improved without antifungal therapy.  She also has no other signs/symptoms of invasive aspergillosis and at this time would monitor and repeat imaging at some point in future per gyn onc team.   - discussed with team will sign off

## 2024-05-24 NOTE — DISCHARGE INSTRUCTIONS
MLI- 1)clean wound with Vashe solution. 2)fill wound space with Aquacel Ag. 3)cover with border foam dressing. Change MWF.

## 2024-05-24 NOTE — NURSING
Nurses Note -- 4 Eyes      5/24/2024   3:04 AM      Skin assessed during: Admit      [] No Altered Skin Integrity Present    []Prevention Measures Documented      [x] Yes- Altered Skin Integrity Present or Discovered   [] LDA Added if Not in Epic (Describe Wound)   [] New Altered Skin Integrity was Present on Admit and Documented in LDA   [x] Wound Image Taken    Wound Care Consulted? Yes    Attending Nurse:  De Leach RN/Staff Member:   Edilma

## 2024-05-24 NOTE — PLAN OF CARE
- Patient is admitted on 5/23/024 for blood culture positive to Staphylococcus epidermidis.   - Currently getting treated with antibiotic Vancomycin for the positive blood culture.  - Patient is calm, cooperative and cheerful.  - VSS. Afebrile. Spo2 maintained@RA.  - Accucheck AC HS. HS PG=683. Scheduled insulin Lantus 7 Units given as per the order.  - Medicines given as per MAR.  - Ibuprofen given for mild headache rating 3 on pain score. Moderate relief obtained.  - Diabetic diet, 2000 calorie.  - Ambulatory.  -  at bedside attentive to patient.  - Bed in low position. Wheels locked. Call light within patient's reach. Patient aware of calling for assistance.  - No s/s of acute distress noted this shift.  - Plan of care reviewed and discussed with the patient and her .

## 2024-05-24 NOTE — NURSING
Patient came to the department on a wheelchair. VSS. Afebrile. Spo2 maintained@RA. Patient is alert and orientedX4.  at bedside. Plan of care discussed with the patient and her . Call light within patient's reach.

## 2024-05-28 ENCOUNTER — TELEPHONE (OUTPATIENT)
Dept: GYNECOLOGIC ONCOLOGY | Facility: CLINIC | Age: 59
End: 2024-05-28
Payer: MEDICARE

## 2024-05-28 LAB
BACTERIA BLD CULT: NORMAL
BACTERIA BLD CULT: NORMAL

## 2024-05-28 NOTE — TELEPHONE ENCOUNTER
----- Message from Letty Guerra sent at 5/28/2024 11:06 AM CDT -----  Regarding: Patient advice  Contact: Pt  [phone]              Name of Caller: Anette      Contact Preference: [phone]    Nature of Call:  Requesting supporting documents she can  from  stating patient is receiving Chemo treatments and treatment plan she would also like copies of Pathology report

## 2024-05-30 ENCOUNTER — LAB VISIT (OUTPATIENT)
Dept: LAB | Facility: HOSPITAL | Age: 59
End: 2024-05-30
Attending: STUDENT IN AN ORGANIZED HEALTH CARE EDUCATION/TRAINING PROGRAM
Payer: MEDICARE

## 2024-05-30 DIAGNOSIS — C56.9 OVARIAN CARCINOSARCOMA: ICD-10-CM

## 2024-05-30 LAB
ALBUMIN SERPL BCP-MCNC: 2.5 G/DL (ref 3.5–5.2)
ALP SERPL-CCNC: 105 U/L (ref 55–135)
ALT SERPL W/O P-5'-P-CCNC: 10 U/L (ref 10–44)
ANION GAP SERPL CALC-SCNC: 8 MMOL/L (ref 8–16)
AST SERPL-CCNC: 14 U/L (ref 10–40)
BILIRUB SERPL-MCNC: 0.4 MG/DL (ref 0.1–1)
BUN SERPL-MCNC: 11 MG/DL (ref 6–20)
CALCIUM SERPL-MCNC: 9.2 MG/DL (ref 8.7–10.5)
CANCER AG125 SERPL-ACNC: 90 U/ML (ref 0–30)
CHLORIDE SERPL-SCNC: 99 MMOL/L (ref 95–110)
CO2 SERPL-SCNC: 28 MMOL/L (ref 23–29)
CREAT SERPL-MCNC: 0.8 MG/DL (ref 0.5–1.4)
ERYTHROCYTE [DISTWIDTH] IN BLOOD BY AUTOMATED COUNT: 18.2 % (ref 11.5–14.5)
EST. GFR  (NO RACE VARIABLE): >60 ML/MIN/1.73 M^2
GLUCOSE SERPL-MCNC: 208 MG/DL (ref 70–110)
HCT VFR BLD AUTO: 30 % (ref 37–48.5)
HGB BLD-MCNC: 9 G/DL (ref 12–16)
IMM GRANULOCYTES # BLD AUTO: 0.05 K/UL (ref 0–0.04)
MCH RBC QN AUTO: 24.5 PG (ref 27–31)
MCHC RBC AUTO-ENTMCNC: 30 G/DL (ref 32–36)
MCV RBC AUTO: 82 FL (ref 82–98)
NEUTROPHILS # BLD AUTO: 8.1 K/UL (ref 1.8–7.7)
PLATELET # BLD AUTO: 527 K/UL (ref 150–450)
PMV BLD AUTO: 9.9 FL (ref 9.2–12.9)
POTASSIUM SERPL-SCNC: 4.1 MMOL/L (ref 3.5–5.1)
PROT SERPL-MCNC: 7 G/DL (ref 6–8.4)
RBC # BLD AUTO: 3.68 M/UL (ref 4–5.4)
SODIUM SERPL-SCNC: 135 MMOL/L (ref 136–145)
WBC # BLD AUTO: 10.88 K/UL (ref 3.9–12.7)

## 2024-05-30 PROCEDURE — 80053 COMPREHEN METABOLIC PANEL: CPT | Mod: HCNC | Performed by: STUDENT IN AN ORGANIZED HEALTH CARE EDUCATION/TRAINING PROGRAM

## 2024-05-30 PROCEDURE — 36415 COLL VENOUS BLD VENIPUNCTURE: CPT | Mod: HCNC | Performed by: STUDENT IN AN ORGANIZED HEALTH CARE EDUCATION/TRAINING PROGRAM

## 2024-05-30 PROCEDURE — 85027 COMPLETE CBC AUTOMATED: CPT | Mod: HCNC | Performed by: STUDENT IN AN ORGANIZED HEALTH CARE EDUCATION/TRAINING PROGRAM

## 2024-05-30 PROCEDURE — 86304 IMMUNOASSAY TUMOR CA 125: CPT | Mod: HCNC | Performed by: STUDENT IN AN ORGANIZED HEALTH CARE EDUCATION/TRAINING PROGRAM

## 2024-05-31 ENCOUNTER — INFUSION (OUTPATIENT)
Dept: INFUSION THERAPY | Facility: HOSPITAL | Age: 59
End: 2024-05-31
Payer: MEDICARE

## 2024-05-31 VITALS
RESPIRATION RATE: 18 BRPM | WEIGHT: 169.75 LBS | TEMPERATURE: 98 F | HEART RATE: 104 BPM | SYSTOLIC BLOOD PRESSURE: 140 MMHG | BODY MASS INDEX: 26.59 KG/M2 | DIASTOLIC BLOOD PRESSURE: 78 MMHG

## 2024-05-31 DIAGNOSIS — C78.6 METASTASIS TO PERITONEAL CAVITY: ICD-10-CM

## 2024-05-31 DIAGNOSIS — C80.1 CARCINOSARCOMA: Primary | ICD-10-CM

## 2024-05-31 PROCEDURE — 96417 CHEMO IV INFUS EACH ADDL SEQ: CPT | Mod: HCNC

## 2024-05-31 PROCEDURE — 25000003 PHARM REV CODE 250: Mod: HCNC | Performed by: OBSTETRICS & GYNECOLOGY

## 2024-05-31 PROCEDURE — 63600175 PHARM REV CODE 636 W HCPCS: Mod: HCNC | Performed by: OBSTETRICS & GYNECOLOGY

## 2024-05-31 PROCEDURE — A4216 STERILE WATER/SALINE, 10 ML: HCPCS | Mod: HCNC | Performed by: OBSTETRICS & GYNECOLOGY

## 2024-05-31 PROCEDURE — 96415 CHEMO IV INFUSION ADDL HR: CPT | Mod: HCNC

## 2024-05-31 PROCEDURE — 96375 TX/PRO/DX INJ NEW DRUG ADDON: CPT | Mod: HCNC

## 2024-05-31 PROCEDURE — 96367 TX/PROPH/DG ADDL SEQ IV INF: CPT | Mod: HCNC

## 2024-05-31 PROCEDURE — 96413 CHEMO IV INFUSION 1 HR: CPT | Mod: HCNC

## 2024-05-31 RX ORDER — HEPARIN 100 UNIT/ML
500 SYRINGE INTRAVENOUS
Status: CANCELLED | OUTPATIENT
Start: 2024-05-31

## 2024-05-31 RX ORDER — DIPHENHYDRAMINE HYDROCHLORIDE 50 MG/ML
50 INJECTION INTRAMUSCULAR; INTRAVENOUS ONCE AS NEEDED
Status: CANCELLED | OUTPATIENT
Start: 2024-05-31

## 2024-05-31 RX ORDER — FAMOTIDINE 10 MG/ML
20 INJECTION INTRAVENOUS
Status: COMPLETED | OUTPATIENT
Start: 2024-05-31 | End: 2024-05-31

## 2024-05-31 RX ORDER — DIPHENHYDRAMINE HYDROCHLORIDE 50 MG/ML
50 INJECTION INTRAMUSCULAR; INTRAVENOUS ONCE AS NEEDED
Status: DISCONTINUED | OUTPATIENT
Start: 2024-05-31 | End: 2024-05-31 | Stop reason: HOSPADM

## 2024-05-31 RX ORDER — EPINEPHRINE 0.3 MG/.3ML
0.3 INJECTION SUBCUTANEOUS ONCE AS NEEDED
Status: CANCELLED | OUTPATIENT
Start: 2024-05-31

## 2024-05-31 RX ORDER — PROCHLORPERAZINE EDISYLATE 5 MG/ML
10 INJECTION INTRAMUSCULAR; INTRAVENOUS ONCE AS NEEDED
Status: DISCONTINUED | OUTPATIENT
Start: 2024-05-31 | End: 2024-05-31 | Stop reason: HOSPADM

## 2024-05-31 RX ORDER — HEPARIN 100 UNIT/ML
500 SYRINGE INTRAVENOUS
Status: DISCONTINUED | OUTPATIENT
Start: 2024-05-31 | End: 2024-05-31 | Stop reason: HOSPADM

## 2024-05-31 RX ORDER — FAMOTIDINE 10 MG/ML
20 INJECTION INTRAVENOUS
Status: CANCELLED | OUTPATIENT
Start: 2024-05-31

## 2024-05-31 RX ORDER — SODIUM CHLORIDE 0.9 % (FLUSH) 0.9 %
10 SYRINGE (ML) INJECTION
Status: CANCELLED | OUTPATIENT
Start: 2024-05-31

## 2024-05-31 RX ORDER — SODIUM CHLORIDE 0.9 % (FLUSH) 0.9 %
10 SYRINGE (ML) INJECTION
Status: DISCONTINUED | OUTPATIENT
Start: 2024-05-31 | End: 2024-05-31 | Stop reason: HOSPADM

## 2024-05-31 RX ORDER — EPINEPHRINE 0.3 MG/.3ML
0.3 INJECTION SUBCUTANEOUS ONCE AS NEEDED
Status: DISCONTINUED | OUTPATIENT
Start: 2024-05-31 | End: 2024-05-31 | Stop reason: HOSPADM

## 2024-05-31 RX ORDER — PROCHLORPERAZINE EDISYLATE 5 MG/ML
10 INJECTION INTRAMUSCULAR; INTRAVENOUS ONCE AS NEEDED
Status: CANCELLED | OUTPATIENT
Start: 2024-05-31

## 2024-05-31 RX ADMIN — APREPITANT 130 MG: 130 INJECTION, EMULSION INTRAVENOUS at 01:05

## 2024-05-31 RX ADMIN — BEVACIZUMAB-AWWB 1100 MG: 400 INJECTION, SOLUTION INTRAVENOUS at 01:05

## 2024-05-31 RX ADMIN — SODIUM CHLORIDE: 9 INJECTION, SOLUTION INTRAVENOUS at 11:05

## 2024-05-31 RX ADMIN — DIPHENHYDRAMINE HYDROCHLORIDE 50 MG: 50 INJECTION, SOLUTION INTRAMUSCULAR; INTRAVENOUS at 02:05

## 2024-05-31 RX ADMIN — CARBOPLATIN 585 MG: 10 INJECTION, SOLUTION INTRAVENOUS at 05:05

## 2024-05-31 RX ADMIN — FAMOTIDINE 20 MG: 10 INJECTION INTRAVENOUS at 01:05

## 2024-05-31 RX ADMIN — HEPARIN 500 UNITS: 100 SYRINGE at 06:05

## 2024-05-31 RX ADMIN — DEXAMETHASONE SODIUM PHOSPHATE 0.25 MG: 4 INJECTION, SOLUTION INTRA-ARTICULAR; INTRALESIONAL; INTRAMUSCULAR; INTRAVENOUS; SOFT TISSUE at 01:05

## 2024-05-31 RX ADMIN — PACLITAXEL 336 MG: 6 INJECTION, SOLUTION INTRAVENOUS at 02:05

## 2024-05-31 RX ADMIN — SODIUM CHLORIDE, PRESERVATIVE FREE 10 ML: 5 INJECTION INTRAVENOUS at 06:05

## 2024-05-31 NOTE — PLAN OF CARE
Problem: Chemotherapy Effects  Goal: Anemia Symptom Improvement  5/31/2024 1833 by Altagracia Brown RN  Outcome: Progressing  5/31/2024 1831 by Altagracia Brown RN  Outcome: Progressing  Goal: Safety Maintained  5/31/2024 1833 by Altagracia Brown RN  Outcome: Progressing  5/31/2024 1831 by Altagracia Brown RN  Outcome: Progressing  Goal: Absence of Hematuria  5/31/2024 1833 by Altagracia Brown RN  Outcome: Progressing  5/31/2024 1831 by Altagracia Brown RN  Outcome: Progressing  Goal: Nausea and Vomiting Relief  5/31/2024 1833 by Altagracia Brown RN  Outcome: Progressing  5/31/2024 1831 by Altagracia Brown RN  Outcome: Progressing  Goal: Neurotoxicity Symptom Control  5/31/2024 1833 by Altagracia Brown RN  Outcome: Progressing  5/31/2024 1831 by Altagracia Brown RN  Outcome: Progressing  Goal: Absence of Infection  5/31/2024 1833 by Altagracia Brown RN  Outcome: Progressing  5/31/2024 1831 by Altagracia Brown RN  Outcome: Progressing  Goal: Absence of Bleeding  5/31/2024 1833 by Altagracia Brown RN  Outcome: Progressing  5/31/2024 1831 by Altagracia Brown RN  Outcome: Progressing     Problem: Fatigue  Goal: Improved Activity Tolerance  5/31/2024 1833 by Altagracia Brown RN  Outcome: Progressing  5/31/2024 1831 by Altagracia Brown RN  Outcome: Progressing     Problem: Anemia  Goal: Anemia Symptom Improvement  5/31/2024 1833 by Altagracia Brown RN  Outcome: Progressing  5/31/2024 1831 by Altagracia Brown RN  Outcome: Progressing     Problem: Infection  Goal: Absence of Infection Signs and Symptoms  5/31/2024 1833 by Altagracia Brown RN  Outcome: Progressing  5/31/2024 1831 by Altagracia Brown RN  Outcome: Progressing   Pt completed C3  of MVASI/Taxol/Carboplatin via chest port without adverse effects. VS WNL Discharged AAOX4 in W/C with  as escort

## 2024-06-10 ENCOUNTER — HOSPITAL ENCOUNTER (OUTPATIENT)
Facility: OTHER | Age: 59
Discharge: HOME | End: 2024-06-10
Attending: STUDENT IN AN ORGANIZED HEALTH CARE EDUCATION/TRAINING PROGRAM | Admitting: STUDENT IN AN ORGANIZED HEALTH CARE EDUCATION/TRAINING PROGRAM
Payer: MEDICARE

## 2024-06-10 VITALS
WEIGHT: 162 LBS | TEMPERATURE: 98 F | RESPIRATION RATE: 16 BRPM | BODY MASS INDEX: 25.43 KG/M2 | OXYGEN SATURATION: 100 % | HEART RATE: 90 BPM | SYSTOLIC BLOOD PRESSURE: 150 MMHG | HEIGHT: 67 IN | DIASTOLIC BLOOD PRESSURE: 83 MMHG

## 2024-06-10 DIAGNOSIS — R18.0 MALIGNANT ASCITES: ICD-10-CM

## 2024-06-10 DIAGNOSIS — R18.8 ASCITES: ICD-10-CM

## 2024-06-10 DIAGNOSIS — R06.02 SOB (SHORTNESS OF BREATH): ICD-10-CM

## 2024-06-10 LAB — POCT GLUCOSE: 230 MG/DL (ref 70–110)

## 2024-06-10 PROCEDURE — 82962 GLUCOSE BLOOD TEST: CPT | Mod: HCNC | Performed by: STUDENT IN AN ORGANIZED HEALTH CARE EDUCATION/TRAINING PROGRAM

## 2024-06-10 NOTE — DISCHARGE INSTRUCTIONS
Diet Instructions:  Resume your normal diet.    Site Instructions:  Remove dressing in 24 hours.  Do not soak the site for 24 hours.    Notify your provider:  If you experience any redness, tenderness, or signs of infection at the procedure site (uncontrolled pain, swelling, redness, odor or green/yellow discharge from the site).

## 2024-06-10 NOTE — DISCHARGE SUMMARY
"Radiology Discharge Summary      Hospital Course: No complications    Admit Date: 6/10/2024  Discharge Date: 6/10/2024     Instructions Given to Patient: Yes  Diet: Resume prior diet  Activity: activity as tolerated and no driving for today    Description of Condition on Discharge: Stable  Vital Signs (Most Recent): Temp: 97.7 °F (36.5 °C) (06/10/24 0837)  Pulse: 90 (06/10/24 0935)  Resp: 16 (06/10/24 0935)  BP: (!) 150/83 (06/10/24 0935)  SpO2: 100 % (06/10/24 0935)    Discharge Disposition: Home    Discharge Diagnosis: hemoperitoneum     Follow-up: patient should have repeat ct of abd and pelvis prior to repeat paracentesius scheduling.    Rogelio Malave MD ([facility])  Interventional Radiology  [phone]      "

## 2024-06-10 NOTE — H&P
Radiology History & Physical      SUBJECTIVE:     Chief Complaint: ascites    History of Present Illness:  Anette Ricci is a 58 y.o. female who presents for paracentesis.    Past Medical History:   Diagnosis Date    Breast cancer 2013    Diabetes mellitus     Genetic testing 05/29/2013    VUS    Hyperlipidemia     Hypertension     Malignant neoplasm of upper-outer quadrant of right breast in female, estrogen receptor positive 12/02/2015    Seizure disorder      Past Surgical History:   Procedure Laterality Date    BILATERAL SALPINGO-OOPHORECTOMY (BSO) N/A 3/25/2024    Procedure: SALPINGO-OOPHORECTOMY, BILATERAL;  Surgeon: Александр Washington MD;  Location: [facility] OR [address] [place] [address];  Service: OB/GYN;  Laterality: N/A;    BREAST BIOPSY      BREAST LUMPECTOMY Right 2013    CHOLECYSTECTOMY  3/25/2024    Procedure: CHOLECYSTECTOMY;  Surgeon: Bo Farmer MD;  Location: [facility] OR [address] [place] [address];  Service: General;;    DEBULKING OF TUMOR N/A 3/25/2024    Procedure: DEBULKING, NEOPLASM;  Surgeon: Александр Washington MD;  [facility];  Service: OB/GYN;  Laterality: N/A;    EXCISION, LIVER N/A 3/25/2024    Procedure: EXCISION, LIVER - partial;  Surgeon: Bo Farmer MD;  Location: [facility] OR [address] [place] [address];  Service: General;  Laterality: N/A;  bk ultrasound  [place] book by TA on 3-21-24  7:00 a.m.  Conf #  [id number]    EYE SURGERY      LAPAROTOMY, EXPLORATORY N/A 3/25/2024    Procedure: LAPAROTOMY, EXPLORATORY;  Surgeon: Александр Washington MD;  [facility];  Service: OB/GYN;  Laterality: N/A;    OMENTECTOMY N/A 3/25/2024    Procedure: OMENTECTOMY;  Surgeon: Александр Washington MD;  [facility];  Service: OB/GYN;  Laterality: N/A;    PERITONEOCENTESIS N/A 3/13/2024    Procedure: PARACENTESIS, ABDOMINAL;  Surgeon: Flavia Moore MD;  Location: [facility] CATH LAB;  Service: Radiology;  Laterality: N/A;    PERITONEOCENTESIS N/A 3/21/2024    Procedure: PARACENTESIS, ABDOMINAL;  Surgeon:  Jorge Jolley MD;  Location: [facility] C[facility] LAB;  Service: Radiology;  Laterality: N/A;    TOTAL ABDOMINAL HYSTERECTOMY N/A 3/25/2024    Procedure: HYSTERECTOMY, TOTAL, ABDOMINAL;  Surgeon: Александр Washington MD;  Location: [facility] OR [address] [place] [address];  Service: OB/GYN;  Laterality: N/A;    TOTAL REDUCTION MAMMOPLASTY Bilateral 2016       Home Meds:   Prior to Admission medications    Medication Sig Start Date End Date Taking? Authorizing Provider   atorvastatin (LIPITOR) 40 MG tablet Take 80 mg by mouth once daily.   Yes Provider, Historical   dexAMETHasone (DECADRON) 4 MG Tab Take 2 tablets (8 mg total) by mouth once daily. daily on days 2-4 of your chemotherapy cycle. 4/23/24  Yes Александр Washington MD   enalapril (VASOTEC) 20 MG tablet Take 20 mg by mouth once daily.   Yes Provider, Historical   hydrochlorothiazide (HYDRODIURIL) 25 MG tablet Take 25 mg by mouth once daily.  5/1/15  Yes Provider, Historical   ibuprofen (ADVIL,MOTRIN) 600 MG tablet Take 1 tablet (600 mg total) by mouth every 6 (six) hours as needed for Pain. 5/24/24  Yes Art Dao MD   insulin aspart U-100 (NOVOLOG) 100 unit/mL (3 mL) InPn pen Inject 3 Units into the skin 3 (three) times daily with meals.  (Discard pen 28 days after initial use) 4/8/24 4/8/25 Yes Mel Boateng MD   insulin lispro (HUMALOG U-100 INSULIN) 100 unit/mL injection **LOW CORRECTION DOSE**  Blood Glucose  mg/dL                  Pre-meal                  151-200                0 unit                        201-250                2 units                      251-300                3 units                      301-350                4 units                      >350                     5 units                      Administer subcutaneously if needed at times designated by monitoring schedule. 3/30/24  Yes Mel Boateng MD   ketorolac 0.5% (ACULAR) 0.5 % Drop Place 1 drop into both eyes. For pain after eye injections.   Yes Provider, Historical   lamoTRIgine  (LAMICTAL) 25 MG tablet Take 25 mg by mouth 2 (two) times daily.   Yes Provider, Historical   lorazepam (ATIVAN) 0.5 MG tablet Take 0.5 mg by mouth every 12 (twelve) hours as needed for Anxiety. 5/31/14  Yes Provider, Historical   metformin (GLUCOPHAGE) 1000 MG tablet Take 1,000 mg by mouth daily with breakfast.    Yes Provider, Historical   OLANZapine (ZYPREXA) 5 MG tablet Take 1 tablet (5 mg total) by mouth every evening. nightly on days 1-4 of each chemotherapy cycle. 4/22/24  Yes Александр Washington MD   ondansetron (ZOFRAN-ODT) 4 MG TbDL Take 1 tablet (4 mg total) by mouth every 6 (six) hours as needed (for nausea). 3/31/24  Yes Mel Boateng MD   prochlorperazine (COMPAZINE) 10 MG tablet Take 1 tablet (10 mg total) by mouth every 6 (six) hours as needed (Nausea). 4/22/24  Yes Александр Washington MD   acetaminophen (TYLENOL) 325 MG tablet Take 2 tablets (650 mg total) by mouth every 4 (four) hours as needed for Pain. 5/24/24   Art Dao MD   amlodipine (NORVASC) 10 MG tablet Take 10 mg by mouth once daily.  12/4/15   Provider, Historical   fluticasone propionate (FLONASE) 50 mcg/actuation nasal spray 1 spray by Each Nostril route once daily.    Provider, Historical   hydrocodone-acetaminophen (HYCET) solution 7.5-325 mg/15mL Take 30 mLs by mouth every 6 (six) hours as needed for Pain. 4/20/24   Kelsey Ramirez MD   HYDROmorphone (DILAUDID) 2 MG tablet Take 1 tablet (2 mg total) by mouth every 8 (eight) hours as needed for Pain. 4/3/24 4/3/25  Mary Mccloud NP   insulin detemir U-100, Levemir, (LEVEMIR FLEXPEN) 100 unit/mL (3 mL) InPn pen Inject 7 Units into the skin every evening.  (Discard pen 28 days after initial use) 4/8/24 4/8/25  Mel Boateng MD   quetiapine (SEROQUEL) 25 MG Tab Take 25 mg by mouth daily as needed. 2/4/16   Provider, Historical     Anticoagulants/Antiplatelets: no anticoagulation    Allergies:   Review of patient's allergies indicates:   Allergen  "Reactions    Codeine Other (See Comments)     Flu like s/s    Tramadol Other (See Comments)     Flu like symptoms similar to codeine reaction     Sedation History:  no adverse reactions    Review of Systems:   Hematological: no known coagulopathies  Respiratory: no shortness of breath  Cardiovascular: no chest pain  Gastrointestinal: no abdominal pain  Genito-Urinary: no dysuria  Musculoskeletal: negative  Neurological: no TIA or stroke symptoms         OBJECTIVE:     Vital Signs (Most Recent)  Temp: 97.7 °F (36.5 °C) (06/10/24 0837)  Pulse: 89 (06/10/24 0920)  Resp: 14 (06/10/24 0920)  BP: (!) 154/85 (06/10/24 0920)  SpO2: 100 % (06/10/24 0920)    Physical Exam:  ASA: 2  Mallampati: 2    General: no acute distress  Mental Status: alert and oriented to person, place and time  HEENT: normocephalic, atraumatic  Chest: unlabored breathing  Heart: regular heart rate  Abdomen: nondistended  Extremity: moves all extremities    Laboratory  Lab Results   Component Value Date    INR 1.2 03/26/2024       Lab Results   Component Value Date    WBC 10.88 05/30/2024    HGB 9.0 (L) 05/30/2024    HCT 30.0 (L) 05/30/2024    MCV 82 05/30/2024     (H) 05/30/2024      Lab Results   Component Value Date     (H) 05/30/2024     (L) 05/30/2024    K 4.1 05/30/2024    CL 99 05/30/2024    CO2 28 05/30/2024    BUN 11 05/30/2024    CREATININE 0.8 05/30/2024    CALCIUM 9.2 05/30/2024    MG 1.2 (L) 05/24/2024    ALT 10 05/30/2024    AST 14 05/30/2024    ALBUMIN 2.5 (L) 05/30/2024    BILITOT 0.4 05/30/2024       ASSESSMENT/PLAN:     Sedation Plan: local  Patient will undergo paracentesis.    Rogelio Malave MD ([facility])  Interventional Radiology          "

## 2024-06-10 NOTE — Clinical Note
The site was marked. The left abdomen was prepped. The site was prepped with ChloraPrep. The patient was draped.

## 2024-06-10 NOTE — PROCEDURES
"  Pre Op Diagnosis: abdominal fluid  Post Op Diagnosis: Same    Procedure: paracentesis    Procedure performed by: Frieda    Written Informed Consent Obtained: Yes  Specimen Removed: YES 20cc  Estimated Blood Loss: Minimal    Findings:   US imaging demosntrated very little abdominal fluid, which appeared complex. 5 fr one step needle placed and only 20cc of old hemorrhagic material was able to be obtained.    Patient tolerated procedure well. Patient will need CT of abd and pelvis before repeat paracentesis should be performed again.     Rogelio Malave MD ([facility])  Interventional Radiology  [phone]      "

## 2024-06-14 ENCOUNTER — TELEPHONE (OUTPATIENT)
Dept: GYNECOLOGIC ONCOLOGY | Facility: CLINIC | Age: 59
End: 2024-06-14
Payer: MEDICARE

## 2024-06-14 NOTE — TELEPHONE ENCOUNTER
----- Message from Letty Guerra sent at 6/14/2024  9:22 AM CDT -----  Regarding: Appt  Contact: pT  [phone]            Appt Type:  Ep      Date/Time Preference:  06/20/24     Treating Provider: Александр Washington MD    Caller Name:  Anette      Contact Prefer: [phone]    Comments/Notes:  Called to schedule labs and urinalysis

## 2024-06-20 ENCOUNTER — HOSPITAL ENCOUNTER (INPATIENT)
Facility: HOSPITAL | Age: 59
LOS: 7 days | Discharge: HOME | DRG: 375 | End: 2024-06-27
Attending: EMERGENCY MEDICINE | Admitting: OBSTETRICS & GYNECOLOGY
Payer: MEDICARE

## 2024-06-20 DIAGNOSIS — E08.311 DIABETES MELLITUS DUE TO UNDERLYING CONDITION WITH RETINOPATHY AND MACULAR EDEMA, WITH LONG-TERM CURRENT USE OF INSULIN, UNSPECIFIED LATERALITY, UNSPECIFIED RETINOPATHY SEVERITY: ICD-10-CM

## 2024-06-20 DIAGNOSIS — J18.9 PNEUMONIA OF RIGHT LOWER LOBE DUE TO INFECTIOUS ORGANISM: ICD-10-CM

## 2024-06-20 DIAGNOSIS — J91.0 PLEURAL EFFUSION, MALIGNANT: Primary | ICD-10-CM

## 2024-06-20 DIAGNOSIS — E78.5 HYPERLIPIDEMIA, UNSPECIFIED HYPERLIPIDEMIA TYPE: ICD-10-CM

## 2024-06-20 DIAGNOSIS — Z79.4 DIABETES MELLITUS DUE TO UNDERLYING CONDITION WITH RETINOPATHY AND MACULAR EDEMA, WITH LONG-TERM CURRENT USE OF INSULIN, UNSPECIFIED LATERALITY, UNSPECIFIED RETINOPATHY SEVERITY: ICD-10-CM

## 2024-06-20 DIAGNOSIS — R06.02 SHORTNESS OF BREATH: ICD-10-CM

## 2024-06-20 DIAGNOSIS — C56.9 MALIGNANT NEOPLASM OF OVARY, UNSPECIFIED LATERALITY: ICD-10-CM

## 2024-06-20 DIAGNOSIS — J90 PLEURAL EFFUSION ON RIGHT: ICD-10-CM

## 2024-06-20 DIAGNOSIS — D64.9 ANEMIA, UNSPECIFIED TYPE: ICD-10-CM

## 2024-06-20 LAB
ABO + RH BLD: NORMAL
ALBUMIN SERPL BCP-MCNC: 2.4 G/DL (ref 3.5–5.2)
ALLENS TEST: ABNORMAL
ALP SERPL-CCNC: 90 U/L (ref 55–135)
ALT SERPL W/O P-5'-P-CCNC: 9 U/L (ref 10–44)
ANION GAP SERPL CALC-SCNC: 11 MMOL/L (ref 8–16)
ANISOCYTOSIS BLD QL SMEAR: SLIGHT
AST SERPL-CCNC: 17 U/L (ref 10–40)
BASOPHILS # BLD AUTO: 0.04 K/UL (ref 0–0.2)
BASOPHILS # BLD AUTO: 0.04 K/UL (ref 0–0.2)
BASOPHILS NFR BLD: 0.3 % (ref 0–1.9)
BASOPHILS NFR BLD: 0.4 % (ref 0–1.9)
BILIRUB SERPL-MCNC: 0.4 MG/DL (ref 0.1–1)
BLD GP AB SCN CELLS X3 SERPL QL: NORMAL
BLD PROD TYP BPU: NORMAL
BLD PROD TYP BPU: NORMAL
BLOOD UNIT EXPIRATION DATE: NORMAL
BLOOD UNIT EXPIRATION DATE: NORMAL
BLOOD UNIT TYPE CODE: 1700
BLOOD UNIT TYPE CODE: 7300
BLOOD UNIT TYPE: NORMAL
BLOOD UNIT TYPE: NORMAL
BNP SERPL-MCNC: 476 PG/ML (ref 0–99)
BUN SERPL-MCNC: 10 MG/DL (ref 6–20)
BUN SERPL-MCNC: 9 MG/DL (ref 6–30)
CALCIUM SERPL-MCNC: 8.5 MG/DL (ref 8.7–10.5)
CHLORIDE SERPL-SCNC: 100 MMOL/L (ref 95–110)
CHLORIDE SERPL-SCNC: 97 MMOL/L (ref 95–110)
CO2 SERPL-SCNC: 24 MMOL/L (ref 23–29)
CODING SYSTEM: NORMAL
CODING SYSTEM: NORMAL
CREAT SERPL-MCNC: 0.5 MG/DL (ref 0.5–1.4)
CREAT SERPL-MCNC: 0.7 MG/DL (ref 0.5–1.4)
CROSSMATCH INTERPRETATION: NORMAL
CROSSMATCH INTERPRETATION: NORMAL
DELSYS: ABNORMAL
DIFFERENTIAL METHOD BLD: ABNORMAL
DIFFERENTIAL METHOD BLD: ABNORMAL
DISPENSE STATUS: NORMAL
DISPENSE STATUS: NORMAL
EOSINOPHIL # BLD AUTO: 0 K/UL (ref 0–0.5)
EOSINOPHIL # BLD AUTO: 0 K/UL (ref 0–0.5)
EOSINOPHIL NFR BLD: 0.2 % (ref 0–8)
EOSINOPHIL NFR BLD: 0.3 % (ref 0–8)
ERYTHROCYTE [DISTWIDTH] IN BLOOD BY AUTOMATED COUNT: 18.9 % (ref 11.5–14.5)
ERYTHROCYTE [DISTWIDTH] IN BLOOD BY AUTOMATED COUNT: 20.3 % (ref 11.5–14.5)
EST. GFR  (NO RACE VARIABLE): >60 ML/MIN/1.73 M^2
FIO2: 21
GIANT PLATELETS BLD QL SMEAR: PRESENT
GLUCOSE SERPL-MCNC: 257 MG/DL (ref 70–110)
GLUCOSE SERPL-MCNC: 277 MG/DL (ref 70–110)
HCT VFR BLD AUTO: 21.7 % (ref 37–48.5)
HCT VFR BLD AUTO: 23.6 % (ref 37–48.5)
HCT VFR BLD CALC: 20 %PCV (ref 36–54)
HGB BLD-MCNC: 6.4 G/DL (ref 12–16)
HGB BLD-MCNC: 7.6 G/DL (ref 12–16)
IMM GRANULOCYTES # BLD AUTO: 0.03 K/UL (ref 0–0.04)
IMM GRANULOCYTES # BLD AUTO: 0.09 K/UL (ref 0–0.04)
IMM GRANULOCYTES NFR BLD AUTO: 0.3 % (ref 0–0.5)
IMM GRANULOCYTES NFR BLD AUTO: 0.8 % (ref 0–0.5)
LDH SERPL L TO P-CCNC: 1214 U/L (ref 110–260)
LDH SERPL L TO P-CCNC: 2.27 MMOL/L (ref 0.5–2.2)
LYMPHOCYTES # BLD AUTO: 1.4 K/UL (ref 1–4.8)
LYMPHOCYTES # BLD AUTO: 1.7 K/UL (ref 1–4.8)
LYMPHOCYTES NFR BLD: 12.5 % (ref 18–48)
LYMPHOCYTES NFR BLD: 18 % (ref 18–48)
MCH RBC QN AUTO: 24.6 PG (ref 27–31)
MCH RBC QN AUTO: 25.9 PG (ref 27–31)
MCHC RBC AUTO-ENTMCNC: 29.5 G/DL (ref 32–36)
MCHC RBC AUTO-ENTMCNC: 32.2 G/DL (ref 32–36)
MCV RBC AUTO: 80 FL (ref 82–98)
MCV RBC AUTO: 84 FL (ref 82–98)
MODE: ABNORMAL
MONOCYTES # BLD AUTO: 1 K/UL (ref 0.3–1)
MONOCYTES # BLD AUTO: 1.3 K/UL (ref 0.3–1)
MONOCYTES NFR BLD: 10.8 % (ref 4–15)
MONOCYTES NFR BLD: 11.2 % (ref 4–15)
NEUTROPHILS # BLD AUTO: 6.5 K/UL (ref 1.8–7.7)
NEUTROPHILS # BLD AUTO: 8.6 K/UL (ref 1.8–7.7)
NEUTROPHILS NFR BLD: 70.2 % (ref 38–73)
NEUTROPHILS NFR BLD: 75 % (ref 38–73)
NRBC BLD-RTO: 0 /100 WBC
NRBC BLD-RTO: 0 /100 WBC
NUM UNITS TRANS PACKED RBC: NORMAL
NUM UNITS TRANS PACKED RBC: NORMAL
OHS QRS DURATION: 70 MS
OHS QRS DURATION: 70 MS
OHS QTC CALCULATION: 446 MS
OHS QTC CALCULATION: 476 MS
OVALOCYTES BLD QL SMEAR: ABNORMAL
PLATELET # BLD AUTO: 272 K/UL (ref 150–450)
PLATELET # BLD AUTO: 288 K/UL (ref 150–450)
PLATELET BLD QL SMEAR: ABNORMAL
PMV BLD AUTO: 11.3 FL (ref 9.2–12.9)
PMV BLD AUTO: 12.9 FL (ref 9.2–12.9)
POC IONIZED CALCIUM: 1.1 MMOL/L (ref 1.06–1.42)
POC TCO2 (MEASURED): 24 MMOL/L (ref 23–29)
POCT GLUCOSE: 174 MG/DL (ref 70–110)
POCT GLUCOSE: 268 MG/DL (ref 70–110)
POCT GLUCOSE: 319 MG/DL (ref 70–110)
POCT GLUCOSE: 331 MG/DL (ref 70–110)
POIKILOCYTOSIS BLD QL SMEAR: SLIGHT
POTASSIUM BLD-SCNC: 3.5 MMOL/L (ref 3.5–5.1)
POTASSIUM SERPL-SCNC: 3.6 MMOL/L (ref 3.5–5.1)
PROT SERPL-MCNC: 6.5 G/DL (ref 6–8.4)
RBC # BLD AUTO: 2.6 M/UL (ref 4–5.4)
RBC # BLD AUTO: 2.94 M/UL (ref 4–5.4)
SAMPLE: ABNORMAL
SAMPLE: ABNORMAL
SARS-COV-2 RDRP RESP QL NAA+PROBE: NEGATIVE
SITE: ABNORMAL
SODIUM BLD-SCNC: 135 MMOL/L (ref 136–145)
SODIUM SERPL-SCNC: 135 MMOL/L (ref 136–145)
SPECIMEN OUTDATE: NORMAL
TROPONIN I SERPL DL<=0.01 NG/ML-MCNC: 0.01 NG/ML (ref 0–0.03)
WBC # BLD AUTO: 11.47 K/UL (ref 3.9–12.7)
WBC # BLD AUTO: 9.24 K/UL (ref 3.9–12.7)

## 2024-06-20 PROCEDURE — 96365 THER/PROPH/DIAG IV INF INIT: CPT | Mod: HCNC

## 2024-06-20 PROCEDURE — 25500020 PHARM REV CODE 255: Mod: HCNC | Performed by: EMERGENCY MEDICINE

## 2024-06-20 PROCEDURE — 87040 BLOOD CULTURE FOR BACTERIA: CPT | Mod: HCNC

## 2024-06-20 PROCEDURE — 93010 ELECTROCARDIOGRAM REPORT: CPT | Mod: HCNC,,, | Performed by: INTERNAL MEDICINE

## 2024-06-20 PROCEDURE — 83615 LACTATE (LD) (LDH) ENZYME: CPT | Mod: HCNC

## 2024-06-20 PROCEDURE — 86920 COMPATIBILITY TEST SPIN: CPT | Mod: HCNC

## 2024-06-20 PROCEDURE — 85025 COMPLETE CBC W/AUTO DIFF WBC: CPT | Mod: 91,HCNC

## 2024-06-20 PROCEDURE — 25000003 PHARM REV CODE 250: Mod: HCNC

## 2024-06-20 PROCEDURE — 86900 BLOOD TYPING SEROLOGIC ABO: CPT | Mod: HCNC

## 2024-06-20 PROCEDURE — 99900035 HC TECH TIME PER 15 MIN (STAT): Mod: HCNC

## 2024-06-20 PROCEDURE — 96367 TX/PROPH/DG ADDL SEQ IV INF: CPT | Mod: HCNC

## 2024-06-20 PROCEDURE — P9016 RBC LEUKOCYTES REDUCED: HCPCS | Mod: HCNC

## 2024-06-20 PROCEDURE — 99285 EMERGENCY DEPT VISIT HI MDM: CPT | Mod: 25,HCNC

## 2024-06-20 PROCEDURE — 84484 ASSAY OF TROPONIN QUANT: CPT | Mod: HCNC

## 2024-06-20 PROCEDURE — 80053 COMPREHEN METABOLIC PANEL: CPT | Mod: HCNC

## 2024-06-20 PROCEDURE — 85025 COMPLETE CBC W/AUTO DIFF WBC: CPT | Mod: HCNC

## 2024-06-20 PROCEDURE — 63600175 PHARM REV CODE 636 W HCPCS: Mod: HCNC

## 2024-06-20 PROCEDURE — 25000003 PHARM REV CODE 250: Mod: HCNC | Performed by: STUDENT IN AN ORGANIZED HEALTH CARE EDUCATION/TRAINING PROGRAM

## 2024-06-20 PROCEDURE — 20600001 HC STEP DOWN PRIVATE ROOM: Mod: HCNC

## 2024-06-20 PROCEDURE — 83880 ASSAY OF NATRIURETIC PEPTIDE: CPT | Mod: HCNC

## 2024-06-20 PROCEDURE — 63600175 PHARM REV CODE 636 W HCPCS: Mod: HCNC | Performed by: STUDENT IN AN ORGANIZED HEALTH CARE EDUCATION/TRAINING PROGRAM

## 2024-06-20 PROCEDURE — U0002 COVID-19 LAB TEST NON-CDC: HCPCS | Mod: HCNC | Performed by: EMERGENCY MEDICINE

## 2024-06-20 PROCEDURE — 93005 ELECTROCARDIOGRAM TRACING: CPT | Mod: HCNC

## 2024-06-20 PROCEDURE — 86850 RBC ANTIBODY SCREEN: CPT | Mod: HCNC

## 2024-06-20 PROCEDURE — 99233 SBSQ HOSP IP/OBS HIGH 50: CPT | Mod: 24,HCNC,95, | Performed by: OBSTETRICS & GYNECOLOGY

## 2024-06-20 PROCEDURE — 36415 COLL VENOUS BLD VENIPUNCTURE: CPT | Mod: HCNC

## 2024-06-20 PROCEDURE — 83605 ASSAY OF LACTIC ACID: CPT | Mod: HCNC

## 2024-06-20 RX ORDER — IBUPROFEN 200 MG
16 TABLET ORAL
Status: DISCONTINUED | OUTPATIENT
Start: 2024-06-20 | End: 2024-06-27 | Stop reason: HOSPADM

## 2024-06-20 RX ORDER — ACETAMINOPHEN 325 MG/1
650 TABLET ORAL ONCE
Status: DISCONTINUED | OUTPATIENT
Start: 2024-06-20 | End: 2024-06-26

## 2024-06-20 RX ORDER — HYDROCHLOROTHIAZIDE 25 MG/1
25 TABLET ORAL DAILY
Status: DISCONTINUED | OUTPATIENT
Start: 2024-06-20 | End: 2024-06-27

## 2024-06-20 RX ORDER — INSULIN GLARGINE 100 [IU]/ML
7 INJECTION, SOLUTION SUBCUTANEOUS NIGHTLY
Status: DISCONTINUED | OUTPATIENT
Start: 2024-06-20 | End: 2024-06-23

## 2024-06-20 RX ORDER — IBUPROFEN 200 MG
24 TABLET ORAL
Status: DISCONTINUED | OUTPATIENT
Start: 2024-06-20 | End: 2024-06-27 | Stop reason: HOSPADM

## 2024-06-20 RX ORDER — ATORVASTATIN CALCIUM 40 MG/1
80 TABLET, FILM COATED ORAL DAILY
Status: DISCONTINUED | OUTPATIENT
Start: 2024-06-20 | End: 2024-06-27 | Stop reason: HOSPADM

## 2024-06-20 RX ORDER — ENOXAPARIN SODIUM 100 MG/ML
40 INJECTION SUBCUTANEOUS EVERY 24 HOURS
Status: DISCONTINUED | OUTPATIENT
Start: 2024-06-21 | End: 2024-06-22

## 2024-06-20 RX ORDER — AMLODIPINE BESYLATE 10 MG/1
10 TABLET ORAL DAILY
Status: DISCONTINUED | OUTPATIENT
Start: 2024-06-20 | End: 2024-06-27 | Stop reason: HOSPADM

## 2024-06-20 RX ORDER — LISINOPRIL 20 MG/1
20 TABLET ORAL DAILY
Status: DISCONTINUED | OUTPATIENT
Start: 2024-06-20 | End: 2024-06-27 | Stop reason: HOSPADM

## 2024-06-20 RX ORDER — ONDANSETRON 8 MG/1
8 TABLET, ORALLY DISINTEGRATING ORAL EVERY 8 HOURS PRN
Status: DISCONTINUED | OUTPATIENT
Start: 2024-06-20 | End: 2024-06-27 | Stop reason: HOSPADM

## 2024-06-20 RX ORDER — INSULIN ASPART 100 [IU]/ML
3 INJECTION, SOLUTION INTRAVENOUS; SUBCUTANEOUS
Status: DISCONTINUED | OUTPATIENT
Start: 2024-06-20 | End: 2024-06-24

## 2024-06-20 RX ORDER — LAMOTRIGINE 25 MG/1
25 TABLET ORAL 2 TIMES DAILY
Status: DISCONTINUED | OUTPATIENT
Start: 2024-06-20 | End: 2024-06-27 | Stop reason: HOSPADM

## 2024-06-20 RX ORDER — INSULIN ASPART 100 [IU]/ML
0-10 INJECTION, SOLUTION INTRAVENOUS; SUBCUTANEOUS
Status: DISCONTINUED | OUTPATIENT
Start: 2024-06-20 | End: 2024-06-23

## 2024-06-20 RX ORDER — DIPHENHYDRAMINE HYDROCHLORIDE 50 MG/ML
25 INJECTION INTRAMUSCULAR; INTRAVENOUS ONCE
Status: COMPLETED | OUTPATIENT
Start: 2024-06-20 | End: 2024-06-20

## 2024-06-20 RX ORDER — OLANZAPINE 5 MG/1
5 TABLET ORAL NIGHTLY
Status: DISCONTINUED | OUTPATIENT
Start: 2024-06-20 | End: 2024-06-27 | Stop reason: HOSPADM

## 2024-06-20 RX ORDER — QUETIAPINE FUMARATE 25 MG/1
25 TABLET, FILM COATED ORAL DAILY PRN
Status: DISCONTINUED | OUTPATIENT
Start: 2024-06-20 | End: 2024-06-27 | Stop reason: HOSPADM

## 2024-06-20 RX ORDER — HYDRALAZINE HYDROCHLORIDE 20 MG/ML
10 INJECTION INTRAMUSCULAR; INTRAVENOUS EVERY 6 HOURS PRN
Status: DISCONTINUED | OUTPATIENT
Start: 2024-06-20 | End: 2024-06-27 | Stop reason: HOSPADM

## 2024-06-20 RX ORDER — HYDROCODONE BITARTRATE AND ACETAMINOPHEN 500; 5 MG/1; MG/1
TABLET ORAL
Status: DISCONTINUED | OUTPATIENT
Start: 2024-06-20 | End: 2024-06-20

## 2024-06-20 RX ORDER — GLUCAGON 1 MG
1 KIT INJECTION
Status: DISCONTINUED | OUTPATIENT
Start: 2024-06-20 | End: 2024-06-27 | Stop reason: HOSPADM

## 2024-06-20 RX ORDER — HYDROCODONE BITARTRATE AND ACETAMINOPHEN 500; 5 MG/1; MG/1
TABLET ORAL
Status: DISCONTINUED | OUTPATIENT
Start: 2024-06-20 | End: 2024-06-26

## 2024-06-20 RX ORDER — BENZONATATE 100 MG/1
100 CAPSULE ORAL 3 TIMES DAILY PRN
Status: DISCONTINUED | OUTPATIENT
Start: 2024-06-20 | End: 2024-06-27 | Stop reason: HOSPADM

## 2024-06-20 RX ADMIN — INSULIN ASPART 8 UNITS: 100 INJECTION, SOLUTION INTRAVENOUS; SUBCUTANEOUS at 11:06

## 2024-06-20 RX ADMIN — INSULIN ASPART 3 UNITS: 100 INJECTION, SOLUTION INTRAVENOUS; SUBCUTANEOUS at 09:06

## 2024-06-20 RX ADMIN — PIPERACILLIN SODIUM AND TAZOBACTAM SODIUM 4.5 G: 4; .5 INJECTION, POWDER, FOR SOLUTION INTRAVENOUS at 03:06

## 2024-06-20 RX ADMIN — LAMOTRIGINE 25 MG: 25 TABLET ORAL at 10:06

## 2024-06-20 RX ADMIN — LAMOTRIGINE 25 MG: 25 TABLET ORAL at 08:06

## 2024-06-20 RX ADMIN — BENZONATATE 100 MG: 100 CAPSULE ORAL at 08:06

## 2024-06-20 RX ADMIN — INSULIN GLARGINE 7 UNITS: 100 INJECTION, SOLUTION SUBCUTANEOUS at 09:06

## 2024-06-20 RX ADMIN — LISINOPRIL 20 MG: 20 TABLET ORAL at 10:06

## 2024-06-20 RX ADMIN — VANCOMYCIN HYDROCHLORIDE 1500 MG: 1.5 INJECTION, POWDER, LYOPHILIZED, FOR SOLUTION INTRAVENOUS at 04:06

## 2024-06-20 RX ADMIN — SODIUM CHLORIDE 1000 ML: 9 INJECTION, SOLUTION INTRAVENOUS at 03:06

## 2024-06-20 RX ADMIN — VANCOMYCIN HYDROCHLORIDE 1250 MG: 1.25 INJECTION, POWDER, LYOPHILIZED, FOR SOLUTION INTRAVENOUS at 08:06

## 2024-06-20 RX ADMIN — IOHEXOL 75 ML: 350 INJECTION, SOLUTION INTRAVENOUS at 04:06

## 2024-06-20 RX ADMIN — DIPHENHYDRAMINE HYDROCHLORIDE 25 MG: 50 INJECTION, SOLUTION INTRAMUSCULAR; INTRAVENOUS at 10:06

## 2024-06-20 RX ADMIN — INSULIN ASPART 2 UNITS: 100 INJECTION, SOLUTION INTRAVENOUS; SUBCUTANEOUS at 04:06

## 2024-06-20 RX ADMIN — ATORVASTATIN CALCIUM 80 MG: 40 TABLET, FILM COATED ORAL at 10:06

## 2024-06-20 RX ADMIN — PIPERACILLIN SODIUM AND TAZOBACTAM SODIUM 4.5 G: 4; .5 INJECTION, POWDER, FOR SOLUTION INTRAVENOUS at 06:06

## 2024-06-20 RX ADMIN — OLANZAPINE 5 MG: 5 TABLET, FILM COATED ORAL at 08:06

## 2024-06-20 NOTE — H&P
Inpatient Radiology Pre-procedure Note    History of Present Illness:  Anette Ricci is a 58 y.o. female who presents for US guided paracentesis and Right sided thoracentesis.    Admission H&P reviewed.  Past Medical History:   Diagnosis Date    Breast cancer 2013    Diabetes mellitus     Genetic testing 05/29/2013    VUS    Hyperlipidemia     Hypertension     Malignant neoplasm of upper-outer quadrant of right breast in female, estrogen receptor positive 12/02/2015    Seizure disorder      Past Surgical History:   Procedure Laterality Date    BILATERAL SALPINGO-OOPHORECTOMY (BSO) N/A 3/25/2024    Procedure: SALPINGO-OOPHORECTOMY, BILATERAL;  Surgeon: Александр Washington MD;  Location: [facility] OR [address] [place] [address];  Service: OB/GYN;  Laterality: N/A;    BREAST BIOPSY      BREAST LUMPECTOMY Right 2013    CHOLECYSTECTOMY  3/25/2024    Procedure: CHOLECYSTECTOMY;  Surgeon: Bo Farmer MD;  Location: [facility] OR [address] [place] [address];  Service: General;;    DEBULKING OF TUMOR N/A 3/25/2024    Procedure: DEBULKING, NEOPLASM;  Surgeon: Александр Washington MD;  Location: [facility] OR [address] [place] [address];  Service: OB/GYN;  Laterality: N/A;    EXCISION, LIVER N/A 3/25/2024    Procedure: EXCISION, LIVER - partial;  Surgeon: Bo Farmer MD;  Location: [facility] OR [address] [place] [address];  Service: General;  Laterality: N/A;  bk ultrasound  [place] book by TA on 3-21-24  7:00 a.m.  Conf #  [id number]    EYE SURGERY      LAPAROTOMY, EXPLORATORY N/A 3/25/2024    Procedure: LAPAROTOMY, EXPLORATORY;  Surgeon: Александр Washington MD;  [facility];  Service: OB/GYN;  Laterality: N/A;    OMENTECTOMY N/A 3/25/2024    Procedure: OMENTECTOMY;  Surgeon: Александр Washington MD;  [facility];  Service: OB/GYN;  Laterality: N/A;    PERITONEOCENTESIS N/A 3/13/2024    Procedure: PARACENTESIS, ABDOMINAL;  Surgeon: Flavia Moore MD;  Location: [facility] CATH LAB;  Service: Radiology;  Laterality: N/A;    PERITONEOCENTESIS N/A 3/21/2024    Procedure:  PARACENTESIS, ABDOMINAL;  Surgeon: Jorge Jolley MD;  Location: [facility] CATH LAB;  Service: Radiology;  Laterality: N/A;    PERITONEOCENTESIS N/A 6/10/2024    Procedure: PARACENTESIS, ABDOMINAL;  Surgeon: Rogelio Malave MD;  Location: [facility] CATH LAB;  Service: Radiology;  Laterality: N/A;    TOTAL ABDOMINAL HYSTERECTOMY N/A 3/25/2024    Procedure: HYSTERECTOMY, TOTAL, ABDOMINAL;  Surgeon: Александр Washington MD;  Location: [facility] OR [address] [place] [address];  Service: OB/GYN;  Laterality: N/A;    TOTAL REDUCTION MAMMOPLASTY Bilateral 2016       Review of Systems:   As documented in primary team H&P    Home Meds:   Prior to Admission medications    Medication Sig Start Date End Date Taking? Authorizing Provider   atorvastatin (LIPITOR) 40 MG tablet Take 80 mg by mouth once daily.   Yes Provider, Historical   enalapril (VASOTEC) 20 MG tablet Take 20 mg by mouth once daily.   Yes Provider, Historical   ibuprofen (ADVIL,MOTRIN) 600 MG tablet Take 1 tablet (600 mg total) by mouth every 6 (six) hours as needed for Pain. 5/24/24  Yes Art Dao MD   insulin lispro (HUMALOG U-100 INSULIN) 100 unit/mL injection **LOW CORRECTION DOSE**  Blood Glucose  mg/dL                  Pre-meal                  151-200                0 unit                        201-250                2 units                      251-300                3 units                      301-350                4 units                      >350                     5 units                      Administer subcutaneously if needed at times designated by monitoring schedule. 3/30/24  Yes Mel Boateng MD   lamoTRIgine (LAMICTAL) 25 MG tablet Take 25 mg by mouth 2 (two) times daily.   Yes Provider, Historical   lorazepam (ATIVAN) 0.5 MG tablet Take 0.5 mg by mouth every 12 (twelve) hours as needed for Anxiety. 5/31/14  Yes Provider, Historical   metformin (GLUCOPHAGE) 1000 MG tablet Take 1,000 mg by mouth daily with breakfast.    Yes Provider, Historical    ondansetron (ZOFRAN-ODT) 4 MG TbDL Take 1 tablet (4 mg total) by mouth every 6 (six) hours as needed (for nausea). 3/31/24  Yes Mel Boateng MD   quetiapine (SEROQUEL) 25 MG Tab Take 25 mg by mouth daily as needed. 2/4/16  Yes Provider, Historical   acetaminophen (TYLENOL) 325 MG tablet Take 2 tablets (650 mg total) by mouth every 4 (four) hours as needed for Pain. 5/24/24   Art Dao MD   amlodipine (NORVASC) 10 MG tablet Take 10 mg by mouth once daily.  12/4/15   Provider, Historical   dexAMETHasone (DECADRON) 4 MG Tab Take 2 tablets (8 mg total) by mouth once daily. daily on days 2-4 of your chemotherapy cycle. 4/23/24   Александр Washington MD   fluticasone propionate (FLONASE) 50 mcg/actuation nasal spray 1 spray by Each Nostril route once daily.    Provider, Historical   hydrochlorothiazide (HYDRODIURIL) 25 MG tablet Take 25 mg by mouth once daily.  5/1/15   Provider, Historical   hydrocodone-acetaminophen (HYCET) solution 7.5-325 mg/15mL Take 30 mLs by mouth every 6 (six) hours as needed for Pain. 4/20/24   Kelsey Ramirez MD   HYDROmorphone (DILAUDID) 2 MG tablet Take 1 tablet (2 mg total) by mouth every 8 (eight) hours as needed for Pain. 4/3/24 4/3/25  Mary Mccloud, NP   insulin aspart U-100 (NOVOLOG) 100 unit/mL (3 mL) InPn pen Inject 3 Units into the skin 3 (three) times daily with meals.  (Discard pen 28 days after initial use) 4/8/24 4/8/25  Mel Boateng MD   insulin detemir U-100, Levemir, (LEVEMIR FLEXPEN) 100 unit/mL (3 mL) InPn pen Inject 7 Units into the skin every evening.  (Discard pen 28 days after initial use) 4/8/24 4/8/25  Mel Boateng MD   ketorolac 0.5% (ACULAR) 0.5 % Drop Place 1 drop into both eyes. For pain after eye injections.    Provider, Historical   OLANZapine (ZYPREXA) 5 MG tablet Take 1 tablet (5 mg total) by mouth every evening. nightly on days 1-4 of each chemotherapy cycle. 4/22/24   Александр Washington MD prochlorperazine  (COMPAZINE) 10 MG tablet Take 1 tablet (10 mg total) by mouth every 6 (six) hours as needed (Nausea). 4/22/24   Александр Washington MD     Scheduled Meds:    amLODIPine  10 mg Oral Daily    atorvastatin  80 mg Oral Daily    hydroCHLOROthiazide  25 mg Oral Daily    insulin aspart U-100  3 Units Subcutaneous TID WM    insulin glargine U-100  7 Units Subcutaneous QHS    lamoTRIgine  25 mg Oral BID    lisinopriL  20 mg Oral Daily    OLANZapine  5 mg Oral QHS     Continuous Infusions:   PRN Meds:  Current Facility-Administered Medications:     dextrose 10%, 12.5 g, Intravenous, PRN    dextrose 10%, 25 g, Intravenous, PRN    glucagon (human recombinant), 1 mg, Intramuscular, PRN    glucose, 16 g, Oral, PRN    glucose, 24 g, Oral, PRN    hydrALAZINE, 10 mg, Intravenous, Q6H PRN    insulin aspart U-100, 0-10 Units, Subcutaneous, QID (AC + HS) PRN    ondansetron, 8 mg, Oral, Q8H PRN    QUEtiapine, 25 mg, Oral, Daily PRN  Anticoagulants/Antiplatelets: no anticoagulation    Allergies:   Review of patient's allergies indicates:   Allergen Reactions    Codeine Other (See Comments)     Flu like s/s    Tramadol Other (See Comments)     Flu like symptoms similar to codeine reaction     Sedation Hx: have not been any systemic reactions    Vitals:  Temp: 98.5 °F (36.9 °C) (06/20/24 1045)  Pulse: (!) 115 (06/20/24 1500)  Resp: 20 (06/20/24 1500)  BP: (!) 178/93 (06/20/24 1500)  SpO2: 100 % (06/20/24 1500)     Physical Exam:  ASA: 3  Mallampati: n/a    General: no acute distress  Mental Status: alert and oriented to person, place and time  HEENT: normocephalic, atraumatic  Chest: unlabored breathing  Heart: regular heart rate  Abdomen: nondistended  Extremity: moves all extremities    Plan: US guided paracentesis  Sedation Plan: Local    Enma Saucedo PA-C  Interventional Radiology  [phone]

## 2024-06-20 NOTE — CONSULTS
IR consulted for a paracentesis and thoracentesis. Ordering team to place order for IR paracentesis and IR thoracentesis as well as any labs/micro for send off.     Kay Polk PA-C  Interventional Radiology  Spectra: [phone]  6/20/2024

## 2024-06-20 NOTE — PLAN OF CARE
Pt arrived to U room 3 via astrethcer w/ transporter. AAO x4. Name//allergies/procedure verified. Pt will be monitored by Rn throughout procedure.

## 2024-06-20 NOTE — PROGRESS NOTES
Progress Note  Gynecology Oncology      SUBJECTIVE:     History of Present Illness:  Anette Ricci is a 58 y.o.   is a 58 y.o.  with stage IVB ovarian carcinosacoma, s/p PDS on 3/25/24 with Dr Washington ([facility] BSO omx liver mobilization, peritoneal & R diaphragm stripping, hepatic wedge resection, cholecystectomy) C3D22 carbo/taxol/bevacizumab who presented to the ED with worsening SOB and CTA showing right sided pleural effusion.     Onc History:  3/4/24: peritoneal biopsy with carcinosarcoma  3/6/24: CA-125 418  3/25/24: Primary debulking surgery CARLOS ALBERTO/BSO, omentectomy, partial hepatectomy, debulking. (<1cm residual disease: sigmoid, transverse colon, diaphragm, kelivn hepatis)     Adjuvant therapy: Carboplatin AUC 6, paclitaxel 175 mg/m2, bevacizumab 15 mg/kg  24: C1D1: Carbo/Taxol/(Velma held), CA-125 248  5/10/24: C2D1: Carbo/Taxol/Velma  23: C3D1: Carbo/Taxol/Velma  24: C4D1: scheduled.      Interval History  Patient had a temperature of 100.8 around 4am today. She reports feeling well, SOB mildly improved from yesterday. Tolerating regular diet although appetite is minimal. She is ambulating in the room. She is passing gas and voiding without difficulty. Denies chills, denies chest pain, denies HA, dysuria, hematuria.     Review of patient's allergies indicates:   Allergen Reactions    Codeine Other (See Comments)     Flu like s/s    Tramadol Other (See Comments)     Flu like symptoms similar to codeine reaction       Past Medical History:   Diagnosis Date    Breast cancer 2013    Diabetes mellitus     Genetic testing 2013    VUS    Hyperlipidemia     Hypertension     Malignant neoplasm of upper-outer quadrant of right breast in female, estrogen receptor positive 2015    Seizure disorder      Past Surgical History:   Procedure Laterality Date    BILATERAL SALPINGO-OOPHORECTOMY (BSO) N/A 3/25/2024    Procedure: SALPINGO-OOPHORECTOMY, BILATERAL;  Surgeon: Александр Washington  MD YURY;  Location: [facility] OR [facility];  Service: OB/GYN;  Laterality: N/A;    BREAST BIOPSY      BREAST LUMPECTOMY Right 2013    CHOLECYSTECTOMY  3/25/2024    Procedure: CHOLECYSTECTOMY;  Surgeon: Bo Farmer MD;  [facility];  Service: General;;    DEBULKING OF TUMOR N/A 3/25/2024    Procedure: DEBULKING, NEOPLASM;  Surgeon: Александр Washington MD;  [facility];  Service: OB/GYN;  Laterality: N/A;    EXCISION, LIVER N/A 3/25/2024    Procedure: EXCISION, LIVER - partial;  Surgeon: Bo Farmer MD;  [facility];  Service: General;  Laterality: N/A;  bk ultrasound  [place] book by TA on 3-21-24  7:00 a.m.  Conf #  [id number]    EYE SURGERY      LAPAROTOMY, EXPLORATORY N/A 3/25/2024    Procedure: LAPAROTOMY, EXPLORATORY;  Surgeon: Александр Washington MD;  [facility];  Service: OB/GYN;  Laterality: N/A;    OMENTECTOMY N/A 3/25/2024    Procedure: OMENTECTOMY;  Surgeon: Александр Washington MD;  [facility];  Service: OB/GYN;  Laterality: N/A;    PERITONEOCENTESIS N/A 3/13/2024    Procedure: PARACENTESIS, ABDOMINAL;  Surgeon: Flavia Moore MD;  Location: [facility] CATH LAB;  Service: Radiology;  Laterality: N/A;    PERITONEOCENTESIS N/A 3/21/2024    Procedure: PARACENTESIS, ABDOMINAL;  Surgeon: Jorge Jolley MD;  Location: [facility] CATH LAB;  Service: Radiology;  Laterality: N/A;    PERITONEOCENTESIS N/A 6/10/2024    Procedure: PARACENTESIS, ABDOMINAL;  Surgeon: Rogelio Malave MD;  Location: [facility] CATH LAB;  Service: Radiology;  Laterality: N/A;    TOTAL ABDOMINAL HYSTERECTOMY N/A 3/25/2024    Procedure: HYSTERECTOMY, TOTAL, ABDOMINAL;  Surgeon: Александр Washington MD;  [facility];  Service: OB/GYN;  Laterality: N/A;    TOTAL REDUCTION MAMMOPLASTY Bilateral 2016     OB History          1    Para   1    Term   1            AB   0    Living   1         SAB   0    IAB        Ectopic        Multiple         Live Births               Obstetric Comments   1st child at age 17; menarche at age 11; LMP in 2014; 1 living child, 2 miscarriages; natural birth             Family History   Problem Relation Name Age of Onset    Hypertension Mother      Seizures Father      Breast cancer Sister  48    Colon cancer Sister      Hypertension Brother      Hypertension Brother      Cancer Neg Hx      Ovarian cancer Neg Hx       Social History     Tobacco Use    Smoking status: Never    Smokeless tobacco: Never   Substance Use Topics    Alcohol use: Yes     Alcohol/week: 0.0 standard drinks of alcohol     Comment: ocassional    Drug use: No       Current Facility-Administered Medications   Medication    amLODIPine tablet 10 mg    atorvastatin tablet 80 mg    dextrose 10% bolus 125 mL 125 mL    dextrose 10% bolus 250 mL 250 mL    [START ON 6/21/2024] enoxaparin injection 40 mg    glucagon (human recombinant) injection 1 mg    glucose chewable tablet 16 g    glucose chewable tablet 24 g    hydrALAZINE injection 10 mg    hydroCHLOROthiazide tablet 25 mg    insulin aspart U-100 pen 0-10 Units    insulin aspart U-100 pen 3 Units    insulin glargine U-100 (Lantus) pen 7 Units    lamoTRIgine tablet 25 mg    lisinopriL tablet 20 mg    OLANZapine tablet 5 mg    ondansetron disintegrating tablet 8 mg    QUEtiapine tablet 25 mg       Review of Systems:  Constitutional: no significant weight change, fever, fatigue  Eyes:  No vision changes  Cardiovascular: No chest pain  Respiratory: + shortness of breath + cough  Gastrointestinal: No diarrhea, bloody stool, nausea/vomiting, constipation  Genitourinary: No blood in urine, painful urination, urgency of urination, frequency of urination  Skin/Breast: No painful breasts, nipple discharge, masses  Neurological: No headache  Psychiatric: No depression or anxiety     OBJECTIVE:     Vital Signs  Temp:  [98.1 °F (36.7 °C)-98.5 °F (36.9 °C)] 98.5 °F (36.9 °C)  Pulse:  [104-115] 112  Resp:  [16-34] 18  SpO2:   [95 %-100 %] 95 %  BP: (125-178)/(71-95) 146/76    Physical Exam:  Gen: A&Ox3, NAD  CV: mildly tachycardic  Pulm: Reduced Breath sounds in right lung base.  Abd: Distended abdomen, but non-tender to palpation. Midline incision covered with gauze, when removed, it looks clean and clear, healing from secondary intention, no drainage, no erythema.   Ext: no peripheral edema, no calf tenderness.    Laboratory  Recent Results (from the past 96 hour(s))   EKG 12-lead    Collection Time: 06/20/24  1:11 AM   Result Value Ref Range    QRS Duration 70 ms    [facility] QTC Calculation 446 ms   EKG 12-lead    Collection Time: 06/20/24  1:54 AM   Result Value Ref Range    QRS Duration 70 ms    [facility] QTC Calculation 476 ms   COVID-19 Rapid Screening    Collection Time: 06/20/24  1:55 AM   Result Value Ref Range    SARS-CoV-2 RNA, Amplification, Qual Negative Negative   CBC Auto Differential    Collection Time: 06/20/24  2:28 AM   Result Value Ref Range    WBC 9.24 3.90 - 12.70 K/uL    RBC 2.60 (L) 4.00 - 5.40 M/uL    Hemoglobin 6.4 (L) 12.0 - 16.0 g/dL    Hematocrit 21.7 (L) 37.0 - 48.5 %    MCV 84 82 - 98 fL    MCH 24.6 (L) 27.0 - 31.0 pg    MCHC 29.5 (L) 32.0 - 36.0 g/dL    RDW 20.3 (H) 11.5 - 14.5 %    Platelets 272 150 - 450 K/uL    MPV 11.3 9.2 - 12.9 fL    Immature Granulocytes 0.3 0.0 - 0.5 %    Gran # (ANC) 6.5 1.8 - 7.7 K/uL    Immature Grans (Abs) 0.03 0.00 - 0.04 K/uL    Lymph # 1.7 1.0 - 4.8 K/uL    Mono # 1.0 0.3 - 1.0 K/uL    Eos # 0.0 0.0 - 0.5 K/uL    Baso # 0.04 0.00 - 0.20 K/uL    nRBC 0 0 /100 WBC    Gran % 70.2 38.0 - 73.0 %    Lymph % 18.0 18.0 - 48.0 %    Mono % 10.8 4.0 - 15.0 %    Eosinophil % 0.3 0.0 - 8.0 %    Basophil % 0.4 0.0 - 1.9 %    Differential Method Automated    Comprehensive Metabolic Panel    Collection Time: 06/20/24  2:28 AM   Result Value Ref Range    Sodium 135 (L) 136 - 145 mmol/L    Potassium 3.6 3.5 - 5.1 mmol/L    Chloride 100 95 - 110 mmol/L    CO2 24 23 - 29 mmol/L    Glucose 257 (H) 70 -  110 mg/dL    BUN 10 6 - 20 mg/dL    Creatinine 0.7 0.5 - 1.4 mg/dL    Calcium 8.5 (L) 8.7 - 10.5 mg/dL    Total Protein 6.5 6.0 - 8.4 g/dL    Albumin 2.4 (L) 3.5 - 5.2 g/dL    Total Bilirubin 0.4 0.1 - 1.0 mg/dL    Alkaline Phosphatase 90 55 - 135 U/L    AST 17 10 - 40 U/L    ALT 9 (L) 10 - 44 U/L    eGFR >60.0 >60 mL/min/1.73 m^2    Anion Gap 11 8 - 16 mmol/L   Troponin I    Collection Time: 06/20/24  2:28 AM   Result Value Ref Range    Troponin I 0.010 0.000 - 0.026 ng/mL   BNP    Collection Time: 06/20/24  2:28 AM   Result Value Ref Range     (H) 0 - 99 pg/mL   Blood culture #1 **CANNOT BE ORDERED STAT**    Collection Time: 06/20/24  2:28 AM    Specimen: Peripheral, Upper Arm, Left; Blood   Result Value Ref Range    Blood Culture, Routine No Growth to date    Blood culture #2 **CANNOT BE ORDERED STAT**    Collection Time: 06/20/24  2:28 AM    Specimen: Peripheral, Hand, Left; Blood   Result Value Ref Range    Blood Culture, Routine No Growth to date    ISTAT PROCEDURE    Collection Time: 06/20/24  2:43 AM   Result Value Ref Range    POC Glucose 277 (H) 70 - 110 mg/dL    POC BUN 9 6 - 30 mg/dL    POC Creatinine 0.5 0.5 - 1.4 mg/dL    POC Sodium 135 (L) 136 - 145 mmol/L    POC Potassium 3.5 3.5 - 5.1 mmol/L    POC Chloride 97 95 - 110 mmol/L    POC TCO2 (MEASURED) 24 23 - 29 mmol/L    POC Ionized Calcium 1.10 1.06 - 1.42 mmol/L    POC Hematocrit 20 (LL) 36 - 54 %PCV    Sample SAMEERA    ISTAT Lactate    Collection Time: 06/20/24  2:49 AM   Result Value Ref Range    POC Lactate 2.27 (H) 0.5 - 2.2 mmol/L    Sample VENOUS     Site Other     Allens Test N/A     DelSys Room Air     Mode SPONT     FiO2 21    Type & Screen    Collection Time: 06/20/24  3:24 AM   Result Value Ref Range    Group & Rh B POS     Indirect Juan NEG     Specimen Outdate 06/23/2024 23:59    Prepare RBC 1 Unit    Collection Time: 06/20/24  3:24 AM   Result Value Ref Range    UNIT NUMBER [id number]     Product Code N5431M26     DISPENSE  STATUS ISSUED     CODING SYSTEM [id number]     Unit Blood Type Code 1700     Unit Blood Type B NEG     Unit Expiration [id number]     CROSSMATCH INTERPRETATION Compatible    POCT glucose    Collection Time: 06/20/24  9:33 AM   Result Value Ref Range    POCT Glucose 319 (H) 70 - 110 mg/dL   Lactate dehydrogenase    Collection Time: 06/20/24 10:45 AM   Result Value Ref Range    LD 1,214 (H) 110 - 260 U/L   POCT glucose    Collection Time: 06/20/24 11:30 AM   Result Value Ref Range    POCT Glucose 331 (H) 70 - 110 mg/dL   CBC auto differential    Collection Time: 06/20/24  2:28 PM   Result Value Ref Range    WBC 11.47 3.90 - 12.70 K/uL    RBC 2.94 (L) 4.00 - 5.40 M/uL    Hemoglobin 7.6 (L) 12.0 - 16.0 g/dL    Hematocrit 23.6 (L) 37.0 - 48.5 %    MCV 80 (L) 82 - 98 fL    MCH 25.9 (L) 27.0 - 31.0 pg    MCHC 32.2 32.0 - 36.0 g/dL    RDW 18.9 (H) 11.5 - 14.5 %    Platelets 288 150 - 450 K/uL    MPV 12.9 9.2 - 12.9 fL    Immature Granulocytes 0.8 (H) 0.0 - 0.5 %    Gran # (ANC) 8.6 (H) 1.8 - 7.7 K/uL    Immature Grans (Abs) 0.09 (H) 0.00 - 0.04 K/uL    Lymph # 1.4 1.0 - 4.8 K/uL    Mono # 1.3 (H) 0.3 - 1.0 K/uL    Eos # 0.0 0.0 - 0.5 K/uL    Baso # 0.04 0.00 - 0.20 K/uL    nRBC 0 0 /100 WBC    Gran % 75.0 (H) 38.0 - 73.0 %    Lymph % 12.5 (L) 18.0 - 48.0 %    Mono % 11.2 4.0 - 15.0 %    Eosinophil % 0.2 0.0 - 8.0 %    Basophil % 0.3 0.0 - 1.9 %    Platelet Estimate Appears normal     Aniso Slight     Poik Slight     Ovalocytes Occasional     Large/Giant Platelets Present     Differential Method Automated    POCT glucose    Collection Time: 06/20/24  4:56 PM   Result Value Ref Range    POCT Glucose 174 (H) 70 - 110 mg/dL       Diagnostic Results:    CTA (6/20/24)  Impression:     Motion and artifact limited study.  No large or central pulmonary embolus.  No convincing CTA findings of right heart strain.     Questionable small pulmonary emboli in the right lower lobe as seen on prior studies.     Moderate right pleural  effusion with elevated right hemidiaphragm. Passive atelectasis in the right lung base. No consolidation.      Suboptimally evaluated large loculated subdiaphragmatic fluid collection between the liver and the right hemidiaphragm.  Ill defied hypoattenuation in the posterior right hepatic lobe and soft tissue nodule adjacent to the posteroinferior right hepatic lobe.  Abdominal free fluid.  Peritoneal metastatic disease is included in the differential, noting that findings are worse when compared with prior CT.  Suggest correlation with clinical findings and short-term follow-up with dedicated abdominal CT when clinically appropriate.     Mild anasarca.  Similar partially calcified right breast nodule.    Chest X-ray  Impression:     Mildly worse right-sided pleural effusion with passive atelectasis or airspace disease in the right lung base.      ASSESSMENT/PLAN:     Active Hospital Problems    Diagnosis  POA    *Pleural effusion, malignant [J91.0]  Unknown    Stage 4B Carcinoscaroma, Suspected ovarian origin [C80.1]  Yes     3/4/24: peritoneal biopsy with carcinosarcoma  3/6/24: CA-125 418  3/25/24: Primary debulking surgery CARLOS ALBERTO/BSO, omentectomy, partial hepatectomy, debulking. 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       Anette Ricci is a 58 y.o.  with stage IV ovarian carcinosacoma, s/p debulking surgery with [facility]ERTO BSO omx liver mobilization, peritoneal & R diaphragm stripping, hepatic wedge resection,  cholecystectomy, currently on C3D21 carbo/taxol/bevacizumab, who presented with SOB and imaging consistent with right pleural effusion, most likely secondary to malignancy.     Malignant pleural effusion (Right)  - Satting 100%, mild tachycardia -110, Temp 100.8 today @0430  - No leukocytosis, WBC 9   - , trop neg, lactate 2.2  - CTA with Moderate right pleural effusion with elevated right hemidiaphragm. Passive atelectasis in the right lung base. No consolidation.   - Plan to continue Vanc/zosyn, until afebrile for 48h, consider DC vancomycin.   - Patient requested supplemental oxygen 3L despite satting 100% on RA. Plan to wean off today.   - Consulted IR for possible thoracentesis/paracentesis for symptomatic relief but a safe window was not identified to complete the thoracentesis, also not enough fluid seen for paracentesis.   - Added UA,Urine Cx, Chest X-ray to complete infectious work up.     Ovarian Carcinosarcoma Stage IV   - s/p PDS on 3/25/24 with Dr Washington ([facility] BSO omx liver mobilization, peritoneal & R diaphragm stripping, hepatic wedge resection, cholecystectomy) suboptimal <1cm residual disease: sigmoid, transverse colon, diaphragm, kelvin hepatis   - currently receiving carbo/taxol/bevacizumab. Due for C4 today. Plan to reschedule for next week.   - PT consulted    Anemia  - H/H 6.4/22 > 1u pRBC > 7.6/23 > 1u pRBC > AM CBC pending  - Likely secondary to chronic disease and chemotherapy.    Diabetes   - Hold Metformin and Continue home levemir 7u QHS, aspart 3u TID WM   - Mod SSI insulin and diabetic diet.  - POCT BG 4 times daily, before meal and before bedtime.    HTN  - Continue home BP meds: amlodipine, enalapril>lisinopril, HTCZ  - Hydralazine PRN (BP>180/110)    HLD  - continue atorvastatin    Seizure disorder  - continue Lamotrigine    Depression/Anxiety  - continue Olanzapine, Seroquel PRN    VTE proph  - SCDs, will transition to Lovenox today.      Quinton Dave,  MD  Obstetrics and Gynecology- PGY1

## 2024-06-20 NOTE — PROGRESS NOTES
Initial US imaging of the abdomen was performed. No ascites was noted for paracentesis. No paracentesis performed.     Initial US imaging of the chest was performed. Small volume pleural fluid was noted in the in the upper chest but a safe percutaneous window could not be identified. Thoracentesis not completed.     Enma Saucedo PA-C  Interventional Radiology  [phone]'

## 2024-06-20 NOTE — H&P
H&P  Gynecology Oncology      SUBJECTIVE:     History of Present Illness:  Anette Ricci is a 58 y.o.   is a 58 y.o.  with stage IVB ovarian carcinosacoma, s/p PDS on 3/25/24 with Dr Washington (CARLOS ALBERTO BSO omx liver mobilization, peritoneal & R diaphragm stripping, hepatic wedge resection, cholecystectomy) C3D21 carbo/taxol/bevacizumab who presented to the ED with worsening SOB and some back pain for 1 day. SOB is worse when laying flat. She endorse a cough for the last 4-5 days. She also reports abdominal bloating, without N/V. She last saw IR for paracentesis on 6/10, but they did not find enough fluid for paracentesis. She denies chest pain, fevers, chills, leg swelling.     Onc History:  3/4/24: peritoneal biopsy with carcinosarcoma  3/6/24: CA-125 418  3/25/24: Primary debulking surgery CARLOS ALBERTO/BSO, omentectomy, partial hepatectomy, debulking. (<1cm residual disease: sigmoid, transverse colon, diaphragm, kelvin hepatis)     Adjuvant therapy: Carboplatin AUC 6, paclitaxel 175 mg/m2, bevacizumab 15 mg/kg  24: C1D1: Carbo/Taxol/(Velma held), CA-125 248  5/10/24: C2D1: Carbo/Taxol/Velma  23: C3D1: Carbo/Taxol/Velma  24: C4D1: scheduled.        Review of patient's allergies indicates:   Allergen Reactions    Codeine Other (See Comments)     Flu like s/s    Tramadol Other (See Comments)     Flu like symptoms similar to codeine reaction       Past Medical History:   Diagnosis Date    Breast cancer 2013    Diabetes mellitus     Genetic testing 2013    VUS    Hyperlipidemia     Hypertension     Malignant neoplasm of upper-outer quadrant of right breast in female, estrogen receptor positive 2015    Seizure disorder      Past Surgical History:   Procedure Laterality Date    BILATERAL SALPINGO-OOPHORECTOMY (BSO) N/A 3/25/2024    Procedure: SALPINGO-OOPHORECTOMY, BILATERAL;  Surgeon: Александр Washington MD;  Location: [facility] OR [address] [place] [address];  Service: OB/GYN;  Laterality: N/A;    BREAST BIOPSY       BREAST LUMPECTOMY Right 2013    CHOLECYSTECTOMY  3/25/2024    Procedure: CHOLECYSTECTOMY;  Surgeon: Bo Farmer MD;  [facility];  Service: General;;    DEBULKING OF TUMOR N/A 3/25/2024    Procedure: DEBULKING, NEOPLASM;  Surgeon: Александр Washington MD;  [facility];  Service: OB/GYN;  Laterality: N/A;    EXCISION, LIVER N/A 3/25/2024    Procedure: EXCISION, LIVER - partial;  Surgeon: Bo Farmer MD;  [facility];  Service: General;  Laterality: N/A;  bk ultrasound  [place] book by TA on 3-21-24  7:00 a.m.  Conf #  [id number]    EYE SURGERY      LAPAROTOMY, EXPLORATORY N/A 3/25/2024    Procedure: LAPAROTOMY, EXPLORATORY;  Surgeon: Александр Washington MD;  Location: [facility] OR [address] [place] [address];  Service: OB/GYN;  Laterality: N/A;    OMENTECTOMY N/A 3/25/2024    Procedure: OMENTECTOMY;  Surgeon: Александр Washington MD;  Location: [facility] OR [address] [place] [address];  Service: OB/GYN;  Laterality: N/A;    PERITONEOCENTESIS N/A 3/13/2024    Procedure: PARACENTESIS, ABDOMINAL;  Surgeon: Flavia Moore MD;  Location: [facility], [place], operated [facility] CATH LAB;  Service: Radiology;  Laterality: N/A;    PERITONEOCENTESIS N/A 3/21/2024    Procedure: PARACENTESIS, ABDOMINAL;  Surgeon: Jorge Jolley MD;  Location: [facility], [place], operated by [facility] CATH LAB;  Service: Radiology;  Laterality: N/A;    PERITONEOCENTESIS N/A 6/10/2024    Procedure: PARACENTESIS, ABDOMINAL;  Surgeon: Rogelio Malave MD;  Location: [facility], [place], operated by [facility] CATH LAB;  Service: Radiology;  Laterality: N/A;    TOTAL ABDOMINAL HYSTERECTOMY N/A 3/25/2024    Procedure: HYSTERECTOMY, TOTAL, ABDOMINAL;  Surgeon: Александр Washington MD;  Location: [facility] OR [address] [place] [address];  Service: OB/GYN;  Laterality: N/A;    TOTAL REDUCTION MAMMOPLASTY Bilateral      OB History          1    Para   1    Term   1            AB   0    Living   1         SAB   0    IAB        Ectopic        Multiple        Live Births               Obstetric Comments   1st child at age 17; menarche at age 11; LMP  in 2014; 1 living child, 2 miscarriages; natural birth             Family History   Problem Relation Name Age of Onset    Hypertension Mother      Seizures Father      Breast cancer Sister  48    Colon cancer Sister      Hypertension Brother      Hypertension Brother      Cancer Neg Hx      Ovarian cancer Neg Hx       Social History     Tobacco Use    Smoking status: Never    Smokeless tobacco: Never   Substance Use Topics    Alcohol use: Yes     Alcohol/week: 0.0 standard drinks of alcohol     Comment: ocassional    Drug use: No       Current Facility-Administered Medications   Medication    atorvastatin tablet 80 mg    dextrose 10% bolus 125 mL 125 mL    dextrose 10% bolus 250 mL 250 mL    glucagon (human recombinant) injection 1 mg    glucose chewable tablet 16 g    glucose chewable tablet 24 g    insulin aspart U-100 pen 0-10 Units    lamoTRIgine tablet 25 mg    OLANZapine tablet 5 mg    ondansetron disintegrating tablet 8 mg     Current Outpatient Medications   Medication Sig    acetaminophen (TYLENOL) 325 MG tablet Take 2 tablets (650 mg total) by mouth every 4 (four) hours as needed for Pain.    amlodipine (NORVASC) 10 MG tablet Take 10 mg by mouth once daily.     atorvastatin (LIPITOR) 40 MG tablet Take 80 mg by mouth once daily.    dexAMETHasone (DECADRON) 4 MG Tab Take 2 tablets (8 mg total) by mouth once daily. daily on days 2-4 of your chemotherapy cycle.    enalapril (VASOTEC) 20 MG tablet Take 20 mg by mouth once daily.    fluticasone propionate (FLONASE) 50 mcg/actuation nasal spray 1 spray by Each Nostril route once daily.    hydrochlorothiazide (HYDRODIURIL) 25 MG tablet Take 25 mg by mouth once daily.     hydrocodone-acetaminophen (HYCET) solution 7.5-325 mg/15mL Take 30 mLs by mouth every 6 (six) hours as needed for Pain.    HYDROmorphone (DILAUDID) 2 MG tablet Take 1 tablet (2 mg total) by mouth every 8 (eight) hours as needed for Pain.    ibuprofen (ADVIL,MOTRIN) 600 MG tablet Take 1 tablet (600  mg total) by mouth every 6 (six) hours as needed for Pain.    insulin aspart U-100 (NOVOLOG) 100 unit/mL (3 mL) InPn pen Inject 3 Units into the skin 3 (three) times daily with meals.  (Discard pen 28 days after initial use)    insulin detemir U-100, Levemir, (LEVEMIR FLEXPEN) 100 unit/mL (3 mL) InPn pen Inject 7 Units into the skin every evening.  (Discard pen 28 days after initial use)    insulin lispro (HUMALOG U-100 INSULIN) 100 unit/mL injection **LOW CORRECTION DOSE**  Blood Glucose  mg/dL                  Pre-meal                  151-200                0 unit                        201-250                2 units                      251-300                3 units                      301-350                4 units                      >350                     5 units                      Administer subcutaneously if needed at times designated by monitoring schedule.    ketorolac 0.5% (ACULAR) 0.5 % Drop Place 1 drop into both eyes. For pain after eye injections.    lamoTRIgine (LAMICTAL) 25 MG tablet Take 25 mg by mouth 2 (two) times daily.    lorazepam (ATIVAN) 0.5 MG tablet Take 0.5 mg by mouth every 12 (twelve) hours as needed for Anxiety.    metformin (GLUCOPHAGE) 1000 MG tablet Take 1,000 mg by mouth daily with breakfast.     OLANZapine (ZYPREXA) 5 MG tablet Take 1 tablet (5 mg total) by mouth every evening. nightly on days 1-4 of each chemotherapy cycle.    ondansetron (ZOFRAN-ODT) 4 MG TbDL Take 1 tablet (4 mg total) by mouth every 6 (six) hours as needed (for nausea).    prochlorperazine (COMPAZINE) 10 MG tablet Take 1 tablet (10 mg total) by mouth every 6 (six) hours as needed (Nausea).    quetiapine (SEROQUEL) 25 MG Tab Take 25 mg by mouth daily as needed.       Review of Systems:  Constitutional: no significant weight change, fever, fatigue  Eyes:  No vision changes  Cardiovascular: No chest pain  Respiratory: + shortness of breath + cough  Gastrointestinal: No diarrhea, bloody stool,  nausea/vomiting, constipation  Genitourinary: No blood in urine, painful urination, urgency of urination, frequency of urination  Skin/Breast: No painful breasts, nipple discharge, masses  Neurological: No headache  Psychiatric: No depression or anxiety     OBJECTIVE:     Vital Signs  Temp:  [98.1 °F (36.7 °C)] 98.1 °F (36.7 °C)  Pulse:  [104-114] 111  Resp:  [18-34] 20  SpO2:  [100 %] 100 %  BP: (133-152)/(71-95) 152/78    Physical Exam:  Gen: A&Ox3, NAD  CV: mildly tachycardic  Pulm: Reduced Breath sounds in right lung base.  Abd: Distended abdomen, but non-tender to palpation. Midline incision covered with gauze.    Ext: no peripheral edema, no calf tenderness.    Laboratory  Recent Results (from the past 96 hour(s))   COVID-19 Rapid Screening    Collection Time: 06/20/24  1:55 AM   Result Value Ref Range    SARS-CoV-2 RNA, Amplification, Qual Negative Negative   CBC Auto Differential    Collection Time: 06/20/24  2:28 AM   Result Value Ref Range    WBC 9.24 3.90 - 12.70 K/uL    RBC 2.60 (L) 4.00 - 5.40 M/uL    Hemoglobin 6.4 (L) 12.0 - 16.0 g/dL    Hematocrit 21.7 (L) 37.0 - 48.5 %    MCV 84 82 - 98 fL    MCH 24.6 (L) 27.0 - 31.0 pg    MCHC 29.5 (L) 32.0 - 36.0 g/dL    RDW 20.3 (H) 11.5 - 14.5 %    Platelets 272 150 - 450 K/uL    MPV 11.3 9.2 - 12.9 fL    Immature Granulocytes 0.3 0.0 - 0.5 %    Gran # (ANC) 6.5 1.8 - 7.7 K/uL    Immature Grans (Abs) 0.03 0.00 - 0.04 K/uL    Lymph # 1.7 1.0 - 4.8 K/uL    Mono # 1.0 0.3 - 1.0 K/uL    Eos # 0.0 0.0 - 0.5 K/uL    Baso # 0.04 0.00 - 0.20 K/uL    nRBC 0 0 /100 WBC    Gran % 70.2 38.0 - 73.0 %    Lymph % 18.0 18.0 - 48.0 %    Mono % 10.8 4.0 - 15.0 %    Eosinophil % 0.3 0.0 - 8.0 %    Basophil % 0.4 0.0 - 1.9 %    Differential Method Automated    Comprehensive Metabolic Panel    Collection Time: 06/20/24  2:28 AM   Result Value Ref Range    Sodium 135 (L) 136 - 145 mmol/L    Potassium 3.6 3.5 - 5.1 mmol/L    Chloride 100 95 - 110 mmol/L    CO2 24 23 - 29 mmol/L     Glucose 257 (H) 70 - 110 mg/dL    BUN 10 6 - 20 mg/dL    Creatinine 0.7 0.5 - 1.4 mg/dL    Calcium 8.5 (L) 8.7 - 10.5 mg/dL    Total Protein 6.5 6.0 - 8.4 g/dL    Albumin 2.4 (L) 3.5 - 5.2 g/dL    Total Bilirubin 0.4 0.1 - 1.0 mg/dL    Alkaline Phosphatase 90 55 - 135 U/L    AST 17 10 - 40 U/L    ALT 9 (L) 10 - 44 U/L    eGFR >60.0 >60 mL/min/1.73 m^2    Anion Gap 11 8 - 16 mmol/L   Troponin I    Collection Time: 06/20/24  2:28 AM   Result Value Ref Range    Troponin I 0.010 0.000 - 0.026 ng/mL   BNP    Collection Time: 06/20/24  2:28 AM   Result Value Ref Range     (H) 0 - 99 pg/mL   ISTAT PROCEDURE    Collection Time: 06/20/24  2:43 AM   Result Value Ref Range    POC Glucose 277 (H) 70 - 110 mg/dL    POC BUN 9 6 - 30 mg/dL    POC Creatinine 0.5 0.5 - 1.4 mg/dL    POC Sodium 135 (L) 136 - 145 mmol/L    POC Potassium 3.5 3.5 - 5.1 mmol/L    POC Chloride 97 95 - 110 mmol/L    POC TCO2 (MEASURED) 24 23 - 29 mmol/L    POC Ionized Calcium 1.10 1.06 - 1.42 mmol/L    POC Hematocrit 20 (LL) 36 - 54 %PCV    Sample SAMEERA    ISTAT Lactate    Collection Time: 06/20/24  2:49 AM   Result Value Ref Range    POC Lactate 2.27 (H) 0.5 - 2.2 mmol/L    Sample VENOUS     Site Other     Allens Test N/A     DelSys Room Air     Mode SPONT     FiO2 21    Type & Screen    Collection Time: 06/20/24  3:24 AM   Result Value Ref Range    Group & Rh B POS     Indirect Juan NEG     Specimen Outdate 06/23/2024 23:59    Prepare RBC 1 Unit    Collection Time: 06/20/24  3:24 AM   Result Value Ref Range    UNIT NUMBER [id number]     Product Code [id number]     DISPENSE STATUS ISSUED     CODING SYSTEM [id number]     Unit Blood Type Code 1700     Unit Blood Type B NEG     Unit Expiration [id number]     CROSSMATCH INTERPRETATION Compatible        Diagnostic Results:    CTA (6/20/24)  Impression:     Motion and artifact limited study.  No large or central pulmonary embolus.  No convincing CTA findings of right heart strain.     Questionable small  pulmonary emboli in the right lower lobe as seen on prior studies.     Moderate right pleural effusion with elevated right hemidiaphragm. Passive atelectasis in the right lung base. No consolidation.      Suboptimally evaluated large loculated subdiaphragmatic fluid collection between the liver and the right hemidiaphragm.  Ill defied hypoattenuation in the posterior right hepatic lobe and soft tissue nodule adjacent to the posteroinferior right hepatic lobe.  Abdominal free fluid.  Peritoneal metastatic disease is included in the differential, noting that findings are worse when compared with prior CT.  Suggest correlation with clinical findings and short-term follow-up with dedicated abdominal CT when clinically appropriate.     Mild anasarca.  Similar partially calcified right breast nodule.    Chest X-ray  Impression:     Mildly worse right-sided pleural effusion with passive atelectasis or airspace disease in the right lung base.    ASSESSMENT/PLAN:     Active Hospital Problems    Diagnosis  POA    *Pleural effusion, malignant [J91.0]  Unknown    Acute postoperative anemia due to expected blood loss [D62]  Yes    Stage 4B Carcinoscaroma, Suspected ovarian origin [C80.1]  Yes     3/4/24: peritoneal biopsy with carcinosarcoma  3/6/24: CA-125 418  3/25/24: Primary debulking surgery CARLOS ALBERTO/BSO, omentectomy, partial hepatectomy, debulking. 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       Anette Ricci is a 58 y.o.  with  stage IV ovarian carcinosacoma, s/p debulking surgery with CARLOS ALBERTO BSO omx liver mobilization, peritoneal & R diaphragm stripping, hepatic wedge resection, cholecystectomy, currently on C3D21 carbo/taxol/bevacizumab, who presented with SOB and imaging consistent with right pleural effusion, most likely secondary to malignancy.     Malignant pleural effusion  - Afebrile, satting 100% RA, mild tachycardia -110  - No leukocytosis, WBC 9   - , trop neg, lactate 2.2  - CTA with Moderate right pleural effusion with elevated right hemidiaphragm. Passive atelectasis in the right lung base. No consolidation.   - Received vanc/zosyn in ED for possible PNA. Will continue for 24h even thought low suspicion for PNA pleural effusion.   - Consult IR for possible thoracentesis/paracentesis for symptomatic relief and consider pleural fluid studies.     Ovarian Carcinosarcoma Stage IV   - s/p PDS on 3/25/24 with Dr Washington (CARLOS ALBERTO BSO omx liver mobilization, peritoneal & R diaphragm stripping, hepatic wedge resection, cholecystectomy) suboptimal <1cm residual disease: sigmoid, transverse colon, diaphragm, kelvin hepatis   - currently receiving carbo/taxol/bevacizumab. Due for next infusion tomorrow.     Anemia  - H/H 6.4/22, s/p 1u pRBC in ED  - Likely contributing to SOB and tachycardia.    Diabetes   - Hold Metformin and Continue home levemir 7u QHS, aspart 3u TID WM   - Mod SSI insulin and diabetic diet.  - POCT BG 4 times daily, before meal and before bedtime.    HTN  - Continue home BP meds: amlodipine, enalapril>lisinopril, HTCZ  - Hydralazine PRN (BP>180/110)    HLD  - continue atorvastatin    Seizure disorder  - continue Lamotrigine    Depression/Anxiety  - continue Olanzapine, Seroquel PRN    VTE proph  - SCDs, will consider medication anticoagulation after IR procedure.      Quinton Dave MD  Obstetrics and Gynecology- PGY1

## 2024-06-20 NOTE — ED NOTES
I-STAT Chem-8+ Results:   Value Reference Range   Sodium 135 136-145 mmol/L   Potassium  3.5 3.5-5.1 mmol/L   Chloride 97  mmol/L   Ionized Calcium 1.1 1.06-1.42 mmol/L   CO2 (measured) 24 23-29 mmol/L   Glucose 277  mg/dL   BUN 9 6-30 mg/dL   Creatinine 0.5 0.5-1.4 mg/dL   Hematocrit 20 36-54%

## 2024-06-20 NOTE — PROVIDER PROGRESS NOTES - EMERGENCY DEPT.
"Encounter Date: 6/20/2024    ED Physician Progress Notes        Physician Note:   ED Resident HAND-OFF NOTE:  6:30 AM 6/20/2024    Anette Ricci is a 58 y.o. female who presented to the ED on 6/20/2024 and has been managed by Dr. Daley and Dr. Sky, who reports patient C/O SOB. I assumed care of patient from off-going ED physician team at 6:30 AM pending gyn onc recommendation.  On my evaluation, Anette Ricci appears hemodynamically stable and in NAD.  Patient will be admitted to the hospital by the gynecology oncology team in the setting of her dyspnea and peritoneal metastatic disease.    Thus far, Anette Ricci has received:  Medications  atorvastatin tablet 80 mg (has no administration in time range)  lamoTRIgine tablet 25 mg (has no administration in time range)  OLANZapine tablet 5 mg (has no administration in time range)  ondansetron disintegrating tablet 8 mg (has no administration in time range)   glucose chewable tablet 16 g (has no administration in time range)  glucose chewable tablet 24 g (has no administration in time range)  glucagon (human recombinant) injection 1 mg (has no administration in time range)  insulin aspart U-100 pen 0-10 Units (has no administration in time range)  dextrose 10% bolus 125 mL 125 mL (has no administration in time range)  dextrose 10% bolus 250 mL 250 mL (has no administration in time range)  vancomycin 1,500 mg in dextrose 5 % (D5W) 250 mL IVPB (Vial-Mate) (0 mg Intravenous Stopped 6/20/24 0702)  piperacillin-tazobactam (ZOSYN) 4.5 g in dextrose 5 % in water (D5W) 100 mL IVPB (MB+) (0 g Intravenous Stopped 6/20/24 0410)  sodium chloride 0.9% bolus 1,000 mL 1,000 mL ( Intravenous Stopped 6/20/24 0513)  iohexoL (OMNIPAQUE 350) injection 75 mL (75 mLs Intravenous Given 6/20/24 0436)    On my exam, I appreciate:  BP (!) 152/78   Pulse (!) 111   Temp 98.1 °F (36.7 °C) (Oral)   Resp 20   Ht 5' 7" (1.702 m)   Wt 77.1 kg (170 lb)   LMP 02/09/2015  "  SpO2 100%   BMI 26.63 kg/m²     ED Course as of 06/20/24 0835  ------------------------------------------------------------  Time: 06/20 0508  Value: Comprehensive Metabolic Panel(!)  Comment: Hyperglycemia  By: Jose Sky MD  ------------------------------------------------------------  Time: 06/20 0508  Value: BNP(!)  Comment: Elevated, concerning for CHF  By: Jose Sky MD  ------------------------------------------------------------  Time: 06/20 0508  Value: Troponin I  Comment: Within normal limits, lowering suspicion for ACS  By: Jose Sky MD  ------------------------------------------------------------  Time: 06/20 0508  Value: ISTAT Lactate(!)  Comment: Elevated.  Concerning for infection in this clinical setting  By: Jose Sky MD  ------------------------------------------------------------  Time: 06/20 0508  Value: CBC Auto Differential(!)  Comment: Anemic.  Blood transfusion ordered  By: Jose Sky MD  ------------------------------------------------------------  Time: 06/20 6910  Value: X-Ray Chest 1 View  Comment: Independent interpretation of this chest x-ray:  Right lung opacification concerning for infection  By: Jose Sky MD      Disposition:  Patient will be admitted to the hospital by the gynecology oncology team.  ______________________  Jason Huffman MD   Emergency Medicine Resident  6/20/2024

## 2024-06-20 NOTE — PROGRESS NOTES
"Pharmacokinetic Initial Assessment: IV Vancomycin    Assessment/Plan:    Initiate intravenous vancomycin with loading dose of 1500 mg once followed by a maintenance dose of vancomycin 1250 mg IV every 12 hours  Desired empiric serum trough concentration is 15 to 20 mcg/mL  Draw vancomycin trough level 60 min prior to fourth dose on 6/21 at approximately 1930  Pharmacy will continue to follow and monitor vancomycin.      Please contact pharmacy at extension [phone] with any questions regarding this assessment.     Thank you for the consult,   Cata Hollis, PharmD       Patient brief summary:  Anette Ricci is a 58 y.o. female initiated on antimicrobial therapy with IV Vancomycin for treatment of suspected lower respiratory infection, suspected pneumonia     Drug Allergies:   Review of patient's allergies indicates:   Allergen Reactions    Codeine Other (See Comments)     Flu like s/s    Tramadol Other (See Comments)     Flu like symptoms similar to codeine reaction       Actual Body Weight:   81.5 kg    Renal Function:   Estimated Creatinine Clearance: 95.1 mL/min (based on SCr of 0.7 mg/dL).,     Dialysis Method (if applicable):  N/A    CBC (last 72 hours):  Recent Labs   Lab Result Units 06/20/24  0228 06/20/24  1428   WBC K/uL 9.24 11.47   Hemoglobin g/dL 6.4* 7.6*   Hematocrit % 21.7* 23.6*   Platelets K/uL 272 288   Gran % % 70.2 75.0*   Lymph % % 18.0 12.5*   Mono % % 10.8 11.2   Eosinophil % % 0.3 0.2   Basophil % % 0.4 0.3   Differential Method  Automated Automated       Metabolic Panel (last 72 hours):  Recent Labs   Lab Result Units 06/20/24  0228   Sodium mmol/L 135*   Potassium mmol/L 3.6   Chloride mmol/L 100   CO2 mmol/L 24   Glucose mg/dL 257*   BUN mg/dL 10   Creatinine mg/dL 0.7   Albumin g/dL 2.4*   Total Bilirubin mg/dL 0.4   Alkaline Phosphatase U/L 90   AST U/L 17   ALT U/L 9*       Drug levels (last 3 results):  No results for input(s): "VANCOMYCINRA", "VANCORANDOM", "VANCOMYCINPE", " ""VANCOPEAK", "VANCOMYCINTR", "VANCOTROUGH" in the last 72 hours.    Microbiologic Results:  Microbiology Results (last 7 days)       Procedure Component Value Units Date/Time    Culture, Anaerobe [[id number]]     Order Status: No result Specimen: Body Fluid from Pleural Fluid     Aerobic culture [[id number]]     Order Status: No result Specimen: Pleural Fluid     Blood culture #2 **CANNOT BE ORDERED STAT** [[id number]] Collected: 06/20/24 0228    Order Status: Completed Specimen: Blood from Peripheral, Hand, Left Updated: 06/20/24 0915     Blood Culture, Routine No Growth to date    Blood culture #1 **CANNOT BE ORDERED STAT** [[id number]] Collected: 06/20/24 0228    Order Status: Completed Specimen: Blood from Peripheral, Upper Arm, Left Updated: 06/20/24 0915     Blood Culture, Routine No Growth to date            "

## 2024-06-20 NOTE — ED NOTES
Pt requested oxygen, her oxygen saturation is 100% on room air, patient placed on 2 lpm intially then asked me to put it on 3 lpm.

## 2024-06-20 NOTE — ED NOTES
Nurses Note -- 4 Eyes      6/20/2024   5:59 AM      Skin assessed during: Daily Assessment      [] No Altered Skin Integrity Present    []Prevention Measures Documented      [x] Yes- Altered Skin Integrity Present or Discovered   [] LDA Added if Not in Epic (Describe Wound)   [] New Altered Skin Integrity was Present on Admit and Documented in LDA   [x] Wound Image Taken    Wound Care Consulted? Yes    Attending Nurse:  Claudia Leach RN/Staff Member:   Saritha

## 2024-06-20 NOTE — ED PROVIDER NOTES
Encounter Date: 6/20/2024       History     Chief Complaint   Patient presents with    Shortness of Breath     Pt complaining of shortness of breath and upper back pain that started yesterday. Pt has history of ovarian cancer and is currently on chemo, last treatment was 3 weeks ago, next treatment is Friday     58-year-old female with past medical history ovarian cancer last chemotherapy 3 weeks ago and additionally as below presents for evaluation of 1 day of shortness of breath and back pain.  Patient states her back pain is located level where she feels short of breath.  Patient reports chronic chills that are at baseline without any recent fevers, and cough.  She denies chest pain, abdominal pain, nausea/vomiting, dysuria, any other symptoms.  Patient had a hysterectomy 3 months ago.    The history is provided by the patient, the spouse and medical records.     Review of patient's allergies indicates:   Allergen Reactions    Codeine Other (See Comments)     Flu like s/s    Tramadol Other (See Comments)     Flu like symptoms similar to codeine reaction     Past Medical History:   Diagnosis Date    Breast cancer 2013    Diabetes mellitus     Genetic testing 05/29/2013    VUS    Hyperlipidemia     Hypertension     Malignant neoplasm of upper-outer quadrant of right breast in female, estrogen receptor positive 12/02/2015    Seizure disorder      Past Surgical History:   Procedure Laterality Date    BILATERAL SALPINGO-OOPHORECTOMY (BSO) N/A 3/25/2024    Procedure: SALPINGO-OOPHORECTOMY, BILATERAL;  Surgeon: Александр Washington MD;  Location: [address];  Service: OB/GYN;  Laterality: N/A;    BREAST BIOPSY      BREAST LUMPECTOMY Right 2013    CHOLECYSTECTOMY  3/25/2024    Procedure: CHOLECYSTECTOMY;  Surgeon: Bo Farmer MD;  Location: [address];  Service: General;;    DEBULKING OF TUMOR N/A 3/25/2024    Procedure: DEBULKING, NEOPLASM;  Surgeon: Александр Washington MD;  Location: [address]  [facility]R;  Service: OB/GYN;  Laterality: N/A;    EXCISION, LIVER N/A 3/25/2024    Procedure: EXCISION, LIVER - partial;  Surgeon: Bo Farmer MD;  Location: [facility] OR [facility];  Service: General;  Laterality: N/A;  bk ultrasound  [place] book by TA on 3-21-24  7:00 a.m.  Conf #  [id number]    EYE SURGERY      LAPAROTOMY, EXPLORATORY N/A 3/25/2024    Procedure: LAPAROTOMY, EXPLORATORY;  Surgeon: Александр Washington MD;  Location: [facility] OR [facility];  Service: OB/GYN;  Laterality: N/A;    OMENTECTOMY N/A 3/25/2024    Procedure: OMENTECTOMY;  Surgeon: Александр Washington MD;  [facility];  Service: OB/GYN;  Laterality: N/A;    PERITONEOCENTESIS N/A 3/13/2024    Procedure: PARACENTESIS, ABDOMINAL;  Surgeon: Flavia Moore MD;  Location: [facility] CATH LAB;  Service: Radiology;  Laterality: N/A;    PERITONEOCENTESIS N/A 3/21/2024    Procedure: PARACENTESIS, ABDOMINAL;  Surgeon: Jorge Jolley MD;  Location: [facility] CATH LAB;  Service: Radiology;  Laterality: N/A;    PERITONEOCENTESIS N/A 6/10/2024    Procedure: PARACENTESIS, ABDOMINAL;  Surgeon: Rogelio Malave MD;  Location: [facility] CATH LAB;  Service: Radiology;  Laterality: N/A;    TOTAL ABDOMINAL HYSTERECTOMY N/A 3/25/2024    Procedure: HYSTERECTOMY, TOTAL, ABDOMINAL;  Surgeon: Александр Washington MD;  [facility];  Service: OB/GYN;  Laterality: N/A;    TOTAL REDUCTION MAMMOPLASTY Bilateral 2016     Family History   Problem Relation Name Age of Onset    Hypertension Mother      Seizures Father      Breast cancer Sister  48    Colon cancer Sister      Hypertension Brother      Hypertension Brother      Cancer Neg Hx      Ovarian cancer Neg Hx       Social History     Tobacco Use    Smoking status: Never    Smokeless tobacco: Never   Substance Use Topics    Alcohol use: Yes     Alcohol/week: 0.0 standard drinks of alcohol     Comment: ocassional    Drug use: No         Physical Exam     Initial Vitals [06/20/24 0105]   BP Pulse Resp Temp  SpO2   (!) 146/80 (!) 111 18 98.1 °F (36.7 °C) 100 %      MAP       --         Physical Exam    Nursing note and vitals reviewed.  Constitutional: She is cooperative. She appears ill.   Cardiovascular:  Regular rhythm and normal heart sounds.   Tachycardia present.         Pulmonary/Chest: Breath sounds normal. No respiratory distress.   Abdominal: Abdomen is soft. There is no abdominal tenderness.   Dressing in place over the midline superior to the umbilicus.  Dressing is clean, dry, intact   Musculoskeletal:      Comments: Left calf substantially larger than right calf without tenderness or palpable cord.  Patient reports her left calf is chronically larger than her right     Neurological: She is alert. GCS score is 15. GCS eye subscore is 4. GCS verbal subscore is 5. GCS motor subscore is 6.         ED Course   Procedures  Labs Reviewed   CBC W/ AUTO DIFFERENTIAL - Abnormal; Notable for the following components:       Result Value    RBC 2.60 (*)     Hemoglobin 6.4 (*)     Hematocrit 21.7 (*)     MCH 24.6 (*)     MCHC 29.5 (*)     RDW 20.3 (*)     All other components within normal limits   COMPREHENSIVE METABOLIC PANEL - Abnormal; Notable for the following components:    Sodium 135 (*)     Glucose 257 (*)     Calcium 8.5 (*)     Albumin 2.4 (*)     ALT 9 (*)     All other components within normal limits   B-TYPE NATRIURETIC PEPTIDE - Abnormal; Notable for the following components:     (*)     All other components within normal limits   ISTAT LACTATE - Abnormal; Notable for the following components:    POC Lactate 2.27 (*)     All other components within normal limits   ISTAT PROCEDURE - Abnormal; Notable for the following components:    POC Glucose 277 (*)     POC Sodium 135 (*)     POC Hematocrit 20 (*)     All other components within normal limits   CULTURE, BLOOD   CULTURE, BLOOD   TROPONIN I   SARS-COV-2 RNA AMPLIFICATION, QUAL   TYPE & SCREEN   ISTAT CHEM8   PREPARE RBC SOFT     EKG Readings:  (Independently Interpreted)   Initial Reading: No STEMI. Previous EKG: Compared with most recent EKG Previous EKG Date: 05/23/2024. Rhythm: Sinus Tachycardia. Heart Rate: 110. Axis: Normal.       Imaging Results               CTA Chest Non-Coronary (PE Studies) (Final result)  Result time 06/20/24 05:15:05      Final result by Michael Duckworth MD (06/20/24 05:15:05)                   Impression:      Motion and artifact limited study.  No large or central pulmonary embolus.  No convincing CTA findings of right heart strain.    Questionable small pulmonary emboli in the right lower lobe as seen on prior studies.    Moderate right pleural effusion with elevated right hemidiaphragm.    Suboptimally evaluated large loculated subdiaphragmatic fluid collection between the liver and the right hemidiaphragm.  Ill defied hypoattenuation in the posterior right hepatic lobe and soft tissue nodule adjacent to the posteroinferior right hepatic lobe.  Abdominal free fluid.  Peritoneal metastatic disease is included in the differential, noting that findings are worse when compared with prior CT.  Suggest correlation with clinical findings and short-term follow-up with dedicated abdominal CT when clinically appropriate.    Mild anasarca.  Similar partially calcified right breast nodule.    Additional findings as above.    This report was flagged in Epic as abnormal.    Electronically signed by resident: Rafaela Mcdaniel  Date:    06/20/2024  Time:    04:38    Electronically signed by: Michael Duckworth MD  Date:    06/20/2024  Time:    05:15               Narrative:    EXAMINATION:  CTA CHEST NON CORONARY (PE STUDIES)    CLINICAL HISTORY:  Pulmonary embolism (PE) suspected, unknown D-dimer;    TECHNIQUE:  Low dose axial images, sagittal and coronal reformations were obtained from the thoracic inlet to the lung bases following the IV administration of 75 mL of Omnipaque 350.  MIP images were performed.    COMPARISON:  CTA chest  03/18/2024    Mammo screening 11/10/2023    CT abdomen pelvis 04/05/2024    FINDINGS:  Exam quality is limited by significant respiratory motion and extensive streak artifact.    Base of Neck: No significant abnormality.    Thoracic soft tissues: Right breast soft tissue lesion with coarse calcification and left breast coarse calcification also seen on prior mammogram.  Mild anasarca.    Aorta/vasculature: No aneurysm.  Mild arch calcified atherosclerosis.    Heart: Normal size. No pericardial effusion.  Left-sided chest port tip within left subclavian vein.    Pulmonary vasculature: Exam quality is limited by significant respiratory motion and streak artifact.  Suspect chronic nonocclusive pulmonary embolus in the right lower lobe as seen on prior CT abdomen pelvis and prior CTA chest.  No obvious new pulmonary emboli allowing for limitations.  No large or central pulmonary embolus.  No abnormal bowing of the interventricular septum.    Sita/Mediastinum: No pathologic awilda enlargement.    Central airways: Patent.    Lungs/Pleura: Limited evaluation due to motion artifact.  Right hemidiaphragm is again elevated.  Passive atelectasis in the right lung base.  No consolidation.  Mild right middle lobe subsegmental atelectasis.  Moderate volume right pleural effusion.  Resolved left pleural effusion when compared with prior CT abdomen.    Esophagus: Unremarkable.    Upper Abdomen: Right-sided loculated subdiaphragmatic fluid collection measuring 7.3 x 11.2 cm abutting the hepatic dome.  Ill-defined region of hypoattenuation along the posterior aspect of the right hepatic lobe in the region of previously seen air/fluid collection on prior abdominal CT (axial series 2, image 360).  Poorly characterized soft tissue density nodule abutting the posteroinferior right hepatic lobe (series 2, image 442).  Nonspecific free fluid and questionable peritoneal nodularity in the upper abdomen.    Bones: Degenerative changes with no  "acute fracture.    Mild diffuse body wall edema.                                       X-Ray Chest 1 View (Final result)  Result time 06/20/24 01:45:20      Final result by Michael Duckworth MD (06/20/24 01:45:20)                   Impression:      Mildly worse right-sided pleural effusion with passive atelectasis or airspace disease in the right lung base.      Electronically signed by: Michael Duckworth MD  Date:    06/20/2024  Time:    01:45               Narrative:    EXAMINATION:  XR CHEST 1 VIEW    CLINICAL HISTORY:  Provided history is "shortness of breath;  ".    TECHNIQUE:  One view of the chest.    COMPARISON:  05/23/2024.    FINDINGS:  Cardiomediastinal silhouette is stable.  Atherosclerotic calcifications overlie the aortic arch.  Left-sided Port-A-Cath is present with the tip overlying the SVC.  Moderate-sized right pleural effusion with right basilar subsegmental atelectasis or airspace disease.  Right basilar aeration is mildly worse when compared with the prior study.  Right hemidiaphragm is elevated.  No other confluent area of consolidation.  No sizable left pleural effusion.  No distinct pneumothorax.                                       Medications   0.9%  NaCl infusion (for blood administration) (has no administration in time range)   vancomycin 1,500 mg in dextrose 5 % (D5W) 250 mL IVPB (Vial-Mate) (0 mg Intravenous Stopped 6/20/24 0702)   piperacillin-tazobactam (ZOSYN) 4.5 g in dextrose 5 % in water (D5W) 100 mL IVPB (MB+) (0 g Intravenous Stopped 6/20/24 0410)   sodium chloride 0.9% bolus 1,000 mL 1,000 mL ( Intravenous Stopped 6/20/24 0513)   iohexoL (OMNIPAQUE 350) injection 75 mL (75 mLs Intravenous Given 6/20/24 0436)     Medical Decision Making  58-year-old female with past medical history ovarian cancer last chemotherapy 3 weeks ago presents for evaluation of 1 day of shortness of breath and back pain.    Pathologies I have considered my differential diagnosis include, but are not " limited to, ACS, pericardial effusion, pneumonia, pneumothorax, CHF, pulmonary embolism, anemia.    Low suspicion for ACS in this patient without chest pain, no STEMI on EKG, troponin within normal limits.  BNP elevated, concerning for possible CHF.  However, effusion on chest x-ray is unilateral and not bilateral, which is less consistent with a cardiogenic cause pleural effusion.  Lactate elevated.  Started patient on IV antibiotics for pneumonia.  Additionally, patient's hemoglobin was 6.4, likely contributing to shortness of breath.  Transfused 1 unit PRBCs.  CT showing stable chronic pulmonary embolism and right lung as well as free fluid between the liver in the right hemidiaphragm.  Discussed patient with Gynecology Oncology, who agreed to admit patient.    Amount and/or Complexity of Data Reviewed  Labs: ordered. Decision-making details documented in ED Course.  Radiology: ordered. Decision-making details documented in ED Course.  ECG/medicine tests: ordered and independent interpretation performed. Decision-making details documented in ED Course.  Discussion of management or test interpretation with external provider(s): Discussed patient with Gyn Onc, who agreed to admit patient    Risk  Prescription drug management.  Decision regarding hospitalization.  Risk Details: Patient received vancomycin, Zosyn, blood products while in the emergency department.               ED Course as of 06/20/24 0741   u Jun 20, 2024   0508 Comprehensive Metabolic Panel(!)  Hyperglycemia [BH]   0508 BNP(!)  Elevated, concerning for CHF []   0508 Troponin I  Within normal limits, lowering suspicion for ACS [BH]   0508 ISTAT Lactate(!)  Elevated.  Concerning for infection in this clinical setting []   0508 CBC Auto Differential(!)  Anemic.  Blood transfusion ordered []   0509 X-Ray Chest 1 View  Independent interpretation of this chest x-ray:  Right lung opacification concerning for infection []      ED Course User  Index  [BH] Jose Sky MD                           Clinical Impression:  Final diagnoses:  [R06.02] Shortness of breath          ED Disposition Condition    Admit Stable                Jose Sky MD  Resident  06/20/24 0738     Abdominal Pain, N/V/D

## 2024-06-20 NOTE — ED NOTES
Anette Ricci, an 58 y.o. female presents to the ED c/o SOB and upper back pain x 1 day. Hx ovarian CA, last chemo 3 wks ago. Denies fevers, endorses nausea      Review of patient's allergies indicates:   Allergen Reactions    Codeine Other (See Comments)     Flu like s/s    Tramadol Other (See Comments)     Flu like symptoms similar to codeine reaction     Chief Complaint   Patient presents with    Shortness of Breath     Pt complaining of shortness of breath and upper back pain that started yesterday. Pt has history of ovarian cancer and is currently on chemo, last treatment was 3 weeks ago, next treatment is Friday     Past Medical History:   Diagnosis Date    Breast cancer 2013    Diabetes mellitus     Genetic testing 05/29/2013    VUS    Hyperlipidemia     Hypertension     Malignant neoplasm of upper-outer quadrant of right breast in female, estrogen receptor positive 12/02/2015    Seizure disorder

## 2024-06-21 LAB
ALBUMIN SERPL BCP-MCNC: 2.4 G/DL (ref 3.5–5.2)
ALP SERPL-CCNC: 89 U/L (ref 55–135)
ALT SERPL W/O P-5'-P-CCNC: 10 U/L (ref 10–44)
ANION GAP SERPL CALC-SCNC: 13 MMOL/L (ref 8–16)
AST SERPL-CCNC: 21 U/L (ref 10–40)
BASOPHILS # BLD AUTO: 0.02 K/UL (ref 0–0.2)
BASOPHILS NFR BLD: 0.2 % (ref 0–1.9)
BILIRUB SERPL-MCNC: 0.7 MG/DL (ref 0.1–1)
BILIRUB UR QL STRIP: NEGATIVE
BUN SERPL-MCNC: 6 MG/DL (ref 6–20)
CALCIUM SERPL-MCNC: 8.7 MG/DL (ref 8.7–10.5)
CHLORIDE SERPL-SCNC: 97 MMOL/L (ref 95–110)
CLARITY UR REFRACT.AUTO: CLEAR
CO2 SERPL-SCNC: 23 MMOL/L (ref 23–29)
COLOR UR AUTO: COLORLESS
CREAT SERPL-MCNC: 0.7 MG/DL (ref 0.5–1.4)
DIFFERENTIAL METHOD BLD: ABNORMAL
EOSINOPHIL # BLD AUTO: 0 K/UL (ref 0–0.5)
EOSINOPHIL NFR BLD: 0.2 % (ref 0–8)
ERYTHROCYTE [DISTWIDTH] IN BLOOD BY AUTOMATED COUNT: 18.1 % (ref 11.5–14.5)
EST. GFR  (NO RACE VARIABLE): >60 ML/MIN/1.73 M^2
GLUCOSE SERPL-MCNC: 104 MG/DL (ref 70–110)
GLUCOSE UR QL STRIP: NEGATIVE
HCT VFR BLD AUTO: 27.1 % (ref 37–48.5)
HGB BLD-MCNC: 8.8 G/DL (ref 12–16)
HGB UR QL STRIP: ABNORMAL
IMM GRANULOCYTES # BLD AUTO: 0.05 K/UL (ref 0–0.04)
IMM GRANULOCYTES NFR BLD AUTO: 0.4 % (ref 0–0.5)
INFLUENZA A, MOLECULAR: NEGATIVE
INFLUENZA B, MOLECULAR: NEGATIVE
KETONES UR QL STRIP: NEGATIVE
LEUKOCYTE ESTERASE UR QL STRIP: NEGATIVE
LYMPHOCYTES # BLD AUTO: 1.6 K/UL (ref 1–4.8)
LYMPHOCYTES NFR BLD: 13.7 % (ref 18–48)
MCH RBC QN AUTO: 26.7 PG (ref 27–31)
MCHC RBC AUTO-ENTMCNC: 32.5 G/DL (ref 32–36)
MCV RBC AUTO: 82 FL (ref 82–98)
MONOCYTES # BLD AUTO: 1.1 K/UL (ref 0.3–1)
MONOCYTES NFR BLD: 9.1 % (ref 4–15)
NEUTROPHILS # BLD AUTO: 9.1 K/UL (ref 1.8–7.7)
NEUTROPHILS NFR BLD: 76.4 % (ref 38–73)
NITRITE UR QL STRIP: NEGATIVE
NRBC BLD-RTO: 0 /100 WBC
PH UR STRIP: 5 [PH] (ref 5–8)
PLATELET # BLD AUTO: 422 K/UL (ref 150–450)
PMV BLD AUTO: 10.9 FL (ref 9.2–12.9)
POCT GLUCOSE: 127 MG/DL (ref 70–110)
POCT GLUCOSE: 181 MG/DL (ref 70–110)
POCT GLUCOSE: 213 MG/DL (ref 70–110)
POCT GLUCOSE: 240 MG/DL (ref 70–110)
POTASSIUM SERPL-SCNC: 3.3 MMOL/L (ref 3.5–5.1)
PROT SERPL-MCNC: 6.7 G/DL (ref 6–8.4)
PROT UR QL STRIP: NEGATIVE
RBC # BLD AUTO: 3.3 M/UL (ref 4–5.4)
SODIUM SERPL-SCNC: 133 MMOL/L (ref 136–145)
SP GR UR STRIP: 1.01 (ref 1–1.03)
SPECIMEN SOURCE: NORMAL
URN SPEC COLLECT METH UR: ABNORMAL
WBC # BLD AUTO: 11.86 K/UL (ref 3.9–12.7)

## 2024-06-21 PROCEDURE — 87502 INFLUENZA DNA AMP PROBE: CPT | Mod: HCNC

## 2024-06-21 PROCEDURE — 63600175 PHARM REV CODE 636 W HCPCS: Mod: HCNC

## 2024-06-21 PROCEDURE — 25000003 PHARM REV CODE 250: Mod: HCNC

## 2024-06-21 PROCEDURE — 25000242 PHARM REV CODE 250 ALT 637 W/ HCPCS: Mod: HCNC

## 2024-06-21 PROCEDURE — 85025 COMPLETE CBC W/AUTO DIFF WBC: CPT | Mod: HCNC | Performed by: STUDENT IN AN ORGANIZED HEALTH CARE EDUCATION/TRAINING PROGRAM

## 2024-06-21 PROCEDURE — 20600001 HC STEP DOWN PRIVATE ROOM: Mod: HCNC

## 2024-06-21 PROCEDURE — 36415 COLL VENOUS BLD VENIPUNCTURE: CPT | Mod: HCNC | Performed by: STUDENT IN AN ORGANIZED HEALTH CARE EDUCATION/TRAINING PROGRAM

## 2024-06-21 PROCEDURE — 81003 URINALYSIS AUTO W/O SCOPE: CPT | Mod: HCNC

## 2024-06-21 PROCEDURE — 36415 COLL VENOUS BLD VENIPUNCTURE: CPT | Mod: HCNC

## 2024-06-21 PROCEDURE — 25000003 PHARM REV CODE 250: Mod: HCNC | Performed by: STUDENT IN AN ORGANIZED HEALTH CARE EDUCATION/TRAINING PROGRAM

## 2024-06-21 PROCEDURE — 63600175 PHARM REV CODE 636 W HCPCS: Mod: HCNC | Performed by: STUDENT IN AN ORGANIZED HEALTH CARE EDUCATION/TRAINING PROGRAM

## 2024-06-21 PROCEDURE — 80053 COMPREHEN METABOLIC PANEL: CPT | Mod: HCNC

## 2024-06-21 RX ORDER — LORAZEPAM 0.5 MG/1
0.5 TABLET ORAL EVERY 12 HOURS PRN
Status: DISCONTINUED | OUTPATIENT
Start: 2024-06-21 | End: 2024-06-27

## 2024-06-21 RX ORDER — ACETAMINOPHEN 500 MG
1000 TABLET ORAL ONCE
Status: DISCONTINUED | OUTPATIENT
Start: 2024-06-21 | End: 2024-06-26

## 2024-06-21 RX ORDER — GUAIFENESIN 600 MG/1
600 TABLET, EXTENDED RELEASE ORAL 2 TIMES DAILY
Status: DISCONTINUED | OUTPATIENT
Start: 2024-06-21 | End: 2024-06-27 | Stop reason: HOSPADM

## 2024-06-21 RX ORDER — FLUTICASONE PROPIONATE 50 MCG
2 SPRAY, SUSPENSION (ML) NASAL DAILY
Status: DISCONTINUED | OUTPATIENT
Start: 2024-06-21 | End: 2024-06-27 | Stop reason: HOSPADM

## 2024-06-21 RX ORDER — GUAIFENESIN AND DEXTROMETHORPHAN HYDROBROMIDE 10; 100 MG/5ML; MG/5ML
10 SYRUP ORAL EVERY 4 HOURS PRN
Status: DISCONTINUED | OUTPATIENT
Start: 2024-06-21 | End: 2024-06-27 | Stop reason: HOSPADM

## 2024-06-21 RX ADMIN — PIPERACILLIN SODIUM AND TAZOBACTAM SODIUM 4.5 G: 4; .5 INJECTION, POWDER, FOR SOLUTION INTRAVENOUS at 02:06

## 2024-06-21 RX ADMIN — SODIUM CHLORIDE, POTASSIUM CHLORIDE, SODIUM LACTATE AND CALCIUM CHLORIDE 500 ML: 600; 310; 30; 20 INJECTION, SOLUTION INTRAVENOUS at 09:06

## 2024-06-21 RX ADMIN — INSULIN ASPART 2 UNITS: 100 INJECTION, SOLUTION INTRAVENOUS; SUBCUTANEOUS at 04:06

## 2024-06-21 RX ADMIN — HYDROCHLOROTHIAZIDE 25 MG: 25 TABLET ORAL at 08:06

## 2024-06-21 RX ADMIN — LAMOTRIGINE 25 MG: 25 TABLET ORAL at 09:06

## 2024-06-21 RX ADMIN — LISINOPRIL 20 MG: 20 TABLET ORAL at 08:06

## 2024-06-21 RX ADMIN — INSULIN ASPART 3 UNITS: 100 INJECTION, SOLUTION INTRAVENOUS; SUBCUTANEOUS at 04:06

## 2024-06-21 RX ADMIN — VANCOMYCIN HYDROCHLORIDE 1250 MG: 1.25 INJECTION, POWDER, LYOPHILIZED, FOR SOLUTION INTRAVENOUS at 08:06

## 2024-06-21 RX ADMIN — OLANZAPINE 5 MG: 5 TABLET, FILM COATED ORAL at 09:06

## 2024-06-21 RX ADMIN — FLUTICASONE PROPIONATE 100 MCG: 50 SPRAY, METERED NASAL at 08:06

## 2024-06-21 RX ADMIN — ATORVASTATIN CALCIUM 80 MG: 40 TABLET, FILM COATED ORAL at 08:06

## 2024-06-21 RX ADMIN — INSULIN ASPART 2 UNITS: 100 INJECTION, SOLUTION INTRAVENOUS; SUBCUTANEOUS at 10:06

## 2024-06-21 RX ADMIN — LORAZEPAM 0.5 MG: 0.5 TABLET ORAL at 09:06

## 2024-06-21 RX ADMIN — INSULIN ASPART 4 UNITS: 100 INJECTION, SOLUTION INTRAVENOUS; SUBCUTANEOUS at 08:06

## 2024-06-21 RX ADMIN — PIPERACILLIN SODIUM AND TAZOBACTAM SODIUM 4.5 G: 4; .5 INJECTION, POWDER, FOR SOLUTION INTRAVENOUS at 10:06

## 2024-06-21 RX ADMIN — LAMOTRIGINE 25 MG: 25 TABLET ORAL at 08:06

## 2024-06-21 RX ADMIN — INSULIN ASPART 3 UNITS: 100 INJECTION, SOLUTION INTRAVENOUS; SUBCUTANEOUS at 11:06

## 2024-06-21 RX ADMIN — INSULIN ASPART 3 UNITS: 100 INJECTION, SOLUTION INTRAVENOUS; SUBCUTANEOUS at 08:06

## 2024-06-21 RX ADMIN — GUAIFENESIN AND DEXTROMETHORPHAN 10 ML: 100; 10 SYRUP ORAL at 09:06

## 2024-06-21 RX ADMIN — ENOXAPARIN SODIUM 40 MG: 40 INJECTION SUBCUTANEOUS at 04:06

## 2024-06-21 RX ADMIN — PIPERACILLIN SODIUM AND TAZOBACTAM SODIUM 4.5 G: 4; .5 INJECTION, POWDER, FOR SOLUTION INTRAVENOUS at 06:06

## 2024-06-21 RX ADMIN — GUAIFENESIN 600 MG: 600 TABLET, EXTENDED RELEASE ORAL at 09:06

## 2024-06-21 RX ADMIN — BENZONATATE 100 MG: 100 CAPSULE ORAL at 04:06

## 2024-06-21 RX ADMIN — INSULIN GLARGINE 7 UNITS: 100 INJECTION, SOLUTION SUBCUTANEOUS at 10:06

## 2024-06-21 NOTE — CONSULTS
[facility] - Oncology ([facility])    Wound Care     Patient Name:  Anette Ricci  MRN:  [id number]  Date: 6/21/2024  Diagnosis: Pleural effusion, malignant     History:  Past Medical History:   Diagnosis Date    Breast cancer 2013    Diabetes mellitus     Genetic testing 05/29/2013    VUS    Hyperlipidemia     Hypertension     Malignant neoplasm of upper-outer quadrant of right breast in female, estrogen receptor positive 12/02/2015    Seizure disorder      Social History     Socioeconomic History    Marital status:    Tobacco Use    Smoking status: Never    Smokeless tobacco: Never   Substance and Sexual Activity    Alcohol use: Yes     Alcohol/week: 0.0 standard drinks of alcohol     Comment: ocassional    Drug use: No    Sexual activity: Not Currently     Partners: Male     Birth control/protection: None     Social Determinants of Health     Financial Resource Strain: Low Risk  (6/20/2024)    Overall Financial Resource Strain (CARDIA)     Difficulty of Paying Living Expenses: Not hard at all   Food Insecurity: No Food Insecurity (6/20/2024)    Hunger Vital Sign     Worried About Running Out of Food in the Last Year: Never true     Ran Out of Food in the Last Year: Never true   Transportation Needs: No Transportation Needs (6/20/2024)    TRANSPORTATION NEEDS     Transportation : No   Physical Activity: Inactive (2/28/2024)    Exercise Vital Sign     Days of Exercise per Week: 0 days     Minutes of Exercise per Session: 0 min   Stress: No Stress Concern Present (6/20/2024)    [employer] [place] [employer] - Occupational Stress Questionnaire     Feeling of Stress : Only a little   Housing Stability: Low Risk  (6/20/2024)    Housing Stability Vital Sign     Unable to Pay for Housing in the Last Year: No     Homeless in the Last Year: No     Precautions:  Allergies as of 06/20/2024 - Reviewed 06/20/2024   Allergen Reaction Noted    Codeine Other (See Comments) 04/15/2013    Tramadol Other (See  Comments) 04/03/2024       WO Assessment Details / Treatment:    Patient seen for wound care: New Consult   Chart reviewed for this encounter.   Labs:   WBC (K/uL)   Date Value   06/21/2024 11.86   06/20/2024 11.47     Glucose (mg/dL)   Date Value   06/21/2024 104   06/20/2024 257 (H)     Albumin (g/dL)   Date Value   06/21/2024 2.4 (L)   06/20/2024 2.4 (L)     Juan Score: 19    Narrative:  Pt seen for WC consultation / follow-up and agreement of visit/assessment : Yes  Pt position: lying down  Pt currently on specialized surface: Low Air Loss    Removed island border, tape, and hydrofera ready, cleansed area with NS, wound appears pink and dry, no drainage, keloid scar to borders. Cavilon to per-wound, hydrofera ready to wound bed, covered w/foam border.     RECOMMENDATIONS:  Bedside nurse assess for acute changes (purulence, increased redness/swelling, increased drainage, malodor, increased pain, pallor, necrosis) please contact physician on any acute changes.  Change dressing q3d  Shade - daily    Discussed POC with patient and primary nurse.   See EMR for orders & patient education.     Discussed nutrition and the role of protein in wound healing with the patient. Instructed patient to optimize protein for wound healing.     Bedside nursing to continue care & monitoring.  Bedside nursing to maintain pressure injury prevention interventions.    See Flowsheet for additional documentation.      Goals: Promote moist wound healing, Reduce pain, Reduce bioburden, and Educate on proper wound management post D/C     Barriers to Wound Healing: multiple co-morbidities immunosuppression diabetes dry wound bed fragile skin     Recommendations provided? Yes     Thank you for the consult. Wound Care will sign off.  Please place a new consult if needed.       06/21/24 1200   WOCN Assessment   WOCN Total Time (mins) 30   Visit Date 06/21/24   Visit Time 1200   Consult Type New   WO Speciality Wound   Wound surgical    Intervention assessed;changed;applied;chart review;orders   Teaching on-going;complication        Incision/Site 04/06/24 1130 Upper quadrant midline vertical   Date First Assessed/Time First Assessed: 04/06/24 1130   Present Prior to Hospital Arrival?: Yes  Location: Upper quadrant  Orientation: midline  Incision Type: vertical   Wound Image    Dressing Appearance Clean;Dry;Intact   Drainage Amount None   Drainage Characteristics/Odor No odor   Appearance Intact;Pink;Dry   Periwound Area Intact;Scar tissue   Wound Edges Rolled/closed   Wound Length (cm) 4.5 cm   Wound Width (cm) 1 cm   Wound Depth (cm) 0.4 cm   Wound Volume (cm^3) 1.8 cm^3   Wound Surface Area (cm^2) 4.5 cm^2   Care Cleansed with:;Antimicrobial agent   Dressing Removed;Applied;Methylene blue/gentian violet;Foam   Dressing Change Due 06/24/24

## 2024-06-21 NOTE — PLAN OF CARE
Assumed care of pt. 06:45-19:15  Pt involved in plan of care and communicating needs throughout shift.     - Dx: Ovarian carcinosacoma   - AAOx4;   - PRN ativan/tesslon pearls given x1   - Ambulates w/SBA;   - Diabetic diet + ngozi supplement   - Remains afebrile  - Voids via RR  - No BM this shift  - RA; ACHS;  - Pt. W/productive cough; MD aware; Influenza neg;   - Abd. [facility] dsg changed by wound care; c/d/i    - q1h patient rounds. All VSS; no acute events so far this shift. Non skid socks on; Pt. Instructed to call if any assistance is needed & prior to getting OOB. Pt remaining free from falls or injury; Bed locked in lowest position w/side rails up x2 & all belongings including call light within reach; Plan of care ongoing; All needs met at this time.

## 2024-06-21 NOTE — PROGRESS NOTES
VANCOMYCIN DOSING BY PHARMACY DISCONTINUATION NOTE    Anette Ricci is a 58 y.o. female who had been consulted for vancomycin dosing.    The pharmacy consult for vancomycin dosing has been discontinued.     Vancomycin Dosing by Pharmacy Consult will sign-off. Please reconsult if necessary. Thank you for allowing us to participate in this patient's care.       Elizabeth Melchor, PharmD  [phone]

## 2024-06-21 NOTE — PLAN OF CARE
AAOX4, verbal and able to make needs known. T-max 100.1, unsustained. Up x1 stand by assist. On 3L O2 via nasal canula. Received 1unit RBCs. Tachycardic. IV ABX given. Island dressing intact on abdomen. Stomach round and distended. Denies nausea and pain at this time. C/o persistent cough, gave PRN benzonatate. Bed in lowest position, side rails up x2, call light in reach.

## 2024-06-21 NOTE — PROGRESS NOTES
Admit Assessment    Patient Identification  Anette Ricci   :  1965  Admit Date:  2024  Attending Provider:  Александр Washington MD              Referral:   Pt was admitted to  with a diagnosis of Pleural effusion, malignant, and was admitted this hospital stay due to Shortness of breath [R06.02]  Pleural effusion on right [J90]  Malignant neoplasm of ovary, unspecified laterality [C56.9]  Pneumonia of right lower lobe due to infectious organism [J18.9]  Anemia, unspecified type [D64.9].   is involved was referred to the Social Work Department via routine referral.  Patient presents as a 58 y.o. year old  female.    Persons interviewed: patient    Living Situation:  Pt. States that she resides at home with her spouse (Darrell Ricci-[phone]). Pt. States that she has been fairly independent with all adl's prior to admission. She states that her  is retired and home with her full time and helps with all of her needs.    Resides at [address] [place], [place] [id number], phone: [phone] (home).      (RETIRED) Functional Status Prior  Ambulation Prior: 0-->independent  Transferrin-->assistive person  Toiletin-->independent    Current or Past Agencies and Description of Services/Supplies    DME  Agency Name: none  Agency Phone Number: n/a  Equipment Currently Used at Home: walker, rolling    Home Health  Agency Name: [facility]   Agency Phone Number: [phone]  Services: nursing    IV Infusion  Agency Name: none  Agency Phone Number: n/a    Nutrition: oral    Outpatient Pharmacy:     [facility] [place] - [address]  [address] [place]) [place] [address]  Phone: [phone] Fax: [phone]    [facility] [address] [place] [address]  Phone: [phone] Fax: [phone]    [facility] [address] [place] [address], [place] [facility] J[place] CHALINO  [facility]  Rosalio ALLRED [facility]  [place] [address]  Phone: [phone] Fax: [phone]      Patient Preference of agencies include: None noted    Patient/Caregiver informed of right to choose providers or agencies.  Patient provides permission to release any necessary information to [facility] and to [facility] agencies as needed to facilitate patient care, treatment planning, and patient discharge planning.  Written and verbal resources provided.      Coping: doing well. Spouse very supportive          Adjustment to Diagnosis and Treatment: appropriate      Emotional/Behavioral/Cognitive Issues: none noted            History/Current Symptoms of Anxiety/Depression: No:   History/Current Substance Use:   Social History     Tobacco Use    Smoking status: Never    Smokeless tobacco: Never   Substance and Sexual Activity    Alcohol use: Yes     Alcohol/week: 0.0 standard drinks of alcohol     Comment: ocassional    Drug use: No    Sexual activity: Not Currently     Partners: Male     Birth control/protection: None       Indications of Abuse/Neglect: No:   Abuse Screen (yes response referral indicated)  Feels Unsafe at Home or Work/School: no  Feels Threatened by Someone: no  Does anyone try to keep you from having contact with others or doing things outside your home?: no  Physical Signs of Abuse Present: no    Financial:  Payer/Plan Subscr  Sex Relation Sub. Ins. ID Effective Group Num   1. [employer] [facility]* ANDRE SOLO SI* 1965 Female Self [id number] 10/1/23 [id number]                                   [address] [address]   2. [employer] * EDILANDRE SI* 1965 Female Self [id number] 4/15/13                                    [address] [id number], [place] [address]                            Other identified concerns/needs: May need to resume HH upon Dc for wound care.     Plan: to return home with help from spouse.    Interventions/Referrals: TBD  Patient/caregiver engaged in treatment planning  process.     providing psychosocial and supportive counseling, resources, education, assistance and discharge planning as appropriate.  Patient/caregiver state understanding of  available resources,  following, remains available. Provided pt. With sw'er phone# and encouraged her to call if needed. Will follow.

## 2024-06-21 NOTE — DISCHARGE INSTRUCTIONS
After hospital discharge, watch wound for acute (sudden) changes (increased redness/swelling, increased drainage, foul smells, increased pain, change in color). Please contact your doctor with these concerns.     Please follow up with Primary Care Physician  / Outpatient Wound Care    [facility] Phone Numbers:    [place]: [phone]    [facility] [place]: [phone] (Press 4 to speak to a nurse)    If after clinic hours or over the weekend, please go to the ER.

## 2024-06-22 PROBLEM — I27.82 CHRONIC PULMONARY EMBOLISM WITHOUT ACUTE COR PULMONALE: Status: ACTIVE | Noted: 2024-06-22

## 2024-06-22 LAB
POCT GLUCOSE: 124 MG/DL (ref 70–110)
POCT GLUCOSE: 134 MG/DL (ref 70–110)
POCT GLUCOSE: 251 MG/DL (ref 70–110)
POCT GLUCOSE: 79 MG/DL (ref 70–110)
SARS-COV-2 RDRP RESP QL NAA+PROBE: NEGATIVE

## 2024-06-22 PROCEDURE — 99223 1ST HOSP IP/OBS HIGH 75: CPT | Mod: HCNC,,, | Performed by: INTERNAL MEDICINE

## 2024-06-22 PROCEDURE — 25000003 PHARM REV CODE 250: Mod: HCNC

## 2024-06-22 PROCEDURE — U0002 COVID-19 LAB TEST NON-CDC: HCPCS | Mod: HCNC

## 2024-06-22 PROCEDURE — 87205 SMEAR GRAM STAIN: CPT | Mod: HCNC

## 2024-06-22 PROCEDURE — 87086 URINE CULTURE/COLONY COUNT: CPT | Mod: HCNC

## 2024-06-22 PROCEDURE — 36415 COLL VENOUS BLD VENIPUNCTURE: CPT | Mod: HCNC

## 2024-06-22 PROCEDURE — 87641 MR-STAPH DNA AMP PROBE: CPT | Mod: HCNC

## 2024-06-22 PROCEDURE — 63600175 PHARM REV CODE 636 W HCPCS: Mod: HCNC

## 2024-06-22 PROCEDURE — 87070 CULTURE OTHR SPECIMN AEROBIC: CPT | Mod: HCNC

## 2024-06-22 PROCEDURE — 87632 RESP VIRUS 6-11 TARGETS: CPT | Mod: HCNC

## 2024-06-22 PROCEDURE — 20600001 HC STEP DOWN PRIVATE ROOM: Mod: HCNC

## 2024-06-22 PROCEDURE — 87040 BLOOD CULTURE FOR BACTERIA: CPT | Mod: 59,HCNC

## 2024-06-22 RX ORDER — IBUPROFEN 600 MG/1
600 TABLET ORAL ONCE
Status: DISCONTINUED | OUTPATIENT
Start: 2024-06-23 | End: 2024-06-23

## 2024-06-22 RX ORDER — IBUPROFEN 600 MG/1
600 TABLET ORAL ONCE
Status: COMPLETED | OUTPATIENT
Start: 2024-06-22 | End: 2024-06-22

## 2024-06-22 RX ORDER — ENOXAPARIN SODIUM 100 MG/ML
1 INJECTION SUBCUTANEOUS EVERY 12 HOURS
Status: DISCONTINUED | OUTPATIENT
Start: 2024-06-23 | End: 2024-06-25

## 2024-06-22 RX ORDER — POTASSIUM CHLORIDE 20 MEQ/1
40 TABLET, EXTENDED RELEASE ORAL
Status: COMPLETED | OUTPATIENT
Start: 2024-06-22 | End: 2024-06-22

## 2024-06-22 RX ADMIN — GUAIFENESIN 600 MG: 600 TABLET, EXTENDED RELEASE ORAL at 09:06

## 2024-06-22 RX ADMIN — POTASSIUM CHLORIDE 40 MEQ: 1500 TABLET, EXTENDED RELEASE ORAL at 09:06

## 2024-06-22 RX ADMIN — IBUPROFEN 600 MG: 600 TABLET, FILM COATED ORAL at 01:06

## 2024-06-22 RX ADMIN — INSULIN ASPART 3 UNITS: 100 INJECTION, SOLUTION INTRAVENOUS; SUBCUTANEOUS at 09:06

## 2024-06-22 RX ADMIN — INSULIN ASPART 6 UNITS: 100 INJECTION, SOLUTION INTRAVENOUS; SUBCUTANEOUS at 12:06

## 2024-06-22 RX ADMIN — LAMOTRIGINE 25 MG: 25 TABLET ORAL at 09:06

## 2024-06-22 RX ADMIN — POTASSIUM CHLORIDE 40 MEQ: 1500 TABLET, EXTENDED RELEASE ORAL at 12:06

## 2024-06-22 RX ADMIN — PIPERACILLIN SODIUM AND TAZOBACTAM SODIUM 4.5 G: 4; .5 INJECTION, POWDER, FOR SOLUTION INTRAVENOUS at 06:06

## 2024-06-22 RX ADMIN — PIPERACILLIN SODIUM AND TAZOBACTAM SODIUM 4.5 G: 4; .5 INJECTION, POWDER, FOR SOLUTION INTRAVENOUS at 11:06

## 2024-06-22 RX ADMIN — INSULIN ASPART 3 UNITS: 100 INJECTION, SOLUTION INTRAVENOUS; SUBCUTANEOUS at 12:06

## 2024-06-22 RX ADMIN — OLANZAPINE 5 MG: 5 TABLET, FILM COATED ORAL at 09:06

## 2024-06-22 RX ADMIN — FLUTICASONE PROPIONATE 100 MCG: 50 SPRAY, METERED NASAL at 09:06

## 2024-06-22 RX ADMIN — LISINOPRIL 20 MG: 20 TABLET ORAL at 09:06

## 2024-06-22 RX ADMIN — ATORVASTATIN CALCIUM 80 MG: 40 TABLET, FILM COATED ORAL at 09:06

## 2024-06-22 RX ADMIN — GUAIFENESIN AND DEXTROMETHORPHAN 10 ML: 100; 10 SYRUP ORAL at 01:06

## 2024-06-22 RX ADMIN — IBUPROFEN 600 MG: 600 TABLET, FILM COATED ORAL at 05:06

## 2024-06-22 RX ADMIN — PIPERACILLIN SODIUM AND TAZOBACTAM SODIUM 4.5 G: 4; .5 INJECTION, POWDER, FOR SOLUTION INTRAVENOUS at 01:06

## 2024-06-22 RX ADMIN — ENOXAPARIN SODIUM 40 MG: 40 INJECTION SUBCUTANEOUS at 05:06

## 2024-06-22 RX ADMIN — INSULIN ASPART 3 UNITS: 100 INJECTION, SOLUTION INTRAVENOUS; SUBCUTANEOUS at 05:06

## 2024-06-22 RX ADMIN — INSULIN GLARGINE 7 UNITS: 100 INJECTION, SOLUTION SUBCUTANEOUS at 09:06

## 2024-06-22 RX ADMIN — GUAIFENESIN AND DEXTROMETHORPHAN 10 ML: 100; 10 SYRUP ORAL at 09:06

## 2024-06-22 NOTE — HOSPITAL COURSE
Patient admitted and treated with IV antibiotics and improving.  Is currently on room air and has no significant shortness of breath.  Oxygen saturations are within normal limits

## 2024-06-22 NOTE — PLAN OF CARE
AAOX4. Afebrile this shift. IS given and use encouraged QH. Ibuprofen given for abd pain.Nonskid footwear on with bed locked and low, bed rails up x2. Ambulates independently and instructed to call if assistance is needed, voiced understanding. Call light and personal items within reach.

## 2024-06-22 NOTE — PT/OT/SLP EVAL
Physical Therapy Evaluation and Treatment    Patient Name:  Anette Ricci   MRN:  [id number]    Recommendations:     Discharge Recommendations: No PT required  Discharge Equipment Recommendations: none   Barriers to discharge: increased level of skilled needs     Assessment:     Anette Ricci is a 58 y.o. female admitted with a medical diagnosis of Pleural effusion, malignant.  She presents with the following impairments/functional limitations: weakness, decreased coordination, impaired endurance, impaired functional mobility, gait instability. Pt tolerated evaluation and treatment very well performing bed mobility training, transfer training, balance training, and gait training with SBA. Pt would continue to benefit from skilled PT until medically stable. Pt should be able to discharge home without the need for further PT.    Rehab Prognosis: Good; patient would benefit from acute skilled PT services to address these deficits and reach maximum level of function.    Recent Surgery: * No surgery found *      Plan:     During this hospitalization, patient to be seen 3 x/week to address the identified rehab impairments via gait training, therapeutic activities and progress toward the following goals:    Plan of Care Expires:  07/21/24    Subjective     Chief Complaint: none  Patient/Family Comments/goals: go home   Pain/Comfort:  Pain Rating 1: 5/10  Location 1: abdomen  Pain Addressed 1: Reposition, Distraction    Patients cultural, spiritual, Ep[employer]opalian conflicts given the current situation: no    Living Environment: Pt lives in a [facility] with her  (who is retired and can provide assistance) and [facility]. Pt has a walkin shower with a shower chair but no bath bench. Pt is not working and is on disability.  Prior to admission, patients level of function was Independent.  Equipment used at home: none.  DME owned (not currently used): rolling walker.  Upon discharge, patient will have assistance from  .    Objective:     Communicated with nsg prior to session.  Patient found supine with Other (comments) (portacath IV)  upon PT entry to room.    General Precautions: Standard, fall, other (see comments) (wear a mask)  Orthopedic Precautions:N/A   Braces: N/A  Respiratory Status: Room air    Exams:  Cognitive Exam:  Patient is oriented to Person, Place, Time, and Situation  RLE ROM: WNL  RLE Strength: WNL  LLE ROM: WNL  LLE Strength: WNL    Functional Mobility: Pt required cueing of the hands and feet to facilitate sequencing for functional mobility  Bed Mobility:     Rolling Left:  stand by assistance  Scooting: stand by assistance  Supine to Sit: stand by assistance  Sit to Supine: stand by assistance  Transfers:     Sit to Stand:  stand by assistance and x 2 reps. 1 rep from EOB and 1 repetition from chair in hallway with no AD  Gait: Pt performed gait training for 162ft + 162 ft  with SBA and a seated rest-break in between  Balance: Pt performed sitting balance EOB and standing balance with SBA      AM-PAC 6 CLICK MOBILITY  Total Score:24       Treatment & Education: Pt verbally educated on PT POC. Pt verbally confirmed her understanding.    Patient left supine with all lines intact and call button in reach.    GOALS:   Multidisciplinary Problems       Physical Therapy Goals          Problem: Physical Therapy    Goal Priority Disciplines Outcome Goal Variances Interventions   Physical Therapy Goal     PT, PT/OT Progressing     Description: Goals to be met by: 24     Patient will increase functional independence with mobility by performin. Supine to sit with Braxton  2. Sit to supine with Braxton  3. Rolling to either side with Braxton.  4. Sit to stand transfer with Braxton  5. Bed to chair transfer with Braxton using no AD  6. Gait  x 500 feet with Braxton using No Assistive Device.                          History:     Past Medical History:   Diagnosis Date     Breast cancer 2013    Diabetes mellitus     Genetic testing 05/29/2013    VUS    Hyperlipidemia     Hypertension     Malignant neoplasm of upper-outer quadrant of right breast in female, estrogen receptor positive 12/02/2015    Seizure disorder        Past Surgical History:   Procedure Laterality Date    BILATERAL SALPINGO-OOPHORECTOMY (BSO) N/A 3/25/2024    Procedure: SALPINGO-OOPHORECTOMY, BILATERAL;  Surgeon: Александр Washington MD;  Location: [facility] OR [address] [place] [address];  Service: OB/GYN;  Laterality: N/A;    BREAST BIOPSY      BREAST LUMPECTOMY Right 2013    CHOLECYSTECTOMY  3/25/2024    Procedure: CHOLECYSTECTOMY;  Surgeon: Bo Farmer MD;  Location: [facility] OR [address] [place] [address];  Service: General;;    DEBULKING OF TUMOR N/A 3/25/2024    Procedure: DEBULKING, NEOPLASM;  Surgeon: Александр Washington MD;  Location: [facility] OR [address] [place] [address];  Service: OB/GYN;  Laterality: N/A;    EXCISION, LIVER N/A 3/25/2024    Procedure: EXCISION, LIVER - partial;  Surgeon: Bo Farmer MD;  Location: [facility] OR [address] [place] [address];  Service: General;  Laterality: N/A;  bk ultrasound  [place] book by TA on 3-21-24  7:00 a.m.  Conf #  [id number]    EYE SURGERY      LAPAROTOMY, EXPLORATORY N/A 3/25/2024    Procedure: LAPAROTOMY, EXPLORATORY;  Surgeon: Александр Washington MD;  Location: [facility] OR [address] [place] [address];  Service: OB/GYN;  Laterality: N/A;    OMENTECTOMY N/A 3/25/2024    Procedure: OMENTECTOMY;  Surgeon: Александр Washington MD;  Location: [facility] OR [address] [place] [address];  Service: OB/GYN;  Laterality: N/A;    PERITONEOCENTESIS N/A 3/13/2024    Procedure: PARACENTESIS, ABDOMINAL;  Surgeon: Flavia Moore MD;  Location: [facility] CATH LAB;  Service: Radiology;  Laterality: N/A;    PERITONEOCENTESIS N/A 3/21/2024    Procedure: PARACENTESIS, ABDOMINAL;  Surgeon: Jorge Jolley MD;  Location: [facility] CATH LAB;  Service: Radiology;  Laterality: N/A;    PERITONEOCENTESIS N/A 6/10/2024    Procedure: PARACENTESIS, ABDOMINAL;  Surgeon: Rogelio Malave MD;  Location: [facility] CATH LAB;   Service: Radiology;  Laterality: N/A;    TOTAL ABDOMINAL HYSTERECTOMY N/A 3/25/2024    Procedure: HYSTERECTOMY, TOTAL, ABDOMINAL;  Surgeon: Александр Washington MD;  Location: [facility] OR [address] [place] [address];  Service: OB/GYN;  Laterality: N/A;    TOTAL REDUCTION MAMMOPLASTY Bilateral 2016       Time Tracking:     PT Received On: 06/22/24  PT Start Time: 1354     PT Stop Time: 1414  PT Total Time (min): 20 min     Billable Minutes: Evaluation 10 mins  and Gait Training 10 mins       06/22/2024

## 2024-06-22 NOTE — PLAN OF CARE
Plan of care reviewed. Patient is oriented X4. Ambulates independently; some weakness observed. Frequent cough and unable to expel phlegm; MD notified. Guaifenesin administered. PIV infusing antibiotics. No complaints of pain or discomfort at this time. T-Max 100.9; Refused tylenol. Accepted ibuprofen. All questions and concerns addressed.  at bedside. All safety precautions in place. Remains free of injury. Patient is stable. All needs met at this time.

## 2024-06-22 NOTE — PROGRESS NOTES
Progress Note  Gynecology Oncology      SUBJECTIVE:     History of Present Illness:  Anette Ricci is a 58 y.o.  is a 58 y.o.  with stage IVB ovarian carcinosacoma, s/p PDS on 3/25/24 with Dr Washington (CARLOS ALBERTO BSO omx liver mobilization, peritoneal & R diaphragm stripping, hepatic wedge resection, cholecystectomy), s/p C3 on  carbo/taxol/bevacizumab admitted for management of malignant pleural effusion.    Onc History:  3/4/24: peritoneal biopsy with carcinosarcoma  3/6/24: CA-125 418  3/25/24: Primary debulking surgery CARLOS ALBERTO/BSO, omentectomy, partial hepatectomy, debulking. (<1cm residual disease: sigmoid, transverse colon, diaphragm, kelvin hepatis)     Adjuvant therapy: Carboplatin AUC 6, paclitaxel 175 mg/m2, bevacizumab 15 mg/kg  24: C1D1: Carbo/Taxol/(Velma held), CA-125 248  5/10/24: C2D1: Carbo/Taxol/Velma  23: C3D1: Carbo/Taxol/Velma  24: C4D1: scheduled. - TO BE RESCHEDULED      Interval History  NAEON, patient resting comfortably in bed. Denies f/c, n/v, pain overnight. Reports cough and SOB improving. Tolerating regular diet. Ambulating without difficulty. Denies CP, HA, abdominal or pelvic pain, dysuria, hematuria, diarrhea, constipation.    Current Facility-Administered Medications   Medication    0.9%  NaCl infusion (for blood administration)    acetaminophen tablet 1,000 mg    acetaminophen tablet 650 mg    alteplase injection 2 mg    amLODIPine tablet 10 mg    atorvastatin tablet 80 mg    benzonatate capsule 100 mg    dextromethorphan-guaiFENesin  mg/5 ml liquid 10 mL    dextrose 10% bolus 125 mL 125 mL    dextrose 10% bolus 250 mL 250 mL    enoxaparin injection 80 mg    fluticasone propionate 50 mcg/actuation nasal spray 100 mcg    glucagon (human recombinant) injection 1 mg    glucose chewable tablet 16 g    glucose chewable tablet 24 g    guaiFENesin 12 hr tablet 600 mg    hydrALAZINE injection 10 mg    hydroCHLOROthiazide tablet 25 mg    insulin aspart U-100  pen 0-10 Units    insulin aspart U-100 pen 3 Units    insulin glargine U-100 (Lantus) pen 7 Units    lamoTRIgine tablet 25 mg    lisinopriL tablet 20 mg    LORazepam tablet 0.5 mg    OLANZapine tablet 5 mg    ondansetron disintegrating tablet 8 mg    piperacillin-tazobactam (ZOSYN) 4.5 g in D5W 100 mL IVPB (MB+)    QUEtiapine tablet 25 mg      OBJECTIVE:     Vital Signs  Temp:  [97.7 °F (36.5 °C)-99.4 °F (37.4 °C)] 99 °F (37.2 °C)  Pulse:  [] 103  Resp:  [16-18] 16  SpO2:  [94 %-99 %] 99 %  BP: (108-127)/(61-83) 127/83    Physical Exam:  Gen: A&Ox3, NAD  CV: mildly tachycardic  Pulm: reduced breath sounds in right lung base.  Abd: mildly distended abdomen, but non-tender to palpation. Midline incision covered with gauze, last dressing change by wound care on 6/21, photos show incision is clean and healing well by secondary intention with  no drainage or erythema.   Ext: no peripheral edema, no calf tenderness    Laboratory  Recent Labs   Lab 06/20/24  0228 06/20/24  0243 06/20/24  1428 06/21/24  0859   WBC 9.24  --  11.47 11.86   HGB 6.4*  --  7.6* 8.8*   HCT 21.7* 20* 23.6* 27.1*   MCV 84  --  80* 82     --  288 422      Recent Labs   Lab 06/20/24  0228 06/21/24  1147 06/23/24  0624   * 133* 135*   K 3.6 3.3* 3.3*    97 99   CO2 24 23 27   BUN 10 6 12   CREATININE 0.7 0.7 0.7   * 104 83   PROT 6.5 6.7  --    BILITOT 0.4 0.7  --    ALKPHOS 90 89  --    ALT 9* 10  --    AST 17 21  --       Blood Sugars (AccuCheck):  Recent Labs     06/21/24  0844 06/21/24  1155 06/21/24  1639 06/21/24  2217 06/22/24  0907 06/22/24  1223 06/22/24  1722 06/22/24  2128   POCTGLUCOSE 213* 127* 181* 240* 134* 251* 79 124*     UA negative    Trop neg  Lactate 2.2    Diagnostic Results:  6/20/24 CTA  Impression:  Motion and artifact limited study.  No large or central pulmonary embolus.  No convincing CTA findings of right heart strain.  Questionable small pulmonary emboli in the right lower lobe as  seen on prior studies.  Moderate right pleural effusion with elevated right hemidiaphragm. Passive atelectasis in the right lung base. No consolidation.   Suboptimally evaluated large loculated subdiaphragmatic fluid collection between the liver and the right hemidiaphragm.  Ill defied hypoattenuation in the posterior right hepatic lobe and soft tissue nodule adjacent to the posteroinferior right hepatic lobe.  Abdominal free fluid.  Peritoneal metastatic disease is included in the differential, noting that findings are worse when compared with prior CT.  Suggest correlation with clinical findings and short-term follow-up with dedicated abdominal CT when clinically appropriate.  Mild anasarca.  Similar partially calcified right breast nodule.    6/21/24 CXR  Impression:  Mildly worse right-sided pleural effusion with passive atelectasis or airspace disease in the right lung base.    ASSESSMENT/PLAN:     Active Hospital Problems    Diagnosis  POA    *Pleural effusion, malignant [J91.0]  Yes    Chronic pulmonary embolism without acute cor pulmonale [I27.82]  Yes     Patient with small right lower lobe pulmonary embolism noted on June 2024 CT chest and also noted on prior films as well.  Patient has not been on full-dose anticoagulation only prophylaxis dosing.  Given her advanced cancer and PE noted on CT, it would not be unreasonable to treat with full-dose anticoagulation.  However patient is breathing well on room air and her prior respiratory issues are likely related to her right-sided pleural effusion.  If a decision is made to treat with full-dose anticoagulation, patient should be treated with Lovenox 1 milligram/kilogram b.i.d and sent home on this regimen.  I see no contraindications to anticoagulation however if other procedures are planned then it may be beneficial to wait until discharge.      Stage 4B Carcinoscaroma, Suspected ovarian origin [C80.1]  Yes     3/4/24: peritoneal biopsy with  carcinosarcoma  3/6/24: CA-125 418  3/25/24: Primary debulking surgery CARLOS ALBERTO/BSO, omentectomy, partial hepatectomy, debulking. 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       Anette Ricci is a 58 y.o.  with stage IV ovarian carcinosacoma, s/p PDS with CARLOS ALBERTO BSO omx liver mobilization, peritoneal & R diaphragm stripping, hepatic wedge resection, cholecystectomy, currently on C3 carbo/taxol/bevacizumab, who presented with SOB and imaging consistent with right pleural effusion, most likely 2/2 malignancy.     Malignant pleural effusion (Right)  - tachycardia resolved, O2sat WNL, afebrile >24h  - CBC WNL  - CTA: moderate right pleural effusion with elevated right hemidiaphragm, passive atelectasis in the right lung base, no consolidation  - initially started on vanc/zosyn; vanc d/c HD#2; continue zosyn until afebrile for 24-48h  - s/p IR consult for possible thoracentesis/paracentesis for symptomatic relief, however no safe window for thora, and minimal fluid for para  - clinical picture significantly improved  - encourage IS  - strict I&O    Chronic PE  - small right lower lobe pulmonary embolism noted on 2024 CT chest, also noted on prior imaging  - LE dopplers  negative   - s/p Pulm Crit Care consult:   - patient is breathing well on room air and her prior respiratory issues are likely related to her right-sided pleural effusion  - Given advanced cancer and PE noted on CT, it would not be unreasonable to treat  with full-dose anticoagulation. If a decision is made to treat with full-dose anticoagulation, patient should be treated with Lovenox 1 milligram/kilogram b.i.d and sent home on this regimen. I see no contraindications to anticoagulation however if other procedures are planned then it may be beneficial to wait until discharge.   - VTE ppx: SCDs + lovenox qD > lovenox 1mg/kg BID initiated on 6/23    Fever  - patient febrile on HD#2, had been on broad spectrum abx since admission  - WBC 9>11  - further infectious w/u performed; UA neg, Covid/Flu neg, CXR with mild worsening R pleural effusion, COVID/Flu neg  - repeat blood cultures collected 6/22, from both her port site and a peripheral site; prelim NGTD x2  - respiratory panel pending   - Tmax/Tlast 100.9 6/21 at 2355  - she has now been afebrile >24h    Hypokalemia  - 3.3 on 6/21  - replaced  - repeat BMP pending    Ovarian Carcinosarcoma Stage IV   - s/p PDS on 3/25/24 with Dr Washington ([facility] BSO omx liver mobilization, peritoneal & R diaphragm stripping, hepatic wedge resection, cholecystectomy) suboptimal <1cm residual disease: sigmoid, transverse colon, diaphragm, kelvin hepatis   - currently receiving carbo/taxol/bevacizumab; was due for C4 6/21 however will plan to reschedule on discharge  - Wound care following, VML incision healing well by secondary intention, plan to change dressing q3d, will need HH, see consult note for full details  - s/p PT consult; no outpatient needs    Anemia  - H/H 6.4/22 > 1u pRBC > 7.6/23 > 1u pRBC > 8.8/27  - denies sx of anemia  - likely 2/2 chronic disease and chemotherapy    Diabetes   - hold Metformin  - continue home levemir 7u QHS, aspart 3u TID WM   - mod SSI ordered  - diabetic diet  - POCT BG pre-meal/qHS    HTN  - continue home BP meds: amlodipine, enalapril>lisinopril, HTCZ  - hydralazine PRN (BP>180/110)    HLD  - continue atorvastatin    Seizure disorder  - continue Lamotrigine    Depression/Anxiety  - continue  Olanzapine, Seroquel PRN      Jayla Holguin MD   OB/GYN PGY-2

## 2024-06-22 NOTE — ASSESSMENT & PLAN NOTE
Patient with right-sided pleural effusion however evaluated by IR and no appropriate collection was visible for drainage.

## 2024-06-22 NOTE — ASSESSMENT & PLAN NOTE
Patient with small right lower lobe pulmonary embolism noted on June 2024 CT chest and also noted on prior films as well.  Patient has not been on full-dose anticoagulation only prophylaxis dosing.  Given her advanced cancer and PE noted on CT, it would not be unreasonable to treat with full-dose anticoagulation.  However patient is breathing well on room air and her prior respiratory issues are likely related to her right-sided pleural effusion.  If a decision is made to treat with full-dose anticoagulation, patient should be treated with Lovenox 1 milligram/kilogram b.i.d and sent home on this regimen.  I see no contraindications to anticoagulation however if other procedures are planned then it may be beneficial to wait until discharge.

## 2024-06-22 NOTE — SUBJECTIVE & OBJECTIVE
Past Medical History:   Diagnosis Date    Breast cancer 2013    Diabetes mellitus     Genetic testing 05/29/2013    VUS    Hyperlipidemia     Hypertension     Malignant neoplasm of upper-outer quadrant of right breast in female, estrogen receptor positive 12/02/2015    Seizure disorder        Past Surgical History:   Procedure Laterality Date    BILATERAL SALPINGO-OOPHORECTOMY (BSO) N/A 3/25/2024    Procedure: SALPINGO-OOPHORECTOMY, BILATERAL;  Surgeon: Александр Washington MD;  Location: [facility] OR [address] [place] [address];  Service: OB/GYN;  Laterality: N/A;    BREAST BIOPSY      BREAST LUMPECTOMY Right 2013    CHOLECYSTECTOMY  3/25/2024    Procedure: CHOLECYSTECTOMY;  Surgeon: Bo Farmer MD;  Location: [facility] OR [address] [place] [address];  Service: General;;    DEBULKING OF TUMOR N/A 3/25/2024    Procedure: DEBULKING, NEOPLASM;  Surgeon: Александр Washington MD;  Location: [facility] OR [address] [place] [address];  Service: OB/GYN;  Laterality: N/A;    EXCISION, LIVER N/A 3/25/2024    Procedure: EXCISION, LIVER - partial;  Surgeon: Bo Farmer MD;  Location: [facility] OR [address] [place] [address];  Service: General;  Laterality: N/A;  bk ultrasound  [place] book by TA on 3-21-24  7:00 a.m.  Conf #  [id number]    EYE SURGERY      LAPAROTOMY, EXPLORATORY N/A 3/25/2024    Procedure: LAPAROTOMY, EXPLORATORY;  Surgeon: Александр Washington MD;  Location: [facility] OR [address] [place] [address];  Service: OB/GYN;  Laterality: N/A;    OMENTECTOMY N/A 3/25/2024    Procedure: OMENTECTOMY;  Surgeon: Александр Washington MD;  Location: [facility] OR [address] [place] [address];  Service: OB/GYN;  Laterality: N/A;    PERITONEOCENTESIS N/A 3/13/2024    Procedure: PARACENTESIS, ABDOMINAL;  Surgeon: Flavia Moore MD;  Location: [facility] CATH LAB;  Service: Radiology;  Laterality: N/A;    PERITONEOCENTESIS N/A 3/21/2024    Procedure: PARACENTESIS, ABDOMINAL;  Surgeon: Jorge Jolley MD;  Location: [facility] CATH LAB;  Service: Radiology;  Laterality: N/A;    PERITONEOCENTESIS N/A 6/10/2024    Procedure: PARACENTESIS, ABDOMINAL;  Surgeon: Frieda  Rogelio KELLEY MD;  Location: [facility] CATH LAB;  Service: Radiology;  Laterality: N/A;    TOTAL ABDOMINAL HYSTERECTOMY N/A 3/25/2024    Procedure: HYSTERECTOMY, TOTAL, ABDOMINAL;  Surgeon: Александр Washington MD;  Location: [facility] OR [address] [place] [address];  Service: OB/GYN;  Laterality: N/A;    TOTAL REDUCTION MAMMOPLASTY Bilateral 2016       Review of patient's allergies indicates:   Allergen Reactions    Codeine Other (See Comments)     Flu like s/s    Tramadol Other (See Comments)     Flu like symptoms similar to codeine reaction       Family History       Problem Relation (Age of Onset)    Breast cancer Sister (48)    Colon cancer Sister    Hypertension Mother, Brother, Brother    Seizures Father          Tobacco Use    Smoking status: Never    Smokeless tobacco: Never   Substance and Sexual Activity    Alcohol use: Yes     Alcohol/week: 0.0 standard drinks of alcohol     Comment: ocassional    Drug use: No    Sexual activity: Not Currently     Partners: Male     Birth control/protection: None      Review of Systems  Objective:     Vital Signs (Most Recent):  Temp: 98.5 °F (36.9 °C) (06/22/24 1645)  Pulse: 107 (06/22/24 1514)  Resp: 18 (06/22/24 1514)  BP: 125/82 (06/22/24 1514)  SpO2: (!) 94 % (06/22/24 1645) Vital Signs (24h Range):  Temp:  [97.7 °F (36.5 °C)-100.9 °F (38.3 °C)] 98.5 °F (36.9 °C)  Pulse:  [] 107  Resp:  [17-22] 18  SpO2:  [94 %-99 %] 94 %  BP: (108-137)/(65-97) 125/82   Weight: 81.5 kg (179 lb 10.8 oz)  Body mass index is 28.14 kg/m².      Intake/Output Summary (Last 24 hours) at 6/22/2024 1721  Last data filed at 6/22/2024 1026  Gross per 24 hour   Intake 1012.43 ml   Output 600 ml   Net 412.43 ml          Physical Exam       Vents:     Lines/Drains/Airways       Central Venous Catheter Line  Duration             Port A Cath Single Lumen Subclavian Left -- days                  Significant Labs:    CBC/Anemia Profile:  Recent Labs   Lab 06/21/24  0859   WBC 11.86   HGB 8.8*   HCT 27.1*      MCV 82    RDW 18.1*        Chemistries:  Recent Labs   Lab 06/21/24  1147   *   K 3.3*   CL 97   CO2 23   BUN 6   CREATININE 0.7   CALCIUM 8.7   ALBUMIN 2.4*   PROT 6.7   BILITOT 0.7   ALKPHOS 89   ALT 10   AST 21       All pertinent labs within the past 24 hours have been reviewed.    Significant Imaging: I have reviewed all pertinent imaging results/findings within the past 24 hours.  I have reviewed and interpreted all pertinent imaging results/findings within the past 24 hours.

## 2024-06-22 NOTE — HPI
Anette Ricci is a 58 y.o.   is a 58 y.o.  with stage IVB ovarian carcinosacoma, s/p PDS on 3/25/24 with Dr Washington (CARLOS ALBERTO BSO omx liver mobilization, peritoneal & R diaphragm stripping, hepatic wedge resection, cholecystectomy) C3D21 carbo/taxol/bevacizumab who presented to the ED with worsening SOB and some back pain for 1 day. SOB is worse when laying flat. She endorse a cough for the last 4-5 days. She also reports abdominal bloating, without N/V. She last saw IR for paracentesis on 6/10, but they did not find enough fluid for paracentesis. She denies chest pain, fevers, chills, leg swelling.   Noted to have pleural effusion, however evaluated by IR and no pocket for drainage. Patient noted to have RLL small pulmonary emboluse that appears to be chronic and noted on prior images. We are asked to make recommendations for chronic PE changes management noted on prior CT chest and CT on admission.     Onc History:  3/4/24: peritoneal biopsy with carcinosarcoma  3/6/24: CA-125 418  3/25/24: Primary debulking surgery CARLOS ALBERTO/BSO, omentectomy, partial hepatectomy, debulking. (<1cm residual disease: sigmoid, transverse colon, diaphragm, kelvin hepatis)     Adjuvant therapy: Carboplatin AUC 6, paclitaxel 175 mg/m2, bevacizumab 15 mg/kg  24: C1D1: Carbo/Taxol/(Velma held), CA-125 248  5/10/24: C2D1: Carbo/Taxol/Velma  23: C3D1: Carbo/Taxol/Velma  24: C4D1: scheduled.

## 2024-06-22 NOTE — PLAN OF CARE
Problem: Physical Therapy  Goal: Physical Therapy Goal  Description: Goals to be met by: 24     Patient will increase functional independence with mobility by performin. Supine to sit with Owen  2. Sit to supine with Owen  3. Rolling to either side with Owen.  4. Sit to stand transfer with Owen  5. Bed to chair transfer with Owen using no AD  6. Gait  x 500 feet with Owen using No Assistive Device.     Outcome: Progressing   Evaluation complete and goals are appropriate 2024

## 2024-06-22 NOTE — PROGRESS NOTES
Progress Note  Gynecology Oncology      SUBJECTIVE:     History of Present Illness:  Anette Ricci is a 58 y.o.  is a 58 y.o.  with stage IVB ovarian carcinosacoma, s/p PDS on 3/25/24 with Dr Washington (CARLOS ALBERTO BSO omx liver mobilization, peritoneal & R diaphragm stripping, hepatic wedge resection, cholecystectomy), C3D23 carbo/taxol/bevacizumab admitted for management of malignant pleural effusion.    Onc History:  3/4/24: peritoneal biopsy with carcinosarcoma  3/6/24: CA-125 418  3/25/24: Primary debulking surgery CARLOS ALBERTO/BSO, omentectomy, partial hepatectomy, debulking. (<1cm residual disease: sigmoid, transverse colon, diaphragm, kelvin hepatis)     Adjuvant therapy: Carboplatin AUC 6, paclitaxel 175 mg/m2, bevacizumab 15 mg/kg  24: C1D1: Carbo/Taxol/(Velma held), CA-125 248  5/10/24: C2D1: Carbo/Taxol/Velma  23: C3D1: Carbo/Taxol/Velma  24: C4D1: scheduled.      Interval History  Patient resting comfortably in bed. Denies feeling feverish or chills overnight, despite T 100.9 at [place]. Patient reports cough was bothersome overnight, robitussin helped relieve symptoms and she was able to sleep well. Denies pain. Reports SOB has continued to improve. Tolerating regular diet, however still has low appetite. Ambulating without difficulty. Denies CP, HA, abdominal or pelvic pain, dysuria, hematuria, diarrhea, constipation.    Review of patient's allergies indicates:   Allergen Reactions    Codeine Other (See Comments)     Flu like s/s    Tramadol Other (See Comments)     Flu like symptoms similar to codeine reaction       Past Medical History:   Diagnosis Date    Breast cancer     Diabetes mellitus     Genetic testing 2013    VUS    Hyperlipidemia     Hypertension     Malignant neoplasm of upper-outer quadrant of right breast in female, estrogen receptor positive 2015    Seizure disorder      Past Surgical History:   Procedure Laterality Date    BILATERAL SALPINGO-OOPHORECTOMY  (BSO) N/A 3/25/2024    Procedure: SALPINGO-OOPHORECTOMY, BILATERAL;  Surgeon: Александр Washington MD;  Location: [facility] OR 2ND FLR;  Service: OB/GYN;  Laterality: N/A;    BREAST BIOPSY      BREAST LUMPECTOMY Right     CHOLECYSTECTOMY  3/25/2024    Procedure: CHOLECYSTECTOMY;  Surgeon: Bo Farmer MD;  [facility];  Service: General;;    DEBULKING OF TUMOR N/A 3/25/2024    Procedure: DEBULKING, NEOPLASM;  Surgeon: Александр Washington MD;  Location: [facility] OR 2ND FLR;  Service: OB/GYN;  Laterality: N/A;    EXCISION, LIVER N/A 3/25/2024    Procedure: EXCISION, LIVER - partial;  Surgeon: Bo Farmer MD;  Location: [facility] OR 2ND FLR;  Service: General;  Laterality: N/A;  bk ultrasound  [place] book by TA on 3-21-24  7:00 a.m.  Conf #  [id number]    EYE SURGERY      LAPAROTOMY, EXPLORATORY N/A 3/25/2024    Procedure: LAPAROTOMY, EXPLORATORY;  Surgeon: Александр Washington MD;  Location: [facility] OR 2ND FLR;  Service: OB/GYN;  Laterality: N/A;    OMENTECTOMY N/A 3/25/2024    Procedure: OMENTECTOMY;  Surgeon: Александр Washington MD;  Location: [facility] OR 2ND FLR;  Service: OB/GYN;  Laterality: N/A;    PERITONEOCENTESIS N/A 3/13/2024    Procedure: PARACENTESIS, ABDOMINAL;  Surgeon: Flavia Moore MD;  Location: [facility] CATH LAB;  Service: Radiology;  Laterality: N/A;    PERITONEOCENTESIS N/A 3/21/2024    Procedure: PARACENTESIS, ABDOMINAL;  Surgeon: Jorge Jolley MD;  Location: [facility] CATH LAB;  Service: Radiology;  Laterality: N/A;    PERITONEOCENTESIS N/A 6/10/2024    Procedure: PARACENTESIS, ABDOMINAL;  Surgeon: Rogelio Malave MD;  Location: [facility] CATH LAB;  Service: Radiology;  Laterality: N/A;    TOTAL ABDOMINAL HYSTERECTOMY N/A 3/25/2024    Procedure: HYSTERECTOMY, TOTAL, ABDOMINAL;  Surgeon: Александр Washington MD;  Location: [facility] OR [address] [place] [address];  Service: OB/GYN;  Laterality: N/A;    TOTAL REDUCTION MAMMOPLASTY Bilateral      OB History          1    Para   1    Term   1             AB   0    Living   1         SAB   0    IAB        Ectopic        Multiple        Live Births               Obstetric Comments   1st child at age 17; menarche at age 11; LMP in 2014; 1 living child, 2 miscarriages; natural birth             Family History   Problem Relation Name Age of Onset    Hypertension Mother      Seizures Father      Breast cancer Sister  48    Colon cancer Sister      Hypertension Brother      Hypertension Brother      Cancer Neg Hx      Ovarian cancer Neg Hx       Social History     Tobacco Use    Smoking status: Never    Smokeless tobacco: Never   Substance Use Topics    Alcohol use: Yes     Alcohol/week: 0.0 standard drinks of alcohol     Comment: ocassional    Drug use: No       Current Facility-Administered Medications   Medication    0.9%  NaCl infusion (for blood administration)    acetaminophen tablet 1,000 mg    acetaminophen tablet 650 mg    amLODIPine tablet 10 mg    atorvastatin tablet 80 mg    benzonatate capsule 100 mg    dextromethorphan-guaiFENesin  mg/5 ml liquid 10 mL    dextrose 10% bolus 125 mL 125 mL    dextrose 10% bolus 250 mL 250 mL    enoxaparin injection 40 mg    fluticasone propionate 50 mcg/actuation nasal spray 100 mcg    glucagon (human recombinant) injection 1 mg    glucose chewable tablet 16 g    glucose chewable tablet 24 g    guaiFENesin 12 hr tablet 600 mg    hydrALAZINE injection 10 mg    hydroCHLOROthiazide tablet 25 mg    insulin aspart U-100 pen 0-10 Units    insulin aspart U-100 pen 3 Units    insulin glargine U-100 (Lantus) pen 7 Units    lamoTRIgine tablet 25 mg    lisinopriL tablet 20 mg    LORazepam tablet 0.5 mg    OLANZapine tablet 5 mg    ondansetron disintegrating tablet 8 mg    piperacillin-tazobactam (ZOSYN) 4.5 g in D5W 100 mL IVPB (MB+)    potassium chloride SA CR tablet 40 mEq    QUEtiapine tablet 25 mg       Review of Systems:  Constitutional: no significant weight change, fever, fatigue  Eyes:  No vision  changes  Cardiovascular: No chest pain  Respiratory: + shortness of breath, + cough  Gastrointestinal: No diarrhea, bloody stool, nausea/vomiting, constipation  Genitourinary: No blood in urine, painful urination, urgency of urination, frequency of urination  Skin/Breast: No painful breasts, nipple discharge, masses  Neurological: No headache  Psychiatric: No depression or anxiety     OBJECTIVE:     Vital Signs  Temp:  [98.7 °F (37.1 °C)-100.9 °F (38.3 °C)] 98.9 °F (37.2 °C)  Pulse:  [] 93  Resp:  [17-22] 17  SpO2:  [93 %-99 %] 98 %  BP: (114-150)/(73-97) 114/73    Physical Exam:  Gen: A&Ox3, NAD  CV: mildly tachycardic  Pulm: reduced breath sounds in right lung base.  Abd: mildly distended abdomen, but non-tender to palpation. Midline incision covered with gauze, last dressing change by wound care on 6/21, photos show incision is clean and healing well by secondary intention with  no drainage or erythema.   Ext: no peripheral edema, no calf tenderness    Laboratory  Recent Labs   Lab 06/20/24  0228 06/20/24  0243 06/20/24  1428 06/21/24  0859   WBC 9.24  --  11.47 11.86   HGB 6.4*  --  7.6* 8.8*   HCT 21.7* 20* 23.6* 27.1*   MCV 84  --  80* 82     --  288 422      Recent Labs   Lab 06/20/24  0228 06/21/24  1147   * 133*   K 3.6 3.3*    97   CO2 24 23   BUN 10 6   CREATININE 0.7 0.7   * 104   PROT 6.5 6.7   BILITOT 0.4 0.7   ALKPHOS 90 89   ALT 9* 10   AST 17 21      Blood Sugars (AccuCheck):  Recent Labs     06/20/24  0933 06/20/24  1130 06/20/24  1656 06/20/24  2101 06/21/24  0844 06/21/24  1155 06/21/24  1639 06/21/24  2217   POCTGLUCOSE 319* 331* 174* 268* 213* 127* 181* 240*     UA negative    Trop neg  Lactate 2.2    Diagnostic Results:  6/20/24 CTA  Impression:  Motion and artifact limited study.  No large or central pulmonary embolus.  No convincing CTA findings of right heart strain.  Questionable small pulmonary emboli in the right lower lobe as seen on prior  studies.  Moderate right pleural effusion with elevated right hemidiaphragm. Passive atelectasis in the right lung base. No consolidation.   Suboptimally evaluated large loculated subdiaphragmatic fluid collection between the liver and the right hemidiaphragm.  Ill defied hypoattenuation in the posterior right hepatic lobe and soft tissue nodule adjacent to the posteroinferior right hepatic lobe.  Abdominal free fluid.  Peritoneal metastatic disease is included in the differential, noting that findings are worse when compared with prior CT.  Suggest correlation with clinical findings and short-term follow-up with dedicated abdominal CT when clinically appropriate.  Mild anasarca.  Similar partially calcified right breast nodule.    24 CXR  Impression:  Mildly worse right-sided pleural effusion with passive atelectasis or airspace disease in the right lung base.    ASSESSMENT/PLAN:     Active Hospital Problems    Diagnosis  POA    *Pleural effusion, malignant [J91.0]  Unknown    Stage 4B Carcinoscaroma, Suspected ovarian origin [C80.1]  Yes     3/4/24: peritoneal biopsy with carcinosarcoma  3/6/24: CA-125 418  3/25/24: Primary debulking surgery CARLOS ALBERTO/BSO, omentectomy, partial hepatectomy, debulking. 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       Anette Ricci is a 58 y.o.  with stage IV ovarian carcinosacoma, s/p PDS with CARLOS ALBERTO BSO omx liver mobilization, peritoneal & R diaphragm stripping, hepatic wedge resection,  cholecystectomy, currently on C3 carbo/taxol/bevacizumab, who presented with SOB and imaging consistent with right pleural effusion, most likely 2/2 malignancy.     Malignant pleural effusion (Right)  - intermittent mild tachycardia, O2sat WNL, febrile (see below)  - CBC WNL  - , trop neg, lactate 2.2  - CTA: moderate right pleural effusion with elevated right hemidiaphragm, passive atelectasis in the right lung base, no consolidation  - initially started on vanc/zosyn; vanc d/c HD#2; continue zosyn until afebrile for approx 48h  - s/p IR consult for possible thoracentesis/paracentesis for symptomatic relief, however no safe window for thora, and minimal fluid for para  - VTE ppx: SCDs + lovenox qD    Fever  - patient febrile on HD#2  - Tmax/Tlast 100.9 6/21 at 2355  - had been on vanc/zosyn since admission  - WBC 9>11  - further infectious w/u performed; UA neg, Covid/Flu neg, CXR with mild worsening R pleural effusion    Hypokalemia  - 3.3 on 6/21  - replaced  - repeat BMP in AM    Ovarian Carcinosarcoma Stage IV   - s/p PDS on 3/25/24 with Dr Washington (CARLOS ALBERTO BSO omx liver mobilization, peritoneal & R diaphragm stripping, hepatic wedge resection, cholecystectomy) suboptimal <1cm residual disease: sigmoid, transverse colon, diaphragm, kelvin hepatis   - currently receiving carbo/taxol/bevacizumab; was due for C4 6/21 however will plan to reschedule on discharge  - Wound care following, VML incision healing well by secondary intention, plan to change dressing q3d, will need HH, see consult note for full details  - PT consulted    Anemia  - H/H 6.4/22 > 1u pRBC > 7.6/23 > 1u pRBC > 8.8/27  - denies sx of anemia  - likely 2/2 chronic disease and chemotherapy    Diabetes   - hold Metformin  - continue home levemir 7u QHS, aspart 3u TID WM   - mod SSI ordered  - diabetic diet  - POCT BG pre-meal/qHS    HTN  - continue home BP meds: amlodipine, enalapril>lisinopril, HTCZ  - hydralazine PRN (BP>180/110)    HLD  -  continue atorvastatin    Seizure disorder  - continue Lamotrigine    Depression/Anxiety  - continue Olanzapine, Seroquel PRN      Jayla Holguin MD   OB/GYN PGY-2

## 2024-06-22 NOTE — CONSULTS
[facility] - Oncology ([facility])  Critical Care Medicine  Consult Note    Patient Name: Anette Ricci  MRN: [id number]  Admission Date: 2024  Hospital Length of Stay: 2 days  Code Status: Full Code  Attending Physician: Александр Washington MD   Primary Care Provider: Mark Chua MD   Principal Problem: Pleural effusion, malignant    Consults  Subjective:     HPI:  Anette Ricci is a 58 y.o.   is a 58 y.o.  with stage IVB ovarian carcinosacoma, s/p PDS on 3/25/24 with Dr Washington ([facility] B[facility] omx liver mobilization, peritoneal & R diaphragm stripping, hepatic wedge resection, cholecystectomy) C3D21 carbo/taxol/bevacizumab who presented to the ED with worsening SOB and some back pain for 1 day. SOB is worse when laying flat. She endorse a cough for the last 4-5 days. She also reports abdominal bloating, without N/V. She last saw IR for paracentesis on 6/10, but they did not find enough fluid for paracentesis. She denies chest pain, fevers, chills, leg swelling.   Noted to have pleural effusion, however evaluated by IR and no pocket for drainage. Patient noted to have RLL small pulmonary emboluse that appears to be chronic and noted on prior images. We are asked to make recommendations for chronic PE changes management noted on prior CT chest and CT on admission.     Onc History:  3/4/24: peritoneal biopsy with carcinosarcoma  3/6/24: CA-125 418  3/25/24: Primary debulking surgery [facility]/BSO, omentectomy, partial hepatectomy, debulking. (<1cm residual disease: sigmoid, transverse colon, diaphragm, kelvin hepatis)     Adjuvant therapy: Carboplatin AUC 6, paclitaxel 175 mg/m2, bevacizumab 15 mg/kg  24: C1D1: Carbo/Taxol/(Velma held), CA-125 248  5/10/24: C2D1: Carbo/Taxol/Velma  23: C3D1: Carbo/Taxol/Velma  24: C4D1: scheduled.         Hospital/ICU Course:  Patient admitted and treated with IV antibiotics and improving.  Is currently on room air and has no significant shortness of  breath.  Oxygen saturations are within normal limits    Past Medical History:   Diagnosis Date    Breast cancer 2013    Diabetes mellitus     Genetic testing 05/29/2013    VUS    Hyperlipidemia     Hypertension     Malignant neoplasm of upper-outer quadrant of right breast in female, estrogen receptor positive 12/02/2015    Seizure disorder        Past Surgical History:   Procedure Laterality Date    BILATERAL SALPINGO-OOPHORECTOMY (BSO) N/A 3/25/2024    Procedure: SALPINGO-OOPHORECTOMY, BILATERAL;  Surgeon: Александр Washington MD;  [facility];  Service: OB/GYN;  Laterality: N/A;    BREAST BIOPSY      BREAST LUMPECTOMY Right 2013    CHOLECYSTECTOMY  3/25/2024    Procedure: CHOLECYSTECTOMY;  Surgeon: Bo Farmer MD;  Location: [facility] OR [address] [place] [address];  Service: General;;    DEBULKING OF TUMOR N/A 3/25/2024    Procedure: DEBULKING, NEOPLASM;  Surgeon: Александр Washington MD;  Location: [facility] OR [address] [place] [address];  Service: OB/GYN;  Laterality: N/A;    EXCISION, LIVER N/A 3/25/2024    Procedure: EXCISION, LIVER - partial;  Surgeon: Bo Farmer MD;  Location: [facility] OR [address] [place] [address];  Service: General;  Laterality: N/A;  bk ultrasound  [place] book by TA on 3-21-24  7:00 a.m.  Conf #  [id number]    EYE SURGERY      LAPAROTOMY, EXPLORATORY N/A 3/25/2024    Procedure: LAPAROTOMY, EXPLORATORY;  Surgeon: Александр Washington MD;  Location: [facility] OR [address] [place] [address];  Service: OB/GYN;  Laterality: N/A;    OMENTECTOMY N/A 3/25/2024    Procedure: OMENTECTOMY;  Surgeon: Александр Washington MD;  [facility];  Service: OB/GYN;  Laterality: N/A;    PERITONEOCENTESIS N/A 3/13/2024    Procedure: PARACENTESIS, ABDOMINAL;  Surgeon: Flavia Moore MD;  Location: [facility] CATH LAB;  Service: Radiology;  Laterality: N/A;    PERITONEOCENTESIS N/A 3/21/2024    Procedure: PARACENTESIS, ABDOMINAL;  Surgeon: Jorge Jolley MD;  Location: [facility] CATH LAB;  Service: Radiology;  Laterality: N/A;    PERITONEOCENTESIS N/A 6/10/2024     Procedure: PARACENTESIS, ABDOMINAL;  Surgeon: Rogelio Malave MD;  Location: [facility] CATH LAB;  Service: Radiology;  Laterality: N/A;    TOTAL ABDOMINAL HYSTERECTOMY N/A 3/25/2024    Procedure: HYSTERECTOMY, TOTAL, ABDOMINAL;  Surgeon: Александр Washington MD;  Location: [facility] OR [address] [place] [address];  Service: OB/GYN;  Laterality: N/A;    TOTAL REDUCTION MAMMOPLASTY Bilateral 2016       Review of patient's allergies indicates:   Allergen Reactions    Codeine Other (See Comments)     Flu like s/s    Tramadol Other (See Comments)     Flu like symptoms similar to codeine reaction       Family History       Problem Relation (Age of Onset)    Breast cancer Sister (48)    Colon cancer Sister    Hypertension Mother, Brother, Brother    Seizures Father          Tobacco Use    Smoking status: Never    Smokeless tobacco: Never   Substance and Sexual Activity    Alcohol use: Yes     Alcohol/week: 0.0 standard drinks of alcohol     Comment: ocassional    Drug use: No    Sexual activity: Not Currently     Partners: Male     Birth control/protection: None      Review of Systems  Objective:     Vital Signs (Most Recent):  Temp: 98.5 °F (36.9 °C) (06/22/24 1645)  Pulse: 107 (06/22/24 1514)  Resp: 18 (06/22/24 1514)  BP: 125/82 (06/22/24 1514)  SpO2: (!) 94 % (06/22/24 1645) Vital Signs (24h Range):  Temp:  [97.7 °F (36.5 °C)-100.9 °F (38.3 °C)] 98.5 °F (36.9 °C)  Pulse:  [] 107  Resp:  [17-22] 18  SpO2:  [94 %-99 %] 94 %  BP: (108-137)/(65-97) 125/82   Weight: 81.5 kg (179 lb 10.8 oz)  Body mass index is 28.14 kg/m².      Intake/Output Summary (Last 24 hours) at 6/22/2024 1721  Last data filed at 6/22/2024 1026  Gross per 24 hour   Intake 1012.43 ml   Output 600 ml   Net 412.43 ml          Physical Exam       Vents:     Lines/Drains/Airways       Central Venous Catheter Line  Duration             Port A Cath Single Lumen Subclavian Left -- days                  Significant Labs:    CBC/Anemia Profile:  Recent Labs   Lab 06/21/24  0852  "  WBC 11.86   HGB 8.8*   HCT 27.1*      MCV 82   RDW 18.1*        Chemistries:  Recent Labs   Lab 06/21/24  1147   *   K 3.3*   CL 97   CO2 23   BUN 6   CREATININE 0.7   CALCIUM 8.7   ALBUMIN 2.4*   PROT 6.7   BILITOT 0.7   ALKPHOS 89   ALT 10   AST 21       All pertinent labs within the past 24 hours have been reviewed.    Significant Imaging: I have reviewed all pertinent imaging results/findings within the past 24 hours.  I have reviewed and interpreted all pertinent imaging results/findings within the past 24 hours.    ABG  No results for input(s): "PH", "PO2", "PCO2", "HCO3", "BE" in the last 168 hours.  Assessment/Plan:     Pulmonary  * Pleural effusion, malignant  Patient with right-sided pleural effusion however evaluated by IR and no appropriate collection was visible for drainage.    Hematology  Chronic pulmonary embolism without acute cor pulmonale  Patient with small right lower lobe pulmonary embolism noted on June 2024 CT chest and also noted on prior films as well.  Patient has not been on full-dose anticoagulation only prophylaxis dosing.  Given her advanced cancer and PE noted on CT, it would not be unreasonable to treat with full-dose anticoagulation.  However patient is breathing well on room air and her prior respiratory issues are likely related to her right-sided pleural effusion.  If a decision is made to treat with full-dose anticoagulation, patient should be treated with Lovenox 1 milligram/kilogram b.i.d and sent home on this regimen.  I see no contraindications to anticoagulation however if other procedures are planned then it may be beneficial to wait until discharge.          Thank you for your consult. I will follow-up with patient. Please contact us if you have any additional questions.     Cinthia Costello MD  Critical Care Medicine  [facility] - Oncology ([facility])  "

## 2024-06-23 LAB
ANION GAP SERPL CALC-SCNC: 9 MMOL/L (ref 8–16)
BACTERIA UR CULT: NO GROWTH
BUN SERPL-MCNC: 12 MG/DL (ref 6–20)
CALCIUM SERPL-MCNC: 8.3 MG/DL (ref 8.7–10.5)
CHLORIDE SERPL-SCNC: 99 MMOL/L (ref 95–110)
CO2 SERPL-SCNC: 27 MMOL/L (ref 23–29)
CREAT SERPL-MCNC: 0.7 MG/DL (ref 0.5–1.4)
EST. GFR  (NO RACE VARIABLE): >60 ML/MIN/1.73 M^2
GLUCOSE SERPL-MCNC: 83 MG/DL (ref 70–110)
POCT GLUCOSE: 157 MG/DL (ref 70–110)
POCT GLUCOSE: 166 MG/DL (ref 70–110)
POCT GLUCOSE: 232 MG/DL (ref 70–110)
POCT GLUCOSE: 92 MG/DL (ref 70–110)
POTASSIUM SERPL-SCNC: 3.3 MMOL/L (ref 3.5–5.1)
SODIUM SERPL-SCNC: 135 MMOL/L (ref 136–145)

## 2024-06-23 PROCEDURE — 36415 COLL VENOUS BLD VENIPUNCTURE: CPT | Mod: HCNC

## 2024-06-23 PROCEDURE — 20600001 HC STEP DOWN PRIVATE ROOM: Mod: HCNC

## 2024-06-23 PROCEDURE — 80048 BASIC METABOLIC PNL TOTAL CA: CPT | Mod: HCNC

## 2024-06-23 PROCEDURE — 25000003 PHARM REV CODE 250: Mod: HCNC

## 2024-06-23 PROCEDURE — 63600175 PHARM REV CODE 636 W HCPCS: Mod: HCNC

## 2024-06-23 PROCEDURE — 99222 1ST HOSP IP/OBS MODERATE 55: CPT | Mod: HCNC,,,

## 2024-06-23 PROCEDURE — 63600175 PHARM REV CODE 636 W HCPCS: Mod: JZ,JG,HCNC

## 2024-06-23 PROCEDURE — 87040 BLOOD CULTURE FOR BACTERIA: CPT | Mod: HCNC

## 2024-06-23 PROCEDURE — 36593 DECLOT VASCULAR DEVICE: CPT | Mod: HCNC

## 2024-06-23 PROCEDURE — 99233 SBSQ HOSP IP/OBS HIGH 50: CPT | Mod: HCNC,24,, | Performed by: OBSTETRICS & GYNECOLOGY

## 2024-06-23 RX ORDER — INSULIN ASPART 100 [IU]/ML
0-5 INJECTION, SOLUTION INTRAVENOUS; SUBCUTANEOUS
Status: DISCONTINUED | OUTPATIENT
Start: 2024-06-23 | End: 2024-06-27 | Stop reason: HOSPADM

## 2024-06-23 RX ORDER — POTASSIUM CHLORIDE 20 MEQ/1
40 TABLET, EXTENDED RELEASE ORAL
Status: DISPENSED | OUTPATIENT
Start: 2024-06-23 | End: 2024-06-23

## 2024-06-23 RX ORDER — IBUPROFEN 600 MG/1
600 TABLET ORAL ONCE
Status: COMPLETED | OUTPATIENT
Start: 2024-06-23 | End: 2024-06-23

## 2024-06-23 RX ORDER — INSULIN GLARGINE 100 [IU]/ML
5 INJECTION, SOLUTION SUBCUTANEOUS NIGHTLY
Status: DISCONTINUED | OUTPATIENT
Start: 2024-06-23 | End: 2024-06-24

## 2024-06-23 RX ADMIN — GUAIFENESIN 600 MG: 600 TABLET, EXTENDED RELEASE ORAL at 09:06

## 2024-06-23 RX ADMIN — ENOXAPARIN SODIUM 80 MG: 80 INJECTION SUBCUTANEOUS at 08:06

## 2024-06-23 RX ADMIN — INSULIN GLARGINE 5 UNITS: 100 INJECTION, SOLUTION SUBCUTANEOUS at 09:06

## 2024-06-23 RX ADMIN — IBUPROFEN 600 MG: 600 TABLET, FILM COATED ORAL at 08:06

## 2024-06-23 RX ADMIN — ENOXAPARIN SODIUM 80 MG: 80 INJECTION SUBCUTANEOUS at 09:06

## 2024-06-23 RX ADMIN — LISINOPRIL 20 MG: 20 TABLET ORAL at 09:06

## 2024-06-23 RX ADMIN — PIPERACILLIN SODIUM AND TAZOBACTAM SODIUM 4.5 G: 4; .5 INJECTION, POWDER, FOR SOLUTION INTRAVENOUS at 03:06

## 2024-06-23 RX ADMIN — GUAIFENESIN AND DEXTROMETHORPHAN 10 ML: 100; 10 SYRUP ORAL at 08:06

## 2024-06-23 RX ADMIN — OLANZAPINE 5 MG: 5 TABLET, FILM COATED ORAL at 08:06

## 2024-06-23 RX ADMIN — PIPERACILLIN SODIUM AND TAZOBACTAM SODIUM 4.5 G: 4; .5 INJECTION, POWDER, FOR SOLUTION INTRAVENOUS at 12:06

## 2024-06-23 RX ADMIN — LAMOTRIGINE 25 MG: 25 TABLET ORAL at 08:06

## 2024-06-23 RX ADMIN — INSULIN ASPART 3 UNITS: 100 INJECTION, SOLUTION INTRAVENOUS; SUBCUTANEOUS at 12:06

## 2024-06-23 RX ADMIN — ALTEPLASE 2 MG: 2.2 INJECTION, POWDER, LYOPHILIZED, FOR SOLUTION INTRAVENOUS at 09:06

## 2024-06-23 RX ADMIN — GUAIFENESIN AND DEXTROMETHORPHAN 10 ML: 100; 10 SYRUP ORAL at 09:06

## 2024-06-23 RX ADMIN — ONDANSETRON 8 MG: 8 TABLET, ORALLY DISINTEGRATING ORAL at 12:06

## 2024-06-23 RX ADMIN — GUAIFENESIN 600 MG: 600 TABLET, EXTENDED RELEASE ORAL at 08:06

## 2024-06-23 RX ADMIN — ATORVASTATIN CALCIUM 80 MG: 40 TABLET, FILM COATED ORAL at 09:06

## 2024-06-23 RX ADMIN — POTASSIUM CHLORIDE 40 MEQ: 1500 TABLET, EXTENDED RELEASE ORAL at 10:06

## 2024-06-23 RX ADMIN — INSULIN ASPART 3 UNITS: 100 INJECTION, SOLUTION INTRAVENOUS; SUBCUTANEOUS at 09:06

## 2024-06-23 RX ADMIN — FLUTICASONE PROPIONATE 100 MCG: 50 SPRAY, METERED NASAL at 09:06

## 2024-06-23 RX ADMIN — INSULIN ASPART 1 UNITS: 100 INJECTION, SOLUTION INTRAVENOUS; SUBCUTANEOUS at 09:06

## 2024-06-23 RX ADMIN — LAMOTRIGINE 25 MG: 25 TABLET ORAL at 09:06

## 2024-06-23 RX ADMIN — PIPERACILLIN SODIUM AND TAZOBACTAM SODIUM 4.5 G: 4; .5 INJECTION, POWDER, FOR SOLUTION INTRAVENOUS at 09:06

## 2024-06-23 NOTE — SUBJECTIVE & OBJECTIVE
Interval HPI:   No acute events overnight. Patient in room 806/806 A. Blood glucose stable. BG at and below goal on current insulin regimen (SSI, prandial, and basal insulin ). Steroid use- None      Renal function-   Lab Results   Component Value Date    CREATININE 0.7 2024        Vasopressors-  None     Diet diabetic 1500 Calorie     Eatin%  Nausea: No  Hypoglycemia and intervention: No  Fever: No  TPN and/or TF: No    PMH, PSH, FH, SH updated and reviewed     ROS:  Review of Systems   Constitutional:  Negative for unexpected weight change.   Gastrointestinal:  Positive for nausea. Negative for constipation, diarrhea and vomiting.   Endocrine: Negative for polydipsia and polyuria.       Current Medications and/or Treatments Impacting Glycemic Control  Immunotherapy:    Immunosuppressants       None          Steroids:   Hormones (From admission, onward)      None          Pressors:    Autonomic Drugs (From admission, onward)      None          Hyperglycemia/Diabetes Medications:   Antihyperglycemics (From admission, onward)      Start     Stop Route Frequency Ordered    24 2100  insulin glargine U-100 (Lantus) pen 5 Units         -- SubQ Nightly 24 0909    24 1200  insulin aspart U-100 pen 3 Units         -- SubQ 3 times daily with meals 24 0934    24 0922  insulin aspart U-100 pen 0-10 Units         -- SubQ Before meals & nightly PRN 24 0823             PHYSICAL EXAMINATION:  Vitals:    24 0727   BP: 135/78   Pulse: 104   Resp: 18   Temp: 99.8 °F (37.7 °C)     Body mass index is 28.14 kg/m².     Physical Exam  Vitals reviewed.   Constitutional:       Appearance: Normal appearance.   HENT:      Head: Normocephalic.   Skin:     Comments: Injections sites without redness    Neurological:      General: No focal deficit present.      Mental Status: She is alert and oriented to person, place, and time.   Psychiatric:         Mood and Affect: Mood normal.          Behavior: Behavior normal.         Thought Content: Thought content normal.

## 2024-06-23 NOTE — PLAN OF CARE
Plan of care reviewed with pt. Pt with c/o cough, prn cough medication administered; moderate relief obtained. Pt ambulates in room and to restroom independently. All VSS, afebrile, free from falls or injuries; no acute events so far this shift. Pt in bed with non-skid socks on, bed locked and in lowest position, side rails up x2, call light and personal items within reach. Pt instructed to call for assistance as needed.

## 2024-06-23 NOTE — HPI
Reason for Consult: Management of T2DM, Hyperglycemia     Diabetes diagnosis year: > 5 years ago     Home Diabetes Medications:    Toujeo 30 units daily  Novolog 10 units with meals      How often checking glucose at home? 1-3 x day   BG readings on regimen: 150s-200s  Hypoglycemia on the regimen?  No  Missed doses on regimen?  No    Diabetes Complications include:     Hyperglycemia and Diabetic retinopathy     Complicating diabetes co morbidities:   HLD, HTN       HPI:   Anette Ricci is a 58 y.o.  is a 58 y.o.  with stage IVB ovarian carcinosacoma, s/p PDS on 3/25/24 with Dr Washington ([facility] BSO omx liver mobilization, peritoneal & R diaphragm stripping, hepatic wedge resection, cholecystectomy), s/p C3 on  carbo/taxol/bevacizumab admitted for management of malignant pleural effusion. Endocrine consulted for BG management.

## 2024-06-23 NOTE — ASSESSMENT & PLAN NOTE
BG goal: 140-180    - Decrease Lantus to 5 units HS given fasting BG below goal   - Novolog 3 units TIDWM  - LDC SSI (150/50) prn  - POCT Glucose before meals and at bedtime  - Hypoglycemia protocol in place      ** Please notify Endocrine for any change and/or advance in diet**  ** Please call Endocrine for any BG related issues **     Discharge Planning:   TBD. Please notify endocrinology prior to discharge.

## 2024-06-23 NOTE — PLAN OF CARE
Emesis x1 received zofran. Afebrile this shift.Nonskid footwear on when OOB. Bed locked and low, bed rails up x2. Ambulates independently and instructed to call if assistance is needed, voiced understanding. Call light and personal items within reach.

## 2024-06-23 NOTE — CONSULTS
[facility] - Oncology ([facility])  Endocrinology  Diabetes Consult Note    Consult Requested by: Александр Washington MD   Reason for admit: Pleural effusion, malignant    HISTORY OF PRESENT ILLNESS:  Reason for Consult: Management of T2DM, Hyperglycemia     Diabetes diagnosis year: > 5 years ago     Home Diabetes Medications:    Toujeo 30 units daily  Novolog 10 units with meals      How often checking glucose at home? 1-3 x day   BG readings on regimen: 150s-200s  Hypoglycemia on the regimen?  No  Missed doses on regimen?  No    Diabetes Complications include:     Hyperglycemia and Diabetic retinopathy     Complicating diabetes co morbidities:   HLD, HTN       HPI:   Anette Ricci is a 58 y.o.  is a 58 y.o.  with stage IVB ovarian carcinosacoma, s/p PDS on 3/25/24 with Dr Washington ([facility] BSO omx liver mobilization, peritoneal & R diaphragm stripping, hepatic wedge resection, cholecystectomy), s/p C3 on  carbo/taxol/bevacizumab admitted for management of malignant pleural effusion. Endocrine consulted for BG management.        Interval HPI:   No acute events overnight. Patient in room 806/806 A. Blood glucose stable. BG at and below goal on current insulin regimen (SSI, prandial, and basal insulin ). Steroid use- None      Renal function-   Lab Results   Component Value Date    CREATININE 0.7 2024        Vasopressors-  None     Diet diabetic 1500 Calorie     Eatin%  Nausea: No  Hypoglycemia and intervention: No  Fever: No  TPN and/or TF: No    PMH, PSH, FH, SH updated and reviewed     ROS:  Review of Systems   Constitutional:  Negative for unexpected weight change.   Gastrointestinal:  Positive for nausea. Negative for constipation, diarrhea and vomiting.   Endocrine: Negative for polydipsia and polyuria.       Current Medications and/or Treatments Impacting Glycemic Control  Immunotherapy:    Immunosuppressants       None          Steroids:   Hormones (From admission, onward)  "     None          Pressors:    Autonomic Drugs (From admission, onward)      None          Hyperglycemia/Diabetes Medications:   Antihyperglycemics (From admission, onward)      Start     Stop Route Frequency Ordered    06/23/24 2100  insulin glargine U-100 (Lantus) pen 5 Units         -- SubQ Nightly 06/23/24 0909    06/20/24 1200  insulin aspart U-100 pen 3 Units         -- SubQ 3 times daily with meals 06/20/24 0934    06/20/24 0922  insulin aspart U-100 pen 0-10 Units         -- SubQ Before meals & nightly PRN 06/20/24 0823             PHYSICAL EXAMINATION:  Vitals:    06/23/24 0727   BP: 135/78   Pulse: 104   Resp: 18   Temp: 99.8 °F (37.7 °C)     Body mass index is 28.14 kg/m².     Physical Exam  Vitals reviewed.   Constitutional:       Appearance: Normal appearance.   HENT:      Head: Normocephalic.   Skin:     Comments: Injections sites without redness    Neurological:      General: No focal deficit present.      Mental Status: She is alert and oriented to person, place, and time.   Psychiatric:         Mood and Affect: Mood normal.         Behavior: Behavior normal.         Thought Content: Thought content normal.            Labs Reviewed and Include   Recent Labs   Lab 06/23/24 0624   GLU 83   CALCIUM 8.3*   *   K 3.3*   CO2 27   CL 99   BUN 12   CREATININE 0.7     Lab Results   Component Value Date    WBC 11.86 06/21/2024    HGB 8.8 (L) 06/21/2024    HCT 27.1 (L) 06/21/2024    MCV 82 06/21/2024     06/21/2024     No results for input(s): "TSH", "FREET4" in the last 168 hours.  Lab Results   Component Value Date    HGBA1C 7.3 (H) 03/19/2024       Nutritional status:   Body mass index is 28.14 kg/m².  Lab Results   Component Value Date    ALBUMIN 2.4 (L) 06/21/2024    ALBUMIN 2.4 (L) 06/20/2024    ALBUMIN 2.5 (L) 05/30/2024     No results found for: "PREALBUMIN"    Estimated Creatinine Clearance: 95.1 mL/min (based on SCr of 0.7 mg/dL).    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 Please notify endocrinology prior to discharge.           Plan discussed with patient, family, and RN at bedside.     Modesta Goldberg PA-C  Endocrinology  [facility] ([facility])

## 2024-06-24 LAB
ABO + RH BLD: NORMAL
ANION GAP SERPL CALC-SCNC: 11 MMOL/L (ref 8–16)
BLD GP AB SCN CELLS X3 SERPL QL: NORMAL
BUN SERPL-MCNC: 13 MG/DL (ref 6–20)
CALCIUM SERPL-MCNC: 8 MG/DL (ref 8.7–10.5)
CANCER AG125 SERPL-ACNC: 87 U/ML (ref 0–30)
CHLORIDE SERPL-SCNC: 97 MMOL/L (ref 95–110)
CO2 SERPL-SCNC: 25 MMOL/L (ref 23–29)
CREAT SERPL-MCNC: 0.8 MG/DL (ref 0.5–1.4)
EST. GFR  (NO RACE VARIABLE): >60 ML/MIN/1.73 M^2
GLUCOSE SERPL-MCNC: 164 MG/DL (ref 70–110)
MAGNESIUM SERPL-MCNC: 1.4 MG/DL (ref 1.6–2.6)
POCT GLUCOSE: 152 MG/DL (ref 70–110)
POCT GLUCOSE: 169 MG/DL (ref 70–110)
POCT GLUCOSE: 214 MG/DL (ref 70–110)
POCT GLUCOSE: 238 MG/DL (ref 70–110)
POTASSIUM SERPL-SCNC: 3.6 MMOL/L (ref 3.5–5.1)
SODIUM SERPL-SCNC: 133 MMOL/L (ref 136–145)
SPECIMEN OUTDATE: NORMAL

## 2024-06-24 PROCEDURE — 25500020 PHARM REV CODE 255: Mod: HCNC | Performed by: STUDENT IN AN ORGANIZED HEALTH CARE EDUCATION/TRAINING PROGRAM

## 2024-06-24 PROCEDURE — 36415 COLL VENOUS BLD VENIPUNCTURE: CPT | Mod: HCNC | Performed by: STUDENT IN AN ORGANIZED HEALTH CARE EDUCATION/TRAINING PROGRAM

## 2024-06-24 PROCEDURE — 25000003 PHARM REV CODE 250: Mod: HCNC | Performed by: STUDENT IN AN ORGANIZED HEALTH CARE EDUCATION/TRAINING PROGRAM

## 2024-06-24 PROCEDURE — 63600175 PHARM REV CODE 636 W HCPCS: Mod: HCNC

## 2024-06-24 PROCEDURE — 20600001 HC STEP DOWN PRIVATE ROOM: Mod: HCNC

## 2024-06-24 PROCEDURE — 99232 SBSQ HOSP IP/OBS MODERATE 35: CPT | Mod: HCNC,,, | Performed by: OBSTETRICS & GYNECOLOGY

## 2024-06-24 PROCEDURE — 83735 ASSAY OF MAGNESIUM: CPT | Mod: HCNC

## 2024-06-24 PROCEDURE — 25000003 PHARM REV CODE 250: Mod: HCNC

## 2024-06-24 PROCEDURE — 86304 IMMUNOASSAY TUMOR CA 125: CPT | Mod: HCNC

## 2024-06-24 PROCEDURE — 86850 RBC ANTIBODY SCREEN: CPT | Mod: HCNC | Performed by: STUDENT IN AN ORGANIZED HEALTH CARE EDUCATION/TRAINING PROGRAM

## 2024-06-24 PROCEDURE — 99233 SBSQ HOSP IP/OBS HIGH 50: CPT | Mod: HCNC,,, | Performed by: INTERNAL MEDICINE

## 2024-06-24 PROCEDURE — 36415 COLL VENOUS BLD VENIPUNCTURE: CPT | Mod: HCNC

## 2024-06-24 PROCEDURE — 99232 SBSQ HOSP IP/OBS MODERATE 35: CPT | Mod: HCNC,,,

## 2024-06-24 PROCEDURE — 86901 BLOOD TYPING SEROLOGIC RH(D): CPT | Mod: HCNC | Performed by: STUDENT IN AN ORGANIZED HEALTH CARE EDUCATION/TRAINING PROGRAM

## 2024-06-24 PROCEDURE — 80048 BASIC METABOLIC PNL TOTAL CA: CPT | Mod: HCNC

## 2024-06-24 RX ORDER — INSULIN ASPART 100 [IU]/ML
5 INJECTION, SOLUTION INTRAVENOUS; SUBCUTANEOUS
Status: DISCONTINUED | OUTPATIENT
Start: 2024-06-24 | End: 2024-06-27 | Stop reason: HOSPADM

## 2024-06-24 RX ORDER — MAGNESIUM SULFATE HEPTAHYDRATE 40 MG/ML
2 INJECTION, SOLUTION INTRAVENOUS ONCE
Status: COMPLETED | OUTPATIENT
Start: 2024-06-24 | End: 2024-06-24

## 2024-06-24 RX ORDER — IBUPROFEN 600 MG/1
600 TABLET ORAL EVERY 6 HOURS PRN
Status: DISCONTINUED | OUTPATIENT
Start: 2024-06-24 | End: 2024-06-26

## 2024-06-24 RX ORDER — INSULIN GLARGINE 100 [IU]/ML
6 INJECTION, SOLUTION SUBCUTANEOUS NIGHTLY
Status: DISCONTINUED | OUTPATIENT
Start: 2024-06-24 | End: 2024-06-27

## 2024-06-24 RX ADMIN — OLANZAPINE 5 MG: 5 TABLET, FILM COATED ORAL at 08:06

## 2024-06-24 RX ADMIN — PIPERACILLIN SODIUM AND TAZOBACTAM SODIUM 4.5 G: 4; .5 INJECTION, POWDER, FOR SOLUTION INTRAVENOUS at 04:06

## 2024-06-24 RX ADMIN — PIPERACILLIN SODIUM AND TAZOBACTAM SODIUM 4.5 G: 4; .5 INJECTION, POWDER, FOR SOLUTION INTRAVENOUS at 01:06

## 2024-06-24 RX ADMIN — INSULIN ASPART 2 UNITS: 100 INJECTION, SOLUTION INTRAVENOUS; SUBCUTANEOUS at 12:06

## 2024-06-24 RX ADMIN — IBUPROFEN 600 MG: 600 TABLET, FILM COATED ORAL at 10:06

## 2024-06-24 RX ADMIN — GUAIFENESIN 600 MG: 600 TABLET, EXTENDED RELEASE ORAL at 08:06

## 2024-06-24 RX ADMIN — FLUTICASONE PROPIONATE 100 MCG: 50 SPRAY, METERED NASAL at 09:06

## 2024-06-24 RX ADMIN — PIPERACILLIN SODIUM AND TAZOBACTAM SODIUM 4.5 G: 4; .5 INJECTION, POWDER, FOR SOLUTION INTRAVENOUS at 09:06

## 2024-06-24 RX ADMIN — IOHEXOL 100 ML: 350 INJECTION, SOLUTION INTRAVENOUS at 07:06

## 2024-06-24 RX ADMIN — BENZONATATE 100 MG: 100 CAPSULE ORAL at 11:06

## 2024-06-24 RX ADMIN — INSULIN ASPART 3 UNITS: 100 INJECTION, SOLUTION INTRAVENOUS; SUBCUTANEOUS at 09:06

## 2024-06-24 RX ADMIN — ATORVASTATIN CALCIUM 80 MG: 40 TABLET, FILM COATED ORAL at 09:06

## 2024-06-24 RX ADMIN — ENOXAPARIN SODIUM 80 MG: 80 INJECTION SUBCUTANEOUS at 09:06

## 2024-06-24 RX ADMIN — INSULIN GLARGINE 6 UNITS: 100 INJECTION, SOLUTION SUBCUTANEOUS at 08:06

## 2024-06-24 RX ADMIN — ENOXAPARIN SODIUM 80 MG: 80 INJECTION SUBCUTANEOUS at 08:06

## 2024-06-24 RX ADMIN — LAMOTRIGINE 25 MG: 25 TABLET ORAL at 09:06

## 2024-06-24 RX ADMIN — GUAIFENESIN 600 MG: 600 TABLET, EXTENDED RELEASE ORAL at 09:06

## 2024-06-24 RX ADMIN — INSULIN ASPART 5 UNITS: 100 INJECTION, SOLUTION INTRAVENOUS; SUBCUTANEOUS at 08:06

## 2024-06-24 RX ADMIN — LAMOTRIGINE 25 MG: 25 TABLET ORAL at 08:06

## 2024-06-24 RX ADMIN — LISINOPRIL 20 MG: 20 TABLET ORAL at 09:06

## 2024-06-24 RX ADMIN — MAGNESIUM SULFATE HEPTAHYDRATE 2 G: 40 INJECTION, SOLUTION INTRAVENOUS at 01:06

## 2024-06-24 RX ADMIN — INSULIN ASPART 2 UNITS: 100 INJECTION, SOLUTION INTRAVENOUS; SUBCUTANEOUS at 09:06

## 2024-06-24 RX ADMIN — IBUPROFEN 600 MG: 600 TABLET, FILM COATED ORAL at 11:06

## 2024-06-24 RX ADMIN — INSULIN ASPART 3 UNITS: 100 INJECTION, SOLUTION INTRAVENOUS; SUBCUTANEOUS at 12:06

## 2024-06-24 NOTE — SUBJECTIVE & OBJECTIVE
"Interval HPI:   No acute events overnight. Patient in room 806/806 A. Blood glucose stable. BG at and above goal on current insulin regimen (SSI, prandial, and basal insulin ). Steroid use- None.      Renal function- Normal   Vasopressors-  None     Diet diabetic 1500 Calorie     Eatin%  Nausea: No  Hypoglycemia and intervention: No  Fever: No  TPN and/or TF: No    BP (!) 120/57 (BP Location: Left arm, Patient Position: Sitting)   Pulse 92   Temp 98.8 °F (37.1 °C) (Oral)   Resp 18   Ht 5' 7" (1.702 m)   Wt 81.5 kg (179 lb 10.8 oz)   LMP 2015   SpO2 98%   Breastfeeding No   BMI 28.14 kg/m²     Labs Reviewed and Include    Recent Labs   Lab 24  0410   *   CALCIUM 8.0*   *   K 3.6   CO2 25   CL 97   BUN 13   CREATININE 0.8     Lab Results   Component Value Date    WBC 11.86 2024    HGB 8.8 (L) 2024    HCT 27.1 (L) 2024    MCV 82 2024     2024     No results for input(s): "TSH", "FREET4" in the last 168 hours.  Lab Results   Component Value Date    HGBA1C 7.3 (H) 2024       Nutritional status:   Body mass index is 28.14 kg/m².  Lab Results   Component Value Date    ALBUMIN 2.4 (L) 2024    ALBUMIN 2.4 (L) 2024    ALBUMIN 2.5 (L) 2024     No results found for: "PREALBUMIN"    Estimated Creatinine Clearance: 83.2 mL/min (based on SCr of 0.8 mg/dL).    Accu-Checks  Recent Labs     24  2217 24  0907 24  1223 24  1722 24  2128 24  0903 24  1246 24  1740 24  2104 24  0821   POCTGLUCOSE 240* 134* 251* 79 124* 92 166* 157* 232* 238*       Current Medications and/or Treatments Impacting Glycemic Control  Immunotherapy:    Immunosuppressants       None          Steroids:   Hormones (From admission, onward)      None          Pressors:    Autonomic Drugs (From admission, onward)      None          Hyperglycemia/Diabetes Medications:   Antihyperglycemics (From admission, " onward)      Start     Stop Route Frequency Ordered    06/23/24 2100  insulin glargine U-100 (Lantus) pen 5 Units         -- SubQ Nightly 06/23/24 0909    06/23/24 0915  insulin aspart U-100 pen 0-5 Units         -- SubQ Before meals & nightly PRN 06/23/24 0916    06/20/24 1200  insulin aspart U-100 pen 3 Units         -- SubQ 3 times daily with meals 06/20/24 0934

## 2024-06-24 NOTE — PLAN OF CARE
Pulmonary consult service following for question of anticoagulation for PE noted on CT.  Note that lovenox treatment-dose was initiated per Dr. Costello's recommendation 6/23 with unchanged respiratory status today. Overall clinically stable on a/c.     Pulmonary will sign off; please call anytime for questions.    Filiberto Cummins MD  [employer] Pulmonary Critical Care Fellow

## 2024-06-24 NOTE — SUBJECTIVE & OBJECTIVE
Past Medical History:   Diagnosis Date    Breast cancer 2013    Diabetes mellitus     Genetic testing 05/29/2013    VUS    Hyperlipidemia     Hypertension     Malignant neoplasm of upper-outer quadrant of right breast in female, estrogen receptor positive 12/02/2015    Seizure disorder        Past Surgical History:   Procedure Laterality Date    BILATERAL SALPINGO-OOPHORECTOMY (BSO) N/A 3/25/2024    Procedure: SALPINGO-OOPHORECTOMY, BILATERAL;  Surgeon: Александр Washington MD;  Location: [facility] OR [address] [place] [address];  Service: OB/GYN;  Laterality: N/A;    BREAST BIOPSY      BREAST LUMPECTOMY Right 2013    CHOLECYSTECTOMY  3/25/2024    Procedure: CHOLECYSTECTOMY;  Surgeon: Bo Farmer MD;  Location: [facility] OR [address] [place] [address];  Service: General;;    DEBULKING OF TUMOR N/A 3/25/2024    Procedure: DEBULKING, NEOPLASM;  Surgeon: Александр Washington MD;  Location: [facility] OR [address] [place] [address];  Service: OB/GYN;  Laterality: N/A;    EXCISION, LIVER N/A 3/25/2024    Procedure: EXCISION, LIVER - partial;  Surgeon: Bo Farmer MD;  Location: [facility] OR [address] [place] [address];  Service: General;  Laterality: N/A;  bk ultrasound  [place] book by TA on 3-21-24  7:00 a.m.  Conf #  [id number]    EYE SURGERY      LAPAROTOMY, EXPLORATORY N/A 3/25/2024    Procedure: LAPAROTOMY, EXPLORATORY;  Surgeon: Александр Washington MD;  Location: [facility] OR [address] [place] [address];  Service: OB/GYN;  Laterality: N/A;    OMENTECTOMY N/A 3/25/2024    Procedure: OMENTECTOMY;  Surgeon: Александр Washington MD;  Location: [facility] OR [address] [place] [address];  Service: OB/GYN;  Laterality: N/A;    PERITONEOCENTESIS N/A 3/13/2024    Procedure: PARACENTESIS, ABDOMINAL;  Surgeon: Flavia Moore MD;  Location: [facility] CATH LAB;  Service: Radiology;  Laterality: N/A;    PERITONEOCENTESIS N/A 3/21/2024    Procedure: PARACENTESIS, ABDOMINAL;  Surgeon: Jorge Jolley MD;  Location: [facility] CATH LAB;  Service: Radiology;  Laterality: N/A;    PERITONEOCENTESIS N/A 6/10/2024    Procedure: PARACENTESIS, ABDOMINAL;  Surgeon: Frieda  Rogelio KELLEY MD;  Location: [facility] CATH LAB;  Service: Radiology;  Laterality: N/A;    TOTAL ABDOMINAL HYSTERECTOMY N/A 3/25/2024    Procedure: HYSTERECTOMY, TOTAL, ABDOMINAL;  Surgeon: Александр Washington MD;  Location: [facility] OR [address] [place] [address];  Service: OB/GYN;  Laterality: N/A;    TOTAL REDUCTION MAMMOPLASTY Bilateral 2016       Review of patient's allergies indicates:   Allergen Reactions    Codeine Other (See Comments)     Flu like s/s    Tramadol Other (See Comments)     Flu like symptoms similar to codeine reaction       Medications:  Medications Prior to Admission   Medication Sig    atorvastatin (LIPITOR) 40 MG tablet Take 80 mg by mouth once daily.    enalapril (VASOTEC) 20 MG tablet Take 20 mg by mouth once daily.    ibuprofen (ADVIL,MOTRIN) 600 MG tablet Take 1 tablet (600 mg total) by mouth every 6 (six) hours as needed for Pain.    insulin lispro (HUMALOG U-100 INSULIN) 100 unit/mL injection **LOW CORRECTION DOSE**  Blood Glucose  mg/dL                  Pre-meal                  151-200                0 unit                        201-250                2 units                      251-300                3 units                      301-350                4 units                      >350                     5 units                      Administer subcutaneously if needed at times designated by monitoring schedule.    lamoTRIgine (LAMICTAL) 25 MG tablet Take 25 mg by mouth 2 (two) times daily.    lorazepam (ATIVAN) 0.5 MG tablet Take 0.5 mg by mouth every 12 (twelve) hours as needed for Anxiety.    metformin (GLUCOPHAGE) 1000 MG tablet Take 1,000 mg by mouth daily with breakfast.     ondansetron (ZOFRAN-ODT) 4 MG TbDL Take 1 tablet (4 mg total) by mouth every 6 (six) hours as needed (for nausea).    quetiapine (SEROQUEL) 25 MG Tab Take 25 mg by mouth daily as needed.    acetaminophen (TYLENOL) 325 MG tablet Take 2 tablets (650 mg total) by mouth every 4 (four) hours as needed for Pain.    amlodipine (NORVASC)  10 MG tablet Take 10 mg by mouth once daily.     dexAMETHasone (DECADRON) 4 MG Tab Take 2 tablets (8 mg total) by mouth once daily. daily on days 2-4 of your chemotherapy cycle.    fluticasone propionate (FLONASE) 50 mcg/actuation nasal spray 1 spray by Each Nostril route once daily.    hydrochlorothiazide (HYDRODIURIL) 25 MG tablet Take 25 mg by mouth once daily.     hydrocodone-acetaminophen (HYCET) solution 7.5-325 mg/15mL Take 30 mLs by mouth every 6 (six) hours as needed for Pain.    HYDROmorphone (DILAUDID) 2 MG tablet Take 1 tablet (2 mg total) by mouth every 8 (eight) hours as needed for Pain.    insulin aspart U-100 (NOVOLOG) 100 unit/mL (3 mL) InPn pen Inject 3 Units into the skin 3 (three) times daily with meals.  (Discard pen 28 days after initial use)    insulin detemir U-100, Levemir, (LEVEMIR FLEXPEN) 100 unit/mL (3 mL) InPn pen Inject 7 Units into the skin every evening.  (Discard pen 28 days after initial use)    ketorolac 0.5% (ACULAR) 0.5 % Drop Place 1 drop into both eyes. For pain after eye injections.    OLANZapine (ZYPREXA) 5 MG tablet Take 1 tablet (5 mg total) by mouth every evening. nightly on days 1-4 of each chemotherapy cycle.    prochlorperazine (COMPAZINE) 10 MG tablet Take 1 tablet (10 mg total) by mouth every 6 (six) hours as needed (Nausea).     Antibiotics (From admission, onward)      Start     Stop Route Frequency Ordered    06/23/24 2030  piperacillin-tazobactam (ZOSYN) 4.5 g in D5W 100 mL IVPB (MB+)         -- IV Every 8 hours (non-standard times) 06/23/24 2016          Antifungals (From admission, onward)      None          Antivirals (From admission, onward)      None             Immunization History   Administered Date(s) Administered    COVID-19 MRNA, LN-S PF (MODERNA HALF 0.25 ML DOSE) 12/11/2021    COVID-19, MRNA, LN-S, PF (MODERNA FULL 0.5 ML DOSE) 03/01/2021, 03/29/2021    COVID-19, mRNA, LNP-S, PF (Moderna 2023)Ages 12+ 01/15/2024    Influenza - Quadrivalent - MDCK -  PF 10/06/2017       Family History       Problem Relation (Age of Onset)    Breast cancer Sister (48)    Colon cancer Sister    Hypertension Mother, Brother, Brother    Seizures Father          Social History     Socioeconomic History    Marital status:    Tobacco Use    Smoking status: Never    Smokeless tobacco: Never   Substance and Sexual Activity    Alcohol use: Yes     Alcohol/week: 0.0 standard drinks of alcohol     Comment: ocassional    Drug use: No    Sexual activity: Not Currently     Partners: Male     Birth control/protection: None     Social Determinants of Health     Financial Resource Strain: Low Risk  (6/20/2024)    Overall Financial Resource Strain (CARDIA)     Difficulty of Paying Living Expenses: Not hard at all   Food Insecurity: No Food Insecurity (6/20/2024)    Hunger Vital Sign     Worried About Running Out of Food in the Last Year: Never true     Ran Out of Food in the Last Year: Never true   Transportation Needs: No Transportation Needs (6/20/2024)    TRANSPORTATION NEEDS     Transportation : No   Physical Activity: Inactive (2/28/2024)    Exercise Vital Sign     Days of Exercise per Week: 0 days     Minutes of Exercise per Session: 0 min   Stress: No Stress Concern Present (6/20/2024)    [employer] [place] [employer] - Occupational Stress Questionnaire     Feeling of Stress : Only a little   Housing Stability: Low Risk  (6/20/2024)    Housing Stability Vital Sign     Unable to Pay for Housing in the Last Year: No     Homeless in the Last Year: No     Review of Systems   All other systems reviewed and are negative.    Objective:     Vital Signs (Most Recent):  Temp: 98.1 °F (36.7 °C) (06/24/24 1521)  Pulse: 95 (06/24/24 1521)  Resp: 18 (06/24/24 0745)  BP: 105/65 (06/24/24 1521)  SpO2: 99 % (06/24/24 1521) Vital Signs (24h Range):  Temp:  [97.7 °F (36.5 °C)-100.5 °F (38.1 °C)] 98.1 °F (36.7 °C)  Pulse:  [] 95  Resp:  [18] 18  SpO2:  [95 %-99 %] 99 %  BP:  (105-127)/(57-80) 105/65     Weight: 81.5 kg (179 lb 10.8 oz)  Body mass index is 28.14 kg/m².    Estimated Creatinine Clearance: 83.2 mL/min (based on SCr of 0.8 mg/dL).     Physical Exam  Constitutional:       General: She is not in acute distress.     Appearance: Normal appearance. She is not ill-appearing.   HENT:      Head: Atraumatic.      Right Ear: External ear normal.      Left Ear: External ear normal.      Nose: Nose normal.      Mouth/Throat:      Mouth: Mucous membranes are moist.      Pharynx: Oropharynx is clear.   Eyes:      General:         Right eye: No discharge.         Left eye: No discharge.      Extraocular Movements: Extraocular movements intact.   Cardiovascular:      Rate and Rhythm: Normal rate and regular rhythm.      Heart sounds: Normal heart sounds. No murmur heard.  Pulmonary:      Effort: Pulmonary effort is normal. No respiratory distress.      Breath sounds: Normal breath sounds. No wheezing.   Abdominal:      General: Abdomen is flat. Bowel sounds are normal. There is no distension.      Palpations: Abdomen is soft.      Tenderness: There is no abdominal tenderness. There is no right CVA tenderness or left CVA tenderness.   Musculoskeletal:         General: Normal range of motion.      Cervical back: Normal range of motion. No rigidity.      Right lower leg: No edema.      Left lower leg: No edema.   Skin:     General: Skin is warm and dry.      Coloration: Skin is not jaundiced.   Neurological:      Mental Status: She is alert and oriented to person, place, and time. Mental status is at baseline.      Cranial Nerves: No cranial nerve deficit.   Psychiatric:         Mood and Affect: Mood normal.         Behavior: Behavior normal.          Significant Labs: All pertinent labs within the past 24 hours have been reviewed.    Significant Imaging: I have reviewed all pertinent imaging results/findings within the past 24 hours.

## 2024-06-24 NOTE — PLAN OF CARE
Plan of care reviewed with pt. Pt ambulates in room and to restroom independently. Pt with tmax 100.5, provider notified and ibuprofen administered. Pt free from falls or injuries; no acute events so far this shift. Pt in bed with non-skid socks on, bed locked and in lowest position, side rails up x2, call light and personal items within reach. Pt instructed to call for assistance as needed.

## 2024-06-24 NOTE — HPI
59 yo F PMH stage 4 ovarian ca (3/2024) s/p debulking, omentectomy, partial hepatectomy admitted for SOB 2/2 pleural effusion. CXR showed RLL opacitiy and CT chest showing R sided loculated subdiaphragmatic fluid 7.3x11.2cm which was not amendable to drainage by IR. She was empirically started on vancomycin and zosyn and currently on only zosyn. She reports improvement in SOB since admission. She denies any fever or chills. Multiple Bcx collected since admission NGTD.

## 2024-06-24 NOTE — ASSESSMENT & PLAN NOTE
59 yo F PMH stage 4 ovarian ca (3/2024) s/p debulking, omentectomy, partial hepatectomy admitted for SOB 2/2 pleural effusion. CXR showed RLL opacitiy and CT chest showing R sided loculated subdiaphragmatic fluid 7.3x11.2cm which was not amendable to drainage by IR. She was empirically started on vancomycin and zosyn and currently on only zosyn. Multiple Bcx collected since admission NGTD.    Recommendations:  -continue zosyn for now  -fevers likely related to malignant pleural effusions  -order CBC in AM  -repeat CT chest to follow up on R side effusion

## 2024-06-24 NOTE — CONSULTS
[facility])  Infectious Disease  Consult Note    Patient Name: Anette Ricci  MRN: [id number]  Admission Date: 6/20/2024  Hospital Length of Stay: 4 days  Attending Physician: Александр Washington MD  Primary Care Provider: Mark Chua MD     Isolation Status: No active isolations    Patient information was obtained from patient and ER records.      Inpatient consult to Infectious Diseases  Consult performed by: Rick Ibarra MD  Consult ordered by: Jayla Holguin MD  Reason for consult: fever        Assessment/Plan:     Pulmonary  * Pleural effusion, malignant  57 yo F PMH stage 4 ovarian ca (3/2024) s/p debulking, omentectomy, partial hepatectomy admitted for SOB 2/2 pleural effusion. CXR showed RLL opacitiy and CT chest showing R sided loculated subdiaphragmatic fluid 7.3x11.2cm which was not amendable to drainage by IR. She was empirically started on vancomycin and zosyn and currently on only zosyn. Multiple Bcx collected since admission NGTD.    Recommendations:  -continue zosyn for now  -fevers likely related to malignant pleural effusions  -order CBC in AM  -repeat CT chest to follow up on R side effusion        Thank you for your consult. I will follow-up with patient. Please contact us if you have any additional questions.    Rick Ibarra MD  Infectious Disease  [facility])    Subjective:     Principal Problem: Pleural effusion, malignant    HPI: 57 yo F PMH stage 4 ovarian ca (3/2024) s/p debulking, omentectomy, partial hepatectomy admitted for SOB 2/2 pleural effusion. CXR showed RLL opacitiy and CT chest showing R sided loculated subdiaphragmatic fluid 7.3x11.2cm which was not amendable to drainage by IR. She was empirically started on vancomycin and zosyn and currently on only zosyn. She reports improvement in SOB since admission. She denies any fever or chills. Multiple Bcx collected since admission NGTD.      Past Medical History:   Diagnosis Date     Breast cancer 2013    Diabetes mellitus     Genetic testing 05/29/2013    VUS    Hyperlipidemia     Hypertension     Malignant neoplasm of upper-outer quadrant of right breast in female, estrogen receptor positive 12/02/2015    Seizure disorder        Past Surgical History:   Procedure Laterality Date    BILATERAL SALPINGO-OOPHORECTOMY (BSO) N/A 3/25/2024    Procedure: SALPINGO-OOPHORECTOMY, BILATERAL;  Surgeon: Александр Washington MD;  Location: [facility] OR [address] [place] [address];  Service: OB/GYN;  Laterality: N/A;    BREAST BIOPSY      BREAST LUMPECTOMY Right 2013    CHOLECYSTECTOMY  3/25/2024    Procedure: CHOLECYSTECTOMY;  Surgeon: Bo Farmer MD;  Location: [facility] OR [address] [place] [address];  Service: General;;    DEBULKING OF TUMOR N/A 3/25/2024    Procedure: DEBULKING, NEOPLASM;  Surgeon: Александр Washington MD;  Location: [facility] OR [address] [place] [address];  Service: OB/GYN;  Laterality: N/A;    EXCISION, LIVER N/A 3/25/2024    Procedure: EXCISION, LIVER - partial;  Surgeon: Bo Farmer MD;  Location: [facility] OR [address] [place] [address];  Service: General;  Laterality: N/A;  bk ultrasound  [place] book by TA on 3-21-24  7:00 a.m.  Conf #  [id number]    EYE SURGERY      LAPAROTOMY, EXPLORATORY N/A 3/25/2024    Procedure: LAPAROTOMY, EXPLORATORY;  Surgeon: Александр Washingotn MD;  Location: [facility] OR [address] [place] [address];  Service: OB/GYN;  Laterality: N/A;    OMENTECTOMY N/A 3/25/2024    Procedure: OMENTECTOMY;  Surgeon: Александр Washington MD;  Location: [facility] OR [address] [place] [address];  Service: OB/GYN;  Laterality: N/A;    PERITONEOCENTESIS N/A 3/13/2024    Procedure: PARACENTESIS, ABDOMINAL;  Surgeon: Flavia Moore MD;  Location: [facility] CATH LAB;  Service: Radiology;  Laterality: N/A;    PERITONEOCENTESIS N/A 3/21/2024    Procedure: PARACENTESIS, ABDOMINAL;  Surgeon: Jorge Jolley MD;  Location: [facility] CATH LAB;  Service: Radiology;  Laterality: N/A;    PERITONEOCENTESIS N/A 6/10/2024    Procedure: PARACENTESIS, ABDOMINAL;  Surgeon: Rogelio Malave MD;  Location: [facility] CATH LAB;   Service: Radiology;  Laterality: N/A;    TOTAL ABDOMINAL HYSTERECTOMY N/A 3/25/2024    Procedure: HYSTERECTOMY, TOTAL, ABDOMINAL;  Surgeon: Александр Washington MD;  Location: [facility] OR [address] [place] [address];  Service: OB/GYN;  Laterality: N/A;    TOTAL REDUCTION MAMMOPLASTY Bilateral 2016       Review of patient's allergies indicates:   Allergen Reactions    Codeine Other (See Comments)     Flu like s/s    Tramadol Other (See Comments)     Flu like symptoms similar to codeine reaction       Medications:  Medications Prior to Admission   Medication Sig    atorvastatin (LIPITOR) 40 MG tablet Take 80 mg by mouth once daily.    enalapril (VASOTEC) 20 MG tablet Take 20 mg by mouth once daily.    ibuprofen (ADVIL,MOTRIN) 600 MG tablet Take 1 tablet (600 mg total) by mouth every 6 (six) hours as needed for Pain.    insulin lispro (HUMALOG U-100 INSULIN) 100 unit/mL injection **LOW CORRECTION DOSE**  Blood Glucose  mg/dL                  Pre-meal                  151-200                0 unit                        201-250                2 units                      251-300                3 units                      301-350                4 units                      >350                     5 units                      Administer subcutaneously if needed at times designated by monitoring schedule.    lamoTRIgine (LAMICTAL) 25 MG tablet Take 25 mg by mouth 2 (two) times daily.    lorazepam (ATIVAN) 0.5 MG tablet Take 0.5 mg by mouth every 12 (twelve) hours as needed for Anxiety.    metformin (GLUCOPHAGE) 1000 MG tablet Take 1,000 mg by mouth daily with breakfast.     ondansetron (ZOFRAN-ODT) 4 MG TbDL Take 1 tablet (4 mg total) by mouth every 6 (six) hours as needed (for nausea).    quetiapine (SEROQUEL) 25 MG Tab Take 25 mg by mouth daily as needed.    acetaminophen (TYLENOL) 325 MG tablet Take 2 tablets (650 mg total) by mouth every 4 (four) hours as needed for Pain.    amlodipine (NORVASC) 10 MG tablet Take 10 mg by mouth once daily.      dexAMETHasone (DECADRON) 4 MG Tab Take 2 tablets (8 mg total) by mouth once daily. daily on days 2-4 of your chemotherapy cycle.    fluticasone propionate (FLONASE) 50 mcg/actuation nasal spray 1 spray by Each Nostril route once daily.    hydrochlorothiazide (HYDRODIURIL) 25 MG tablet Take 25 mg by mouth once daily.     hydrocodone-acetaminophen (HYCET) solution 7.5-325 mg/15mL Take 30 mLs by mouth every 6 (six) hours as needed for Pain.    HYDROmorphone (DILAUDID) 2 MG tablet Take 1 tablet (2 mg total) by mouth every 8 (eight) hours as needed for Pain.    insulin aspart U-100 (NOVOLOG) 100 unit/mL (3 mL) InPn pen Inject 3 Units into the skin 3 (three) times daily with meals.  (Discard pen 28 days after initial use)    insulin detemir U-100, Levemir, (LEVEMIR FLEXPEN) 100 unit/mL (3 mL) InPn pen Inject 7 Units into the skin every evening.  (Discard pen 28 days after initial use)    ketorolac 0.5% (ACULAR) 0.5 % Drop Place 1 drop into both eyes. For pain after eye injections.    OLANZapine (ZYPREXA) 5 MG tablet Take 1 tablet (5 mg total) by mouth every evening. nightly on days 1-4 of each chemotherapy cycle.    prochlorperazine (COMPAZINE) 10 MG tablet Take 1 tablet (10 mg total) by mouth every 6 (six) hours as needed (Nausea).     Antibiotics (From admission, onward)      Start     Stop Route Frequency Ordered    06/23/24 2030  piperacillin-tazobactam (ZOSYN) 4.5 g in D5W 100 mL IVPB (MB+)         -- IV Every 8 hours (non-standard times) 06/23/24 2016          Antifungals (From admission, onward)      None          Antivirals (From admission, onward)      None             Immunization History   Administered Date(s) Administered    COVID-19 MRNA, LN-S PF (MODERNA HALF 0.25 ML DOSE) 12/11/2021    COVID-19, MRNA, LN-S, PF (MODERNA FULL 0.5 ML DOSE) 03/01/2021, 03/29/2021    COVID-19, mRNA, LNP-S, PF (Moderna 2023)Ages 12+ 01/15/2024    Influenza - Quadrivalent - MDCK - PF 10/06/2017       Family History        Problem Relation (Age of Onset)    Breast cancer Sister (48)    Colon cancer Sister    Hypertension Mother, Brother, Brother    Seizures Father          Social History     Socioeconomic History    Marital status:    Tobacco Use    Smoking status: Never    Smokeless tobacco: Never   Substance and Sexual Activity    Alcohol use: Yes     Alcohol/week: 0.0 standard drinks of alcohol     Comment: ocassional    Drug use: No    Sexual activity: Not Currently     Partners: Male     Birth control/protection: None     Social Determinants of Health     Financial Resource Strain: Low Risk  (6/20/2024)    Overall Financial Resource Strain (CARDIA)     Difficulty of Paying Living Expenses: Not hard at all   Food Insecurity: No Food Insecurity (6/20/2024)    Hunger Vital Sign     Worried About Running Out of Food in the Last Year: Never true     Ran Out of Food in the Last Year: Never true   Transportation Needs: No Transportation Needs (6/20/2024)    TRANSPORTATION NEEDS     Transportation : No   Physical Activity: Inactive (2/28/2024)    Exercise Vital Sign     Days of Exercise per Week: 0 days     Minutes of Exercise per Session: 0 min   Stress: No Stress Concern Present (6/20/2024)    [employer] [place] [employer] - Occupational Stress Questionnaire     Feeling of Stress : Only a little   Housing Stability: Low Risk  (6/20/2024)    Housing Stability Vital Sign     Unable to Pay for Housing in the Last Year: No     Homeless in the Last Year: No     Review of Systems   All other systems reviewed and are negative.    Objective:     Vital Signs (Most Recent):  Temp: 98.1 °F (36.7 °C) (06/24/24 1521)  Pulse: 95 (06/24/24 1521)  Resp: 18 (06/24/24 0745)  BP: 105/65 (06/24/24 1521)  SpO2: 99 % (06/24/24 1521) Vital Signs (24h Range):  Temp:  [97.7 °F (36.5 °C)-100.5 °F (38.1 °C)] 98.1 °F (36.7 °C)  Pulse:  [] 95  Resp:  [18] 18  SpO2:  [95 %-99 %] 99 %  BP: (105-127)/(57-80) 105/65     Weight: 81.5 kg (179 lb  10.8 oz)  Body mass index is 28.14 kg/m².    Estimated Creatinine Clearance: 83.2 mL/min (based on SCr of 0.8 mg/dL).     Physical Exam  Constitutional:       General: She is not in acute distress.     Appearance: Normal appearance. She is not ill-appearing.   HENT:      Head: Atraumatic.      Right Ear: External ear normal.      Left Ear: External ear normal.      Nose: Nose normal.      Mouth/Throat:      Mouth: Mucous membranes are moist.      Pharynx: Oropharynx is clear.   Eyes:      General:         Right eye: No discharge.         Left eye: No discharge.      Extraocular Movements: Extraocular movements intact.   Cardiovascular:      Rate and Rhythm: Normal rate and regular rhythm.      Heart sounds: Normal heart sounds. No murmur heard.  Pulmonary:      Effort: Pulmonary effort is normal. No respiratory distress.      Breath sounds: Normal breath sounds. No wheezing.   Abdominal:      General: Abdomen is flat. Bowel sounds are normal. There is no distension.      Palpations: Abdomen is soft.      Tenderness: There is no abdominal tenderness. There is no right CVA tenderness or left CVA tenderness.   Musculoskeletal:         General: Normal range of motion.      Cervical back: Normal range of motion. No rigidity.      Right lower leg: No edema.      Left lower leg: No edema.   Skin:     General: Skin is warm and dry.      Coloration: Skin is not jaundiced.   Neurological:      Mental Status: She is alert and oriented to person, place, and time. Mental status is at baseline.      Cranial Nerves: No cranial nerve deficit.   Psychiatric:         Mood and Affect: Mood normal.         Behavior: Behavior normal.          Significant Labs: All pertinent labs within the past 24 hours have been reviewed.    Significant Imaging: I have reviewed all pertinent imaging results/findings within the past 24 hours.

## 2024-06-24 NOTE — PROGRESS NOTES
"Progress Note  Gynecology Oncology      SUBJECTIVE:     History of Present Illness:  Anette Ricci is a 58 y.o.  is a 58 y.o.  with stage IVB ovarian carcinosacoma, s/p PDS on 3/25/24 with Dr Washington ([facility] omx liver mobilization, peritoneal & R diaphragm stripping, hepatic wedge resection, cholecystectomy), s/p C3 on  carbo/taxol/bevacizumab admitted for management of malignant pleural effusion.    Onc History:  3/4/24: peritoneal biopsy with carcinosarcoma  3/6/24: CA-125 418  3/25/24: Primary debulking surgery [facility]/BSO, omentectomy, partial hepatectomy, debulking. (<1cm residual disease: sigmoid, transverse colon, diaphragm, kelvin hepatis)     Adjuvant therapy: Carboplatin AUC 6, paclitaxel 175 mg/m2, bevacizumab 15 mg/kg  24: C1D1: Carbo/Taxol/(Velma held), CA-125 248  5/10/24: C2D1: Carbo/Taxol/Velma  23: C3D1: Carbo/Taxol/Velma  24: C4D1: scheduled. - TO BE RESCHEDULED      Interval History  Patient had a fever last night around 1940. Denies chills, n/v, pain overnight. She had a small episode of emesis after eating "bad fruit" but is overall tolerating PO. Reports cough and SOB greatly improved. Ambulating without difficulty. Denies CP, HA, abdominal or pelvic pain, dysuria, hematuria, diarrhea, constipation.    Current Facility-Administered Medications   Medication    0.9%  NaCl infusion (for blood administration)    acetaminophen tablet 1,000 mg    acetaminophen tablet 650 mg    alteplase injection 2 mg    amLODIPine tablet 10 mg    atorvastatin tablet 80 mg    benzonatate capsule 100 mg    dextromethorphan-guaiFENesin  mg/5 ml liquid 10 mL    dextrose 10% bolus 125 mL 125 mL    dextrose 10% bolus 250 mL 250 mL    enoxaparin injection 80 mg    fluticasone propionate 50 mcg/actuation nasal spray 100 mcg    glucagon (human recombinant) injection 1 mg    glucose chewable tablet 16 g    glucose chewable tablet 24 g    guaiFENesin 12 hr tablet 600 mg    hydrALAZINE " injection 10 mg    hydroCHLOROthiazide tablet 25 mg    insulin aspart U-100 pen 0-5 Units    insulin aspart U-100 pen 3 Units    insulin glargine U-100 (Lantus) pen 5 Units    lamoTRIgine tablet 25 mg    lisinopriL tablet 20 mg    LORazepam tablet 0.5 mg    OLANZapine tablet 5 mg    ondansetron disintegrating tablet 8 mg    piperacillin-tazobactam (ZOSYN) 4.5 g in D5W 100 mL IVPB (MB+)    QUEtiapine tablet 25 mg      OBJECTIVE:     Vital Signs  Temp:  [97.7 °F (36.5 °C)-100.5 °F (38.1 °C)] 97.7 °F (36.5 °C)  Pulse:  [] 88  Resp:  [18] 18  SpO2:  [95 %-100 %] 97 %  BP: (114-137)/(60-83) 114/76    Physical Exam:  Gen: A&Ox3, NAD  CV: mildly tachycardic  Pulm: reduced breath sounds in right lung base.  Abd: mildly distended abdomen, but non-tender to palpation. Midline incision covered with gauze, last dressing change by wound care on 6/21, photos show incision is clean and healing well by secondary intention with  no drainage or erythema.   Ext: no peripheral edema, no calf tenderness    Laboratory  Recent Labs   Lab 06/20/24  0228 06/20/24  0243 06/20/24  1428 06/21/24  0859   WBC 9.24  --  11.47 11.86   HGB 6.4*  --  7.6* 8.8*   HCT 21.7* 20* 23.6* 27.1*   MCV 84  --  80* 82     --  288 422      Recent Labs   Lab 06/20/24 0228 06/21/24  1147 06/23/24  0624 06/24/24  0410   * 133* 135* 133*   K 3.6 3.3* 3.3* 3.6    97 99 97   CO2 24 23 27 25   BUN 10 6 12 13   CREATININE 0.7 0.7 0.7 0.8   * 104 83 164*   PROT 6.5 6.7  --   --    BILITOT 0.4 0.7  --   --    ALKPHOS 90 89  --   --    ALT 9* 10  --   --    AST 17 21  --   --    MG  --   --   --  1.4*      Blood Sugars (AccuCheck):  Recent Labs     06/22/24  0907 06/22/24  1223 06/22/24  1722 06/22/24  2128 06/23/24  0903 06/23/24  1246 06/23/24  1740 06/23/24  2104   POCTGLUCOSE 134* 251* 79 124* 92 166* 157* 232*     UA negative    Trop neg  Lactate 2.2    Diagnostic Results:  6/20/24 CTA  Impression:  Motion and artifact limited  study.  No large or central pulmonary embolus.  No convincing CTA findings of right heart strain.  Questionable small pulmonary emboli in the right lower lobe as seen on prior studies.  Moderate right pleural effusion with elevated right hemidiaphragm. Passive atelectasis in the right lung base. No consolidation.   Suboptimally evaluated large loculated subdiaphragmatic fluid collection between the liver and the right hemidiaphragm.  Ill defied hypoattenuation in the posterior right hepatic lobe and soft tissue nodule adjacent to the posteroinferior right hepatic lobe.  Abdominal free fluid.  Peritoneal metastatic disease is included in the differential, noting that findings are worse when compared with prior CT.  Suggest correlation with clinical findings and short-term follow-up with dedicated abdominal CT when clinically appropriate.  Mild anasarca.  Similar partially calcified right breast nodule.    6/21/24 CXR  Impression:  Mildly worse right-sided pleural effusion with passive atelectasis or airspace disease in the right lung base.    ASSESSMENT/PLAN:     Active Hospital Problems    Diagnosis  POA    *Pleural effusion, malignant [J91.0]  Yes    Chronic pulmonary embolism without acute cor pulmonale [I27.82]  Yes     Patient with small right lower lobe pulmonary embolism noted on June 2024 CT chest and also noted on prior films as well.  Patient has not been on full-dose anticoagulation only prophylaxis dosing.  Given her advanced cancer and PE noted on CT, it would not be unreasonable to treat with full-dose anticoagulation.  However patient is breathing well on room air and her prior respiratory issues are likely related to her right-sided pleural effusion.  If a decision is made to treat with full-dose anticoagulation, patient should be treated with Lovenox 1 milligram/kilogram b.i.d and sent home on this regimen.  I see no contraindications to anticoagulation however if other procedures are planned then it  may be beneficial to wait until discharge.      Stage 4B Carcinoscaroma, Suspected ovarian origin [C80.1]  Yes     3/4/24: peritoneal biopsy with carcinosarcoma  3/6/24: CA-125 418  3/25/24: Primary debulking surgery [facility]/BSO, omentectomy, partial hepatectomy, debulking. 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       Anette Ricci is a 58 y.o.  with stage IV ovarian carcinosacoma, s/p PDS with [facility] BSO omx liver mobilization, peritoneal & R diaphragm stripping, hepatic wedge resection, cholecystectomy, currently on C3 carbo/taxol/bevacizumab, who presented with SOB and imaging consistent with right pleural effusion, most likely 2/2 malignancy.     Malignant pleural effusion (Right)  - tachycardia resolved, O2sat WNL, last Fever  @1940  - CBC WNL  - CTA: moderate right pleural effusion with elevated right hemidiaphragm, passive atelectasis in the right lung base, no consolidation  - initially started on vanc/zosyn; vanc d/c HD#2; continue zosyn until afebrile for 24-48h. Plan to continue Zosyn today since febrile last night.   - s/p IR consult for possible thoracentesis/paracentesis for symptomatic relief, however no safe window for thora, and minimal fluid for para  - clinical picture significantly improved  - encourage IS  - strict I&O    Chronic PE  - small right lower lobe pulmonary embolism noted on 2024 CT chest, also noted on prior imaging  - LE dopplers  negative   -  s/p Pulm Crit Care consult:   - patient is breathing well on room air and her prior respiratory issues are likely related to her right-sided pleural effusion  - Hematology recommendation: Given advanced cancer and PE noted on CT, it would not be unreasonable to treat with full-dose anticoagulation. If a decision is made to treat with full-dose anticoagulation, patient should be treated with Lovenox 1 milligram/kilogram b.i.d and sent home on this regimen.  - VTE ppx: SCDs + lovenox qD > lovenox 1mg/kg BID initiated on 6/23    Fever  - patient febrile on HD#2, had been on broad spectrum abx since admission  - WBC 9>11  - further infectious w/u performed; UA neg, Covid/Flu neg, CXR with mild worsening R pleural effusion, COVID/Flu neg. Sputum cultures grew few Gram+ cocci, rare WBC, <10 epithelial cells.   - repeat blood cultures collected 6/22, from both her port site and a peripheral site; prelim NGTD x2  - respiratory panel pending   - Tlast 100.5 on 6/23 at 1940    Hypokalemia  - 3.3 on 6/21 > replaced > 3.3    Ovarian Carcinosarcoma Stage IV   - s/p PDS on 3/25/24 with Dr Washington ([facility] B[facility] omx liver mobilization, peritoneal & R diaphragm stripping, hepatic wedge resection, cholecystectomy) suboptimal <1cm residual disease: sigmoid, transverse colon, diaphragm, kelvin hepatis   - currently receiving carbo/taxol/bevacizumab; was due for C4 6/21 however will plan to reschedule on discharge  - Wound care following, VML incision healing well by secondary intention, plan to change dressing q3d, will need HH, see consult note for full details  - s/p PT consult; no outpatient needs    Anemia  - H/H 6.4/22 > 1u pRBC > 7.6/23 > 1u pRBC > 8.8/27  - denies sx of anemia  - likely 2/2 chronic disease and chemotherapy    Diabetes   - hold Metformin  - changed home levemir to 5u QHS, aspart 3u TID WM   - low SSI ordered  - diabetic diet  - POCT BG pre-meal/qHS    HTN  - continue home BP meds: amlodipine, enalapril>lisinopril,  HTCZ  - hydralazine PRN (BP>180/110)    HLD  - continue atorvastatin    Seizure disorder  - continue Lamotrigine    Depression/Anxiety  - continue Olanzapine, Seroquel PRN

## 2024-06-24 NOTE — ASSESSMENT & PLAN NOTE
BG goal: 140-180    - Lantus 5 units HS  - Novolog 3 units TIDWM  - LDC SSI (150/50) prn  - POCT Glucose before meals and at bedtime  - Hypoglycemia protocol in place      ** Please notify Endocrine for any change and/or advance in diet**  ** Please call Endocrine for any BG related issues **     Discharge Planning:   TBD. Please notify endocrinology prior to discharge.

## 2024-06-24 NOTE — PROGRESS NOTES
"Yves Acuna - Oncology ([facility])  Endocrinology  Progress Note    Admit Date: 2024     Reason for Consult: Management of T2DM, Hyperglycemia     Diabetes diagnosis year: > 5 years ago     Home Diabetes Medications:    Toujeo 30 units daily  Novolog 10 units with meals      How often checking glucose at home? 1-3 x day   BG readings on regimen: 150s-200s  Hypoglycemia on the regimen?  No  Missed doses on regimen?  No    Diabetes Complications include:     Hyperglycemia and Diabetic retinopathy     Complicating diabetes co morbidities:   HLD, HTN       HPI:   Anette Ricci is a 58 y.o.  is a 58 y.o.  with stage IVB ovarian carcinosacoma, s/p PDS on 3/25/24 with Dr Washington ([facility] BSO omx liver mobilization, peritoneal & R diaphragm stripping, hepatic wedge resection, cholecystectomy), s/p C3 on  carbo/taxol/bevacizumab admitted for management of malignant pleural effusion. Endocrine consulted for BG management.        Interval HPI:   No acute events overnight. Patient in room 806/806 A. Blood glucose stable. BG at and above goal on current insulin regimen (SSI, prandial, and basal insulin ). Steroid use- None.      Renal function- Normal   Vasopressors-  None     Diet diabetic 1500 Calorie     Eatin%  Nausea: No  Hypoglycemia and intervention: No  Fever: No  TPN and/or TF: No    BP (!) 120/57 (BP Location: Left arm, Patient Position: Sitting)   Pulse 92   Temp 98.8 °F (37.1 °C) (Oral)   Resp 18   Ht 5' 7" (1.702 m)   Wt 81.5 kg (179 lb 10.8 oz)   LMP 2015   SpO2 98%   Breastfeeding No   BMI 28.14 kg/m²     Labs Reviewed and Include    Recent Labs   Lab 24  0410   *   CALCIUM 8.0*   *   K 3.6   CO2 25   CL 97   BUN 13   CREATININE 0.8     Lab Results   Component Value Date    WBC 11.86 2024    HGB 8.8 (L) 2024    HCT 27.1 (L) 2024    MCV 82 2024     2024     No results for input(s): "TSH", "FREET4" in the last 168 " "hours.  Lab Results   Component Value Date    HGBA1C 7.3 (H) 03/19/2024       Nutritional status:   Body mass index is 28.14 kg/m².  Lab Results   Component Value Date    ALBUMIN 2.4 (L) 06/21/2024    ALBUMIN 2.4 (L) 06/20/2024    ALBUMIN 2.5 (L) 05/30/2024     No results found for: "PREALBUMIN"    Estimated Creatinine Clearance: 83.2 mL/min (based on SCr of 0.8 mg/dL).    Accu-Checks  Recent Labs     06/21/24  2217 06/22/24  0907 06/22/24  1223 06/22/24  1722 06/22/24  2128 06/23/24  0903 06/23/24  1246 06/23/24  1740 06/23/24  2104 06/24/24  0821   POCTGLUCOSE 240* 134* 251* 79 124* 92 166* 157* 232* 238*       Current Medications and/or Treatments Impacting Glycemic Control  Immunotherapy:    Immunosuppressants       None          Steroids:   Hormones (From admission, onward)      None          Pressors:    Autonomic Drugs (From admission, onward)      None          Hyperglycemia/Diabetes Medications:   Antihyperglycemics (From admission, onward)      Start     Stop Route Frequency Ordered    06/23/24 2100  insulin glargine U-100 (Lantus) pen 5 Units         -- SubQ Nightly 06/23/24 0909    06/23/24 0915  insulin aspart U-100 pen 0-5 Units         -- SubQ Before meals & nightly PRN 06/23/24 0916    06/20/24 1200  insulin aspart U-100 pen 3 Units         -- SubQ 3 times daily with meals 06/20/24 0934            ASSESSMENT and PLAN    Pulmonary  * Pleural effusion, malignant  Managed per primary team        Cardiac/Vascular  Hyperlipidemia  On statin per ADA guidelines       Endocrine  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 Please notify endocrinology prior to discharge.           Modesta Goldberg PA-C  Endocrinology  [facility] ([facility])

## 2024-06-25 LAB
APTT PPP: 34.7 SEC (ref 21–32)
BACTERIA BLD CULT: NORMAL
BACTERIA BLD CULT: NORMAL
BACTERIA SPEC AEROBE CULT: NORMAL
BASOPHILS # BLD AUTO: 0.03 K/UL (ref 0–0.2)
BASOPHILS NFR BLD: 0.3 % (ref 0–1.9)
DIFFERENTIAL METHOD BLD: ABNORMAL
EOSINOPHIL # BLD AUTO: 0.1 K/UL (ref 0–0.5)
EOSINOPHIL NFR BLD: 0.6 % (ref 0–8)
ERYTHROCYTE [DISTWIDTH] IN BLOOD BY AUTOMATED COUNT: 19.3 % (ref 11.5–14.5)
GRAM STN SPEC: NORMAL
HCT VFR BLD AUTO: 22.9 % (ref 37–48.5)
HGB BLD-MCNC: 7 G/DL (ref 12–16)
IMM GRANULOCYTES # BLD AUTO: 0.05 K/UL (ref 0–0.04)
IMM GRANULOCYTES NFR BLD AUTO: 0.4 % (ref 0–0.5)
INR PPP: 1.1 (ref 0.8–1.2)
LYMPHOCYTES # BLD AUTO: 1.2 K/UL (ref 1–4.8)
LYMPHOCYTES NFR BLD: 10.4 % (ref 18–48)
MAGNESIUM SERPL-MCNC: 1.9 MG/DL (ref 1.6–2.6)
MCH RBC QN AUTO: 25.9 PG (ref 27–31)
MCHC RBC AUTO-ENTMCNC: 30.6 G/DL (ref 32–36)
MCV RBC AUTO: 85 FL (ref 82–98)
MONOCYTES # BLD AUTO: 1.2 K/UL (ref 0.3–1)
MONOCYTES NFR BLD: 9.7 % (ref 4–15)
NEUTROPHILS # BLD AUTO: 9.3 K/UL (ref 1.8–7.7)
NEUTROPHILS NFR BLD: 78.6 % (ref 38–73)
NRBC BLD-RTO: 0 /100 WBC
PLATELET # BLD AUTO: 359 K/UL (ref 150–450)
PMV BLD AUTO: 12.1 FL (ref 9.2–12.9)
POCT GLUCOSE: 128 MG/DL (ref 70–110)
POCT GLUCOSE: 129 MG/DL (ref 70–110)
POCT GLUCOSE: 146 MG/DL (ref 70–110)
POCT GLUCOSE: 172 MG/DL (ref 70–110)
POCT GLUCOSE: 199 MG/DL (ref 70–110)
PROTHROMBIN TIME: 12.4 SEC (ref 9–12.5)
RBC # BLD AUTO: 2.7 M/UL (ref 4–5.4)
WBC # BLD AUTO: 11.84 K/UL (ref 3.9–12.7)

## 2024-06-25 PROCEDURE — 83735 ASSAY OF MAGNESIUM: CPT | Mod: HCNC

## 2024-06-25 PROCEDURE — 63600175 PHARM REV CODE 636 W HCPCS: Mod: HCNC

## 2024-06-25 PROCEDURE — 99232 SBSQ HOSP IP/OBS MODERATE 35: CPT | Mod: HCNC,,,

## 2024-06-25 PROCEDURE — 85025 COMPLETE CBC W/AUTO DIFF WBC: CPT | Mod: HCNC | Performed by: STUDENT IN AN ORGANIZED HEALTH CARE EDUCATION/TRAINING PROGRAM

## 2024-06-25 PROCEDURE — 97530 THERAPEUTIC ACTIVITIES: CPT | Mod: HCNC,CQ

## 2024-06-25 PROCEDURE — 36415 COLL VENOUS BLD VENIPUNCTURE: CPT | Mod: HCNC

## 2024-06-25 PROCEDURE — 85610 PROTHROMBIN TIME: CPT | Mod: HCNC

## 2024-06-25 PROCEDURE — 85730 THROMBOPLASTIN TIME PARTIAL: CPT | Mod: HCNC

## 2024-06-25 PROCEDURE — 20600001 HC STEP DOWN PRIVATE ROOM: Mod: HCNC

## 2024-06-25 PROCEDURE — 25000003 PHARM REV CODE 250: Mod: HCNC

## 2024-06-25 PROCEDURE — 99233 SBSQ HOSP IP/OBS HIGH 50: CPT | Mod: HCNC,,, | Performed by: INTERNAL MEDICINE

## 2024-06-25 RX ORDER — ENOXAPARIN SODIUM 100 MG/ML
1 INJECTION SUBCUTANEOUS EVERY 12 HOURS
Qty: 44.8 ML | Refills: 0 | Status: ON HOLD | OUTPATIENT
Start: 2024-06-25 | End: 2024-07-25

## 2024-06-25 RX ADMIN — GUAIFENESIN 600 MG: 600 TABLET, EXTENDED RELEASE ORAL at 09:06

## 2024-06-25 RX ADMIN — OLANZAPINE 5 MG: 5 TABLET, FILM COATED ORAL at 09:06

## 2024-06-25 RX ADMIN — ONDANSETRON 8 MG: 8 TABLET, ORALLY DISINTEGRATING ORAL at 08:06

## 2024-06-25 RX ADMIN — PIPERACILLIN SODIUM AND TAZOBACTAM SODIUM 4.5 G: 4; .5 INJECTION, POWDER, FOR SOLUTION INTRAVENOUS at 05:06

## 2024-06-25 RX ADMIN — IBUPROFEN 600 MG: 600 TABLET, FILM COATED ORAL at 09:06

## 2024-06-25 RX ADMIN — INSULIN ASPART 5 UNITS: 100 INJECTION, SOLUTION INTRAVENOUS; SUBCUTANEOUS at 08:06

## 2024-06-25 RX ADMIN — GUAIFENESIN 600 MG: 600 TABLET, EXTENDED RELEASE ORAL at 08:06

## 2024-06-25 RX ADMIN — LAMOTRIGINE 25 MG: 25 TABLET ORAL at 08:06

## 2024-06-25 RX ADMIN — ATORVASTATIN CALCIUM 80 MG: 40 TABLET, FILM COATED ORAL at 08:06

## 2024-06-25 RX ADMIN — INSULIN ASPART 5 UNITS: 100 INJECTION, SOLUTION INTRAVENOUS; SUBCUTANEOUS at 12:06

## 2024-06-25 RX ADMIN — GUAIFENESIN AND DEXTROMETHORPHAN 10 ML: 100; 10 SYRUP ORAL at 09:06

## 2024-06-25 RX ADMIN — ENOXAPARIN SODIUM 80 MG: 80 INJECTION SUBCUTANEOUS at 08:06

## 2024-06-25 RX ADMIN — PIPERACILLIN SODIUM AND TAZOBACTAM SODIUM 4.5 G: 4; .5 INJECTION, POWDER, FOR SOLUTION INTRAVENOUS at 04:06

## 2024-06-25 RX ADMIN — INSULIN GLARGINE 6 UNITS: 100 INJECTION, SOLUTION SUBCUTANEOUS at 09:06

## 2024-06-25 RX ADMIN — FLUTICASONE PROPIONATE 100 MCG: 50 SPRAY, METERED NASAL at 08:06

## 2024-06-25 RX ADMIN — INSULIN ASPART 5 UNITS: 100 INJECTION, SOLUTION INTRAVENOUS; SUBCUTANEOUS at 05:06

## 2024-06-25 RX ADMIN — LAMOTRIGINE 25 MG: 25 TABLET ORAL at 09:06

## 2024-06-25 RX ADMIN — LISINOPRIL 20 MG: 20 TABLET ORAL at 08:06

## 2024-06-25 NOTE — ASSESSMENT & PLAN NOTE
59 yo F PMH stage 4 ovarian ca (3/2024) s/p debulking, omentectomy, partial hepatectomy admitted for SOB 2/2 pleural effusion. CXR showed RLL opacitiy and CT chest showing R sided loculated subdiaphragmatic fluid 7.3x11.2cm which was not amendable to drainage by IR. Fevers have resolved, currently on pip-tazo. Repeat CT shows worsening metastatic disease w/ ascites and perihepatic collection.     Recommendations:  - plan for paracentesis tomorrow  - continue pip-tazo  - send ascites for cell count, gram stain and culture  - will finalize abx plan w/ gyn onc tomorrow, hoping to get patient home soon

## 2024-06-25 NOTE — ASSESSMENT & PLAN NOTE
BG goal: 140-180    - Lantus 6 units HS  - Novolog 5 units TIDWM  - LDC SSI (150/50) prn  - POCT Glucose before meals and at bedtime  - Hypoglycemia protocol in place      ** Please notify Endocrine for any change and/or advance in diet**  ** Please call Endocrine for any BG related issues **     Discharge Planning:   TBD. Please notify endocrinology prior to discharge.

## 2024-06-25 NOTE — PLAN OF CARE
Patient calm and cooperative during shift.  Denied backpain, SOB this shift.  Reported abdominal pain well relieved with prn ibuprofen.  Magnesium replaced per MD order.  IV antibiotics continued per MD order.  Blood sugars 238, 214, and 169 during shift.  Last insulin dose held due to NPO status, patient awaiting CT.  Left chest port now with blood return per charge nurse.  Patient ambulating in room and independent of ADLs.  No falls or injuries during shift.  Will continue to monitor.

## 2024-06-25 NOTE — PLAN OF CARE
Patient is AAOX4. Up, independent. Call light and personal items within reach. Patient involved in care. Diabetic diet with good intake. POCT glucose monitored, scheduled insulin provided. Wound care done, next dsg change on 6/28. Prn Zofran provided for nausea. Prn Robitussin provided for cough. IV antibiotic provided. Plan paracentesis tomorrow, MNPO to be kept. PT/INR and APTT sent. Patient stable at this time.

## 2024-06-25 NOTE — PROGRESS NOTES
"Yves Acuna - Oncology ([facility])  Endocrinology  Progress Note    Admit Date: 2024     Reason for Consult: Management of T2DM, Hyperglycemia     Diabetes diagnosis year: > 5 years ago     Home Diabetes Medications:    Toujeo 30 units daily  Novolog 10 units with meals      How often checking glucose at home? 1-3 x day   BG readings on regimen: 150s-200s  Hypoglycemia on the regimen?  No  Missed doses on regimen?  No    Diabetes Complications include:     Hyperglycemia and Diabetic retinopathy     Complicating diabetes co morbidities:   HLD, HTN       HPI:   Anette Ricci is a 58 y.o.  is a 58 y.o.  with stage IVB ovarian carcinosacoma, s/p PDS on 3/25/24 with Dr Washington ([place] BSO omx liver mobilization, peritoneal & R diaphragm stripping, hepatic wedge resection, cholecystectomy), s/p C3 on  carbo/taxol/bevacizumab admitted for management of malignant pleural effusion. Endocrine consulted for BG management.        Interval HPI:   No acute events overnight. Patient in room 806/806 A. Blood glucose stable. BG at goal on current insulin regimen (SSI, prandial, and basal insulin ). Steroid use- None.   Renal function- Normal   Vasopressors-  None      Diet diabetic 1500 Calorie      Eatin%  Nausea: No  Hypoglycemia and intervention: No  Fever: No  TPN and/or TF: No    /64 (BP Location: Left arm, Patient Position: Sitting)   Pulse 96   Temp 98.2 °F (36.8 °C) (Oral)   Resp 18   Ht 5' 7" (1.702 m)   Wt 81.5 kg (179 lb 10.8 oz)   LMP 2015   SpO2 98%   Breastfeeding No   BMI 28.14 kg/m²     Labs Reviewed and Include    No results for input(s): "GLU", "CALCIUM", "ALBUMIN", "PROT", "NA", "K", "CO2", "CL", "BUN", "CREATININE", "ALKPHOS", "ALT", "AST", "BILITOT" in the last 24 hours.  Lab Results   Component Value Date    WBC 11.84 2024    HGB 7.0 (L) 2024    HCT 22.9 (L) 2024    MCV 85 2024     2024     No results for input(s): " ""TSH", "FREET4" in the last 168 hours.  Lab Results   Component Value Date    HGBA1C 7.3 (H) 03/19/2024       Nutritional status:   Body mass index is 28.14 kg/m².  Lab Results   Component Value Date    ALBUMIN 2.4 (L) 06/21/2024    ALBUMIN 2.4 (L) 06/20/2024    ALBUMIN 2.5 (L) 05/30/2024     No results found for: "PREALBUMIN"    Estimated Creatinine Clearance: 83.2 mL/min (based on SCr of 0.8 mg/dL).    Accu-Checks  Recent Labs     06/22/24  2128 06/23/24  0903 06/23/24  1246 06/23/24  1740 06/23/24  2104 06/24/24  0821 06/24/24  1223 06/24/24  1741 06/24/24  2050 06/25/24  0829   POCTGLUCOSE 124* 92 166* 157* 232* 238* 214* 169* 152* 172*       Current Medications and/or Treatments Impacting Glycemic Control  Immunotherapy:    Immunosuppressants       None          Steroids:   Hormones (From admission, onward)      None          Pressors:    Autonomic Drugs (From admission, onward)      None          Hyperglycemia/Diabetes Medications:   Antihyperglycemics (From admission, onward)      Start     Stop Route Frequency Ordered    06/24/24 2100  insulin glargine U-100 (Lantus) pen 6 Units         -- SubQ Nightly 06/24/24 1247    06/24/24 1715  insulin aspart U-100 pen 5 Units         -- SubQ 3 times daily with meals 06/24/24 1247    06/23/24 0915  insulin aspart U-100 pen 0-5 Units         -- SubQ Before meals & nightly PRN 06/23/24 0916            ASSESSMENT and PLAN    Pulmonary  * Pleural effusion, malignant  Managed per primary team        Cardiac/Vascular  Hyperlipidemia  On statin per ADA guidelines       Endocrine  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 Please notify endocrinology prior to discharge.           Modesta Goldberg PA-C  Endocrinology  [facility] ([facility])

## 2024-06-25 NOTE — PT/OT/SLP PROGRESS
Physical Therapy Treatment    Patient Name:  Anette Ricci   MRN:  [id number]    Recommendations:     Discharge Recommendations: Low Intensity Therapy  Discharge Equipment Recommendations: none  Barriers to discharge: None    Assessment:     Anette Ricci is a 58 y.o. female admitted with a medical diagnosis of Pleural effusion, malignant.  She presents with the following impairments/functional limitations: weakness, impaired endurance, impaired functional mobility, gait instability, decreased safety awareness, pain.  Pt was agreeable and tolerated fairly well. Pt ambulated in the hallway with only standing rest break. 1 LOB, but able to recover with CGA. Pt is most limited by fatigued. Pt is progressing well and continues to demonstrate the need for low intensity therapy on a scheduled basis exhibited by decreased independence with functional mobility.    Rehab Prognosis: Good; patient would benefit from acute skilled PT services to address these deficits and reach maximum level of function.    Recent Surgery: * No surgery found *      Plan:     During this hospitalization, patient to be seen 3 x/week to address the identified rehab impairments via gait training, therapeutic activities and progress toward the following goals:    Plan of Care Expires:  07/21/24    Subjective     Chief Complaint: pain   Patient/Family Comments/goals: pt reports pain from trapped gas feeling  Pain/Comfort:  Pain Rating 1: 10/10  Location - Orientation 1: generalized  Location 1: abdomen  Pain Addressed 1: Reposition  Pain Rating Post-Intervention 1: 10/10      Objective:     Communicated with nruse prior to session.  Patient found HOB elevated with  (no active lines) upon PT entry to room.     General Precautions: Standard, fall, other (see comments) (wear a mask)  Orthopedic Precautions: N/A  Braces: N/A  Respiratory Status: Room air     Functional Mobility:  Additional staff present: N/A  Bed Mobility:   Supine to Sit:  supervision; HOB elevated  Transfers:   Sit <> Stand Transfer: stand by assistance with no assistive device   Bathroom Transfer: stand by assistance with no assistive device using Step Transfer technique   Gait:  Pt ambulated ~200 ft with  closeSBA  and no assistive device/holding railing (pt reports just waking up and prefers being close to railing)  2 standing rest break and 1 LOB  All lines remained intact throughout ambulation trial, gait belt utilized.  Donned mask and gown on backside prior to ambulating the hallway  Gait Deviation(s): mostly steady, slow reciprocal gait with decrease edie, slight lateral sway, decrease step length  Verbal/tactile cues for pacing as needed    AM-PAC 6 CLICK MOBILITY  Turning over in bed (including adjusting bedclothes, sheets and blankets)?: 4  Sitting down on and standing up from a chair with arms (e.g., wheelchair, bedside commode, etc.): 4  Moving from lying on back to sitting on the side of the bed?: 4  Moving to and from a bed to a chair (including a wheelchair)?: 4  Need to walk in hospital room?: 3  Climbing 3-5 steps with a railing?: 3  Basic Mobility Total Score: 22       Treatment & Education:  Patient educated on role of therapy, goals of session, and benefits of out of bed mobility.   Instructed on use of call button and importance of calling nursing staff for assistance with mobility   Questions/concerns addressed within PTA scope of practice  Pt verbalized understanding.  Whiteboard Updated      Patient left sitting edge of bed with all lines intact, call button in reach, and nurse notified..    GOALS:   Multidisciplinary Problems       Physical Therapy Goals          Problem: Physical Therapy    Goal Priority Disciplines Outcome Goal Variances Interventions   Physical Therapy Goal     PT, PT/OT Progressing     Description: Goals to be met by: 24     Patient will increase functional independence with mobility by performin. Supine to sit with  [place]  2. Sit to supine with [place]  3. Rolling to either side with [place].  4. Sit to stand transfer with [place]  5. Bed to chair transfer with Bradford using no AD  6. Gait  x 500 feet with [place] using No Assistive Device.                          Time Tracking:     PT Received On: 06/25/24  PT Start Time: 1508     PT Stop Time: 1521  PT Total Time (min): 13 min     Billable Minutes: Therapeutic Activity 13    Treatment Type: Treatment  PT/PTA: PTA     Number of PTA visits since last PT visit: 1 06/25/2024

## 2024-06-25 NOTE — PROGRESS NOTES
[facility] On[facility] ([facility])  Infectious Disease  Progress Note    Patient Name: Anette Ricci  MRN: [id number]  Admission Date: 6/20/2024  Length of Stay: 5 days  Attending Physician: Александр Washington MD  Primary Care Provider: Mark Chua MD    Isolation Status: No active isolations  Assessment/Plan:      Pulmonary  * Pleural effusion, malignant  59 yo F PMH stage 4 ovarian ca (3/2024) s/p debulking, omentectomy, partial hepatectomy admitted for SOB 2/2 pleural effusion. CXR showed RLL opacitiy and CT chest showing R sided loculated subdiaphragmatic fluid 7.3x11.2cm which was not amendable to drainage by IR. Fevers have resolved, currently on pip-tazo. Repeat CT shows worsening metastatic disease w/ ascites and perihepatic collection.     Recommendations:  - plan for paracentesis tomorrow  - continue pip-tazo  - send ascites for cell count, gram stain and culture  - will finalize abx plan w/ gyn onc tomorrow, hoping to get patient home soon        Anticipated Disposition: TBD    Thank you for your consult. I will follow-up with patient. Please contact us if you have any additional questions.    Discussed w/ gyn/onc staff    Martha Galvan DO  Transplant Infectious Disease    Time: 50 minutes   50% of time spent on face-to-face counseling and coordination of care. Counseling included review of test results, diagnosis, and treatment plan with patient and/or family.        Subjective:     Principal Problem:Pleural effusion, malignant    HPI: 59 yo F PMH stage 4 ovarian ca (3/2024) s/p debulking, omentectomy, partial hepatectomy admitted for SOB 2/2 pleural effusion. CXR showed RLL opacitiy and CT chest showing R sided loculated subdiaphragmatic fluid 7.3x11.2cm which was not amendable to drainage by IR. She was empirically started on vancomycin and zosyn and currently on only zosyn. She reports improvement in SOB since admission. She denies any fever or chills. Multiple Bcx collected since admission NGTD.     Interval History: CT w/ worsening metastatic disease, increasing abd distention. Afebrile    Review of Systems   All other systems reviewed and are negative.    Objective:     Vital Signs (Most Recent):  Temp: 97.9 °F (36.6 °C) (06/25/24 1516)  Pulse: 96 (06/25/24 1516)  Resp: 18 (06/25/24 1516)  BP: 120/72 (06/25/24 1516)  SpO2: 95 % (06/25/24 1516) Vital Signs (24h Range):  Temp:  [97.8 °F (36.6 °C)-98.5 °F (36.9 °C)] 97.9 °F (36.6 °C)  Pulse:  [] 96  Resp:  [17-18] 18  SpO2:  [94 %-99 %] 95 %  BP: (112-129)/(64-76) 120/72     Weight: 81.5 kg (179 lb 10.8 oz)  Body mass index is 28.14 kg/m².    Estimated Creatinine Clearance: 83.2 mL/min (based on SCr of 0.8 mg/dL).     Physical Exam  Constitutional:       General: She is not in acute distress.     Appearance: She is well-developed. She is ill-appearing. She is not diaphoretic.   HENT:      Head: Normocephalic and atraumatic.      Right Ear: External ear normal.      Left Ear: External ear normal.      Nose: Nose normal.   Eyes:      General: No scleral icterus.        Right eye: No discharge.         Left eye: No discharge.      Extraocular Movements: Extraocular movements intact.      Conjunctiva/sclera: Conjunctivae normal.   Pulmonary:      Effort: Pulmonary effort is normal. No respiratory distress.      Breath sounds: No stridor.   Abdominal:      General: There is distension.      Tenderness: There is abdominal tenderness.   Musculoskeletal:         General: Normal range of motion.   Skin:     Findings: No erythema or rash.   Neurological:      General: No focal deficit present.      Mental Status: She is alert and oriented to person, place, and time. Mental status is at baseline.      Cranial Nerves: No cranial nerve deficit.   Psychiatric:         Mood and Affect: Mood normal.         Behavior: Behavior normal.         Thought Content: Thought content normal.         Judgment: Judgment normal.          Significant Labs: CBC:   Recent Labs   Lab  06/25/24  0519   WBC 11.84   HGB 7.0*   HCT 22.9*        CMP:   Recent Labs   Lab 06/24/24  0410   *   K 3.6   CL 97   CO2 25   *   BUN 13   CREATININE 0.8   CALCIUM 8.0*   ANIONGAP 11     Microbiology Results (last 7 days)       Procedure Component Value Units Date/Time    Culture, Respiratory with Gram Stain [[id number]] Collected: 06/22/24 1920    Order Status: Completed Specimen: Respiratory from Sputum Updated: 06/25/24 1243     Respiratory Culture Normal respiratory adriano     Gram Stain (Respiratory) <10 epithelial cells per low power field.     Gram Stain (Respiratory) Rare WBC's     Gram Stain (Respiratory) Few Gram positive cocci    Blood culture [[id number]] Collected: 06/22/24 1129    Order Status: Completed Specimen: Blood from Peripheral, Wrist, Left Updated: 06/25/24 1222     Blood Culture, Routine No Growth to date      No Growth to date      No Growth to date      No Growth to date    Narrative:      FROM PERIPHERAL SITE    Blood culture [[id number]] Collected: 06/22/24 1119    Order Status: Completed Specimen: Blood from Peripheral, Hand, Left Updated: 06/25/24 1222     Blood Culture, Routine No Growth to date      No Growth to date      No Growth to date      No Growth to date    Narrative:      FROM PORT SITE    Blood culture #2 **CANNOT BE ORDERED STAT** [[id number]] Collected: 06/20/24 0228    Order Status: Completed Specimen: Blood from Peripheral, Hand, Left Updated: 06/25/24 0612     Blood Culture, Routine No growth after 5 days.    Blood culture #1 **CANNOT BE ORDERED STAT** [[id number]] Collected: 06/20/24 0228    Order Status: Completed Specimen: Blood from Peripheral, Upper Arm, Left Updated: 06/25/24 0612     Blood Culture, Routine No growth after 5 days.    Blood culture [[id number]] Collected: 06/23/24 2119    Order Status: Completed Specimen: Blood Updated: 06/24/24 2212     Blood Culture, Routine No Growth to date      No Growth to date    Narrative:       PERIPHERAL SITE #1    Blood culture [[id number]] Collected: 06/23/24 2119    Order Status: Completed Specimen: Blood Updated: 06/24/24 2212     Blood Culture, Routine No Growth to date      No Growth to date    Narrative:      PERIPHERAL SITE #2    Blood culture [[id number]] Collected: 06/23/24 2120    Order Status: Completed Specimen: Blood Updated: 06/24/24 2212     Blood Culture, Routine No Growth to date      No Growth to date    Narrative:      PORT SITE    Urine Culture High Risk [[id number]] Collected: 06/22/24 1048    Order Status: Completed Specimen: Urine Updated: 06/23/24 1430     Urine Culture, Routine No growth    Narrative:      Indicated criteria for high risk culture:->Other  Other (specify):->cancer    Influenza A & B by Molecular [[id number]] Collected: 06/21/24 0820    Order Status: Completed Specimen: Nasopharyngeal Swab Updated: 06/21/24 0946     Influenza A, Molecular Negative     Influenza B, Molecular Negative     Flu A & B Source Nasal swab    Culture, Anaerobe [[id number]]     Order Status: No result Specimen: Body Fluid from Pleural Fluid     Aerobic culture [[id number]]     Order Status: No result Specimen: Pleural Fluid             Significant Imaging: I have reviewed all pertinent imaging results/findings within the past 24 hours.

## 2024-06-25 NOTE — SUBJECTIVE & OBJECTIVE
"Interval HPI:   No acute events overnight. Patient in room 806/806 A. Blood glucose stable. BG at goal on current insulin regimen (SSI, prandial, and basal insulin ). Steroid use- None.   Renal function- Normal   Vasopressors-  None      Diet diabetic 1500 Calorie      Eatin%  Nausea: No  Hypoglycemia and intervention: No  Fever: No  TPN and/or TF: No    /64 (BP Location: Left arm, Patient Position: Sitting)   Pulse 96   Temp 98.2 °F (36.8 °C) (Oral)   Resp 18   Ht 5' 7" (1.702 m)   Wt 81.5 kg (179 lb 10.8 oz)   LMP 2015   SpO2 98%   Breastfeeding No   BMI 28.14 kg/m²     Labs Reviewed and Include    No results for input(s): "GLU", "CALCIUM", "ALBUMIN", "PROT", "NA", "K", "CO2", "CL", "BUN", "CREATININE", "ALKPHOS", "ALT", "AST", "BILITOT" in the last 24 hours.  Lab Results   Component Value Date    WBC 11.84 2024    HGB 7.0 (L) 2024    HCT 22.9 (L) 2024    MCV 85 2024     2024     No results for input(s): "TSH", "FREET4" in the last 168 hours.  Lab Results   Component Value Date    HGBA1C 7.3 (H) 2024       Nutritional status:   Body mass index is 28.14 kg/m².  Lab Results   Component Value Date    ALBUMIN 2.4 (L) 2024    ALBUMIN 2.4 (L) 2024    ALBUMIN 2.5 (L) 2024     No results found for: "PREALBUMIN"    Estimated Creatinine Clearance: 83.2 mL/min (based on SCr of 0.8 mg/dL).    Accu-Checks  Recent Labs     248 24  0903 24  1246 24  1740 24  2104 24  0821 24  1223 24  1741 24  2050 24  0829   POCTGLUCOSE 124* 92 166* 157* 232* 238* 214* 169* 152* 172*       Current Medications and/or Treatments Impacting Glycemic Control  Immunotherapy:    Immunosuppressants       None          Steroids:   Hormones (From admission, onward)      None          Pressors:    Autonomic Drugs (From admission, onward)      None          Hyperglycemia/Diabetes Medications: "   Antihyperglycemics (From admission, onward)      Start     Stop Route Frequency Ordered    06/24/24 2100  insulin glargine U-100 (Lantus) pen 6 Units         -- SubQ Nightly 06/24/24 1247    06/24/24 1715  insulin aspart U-100 pen 5 Units         -- SubQ 3 times daily with meals 06/24/24 1247    06/23/24 0915  insulin aspart U-100 pen 0-5 Units         -- SubQ Before meals & nightly PRN 06/23/24 0916

## 2024-06-25 NOTE — SUBJECTIVE & OBJECTIVE
Interval History: CT w/ worsening metastatic disease, increasing abd distention. Afebrile    Review of Systems   All other systems reviewed and are negative.    Objective:     Vital Signs (Most Recent):  Temp: 97.9 °F (36.6 °C) (06/25/24 1516)  Pulse: 96 (06/25/24 1516)  Resp: 18 (06/25/24 1516)  BP: 120/72 (06/25/24 1516)  SpO2: 95 % (06/25/24 1516) Vital Signs (24h Range):  Temp:  [97.8 °F (36.6 °C)-98.5 °F (36.9 °C)] 97.9 °F (36.6 °C)  Pulse:  [] 96  Resp:  [17-18] 18  SpO2:  [94 %-99 %] 95 %  BP: (112-129)/(64-76) 120/72     Weight: 81.5 kg (179 lb 10.8 oz)  Body mass index is 28.14 kg/m².    Estimated Creatinine Clearance: 83.2 mL/min (based on SCr of 0.8 mg/dL).     Physical Exam  Constitutional:       General: She is not in acute distress.     Appearance: She is well-developed. She is ill-appearing. She is not diaphoretic.   HENT:      Head: Normocephalic and atraumatic.      Right Ear: External ear normal.      Left Ear: External ear normal.      Nose: Nose normal.   Eyes:      General: No scleral icterus.        Right eye: No discharge.         Left eye: No discharge.      Extraocular Movements: Extraocular movements intact.      Conjunctiva/sclera: Conjunctivae normal.   Pulmonary:      Effort: Pulmonary effort is normal. No respiratory distress.      Breath sounds: No stridor.   Abdominal:      General: There is distension.      Tenderness: There is abdominal tenderness.   Musculoskeletal:         General: Normal range of motion.   Skin:     Findings: No erythema or rash.   Neurological:      General: No focal deficit present.      Mental Status: She is alert and oriented to person, place, and time. Mental status is at baseline.      Cranial Nerves: No cranial nerve deficit.   Psychiatric:         Mood and Affect: Mood normal.         Behavior: Behavior normal.         Thought Content: Thought content normal.         Judgment: Judgment normal.          Significant Labs: CBC:   Recent Labs   Lab  06/25/24  0519   WBC 11.84   HGB 7.0*   HCT 22.9*        CMP:   Recent Labs   Lab 06/24/24  0410   *   K 3.6   CL 97   CO2 25   *   BUN 13   CREATININE 0.8   CALCIUM 8.0*   ANIONGAP 11     Microbiology Results (last 7 days)       Procedure Component Value Units Date/Time    Culture, Respiratory with Gram Stain [[id number]] Collected: 06/22/24 1920    Order Status: Completed Specimen: Respiratory from Sputum Updated: 06/25/24 1243     Respiratory Culture Normal respiratory adriano     Gram Stain (Respiratory) <10 epithelial cells per low power field.     Gram Stain (Respiratory) Rare WBC's     Gram Stain (Respiratory) Few Gram positive cocci    Blood culture [[id number]] Collected: 06/22/24 1129    Order Status: Completed Specimen: Blood from Peripheral, Wrist, Left Updated: 06/25/24 1222     Blood Culture, Routine No Growth to date      No Growth to date      No Growth to date      No Growth to date    Narrative:      FROM PERIPHERAL SITE    Blood culture [[id number]] Collected: 06/22/24 1119    Order Status: Completed Specimen: Blood from Peripheral, Hand, Left Updated: 06/25/24 1222     Blood Culture, Routine No Growth to date      No Growth to date      No Growth to date      No Growth to date    Narrative:      FROM PORT SITE    Blood culture #2 **CANNOT BE ORDERED STAT** [[id number]] Collected: 06/20/24 0228    Order Status: Completed Specimen: Blood from Peripheral, Hand, Left Updated: 06/25/24 0612     Blood Culture, Routine No growth after 5 days.    Blood culture #1 **CANNOT BE ORDERED STAT** [[id number]] Collected: 06/20/24 0228    Order Status: Completed Specimen: Blood from Peripheral, Upper Arm, Left Updated: 06/25/24 0612     Blood Culture, Routine No growth after 5 days.    Blood culture [[id number]] Collected: 06/23/24 2119    Order Status: Completed Specimen: Blood Updated: 06/24/24 2212     Blood Culture, Routine No Growth to date      No Growth to date    Narrative:       PERIPHERAL SITE #1    Blood culture [[id number]] Collected: 06/23/24 2119    Order Status: Completed Specimen: Blood Updated: 06/24/24 2212     Blood Culture, Routine No Growth to date      No Growth to date    Narrative:      PERIPHERAL SITE #2    Blood culture [[id number]] Collected: 06/23/24 2120    Order Status: Completed Specimen: Blood Updated: 06/24/24 2212     Blood Culture, Routine No Growth to date      No Growth to date    Narrative:      PORT SITE    Urine Culture High Risk [[id number]] Collected: 06/22/24 1048    Order Status: Completed Specimen: Urine Updated: 06/23/24 1430     Urine Culture, Routine No growth    Narrative:      Indicated criteria for high risk culture:->Other  Other (specify):->cancer    Influenza A & B by Molecular [[id number]] Collected: 06/21/24 0820    Order Status: Completed Specimen: Nasopharyngeal Swab Updated: 06/21/24 0946     Influenza A, Molecular Negative     Influenza B, Molecular Negative     Flu A & B Source Nasal swab    Culture, Anaerobe [[id number]]     Order Status: No result Specimen: Body Fluid from Pleural Fluid     Aerobic culture [[id number]]     Order Status: No result Specimen: Pleural Fluid             Significant Imaging: I have reviewed all pertinent imaging results/findings within the past 24 hours.

## 2024-06-25 NOTE — PLAN OF CARE
No acute events this shift, VSS, afebrile, pt aaox4, medications tolerated, spouse at bedside, blood glucose monitored, pt covered with insulin, abx administered and tolerated, tessalon pearls and ibuprofen given, medications effective, I/O recorded, frequent rounds performed, pt in bed resting, call light in reach, pt instructed to call for assistance, NAD, safety maintained.

## 2024-06-25 NOTE — PROGRESS NOTES
Progress Note  Gynecology Oncology      SUBJECTIVE:     History of Present Illness:  Anette Ricci is a 58 y.o.  is a 58 y.o.  with stage IVB ovarian carcinosacoma, s/p PDS on 3/25/24 with Dr Washington ([facility] BSO omx liver mobilization, peritoneal & R diaphragm stripping, hepatic wedge resection, cholecystectomy), s/p C3 on  carbo/taxol/bevacizumab admitted for management of malignant pleural effusion.    Onc History:  3/4/24: peritoneal biopsy with carcinosarcoma  3/6/24: CA-125 418  3/25/24: Primary debulking surgery CARLOS ALBERTO/BSO, omentectomy, partial hepatectomy, debulking. (<1cm residual disease: sigmoid, transverse colon, diaphragm, kelvin hepatis)     Adjuvant therapy: Carboplatin AUC 6, paclitaxel 175 mg/m2, bevacizumab 15 mg/kg  24: C1D1: Carbo/Taxol/(Velma held), CA-125 248  5/10/24: C2D1: Carbo/Taxol/Velma  23: C3D1: Carbo/Taxol/Velma  24: C4D1: scheduled. - TO BE RESCHEDULED      Interval History  Patient reports doing well, denies any fevers or chills in last 24h,denies n/v, pain overnight, tolerating PO. Reports cough and SOB almost resolved. Ambulating without difficulty. Denies CP, HA, abdominal or pelvic pain, dysuria, hematuria, diarrhea, constipation.    Current Facility-Administered Medications   Medication    0.9%  NaCl infusion (for blood administration)    acetaminophen tablet 1,000 mg    acetaminophen tablet 650 mg    alteplase injection 2 mg    amLODIPine tablet 10 mg    atorvastatin tablet 80 mg    benzonatate capsule 100 mg    dextromethorphan-guaiFENesin  mg/5 ml liquid 10 mL    dextrose 10% bolus 125 mL 125 mL    dextrose 10% bolus 250 mL 250 mL    enoxaparin injection 80 mg    fluticasone propionate 50 mcg/actuation nasal spray 100 mcg    glucagon (human recombinant) injection 1 mg    glucose chewable tablet 16 g    glucose chewable tablet 24 g    guaiFENesin 12 hr tablet 600 mg    hydrALAZINE injection 10 mg    hydroCHLOROthiazide tablet 25 mg     ibuprofen tablet 600 mg    insulin aspart U-100 pen 0-5 Units    insulin aspart U-100 pen 5 Units    insulin glargine U-100 (Lantus) pen 6 Units    iohexoL (OMNIPAQUE 300) injection 200 mL    lamoTRIgine tablet 25 mg    lisinopriL tablet 20 mg    LORazepam tablet 0.5 mg    OLANZapine tablet 5 mg    ondansetron disintegrating tablet 8 mg    piperacillin-tazobactam (ZOSYN) 4.5 g in D5W 100 mL IVPB (MB+)    QUEtiapine tablet 25 mg      OBJECTIVE:     Vital Signs  Temp:  [98.1 °F (36.7 °C)-98.8 °F (37.1 °C)] 98.3 °F (36.8 °C)  Pulse:  [] 92  Resp:  [17-18] 18  SpO2:  [98 %-99 %] 98 %  BP: (105-129)/(57-76) 118/71    Physical Exam:  Gen: A&Ox3, NAD  CV: RRR  Pulm: reduced breath sounds in right lung base.  Abd: mildly distended abdomen, but non-tender to palpation. Midline incision covered with dressing, last dressing change by wound care on 6/21, incision is clean and healing well by secondary intention.  Ext: no peripheral edema, no calf tenderness    Laboratory  Recent Labs   Lab 06/20/24  0228 06/20/24  0243 06/20/24  1428 06/21/24  0859   WBC 9.24  --  11.47 11.86   HGB 6.4*  --  7.6* 8.8*   HCT 21.7* 20* 23.6* 27.1*   MCV 84  --  80* 82     --  288 422      Recent Labs   Lab 06/20/24  0228 06/21/24  1147 06/23/24  0624 06/24/24  0410   * 133* 135* 133*   K 3.6 3.3* 3.3* 3.6    97 99 97   CO2 24 23 27 25   BUN 10 6 12 13   CREATININE 0.7 0.7 0.7 0.8   * 104 83 164*   PROT 6.5 6.7  --   --    BILITOT 0.4 0.7  --   --    ALKPHOS 90 89  --   --    ALT 9* 10  --   --    AST 17 21  --   --    MG  --   --   --  1.4*      Blood Sugars (AccuCheck):  Recent Labs     06/23/24  0903 06/23/24  1246 06/23/24  1740 06/23/24  2104 06/24/24  0821 06/24/24  1223 06/24/24  1741 06/24/24  2050   POCTGLUCOSE 92 166* 157* 232* 238* 214* 169* 152*     UA negative    Trop neg  Lactate 2.2    Diagnostic Results:  6/20/24 CTA  Impression:  Motion and artifact limited study.  No large or central  pulmonary embolus.  No convincing CTA findings of right heart strain.  Questionable small pulmonary emboli in the right lower lobe as seen on prior studies.  Moderate right pleural effusion with elevated right hemidiaphragm. Passive atelectasis in the right lung base. No consolidation.   Suboptimally evaluated large loculated subdiaphragmatic fluid collection between the liver and the right hemidiaphragm.  Ill defied hypoattenuation in the posterior right hepatic lobe and soft tissue nodule adjacent to the posteroinferior right hepatic lobe.  Abdominal free fluid.  Peritoneal metastatic disease is included in the differential, noting that findings are worse when compared with prior CT.  Suggest correlation with clinical findings and short-term follow-up with dedicated abdominal CT when clinically appropriate.  Mild anasarca.  Similar partially calcified right breast nodule.    6/21/24 CXR  Impression:  Mildly worse right-sided pleural effusion with passive atelectasis or airspace disease in the right lung base.    ASSESSMENT/PLAN:     Active Hospital Problems    Diagnosis  POA    *Pleural effusion, malignant [J91.0]  Yes    Chronic pulmonary embolism without acute cor pulmonale [I27.82]  Yes     Patient with small right lower lobe pulmonary embolism noted on June 2024 CT chest and also noted on prior films as well.  Patient has not been on full-dose anticoagulation only prophylaxis dosing.  Given her advanced cancer and PE noted on CT, it would not be unreasonable to treat with full-dose anticoagulation.  However patient is breathing well on room air and her prior respiratory issues are likely related to her right-sided pleural effusion.  If a decision is made to treat with full-dose anticoagulation, patient should be treated with Lovenox 1 milligram/kilogram b.i.d and sent home on this regimen.  I see no contraindications to anticoagulation however if other procedures are planned then it may be beneficial to wait  until discharge.      Stage 4B Carcinoscaroma, Suspected ovarian origin [C80.1]  Yes     3/4/24: peritoneal biopsy with carcinosarcoma  3/6/24: CA-125 418  3/25/24: Primary debulking surgery [facility]O/BSO, omentectomy, partial hepatectomy, debulking. 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       Anette Ricci is a 58 y.o.  with stage IV ovarian carcinosacoma, s/p PDS with [facility] BSO omx liver mobilization, peritoneal & R diaphragm stripping, hepatic wedge resection, cholecystectomy, currently on C3 carbo/taxol/bevacizumab, who presented with SOB and imaging consistent with right pleural effusion, most likely 2/2 malignancy.     Malignant pleural effusion (Right)  - tachycardia resolved, O2sat WNL, last Fever  @1940  - CBC WNL  - CTA: moderate right pleural effusion with elevated right hemidiaphragm, passive atelectasis in the right lung base, no consolidation  - initially started on vanc/zosyn; vanc d/c HD#2  - s/p IR consult for possible thoracentesis/paracentesis for symptomatic relief, however no safe window for thora, and minimal fluid for para  - clinical picture significantly improved  - encourage IS  - strict I&O    Chronic PE  - small right lower lobe pulmonary embolism noted on 2024 CT chest, also noted on prior imaging  - LE dopplers  negative   - s/p Pulm Crit Care consult: recommend Lovenox 1 milligram/kilogram b.i.d and sent home on this regimen.  - VTE ppx: SCDs +  lovenox qD > lovenox 1mg/kg BID initiated on 6/23    Fever  - patient became febrile on HD#2, had been on broad spectrum abx since admission  - WBC 9>11  - further infectious w/u performed; UA neg, Covid/Flu neg, CXR with mild worsening R pleural effusion, COVID/Flu neg. Sputum cultures grew few Gram+ cocci, rare WBC, <10 epithelial cells. Negative respiratory panel.  - repeat blood cultures collected 6/22, from both her port site and a peripheral site; prelim NGTD x2  - Tlast 100.5 on 6/23 at 1940  - ID team consulted: recommended to continue zosyn yesterday and follow up with CT chest results. Appreciate recs for discharge panning.     Hypokalemia  - 3.3 on 6/21 > replace x 2 > 3.6     Ovarian Carcinosarcoma Stage IV   - s/p PDS on 3/25/24 with Dr Washington ([facility] BSO omx liver mobilization, peritoneal & R diaphragm stripping, hepatic wedge resection, cholecystectomy) suboptimal <1cm residual disease: sigmoid, transverse colon, diaphragm, kelvin hepatis   - currently receiving carbo/taxol/bevacizumab; was due for C4 6/21 however will plan to reschedule on discharge.  - Wound care following, VML incision healing well by secondary intention, plan to change dressing q3d, will need HH, see consult note for full details  - s/p PT consult; no outpatient needs  - Repeat CT chest abdomen pelvis completed last night, pending results.  - CA-125 ordered     Anemia  - H/H 6.4/22 > 1u pRBC > 7.6/23 > 1u pRBC > 8.8/27  - denies sx of anemia  - likely 2/2 chronic disease and chemotherapy    Diabetes   - hold Metformin  - Endocrinology team recommended increasing home levemir to 6u QHS, aspart 5u TID WM   - LDC SSI ordered  - diabetic diet  - POCT BG pre-meal/qHS    HTN  - continue home BP meds: amlodipine, enalapril>lisinopril, HTCZ  - hydralazine PRN (BP>180/110)    HLD  - continue atorvastatin    Seizure disorder  - continue Lamotrigine    Depression/Anxiety  - continue Olanzapine, Seroquel PRN     no

## 2024-06-26 LAB
BASOPHILS # BLD AUTO: 0.05 K/UL (ref 0–0.2)
BASOPHILS NFR BLD: 0.2 % (ref 0–1.9)
BLD PROD TYP BPU: NORMAL
BLOOD UNIT EXPIRATION DATE: NORMAL
BLOOD UNIT TYPE CODE: 7300
BLOOD UNIT TYPE: NORMAL
CODING SYSTEM: NORMAL
CROSSMATCH INTERPRETATION: NORMAL
DIFFERENTIAL METHOD BLD: ABNORMAL
DISPENSE STATUS: NORMAL
ENTEROVIRUS/RHINOVIRUS: NOT DETECTED
EOSINOPHIL # BLD AUTO: 0 K/UL (ref 0–0.5)
EOSINOPHIL NFR BLD: 0.1 % (ref 0–8)
ERYTHROCYTE [DISTWIDTH] IN BLOOD BY AUTOMATED COUNT: 18.1 % (ref 11.5–14.5)
HCT VFR BLD AUTO: 25.1 % (ref 37–48.5)
HGB BLD-MCNC: 7.8 G/DL (ref 12–16)
HUMAN BOCAVIRUS: NOT DETECTED
HUMAN CORONAVIRUS, COMMON COLD VIRUS: NOT DETECTED
IMM GRANULOCYTES # BLD AUTO: 0.73 K/UL (ref 0–0.04)
IMM GRANULOCYTES NFR BLD AUTO: 3 % (ref 0–0.5)
INFLUENZA A - H1N1-09: NOT DETECTED
LEGIONELLA PNEUMOPHILA: NOT DETECTED
LYMPHOCYTES # BLD AUTO: 1.1 K/UL (ref 1–4.8)
LYMPHOCYTES NFR BLD: 4.7 % (ref 18–48)
MCH RBC QN AUTO: 26.4 PG (ref 27–31)
MCHC RBC AUTO-ENTMCNC: 31.1 G/DL (ref 32–36)
MCV RBC AUTO: 85 FL (ref 82–98)
MONOCYTES # BLD AUTO: 1.4 K/UL (ref 0.3–1)
MONOCYTES NFR BLD: 5.8 % (ref 4–15)
MORAXELLA CATARRHALIS: NOT DETECTED
NEUTROPHILS # BLD AUTO: 20.9 K/UL (ref 1.8–7.7)
NEUTROPHILS NFR BLD: 86.2 % (ref 38–73)
NRBC BLD-RTO: 0 /100 WBC
NUM UNITS TRANS PACKED RBC: NORMAL
PARAINFLUENZA: NOT DETECTED
PLATELET # BLD AUTO: 423 K/UL (ref 150–450)
PMV BLD AUTO: 10.6 FL (ref 9.2–12.9)
POCT GLUCOSE: 155 MG/DL (ref 70–110)
POCT GLUCOSE: 166 MG/DL (ref 70–110)
POCT GLUCOSE: 209 MG/DL (ref 70–110)
POCT GLUCOSE: 279 MG/DL (ref 70–110)
RBC # BLD AUTO: 2.95 M/UL (ref 4–5.4)
RVP - ADENOVIRUS: NOT DETECTED
RVP - HUMAN METAPNEUMOVIRUS (HMPV): NOT DETECTED
RVP - INFLUENZA A: NOT DETECTED
RVP - INFLUENZA B: NOT DETECTED
RVP - RESPIRATORY SYNCTIAL VIRUS (RSV) A: NOT DETECTED
RVP - RESPIRATORY VIRAL PANEL, SOURCE: NORMAL
TEM - ACINETOBACTER BAUMANNII: NOT DETECTED
TEM - BORDETELLA PERTUSSIS: NOT DETECTED
TEM - CHLAMYDOPHILA PNEUMONIAE: NOT DETECTED
TEM - KLEBSIELLA PNEUMONIAE: NOT DETECTED
TEM - MRSA: NOT DETECTED
TEM - MYCOPLASMA PNEUMONIAE: NOT DETECTED
TEM - NEISSERIA MENINGITIDIS: NOT DETECTED
TEM - PANTON-VALENTINE: NOT DETECTED
TEM - PSEUDOMONAS AERUGINOSA: NOT DETECTED
TEM - STAPHYLOCOCCUS AUREUS: NOT DETECTED
TEM - STREPTOCOCCUS PNEUMONIAE: NOT DETECTED
TEM - STREPTOCOCCUS PYOGENES A: NOT DETECTED
TEM- HAEMOPHILUS INFLUENZAE B: NOT DETECTED
TEM- HAEMOPHILUS INFLUENZAE: NOT DETECTED
WBC # BLD AUTO: 24.22 K/UL (ref 3.9–12.7)

## 2024-06-26 PROCEDURE — 99231 SBSQ HOSP IP/OBS SF/LOW 25: CPT | Mod: HCNC,,, | Performed by: NURSE PRACTITIONER

## 2024-06-26 PROCEDURE — 25000003 PHARM REV CODE 250: Mod: HCNC

## 2024-06-26 PROCEDURE — 99900035 HC TECH TIME PER 15 MIN (STAT): Mod: HCNC

## 2024-06-26 PROCEDURE — 0WJF3ZZ INSPECTION OF ABDOMINAL WALL, PERCUTANEOUS APPROACH: ICD-10-PCS | Performed by: STUDENT IN AN ORGANIZED HEALTH CARE EDUCATION/TRAINING PROGRAM

## 2024-06-26 PROCEDURE — 30233N1 TRANSFUSION OF NONAUTOLOGOUS RED BLOOD CELLS INTO PERIPHERAL VEIN, PERCUTANEOUS APPROACH: ICD-10-PCS | Performed by: EMERGENCY MEDICINE

## 2024-06-26 PROCEDURE — 20600001 HC STEP DOWN PRIVATE ROOM: Mod: HCNC

## 2024-06-26 PROCEDURE — P9016 RBC LEUKOCYTES REDUCED: HCPCS | Mod: HCNC

## 2024-06-26 PROCEDURE — 85025 COMPLETE CBC W/AUTO DIFF WBC: CPT | Mod: HCNC

## 2024-06-26 PROCEDURE — 63600175 PHARM REV CODE 636 W HCPCS: Mod: HCNC

## 2024-06-26 PROCEDURE — 86920 COMPATIBILITY TEST SPIN: CPT | Mod: HCNC

## 2024-06-26 PROCEDURE — 94799 UNLISTED PULMONARY SVC/PX: CPT | Mod: HCNC

## 2024-06-26 PROCEDURE — 99232 SBSQ HOSP IP/OBS MODERATE 35: CPT | Mod: HCNC,,, | Performed by: INTERNAL MEDICINE

## 2024-06-26 PROCEDURE — 36430 TRANSFUSION BLD/BLD COMPNT: CPT | Mod: HCNC

## 2024-06-26 RX ORDER — ENOXAPARIN SODIUM 100 MG/ML
1 INJECTION SUBCUTANEOUS EVERY 12 HOURS
Status: DISCONTINUED | OUTPATIENT
Start: 2024-06-26 | End: 2024-06-27 | Stop reason: HOSPADM

## 2024-06-26 RX ORDER — ENOXAPARIN SODIUM 100 MG/ML
40 INJECTION SUBCUTANEOUS EVERY 24 HOURS
Status: DISCONTINUED | OUTPATIENT
Start: 2024-06-26 | End: 2024-06-26

## 2024-06-26 RX ORDER — ACETAMINOPHEN 325 MG/1
650 TABLET ORAL ONCE
Status: DISCONTINUED | OUTPATIENT
Start: 2024-06-26 | End: 2024-06-26

## 2024-06-26 RX ORDER — SIMETHICONE 80 MG
1 TABLET,CHEWABLE ORAL 3 TIMES DAILY PRN
Status: DISCONTINUED | OUTPATIENT
Start: 2024-06-26 | End: 2024-06-27 | Stop reason: HOSPADM

## 2024-06-26 RX ORDER — DIPHENHYDRAMINE HCL 25 MG
25 CAPSULE ORAL ONCE
Status: COMPLETED | OUTPATIENT
Start: 2024-06-26 | End: 2024-06-26

## 2024-06-26 RX ORDER — OXYCODONE HYDROCHLORIDE 5 MG/1
5 TABLET ORAL EVERY 6 HOURS PRN
Status: DISCONTINUED | OUTPATIENT
Start: 2024-06-26 | End: 2024-06-27 | Stop reason: HOSPADM

## 2024-06-26 RX ORDER — IBUPROFEN 600 MG/1
600 TABLET ORAL EVERY 6 HOURS PRN
Status: DISCONTINUED | OUTPATIENT
Start: 2024-06-26 | End: 2024-06-27 | Stop reason: HOSPADM

## 2024-06-26 RX ORDER — HYDROCODONE BITARTRATE AND ACETAMINOPHEN 500; 5 MG/1; MG/1
TABLET ORAL
Status: DISCONTINUED | OUTPATIENT
Start: 2024-06-26 | End: 2024-06-26

## 2024-06-26 RX ADMIN — ENOXAPARIN SODIUM 80 MG: 80 INJECTION SUBCUTANEOUS at 05:06

## 2024-06-26 RX ADMIN — ATORVASTATIN CALCIUM 80 MG: 40 TABLET, FILM COATED ORAL at 08:06

## 2024-06-26 RX ADMIN — LAMOTRIGINE 25 MG: 25 TABLET ORAL at 08:06

## 2024-06-26 RX ADMIN — GUAIFENESIN 600 MG: 600 TABLET, EXTENDED RELEASE ORAL at 08:06

## 2024-06-26 RX ADMIN — IBUPROFEN 600 MG: 600 TABLET, FILM COATED ORAL at 09:06

## 2024-06-26 RX ADMIN — INSULIN ASPART 1 UNITS: 100 INJECTION, SOLUTION INTRAVENOUS; SUBCUTANEOUS at 09:06

## 2024-06-26 RX ADMIN — LAMOTRIGINE 25 MG: 25 TABLET ORAL at 09:06

## 2024-06-26 RX ADMIN — GUAIFENESIN 600 MG: 600 TABLET, EXTENDED RELEASE ORAL at 09:06

## 2024-06-26 RX ADMIN — SIMETHICONE 80 MG: 80 TABLET, CHEWABLE ORAL at 08:06

## 2024-06-26 RX ADMIN — PIPERACILLIN SODIUM AND TAZOBACTAM SODIUM 4.5 G: 4; .5 INJECTION, POWDER, FOR SOLUTION INTRAVENOUS at 02:06

## 2024-06-26 RX ADMIN — SIMETHICONE 80 MG: 80 TABLET, CHEWABLE ORAL at 09:06

## 2024-06-26 RX ADMIN — LORAZEPAM 0.5 MG: 0.5 TABLET ORAL at 09:06

## 2024-06-26 RX ADMIN — IBUPROFEN 600 MG: 600 TABLET, FILM COATED ORAL at 02:06

## 2024-06-26 RX ADMIN — INSULIN ASPART 2 UNITS: 100 INJECTION, SOLUTION INTRAVENOUS; SUBCUTANEOUS at 12:06

## 2024-06-26 RX ADMIN — DIPHENHYDRAMINE HYDROCHLORIDE 25 MG: 25 CAPSULE ORAL at 08:06

## 2024-06-26 RX ADMIN — IBUPROFEN 600 MG: 600 TABLET, FILM COATED ORAL at 08:06

## 2024-06-26 RX ADMIN — FLUTICASONE PROPIONATE 100 MCG: 50 SPRAY, METERED NASAL at 08:06

## 2024-06-26 RX ADMIN — INSULIN GLARGINE 6 UNITS: 100 INJECTION, SOLUTION SUBCUTANEOUS at 09:06

## 2024-06-26 RX ADMIN — OLANZAPINE 5 MG: 5 TABLET, FILM COATED ORAL at 09:06

## 2024-06-26 RX ADMIN — PIPERACILLIN SODIUM AND TAZOBACTAM SODIUM 4.5 G: 4; .5 INJECTION, POWDER, FOR SOLUTION INTRAVENOUS at 05:06

## 2024-06-26 RX ADMIN — PIPERACILLIN SODIUM AND TAZOBACTAM SODIUM 4.5 G: 4; .5 INJECTION, POWDER, FOR SOLUTION INTRAVENOUS at 09:06

## 2024-06-26 NOTE — PLAN OF CARE
Patient is AAOX4. Up, independent, stand by assist. Call light and personal items within reach. Patient involved in care. NPO for paracentesis. POCT glucose monitored, scheduled insulin provided. Complain of gas pain and abdominal pain, notified Dr. Jayla gandhi, prn ibuprofen and simethicone provided. IV antibiotic provided. 1 U PRBC transfused, tylenol refused and benadryl provided before transfusion, tolerated well. CBC sent to the lab. Paracentesis cancelled today after imaging performed, no safe pocket found for procedure. done today. Patient stable at this time.

## 2024-06-26 NOTE — SEDATION DOCUMENTATION
Pt assessed by the Provider, after imaging each quadrant of the ABD, there is no safe pocket to stick to drain fluid. Paracentesis not performed. Report called to primary nurse Carri.

## 2024-06-26 NOTE — PROCEDURES
Radiology Post-Procedure Note     Pre Op Diagnosis: Abdominal distension  Post Op Diagnosis: Same     Procedure: Ultrasound Abdomen Limited     Procedure performed by: Sendy Moreno PA-C     Written Informed Consent Obtained: Yes  Specimen Removed: NO  Estimated Blood Loss: None     Findings:   All 4 abdominal quadrants scanned, not enough fluid seen for safe drainage. Therefore, paracentesis was not performed. Pt verbalized understanding.       Sendy Moreno PA-C  Interventional Radiology   Spectra: [id number]

## 2024-06-26 NOTE — ASSESSMENT & PLAN NOTE
57 yo F PMH stage 4 ovarian ca (3/2024) s/p debulking, omentectomy, partial hepatectomy admitted for SOB 2/2 pleural effusion. CXR showed RLL opacitiy and CT chest showing R sided loculated subdiaphragmatic fluid 7.3x11.2cm which was not amendable to drainage by IR. Fevers have resolved, currently on pip-tazo. Repeat CT shows worsening metastatic disease w/ ascites and perihepatic collection.     Recommendations:  - IR unable to safely complete paracentesis  - continue pip-tazo  - will discuss plan with patient's primary oncologist

## 2024-06-26 NOTE — H&P
Inpatient Radiology Pre-procedure Note    History of Present Illness:  Anette Ricci is a 58 y.o. female who presents for ultrasound guided paracentesis.    Admission H&P reviewed.  Past Medical History:   Diagnosis Date    Breast cancer 2013    Diabetes mellitus     Genetic testing 05/29/2013    VUS    Hyperlipidemia     Hypertension     Malignant neoplasm of upper-outer quadrant of right breast in female, estrogen receptor positive 12/02/2015    Seizure disorder      Past Surgical History:   Procedure Laterality Date    BILATERAL SALPINGO-OOPHORECTOMY (BSO) N/A 3/25/2024    Procedure: SALPINGO-OOPHORECTOMY, BILATERAL;  Surgeon: Александр Washington MD;  Location: [facility] OR [address] [place] [address];  Service: OB/GYN;  Laterality: N/A;    BREAST BIOPSY      BREAST LUMPECTOMY Right 2013    CHOLECYSTECTOMY  3/25/2024    Procedure: CHOLECYSTECTOMY;  Surgeon: Bo Farmer MD;  Location: [facility] OR [address] [place] [address];  Service: General;;    DEBULKING OF TUMOR N/A 3/25/2024    Procedure: DEBULKING, NEOPLASM;  Surgeon: Александр Washington MD;  Location: [facility] OR [address] [place] [address];  Service: OB/GYN;  Laterality: N/A;    EXCISION, LIVER N/A 3/25/2024    Procedure: EXCISION, LIVER - partial;  Surgeon: Bo Farmer MD;  Location: [facility] OR [address] [place] [address];  Service: General;  Laterality: N/A;  bk ultrasound  [place] book by TA on 3-21-24  7:00 a.m.  Conf #  [id number]    EYE SURGERY      LAPAROTOMY, EXPLORATORY N/A 3/25/2024    Procedure: LAPAROTOMY, EXPLORATORY;  Surgeon: Александр Washington MD;  Location: [facility] OR [address] [place] [address];  Service: OB/GYN;  Laterality: N/A;    OMENTECTOMY N/A 3/25/2024    Procedure: OMENTECTOMY;  Surgeon: Александр Washington MD;  Location: [facility] OR [address] [place] [address];  Service: OB/GYN;  Laterality: N/A;    PERITONEOCENTESIS N/A 3/13/2024    Procedure: PARACENTESIS, ABDOMINAL;  Surgeon: Flavia Moore MD;  Location: [facility] CATH LAB;  Service: Radiology;  Laterality: N/A;    PERITONEOCENTESIS N/A 3/21/2024    Procedure: PARACENTESIS, ABDOMINAL;   Surgeon: Jorge Jolley MD;  Location: [facility] [place] CATH LAB;  Service: Radiology;  Laterality: N/A;    PERITONEOCENTESIS N/A 6/10/2024    Procedure: PARACENTESIS, ABDOMINAL;  Surgeon: Rogelio Malave MD;  Location: [facility] [place] CATH LAB;  Service: Radiology;  Laterality: N/A;    TOTAL ABDOMINAL HYSTERECTOMY N/A 3/25/2024    Procedure: HYSTERECTOMY, TOTAL, ABDOMINAL;  Surgeon: Александр Washington MD;  Location: [facility] OR [address] [place] [address];  Service: OB/GYN;  Laterality: N/A;    TOTAL REDUCTION MAMMOPLASTY Bilateral 2016       Review of Systems:   As documented in primary team H&P    Home Meds:   Prior to Admission medications    Medication Sig Start Date End Date Taking? Authorizing Provider   atorvastatin (LIPITOR) 40 MG tablet Take 80 mg by mouth once daily.   Yes Provider, Historical   enalapril (VASOTEC) 20 MG tablet Take 20 mg by mouth once daily.   Yes Provider, Historical   ibuprofen (ADVIL,MOTRIN) 600 MG tablet Take 1 tablet (600 mg total) by mouth every 6 (six) hours as needed for Pain. 5/24/24  Yes Art Dao MD   insulin lispro (HUMALOG U-100 INSULIN) 100 unit/mL injection **LOW CORRECTION DOSE**  Blood Glucose  mg/dL                  Pre-meal                  151-200                0 unit                        201-250                2 units                      251-300                3 units                      301-350                4 units                      >350                     5 units                      Administer subcutaneously if needed at times designated by monitoring schedule. 3/30/24  Yes Mel Boateng MD   lamoTRIgine (LAMICTAL) 25 MG tablet Take 25 mg by mouth 2 (two) times daily.   Yes Provider, Historical   lorazepam (ATIVAN) 0.5 MG tablet Take 0.5 mg by mouth every 12 (twelve) hours as needed for Anxiety. 5/31/14  Yes Provider, Historical   metformin (GLUCOPHAGE) 1000 MG tablet Take 1,000 mg by mouth daily with breakfast.    Yes Provider, Historical   ondansetron (ZOFRAN-ODT) 4 MG TbDL  Take 1 tablet (4 mg total) by mouth every 6 (six) hours as needed (for nausea). 3/31/24  Yes Mel Boateng MD   quetiapine (SEROQUEL) 25 MG Tab Take 25 mg by mouth daily as needed. 2/4/16  Yes Provider, Historical   acetaminophen (TYLENOL) 325 MG tablet Take 2 tablets (650 mg total) by mouth every 4 (four) hours as needed for Pain. 5/24/24   Art Dao MD   amlodipine (NORVASC) 10 MG tablet Take 10 mg by mouth once daily.  12/4/15   Provider, Historical   dexAMETHasone (DECADRON) 4 MG Tab Take 2 tablets (8 mg total) by mouth once daily. daily on days 2-4 of your chemotherapy cycle. 4/23/24   Александр Washington MD   enoxaparin (LOVENOX) 80 mg/0.8 mL Syrg Inject 0.8 mLs (80 mg total) into the skin every 12 (twelve) hours. 6/25/24 7/23/24  Quinton Magaña MD   fluticasone propionate (FLONASE) 50 mcg/actuation nasal spray 1 spray by Each Nostril route once daily.    Provider, Historical   hydrochlorothiazide (HYDRODIURIL) 25 MG tablet Take 25 mg by mouth once daily.  5/1/15   Provider, Historical   hydrocodone-acetaminophen (HYCET) solution 7.5-325 mg/15mL Take 30 mLs by mouth every 6 (six) hours as needed for Pain. 4/20/24   Kelsey Ramirez MD   HYDROmorphone (DILAUDID) 2 MG tablet Take 1 tablet (2 mg total) by mouth every 8 (eight) hours as needed for Pain. 4/3/24 4/3/25  Mary Mccloud, NP   insulin aspart U-100 (NOVOLOG) 100 unit/mL (3 mL) InPn pen Inject 3 Units into the skin 3 (three) times daily with meals.  (Discard pen 28 days after initial use) 4/8/24 4/8/25  Mel Boateng MD   insulin detemir U-100, Levemir, (LEVEMIR FLEXPEN) 100 unit/mL (3 mL) InPn pen Inject 7 Units into the skin every evening.  (Discard pen 28 days after initial use) 4/8/24 4/8/25  Mel Boateng MD   ketorolac 0.5% (ACULAR) 0.5 % Drop Place 1 drop into both eyes. For pain after eye injections.    Provider, Historical   OLANZapine (ZYPREXA) 5 MG tablet Take 1 tablet (5 mg total) by mouth every  evening. nightly on days 1-4 of each chemotherapy cycle. 4/22/24   Александр Washington MD   prochlorperazine (COMPAZINE) 10 MG tablet Take 1 tablet (10 mg total) by mouth every 6 (six) hours as needed (Nausea). 4/22/24   Александр Washington MD     Scheduled Meds:    acetaminophen  1,000 mg Oral Once    acetaminophen  650 mg Oral Once    acetaminophen  650 mg Oral Once    amLODIPine  10 mg Oral Daily    atorvastatin  80 mg Oral Daily    fluticasone propionate  2 spray Each Nostril Daily    guaiFENesin  600 mg Oral BID    hydroCHLOROthiazide  25 mg Oral Daily    insulin aspart U-100  5 Units Subcutaneous TID WM    insulin glargine U-100  6 Units Subcutaneous QHS    lamoTRIgine  25 mg Oral BID    lisinopriL  20 mg Oral Daily    OLANZapine  5 mg Oral QHS    piperacillin-tazobactam (Zosyn) IV (PEDS and ADULTS) (extended infusion is not appropriate)  4.5 g Intravenous Q8H     Continuous Infusions:   PRN Meds:  Current Facility-Administered Medications:     0.9%  NaCl infusion (for blood administration), , Intravenous, Q24H PRN    0.9%  NaCl infusion (for blood administration), , Intravenous, Q24H PRN    alteplase, 2 mg, Intra-Catheter, PRN    benzonatate, 100 mg, Oral, TID PRN    dextromethorphan-guaiFENesin  mg/5 ml, 10 mL, Oral, Q4H PRN    dextrose 10%, 12.5 g, Intravenous, PRN    dextrose 10%, 25 g, Intravenous, PRN    glucagon (human recombinant), 1 mg, Intramuscular, PRN    glucose, 16 g, Oral, PRN    glucose, 24 g, Oral, PRN    hydrALAZINE, 10 mg, Intravenous, Q6H PRN    ibuprofen, 600 mg, Oral, Q6H PRN    insulin aspart U-100, 0-5 Units, Subcutaneous, QID (AC + HS) PRN    iohexoL, 200 mL, Intravenous, ONCE PRN    LORazepam, 0.5 mg, Oral, Q12H PRN    ondansetron, 8 mg, Oral, Q8H PRN    oxyCODONE, 5 mg, Oral, Q6H PRN    QUEtiapine, 25 mg, Oral, Daily PRN    simethicone, 1 tablet, Oral, TID PRN  Anticoagulants/Antiplatelets: no anticoagulation    Allergies:   Review of patient's allergies indicates:    Allergen Reactions    Codeine Other (See Comments)     Flu like s/s    Tramadol Other (See Comments)     Flu like symptoms similar to codeine reaction     Sedation Hx: have not been any systemic reactions    Vitals:  Temp: 98 °F (36.7 °C) (06/26/24 1314)  Pulse: 93 (06/26/24 1314)  Resp: 18 (06/26/24 1314)  BP: (!) 103/59 (06/26/24 1314)  SpO2: 96 % (06/26/24 1314)     Physical Exam:  ASA: 3  Mallampati: n/a    General: no acute distress  Mental Status: alert and oriented to person, place and time  HEENT: normocephalic, atraumatic  Chest: unlabored breathing  Heart: regular heart rate  Abdomen: distended  Extremity: moves all extremities    Plan: ultrasound guided paracentesis  Sedation Plan: local    Sendy Moreno PA-C  Interventional Radiology   Spectra: [phone]

## 2024-06-26 NOTE — PROGRESS NOTES
Progress Note  Gynecology Oncology      SUBJECTIVE:     History of Present Illness:  Anette Ricci is a 58 y.o.  is a 58 y.o.  with stage IVB ovarian carcinosacoma, s/p PDS on 3/25/24 with Dr Washington (CARLOS ALBERTO BSO omx liver mobilization, peritoneal & R diaphragm stripping, hepatic wedge resection, cholecystectomy), s/p C3 on  carbo/taxol/bevacizumab admitted for management of malignant pleural effusion.    Onc History:  3/4/24: peritoneal biopsy with carcinosarcoma  3/6/24: CA-125 418  3/25/24: Primary debulking surgery CARLOS ALBERTO/BSO, omentectomy, partial hepatectomy, debulking. (<1cm residual disease: sigmoid, transverse colon, diaphragm, kelvin hepatis)     Adjuvant therapy: Carboplatin AUC 6, paclitaxel 175 mg/m2, bevacizumab 15 mg/kg  24: C1D1: Carbo/Taxol/(Velma held), CA-125 248  5/10/24: C2D1: Carbo/Taxol/Velma  23: C3D1: Carbo/Taxol/Velma  24: C4D1: scheduled. - TO BE RESCHEDULED      Interval History  Patient reports doing well this morning. Denies pain, f/c, n/v overnight. Tolerating PO. Reports cough and SOB almost resolved. Reports feeling weak/fatigued, dizzy when ambulating to bathroom overnight. Denies palpitations, syncope, falls, chest pain. Denies abdominal or pelvic pain, dysuria, hematuria, diarrhea, constipation. Last BM this morning. Voiding spontaneously.    Current Facility-Administered Medications   Medication    0.9%  NaCl infusion (for blood administration)    0.9%  NaCl infusion (for blood administration)    acetaminophen tablet 1,000 mg    acetaminophen tablet 650 mg    acetaminophen tablet 650 mg    alteplase injection 2 mg    amLODIPine tablet 10 mg    atorvastatin tablet 80 mg    benzonatate capsule 100 mg    dextromethorphan-guaiFENesin  mg/5 ml liquid 10 mL    dextrose 10% bolus 125 mL 125 mL    dextrose 10% bolus 250 mL 250 mL    diphenhydrAMINE capsule 25 mg    fluticasone propionate 50 mcg/actuation nasal spray 100 mcg    glucagon (human  recombinant) injection 1 mg    glucose chewable tablet 16 g    glucose chewable tablet 24 g    guaiFENesin 12 hr tablet 600 mg    hydrALAZINE injection 10 mg    hydroCHLOROthiazide tablet 25 mg    ibuprofen tablet 600 mg    insulin aspart U-100 pen 0-5 Units    insulin aspart U-100 pen 5 Units    insulin glargine U-100 (Lantus) pen 6 Units    iohexoL (OMNIPAQUE 300) injection 200 mL    lamoTRIgine tablet 25 mg    lisinopriL tablet 20 mg    LORazepam tablet 0.5 mg    OLANZapine tablet 5 mg    ondansetron disintegrating tablet 8 mg    piperacillin-tazobactam (ZOSYN) 4.5 g in D5W 100 mL IVPB (MB+)    QUEtiapine tablet 25 mg      OBJECTIVE:     Vital Signs  Temp:  [97.8 °F (36.6 °C)-100.3 °F (37.9 °C)] 98.2 °F (36.8 °C)  Pulse:  [] 108  Resp:  [16-20] 16  SpO2:  [91 %-97 %] 91 %  BP: (107-156)/(64-72) 107/65    Physical Exam:  Gen: A&Ox3, NAD  CV: RRR  Pulm: LCTAB, no respiratory distress  Abd: mildly distended abdomen, but non-tender to palpation. High midline incision covered with dressing, last dressing change by wound care on 6/25, photos with incision clean and healing well by secondary intention.  Ext: no peripheral edema, no calf tenderness, SCDs while in bed    Laboratory  Recent Labs   Lab 06/20/24  1428 06/21/24  0859 06/25/24  0519   WBC 11.47 11.86 11.84   HGB 7.6* 8.8* 7.0*   HCT 23.6* 27.1* 22.9*   MCV 80* 82 85    422 359      Recent Labs   Lab 06/20/24  0228 06/21/24  1147 06/23/24  0624 06/24/24  0410 06/25/24  0519   * 133* 135* 133*  --    K 3.6 3.3* 3.3* 3.6  --     97 99 97  --    CO2 24 23 27 25  --    BUN 10 6 12 13  --    CREATININE 0.7 0.7 0.7 0.8  --    * 104 83 164*  --    PROT 6.5 6.7  --   --   --    BILITOT 0.4 0.7  --   --   --    ALKPHOS 90 89  --   --   --    ALT 9* 10  --   --   --    AST 17 21  --   --   --    MG  --   --   --  1.4* 1.9      Blood Sugars (AccuCheck):  Recent Labs     06/24/24  1223 06/24/24  1741 06/24/24  2050 06/25/24  0829  06/25/24  1223 06/25/24  1752 06/25/24  2119 06/25/24  2223   POCTGLUCOSE 214* 169* 152* 172* 199* 128* 129* 146*     UA negative    Trop neg  Lactate 2.2    Diagnostic Results:  6/20/24 CTA  Impression:  Motion and artifact limited study.  No large or central pulmonary embolus.  No convincing CTA findings of right heart strain.  Questionable small pulmonary emboli in the right lower lobe as seen on prior studies.  Moderate right pleural effusion with elevated right hemidiaphragm. Passive atelectasis in the right lung base. No consolidation.   Suboptimally evaluated large loculated subdiaphragmatic fluid collection between the liver and the right hemidiaphragm.  Ill defied hypoattenuation in the posterior right hepatic lobe and soft tissue nodule adjacent to the posteroinferior right hepatic lobe.  Abdominal free fluid.  Peritoneal metastatic disease is included in the differential, noting that findings are worse when compared with prior CT.  Suggest correlation with clinical findings and short-term follow-up with dedicated abdominal CT when clinically appropriate.  Mild anasarca.  Similar partially calcified right breast nodule.    6/21/24 CXR  Impression:  Mildly worse right-sided pleural effusion with passive atelectasis or airspace disease in the right lung base.    6/22/24 LE dopplers  No evidence of deep venous thrombosis in either lower extremity.    6/25/24 CT C/A/P  Impression:  In this patient with metastatic ovarian carcinoma status post debulking surgery, there is extensive peritoneal nodularity including multiple heterogeneous soft tissue masses throughout the abdomen measuring to 8.7 cm, new from CT 04/05/2024, concerning for disease recurrence.  Redemonstration of a lobulated right subdiaphragmatic collection measuring up to 11.2 cm with probable extension into the hepatic parenchyma.  The findings remain concerning for possible perihepatic abscess.  MRI without and with contrast may be  attempted for further evaluation.  Moderate volume of ascites.       ASSESSMENT/PLAN:     Anette Ricci is a 58 y.o.  with stage IV ovarian carcinosacoma, s/p PDS with CARLOS ALBERTO BSO omx liver mobilization, peritoneal & R diaphragm stripping, hepatic wedge resection, cholecystectomy, currently on C3 carbo/taxol/bevacizumab, who presented with SOB and imaging consistent with right pleural effusion, most likely 2/2 malignancy.     Malignant pleural effusion (Right)  - tachycardia resolved, O2sat WNL, last Fever  @1940  - CBC WNL  - CTA: moderate right pleural effusion with elevated right hemidiaphragm, passive atelectasis in the right lung base, no consolidation  - initially started on vanc/zosyn; vanc d/c HD#2  - s/p IR consult for possible thoracentesis/paracentesis for symptomatic relief, however no safe window for thora, and minimal fluid for para  - clinical picture significantly improved  - encourage IS  - strict I&O    Fever  - patient became febrile on HD#2, had been on broad spectrum abx since admission  - WBC WNL  - further infectious w/u performed; UA neg, Covid/Flu neg, CXR with mild worsening R pleural effusion, COVID/Flu neg. Sputum cultures with few Gram+ cocci, rare WBC, <10 epithelial cells. Negative respiratory panel.  - repeat blood cultures collected , from both her port site and a peripheral site; prelim NGTD x2  - Tlast 100.5 on  at 1940  - ID team following, last seen ; plan for IR paracentesis today  (labs for cytology, gram stain, culture ordered); continue zosyn, will discuss plan for PO abx on dc    Anemia  - likely 2/2 chronic disease and chemotherapy  - see H/H trend above  - total blood products since admission: 2u pRBC  - reports sx of anemia overnight  - transfuse additional 1u pRBC this AM given H/H 7.0/24  - will recheck CBC 4h s/p transfusion completion    Ovarian Carcinosarcoma Stage IV   - s/p PDS on 3/25/24 with Dr Washington (CARLOS ALBETRO BSO omx liver  mobilization, peritoneal & R diaphragm stripping, hepatic wedge resection, cholecystectomy) suboptimal <1cm residual disease: sigmoid, transverse colon, diaphragm, kelvin hepatis   - currently receiving carbo/taxol/bevacizumab; was due for C4 6/21 however will plan to reschedule on discharge.  - Wound care following, VML incision healing well by secondary intention, plan to change dressing q3d, will need HH, see consult note for full details  - s/p PT consult; no outpatient needs  - Repeat CT C/A/P 6/25, c/w progression of disease, moderate ascites  - CA-125 ordered     Chronic PE  - small right lower lobe pulmonary embolism noted on June 2024 CT chest, also noted on prior imaging  - LE dopplers 6/22 negative   - s/p Pulm Crit Care consult: recommend Lovenox 1 milligram/kilogram b.i.d and sent home on this regimen.  - VTE ppx: SCDs + lovenox qD > lovenox 1mg/kg BID initiated on 6/23    Diabetes   - hold Metformin  - endocrinology team following and altering insulin regimen  - current regimen: levemir to 6u QHS, aspart 5u TID WM   - LDC SSI ordered  - diabetic diet  - POCT BG pre-meal/qHS    HTN  - continue home BP meds: amlodipine, enalapril>lisinopril, HTCZ  - hydralazine PRN (BP>180/110)    HLD  - continue atorvastatin    Seizure disorder  - continue Lamotrigine    Depression/Anxiety  - continue Olanzapine, Seroquel PRN      Jayla Holguin MD   OB/GYN PGY-2

## 2024-06-26 NOTE — SUBJECTIVE & OBJECTIVE
Interval History: IR evaluated for para today, not enough fluid for safe tap. Per patient, her primary oncologist will be coming to see her tomorrow to discuss options moving forward    Review of Systems   All other systems reviewed and are negative.    Objective:     Vital Signs (Most Recent):  Temp: 98 °F (36.7 °C) (06/26/24 1554)  Pulse: 94 (06/26/24 1554)  Resp: 18 (06/26/24 1554)  BP: 100/64 (06/26/24 1554)  SpO2: 97 % (06/26/24 1554) Vital Signs (24h Range):  Temp:  [97.9 °F (36.6 °C)-100.3 °F (37.9 °C)] 98 °F (36.7 °C)  Pulse:  [] 94  Resp:  [16-20] 18  SpO2:  [91 %-100 %] 97 %  BP: (100-156)/(57-67) 100/64     Weight: 81.5 kg (179 lb 10.8 oz)  Body mass index is 28.14 kg/m².    Estimated Creatinine Clearance: 83.2 mL/min (based on SCr of 0.8 mg/dL).     Physical Exam  Constitutional:       General: She is not in acute distress.     Appearance: She is well-developed. She is ill-appearing. She is not diaphoretic.   HENT:      Head: Normocephalic and atraumatic.      Right Ear: External ear normal.      Left Ear: External ear normal.      Nose: Nose normal.   Eyes:      General: No scleral icterus.        Right eye: No discharge.         Left eye: No discharge.      Extraocular Movements: Extraocular movements intact.      Conjunctiva/sclera: Conjunctivae normal.   Pulmonary:      Effort: Pulmonary effort is normal. No respiratory distress.      Breath sounds: No stridor.   Abdominal:      General: There is distension.      Tenderness: There is abdominal tenderness.   Musculoskeletal:         General: Normal range of motion.   Skin:     Findings: No erythema or rash.   Neurological:      General: No focal deficit present.      Mental Status: She is alert and oriented to person, place, and time. Mental status is at baseline.      Cranial Nerves: No cranial nerve deficit.   Psychiatric:         Mood and Affect: Mood normal.         Behavior: Behavior normal.         Thought Content: Thought content normal.   "       Judgment: Judgment normal.          Significant Labs: CBC:   Recent Labs   Lab 06/25/24  0519   WBC 11.84   HGB 7.0*   HCT 22.9*        CMP:   No results for input(s): "NA", "K", "CL", "CO2", "GLU", "BUN", "CREATININE", "CALCIUM", "PROT", "ALBUMIN", "BILITOT", "ALKPHOS", "AST", "ALT", "ANIONGAP", "EGFRNONAA" in the last 48 hours.    Invalid input(s): "ESTGFAFRICA"    Microbiology Results (last 7 days)       Procedure Component Value Units Date/Time    Blood culture [[id number]] Collected: 06/22/24 1119    Order Status: Completed Specimen: Blood from Peripheral, Hand, Left Updated: 06/26/24 1222     Blood Culture, Routine No Growth to date      No Growth to date      No Growth to date      No Growth to date      No Growth to date    Narrative:      FROM PORT SITE    Blood culture [[id number]] Collected: 06/22/24 1129    Order Status: Completed Specimen: Blood from Peripheral, Wrist, Left Updated: 06/26/24 1222     Blood Culture, Routine No Growth to date      No Growth to date      No Growth to date      No Growth to date      No Growth to date    Narrative:      FROM PERIPHERAL SITE    Fungus culture [[id number]]     Order Status: Canceled Specimen: Ascites     (rule out SBP) Aerobic culture [[id number]]     Order Status: Canceled Specimen: Ascites     (rule out SBP) Culture, Anaerobic [[id number]]     Order Status: Canceled Specimen: Ascites     (rule out SBP) Gram stain [[id number]]     Order Status: Canceled Specimen: Ascites     Blood culture [[id number]] Collected: 06/23/24 2119    Order Status: Completed Specimen: Blood Updated: 06/25/24 2212     Blood Culture, Routine No Growth to date      No Growth to date      No Growth to date    Narrative:      PERIPHERAL SITE #1    Blood culture [[id number]] Collected: 06/23/24 2119    Order Status: Completed Specimen: Blood Updated: 06/25/24 2212     Blood Culture, Routine No Growth to date      No Growth to date      No Growth to date    Narrative:      " PERIPHERAL SITE #2    Blood culture [[id number]] Collected: 06/23/24 2120    Order Status: Completed Specimen: Blood Updated: 06/25/24 2212     Blood Culture, Routine No Growth to date      No Growth to date      No Growth to date    Narrative:      PORT SITE    Culture, Respiratory with Gram Stain [[id number]] Collected: 06/22/24 1920    Order Status: Completed Specimen: Respiratory from Sputum Updated: 06/25/24 1243     Respiratory Culture Normal respiratory adriano     Gram Stain (Respiratory) <10 epithelial cells per low power field.     Gram Stain (Respiratory) Rare WBC's     Gram Stain (Respiratory) Few Gram positive cocci    Blood culture #2 **CANNOT BE ORDERED STAT** [[id number]] Collected: 06/20/24 0228    Order Status: Completed Specimen: Blood from Peripheral, Hand, Left Updated: 06/25/24 0612     Blood Culture, Routine No growth after 5 days.    Blood culture #1 **CANNOT BE ORDERED STAT** [[id number]] Collected: 06/20/24 0228    Order Status: Completed Specimen: Blood from Peripheral, Upper Arm, Left Updated: 06/25/24 0612     Blood Culture, Routine No growth after 5 days.    Urine Culture High Risk [[id number]] Collected: 06/22/24 1048    Order Status: Completed Specimen: Urine Updated: 06/23/24 1430     Urine Culture, Routine No growth    Narrative:      Indicated criteria for high risk culture:->Other  Other (specify):->cancer    Influenza A & B by Molecular [[id number]] Collected: 06/21/24 0820    Order Status: Completed Specimen: Nasopharyngeal Swab Updated: 06/21/24 0946     Influenza A, Molecular Negative     Influenza B, Molecular Negative     Flu A & B Source Nasal swab    Culture, Anaerobe [[id number]]     Order Status: Canceled Specimen: Body Fluid from Pleural Fluid     Aerobic culture [[id number]]     Order Status: Canceled Specimen: Pleural Fluid             Significant Imaging: I have reviewed all pertinent imaging results/findings within the past 24 hours.

## 2024-06-26 NOTE — CARE UPDATE
Care Update:     No acute events overnight. Patient in room 806/806 A. Blood glucose stable on current inpatient regimen.No hypoglycemia noted over the past 24-hours. Endocrine will continue to follow and manage insulin orders inpatient.       Steroid use- None  Renal function-   Lab Results   Component Value Date    CREATININE 0.8 06/24/2024        Diet Adult Regular  Diet NPO     POCT Glucose   Date Value Ref Range Status   06/26/2024 155 (H) 70 - 110 mg/dL Final   06/25/2024 146 (H) 70 - 110 mg/dL Final   06/25/2024 129 (H) 70 - 110 mg/dL Final   06/25/2024 128 (H) 70 - 110 mg/dL Final   06/25/2024 199 (H) 70 - 110 mg/dL Final   06/25/2024 172 (H) 70 - 110 mg/dL Final   06/24/2024 152 (H) 70 - 110 mg/dL Final   06/24/2024 169 (H) 70 - 110 mg/dL Final   06/24/2024 214 (H) 70 - 110 mg/dL Final   06/24/2024 238 (H) 70 - 110 mg/dL Final   06/23/2024 232 (H) 70 - 110 mg/dL Final   06/23/2024 157 (H) 70 - 110 mg/dL Final   06/23/2024 166 (H) 70 - 110 mg/dL Final   06/23/2024 92 70 - 110 mg/dL Final     Lab Results   Component Value Date    HGBA1C 7.3 (H) 03/19/2024         Home Medications   Diabetes Medications               insulin aspart U-100 (NOVOLOG) 100 unit/mL (3 mL) InPn pen Inject 3 Units into the skin 3 (three) times daily with meals.  (Discard pen 28 days after initial use)    insulin detemir U-100, Levemir, (LEVEMIR FLEXPEN) 100 unit/mL (3 mL) InPn pen Inject 7 Units into the skin every evening.  (Discard pen 28 days after initial use)    insulin lispro (HUMALOG U-100 INSULIN) 100 unit/mL injection **LOW CORRECTION DOSE**  Blood Glucose  mg/dL                  Pre-meal                  151-200                0 unit                        201-250                2 units                      251-300                3 units                      301-350                4 units                      >350                     5 units                      Administer subcutaneously if needed at times designated by  monitoring schedule.    metformin (GLUCOPHAGE) 1000 MG tablet Take 1,000 mg by mouth daily with breakfast.      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 Please notify endocrinology prior to discharge.     Bo Pradhan DNP, FNP-C  Department of Endocrinology  Inpatient Glycemic Management

## 2024-06-26 NOTE — PROGRESS NOTES
[facility] - Oncology ([facility])  Infectious Disease  Progress Note    Patient Name: Anette Ricci  MRN: [id number]  Admission Date: 6/20/2024  Length of Stay: 6 days  Attending Physician: Александр Washington MD  Primary Care Provider: Mark Chua MD    Isolation Status: No active isolations  Assessment/Plan:      Pulmonary  * Pleural effusion, malignant  57 yo F PMH stage 4 ovarian ca (3/2024) s/p debulking, omentectomy, partial hepatectomy admitted for SOB 2/2 pleural effusion. CXR showed RLL opacitiy and CT chest showing R sided loculated subdiaphragmatic fluid 7.3x11.2cm which was not amendable to drainage by IR. Fevers have resolved, currently on pip-tazo. Repeat CT shows worsening metastatic disease w/ ascites and perihepatic collection.     Recommendations:  - IR unable to safely complete paracentesis  - continue pip-tazo  - will discuss plan with patient's primary oncologist    Anticipated Disposition: TBD    Thank you for your consult. I will follow-up with patient. Please contact us if you have any additional questions.    Martha Galvan DO  Transplant Infectious Disease    Time: 35 minutes   50% of time spent on face-to-face counseling and coordination of care. Counseling included review of test results, diagnosis, and treatment plan with patient and/or family.        Subjective:     Principal Problem:Pleural effusion, malignant    HPI: 57 yo F PMH stage 4 ovarian ca (3/2024) s/p debulking, omentectomy, partial hepatectomy admitted for SOB 2/2 pleural effusion. CXR showed RLL opacitiy and CT chest showing R sided loculated subdiaphragmatic fluid 7.3x11.2cm which was not amendable to drainage by IR. She was empirically started on vancomycin and zosyn and currently on only zosyn. She reports improvement in SOB since admission. She denies any fever or chills. Multiple Bcx collected since admission NGTD.    Interval History: IR evaluated for para today, not enough fluid for safe tap. Per patient, her  primary oncologist will be coming to see her tomorrow to discuss options moving forward    Review of Systems   All other systems reviewed and are negative.    Objective:     Vital Signs (Most Recent):  Temp: 98 °F (36.7 °C) (06/26/24 1554)  Pulse: 94 (06/26/24 1554)  Resp: 18 (06/26/24 1554)  BP: 100/64 (06/26/24 1554)  SpO2: 97 % (06/26/24 1554) Vital Signs (24h Range):  Temp:  [97.9 °F (36.6 °C)-100.3 °F (37.9 °C)] 98 °F (36.7 °C)  Pulse:  [] 94  Resp:  [16-20] 18  SpO2:  [91 %-100 %] 97 %  BP: (100-156)/(57-67) 100/64     Weight: 81.5 kg (179 lb 10.8 oz)  Body mass index is 28.14 kg/m².    Estimated Creatinine Clearance: 83.2 mL/min (based on SCr of 0.8 mg/dL).     Physical Exam  Constitutional:       General: She is not in acute distress.     Appearance: She is well-developed. She is ill-appearing. She is not diaphoretic.   HENT:      Head: Normocephalic and atraumatic.      Right Ear: External ear normal.      Left Ear: External ear normal.      Nose: Nose normal.   Eyes:      General: No scleral icterus.        Right eye: No discharge.         Left eye: No discharge.      Extraocular Movements: Extraocular movements intact.      Conjunctiva/sclera: Conjunctivae normal.   Pulmonary:      Effort: Pulmonary effort is normal. No respiratory distress.      Breath sounds: No stridor.   Abdominal:      General: There is distension.      Tenderness: There is abdominal tenderness.   Musculoskeletal:         General: Normal range of motion.   Skin:     Findings: No erythema or rash.   Neurological:      General: No focal deficit present.      Mental Status: She is alert and oriented to person, place, and time. Mental status is at baseline.      Cranial Nerves: No cranial nerve deficit.   Psychiatric:         Mood and Affect: Mood normal.         Behavior: Behavior normal.         Thought Content: Thought content normal.         Judgment: Judgment normal.          Significant Labs: CBC:   Recent Labs   Lab  "06/25/24  0519   WBC 11.84   HGB 7.0*   HCT 22.9*        CMP:   No results for input(s): "NA", "K", "CL", "CO2", "GLU", "BUN", "CREATININE", "CALCIUM", "PROT", "ALBUMIN", "BILITOT", "ALKPHOS", "AST", "ALT", "ANIONGAP", "EGFRNONAA" in the last 48 hours.    Invalid input(s): "ESTGFAFRICA"    Microbiology Results (last 7 days)       Procedure Component Value Units Date/Time    Blood culture [[id number]] Collected: 06/22/24 1119    Order Status: Completed Specimen: Blood from Peripheral, Hand, Left Updated: 06/26/24 1222     Blood Culture, Routine No Growth to date      No Growth to date      No Growth to date      No Growth to date      No Growth to date    Narrative:      FROM PORT SITE    Blood culture [[id number]] Collected: 06/22/24 1129    Order Status: Completed Specimen: Blood from Peripheral, Wrist, Left Updated: 06/26/24 1222     Blood Culture, Routine No Growth to date      No Growth to date      No Growth to date      No Growth to date      No Growth to date    Narrative:      FROM PERIPHERAL SITE    Fungus culture [[id number]]     Order Status: Canceled Specimen: Ascites     (rule out SBP) Aerobic culture [[id number]]     Order Status: Canceled Specimen: Ascites     (rule out SBP) Culture, Anaerobic [[id number]]     Order Status: Canceled Specimen: Ascites     (rule out SBP) Gram stain [[id number]]     Order Status: Canceled Specimen: Ascites     Blood culture [[id number]] Collected: 06/23/24 2119    Order Status: Completed Specimen: Blood Updated: 06/25/24 2212     Blood Culture, Routine No Growth to date      No Growth to date      No Growth to date    Narrative:      PERIPHERAL SITE #1    Blood culture [[id number]] Collected: 06/23/24 2119    Order Status: Completed Specimen: Blood Updated: 06/25/24 2212     Blood Culture, Routine No Growth to date      No Growth to date      No Growth to date    Narrative:      PERIPHERAL SITE #2    Blood culture [[id number]] Collected: 06/23/24 2120    Order " Status: Completed Specimen: Blood Updated: 06/25/24 2212     Blood Culture, Routine No Growth to date      No Growth to date      No Growth to date    Narrative:      PORT SITE    Culture, Respiratory with Gram Stain [[id number]] Collected: 06/22/24 1920    Order Status: Completed Specimen: Respiratory from Sputum Updated: 06/25/24 1243     Respiratory Culture Normal respiratory adriano     Gram Stain (Respiratory) <10 epithelial cells per low power field.     Gram Stain (Respiratory) Rare WBC's     Gram Stain (Respiratory) Few Gram positive cocci    Blood culture #2 **CANNOT BE ORDERED STAT** [[id number]] Collected: 06/20/24 0228    Order Status: Completed Specimen: Blood from Peripheral, Hand, Left Updated: 06/25/24 0612     Blood Culture, Routine No growth after 5 days.    Blood culture #1 **CANNOT BE ORDERED STAT** [[id number]] Collected: 06/20/24 0228    Order Status: Completed Specimen: Blood from Peripheral, Upper Arm, Left Updated: 06/25/24 0612     Blood Culture, Routine No growth after 5 days.    Urine Culture High Risk [[id number]] Collected: 06/22/24 1048    Order Status: Completed Specimen: Urine Updated: 06/23/24 1430     Urine Culture, Routine No growth    Narrative:      Indicated criteria for high risk culture:->Other  Other (specify):->cancer    Influenza A & B by Molecular [[id number]] Collected: 06/21/24 0820    Order Status: Completed Specimen: Nasopharyngeal Swab Updated: 06/21/24 0946     Influenza A, Molecular Negative     Influenza B, Molecular Negative     Flu A & B Source Nasal swab    Culture, Anaerobe [[id number]]     Order Status: Canceled Specimen: Body Fluid from Pleural Fluid     Aerobic culture [[id number]]     Order Status: Canceled Specimen: Pleural Fluid             Significant Imaging: I have reviewed all pertinent imaging results/findings within the past 24 hours.

## 2024-06-26 NOTE — PLAN OF CARE
Plan of care reviewed. Patient is oriented X4. Ambulates independently. PIV infusing antibiotics. Experiencing abdominal and back pain; PRN Ibuprofen administered twice. T-Max 100.3. NPO since midnight for scheduled paracentesis. All questions and concerns addressed.  at bedside. All safety precautions in place. Remains free of injury. All needs met at this time.

## 2024-06-27 VITALS
BODY MASS INDEX: 28.2 KG/M2 | SYSTOLIC BLOOD PRESSURE: 115 MMHG | RESPIRATION RATE: 18 BRPM | HEART RATE: 106 BPM | WEIGHT: 179.69 LBS | TEMPERATURE: 98 F | OXYGEN SATURATION: 98 % | HEIGHT: 67 IN | DIASTOLIC BLOOD PRESSURE: 79 MMHG

## 2024-06-27 LAB
ABO + RH BLD: NORMAL
ALBUMIN SERPL BCP-MCNC: 2 G/DL (ref 3.5–5.2)
ALP SERPL-CCNC: 96 U/L (ref 55–135)
ALT SERPL W/O P-5'-P-CCNC: 7 U/L (ref 10–44)
AMMONIA PLAS-SCNC: 40 UMOL/L (ref 10–50)
ANION GAP SERPL CALC-SCNC: 15 MMOL/L (ref 8–16)
AST SERPL-CCNC: 29 U/L (ref 10–40)
BACTERIA #/AREA URNS AUTO: ABNORMAL /HPF
BACTERIA BLD CULT: NORMAL
BACTERIA BLD CULT: NORMAL
BILIRUB SERPL-MCNC: 0.7 MG/DL (ref 0.1–1)
BILIRUB UR QL STRIP: NEGATIVE
BLD GP AB SCN CELLS X3 SERPL QL: NORMAL
BUN SERPL-MCNC: 32 MG/DL (ref 6–20)
CALCIUM SERPL-MCNC: 8.3 MG/DL (ref 8.7–10.5)
CHLORIDE SERPL-SCNC: 97 MMOL/L (ref 95–110)
CLARITY UR REFRACT.AUTO: ABNORMAL
CO2 SERPL-SCNC: 22 MMOL/L (ref 23–29)
COLOR UR AUTO: YELLOW
CREAT SERPL-MCNC: 1.5 MG/DL (ref 0.5–1.4)
EST. GFR  (NO RACE VARIABLE): 39.9 ML/MIN/1.73 M^2
GLUCOSE SERPL-MCNC: 296 MG/DL (ref 70–110)
GLUCOSE UR QL STRIP: ABNORMAL
HGB UR QL STRIP: ABNORMAL
KETONES UR QL STRIP: ABNORMAL
LEUKOCYTE ESTERASE UR QL STRIP: NEGATIVE
MICROSCOPIC COMMENT: ABNORMAL
NITRITE UR QL STRIP: NEGATIVE
PH UR STRIP: 5 [PH] (ref 5–8)
POCT GLUCOSE: 290 MG/DL (ref 70–110)
POCT GLUCOSE: 330 MG/DL (ref 70–110)
POCT GLUCOSE: 362 MG/DL (ref 70–110)
POTASSIUM SERPL-SCNC: 3.5 MMOL/L (ref 3.5–5.1)
PROT SERPL-MCNC: 6.4 G/DL (ref 6–8.4)
PROT UR QL STRIP: ABNORMAL
RBC #/AREA URNS AUTO: 23 /HPF (ref 0–4)
SODIUM SERPL-SCNC: 134 MMOL/L (ref 136–145)
SP GR UR STRIP: 1.03 (ref 1–1.03)
SPECIMEN OUTDATE: NORMAL
SQUAMOUS #/AREA URNS AUTO: 5 /HPF
TSH SERPL DL<=0.005 MIU/L-ACNC: 3.79 UIU/ML (ref 0.4–4)
URN SPEC COLLECT METH UR: ABNORMAL
WBC #/AREA URNS AUTO: 13 /HPF (ref 0–5)

## 2024-06-27 PROCEDURE — 63600175 PHARM REV CODE 636 W HCPCS: Mod: HCNC

## 2024-06-27 PROCEDURE — 99232 SBSQ HOSP IP/OBS MODERATE 35: CPT | Mod: HCNC,,, | Performed by: NURSE PRACTITIONER

## 2024-06-27 PROCEDURE — 86901 BLOOD TYPING SEROLOGIC RH(D): CPT | Mod: HCNC | Performed by: STUDENT IN AN ORGANIZED HEALTH CARE EDUCATION/TRAINING PROGRAM

## 2024-06-27 PROCEDURE — 97530 THERAPEUTIC ACTIVITIES: CPT | Mod: HCNC

## 2024-06-27 PROCEDURE — 99239 HOSP IP/OBS DSCHRG MGMT >30: CPT | Mod: HCNC,,, | Performed by: STUDENT IN AN ORGANIZED HEALTH CARE EDUCATION/TRAINING PROGRAM

## 2024-06-27 PROCEDURE — 94761 N-INVAS EAR/PLS OXIMETRY MLT: CPT | Mod: HCNC

## 2024-06-27 PROCEDURE — 99900035 HC TECH TIME PER 15 MIN (STAT): Mod: HCNC

## 2024-06-27 PROCEDURE — 86900 BLOOD TYPING SEROLOGIC ABO: CPT | Mod: HCNC | Performed by: STUDENT IN AN ORGANIZED HEALTH CARE EDUCATION/TRAINING PROGRAM

## 2024-06-27 PROCEDURE — 82140 ASSAY OF AMMONIA: CPT | Mod: HCNC

## 2024-06-27 PROCEDURE — 87086 URINE CULTURE/COLONY COUNT: CPT | Mod: HCNC

## 2024-06-27 PROCEDURE — 80053 COMPREHEN METABOLIC PANEL: CPT | Mod: HCNC

## 2024-06-27 PROCEDURE — 25000003 PHARM REV CODE 250: Mod: HCNC

## 2024-06-27 PROCEDURE — 97116 GAIT TRAINING THERAPY: CPT | Mod: HCNC

## 2024-06-27 PROCEDURE — 27000221 HC OXYGEN, UP TO 24 HOURS: Mod: HCNC

## 2024-06-27 PROCEDURE — 81001 URINALYSIS AUTO W/SCOPE: CPT | Mod: HCNC

## 2024-06-27 PROCEDURE — 84443 ASSAY THYROID STIM HORMONE: CPT | Mod: HCNC

## 2024-06-27 PROCEDURE — 63600175 PHARM REV CODE 636 W HCPCS: Mod: HCNC | Performed by: STUDENT IN AN ORGANIZED HEALTH CARE EDUCATION/TRAINING PROGRAM

## 2024-06-27 RX ORDER — INSULIN ASPART 100 [IU]/ML
0-5 INJECTION, SOLUTION INTRAVENOUS; SUBCUTANEOUS
Qty: 3 ML | Refills: 2 | Status: ON HOLD | OUTPATIENT
Start: 2024-06-27 | End: 2025-06-27

## 2024-06-27 RX ORDER — INSULIN GLARGINE 100 [IU]/ML
9 INJECTION, SOLUTION SUBCUTANEOUS NIGHTLY
Status: DISCONTINUED | OUTPATIENT
Start: 2024-06-27 | End: 2024-06-27 | Stop reason: HOSPADM

## 2024-06-27 RX ORDER — HEPARIN 100 UNIT/ML
100 SYRINGE INTRAVENOUS
Status: DISCONTINUED | OUTPATIENT
Start: 2024-06-27 | End: 2024-06-27

## 2024-06-27 RX ORDER — SODIUM CHLORIDE, SODIUM LACTATE, POTASSIUM CHLORIDE, CALCIUM CHLORIDE 600; 310; 30; 20 MG/100ML; MG/100ML; MG/100ML; MG/100ML
INJECTION, SOLUTION INTRAVENOUS CONTINUOUS
Status: DISCONTINUED | OUTPATIENT
Start: 2024-06-27 | End: 2024-06-27 | Stop reason: HOSPADM

## 2024-06-27 RX ORDER — HEPARIN 100 UNIT/ML
300 SYRINGE INTRAVENOUS
Status: DISCONTINUED | OUTPATIENT
Start: 2024-06-27 | End: 2024-06-27 | Stop reason: HOSPADM

## 2024-06-27 RX ORDER — HYDROCODONE BITARTRATE AND ACETAMINOPHEN 5; 325 MG/1; MG/1
1 TABLET ORAL ONCE
Status: DISCONTINUED | OUTPATIENT
Start: 2024-06-27 | End: 2024-06-27 | Stop reason: HOSPADM

## 2024-06-27 RX ORDER — SODIUM CHLORIDE, SODIUM LACTATE, POTASSIUM CHLORIDE, CALCIUM CHLORIDE 600; 310; 30; 20 MG/100ML; MG/100ML; MG/100ML; MG/100ML
INJECTION, SOLUTION INTRAVENOUS CONTINUOUS
Status: DISCONTINUED | OUTPATIENT
Start: 2024-06-27 | End: 2024-06-27

## 2024-06-27 RX ORDER — INSULIN GLARGINE 100 [IU]/ML
9 INJECTION, SOLUTION SUBCUTANEOUS NIGHTLY
Qty: 3 ML | Refills: 1 | Status: ON HOLD | OUTPATIENT
Start: 2024-06-27 | End: 2025-06-27

## 2024-06-27 RX ORDER — INSULIN ASPART 100 [IU]/ML
6 INJECTION, SOLUTION INTRAVENOUS; SUBCUTANEOUS
Qty: 3 ML | Refills: 4 | Status: ON HOLD | OUTPATIENT
Start: 2024-06-27 | End: 2025-06-27

## 2024-06-27 RX ADMIN — SIMETHICONE 80 MG: 80 TABLET, CHEWABLE ORAL at 02:06

## 2024-06-27 RX ADMIN — PIPERACILLIN SODIUM AND TAZOBACTAM SODIUM 4.5 G: 4; .5 INJECTION, POWDER, FOR SOLUTION INTRAVENOUS at 08:06

## 2024-06-27 RX ADMIN — ENOXAPARIN SODIUM 80 MG: 80 INJECTION SUBCUTANEOUS at 08:06

## 2024-06-27 RX ADMIN — LAMOTRIGINE 25 MG: 25 TABLET ORAL at 08:06

## 2024-06-27 RX ADMIN — INSULIN ASPART 5 UNITS: 100 INJECTION, SOLUTION INTRAVENOUS; SUBCUTANEOUS at 12:06

## 2024-06-27 RX ADMIN — GUAIFENESIN 600 MG: 600 TABLET, EXTENDED RELEASE ORAL at 08:06

## 2024-06-27 RX ADMIN — SODIUM CHLORIDE, POTASSIUM CHLORIDE, SODIUM LACTATE AND CALCIUM CHLORIDE: 600; 310; 30; 20 INJECTION, SOLUTION INTRAVENOUS at 10:06

## 2024-06-27 RX ADMIN — ATORVASTATIN CALCIUM 80 MG: 40 TABLET, FILM COATED ORAL at 08:06

## 2024-06-27 RX ADMIN — IBUPROFEN 600 MG: 600 TABLET, FILM COATED ORAL at 02:06

## 2024-06-27 RX ADMIN — INSULIN ASPART 3 UNITS: 100 INJECTION, SOLUTION INTRAVENOUS; SUBCUTANEOUS at 08:06

## 2024-06-27 RX ADMIN — INSULIN ASPART 5 UNITS: 100 INJECTION, SOLUTION INTRAVENOUS; SUBCUTANEOUS at 08:06

## 2024-06-27 RX ADMIN — LISINOPRIL 20 MG: 20 TABLET ORAL at 08:06

## 2024-06-27 RX ADMIN — PIPERACILLIN SODIUM AND TAZOBACTAM SODIUM 4.5 G: 4; .5 INJECTION, POWDER, FOR SOLUTION INTRAVENOUS at 12:06

## 2024-06-27 RX ADMIN — INSULIN ASPART 2 UNITS: 100 INJECTION, SOLUTION INTRAVENOUS; SUBCUTANEOUS at 02:06

## 2024-06-27 RX ADMIN — IBUPROFEN 600 MG: 600 TABLET, FILM COATED ORAL at 08:06

## 2024-06-27 RX ADMIN — HEPARIN 300 UNITS: 100 SYRINGE at 04:06

## 2024-06-27 RX ADMIN — FLUTICASONE PROPIONATE 100 MCG: 50 SPRAY, METERED NASAL at 08:06

## 2024-06-27 NOTE — ASSESSMENT & PLAN NOTE
BG goal: 140-180    - Lantus 9 units HS (20% increase due to fasting blood glucose above goal)   - Novolog 5 units TIDWM  - LDC SSI (150/50) prn  - POCT Glucose before meals and at bedtime  - Hypoglycemia protocol in place      ** Please notify Endocrine for any change and/or advance in diet**  ** Please call Endocrine for any BG related issues **     Discharge Planning:   - Toujeo 9 units nightly  - Novolog 6 units with meals  - Novolog SSI  150 - 200 + 2 unit    201 - 250 + 4 units    251 - 300 + 6 units    301 - 350 + 8 units       > 350   + 10 units  - F/U in the discharge clinic in 4 weeks  - Send logs in 3 days for review

## 2024-06-27 NOTE — NURSING
Patient discharged as per MD order.Port de-accessed.PIV removed.Patient ambulating in room with standby assist.Tolerating diet with poor appetite.Voiding spontaneously.Patient had complained of pain gave  PRN ibuprofen x 2 .Patient refused  oxycodone MD notified ordered norco and patient refused that.Patient transferred on a wheelchair accompanied by .

## 2024-06-27 NOTE — PROGRESS NOTES
"Progress Note  Gynecology Oncology      SUBJECTIVE:     History of Present Illness:  Anette Ricci is a 58 y.o.  is a 58 y.o.  with stage IVB ovarian carcinosacoma, s/p PDS on 3/25/24 with Dr Washington ([facility] BSO omx liver mobilization, peritoneal & R diaphragm stripping, hepatic wedge resection, cholecystectomy), s/p C3 on  carbo/taxol/bevacizumab admitted for management of malignant pleural effusion.    Onc History:  3/4/24: peritoneal biopsy with carcinosarcoma  3/6/24: CA-125 418  3/25/24: Primary debulking surgery CARLOS ALBERTO/BSO, omentectomy, partial hepatectomy, debulking. (<1cm residual disease: sigmoid, transverse colon, diaphragm, kelvin hepatis)     Adjuvant therapy: Carboplatin AUC 6, paclitaxel 175 mg/m2, bevacizumab 15 mg/kg  24: C1D1: Carbo/Taxol/(Velma held), CA-125 248  5/10/24: C2D1: Carbo/Taxol/Velma  23: C3D1: Carbo/Taxol/Velma  24: C4D1: scheduled. - TO BE RESCHEDULED      Interval History  Per RN, around 2am patient was not at baseline, lethargic with tachypnea, vitals WNL, placed on 3L NC for comfort although sat >95%, labs collected as below, status improved thereafter although patient is still lethargic this AM.  On rounds, patient resting comfortably in bed. Reports feeling "winded", describes difficulty with taking deep breaths d/t pain which is causing her to take shallow breaths. She reports persistent right sided abdominal pain and right scapular pain, which has been present since admission and is overall controlled with ibuprofen PRN. She is tolerating PO without N/V. Denies f/c overnight. Voiding spontaneously without dysuria or hematuria. Normal daily BM, no constipation or diarrhea. Reports fatigue/weakness improved after blood transfusion yesterday. Denies HA, CP, leg pain/swelling, pelvic pain.    Current Facility-Administered Medications   Medication    alteplase injection 2 mg    amLODIPine tablet 10 mg    atorvastatin tablet 80 mg    benzonatate capsule " 100 mg    dextromethorphan-guaiFENesin  mg/5 ml liquid 10 mL    dextrose 10% bolus 125 mL 125 mL    dextrose 10% bolus 250 mL 250 mL    enoxaparin injection 80 mg    fluticasone propionate 50 mcg/actuation nasal spray 100 mcg    glucagon (human recombinant) injection 1 mg    glucose chewable tablet 16 g    glucose chewable tablet 24 g    guaiFENesin 12 hr tablet 600 mg    hydrALAZINE injection 10 mg    ibuprofen tablet 600 mg    insulin aspart U-100 pen 0-5 Units    insulin aspart U-100 pen 5 Units    insulin glargine U-100 (Lantus) pen 6 Units    iohexoL (OMNIPAQUE 300) injection 200 mL    lactated ringers infusion    lamoTRIgine tablet 25 mg    lisinopriL tablet 20 mg    OLANZapine tablet 5 mg    ondansetron disintegrating tablet 8 mg    oxyCODONE immediate release tablet 5 mg    piperacillin-tazobactam (ZOSYN) 4.5 g in D5W 100 mL IVPB (MB+)    QUEtiapine tablet 25 mg    simethicone chewable tablet 80 mg      OBJECTIVE:     Vital Signs  Temp:  [97.6 °F (36.4 °C)-98.1 °F (36.7 °C)] 97.7 °F (36.5 °C)  Pulse:  [] 100  Resp:  [16-32] 16  SpO2:  [95 %-100 %] 100 %  BP: (100-129)/(57-76) 114/66    Physical Exam:  Gen: A&Ox3, NAD  CV: RRR  Pulm: right lung fields with decreased breath sounds (stable), no wheezing, no respiratory distress, wearing 3L NC  Abd: mildly distended abdomen, but non-tender to palpation. High midline incision covered with dressing, last dressing change by wound care on 6/25, photos with incision clean and healing well by secondary intention.  Ext: no peripheral edema, no calf tenderness, SCDs while in bed    Laboratory  Recent Labs   Lab 06/21/24  0859 06/25/24  0519 06/26/24  1708   WBC 11.86 11.84 24.22*   HGB 8.8* 7.0* 7.8*   HCT 27.1* 22.9* 25.1*   MCV 82 85 85    359 423      Recent Labs   Lab 06/21/24  1147 06/23/24  0624 06/24/24  0410 06/25/24  0519 06/27/24  0146   * 135* 133*  --  134*   K 3.3* 3.3* 3.6  --  3.5   CL 97 99 97  --  97   CO2 23 27 25  --  22*    BUN 6 12 13  --  32*   CREATININE 0.7 0.7 0.8  --  1.5*    83 164*  --  296*   PROT 6.7  --   --   --  6.4   BILITOT 0.7  --   --   --  0.7   ALKPHOS 89  --   --   --  96   ALT 10  --   --   --  7*   AST 21  --   --   --  29   MG  --   --  1.4* 1.9  --       Blood Sugars (AccuCheck):  Recent Labs     06/25/24  2119 06/25/24  2223 06/26/24  0816 06/26/24  1246 06/26/24  1711 06/26/24  2146 06/27/24  0155 06/27/24  0828   POCTGLUCOSE 129* 146* 155* 209* 166* 279* 330* 290*     UA negative    Trop neg  Lactate 2.2    TSH wnl  Ammonia wnl  UA pending    Diagnostic Results:  6/20/24 CTA  Impression:  Motion and artifact limited study.  No large or central pulmonary embolus.  No convincing CTA findings of right heart strain.  Questionable small pulmonary emboli in the right lower lobe as seen on prior studies.  Moderate right pleural effusion with elevated right hemidiaphragm. Passive atelectasis in the right lung base. No consolidation.   Suboptimally evaluated large loculated subdiaphragmatic fluid collection between the liver and the right hemidiaphragm.  Ill defied hypoattenuation in the posterior right hepatic lobe and soft tissue nodule adjacent to the posteroinferior right hepatic lobe.  Abdominal free fluid.  Peritoneal metastatic disease is included in the differential, noting that findings are worse when compared with prior CT.  Suggest correlation with clinical findings and short-term follow-up with dedicated abdominal CT when clinically appropriate.  Mild anasarca.  Similar partially calcified right breast nodule.    6/21/24 CXR  Impression:  Mildly worse right-sided pleural effusion with passive atelectasis or airspace disease in the right lung base.    6/22/24 LE dopplers  No evidence of deep venous thrombosis in either lower extremity.    6/25/24 CT C/A/P  Impression:  In this patient with metastatic ovarian carcinoma status post debulking surgery, there is extensive peritoneal nodularity  including multiple heterogeneous soft tissue masses throughout the abdomen measuring to 8.7 cm, new from CT 2024, concerning for disease recurrence.  Redemonstration of a lobulated right subdiaphragmatic collection measuring up to 11.2 cm with probable extension into the hepatic parenchyma.  The findings remain concerning for possible perihepatic abscess.  MRI without and with contrast may be attempted for further evaluation.  Moderate volume of ascites.       ASSESSMENT/PLAN:     Anette Ricci is a 58 y.o.  with stage IV ovarian carcinosacoma, s/p PDS with CARLOS ALBERTO BSO omx liver mobilization, peritoneal & R diaphragm stripping, hepatic wedge resection, cholecystectomy, currently on C3 carbo/taxol/bevacizumab, who presented with SOB and imaging consistent with right pleural effusion, most likely 2/2 malignancy.     Malignant pleural effusion (Right)  - tachycardia resolved, O2sat WNL, last fever  @1940  - clinical picture significantly improved since day of admission, reporting discomfort with deep breaths causing shallow breathing, O2 WNL, using NC for comfort  - CTA: moderate right pleural effusion with elevated right hemidiaphragm, passive atelectasis in the right lung base, no consolidation  - initially started on vanc/zosyn; vanc d/c HD#2  - s/p IR consult for possible thoracentesis/paracentesis for symptomatic relief, however no safe window for thora, and minimal fluid for para  - encourage IS  - strict I&O  - 6min walk test ordered to assess if qualifies for home oxygen    Fever  - patient became febrile on HD#2, had been on broad spectrum abx since admission  - further infectious w/u performed on  d/t continued fevers; UA neg, Covid/Flu neg, CXR with mild worsening R pleural effusion, COVID/Flu neg. Sputum cultures with few Gram+ cocci, rare WBC, <10 epithelial cells. Negative respiratory panel.  - repeat blood cultures collected  d/t persistent fever, from both her port site and  a peripheral site; prelim NGTD x2  - Tlast 100.5 on 6/23 at 1940  - ID recommended paracentesis for evaluation of infection, however unable to be performed on 6/26 d/t no safe window  - new leukocytosis WBC 24 noted in PM of 6/26; continues to be afebrile, all vitals WNL; repeat UA pending  - no infectious cause at this time, likely 2/2 progressing malignancy  - ID team following, will review case with team to discuss discharge plans    JASON  - Cr 1.5 on 6/27 (previously 0.8, at baseline)  - mIVF ordered  - UA pending  - likely prerenal, will monitor closely    Anemia  - likely 2/2 chronic disease and chemotherapy  - see H/H trend above  - total blood products since admission: 2u pRBC  - reports sx of anemia on 6/26, H/H 7.0/24 > 1u pRBC > appropriately zac 7.8/25  - anemia sx improved today    Ovarian Carcinosarcoma Stage IV   - s/p PDS on 3/25/24 with Dr Washington ([facility] BSO omx liver mobilization, peritoneal & R diaphragm stripping, hepatic wedge resection, cholecystectomy) suboptimal <1cm residual disease: sigmoid, transverse colon, diaphragm, kelvin hepatis   - currently receiving carbo/taxol/bevacizumab; was due for C4 6/21 however will plan to reschedule on discharge.  - Wound care following, VML incision healing well by secondary intention, plan to change dressing q3d, will need HH, see consult note for full details  - s/p PT consult; no outpatient needs  - Repeat CT C/A/P 6/25, c/w progression of disease, moderate ascites  - CA-125 87 (previously 90 on 5/30)    Chronic PE  - small right lower lobe pulmonary embolism noted on June 2024 CT chest, also noted on prior imaging  - LE dopplers 6/22 negative   - s/p Pulm Crit Care consult: recommend Lovenox 1 milligram/kilogram b.i.d and sent home on this regimen.  - VTE ppx: SCDs + lovenox qD > lovenox 1mg/kg BID initiated on 6/23    Diabetes   - hold Metformin  - endocrinology team following and altering insulin regimen  - current regimen: levemir to 6u QHS, aspart  5u TID WM   - LDC SSI ordered  - diabetic diet  - POCT BG pre-meal/qHS    HTN  - continue home BP meds: amlodipine, enalapril>lisinopril, HTCZ  - hydralazine PRN (BP>180/110)    HLD  - continue atorvastatin    Seizure disorder  - continue Lamotrigine    Depression/Anxiety  - continue Olanzapine, Seroquel PRN      Jayla Holguin MD   OB/GYN PGY-2

## 2024-06-27 NOTE — SUBJECTIVE & OBJECTIVE
"Interval HPI:   Overnight events: No acute events overnight. Patient in room 806/806 A. Blood glucose worsening. BG at and above goal on current insulin regimen (SSI, prandial, and basal insulin ). Steroid use- None.    Renal function- Abnormal - Creatinine 1.5   Vasopressors-  None       Endocrine will continue to follow and manage insulin orders inpatient.         Diet Adult Regular  Diet diabetic  Calorie     Eatin%  Nausea: No  Hypoglycemia and intervention: No  Fever: No  TPN and/or TF: No      /70   Pulse 101   Temp 97.9 °F (36.6 °C) (Oral)   Resp 20   Ht 5' 7" (1.702 m)   Wt 81.5 kg (179 lb 10.8 oz)   LMP 2015   SpO2 100%   Breastfeeding No   BMI 28.14 kg/m²     Labs Reviewed and Include    Recent Labs   Lab 24  0146   *   CALCIUM 8.3*   ALBUMIN 2.0*   PROT 6.4   *   K 3.5   CO2 22*   CL 97   BUN 32*   CREATININE 1.5*   ALKPHOS 96   ALT 7*   AST 29   BILITOT 0.7     Lab Results   Component Value Date    WBC 24.22 (H) 2024    HGB 7.8 (L) 2024    HCT 25.1 (L) 2024    MCV 85 2024     2024     Recent Labs   Lab 24  0146   TSH 3.793     Lab Results   Component Value Date    HGBA1C 7.3 (H) 2024       Nutritional status:   Body mass index is 28.14 kg/m².  Lab Results   Component Value Date    ALBUMIN 2.0 (L) 2024    ALBUMIN 2.4 (L) 2024    ALBUMIN 2.4 (L) 2024     No results found for: "PREALBUMIN"    Estimated Creatinine Clearance: 44.4 mL/min (A) (based on SCr of 1.5 mg/dL (H)).    Accu-Checks  Recent Labs     24  1223 24  1752 24  2119 24  2223 24  0816 24  1246 24  1711 24  2146 24  0155 24  0828   POCTGLUCOSE 199* 128* 129* 146* 155* 209* 166* 279* 330* 290*       Current Medications and/or Treatments Impacting Glycemic Control  Immunotherapy:    Immunosuppressants       None          Steroids:   Hormones (From admission, onward)      " None          Pressors:    Autonomic Drugs (From admission, onward)      None          Hyperglycemia/Diabetes Medications:   Antihyperglycemics (From admission, onward)      Start     Stop Route Frequency Ordered    06/27/24 2100  insulin glargine U-100 (Lantus) pen 9 Units         -- SubQ Nightly 06/27/24 0929    06/24/24 1715  insulin aspart U-100 pen 5 Units         -- SubQ 3 times daily with meals 06/24/24 1247    06/23/24 0915  insulin aspart U-100 pen 0-5 Units         -- SubQ Before meals & nightly PRN 06/23/24 0916

## 2024-06-27 NOTE — PROGRESS NOTES
06/27/24 0103   Vital Signs   Pulse 105   Resp (!) 32   SpO2 100 %   Pulse Oximetry Type Intermittent   Flow (L/min) (Oxygen Therapy) 3   Device (Oxygen Therapy) nasal cannula     Patient called nurse to room and stated she was SOB. Increased oxygen to 3 liters NC. Some lethargy noted. MD made aware. Orders to follow.

## 2024-06-27 NOTE — PROGRESS NOTES
"Yves Acuna - Oncology ([facility])  Endocrinology  Progress Note    Admit Date: 2024     Reason for Consult: Management of T2DM, Hyperglycemia     Diabetes diagnosis year: > 5 years ago     Home Diabetes Medications:    Toujeo 30 units daily  Novolog 10 units with meals      How often checking glucose at home? 1-3 x day   BG readings on regimen: 150s-200s  Hypoglycemia on the regimen?  No  Missed doses on regimen?  No    Diabetes Complications include:     Hyperglycemia and Diabetic retinopathy     Complicating diabetes co morbidities:   HLD, HTN       HPI:   Anette Ricci is a 58 y.o.  is a 58 y.o.  with stage IVB ovarian carcinosacoma, s/p PDS on 3/25/24 with Dr Washington ([facility] BSO omx liver mobilization, peritoneal & R diaphragm stripping, hepatic wedge resection, cholecystectomy), s/p C3 on  carbo/taxol/bevacizumab admitted for management of malignant pleural effusion. Endocrine consulted for BG management.        Interval HPI:   Overnight events: No acute events overnight. Patient in room 806/806 A. Blood glucose worsening. BG at and above goal on current insulin regimen (SSI, prandial, and basal insulin ). Steroid use- None.    Renal function- Abnormal - Creatinine 1.5   Vasopressors-  None       Endocrine will continue to follow and manage insulin orders inpatient.         Diet Adult Regular  Diet diabetic  Calorie     Eatin%  Nausea: No  Hypoglycemia and intervention: No  Fever: No  TPN and/or TF: No      /70   Pulse 101   Temp 97.9 °F (36.6 °C) (Oral)   Resp 20   Ht 5' 7" (1.702 m)   Wt 81.5 kg (179 lb 10.8 oz)   LMP 2015   SpO2 100%   Breastfeeding No   BMI 28.14 kg/m²     Labs Reviewed and Include    Recent Labs   Lab 24  0146   *   CALCIUM 8.3*   ALBUMIN 2.0*   PROT 6.4   *   K 3.5   CO2 22*   CL 97   BUN 32*   CREATININE 1.5*   ALKPHOS 96   ALT 7*   AST 29   BILITOT 0.7     Lab Results   Component Value Date    WBC 24.22 (H) " "06/26/2024    HGB 7.8 (L) 06/26/2024    HCT 25.1 (L) 06/26/2024    MCV 85 06/26/2024     06/26/2024     Recent Labs   Lab 06/27/24  0146   TSH 3.793     Lab Results   Component Value Date    HGBA1C 7.3 (H) 03/19/2024       Nutritional status:   Body mass index is 28.14 kg/m².  Lab Results   Component Value Date    ALBUMIN 2.0 (L) 06/27/2024    ALBUMIN 2.4 (L) 06/21/2024    ALBUMIN 2.4 (L) 06/20/2024     No results found for: "PREALBUMIN"    Estimated Creatinine Clearance: 44.4 mL/min (A) (based on SCr of 1.5 mg/dL (H)).    Accu-Checks  Recent Labs     06/25/24  1223 06/25/24  1752 06/25/24  2119 06/25/24  2223 06/26/24  0816 06/26/24  1246 06/26/24  1711 06/26/24  2146 06/27/24  0155 06/27/24  0828   POCTGLUCOSE 199* 128* 129* 146* 155* 209* 166* 279* 330* 290*       Current Medications and/or Treatments Impacting Glycemic Control  Immunotherapy:    Immunosuppressants       None          Steroids:   Hormones (From admission, onward)      None          Pressors:    Autonomic Drugs (From admission, onward)      None          Hyperglycemia/Diabetes Medications:   Antihyperglycemics (From admission, onward)      Start     Stop Route Frequency Ordered    06/27/24 2100  insulin glargine U-100 (Lantus) pen 9 Units         -- SubQ Nightly 06/27/24 0929    06/24/24 1715  insulin aspart U-100 pen 5 Units         -- SubQ 3 times daily with meals 06/24/24 1247    06/23/24 0915  insulin aspart U-100 pen 0-5 Units         -- SubQ Before meals & nightly PRN 06/23/24 0916            ASSESSMENT and PLAN    Pulmonary  * Pleural effusion, malignant  Managed per primary team        Cardiac/Vascular  Hyperlipidemia  On statin per ADA guidelines       Endocrine  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  BG goal: 140-180    - Lantus 9 units HS (20% increase due to fasting blood glucose above goal)   - Novolog 5 units TIDWM  - LDC SSI (150/50) prn  - POCT Glucose before meals and at " bedtime  - Hypoglycemia protocol in place      ** Please notify Endocrine for any change and/or advance in diet**  ** Please call Endocrine for any BG related issues **     Discharge Planning:   - Toujeo 9 units nightly  - Novolog 6 units with meals  - Novolog SSI  150 - 200 + 2 unit    201 - 250 + 4 units    251 - 300 + 6 units    301 - 350 + 8 units       > 350   + 10 units  - F/U in the discharge clinic in 4 weeks  - Send logs in 3 days for review             Bo Pradhan, DNP, FNP  Endocrinology  [facility] ([facility])

## 2024-06-27 NOTE — DISCHARGE SUMMARY
Discharge Summary  Gynecology      Admit Date: 6/20/2024    Discharge Date and Time: 6/27/2024     Attending Physician: Александр Washington MD    Principal Diagnoses: Pleural effusion, malignant    Active Hospital Problems    Diagnosis  POA    *Pleural effusion, malignant [J91.0]  Yes    Chronic pulmonary embolism without acute cor pulmonale [I27.82]  Yes     Patient with small right lower lobe pulmonary embolism noted on June 2024 CT chest and also noted on prior films as well.  Patient has not been on full-dose anticoagulation only prophylaxis dosing.  Given her advanced cancer and PE noted on CT, it would not be unreasonable to treat with full-dose anticoagulation.  However patient is breathing well on room air and her prior respiratory issues are likely related to her right-sided pleural effusion.  If a decision is made to treat with full-dose anticoagulation, patient should be treated with Lovenox 1 milligram/kilogram b.i.d and sent home on this regimen.  I see no contraindications to anticoagulation however if other procedures are planned then it may be beneficial to wait until discharge.      Stage 4B Carcinoscaroma, Suspected ovarian origin [C80.1]  Yes     3/4/24: peritoneal biopsy with carcinosarcoma  3/6/24: CA-125 418  3/25/24: Primary debulking surgery CARLOS ALBERTO/BSO, omentectomy, partial hepatectomy, debulking. 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       Discharged Condition:  good    Hospital Course:     Anette Ricci is a 58 y.o.  is a 58 y.o.  with stage IVB ovarian carcinosacoma, s/p PDS on 3/25/24 with Dr Washington ([facility] BSO omx liver mobilization, peritoneal & R diaphragm stripping, hepatic wedge resection, cholecystectomy), s/p C3 on  carbo/taxol/bevacizumab admitted on 24 for management of malignant pleural effusion. IR team was unable to drain the effusion safely. She also developed a fever on HD2. Patient was treated with zosyn for 6 days due to recurrent fevers, but infectious work up was negative, without any obvious source of infection.     Before discharge patient was afebrile for 48h. Plan was discussed with ID and decision was made to stop antibiotics on discharge. The endocrinology team recommended Lantus 9U and Novolog 6U with meals while in the hospital; new prescriptions were sent to the pharmacy on discharge. Due to stable PE seen on imaging, anticoagulation treatment was initiated in the hospital, which she will continue upon discharge.     Patient also got a CT A/P to assess disease after C3 of her treatment. Patient to follow up with Dr. Washington to discuss next steps.       Significant Diagnostic Studies:  Recent Labs   Lab 24  0859 24  0519 24  1708   WBC 11.86 11.84 24.22*   HGB 8.8* 7.0* 7.8*   HCT 27.1* 22.9* 25.1*   MCV 82 85 85    359 423        Disposition: Home or Self Care    Patient Instructions:   Current Discharge Medication List        START taking these medications    Details   enoxaparin (LOVENOX) 80 mg/0.8 mL Syrg Inject 0.8 mLs (80 mg total) into the skin every 12 (twelve) hours.  Qty: 44.8 mL, Refills: 0           CONTINUE these medications which have CHANGED    Details   !! insulin aspart U-100 (NOVOLOG FLEXPEN U-100 INSULIN) 100 unit/mL (3 mL) InPn pen Inject 6 Units into the skin 3 (three) times daily with meals. (Discard pen 28 days after initial use)  Qty: 3 mL, Refills: 4      !!  insulin aspart U-100 (NOVOLOG) 100 unit/mL (3 mL) InPn pen Inject 0-5 Units into the skin before meals and at bedtime as needed (for hyperglycemia by specified ranges). Follow the chart to give ADDITIONAL insulin on top of your pre-meal dose of Novolog mealtime insulin if your blood sugar is in the elevated ranges as depicted below.  Blood Glucose  mg/dL                  Pre-meal     151-200                2 unit    201-250                4 units    251-300                6 units    301-350                8 units   >350                     10 units  Qty: 3 mL, Refills: 2      insulin detemir U-100, Levemir, (LEVEMIR FLEXPEN) 100 unit/mL (3 mL) InPn pen Inject 9 Units into the skin every evening. (Discard pen 28 days after initial use)  Qty: 3 mL, Refills: 1       !! - Potential duplicate medications found. Please discuss with provider.        CONTINUE these medications which have NOT CHANGED    Details   atorvastatin (LIPITOR) 40 MG tablet Take 80 mg by mouth once daily.      enalapril (VASOTEC) 20 MG tablet Take 20 mg by mouth once daily.      ibuprofen (ADVIL,MOTRIN) 600 MG tablet Take 1 tablet (600 mg total) by mouth every 6 (six) hours as needed for Pain.  Qty: 30 tablet, Refills: 1      insulin lispro (HUMALOG U-100 INSULIN) 100 unit/mL injection **LOW CORRECTION DOSE**  Blood Glucose  mg/dL                  Pre-meal                  151-200                0 unit                        201-250                2 units                      251-300                3 units                      301-350                4 units                      >350                     5 units                      Administer subcutaneously if needed at times designated by monitoring schedule.  Qty: 10 mL, Refills: 1      lamoTRIgine (LAMICTAL) 25 MG tablet Take 25 mg by mouth 2 (two) times daily.      lorazepam (ATIVAN) 0.5 MG tablet Take 0.5 mg by mouth every 12 (twelve) hours as needed for Anxiety.      metformin (GLUCOPHAGE) 1000  MG tablet Take 1,000 mg by mouth daily with breakfast.       ondansetron (ZOFRAN-ODT) 4 MG TbDL Take 1 tablet (4 mg total) by mouth every 6 (six) hours as needed (for nausea).  Qty: 20 tablet, Refills: 0      quetiapine (SEROQUEL) 25 MG Tab Take 25 mg by mouth daily as needed.      acetaminophen (TYLENOL) 325 MG tablet Take 2 tablets (650 mg total) by mouth every 4 (four) hours as needed for Pain.  Qty: 30 tablet, Refills: 1      amlodipine (NORVASC) 10 MG tablet Take 10 mg by mouth once daily.       fluticasone propionate (FLONASE) 50 mcg/actuation nasal spray 1 spray by Each Nostril route once daily.      hydrochlorothiazide (HYDRODIURIL) 25 MG tablet Take 25 mg by mouth once daily.       hydrocodone-acetaminophen (HYCET) solution 7.5-325 mg/15mL Take 30 mLs by mouth every 6 (six) hours as needed for Pain.  Qty: 473 mL, Refills: 0    Comments: Quantity prescribed more than 7 day supply? No      HYDROmorphone (DILAUDID) 2 MG tablet Take 1 tablet (2 mg total) by mouth every 8 (eight) hours as needed for Pain.  Qty: 20 tablet, Refills: 0    Comments: Quantity prescribed more than 7 day supply? Yes, quantity medically necessary  Associated Diagnoses: Ovarian carcinosarcoma; Post-op pain      ketorolac 0.5% (ACULAR) 0.5 % Drop Place 1 drop into both eyes. For pain after eye injections.           STOP taking these medications       OLANZapine (ZYPREXA) 5 MG tablet Comments:   Reason for Stopping:                  Follow-up Information       Александр Washington MD Follow up in 1 week(s).    Specialty: Gynecologic Oncology  Why: hospitalization follow up.  Contact information:  [address]  [address] [address] [phone]               Bo Pradhan DNP, FNP Follow up in 4 week(s).    Specialty: Endocrinology  Why: diabetes follow up  Contact information:  [address] [facility] [address] [phone]

## 2024-06-27 NOTE — PLAN OF CARE
Yves Acuna - Oncology ([facility])      HOME HEALTH ORDERS  FACE TO FACE ENCOUNTER    Patient Name: Anette Ricci  YOB: 1965    PCP: Mark Chua MD   PCP Address: [address] [facility] / [place] [address]  PCP Phone Number: [phone]  PCP Fax: [phone]    Encounter Date: 6/20/24    Admit to Home Health    Diagnoses:  Active Hospital Problems    Diagnosis  POA    *Pleural effusion, malignant [J91.0]  Yes    Chronic pulmonary embolism without acute cor pulmonale [I27.82]  Yes     Patient with small right lower lobe pulmonary embolism noted on June 2024 CT chest and also noted on prior films as well.  Patient has not been on full-dose anticoagulation only prophylaxis dosing.  Given her advanced cancer and PE noted on CT, it would not be unreasonable to treat with full-dose anticoagulation.  However patient is breathing well on room air and her prior respiratory issues are likely related to her right-sided pleural effusion.  If a decision is made to treat with full-dose anticoagulation, patient should be treated with Lovenox 1 milligram/kilogram b.i.d and sent home on this regimen.  I see no contraindications to anticoagulation however if other procedures are planned then it may be beneficial to wait until discharge.      Stage 4B Carcinoscaroma, Suspected ovarian origin [C80.1]  Yes     3/4/24: peritoneal biopsy with carcinosarcoma  3/6/24: CA-125 418  3/25/24: Primary debulking surgery CARLOS ALBERTO/BSO, omentectomy, partial hepatectomy, debulking. 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       Follow Up Appointments:  Future Appointments   Date Time Provider Department Center   7/1/2024  2:15 PM Александр Washington MD [facility] GYN ONC [facility]   7/23/2024 10:00 AM Rachel Altamirano DNP [facility] OBGPAL [facility]       Allergies:  Review of patient's allergies indicates:   Allergen Reactions    Codeine Other (See Comments)     Flu like s/s    Tramadol Other (See Comments)     Flu like symptoms similar to codeine reaction       Medications: Review discharge medications with patient and family and provide education.    Current Facility-Administered Medications   Medication Dose Route Frequency Provider Last Rate Last Admin    alteplase injection 2 mg  2 mg Intra-Catheter PRN Jayla Holguin MD   2 mg at 06/23/24 0955    amLODIPine tablet 10 mg  10 mg Oral Daily Quinton Magaña MD        atorvastatin tablet 80 mg  80 mg Oral Daily Quinton Magaña MD   80 mg at 06/27/24 0821    benzonatate capsule 100 mg  100 mg Oral TID PRN Pablo Leiva MD   100 mg at 06/24/24 2321    dextromethorphan-guaiFENesin  mg/5 ml liquid 10 mL  10 mL Oral Q4H PRN Jayla Holguin MD   10 mL at 06/25/24 0912    dextrose 10% bolus 125 mL 125 mL  12.5 g Intravenous PRN Quinton Magaña MD        dextrose 10% bolus 250 mL 250 mL  25 g Intravenous PRN Quinton Magaña MD        enoxaparin injection 80 mg  1 mg/kg Subcutaneous Q12H Jayla Holguin MD   80 mg at 06/27/24 0821    fluticasone propionate 50 mcg/actuation nasal spray 100 mcg  2 spray Each Nostril Daily Quinton Magaña MD   100 mcg at 06/27/24 0833    glucagon (human recombinant) injection 1 mg  1 mg Intramuscular PRN Quinton Magaña MD        glucose chewable tablet 16 g  16 g Oral PRN Quinton Magaña MD        glucose chewable tablet 24 g  24 g Oral PRN Quinton Magaña MD        guaiFENesin 12 hr  tablet 600 mg  600 mg Oral BID Quinton Magaña MD   600 mg at 06/27/24 0821    hydrALAZINE injection 10 mg  10 mg Intravenous Q6H PRN Quinton Magaña MD        ibuprofen tablet 600 mg  600 mg Oral Q6H PRN Jayla Holguin MD   600 mg at 06/27/24 1414    insulin aspart U-100 pen 0-5 Units  0-5 Units Subcutaneous QID (AC + HS) PRN Modesta Goldberg PA-C   5 Units at 06/27/24 1245    insulin aspart U-100 pen 5 Units  5 Units Subcutaneous TID WM Modesta Goldberg PA-C   5 Units at 06/27/24 1245    insulin glargine U-100 (Lantus) pen 9 Units  9 Units Subcutaneous QHS Bo Pradhan, DNP, FNP        iohexoL (OMNIPAQUE 300) injection 200 mL  200 mL Intravenous ONCE PRN Quinton Magaña MD        lactated ringers infusion   Intravenous Continuous Jayla Holguin MD   Paused at 06/27/24 1024    lamoTRIgine tablet 25 mg  25 mg Oral BID Quinton Magaña MD   25 mg at 06/27/24 0823    lisinopriL tablet 20 mg  20 mg Oral Daily Quinton Magaña MD   20 mg at 06/27/24 0822    OLANZapine tablet 5 mg  5 mg Oral QHS Quinton Magaña MD   5 mg at 06/26/24 2143    ondansetron disintegrating tablet 8 mg  8 mg Oral Q8H PRN Quinton Magaña MD   8 mg at 06/25/24 0844    oxyCODONE immediate release tablet 5 mg  5 mg Oral Q6H PRN Jayla Holguin MD        piperacillin-tazobactam (ZOSYN) 4.5 g in D5W 100 mL IVPB (MB+)  4.5 g Intravenous Q8H Quinton Magaña MD   Stopped at 06/27/24 1233    QUEtiapine tablet 25 mg  25 mg Oral Daily PRN uQinton Magaña MD        simethicone chewable tablet 80 mg  1 tablet Oral TID PRN Jayla Holguin MD   80 mg at 06/27/24 1428     Current Discharge Medication List        START taking these medications    Details   enoxaparin (LOVENOX) 80 mg/0.8 mL Syrg Inject 0.8 mLs (80 mg total) into the skin every 12 (twelve) hours.  Qty: 44.8 mL, Refills: 0           CONTINUE these medications which have NOT CHANGED    Details   atorvastatin (LIPITOR) 40 MG  tablet Take 80 mg by mouth once daily.      enalapril (VASOTEC) 20 MG tablet Take 20 mg by mouth once daily.      ibuprofen (ADVIL,MOTRIN) 600 MG tablet Take 1 tablet (600 mg total) by mouth every 6 (six) hours as needed for Pain.  Qty: 30 tablet, Refills: 1      insulin lispro (HUMALOG U-100 INSULIN) 100 unit/mL injection **LOW CORRECTION DOSE**  Blood Glucose  mg/dL                  Pre-meal                  151-200                0 unit                        201-250                2 units                      251-300                3 units                      301-350                4 units                      >350                     5 units                      Administer subcutaneously if needed at times designated by monitoring schedule.  Qty: 10 mL, Refills: 1      lamoTRIgine (LAMICTAL) 25 MG tablet Take 25 mg by mouth 2 (two) times daily.      lorazepam (ATIVAN) 0.5 MG tablet Take 0.5 mg by mouth every 12 (twelve) hours as needed for Anxiety.      metformin (GLUCOPHAGE) 1000 MG tablet Take 1,000 mg by mouth daily with breakfast.       ondansetron (ZOFRAN-ODT) 4 MG TbDL Take 1 tablet (4 mg total) by mouth every 6 (six) hours as needed (for nausea).  Qty: 20 tablet, Refills: 0      quetiapine (SEROQUEL) 25 MG Tab Take 25 mg by mouth daily as needed.      acetaminophen (TYLENOL) 325 MG tablet Take 2 tablets (650 mg total) by mouth every 4 (four) hours as needed for Pain.  Qty: 30 tablet, Refills: 1      amlodipine (NORVASC) 10 MG tablet Take 10 mg by mouth once daily.       fluticasone propionate (FLONASE) 50 mcg/actuation nasal spray 1 spray by Each Nostril route once daily.      hydrochlorothiazide (HYDRODIURIL) 25 MG tablet Take 25 mg by mouth once daily.       hydrocodone-acetaminophen (HYCET) solution 7.5-325 mg/15mL Take 30 mLs by mouth every 6 (six) hours as needed for Pain.  Qty: 473 mL, Refills: 0    Comments: Quantity prescribed more than 7 day supply? No      HYDROmorphone (DILAUDID) 2 MG tablet  Take 1 tablet (2 mg total) by mouth every 8 (eight) hours as needed for Pain.  Qty: 20 tablet, Refills: 0    Comments: Quantity prescribed more than 7 day supply? Yes, quantity medically necessary  Associated Diagnoses: Ovarian carcinosarcoma; Post-op pain      insulin aspart U-100 (NOVOLOG) 100 unit/mL (3 mL) InPn pen Inject 3 Units into the skin 3 (three) times daily with meals.  (Discard pen 28 days after initial use)  Qty: 3 mL, Refills: 4      insulin detemir U-100, Levemir, (LEVEMIR FLEXPEN) 100 unit/mL (3 mL) InPn pen Inject 7 Units into the skin every evening.  (Discard pen 28 days after initial use)  Qty: 6 mL, Refills: 3      ketorolac 0.5% (ACULAR) 0.5 % Drop Place 1 drop into both eyes. For pain after eye injections.           STOP taking these medications       OLANZapine (ZYPREXA) 5 MG tablet Comments:   Reason for Stopping:                 I have seen and examined this patient within the last 30 days. My clinical findings that support the need for the home health skilled services and home bound status are the following:no   Weakness/numbness causing balance and gait disturbance due to Malignancy/Cancer making it taxing to leave home.   Wound care    Diet:   Diabetic     Labs:  Report Lab results to PCP.    Referrals/ Consults  Aide to provide assistance with personal care, ADLs, and vital signs.  Wound care     Activities:   activity as tolerated    Nursing:   Agency to admit patient within 24 hours of hospital discharge unless specified on physician order or at patient request    SN to complete comprehensive assessment including routine vital signs. Instruct on disease process and s/s of complications to report to MD. Review/verify medication list sent home with the patient at time of discharge  and instruct patient/caregiver as needed. Frequency may be adjusted depending on start of care date.     Skilled nurse to perform up to 3 visits PRN for symptoms related to diagnosis    Notify MD if SBP > 160  or < 90; DBP > 90 or < 50; HR > 120 or < 50; Temp > 101; O2 < 88%    Ok to schedule additional visits based on staff availability and patient request on consecutive days within the home health episode.    When multiple disciplines ordered:    Start of Care occurs on Sunday - Wednesday schedule remaining discipline evaluations as ordered on separate consecutive days following the start of care.    Thursday SOC -schedule subsequent evaluations Friday and Monday the following week.     Friday - Saturday SOC - schedule subsequent discipline evaluations on consecutive days starting Monday of the following week.    For all post-discharge communication and subsequent orders please contact patient's primary care physician. If unable to reach primary care physician or do not receive response within 30 minutes, please contact on-call GYN ONC team for clinical staff order clarification    Home Health Aide:  Nursing Monday, Wednesday, Friday     Wound Care Orders  Clean wound with Vashe wound cleanser. Apply hydrafera blue ready transfer to wound bed. Cover with dry sterile dressing (eg ABD pad) and secure with tape. If dressing becomes saturated may change outer dressing but change whole dressing including hydrafera blue ready transfer Monday, Wednesday, Friday     I certify that this patient is confined to her home and needs intermittent skilled nursing care.        Jayla Holguin MD   OB/GYN PGY-2

## 2024-06-27 NOTE — PT/OT/SLP PROGRESS
"Physical Therapy Treatment    Patient Name:  Anette Ricci   MRN:  [id number]    Recommendations:     Discharge Recommendations: Low Intensity Therapy  Discharge Equipment Recommendations: none  Barriers to discharge: None    Assessment:     Anette Ricci is a 58 y.o. female admitted with a medical diagnosis of Pleural effusion, malignant.  She presents with the following impairments/functional limitations: weakness, impaired endurance, impaired functional mobility, impaired balance, gait instability, decreased lower extremity function, impaired sensation, decreased safety awareness, pain, impaired coordination. Patient presented with increased motivation to participate in treatment session, with fairly good response to completed activities and provided education. Patient completed 6MWT, in which she ambulated 342ft (Gait speed: .29m/s). SpO2 92-95% on room air, however patient endorsing s/s of SOB. Throughout gait trials, patient demonstrated tendency to impaired LLE step length & foot clearance, with her noting impaired LLE sensation. Gait deviations progressive with increasing fatigue. Patient demonstrates that they will greatly benefit from low intensity skilled physical therapy services post-acutely.     Rehab Prognosis: Good; patient would benefit from acute skilled PT services to address these deficits and reach maximum level of function.    Recent Surgery: * No surgery found *      Plan:     During this hospitalization, patient to be seen 3 x/week to address the identified rehab impairments via gait training, therapeutic activities, therapeutic exercises, neuromuscular re-education and progress toward the following goals:    Plan of Care Expires:  07/21/24    Subjective     Chief Complaint: SOB  Patient/Family Comments/goals: "My left leg is numb"  Pain/Comfort:  Pain Rating 1: Intensity not provided  Location - Side 1: Right  Location - Orientation 1: generalized  Location 1: flank  Pain " "Addressed 1: Reposition, Distraction  Pain Rating Post-Intervention 1: Intensity not provided      Objective:     Communicated with RN prior to session.  Patient found sitting edge of bed with  (no active lines) upon PT entry to room.     General Precautions: Standard, fall   Orthopedic Precautions:N/A   Braces: N/A   Body mass index is 28.14 kg/m².  Oxygen Device: Room Air  Vitals: /79   Pulse 106   Temp 97.7 °F (36.5 °C) (Oral)   Resp 18   Ht 5' 7" (1.702 m)   Wt 81.5 kg (179 lb 10.8 oz)   LMP 02/09/2015   SpO2 98%   Breastfeeding No   BMI 28.14 kg/m²      Functional Mobility:  Bed Mobility:     EOB Scooting:   Anterior: Independent    Transfers:     Sit<>Stand:   x1, Independent from Edge of Bed with No AD  x2, CGA from Edge of Bed with No AD    Gait:  x342ft, CGA with No AD. 1 seated rest break taken. See 6MWT  x25ft, CGA with No AD  x25ft, CGA with HHA  Gait Assessment: decreased step length, decreased foot clearance, decreased gait speed, narrow base of support, antalgic gait pattern, unsteady gait   Total Distance: 392ft    Balance:   Static Sitting: Independent  Dynamic Sitting: Independent    Static Standing: SBA  Dynamic Standing: CGA    AM-PAC 6 CLICK MOBILITY  Turning over in bed (including adjusting bedclothes, sheets and blankets)?: 4  Sitting down on and standing up from a chair with arms (e.g., wheelchair, bedside commode, etc.): 4  Moving from lying on back to sitting on the side of the bed?: 4  Moving to and from a bed to a chair (including a wheelchair)?: 4  Need to walk in hospital room?: 4  Climbing 3-5 steps with a railing?: 3  Basic Mobility Total Score: 23     Treatment & Education:  6MW Test  Height: 5' 7" (170.2 cm)  Weight: 81.5 kg (179 lb 10.8 oz)  Predicted Distance Meters (Calculated): 498.47 meters  Completed Distance Meters: 104.24 meters (342ft)  VS throughout:  SpO2: 95-92% on RA  HRmax: 123bpm     VS (5-min post-activity)  SpO2: 98% on RA  RR: 24 " breaths/min    Patient Education Provided on:  The role of physical therapy and how the patient can benefit from skilled services  The negative effects of prolonged bed rest/sedentary behavior, along with the importance of OOB activity & patient participation with PT  The importance of contacting RN, via call light, for mobility throughout the day  Pt white board updated with current therapists name and level of mobility assistance needed.     Patient Verbalized understanding of all topics touched on this date. All patient questions answered within the PT scope of practice    Patient left sitting edge of bed with all lines intact, call button in reach, RN notified, and spouse present..    GOALS:   Multidisciplinary Problems       Physical Therapy Goals          Problem: Physical Therapy    Goal Priority Disciplines Outcome Goal Variances Interventions   Physical Therapy Goal     PT, PT/OT Progressing     Description: Goals to be met by: 24     Patient will increase functional independence with mobility by performin. Supine to sit with [place]  2. Sit to supine with [place]  3. Rolling to either side with [place].  4. Sit to stand transfer with [place]  5. Bed to chair transfer with [place] using no AD  6. Gait  x 500 feet with [place] using No Assistive Device.                          Time Tracking:     PT Received On: 24  PT Start Time: 1207     PT Stop Time: 1236  PT Total Time (min): 29 min     Billable Minutes: Gait Training 10 and Therapeutic Activity 19    Treatment Type: Treatment  PT/PTA: PT     Number of PTA visits since last PT visit: 0     2024

## 2024-06-27 NOTE — PLAN OF CARE
Plan of care reviewed. Patient is oriented. Ambulates independently. PIV infusing antibiotics. Experiencing back pain; PRN Ibuprofen administered once. Gas-x administered once.  Remained afebrile. Ativan Hydrochlorothiazide d/c'd. U/A collected.  at bedside. All questions and concerns addressed. All safety precautions in place. Remains free of injury. All needs met at this time

## 2024-06-27 NOTE — PLAN OF CARE
Problem: Physical Therapy  Goal: Physical Therapy Goal  Description: Goals to be met by: 24     Patient will increase functional independence with mobility by performin. Supine to sit with [place]  2. Sit to supine with [place]  3. Rolling to either side with [place].  4. Sit to stand transfer with [place]  5. Bed to chair transfer with [place] using no AD  6. Gait  x 500 feet with [place] using No Assistive Device.     Outcome: Progressing

## 2024-06-28 ENCOUNTER — PATIENT MESSAGE (OUTPATIENT)
Dept: ENDOCRINOLOGY | Facility: HOSPITAL | Age: 59
End: 2024-06-28
Payer: MEDICARE

## 2024-06-28 ENCOUNTER — PATIENT MESSAGE (OUTPATIENT)
Dept: GYNECOLOGIC ONCOLOGY | Facility: CLINIC | Age: 59
End: 2024-06-28
Payer: MEDICARE

## 2024-06-28 ENCOUNTER — TELEPHONE (OUTPATIENT)
Dept: GYNECOLOGIC ONCOLOGY | Facility: CLINIC | Age: 59
End: 2024-06-28
Payer: MEDICARE

## 2024-06-28 LAB
BACTERIA BLD CULT: NORMAL
BACTERIA UR CULT: NO GROWTH

## 2024-06-28 NOTE — TELEPHONE ENCOUNTER
Contacted pt regarding msg stating pt needs education on upcoing regimen (Doxil). Pt ok with portal education being sent and I will call her on Monday to discuss further and any questions. Pt 7/1 appt changed to 7/8 after Dr. Washington agreed 7/1 appt was too soon and 7/4 is a holiday. Pt notified of appt change and stated calling on 7/1 is ok between 10-11 am.

## 2024-06-28 NOTE — PROGRESS NOTES
Referral for HH made to [facility] ([phone]). Faxed order and clinicals to the office ([phone]). Pt. Current with [facility] for wound care. No other needs identified at this time.

## 2024-06-30 ENCOUNTER — HOSPITAL ENCOUNTER (INPATIENT)
Facility: HOSPITAL | Age: 59
LOS: 12 days | Discharge: HOSPICE | DRG: 374 | End: 2024-07-13
Attending: EMERGENCY MEDICINE | Admitting: OBSTETRICS & GYNECOLOGY
Payer: MEDICARE

## 2024-06-30 DIAGNOSIS — E08.311 DIABETES MELLITUS DUE TO UNDERLYING CONDITION WITH RETINOPATHY AND MACULAR EDEMA, WITH LONG-TERM CURRENT USE OF INSULIN, UNSPECIFIED LATERALITY, UNSPECIFIED RETINOPATHY SEVERITY: ICD-10-CM

## 2024-06-30 DIAGNOSIS — Z79.4 DIABETES MELLITUS DUE TO UNDERLYING CONDITION WITH RETINOPATHY AND MACULAR EDEMA, WITH LONG-TERM CURRENT USE OF INSULIN, UNSPECIFIED LATERALITY, UNSPECIFIED RETINOPATHY SEVERITY: ICD-10-CM

## 2024-06-30 DIAGNOSIS — Z91.89 AT RISK FOR PROLONGED QT INTERVAL SYNDROME: ICD-10-CM

## 2024-06-30 DIAGNOSIS — R06.02 SHORTNESS OF BREATH: ICD-10-CM

## 2024-06-30 DIAGNOSIS — E11.10 DIABETIC KETOACIDOSIS WITHOUT COMA ASSOCIATED WITH TYPE 2 DIABETES MELLITUS: ICD-10-CM

## 2024-06-30 DIAGNOSIS — R18.0 MALIGNANT ASCITES: Primary | ICD-10-CM

## 2024-06-30 DIAGNOSIS — K56.609 SBO (SMALL BOWEL OBSTRUCTION): ICD-10-CM

## 2024-06-30 DIAGNOSIS — C80.1 CARCINOSARCOMA: ICD-10-CM

## 2024-06-30 DIAGNOSIS — R94.31 QT PROLONGATION: ICD-10-CM

## 2024-06-30 DIAGNOSIS — Z51.5 PALLIATIVE CARE ENCOUNTER: ICD-10-CM

## 2024-06-30 DIAGNOSIS — J81.1 CHRONIC PULMONARY EDEMA: ICD-10-CM

## 2024-06-30 DIAGNOSIS — R52 PAIN: ICD-10-CM

## 2024-06-30 PROCEDURE — 96366 THER/PROPH/DIAG IV INF ADDON: CPT | Mod: HCNC

## 2024-06-30 PROCEDURE — 96365 THER/PROPH/DIAG IV INF INIT: CPT | Mod: HCNC

## 2024-06-30 PROCEDURE — 93010 ELECTROCARDIOGRAM REPORT: CPT | Mod: HCNC,,, | Performed by: INTERNAL MEDICINE

## 2024-06-30 PROCEDURE — 94761 N-INVAS EAR/PLS OXIMETRY MLT: CPT | Mod: HCNC

## 2024-06-30 PROCEDURE — 99285 EMERGENCY DEPT VISIT HI MDM: CPT | Mod: 25,HCNC

## 2024-06-30 PROCEDURE — 82962 GLUCOSE BLOOD TEST: CPT | Mod: HCNC

## 2024-06-30 PROCEDURE — 93005 ELECTROCARDIOGRAM TRACING: CPT | Mod: HCNC

## 2024-07-01 ENCOUNTER — TELEPHONE (OUTPATIENT)
Dept: GYNECOLOGIC ONCOLOGY | Facility: CLINIC | Age: 59
End: 2024-07-01

## 2024-07-01 PROBLEM — J90 PLEURAL EFFUSION: Status: ACTIVE | Noted: 2024-07-01

## 2024-07-01 PROBLEM — E87.29 HIGH ANION GAP METABOLIC ACIDOSIS: Status: ACTIVE | Noted: 2024-07-01

## 2024-07-01 PROBLEM — E11.10 DIABETIC KETOACIDOSIS WITHOUT COMA ASSOCIATED WITH TYPE 2 DIABETES MELLITUS: Status: ACTIVE | Noted: 2024-07-01

## 2024-07-01 LAB
ALBUMIN SERPL BCP-MCNC: 1.7 G/DL (ref 3.5–5.2)
ALBUMIN SERPL BCP-MCNC: 1.7 G/DL (ref 3.5–5.2)
ALBUMIN SERPL BCP-MCNC: 1.8 G/DL (ref 3.5–5.2)
ALBUMIN SERPL BCP-MCNC: 1.9 G/DL (ref 3.5–5.2)
ALBUMIN SERPL BCP-MCNC: 2.2 G/DL (ref 3.5–5.2)
ALLENS TEST: ABNORMAL
ALLENS TEST: NORMAL
ALP SERPL-CCNC: 129 U/L (ref 55–135)
ALP SERPL-CCNC: 135 U/L (ref 55–135)
ALP SERPL-CCNC: 159 U/L (ref 55–135)
ALP SERPL-CCNC: 173 U/L (ref 55–135)
ALP SERPL-CCNC: 176 U/L (ref 55–135)
ALT SERPL W/O P-5'-P-CCNC: 5 U/L (ref 10–44)
ALT SERPL W/O P-5'-P-CCNC: 6 U/L (ref 10–44)
ALT SERPL W/O P-5'-P-CCNC: 7 U/L (ref 10–44)
ALT SERPL W/O P-5'-P-CCNC: 7 U/L (ref 10–44)
ALT SERPL W/O P-5'-P-CCNC: 8 U/L (ref 10–44)
ANION GAP SERPL CALC-SCNC: 12 MMOL/L (ref 8–16)
ANION GAP SERPL CALC-SCNC: 17 MMOL/L (ref 8–16)
ANION GAP SERPL CALC-SCNC: 18 MMOL/L (ref 8–16)
ANION GAP SERPL CALC-SCNC: 21 MMOL/L (ref 8–16)
ANION GAP SERPL CALC-SCNC: 9 MMOL/L (ref 8–16)
ANISOCYTOSIS BLD QL SMEAR: SLIGHT
APPEARANCE FLD: NORMAL
APTT PPP: 27.5 SEC (ref 21–32)
AST SERPL-CCNC: 18 U/L (ref 10–40)
AST SERPL-CCNC: 21 U/L (ref 10–40)
AST SERPL-CCNC: 21 U/L (ref 10–40)
AST SERPL-CCNC: 24 U/L (ref 10–40)
AST SERPL-CCNC: 25 U/L (ref 10–40)
B-OH-BUTYR BLD STRIP-SCNC: 4 MMOL/L (ref 0–0.5)
BACTERIA #/AREA URNS AUTO: ABNORMAL /HPF
BASOPHILS # BLD AUTO: ABNORMAL K/UL (ref 0–0.2)
BASOPHILS NFR BLD: 0 % (ref 0–1.9)
BILIRUB SERPL-MCNC: 0.3 MG/DL (ref 0.1–1)
BILIRUB SERPL-MCNC: 0.4 MG/DL (ref 0.1–1)
BILIRUB SERPL-MCNC: 0.5 MG/DL (ref 0.1–1)
BILIRUB UR QL STRIP: NEGATIVE
BNP SERPL-MCNC: 87 PG/ML (ref 0–99)
BODY FLD TYPE: NORMAL
BUN SERPL-MCNC: 71 MG/DL (ref 6–20)
BUN SERPL-MCNC: 74 MG/DL (ref 6–20)
BUN SERPL-MCNC: 75 MG/DL (ref 6–20)
BUN SERPL-MCNC: 76 MG/DL (ref 6–20)
BUN SERPL-MCNC: 76 MG/DL (ref 6–20)
BURR CELLS BLD QL SMEAR: ABNORMAL
CALCIUM SERPL-MCNC: 7.6 MG/DL (ref 8.7–10.5)
CALCIUM SERPL-MCNC: 7.7 MG/DL (ref 8.7–10.5)
CALCIUM SERPL-MCNC: 8 MG/DL (ref 8.7–10.5)
CALCIUM SERPL-MCNC: 8.1 MG/DL (ref 8.7–10.5)
CALCIUM SERPL-MCNC: 8.8 MG/DL (ref 8.7–10.5)
CHLORIDE SERPL-SCNC: 100 MMOL/L (ref 95–110)
CHLORIDE SERPL-SCNC: 103 MMOL/L (ref 95–110)
CHLORIDE SERPL-SCNC: 94 MMOL/L (ref 95–110)
CHLORIDE SERPL-SCNC: 95 MMOL/L (ref 95–110)
CHLORIDE SERPL-SCNC: 98 MMOL/L (ref 95–110)
CLARITY UR REFRACT.AUTO: ABNORMAL
CO2 SERPL-SCNC: 14 MMOL/L (ref 23–29)
CO2 SERPL-SCNC: 15 MMOL/L (ref 23–29)
CO2 SERPL-SCNC: 16 MMOL/L (ref 23–29)
CO2 SERPL-SCNC: 19 MMOL/L (ref 23–29)
CO2 SERPL-SCNC: 19 MMOL/L (ref 23–29)
COLOR FLD: YELLOW
COLOR UR AUTO: YELLOW
CREAT SERPL-MCNC: 4.1 MG/DL (ref 0.5–1.4)
CREAT SERPL-MCNC: 4.4 MG/DL (ref 0.5–1.4)
CREAT SERPL-MCNC: 4.5 MG/DL (ref 0.5–1.4)
CREAT SERPL-MCNC: 4.6 MG/DL (ref 0.5–1.4)
CREAT SERPL-MCNC: 4.6 MG/DL (ref 0.5–1.4)
CREAT UR-MCNC: 186 MG/DL (ref 15–325)
DACRYOCYTES BLD QL SMEAR: ABNORMAL
DELSYS: ABNORMAL
DELSYS: NORMAL
DIFFERENTIAL METHOD BLD: ABNORMAL
EOSINOPHIL # BLD AUTO: ABNORMAL K/UL (ref 0–0.5)
EOSINOPHIL NFR BLD: 1 % (ref 0–8)
ERYTHROCYTE [DISTWIDTH] IN BLOOD BY AUTOMATED COUNT: 19.9 % (ref 11.5–14.5)
EST. GFR  (NO RACE VARIABLE): 10.4 ML/MIN/1.73 M^2
EST. GFR  (NO RACE VARIABLE): 10.4 ML/MIN/1.73 M^2
EST. GFR  (NO RACE VARIABLE): 10.7 ML/MIN/1.73 M^2
EST. GFR  (NO RACE VARIABLE): 11 ML/MIN/1.73 M^2
EST. GFR  (NO RACE VARIABLE): 11.9 ML/MIN/1.73 M^2
GLUCOSE SERPL-MCNC: 104 MG/DL (ref 70–110)
GLUCOSE SERPL-MCNC: 312 MG/DL (ref 70–110)
GLUCOSE SERPL-MCNC: 403 MG/DL (ref 70–110)
GLUCOSE SERPL-MCNC: 461 MG/DL (ref 70–110)
GLUCOSE SERPL-MCNC: 85 MG/DL (ref 70–110)
GLUCOSE UR QL STRIP: NEGATIVE
GRAM STN SPEC: NORMAL
GRAM STN SPEC: NORMAL
HCO3 UR-SCNC: 17.7 MMOL/L (ref 24–28)
HCO3 UR-SCNC: 17.8 MMOL/L (ref 24–28)
HCO3 UR-SCNC: 20.3 MMOL/L (ref 24–28)
HCT VFR BLD AUTO: 23.3 % (ref 37–48.5)
HCT VFR BLD CALC: 23 %PCV (ref 36–54)
HGB BLD-MCNC: 7.4 G/DL (ref 12–16)
HGB UR QL STRIP: ABNORMAL
HYALINE CASTS UR QL AUTO: 0 /LPF
HYPOCHROMIA BLD QL SMEAR: ABNORMAL
IMM GRANULOCYTES # BLD AUTO: ABNORMAL K/UL (ref 0–0.04)
IMM GRANULOCYTES NFR BLD AUTO: ABNORMAL % (ref 0–0.5)
INFLUENZA A, MOLECULAR: NOT DETECTED
INFLUENZA B, MOLECULAR: NOT DETECTED
INR PPP: 1.1 (ref 0.8–1.2)
KETONES UR QL STRIP: ABNORMAL
KOH PREP SPEC: NORMAL
LDH SERPL L TO P-CCNC: 1.76 MMOL/L (ref 0.5–2.2)
LDH SERPL L TO P-CCNC: 2.22 MMOL/L (ref 0.5–2.2)
LDH SERPL L TO P-CCNC: 3176 U/L (ref 110–260)
LEUKOCYTE ESTERASE UR QL STRIP: ABNORMAL
LYMPHOCYTES # BLD AUTO: ABNORMAL K/UL (ref 1–4.8)
LYMPHOCYTES NFR BLD: 6 % (ref 18–48)
LYMPHOCYTES NFR FLD MANUAL: 3 %
MAGNESIUM SERPL-MCNC: 1.8 MG/DL (ref 1.6–2.6)
MAGNESIUM SERPL-MCNC: 1.9 MG/DL (ref 1.6–2.6)
MAGNESIUM SERPL-MCNC: 2 MG/DL (ref 1.6–2.6)
MAGNESIUM SERPL-MCNC: 2 MG/DL (ref 1.6–2.6)
MAGNESIUM SERPL-MCNC: 2.1 MG/DL (ref 1.6–2.6)
MCH RBC QN AUTO: 26.2 PG (ref 27–31)
MCHC RBC AUTO-ENTMCNC: 31.8 G/DL (ref 32–36)
MCV RBC AUTO: 83 FL (ref 82–98)
METAMYELOCYTES NFR BLD MANUAL: 1 %
MICROSCOPIC COMMENT: ABNORMAL
MONOCYTES # BLD AUTO: ABNORMAL K/UL (ref 0.3–1)
MONOCYTES NFR BLD: 4 % (ref 4–15)
MONOS+MACROS NFR FLD MANUAL: 2 %
MYELOCYTES NFR BLD MANUAL: 1 %
NEUTROPHILS NFR BLD: 87 % (ref 38–73)
NEUTROPHILS NFR FLD MANUAL: 95 %
NITRITE UR QL STRIP: NEGATIVE
NRBC BLD-RTO: 0 /100 WBC
OHS QRS DURATION: 68 MS
OHS QTC CALCULATION: 515 MS
PCO2 BLDA: 33.9 MMHG (ref 35–45)
PCO2 BLDA: 35.2 MMHG (ref 35–45)
PCO2 BLDA: 42.4 MMHG (ref 35–45)
PH SMN: 7.29 [PH] (ref 7.35–7.45)
PH SMN: 7.31 [PH] (ref 7.35–7.45)
PH SMN: 7.32 [PH] (ref 7.35–7.45)
PH UR STRIP: 6 [PH] (ref 5–8)
PHOSPHATE SERPL-MCNC: 2.7 MG/DL (ref 2.7–4.5)
PHOSPHATE SERPL-MCNC: 2.7 MG/DL (ref 2.7–4.5)
PHOSPHATE SERPL-MCNC: 3.2 MG/DL (ref 2.7–4.5)
PHOSPHATE SERPL-MCNC: 3.8 MG/DL (ref 2.7–4.5)
PHOSPHATE SERPL-MCNC: 3.9 MG/DL (ref 2.7–4.5)
PLATELET # BLD AUTO: 306 K/UL (ref 150–450)
PLATELET BLD QL SMEAR: ABNORMAL
PMV BLD AUTO: 11.7 FL (ref 9.2–12.9)
PO2 BLDA: 27 MMHG (ref 40–60)
PO2 BLDA: 36 MMHG (ref 40–60)
PO2 BLDA: 51 MMHG (ref 40–60)
POC BE: -6 MMOL/L
POC BE: -8 MMOL/L
POC BE: -8 MMOL/L
POC IONIZED CALCIUM: 1.1 MMOL/L (ref 1.06–1.42)
POC SATURATED O2: 44 % (ref 95–100)
POC SATURATED O2: 63 % (ref 95–100)
POC SATURATED O2: 83 % (ref 95–100)
POC TCO2: 19 MMOL/L (ref 24–29)
POC TCO2: 19 MMOL/L (ref 24–29)
POC TCO2: 22 MMOL/L (ref 24–29)
POCT GLUCOSE: 106 MG/DL (ref 70–110)
POCT GLUCOSE: 109 MG/DL (ref 70–110)
POCT GLUCOSE: 117 MG/DL (ref 70–110)
POCT GLUCOSE: 120 MG/DL (ref 70–110)
POCT GLUCOSE: 134 MG/DL (ref 70–110)
POCT GLUCOSE: 140 MG/DL (ref 70–110)
POCT GLUCOSE: 163 MG/DL (ref 70–110)
POCT GLUCOSE: 182 MG/DL (ref 70–110)
POCT GLUCOSE: 228 MG/DL (ref 70–110)
POCT GLUCOSE: 320 MG/DL (ref 70–110)
POCT GLUCOSE: 376 MG/DL (ref 70–110)
POCT GLUCOSE: 419 MG/DL (ref 70–110)
POCT GLUCOSE: 420 MG/DL (ref 70–110)
POCT GLUCOSE: 448 MG/DL (ref 70–110)
POCT GLUCOSE: 456 MG/DL (ref 70–110)
POCT GLUCOSE: 489 MG/DL (ref 70–110)
POIKILOCYTOSIS BLD QL SMEAR: SLIGHT
POLYCHROMASIA BLD QL SMEAR: ABNORMAL
POTASSIUM BLD-SCNC: 3.7 MMOL/L (ref 3.5–5.1)
POTASSIUM SERPL-SCNC: 3 MMOL/L (ref 3.5–5.1)
POTASSIUM SERPL-SCNC: 3.5 MMOL/L (ref 3.5–5.1)
POTASSIUM SERPL-SCNC: 3.8 MMOL/L (ref 3.5–5.1)
POTASSIUM SERPL-SCNC: 3.8 MMOL/L (ref 3.5–5.1)
POTASSIUM SERPL-SCNC: 3.9 MMOL/L (ref 3.5–5.1)
PROCALCITONIN SERPL IA-MCNC: 11.51 NG/ML
PROT SERPL-MCNC: 5.7 G/DL (ref 6–8.4)
PROT SERPL-MCNC: 5.8 G/DL (ref 6–8.4)
PROT SERPL-MCNC: 6.2 G/DL (ref 6–8.4)
PROT SERPL-MCNC: 6.4 G/DL (ref 6–8.4)
PROT SERPL-MCNC: 7.5 G/DL (ref 6–8.4)
PROT UR QL STRIP: ABNORMAL
PROTHROMBIN TIME: 11.5 SEC (ref 9–12.5)
RBC # BLD AUTO: 2.82 M/UL (ref 4–5.4)
RBC #/AREA URNS AUTO: 10 /HPF (ref 0–4)
RSV AG BY MOLECULAR METHOD: NOT DETECTED
SAMPLE: ABNORMAL
SAMPLE: NORMAL
SARS-COV-2 RNA RESP QL NAA+PROBE: NOT DETECTED
SITE: ABNORMAL
SITE: NORMAL
SODIUM BLD-SCNC: 130 MMOL/L (ref 136–145)
SODIUM SERPL-SCNC: 127 MMOL/L (ref 136–145)
SODIUM SERPL-SCNC: 129 MMOL/L (ref 136–145)
SODIUM SERPL-SCNC: 131 MMOL/L (ref 136–145)
SODIUM SERPL-SCNC: 131 MMOL/L (ref 136–145)
SODIUM SERPL-SCNC: 132 MMOL/L (ref 136–145)
SODIUM UR-SCNC: 17 MMOL/L (ref 20–250)
SP GR UR STRIP: 1.03 (ref 1–1.03)
SQUAMOUS #/AREA URNS AUTO: 6 /HPF
TROPONIN I SERPL DL<=0.01 NG/ML-MCNC: 0.01 NG/ML (ref 0–0.03)
URATE SERPL-MCNC: 9.3 MG/DL (ref 2.4–5.7)
URN SPEC COLLECT METH UR: ABNORMAL
WBC # BLD AUTO: 20.03 K/UL (ref 3.9–12.7)
WBC # FLD: NORMAL /CU MM
WBC #/AREA URNS AUTO: 40 /HPF (ref 0–5)
YEAST UR QL AUTO: ABNORMAL

## 2024-07-01 PROCEDURE — 85007 BL SMEAR W/DIFF WBC COUNT: CPT | Mod: HCNC

## 2024-07-01 PROCEDURE — 94761 N-INVAS EAR/PLS OXIMETRY MLT: CPT | Mod: HCNC,XB

## 2024-07-01 PROCEDURE — 99900035 HC TECH TIME PER 15 MIN (STAT): Mod: HCNC

## 2024-07-01 PROCEDURE — 63600175 PHARM REV CODE 636 W HCPCS: Mod: HCNC | Performed by: EMERGENCY MEDICINE

## 2024-07-01 PROCEDURE — 84157 ASSAY OF PROTEIN OTHER: CPT | Mod: HCNC | Performed by: STUDENT IN AN ORGANIZED HEALTH CARE EDUCATION/TRAINING PROGRAM

## 2024-07-01 PROCEDURE — 84100 ASSAY OF PHOSPHORUS: CPT | Mod: 91,HCNC | Performed by: NURSE PRACTITIONER

## 2024-07-01 PROCEDURE — 81001 URINALYSIS AUTO W/SCOPE: CPT | Mod: HCNC | Performed by: NURSE PRACTITIONER

## 2024-07-01 PROCEDURE — 87086 URINE CULTURE/COLONY COUNT: CPT | Mod: HCNC | Performed by: NURSE PRACTITIONER

## 2024-07-01 PROCEDURE — 51702 INSERT TEMP BLADDER CATH: CPT | Mod: HCNC

## 2024-07-01 PROCEDURE — 82330 ASSAY OF CALCIUM: CPT | Mod: HCNC

## 2024-07-01 PROCEDURE — 87088 URINE BACTERIA CULTURE: CPT | Mod: HCNC | Performed by: NURSE PRACTITIONER

## 2024-07-01 PROCEDURE — 83615 LACTATE (LD) (LDH) ENZYME: CPT | Mod: 91,HCNC | Performed by: STUDENT IN AN ORGANIZED HEALTH CARE EDUCATION/TRAINING PROGRAM

## 2024-07-01 PROCEDURE — 84478 ASSAY OF TRIGLYCERIDES: CPT | Mod: HCNC | Performed by: STUDENT IN AN ORGANIZED HEALTH CARE EDUCATION/TRAINING PROGRAM

## 2024-07-01 PROCEDURE — 84311 SPECTROPHOTOMETRY: CPT | Mod: 91,HCNC | Performed by: STUDENT IN AN ORGANIZED HEALTH CARE EDUCATION/TRAINING PROGRAM

## 2024-07-01 PROCEDURE — 84295 ASSAY OF SERUM SODIUM: CPT | Mod: HCNC

## 2024-07-01 PROCEDURE — 87015 SPECIMEN INFECT AGNT CONCNTJ: CPT | Mod: HCNC | Performed by: STUDENT IN AN ORGANIZED HEALTH CARE EDUCATION/TRAINING PROGRAM

## 2024-07-01 PROCEDURE — 27000207 HC ISOLATION: Mod: HCNC

## 2024-07-01 PROCEDURE — 99223 1ST HOSP IP/OBS HIGH 75: CPT | Mod: HCNC,FS,, | Performed by: HOSPITALIST

## 2024-07-01 PROCEDURE — 87070 CULTURE OTHR SPECIMN AEROBIC: CPT | Mod: HCNC | Performed by: STUDENT IN AN ORGANIZED HEALTH CARE EDUCATION/TRAINING PROGRAM

## 2024-07-01 PROCEDURE — 83605 ASSAY OF LACTIC ACID: CPT | Mod: HCNC

## 2024-07-01 PROCEDURE — 87186 SC STD MICRODIL/AGAR DIL: CPT | Mod: HCNC | Performed by: STUDENT IN AN ORGANIZED HEALTH CARE EDUCATION/TRAINING PROGRAM

## 2024-07-01 PROCEDURE — 0W993ZX DRAINAGE OF RIGHT PLEURAL CAVITY, PERCUTANEOUS APPROACH, DIAGNOSTIC: ICD-10-PCS | Performed by: INTERNAL MEDICINE

## 2024-07-01 PROCEDURE — 84311 SPECTROPHOTOMETRY: CPT | Mod: HCNC | Performed by: STUDENT IN AN ORGANIZED HEALTH CARE EDUCATION/TRAINING PROGRAM

## 2024-07-01 PROCEDURE — 87210 SMEAR WET MOUNT SALINE/INK: CPT | Mod: HCNC | Performed by: STUDENT IN AN ORGANIZED HEALTH CARE EDUCATION/TRAINING PROGRAM

## 2024-07-01 PROCEDURE — 88341 IMHCHEM/IMCYTCHM EA ADD ANTB: CPT | Mod: HCNC | Performed by: STUDENT IN AN ORGANIZED HEALTH CARE EDUCATION/TRAINING PROGRAM

## 2024-07-01 PROCEDURE — 20000000 HC ICU ROOM: Mod: HCNC

## 2024-07-01 PROCEDURE — 82570 ASSAY OF URINE CREATININE: CPT | Mod: HCNC | Performed by: NURSE PRACTITIONER

## 2024-07-01 PROCEDURE — 63600175 PHARM REV CODE 636 W HCPCS: Mod: HCNC | Performed by: STUDENT IN AN ORGANIZED HEALTH CARE EDUCATION/TRAINING PROGRAM

## 2024-07-01 PROCEDURE — 80053 COMPREHEN METABOLIC PANEL: CPT | Mod: HCNC

## 2024-07-01 PROCEDURE — 88305 TISSUE EXAM BY PATHOLOGIST: CPT | Mod: 26,HCNC,, | Performed by: STUDENT IN AN ORGANIZED HEALTH CARE EDUCATION/TRAINING PROGRAM

## 2024-07-01 PROCEDURE — 63600175 PHARM REV CODE 636 W HCPCS: Mod: HCNC

## 2024-07-01 PROCEDURE — 87205 SMEAR GRAM STAIN: CPT | Mod: 59,HCNC

## 2024-07-01 PROCEDURE — 63600175 PHARM REV CODE 636 W HCPCS: Mod: HCNC | Performed by: NURSE PRACTITIONER

## 2024-07-01 PROCEDURE — 88112 CYTOPATH CELL ENHANCE TECH: CPT | Mod: HCNC | Performed by: STUDENT IN AN ORGANIZED HEALTH CARE EDUCATION/TRAINING PROGRAM

## 2024-07-01 PROCEDURE — 25000003 PHARM REV CODE 250: Mod: HCNC | Performed by: STUDENT IN AN ORGANIZED HEALTH CARE EDUCATION/TRAINING PROGRAM

## 2024-07-01 PROCEDURE — 25000003 PHARM REV CODE 250: Mod: HCNC

## 2024-07-01 PROCEDURE — 80053 COMPREHEN METABOLIC PANEL: CPT | Mod: 91,HCNC | Performed by: NURSE PRACTITIONER

## 2024-07-01 PROCEDURE — 87077 CULTURE AEROBIC IDENTIFY: CPT | Mod: 59,HCNC | Performed by: STUDENT IN AN ORGANIZED HEALTH CARE EDUCATION/TRAINING PROGRAM

## 2024-07-01 PROCEDURE — 87205 SMEAR GRAM STAIN: CPT | Mod: HCNC | Performed by: STUDENT IN AN ORGANIZED HEALTH CARE EDUCATION/TRAINING PROGRAM

## 2024-07-01 PROCEDURE — 84300 ASSAY OF URINE SODIUM: CPT | Mod: HCNC | Performed by: NURSE PRACTITIONER

## 2024-07-01 PROCEDURE — 83615 LACTATE (LD) (LDH) ENZYME: CPT | Mod: HCNC | Performed by: STUDENT IN AN ORGANIZED HEALTH CARE EDUCATION/TRAINING PROGRAM

## 2024-07-01 PROCEDURE — 88342 IMHCHEM/IMCYTCHM 1ST ANTB: CPT | Mod: 26,HCNC,, | Performed by: STUDENT IN AN ORGANIZED HEALTH CARE EDUCATION/TRAINING PROGRAM

## 2024-07-01 PROCEDURE — 99223 1ST HOSP IP/OBS HIGH 75: CPT | Mod: AI,HCNC,, | Performed by: OBSTETRICS & GYNECOLOGY

## 2024-07-01 PROCEDURE — 82565 ASSAY OF CREATININE: CPT | Mod: HCNC

## 2024-07-01 PROCEDURE — 84100 ASSAY OF PHOSPHORUS: CPT | Mod: 91,HCNC

## 2024-07-01 PROCEDURE — 82010 KETONE BODYS QUAN: CPT | Mod: HCNC | Performed by: EMERGENCY MEDICINE

## 2024-07-01 PROCEDURE — 88112 CYTOPATH CELL ENHANCE TECH: CPT | Mod: 26,HCNC,, | Performed by: STUDENT IN AN ORGANIZED HEALTH CARE EDUCATION/TRAINING PROGRAM

## 2024-07-01 PROCEDURE — 84145 PROCALCITONIN (PCT): CPT | Mod: HCNC

## 2024-07-01 PROCEDURE — 84484 ASSAY OF TROPONIN QUANT: CPT | Mod: HCNC

## 2024-07-01 PROCEDURE — 83735 ASSAY OF MAGNESIUM: CPT | Mod: 91,HCNC | Performed by: NURSE PRACTITIONER

## 2024-07-01 PROCEDURE — 87102 FUNGUS ISOLATION CULTURE: CPT | Mod: HCNC | Performed by: STUDENT IN AN ORGANIZED HEALTH CARE EDUCATION/TRAINING PROGRAM

## 2024-07-01 PROCEDURE — 84100 ASSAY OF PHOSPHORUS: CPT | Mod: HCNC

## 2024-07-01 PROCEDURE — 88342 IMHCHEM/IMCYTCHM 1ST ANTB: CPT | Mod: HCNC | Performed by: STUDENT IN AN ORGANIZED HEALTH CARE EDUCATION/TRAINING PROGRAM

## 2024-07-01 PROCEDURE — 87077 CULTURE AEROBIC IDENTIFY: CPT | Mod: HCNC | Performed by: NURSE PRACTITIONER

## 2024-07-01 PROCEDURE — 82042 OTHER SOURCE ALBUMIN QUAN EA: CPT | Mod: HCNC | Performed by: STUDENT IN AN ORGANIZED HEALTH CARE EDUCATION/TRAINING PROGRAM

## 2024-07-01 PROCEDURE — 83735 ASSAY OF MAGNESIUM: CPT | Mod: HCNC

## 2024-07-01 PROCEDURE — 83880 ASSAY OF NATRIURETIC PEPTIDE: CPT | Mod: HCNC | Performed by: EMERGENCY MEDICINE

## 2024-07-01 PROCEDURE — 88341 IMHCHEM/IMCYTCHM EA ADD ANTB: CPT | Mod: 26,HCNC,, | Performed by: STUDENT IN AN ORGANIZED HEALTH CARE EDUCATION/TRAINING PROGRAM

## 2024-07-01 PROCEDURE — 25000003 PHARM REV CODE 250: Mod: HCNC | Performed by: NURSE PRACTITIONER

## 2024-07-01 PROCEDURE — 63600175 PHARM REV CODE 636 W HCPCS: Mod: HCNC | Performed by: INTERNAL MEDICINE

## 2024-07-01 PROCEDURE — 85730 THROMBOPLASTIN TIME PARTIAL: CPT | Mod: HCNC

## 2024-07-01 PROCEDURE — 0241U SARS-COV2 (COVID) WITH FLU/RSV BY PCR: CPT | Mod: HCNC

## 2024-07-01 PROCEDURE — 82803 BLOOD GASES ANY COMBINATION: CPT | Mod: HCNC

## 2024-07-01 PROCEDURE — 84550 ASSAY OF BLOOD/URIC ACID: CPT | Mod: HCNC

## 2024-07-01 PROCEDURE — 84132 ASSAY OF SERUM POTASSIUM: CPT | Mod: HCNC

## 2024-07-01 PROCEDURE — 85610 PROTHROMBIN TIME: CPT | Mod: HCNC

## 2024-07-01 PROCEDURE — 87206 SMEAR FLUORESCENT/ACID STAI: CPT | Mod: HCNC | Performed by: STUDENT IN AN ORGANIZED HEALTH CARE EDUCATION/TRAINING PROGRAM

## 2024-07-01 PROCEDURE — 89051 BODY FLUID CELL COUNT: CPT | Mod: HCNC | Performed by: STUDENT IN AN ORGANIZED HEALTH CARE EDUCATION/TRAINING PROGRAM

## 2024-07-01 PROCEDURE — 83735 ASSAY OF MAGNESIUM: CPT | Mod: 91,HCNC

## 2024-07-01 PROCEDURE — 80053 COMPREHEN METABOLIC PANEL: CPT | Mod: 91,HCNC

## 2024-07-01 PROCEDURE — 84478 ASSAY OF TRIGLYCERIDES: CPT | Mod: 91,HCNC | Performed by: STUDENT IN AN ORGANIZED HEALTH CARE EDUCATION/TRAINING PROGRAM

## 2024-07-01 PROCEDURE — 87116 MYCOBACTERIA CULTURE: CPT | Mod: HCNC | Performed by: STUDENT IN AN ORGANIZED HEALTH CARE EDUCATION/TRAINING PROGRAM

## 2024-07-01 PROCEDURE — 85027 COMPLETE CBC AUTOMATED: CPT | Mod: HCNC

## 2024-07-01 PROCEDURE — S5010 5% DEXTROSE AND 0.45% SALINE: HCPCS | Mod: HCNC

## 2024-07-01 PROCEDURE — 87040 BLOOD CULTURE FOR BACTERIA: CPT | Mod: HCNC

## 2024-07-01 PROCEDURE — 87070 CULTURE OTHR SPECIMN AEROBIC: CPT | Mod: 59,HCNC

## 2024-07-01 PROCEDURE — 82664 ELECTROPHORETIC TEST: CPT | Mod: HCNC | Performed by: STUDENT IN AN ORGANIZED HEALTH CARE EDUCATION/TRAINING PROGRAM

## 2024-07-01 PROCEDURE — 88305 TISSUE EXAM BY PATHOLOGIST: CPT | Mod: HCNC | Performed by: STUDENT IN AN ORGANIZED HEALTH CARE EDUCATION/TRAINING PROGRAM

## 2024-07-01 RX ORDER — PROCHLORPERAZINE EDISYLATE 5 MG/ML
2.5 INJECTION INTRAMUSCULAR; INTRAVENOUS EVERY 6 HOURS PRN
Status: DISCONTINUED | OUTPATIENT
Start: 2024-07-01 | End: 2024-07-01

## 2024-07-01 RX ORDER — ACETAMINOPHEN 325 MG/1
650 TABLET ORAL EVERY 6 HOURS PRN
Status: DISCONTINUED | OUTPATIENT
Start: 2024-07-01 | End: 2024-07-13 | Stop reason: HOSPADM

## 2024-07-01 RX ORDER — DEXTROSE MONOHYDRATE AND SODIUM CHLORIDE 5; .45 G/100ML; G/100ML
INJECTION, SOLUTION INTRAVENOUS CONTINUOUS PRN
Status: DISCONTINUED | OUTPATIENT
Start: 2024-07-01 | End: 2024-07-01

## 2024-07-01 RX ORDER — DEXTROSE MONOHYDRATE 100 MG/ML
INJECTION, SOLUTION INTRAVENOUS
Status: DISCONTINUED | OUTPATIENT
Start: 2024-07-01 | End: 2024-07-13 | Stop reason: HOSPADM

## 2024-07-01 RX ORDER — POTASSIUM CHLORIDE 29.8 MG/ML
40 INJECTION INTRAVENOUS EVERY 4 HOURS
Status: COMPLETED | OUTPATIENT
Start: 2024-07-01 | End: 2024-07-01

## 2024-07-01 RX ORDER — INSULIN GLARGINE 100 [IU]/ML
10 INJECTION, SOLUTION SUBCUTANEOUS DAILY
Status: DISCONTINUED | OUTPATIENT
Start: 2024-07-01 | End: 2024-07-02

## 2024-07-01 RX ORDER — POTASSIUM CHLORIDE 7.45 MG/ML
60 INJECTION INTRAVENOUS
Status: DISCONTINUED | OUTPATIENT
Start: 2024-07-01 | End: 2024-07-01

## 2024-07-01 RX ORDER — MAGNESIUM SULFATE HEPTAHYDRATE 40 MG/ML
2 INJECTION, SOLUTION INTRAVENOUS ONCE
Status: DISCONTINUED | OUTPATIENT
Start: 2024-07-01 | End: 2024-07-01

## 2024-07-01 RX ORDER — GLUCAGON 1 MG
1 KIT INJECTION
Status: DISCONTINUED | OUTPATIENT
Start: 2024-07-01 | End: 2024-07-13 | Stop reason: HOSPADM

## 2024-07-01 RX ORDER — IBUPROFEN 200 MG
16 TABLET ORAL
Status: DISCONTINUED | OUTPATIENT
Start: 2024-07-01 | End: 2024-07-13 | Stop reason: HOSPADM

## 2024-07-01 RX ORDER — IBUPROFEN 200 MG
24 TABLET ORAL
Status: DISCONTINUED | OUTPATIENT
Start: 2024-07-01 | End: 2024-07-13 | Stop reason: HOSPADM

## 2024-07-01 RX ORDER — SODIUM CHLORIDE, SODIUM LACTATE, POTASSIUM CHLORIDE, CALCIUM CHLORIDE 600; 310; 30; 20 MG/100ML; MG/100ML; MG/100ML; MG/100ML
INJECTION, SOLUTION INTRAVENOUS CONTINUOUS
Status: ACTIVE | OUTPATIENT
Start: 2024-07-01 | End: 2024-07-02

## 2024-07-01 RX ORDER — DEXTROSE MONOHYDRATE, SODIUM CHLORIDE, AND POTASSIUM CHLORIDE 50; 1.49; 4.5 G/1000ML; G/1000ML; G/1000ML
INJECTION, SOLUTION INTRAVENOUS CONTINUOUS PRN
Status: DISCONTINUED | OUTPATIENT
Start: 2024-07-01 | End: 2024-07-01

## 2024-07-01 RX ORDER — HYDROMORPHONE HYDROCHLORIDE 1 MG/ML
0.2 INJECTION, SOLUTION INTRAMUSCULAR; INTRAVENOUS; SUBCUTANEOUS ONCE
Status: COMPLETED | OUTPATIENT
Start: 2024-07-01 | End: 2024-07-01

## 2024-07-01 RX ORDER — SCOLOPAMINE TRANSDERMAL SYSTEM 1 MG/1
1 PATCH, EXTENDED RELEASE TRANSDERMAL
Status: DISCONTINUED | OUTPATIENT
Start: 2024-07-01 | End: 2024-07-01

## 2024-07-01 RX ORDER — INSULIN ASPART 100 [IU]/ML
0-15 INJECTION, SOLUTION INTRAVENOUS; SUBCUTANEOUS
Status: DISCONTINUED | OUTPATIENT
Start: 2024-07-01 | End: 2024-07-03

## 2024-07-01 RX ORDER — SODIUM CHLORIDE AND POTASSIUM CHLORIDE 150; 900 MG/100ML; MG/100ML
INJECTION, SOLUTION INTRAVENOUS CONTINUOUS
Status: DISCONTINUED | OUTPATIENT
Start: 2024-07-01 | End: 2024-07-01

## 2024-07-01 RX ORDER — LAMOTRIGINE 25 MG/1
25 TABLET ORAL 2 TIMES DAILY
Status: DISCONTINUED | OUTPATIENT
Start: 2024-07-01 | End: 2024-07-11

## 2024-07-01 RX ORDER — POTASSIUM CHLORIDE 7.45 MG/ML
10 INJECTION INTRAVENOUS
Status: CANCELLED | OUTPATIENT
Start: 2024-07-01 | End: 2024-07-01

## 2024-07-01 RX ORDER — POTASSIUM CHLORIDE 7.45 MG/ML
40 INJECTION INTRAVENOUS
Status: DISCONTINUED | OUTPATIENT
Start: 2024-07-01 | End: 2024-07-01

## 2024-07-01 RX ORDER — QUETIAPINE FUMARATE 25 MG/1
25 TABLET, FILM COATED ORAL DAILY PRN
Status: DISCONTINUED | OUTPATIENT
Start: 2024-07-01 | End: 2024-07-13 | Stop reason: HOSPADM

## 2024-07-01 RX ORDER — SODIUM CHLORIDE 0.9 % (FLUSH) 0.9 %
10 SYRINGE (ML) INJECTION
Status: DISCONTINUED | OUTPATIENT
Start: 2024-07-01 | End: 2024-07-13 | Stop reason: HOSPADM

## 2024-07-01 RX ORDER — POTASSIUM CHLORIDE 7.45 MG/ML
80 INJECTION INTRAVENOUS
Status: DISCONTINUED | OUTPATIENT
Start: 2024-07-01 | End: 2024-07-01

## 2024-07-01 RX ORDER — SODIUM CHLORIDE 9 MG/ML
1000 INJECTION, SOLUTION INTRAVENOUS CONTINUOUS
Status: DISCONTINUED | OUTPATIENT
Start: 2024-07-01 | End: 2024-07-01

## 2024-07-01 RX ADMIN — SODIUM CHLORIDE AND POTASSIUM CHLORIDE: .9; .15 SOLUTION INTRAVENOUS at 11:07

## 2024-07-01 RX ADMIN — DEXTROSE AND SODIUM CHLORIDE: 5; 450 INJECTION, SOLUTION INTRAVENOUS at 12:07

## 2024-07-01 RX ADMIN — INSULIN HUMAN 0.1 UNITS/KG/HR: 1 INJECTION, SOLUTION INTRAVENOUS at 07:07

## 2024-07-01 RX ADMIN — HYDROMORPHONE HYDROCHLORIDE 0.2 MG: 1 INJECTION, SOLUTION INTRAMUSCULAR; INTRAVENOUS; SUBCUTANEOUS at 04:07

## 2024-07-01 RX ADMIN — SODIUM CHLORIDE 1000 ML: 9 INJECTION, SOLUTION INTRAVENOUS at 07:07

## 2024-07-01 RX ADMIN — INSULIN HUMAN 0.05 UNITS/KG/HR: 1 INJECTION, SOLUTION INTRAVENOUS at 12:07

## 2024-07-01 RX ADMIN — LAMOTRIGINE 25 MG: 25 TABLET ORAL at 08:07

## 2024-07-01 RX ADMIN — POTASSIUM CHLORIDE, DEXTROSE MONOHYDRATE AND SODIUM CHLORIDE: 150; 5; 450 INJECTION, SOLUTION INTRAVENOUS at 01:07

## 2024-07-01 RX ADMIN — SODIUM CHLORIDE 500 ML: 0.9 INJECTION, SOLUTION INTRAVENOUS at 01:07

## 2024-07-01 RX ADMIN — POTASSIUM CHLORIDE 40 MEQ: 29.8 INJECTION, SOLUTION INTRAVENOUS at 10:07

## 2024-07-01 RX ADMIN — PIPERACILLIN SODIUM AND TAZOBACTAM SODIUM 4.5 G: 4; .5 INJECTION, POWDER, FOR SOLUTION INTRAVENOUS at 01:07

## 2024-07-01 RX ADMIN — LAMOTRIGINE 25 MG: 25 TABLET ORAL at 09:07

## 2024-07-01 RX ADMIN — VANCOMYCIN HYDROCHLORIDE 1500 MG: 1.5 INJECTION, POWDER, LYOPHILIZED, FOR SOLUTION INTRAVENOUS at 01:07

## 2024-07-01 RX ADMIN — INSULIN GLARGINE 10 UNITS: 100 INJECTION, SOLUTION SUBCUTANEOUS at 11:07

## 2024-07-01 RX ADMIN — SODIUM CHLORIDE, POTASSIUM CHLORIDE, SODIUM LACTATE AND CALCIUM CHLORIDE 1000 ML: 600; 310; 30; 20 INJECTION, SOLUTION INTRAVENOUS at 04:07

## 2024-07-01 RX ADMIN — POTASSIUM CHLORIDE 40 MEQ: 29.8 INJECTION, SOLUTION INTRAVENOUS at 03:07

## 2024-07-01 RX ADMIN — SODIUM CHLORIDE, POTASSIUM CHLORIDE, SODIUM LACTATE AND CALCIUM CHLORIDE: 600; 310; 30; 20 INJECTION, SOLUTION INTRAVENOUS at 08:07

## 2024-07-01 NOTE — ED NOTES
Anette Ricci, a 58 y.o. female presents to the ED w/ complaint of  abdominal pain (chronic) nausea/vomiting and shortness of breath (today). General malaise    Triage note:  Chief Complaint   Patient presents with    Abdominal Pain     Pt complaining of abdominal pain, nausea, vomiting, SOB since yesterday. Pt on chemo for ovarian cancer and missed her last session      Review of patient's allergies indicates:   Allergen Reactions    Codeine Other (See Comments)     Flu like s/s    Tramadol Other (See Comments)     Flu like symptoms similar to codeine reaction     Past Medical History:   Diagnosis Date    Breast cancer 2013    Diabetes mellitus     Genetic testing 05/29/2013    VUS    Hyperlipidemia     Hypertension     Malignant neoplasm of upper-outer quadrant of right breast in female, estrogen receptor positive 12/02/2015    Seizure disorder        Primary Defect Length (In Cm): 0.9

## 2024-07-01 NOTE — ED PROVIDER NOTES
Encounter Date: 2024       History     Chief Complaint   Patient presents with    Abdominal Pain     Pt complaining of abdominal pain, nausea, vomiting, SOB since yesterday. Pt on chemo for ovarian cancer and missed her last session      This patient is a  is a 58 y.o.  with stage IVB ovarian carcinosacoma, s/p PDS on 3/25/24 with Dr Washington ([facility] BSO omx liver mobilization, peritoneal & R diaphragm stripping, hepatic wedge resection, cholecystectomy), s/p C3 on  carbo/taxol/bevacizumab admitted on 24 for management of malignant pleural effusion. IR team was unable to drain the effusion safely. She also developed a fever on HD2. Patient was treated with zosyn for 6 days due to recurrent fevers, but infectious work up was negative, without any obvious source of infection.  Patient is subsequently discharged on  and wound dressing replaced a ventral incision on .  Patient has been afebrile as per patient's partner in the room who is her caretaker and states that she has had worsening shortness of breath since discharge.  There have been no vomiting episodes and patient denies nausea, chest pain, pleuritic pain, palpitations, dysuria or changes in bowel habits.  Patient endorses left-sided abdominal pain and difficulty breathing.    The history is provided by the patient, medical records, a relative and a friend. No  was used.     Review of patient's allergies indicates:   Allergen Reactions    Codeine Other (See Comments)     Flu like s/s    Tramadol Other (See Comments)     Flu like symptoms similar to codeine reaction     Past Medical History:   Diagnosis Date    Breast cancer     Diabetes mellitus     Genetic testing 2013    VUS    Hyperlipidemia     Hypertension     Malignant neoplasm of upper-outer quadrant of right breast in female, estrogen receptor positive 2015    Seizure disorder      Past Surgical History:   Procedure Laterality  Date    BILATERAL SALPINGO-OOPHORECTOMY (BSO) N/A 3/25/2024    Procedure: SALPINGO-OOPHORECTOMY, BILATERAL;  Surgeon: Александр Washington MD;  Location: [facility] OR 2ND FLR;  Service: OB/GYN;  Laterality: N/A;    BREAST BIOPSY      BREAST LUMPECTOMY Right 2013    CHOLECYSTECTOMY  3/25/2024    Procedure: CHOLECYSTECTOMY;  Surgeon: Bo Farmer MD;  [facility];  Service: General;;    DEBULKING OF TUMOR N/A 3/25/2024    Procedure: DEBULKING, NEOPLASM;  Surgeon: Александр Washington MD;  Location: [facility] OR 2ND FLR;  Service: OB/GYN;  Laterality: N/A;    EXCISION, LIVER N/A 3/25/2024    Procedure: EXCISION, LIVER - partial;  Surgeon: Bo Farmer MD;  [facility];  Service: General;  Laterality: N/A;  bk ultrasound  [place] book by TA on 3-21-24  7:00 a.m.  Conf #  [id number]    EYE SURGERY      LAPAROTOMY, EXPLORATORY N/A 3/25/2024    Procedure: LAPAROTOMY, EXPLORATORY;  Surgeon: Александр Washington MD;  [facility];  Service: OB/GYN;  Laterality: N/A;    OMENTECTOMY N/A 3/25/2024    Procedure: OMENTECTOMY;  Surgeon: Александр Washington MD;  [facility];  Service: OB/GYN;  Laterality: N/A;    PERITONEOCENTESIS N/A 3/13/2024    Procedure: PARACENTESIS, ABDOMINAL;  Surgeon: Flavia Moore MD;  Location: [facility] CATH LAB;  Service: Radiology;  Laterality: N/A;    PERITONEOCENTESIS N/A 3/21/2024    Procedure: PARACENTESIS, ABDOMINAL;  Surgeon: Jorge Jolley MD;  Location: [facility] CATH LAB;  Service: Radiology;  Laterality: N/A;    PERITONEOCENTESIS N/A 6/10/2024    Procedure: PARACENTESIS, ABDOMINAL;  Surgeon: Rogelio Malave MD;  Location: [facility] CATH LAB;  Service: Radiology;  Laterality: N/A;    TOTAL ABDOMINAL HYSTERECTOMY N/A 3/25/2024    Procedure: HYSTERECTOMY, TOTAL, ABDOMINAL;  Surgeon: Александр Washington MD;  Location: [facility] OR [address] [place] [address];  Service: OB/GYN;  Laterality: N/A;    TOTAL REDUCTION MAMMOPLASTY Bilateral 2016     Family History    Problem Relation Name Age of Onset    Hypertension Mother      Seizures Father      Breast cancer Sister  48    Colon cancer Sister      Hypertension Brother      Hypertension Brother      Cancer Neg Hx      Ovarian cancer Neg Hx       Social History     Tobacco Use    Smoking status: Never    Smokeless tobacco: Never   Substance Use Topics    Alcohol use: Yes     Alcohol/week: 0.0 standard drinks of alcohol     Comment: ocassional    Drug use: No     Review of Systems    Physical Exam     Initial Vitals [06/30/24 2322]   BP Pulse Resp Temp SpO2   (!) 88/48 100 18 97.8 °F (36.6 °C) 99 %      MAP       --         Physical Exam    Nursing note and vitals reviewed.  Constitutional: She appears well-developed and well-nourished. She appears distressed.   HENT:   Head: Normocephalic and atraumatic.   Eyes: Conjunctivae and EOM are normal. Pupils are equal, round, and reactive to light.   Neck: Neck supple.   Normal range of motion.  Cardiovascular:  Normal rate, normal heart sounds and intact distal pulses.           Abdominal: Abdomen is soft. She exhibits distension. She exhibits no mass.    dressing applied to subxiphoid region from IR procedure There is no guarding.   Musculoskeletal:         General: Normal range of motion.      Cervical back: Normal range of motion and neck supple.     Neurological: She is alert and oriented to person, place, and time. She has normal strength. GCS score is 15. GCS eye subscore is 4. GCS verbal subscore is 5. GCS motor subscore is 6.   Skin: Skin is warm and dry. Capillary refill takes 2 to 3 seconds.   Psychiatric: She has a normal mood and affect. Her behavior is normal. Judgment and thought content normal.         ED Course   Procedures  Labs Reviewed - No data to display  EKG Readings: (Independently Interpreted)   Initial Reading: No STEMI.       Imaging Results    None       X-Rays:   Independently Interpreted Readings:   Chest X-Ray: Left pleural effusion present.      Medications - No data to display  Medical Decision Making  58-year-old female as described above, presenting with progressively worsening shortness of breath since discharge on the 27th from gynecology/oncology.  Recent procedure with IR to drain pleural effusion.  Known pulmonary embolism on Eliquis.  Patient hypotensive on arrival we will respond fluids.  Afebrile throughout encounter.  Patient placed on telemetry with labile blood pressures while at rest vital map of 85 consistently while awake.  Point of care ultrasound demonstrating preserved ejection fraction from prior with no pericardial effusion.  I-STAT lactate of 2.22 with metabolic acidosis, leukocytosis 20.03 with left shift an H&H of 7.4/23.3, uric acid 9.3, and protocol of 11.51.  Repeat chest x-ray demonstrating worse pleural effusion of the left side.  CT abdomen pelvis without contrast demonstrating markedly unchanged intra-abdominal findings and prior for limited due to JASON acute renal failure.  Discussed the case with gynecology/oncology who agreed to evaluate the patient and admit to their service.    Amount and/or Complexity of Data Reviewed  Labs: ordered.  Radiology: ordered.  Discussion of management or test interpretation with external provider(s): I discussed the case with gynecology/oncology who agreed to evaluate patient.  Appreciate recs    Risk  Parenteral controlled substances.  Drug therapy requiring intensive monitoring for toxicity.  Decision regarding hospitalization.      Additional MDM:   Hyperglycemia: Initial glucose level was 419. The initial potassium level was 3.8. After the glucose level was received the following occurred: a glucose was repeated (lab). The repeat glucose level was 461. Therapy: IV fluid bolus and IV insulin drip. The patient tolerated the above treatment and did not have any complications.   PERC Rule:   Age is greater than or equal to 50 = 1.0  Heart Rate is greater than or equal to 100 = 1.0  SaO2  on room air < 95% = 1.0  Unilateral leg swelling = 0.0  Hemoptysis = 0.0  Recent surgery or trauma = 1.0  Prior PE or DVT =  1.0  Hormone use = 0.00  PERC Score = 5        Heart Failure Score:   Systolic BP = 90-99  Heart Rate = 100-104  Sodium = <130  COPD = Yes  Black = Yes  BUN = 70-79  Age = 50-59  Patient has a GWTG HF Risk Score for In-Hospital Mortality of 57 which translates into a probability of death of >5-10% (Intermediate Risk).                                     Clinical Impression:  Final diagnoses:  [R06.02] Shortness of breath                 Alisha Ayon MD  Resident  07/01/24 0654

## 2024-07-01 NOTE — ED NOTES
Nurses Note -- 4 Eyes      7/1/2024   3:49 AM      Skin assessed during: Admit      [] No Altered Skin Integrity Present    []Prevention Measures Documented      [x] Yes- Altered Skin Integrity Present or Discovered   [] LDA Added if Not in Epic (Describe Wound)   [x] New Altered Skin Integrity was Present on Admit and Documented in LDA   [x] Wound Image Taken    Wound Care Consulted? No    Attending Nurse:  Louie Leach RN/Staff Member:   Seda

## 2024-07-01 NOTE — SUBJECTIVE & OBJECTIVE
Past Medical History:   Diagnosis Date    Breast cancer 2013    Diabetes mellitus     Genetic testing 05/29/2013    VUS    Hyperlipidemia     Hypertension     Malignant neoplasm of upper-outer quadrant of right breast in female, estrogen receptor positive 12/02/2015    Seizure disorder        Past Surgical History:   Procedure Laterality Date    BILATERAL SALPINGO-OOPHORECTOMY (BSO) N/A 3/25/2024    Procedure: SALPINGO-OOPHORECTOMY, BILATERAL;  Surgeon: Александр Washington MD;  Location: [facility] OR [address] [place] [address];  Service: OB/GYN;  Laterality: N/A;    BREAST BIOPSY      BREAST LUMPECTOMY Right 2013    CHOLECYSTECTOMY  3/25/2024    Procedure: CHOLECYSTECTOMY;  Surgeon: Bo Farmer MD;  Location: [facility] OR [address] [place] [address];  Service: General;;    DEBULKING OF TUMOR N/A 3/25/2024    Procedure: DEBULKING, NEOPLASM;  Surgeon: Александр Washington MD;  Location: [facility] OR [address] [place] [address];  Service: OB/GYN;  Laterality: N/A;    EXCISION, LIVER N/A 3/25/2024    Procedure: EXCISION, LIVER - partial;  Surgeon: Bo Farmer MD;  Location: [facility] OR [address] [place] [address];  Service: General;  Laterality: N/A;  bk ultrasound  [employer] book by TA on 3-21-24  7:00 a.m.  Conf #  [id number]    EYE SURGERY      LAPAROTOMY, EXPLORATORY N/A 3/25/2024    Procedure: LAPAROTOMY, EXPLORATORY;  Surgeon: Александр Washington MD;  Location: [facility] OR [address] [place] [address];  Service: OB/GYN;  Laterality: N/A;    OMENTECTOMY N/A 3/25/2024    Procedure: OMENTECTOMY;  Surgeon: Александр Washington MD;  Location: [facility] OR [address] [place] [address];  Service: OB/GYN;  Laterality: N/A;    PERITONEOCENTESIS N/A 3/13/2024    Procedure: PARACENTESIS, ABDOMINAL;  Surgeon: Flavia Moore MD;  Location: [facility] CATH LAB;  Service: Radiology;  Laterality: N/A;    PERITONEOCENTESIS N/A 3/21/2024    Procedure: PARACENTESIS, ABDOMINAL;  Surgeon: Jorge Jolley MD;  Location: [facility] CATH LAB;  Service: Radiology;  Laterality: N/A;    PERITONEOCENTESIS N/A 6/10/2024    Procedure: PARACENTESIS, ABDOMINAL;  Surgeon: Frieda  Rogelio KELLEY MD;  Location: [facility] CATH LAB;  Service: Radiology;  Laterality: N/A;    TOTAL ABDOMINAL HYSTERECTOMY N/A 3/25/2024    Procedure: HYSTERECTOMY, TOTAL, ABDOMINAL;  Surgeon: Александр Washington MD;  Location: [facility] OR [address] [place] [address];  Service: OB/GYN;  Laterality: N/A;    TOTAL REDUCTION MAMMOPLASTY Bilateral 2016       Review of patient's allergies indicates:   Allergen Reactions    Codeine Other (See Comments)     Flu like s/s    Tramadol Other (See Comments)     Flu like symptoms similar to codeine reaction     Current Facility-Administered Medications   Medication Frequency    0.9 % NaCl with KCl 20 mEq infusion Continuous    acetaminophen tablet 650 mg Q6H PRN    dextrose 10 % infusion PRN    dextrose 10 % infusion PRN    dextrose 10% bolus 125 mL 125 mL PRN    dextrose 10% bolus 250 mL 250 mL PRN    dextrose 5 % and 0.45 % NaCl with KCl 20 mEq infusion Continuous PRN    insulin glargine U-100 (Lantus) pen 10 Units Daily    insulin regular in 0.9 % NaCl 100 unit/100 mL (1 unit/mL) infusion Continuous    lamoTRIgine tablet 25 mg BID    piperacillin-tazobactam (ZOSYN) 4.5 g in D5W 100 mL IVPB (MB+) Q12H    potassium chloride 40 mEq in 100 mL IVPB (FOR CENTRAL LINE ADMINISTRATION ONLY) Q4H    QUEtiapine tablet 25 mg Daily PRN    sodium chloride 0.9% flush 10 mL PRN    vancomycin - pharmacy to dose pharmacy to manage frequency     Family History       Problem Relation (Age of Onset)    Breast cancer Sister (48)    Colon cancer Sister    Hypertension Mother, Brother, Brother    Seizures Father          Tobacco Use    Smoking status: Never    Smokeless tobacco: Never   Substance and Sexual Activity    Alcohol use: Yes     Alcohol/week: 0.0 standard drinks of alcohol     Comment: ocassional    Drug use: No    Sexual activity: Not Currently     Partners: Male     Birth control/protection: None     Review of Systems   Unable to perform ROS: Acuity of condition     Objective:     Vital Signs (Most Recent):  Temp: 98.4  °F (36.9 °C) (07/01/24 0900)  Pulse: 97 (07/01/24 1432)  Resp: (!) 30 (07/01/24 1432)  BP: (!) 154/60 (07/01/24 1432)  SpO2: 100 % (07/01/24 1432) Vital Signs (24h Range):  Temp:  [97 °F (36.1 °C)-98.4 °F (36.9 °C)] 98.4 °F (36.9 °C)  Pulse:  [] 97  Resp:  [17-34] 30  SpO2:  [90 %-100 %] 100 %  BP: ()/(42-90) 154/60     Weight: 86.9 kg (191 lb 9.3 oz) (07/01/24 0620)  Body mass index is 30.01 kg/m².  Body surface area is 2.03 meters squared.    I/O last 3 completed shifts:  In: 1849.7 [IV Piggyback:1849.7]  Out: -      Physical Exam  Vitals and nursing note reviewed.   Constitutional:       General: She is in acute distress.      Appearance: She is well-developed. She is ill-appearing. She is not toxic-appearing.   HENT:      Head: Normocephalic and atraumatic.      Mouth/Throat:      Mouth: Mucous membranes are dry.   Eyes:      General: No scleral icterus.        Right eye: No discharge.         Left eye: No discharge.      Extraocular Movements: Extraocular movements intact.      Conjunctiva/sclera: Conjunctivae normal.   Cardiovascular:      Rate and Rhythm: Normal rate and regular rhythm.      Heart sounds: No murmur heard.     No friction rub. No gallop.   Pulmonary:      Effort: Tachypnea present. No respiratory distress.      Breath sounds: Decreased breath sounds present. No rhonchi or rales.   Abdominal:      General: There is distension.      Palpations: Abdomen is soft.      Tenderness: There is no abdominal tenderness.   Musculoskeletal:         General: No swelling.      Cervical back: Normal range of motion and neck supple.      Right lower leg: No edema.      Left lower leg: No edema.   Skin:     General: Skin is warm and dry.   Neurological:      Mental Status: She is easily aroused. She is lethargic.          Significant Labs:  ABGs:   Recent Labs   Lab 07/01/24 0652   PH 7.312*   PCO2 35.2   HCO3 17.8*   POCSATURATED 63   BE -8*     CBC:   Recent Labs   Lab 07/01/24  0026  07/01/24  0652   WBC 20.03*  --    RBC 2.82*  --    HGB 7.4*  --    HCT 23.3* 23*     --    MCV 83  --    MCH 26.2*  --    MCHC 31.8*  --      CMP:   Recent Labs   Lab 07/01/24  0923   *   CALCIUM 8.0*   ALBUMIN 1.8*   PROT 6.2   *   K 3.0*   CO2 16*   CL 98   BUN 76*   CREATININE 4.6*   ALKPHOS 159*   ALT 7*   AST 21   BILITOT 0.3

## 2024-07-01 NOTE — ASSESSMENT & PLAN NOTE
JASON on Normal Renal Fx  -Baseline SCr 0.8.  59 yo female with recent admission from 6/20-6/27 for SOB 2/2 malignant effusion who presents on 6/30 for N/V/SOB on 7/1, found to be in DKA with JASON with SCr of 4.6.  I/O's not accurately recorded since admission, stepped up to ICU on 7/1 for higher level of care.  Multiple hypotensive episodes recorded.  Discharged on 6/27 during last admission, on that date had an JASON with SCr of 1.5, suspicious for ANNE as she had a CT with contrast 6/24 and also receiving Ibuprofen and ACEi.    Suspect a component of ANNE from previous admission playing a role, but also volume depletion from DKA and osmotic diuresis from glucosuria.    May have developed an iATN from hypotensive events.  Home meds including Enalapril, HCTZ, Ibuprofen PRN, Metformin    Mohsen 17  7/1:  POCUS @ bedside with collapsible IJ > 50%  Renal US negative for hydronephrosis; acute findings    Plan/Recommendations:  -Recommend placement of junior catheter for strict I/O's  -recommend 1L NS bolus  -recommend starting on LR @ 100 cc/hr x 24 hours  -defer management of DKA to MICU  -recommend monitoring of RFP q 8 hours, replete K as needed to maintain K > 4, Mg > 2  -strict I/O's  -no indication for RRT at this time, we will continue following

## 2024-07-01 NOTE — CONSULTS
Patient seen and examined.  Will transfer to medical ICU for initiation of insulin infusion.        Willam Machuca M.D.  Rapid Response/Critical Care  Department of Pulmonary Medicine  [facility]        N.B.: Portions of this note was dictated using M*Modal Fluency Direct--there may be voice recognition errors occasionally missed on review.

## 2024-07-01 NOTE — HPI
Anette Ricci is a 58 y.o.  with stage IVB ovarian carcinosarcoma s/p PDS on 3/25/24 with Dr Washington (CARLOS ALBERTO/BSO/OMX/liver mobilization/peritoneal & R diaphragm stripping/ hepatic wedge resection/cholecystectomy) on C3D32 of carbo/taxol/bevacizumab who presented to the ED with worsening SOB for 1 day. She also has a known right PE, persistent right pleural effusion, HTN, HLD, diabetes, anemia, and seizure disorder. She was recently admitted from - for management of this issue where IR reported inability to drain collection and was treated with multiple days of zosyn without any clear source of infection.      She reports that her shortness of breath was overall stable since discharge, however, worsened yesterday. She denies any fevers/chills, reports baseline nausea without vomiting. Reports her cough is unchanged from discharge. She denies sick contacts. She has been compliant with her medication since discharge, however, reports persistent hyperglycemia at home with sugars in the 300s-400s for the past 1-2 days.      She was admitted to Gyn- Oncology for pleural effusion and JASON but transferred to MICU the following day for DKA requiring insulin drip.

## 2024-07-01 NOTE — PLAN OF CARE
[facility] - Cardiac Medical ICU  Initial Discharge Assessment       Primary Care Provider: Mark Chua MD    Admission Diagnosis: Shortness of breath [R06.02]  Pain [R52]  Malignant ascites [R18.0]    Admission Date: 6/30/2024  Expected Discharge Date: 7/5/2024    Transition of Care Barriers: None    Payor: HU[employer] MANAGED MEDICARE / Plan: HU[employer]NA MEDICARE HMO / Product Type: Capitation /     Extended Emergency Contact Information  Primary Emergency Contact: Darrell Ricci  Address: [address] [place] [address] [facility]  Home Phone: [phone]  Work Phone: [phone]  Mobile Phone: [phone]  Relation: Spouse   needed? No  Secondary Emergency Contact: Bruno Branch  Address: [address] [place] [address] [place]  Mobile Phone: [phone]  Relation: Brother    Discharge Plan A: Home with family, Home Health (Current with [facility])  Discharge Plan B: Home with family      [facility] [place] - [address]  [address] [place] [address]  Phone: [phone] Fax: [phone]    [facility] [address] [place] [address]  Phone: [phone] Fax: [phone]    [address] [facility]  [address] [facility] [address]  Phone: [phone] Fax: [phone]      Transferred from:     Past Medical History:   Diagnosis Date    Breast cancer 2013    Diabetes mellitus     Genetic testing 05/29/2013    VUS    Hyperlipidemia     Hypertension     Malignant neoplasm of upper-outer quadrant of right breast in female, estrogen receptor positive 12/02/2015    Seizure disorder          CM met with patient in room for Discharge Planning Assessment.  Patient able to answer questions.  Per patient, she lives with Darrell Ricci (spouse) [phone]  in a 1-story home with 6 step(s) to enter.   Per  patient, she was independent with ADLS and used no DME for ambulation. Per patient,  she is not on dialysis and does not take Coumadin. PCP and pharmacy verified. Patient is current with [facility], and has been hospitialized in past 30 days.  Patient will have help from Darrelllouisa Ricci (spouse) [phone]  upon discharge.   Discharge Planning Booklet given to patient/family and discussed.  All questions addressed.  CM will follow for needs.    Discharge Plan A and Plan B have been determined by review of patient's clinical status, future medical and therapeutic needs, and coverage/benefits for post-acute care in coordination with multidisciplinary team members.      Initial Assessment (most recent)       Adult Discharge Assessment - 07/01/24 1049          Discharge Assessment    Assessment Type Discharge Planning Assessment     Confirmed/corrected address, phone number and insurance Yes     Confirmed Demographics Correct on Facesheet     Source of Information patient     When was your last doctors appointment? 05/21/24     Communicated ROS with patient/caregiver Date not available/Unable to determine     Reason For Admission Shortness of breath     People in Home spouse     Facility Arrived From: home     Do you have help at home or someone to help you manage your care at home? Yes     Who are your caregiver(s) and their phone number(s)? Darrell Ricci (spouse) [phone]     Prior to hospitilization cognitive status: Alert/Oriented     Current cognitive status: Alert/Oriented;Not Oriented to Time   patient uncomfortable - pain    Walking or Climbing Stairs Difficulty no     Dressing/Bathing Difficulty no     Equipment Currently Used at Home none     Readmission within 30 days? Yes     Patient currently being followed by outpatient case management? No     Do you currently have service(s) that help you manage your care at home? Yes     Name and Contact number of agency [facility]     Is the  pt/caregiver preference to resume services with current agency Yes     Do you take prescription medications? Yes     Do you have prescription coverage? Yes     Coverage HUMANA MANAGED MEDICARE - HUMANA MEDICARE HMO - CAPITATED     Do you have any problems affording any of your prescribed medications? No     Is the patient taking medications as prescribed? yes     Who is going to help you get home at discharge? Darrell Ricci (spouse) [phone]     How do you get to doctors appointments? car, drives self;family or friend will provide     Are you on dialysis? No     Do you take coumadin? No     Discharge Plan A Home with family;Home Health   Current with [facility]    Discharge Plan B Home with family     Discharge Plan discussed with: Patient     Transition of Care Barriers None        Physical Activity    On average, how many days per week do you engage in moderate to strenuous exercise (like a brisk walk)? 0 days     On average, how many minutes do you engage in exercise at this level? 0 min        Financial Resource Strain    How hard is it for you to pay for the very basics like food, housing, medical care, and heating? Not very hard        Housing Stability    In the last 12 months, was there a time when you were not able to pay the mortgage or rent on time? No     At any time in the past 12 months, were you homeless or living in a shelter (including now)? No        Transportation Needs    Has the lack of transportation kept you from medical appointments, meetings, work or from getting things needed for daily living? No        Food Insecurity    Within the past 12 months, you worried that your food would run out before you got the money to buy more. Never true     Within the past 12 months, the food you bought just didn't last and you didn't have money to get more. Never true        Stress    Do you feel stress - tense, restless, nervous, or anxious, or unable to sleep at night because your mind is  troubled all the time - these days? Very much        Social Isolation    How often do you feel lonely or isolated from those around you?  Sometimes        Alcohol Use    Q1: How often do you have a drink containing alcohol? Never     Q2: How many drinks containing alcohol do you have on a typical day when you are drinking? Patient does not drink     Q3: How often do you have six or more drinks on one occasion? Never        Utilities    In the past 12 months has the electric, gas, oil, or water company threatened to shut off services in your home? No        Health Literacy    How often do you need to have someone help you when you read instructions, pamphlets, or other written material from your doctor or pharmacy? Sometimes        OTHER    Name(s) of People in Home Darrell Ricci (spouse) [phone]                   Afia Clayton RN     [phone]

## 2024-07-01 NOTE — PLAN OF CARE
Hospital Medicine Consulted for co management.    Ms. 58-year-old female with a history of diabetes, hyperlipidemia, hypertension, chronic pulmonary embolism, and stage IV ovarian cancer (diagnosed March 2024) undergoing chemotherapy (carboplatin, paclitaxel, bevacizumab), presented with shortness of breath secondary to pleural effusion. She was admitted for shortness of breath on 06/20/2024, likely due to a malignant pleural effusion (previously unable to be drained per IR). She was discharged on 06/27/2024 after a 7-day hospital stay, during which she was treated with Zosyn for 6 days for recurrent fevers,  infectious workup was negative.      On Admission:  -Vitals: Pulse 100 bpm, saturation 99%, afebrile, blood pressure 88/48.  - CBC: Notable for marked leukocytosis of 20,000, hemoglobin of 7.4 (hematocrit 23%) with increased granulocyte percentage, RDW 19.9.  CMP:Sodium 129, CO2 14, anion gap 21, BUN 76, creatinine 4.6, GFR 10, glucose 403, beta hydroxy butyrate 4m  alkaline phosphatase 170, normal AST, ALT, uric acid 9.3.     Imaging Notable For:  - CT scan: Significant pleural fluid accumulation at the right lung base with suspected lung consolidation and mediastinal shift.  - Chest X-ray: Worsening opacity in the right hemithorax consistent with pleural fluid, along with parenchymal changes suggesting atelectasis and possible infiltrates. No signs of pneumothorax. Mild changes noted in the left lung base.    Upon my examination patient tachypneic with a respiratory rate of 28, she has not on home oxygen, saturating well on room air, she is in acute distress, per Mr. Ricci at bedside she is more confused.      DXs  Respiratory distress in the setting of malignant pleural effusion  DKA  Hyponatremia  Acute renal failure  Hyperuricemia    Given clinical findings, ICU called for higher level of care given her respiratory distress, AMS, DKA,  hyponatremia.  Patient accepted by the ICU.

## 2024-07-01 NOTE — SUBJECTIVE & OBJECTIVE
Oncology Treatment Plan:   OP GYN DOXORUBICIN LIPOSOME Q4W      Past Medical History:   Diagnosis Date    Breast cancer 2013    Diabetes mellitus     Genetic testing 05/29/2013    VUS    Hyperlipidemia     Hypertension     Malignant neoplasm of upper-outer quadrant of right breast in female, estrogen receptor positive 12/02/2015    Seizure disorder      Past Surgical History:   Procedure Laterality Date    BILATERAL SALPINGO-OOPHORECTOMY (BSO) N/A 3/25/2024    Procedure: SALPINGO-OOPHORECTOMY, BILATERAL;  Surgeon: Александр Washington MD;  [facility];  Service: OB/GYN;  Laterality: N/A;    BREAST BIOPSY      BREAST LUMPECTOMY Right 2013    CHOLECYSTECTOMY  3/25/2024    Procedure: CHOLECYSTECTOMY;  Surgeon: Bo Farmer MD;  [facility];  Service: General;;    DEBULKING OF TUMOR N/A 3/25/2024    Procedure: DEBULKING, NEOPLASM;  Surgeon: Александр Washington MD;  [facility];  Service: OB/GYN;  Laterality: N/A;    EXCISION, LIVER N/A 3/25/2024    Procedure: EXCISION, LIVER - partial;  Surgeon: Bo Farmer MD;  [facility];  Service: General;  Laterality: N/A;  bk ultrasound  [place] book by TA on 3-21-24  7:00 a.m.  Conf #  [id number]    EYE SURGERY      LAPAROTOMY, EXPLORATORY N/A 3/25/2024    Procedure: LAPAROTOMY, EXPLORATORY;  Surgeon: Александр Washington MD;  [facility];  Service: OB/GYN;  Laterality: N/A;    OMENTECTOMY N/A 3/25/2024    Procedure: OMENTECTOMY;  Surgeon: Александр Washington MD;  [facility];  Service: OB/GYN;  Laterality: N/A;    PERITONEOCENTESIS N/A 3/13/2024    Procedure: PARACENTESIS, ABDOMINAL;  Surgeon: Flavia Moore MD;  Location: [facility] CATH LAB;  Service: Radiology;  Laterality: N/A;    PERITONEOCENTESIS N/A 3/21/2024    Procedure: PARACENTESIS, ABDOMINAL;  Surgeon: Jorge Jolley MD;  Location: [facility] CATH LAB;  Service: Radiology;  Laterality: N/A;    PERITONEOCENTESIS N/A  6/10/2024    Procedure: PARACENTESIS, ABDOMINAL;  Surgeon: Rogelio Malave MD;  Location: [facility] CATH LAB;  Service: Radiology;  Laterality: N/A;    TOTAL ABDOMINAL HYSTERECTOMY N/A 3/25/2024    Procedure: HYSTERECTOMY, TOTAL, ABDOMINAL;  Surgeon: Александр Washington MD;  [facility];  Service: OB/GYN;  Laterality: N/A;    TOTAL REDUCTION MAMMOPLASTY Bilateral 2016     Family History       Problem Relation (Age of Onset)    Breast cancer Sister (48)    Colon cancer Sister    Hypertension Mother, Brother, Brother    Seizures Father          Tobacco Use    Smoking status: Never    Smokeless tobacco: Never   Substance and Sexual Activity    Alcohol use: Yes     Alcohol/week: 0.0 standard drinks of alcohol     Comment: ocassional    Drug use: No    Sexual activity: Not Currently     Partners: Male     Birth control/protection: None       PTA Medications   Medication Sig    acetaminophen (TYLENOL) 325 MG tablet Take 2 tablets (650 mg total) by mouth every 4 (four) hours as needed for Pain.    amlodipine (NORVASC) 10 MG tablet Take 10 mg by mouth once daily.     atorvastatin (LIPITOR) 40 MG tablet Take 80 mg by mouth once daily.    enalapril (VASOTEC) 20 MG tablet Take 20 mg by mouth once daily.    enoxaparin (LOVENOX) 80 mg/0.8 mL Syrg Inject 0.8 mLs (80 mg total) into the skin every 12 (twelve) hours.    fluticasone propionate (FLONASE) 50 mcg/actuation nasal spray 1 spray by Each Nostril route once daily.    hydrochlorothiazide (HYDRODIURIL) 25 MG tablet Take 25 mg by mouth once daily.     hydrocodone-acetaminophen (HYCET) solution 7.5-325 mg/15mL Take 30 mLs by mouth every 6 (six) hours as needed for Pain.    HYDROmorphone (DILAUDID) 2 MG tablet Take 1 tablet (2 mg total) by mouth every 8 (eight) hours as needed for Pain.    ibuprofen (ADVIL,MOTRIN) 600 MG tablet Take 1 tablet (600 mg total) by mouth every 6 (six) hours as needed for Pain.    insulin aspart U-100 (NOVOLOG FLEXPEN U-100 INSULIN) 100  "unit/mL (3 mL) InPn pen Inject 6 Units into the skin 3 (three) times daily with meals. (Discard pen 28 days after initial use)    insulin aspart U-100 (NOVOLOG) 100 unit/mL (3 mL) InPn pen Inject 0-5 Units into the skin before meals and at bedtime as needed (for hyperglycemia by specified ranges). Sliding Scale: Blood Glucose Pre-meal 151-200 +2 units, 201-250 + 4 units, 251-300 + 6 units, 301-350 + 8 units, >350 + 10 units, max TDD 58 units    insulin glargine U-100, Lantus, 100 unit/mL (3 mL) SubQ InPn pen Inject 9 Units into the skin every evening.    insulin lispro (HUMALOG U-100 INSULIN) 100 unit/mL injection **LOW CORRECTION DOSE**  Blood Glucose  mg/dL                  Pre-meal                  151-200                0 unit                        201-250                2 units                      251-300                3 units                      301-350                4 units                      >350                     5 units                      Administer subcutaneously if needed at times designated by monitoring schedule.    ketorolac 0.5% (ACULAR) 0.5 % Drop Place 1 drop into both eyes. For pain after eye injections.    lamoTRIgine (LAMICTAL) 25 MG tablet Take 25 mg by mouth 2 (two) times daily.    lorazepam (ATIVAN) 0.5 MG tablet Take 0.5 mg by mouth every 12 (twelve) hours as needed for Anxiety.    metformin (GLUCOPHAGE) 1000 MG tablet Take 1,000 mg by mouth daily with breakfast.     ondansetron (ZOFRAN-ODT) 4 MG TbDL Take 1 tablet (4 mg total) by mouth every 6 (six) hours as needed (for nausea).    pen needle, diabetic 32 gauge x 5/32" Ndle Use to inject insulin 5 times daily.    quetiapine (SEROQUEL) 25 MG Tab Take 25 mg by mouth daily as needed.       Review of patient's allergies indicates:   Allergen Reactions    Codeine Other (See Comments)     Flu like s/s    Tramadol Other (See Comments)     Flu like symptoms similar to codeine reaction       Review of Systems   Constitutional:  Positive " for fatigue. Negative for appetite change, chills and fever.   Respiratory:  Positive for cough and shortness of breath.    Cardiovascular:  Negative for chest pain and leg swelling.   Gastrointestinal:  Positive for nausea. Negative for abdominal pain, constipation, diarrhea and vomiting.   Endocrine: Negative for hot flashes.   Genitourinary:  Negative for menstrual problem and pelvic pain.   Integumentary:  Negative for breast mass, nipple discharge and breast skin changes.   Neurological:  Negative for headaches.   Hematological:  Does not bruise/bleed easily.   Psychiatric/Behavioral:  Negative for depression.    Breast: Negative for mass, mastodynia, nipple discharge and skin changes     Objective:     Vital Signs (Most Recent):  Temp: 98.4 °F (36.9 °C) (07/01/24 0647)  Pulse: 107 (07/01/24 0639)  Resp: (!) 31 (07/01/24 0639)  BP: (!) 74/42 (07/01/24 0647)  SpO2: 95 % (07/01/24 0639) Vital Signs (24h Range):  Temp:  [97 °F (36.1 °C)-98.4 °F (36.9 °C)] 98.4 °F (36.9 °C)  Pulse:  [] 107  Resp:  [17-32] 31  SpO2:  [90 %-99 %] 95 %  BP: ()/(42-90) 74/42     Weight: 86.9 kg (191 lb 9.3 oz)  Body mass index is 30.01 kg/m².       Physical Exam:   Constitutional: She appears lethargic. She is easily aroused. She appears ill. No distress.    HENT:   Head: Normocephalic.    Eyes: EOM are normal.     Cardiovascular:  Regular rhythm, normal heart sounds and intact distal pulses.      Exam reveals no edema.       Mildly tachycardic, 90s-100ss    Pulmonary/Chest: No stridor. No respiratory distress.   Increased effort, diminished breath sounds from mid-lung to bases bilaterally although worse on R compared to L        Abdominal: Soft. She exhibits abdominal incision (midline vertical incision well healing without erythema, induration, discharge). She exhibits no mass. There is no abdominal tenderness. There is no rebound and no guarding.             Musculoskeletal: Moves all extremeties.       Neurological: She  is easily aroused. She appears lethargic.    Skin: Skin is warm and dry.           Laboratory:  Recent Results (from the past 24 hour(s))   ISTAT PROCEDURE    Collection Time: 07/01/24 12:23 AM   Result Value Ref Range    POC PH 7.289 (LL) 7.35 - 7.45    POC PCO2 42.4 35 - 45 mmHg    POC PO2 27 (LL) 40 - 60 mmHg    POC HCO3 20.3 (L) 24 - 28 mmol/L    POC BE -6 (L) -2 to 2 mmol/L    POC SATURATED O2 44 95 - 100 %    POC TCO2 22 (L) 24 - 29 mmol/L    Sample VENOUS     Site Other     Allens Test N/A    CBC auto differential    Collection Time: 07/01/24 12:26 AM   Result Value Ref Range    WBC 20.03 (H) 3.90 - 12.70 K/uL    RBC 2.82 (L) 4.00 - 5.40 M/uL    Hemoglobin 7.4 (L) 12.0 - 16.0 g/dL    Hematocrit 23.3 (L) 37.0 - 48.5 %    MCV 83 82 - 98 fL    MCH 26.2 (L) 27.0 - 31.0 pg    MCHC 31.8 (L) 32.0 - 36.0 g/dL    RDW 19.9 (H) 11.5 - 14.5 %    Platelets 306 150 - 450 K/uL    MPV 11.7 9.2 - 12.9 fL    Immature Granulocytes CANCELED 0.0 - 0.5 %    Immature Grans (Abs) CANCELED 0.00 - 0.04 K/uL    Lymph # CANCELED 1.0 - 4.8 K/uL    Mono # CANCELED 0.3 - 1.0 K/uL    Eos # CANCELED 0.0 - 0.5 K/uL    Baso # CANCELED 0.00 - 0.20 K/uL    nRBC 0 0 /100 WBC    Gran % 87.0 (H) 38.0 - 73.0 %    Lymph % 6.0 (L) 18.0 - 48.0 %    Mono % 4.0 4.0 - 15.0 %    Eosinophil % 1.0 0.0 - 8.0 %    Basophil % 0.0 0.0 - 1.9 %    Metamyelocytes 1.0 %    Myelocytes 1.0 %    Platelet Estimate Appears normal     [place] Slight     Poik Slight     Poly Occasional     Hypo Occasional     Tear Drop Cells Occasional     [place] Cells Occasional     Differential Method Manual    Comprehensive metabolic panel    Collection Time: 07/01/24 12:26 AM   Result Value Ref Range    Sodium 129 (L) 136 - 145 mmol/L    Potassium 3.9 3.5 - 5.1 mmol/L    Chloride 94 (L) 95 - 110 mmol/L    CO2 14 (L) 23 - 29 mmol/L    Glucose 403 (H) 70 - 110 mg/dL    BUN 76 (H) 6 - 20 mg/dL    Creatinine 4.6 (H) 0.5 - 1.4 mg/dL    Calcium 8.8 8.7 - 10.5 mg/dL    Total Protein 7.5 6.0 - 8.4  g/dL    Albumin 2.2 (L) 3.5 - 5.2 g/dL    Total Bilirubin 0.5 0.1 - 1.0 mg/dL    Alkaline Phosphatase 176 (H) 55 - 135 U/L    AST 25 10 - 40 U/L    ALT 8 (L) 10 - 44 U/L    eGFR 10.4 (A) >60 mL/min/1.73 m^2    Anion Gap 21 (H) 8 - 16 mmol/L   Magnesium    Collection Time: 07/01/24 12:26 AM   Result Value Ref Range    Magnesium 2.1 1.6 - 2.6 mg/dL   APTT    Collection Time: 07/01/24 12:26 AM   Result Value Ref Range    aPTT 27.5 21.0 - 32.0 sec   Procalcitonin    Collection Time: 07/01/24 12:26 AM   Result Value Ref Range    Procalcitonin 11.51 (H) <0.25 ng/mL   Protime-INR    Collection Time: 07/01/24 12:26 AM   Result Value Ref Range    Prothrombin Time 11.5 9.0 - 12.5 sec    INR 1.1 0.8 - 1.2   Troponin I    Collection Time: 07/01/24 12:26 AM   Result Value Ref Range    Troponin I 0.007 0.000 - 0.026 ng/mL   Brain natriuretic peptide    Collection Time: 07/01/24 12:26 AM   Result Value Ref Range    BNP 87 0 - 99 pg/mL   Uric Acid    Collection Time: 07/01/24 12:26 AM   Result Value Ref Range    Uric Acid 9.3 (H) 2.4 - 5.7 mg/dL   Phosphorus    Collection Time: 07/01/24 12:26 AM   Result Value Ref Range    Phosphorus 3.8 2.7 - 4.5 mg/dL   SARS-Cov2 (COVID) with FLU/RSV by PCR    Collection Time: 07/01/24 12:34 AM   Result Value Ref Range    SARS-CoV2 (COVID-19) Qualitative PCR Not Detected Not Detected    Influenza A, Molecular Not Detected Not Detected    Influenza B, Molecular Not Detected Not Detected    RSV Ag by Molecular Method Not Detected Not Detected   POCT glucose    Collection Time: 07/01/24  3:54 AM   Result Value Ref Range    POCT Glucose 420 (H) 70 - 110 mg/dL   ISTAT Lactate    Collection Time: 07/01/24  4:01 AM   Result Value Ref Range    POC Lactate 2.22 (H) 0.5 - 2.2 mmol/L    Sample VENOUS     Site Other     Allens Test N/A    ISTAT PROCEDURE    Collection Time: 07/01/24  4:04 AM   Result Value Ref Range    POC PH 7.324 (L) 7.35 - 7.45    POC PCO2 33.9 (L) 35 - 45 mmHg    POC PO2 51 40 - 60 mmHg     POC HCO3 17.7 (L) 24 - 28 mmol/L    POC BE -8 (L) -2 to 2 mmol/L    POC SATURATED O2 83 95 - 100 %    POC TCO2 19 (L) 24 - 29 mmol/L    Sample VENOUS     Site Other     Allens Test N/A    POCT glucose    Collection Time: 07/01/24  5:51 AM   Result Value Ref Range    POCT Glucose 419 (H) 70 - 110 mg/dL   Beta - Hydroxybutyrate, Serum    Collection Time: 07/01/24  5:53 AM   Result Value Ref Range    Beta-Hydroxybutyrate 4.0 (H) 0.0 - 0.5 mmol/L   Comprehensive metabolic panel    Collection Time: 07/01/24  5:53 AM   Result Value Ref Range    Sodium 127 (L) 136 - 145 mmol/L    Potassium 3.8 3.5 - 5.1 mmol/L    Chloride 95 95 - 110 mmol/L    CO2 15 (L) 23 - 29 mmol/L    Glucose 461 (HH) 70 - 110 mg/dL    BUN 75 (H) 6 - 20 mg/dL    Creatinine 4.5 (H) 0.5 - 1.4 mg/dL    Calcium 7.7 (L) 8.7 - 10.5 mg/dL    Total Protein 5.8 (L) 6.0 - 8.4 g/dL    Albumin 1.7 (L) 3.5 - 5.2 g/dL    Total Bilirubin 0.4 0.1 - 1.0 mg/dL    Alkaline Phosphatase 135 55 - 135 U/L    AST 18 10 - 40 U/L    ALT 7 (L) 10 - 44 U/L    eGFR 10.7 (A) >60 mL/min/1.73 m^2    Anion Gap 17 (H) 8 - 16 mmol/L   Magnesium    Collection Time: 07/01/24  5:53 AM   Result Value Ref Range    Magnesium 1.9 1.6 - 2.6 mg/dL   Phosphorus    Collection Time: 07/01/24  5:53 AM   Result Value Ref Range    Phosphorus 3.9 2.7 - 4.5 mg/dL   POCT glucose    Collection Time: 07/01/24  6:31 AM   Result Value Ref Range    POCT Glucose 489 (HH) 70 - 110 mg/dL   ISTAT PROCEDURE    Collection Time: 07/01/24  6:52 AM   Result Value Ref Range    POC PH 7.312 (L) 7.35 - 7.45    POC PCO2 35.2 35 - 45 mmHg    POC PO2 36 (LL) 40 - 60 mmHg    POC HCO3 17.8 (L) 24 - 28 mmol/L    POC BE -8 (L) -2 to 2 mmol/L    POC SATURATED O2 63 95 - 100 %    POC Sodium 130 (L) 136 - 145 mmol/L    POC Potassium 3.7 3.5 - 5.1 mmol/L    POC TCO2 19 (L) 24 - 29 mmol/L    POC Ionized Calcium 1.10 1.06 - 1.42 mmol/L    POC Hematocrit 23 (L) 36 - 54 %PCV    Sample VENOUS     Site Other     Allens Test N/A      [employer] Room Air    ISTAT Lactate    Collection Time: 07/01/24  6:54 AM   Result Value Ref Range    POC Lactate 1.76 0.5 - 2.2 mmol/L    Sample VENOUS     Site Other     Allens Test N/A     [employer] Room Air          Diagnostic Results:  X-Ray Chest AP Portable    Narrative    EXAMINATION:  XR CHEST AP PORTABLE    CLINICAL HISTORY:  Sepsis;    TECHNIQUE:  Single frontal view of the chest was performed.    COMPARISON:  Chest radiograph June 21, 2024    FINDINGS:  Single portable chest view is submitted.  Subcutaneous port central venous catheter again noted.  There is diminished depth of inspiration and mild elevation of the right hemidiaphragm.  When accounting for position and technique and depth of inspiration the appearance of the cardiomediastinal silhouette is stable.  Aortic atherosclerotic change noted.    There is persistent and worsening opacity associated with the right hemithorax, now with prominent opacity extending from the mid to peripheral right hemithorax, and extending from the base to the peripheral right apex, with a configuration that likely relates to pleural fluid with somewhat loculated configuration.  The right lung demonstrates associated parenchymal change likely relating to volume loss and atelectasis, with potential component of superimposed pulmonary infiltrates.  There is no evidence for pneumothorax bilaterally.    Mild accentuated parenchymal markings at the left lung base may relate to mild basilar infiltrate without focal consolidation.  There is no evidence for significant pleural fluid on the left.    The osseous structures demonstrate chronic change.      Impression    Progression of abnormality associated with the right hemithorax may relate to a large area of at least partially loculated pleural fluid, with parenchymal change likely relating to volume loss and atelectasis and possible superimposed infiltrate.    Suspected mild infiltrate at the left lung base.    This report was  flagged in Epic as abnormal.      Electronically signed by: Brandon Palencia  Date:    07/01/2024  Time:    00:50     CT Abdomen Pelvis  Without Contrast    Narrative    EXAMINATION:  CT ABDOMEN PELVIS WITHOUT CONTRAST    CLINICAL HISTORY:  Abdominal pain, acute, nonlocalized;    TECHNIQUE:  Low dose axial images, sagittal and coronal reformations were obtained from the lung bases to the pubic symphysis.  Intravenous contrast and oral contrast was not utilized, this diminishes the sensitivity of the examination.    COMPARISON:  Chest radiograph July 1, 2024, CT examination abdomen pelvis June 24, 2024    FINDINGS:  There is a large volume of pleural fluid noted at the right lung base, seen as an interval change when compared to the prior examination, this is incompletely imaged however the appearance would suggest some degree of loculation particularly when compared to the recent chest radiograph.  There is volume loss and atelectasis associated with the right lung and there may be a component of consolidation.  In addition there may be a component of mild shift of the cardiomediastinal structures to the left due to the aforementioned.  The visualized left lung base demonstrates mild motion artifact and atelectatic change.    Irregular calcific finding of the right breast again noted.  The stomach is incompletely distended, nonspecific appearance of mild fluid and air within the gastric lumen.    The previously identified subdiaphragmatic fluid collection is again noted, it appears to have diminished in size, now measuring approximately 5.3 by 7.4 cm and the adjacent smaller collection is not definitively seen noting limitations of this exam.  There is a small hypodensity at the dome of the liver.  The previously identified abnormality at the medial aspect of the right lobe of the liver is not as well delineated on this exam.    When accounting for limitations of the exam there is no evidence for acute process of the  pancreas, spleen or adrenal glands.  There is no evidence for hydronephrosis or obstructive uropathy or primary perinephric inflammatory change.  The arterial vascular structures demonstrate atherosclerotic change.  The aorta appears normal in caliber otherwise not optimally evaluated.  The urinary bladder appears decompressed.  The patient is status post hysterectomy.    Previously identified mass lesion within the left abdomen is again noted, difficult to accurately measure, however measures approximately 5.9 x 8.2 cm, with adjacent mass lesion again noted measuring approximately 3.9 x 3.9 cm.  Additional soft tissue lesions throughout the abdomen and pelvis are noted, not optimally evaluated on this examination however overall appearing similar to the prior study without a large degree of interval change.    There is free fluid of the abdomen and pelvis noted, the volume is diminished.  There is no evidence for small bowel obstructive process.  The appendix is identified, difficult to delineate in its entirety, it does not appear inflamed.  There is no evidence for inflammatory or obstructive process of the colon, mild prominence of the colon with stool is noted.  There is no evidence for free intraperitoneal air.    There is accentuated attenuation of the body wall fat planes, this may relate to a general pattern of body edema.  The visualized osseous structures demonstrate chronic change.      Impression    Interval development of large volume of pleural fluid at the right lung base, with appearance that may relate to components of loculation, there is associated volume loss and suspected consolidation at the right lower lobe, as well as suspected mild right to left midline shift of the cardiomediastinal structures.    Previously identified subdiaphragmatic collection appears diminished in size, as discussed above.    Previously identified soft tissue mass lesions of the abdomen and pelvis are again noted, not  optimally evaluated on this noncontrast examination however without a large degree of interval change appreciated.    Free fluid of the abdomen pelvis is noted, diminished in volume.    Accentuated attenuation of the body wall fat planes likely relates to a pattern of body edema.    Mild prominence of the colon with stool without inflammatory or obstructive pattern.    Additional findings as above.    This report was flagged in Epic as abnormal.      Electronically signed by: Brandon Palencia  Date:    07/01/2024  Time:    04:18

## 2024-07-01 NOTE — SUBJECTIVE & OBJECTIVE
Past Medical History:   Diagnosis Date    Breast cancer 2013    Diabetes mellitus     Genetic testing 05/29/2013    VUS    Hyperlipidemia     Hypertension     Malignant neoplasm of upper-outer quadrant of right breast in female, estrogen receptor positive 12/02/2015    Seizure disorder        Past Surgical History:   Procedure Laterality Date    BILATERAL SALPINGO-OOPHORECTOMY (BSO) N/A 3/25/2024    Procedure: SALPINGO-OOPHORECTOMY, BILATERAL;  Surgeon: Александр Washington MD;  Location: [facility] OR [address] [place] [address];  Service: OB/GYN;  Laterality: N/A;    BREAST BIOPSY      BREAST LUMPECTOMY Right 2013    CHOLECYSTECTOMY  3/25/2024    Procedure: CHOLECYSTECTOMY;  Surgeon: Bo Farmer MD;  Location: [facility] OR [address] [place] [address];  Service: General;;    DEBULKING OF TUMOR N/A 3/25/2024    Procedure: DEBULKING, NEOPLASM;  Surgeon: Александр Washington MD;  Location: [facility] OR [address] [place] [address];  Service: OB/GYN;  Laterality: N/A;    EXCISION, LIVER N/A 3/25/2024    Procedure: EXCISION, LIVER - partial;  Surgeon: Bo Farmer MD;  Location: [facility] OR [address] [place] [address];  Service: General;  Laterality: N/A;  bk ultrasound  [place] book by TA on 3-21-24  7:00 a.m.  Conf #  [id number]    EYE SURGERY      LAPAROTOMY, EXPLORATORY N/A 3/25/2024    Procedure: LAPAROTOMY, EXPLORATORY;  Surgeon: Александр Washington MD;  Location: [facility] OR [address] [place] [address];  Service: OB/GYN;  Laterality: N/A;    OMENTECTOMY N/A 3/25/2024    Procedure: OMENTECTOMY;  Surgeon: Александр Washington MD;  Location: [facility] OR [address] [place] [address];  Service: OB/GYN;  Laterality: N/A;    PERITONEOCENTESIS N/A 3/13/2024    Procedure: PARACENTESIS, ABDOMINAL;  Surgeon: Flavia Moore MD;  Location: [facility] CATH LAB;  Service: Radiology;  Laterality: N/A;    PERITONEOCENTESIS N/A 3/21/2024    Procedure: PARACENTESIS, ABDOMINAL;  Surgeon: Jorge Jolley MD;  Location: [facility] CATH LAB;  Service: Radiology;  Laterality: N/A;    PERITONEOCENTESIS N/A 6/10/2024    Procedure: PARACENTESIS, ABDOMINAL;  Surgeon: Frieda  Rogelio KELLEY MD;  Location: [facility] CATH LAB;  Service: Radiology;  Laterality: N/A;    TOTAL ABDOMINAL HYSTERECTOMY N/A 3/25/2024    Procedure: HYSTERECTOMY, TOTAL, ABDOMINAL;  Surgeon: Александр Washington MD;  Location: [facility] OR [address] [place] [address];  Service: OB/GYN;  Laterality: N/A;    TOTAL REDUCTION MAMMOPLASTY Bilateral 2016       Review of patient's allergies indicates:   Allergen Reactions    Codeine Other (See Comments)     Flu like s/s    Tramadol Other (See Comments)     Flu like symptoms similar to codeine reaction       Family History       Problem Relation (Age of Onset)    Breast cancer Sister (48)    Colon cancer Sister    Hypertension Mother, Brother, Brother    Seizures Father          Tobacco Use    Smoking status: Never    Smokeless tobacco: Never   Substance and Sexual Activity    Alcohol use: Yes     Alcohol/week: 0.0 standard drinks of alcohol     Comment: ocassional    Drug use: No    Sexual activity: Not Currently     Partners: Male     Birth control/protection: None      Review of Systems   Constitutional:  Positive for fatigue. Negative for fever.   Respiratory:  Positive for cough and shortness of breath.    Gastrointestinal:  Positive for diarrhea, nausea and vomiting. Negative for constipation.   Genitourinary:  Negative for dysuria and flank pain.   Skin:  Negative for color change and wound.   Neurological:  Negative for seizures and syncope.   Psychiatric/Behavioral:  Negative for agitation and confusion.      Objective:     Vital Signs (Most Recent):  Temp: 98.1 °F (36.7 °C) (07/01/24 1700)  Pulse: 93 (07/01/24 1800)  Resp: 13 (07/01/24 1800)  BP: 139/60 (07/01/24 1800)  SpO2: 99 % (07/01/24 1800) Vital Signs (24h Range):  Temp:  [97 °F (36.1 °C)-98.4 °F (36.9 °C)] 98.1 °F (36.7 °C)  Pulse:  [] 93  Resp:  [12-38] 13  SpO2:  [90 %-100 %] 99 %  BP: ()/(42-90) 139/60   Weight: 86.9 kg (191 lb 9.3 oz)  Body mass index is 30.01 kg/m².      Intake/Output Summary (Last 24 hours) at 7/1/2024  1837  Last data filed at 7/1/2024 1823  Gross per 24 hour   Intake 4145.96 ml   Output 900 ml   Net 3245.96 ml          Physical Exam  Vitals and nursing note reviewed.   Constitutional:       Comments: Uncomfortable appearing   HENT:      Head: Normocephalic and atraumatic.      Right Ear: External ear normal.      Left Ear: External ear normal.      Nose: Nose normal.      Mouth/Throat:      Mouth: Mucous membranes are dry.      Pharynx: Oropharynx is clear.   Cardiovascular:      Rate and Rhythm: Normal rate and regular rhythm.      Pulses: Normal pulses.   Pulmonary:      Comments: Diminished breath sounds to entire R side  Abdominal:      Tenderness: There is no guarding or rebound.      Comments: Well healing midline vertical incision   Musculoskeletal:      Right lower leg: No edema.      Left lower leg: No edema.   Skin:     General: Skin is warm and dry.   Neurological:      Mental Status: She is alert and oriented to person, place, and time.   Psychiatric:         Mood and Affect: Mood normal.         Behavior: Behavior normal.            Vents:     Lines/Drains/Airways       Central Venous Catheter Line  Duration             Port A Cath Single Lumen Subclavian Left -- days              Peripheral Intravenous Line  Duration                  Peripheral IV - Single Lumen 07/01/24 0000 24 G Anterior;Left Forearm <1 day         Peripheral IV - Single Lumen 07/01/24 0032 20 G Left Upper Arm <1 day                  Significant Labs:    CBC/Anemia Profile:  Recent Labs   Lab 07/01/24  0026 07/01/24  0652   WBC 20.03*  --    HGB 7.4*  --    HCT 23.3* 23*     --    MCV 83  --    RDW 19.9*  --         Chemistries:  Recent Labs   Lab 07/01/24  0553 07/01/24  0923 07/01/24  1441   * 132* 131*   K 3.8 3.0* 3.5   CL 95 98 100   CO2 15* 16* 19*   BUN 75* 76* 74*   CREATININE 4.5* 4.6* 4.4*   CALCIUM 7.7* 8.0* 8.1*   ALBUMIN 1.7* 1.8* 1.9*   PROT 5.8* 6.2 6.4   BILITOT 0.4 0.3 0.3   ALKPHOS 135 159* 173*  "  ALT 7* 7* 6*   AST 18 21 24   MG 1.9 2.0 2.0   PHOS 3.9 3.2 2.7       BMP:   Recent Labs   Lab 07/01/24  1441   GLU 85   *   K 3.5      CO2 19*   BUN 74*   CREATININE 4.4*   CALCIUM 8.1*   MG 2.0     CMP:   Recent Labs   Lab 07/01/24  0553 07/01/24  0923 07/01/24  1441   * 132* 131*   K 3.8 3.0* 3.5   CL 95 98 100   CO2 15* 16* 19*   * 312* 85   BUN 75* 76* 74*   CREATININE 4.5* 4.6* 4.4*   CALCIUM 7.7* 8.0* 8.1*   PROT 5.8* 6.2 6.4   ALBUMIN 1.7* 1.8* 1.9*   BILITOT 0.4 0.3 0.3   ALKPHOS 135 159* 173*   AST 18 21 24   ALT 7* 7* 6*   ANIONGAP 17* 18* 12     Lactic Acid: No results for input(s): "LACTATE" in the last 48 hours.  All pertinent labs within the past 24 hours have been reviewed.    Significant Imaging: I have reviewed all pertinent imaging results/findings within the past 24 hours.  "

## 2024-07-01 NOTE — HPI
Ms. Ricci is a 57 yo female with hx of DM, HLD, HTN, PE, Stg IV Ovarian Ca s/p C3 of carbo/taxol/bevacizumab on 5/31 and plans to start Doxorubicin therapy who presents to the hospital on 6/30 with CC of abdominal pain, nausea, vomiting, and SOB x 1 day.  She was recently admitted on 6/20-6/27 for SOB 2/2 pleural effusion (thought to be malignant) that was unable to be drained by IR.  She was treated during that admission with zosyn x 6 days for recurrent fevers, workup negative.  According to family at bedside, she has had relatively poor PO intake since discharge.  On this admission, labs were notable for a WBC of 20k, Na 129, CO2 14, AG 21, , Beta hydroxybutyrate > 4.  UA + ketones.  She was started on DKA protocol and admitted to Gyn/Onc service.  On 7/1, MICU was consulted for worsening mental status, tachypnea and further management of DKA.  Nephrology was consulted at that time for JASON evaluation.    According to records, Ms. Ricci has normal kidney function at baseline with sCR of 0.8.  This admission she arrives with a sCR of 4.6.  UA with 2+ protein, 3+ OB, 40 WBC, + ketones, Mohsen 17.  On chart review, she has a baseline SCr of 0.8, discharged during her last admission with a SCR of 1.5 on 6/27.  Of note, she did have a CT with contrast performed on 6/24.  Family at bedside report decreased PO intake prior to this presentation.  No hx of NSAID use.

## 2024-07-01 NOTE — HPI
57 yo F PMH diabetes, hyperlipidemia, hypertension, chronic pulmonary embolism stage 4 ovarian ca (3/2024) s/p debulking, omentectomy, partial hepatectomy on chemotherapy (carboplatin paclitaxel bevacizumab) admitted for SOB 2/2 pleural effusion with a recent       Patient had a recent 7 day hospital stay (admitted 06/20) in the setting of malignant pleural effusion (not amenable for drainage per IR).  She was discharged on 06/27.  During hospitalization she was treated with Zosyn for 6 days due to recurrent fevers but infectious workup was negative      On admission:   Pulse 100 saturating 99%, afebrile, blood pressure 88/48.  CBC notable for marked leukocytosis 20,000 hemoglobin of 7.4 (hematocrit 23) with increase in granulocyte percentage, RDW 19.9.  CMP notable for a sodium of 129 CO2 14, anion gap 21, BUN 76 creatinine 4.6 GFR 10,  glucose of 403 alk-phos 170, normal AST ALT, uric acid of 9.3    Imaging notable for:  CT scan indicates significant pleural fluid accumulation at the right lung base with suspected lung consolidation and mediastinal shift. The Chest X-ray confirms worsening opacity in the right hemithorax consistent with pleural fluid, along with parenchymal changes suggesting atelectasis and possible infiltrates. There are no signs of pneumothorax, but mild changes are noted in the left lung base.    In the ED received vanc and Zosyn and insulin pending.

## 2024-07-01 NOTE — EICU
Intervention Initiated From:  Bedside    Yousuf intervened regarding:  Time-Out    Comments:  16:32  Called into room via elert for right sided thoracentesis.  Physician verification done for Ana Rosa Castellanos/ Pulm/CC.  Time-out completed.  16:54  Dilaudid 0.2 mg IVP given as ordered for comfort during procedure.  17:03  Thoracentesis (R) completed using u/s guidance.  ~800 cc milky yellow fluid removed.  CXR ordered. Pt tolerated procedure well.  POx 99%.

## 2024-07-01 NOTE — TELEPHONE ENCOUNTER
Opened pt chart to f/u call regarding upcoming treatment education/questions and pt noted to be admitted to ICU. Will f/u with pt upon discharge to complete assessment of Doxil knowledge and answer any questions/concerns pt has, MD notified.

## 2024-07-01 NOTE — CONSULTS
Please see H&P to follow for full details.     In short, patient is a 58 y.o.  with stage IVB ovarian carcinosarcoma s/p PDS on 3/25/24 with Dr Washington (CARLOS ALBERTO/BSO/OMX/liver mobilization/peritoneal & R diaphragm stripping/ hepatic wedge resection/cholecystectomy) on C3D32 of carbo/taxol/bevacizumab who presented to the ED with worsening SOB.     Workup remarkable for elevated anion gap metabolic acidosis with Cr 4.6 and increased right pleural effusion.    Plan for admission for management of pleural effusion and JASON.     Argentina Mccloud MD  OB/GYN PGY-3

## 2024-07-01 NOTE — NURSING
Report given from ED RN. Pt came up in respiratory distress with accessory muscle use. Rapid response called for bedside evaluation. Critical care called to bedside. Blood glucose of 489; pt transferred to ICU for higher level of care and to initiate insulin drip.

## 2024-07-01 NOTE — PROCEDURES
"Anette Ricci is a 58 y.o. female patient.    Temp: 98.4 °F (36.9 °C) (24 0900)  Pulse: 97 (24)  Resp: 12 (24)  BP: 132/60 (24)  SpO2: 98 % (24)  Weight: 86.9 kg (191 lb 9.3 oz) (24)  Height: 5' 7" (170.2 cm) (24)       Thoracentesis    Date/Time: 2024 5:15 PM  Location procedure was performed: [facility] CRITICAL CARE MEDICINE    Performed by: Lilli Bowen MD  Authorized by: Lilli Bowen MD  Consent Done: Yes  Consent: Written consent obtained.  Risks and benefits: risks, benefits and alternatives were discussed  Consent given by: patient (patient and spouse)  Patient understanding: patient states understanding of the procedure being performed  Patient consent: the patient's understanding of the procedure matches consent given  Procedure consent: procedure consent matches procedure scheduled  Relevant documents: relevant documents present and verified  Test results: test results available and properly labeled  Site marked: the operative site was marked  Imaging studies: imaging studies available  Required items: required blood products, implants, devices, and special equipment available  Patient identity confirmed: , MRN and name  Time out: Immediately prior to procedure a "time out" was called to verify the correct patient, procedure, equipment, support staff and site/side marked as required.  Procedure purpose: diagnostic and therapeutic  Indications: pleural effusion  Preparation: Patient was prepped and draped in the usual sterile fashion.  Local anesthesia used: yes  Anesthesia: local infiltration    Anesthesia:  Local anesthesia used: yes  Local Anesthetic: lidocaine 1% without epinephrine  Anesthetic total: 10 mL  Preparation: skin prepped with ChloraPrep  Patient position: supine  Ultrasound guidance: yes  Location: right lateral  Number of attempts: 2  Drainage amount: 800 ml  Drainage characteristics: cloudy " (yellow cloudy/chylous looking fluid vs purulence)  Patient tolerance: Patient tolerated the procedure well with no immediate complications  Chest x-ray performed: yes          Lilli Bowen MD  [employer] Fellow   07/01/2024 5:17 PM

## 2024-07-01 NOTE — ED NOTES
Telemetry Verification   Patient placed on Telemetry Box  Verified with War Room  Box #    Monitor Tech War room   Rate 101   Rhythm Sinus tachy

## 2024-07-01 NOTE — CODE/ RAPID DOCUMENTATION
RAPID RESPONSE NURSE NOTE        Admit Date: 2024  LOS: 0  Code Status: Prior   Date of Consult: 2024  : 1965  Age: 58 y.o.  Weight:   Wt Readings from Last 1 Encounters:   24 86.9 kg (191 lb 9.3 oz)     Sex: female  Race: Black or    Bed: [address] [place] [address] A:   MRN: [id number]  Time Rapid Response Team page Received: 06  Time Rapid Response Team at Bedside: 627  Time Rapid Response Team left Bedside: 07  Was the patient discharged from an ICU this admission? No   Was the patient discharged from a PACU within last 24 hours? No   Did the patient receive conscious sedation/general anesthesia in last 24 hours? No  Was the patient in the ED within the past 24 hours? Yes  Was the patient on NIPPV within the past 24 hours? No   Did this progress into an ARC or CPA: No  Attending Physician: Willam Machuca MD  Primary Service: Pulmonary Disease       SITUATION    Notified by bedside RN via phone call.  Reason for alert: AMS, hypotension, SOB  Called to evaluate the patient for Neuro    BACKGROUND     Why is the patient in the hospital?: <principal problem not specified>    Patient has a past medical history of Breast cancer, Diabetes mellitus, Genetic testing, Hyperlipidemia, Hypertension, Malignant neoplasm of upper-outer quadrant of right breast in female, estrogen receptor positive, and Seizure disorder.    Last Vitals:  Temp: 98.4 °F (36.9 °C) (647)  Pulse: 107 (639)  Resp: 31 (639)  BP: 74/42 (647)  SpO2: 95 % (639)    24 Hours Vitals Range:  Temp:  [97 °F (36.1 °C)-98.4 °F (36.9 °C)]   Pulse:  []   Resp:  [17-32]   BP: ()/(42-90)   SpO2:  [90 %-99 %]     Labs:  Recent Labs     24   WBC 20.03*  --    HGB 7.4*  --    HCT 23.3* 23*     --        Recent Labs     24  0026 24  0553   * 127*   K 3.9 3.8   CL 94* 95   CO2 14* 15*   BUN 76* 75*   CREATININE 4.6* 4.5*   * 461*   PHOS 3.8 3.9    MG 2.1 1.9        Recent Labs     07/01/24  0023 07/01/24  0404 07/01/24  0652   PH 7.289* 7.324* 7.312*   PCO2 42.4 33.9* 35.2   PO2 27* 51 36*   HCO3 20.3* 17.7* 17.8*   POCSATURATED 44 83 63   BE -6* -8* -8*        ASSESSMENT    Called to bedside for pt with altered mental status, shortness of breath, tachypnea, and hypotension. On exam, pt is able to answer simple questions with single word answers, arousable by repeated stimulation. RR 36 with shallow breathing noted. BP 70s/40s, progressively increasing to  without intervention. CCM consulted, CXR and ABG ordered and obtained, volume overload and acidemia noted, respectively. Pt also noted to be in DKA. Dr. Machuca to bedside to assess. Pt upgraded to MICU.    Physical Exam  HENT:      Mouth/Throat:      Mouth: Mucous membranes are moist.      Pharynx: Oropharynx is clear.   Eyes:      Pupils: Pupils are equal, round, and reactive to light.   Cardiovascular:      Rate and Rhythm: Regular rhythm. Tachycardia present.   Pulmonary:      Effort: Tachypnea present.      Breath sounds: Examination of the right-upper field reveals decreased breath sounds. Examination of the left-upper field reveals decreased breath sounds. Examination of the right-middle field reveals decreased breath sounds. Examination of the left-middle field reveals decreased breath sounds. Examination of the right-lower field reveals decreased breath sounds. Examination of the left-lower field reveals decreased breath sounds. Decreased breath sounds present.   Abdominal:      General: There is distension.   Skin:     General: Skin is warm and dry.      Capillary Refill: Capillary refill takes less than 2 seconds.   Neurological:      Mental Status: She is lethargic.      GCS: GCS eye subscore is 3. GCS verbal subscore is 4. GCS motor subscore is 6.         INTERVENTIONS    The patient was seen for Neurological problem. Staff concerns included mental status change and decreased  responsiveness. The following interventions were performed: POCT glucose, continuous pulse ox monitoring continued , and continuous cardiac monitoring continued.  Respiratory problem. Staff concerns included tachypnea, new onset of difficulty breathing, and increased WOB. The following interventions were performed: portable chest x-ray and venous blood gas.  Cardiac problem. Staff concerns included tachycardia and hypotension. The following interventions were performed: critical care consult.    RECOMMENDATIONS    We recommend: POC per ICU.    PROVIDER ESCALATION    Orders received and case discussed with Dr. Machuca .    Primary team arrival time: N/a    Disposition: Tx in ICU bed 4718.    FOLLOW UP    Charge RNIvan  updated on plan of care. Instructed to call the Rapid Response Nurse, Flaco Ochoa RN at [phone] for additional questions or concerns.

## 2024-07-01 NOTE — RESPIRATORY THERAPY
RAPID RESPONSE RESPIRATORY THERAPY NOTE             Code Status: Prior   : 1965  Bed: 7093/7093 A:   MRN: [id number]  Time page Received: 625  Time Rapid Response RT at Bedside: 633  Time Rapid Response RT left Bedside: 705    SITUATION    Evaluated patient for: resp    BACKGROUND    Why is the patient in the hospital?: Diabetic ketoacidosis without coma associated with type 2 diabetes mellitus    Patient has a past medical history of Breast cancer, Diabetes mellitus, Genetic testing, Hyperlipidemia, Hypertension, Malignant neoplasm of upper-outer quadrant of right breast in female, estrogen receptor positive, and Seizure disorder.    24 Hours Vitals Range:  Temp:  [97 °F (36.1 °C)-98.4 °F (36.9 °C)]   Pulse:  []   Resp:  [17-32]   BP: ()/(42-90)   SpO2:  [90 %-99 %]     Labs:    Recent Labs     24  0026 24  0553   * 127*   K 3.9 3.8   CL 94* 95   CO2 14* 15*   BUN 76* 75*   CREATININE 4.6* 4.5*   * 461*   PHOS 3.8 3.9   MG 2.1 1.9        Recent Labs     24  0023 24  0404 24  0652   PH 7.289* 7.324* 7.312*   PCO2 42.4 33.9* 35.2   PO2 27* 51 36*   HCO3 20.3* 17.7* 17.8*   POCSATURATED 44 83 63   BE -6* -8* -8*       ASSESSMENT/INTERVENTIONS  Called to bedside to run a VBG due to increased WOB. On room air 99% SPO2 but noticeable abdominal muscle use. Pt will awaken to repeated verbal stimulation.   VBG obtained. Critical care at bedside. Transfer orders placed      Last Vitals: Temp: 98.4 °F (36.9 °C) (647)  Pulse: 107 (639)  Resp: 31 (639)  BP: 74/42 (647)  SpO2: 95 % (639)  Level of Consciousness: Level of Consciousness (AVPU): alert  Respiratory Effort:    Expansion/Accessory Muscle Usage:    All Lung Field Breath Sounds:    O2 Device/Concentration: room air  NIPPV: No Surgical airway: No Vent: No  ETCO2 monitored:    Ambu at bedside:      Active Orders   Respiratory Care    Oxygen Continuous     Frequency:  Continuous     Number of Occurrences: Until Specified     Order Questions:      Device type: Low flow      Device: Nasal Cannula (1- 5 Liters)      LPM: 2      Titrate O2 per Oxygen Titration Protocol: Yes      Notify MD of: Inability to achieve desired SpO2    Pulse Oximetry Q Shift     Frequency: Q Shift     Number of Occurrences: Until Specified       RECOMMENDATIONS  ?  We recommend: RRT Recs: Continue POC per primary team.    ESCALATION    Orders received and case discussed with Dr. Machuca.    FOLLOW-UP    Disposition: Tx in ICU bed 7093.    Please call back the Rapid Response RT, Lashawn Dixon, RRT at x [phone] for any questions or concerns.

## 2024-07-01 NOTE — PROGRESS NOTES
Pharmacokinetic Initial Assessment: IV Vancomycin    Assessment/Plan:    - Ms. Ricci received vancomycin 1500 mg today in the ED at  01:54.   - Initiate pulse dosing in the setting of JASON. No additional vancomycin is required today.  - Desired empiric serum trough concentration is 15 to 20 mcg/mL  - Draw vancomycin random level with AM labs tomorrow to assess clearance.  Pharmacy to re-dose based on level and renal function.    Pharmacy will continue to follow and monitor vancomycin.      Please contact pharmacy at extension [phone] with any questions regarding this assessment.     Thank you for the consult,   Sally Taylor, PharmD, BCCCP       Patient brief summary:  Anette Ricci is a 58 y.o. female initiated on antimicrobial therapy with IV Vancomycin for treatment of suspected sepsis    Drug Allergies:   Review of patient's allergies indicates:   Allergen Reactions    Codeine Other (See Comments)     Flu like s/s    Tramadol Other (See Comments)     Flu like symptoms similar to codeine reaction       Actual Body Weight:   86.9 kg    Renal Function:   Estimated Creatinine Clearance: 14.9 mL/min (A) (based on SCr of 4.6 mg/dL (H)).,     Dialysis Method (if applicable):  N/A    CBC (last 72 hours):  Recent Labs   Lab Result Units 07/01/24  0026   WBC K/uL 20.03*   Hemoglobin g/dL 7.4*   Hematocrit % 23.3*   Platelets K/uL 306   Gran % % 87.0*   Lymph % % 6.0*   Mono % % 4.0   Eosinophil % % 1.0   Basophil % % 0.0   Differential Method  Manual       Metabolic Panel (last 72 hours):  Recent Labs   Lab Result Units 07/01/24  0026 07/01/24  0553 07/01/24  0923 07/01/24  0943   Sodium mmol/L 129* 127* 132*  --    Sodium, Urine mmol/L  --   --   --  17*   Potassium mmol/L 3.9 3.8 3.0*  --    Chloride mmol/L 94* 95 98  --    CO2 mmol/L 14* 15* 16*  --    Glucose mg/dL 403* 461* 312*  --    Glucose, UA   --   --   --  Negative   BUN mg/dL 76* 75* 76*  --    Creatinine mg/dL 4.6* 4.5* 4.6*  --    Creatinine,  "Urine mg/dL  --   --   --  186.0   Albumin g/dL 2.2* 1.7* 1.8*  --    Total Bilirubin mg/dL 0.5 0.4 0.3  --    Alkaline Phosphatase U/L 176* 135 159*  --    AST U/L 25 18 21  --    ALT U/L 8* 7* 7*  --    Magnesium mg/dL 2.1 1.9 2.0  --    Phosphorus mg/dL 3.8 3.9 3.2  --        Drug levels (last 3 results):  No results for input(s): "VANCOMYCINRA", "VANCORANDOM", "VANCOMYCINPE", "VANCOPEAK", "VANCOMYCINTR", "VANCOTROUGH" in the last 72 hours.    Microbiologic Results:  Microbiology Results (last 7 days)       Procedure Component Value Units Date/Time    Culture, Respiratory with Gram Stain [[id number]]     Order Status: No result Specimen: Respiratory     Clostridium difficile EIA [[id number]]     Order Status: No result Specimen: Stool     Urine culture [[id number]] Collected: 07/01/24 0943    Order Status: No result Specimen: Urine Updated: 07/01/24 1204    Blood culture x two cultures. Draw prior to antibiotics. [[id number]] Collected: 07/01/24 0028    Order Status: Completed Specimen: Blood from Peripheral, Forearm, Left Updated: 07/01/24 0715     Blood Culture, Routine No Growth to date    Narrative:      Aerobic and anaerobic    Blood culture x two cultures. Draw prior to antibiotics. [[id number]] Collected: 07/01/24 0027    Order Status: Completed Specimen: Blood from Peripheral, Upper Arm, Left Updated: 07/01/24 0715     Blood Culture, Routine No Growth to date    Narrative:      Aerobic and anaerobic            "

## 2024-07-01 NOTE — ED NOTES
Nurse and MD responded to room for multiple readings of low blood pressure while pt is sleeping. Pt was easily arousable, BP increased when pt woke up. MD aware.

## 2024-07-01 NOTE — PLAN OF CARE
MICU DAILY GOALS     Family/Goals of care/Code Status   Code Status: Full Code    24H Vital Sign Range  Temp:  [97 °F (36.1 °C)-98.4 °F (36.9 °C)]   Pulse:  []   Resp:  [12-38]   BP: ()/(42-90)   SpO2:  [90 %-100 %]      Shift Events (include procedures and significant events)   Patient brought as rapid response for hypotension; Hyperglycemia and started on insulin infusion. Infusion eventually weaned off around 6 pm tonight. Bedside Thora performed with >1 L removed of purulent fluid. Patient visited by Oncology; prognosis discussed in depth with family. Family to have ongoing GOC meeting. VSS. AAOx4; patient with multiple firm Bowel Movements.     AWAKE RASS: Goal - RASS Goal: 0-->alert and calm  Actual - RASS (Robles Agitation-Sedation Scale): alert and calm    Restraint necessity: Not necessary   BREATHE SBT: Not intubated    Coordinate A & B, analgesics/sedatives Pain: managed   SAT: Not intubated   Delirium CAM-ICU: Overall CAM-ICU: Negative   Early(intubated/ Progressive (non-intubated) Mobility MOVE Screen (INTUBATED ONLY): Not intubated    Activity: Activity Management: Rolling - L1   Feeding/Nutrition Diet order: Diet/Nutrition Received: consistent carb/diabetic diet,     Thrombus DVT prophylaxis:     HOB Elevation Head of Bed (HOB) Positioning: HOB at 30-45 degrees   Ulcer Prophylaxis GI: yes   Glucose control managed     Skin Skin assessed during: Transfer Assessment/Admit    Sacrum intact/not altered? Yes  Heels intact/not altered? Yes  Surgical wound? Yes; Old Healed     CHECK ONE!   (no altered skin or altered skin) and sub boxes:  [x] No Altered Skin Integrity Present    [x]Prevention Measures Documented    [] Altered Skin Integrity Present or Discovered   [] LDA present in EPIC, daily doc completed              [] LDA added if not in EPIC (describe wound).                    When describing wound, do not stage, use descriptive words only.    [] Wound Image Taken (required on admit,                    transfer/discharge and every Tuesday)    Wound Care Consulted? No    4 EYES:  Attending Nurse (1st set of eyes): Ivan Arellano RN     Second RN/Staff Member (2nd set of eyes): Bushra Samaniego RN    Bowel Function diarrhea    Indwelling Catheter Necessity      Port A Cath Single Lumen Subclavian Left-Line Necessity Review: Longterm central access required     De-escalation Antibiotics Yes        VS and assessment per flow sheet, patient progressing towards goals as tolerated, plan of care reviewed with family, all concerns addressed, will continue to monitor.

## 2024-07-01 NOTE — ASSESSMENT & PLAN NOTE
3/4/24: peritoneal biopsy with carcinosarcoma  3/6/24: CA-125 418  3/25/24: Primary debulking surgery CARLOS ALBERTO/BSO, omentectomy, partial hepatectomy, debulking. (<1cm residual disease: sigmoid, transverse colon, diaphragm, kelvin hepatis)     Adjuvant therapy: Carboplatin AUC 6, paclitaxel 175 mg/m2, bevacizumab 15 mg/kg  C1D1: (holding yaneli) planned for 4/19/24, CA-125 248    -per Gyn/Onc

## 2024-07-01 NOTE — H&P
[facility] - Cardiac Medical ICU  Critical Care Medicine  History & Physical    Patient Name: Anette Ricci  MRN: [id number]  Admission Date: 2024  Hospital Length of Stay: 0 days  Code Status: Full Code  Attending Physician: Maria Antonia Pierce MD   Primary Care Provider: Mark Chua MD   Principal Problem: Diabetic ketoacidosis without coma associated with type 2 diabetes mellitus    Subjective:     HPI:  Anette Ricci is a 58 y.o.  with stage IVB ovarian carcinosarcoma s/p PDS on 3/25/24 with Dr Washington (CARLOS ALBERTO/BSO/OMX/liver mobilization/peritoneal & R diaphragm stripping/ hepatic wedge resection/cholecystectomy) on C3D32 of carbo/taxol/bevacizumab who presented to the ED with worsening SOB for 1 day. She also has a known right PE, persistent right pleural effusion, HTN, HLD, diabetes, anemia, and seizure disorder. She was recently admitted from - for management of this issue where IR reported inability to drain collection and was treated with multiple days of zosyn without any clear source of infection.      She reports that her shortness of breath was overall stable since discharge, however, worsened yesterday. She denies any fevers/chills, reports baseline nausea without vomiting. Reports her cough is unchanged from discharge. She denies sick contacts. She has been compliant with her medication since discharge, however, reports persistent hyperglycemia at home with sugars in the 300s-400s for the past 1-2 days.      She was admitted to Gyn- Oncology for pleural effusion and JASON but transferred to MICU the following day for DKA requiring insulin drip.    Hospital/ICU Course:  No notes on file     Past Medical History:   Diagnosis Date    Breast cancer 2013    Diabetes mellitus     Genetic testing 2013    VUS    Hyperlipidemia     Hypertension     Malignant neoplasm of upper-outer quadrant of right breast in female, estrogen receptor positive 2015    Seizure disorder         Past Surgical History:   Procedure Laterality Date    BILATERAL SALPINGO-OOPHORECTOMY (BSO) N/A 3/25/2024    Procedure: SALPINGO-OOPHORECTOMY, BILATERAL;  Surgeon: Александр Washington MD;  [facility];  Service: OB/GYN;  Laterality: N/A;    BREAST BIOPSY      BREAST LUMPECTOMY Right 2013    CHOLECYSTECTOMY  3/25/2024    Procedure: CHOLECYSTECTOMY;  Surgeon: Bo Farmer MD;  [facility];  Service: General;;    DEBULKING OF TUMOR N/A 3/25/2024    Procedure: DEBULKING, NEOPLASM;  Surgeon: Александр Washington MD;  [facility];  Service: OB/GYN;  Laterality: N/A;    EXCISION, LIVER N/A 3/25/2024    Procedure: EXCISION, LIVER - partial;  Surgeon: Bo Farmer MD;  [facility];  Service: General;  Laterality: N/A;  bk ultrasound  [place] book by TA on 3-21-24  7:00 a.m.  Conf #  [id number]    EYE SURGERY      LAPAROTOMY, EXPLORATORY N/A 3/25/2024    Procedure: LAPAROTOMY, EXPLORATORY;  Surgeon: Александр Washington MD;  [facility];  Service: OB/GYN;  Laterality: N/A;    OMENTECTOMY N/A 3/25/2024    Procedure: OMENTECTOMY;  Surgeon: Александр Washington MD;  [facility];  Service: OB/GYN;  Laterality: N/A;    PERITONEOCENTESIS N/A 3/13/2024    Procedure: PARACENTESIS, ABDOMINAL;  Surgeon: Flavia Moore MD;  Location: [facility] CATH LAB;  Service: Radiology;  Laterality: N/A;    PERITONEOCENTESIS N/A 3/21/2024    Procedure: PARACENTESIS, ABDOMINAL;  Surgeon: Jorge Jolley MD;  Location: [facility] CATH LAB;  Service: Radiology;  Laterality: N/A;    PERITONEOCENTESIS N/A 6/10/2024    Procedure: PARACENTESIS, ABDOMINAL;  Surgeon: Rogelio Malave MD;  Location: [facility] CATH LAB;  Service: Radiology;  Laterality: N/A;    TOTAL ABDOMINAL HYSTERECTOMY N/A 3/25/2024    Procedure: HYSTERECTOMY, TOTAL, ABDOMINAL;  Surgeon: Александр Washington MD;  Location: [facility] OR [address] [place] [address];  Service: OB/GYN;  Laterality: N/A;    TOTAL  REDUCTION MAMMOPLASTY Bilateral 2016       Review of patient's allergies indicates:   Allergen Reactions    Codeine Other (See Comments)     Flu like s/s    Tramadol Other (See Comments)     Flu like symptoms similar to codeine reaction       Family History       Problem Relation (Age of Onset)    Breast cancer Sister (48)    Colon cancer Sister    Hypertension Mother, Brother, Brother    Seizures Father          Tobacco Use    Smoking status: Never    Smokeless tobacco: Never   Substance and Sexual Activity    Alcohol use: Yes     Alcohol/week: 0.0 standard drinks of alcohol     Comment: ocassional    Drug use: No    Sexual activity: Not Currently     Partners: Male     Birth control/protection: None      Review of Systems   Constitutional:  Positive for fatigue. Negative for fever.   Respiratory:  Positive for cough and shortness of breath.    Gastrointestinal:  Positive for diarrhea, nausea and vomiting. Negative for constipation.   Genitourinary:  Negative for dysuria and flank pain.   Skin:  Negative for color change and wound.   Neurological:  Negative for seizures and syncope.   Psychiatric/Behavioral:  Negative for agitation and confusion.      Objective:     Vital Signs (Most Recent):  Temp: 98.1 °F (36.7 °C) (07/01/24 1700)  Pulse: 93 (07/01/24 1800)  Resp: 13 (07/01/24 1800)  BP: 139/60 (07/01/24 1800)  SpO2: 99 % (07/01/24 1800) Vital Signs (24h Range):  Temp:  [97 °F (36.1 °C)-98.4 °F (36.9 °C)] 98.1 °F (36.7 °C)  Pulse:  [] 93  Resp:  [12-38] 13  SpO2:  [90 %-100 %] 99 %  BP: ()/(42-90) 139/60   Weight: 86.9 kg (191 lb 9.3 oz)  Body mass index is 30.01 kg/m².      Intake/Output Summary (Last 24 hours) at 7/1/2024 1837  Last data filed at 7/1/2024 1823  Gross per 24 hour   Intake 4145.96 ml   Output 900 ml   Net 3245.96 ml          Physical Exam  Vitals and nursing note reviewed.   Constitutional:       Comments: Uncomfortable appearing   HENT:      Head: Normocephalic and atraumatic.       Right Ear: External ear normal.      Left Ear: External ear normal.      Nose: Nose normal.      Mouth/Throat:      Mouth: Mucous membranes are dry.      Pharynx: Oropharynx is clear.   Cardiovascular:      Rate and Rhythm: Normal rate and regular rhythm.      Pulses: Normal pulses.   Pulmonary:      Comments: Diminished breath sounds to entire R side  Abdominal:      Tenderness: There is no guarding or rebound.      Comments: Well healing midline vertical incision   Musculoskeletal:      Right lower leg: No edema.      Left lower leg: No edema.   Skin:     General: Skin is warm and dry.   Neurological:      Mental Status: She is alert and oriented to person, place, and time.   Psychiatric:         Mood and Affect: Mood normal.         Behavior: Behavior normal.            Vents:     Lines/Drains/Airways       Central Venous Catheter Line  Duration             Port A Cath Single Lumen Subclavian Left -- days              Peripheral Intravenous Line  Duration                  Peripheral IV - Single Lumen 07/01/24 0000 24 G Anterior;Left Forearm <1 day         Peripheral IV - Single Lumen 07/01/24 0032 20 G Left Upper Arm <1 day                  Significant Labs:    CBC/Anemia Profile:  Recent Labs   Lab 07/01/24  0026 07/01/24  0652   WBC 20.03*  --    HGB 7.4*  --    HCT 23.3* 23*     --    MCV 83  --    RDW 19.9*  --         Chemistries:  Recent Labs   Lab 07/01/24  0553 07/01/24  0923 07/01/24  1441   * 132* 131*   K 3.8 3.0* 3.5   CL 95 98 100   CO2 15* 16* 19*   BUN 75* 76* 74*   CREATININE 4.5* 4.6* 4.4*   CALCIUM 7.7* 8.0* 8.1*   ALBUMIN 1.7* 1.8* 1.9*   PROT 5.8* 6.2 6.4   BILITOT 0.4 0.3 0.3   ALKPHOS 135 159* 173*   ALT 7* 7* 6*   AST 18 21 24   MG 1.9 2.0 2.0   PHOS 3.9 3.2 2.7       BMP:   Recent Labs   Lab 07/01/24  1441   GLU 85   *   K 3.5      CO2 19*   BUN 74*   CREATININE 4.4*   CALCIUM 8.1*   MG 2.0     CMP:   Recent Labs   Lab 07/01/24  0553 07/01/24  0923 07/01/24  1440  "  * 132* 131*   K 3.8 3.0* 3.5   CL 95 98 100   CO2 15* 16* 19*   * 312* 85   BUN 75* 76* 74*   CREATININE 4.5* 4.6* 4.4*   CALCIUM 7.7* 8.0* 8.1*   PROT 5.8* 6.2 6.4   ALBUMIN 1.7* 1.8* 1.9*   BILITOT 0.4 0.3 0.3   ALKPHOS 135 159* 173*   AST 18 21 24   ALT 7* 7* 6*   ANIONGAP 17* 18* 12     Lactic Acid: No results for input(s): "LACTATE" in the last 48 hours.  All pertinent labs within the past 24 hours have been reviewed.    Significant Imaging: I have reviewed all pertinent imaging results/findings within the past 24 hours.  Assessment/Plan:     Neuro  Seizure disorder  Continue home lamictal    Psychiatric  Depression, reactive  Continue home seroquel    Pulmonary  Pleural effusion, malignant  Malignant pleural effusion     - IR was previously consulted for drainage during past admission and was unable to perform procedure due to minimal fluid visualized     - CXR obtained in ED with worsening effusions bilaterally     - Thoracentesis performed today, follow up studies    Renal/  JASON (acute kidney injury)  Creatinine up to 4.6 on admission; baseline 0.7. Likely prerenal in the setting of DKA; dehydration.    -- Nephrology consulted: recommend 1L NS bolus, LR @100 ml x 24 hours, Junior for I&Os  --IV Fluids per DKA protocol  --renal US  --Follow up UA  --Strict I&O  --Maintain junior  --Avoid nephrotoxic agents    Hematology  Chronic pulmonary embolism without acute cor pulmonale  On Lovenox at home; hold for potential thoracentesis today    Oncology  Stage 4B Carcinoscaroma, Suspected ovarian origin  Gyn/Onc following    Endocrine  * Diabetic ketoacidosis without coma associated with type 2 diabetes mellitus  On admission noted to have glucose 461, gap 17, Bicarb 15. Transferred to MICU for DKA.    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     C: SAT & SBT Coordinated?  N/a                      D: Delirium: CAM-ICU Overall CAM-ICU: Negative   E: Early Mobility Performed? Yes   F: Feeding Goal:    Status:     Current Diet Order   Procedures    Diet diabetic 2000 Calorie     Order Specific Question:   Total calories:     Answer:   2000 Calorie      AS: Analgesia/Sedation PRN tylenol   T: Thromboembolic Prophylaxis Held for thoracentesis   H: HOB > 300 Yes   U: Stress Ulcer Prophylaxis (if needed) N/a   G: Glucose Control SSI   B: Bowel Function Stool Occurrence: 2   I: Indwelling Catheter (Lines & Mccartney) Necessity PIV   D: De-escalation of Antimicrobials/Pharmacotherapies Vanc/Zosyn    Plan for the day/ETD Continue ICU care    Code Status:  Family/Goals of Care: Full Code         Critical secondary to Patient has a condition that poses threat to life and bodily function: DKA    Critical care was time spent personally by me on the following activities: development of treatment plan with patient or surrogate and bedside caregivers, discussions with consultants, evaluation of patient's response to treatment, examination of patient, ordering and performing treatments and interventions, ordering and review of laboratory studies, ordering and review of radiographic studies, pulse oximetry, re-evaluation of patient's condition. This critical care time did not overlap with that of any other provider or involve time for any procedures.     El De La Garza MD  Critical Care Medicine  [facility] - Cardiac Medical ICU

## 2024-07-01 NOTE — ASSESSMENT & PLAN NOTE
Malignant pleural effusion     - IR was previously consulted for drainage during past admission and was unable to perform procedure due to minimal fluid visualized     - CXR obtained in ED with worsening effusions bilaterally     - Thoracentesis performed today, follow up studies

## 2024-07-01 NOTE — CARE UPDATE
Afternoon Assessment:    Anette Ricci is a 58 y.o.  recurrent ovarian carcinosarcoma s/p PDS on 3/25/24 with Dr Washington (CARLOS ALBERTO/BSO/OMX/liver mobilization/peritoneal & R diaphragm stripping/ hepatic wedge resection/cholecystectomy), s/p C3 of carbo/taxol/bevacizumab on  and plans to start Doxorubicin therapy. Admitted to MICU for the management of DKA, JASON, and symptomatic malignant pleural effusion vs Pneumonia.    Temp:  [97 °F (36.1 °C)-98.4 °F (36.9 °C)] 98.4 °F (36.9 °C)  Pulse:  [] 99  Resp:  [17-34] 30  SpO2:  [90 %-100 %] 100 %  BP: ()/(42-90) 163/69    PE:  Constitutional: She appears somnolent but arousable. No distress.    HENT:   Head: Normocephalic.    Eyes: EOM are normal.     Cardiovascular:  Regular rhythm, rate normal, normal heart sounds and intact distal pulses.  Exam reveals no edema.       Pulmonary/Chest: Reduced breath sound bilaterally, right > left. No stridor. No respiratory distress.   Abdominal: Soft. Abdomen, distended but stable. She exhibits abdominal incision (midline vertical incision well healing without erythema, induration, discharge). She exhibits no mass. There is no abdominal tenderness. There is no rebound and no guarding.             Musculoskeletal: Moves all extremeties.       Neurological: She is easily aroused. AO x4   Skin: Skin is warm and dry.      Labs:  Recent Labs   Lab 24  0519 24  1708 24  0026 24  0652   WBC 11.84 24.22* 20.03*  --    HGB 7.0* 7.8* 7.4*  --    HCT 22.9* 25.1* 23.3* 23*   MCV 85 85 83  --     423 306  --        Recent Labs   Lab 24  0026 24  0553 24  0923   * 127* 132*   K 3.9 3.8 3.0*   CL 94* 95 98   CO2 14* 15* 16*   BUN 76* 75* 76*   CREATININE 4.6* 4.5* 4.6*   * 461* 312*   PROT 7.5 5.8* 6.2   BILITOT 0.5 0.4 0.3   ALKPHOS 176* 135 159*   ALT 8* 7* 7*   AST 25 18 21   MG 2.1 1.9 2.0   PHOS 3.8 3.9 3.2     POCT Glucose: 160  Uric acid 9.3   AG  21>17  Procal 11 PT/PTT wnl   Trop/BNP wnl   VBG: pH 7.29>7.32, bicarb 20>18, lactate 2.2>1.8  B-HB 4.0     A/P:  Neuro:   GCS: 13   Seizure disorder: Continue home lamictal  Depression: Continue home seroquel, ativan prn     Cardiac:  Hypertension: Hold home norvasc, enalapril, HCTZ  Hyperlipidemia: Hold home statin     Pulmonary:   Malignant pleural effusion vs pneumonia  - CXR: worsening effusions bilaterally, increased opacification of the right hemithorax.   - CT: suspected consolidation at the right lower lobe, as well as suspected mild right to left midline shift of the cardiomediastinal structures.   - IV antibiotics: currently on zosyn, s/p Vacn/zosyn in ED  - ID and IR consulted     Pulmonary embolism: stable 1 week ago. Hold home therapeutic lovenox, last dose yesterday AM     Renal:   JASON     - Patient's baseline Cr 0.7-0.8     - Cr today 4.6     - AG 21>17     - FENA 0.3, suggesting pre renal process.      - Strict I/Os, recommend junior placement.      - Trend Cr q4h during management of acute hyperglycemia and metabolic acidosis     - Avoid nephrotoxic agents     - Renal US showing Elevated intraparenchymal resistive indices which is nonspecific but may be seen with medical renal disease. Peritoneal mass adjacent to the right kidney.     Infectious Disease:   - WBC 20  - Lactate 2.2  - Bcx NGTD, Ucx pending, Respiratory culture pending  - S/p vanc/zosyn in ED. Currently on Zosyn q12h and Vanc (dosed by pharmacy)  - C.diff pending     Hematology/Oncology:  Stage IVB ovarian carcinosarcoma  - See onc history as above     Endocrine:  DKA     - AG 21>17, Beta hydroxybutyrate 4 >     - Insulin drip (managed by MICU)     - Replace K PRN, trend CMP, Mag, Phos   Diabetes mellitus     - Home regimen: lantus 9u, novolog 6u TIDWM     - POCT glucose 160    GI:   - NPO + D5 1/2NS + Kcl  - replace electrolytes as needed to keep K>4, Mg>2  - bowel reg - daily miralax, docusate  - famotidine for GI prophylaxis      Dispo:  - continue care in the ICU

## 2024-07-01 NOTE — PLAN OF CARE
07/01/24 1114   Readmission   Was this a planned readmission? No   Why were you hospitalized in the last 30 days? pleural Effusion, malignant   Why were you readmitted? New medical problem   When you left the hospital how did you feel? ok   When you left the hospital where did you go? Home with Home Health   Did patient/caregiver refused recommended DC plan? No   Tell me about what happened between when you left the hospital and the day you returned. unable to answer - patient very uncomfortable with pain   When did you start not feeling well? yesterday   Did you try to manage your symptoms your self? No   Did you call anyone? No   Did you try to see or did see a doctor or nurse before you came? No   Did you have  a follow-up appointment on discharge? Yes   Did you go? No   Why?   (not time yet)       Patient stepped up to MICU 7/1/2024 at 0710 hours for respiratory issues.     Discharge Plan A and Plan B have been determined by review of patient's clinical status, future medical and therapeutic needs, and coverage/benefits for post-acute care in coordination with multidisciplinary team members.      Afia Clayton RN     [phone]

## 2024-07-01 NOTE — ED NOTES
Pt placed on bedpan at this time for bowel movement   Spironolactone Pregnancy And Lactation Text: This medication can cause feminization of the male fetus and should be avoided during pregnancy. The active metabolite is also found in breast milk.

## 2024-07-01 NOTE — H&P
[facility] - Oncology ([facility])  Obstetrics & Gynecology  History & Physical    Patient Name: Anette Ricci  MRN: [id number]  Admission Date: 2024  Primary Care Provider: Mark Chua MD    Subjective:     Chief Complaint/Reason for Admission: shortness of breath, JASON    History of Present Illness:  Anette Ricci is a 58 y.o.  with stage IVB ovarian carcinosarcoma s/p PDS on 3/25/24 with Dr Washington (CARLOS ALBERTO/BSO/OMX/liver mobilization/peritoneal & R diaphragm stripping/ hepatic wedge resection/cholecystectomy) on C3D32 of carbo/taxol/bevacizumab who presented to the ED with worsening SOB for 1 day. She also has a known right PE, persistent right pleural effusion, HTN, HLD, diabetes, anemia, and seizure disorder. She was recently admitted from - for management of this issue where IR reported inability to drain collection and was treated with multiple days of zosyn without any clear source of infection.      She reports that her shortness of breath was overall stable since discharge, however, worsened yesterday. She denies any fevers/chills, reports baseline nausea without vomiting. Reports her cough is unchanged from discharge. She denies sick contacts. She has been compliant with her medication since discharge, however, reports persistent hyperglycemia at home with sugars in the 300s-400s for the past 1-2 days.     Onc History:   3/4/24: peritoneal biopsy with carcinosarcoma  3/6/24: CA-125 418  3/25/24: Primary debulking surgery CARLOS ALBERTO/BSO, omentectomy, partial hepatectomy, debulking. (<1cm residual disease: sigmoid, transverse colon, diaphragm, kelvin hepatis)    Adjuvant therapy: Carboplatin AUC 6, paclitaxel 175 mg/m2, bevacizumab 15 mg/kg  24: C1D1: Carbo/Taxol/(Velma held), CA-125 248  5/10/24: C2D1: Carbo/Taxol/Velma  24: C3D1: Carbo/Taxol/Velma  24: Deferred due to hospitalization  24: scheduled for Doxil start    Oncology Treatment Plan:   OP GYN DOXORUBICIN LIPOSOME  Q4W      Past Medical History:   Diagnosis Date    Breast cancer 2013    Diabetes mellitus     Genetic testing 05/29/2013    VUS    Hyperlipidemia     Hypertension     Malignant neoplasm of upper-outer quadrant of right breast in female, estrogen receptor positive 12/02/2015    Seizure disorder      Past Surgical History:   Procedure Laterality Date    BILATERAL SALPINGO-OOPHORECTOMY (BSO) N/A 3/25/2024    Procedure: SALPINGO-OOPHORECTOMY, BILATERAL;  Surgeon: Александр Washington MD;  [facility];  Service: OB/GYN;  Laterality: N/A;    BREAST BIOPSY      BREAST LUMPECTOMY Right 2013    CHOLECYSTECTOMY  3/25/2024    Procedure: CHOLECYSTECTOMY;  Surgeon: Bo Farmer MD;  Location: [facility] OR [address] [place] [address];  Service: General;;    DEBULKING OF TUMOR N/A 3/25/2024    Procedure: DEBULKING, NEOPLASM;  Surgeon: Александр Washington MD;  Location: [facility] OR [address] [place] [address];  Service: OB/GYN;  Laterality: N/A;    EXCISION, LIVER N/A 3/25/2024    Procedure: EXCISION, LIVER - partial;  Surgeon: Bo Farmer MD;  Location: [facility] OR [address] [place] [address];  Service: General;  Laterality: N/A;  bk ultrasound  [place] book by TA on 3-21-24  7:00 a.m.  Conf #  [id number]    EYE SURGERY      LAPAROTOMY, EXPLORATORY N/A 3/25/2024    Procedure: LAPAROTOMY, EXPLORATORY;  Surgeon: Александр Washington MD;  Location: [facility] OR [address] [place] [address];  Service: OB/GYN;  Laterality: N/A;    OMENTECTOMY N/A 3/25/2024    Procedure: OMENTECTOMY;  Surgeon: Александр Washington MD;  Location: [facility] OR [address] [place] [address];  Service: OB/GYN;  Laterality: N/A;    PERITONEOCENTESIS N/A 3/13/2024    Procedure: PARACENTESIS, ABDOMINAL;  Surgeon: Flavia Moore MD;  Location: [facility] CATH LAB;  Service: Radiology;  Laterality: N/A;    PERITONEOCENTESIS N/A 3/21/2024    Procedure: PARACENTESIS, ABDOMINAL;  Surgeon: Jorge Jolley MD;  Location: [facility] CATH LAB;  Service: Radiology;  Laterality: N/A;    PERITONEOCENTESIS N/A 6/10/2024    Procedure: PARACENTESIS, ABDOMINAL;  Surgeon:  Rogelio Malave MD;  Location: [facility] CATH LAB;  Service: Radiology;  Laterality: N/A;    TOTAL ABDOMINAL HYSTERECTOMY N/A 3/25/2024    Procedure: HYSTERECTOMY, TOTAL, ABDOMINAL;  Surgeon: Александр Washington MD;  Location: [facility] OR [address] [place] [address];  Service: OB/GYN;  Laterality: N/A;    TOTAL REDUCTION MAMMOPLASTY Bilateral 2016     Family History       Problem Relation (Age of Onset)    Breast cancer Sister (48)    Colon cancer Sister    Hypertension Mother, Brother, Brother    Seizures Father          Tobacco Use    Smoking status: Never    Smokeless tobacco: Never   Substance and Sexual Activity    Alcohol use: Yes     Alcohol/week: 0.0 standard drinks of alcohol     Comment: ocassional    Drug use: No    Sexual activity: Not Currently     Partners: Male     Birth control/protection: None       PTA Medications   Medication Sig    acetaminophen (TYLENOL) 325 MG tablet Take 2 tablets (650 mg total) by mouth every 4 (four) hours as needed for Pain.    amlodipine (NORVASC) 10 MG tablet Take 10 mg by mouth once daily.     atorvastatin (LIPITOR) 40 MG tablet Take 80 mg by mouth once daily.    enalapril (VASOTEC) 20 MG tablet Take 20 mg by mouth once daily.    enoxaparin (LOVENOX) 80 mg/0.8 mL Syrg Inject 0.8 mLs (80 mg total) into the skin every 12 (twelve) hours.    fluticasone propionate (FLONASE) 50 mcg/actuation nasal spray 1 spray by Each Nostril route once daily.    hydrochlorothiazide (HYDRODIURIL) 25 MG tablet Take 25 mg by mouth once daily.     hydrocodone-acetaminophen (HYCET) solution 7.5-325 mg/15mL Take 30 mLs by mouth every 6 (six) hours as needed for Pain.    HYDROmorphone (DILAUDID) 2 MG tablet Take 1 tablet (2 mg total) by mouth every 8 (eight) hours as needed for Pain.    ibuprofen (ADVIL,MOTRIN) 600 MG tablet Take 1 tablet (600 mg total) by mouth every 6 (six) hours as needed for Pain.    insulin aspart U-100 (NOVOLOG FLEXPEN U-100 INSULIN) 100 unit/mL (3 mL) InPn pen Inject 6 Units into the skin 3  "(three) times daily with meals. (Discard pen 28 days after initial use)    insulin aspart U-100 (NOVOLOG) 100 unit/mL (3 mL) InPn pen Inject 0-5 Units into the skin before meals and at bedtime as needed (for hyperglycemia by specified ranges). Sliding Scale: Blood Glucose Pre-meal 151-200 +2 units, 201-250 + 4 units, 251-300 + 6 units, 301-350 + 8 units, >350 + 10 units, max TDD 58 units    insulin glargine U-100, Lantus, 100 unit/mL (3 mL) SubQ InPn pen Inject 9 Units into the skin every evening.    insulin lispro (HUMALOG U-100 INSULIN) 100 unit/mL injection **LOW CORRECTION DOSE**  Blood Glucose  mg/dL                  Pre-meal                  151-200                0 unit                        201-250                2 units                      251-300                3 units                      301-350                4 units                      >350                     5 units                      Administer subcutaneously if needed at times designated by monitoring schedule.    ketorolac 0.5% (ACULAR) 0.5 % Drop Place 1 drop into both eyes. For pain after eye injections.    lamoTRIgine (LAMICTAL) 25 MG tablet Take 25 mg by mouth 2 (two) times daily.    lorazepam (ATIVAN) 0.5 MG tablet Take 0.5 mg by mouth every 12 (twelve) hours as needed for Anxiety.    metformin (GLUCOPHAGE) 1000 MG tablet Take 1,000 mg by mouth daily with breakfast.     ondansetron (ZOFRAN-ODT) 4 MG TbDL Take 1 tablet (4 mg total) by mouth every 6 (six) hours as needed (for nausea).    pen needle, diabetic 32 gauge x 5/32" Ndle Use to inject insulin 5 times daily.    quetiapine (SEROQUEL) 25 MG Tab Take 25 mg by mouth daily as needed.       Review of patient's allergies indicates:   Allergen Reactions    Codeine Other (See Comments)     Flu like s/s    Tramadol Other (See Comments)     Flu like symptoms similar to codeine reaction       Review of Systems   Constitutional:  Positive for fatigue. Negative for appetite change, chills and " fever.   Respiratory:  Positive for cough and shortness of breath.    Cardiovascular:  Negative for chest pain and leg swelling.   Gastrointestinal:  Positive for nausea. Negative for abdominal pain, constipation, diarrhea and vomiting.   Endocrine: Negative for hot flashes.   Genitourinary:  Negative for menstrual problem and pelvic pain.   Integumentary:  Negative for breast mass, nipple discharge and breast skin changes.   Neurological:  Negative for headaches.   Hematological:  Does not bruise/bleed easily.   Psychiatric/Behavioral:  Negative for depression.    Breast: Negative for mass, mastodynia, nipple discharge and skin changes     Objective:     Vital Signs (Most Recent):  Temp: 98.4 °F (36.9 °C) (07/01/24 0647)  Pulse: 107 (07/01/24 0639)  Resp: (!) 31 (07/01/24 0639)  BP: (!) 74/42 (07/01/24 0647)  SpO2: 95 % (07/01/24 0639) Vital Signs (24h Range):  Temp:  [97 °F (36.1 °C)-98.4 °F (36.9 °C)] 98.4 °F (36.9 °C)  Pulse:  [] 107  Resp:  [17-32] 31  SpO2:  [90 %-99 %] 95 %  BP: ()/(42-90) 74/42     Weight: 86.9 kg (191 lb 9.3 oz)  Body mass index is 30.01 kg/m².       Physical Exam:   Constitutional: She appears lethargic. She is easily aroused. She appears ill. No distress.    HENT:   Head: Normocephalic.    Eyes: EOM are normal.     Cardiovascular:  Regular rhythm, normal heart sounds and intact distal pulses.      Exam reveals no edema.       Mildly tachycardic, 90s-100ss    Pulmonary/Chest: No stridor. No respiratory distress.   Increased effort, diminished breath sounds from mid-lung to bases bilaterally although worse on R compared to L        Abdominal: Soft. She exhibits abdominal incision (midline vertical incision well healing without erythema, induration, discharge). She exhibits no mass. There is no abdominal tenderness. There is no rebound and no guarding.             Musculoskeletal: Moves all extremeties.       Neurological: She is easily aroused. She appears lethargic.    Skin:  Skin is warm and dry.           Laboratory:  Recent Results (from the past 24 hour(s))   ISTAT PROCEDURE    Collection Time: 07/01/24 12:23 AM   Result Value Ref Range    POC PH 7.289 (LL) 7.35 - 7.45    POC PCO2 42.4 35 - 45 mmHg    POC PO2 27 (LL) 40 - 60 mmHg    POC HCO3 20.3 (L) 24 - 28 mmol/L    POC BE -6 (L) -2 to 2 mmol/L    POC SATURATED O2 44 95 - 100 %    POC TCO2 22 (L) 24 - 29 mmol/L    Sample VENOUS     Site Other     Allens Test N/A    CBC auto differential    Collection Time: 07/01/24 12:26 AM   Result Value Ref Range    WBC 20.03 (H) 3.90 - 12.70 K/uL    RBC 2.82 (L) 4.00 - 5.40 M/uL    Hemoglobin 7.4 (L) 12.0 - 16.0 g/dL    Hematocrit 23.3 (L) 37.0 - 48.5 %    MCV 83 82 - 98 fL    MCH 26.2 (L) 27.0 - 31.0 pg    MCHC 31.8 (L) 32.0 - 36.0 g/dL    RDW 19.9 (H) 11.5 - 14.5 %    Platelets 306 150 - 450 K/uL    MPV 11.7 9.2 - 12.9 fL    Immature Granulocytes CANCELED 0.0 - 0.5 %    Immature Grans (Abs) CANCELED 0.00 - 0.04 K/uL    Lymph # CANCELED 1.0 - 4.8 K/uL    Mono # CANCELED 0.3 - 1.0 K/uL    Eos # CANCELED 0.0 - 0.5 K/uL    Baso # CANCELED 0.00 - 0.20 K/uL    nRBC 0 0 /100 WBC    Gran % 87.0 (H) 38.0 - 73.0 %    Lymph % 6.0 (L) 18.0 - 48.0 %    Mono % 4.0 4.0 - 15.0 %    Eosinophil % 1.0 0.0 - 8.0 %    Basophil % 0.0 0.0 - 1.9 %    Metamyelocytes 1.0 %    Myelocytes 1.0 %    Platelet Estimate Appears normal     Aniso Slight     Poik Slight     Poly Occasional     Hypo Occasional     Tear Drop Cells Occasional     [place] Cells Occasional     Differential Method Manual    Comprehensive metabolic panel    Collection Time: 07/01/24 12:26 AM   Result Value Ref Range    Sodium 129 (L) 136 - 145 mmol/L    Potassium 3.9 3.5 - 5.1 mmol/L    Chloride 94 (L) 95 - 110 mmol/L    CO2 14 (L) 23 - 29 mmol/L    Glucose 403 (H) 70 - 110 mg/dL    BUN 76 (H) 6 - 20 mg/dL    Creatinine 4.6 (H) 0.5 - 1.4 mg/dL    Calcium 8.8 8.7 - 10.5 mg/dL    Total Protein 7.5 6.0 - 8.4 g/dL    Albumin 2.2 (L) 3.5 - 5.2 g/dL    Total  Bilirubin 0.5 0.1 - 1.0 mg/dL    Alkaline Phosphatase 176 (H) 55 - 135 U/L    AST 25 10 - 40 U/L    ALT 8 (L) 10 - 44 U/L    eGFR 10.4 (A) >60 mL/min/1.73 m^2    Anion Gap 21 (H) 8 - 16 mmol/L   Magnesium    Collection Time: 07/01/24 12:26 AM   Result Value Ref Range    Magnesium 2.1 1.6 - 2.6 mg/dL   APTT    Collection Time: 07/01/24 12:26 AM   Result Value Ref Range    aPTT 27.5 21.0 - 32.0 sec   Procalcitonin    Collection Time: 07/01/24 12:26 AM   Result Value Ref Range    Procalcitonin 11.51 (H) <0.25 ng/mL   Protime-INR    Collection Time: 07/01/24 12:26 AM   Result Value Ref Range    Prothrombin Time 11.5 9.0 - 12.5 sec    INR 1.1 0.8 - 1.2   Troponin I    Collection Time: 07/01/24 12:26 AM   Result Value Ref Range    Troponin I 0.007 0.000 - 0.026 ng/mL   Brain natriuretic peptide    Collection Time: 07/01/24 12:26 AM   Result Value Ref Range    BNP 87 0 - 99 pg/mL   Uric Acid    Collection Time: 07/01/24 12:26 AM   Result Value Ref Range    Uric Acid 9.3 (H) 2.4 - 5.7 mg/dL   Phosphorus    Collection Time: 07/01/24 12:26 AM   Result Value Ref Range    Phosphorus 3.8 2.7 - 4.5 mg/dL   SARS-Cov2 (COVID) with FLU/RSV by PCR    Collection Time: 07/01/24 12:34 AM   Result Value Ref Range    SARS-CoV2 (COVID-19) Qualitative PCR Not Detected Not Detected    Influenza A, Molecular Not Detected Not Detected    Influenza B, Molecular Not Detected Not Detected    RSV Ag by Molecular Method Not Detected Not Detected   POCT glucose    Collection Time: 07/01/24  3:54 AM   Result Value Ref Range    POCT Glucose 420 (H) 70 - 110 mg/dL   ISTAT Lactate    Collection Time: 07/01/24  4:01 AM   Result Value Ref Range    POC Lactate 2.22 (H) 0.5 - 2.2 mmol/L    Sample VENOUS     Site Other     Allens Test N/A    ISTAT PROCEDURE    Collection Time: 07/01/24  4:04 AM   Result Value Ref Range    POC PH 7.324 (L) 7.35 - 7.45    POC PCO2 33.9 (L) 35 - 45 mmHg    POC PO2 51 40 - 60 mmHg    POC HCO3 17.7 (L) 24 - 28 mmol/L    POC BE -8  (L) -2 to 2 mmol/L    POC SATURATED O2 83 95 - 100 %    POC TCO2 19 (L) 24 - 29 mmol/L    Sample VENOUS     Site Other     Allens Test N/A    POCT glucose    Collection Time: 07/01/24  5:51 AM   Result Value Ref Range    POCT Glucose 419 (H) 70 - 110 mg/dL   Beta - Hydroxybutyrate, Serum    Collection Time: 07/01/24  5:53 AM   Result Value Ref Range    Beta-Hydroxybutyrate 4.0 (H) 0.0 - 0.5 mmol/L   Comprehensive metabolic panel    Collection Time: 07/01/24  5:53 AM   Result Value Ref Range    Sodium 127 (L) 136 - 145 mmol/L    Potassium 3.8 3.5 - 5.1 mmol/L    Chloride 95 95 - 110 mmol/L    CO2 15 (L) 23 - 29 mmol/L    Glucose 461 (HH) 70 - 110 mg/dL    BUN 75 (H) 6 - 20 mg/dL    Creatinine 4.5 (H) 0.5 - 1.4 mg/dL    Calcium 7.7 (L) 8.7 - 10.5 mg/dL    Total Protein 5.8 (L) 6.0 - 8.4 g/dL    Albumin 1.7 (L) 3.5 - 5.2 g/dL    Total Bilirubin 0.4 0.1 - 1.0 mg/dL    Alkaline Phosphatase 135 55 - 135 U/L    AST 18 10 - 40 U/L    ALT 7 (L) 10 - 44 U/L    eGFR 10.7 (A) >60 mL/min/1.73 m^2    Anion Gap 17 (H) 8 - 16 mmol/L   Magnesium    Collection Time: 07/01/24  5:53 AM   Result Value Ref Range    Magnesium 1.9 1.6 - 2.6 mg/dL   Phosphorus    Collection Time: 07/01/24  5:53 AM   Result Value Ref Range    Phosphorus 3.9 2.7 - 4.5 mg/dL   POCT glucose    Collection Time: 07/01/24  6:31 AM   Result Value Ref Range    POCT Glucose 489 (HH) 70 - 110 mg/dL   ISTAT PROCEDURE    Collection Time: 07/01/24  6:52 AM   Result Value Ref Range    POC PH 7.312 (L) 7.35 - 7.45    POC PCO2 35.2 35 - 45 mmHg    POC PO2 36 (LL) 40 - 60 mmHg    POC HCO3 17.8 (L) 24 - 28 mmol/L    POC BE -8 (L) -2 to 2 mmol/L    POC SATURATED O2 63 95 - 100 %    POC Sodium 130 (L) 136 - 145 mmol/L    POC Potassium 3.7 3.5 - 5.1 mmol/L    POC TCO2 19 (L) 24 - 29 mmol/L    POC Ionized Calcium 1.10 1.06 - 1.42 mmol/L    POC Hematocrit 23 (L) 36 - 54 %PCV    Sample VENOUS     Site Other     Allens Test N/A     DelSys Room Air    ISTAT Lactate    Collection  Time: 07/01/24  6:54 AM   Result Value Ref Range    POC Lactate 1.76 0.5 - 2.2 mmol/L    Sample VENOUS     Site Other     Allens Test N/A     DelSys Room Air          Diagnostic Results:  X-Ray Chest AP Portable    Narrative    EXAMINATION:  XR CHEST AP PORTABLE    CLINICAL HISTORY:  Sepsis;    TECHNIQUE:  Single frontal view of the chest was performed.    COMPARISON:  Chest radiograph June 21, 2024    FINDINGS:  Single portable chest view is submitted.  Subcutaneous port central venous catheter again noted.  There is diminished depth of inspiration and mild elevation of the right hemidiaphragm.  When accounting for position and technique and depth of inspiration the appearance of the cardiomediastinal silhouette is stable.  Aortic atherosclerotic change noted.    There is persistent and worsening opacity associated with the right hemithorax, now with prominent opacity extending from the mid to peripheral right hemithorax, and extending from the base to the peripheral right apex, with a configuration that likely relates to pleural fluid with somewhat loculated configuration.  The right lung demonstrates associated parenchymal change likely relating to volume loss and atelectasis, with potential component of superimposed pulmonary infiltrates.  There is no evidence for pneumothorax bilaterally.    Mild accentuated parenchymal markings at the left lung base may relate to mild basilar infiltrate without focal consolidation.  There is no evidence for significant pleural fluid on the left.    The osseous structures demonstrate chronic change.      Impression    Progression of abnormality associated with the right hemithorax may relate to a large area of at least partially loculated pleural fluid, with parenchymal change likely relating to volume loss and atelectasis and possible superimposed infiltrate.    Suspected mild infiltrate at the left lung base.    This report was flagged in Epic as abnormal.      Electronically  signed by: Brandon Palencia  Date:    07/01/2024  Time:    00:50     CT Abdomen Pelvis  Without Contrast    Narrative    EXAMINATION:  CT ABDOMEN PELVIS WITHOUT CONTRAST    CLINICAL HISTORY:  Abdominal pain, acute, nonlocalized;    TECHNIQUE:  Low dose axial images, sagittal and coronal reformations were obtained from the lung bases to the pubic symphysis.  Intravenous contrast and oral contrast was not utilized, this diminishes the sensitivity of the examination.    COMPARISON:  Chest radiograph July 1, 2024, CT examination abdomen pelvis June 24, 2024    FINDINGS:  There is a large volume of pleural fluid noted at the right lung base, seen as an interval change when compared to the prior examination, this is incompletely imaged however the appearance would suggest some degree of loculation particularly when compared to the recent chest radiograph.  There is volume loss and atelectasis associated with the right lung and there may be a component of consolidation.  In addition there may be a component of mild shift of the cardiomediastinal structures to the left due to the aforementioned.  The visualized left lung base demonstrates mild motion artifact and atelectatic change.    Irregular calcific finding of the right breast again noted.  The stomach is incompletely distended, nonspecific appearance of mild fluid and air within the gastric lumen.    The previously identified subdiaphragmatic fluid collection is again noted, it appears to have diminished in size, now measuring approximately 5.3 by 7.4 cm and the adjacent smaller collection is not definitively seen noting limitations of this exam.  There is a small hypodensity at the dome of the liver.  The previously identified abnormality at the medial aspect of the right lobe of the liver is not as well delineated on this exam.    When accounting for limitations of the exam there is no evidence for acute process of the pancreas, spleen or adrenal glands.  There is no  evidence for hydronephrosis or obstructive uropathy or primary perinephric inflammatory change.  The arterial vascular structures demonstrate atherosclerotic change.  The aorta appears normal in caliber otherwise not optimally evaluated.  The urinary bladder appears decompressed.  The patient is status post hysterectomy.    Previously identified mass lesion within the left abdomen is again noted, difficult to accurately measure, however measures approximately 5.9 x 8.2 cm, with adjacent mass lesion again noted measuring approximately 3.9 x 3.9 cm.  Additional soft tissue lesions throughout the abdomen and pelvis are noted, not optimally evaluated on this examination however overall appearing similar to the prior study without a large degree of interval change.    There is free fluid of the abdomen and pelvis noted, the volume is diminished.  There is no evidence for small bowel obstructive process.  The appendix is identified, difficult to delineate in its entirety, it does not appear inflamed.  There is no evidence for inflammatory or obstructive process of the colon, mild prominence of the colon with stool is noted.  There is no evidence for free intraperitoneal air.    There is accentuated attenuation of the body wall fat planes, this may relate to a general pattern of body edema.  The visualized osseous structures demonstrate chronic change.      Impression    Interval development of large volume of pleural fluid at the right lung base, with appearance that may relate to components of loculation, there is associated volume loss and suspected consolidation at the right lower lobe, as well as suspected mild right to left midline shift of the cardiomediastinal structures.    Previously identified subdiaphragmatic collection appears diminished in size, as discussed above.    Previously identified soft tissue mass lesions of the abdomen and pelvis are again noted, not optimally evaluated on this noncontrast  examination however without a large degree of interval change appreciated.    Free fluid of the abdomen pelvis is noted, diminished in volume.    Accentuated attenuation of the body wall fat planes likely relates to a pattern of body edema.    Mild prominence of the colon with stool without inflammatory or obstructive pattern.    Additional findings as above.    This report was flagged in Epic as abnormal.      Electronically signed by: Brandon Palencia  Date:    07/01/2024  Time:    04:18       Assessment/Plan:   Neuro:   Seizure disorder     - Continue home lamictal    Depression     - Continue home seroquel, ativan prn    Cardiac:  Hypertension     - Hold home norvasc, enalapril, HCTZ    Hyperlipidemia     - Hold home statin    Pulmonary:   Malignant pleural effusion     - IR was previously consulted for drainage during past admission and was unable to perform procedure due to minimal fluid visualized     - CXR obtained in ED with worsening effusions bilaterally     - Will start IV antibiotics for possible infected effusion   - Plan for linezolid/zosyn; will attempt to avoid nephrotoxic agents   - Consult ID for further recs     - Consult IR for possible drainage of effusion        - Plan for transfer to ICU 2/2 worsening tachypnea after administration of fluids    Pulmonary embolism     - Hold home therapeutic lovenox, last dose yesterday AM    Renal:   JASON     - Patient's baseline Cr 0.7-0.8     - Cr today 4.6     - Elevated anion gap metabolic acidosis noted on CMP and VBG     - Discussed with hospital medicine, appreciate recs     - Strict I/Os, will have junior placed for further monitoring     - Trend Cr q4h during management of acute hyperglycemia and metabolic acidosis     - Avoid nephrotoxic agents    Infectious Disease:   - WCC upon discharge 24, today 20  - Lactate 2.2  - S/p vanc/zosyn in ED  - Will continue antibiotics for possible superinfected effusion and consult ID for  recs    Hematology/Oncology:  Stage IVB ovarian carcinosarcoma  - See onc history as above    Endocrine:  Diabetes mellitus     - Home regimen: lantus 9u, novolog 6u TIDWM     - Hold home medications     - Initial BG on admit 403, repeat POCT 420     - Elevated anion gap metabolic acidosis noted on further workup     - Beta-hydroxybutyrate 4.0     - Suspect JASON may be secondary to dehydration vs DKA, will start IVF, insulin drip     - Trend CMP, Mg, Phos q4h    GI:   - NPO for possible IR procedure with IV fluids  - replace electrolytes as needed to keep K>4, Mg>2  - bowel reg - daily miralax, docusate  - famotidine for GI prophylaxis    Dispo:  - continue care in the ICU    Argentina Mccloud MD  Obstetrics & Gynecology  [facility] - Oncology ([facility])

## 2024-07-01 NOTE — CONSULTS
[facility] - Cardiac Medical ICU  Nephrology  Consult Note    Patient Name: Anette Ricci  MRN: [id number]  Admission Date: 6/30/2024  Hospital Length of Stay: 0 days  Attending Provider: Maria Antonia Pierce MD   Primary Care Physician: Mark Chua MD  Principal Problem:Diabetic ketoacidosis without coma associated with type 2 diabetes mellitus    Inpatient consult to Nephrology  Consult performed by: Be Jara, NP  Consult ordered by: Sacha Hilario MD  Reason for consult: JASON        Subjective:     HPI: Ms. Ricci is a 59 yo female with hx of DM, HLD, HTN, PE, Stg IV Ovarian Ca s/p C3 of carbo/taxol/bevacizumab on 5/31 and plans to start Doxorubicin therapy who presents to the hospital on 6/30 with CC of abdominal pain, nausea, vomiting, and SOB x 1 day.  She was recently admitted on 6/20-6/27 for SOB 2/2 pleural effusion (thought to be malignant) that was unable to be drained by IR.  She was treated during that admission with zosyn x 6 days for recurrent fevers, workup negative.  According to family at bedside, she has had relatively poor PO intake since discharge.  On this admission, labs were notable for a WBC of 20k, Na 129, CO2 14, AG 21, , Beta hydroxybutyrate > 4.  UA + ketones.  She was started on DKA protocol and admitted to Gyn/Onc service.  On 7/1, MICU was consulted for worsening mental status, tachypnea and further management of DKA.  Nephrology was consulted at that time for JASON evaluation.    According to records, Ms. Ricci has normal kidney function at baseline with sCR of 0.8.  This admission she arrives with a sCR of 4.6.  UA with 2+ protein, 3+ OB, 40 WBC, + ketones, Mohsen 17.  On chart review, she has a baseline SCr of 0.8, discharged during her last admission with a SCR of 1.5 on 6/27.  Of note, she did have a CT with contrast performed on 6/24.  Family at bedside report decreased PO intake prior to this presentation.  No hx of NSAID use.    Past Medical History:    Diagnosis Date    Breast cancer 2013    Diabetes mellitus     Genetic testing 05/29/2013    VUS    Hyperlipidemia     Hypertension     Malignant neoplasm of upper-outer quadrant of right breast in female, estrogen receptor positive 12/02/2015    Seizure disorder        Past Surgical History:   Procedure Laterality Date    BILATERAL SALPINGO-OOPHORECTOMY (BSO) N/A 3/25/2024    Procedure: SALPINGO-OOPHORECTOMY, BILATERAL;  Surgeon: Александр Washington MD;  Location: [facility] OR [address] [place] [address];  Service: OB/GYN;  Laterality: N/A;    BREAST BIOPSY      BREAST LUMPECTOMY Right 2013    CHOLECYSTECTOMY  3/25/2024    Procedure: CHOLECYSTECTOMY;  Surgeon: Bo Farmer MD;  Location: [facility] OR [address] [place] [address];  Service: General;;    DEBULKING OF TUMOR N/A 3/25/2024    Procedure: DEBULKING, NEOPLASM;  Surgeon: Александр Washington MD;  Location: [facility] OR [address] [place] [address];  Service: OB/GYN;  Laterality: N/A;    EXCISION, LIVER N/A 3/25/2024    Procedure: EXCISION, LIVER - partial;  Surgeon: Bo Farmer MD;  Location: [facility] OR [address] [place] [address];  Service: General;  Laterality: N/A;  bk ultrasound  [place] book by TA on 3-21-24  7:00 a.m.  Conf #  [id number]    EYE SURGERY      LAPAROTOMY, EXPLORATORY N/A 3/25/2024    Procedure: LAPAROTOMY, EXPLORATORY;  Surgeon: Александр Washington MD;  Location: [facility] OR [address] [place] [address];  Service: OB/GYN;  Laterality: N/A;    OMENTECTOMY N/A 3/25/2024    Procedure: OMENTECTOMY;  Surgeon: Александр Washington MD;  Location: [facility] OR [address] [place] [address];  Service: OB/GYN;  Laterality: N/A;    PERITONEOCENTESIS N/A 3/13/2024    Procedure: PARACENTESIS, ABDOMINAL;  Surgeon: Flavia Moore MD;  Location: [facility] CATH LAB;  Service: Radiology;  Laterality: N/A;    PERITONEOCENTESIS N/A 3/21/2024    Procedure: PARACENTESIS, ABDOMINAL;  Surgeon: Jorge Jolley MD;  Location: [facility] CATH LAB;  Service: Radiology;  Laterality: N/A;    PERITONEOCENTESIS N/A 6/10/2024    Procedure: PARACENTESIS, ABDOMINAL;  Surgeon: Rogelio Malave MD;   Location: [facility] CATH LAB;  Service: Radiology;  Laterality: N/A;    TOTAL ABDOMINAL HYSTERECTOMY N/A 3/25/2024    Procedure: HYSTERECTOMY, TOTAL, ABDOMINAL;  Surgeon: Александр Washington MD;  Location: [facility] OR [address] [place] [address];  Service: OB/GYN;  Laterality: N/A;    TOTAL REDUCTION MAMMOPLASTY Bilateral 2016       Review of patient's allergies indicates:   Allergen Reactions    Codeine Other (See Comments)     Flu like s/s    Tramadol Other (See Comments)     Flu like symptoms similar to codeine reaction     Current Facility-Administered Medications   Medication Frequency    0.9 % NaCl with KCl 20 mEq infusion Continuous    acetaminophen tablet 650 mg Q6H PRN    dextrose 10 % infusion PRN    dextrose 10 % infusion PRN    dextrose 10% bolus 125 mL 125 mL PRN    dextrose 10% bolus 250 mL 250 mL PRN    dextrose 5 % and 0.45 % NaCl with KCl 20 mEq infusion Continuous PRN    insulin glargine U-100 (Lantus) pen 10 Units Daily    insulin regular in 0.9 % NaCl 100 unit/100 mL (1 unit/mL) infusion Continuous    lamoTRIgine tablet 25 mg BID    piperacillin-tazobactam (ZOSYN) 4.5 g in D5W 100 mL IVPB (MB+) Q12H    potassium chloride 40 mEq in 100 mL IVPB (FOR CENTRAL LINE ADMINISTRATION ONLY) Q4H    QUEtiapine tablet 25 mg Daily PRN    sodium chloride 0.9% flush 10 mL PRN    vancomycin - pharmacy to dose pharmacy to manage frequency     Family History       Problem Relation (Age of Onset)    Breast cancer Sister (48)    Colon cancer Sister    Hypertension Mother, Brother, Brother    Seizures Father          Tobacco Use    Smoking status: Never    Smokeless tobacco: Never   Substance and Sexual Activity    Alcohol use: Yes     Alcohol/week: 0.0 standard drinks of alcohol     Comment: ocassional    Drug use: No    Sexual activity: Not Currently     Partners: Male     Birth control/protection: None     Review of Systems   Unable to perform ROS: Acuity of condition     Objective:     Vital Signs (Most Recent):  Temp: 98.4 °F (36.9 °C)  (07/01/24 0900)  Pulse: 97 (07/01/24 1432)  Resp: (!) 30 (07/01/24 1432)  BP: (!) 154/60 (07/01/24 1432)  SpO2: 100 % (07/01/24 1432) Vital Signs (24h Range):  Temp:  [97 °F (36.1 °C)-98.4 °F (36.9 °C)] 98.4 °F (36.9 °C)  Pulse:  [] 97  Resp:  [17-34] 30  SpO2:  [90 %-100 %] 100 %  BP: ()/(42-90) 154/60     Weight: 86.9 kg (191 lb 9.3 oz) (07/01/24 0620)  Body mass index is 30.01 kg/m².  Body surface area is 2.03 meters squared.    I/O last 3 completed shifts:  In: 1849.7 [IV Piggyback:1849.7]  Out: -      Physical Exam  Vitals and nursing note reviewed.   Constitutional:       General: She is in acute distress.      Appearance: She is well-developed. She is ill-appearing. She is not toxic-appearing.   HENT:      Head: Normocephalic and atraumatic.      Mouth/Throat:      Mouth: Mucous membranes are dry.   Eyes:      General: No scleral icterus.        Right eye: No discharge.         Left eye: No discharge.      Extraocular Movements: Extraocular movements intact.      Conjunctiva/sclera: Conjunctivae normal.   Cardiovascular:      Rate and Rhythm: Normal rate and regular rhythm.      Heart sounds: No murmur heard.     No friction rub. No gallop.   Pulmonary:      Effort: Tachypnea present. No respiratory distress.      Breath sounds: Decreased breath sounds present. No rhonchi or rales.   Abdominal:      General: There is distension.      Palpations: Abdomen is soft.      Tenderness: There is no abdominal tenderness.   Musculoskeletal:         General: No swelling.      Cervical back: Normal range of motion and neck supple.      Right lower leg: No edema.      Left lower leg: No edema.   Skin:     General: Skin is warm and dry.   Neurological:      Mental Status: She is easily aroused. She is lethargic.          Significant Labs:  ABGs:   Recent Labs   Lab 07/01/24 0652   PH 7.312*   PCO2 35.2   HCO3 17.8*   POCSATURATED 63   BE -8*     CBC:   Recent Labs   Lab 07/01/24  0026 07/01/24  0652   WBC  20.03*  --    RBC 2.82*  --    HGB 7.4*  --    HCT 23.3* 23*     --    MCV 83  --    MCH 26.2*  --    MCHC 31.8*  --      CMP:   Recent Labs   Lab 07/01/24  0923   *   CALCIUM 8.0*   ALBUMIN 1.8*   PROT 6.2   *   K 3.0*   CO2 16*   CL 98   BUN 76*   CREATININE 4.6*   ALKPHOS 159*   ALT 7*   AST 21   BILITOT 0.3     Assessment/Plan:     Renal/  High anion gap metabolic acidosis  2/2 DKA  -Beta hydroxybutyrate 4/0  -Ketones +  -Mgmt per MICU    JASON (acute kidney injury)  JASON on Normal Renal Fx  -Baseline SCr 0.8.  59 yo female with recent admission from 6/20-6/27 for SOB 2/2 malignant effusion who presents on 6/30 for N/V/SOB on 7/1, found to be in DKA with JASON with SCr of 4.6.  I/O's not accurately recorded since admission, stepped up to ICU on 7/1 for higher level of care.  Multiple hypotensive episodes recorded.  Discharged on 6/27 during last admission, on that date had an JASON with SCr of 1.5, suspicious for ANNE as she had a CT with contrast 6/24 and also receiving Ibuprofen and ACEi.    Suspect a component of ANNE from previous admission playing a role, but also volume depletion from DKA and osmotic diuresis from glucosuria.    May have developed an iATN from hypotensive events.  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 (<1cm residual disease: sigmoid, transverse colon, diaphragm, kelvin hepatis)     Adjuvant therapy: Carboplatin AUC 6, paclitaxel 175 mg/m2, bevacizumab 15 mg/kg  C1D1: (holding yaneli) planned for 4/19/24, CA-125 248    -per Gyn/Onc    Endocrine  * Diabetic ketoacidosis without coma associated with type 2 diabetes mellitus  [facility]      Be Raman NP  Nephrology  [facility] - Card[facility] Medical [facility]

## 2024-07-01 NOTE — ASSESSMENT & PLAN NOTE
Creatinine up to 4.6 on admission; baseline 0.7. Likely prerenal in the setting of DKA; dehydration.    -- Nephrology consulted: recommend 1L NS bolus, LR @100 ml x 24 hours, Junior for I&Os  --IV Fluids per DKA protocol  --renal US  --Follow up UA  --Strict I&O  --Maintain junior  --Avoid nephrotoxic agents

## 2024-07-01 NOTE — ASSESSMENT & PLAN NOTE
On admission noted to have glucose 461, gap 17, Bicarb 15. Transferred to MICU for DKA.    -- On insulin gtt, AG closed, bicarb normalized -> transitioned to subQ on diabetic diet  -- 10u lantus, high dose SSI  --Q4hr accuchecks  --BMP Q4  --Hypoglycemia protocol

## 2024-07-01 NOTE — HPI
Anette Ricci is a 58 y.o.  with stage IVB ovarian carcinosarcoma s/p PDS on 3/25/24 with Dr Washington (CARLOS ALBERTO/BSO/OMX/liver mobilization/peritoneal & R diaphragm stripping/ hepatic wedge resection/cholecystectomy) on C3D32 of carbo/taxol/bevacizumab who presented to the ED with worsening SOB for 1 day. She also has a known right PE, persistent right pleural effusion, HTN, HLD, diabetes, anemia, and seizure disorder. She was recently admitted from - for management of this issue where IR reported inability to drain collection and was treated with multiple days of zosyn without any clear source of infection.      She reports that her shortness of breath was overall stable since discharge, however, worsened yesterday. She denies any fevers/chills, reports baseline nausea without vomiting. Reports her cough is unchanged from discharge. She denies sick contacts. She has been compliant with her medication since discharge, however, reports persistent hyperglycemia at home with sugars in the 300s-400s for the past 1-2 days.     Onc History:   3/4/24: peritoneal biopsy with carcinosarcoma  3/6/24: CA-125 418  3/25/24: Primary debulking surgery CARLOS ALBERTO/BSO, omentectomy, partial hepatectomy, debulking. (<1cm residual disease: sigmoid, transverse colon, diaphragm, kelvin hepatis)    Adjuvant therapy: Carboplatin AUC 6, paclitaxel 175 mg/m2, bevacizumab 15 mg/kg  24: C1D1: Carbo/Taxol/(Velma held), CA-125 248  5/10/24: C2D1: Carbo/Taxol/Velma  24: C3D1: Carbo/Taxol/Velma  24: Deferred due to hospitalization  24: scheduled for Doxil start

## 2024-07-02 PROBLEM — J86.9 EMPYEMA: Status: ACTIVE | Noted: 2024-07-02

## 2024-07-02 LAB
ACANTHOCYTES BLD QL SMEAR: PRESENT
ACID FAST MOD KINY STN SPEC: NORMAL
ALBUMIN SERPL BCP-MCNC: 1.6 G/DL (ref 3.5–5.2)
ALBUMIN SERPL BCP-MCNC: 1.6 G/DL (ref 3.5–5.2)
ALBUMIN SERPL BCP-MCNC: 1.7 G/DL (ref 3.5–5.2)
ALBUMIN SERPL BCP-MCNC: 1.8 G/DL (ref 3.5–5.2)
ALP SERPL-CCNC: 123 U/L (ref 55–135)
ALP SERPL-CCNC: 126 U/L (ref 55–135)
ALP SERPL-CCNC: 136 U/L (ref 55–135)
ALP SERPL-CCNC: 142 U/L (ref 55–135)
ALP SERPL-CCNC: 142 U/L (ref 55–135)
ALP SERPL-CCNC: 144 U/L (ref 55–135)
ALT SERPL W/O P-5'-P-CCNC: 5 U/L (ref 10–44)
ALT SERPL W/O P-5'-P-CCNC: 5 U/L (ref 10–44)
ALT SERPL W/O P-5'-P-CCNC: 6 U/L (ref 10–44)
ALT SERPL W/O P-5'-P-CCNC: 6 U/L (ref 10–44)
ALT SERPL W/O P-5'-P-CCNC: 7 U/L (ref 10–44)
ALT SERPL W/O P-5'-P-CCNC: 7 U/L (ref 10–44)
ANION GAP SERPL CALC-SCNC: 11 MMOL/L (ref 8–16)
ANION GAP SERPL CALC-SCNC: 11 MMOL/L (ref 8–16)
ANION GAP SERPL CALC-SCNC: 12 MMOL/L (ref 8–16)
ANION GAP SERPL CALC-SCNC: 12 MMOL/L (ref 8–16)
ANION GAP SERPL CALC-SCNC: 13 MMOL/L (ref 8–16)
ANION GAP SERPL CALC-SCNC: 14 MMOL/L (ref 8–16)
ANISOCYTOSIS BLD QL SMEAR: ABNORMAL
APTT PPP: 29.2 SEC (ref 21–32)
AST SERPL-CCNC: 21 U/L (ref 10–40)
AST SERPL-CCNC: 22 U/L (ref 10–40)
AST SERPL-CCNC: 25 U/L (ref 10–40)
AST SERPL-CCNC: 26 U/L (ref 10–40)
AST SERPL-CCNC: 28 U/L (ref 10–40)
AST SERPL-CCNC: 33 U/L (ref 10–40)
BACTERIA UR CULT: ABNORMAL
BASO STIPL BLD QL SMEAR: ABNORMAL
BASOPHILS # BLD AUTO: ABNORMAL K/UL (ref 0–0.2)
BASOPHILS NFR BLD: 1 % (ref 0–1.9)
BILIRUB SERPL-MCNC: 0.3 MG/DL (ref 0.1–1)
BILIRUB SERPL-MCNC: 0.4 MG/DL (ref 0.1–1)
BILIRUB SERPL-MCNC: 0.4 MG/DL (ref 0.1–1)
BILIRUB SERPL-MCNC: 0.5 MG/DL (ref 0.1–1)
BUN SERPL-MCNC: 69 MG/DL (ref 6–20)
BUN SERPL-MCNC: 70 MG/DL (ref 6–20)
BUN SERPL-MCNC: 71 MG/DL (ref 6–20)
BUN SERPL-MCNC: 72 MG/DL (ref 6–20)
BURR CELLS BLD QL SMEAR: ABNORMAL
CALCIUM SERPL-MCNC: 7.6 MG/DL (ref 8.7–10.5)
CALCIUM SERPL-MCNC: 7.7 MG/DL (ref 8.7–10.5)
CALCIUM SERPL-MCNC: 7.8 MG/DL (ref 8.7–10.5)
CALCIUM SERPL-MCNC: 7.8 MG/DL (ref 8.7–10.5)
CALCIUM SERPL-MCNC: 7.9 MG/DL (ref 8.7–10.5)
CALCIUM SERPL-MCNC: 8 MG/DL (ref 8.7–10.5)
CHLORIDE SERPL-SCNC: 102 MMOL/L (ref 95–110)
CHLORIDE SERPL-SCNC: 102 MMOL/L (ref 95–110)
CHLORIDE SERPL-SCNC: 103 MMOL/L (ref 95–110)
CHLORIDE SERPL-SCNC: 103 MMOL/L (ref 95–110)
CHLORIDE SERPL-SCNC: 104 MMOL/L (ref 95–110)
CHLORIDE SERPL-SCNC: 105 MMOL/L (ref 95–110)
CO2 SERPL-SCNC: 17 MMOL/L (ref 23–29)
CO2 SERPL-SCNC: 17 MMOL/L (ref 23–29)
CO2 SERPL-SCNC: 18 MMOL/L (ref 23–29)
CO2 SERPL-SCNC: 19 MMOL/L (ref 23–29)
CREAT SERPL-MCNC: 3.4 MG/DL (ref 0.5–1.4)
CREAT SERPL-MCNC: 3.4 MG/DL (ref 0.5–1.4)
CREAT SERPL-MCNC: 3.7 MG/DL (ref 0.5–1.4)
CREAT SERPL-MCNC: 3.7 MG/DL (ref 0.5–1.4)
CREAT SERPL-MCNC: 4 MG/DL (ref 0.5–1.4)
CREAT SERPL-MCNC: 4 MG/DL (ref 0.5–1.4)
CREAT UR-MCNC: 119 MG/DL (ref 15–325)
DIFFERENTIAL METHOD BLD: ABNORMAL
EOSINOPHIL # BLD AUTO: ABNORMAL K/UL (ref 0–0.5)
EOSINOPHIL NFR BLD: 1 % (ref 0–8)
ERYTHROCYTE [DISTWIDTH] IN BLOOD BY AUTOMATED COUNT: 20.6 % (ref 11.5–14.5)
EST. GFR  (NO RACE VARIABLE): 12.3 ML/MIN/1.73 M^2
EST. GFR  (NO RACE VARIABLE): 12.3 ML/MIN/1.73 M^2
EST. GFR  (NO RACE VARIABLE): 13.5 ML/MIN/1.73 M^2
EST. GFR  (NO RACE VARIABLE): 13.5 ML/MIN/1.73 M^2
EST. GFR  (NO RACE VARIABLE): 14.9 ML/MIN/1.73 M^2
EST. GFR  (NO RACE VARIABLE): 14.9 ML/MIN/1.73 M^2
GIANT PLATELETS BLD QL SMEAR: PRESENT
GLUCOSE SERPL-MCNC: 112 MG/DL (ref 70–110)
GLUCOSE SERPL-MCNC: 121 MG/DL (ref 70–110)
GLUCOSE SERPL-MCNC: 147 MG/DL (ref 70–110)
GLUCOSE SERPL-MCNC: 188 MG/DL (ref 70–110)
GLUCOSE SERPL-MCNC: 191 MG/DL (ref 70–110)
GLUCOSE SERPL-MCNC: 296 MG/DL (ref 70–110)
HCT VFR BLD AUTO: 21.3 % (ref 37–48.5)
HGB BLD-MCNC: 7 G/DL (ref 12–16)
HYPOCHROMIA BLD QL SMEAR: ABNORMAL
IMM GRANULOCYTES # BLD AUTO: ABNORMAL K/UL (ref 0–0.04)
IMM GRANULOCYTES NFR BLD AUTO: ABNORMAL % (ref 0–0.5)
INR PPP: 1.1 (ref 0.8–1.2)
LYMPHOCYTES # BLD AUTO: ABNORMAL K/UL (ref 1–4.8)
LYMPHOCYTES NFR BLD: 8 % (ref 18–48)
MAGNESIUM SERPL-MCNC: 1.8 MG/DL (ref 1.6–2.6)
MAGNESIUM SERPL-MCNC: 1.9 MG/DL (ref 1.6–2.6)
MCH RBC QN AUTO: 26 PG (ref 27–31)
MCHC RBC AUTO-ENTMCNC: 32.9 G/DL (ref 32–36)
MCV RBC AUTO: 79 FL (ref 82–98)
METAMYELOCYTES NFR BLD MANUAL: 2 %
MONOCYTES # BLD AUTO: ABNORMAL K/UL (ref 0.3–1)
MONOCYTES NFR BLD: 9 % (ref 4–15)
MYCOBACTERIUM SPEC QL CULT: NORMAL
MYELOCYTES NFR BLD MANUAL: 1 %
NEUTROPHILS NFR BLD: 75 % (ref 38–73)
NEUTS BAND NFR BLD MANUAL: 3 %
NRBC BLD-RTO: 1 /100 WBC
OVALOCYTES BLD QL SMEAR: ABNORMAL
PHOSPHATE SERPL-MCNC: 2.5 MG/DL (ref 2.7–4.5)
PHOSPHATE SERPL-MCNC: 2.6 MG/DL (ref 2.7–4.5)
PHOSPHATE SERPL-MCNC: 2.7 MG/DL (ref 2.7–4.5)
PHOSPHATE SERPL-MCNC: 2.8 MG/DL (ref 2.7–4.5)
PLATELET # BLD AUTO: 503 K/UL (ref 150–450)
PLATELET BLD QL SMEAR: ABNORMAL
PMV BLD AUTO: 9.7 FL (ref 9.2–12.9)
POCT GLUCOSE: 100 MG/DL (ref 70–110)
POCT GLUCOSE: 179 MG/DL (ref 70–110)
POCT GLUCOSE: 216 MG/DL (ref 70–110)
POCT GLUCOSE: 235 MG/DL (ref 70–110)
POCT GLUCOSE: 313 MG/DL (ref 70–110)
POCT GLUCOSE: 96 MG/DL (ref 70–110)
POIKILOCYTOSIS BLD QL SMEAR: ABNORMAL
POLYCHROMASIA BLD QL SMEAR: ABNORMAL
POTASSIUM SERPL-SCNC: 3.7 MMOL/L (ref 3.5–5.1)
POTASSIUM SERPL-SCNC: 3.8 MMOL/L (ref 3.5–5.1)
POTASSIUM SERPL-SCNC: 3.9 MMOL/L (ref 3.5–5.1)
POTASSIUM SERPL-SCNC: 4 MMOL/L (ref 3.5–5.1)
POTASSIUM SERPL-SCNC: 4 MMOL/L (ref 3.5–5.1)
POTASSIUM SERPL-SCNC: 4.1 MMOL/L (ref 3.5–5.1)
PROT SERPL-MCNC: 5.7 G/DL (ref 6–8.4)
PROT SERPL-MCNC: 6.1 G/DL (ref 6–8.4)
PROT UR-MCNC: 61 MG/DL (ref 0–15)
PROT/CREAT UR: 0.51 MG/G{CREAT} (ref 0–0.2)
PROTHROMBIN TIME: 11.9 SEC (ref 9–12.5)
RBC # BLD AUTO: 2.69 M/UL (ref 4–5.4)
SCHISTOCYTES BLD QL SMEAR: ABNORMAL
SCHISTOCYTES BLD QL SMEAR: PRESENT
SODIUM SERPL-SCNC: 132 MMOL/L (ref 136–145)
SODIUM SERPL-SCNC: 133 MMOL/L (ref 136–145)
SODIUM SERPL-SCNC: 133 MMOL/L (ref 136–145)
SODIUM SERPL-SCNC: 134 MMOL/L (ref 136–145)
SODIUM SERPL-SCNC: 134 MMOL/L (ref 136–145)
SODIUM SERPL-SCNC: 135 MMOL/L (ref 136–145)
SODIUM UR-SCNC: 40 MMOL/L (ref 20–250)
SODIUM UR-SCNC: 42 MMOL/L (ref 20–250)
TOXIC GRANULES BLD QL SMEAR: PRESENT
VANCOMYCIN SERPL-MCNC: 15.3 UG/ML
WBC # BLD AUTO: 17.15 K/UL (ref 3.9–12.7)

## 2024-07-02 PROCEDURE — 20000000 HC ICU ROOM: Mod: HCNC

## 2024-07-02 PROCEDURE — 63600175 PHARM REV CODE 636 W HCPCS: Mod: HCNC | Performed by: STUDENT IN AN ORGANIZED HEALTH CARE EDUCATION/TRAINING PROGRAM

## 2024-07-02 PROCEDURE — 84300 ASSAY OF URINE SODIUM: CPT | Mod: HCNC | Performed by: NURSE PRACTITIONER

## 2024-07-02 PROCEDURE — 63600175 PHARM REV CODE 636 W HCPCS: Mod: HCNC

## 2024-07-02 PROCEDURE — 0W9930Z DRAINAGE OF RIGHT PLEURAL CAVITY WITH DRAINAGE DEVICE, PERCUTANEOUS APPROACH: ICD-10-PCS | Performed by: INTERNAL MEDICINE

## 2024-07-02 PROCEDURE — 25000003 PHARM REV CODE 250: Mod: HCNC | Performed by: NURSE PRACTITIONER

## 2024-07-02 PROCEDURE — 84156 ASSAY OF PROTEIN URINE: CPT | Mod: HCNC | Performed by: NURSE PRACTITIONER

## 2024-07-02 PROCEDURE — C1729 CATH, DRAINAGE: HCPCS | Mod: HCNC

## 2024-07-02 PROCEDURE — 85007 BL SMEAR W/DIFF WBC COUNT: CPT | Mod: HCNC | Performed by: NURSE PRACTITIONER

## 2024-07-02 PROCEDURE — 25000003 PHARM REV CODE 250: Mod: HCNC | Performed by: STUDENT IN AN ORGANIZED HEALTH CARE EDUCATION/TRAINING PROGRAM

## 2024-07-02 PROCEDURE — 80202 ASSAY OF VANCOMYCIN: CPT | Mod: HCNC | Performed by: INTERNAL MEDICINE

## 2024-07-02 PROCEDURE — 32551 INSERTION OF CHEST TUBE: CPT | Mod: HCNC

## 2024-07-02 PROCEDURE — 25000003 PHARM REV CODE 250: Mod: HCNC | Performed by: INTERNAL MEDICINE

## 2024-07-02 PROCEDURE — 85610 PROTHROMBIN TIME: CPT | Mod: HCNC | Performed by: STUDENT IN AN ORGANIZED HEALTH CARE EDUCATION/TRAINING PROGRAM

## 2024-07-02 PROCEDURE — 63600175 PHARM REV CODE 636 W HCPCS: Mod: HCNC | Performed by: INTERNAL MEDICINE

## 2024-07-02 PROCEDURE — 99233 SBSQ HOSP IP/OBS HIGH 50: CPT | Mod: FS,HCNC,, | Performed by: HOSPITALIST

## 2024-07-02 PROCEDURE — 85027 COMPLETE CBC AUTOMATED: CPT | Mod: HCNC | Performed by: NURSE PRACTITIONER

## 2024-07-02 PROCEDURE — 80053 COMPREHEN METABOLIC PANEL: CPT | Mod: 91,HCNC | Performed by: NURSE PRACTITIONER

## 2024-07-02 PROCEDURE — 27000207 HC ISOLATION: Mod: HCNC

## 2024-07-02 PROCEDURE — 84100 ASSAY OF PHOSPHORUS: CPT | Mod: 91,HCNC | Performed by: NURSE PRACTITIONER

## 2024-07-02 PROCEDURE — 94761 N-INVAS EAR/PLS OXIMETRY MLT: CPT | Mod: HCNC

## 2024-07-02 PROCEDURE — 25000003 PHARM REV CODE 250: Mod: HCNC

## 2024-07-02 PROCEDURE — 63600175 PHARM REV CODE 636 W HCPCS: Mod: HCNC | Performed by: NURSE PRACTITIONER

## 2024-07-02 PROCEDURE — 85730 THROMBOPLASTIN TIME PARTIAL: CPT | Mod: HCNC | Performed by: STUDENT IN AN ORGANIZED HEALTH CARE EDUCATION/TRAINING PROGRAM

## 2024-07-02 PROCEDURE — 99223 1ST HOSP IP/OBS HIGH 75: CPT | Mod: HCNC,,, | Performed by: INTERNAL MEDICINE

## 2024-07-02 PROCEDURE — 83735 ASSAY OF MAGNESIUM: CPT | Mod: 91,HCNC | Performed by: NURSE PRACTITIONER

## 2024-07-02 RX ORDER — HYDROMORPHONE HYDROCHLORIDE 1 MG/ML
0.2 INJECTION, SOLUTION INTRAMUSCULAR; INTRAVENOUS; SUBCUTANEOUS ONCE
Status: COMPLETED | OUTPATIENT
Start: 2024-07-02 | End: 2024-07-02

## 2024-07-02 RX ORDER — HYDROCODONE BITARTRATE AND ACETAMINOPHEN 7.5; 325 MG/1; MG/1
1 TABLET ORAL EVERY 6 HOURS PRN
Status: CANCELLED | OUTPATIENT
Start: 2024-07-02

## 2024-07-02 RX ORDER — LIDOCAINE HYDROCHLORIDE 10 MG/ML
INJECTION, SOLUTION EPIDURAL; INFILTRATION; INTRACAUDAL; PERINEURAL
Status: DISPENSED
Start: 2024-07-02 | End: 2024-07-03

## 2024-07-02 RX ORDER — DIPHENHYDRAMINE HCL 25 MG
25 CAPSULE ORAL EVERY 6 HOURS PRN
Status: DISCONTINUED | OUTPATIENT
Start: 2024-07-02 | End: 2024-07-13 | Stop reason: HOSPADM

## 2024-07-02 RX ORDER — INSULIN ASPART 100 [IU]/ML
3 INJECTION, SOLUTION INTRAVENOUS; SUBCUTANEOUS
Status: DISCONTINUED | OUTPATIENT
Start: 2024-07-02 | End: 2024-07-06

## 2024-07-02 RX ORDER — HEPARIN SODIUM,PORCINE/D5W 25000/250
0-40 INTRAVENOUS SOLUTION INTRAVENOUS CONTINUOUS
Status: DISCONTINUED | OUTPATIENT
Start: 2024-07-02 | End: 2024-07-03

## 2024-07-02 RX ORDER — LIDOCAINE HYDROCHLORIDE 10 MG/ML
1 INJECTION INFILTRATION; PERINEURAL ONCE
Status: CANCELLED | OUTPATIENT
Start: 2024-07-02 | End: 2024-07-02

## 2024-07-02 RX ORDER — INSULIN GLARGINE 100 [IU]/ML
15 INJECTION, SOLUTION SUBCUTANEOUS DAILY
Status: DISCONTINUED | OUTPATIENT
Start: 2024-07-02 | End: 2024-07-04

## 2024-07-02 RX ORDER — HYDROMORPHONE HYDROCHLORIDE 1 MG/ML
0.2 INJECTION, SOLUTION INTRAMUSCULAR; INTRAVENOUS; SUBCUTANEOUS EVERY 6 HOURS PRN
Status: DISCONTINUED | OUTPATIENT
Start: 2024-07-02 | End: 2024-07-02

## 2024-07-02 RX ADMIN — MEROPENEM 1 G: 1 INJECTION INTRAVENOUS at 10:07

## 2024-07-02 RX ADMIN — HYDROMORPHONE HYDROCHLORIDE 0.2 MG: 1 INJECTION, SOLUTION INTRAMUSCULAR; INTRAVENOUS; SUBCUTANEOUS at 08:07

## 2024-07-02 RX ADMIN — HEPARIN SODIUM 12 UNITS/KG/HR: 10000 INJECTION, SOLUTION INTRAVENOUS at 08:07

## 2024-07-02 RX ADMIN — HYDROMORPHONE HYDROCHLORIDE 0.2 MG: 1 INJECTION, SOLUTION INTRAMUSCULAR; INTRAVENOUS; SUBCUTANEOUS at 05:07

## 2024-07-02 RX ADMIN — LAMOTRIGINE 25 MG: 25 TABLET ORAL at 08:07

## 2024-07-02 RX ADMIN — INSULIN ASPART 3 UNITS: 100 INJECTION, SOLUTION INTRAVENOUS; SUBCUTANEOUS at 04:07

## 2024-07-02 RX ADMIN — DIPHENHYDRAMINE HYDROCHLORIDE 25 MG: 25 CAPSULE ORAL at 09:07

## 2024-07-02 RX ADMIN — VANCOMYCIN HYDROCHLORIDE 500 MG: 500 INJECTION, POWDER, LYOPHILIZED, FOR SOLUTION INTRAVENOUS at 09:07

## 2024-07-02 RX ADMIN — INSULIN ASPART 6 UNITS: 100 INJECTION, SOLUTION INTRAVENOUS; SUBCUTANEOUS at 08:07

## 2024-07-02 RX ADMIN — INSULIN ASPART 12 UNITS: 100 INJECTION, SOLUTION INTRAVENOUS; SUBCUTANEOUS at 12:07

## 2024-07-02 RX ADMIN — INSULIN ASPART 3 UNITS: 100 INJECTION, SOLUTION INTRAVENOUS; SUBCUTANEOUS at 12:07

## 2024-07-02 RX ADMIN — INSULIN ASPART 6 UNITS: 100 INJECTION, SOLUTION INTRAVENOUS; SUBCUTANEOUS at 04:07

## 2024-07-02 RX ADMIN — INSULIN GLARGINE 15 UNITS: 100 INJECTION, SOLUTION SUBCUTANEOUS at 08:07

## 2024-07-02 RX ADMIN — MEROPENEM 1 G: 1 INJECTION INTRAVENOUS at 09:07

## 2024-07-02 RX ADMIN — PIPERACILLIN SODIUM AND TAZOBACTAM SODIUM 4.5 G: 4; .5 INJECTION, POWDER, FOR SOLUTION INTRAVENOUS at 01:07

## 2024-07-02 NOTE — PROGRESS NOTES
[facility] - Cardiac Medical ICU  Critical Care Medicine  Progress Note    Patient Name: Anette Ricci  MRN: [id number]  Admission Date: 2024  Hospital Length of Stay: 1 days  Code Status: Full Code  Attending Provider: Maria Antonia Pierce MD  Primary Care Provider: Mark Chua MD   Principal Problem: Diabetic ketoacidosis without coma associated with type 2 diabetes mellitus    Subjective:     HPI:  Anette Ricci is a 58 y.o.  with stage IVB ovarian carcinosarcoma s/p PDS on 3/25/24 with Dr Washington (CARLOS ALBERTO/BSO/OMX/liver mobilization/peritoneal & R diaphragm stripping/ hepatic wedge resection/cholecystectomy) on C3D32 of carbo/taxol/bevacizumab who presented to the ED with worsening SOB for 1 day. She also has a known right PE, persistent right pleural effusion, HTN, HLD, diabetes, anemia, and seizure disorder. She was recently admitted from - for management of this issue where IR reported inability to drain collection and was treated with multiple days of zosyn without any clear source of infection.      She reports that her shortness of breath was overall stable since discharge, however, worsened yesterday. She denies any fevers/chills, reports baseline nausea without vomiting. Reports her cough is unchanged from discharge. She denies sick contacts. She has been compliant with her medication since discharge, however, reports persistent hyperglycemia at home with sugars in the 300s-400s for the past 1-2 days.      She was admitted to Gyn- Oncology for pleural effusion and JASON but transferred to MICU the following day for DKA requiring insulin drip.    Hospital/ICU Course:  No notes on file    Interval History/ Significant Events: Endorses feeling improved compared to yesterday. Tolerating PO intake with breakfast.     Past Medical History:   Diagnosis Date    Breast cancer     Diabetes mellitus     Genetic testing 2013    VUS    Hyperlipidemia     Hypertension     Malignant  neoplasm of upper-outer quadrant of right breast in female, estrogen receptor positive 12/02/2015    Seizure disorder        Past Surgical History:   Procedure Laterality Date    BILATERAL SALPINGO-OOPHORECTOMY (BSO) N/A 3/25/2024    Procedure: SALPINGO-OOPHORECTOMY, BILATERAL;  Surgeon: Александр Washington MD;  Location: [facility] OR [address] [place] [address];  Service: OB/GYN;  Laterality: N/A;    BREAST BIOPSY      BREAST LUMPECTOMY Right 2013    CHOLECYSTECTOMY  3/25/2024    Procedure: CHOLECYSTECTOMY;  Surgeon: Bo Farmer MD;  Location: [facility] OR [address] [place] [address];  Service: General;;    DEBULKING OF TUMOR N/A 3/25/2024    Procedure: DEBULKING, NEOPLASM;  Surgeon: Александр Washington MD;  Location: [facility] OR [address] [place] [address];  Service: OB/GYN;  Laterality: N/A;    EXCISION, LIVER N/A 3/25/2024    Procedure: EXCISION, LIVER - partial;  Surgeon: Bo Farmer MD;  Location: [facility] OR [address] [place] [address];  Service: General;  Laterality: N/A;  bk ultrasound  [place] book by TA on 3-21-24  7:00 a.m.  Conf #  [id number]    EYE SURGERY      LAPAROTOMY, EXPLORATORY N/A 3/25/2024    Procedure: LAPAROTOMY, EXPLORATORY;  Surgeon: Александр Washington MD;  Location: [facility] OR [address] [place] [address];  Service: OB/GYN;  Laterality: N/A;    OMENTECTOMY N/A 3/25/2024    Procedure: OMENTECTOMY;  Surgeon: Александр Washington MD;  [facility];  Service: OB/GYN;  Laterality: N/A;    PERITONEOCENTESIS N/A 3/13/2024    Procedure: PARACENTESIS, ABDOMINAL;  Surgeon: Flavia Moore MD;  Location: [facility] CATH LAB;  Service: Radiology;  Laterality: N/A;    PERITONEOCENTESIS N/A 3/21/2024    Procedure: PARACENTESIS, ABDOMINAL;  Surgeon: Jorge Jolley MD;  Location: [facility] CATH LAB;  Service: Radiology;  Laterality: N/A;    PERITONEOCENTESIS N/A 6/10/2024    Procedure: PARACENTESIS, ABDOMINAL;  Surgeon: Rogelio Malave MD;  Location: [facility] CATH LAB;  Service: Radiology;  Laterality: N/A;    TOTAL ABDOMINAL HYSTERECTOMY N/A 3/25/2024    Procedure: HYSTERECTOMY, TOTAL,  ABDOMINAL;  Surgeon: Александр Washington MD;  Location: [facility] OR [address] [place] [address];  Service: OB/GYN;  Laterality: N/A;    TOTAL REDUCTION MAMMOPLASTY Bilateral 2016       Review of patient's allergies indicates:   Allergen Reactions    Codeine Other (See Comments)     Flu like s/s    Tramadol Other (See Comments)     Flu like symptoms similar to codeine reaction       Family History       Problem Relation (Age of Onset)    Breast cancer Sister (48)    Colon cancer Sister    Hypertension Mother, Brother, Brother    Seizures Father          Tobacco Use    Smoking status: Never    Smokeless tobacco: Never   Substance and Sexual Activity    Alcohol use: Yes     Alcohol/week: 0.0 standard drinks of alcohol     Comment: ocassional    Drug use: No    Sexual activity: Not Currently     Partners: Male     Birth control/protection: None      Review of Systems   Constitutional:  Positive for fatigue. Negative for fever.   Respiratory:  Positive for cough and shortness of breath.    Gastrointestinal:  Positive for diarrhea, nausea and vomiting. Negative for constipation.   Genitourinary:  Negative for dysuria and flank pain.   Skin:  Negative for color change and wound.   Neurological:  Negative for seizures and syncope.   Psychiatric/Behavioral:  Negative for agitation and confusion.      Objective:     Vital Signs (Most Recent):  Temp: 98.4 °F (36.9 °C) (07/02/24 0900)  Pulse: 96 (07/02/24 1400)  Resp: 17 (07/02/24 1400)  BP: (!) 148/65 (07/02/24 1400)  SpO2: 100 % (07/02/24 1400) Vital Signs (24h Range):  Temp:  [97.9 °F (36.6 °C)-98.6 °F (37 °C)] 98.4 °F (36.9 °C)  Pulse:  [] 96  Resp:  [12-30] 17  SpO2:  [97 %-100 %] 100 %  BP: ()/(44-84) 148/65   Weight: 86.9 kg (191 lb 9.3 oz)  Body mass index is 30.01 kg/m².      Intake/Output Summary (Last 24 hours) at 7/2/2024 1414  Last data filed at 7/2/2024 1403  Gross per 24 hour   Intake 4227.8 ml   Output 1225 ml   Net 3002.8 ml          Physical Exam  Vitals and nursing  note reviewed.   Constitutional:       Comments: Talking on phone with family   HENT:      Head: Normocephalic and atraumatic.      Right Ear: External ear normal.      Left Ear: External ear normal.      Nose: Nose normal.      Mouth/Throat:      Mouth: Mucous membranes are moist.      Pharynx: Oropharynx is clear.   Cardiovascular:      Rate and Rhythm: Normal rate and regular rhythm.      Pulses: Normal pulses.   Pulmonary:      Comments: Diminished breath sounds R lung base, significantly improved from yesterday   Abdominal:      Tenderness: There is no guarding or rebound.      Comments: Well healing midline vertical incision   Musculoskeletal:      Right lower leg: No edema.      Left lower leg: No edema.   Skin:     General: Skin is warm and dry.   Neurological:      Mental Status: She is alert and oriented to person, place, and time.   Psychiatric:         Mood and Affect: Mood normal.         Behavior: Behavior normal.            Vents:     Lines/Drains/Airways       Central Venous Catheter Line  Duration             Port A Cath Single Lumen Subclavian Left -- days              Drain  Duration                  Urethral Catheter 07/01/24 1901 <1 day              Peripheral Intravenous Line  Duration                  Peripheral IV - Single Lumen 07/01/24 0000 24 G Anterior;Left Forearm 1 day         Peripheral IV - Single Lumen 07/01/24 0032 20 G Left Upper Arm 1 day                  Significant Labs:    CBC/Anemia Profile:  Recent Labs   Lab 07/01/24  0026 07/01/24  0652 07/02/24  0314   WBC 20.03*  --  17.15*   HGB 7.4*  --  7.0*   HCT 23.3* 23* 21.3*     --  503*   MCV 83  --  79*   RDW 19.9*  --  20.6*        Chemistries:  Recent Labs   Lab 07/02/24  0314 07/02/24  0808 07/02/24  1202   * 133* 132*   K 4.0 4.0 4.1    103 102   CO2 17* 19* 19*   BUN 69* 70* 72*   CREATININE 4.0* 3.7* 3.7*   CALCIUM 7.9* 7.8* 7.8*   ALBUMIN 1.8* 1.6* 1.7*   PROT 6.1 5.7* 6.1   BILITOT 0.4 0.4 0.5  "  ALKPHOS 144* 126 142*   ALT 7* 5* 7*   AST 33 25 28   MG 1.9 1.9 1.8   PHOS 2.5* 2.7 2.8       BMP:   Recent Labs   Lab 07/02/24  1202   *   *   K 4.1      CO2 19*   BUN 72*   CREATININE 3.7*   CALCIUM 7.8*   MG 1.8     CMP:   Recent Labs   Lab 07/02/24  0314 07/02/24  0808 07/02/24  1202   * 133* 132*   K 4.0 4.0 4.1    103 102   CO2 17* 19* 19*   * 188* 296*   BUN 69* 70* 72*   CREATININE 4.0* 3.7* 3.7*   CALCIUM 7.9* 7.8* 7.8*   PROT 6.1 5.7* 6.1   ALBUMIN 1.8* 1.6* 1.7*   BILITOT 0.4 0.4 0.5   ALKPHOS 144* 126 142*   AST 33 25 28   ALT 7* 5* 7*   ANIONGAP 14 11 11     Lactic Acid: No results for input(s): "LACTATE" in the last 48 hours.  All pertinent labs within the past 24 hours have been reviewed.    Significant Imaging: I have reviewed all pertinent imaging results/findings within the past 24 hours.    ABG  Recent Labs   Lab 07/01/24  0652   PH 7.312*   PO2 36*   PCO2 35.2   HCO3 17.8*   BE -8*     Assessment/Plan:     Neuro  Seizure disorder  Continue home lamictal    Psychiatric  Depression, reactive  Continue home seroquel    Pulmonary  Pleural effusion, malignant  Malignant pleural effusion     - IR was previously consulted for drainage during past admission and was unable to perform procedure due to minimal fluid visualized     - CXR obtained in ED with worsening effusions bilaterally     - Thoracentesis performed 7/1, follow up studies        -- Lipemic, yellow fluid, [id number] WBC 95% seg        -- No yeast or fungal so far        -- G negative rods        -- Pending triglyceride, protein, LDH, cholesterol, albumin, lipid analysis  -- Concern for empyema. CT chest with loculated appearance.  -- Will likely place chest tube  -- Covering with Meropenem/Vanc for suspected empyema    Renal/  JASON (acute kidney injury)  Creatinine up to 4.6 on admission; baseline 0.7. Likely prerenal in the setting of DKA; dehydration. Also possible component ATN 2/2 contrast " nephropathy at end of last admission.    -- Cr down trending 4.0  -- Nephrology consulted: recommend 1L NS bolus, LR @100 ml x 24 hours, Junior for I&Os  --renal US  --Strict I&O, maintain junior  --Avoid nephrotoxic agents    Hematology  Chronic pulmonary embolism without acute cor pulmonale  On Lovenox at home; hold for potential thoracentesis today    Oncology  Stage 4B Carcinoscaroma, Suspected ovarian origin  Gyn/Onc following    Endocrine  * Diabetic ketoacidosis without coma associated with type 2 diabetes mellitus  RESOLVED  On admission noted to have glucose 461, gap 17, Bicarb 15. Transferred to MICU for DKA.    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     C: SAT & SBT Coordinated?  N/a                      D: Delirium: CAM-ICU Overall CAM-ICU: Negative   E: Early Mobility Performed? Yes   F: Feeding Goal:    Status:     Current Diet Order   Procedures    Diet diabetic 2000 Calorie     Order Specific Question:   Total calories:     Answer:   2000 Calorie      AS: Analgesia/Sedation PRN acetaminophen   T: Thromboembolic Prophylaxis Held for likely chest tube   H: HOB > 300 Yes   U: Stress Ulcer Prophylaxis (if needed) N/a   G: Glucose Control Latnus 10u, aspart 3 TID, SSI   B: Bowel Function Stool Occurrence: 1   I: Indwelling Catheter (Lines & Junior) Necessity Junior for strict I&Os   D: De-escalation of Antimicrobials/Pharmacotherapies Broadened to Meropenem/Vanc    Plan for the day/ETD Continue ICU care     Code Status:  Family/Goals of Care: Full  Code         Critical secondary to Patient has a condition that poses threat to life and bodily function: DKA on insulin gtt      Critical care was time spent personally by me on the following activities: development of treatment plan with patient or surrogate and bedside caregivers, discussions with consultants, evaluation of patient's response to treatment, examination of patient, ordering and performing treatments and interventions, ordering and review of laboratory studies, ordering and review of radiographic studies, pulse oximetry, re-evaluation of patient's condition. This critical care time did not overlap with that of any other provider or involve time for any procedures.     El De La Garza MD  Critical Care Medicine  [facility] - Cardiac Medical ICU

## 2024-07-02 NOTE — PROGRESS NOTES
Progress Note  Gynecology    Admit Date: 2024  LOS: 1    Reason for Admission:  Diabetic ketoacidosis without coma associated with type 2 diabetes mellitus    SUBJECTIVE:     Anette Ricci is a 58 y.o.  with Stage IVB ovarian carcinosarcoma s/p PDS on 3/25/24 with Dr Washington (CARLOS ALBERTO/BSO/OMX/liver mobilization/peritoneal & R diaphragm stripping/ hepatic wedge resection/cholecystectomy) s/p C3 of carbo/taxol/bevacizumab on . Admitted to MICU  for the management of DKA, JASON, and symptomatic malignant pleural effusion vs pneumonia.     Overnight, pt reports symptomatic improvement in shortness of breath and cough. Reports she is fatigued, but feels better this morning. Denies pain. Has not yet eaten since admission but is beginning to feel hungry. Has not yet ambulated out of bed. Voiding without difficulty via junior.     OBJECTIVE:     Vital Signs   Temp:  [97 °F (36.1 °C)-98.6 °F (37 °C)] 98.6 °F (37 °C)  Pulse:  [] 89  Resp:  [12-38] 15  SpO2:  [94 %-100 %] 100 %  BP: ()/(42-90) 96/49      Intake/Output Summary (Last 24 hours) at 2024 0604  Last data filed at 2024 0400  Gross per 24 hour   Intake 4065.41 ml   Output 1000 ml   Net 3065.41 ml       Physical Exam:  Gen: Pt alert, A&Ox3, NAD  CV: RRR, normal heart sounds and intact distal pulses.  No edema on exam.   Pulm: Lung sounds reduced at right base although improved from yesterday. No stridor. No respiratory distress, normal respiratory effort  Abd: Mild-moderate distention although non-tender to palpation without rebound or guarding, midline vertical incision w/o erythema, induration, or discharge on bandage.   Ext: PPP, no peripheral edema, SCDs in place  : Junior in place draining dark yellow urine   Skin: Skin is warm and dry.     Laboratory:  Recent Results (from the past 24 hour(s))   POCT glucose    Collection Time: 24  6:31 AM   Result Value Ref Range    POCT Glucose 489 (HH) 70 - 110 mg/dL   ISTAT  PROCEDURE    Collection Time: 07/01/24  6:52 AM   Result Value Ref Range    POC PH 7.312 (L) 7.35 - 7.45    POC PCO2 35.2 35 - 45 mmHg    POC PO2 36 (LL) 40 - 60 mmHg    POC HCO3 17.8 (L) 24 - 28 mmol/L    POC BE -8 (L) -2 to 2 mmol/L    POC SATURATED O2 63 95 - 100 %    POC Sodium 130 (L) 136 - 145 mmol/L    POC Potassium 3.7 3.5 - 5.1 mmol/L    POC TCO2 19 (L) 24 - 29 mmol/L    POC Ionized Calcium 1.10 1.06 - 1.42 mmol/L    POC Hematocrit 23 (L) 36 - 54 %PCV    Sample VENOUS     Site Other     Allens Test N/A     DelSys Room Air    ISTAT Lactate    Collection Time: 07/01/24  6:54 AM   Result Value Ref Range    POC Lactate 1.76 0.5 - 2.2 mmol/L    Sample VENOUS     Site Other     Allens Test N/A     [facility] Room Air    POCT glucose    Collection Time: 07/01/24  7:20 AM   Result Value Ref Range    POCT Glucose 448 (H) 70 - 110 mg/dL   POCT glucose    Collection Time: 07/01/24  8:35 AM   Result Value Ref Range    POCT Glucose 456 (HH) 70 - 110 mg/dL   Comprehensive metabolic panel    Collection Time: 07/01/24  9:23 AM   Result Value Ref Range    Sodium 132 (L) 136 - 145 mmol/L    Potassium 3.0 (L) 3.5 - 5.1 mmol/L    Chloride 98 95 - 110 mmol/L    CO2 16 (L) 23 - 29 mmol/L    Glucose 312 (H) 70 - 110 mg/dL    BUN 76 (H) 6 - 20 mg/dL    Creatinine 4.6 (H) 0.5 - 1.4 mg/dL    Calcium 8.0 (L) 8.7 - 10.5 mg/dL    Total Protein 6.2 6.0 - 8.4 g/dL    Albumin 1.8 (L) 3.5 - 5.2 g/dL    Total Bilirubin 0.3 0.1 - 1.0 mg/dL    Alkaline Phosphatase 159 (H) 55 - 135 U/L    AST 21 10 - 40 U/L    ALT 7 (L) 10 - 44 U/L    eGFR 10.4 (A) >60 mL/min/1.73 m^2    Anion Gap 18 (H) 8 - 16 mmol/L   Magnesium    Collection Time: 07/01/24  9:23 AM   Result Value Ref Range    Magnesium 2.0 1.6 - 2.6 mg/dL   Phosphorus    Collection Time: 07/01/24  9:23 AM   Result Value Ref Range    Phosphorus 3.2 2.7 - 4.5 mg/dL   POCT glucose    Collection Time: 07/01/24  9:27 AM   Result Value Ref Range    POCT Glucose 376 (H) 70 - 110 mg/dL   Urinalysis,  Reflex to Urine Culture Urine, Catheterized    Collection Time: 07/01/24  9:43 AM    Specimen: Urine   Result Value Ref Range    Specimen UA Urine, Catheterized     Color, UA Yellow Yellow, Straw, Honey    Appearance, UA Cloudy (A) Clear    pH, UA 6.0 5.0 - 8.0    Specific Gravity, UA 1.030 1.005 - 1.030    Protein, UA 2+ (A) Negative    Glucose, UA Negative Negative    Ketones, UA Trace (A) Negative    Bilirubin (UA) Negative Negative    Occult Blood UA 3+ (A) Negative    Nitrite, UA Negative Negative    Leukocytes, UA 3+ (A) Negative   Sodium, urine, random    Collection Time: 07/01/24  9:43 AM   Result Value Ref Range    Sodium, Urine 17 (L) 20 - 250 mmol/L   Creatinine, urine, random    Collection Time: 07/01/24  9:43 AM   Result Value Ref Range    Creatinine, Urine 186.0 15.0 - 325.0 mg/dL   Urinalysis Microscopic    Collection Time: 07/01/24  9:43 AM   Result Value Ref Range    RBC, UA 10 (H) 0 - 4 /hpf    WBC, UA 40 (H) 0 - 5 /hpf    Bacteria Few (A) None-Occ /hpf    Yeast, UA Many (A) None    Squam Epithel, UA 6 /hpf    Hyaline Casts, UA 0 0-1/lpf /lpf    Microscopic Comment SEE COMMENT    POCT glucose    Collection Time: 07/01/24 10:40 AM   Result Value Ref Range    POCT Glucose 320 (H) 70 - 110 mg/dL   POCT glucose    Collection Time: 07/01/24 11:37 AM   Result Value Ref Range    POCT Glucose 228 (H) 70 - 110 mg/dL   POCT glucose    Collection Time: 07/01/24 12:31 PM   Result Value Ref Range    POCT Glucose 182 (H) 70 - 110 mg/dL   POCT glucose    Collection Time: 07/01/24  1:33 PM   Result Value Ref Range    POCT Glucose 163 (H) 70 - 110 mg/dL   Comprehensive metabolic panel    Collection Time: 07/01/24  2:41 PM   Result Value Ref Range    Sodium 131 (L) 136 - 145 mmol/L    Potassium 3.5 3.5 - 5.1 mmol/L    Chloride 100 95 - 110 mmol/L    CO2 19 (L) 23 - 29 mmol/L    Glucose 85 70 - 110 mg/dL    BUN 74 (H) 6 - 20 mg/dL    Creatinine 4.4 (H) 0.5 - 1.4 mg/dL    Calcium 8.1 (L) 8.7 - 10.5 mg/dL    Total  Protein 6.4 6.0 - 8.4 g/dL    Albumin 1.9 (L) 3.5 - 5.2 g/dL    Total Bilirubin 0.3 0.1 - 1.0 mg/dL    Alkaline Phosphatase 173 (H) 55 - 135 U/L    AST 24 10 - 40 U/L    ALT 6 (L) 10 - 44 U/L    eGFR 11.0 (A) >60 mL/min/1.73 m^2    Anion Gap 12 8 - 16 mmol/L   Magnesium    Collection Time: 07/01/24  2:41 PM   Result Value Ref Range    Magnesium 2.0 1.6 - 2.6 mg/dL   Phosphorus    Collection Time: 07/01/24  2:41 PM   Result Value Ref Range    Phosphorus 2.7 2.7 - 4.5 mg/dL   POCT glucose    Collection Time: 07/01/24  2:42 PM   Result Value Ref Range    POCT Glucose 106 70 - 110 mg/dL   Culture, Respiratory with Gram Stain    Collection Time: 07/01/24  3:05 PM    Specimen: Sputum, Induced; Respiratory   Result Value Ref Range    Gram Stain (Respiratory) <10 epithelial cells per low power field.     Gram Stain (Respiratory) Rare WBC's     Gram Stain (Respiratory) Moderate Gram positive cocci    POCT glucose    Collection Time: 07/01/24  3:37 PM   Result Value Ref Range    POCT Glucose 109 70 - 110 mg/dL   Culture, body fluid - Bactec    Collection Time: 07/01/24  5:11 PM    Specimen: Thoracentesis Fluid; Body Fluid   Result Value Ref Range    Body Fluid Culture, Sterile Gram stain: Gram negative rods     Body Fluid Culture, Sterile 07/02/2024  04:40    Gram stain    Collection Time: 07/01/24  5:12 PM    Specimen: Pleural Fluid; Body Fluid   Result Value Ref Range    Gram Stain Result Many WBC's     Gram Stain Result Rare Gram negative rods    RENAE prep    Collection Time: 07/01/24  5:12 PM    Specimen: Pleural Fluid; Body Fluid   Result Value Ref Range    KOH Prep No yeast or fungal elements seen    Lipid Analysis, body fluid Thoracentesis Fluid    Collection Time: 07/01/24  5:16 PM   Result Value Ref Range    Lipid Analysis Body Fluid Type Thoracentesis Fluid    WBC & Diff,Body Fluid Pleural Fluid, Right    Collection Time: 07/01/24  5:16 PM   Result Value Ref Range    Body Fluid Type Pleural Fluid, Right     Fluid  Appearance Lipemic     Fluid Color Yellow     WBC, Body Fluid 563958 /cu mm    Segs, Fluid 95 %    Lymphs, Fluid 3 %    Monocytes/Macrophages, Fluid 2 %   POCT glucose    Collection Time: 07/01/24  5:30 PM   Result Value Ref Range    POCT Glucose 117 (H) 70 - 110 mg/dL   POCT glucose    Collection Time: 07/01/24  6:04 PM   Result Value Ref Range    POCT Glucose 134 (H) 70 - 110 mg/dL   POCT glucose    Collection Time: 07/01/24  8:24 PM   Result Value Ref Range    POCT Glucose 120 (H) 70 - 110 mg/dL   Comprehensive metabolic panel    Collection Time: 07/01/24  8:25 PM   Result Value Ref Range    Sodium 131 (L) 136 - 145 mmol/L    Potassium 3.8 3.5 - 5.1 mmol/L    Chloride 103 95 - 110 mmol/L    CO2 19 (L) 23 - 29 mmol/L    Glucose 104 70 - 110 mg/dL    BUN 71 (H) 6 - 20 mg/dL    Creatinine 4.1 (H) 0.5 - 1.4 mg/dL    Calcium 7.6 (L) 8.7 - 10.5 mg/dL    Total Protein 5.7 (L) 6.0 - 8.4 g/dL    Albumin 1.7 (L) 3.5 - 5.2 g/dL    Total Bilirubin 0.3 0.1 - 1.0 mg/dL    Alkaline Phosphatase 129 55 - 135 U/L    AST 21 10 - 40 U/L    ALT 5 (L) 10 - 44 U/L    eGFR 11.9 (A) >60 mL/min/1.73 m^2    Anion Gap 9 8 - 16 mmol/L   Magnesium    Collection Time: 07/01/24  8:25 PM   Result Value Ref Range    Magnesium 1.8 1.6 - 2.6 mg/dL   Phosphorus    Collection Time: 07/01/24  8:25 PM   Result Value Ref Range    Phosphorus 2.7 2.7 - 4.5 mg/dL   Lactate dehydrogenase    Collection Time: 07/01/24  8:25 PM   Result Value Ref Range    LD 3,176 (H) 110 - 260 U/L   Comprehensive metabolic panel    Collection Time: 07/01/24 11:06 PM   Result Value Ref Range    Sodium 134 (L) 136 - 145 mmol/L    Potassium 3.9 3.5 - 5.1 mmol/L    Chloride 103 95 - 110 mmol/L    CO2 19 (L) 23 - 29 mmol/L    Glucose 121 (H) 70 - 110 mg/dL    BUN 71 (H) 6 - 20 mg/dL    Creatinine 4.0 (H) 0.5 - 1.4 mg/dL    Calcium 7.7 (L) 8.7 - 10.5 mg/dL    Total Protein 5.7 (L) 6.0 - 8.4 g/dL    Albumin 1.7 (L) 3.5 - 5.2 g/dL    Total Bilirubin 0.3 0.1 - 1.0 mg/dL    Alkaline  Phosphatase 142 (H) 55 - 135 U/L    AST 26 10 - 40 U/L    ALT 6 (L) 10 - 44 U/L    eGFR 12.3 (A) >60 mL/min/1.73 m^2    Anion Gap 12 8 - 16 mmol/L   Magnesium    Collection Time: 07/01/24 11:06 PM   Result Value Ref Range    Magnesium 1.9 1.6 - 2.6 mg/dL   Phosphorus    Collection Time: 07/01/24 11:06 PM   Result Value Ref Range    Phosphorus 2.7 2.7 - 4.5 mg/dL   POCT glucose    Collection Time: 07/01/24 11:06 PM   Result Value Ref Range    POCT Glucose 140 (H) 70 - 110 mg/dL   Comprehensive metabolic panel    Collection Time: 07/02/24  3:14 AM   Result Value Ref Range    Sodium 133 (L) 136 - 145 mmol/L    Potassium 4.0 3.5 - 5.1 mmol/L    Chloride 102 95 - 110 mmol/L    CO2 17 (L) 23 - 29 mmol/L    Glucose 147 (H) 70 - 110 mg/dL    BUN 69 (H) 6 - 20 mg/dL    Creatinine 4.0 (H) 0.5 - 1.4 mg/dL    Calcium 7.9 (L) 8.7 - 10.5 mg/dL    Total Protein 6.1 6.0 - 8.4 g/dL    Albumin 1.8 (L) 3.5 - 5.2 g/dL    Total Bilirubin 0.4 0.1 - 1.0 mg/dL    Alkaline Phosphatase 144 (H) 55 - 135 U/L    AST 33 10 - 40 U/L    ALT 7 (L) 10 - 44 U/L    eGFR 12.3 (A) >60 mL/min/1.73 m^2    Anion Gap 14 8 - 16 mmol/L   Magnesium    Collection Time: 07/02/24  3:14 AM   Result Value Ref Range    Magnesium 1.9 1.6 - 2.6 mg/dL   Phosphorus    Collection Time: 07/02/24  3:14 AM   Result Value Ref Range    Phosphorus 2.5 (L) 2.7 - 4.5 mg/dL   CBC Auto Differential    Collection Time: 07/02/24  3:14 AM   Result Value Ref Range    WBC 17.15 (H) 3.90 - 12.70 K/uL    RBC 2.69 (L) 4.00 - 5.40 M/uL    Hemoglobin 7.0 (L) 12.0 - 16.0 g/dL    Hematocrit 21.3 (L) 37.0 - 48.5 %    MCV 79 (L) 82 - 98 fL    MCH 26.0 (L) 27.0 - 31.0 pg    MCHC 32.9 32.0 - 36.0 g/dL    RDW 20.6 (H) 11.5 - 14.5 %    Platelets 503 (H) 150 - 450 K/uL    MPV 9.7 9.2 - 12.9 fL    Immature Granulocytes CANCELED 0.0 - 0.5 %    Immature Grans (Abs) CANCELED 0.00 - 0.04 K/uL    Lymph # CANCELED 1.0 - 4.8 K/uL    Mono # CANCELED 0.3 - 1.0 K/uL    Eos # CANCELED 0.0 - 0.5 K/uL    Baso #  CANCELED 0.00 - 0.20 K/uL    nRBC 1 (A) 0 /100 WBC    Gran % 75.0 (H) 38.0 - 73.0 %    Lymph % 8.0 (L) 18.0 - 48.0 %    Mono % 9.0 4.0 - 15.0 %    Eosinophil % 1.0 0.0 - 8.0 %    Basophil % 1.0 0.0 - 1.9 %    Bands 3.0 %    Metamyelocytes 2.0 %    Myelocytes 1.0 %    Platelet Estimate Increased (A)     Aniso Moderate     Poik Moderate     Poly Occasional     Hypo Occasional     Ovalocytes Occasional     [place] Cells Moderate     Acanthocytes Present     Schistocytes Present     Basophilic Stippling Occasional     Toxic Granulation Present     Large/Giant Platelets Present     Fragmented Cells Occasional     Differential Method Manual    Vancomycin, Random    Collection Time: 07/02/24  3:14 AM   Result Value Ref Range    Vancomycin, Random 15.3 Not established ug/mL   POCT glucose    Collection Time: 07/02/24  3:14 AM   Result Value Ref Range    POCT Glucose 179 (H) 70 - 110 mg/dL     Diagnostic Results:  US Retroperitoneal Complete    Narrative    EXAMINATION:  US RETROPERITONEAL COMPLETE    CLINICAL HISTORY:  JASON;    TECHNIQUE:  Ultrasound of the kidneys and urinary bladder was performed including color flow and Doppler evaluation of the kidneys.    COMPARISON:  CT from 7/1/2024    FINDINGS:  Right kidney: The right kidney measures 11.8 cm. No cortical thinning. No loss of corticomedullary distinction. Resistive index measures 0.90. No mass. No renal stone. No hydronephrosis.    Left kidney: The left kidney measures 12.1 cm. No cortical thinning. No loss of corticomedullary distinction. Resistive index measures 0.87. No mass. No renal stone. No hydronephrosis.    Soft tissue mass adjacent to and apparently separate from the right kidney measuring 2.0 x 1.7 x 1.6 cm.    The bladder is partially distended at the time of scanning and has an unremarkable appearance.      Impression    Elevated intraparenchymal resistive indices which is nonspecific but may be seen with medical renal disease.    Peritoneal mass adjacent  to the right kidney.      Electronically signed by: Abdullahi Herrera MD  Date:    07/01/2024  Time:    11:21       ASSESSMENT/PLAN:     Active Hospital Problems    Diagnosis  POA    *Diabetic ketoacidosis without coma associated with type 2 diabetes mellitus [E11.10]  No    High anion gap metabolic acidosis [E87.29]  Yes    Chronic pulmonary embolism without acute cor pulmonale [I27.82]  Yes     Patient with small right lower lobe pulmonary embolism noted on June 2024 CT chest and also noted on prior films as well.  Patient has not been on full-dose anticoagulation only prophylaxis dosing.  Given her advanced cancer and PE noted on CT, it would not be unreasonable to treat with full-dose anticoagulation.  However patient is breathing well on room air and her prior respiratory issues are likely related to her right-sided pleural effusion.  If a decision is made to treat with full-dose anticoagulation, patient should be treated with Lovenox 1 milligram/kilogram b.i.d and sent home on this regimen.  I see no contraindications to anticoagulation however if other procedures are planned then it may be beneficial to wait until discharge.      Pleural effusion, malignant [J91.0]  Yes    S/p CARLOS ALBERTO/BSO/OMX/Debulking/Partial liver resection/Cholecystectomy [Z90.710, Z90.79, Z90.722]  Yes    JASON (acute kidney injury) [N17.9]  Yes    Stage 4B Carcinoscaroma, Suspected ovarian origin [C80.1]  Yes     3/4/24: peritoneal biopsy with carcinosarcoma  3/6/24: CA-125 418  3/25/24: Primary debulking surgery CARLOS ALBERTO/BSO, omentectomy, partial hepatectomy, debulking. 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       Assessment: Anette Ricci is a 58 y.o.  with stage IVB ovarian carcinosarcoma s/p PDS on 3/25/24 with Dr Washingotn (CARLOS ALBERTO/BSO/OMX/liver mobilization/peritoneal & R diaphragm stripping/ hepatic wedge resection/cholecystectomy) s/p C3 of carbo/taxol/bevacizumab on . Admitted to MICU  for the management of DKA, JASON, and symptomatic malignant pleural effusion vs pneumonia.     Neuro:   GCS: 15  Seizure disorder: Continue home lamictal  Depression: Continue home seroquel, ativan prn     Cardiac:  Hypertension: Hold home norvasc, enalapril, HCTZ  Hyperlipidemia: Hold home statin     Pulmonary:   Malignant pleural effusion vs pneumonia  - CXR : worsening effusions bilaterally, increased opacification of the right hemithorax.   - CT : suspected consolidation at the right lower lobe, as well as suspected mild right to left midline shift of the cardiomediastinal structures.   - IV antibiotics:   -s/p Vanc/zosyn in ED, continue while inpatient per MICU team  - S/p right thoracentesis yesterday afternoon with 800cc purulent fluid sent to microbiology   - KOH prep: negative for yeast/fungus   - Gram stain: rare gram negative rods   - Culture pending, currently prelim gram negative rods  - AFB and fungal cultures pending   - Fluid WCC >170k  - Albumin, cholesterol, LDH, protein, TG pending  - Respiratory gram stain: preliminary moderate gram positive cocci  - Continue vanc/zosyn    Pulmonary embolism: stable 1 week ago. Hold home therapeutic lovenox, last dose  AM     Renal:   JASON 2/2 dehydration and DKA     - Patient's baseline Cr 0.7-0.8     - Cr  of 4.6     - FENA studies 0.3%, consistent with pre-renal etiology     - AG closed yesterday evening     - Strict I/Os, junior placed for further monitoring     - Cr max  this admission 4.6, 4.0 this AM     - Avoid nephrotoxic agents     - Renal US remarkable for elevated intraparenchymal indices and 2cm mass adjacent to right kidney     - Nephrology following     Infectious Disease:   - WBC 20 > 17  - Lactate 2.2 > 1.76  - Bcx NGTD  - Urine Cx pending  - Respiratory gram stain prelim moderate gram positive cocci  - Continue vanc/zosyn    Hematology/Oncology:  Stage IVB ovarian carcinosarcoma     - See onc history as above    Anemia     - H/H upon admission 7.4/23.3     - H/H this AM 7.0/21.3     - Due to JASON/DKA and resulting dehydration patient likely hemoconcentrated, consider blood transfusion     Endocrine:  Metabolic acidosis 2/2 DKA     - Home regimen: lantus 9u, novolog 6u TIDWM     - Upon admission AG 21, glucose 420, beta-hydroxybutyrate 4.0     - AG closed yesterday evening     - B-461 since admission, overnight 121-179      - Insulin drip discontinued yesterday, current insulin regimen lantus 15u with high-correction SSI as needed     - Trend and replace electrolytes as needed     GI:   - Diabetic diet  - replace electrolytes as needed to keep K>4, Mg>2  - bowel reg - daily miralax, docusate  - famotidine for GI prophylaxis     Dispo:  - continue care in the ICU    Argentina Mccloud MD  OB/GYN PGY-3

## 2024-07-02 NOTE — SUBJECTIVE & OBJECTIVE
Interval History:   More alert this morning, oxygenating well on RA.  Had 800 ml removed via thoracentesis.  Remains on LR @ 100 cc/hr, net positive ~ 3.1L/24h.  UOP of 375 ml.  AG closed.  SCR remains elevated with slight improvement over the past 24 hours, UOP improving.    Mental status back to baseline, no distress on exam.      Review of patient's allergies indicates:   Allergen Reactions    Codeine Other (See Comments)     Flu like s/s    Tramadol Other (See Comments)     Flu like symptoms similar to codeine reaction     Current Facility-Administered Medications   Medication Frequency    acetaminophen tablet 650 mg Q6H PRN    dextrose 10 % infusion PRN    dextrose 10 % infusion PRN    dextrose 10% bolus 125 mL 125 mL PRN    dextrose 10% bolus 250 mL 250 mL PRN    glucagon (human recombinant) injection 1 mg PRN    glucose chewable tablet 16 g PRN    glucose chewable tablet 24 g PRN    insulin aspart U-100 pen 0-15 Units QID (AC + HS) PRN    insulin aspart U-100 pen 3 Units TIDWM    insulin glargine U-100 (Lantus) pen 15 Units Daily    lactated ringers infusion Continuous    lamoTRIgine tablet 25 mg BID    meropenem (MERREM) 1 g in 0.9% NaCl 100 mL IVPB (MB+) Q12H    QUEtiapine tablet 25 mg Daily PRN    sodium chloride 0.9% flush 10 mL PRN    vancomycin - pharmacy to dose pharmacy to manage frequency       Objective:     Vital Signs (Most Recent):  Temp: 98.4 °F (36.9 °C) (07/02/24 0900)  Pulse: 98 (07/02/24 1240)  Resp: 20 (07/02/24 1240)  BP: (!) 163/70 (07/02/24 1240)  SpO2: 100 % (07/02/24 1240) Vital Signs (24h Range):  Temp:  [97.9 °F (36.6 °C)-98.6 °F (37 °C)] 98.4 °F (36.9 °C)  Pulse:  [] 98  Resp:  [12-38] 20  SpO2:  [97 %-100 %] 100 %  BP: ()/(44-84) 163/70     Weight: 86.9 kg (191 lb 9.3 oz) (07/01/24 0620)  Body mass index is 30.01 kg/m².  Body surface area is 2.03 meters squared.    I/O last 3 completed shifts:  In: 6140.1 [I.V.:2951.4; IV Piggyback:3188.8]  Out: 1175 [Urine:375;  Other:800]     Physical Exam  Vitals and nursing note reviewed.   Constitutional:       General: She is not in acute distress.     Appearance: She is well-developed. She is not ill-appearing or toxic-appearing.   HENT:      Head: Normocephalic and atraumatic.      Mouth/Throat:      Mouth: Mucous membranes are dry.   Eyes:      General: No scleral icterus.        Right eye: No discharge.         Left eye: No discharge.      Extraocular Movements: Extraocular movements intact.      Conjunctiva/sclera: Conjunctivae normal.   Cardiovascular:      Rate and Rhythm: Normal rate and regular rhythm.      Heart sounds: No murmur heard.     No friction rub. No gallop.   Pulmonary:      Effort: Tachypnea present. No respiratory distress.      Breath sounds: Decreased breath sounds present. No rhonchi or rales.   Abdominal:      General: There is distension.      Palpations: Abdomen is soft.      Tenderness: There is no abdominal tenderness.   Musculoskeletal:         General: No swelling.      Cervical back: Normal range of motion and neck supple.      Right lower leg: Edema present.      Left lower leg: Edema present.   Skin:     General: Skin is warm and dry.   Neurological:      Mental Status: She is alert, oriented to person, place, and time and easily aroused.          Significant Labs:  CBC:   Recent Labs   Lab 07/02/24  0314   WBC 17.15*   RBC 2.69*   HGB 7.0*   HCT 21.3*   *   MCV 79*   MCH 26.0*   MCHC 32.9     CMP:   Recent Labs   Lab 07/02/24  1202   *   CALCIUM 7.8*   ALBUMIN 1.7*   PROT 6.1   *   K 4.1   CO2 19*      BUN 72*   CREATININE 3.7*   ALKPHOS 142*   ALT 7*   AST 28   BILITOT 0.5

## 2024-07-02 NOTE — ASSESSMENT & PLAN NOTE
58 stage 4 ovarian ca (3/2024, on C3D32 of carbo/taxol/bevacizumab) s/p debulking, omentectomy, partial hepatectomy admitted last month for SOB 2/2 pleural effusion. CXR showed RLL opacitiy and CT chest showing R sided loculated subdiaphragmatic fluid 7.3x11.2cm which was not amendable to drainage by IR. Fevers resolved on pip-tazo. Repeat CT with worsening metastatic disease w/ ascites and perihepatic collection.  Fevers/leukocytosis thought possibly related to malignancy, patient sent home without antibiotics.  She presented to the ER with worsening SOB found to have worsening JASON, increased R pleural effusion with locations.  Morning of 7/1 pt became altered, SOB, hypotensive to 70s/40s and noted to be in dKA transferred to the ICU Thoracentesis 7/1 significant 800ml of purulent fluid drained from the pleural space, 173K WBC 95% segs, gram stain with GNR.   - continue meropenem and vancomycin for now and f/u cultures.  If no MRSA growth, can stop vancomycin  - consider chest tube placement if c/w GOC  - discussed with team will follow

## 2024-07-02 NOTE — SUBJECTIVE & OBJECTIVE
Interval History/ Significant Events: Endorses feeling improved compared to yesterday. Tolerating PO intake with breakfast.     Past Medical History:   Diagnosis Date    Breast cancer 2013    Diabetes mellitus     Genetic testing 05/29/2013    VUS    Hyperlipidemia     Hypertension     Malignant neoplasm of upper-outer quadrant of right breast in female, estrogen receptor positive 12/02/2015    Seizure disorder        Past Surgical History:   Procedure Laterality Date    BILATERAL SALPINGO-OOPHORECTOMY (BSO) N/A 3/25/2024    Procedure: SALPINGO-OOPHORECTOMY, BILATERAL;  Surgeon: Александр Washington MD;  [facility];  Service: OB/GYN;  Laterality: N/A;    BREAST BIOPSY      BREAST LUMPECTOMY Right 2013    CHOLECYSTECTOMY  3/25/2024    Procedure: CHOLECYSTECTOMY;  Surgeon: Bo Farmer MD;  Location: [facility] OR [address] [place] [address];  Service: General;;    DEBULKING OF TUMOR N/A 3/25/2024    Procedure: DEBULKING, NEOPLASM;  Surgeon: Александр Washington MD;  [facility];  Service: OB/GYN;  Laterality: N/A;    EXCISION, LIVER N/A 3/25/2024    Procedure: EXCISION, LIVER - partial;  Surgeon: Bo Farmer MD;  [facility];  Service: General;  Laterality: N/A;  bk ultrasound  [place] book by TA on 3-21-24  7:00 a.m.  Conf #  [id number]    EYE SURGERY      LAPAROTOMY, EXPLORATORY N/A 3/25/2024    Procedure: LAPAROTOMY, EXPLORATORY;  Surgeon: Александр Washington MD;  [facility];  Service: OB/GYN;  Laterality: N/A;    OMENTECTOMY N/A 3/25/2024    Procedure: OMENTECTOMY;  Surgeon: Александр Washington MD;  [facility];  Service: OB/GYN;  Laterality: N/A;    PERITONEOCENTESIS N/A 3/13/2024    Procedure: PARACENTESIS, ABDOMINAL;  Surgeon: Flavia Moore MD;  Location: [facility] CATH LAB;  Service: Radiology;  Laterality: N/A;    PERITONEOCENTESIS N/A 3/21/2024    Procedure: PARACENTESIS, ABDOMINAL;  Surgeon: Jorge Jolley MD;  Location: [facility] CATH LAB;   Service: Radiology;  Laterality: N/A;    PERITONEOCENTESIS N/A 6/10/2024    Procedure: PARACENTESIS, ABDOMINAL;  Surgeon: Rogelio Malave MD;  Location: [facility] CATH LAB;  Service: Radiology;  Laterality: N/A;    TOTAL ABDOMINAL HYSTERECTOMY N/A 3/25/2024    Procedure: HYSTERECTOMY, TOTAL, ABDOMINAL;  Surgeon: Александр Washington MD;  Location: [facility] OR [address] [place] [address];  Service: OB/GYN;  Laterality: N/A;    TOTAL REDUCTION MAMMOPLASTY Bilateral 2016       Review of patient's allergies indicates:   Allergen Reactions    Codeine Other (See Comments)     Flu like s/s    Tramadol Other (See Comments)     Flu like symptoms similar to codeine reaction       Family History       Problem Relation (Age of Onset)    Breast cancer Sister (48)    Colon cancer Sister    Hypertension Mother, Brother, Brother    Seizures Father          Tobacco Use    Smoking status: Never    Smokeless tobacco: Never   Substance and Sexual Activity    Alcohol use: Yes     Alcohol/week: 0.0 standard drinks of alcohol     Comment: ocassional    Drug use: No    Sexual activity: Not Currently     Partners: Male     Birth control/protection: None      Review of Systems   Constitutional:  Positive for fatigue. Negative for fever.   Respiratory:  Positive for cough and shortness of breath.    Gastrointestinal:  Positive for diarrhea, nausea and vomiting. Negative for constipation.   Genitourinary:  Negative for dysuria and flank pain.   Skin:  Negative for color change and wound.   Neurological:  Negative for seizures and syncope.   Psychiatric/Behavioral:  Negative for agitation and confusion.      Objective:     Vital Signs (Most Recent):  Temp: 98.4 °F (36.9 °C) (07/02/24 0900)  Pulse: 96 (07/02/24 1400)  Resp: 17 (07/02/24 1400)  BP: (!) 148/65 (07/02/24 1400)  SpO2: 100 % (07/02/24 1400) Vital Signs (24h Range):  Temp:  [97.9 °F (36.6 °C)-98.6 °F (37 °C)] 98.4 °F (36.9 °C)  Pulse:  [] 96  Resp:  [12-30] 17  SpO2:  [97 %-100 %] 100 %  BP:  ()/(44-84) 148/65   Weight: 86.9 kg (191 lb 9.3 oz)  Body mass index is 30.01 kg/m².      Intake/Output Summary (Last 24 hours) at 7/2/2024 1414  Last data filed at 7/2/2024 1403  Gross per 24 hour   Intake 4227.8 ml   Output 1225 ml   Net 3002.8 ml          Physical Exam  Vitals and nursing note reviewed.   Constitutional:       Comments: Talking on phone with family   HENT:      Head: Normocephalic and atraumatic.      Right Ear: External ear normal.      Left Ear: External ear normal.      Nose: Nose normal.      Mouth/Throat:      Mouth: Mucous membranes are moist.      Pharynx: Oropharynx is clear.   Cardiovascular:      Rate and Rhythm: Normal rate and regular rhythm.      Pulses: Normal pulses.   Pulmonary:      Comments: Diminished breath sounds R lung base, significantly improved from yesterday   Abdominal:      Tenderness: There is no guarding or rebound.      Comments: Well healing midline vertical incision   Musculoskeletal:      Right lower leg: No edema.      Left lower leg: No edema.   Skin:     General: Skin is warm and dry.   Neurological:      Mental Status: She is alert and oriented to person, place, and time.   Psychiatric:         Mood and Affect: Mood normal.         Behavior: Behavior normal.            Vents:     Lines/Drains/Airways       Central Venous Catheter Line  Duration             Port A Cath Single Lumen Subclavian Left -- days              Drain  Duration                  Urethral Catheter 07/01/24 1901 <1 day              Peripheral Intravenous Line  Duration                  Peripheral IV - Single Lumen 07/01/24 0000 24 G Anterior;Left Forearm 1 day         Peripheral IV - Single Lumen 07/01/24 0032 20 G Left Upper Arm 1 day                  Significant Labs:    CBC/Anemia Profile:  Recent Labs   Lab 07/01/24  0026 07/01/24  0652 07/02/24  0314   WBC 20.03*  --  17.15*   HGB 7.4*  --  7.0*   HCT 23.3* 23* 21.3*     --  503*   MCV 83  --  79*   RDW 19.9*  --  20.6*  "       Chemistries:  Recent Labs   Lab 07/02/24  0314 07/02/24  0808 07/02/24  1202   * 133* 132*   K 4.0 4.0 4.1    103 102   CO2 17* 19* 19*   BUN 69* 70* 72*   CREATININE 4.0* 3.7* 3.7*   CALCIUM 7.9* 7.8* 7.8*   ALBUMIN 1.8* 1.6* 1.7*   PROT 6.1 5.7* 6.1   BILITOT 0.4 0.4 0.5   ALKPHOS 144* 126 142*   ALT 7* 5* 7*   AST 33 25 28   MG 1.9 1.9 1.8   PHOS 2.5* 2.7 2.8       BMP:   Recent Labs   Lab 07/02/24  1202   *   *   K 4.1      CO2 19*   BUN 72*   CREATININE 3.7*   CALCIUM 7.8*   MG 1.8     CMP:   Recent Labs   Lab 07/02/24  0314 07/02/24  0808 07/02/24  1202   * 133* 132*   K 4.0 4.0 4.1    103 102   CO2 17* 19* 19*   * 188* 296*   BUN 69* 70* 72*   CREATININE 4.0* 3.7* 3.7*   CALCIUM 7.9* 7.8* 7.8*   PROT 6.1 5.7* 6.1   ALBUMIN 1.8* 1.6* 1.7*   BILITOT 0.4 0.4 0.5   ALKPHOS 144* 126 142*   AST 33 25 28   ALT 7* 5* 7*   ANIONGAP 14 11 11     Lactic Acid: No results for input(s): "LACTATE" in the last 48 hours.  All pertinent labs within the past 24 hours have been reviewed.    Significant Imaging: I have reviewed all pertinent imaging results/findings within the past 24 hours.  "

## 2024-07-02 NOTE — SUBJECTIVE & OBJECTIVE
No current facility-administered medications on file prior to encounter.     Current Outpatient Medications on File Prior to Encounter   Medication Sig    acetaminophen (TYLENOL) 325 MG tablet Take 2 tablets (650 mg total) by mouth every 4 (four) hours as needed for Pain.    amlodipine (NORVASC) 10 MG tablet Take 10 mg by mouth once daily.     atorvastatin (LIPITOR) 40 MG tablet Take 80 mg by mouth once daily.    enalapril (VASOTEC) 20 MG tablet Take 20 mg by mouth once daily.    enoxaparin (LOVENOX) 80 mg/0.8 mL Syrg Inject 0.8 mLs (80 mg total) into the skin every 12 (twelve) hours.    fluticasone propionate (FLONASE) 50 mcg/actuation nasal spray 1 spray by Each Nostril route once daily.    hydrochlorothiazide (HYDRODIURIL) 25 MG tablet Take 25 mg by mouth once daily.     hydrocodone-acetaminophen (HYCET) solution 7.5-325 mg/15mL Take 30 mLs by mouth every 6 (six) hours as needed for Pain.    HYDROmorphone (DILAUDID) 2 MG tablet Take 1 tablet (2 mg total) by mouth every 8 (eight) hours as needed for Pain.    ibuprofen (ADVIL,MOTRIN) 600 MG tablet Take 1 tablet (600 mg total) by mouth every 6 (six) hours as needed for Pain.    insulin aspart U-100 (NOVOLOG FLEXPEN U-100 INSULIN) 100 unit/mL (3 mL) InPn pen Inject 6 Units into the skin 3 (three) times daily with meals. (Discard pen 28 days after initial use)    insulin aspart U-100 (NOVOLOG) 100 unit/mL (3 mL) InPn pen Inject 0-5 Units into the skin before meals and at bedtime as needed (for hyperglycemia by specified ranges). Sliding Scale: Blood Glucose Pre-meal 151-200 +2 units, 201-250 + 4 units, 251-300 + 6 units, 301-350 + 8 units, >350 + 10 units, max TDD 58 units    insulin glargine U-100, Lantus, 100 unit/mL (3 mL) SubQ InPn pen Inject 9 Units into the skin every evening.    insulin lispro (HUMALOG U-100 INSULIN) 100 unit/mL injection **LOW CORRECTION DOSE**  Blood Glucose  mg/dL                  Pre-meal                  151-200                0 unit        "                 201-250                2 units                      251-300                3 units                      301-350                4 units                      >350                     5 units                      Administer subcutaneously if needed at times designated by monitoring schedule.    ketorolac 0.5% (ACULAR) 0.5 % Drop Place 1 drop into both eyes. For pain after eye injections.    lamoTRIgine (LAMICTAL) 25 MG tablet Take 25 mg by mouth 2 (two) times daily.    lorazepam (ATIVAN) 0.5 MG tablet Take 0.5 mg by mouth every 12 (twelve) hours as needed for Anxiety.    metformin (GLUCOPHAGE) 1000 MG tablet Take 1,000 mg by mouth daily with breakfast.     ondansetron (ZOFRAN-ODT) 4 MG TbDL Take 1 tablet (4 mg total) by mouth every 6 (six) hours as needed (for nausea).    pen needle, diabetic 32 gauge x 5/32" Ndle Use to inject insulin 5 times daily.    quetiapine (SEROQUEL) 25 MG Tab Take 25 mg by mouth daily as needed.       Review of patient's allergies indicates:   Allergen Reactions    Codeine Other (See Comments)     Flu like s/s    Tramadol Other (See Comments)     Flu like symptoms similar to codeine reaction       Past Medical History:   Diagnosis Date    Breast cancer 2013    Diabetes mellitus     Genetic testing 05/29/2013    VUS    Hyperlipidemia     Hypertension     Malignant neoplasm of upper-outer quadrant of right breast in female, estrogen receptor positive 12/02/2015    Seizure disorder      Past Surgical History:   Procedure Laterality Date    BILATERAL SALPINGO-OOPHORECTOMY (BSO) N/A 3/25/2024    Procedure: SALPINGO-OOPHORECTOMY, BILATERAL;  Surgeon: Александр Washington MD;  Location: [facility] OR [address] [place] [address];  Service: OB/GYN;  Laterality: N/A;    BREAST BIOPSY      BREAST LUMPECTOMY Right 2013    CHOLECYSTECTOMY  3/25/2024    Procedure: CHOLECYSTECTOMY;  Surgeon: Bo Farmer MD;  Location: [facility] OR [address] [place] [address];  Service: General;;    DEBULKING OF TUMOR N/A 3/25/2024    Procedure: " DEBULKING, NEOPLASM;  Surgeon: Александр Washington MD;  [facility];  Service: OB/GYN;  Laterality: N/A;    EXCISION, LIVER N/A 3/25/2024    Procedure: EXCISION, LIVER - partial;  Surgeon: Bo Farmer MD;  [facility];  Service: General;  Laterality: N/A;  bk ultrasound  [place] book by TA on 3-21-24  7:00 a.m.  Conf #  [id number]    EYE SURGERY      LAPAROTOMY, EXPLORATORY N/A 3/25/2024    Procedure: LAPAROTOMY, EXPLORATORY;  Surgeon: Александр Washington MD;  [facility];  Service: OB/GYN;  Laterality: N/A;    OMENTECTOMY N/A 3/25/2024    Procedure: OMENTECTOMY;  Surgeon: Александр Washington MD;  Location: [facility] OR [address] [place] [address];  Service: OB/GYN;  Laterality: N/A;    PERITONEOCENTESIS N/A 3/13/2024    Procedure: PARACENTESIS, ABDOMINAL;  Surgeon: Flavia Moore MD;  Location: [facility] CATH LAB;  Service: Radiology;  Laterality: N/A;    PERITONEOCENTESIS N/A 3/21/2024    Procedure: PARACENTESIS, ABDOMINAL;  Surgeon: Jorge Jolley MD;  Location: [facility] CATH LAB;  Service: Radiology;  Laterality: N/A;    PERITONEOCENTESIS N/A 6/10/2024    Procedure: PARACENTESIS, ABDOMINAL;  Surgeon: Rogelio Malave MD;  Location: [facility] CATH LAB;  Service: Radiology;  Laterality: N/A;    TOTAL ABDOMINAL HYSTERECTOMY N/A 3/25/2024    Procedure: HYSTERECTOMY, TOTAL, ABDOMINAL;  Surgeon: Александр Washington MD;  Location: [facility] OR [address] [place] [address];  Service: OB/GYN;  Laterality: N/A;    TOTAL REDUCTION MAMMOPLASTY Bilateral 2016     Family History       Problem Relation (Age of Onset)    Breast cancer Sister (48)    Colon cancer Sister    Hypertension Mother, Brother, Brother    Seizures Father          Tobacco Use    Smoking status: Never    Smokeless tobacco: Never   Substance and Sexual Activity    Alcohol use: Yes     Alcohol/week: 0.0 standard drinks of alcohol     Comment: ocassional    Drug use: No    Sexual activity: Not Currently     Partners: Male     Birth control/protection: None      Review of Systems   Constitutional:  Positive for fatigue. Negative for fever.   Respiratory:  Positive for cough and shortness of breath.    Gastrointestinal:  Positive for diarrhea, nausea and vomiting. Negative for constipation.   Genitourinary:  Negative for dysuria and flank pain.   Skin:  Negative for color change and wound.   Neurological:  Negative for seizures and syncope.   Psychiatric/Behavioral:  Negative for agitation and confusion.      Objective:     Vital Signs (Most Recent):  Temp: 98.2 °F (36.8 °C) (07/02/24 1400)  Pulse: 97 (07/02/24 1500)  Resp: 19 (07/02/24 1500)  BP: (!) 148/64 (07/02/24 1500)  SpO2: 100 % (07/02/24 1500) Vital Signs (24h Range):  Temp:  [97.9 °F (36.6 °C)-98.6 °F (37 °C)] 98.2 °F (36.8 °C)  Pulse:  [] 97  Resp:  [12-26] 19  SpO2:  [97 %-100 %] 100 %  BP: ()/(44-84) 148/64     Weight: 86.9 kg (191 lb 9.3 oz)  Body mass index is 30.01 kg/m².    Intake/Output - Last 3 Shifts         06/30 0700  07/01 0659 07/01 0700  07/02 0659 07/02 0700  07/03 0659    I.V. (mL/kg)  2951.4 (34) 547.8 (6.3)    IV Piggyback 1849.7 1339.1 200.3    Total Intake(mL/kg) 1849.7 (21.3) 4290.4 (49.4) 748.1 (8.6)    Urine (mL/kg/hr)  375 (0.2) 150 (0.2)    Other  800     Stool  0     Total Output  1175 150    Net +1849.7 +3115.4 +598.1           Stool Occurrence  3 x             SpO2: 100 %        Physical Exam  Vitals and nursing note reviewed.   Constitutional:       Appearance: She is obese.   HENT:      Head: Normocephalic and atraumatic.      Right Ear: External ear normal.      Left Ear: External ear normal.      Nose: Nose normal.      Mouth/Throat:      Mouth: Mucous membranes are moist.      Pharynx: Oropharynx is clear.   Cardiovascular:      Rate and Rhythm: Normal rate and regular rhythm.      Pulses: Normal pulses.   Pulmonary:      Comments: Diminished breath sounds R lung base, significantly improved from yesterday   Abdominal:      Tenderness: There is no guarding or  rebound.      Comments: Well healing midline vertical incision   Musculoskeletal:      Right lower leg: No edema.      Left lower leg: No edema.   Skin:     General: Skin is warm and dry.   Neurological:      Mental Status: She is alert and oriented to person, place, and time.   Psychiatric:         Mood and Affect: Mood normal.         Behavior: Behavior normal.            Significant Labs:  Recent Lab Results  (Last 5 results in the past 24 hours)        07/02/24  1202   07/02/24  1156   07/02/24  0830   07/02/24  0812   07/02/24  0808        Acanthocytes               Albumin 1.7         1.6                126       ALT 7         5       Anion Gap 11         11       Aniso               AST 28         25       Bands               Baso #               Basophilic Stippling               Basophil %               BILIRUBIN TOTAL 0.5  Comment: For infants and newborns, interpretation of results should be based  on gestational age, weight and in agreement with clinical  observations.    Premature Infant recommended reference ranges:  Up to 24 hours.............<8.0 mg/dL  Up to 48 hours............<12.0 mg/dL  3-5 days..................<15.0 mg/dL  6-29 days.................<15.0 mg/dL           0.4  Comment: For infants and newborns, interpretation of results should be based  on gestational age, weight and in agreement with clinical  observations.    Premature Infant recommended reference ranges:  Up to 24 hours.............<8.0 mg/dL  Up to 48 hours............<12.0 mg/dL  3-5 days..................<15.0 mg/dL  6-29 days.................<15.0 mg/dL         BUN 72         70       [place]/Echinocytes               Calcium 7.8         7.8       Chloride 102         103       CO2 19         19       Creatinine 3.7         3.7       Differential Method               eGFR 13.5         13.5       Eos #               Eos %               Fragmented Cells               Giant Platelets               Glucose 296          188       Gran %               Hematocrit               Hemoglobin               Hypo               Immature Grans (Abs)               Immature Granulocytes               Lymph #               Lymph %               Magnesium  1.8         1.9       MCH               MCHC               MCV               Metamyelocytes               Mono #               Mono %               MPV               Myelocytes               nRBC               Ovalocytes               Phosphorus Level 2.8         2.7       Platelet Estimate               Platelet Count               POCT Glucose   313     216         Poikilocytosis               Poly               Potassium 4.1         4.0       PROTEIN TOTAL 6.1         5.7       RBC               RDW               Schistocytes               Sodium 132         133       Sodium, Urine     40  Comment: The random urine reference ranges provided were established   for 24 hour urine collections.  No reference ranges exist for  random urine specimens.  Correlate clinically.             Toxic Granulation               Vancomycin, Random               WBC                                      Significant Diagnostics:  CT: I have reviewed all pertinent results/findings within the past 24 hours  CXR: I have reviewed all pertinent results/findings within the past 24 hours    VTE Risk Mitigation (From admission, onward)           Ordered     IP VTE HIGH RISK PATIENT  Once         07/01/24 0540     Place sequential compression device  Until discontinued         07/01/24 0540

## 2024-07-02 NOTE — PROGRESS NOTES
[facility]  Nephrology  Progress Note    Patient Name: Anette Ricci  MRN: [id number]  Admission Date: 6/30/2024  Hospital Length of Stay: 1 days  Attending Provider: Maria Antonia Pierce MD   Primary Care Physician: Mark Chua MD  Principal Problem:Diabetic ketoacidosis without coma associated with type 2 diabetes mellitus    Subjective:     HPI: Ms. Ricci is a 59 yo female with hx of DM, HLD, HTN, PE, Stg IV Ovarian Ca s/p C3 of carbo/taxol/bevacizumab on 5/31 and plans to start Doxorubicin therapy who presents to the hospital on 6/30 with CC of abdominal pain, nausea, vomiting, and SOB x 1 day.  She was recently admitted on 6/20-6/27 for SOB 2/2 pleural effusion (thought to be malignant) that was unable to be drained by IR.  She was treated during that admission with zosyn x 6 days for recurrent fevers, workup negative.  According to family at bedside, she has had relatively poor PO intake since discharge.  On this admission, labs were notable for a WBC of 20k, Na 129, CO2 14, AG 21, , Beta hydroxybutyrate > 4.  UA + ketones.  She was started on DKA protocol and admitted to Gyn/Onc service.  On 7/1, MICU was consulted for worsening mental status, tachypnea and further management of DKA.  Nephrology was consulted at that time for JASON evaluation.    According to records, Ms. Ricci has normal kidney function at baseline with sCR of 0.8.  This admission she arrives with a sCR of 4.6.  UA with 2+ protein, 3+ OB, 40 WBC, + ketones, Mohsen 17.  On chart review, she has a baseline SCr of 0.8, discharged during her last admission with a SCR of 1.5 on 6/27.  Of note, she did have a CT with contrast performed on 6/24.  Family at bedside report decreased PO intake prior to this presentation.  No hx of NSAID use.    Interval History:   More alert this morning, oxygenating well on RA.  Had 800 ml removed via thoracentesis.  Remains on LR @ 100 cc/hr, net positive ~ 3.1L/24h.  UOP of 375 ml.   AG closed.  SCR remains elevated with slight improvement over the past 24 hours, UOP improving.    Mental status back to baseline, no distress on exam.      Review of patient's allergies indicates:   Allergen Reactions    Codeine Other (See Comments)     Flu like s/s    Tramadol Other (See Comments)     Flu like symptoms similar to codeine reaction     Current Facility-Administered Medications   Medication Frequency    acetaminophen tablet 650 mg Q6H PRN    dextrose 10 % infusion PRN    dextrose 10 % infusion PRN    dextrose 10% bolus 125 mL 125 mL PRN    dextrose 10% bolus 250 mL 250 mL PRN    glucagon (human recombinant) injection 1 mg PRN    glucose chewable tablet 16 g PRN    glucose chewable tablet 24 g PRN    insulin aspart U-100 pen 0-15 Units QID (AC + HS) PRN    insulin aspart U-100 pen 3 Units TIDWM    insulin glargine U-100 (Lantus) pen 15 Units Daily    lactated ringers infusion Continuous    lamoTRIgine tablet 25 mg BID    meropenem (MERREM) 1 g in 0.9% NaCl 100 mL IVPB (MB+) Q12H    QUEtiapine tablet 25 mg Daily PRN    sodium chloride 0.9% flush 10 mL PRN    vancomycin - pharmacy to dose pharmacy to manage frequency       Objective:     Vital Signs (Most Recent):  Temp: 98.4 °F (36.9 °C) (07/02/24 0900)  Pulse: 98 (07/02/24 1240)  Resp: 20 (07/02/24 1240)  BP: (!) 163/70 (07/02/24 1240)  SpO2: 100 % (07/02/24 1240) Vital Signs (24h Range):  Temp:  [97.9 °F (36.6 °C)-98.6 °F (37 °C)] 98.4 °F (36.9 °C)  Pulse:  [] 98  Resp:  [12-38] 20  SpO2:  [97 %-100 %] 100 %  BP: ()/(44-84) 163/70     Weight: 86.9 kg (191 lb 9.3 oz) (07/01/24 0620)  Body mass index is 30.01 kg/m².  Body surface area is 2.03 meters squared.    I/O last 3 completed shifts:  In: 6140.1 [I.V.:2951.4; IV Piggyback:3188.8]  Out: 1175 [Urine:375; Other:800]     Physical Exam  Vitals and nursing note reviewed.   Constitutional:       General: She is not in acute distress.     Appearance: She is well-developed. She is not  ill-appearing or toxic-appearing.   HENT:      Head: Normocephalic and atraumatic.      Mouth/Throat:      Mouth: Mucous membranes are dry.   Eyes:      General: No scleral icterus.        Right eye: No discharge.         Left eye: No discharge.      Extraocular Movements: Extraocular movements intact.      Conjunctiva/sclera: Conjunctivae normal.   Cardiovascular:      Rate and Rhythm: Normal rate and regular rhythm.      Heart sounds: No murmur heard.     No friction rub. No gallop.   Pulmonary:      Effort: Tachypnea present. No respiratory distress.      Breath sounds: Decreased breath sounds present. No rhonchi or rales.   Abdominal:      General: There is distension.      Palpations: Abdomen is soft.      Tenderness: There is no abdominal tenderness.   Musculoskeletal:         General: No swelling.      Cervical back: Normal range of motion and neck supple.      Right lower leg: Edema present.      Left lower leg: Edema present.   Skin:     General: Skin is warm and dry.   Neurological:      Mental Status: She is alert, oriented to person, place, and time and easily aroused.          Significant Labs:  CBC:   Recent Labs   Lab 07/02/24  0314   WBC 17.15*   RBC 2.69*   HGB 7.0*   HCT 21.3*   *   MCV 79*   MCH 26.0*   MCHC 32.9     CMP:   Recent Labs   Lab 07/02/24  1202   *   CALCIUM 7.8*   ALBUMIN 1.7*   PROT 6.1   *   K 4.1   CO2 19*      BUN 72*   CREATININE 3.7*   ALKPHOS 142*   ALT 7*   AST 28   BILITOT 0.5      Assessment/Plan:     Renal/  High anion gap metabolic acidosis  2/2 DKA  -Ketones +  -Mgmt per MICU  -Improved    JASON (acute kidney injury)  JASON on Normal Renal Fx  -Baseline SCr 0.8.  57 yo female with recent admission from 6/20-6/27 for SOB 2/2 malignant effusion who presents on 6/30 for N/V/SOB on 7/1, found to be in DKA with JASON with SCr of 4.6.  I/O's not accurately recorded since admission, stepped up to ICU on 7/1 for higher level of care.  Multiple hypotensive  episodes recorded.  Discharged on 6/27 during last admission, on that date had an JASON with SCr of 1.5, suspicious for ANNE as she had a CT with contrast 6/24 and also receiving Ibuprofen and ACEi.    Suspect a component of ANNE from previous admission playing a role, but also volume depletion from DKA and osmotic diuresis from glucosuria.    May have developed an iATN from hypotensive events.  Home meds including Enalapril, HCTZ, Ibuprofen PRN, Metformin    Mohsen 17  7/1:  POCUS @ bedside with collapsible IJ > 50%  Renal US negative for hydronephrosis; acute findings  Urine microscopy with numerous WBCs, leucine crystals    Plan/Recommendations:  -Recommend to continue junior  -recommend continuing on LR @ 100 cc/hr x 24 hours  -defer management of DKA to MICU  -recommend monitoring of RFP BID replete K as needed to maintain K > 4, Mg > 2  -strict I/O's  -no indication for RRT at this time, we will continue following  -will recheck urine Na; uPCR    Endocrine  * Diabetic ketoacidosis without coma associated with type 2 diabetes mellitus  -improving  Mgmt per MICU  -continue LR      Be Raman NP  Nephrology  [facility]

## 2024-07-02 NOTE — ASSESSMENT & PLAN NOTE
Malignant pleural effusion     - IR was previously consulted for drainage during past admission and was unable to perform procedure due to minimal fluid visualized     - CXR obtained in ED with worsening effusions bilaterally     - Thoracentesis performed 7/1, follow up studies        -- Lipemic, yellow fluid, [id number] WBC 95% seg        -- No yeast or fungal so far        -- G negative rods        -- Pending triglyceride, protein, LDH, cholesterol, albumin, lipid analysis  -- Concern for empyema. CT chest with loculated appearance.  -- Will likely place chest tube  -- Covering with Meropenem/Vanc for suspected empyema

## 2024-07-02 NOTE — ASSESSMENT & PLAN NOTE
JASON on Normal Renal Fx  -Baseline SCr 0.8.  57 yo female with recent admission from 6/20-6/27 for SOB 2/2 malignant effusion who presents on 6/30 for N/V/SOB on 7/1, found to be in DKA with JASON with SCr of 4.6.  I/O's not accurately recorded since admission, stepped up to ICU on 7/1 for higher level of care.  Multiple hypotensive episodes recorded.  Discharged on 6/27 during last admission, on that date had an JASON with SCr of 1.5, suspicious for ANNE as she had a CT with contrast 6/24 and also receiving Ibuprofen and ACEi.    Suspect a component of ANNE from previous admission playing a role, but also volume depletion from DKA and osmotic diuresis from glucosuria.    May have developed an iATN from hypotensive events.  Home meds including Enalapril, HCTZ, Ibuprofen PRN, Metformin    Mohsen 17  7/1:  POCUS @ bedside with collapsible IJ > 50%  Renal US negative for hydronephrosis; acute findings  Urine microscopy with numerous WBCs, leucine crystals    Plan/Recommendations:  -Recommend to continue junior  -recommend continuing on LR @ 100 cc/hr x 24 hours  -defer management of DKA to MICU  -recommend monitoring of RFP BID replete K as needed to maintain K > 4, Mg > 2  -strict I/O's  -no indication for RRT at this time, we will continue following  -will recheck urine Na; uPCR

## 2024-07-02 NOTE — PROGRESS NOTES
[facility]  Critical Care Medicine  Progress Note    Patient Name: Anette Ricci  MRN: [id number]  Admission Date: 2024  Hospital Length of Stay: 1 days  Code Status: Full Code  Attending Provider: Maria Antonia Pierce MD  Primary Care Provider: Mark Chua MD   Principal Problem: Diabetic ketoacidosis without coma associated with type 2 diabetes mellitus    Subjective:     HPI:  Anette Ricci is a 58 y.o.  with stage IVB ovarian carcinosarcoma s/p PDS on 3/25/24 with Dr Washington (CARLOS ALBERTO/BSO/OMX/liver mobilization/peritoneal & R diaphragm stripping/ hepatic wedge resection/cholecystectomy) on C3D32 of carbo/taxol/bevacizumab who presented to the ED with worsening SOB for 1 day. She also has a known right PE, persistent right pleural effusion, HTN, HLD, diabetes, anemia, and seizure disorder. She was recently admitted from - for management of this issue where IR reported inability to drain collection and was treated with multiple days of zosyn without any clear source of infection.      She reports that her shortness of breath was overall stable since discharge, however, worsened yesterday. She denies any fevers/chills, reports baseline nausea without vomiting. Reports her cough is unchanged from discharge. She denies sick contacts. She has been compliant with her medication since discharge, however, reports persistent hyperglycemia at home with sugars in the 300s-400s for the past 1-2 days.      She was admitted to Gyn- Oncology for pleural effusion and JASON but transferred to MICU the following day for DKA requiring insulin drip.    Hospital/ICU Course:  No notes on file    No new subjective & objective note has been filed under this hospital service since the last note was generated.      ABG  Recent Labs   Lab 24  0652   PH 7.312*   PO2 36*   PCO2 35.2   HCO3 17.8*   BE -8*     Assessment/Plan:     Neuro  Seizure disorder  Continue home  lamictal    Psychiatric  Depression, reactive  Continue home seroquel    Pulmonary  Pleural effusion, malignant  Malignant pleural effusion     - IR was previously consulted for drainage during past admission and was unable to perform procedure due to minimal fluid visualized     - CXR obtained in ED with worsening effusions bilaterally     - Thoracentesis performed 7/1, follow up studies        -- Lipemic, yellow fluid, [id number] WBC 95% seg        -- No yeast or fungal so far        -- G negative rods        -- Pending triglyceride, protein, LDH, cholesterol, albumin, lipid analysis    Resp Cx 7/1: moderate G+ cocci    -- Ertapenem, Vanc      Renal/  JASON (acute kidney injury)  Creatinine up to 4.6 on admission; baseline 0.7. Likely prerenal in the setting of DKA; dehydration.    -- Nephrology consulted: recommend 1L NS bolus, LR @100 ml x 24 hours, Junior for I&Os  --IV Fluids per DKA protocol  --renal US  --Follow up UA  --Strict I&O  --Maintain junior  --Avoid nephrotoxic agents    Hematology  Chronic pulmonary embolism without acute cor pulmonale  On Lovenox at home; hold for potential thoracentesis today    Oncology  Stage 4B Carcinoscaroma, Suspected ovarian origin  Gyn/Onc following    Endocrine  * Diabetic ketoacidosis without coma associated with type 2 diabetes mellitus  On admission noted to have glucose 461, gap 17, Bicarb 15. Transferred to MICU for DKA.    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     C: SAT & SBT Coordinated?  ***                       D: Delirium: CAM-ICU Overall CAM-ICU: Negative   E: Early Mobility Performed? {YES,NO:25008}   F: Feeding Goal:    Status:     Current Diet Order   Procedures    Diet diabetic 2000 Calorie     Order Specific Question:   Total calories:     Answer:   2000 Calorie      AS: Analgesia/Sedation ***   T: Thromboembolic Prophylaxis ***   H: HOB > 300 {YES,NO:25008}   U: Stress Ulcer Prophylaxis (if needed) ***   G: Glucose Control ***   B: Bowel Function Stool Occurrence: 1   I: Indwelling Catheter (Lines & Mccartney) Necessity ***   D: De-escalation of Antimicrobials/Pharmacotherapies ***    Plan for the day/ETD ***    Code Status:  Family/Goals of Care: Full Code  ***       Critical secondary to {:56638}      Critical care was time spent personally by me on the following activities: development of treatment plan with patient or surrogate and bedside caregivers, discussions with consultants, evaluation of patient's response to treatment, examination of patient, ordering and performing treatments and interventions, ordering and review of laboratory studies, ordering and review of radiographic studies, pulse oximetry, re-evaluation of patient's condition. This critical care time did not overlap with that of any other provider or involve time for any procedures.     El De La Garza MD  Critical Care Medicine  [facility] - Cardiac Medical ICU

## 2024-07-02 NOTE — PLAN OF CARE
MICU DAILY GOALS     Family/Goals of care/Code Status   Code Status: Full Code    24H Vital Sign Range  Temp:  [97.9 °F (36.6 °C)-98.6 °F (37 °C)]   Pulse:  []   Resp:  [12-38]   BP: ()/(42-76)   SpO2:  [97 %-100 %]      Shift Events (include procedures and significant events)   No acute events throughout shift    AWAKE RASS: Goal - RASS Goal: 0-->alert and calm  Actual - RASS (Robles Agitation-Sedation Scale): alert and calm    Restraint necessity: Not necessary   BREATHE SBT: Not intubated    Coordinate A & B, analgesics/sedatives Pain: managed   SAT: Not intubated   Delirium CAM-ICU: Overall CAM-ICU: Negative   Early(intubated/ Progressive (non-intubated) Mobility MOVE Screen (INTUBATED ONLY): Not intubated    Activity: Activity Management: Rolling - L1   Feeding/Nutrition Diet order: Diet/Nutrition Received: consistent carb/diabetic diet,     Thrombus DVT prophylaxis: VTE Required Core Measure: Pharmacological prophylaxis initiated/maintained   HOB Elevation Head of Bed (HOB) Positioning: HOB at 30-45 degrees   Ulcer Prophylaxis GI: yes   Glucose control managed     Skin Skin assessed during: Daily Assessment    Sacrum intact/not altered? Yes  Heels intact/not altered? Yes  Surgical wound? Yes    CHECK ONE!   (no altered skin or altered skin) and sub boxes:  [] No Altered Skin Integrity Present    []Prevention Measures Documented    [] Altered Skin Integrity Present or Discovered   [] LDA present in EPIC, daily doc completed              [] LDA added if not in EPIC (describe wound).                    When describing wound, do not stage, use descriptive words only.    [] Wound Image Taken (required on admit,                   transfer/discharge and every Tuesday)    Wound Care Consulted? No    4 EYES:  Attending Nurse (1st set of eyes):     Second RN/Staff Member (2nd set of eyes):    Bowel Function no issues    Indwelling Catheter Necessity      Urethral Catheter 07/01/24 1901-Reason for  Continuing Urinary Catheterization: Critically ill in ICU and requiring hourly monitoring of intake/output, Urinary retention    Port A Cath Single Lumen Subclavian Left-Line Necessity Review: Longterm central access required     De-escalation Antibiotics Yes        VS and assessment per flow sheet, patient progressing towards goals as tolerated, plan of care reviewed with family, all concerns addressed, will continue to monitor.

## 2024-07-02 NOTE — CARE UPDATE
Afternoon Assessment:    Ms. Ricci reports improvement of her SOB. She complains of a headache this afternoon that she attributes to the persistent coughing.     Temp:  [97.9 °F (36.6 °C)-98.6 °F (37 °C)] 98.2 °F (36.8 °C)  Pulse:  [] 96  Resp:  [12-26] 15  SpO2:  [97 %-100 %] 100 %  BP: ()/(44-84) 142/63    PE:  Alert and oriented, on room air.  Abdomen: soft, non tender, midline incision covered.   : junior in place draining clear urine.    Labs:  Recent Labs   Lab 07/02/24  0314   WBC 17.15*   RBC 2.69*   HGB 7.0*   HCT 21.3*   *   MCV 79*   MCH 26.0*   MCHC 32.9       A/P:  Patient improving clinically since right pleural effusion was drained. Culture rare Gram negative rods.   ID team recommends continuing meropenem and vancomycin until cultures are back.   Plan is to place a chest tube.   Continue management per primary team. Gyn onc will continue to follow.     Quinton Dave MD  Obstetrics and Gynecology- PGY2

## 2024-07-02 NOTE — ASSESSMENT & PLAN NOTE
58F w/ stage 4 ovarian cancer with R pleural effusion presumed malignant who was admitted to MICU for management of DKA.     - Recommend Chest tube placement  - Re-image with CT Chest in 2 days for allowance of time to amply drain  - Possible initiation of lytics therapy pending re-evaluation with updated imaging  - rest of care per primary  - Will follow to reassess new imaging

## 2024-07-02 NOTE — CONSULTS
[facility] - Cardiac Medical ICU  Infectious Disease  Consult Note    Patient Name: Anette Ricci  MRN: [id number]  Admission Date: 6/30/2024  Hospital Length of Stay: 1 days  Attending Physician: Maria Antonia Pierce MD  Primary Care Provider: Mark Chua MD     Isolation Status: Special Contact    Patient information was obtained from patient, relative(s), and past medical records.      Inpatient consult to Infectious Diseases  Consult performed by: Ivan Epstein MD  Consult ordered by: Александр Mena MD  Reason for consult: empyema      Assessment/Plan:     Pulmonary  Empyema  58 stage 4 ovarian ca (3/2024, on C3D32 of carbo/taxol/bevacizumab) s/p debulking, omentectomy, partial hepatectomy admitted last month for SOB 2/2 pleural effusion. CXR showed RLL opacitiy and CT chest showing R sided loculated subdiaphragmatic fluid 7.3x11.2cm which was not amendable to drainage by IR. Fevers resolved on pip-tazo. Repeat CT with worsening metastatic disease w/ ascites and perihepatic collection.  Fevers/leukocytosis thought possibly related to malignancy, patient sent home without antibiotics.  She presented to the ER with worsening SOB found to have worsening JASON, increased R pleural effusion with locations.  Morning of 7/1 pt became altered, SOB, hypotensive to 70s/40s and noted to be in dKA transferred to the ICU Thoracentesis 7/1 significant 800ml of purulent fluid drained from the pleural space, 173K WBC 95% segs, gram stain with GNR.   - continue meropenem and vancomycin for now and f/u cultures.  If no MRSA growth, can stop vancomycin  - consider chest tube placement if c/w GOC  - discussed with team will follow        Thank you for your consult. I will follow-up with patient. Please contact us if you have any additional questions.    Ivan Epstein MD  Infectious Disease  [facility] - Cardiac Medical ICU    Subjective:     Principal Problem: Diabetic ketoacidosis without coma associated with type 2  diabetes mellitus    HPI: 58 stage 4 ovarian ca (3/2024, on C3D32 of carbo/taxol/bevacizumab) s/p debulking, omentectomy, partial hepatectomy admitted last month for SOB 2/2 pleural effusion. CXR showed RLL opacitiy and CT chest showing R sided loculated subdiaphragmatic fluid 7.3x11.2cm which was not amendable to drainage by IR. Fevers resolved on pip-tazo. Repeat CT with worsening metastatic disease w/ ascites and perihepatic collection.  Fevers/leukocytosis thought possibly related to malignancy, patient sent home without antibiotics.  She presented to the ER with worsening SOB found to have worsening JASON, increased R pleural effusion with locations.  Morning of 7/1 pt became altered, SOB, hypotensive to 70s/40s and noted to be in dKA transferred to the ICU Thoracentesis 7/1 significant 800ml of purulent fluid drained from the pleural space, 173K WBC 95% segs, gram stain with GNR.     Past Medical History:   Diagnosis Date    Breast cancer 2013    Diabetes mellitus     Genetic testing 05/29/2013    VUS    Hyperlipidemia     Hypertension     Malignant neoplasm of upper-outer quadrant of right breast in female, estrogen receptor positive 12/02/2015    Seizure disorder        Past Surgical History:   Procedure Laterality Date    BILATERAL SALPINGO-OOPHORECTOMY (BSO) N/A 3/25/2024    Procedure: SALPINGO-OOPHORECTOMY, BILATERAL;  Surgeon: Александр Washington MD;  Location: [address];  Service: OB/GYN;  Laterality: N/A;    BREAST BIOPSY      BREAST LUMPECTOMY Right 2013    CHOLECYSTECTOMY  3/25/2024    Procedure: CHOLECYSTECTOMY;  Surgeon: Bo Farmer MD;  Location: [facility] OR [address] [place] [address];  Service: General;;    DEBULKING OF TUMOR N/A 3/25/2024    Procedure: DEBULKING, NEOPLASM;  Surgeon: Александр Washington MD;  Location: [facility] OR [address] [place] [address];  Service: OB/GYN;  Laterality: N/A;    EXCISION, LIVER N/A 3/25/2024    Procedure: EXCISION, LIVER - partial;  Surgeon: Bo Farmer MD;  Location: [facility]  OR 2ND FLR;  Service: General;  Laterality: N/A;  bk ultrasound  [place] book by TA on 3-21-24  7:00 a.m.  Conf #  [id number]    EYE SURGERY      LAPAROTOMY, EXPLORATORY N/A 3/25/2024    Procedure: LAPAROTOMY, EXPLORATORY;  Surgeon: Александр Washington MD;  Location: [facility] OR 2ND FLR;  Service: OB/GYN;  Laterality: N/A;    OMENTECTOMY N/A 3/25/2024    Procedure: OMENTECTOMY;  Surgeon: Александр Washnigton MD;  Location: [facility] OR 2ND FLR;  Service: OB/GYN;  Laterality: N/A;    PERITONEOCENTESIS N/A 3/13/2024    Procedure: PARACENTESIS, ABDOMINAL;  Surgeon: Flavia Moore MD;  Location: [facility] CATH LAB;  Service: Radiology;  Laterality: N/A;    PERITONEOCENTESIS N/A 3/21/2024    Procedure: PARACENTESIS, ABDOMINAL;  Surgeon: Jorge Jolley MD;  Location: [facility] CATH LAB;  Service: Radiology;  Laterality: N/A;    PERITONEOCENTESIS N/A 6/10/2024    Procedure: PARACENTESIS, ABDOMINAL;  Surgeon: Rogelio Malave MD;  Location: [facility] CATH LAB;  Service: Radiology;  Laterality: N/A;    TOTAL ABDOMINAL HYSTERECTOMY N/A 3/25/2024    Procedure: HYSTERECTOMY, TOTAL, ABDOMINAL;  Surgeon: Александр Washington MD;  Location: [facility] OR 2ND FLR;  Service: OB/GYN;  Laterality: N/A;    TOTAL REDUCTION MAMMOPLASTY Bilateral 2016       Review of patient's allergies indicates:   Allergen Reactions    Codeine Other (See Comments)     Flu like s/s    Tramadol Other (See Comments)     Flu like symptoms similar to codeine reaction       Medications:  Medications Prior to Admission   Medication Sig    acetaminophen (TYLENOL) 325 MG tablet Take 2 tablets (650 mg total) by mouth every 4 (four) hours as needed for Pain.    amlodipine (NORVASC) 10 MG tablet Take 10 mg by mouth once daily.     atorvastatin (LIPITOR) 40 MG tablet Take 80 mg by mouth once daily.    enalapril (VASOTEC) 20 MG tablet Take 20 mg by mouth once daily.    enoxaparin (LOVENOX) 80 mg/0.8 mL Syrg Inject 0.8 mLs (80 mg total) into the skin every 12 (twelve)  hours.    fluticasone propionate (FLONASE) 50 mcg/actuation nasal spray 1 spray by Each Nostril route once daily.    hydrochlorothiazide (HYDRODIURIL) 25 MG tablet Take 25 mg by mouth once daily.     hydrocodone-acetaminophen (HYCET) solution 7.5-325 mg/15mL Take 30 mLs by mouth every 6 (six) hours as needed for Pain.    HYDROmorphone (DILAUDID) 2 MG tablet Take 1 tablet (2 mg total) by mouth every 8 (eight) hours as needed for Pain.    ibuprofen (ADVIL,MOTRIN) 600 MG tablet Take 1 tablet (600 mg total) by mouth every 6 (six) hours as needed for Pain.    insulin aspart U-100 (NOVOLOG FLEXPEN U-100 INSULIN) 100 unit/mL (3 mL) InPn pen Inject 6 Units into the skin 3 (three) times daily with meals. (Discard pen 28 days after initial use)    insulin aspart U-100 (NOVOLOG) 100 unit/mL (3 mL) InPn pen Inject 0-5 Units into the skin before meals and at bedtime as needed (for hyperglycemia by specified ranges). Sliding Scale: Blood Glucose Pre-meal 151-200 +2 units, 201-250 + 4 units, 251-300 + 6 units, 301-350 + 8 units, >350 + 10 units, max TDD 58 units    insulin glargine U-100, Lantus, 100 unit/mL (3 mL) SubQ InPn pen Inject 9 Units into the skin every evening.    insulin lispro (HUMALOG U-100 INSULIN) 100 unit/mL injection **LOW CORRECTION DOSE**  Blood Glucose  mg/dL                  Pre-meal                  151-200                0 unit                        201-250                2 units                      251-300                3 units                      301-350                4 units                      >350                     5 units                      Administer subcutaneously if needed at times designated by monitoring schedule.    ketorolac 0.5% (ACULAR) 0.5 % Drop Place 1 drop into both eyes. For pain after eye injections.    lamoTRIgine (LAMICTAL) 25 MG tablet Take 25 mg by mouth 2 (two) times daily.    lorazepam (ATIVAN) 0.5 MG tablet Take 0.5 mg by mouth every 12 (twelve) hours as  "needed for Anxiety.    metformin (GLUCOPHAGE) 1000 MG tablet Take 1,000 mg by mouth daily with breakfast.     ondansetron (ZOFRAN-ODT) 4 MG TbDL Take 1 tablet (4 mg total) by mouth every 6 (six) hours as needed (for nausea).    pen needle, diabetic 32 gauge x 5/32" Ndle Use to inject insulin 5 times daily.    quetiapine (SEROQUEL) 25 MG Tab Take 25 mg by mouth daily as needed.     Antibiotics (From admission, onward)      Start     Stop Route Frequency Ordered    07/02/24 1000  meropenem (MERREM) 1 g in 0.9% NaCl 100 mL IVPB (MB+)         -- IV Every 12 hours (non-standard times) 07/02/24 0907    07/01/24 1502  vancomycin - pharmacy to dose  (vancomycin IVPB (PEDS and ADULTS))        Placed in "And" Linked Group    -- IV pharmacy to manage frequency 07/01/24 1402          Antifungals (From admission, onward)      None          Antivirals (From admission, onward)      None             Immunization History   Administered Date(s) Administered    COVID-19 MRNA, LN-S PF (MODERNA HALF 0.25 ML DOSE) 12/11/2021    COVID-19, MRNA, LN-S, PF (MODERNA FULL 0.5 ML DOSE) 03/01/2021, 03/29/2021    COVID-19, mRNA, LNP-S, PF (Moderna 2023)Ages 12+ 01/15/2024    Influenza - Quadrivalent - MDCK - PF 10/06/2017       Family History       Problem Relation (Age of Onset)    Breast cancer Sister (48)    Colon cancer Sister    Hypertension Mother, Brother, Brother    Seizures Father          Social History     Socioeconomic History    Marital status:    Tobacco Use    Smoking status: Never    Smokeless tobacco: Never   Substance and Sexual Activity    Alcohol use: Yes     Alcohol/week: 0.0 standard drinks of alcohol     Comment: ocassional    Drug use: No    Sexual activity: Not Currently     Partners: Male     Birth control/protection: None     Social Determinants of Health     Financial Resource Strain: Low Risk  (7/1/2024)    Overall Financial Resource Strain (CARDIA)     Difficulty of Paying Living Expenses: Not " very hard   Food Insecurity: No Food Insecurity (7/1/2024)    Hunger Vital Sign     Worried About Running Out of Food in the Last Year: Never true     Ran Out of Food in the Last Year: Never true   Transportation Needs: No Transportation Needs (7/1/2024)    TRANSPORTATION NEEDS     Transportation : No   Physical Activity: Inactive (7/1/2024)    Exercise Vital Sign     Days of Exercise per Week: 0 days     Minutes of Exercise per Session: 0 min   Stress: Stress Concern Present (7/1/2024)    [employer] - Occupational Stress Questionnaire     Feeling of Stress : Very much   Housing Stability: Low Risk  (7/1/2024)    Housing Stability Vital Sign     Unable to Pay for Housing in the Last Year: No     Homeless in the Last Year: No     Review of Systems   Constitutional:  Negative for activity change, chills and fever.   HENT:  Negative for congestion, mouth sores, rhinorrhea, sinus pressure and sore throat.    Eyes:  Negative for photophobia, pain and redness.   Respiratory:  Positive for shortness of breath. Negative for cough, chest tightness and wheezing.    Cardiovascular:  Negative for chest pain and leg swelling.   Gastrointestinal:  Negative for abdominal distention, abdominal pain, diarrhea, nausea and vomiting.   Endocrine: Negative for polyuria.   Genitourinary:  Negative for decreased urine volume, dysuria and flank pain.   Musculoskeletal:  Negative for joint swelling and neck pain.   Skin:  Negative for color change.   Allergic/Immunologic: Negative for food allergies.   Neurological:  Negative for dizziness, weakness and headaches.   Hematological:  Negative for adenopathy.   Psychiatric/Behavioral:  Negative for agitation and confusion. The patient is not nervous/anxious.      Objective:     Vital Signs (Most Recent):  Temp: 98.2 °F (36.8 °C) (07/02/24 1400)  Pulse: 97 (07/02/24 1500)  Resp: 19 (07/02/24 1500)  BP: (!) 148/64 (07/02/24 1500)  SpO2: 100 % (07/02/24 1500)  Vital Signs (24h Range):  Temp:  [97.9 °F (36.6 °C)-98.6 °F (37 °C)] 98.2 °F (36.8 °C)  Pulse:  [] 97  Resp:  [12-26] 19  SpO2:  [97 %-100 %] 100 %  BP: ()/(44-84) 148/64     Weight: 86.9 kg (191 lb 9.3 oz)  Body mass index is 30.01 kg/m².    Estimated Creatinine Clearance: 18.5 mL/min (A) (based on SCr of 3.7 mg/dL (H)).     Physical Exam  Constitutional:       Appearance: She is well-developed.   HENT:      Head: Normocephalic and atraumatic.   Eyes:      Pupils: Pupils are equal, round, and reactive to light.   Cardiovascular:      Rate and Rhythm: Normal rate.   Pulmonary:      Effort: Pulmonary effort is normal.      Breath sounds: Rhonchi present.   Abdominal:      General: Bowel sounds are normal.      Palpations: Abdomen is soft.   Musculoskeletal:         General: No tenderness.      Cervical back: Normal range of motion and neck supple.   Skin:     General: Skin is warm and dry.      Comments: Midline abdominal wound healing well   Neurological:      Mental Status: She is alert and oriented to person, place, and time.          Significant Labs: CBC:   Recent Labs   Lab 07/01/24  0026 07/01/24  0652 07/02/24  0314   WBC 20.03*  --  17.15*   HGB 7.4*  --  7.0*   HCT 23.3* 23* 21.3*     --  503*     CMP:   Recent Labs   Lab 07/02/24  0314 07/02/24  0808 07/02/24  1202   * 133* 132*   K 4.0 4.0 4.1    103 102   CO2 17* 19* 19*   * 188* 296*   BUN 69* 70* 72*   CREATININE 4.0* 3.7* 3.7*   CALCIUM 7.9* 7.8* 7.8*   PROT 6.1 5.7* 6.1   ALBUMIN 1.8* 1.6* 1.7*   BILITOT 0.4 0.4 0.5   ALKPHOS 144* 126 142*   AST 33 25 28   ALT 7* 5* 7*   ANIONGAP 14 11 11       Significant Imaging: I have reviewed all pertinent imaging results/findings within the past 24 hours.

## 2024-07-02 NOTE — HPI
58 stage 4 ovarian ca (3/2024, on C3D32 of carbo/taxol/bevacizumab) s/p debulking, omentectomy, partial hepatectomy admitted last month for SOB 2/2 pleural effusion. CXR showed RLL opacitiy and CT chest showing R sided loculated subdiaphragmatic fluid 7.3x11.2cm which was not amendable to drainage by IR. Fevers resolved on pip-tazo. Repeat CT with worsening metastatic disease w/ ascites and perihepatic collection.  Fevers/leukocytosis thought possibly related to malignancy, patient sent home without antibiotics.  She presented to the ER with worsening SOB found to have worsening JASON, increased R pleural effusion with locations.  Morning of 7/1 pt became altered, SOB, hypotensive to 70s/40s and noted to be in dKA transferred to the ICU Thoracentesis 7/1 significant 800ml of purulent fluid drained from the pleural space, 173K WBC 95% segs, gram stain with GNR.

## 2024-07-02 NOTE — HPI
58F w/ stage 4 ovarian cancer with R pleural effusion presumed malignant who was admitted to MICU for management of DKA. Thoracentesis yesterday 07/02/24; drained 800 ml cloudy (yellow cloudy/chylous looking fluid vs purulence). Micro positive for E coli, path negative for malignancy. She was managed with the chest tube and pulmonology was on board for possible lytic therapy. The drain output decreased and repeat chest CT on 7/4 demonstrated significant decrease in the volume of the right pleural fluid collection. The chest tube was removed by pulmonology on 7/5. Since that time she has had occasional fevers and a persistently elevated WBC. Repeat CT chest on 7/10 with re demonstration of the right pleural fluid collection, stable in size from the 7/4 CT, but with new scattered foci of air. CTS re consulted for potential surgical intervention.

## 2024-07-02 NOTE — ASSESSMENT & PLAN NOTE
Creatinine up to 4.6 on admission; baseline 0.7. Likely prerenal in the setting of DKA; dehydration. Also possible component ATN 2/2 contrast nephropathy at end of last admission.    -- Cr down trending 4.0  -- Nephrology consulted: recommend 1L NS bolus, LR @100 ml x 24 hours, Junior for I&Os  --renal US  --Strict I&O, maintain junior  --Avoid nephrotoxic agents

## 2024-07-02 NOTE — PROGRESS NOTES
Pharmacokinetic Assessment Follow Up: IV Vancomycin    Vancomycin Regimen Assessment/Plan:    - Vancomycin random level resulted as 15.3 mcg/mL, goal 15-20 mcg/mL.   - Continue pulse dosing in the setting of JASON.  - Administer vancomycin 500 mg x 1 dose.  - A random level is ordered with AM labs tomorrow.  Pharmacy to re-dose based on level and renal function.     Drug levels (last 3 results):  Recent Labs   Lab Result Units 07/02/24  0314   Vancomycin, Random ug/mL 15.3       Pharmacy will continue to follow and monitor vancomycin.    Please contact pharmacy at extension [phone] for questions regarding this assessment.    Thank you for the consult,   Sally Taylor, PharmD, BCCCP       Patient brief summary:  Anetet Ricci is a 58 y.o. female initiated on antimicrobial therapy with IV Vancomycin for treatment of sepsis    The patient's current regimen is pulse dosing.    Drug Allergies:   Review of patient's allergies indicates:   Allergen Reactions    Codeine Other (See Comments)     Flu like s/s    Tramadol Other (See Comments)     Flu like symptoms similar to codeine reaction       Actual Body Weight:   86.9 kg    Renal Function:   Estimated Creatinine Clearance: 18.5 mL/min (A) (based on SCr of 3.7 mg/dL (H)).,     Dialysis Method (if applicable):  N/A    CBC (last 72 hours):  Recent Labs   Lab Result Units 07/01/24  0026 07/02/24  0314   WBC K/uL 20.03* 17.15*   Hemoglobin g/dL 7.4* 7.0*   Hematocrit % 23.3* 21.3*   Platelets K/uL 306 503*   Gran % % 87.0* 75.0*   Lymph % % 6.0* 8.0*   Mono % % 4.0 9.0   Eosinophil % % 1.0 1.0   Basophil % % 0.0 1.0   Differential Method  Manual Manual       Metabolic Panel (last 72 hours):  Recent Labs   Lab Result Units 07/01/24  0026 07/01/24  0553 07/01/24  0923 07/01/24  0943 07/01/24  1441 07/01/24  2025 07/01/24  2306 07/02/24  0314 07/02/24  0808   Sodium mmol/L 129* 127* 132*  --  131* 131* 134* 133* 133*   Sodium, Urine mmol/L  --   --   --  17*  --   --    --   --   --    Potassium mmol/L 3.9 3.8 3.0*  --  3.5 3.8 3.9 4.0 4.0   Chloride mmol/L 94* 95 98  --  100 103 103 102 103   CO2 mmol/L 14* 15* 16*  --  19* 19* 19* 17* 19*   Glucose mg/dL 403* 461* 312*  --  85 104 121* 147* 188*   Glucose, UA   --   --   --  Negative  --   --   --   --   --    BUN mg/dL 76* 75* 76*  --  74* 71* 71* 69* 70*   Creatinine mg/dL 4.6* 4.5* 4.6*  --  4.4* 4.1* 4.0* 4.0* 3.7*   Creatinine, Urine mg/dL  --   --   --  186.0  --   --   --   --   --    Albumin g/dL 2.2* 1.7* 1.8*  --  1.9* 1.7* 1.7* 1.8* 1.6*   Total Bilirubin mg/dL 0.5 0.4 0.3  --  0.3 0.3 0.3 0.4 0.4   Alkaline Phosphatase U/L 176* 135 159*  --  173* 129 142* 144* 126   AST U/L 25 18 21  --  24 21 26 33 25   ALT U/L 8* 7* 7*  --  6* 5* 6* 7* 5*   Magnesium mg/dL 2.1 1.9 2.0  --  2.0 1.8 1.9 1.9 1.9   Phosphorus mg/dL 3.8 3.9 3.2  --  2.7 2.7 2.7 2.5* 2.7       Vancomycin Administrations:  vancomycin given in the last 96 hours                     vancomycin 1,500 mg in D5W 250 mL IVPB (Vial-Mate) (mg) 1,500 mg New Bag 07/01/24 0154                    Microbiologic Results:  Microbiology Results (last 7 days)       Procedure Component Value Units Date/Time    Culture, Respiratory with Gram Stain [[id number]] Collected: 07/01/24 1505    Order Status: Completed Specimen: Respiratory from Sputum, Induced Updated: 07/02/24 0867     Respiratory Culture Normal respiratory adriano     Gram Stain (Respiratory) <10 epithelial cells per low power field.     Gram Stain (Respiratory) Rare WBC's     Gram Stain (Respiratory) Moderate Gram positive cocci    Blood culture x two cultures. Draw prior to antibiotics. [[id number]] Collected: 07/01/24 0028    Order Status: Completed Specimen: Blood from Peripheral, Forearm, Left Updated: 07/02/24 0613     Blood Culture, Routine No Growth to date      No Growth to date    Narrative:      Aerobic and anaerobic    Blood culture x two cultures. Draw prior to antibiotics. [[id number]] Collected:  07/01/24 0027    Order Status: Completed Specimen: Blood from Peripheral, Upper Arm, Left Updated: 07/02/24 0613     Blood Culture, Routine No Growth to date      No Growth to date    Narrative:      Aerobic and anaerobic    Culture, body fluid - Bactec [[id number]] Collected: 07/01/24 1711    Order Status: Completed Specimen: Body Fluid from Thoracentesis Fluid Updated: 07/02/24 0440     Body Fluid Culture, Sterile Gram stain: Gram negative rods      07/02/2024  04:40    Gram stain [[id number]] Collected: 07/01/24 1712    Order Status: Completed Specimen: Body Fluid from Pleural Fluid Updated: 07/01/24 2027     Gram Stain Result Many WBC's      Rare Gram negative rods    KOH prep [[id number]] Collected: 07/01/24 1712    Order Status: Completed Specimen: Body Fluid from Pleural Fluid Updated: 07/01/24 1936     KOH Prep No yeast or fungal elements seen    Fungus culture [[id number]] Collected: 07/01/24 1712    Order Status: Sent Specimen: Body Fluid from Pleural Fluid Updated: 07/01/24 1850    AFB culture [[id number]] Collected: 07/01/24 1712    Order Status: Sent Specimen: Body Fluid from Pleural Fluid Updated: 07/01/24 1833    Clostridium difficile EIA [[id number]] Collected: 07/01/24 1716    Order Status: Sent Specimen: Stool Updated: 07/01/24 1717    Urine culture [[id number]] Collected: 07/01/24 0943    Order Status: No result Specimen: Urine Updated: 07/01/24 1204

## 2024-07-02 NOTE — CONSULTS
[facility] - Card[facility]  Thoracic Surgery  Consult Note    Patient Name: Anette Ricci  MRN: [id number]  Code Status: Full Code  Admission Date: 6/30/2024  Hospital Length of Stay: 1 days  Consult Requesting Physician: Maria Antonia Pierce MD  Consulting Physician: Mikey Palencia MD  Primary Care Provider: Mark Chua MD    Inpatient consult to Cardiothoracic Surgery  Consult performed by: Be Mendoza PA-C  Consult ordered by: El De La Garza MD  Reason for consult: 58F w/ stage 4 ovarian cancer with R pleural effusion presumed malignant who was admitted to MICU for management of DKA. Thoracentesis yesterday 07/02/24; drained 800 ml cloudy (yellow cloudy/chylous looking fluid vs purulence) yesterday. Cytology and cultures sent. Plan to place chest tube today. Thoracic surgery consulted for recommendations on lytic therapy.        Subjective:     Reason for Consult: 58F w/ stage 4 ovarian cancer with R pleural effusion presumed malignant. Recommendations for possible lytics    History of Present Illness: 58F w/ stage 4 ovarian cancer with R pleural effusion presumed malignant who was admitted to MICU for management of DKA. Thoracentesis yesterday 07/02/24; drained 800 ml cloudy (yellow cloudy/chylous looking fluid vs purulence) yesterday. Cytology and cultures sent. Plan to place chest tube today. Thoracic surgery consulted for recommendations on lytic therapy.     No current facility-administered medications on file prior to encounter.     Current Outpatient Medications on File Prior to Encounter   Medication Sig    acetaminophen (TYLENOL) 325 MG tablet Take 2 tablets (650 mg total) by mouth every 4 (four) hours as needed for Pain.    amlodipine (NORVASC) 10 MG tablet Take 10 mg by mouth once daily.     atorvastatin (LIPITOR) 40 MG tablet Take 80 mg by mouth once daily.    enalapril (VASOTEC) 20 MG tablet Take 20 mg by mouth once daily.    enoxaparin (LOVENOX) 80 mg/0.8 mL Syrg Inject 0.8 mLs (80  mg total) into the skin every 12 (twelve) hours.    fluticasone propionate (FLONASE) 50 mcg/actuation nasal spray 1 spray by Each Nostril route once daily.    hydrochlorothiazide (HYDRODIURIL) 25 MG tablet Take 25 mg by mouth once daily.     hydrocodone-acetaminophen (HYCET) solution 7.5-325 mg/15mL Take 30 mLs by mouth every 6 (six) hours as needed for Pain.    HYDROmorphone (DILAUDID) 2 MG tablet Take 1 tablet (2 mg total) by mouth every 8 (eight) hours as needed for Pain.    ibuprofen (ADVIL,MOTRIN) 600 MG tablet Take 1 tablet (600 mg total) by mouth every 6 (six) hours as needed for Pain.    insulin aspart U-100 (NOVOLOG FLEXPEN U-100 INSULIN) 100 unit/mL (3 mL) InPn pen Inject 6 Units into the skin 3 (three) times daily with meals. (Discard pen 28 days after initial use)    insulin aspart U-100 (NOVOLOG) 100 unit/mL (3 mL) InPn pen Inject 0-5 Units into the skin before meals and at bedtime as needed (for hyperglycemia by specified ranges). Sliding Scale: Blood Glucose Pre-meal 151-200 +2 units, 201-250 + 4 units, 251-300 + 6 units, 301-350 + 8 units, >350 + 10 units, max TDD 58 units    insulin glargine U-100, Lantus, 100 unit/mL (3 mL) SubQ InPn pen Inject 9 Units into the skin every evening.    insulin lispro (HUMALOG U-100 INSULIN) 100 unit/mL injection **LOW CORRECTION DOSE**  Blood Glucose  mg/dL                  Pre-meal                  151-200                0 unit                        201-250                2 units                      251-300                3 units                      301-350                4 units                      >350                     5 units                      Administer subcutaneously if needed at times designated by monitoring schedule.    ketorolac 0.5% (ACULAR) 0.5 % Drop Place 1 drop into both eyes. For pain after eye injections.    lamoTRIgine (LAMICTAL) 25 MG tablet Take 25 mg by mouth 2 (two) times daily.    lorazepam (ATIVAN) 0.5 MG tablet Take 0.5 mg by mouth  "every 12 (twelve) hours as needed for Anxiety.    metformin (GLUCOPHAGE) 1000 MG tablet Take 1,000 mg by mouth daily with breakfast.     ondansetron (ZOFRAN-ODT) 4 MG TbDL Take 1 tablet (4 mg total) by mouth every 6 (six) hours as needed (for nausea).    pen needle, diabetic 32 gauge x 5/32" Ndle Use to inject insulin 5 times daily.    quetiapine (SEROQUEL) 25 MG Tab Take 25 mg by mouth daily as needed.       Review of patient's allergies indicates:   Allergen Reactions    Codeine Other (See Comments)     Flu like s/s    Tramadol Other (See Comments)     Flu like symptoms similar to codeine reaction       Past Medical History:   Diagnosis Date    Breast cancer 2013    Diabetes mellitus     Genetic testing 05/29/2013    VUS    Hyperlipidemia     Hypertension     Malignant neoplasm of upper-outer quadrant of right breast in female, estrogen receptor positive 12/02/2015    Seizure disorder      Past Surgical History:   Procedure Laterality Date    BILATERAL SALPINGO-OOPHORECTOMY (BSO) N/A 3/25/2024    Procedure: SALPINGO-OOPHORECTOMY, BILATERAL;  Surgeon: Александр Washington MD;  Location: [facility] OR [address] [place] [address];  Service: OB/GYN;  Laterality: N/A;    BREAST BIOPSY      BREAST LUMPECTOMY Right 2013    CHOLECYSTECTOMY  3/25/2024    Procedure: CHOLECYSTECTOMY;  Surgeon: Bo Farmer MD;  Location: [facility] OR [address] [place] [address];  Service: General;;    DEBULKING OF TUMOR N/A 3/25/2024    Procedure: DEBULKING, NEOPLASM;  Surgeon: Александр Washington MD;  Location: [facility] OR [address] [place] [address];  Service: OB/GYN;  Laterality: N/A;    EXCISION, LIVER N/A 3/25/2024    Procedure: EXCISION, LIVER - partial;  Surgeon: Bo Farmer MD;  [facility];  Service: General;  Laterality: N/A;  bk ultrasound  [place] book by TA on 3-21-24  7:00 a.m.  Conf #  [id number]    EYE SURGERY      LAPAROTOMY, EXPLORATORY N/A 3/25/2024    Procedure: LAPAROTOMY, EXPLORATORY;  Surgeon: Александр Washington MD;  Location: [facility] OR [address] [place] [address];  Service: OB/GYN;  " Laterality: N/A;    OMENTECTOMY N/A 3/25/2024    Procedure: OMENTECTOMY;  Surgeon: Александр Washington MD;  [facility];  Service: OB/GYN;  Laterality: N/A;    PERITONEOCENTESIS N/A 3/13/2024    Procedure: PARACENTESIS, ABDOMINAL;  Surgeon: Flavia Moore MD;  Location: [facility] CATH LAB;  Service: Radiology;  Laterality: N/A;    PERITONEOCENTESIS N/A 3/21/2024    Procedure: PARACENTESIS, ABDOMINAL;  Surgeon: Jorge Jolley MD;  Location: [facility] CATH LAB;  Service: Radiology;  Laterality: N/A;    PERITONEOCENTESIS N/A 6/10/2024    Procedure: PARACENTESIS, ABDOMINAL;  Surgeon: Rogelio Malave MD;  Location: [facility] CATH LAB;  Service: Radiology;  Laterality: N/A;    TOTAL ABDOMINAL HYSTERECTOMY N/A 3/25/2024    Procedure: HYSTERECTOMY, TOTAL, ABDOMINAL;  Surgeon: Александр Washington MD;  [facility];  Service: OB/GYN;  Laterality: N/A;    TOTAL REDUCTION MAMMOPLASTY Bilateral 2016     Family History       Problem Relation (Age of Onset)    Breast cancer Sister (48)    Colon cancer Sister    Hypertension Mother, Brother, Brother    Seizures Father          Tobacco Use    Smoking status: Never    Smokeless tobacco: Never   Substance and Sexual Activity    Alcohol use: Yes     Alcohol/week: 0.0 standard drinks of alcohol     Comment: ocassional    Drug use: No    Sexual activity: Not Currently     Partners: Male     Birth control/protection: None     Review of Systems   Constitutional:  Positive for fatigue. Negative for fever.   Respiratory:  Positive for cough and shortness of breath.    Gastrointestinal:  Positive for diarrhea, nausea and vomiting. Negative for constipation.   Genitourinary:  Negative for dysuria and flank pain.   Skin:  Negative for color change and wound.   Neurological:  Negative for seizures and syncope.   Psychiatric/Behavioral:  Negative for agitation and confusion.      Objective:     Vital Signs (Most Recent):  Temp: 98.2 °F (36.8 °C) (07/02/24 1400)  Pulse:  97 (07/02/24 1500)  Resp: 19 (07/02/24 1500)  BP: (!) 148/64 (07/02/24 1500)  SpO2: 100 % (07/02/24 1500) Vital Signs (24h Range):  Temp:  [97.9 °F (36.6 °C)-98.6 °F (37 °C)] 98.2 °F (36.8 °C)  Pulse:  [] 97  Resp:  [12-26] 19  SpO2:  [97 %-100 %] 100 %  BP: ()/(44-84) 148/64     Weight: 86.9 kg (191 lb 9.3 oz)  Body mass index is 30.01 kg/m².    Intake/Output - Last 3 Shifts         06/30 0700 07/01 0659 07/01 0700 07/02 0659 07/02 0700 07/03 0659    I.V. (mL/kg)  2951.4 (34) 547.8 (6.3)    IV Piggyback 1849.7 1339.1 200.3    Total Intake(mL/kg) 1849.7 (21.3) 4290.4 (49.4) 748.1 (8.6)    Urine (mL/kg/hr)  375 (0.2) 150 (0.2)    Other  800     Stool  0     Total Output  1175 150    Net +1849.7 +3115.4 +598.1           Stool Occurrence  3 x             SpO2: 100 %        Physical Exam  Vitals and nursing note reviewed.   Constitutional:       Appearance: She is obese.   HENT:      Head: Normocephalic and atraumatic.      Right Ear: External ear normal.      Left Ear: External ear normal.      Nose: Nose normal.      Mouth/Throat:      Mouth: Mucous membranes are moist.      Pharynx: Oropharynx is clear.   Cardiovascular:      Rate and Rhythm: Normal rate and regular rhythm.      Pulses: Normal pulses.   Pulmonary:      Comments: Diminished breath sounds R lung base, significantly improved from yesterday   Abdominal:      Tenderness: There is no guarding or rebound.      Comments: Well healing midline vertical incision   Musculoskeletal:      Right lower leg: No edema.      Left lower leg: No edema.   Skin:     General: Skin is warm and dry.   Neurological:      Mental Status: She is alert and oriented to person, place, and time.   Psychiatric:         Mood and Affect: Mood normal.         Behavior: Behavior normal.            Significant Labs:  Recent Lab Results  (Last 5 results in the past 24 hours)        07/02/24  1202   07/02/24  1156   07/02/24  0830   07/02/24  0812   07/02/24  0808         Acanthocytes               Albumin 1.7         1.6                126       ALT 7         5       Anion Gap 11         11       Aniso               AST 28         25       Bands               Baso #               Basophilic Stippling               Basophil %               BILIRUBIN TOTAL 0.5  Comment: For infants and newborns, interpretation of results should be based  on gestational age, weight and in agreement with clinical  observations.    Premature Infant recommended reference ranges:  Up to 24 hours.............<8.0 mg/dL  Up to 48 hours............<12.0 mg/dL  3-5 days..................<15.0 mg/dL  6-29 days.................<15.0 mg/dL           0.4  Comment: For infants and newborns, interpretation of results should be based  on gestational age, weight and in agreement with clinical  observations.    Premature Infant recommended reference ranges:  Up to 24 hours.............<8.0 mg/dL  Up to 48 hours............<12.0 mg/dL  3-5 days..................<15.0 mg/dL  6-29 days.................<15.0 mg/dL         BUN 72         70       Winston/Echinocytes               Calcium 7.8         7.8       Chloride 102         103       CO2 19         19       Creatinine 3.7         3.7       Differential Method               eGFR 13.5         13.5       Eos #               Eos %               Fragmented Cells               Giant Platelets               Glucose 296         188       Gran %               Hematocrit               Hemoglobin               Hypo               Immature Grans (Abs)               Immature Granulocytes               Lymph #               Lymph %               Magnesium  1.8         1.9       MCH               MCHC               MCV               Metamyelocytes               Mono #               Mono %               MPV               Myelocytes               nRBC               Ovalocytes               Phosphorus Level 2.8         2.7       Platelet Estimate               Platelet Count                POCT Glucose   313     216         Poikilocytosis               Poly               Potassium 4.1         4.0       PROTEIN TOTAL 6.1         5.7       RBC               RDW               Schistocytes               Sodium 132         133       Sodium, Urine     40  Comment: The random urine reference ranges provided were established   for 24 hour urine collections.  No reference ranges exist for  random urine specimens.  Correlate clinically.             Toxic Granulation               Vancomycin, Random               WBC                                      Significant Diagnostics:  CT: I have reviewed all pertinent results/findings within the past 24 hours  CXR: I have reviewed all pertinent results/findings within the past 24 hours    VTE Risk Mitigation (From admission, onward)           Ordered     IP VTE HIGH RISK PATIENT  Once         07/01/24 0540     Place sequential compression device  Until discontinued         07/01/24 0540                    Assessment/Plan:     Pleural effusion, malignant  58F w/ stage 4 ovarian cancer with R pleural effusion presumed malignant who was admitted to MICU for management of DKA.     - Recommend Chest tube placement  - Re-image with CT Chest in 2 days for allowance of time to amply drain  - Possible initiation of lytics therapy pending re-evaluation with updated imaging  - rest of care per primary  - Will follow to reassess new imaging        Thank you for your consult. I will follow-up with patient. Please contact us if you have any additional questions.    Be Mendoza PA-C  Thoracic Surgery  [facility] - [facility]

## 2024-07-02 NOTE — EICU
Intervention Initiated From:  Bedside    Yousuf intervened regarding:  Time-Out    Comments:  17:34  Called into room via elert for Chest Tube insertion.  Physician verification done for Lilli Pratt w/ [facility] & [facility] Castell[facility]/ Pulm/[facility].  Time-out completed using proper pt identifiers.  17:55  #14 Fr right sided chest tube inserted per Dr. Bowen supervised by Dr. Pierce using u/s guidance.  Secured & sutured.  Connected to CT set-up.  CXR ordered.  Pt tolerated procedure well.

## 2024-07-02 NOTE — SUBJECTIVE & OBJECTIVE
Past Medical History:   Diagnosis Date    Breast cancer 2013    Diabetes mellitus     Genetic testing 05/29/2013    VUS    Hyperlipidemia     Hypertension     Malignant neoplasm of upper-outer quadrant of right breast in female, estrogen receptor positive 12/02/2015    Seizure disorder        Past Surgical History:   Procedure Laterality Date    BILATERAL SALPINGO-OOPHORECTOMY (BSO) N/A 3/25/2024    Procedure: SALPINGO-OOPHORECTOMY, BILATERAL;  Surgeon: Александр Washington MD;  Location: [facility] OR [address] [place] [address];  Service: OB/GYN;  Laterality: N/A;    BREAST BIOPSY      BREAST LUMPECTOMY Right 2013    CHOLECYSTECTOMY  3/25/2024    Procedure: CHOLECYSTECTOMY;  Surgeon: Bo Farmer MD;  Location: [facility] OR [address] [place] [address];  Service: General;;    DEBULKING OF TUMOR N/A 3/25/2024    Procedure: DEBULKING, NEOPLASM;  Surgeon: Александр Washington MD;  Location: [facility] OR [address] [place] [address];  Service: OB/GYN;  Laterality: N/A;    EXCISION, LIVER N/A 3/25/2024    Procedure: EXCISION, LIVER - partial;  Surgeon: Bo Farmer MD;  Location: [facility] OR [address] [place] [address];  Service: General;  Laterality: N/A;  bk ultrasound  [place] book by TA on 3-21-24  7:00 a.m.  Conf #  [id number]    EYE SURGERY      LAPAROTOMY, EXPLORATORY N/A 3/25/2024    Procedure: LAPAROTOMY, EXPLORATORY;  Surgeon: Александр Washington MD;  Location: [facility] OR [address] [place] [address];  Service: OB/GYN;  Laterality: N/A;    OMENTECTOMY N/A 3/25/2024    Procedure: OMENTECTOMY;  Surgeon: Александр Washington MD;  Location: [facility] OR [address] [place] [address];  Service: OB/GYN;  Laterality: N/A;    PERITONEOCENTESIS N/A 3/13/2024    Procedure: PARACENTESIS, ABDOMINAL;  Surgeon: Flavia Moore MD;  Location: [facility] CATH LAB;  Service: Radiology;  Laterality: N/A;    PERITONEOCENTESIS N/A 3/21/2024    Procedure: PARACENTESIS, ABDOMINAL;  Surgeon: Jorge Jolley MD;  Location: [facility] CATH LAB;  Service: Radiology;  Laterality: N/A;    PERITONEOCENTESIS N/A 6/10/2024    Procedure: PARACENTESIS, ABDOMINAL;  Surgeon: Frieda  Rogelio KELLEY MD;  Location: [facility] CATH LAB;  Service: Radiology;  Laterality: N/A;    TOTAL ABDOMINAL HYSTERECTOMY N/A 3/25/2024    Procedure: HYSTERECTOMY, TOTAL, ABDOMINAL;  Surgeon: Александр Washington MD;  Location: [facility] OR [address] [place] [address];  Service: OB/GYN;  Laterality: N/A;    TOTAL REDUCTION MAMMOPLASTY Bilateral 2016       Review of patient's allergies indicates:   Allergen Reactions    Codeine Other (See Comments)     Flu like s/s    Tramadol Other (See Comments)     Flu like symptoms similar to codeine reaction       Medications:  Medications Prior to Admission   Medication Sig    acetaminophen (TYLENOL) 325 MG tablet Take 2 tablets (650 mg total) by mouth every 4 (four) hours as needed for Pain.    amlodipine (NORVASC) 10 MG tablet Take 10 mg by mouth once daily.     atorvastatin (LIPITOR) 40 MG tablet Take 80 mg by mouth once daily.    enalapril (VASOTEC) 20 MG tablet Take 20 mg by mouth once daily.    enoxaparin (LOVENOX) 80 mg/0.8 mL Syrg Inject 0.8 mLs (80 mg total) into the skin every 12 (twelve) hours.    fluticasone propionate (FLONASE) 50 mcg/actuation nasal spray 1 spray by Each Nostril route once daily.    hydrochlorothiazide (HYDRODIURIL) 25 MG tablet Take 25 mg by mouth once daily.     hydrocodone-acetaminophen (HYCET) solution 7.5-325 mg/15mL Take 30 mLs by mouth every 6 (six) hours as needed for Pain.    HYDROmorphone (DILAUDID) 2 MG tablet Take 1 tablet (2 mg total) by mouth every 8 (eight) hours as needed for Pain.    ibuprofen (ADVIL,MOTRIN) 600 MG tablet Take 1 tablet (600 mg total) by mouth every 6 (six) hours as needed for Pain.    insulin aspart U-100 (NOVOLOG FLEXPEN U-100 INSULIN) 100 unit/mL (3 mL) InPn pen Inject 6 Units into the skin 3 (three) times daily with meals. (Discard pen 28 days after initial use)    insulin aspart U-100 (NOVOLOG) 100 unit/mL (3 mL) InPn pen Inject 0-5 Units into the skin before meals and at bedtime as needed (for hyperglycemia by specified ranges). Sliding  "Scale: Blood Glucose Pre-meal 151-200 +2 units, 201-250 + 4 units, 251-300 + 6 units, 301-350 + 8 units, >350 + 10 units, max TDD 58 units    insulin glargine U-100, Lantus, 100 unit/mL (3 mL) SubQ InPn pen Inject 9 Units into the skin every evening.    insulin lispro (HUMALOG U-100 INSULIN) 100 unit/mL injection **LOW CORRECTION DOSE**  Blood Glucose  mg/dL                  Pre-meal                  151-200                0 unit                        201-250                2 units                      251-300                3 units                      301-350                4 units                      >350                     5 units                      Administer subcutaneously if needed at times designated by monitoring schedule.    ketorolac 0.5% (ACULAR) 0.5 % Drop Place 1 drop into both eyes. For pain after eye injections.    lamoTRIgine (LAMICTAL) 25 MG tablet Take 25 mg by mouth 2 (two) times daily.    lorazepam (ATIVAN) 0.5 MG tablet Take 0.5 mg by mouth every 12 (twelve) hours as needed for Anxiety.    metformin (GLUCOPHAGE) 1000 MG tablet Take 1,000 mg by mouth daily with breakfast.     ondansetron (ZOFRAN-ODT) 4 MG TbDL Take 1 tablet (4 mg total) by mouth every 6 (six) hours as needed (for nausea).    pen needle, diabetic 32 gauge x 5/32" Ndle Use to inject insulin 5 times daily.    quetiapine (SEROQUEL) 25 MG Tab Take 25 mg by mouth daily as needed.     Antibiotics (From admission, onward)      Start     Stop Route Frequency Ordered    07/02/24 1000  meropenem (MERREM) 1 g in 0.9% NaCl 100 mL IVPB (MB+)         -- IV Every 12 hours (non-standard times) 07/02/24 0907    07/01/24 1502  vancomycin - pharmacy to dose  (vancomycin IVPB (PEDS and ADULTS))        Placed in "And" Linked Group    -- IV pharmacy to manage frequency 07/01/24 1402          Antifungals (From admission, onward)      None          Antivirals (From admission, onward)      None             Immunization History   Administered Date(s) " Administered    COVID-19 MRNA, LN-S PF (MODERNA HALF 0.25 ML DOSE) 12/11/2021    COVID-19, MRNA, LN-S, PF (MODERNA FULL 0.5 ML DOSE) 03/01/2021, 03/29/2021    COVID-19, mRNA, LNP-S, PF (Moderna 2023)Ages 12+ 01/15/2024    Influenza - Quadrivalent - MDCK - PF 10/06/2017       Family History       Problem Relation (Age of Onset)    Breast cancer Sister (48)    Colon cancer Sister    Hypertension Mother, Brother, Brother    Seizures Father          Social History     Socioeconomic History    Marital status:    Tobacco Use    Smoking status: Never    Smokeless tobacco: Never   Substance and Sexual Activity    Alcohol use: Yes     Alcohol/week: 0.0 standard drinks of alcohol     Comment: ocassional    Drug use: No    Sexual activity: Not Currently     Partners: Male     Birth control/protection: None     Social Determinants of Health     Financial Resource Strain: Low Risk  (7/1/2024)    Overall Financial Resource Strain (CARDIA)     Difficulty of Paying Living Expenses: Not very hard   Food Insecurity: No Food Insecurity (7/1/2024)    Hunger Vital Sign     Worried About Running Out of Food in the Last Year: Never true     Ran Out of Food in the Last Year: Never true   Transportation Needs: No Transportation Needs (7/1/2024)    TRANSPORTATION NEEDS     Transportation : No   Physical Activity: Inactive (7/1/2024)    Exercise Vital Sign     Days of Exercise per Week: 0 days     Minutes of Exercise per Session: 0 min   Stress: Stress Concern Present (7/1/2024)    [place] [employer] - Occupational Stress Questionnaire     Feeling of Stress : Very much   Housing Stability: Low Risk  (7/1/2024)    Housing Stability Vital Sign     Unable to Pay for Housing in the Last Year: No     Homeless in the Last Year: No     Review of Systems   Constitutional:  Negative for activity change, chills and fever.   HENT:  Negative for congestion, mouth sores, rhinorrhea, sinus pressure and sore throat.    Eyes:   Negative for photophobia, pain and redness.   Respiratory:  Positive for shortness of breath. Negative for cough, chest tightness and wheezing.    Cardiovascular:  Negative for chest pain and leg swelling.   Gastrointestinal:  Negative for abdominal distention, abdominal pain, diarrhea, nausea and vomiting.   Endocrine: Negative for polyuria.   Genitourinary:  Negative for decreased urine volume, dysuria and flank pain.   Musculoskeletal:  Negative for joint swelling and neck pain.   Skin:  Negative for color change.   Allergic/Immunologic: Negative for food allergies.   Neurological:  Negative for dizziness, weakness and headaches.   Hematological:  Negative for adenopathy.   Psychiatric/Behavioral:  Negative for agitation and confusion. The patient is not nervous/anxious.      Objective:     Vital Signs (Most Recent):  Temp: 98.2 °F (36.8 °C) (07/02/24 1400)  Pulse: 97 (07/02/24 1500)  Resp: 19 (07/02/24 1500)  BP: (!) 148/64 (07/02/24 1500)  SpO2: 100 % (07/02/24 1500) Vital Signs (24h Range):  Temp:  [97.9 °F (36.6 °C)-98.6 °F (37 °C)] 98.2 °F (36.8 °C)  Pulse:  [] 97  Resp:  [12-26] 19  SpO2:  [97 %-100 %] 100 %  BP: ()/(44-84) 148/64     Weight: 86.9 kg (191 lb 9.3 oz)  Body mass index is 30.01 kg/m².    Estimated Creatinine Clearance: 18.5 mL/min (A) (based on SCr of 3.7 mg/dL (H)).     Physical Exam  Constitutional:       Appearance: She is well-developed.   HENT:      Head: Normocephalic and atraumatic.   Eyes:      Pupils: Pupils are equal, round, and reactive to light.   Cardiovascular:      Rate and Rhythm: Normal rate.   Pulmonary:      Effort: Pulmonary effort is normal.      Breath sounds: Rhonchi present.   Abdominal:      General: Bowel sounds are normal.      Palpations: Abdomen is soft.   Musculoskeletal:         General: No tenderness.      Cervical back: Normal range of motion and neck supple.   Skin:     General: Skin is warm and dry.      Comments: Midline abdominal wound healing  well   Neurological:      Mental Status: She is alert and oriented to person, place, and time.          Significant Labs: CBC:   Recent Labs   Lab 07/01/24  0026 07/01/24  0652 07/02/24 0314   WBC 20.03*  --  17.15*   HGB 7.4*  --  7.0*   HCT 23.3* 23* 21.3*     --  503*     CMP:   Recent Labs   Lab 07/02/24  0314 07/02/24  0808 07/02/24  1202   * 133* 132*   K 4.0 4.0 4.1    103 102   CO2 17* 19* 19*   * 188* 296*   BUN 69* 70* 72*   CREATININE 4.0* 3.7* 3.7*   CALCIUM 7.9* 7.8* 7.8*   PROT 6.1 5.7* 6.1   ALBUMIN 1.8* 1.6* 1.7*   BILITOT 0.4 0.4 0.5   ALKPHOS 144* 126 142*   AST 33 25 28   ALT 7* 5* 7*   ANIONGAP 14 11 11       Significant Imaging: I have reviewed all pertinent imaging results/findings within the past 24 hours.

## 2024-07-02 NOTE — ASSESSMENT & PLAN NOTE
RESOLVED  On admission noted to have glucose 461, gap 17, Bicarb 15. Transferred to MICU for DKA.    -- On insulin gtt, AG closed, bicarb normalized -> transitioned to subQ on diabetic diet  -- 10u lantus, high dose SSI  --Q4hr accuchecks  --BMP Q4  --Hypoglycemia protocol

## 2024-07-03 LAB
ABO + RH BLD: NORMAL
ALBUMIN FLD-MCNC: 2 G/DL
ALBUMIN SERPL BCP-MCNC: 1.6 G/DL (ref 3.5–5.2)
ALBUMIN SERPL BCP-MCNC: 1.7 G/DL (ref 3.5–5.2)
ALBUMIN SERPL BCP-MCNC: 1.7 G/DL (ref 3.5–5.2)
ALBUMIN SERPL BCP-MCNC: 1.8 G/DL (ref 3.5–5.2)
ALP SERPL-CCNC: 117 U/L (ref 55–135)
ALP SERPL-CCNC: 121 U/L (ref 55–135)
ALP SERPL-CCNC: 122 U/L (ref 55–135)
ALP SERPL-CCNC: 131 U/L (ref 55–135)
ALT SERPL W/O P-5'-P-CCNC: 5 U/L (ref 10–44)
ALT SERPL W/O P-5'-P-CCNC: 6 U/L (ref 10–44)
ALT SERPL W/O P-5'-P-CCNC: 7 U/L (ref 10–44)
ALT SERPL W/O P-5'-P-CCNC: 7 U/L (ref 10–44)
ANION GAP SERPL CALC-SCNC: 10 MMOL/L (ref 8–16)
ANION GAP SERPL CALC-SCNC: 11 MMOL/L (ref 8–16)
APTT PPP: 29.4 SEC (ref 21–32)
APTT PPP: 56.4 SEC (ref 21–32)
AST SERPL-CCNC: 23 U/L (ref 10–40)
AST SERPL-CCNC: 24 U/L (ref 10–40)
AST SERPL-CCNC: 24 U/L (ref 10–40)
AST SERPL-CCNC: 26 U/L (ref 10–40)
BACTERIA SPEC AEROBE CULT: NORMAL
BACTERIA SPEC AEROBE CULT: NORMAL
BASOPHILS # BLD AUTO: 0.04 K/UL (ref 0–0.2)
BASOPHILS # BLD AUTO: 0.05 K/UL (ref 0–0.2)
BASOPHILS NFR BLD: 0.3 % (ref 0–1.9)
BASOPHILS NFR BLD: 0.3 % (ref 0–1.9)
BILIRUB SERPL-MCNC: 0.3 MG/DL (ref 0.1–1)
BILIRUB SERPL-MCNC: 0.3 MG/DL (ref 0.1–1)
BILIRUB SERPL-MCNC: 0.4 MG/DL (ref 0.1–1)
BILIRUB SERPL-MCNC: 0.4 MG/DL (ref 0.1–1)
BLD GP AB SCN CELLS X3 SERPL QL: NORMAL
BLD PROD TYP BPU: NORMAL
BLOOD UNIT EXPIRATION DATE: NORMAL
BLOOD UNIT TYPE CODE: 7300
BLOOD UNIT TYPE: NORMAL
BUN SERPL-MCNC: 65 MG/DL (ref 6–20)
BUN SERPL-MCNC: 67 MG/DL (ref 6–20)
BUN SERPL-MCNC: 68 MG/DL (ref 6–20)
BUN SERPL-MCNC: 70 MG/DL (ref 6–20)
CALCIUM SERPL-MCNC: 7.9 MG/DL (ref 8.7–10.5)
CALCIUM SERPL-MCNC: 7.9 MG/DL (ref 8.7–10.5)
CALCIUM SERPL-MCNC: 8.1 MG/DL (ref 8.7–10.5)
CALCIUM SERPL-MCNC: 8.2 MG/DL (ref 8.7–10.5)
CHLORIDE SERPL-SCNC: 103 MMOL/L (ref 95–110)
CHLORIDE SERPL-SCNC: 104 MMOL/L (ref 95–110)
CO2 SERPL-SCNC: 19 MMOL/L (ref 23–29)
CO2 SERPL-SCNC: 19 MMOL/L (ref 23–29)
CO2 SERPL-SCNC: 20 MMOL/L (ref 23–29)
CO2 SERPL-SCNC: 20 MMOL/L (ref 23–29)
CODING SYSTEM: NORMAL
CREAT SERPL-MCNC: 2.9 MG/DL (ref 0.5–1.4)
CREAT SERPL-MCNC: 2.9 MG/DL (ref 0.5–1.4)
CREAT SERPL-MCNC: 3.1 MG/DL (ref 0.5–1.4)
CREAT SERPL-MCNC: 3.1 MG/DL (ref 0.5–1.4)
CROSSMATCH INTERPRETATION: NORMAL
DIFFERENTIAL METHOD BLD: ABNORMAL
DIFFERENTIAL METHOD BLD: ABNORMAL
DISPENSE STATUS: NORMAL
EOSINOPHIL # BLD AUTO: 0.1 K/UL (ref 0–0.5)
EOSINOPHIL # BLD AUTO: 0.1 K/UL (ref 0–0.5)
EOSINOPHIL NFR BLD: 0.8 % (ref 0–8)
EOSINOPHIL NFR BLD: 0.9 % (ref 0–8)
ERYTHROCYTE [DISTWIDTH] IN BLOOD BY AUTOMATED COUNT: 19.5 % (ref 11.5–14.5)
ERYTHROCYTE [DISTWIDTH] IN BLOOD BY AUTOMATED COUNT: 20.6 % (ref 11.5–14.5)
EST. GFR  (NO RACE VARIABLE): 16.7 ML/MIN/1.73 M^2
EST. GFR  (NO RACE VARIABLE): 16.7 ML/MIN/1.73 M^2
EST. GFR  (NO RACE VARIABLE): 18.1 ML/MIN/1.73 M^2
EST. GFR  (NO RACE VARIABLE): 18.1 ML/MIN/1.73 M^2
GLUCOSE SERPL-MCNC: 132 MG/DL (ref 70–110)
GLUCOSE SERPL-MCNC: 135 MG/DL (ref 70–110)
GLUCOSE SERPL-MCNC: 85 MG/DL (ref 70–110)
GLUCOSE SERPL-MCNC: 92 MG/DL (ref 70–110)
GRAM STN SPEC: NORMAL
HCT VFR BLD AUTO: 20 % (ref 37–48.5)
HCT VFR BLD AUTO: 22.8 % (ref 37–48.5)
HGB BLD-MCNC: 6.2 G/DL (ref 12–16)
HGB BLD-MCNC: 7.7 G/DL (ref 12–16)
IMM GRANULOCYTES # BLD AUTO: 0.44 K/UL (ref 0–0.04)
IMM GRANULOCYTES # BLD AUTO: 0.65 K/UL (ref 0–0.04)
IMM GRANULOCYTES NFR BLD AUTO: 3.1 % (ref 0–0.5)
IMM GRANULOCYTES NFR BLD AUTO: 4.1 % (ref 0–0.5)
LACTATE DEHYDROGENASE FLUID: >9000 U/L
LYMPHOCYTES # BLD AUTO: 1.3 K/UL (ref 1–4.8)
LYMPHOCYTES # BLD AUTO: 1.3 K/UL (ref 1–4.8)
LYMPHOCYTES NFR BLD: 8.2 % (ref 18–48)
LYMPHOCYTES NFR BLD: 9 % (ref 18–48)
MAGNESIUM SERPL-MCNC: 1.8 MG/DL (ref 1.6–2.6)
MCH RBC QN AUTO: 26.1 PG (ref 27–31)
MCH RBC QN AUTO: 27.3 PG (ref 27–31)
MCHC RBC AUTO-ENTMCNC: 31 G/DL (ref 32–36)
MCHC RBC AUTO-ENTMCNC: 33.8 G/DL (ref 32–36)
MCV RBC AUTO: 81 FL (ref 82–98)
MCV RBC AUTO: 84 FL (ref 82–98)
MONOCYTES # BLD AUTO: 1.2 K/UL (ref 0.3–1)
MONOCYTES # BLD AUTO: 1.5 K/UL (ref 0.3–1)
MONOCYTES NFR BLD: 8.3 % (ref 4–15)
MONOCYTES NFR BLD: 9.7 % (ref 4–15)
NEUTROPHILS # BLD AUTO: 11.2 K/UL (ref 1.8–7.7)
NEUTROPHILS # BLD AUTO: 12.2 K/UL (ref 1.8–7.7)
NEUTROPHILS NFR BLD: 76.9 % (ref 38–73)
NEUTROPHILS NFR BLD: 78.4 % (ref 38–73)
NRBC BLD-RTO: 1 /100 WBC
NRBC BLD-RTO: 1 /100 WBC
NUM UNITS TRANS PACKED RBC: NORMAL
PHOSPHATE SERPL-MCNC: 2.8 MG/DL (ref 2.7–4.5)
PHOSPHATE SERPL-MCNC: 2.9 MG/DL (ref 2.7–4.5)
PHOSPHATE SERPL-MCNC: 3 MG/DL (ref 2.7–4.5)
PHOSPHATE SERPL-MCNC: 3.1 MG/DL (ref 2.7–4.5)
PLATELET # BLD AUTO: 437 K/UL (ref 150–450)
PLATELET # BLD AUTO: 446 K/UL (ref 150–450)
PMV BLD AUTO: 10.1 FL (ref 9.2–12.9)
PMV BLD AUTO: 9.1 FL (ref 9.2–12.9)
POCT GLUCOSE: 106 MG/DL (ref 70–110)
POCT GLUCOSE: 126 MG/DL (ref 70–110)
POCT GLUCOSE: 132 MG/DL (ref 70–110)
POCT GLUCOSE: 158 MG/DL (ref 70–110)
POCT GLUCOSE: 74 MG/DL (ref 70–110)
POCT GLUCOSE: 92 MG/DL (ref 70–110)
POTASSIUM SERPL-SCNC: 3.5 MMOL/L (ref 3.5–5.1)
POTASSIUM SERPL-SCNC: 3.6 MMOL/L (ref 3.5–5.1)
POTASSIUM SERPL-SCNC: 3.6 MMOL/L (ref 3.5–5.1)
POTASSIUM SERPL-SCNC: 3.7 MMOL/L (ref 3.5–5.1)
PROT FLD-MCNC: 4.7 G/DL
PROT SERPL-MCNC: 5.6 G/DL (ref 6–8.4)
PROT SERPL-MCNC: 5.8 G/DL (ref 6–8.4)
PROT SERPL-MCNC: 6.1 G/DL (ref 6–8.4)
PROT SERPL-MCNC: 6.1 G/DL (ref 6–8.4)
RBC # BLD AUTO: 2.38 M/UL (ref 4–5.4)
RBC # BLD AUTO: 2.82 M/UL (ref 4–5.4)
SODIUM SERPL-SCNC: 133 MMOL/L (ref 136–145)
SODIUM SERPL-SCNC: 133 MMOL/L (ref 136–145)
SODIUM SERPL-SCNC: 134 MMOL/L (ref 136–145)
SODIUM SERPL-SCNC: 134 MMOL/L (ref 136–145)
SPECIMEN OUTDATE: NORMAL
VANCOMYCIN SERPL-MCNC: 15 UG/ML
WBC # BLD AUTO: 14.28 K/UL (ref 3.9–12.7)
WBC # BLD AUTO: 15.79 K/UL (ref 3.9–12.7)

## 2024-07-03 PROCEDURE — 25000003 PHARM REV CODE 250: Mod: HCNC

## 2024-07-03 PROCEDURE — 63600175 PHARM REV CODE 636 W HCPCS: Mod: HCNC | Performed by: STUDENT IN AN ORGANIZED HEALTH CARE EDUCATION/TRAINING PROGRAM

## 2024-07-03 PROCEDURE — 86901 BLOOD TYPING SEROLOGIC RH(D): CPT | Mod: HCNC

## 2024-07-03 PROCEDURE — P9016 RBC LEUKOCYTES REDUCED: HCPCS | Mod: HCNC

## 2024-07-03 PROCEDURE — 93010 ELECTROCARDIOGRAM REPORT: CPT | Mod: 76,HCNC,, | Performed by: INTERNAL MEDICINE

## 2024-07-03 PROCEDURE — 25000003 PHARM REV CODE 250: Mod: HCNC | Performed by: INTERNAL MEDICINE

## 2024-07-03 PROCEDURE — 99233 SBSQ HOSP IP/OBS HIGH 50: CPT | Mod: HCNC,,, | Performed by: INTERNAL MEDICINE

## 2024-07-03 PROCEDURE — 20600001 HC STEP DOWN PRIVATE ROOM: Mod: HCNC

## 2024-07-03 PROCEDURE — 85025 COMPLETE CBC W/AUTO DIFF WBC: CPT | Mod: HCNC | Performed by: NURSE PRACTITIONER

## 2024-07-03 PROCEDURE — 27000207 HC ISOLATION: Mod: HCNC

## 2024-07-03 PROCEDURE — 93010 ELECTROCARDIOGRAM REPORT: CPT | Mod: HCNC,,, | Performed by: INTERNAL MEDICINE

## 2024-07-03 PROCEDURE — 63600175 PHARM REV CODE 636 W HCPCS: Mod: HCNC

## 2024-07-03 PROCEDURE — 80053 COMPREHEN METABOLIC PANEL: CPT | Mod: 91,HCNC | Performed by: NURSE PRACTITIONER

## 2024-07-03 PROCEDURE — 11000001 HC ACUTE MED/SURG PRIVATE ROOM: Mod: HCNC

## 2024-07-03 PROCEDURE — 85025 COMPLETE CBC W/AUTO DIFF WBC: CPT | Mod: 91,HCNC | Performed by: INTERNAL MEDICINE

## 2024-07-03 PROCEDURE — 93005 ELECTROCARDIOGRAM TRACING: CPT | Mod: HCNC

## 2024-07-03 PROCEDURE — 99232 SBSQ HOSP IP/OBS MODERATE 35: CPT | Mod: HCNC,,, | Performed by: NURSE PRACTITIONER

## 2024-07-03 PROCEDURE — 84100 ASSAY OF PHOSPHORUS: CPT | Mod: 91,HCNC | Performed by: NURSE PRACTITIONER

## 2024-07-03 PROCEDURE — 94761 N-INVAS EAR/PLS OXIMETRY MLT: CPT | Mod: HCNC

## 2024-07-03 PROCEDURE — 30233N0 TRANSFUSION OF AUTOLOGOUS RED BLOOD CELLS INTO PERIPHERAL VEIN, PERCUTANEOUS APPROACH: ICD-10-PCS | Performed by: INTERNAL MEDICINE

## 2024-07-03 PROCEDURE — 86920 COMPATIBILITY TEST SPIN: CPT | Mod: HCNC

## 2024-07-03 PROCEDURE — 80202 ASSAY OF VANCOMYCIN: CPT | Mod: HCNC | Performed by: INTERNAL MEDICINE

## 2024-07-03 PROCEDURE — 85730 THROMBOPLASTIN TIME PARTIAL: CPT | Mod: HCNC | Performed by: INTERNAL MEDICINE

## 2024-07-03 PROCEDURE — 86900 BLOOD TYPING SEROLOGIC ABO: CPT | Mod: HCNC

## 2024-07-03 PROCEDURE — 36430 TRANSFUSION BLD/BLD COMPNT: CPT | Mod: HCNC

## 2024-07-03 PROCEDURE — 25000003 PHARM REV CODE 250: Mod: HCNC | Performed by: STUDENT IN AN ORGANIZED HEALTH CARE EDUCATION/TRAINING PROGRAM

## 2024-07-03 PROCEDURE — 99223 1ST HOSP IP/OBS HIGH 75: CPT | Mod: HCNC,,, | Performed by: STUDENT IN AN ORGANIZED HEALTH CARE EDUCATION/TRAINING PROGRAM

## 2024-07-03 PROCEDURE — 83735 ASSAY OF MAGNESIUM: CPT | Mod: 91,HCNC | Performed by: NURSE PRACTITIONER

## 2024-07-03 RX ORDER — ONDANSETRON 4 MG/1
4 TABLET, FILM COATED ORAL EVERY 12 HOURS PRN
Status: DISCONTINUED | OUTPATIENT
Start: 2024-07-03 | End: 2024-07-08

## 2024-07-03 RX ORDER — HYDROCODONE BITARTRATE AND ACETAMINOPHEN 500; 5 MG/1; MG/1
TABLET ORAL
Status: DISCONTINUED | OUTPATIENT
Start: 2024-07-03 | End: 2024-07-09

## 2024-07-03 RX ORDER — ONDANSETRON 4 MG/1
4 TABLET, FILM COATED ORAL ONCE
Status: DISCONTINUED | OUTPATIENT
Start: 2024-07-03 | End: 2024-07-03

## 2024-07-03 RX ORDER — HEPARIN SODIUM 5000 [USP'U]/ML
5000 INJECTION, SOLUTION INTRAVENOUS; SUBCUTANEOUS EVERY 8 HOURS
Status: DISCONTINUED | OUTPATIENT
Start: 2024-07-03 | End: 2024-07-09

## 2024-07-03 RX ORDER — MUPIROCIN 20 MG/G
OINTMENT TOPICAL 2 TIMES DAILY
Status: COMPLETED | OUTPATIENT
Start: 2024-07-03 | End: 2024-07-07

## 2024-07-03 RX ORDER — INSULIN ASPART 100 [IU]/ML
0-10 INJECTION, SOLUTION INTRAVENOUS; SUBCUTANEOUS
Status: DISCONTINUED | OUTPATIENT
Start: 2024-07-03 | End: 2024-07-13 | Stop reason: HOSPADM

## 2024-07-03 RX ORDER — MAGNESIUM SULFATE HEPTAHYDRATE 40 MG/ML
2 INJECTION, SOLUTION INTRAVENOUS ONCE
Status: COMPLETED | OUTPATIENT
Start: 2024-07-03 | End: 2024-07-03

## 2024-07-03 RX ORDER — AMLODIPINE BESYLATE 10 MG/1
10 TABLET ORAL DAILY
Status: DISCONTINUED | OUTPATIENT
Start: 2024-07-03 | End: 2024-07-11

## 2024-07-03 RX ORDER — SIMETHICONE 80 MG
1 TABLET,CHEWABLE ORAL 3 TIMES DAILY PRN
Status: DISCONTINUED | OUTPATIENT
Start: 2024-07-03 | End: 2024-07-13 | Stop reason: HOSPADM

## 2024-07-03 RX ORDER — HYDROMORPHONE HYDROCHLORIDE 1 MG/ML
0.2 INJECTION, SOLUTION INTRAMUSCULAR; INTRAVENOUS; SUBCUTANEOUS EVERY 6 HOURS PRN
Status: DISCONTINUED | OUTPATIENT
Start: 2024-07-03 | End: 2024-07-07

## 2024-07-03 RX ADMIN — HYDROMORPHONE HYDROCHLORIDE 0.2 MG: 1 INJECTION, SOLUTION INTRAMUSCULAR; INTRAVENOUS; SUBCUTANEOUS at 01:07

## 2024-07-03 RX ADMIN — HYDROMORPHONE HYDROCHLORIDE 0.2 MG: 1 INJECTION, SOLUTION INTRAMUSCULAR; INTRAVENOUS; SUBCUTANEOUS at 04:07

## 2024-07-03 RX ADMIN — MAGNESIUM SULFATE HEPTAHYDRATE 2 G: 40 INJECTION, SOLUTION INTRAVENOUS at 10:07

## 2024-07-03 RX ADMIN — DIPHENHYDRAMINE HYDROCHLORIDE 25 MG: 25 CAPSULE ORAL at 09:07

## 2024-07-03 RX ADMIN — MEROPENEM 1 G: 1 INJECTION INTRAVENOUS at 09:07

## 2024-07-03 RX ADMIN — MUPIROCIN: 20 OINTMENT TOPICAL at 09:07

## 2024-07-03 RX ADMIN — HEPARIN SODIUM 5000 UNITS: 5000 INJECTION INTRAVENOUS; SUBCUTANEOUS at 02:07

## 2024-07-03 RX ADMIN — INSULIN ASPART 3 UNITS: 100 INJECTION, SOLUTION INTRAVENOUS; SUBCUTANEOUS at 08:07

## 2024-07-03 RX ADMIN — HEPARIN SODIUM 5000 UNITS: 5000 INJECTION INTRAVENOUS; SUBCUTANEOUS at 09:07

## 2024-07-03 RX ADMIN — MEROPENEM 1 G: 1 INJECTION INTRAVENOUS at 10:07

## 2024-07-03 RX ADMIN — POTASSIUM BICARBONATE 40 MEQ: 391 TABLET, EFFERVESCENT ORAL at 10:07

## 2024-07-03 RX ADMIN — LAMOTRIGINE 25 MG: 25 TABLET ORAL at 09:07

## 2024-07-03 RX ADMIN — INSULIN GLARGINE 15 UNITS: 100 INJECTION, SOLUTION SUBCUTANEOUS at 08:07

## 2024-07-03 RX ADMIN — AMLODIPINE BESYLATE 10 MG: 10 TABLET ORAL at 02:07

## 2024-07-03 RX ADMIN — ONDANSETRON HYDROCHLORIDE 4 MG: 4 TABLET, FILM COATED ORAL at 09:07

## 2024-07-03 RX ADMIN — MUPIROCIN: 20 OINTMENT TOPICAL at 10:07

## 2024-07-03 RX ADMIN — SIMETHICONE 80 MG: 80 TABLET, CHEWABLE ORAL at 10:07

## 2024-07-03 RX ADMIN — INSULIN ASPART 3 UNITS: 100 INJECTION, SOLUTION INTRAVENOUS; SUBCUTANEOUS at 12:07

## 2024-07-03 RX ADMIN — INSULIN ASPART 1 UNITS: 100 INJECTION, SOLUTION INTRAVENOUS; SUBCUTANEOUS at 09:07

## 2024-07-03 RX ADMIN — DIPHENHYDRAMINE HYDROCHLORIDE 25 MG: 25 CAPSULE ORAL at 05:07

## 2024-07-03 RX ADMIN — HYDROMORPHONE HYDROCHLORIDE 0.2 MG: 1 INJECTION, SOLUTION INTRAMUSCULAR; INTRAVENOUS; SUBCUTANEOUS at 10:07

## 2024-07-03 NOTE — SUBJECTIVE & OBJECTIVE
Interval History:   Continued improvement in kidney function, SCR downt o 3.1 from 3.4/24h.   ml, net positive 1L.  Hypervolemic on exam.  Oxygenating well on RA.  CT in-place.    Review of patient's allergies indicates:   Allergen Reactions    Codeine Other (See Comments)     Flu like s/s    Tramadol Other (See Comments)     Flu like symptoms similar to codeine reaction     Current Facility-Administered Medications   Medication Frequency    0.9%  NaCl infusion (for blood administration) Q24H PRN    acetaminophen tablet 650 mg Q6H PRN    amLODIPine tablet 10 mg Daily    dextrose 10 % infusion PRN    dextrose 10 % infusion PRN    dextrose 10% bolus 125 mL 125 mL PRN    dextrose 10% bolus 250 mL 250 mL PRN    diphenhydrAMINE capsule 25 mg Q6H PRN    glucagon (human recombinant) injection 1 mg PRN    glucose chewable tablet 16 g PRN    glucose chewable tablet 24 g PRN    heparin (porcine) injection 5,000 Units Q8H    HYDROmorphone injection 0.2 mg Q6H PRN    insulin aspart U-100 pen 0-10 Units QID (AC + HS) PRN    insulin aspart U-100 pen 3 Units TIDWM    insulin glargine U-100 (Lantus) pen 15 Units Daily    lamoTRIgine tablet 25 mg BID    meropenem (MERREM) 1 g in 0.9% NaCl 100 mL IVPB (MB+) Q12H    mupirocin 2 % ointment BID    ondansetron tablet 4 mg Q12H PRN    QUEtiapine tablet 25 mg Daily PRN    sodium chloride 0.9% flush 10 mL PRN       Objective:     Vital Signs (Most Recent):  Temp: 98.8 °F (37.1 °C) (07/03/24 0930)  Pulse: 92 (07/03/24 1309)  Resp: 16 (07/03/24 1309)  BP: (!) 182/81 (md NOTIFIED) (07/03/24 1309)  SpO2: 99 % (07/03/24 1309) Vital Signs (24h Range):  Temp:  [97.9 °F (36.6 °C)-99 °F (37.2 °C)] 98.8 °F (37.1 °C)  Pulse:  [] 92  Resp:  [10-24] 16  SpO2:  [97 %-100 %] 99 %  BP: (104-182)/(53-81) 182/81     Weight: 86.9 kg (191 lb 9.3 oz) (07/01/24 0620)  Body mass index is 30.01 kg/m².  Body surface area is 2.03 meters squared.    I/O last 3 completed shifts:  In: 4308.6 [P.O.:240;  I.V.:2671.4; IV Piggyback:1397.2]  Out: 1535 [Urine:1085; Chest Tube:450]     Physical Exam  Vitals and nursing note reviewed.   Constitutional:       General: She is not in acute distress.     Appearance: She is well-developed. She is not ill-appearing or toxic-appearing.   HENT:      Head: Normocephalic and atraumatic.      Mouth/Throat:      Mouth: Mucous membranes are dry.   Eyes:      General: No scleral icterus.        Right eye: No discharge.         Left eye: No discharge.      Extraocular Movements: Extraocular movements intact.      Conjunctiva/sclera: Conjunctivae normal.   Cardiovascular:      Rate and Rhythm: Normal rate and regular rhythm.      Heart sounds: No murmur heard.     No friction rub. No gallop.   Pulmonary:      Effort: Tachypnea present. No respiratory distress.      Breath sounds: Decreased breath sounds present. No rhonchi or rales.   Abdominal:      General: There is distension.      Palpations: Abdomen is soft.      Tenderness: There is no abdominal tenderness.   Musculoskeletal:         General: No swelling.      Cervical back: Normal range of motion and neck supple.      Right lower leg: Edema present.      Left lower leg: Edema present.   Skin:     General: Skin is warm and dry.   Neurological:      Mental Status: She is alert, oriented to person, place, and time and easily aroused.          Significant Labs:  CBC:   Recent Labs   Lab 07/03/24  1046   WBC 14.28*   RBC 2.82*   HGB 7.7*   HCT 22.8*      MCV 81*   MCH 27.3   MCHC 33.8     CMP:   Recent Labs   Lab 07/03/24  1046   *   CALCIUM 8.2*   ALBUMIN 1.7*   PROT 6.1   *   K 3.6   CO2 20*      BUN 67*   CREATININE 2.9*   ALKPHOS 131   ALT 5*   AST 24   BILITOT 0.4

## 2024-07-03 NOTE — PROGRESS NOTES
[facility] - Cardiac Medical ICU  Thoracic Surgery  Progress Note    Subjective:     History of Present Illness:  58F w/ stage 4 ovarian cancer with R pleural effusion presumed malignant who was admitted to MICU for management of DKA. Thoracentesis yesterday 07/02/24; drained 800 ml cloudy (yellow cloudy/chylous looking fluid vs purulence) yesterday. Cytology and cultures sent. Plan to place chest tube today. Thoracic surgery consulted for recommendations on lytic therapy.     Post-Op Info:  * No surgery found *         Interval History: NAEON. Patient resting comfortably this morning. Chest tube in place without air leak. Morning CXR pending.    Medications:  Continuous Infusions:  Scheduled Meds:   heparin (porcine)  5,000 Units Subcutaneous Q8H    insulin aspart U-100  3 Units Subcutaneous TIDWM    insulin glargine U-100  15 Units Subcutaneous Daily    lamoTRIgine  25 mg Oral BID    magnesium sulfate IVPB  2 g Intravenous Once    meropenem IV (PEDS and ADULTS)  1 g Intravenous Q12H    mupirocin   Nasal BID    potassium bicarbonate  40 mEq Oral Once     PRN Meds:  Current Facility-Administered Medications:     0.9%  NaCl infusion (for blood administration), , Intravenous, Q24H PRN    acetaminophen, 650 mg, Oral, Q6H PRN    D10W, , Intravenous, PRN    D10W, , Intravenous, PRN    dextrose 10%, 12.5 g, Intravenous, PRN    dextrose 10%, 25 g, Intravenous, PRN    diphenhydrAMINE, 25 mg, Oral, Q6H PRN    glucagon (human recombinant), 1 mg, Intramuscular, PRN    glucose, 16 g, Oral, PRN    glucose, 24 g, Oral, PRN    HYDROmorphone, 0.2 mg, Intravenous, Q6H PRN    insulin aspart U-100, 0-10 Units, Subcutaneous, QID (AC + HS) PRN    ondansetron, 4 mg, Oral, Q12H PRN    QUEtiapine, 25 mg, Oral, Daily PRN    sodium chloride 0.9%, 10 mL, Intravenous, PRN    Pharmacy to dose Vancomycin consult, , , Once **AND** vancomycin - pharmacy to dose, , Intravenous, pharmacy to manage frequency     Review of patient's allergies indicates:    Allergen Reactions    Codeine Other (See Comments)     Flu like s/s    Tramadol Other (See Comments)     Flu like symptoms similar to codeine reaction     Objective:     Vital Signs (Most Recent):  Temp: 98.2 °F (36.8 °C) (07/03/24 0729)  Pulse: 97 (07/03/24 0930)  Resp: (!) 23 (07/03/24 0930)  BP: (!) 151/72 (07/03/24 0930)  SpO2: 100 % (07/03/24 0930) Vital Signs (24h Range):  Temp:  [97.9 °F (36.6 °C)-99 °F (37.2 °C)] 98.2 °F (36.8 °C)  Pulse:  [] 97  Resp:  [10-26] 23  SpO2:  [97 %-100 %] 100 %  BP: (104-182)/(53-84) 151/72     Intake/Output - Last 3 Shifts         07/01 0700 07/02 0659 07/02 0700 07/03 0659 07/03 0700 07/04 0659    P.O.  240     I.V. (mL/kg) 2951.4 (34) 1737.9 (20) 8.6 (0.1)    Blood   350    IV Piggyback 1339.1 343.3     Total Intake(mL/kg) 4290.4 (49.4) 2321.2 (26.7) 358.6 (4.1)    Urine (mL/kg/hr) 375 (0.2) 810 (0.4)     Other 800      Stool 0 0     Chest Tube  450     Total Output 1175 1260     Net +3115.4 +1061.2 +358.6           Stool Occurrence 3 x 0 x             SpO2: 100 %        Physical Exam  Vitals reviewed.   Constitutional:       Appearance: Normal appearance.   Cardiovascular:      Rate and Rhythm: Normal rate.      Pulses: Normal pulses.   Pulmonary:      Effort: Pulmonary effort is normal.      Comments: Right chest tube in place and to suction. No air leak. 450 mL since placement  Abdominal:      General: There is no distension.      Palpations: Abdomen is soft.   Skin:     General: Skin is warm and dry.   Neurological:      General: No focal deficit present.      Mental Status: She is alert and oriented to person, place, and time.            Significant Labs:  CBC:   Recent Labs   Lab 07/03/24  0331   WBC 15.79*   RBC 2.38*   HGB 6.2*   HCT 20.0*      MCV 84   MCH 26.1*   MCHC 31.0*     CMP:   Recent Labs   Lab 07/03/24  0331   GLU 92   CALCIUM 7.9*   ALBUMIN 1.7*   PROT 5.8*   *   K 3.6   CO2 20*      BUN 70*   CREATININE 3.1*   ALKPHOS 121    ALT 6*   AST 24   BILITOT 0.3       Significant Diagnostics:  I have reviewed and interpreted all pertinent imaging results/findings within the past 24 hours.    VTE Risk Mitigation (From admission, onward)           Ordered     heparin (porcine) injection 5,000 Units  Every 8 hours         07/03/24 0931     IP VTE HIGH RISK PATIENT  Once         07/01/24 0540     Place sequential compression device  Until discontinued         07/01/24 0540                  Assessment/Plan:     Pleural effusion, malignant  58F w/ stage 4 ovarian cancer with R pleural effusion presumed malignant who was admitted to MICU for management of DKA. Now s/p Chest ube placement on 7/2.    - Leave chest tube to suction today  - Re-image with CT Chest tomorrow, 7/4 for re-evaluation of effusion  - Possible initiation of lytics therapy pending updated imaging  - rest of care per primary  - Will follow to reassess new imaging        Robbin Arceo MD  Thoracic Surgery  [facility] - Cardiac [facility]

## 2024-07-03 NOTE — CARE UPDATE
Resident to bedside for afternoon evaluation.    S: Patient resting in bed. Reports improved shortness of breath and cough, however, endorses pain near chest tube site. Tolerating PO without nausea or vomiting. Voiding normally via purewick, passing flatus. Denies F/C.      O:   Temp:  [97.9 °F (36.6 °C)-99 °F (37.2 °C)] 98.8 °F (37.1 °C)  Pulse:  [] 96  Resp:  [10-24] 22  SpO2:  [97 %-100 %] 99 %  BP: (104-182)/(53-81) 158/70     I/O:   UOP 0.6cc/kg/hr    PE:  Gen: lethargic but arousable to speech  CVS: normal rate  Resp: breath sounds diminished at right base, no increased work of breathing  Abd: mild distention, non tender mass left of umbilicus, no rebound or guarding  Ext: PPP, no LE edema    Labs:  Recent Labs   Lab 07/02/24  0314 07/03/24  0331 07/03/24  1046   WBC 17.15* 15.79* 14.28*   HGB 7.0* 6.2* 7.7*   HCT 21.3* 20.0* 22.8*   MCV 79* 84 81*   * 437 446       Recent Labs   Lab 07/03/24  0331 07/03/24  0949 07/03/24  1046   * 133* 134*   K 3.6 3.7 3.6    103 104   CO2 20* 19* 20*   BUN 70* 65* 67*   CREATININE 3.1* 2.9* 2.9*   GLU 92 135* 132*   PROT 5.8* 6.1 6.1   BILITOT 0.3 0.4 0.4   ALKPHOS 121 122 131   ALT 6* 7* 5*   AST 24 26 24   MG 1.8 1.8 1.8   PHOS 3.0 2.9 3.1       A/P:   - Continue IV Abx for management of empyema  - Repeat CT in AM to monitor right pleural effusion with chest tube in place  - Cr improving (2.9), continue IV fluids  - H/H with appropriate rise after 1u pRBC, continue to monitor  - Continue current diabetes regimen    Argentina Mccloud MD  OB/GYN PGY-2

## 2024-07-03 NOTE — ASSESSMENT & PLAN NOTE
Malignant pleural effusion     - IR was previously consulted for drainage during past admission and was unable to perform procedure due to minimal fluid visualized     - CXR obtained in ED with worsening effusions bilaterally     - Thoracentesis performed 7/1, follow up studies        -- Lipemic, yellow fluid, [id number] WBC 95% seg        -- No yeast or fungal so far        -- G negative rods        -- Pending triglyceride, protein, LDH, cholesterol, albumin, lipid analysis  -- Concern for empyema. CT chest with loculated appearance.  --  chest tube draining   -- Covering with Meropenem/Vanc for suspected empyema

## 2024-07-03 NOTE — SUBJECTIVE & OBJECTIVE
Interval History/Significant Events: chest tube continues to drain. CT chest scheduled morning of 7/4. Pulmonology consulted. Planned for stepdown.     Review of Systems   Constitutional:  Negative for activity change, chills and fever.   HENT:  Negative for congestion, mouth sores, rhinorrhea, sinus pressure and sore throat.    Eyes:  Negative for photophobia, pain and redness.   Respiratory:  Positive for shortness of breath. Negative for cough, chest tightness and wheezing.    Cardiovascular:  Negative for chest pain and leg swelling.   Gastrointestinal:  Negative for abdominal distention, abdominal pain, diarrhea, nausea and vomiting.   Endocrine: Negative for polyuria.   Genitourinary:  Negative for decreased urine volume, dysuria and flank pain.   Musculoskeletal:  Negative for joint swelling and neck pain.   Skin:  Negative for color change.   Allergic/Immunologic: Negative for food allergies.   Neurological:  Negative for dizziness, weakness and headaches.   Hematological:  Negative for adenopathy.   Psychiatric/Behavioral:  Negative for agitation and confusion. The patient is not nervous/anxious.      Objective:     Vital Signs (Most Recent):  Temp: 98.8 °F (37.1 °C) (07/03/24 0930)  Pulse: 96 (07/03/24 1500)  Resp: (!) 22 (07/03/24 1500)  BP: (!) 158/70 (07/03/24 1500)  SpO2: 99 % (07/03/24 1500) Vital Signs (24h Range):  Temp:  [97.9 °F (36.6 °C)-99 °F (37.2 °C)] 98.8 °F (37.1 °C)  Pulse:  [] 96  Resp:  [10-24] 22  SpO2:  [97 %-100 %] 99 %  BP: (104-182)/(53-81) 158/70   Weight: 86.9 kg (191 lb 9.3 oz)  Body mass index is 30.01 kg/m².      Intake/Output Summary (Last 24 hours) at 7/3/2024 1547  Last data filed at 7/3/2024 1301  Gross per 24 hour   Intake 2382.99 ml   Output 1735 ml   Net 647.99 ml          Physical Exam  Vitals reviewed.   Constitutional:       Appearance: Normal appearance.   Cardiovascular:      Rate and Rhythm: Normal rate.      Pulses: Normal pulses.   Pulmonary:      Breath  sounds: No wheezing or rales.      Comments: Right chest tube in place and to suction. No air leak. 450 mL since placement  Abdominal:      General: There is no distension.      Palpations: Abdomen is soft.   Musculoskeletal:      Right lower leg: No edema.      Left lower leg: No edema.   Skin:     General: Skin is warm and dry.      Findings: No erythema or rash.   Neurological:      General: No focal deficit present.      Mental Status: She is alert and oriented to person, place, and time.            Vents:     Lines/Drains/Airways       Central Venous Catheter Line  Duration             Port A Cath Single Lumen Subclavian Left -- days              Drain  Duration                  Chest Tube 07/02/24 1855 Tube - 1 Right Pleural 14 Fr. <1 day              Peripheral Intravenous Line  Duration                  Peripheral IV - Single Lumen 07/01/24 0000 24 G Anterior;Left Forearm 2 days         Peripheral IV - Single Lumen 07/01/24 0032 20 G Left Upper Arm 2 days                  Significant Labs:    CBC/Anemia Profile:  Recent Labs   Lab 07/02/24  0314 07/03/24  0331 07/03/24  1046   WBC 17.15* 15.79* 14.28*   HGB 7.0* 6.2* 7.7*   HCT 21.3* 20.0* 22.8*   * 437 446   MCV 79* 84 81*   RDW 20.6* 20.6* 19.5*        Chemistries:  Recent Labs   Lab 07/03/24  0331 07/03/24  0949 07/03/24  1046   * 133* 134*   K 3.6 3.7 3.6    103 104   CO2 20* 19* 20*   BUN 70* 65* 67*   CREATININE 3.1* 2.9* 2.9*   CALCIUM 7.9* 8.1* 8.2*   ALBUMIN 1.7* 1.8* 1.7*   PROT 5.8* 6.1 6.1   BILITOT 0.3 0.4 0.4   ALKPHOS 121 122 131   ALT 6* 7* 5*   AST 24 26 24   MG 1.8 1.8 1.8   PHOS 3.0 2.9 3.1       All pertinent labs within the past 24 hours have been reviewed.    Significant Imaging:  I have reviewed all pertinent imaging results/findings within the past 24 hours.

## 2024-07-03 NOTE — PLAN OF CARE
Date of visit: 11/2/2023      Chief Complaint   Patient presents with   • Office Visit   • Hypertension       MA note appreciated and accepted.      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  Last visit his lisinopril was increased to 30 mg daily.  No problems with this.  Home BPs running 130-160s / 70-90s    DM: last visit his metformin was decreased to 500 mg BID.  He notes his blood sugars are rising a bit, now in the 120-140s.  Still taking ozempic for DM & seeing slow wt loss.  Not sure of where his weight should be - previous doctor wanted him at 170 lb.     Gait instability / balance issues from CVA >25 ya, made worse by having hip arthritis & hip surgery Dec of 2022.  Now wants to increase his stamina. Cane isn't very helpful while walking most of the time.  When out for a walk generally is with his wife & they lean on each other.        Past Medical History:   Diagnosis Date   • Acute, but ill-defined, cerebrovascular disease 02/1997    Stroke (old), mid-brain cerebellar, patent foramen ovale   • Ascending aorta dilatation (CMD) 11/2021    Repeat echo yearly   • Benign neoplasm of colon     Colon Polyp followed by Dr. Shirley   • Calculus of kidney     Kidney Stone x 10, calcium oxalate   • Cerebral infarction (CMD)     Stroke (old), mid-brain cerebellar, patent foramen ovale   • Diverticulosis of colon (without mention of hemorrhage)     Diverticulosis   • Essential hypertension, benign    • Fatty liver    • Gait abnormality 7/13/2021   • GERD (gastroesophageal reflux disease)    • Iron deficiency anemia due to dietary causes 2020   • Multiple thyroid nodules 01/2022    Do CT in a year for follow-up   • Other and unspecified hyperlipidemia     Hyperlipidemia   • Patent foramen ovale    • Problems with sexual function     ED   • Tuberculin test reaction child    and positive histo test - no RX for TB.   • Type II or unspecified type diabetes  [facility]  Discharge Reassessment    Primary Care Provider: Mark Chua MD    Expected Discharge Date: 7/5/2024    Patient not medically ready to discharge per MD.    Discharge Plan A and Plan B have been determined by review of patient's clinical status, future medical and therapeutic needs, and coverage/benefits for post-acute care in coordination with multidisciplinary team members.          Reassessment (most recent)       Discharge Reassessment - 07/03/24 1048          Discharge Reassessment    Assessment Type Discharge Planning Reassessment     Did the patient's condition or plan change since previous assessment? No     Discharge Plan discussed with: Patient     Communicated ROS with patient/caregiver Date not available/Unable to determine     Discharge Plan A Home with family;Home Health   Current with [facility]    Discharge Plan B Home with family     DME Needed Upon Discharge  other (see comments)   TBD    Transition of Care Barriers None     Why the patient remains in the hospital Requires continued medical care        Post-Acute Status    Post-Acute Authorization Home Health     Home Health Status Pending medical clearance/testing     Coverage [employer] MANAGED MEDICARE - [employer] MEDICARE O     Discharge Delays None known at this time                     Afia Clayton RN     [phone]     mellitus without mention of complication, not stated as uncontrolled 12/2011   • Unspecified sleep apnea     CPAP         REVIEW OF SYSTEMS:  Constitutional: feeling healthy, interested in increasing his stamina.    Cardiovascular: pedal edema is stable - sock line present. Denies chest pain, palpitations.  Respiratory: Denies cough, SOB, wheeze        EXAM:    Visit Vitals  BP (!) 152/88 Comment: per np   Pulse 79   Wt 85.8 kg (189 lb 3.2 oz) Comment: without shoes   SpO2 98%   BMI 27.94 kg/m²     General: pleasant 79 year old male who looks comfortable   Cardiovascular: RRR, no murmur appreciated, no pedal edema  Respiratory: CTA bilateral, normal resp effort  Bare Feet examined: Normal dorsalis pedis pulses, skin intact, no areas of redness, swelling or calluses, normal monofilament testing        ASSESSMENT:   1. Essential hypertension    2. Type 2 diabetes mellitus without complication, without long-term current use of insulin (CMD)    Will skip bmp today since we adjusted lisinopril again today. Will check at next visit.      PLAN:   Orders Placed This Encounter   • lisinopril (ZESTRIL) 40 MG tablet   We discussed that as we get older, it is appropriate to have less tight control on blood sugars.     Increase lisinopril to 40 mg daily.  Consider pushing yourself on the walking to increase stamina.  Consider using a walker.          FU: Recheck in 3-4 weeks - lab then.     Written information given   Patient states understanding and agreement with plan

## 2024-07-03 NOTE — ASSESSMENT & PLAN NOTE
58F w/ stage 4 ovarian cancer with R pleural effusion presumed malignant who was admitted to MICU for management of DKA. Now s/p Chest ube placement on 7/2.    - Leave chest tube to suction today  - Re-image with CT Chest tomorrow, 7/4 for re-evaluation of effusion  - Possible initiation of lytics therapy pending updated imaging  - rest of care per primary  - Will follow to reassess new imaging

## 2024-07-03 NOTE — PLAN OF CARE
MICU DAILY GOALS     Family/Goals of care/Code Status   Code Status: Full Code    24H Vital Sign Range  Temp:  [98.2 °F (36.8 °C)-98.6 °F (37 °C)]   Pulse:  []   Resp:  [14-26]   BP: ()/(44-84)   SpO2:  [97 %-100 %]      Shift Events (include procedures and significant events)   No acute events throughout shift; patient transported for CT which revealed pleural effusion. Plans to patient right chest tube with follow up ct in two days. VSS. Possible stepdown tomorrow. Output 250 upon insertion. PIVx2; Port access left chest weall.     AWAKE RASS: Goal - RASS Goal: 0-->alert and calm  Actual - RASS (Robles Agitation-Sedation Scale): alert and calm    Restraint necessity: Not necessary   BREATHE SBT: Not intubated    Coordinate A & B, analgesics/sedatives Pain: managed   SAT: Not intubated   Delirium CAM-ICU: Overall CAM-ICU: Negative   Early(intubated/ Progressive (non-intubated) Mobility MOVE Screen (INTUBATED ONLY): Not intubated    Activity: Activity Management: Rolling - L1   Feeding/Nutrition Diet order: Diet/Nutrition Received: consistent carb/diabetic diet,     Thrombus DVT prophylaxis: VTE Required Core Measure: Pharmacological prophylaxis initiated/maintained   HOB Elevation Head of Bed (HOB) Positioning: HOB at 30-45 degrees   Ulcer Prophylaxis GI: yes   Glucose control managed     Skin Skin assessed during: Daily Assessment    Sacrum intact/not altered? Yes  Heels intact/not altered? Yes  Surgical wound? Yes    CHECK ONE!   (no altered skin or altered skin) and sub boxes:  [x] No Altered Skin Integrity Present    [x]Prevention Measures Documented    [] Altered Skin Integrity Present or Discovered   [] LDA present in EPIC, daily doc completed              [] LDA added if not in EPIC (describe wound).                    When describing wound, do not stage, use descriptive words only.    [] Wound Image Taken (required on admit,                   transfer/discharge and every Tuesday)    Wound Care  Consulted? No    4 EYES:  Attending Nurse (1st set of eyes): Jero RN     Second RN/Staff Member (2nd set of eyes): David, PCT    Bowel Function no issues    Indwelling Catheter Necessity      Urethral Catheter 07/01/24 1901-Reason for Continuing Urinary Catheterization: Critically ill in ICU and requiring hourly monitoring of intake/output    Port A Cath Single Lumen Subclavian Left-Line Necessity Review: Longterm central access required     De-escalation Antibiotics No        VS and assessment per flow sheet, patient progressing towards goals as tolerated, plan of care reviewed with family, all concerns addressed, will continue to monitor.

## 2024-07-03 NOTE — ASSESSMENT & PLAN NOTE
RESOLVED  On admission noted to have glucose 461, gap 17, Bicarb 15. Transferred to MICU for DKA.    --transitioned to subQ on diabetic diet, glucose wnl  insulin aspart U-100 pen 3 Units  insulin glargine U-100 (Lantus) pen 15 Units   --Q4hr accuchecks  --BMP Q4  --Hypoglycemia protocol

## 2024-07-03 NOTE — PROGRESS NOTES
Progress Note  Gynecology Oncology    Admit Date: 2024  LOS: 2    Reason for Admission:  Diabetic ketoacidosis without coma associated with type 2 diabetes mellitus    SUBJECTIVE:     Anette Ricci is a 58 y.o.  with Stage IVB ovarian carcinosarcoma s/p PDS on 3/25/24 with Dr Washington (CARLOS ALBERTO/BSO/OMX/liver mobilization/peritoneal & R diaphragm stripping/hepatic wedge resection/cholecystectomy) s/p C3 of carbo/taxol/bevacizumab on . Admitted to MICU  for the management of DKA, JASON, and symptomatic malignant pleural effusion with possible empyema.     Overnight patient reports doing well. Reports SOB improved compared to yesterday. Denies fevers/chills, nausea/vomiting, chest pain. Reports cough is improved. Reports she is tolerating PO, however, denies flatus/BM yesterday.     OBJECTIVE:     Vital Signs   Temp:  [98.2 °F (36.8 °C)-99 °F (37.2 °C)] 98.4 °F (36.9 °C)  Pulse:  [] 85  Resp:  [10-26] 11  SpO2:  [97 %-100 %] 100 %  BP: (104-182)/(51-84) 132/58      Intake/Output Summary (Last 24 hours) at 7/3/2024 0551  Last data filed at 7/3/2024 0547  Gross per 24 hour   Intake 2404.19 ml   Output 1435 ml   Net 969.19 ml       Physical Exam:  Gen: Pt alert, A&Ox3, NAD; more lethargic this AM than yesterday which is confirmed by RN at bedside although arousable  CV: RRR, normal heart sounds and intact distal pulses.  No edema on exam.   Pulm: Lung sounds reduced at right base although stable from yesterday. No stridor. No respiratory distress, normal respiratory effort.   Abd: Mild-moderate distention although non-tender to palpation without rebound or guarding. Firm mass noted on left mid-abdomen which is nontender to palpation. Bandage removed from superior aspect of midline vertical incision, subcutaneous tissue intact although pigmentation diminished. No erythema, induration, or discharge. Bandage replaced per patient preference.  Back: Chest tube in place with surrounding bandage  draining serous fluid.   Ext: PPP, no peripheral edema, SCDs in place  : Mccartney in place draining yellow urine   Skin: Skin is warm and dry.     Laboratory:  Recent Labs   Lab 07/01/24  0026 07/01/24  0652 07/02/24  0314 07/03/24  0331   WBC 20.03*  --  17.15* 15.79*   HGB 7.4*  --  7.0* 6.2*   HCT 23.3* 23* 21.3* 20.0*   MCV 83  --  79* 84     --  503* 437       Recent Labs   Lab 07/02/24  1926 07/02/24  2305 07/03/24  0331   * 133* 134*   K 3.7 3.5 3.6    103 103   CO2 18* 19* 20*   BUN 71* 68* 70*   CREATININE 3.4* 3.1* 3.1*   * 85 92   PROT 5.7* 5.6* 5.8*   BILITOT 0.3 0.3 0.3   ALKPHOS 123 117 121   ALT 6* 7* 6*   AST 21 23 24   MG 1.9 1.8 1.8   PHOS 2.6* 2.8 3.0       Blood Sugars (AccuCheck):    Recent Labs     07/02/24  0314 07/02/24  0812 07/02/24  1156 07/02/24  1622 07/02/24 2051 07/02/24  2305 07/03/24  0150 07/03/24  0331   POCTGLUCOSE 179* 216* 313* 235* 100 96 74 106      Recent Labs   Lab 07/02/24 2041 07/03/24  0331   INR 1.1  --    APTT 29.2 56.4*       Diagnostic Results:  CT Chest Without Contrast    Narrative    EXAMINATION:  CT CHEST WITHOUT CONTRAST    CLINICAL HISTORY:  Pleural effusion, malignancy suspected;Concern for empyema;    TECHNIQUE:  Low dose axial images, sagittal and coronal reformations were obtained from the thoracic inlet to the lung bases. Contrast was not administered.    COMPARISON:  CT abdomen and pelvis 07/01/2024    FINDINGS:  There is motion artifact.    Allowing for the above, the structures at the base of the neck are grossly unremarkable.  Left chest wall port catheter tip terminates within the distal SVC.  No significant mediastinal lymphadenopathy.  The heart is not enlarged.  No significant pericardial effusion.  Please see CT 07/01/2024 for details of the abdominal content noting collection along the undersurface of the right hemidiaphragm appears grossly stable to the previous examination.  There are partially visualized masslike foci  along the inferior hepatic margin, also better evaluated on the previous examination.    Motion artifact limits evaluation of the airways and pulmonary parenchyma.  Allowing for this, the airways are patent centrally noting there is occlusion of a few distal airways to the right lower lobe.  There are a few scattered clusters of tree-in-bud type nodularity throughout the visualized pulmonary parenchyma.  There is a loculated right pleural effusion resulting in scattered compressive atelectasis of the right hemithorax.  There is a punctate focus of air within the posterior aspect of the pleural fluid, noting patient has had recent thoracentesis.  No large volume pneumothorax.  There is ground-glass/consolidation along the anterior aspect of the right upper lobe, concerning for developing infectious or inflammatory process, differential would include localized edema.  No left pleural effusion.    There are degenerative changes of the spine.  There is some degree of osteopenia.  No acute osseous abnormality.  There are calcific densities throughout the breast tissue bilaterally, correlation with mammographic history advised.  There are partially calcified lobular foci within the lateral aspect of the right breast.  Punctate foci of subcutaneous emphysema noted along the posterior aspect of the right chest wall, likely from recent thoracentesis.  There is body wall anasarca.      Impression    1. Loculated appearing right pleural effusion noting patient has undergone recent thoracentesis, likely accounting for punctate focus of air within the posterior aspect of the collection.  Continued follow-up is advised.  2. Patchy ground-glass/consolidation within the anterior aspect of the left upper lobe concerning for developing infectious or inflammatory pneumonitis.  Correlation and follow-up is advised.  3. Please see CT 07/01/2024 for details of the abdominopelvic content.  4. Please see above for several additional  findings.      Electronically signed by: Be Booth MD  Date:    07/02/2024  Time:    14:10      X-Ray Chest 1 View    Narrative    EXAMINATION:  XR CHEST 1 VIEW    CLINICAL HISTORY:  Chest Tube placement (R) side;    TECHNIQUE:  Single frontal view of the chest was performed.    COMPARISON:  07/01/2024    FINDINGS:  Right chest tube has been placed.  No new or worsening pneumothorax or significant interval detrimental change in the cardiopulmonary status since the previous exam noting improved right pleural effusion.      Impression    As above      Electronically signed by: Be Booth MD  Date:    07/02/2024  Time:    21:11       ASSESSMENT/PLAN:     Active Hospital Problems    Diagnosis  POA    *Diabetic ketoacidosis without coma associated with type 2 diabetes mellitus [E11.10]  No    Empyema [J86.9]  Yes    High anion gap metabolic acidosis [E87.29]  Yes    Chronic pulmonary embolism without acute cor pulmonale [I27.82]  Yes     Patient with small right lower lobe pulmonary embolism noted on June 2024 CT chest and also noted on prior films as well.  Patient has not been on full-dose anticoagulation only prophylaxis dosing.  Given her advanced cancer and PE noted on CT, it would not be unreasonable to treat with full-dose anticoagulation.  However patient is breathing well on room air and her prior respiratory issues are likely related to her right-sided pleural effusion.  If a decision is made to treat with full-dose anticoagulation, patient should be treated with Lovenox 1 milligram/kilogram b.i.d and sent home on this regimen.  I see no contraindications to anticoagulation however if other procedures are planned then it may be beneficial to wait until discharge.      Pleural effusion, malignant [J91.0]  Yes    S/p CARLOS ALBERTO/BSO/OMX/Debulking/Partial liver resection/Cholecystectomy [Z90.710, Z90.79, Z90.722]  Yes    JASON (acute kidney injury) [N17.9]  Yes    Stage 4B Carcinoscaroma, Suspected ovarian origin  [C80.1]  Yes     3/4/24: peritoneal biopsy with carcinosarcoma  3/6/24: CA-125 418  3/25/24: Primary debulking surgery CARLOS ALBERTO/BSO, omentectomy, partial hepatectomy, debulking. 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       Assessment: Anette Ricci is a 58 y.o.  with stage IVB ovarian carcinosarcoma s/p PDS on 3/25/24 with Dr Washington (CARLOS ALBERTO/BSO/OMX/liver mobilization/peritoneal & R diaphragm stripping/ hepatic wedge resection/cholecystectomy) s/p C3 of carbo/taxol/bevacizumab on  (C3D34). Admitted to MICU  for the management of DKA, JASON, and symptomatic malignant pleural effusion with possible empyema.     Neuro:   GCS: 14/15  Seizure disorder: Continue home lamictal  Depression: Continue home seroquel, ativan prn     Cardiac:  Hypertension: Hold home norvasc, enalapril, HCTZ  Hyperlipidemia: Hold home statin     Pulmonary:   Malignant pleural effusion with possible empyema  - Respiratory gram stain: preliminary moderate gram positive cocci with rare WBCs  - S/p right thoracentesis  with 800cc purulent fluid sent to microbiology   - KOH prep: negative for yeast/fungus   - Gram stain: rare gram negative rods   - Culture pending, currently prelim gram negative rods  - AFB prelim result negative  - Fungal cultures pending   - Fluid WCC >170k  - Albumin, cholesterol, LDH, protein, TG pending  - Chest tube placed  with 450 cc fluid since placement; transitioned from purulent appearing fluid  to serous  - CXR : improvement in right pleural effusion  - CT : Right lower lobe distal airway occlusion, loculated right pleural effusion with right hemithorax atelectasis right upper lobe findings concerning for infection v inflammation v edema   - IV antibiotics:   -s/p Vanc/zosyn, transitioned to vanc/meropenem yesterday per ID recs  - Continue antibiotics per ID    Pulmonary embolism: stable 1 week ago. Holding home therapeutic lovenox, but currently on heparin drip     Renal:   JASON 2/2 dehydration and DKA     - Patient's baseline Cr 0.7-0.8     - Initial Cr on admit 4.6     - FENA studies 0.3%, consistent with pre-renal etiology        - Renal US remarkable for elevated intraparenchymal indices and 2cm mass adjacent to right kidney     - Strict I/Os, junior placed for further monitoring     - UOP 0.34cc/kg/hr     - Cr max this admission 4.6, 3.6 this AM     - Avoid nephrotoxic agents     - Nephrology following     Infectious Disease:   - WBC 20 > 15  - Bcx NGTD  - Urine Cx with candida glabrata  - Respiratory gram stain prelim moderate gram positive cocci  - Pleural fluid culture gram negative rods  - Continue IV Abx per ID recs, currently meropenem/vanc  - Consider additional antifungal for glabrata UTI    Hematology/Oncology:  Stage IVB ovarian carcinosarcoma     - See onc history as above    Anemia     - H/H upon admission 7.4/23.3     - H/H this AM 6.2/20     - Recommend 1u pRBC transfusion     Endocrine:  Metabolic acidosis 2/2 DKA     - Upon admission AG 21, glucose 420, beta-hydroxybutyrate 4.0     - S/p insulin drip     - Overall resolved since     Diabetes Mellitus     - Home regimen: lantus 9u, novolog 6u TIDWM     - B-313 in 24h period, overnight      - Current regimen lantus 15u, aspart 3u TIDWM, SSI as needed     GI:   - Diabetic diet  - replace electrolytes as needed to keep K>4, Mg>2  - bowel reg - daily miralax, docusate  - famotidine for GI prophylaxis     Dispo:  -  continue care in the ICU    Argentina Mccloud MD  OB/GYN PGY-3

## 2024-07-03 NOTE — ASSESSMENT & PLAN NOTE
58 stage 3 ovarian ca (3/2024, on C3D32 of carbo/taxol/bevacizumab) s/p debulking, omentectomy, partial hepatectomy admitted last month for SOB 2/2 pleural effusion. CXR showed RLL opacitiy and CT chest showing R sided loculated subdiaphragmatic fluid 7.3x11.2cm which was not amendable to drainage by IR. Fevers resolved on pip-tazo. Repeat CT with worsening metastatic disease w/ ascites and perihepatic collection.  Fevers/leukocytosis thought possibly related to malignancy, patient sent home without antibiotics.  She presented to the ER with worsening SOB found to have worsening JASON, increased R pleural effusion with locations.  Morning of 7/1 pt became altered, SOB, hypotensive to 70s/40s and noted to be in dKA transferred to the ICU Thoracentesis 7/1 significant 800ml of purulent fluid drained from the pleural space, 173K WBC 95% segs, gram stain with GNR. Growing Ecoli , now s/p chest tube placement  - continue meropenem given MRSA growth, can stop vancomycin  - discussed with team will follow

## 2024-07-03 NOTE — SUBJECTIVE & OBJECTIVE
Interval History: s/p chest tube, clinically improving transferring out of ICU    Review of Systems   Constitutional:  Negative for activity change, chills and fever.   HENT:  Negative for congestion, mouth sores, rhinorrhea, sinus pressure and sore throat.    Eyes:  Negative for photophobia, pain and redness.   Respiratory:  Positive for shortness of breath. Negative for cough, chest tightness and wheezing.    Cardiovascular:  Negative for chest pain and leg swelling.   Gastrointestinal:  Negative for abdominal distention, abdominal pain, diarrhea, nausea and vomiting.   Endocrine: Negative for polyuria.   Genitourinary:  Negative for decreased urine volume, dysuria and flank pain.   Musculoskeletal:  Negative for joint swelling and neck pain.   Skin:  Negative for color change.   Allergic/Immunologic: Negative for food allergies.   Neurological:  Negative for dizziness, weakness and headaches.   Hematological:  Negative for adenopathy.   Psychiatric/Behavioral:  Negative for agitation and confusion. The patient is not nervous/anxious.      Objective:     Vital Signs (Most Recent):  Temp: 98.8 °F (37.1 °C) (07/03/24 0930)  Pulse: 93 (07/03/24 1700)  Resp: 16 (07/03/24 1700)  BP: (!) 172/70 (07/03/24 1700)  SpO2: 99 % (07/03/24 1700) Vital Signs (24h Range):  Temp:  [97.9 °F (36.6 °C)-99 °F (37.2 °C)] 98.8 °F (37.1 °C)  Pulse:  [82-97] 93  Resp:  [10-24] 16  SpO2:  [97 %-100 %] 99 %  BP: (104-182)/(53-81) 172/70     Weight: 86.9 kg (191 lb 9.3 oz)  Body mass index is 30.01 kg/m².    Estimated Creatinine Clearance: 23.6 mL/min (A) (based on SCr of 2.9 mg/dL (H)).     Physical Exam  Constitutional:       Appearance: She is well-developed.   HENT:      Head: Normocephalic and atraumatic.   Eyes:      Pupils: Pupils are equal, round, and reactive to light.   Cardiovascular:      Rate and Rhythm: Normal rate.   Pulmonary:      Effort: Pulmonary effort is normal.      Breath sounds: Rhonchi present.   Abdominal:       General: Bowel sounds are normal.      Palpations: Abdomen is soft.   Musculoskeletal:         General: No tenderness.      Cervical back: Normal range of motion and neck supple.   Skin:     General: Skin is warm and dry.      Comments: Midline abdominal wound healing well, R chest tube   Neurological:      Mental Status: She is alert and oriented to person, place, and time.          Significant Labs: CBC:   Recent Labs   Lab 07/02/24  0314 07/03/24  0331 07/03/24  1046   WBC 17.15* 15.79* 14.28*   HGB 7.0* 6.2* 7.7*   HCT 21.3* 20.0* 22.8*   * 437 446     CMP:   Recent Labs   Lab 07/03/24  0331 07/03/24  0949 07/03/24  1046   * 133* 134*   K 3.6 3.7 3.6    103 104   CO2 20* 19* 20*   GLU 92 135* 132*   BUN 70* 65* 67*   CREATININE 3.1* 2.9* 2.9*   CALCIUM 7.9* 8.1* 8.2*   PROT 5.8* 6.1 6.1   ALBUMIN 1.7* 1.8* 1.7*   BILITOT 0.3 0.4 0.4   ALKPHOS 121 122 131   AST 24 26 24   ALT 6* 7* 5*   ANIONGAP 11 11 10       Significant Imaging: I have reviewed all pertinent imaging results/findings within the past 24 hours.

## 2024-07-03 NOTE — PLAN OF CARE
MICU DAILY GOALS     Family/Goals of care/Code Status   Code Status: Full Code    24H Vital Sign Range  Temp:  [98.2 °F (36.8 °C)-99 °F (37.2 °C)]   Pulse:  []   Resp:  [10-26]   BP: (104-182)/(51-84)   SpO2:  [97 %-100 %]      Shift Events (include procedures and significant events)   No acute events throughout shift. Pt has received PRN dilaudid x2 times for pain related to chest tube site insertion. Received PRN benadryl x1 for itching. VSS throughout shift. Pt's H&H resulted at 6.2 and 20.0. Notified CC1 provider, Cielo GRECO. Awaiting new orders.    AWAKE RASS: Goal - RASS Goal: 0-->alert and calm  Actual - RASS (Robles Agitation-Sedation Scale): alert and calm    Restraint necessity: Not necessary   BREATHE SBT: Not intubated    Coordinate A & B, analgesics/sedatives Pain: managed   SAT: Not intubated   Delirium CAM-ICU: Overall CAM-ICU: Negative   Early(intubated/ Progressive (non-intubated) Mobility MOVE Screen (INTUBATED ONLY): Not intubated    Activity: Activity Management: Rolling - L1   Feeding/Nutrition Diet order: Diet/Nutrition Received: consistent carb/diabetic diet,     Thrombus DVT prophylaxis: VTE Required Core Measure: Pharmacological prophylaxis initiated/maintained   HOB Elevation Head of Bed (HOB) Positioning: HOB at 30-45 degrees   Ulcer Prophylaxis GI: yes   Glucose control managed Glycemic Management: blood glucose monitored   Skin Skin assessed during: Daily Assessment    Sacrum intact/not altered? Yes  Heels intact/not altered? Yes  Surgical wound? Yes    CHECK ONE!   (no altered skin or altered skin) and sub boxes:  [] No Altered Skin Integrity Present    []Prevention Measures Documented    [] Altered Skin Integrity Present or Discovered   [] LDA present in EPIC, daily doc completed              [] LDA added if not in EPIC (describe wound).                    When describing wound, do not stage, use descriptive words only.    [] Wound Image Taken (required on admit,                    transfer/discharge and every Tuesday)    Wound Care Consulted? No    4 EYES:  Attending Nurse (1st set of eyes):     Second RN/Staff Member (2nd set of eyes):    Bowel Function no issues    Indwelling Catheter Necessity      Urethral Catheter 07/01/24 1901-Reason for Continuing Urinary Catheterization: Critically ill in ICU and requiring hourly monitoring of intake/output, Urinary retention    Port A Cath Single Lumen Subclavian Left-Line Necessity Review: Longterm central access required     De-escalation Antibiotics Yes        VS and assessment per flow sheet, patient progressing towards goals as tolerated, plan of care reviewed with family, all concerns addressed, will continue to monitor.

## 2024-07-03 NOTE — PROGRESS NOTES
[facility] - Cardiac Medical ICU  Critical Care Medicine  Progress Note    Patient Name: Anette Ricci  MRN: [id number]  Admission Date: 2024  Hospital Length of Stay: 2 days  Code Status: Full Code  Attending Provider: Maria Antonia Pierce MD  Primary Care Provider: Mark Chua MD   Principal Problem: Diabetic ketoacidosis without coma associated with type 2 diabetes mellitus    Subjective:     HPI:  Anette Ricci is a 58 y.o.  with stage IVB ovarian carcinosarcoma s/p PDS on 3/25/24 with Dr Washington (CARLOS ALBERTO/BSO/OMX/liver mobilization/peritoneal & R diaphragm stripping/ hepatic wedge resection/cholecystectomy) on C3D32 of carbo/taxol/bevacizumab who presented to the ED with worsening SOB for 1 day. She also has a known right PE, persistent right pleural effusion, HTN, HLD, diabetes, anemia, and seizure disorder. She was recently admitted from - for management of this issue where IR reported inability to drain collection and was treated with multiple days of zosyn without any clear source of infection.      She reports that her shortness of breath was overall stable since discharge, however, worsened yesterday. She denies any fevers/chills, reports baseline nausea without vomiting. Reports her cough is unchanged from discharge. She denies sick contacts. She has been compliant with her medication since discharge, however, reports persistent hyperglycemia at home with sugars in the 300s-400s for the past 1-2 days.      She was admitted to Gyn- Oncology for pleural effusion and JASON but transferred to MICU the following day for DKA requiring insulin drip.    Hospital/ICU Course:  No notes on file  DKA resolved. Empyema diagnosed with thoracentesis and treated medically and with chest tube. Hb dropped to 6.2 and improved to 7.7 with 1 unit packed rbcs.   No new subjective & objective note has been filed under this hospital service since the last note was generated.      ABG  Recent Labs   Lab  07/01/24  0652   PH 7.312*   PO2 36*   PCO2 35.2   HCO3 17.8*   BE -8*     Assessment/Plan:     Neuro  Seizure disorder  Continue home lamictal    Psychiatric  Depression, reactive  Continue home seroquel    Pulmonary  Empyema  See Pleural effusion, malignant     Pleural effusion, malignant  Malignant pleural effusion     - IR was previously consulted for drainage during past admission and was unable to perform procedure due to minimal fluid visualized     - CXR obtained in ED with worsening effusions bilaterally     - Thoracentesis performed 7/1, follow up studies        -- Lipemic, yellow fluid, [id number] WBC 95% seg        -- No yeast or fungal so far        -- G negative rods        -- Pending triglyceride, protein, LDH, cholesterol, albumin, lipid analysis  -- Concern for empyema. CT chest with loculated appearance.  --  chest tube draining   -- Covering with Meropenem/Vanc for suspected empyema    Renal/  JASON (acute kidney injury)  Creatinine up to 4.6 on admission; baseline 0.7. Likely prerenal in the setting of DKA; dehydration. Also possible component ATN 2/2 contrast nephropathy at end of last admission.    -- Cr down trending 3.1  -- Nephrology consulted: recommend 1L NS bolus, LR @100 ml x 24 hours, Junior for I&Os  --Strict I&O, maintain junior  --Avoid nephrotoxic agents    Hematology  Chronic pulmonary embolism without acute cor pulmonale  On Lovenox at home  *continue s/p chest tube?????    Oncology  Stage 4B Carcinoscaroma, Suspected ovarian origin  Gyn/Onc following    Endocrine  * Diabetic ketoacidosis without coma associated with type 2 diabetes mellitus  RESOLVED  On admission noted to have glucose 461, gap 17, Bicarb 15. Transferred to MICU for DKA.    --transitioned to subQ on diabetic diet, glucose wnl  insulin aspart U-100 pen 3 Units  insulin glargine U-100 (Lantus) pen 15 Units   --Q4hr accuchecks  --BMP Q4  --Hypoglycemia protocol    Diabetes mellitus due to underlying condition with  diabetic retinopathy with macular edema  Home regimen: Lantus 9units; Novolog 6units TID with meals    --See plan for DKA       Critical Care Daily Checklist:    A: Awake: RASS Goal/Actual Goal: RASS Goal: 0-->alert and calm  Actual:     B: Spontaneous Breathing Trial Performed?     C: SAT & SBT Coordinated?  no                 D: Delirium: CAM-ICU Overall CAM-ICU: Negative   E: Early Mobility Performed? Yes   F: Feeding Goal:    Status:     Current Diet Order   Procedures    Diet diabetic 2000 Calorie     Order Specific Question:   Total calories:     Answer:   2000 Calorie      AS: Analgesia/Sedation HYDROmorphone injection 0.2 mg q6 prn   T: Thromboembolic Prophylaxis Heparin 5,000 units   H: HOB > 300 Yes   U: Stress Ulcer Prophylaxis (if needed) no   G: Glucose Control Aspart 3u 3TID, lantus 15u daily   B: Bowel Function Stool Occurrence: 0   I: Indwelling Catheter (Lines & Mccartney) Necessity Chest tube  Peripheral lines   D: De-escalation of Antimicrobials/Pharmacotherapies no    Plan for the day/ETD stepdown    Code Status:  Family/Goals of Care: Full Code         Critical secondary to Patient has a condition that poses threat to life and bodily function: empyema      Critical care was time spent personally by me on the following activities: development of treatment plan with patient or surrogate and bedside caregivers, discussions with consultants, evaluation of patient's response to treatment, examination of patient, ordering and performing treatments and interventions, ordering and review of laboratory studies, ordering and review of radiographic studies, pulse oximetry, re-evaluation of patient's condition. This critical care time did not overlap with that of any other provider or involve time for any procedures.     Sacha Hilario MD  Critical Care Medicine  [facility] - Cardiac Medical ICU

## 2024-07-03 NOTE — PROCEDURES
"Anette Ricci is a 58 y.o. female patient.    Temp: 98.2 °F (36.8 °C) (24 1400)  Pulse: 95 (24)  Resp: (!) 21 (24)  BP: (!) 128/58 (24)  SpO2: 98 % (24)  Weight: 86.9 kg (191 lb 9.3 oz) (24)  Height: 5' 7" (170.2 cm) (24)       Chest Tube Insertion    Date/Time: 2024 7:53 PM  Location procedure was performed: [facility] CRITICAL CARE MEDICINE    Performed by: Lilli Bowen MD  Authorized by: Lilli Bowen MD  Consent Done: Yes  Consent: Verbal consent obtained. Written consent obtained.  Risks and benefits: risks, benefits and alternatives were discussed  Consent given by: spouse (patient and spouse present and engaged for consent)  Patient understanding: patient states understanding of the procedure being performed  Patient consent: the patient's understanding of the procedure matches consent given  Procedure consent: procedure consent matches procedure scheduled  Relevant documents: relevant documents present and verified  Test results: test results available and properly labeled  Site marked: the operative site was marked  Imaging studies: imaging studies available  Required items: required blood products, implants, devices, and special equipment available  Patient identity confirmed: , MRN and name  Time out: Immediately prior to procedure a "time out" was called to verify the correct patient, procedure, equipment, support staff and site/side marked as required.  Indications: pleural effusion  Anesthesia: local infiltration    Anesthesia:  Local Anesthetic: lidocaine 1% without epinephrine  Preparation: skin prepped with ChloraPrep  Placement location: right lateral  Ultrasound guidance: yes  Tube connected to: suction  Drainage characteristics: purulent  Drainage amount: 200 ml  Suture material: 2-0 silk  Dressing: 4x4 sterile gauze  Patient tolerance: Patient tolerated the procedure well with no immediate " complications  Complications: No        Brendan 14Fr catheter placed to right posteriolateral position based on ultrasound guidance and sutured in place. 200cc output of purulent drainage after placement. Pending fu CXR. Placed to suction overnight, will continue to monitor drainage.      Lilli Bowen MD  [employer] Fellow   07/02/2024 7:55 PM

## 2024-07-03 NOTE — PROGRESS NOTES
[facility] - Cardiac Medical ICU  Infectious Disease  Progress Note    Patient Name: Anette Ricci  MRN: [id number]  Admission Date: 6/30/2024  Length of Stay: 2 days  Attending Physician: Maria Antonia Pierce MD  Primary Care Provider: Mark Chua MD    Isolation Status: Special Contact  Assessment/Plan:      Pulmonary  Empyema  58 stage 3 ovarian ca (3/2024, on C3D32 of carbo/taxol/bevacizumab) s/p debulking, omentectomy, partial hepatectomy admitted last month for SOB 2/2 pleural effusion. CXR showed RLL opacitiy and CT chest showing R sided loculated subdiaphragmatic fluid 7.3x11.2cm which was not amendable to drainage by IR. Fevers resolved on pip-tazo. Repeat CT with worsening metastatic disease w/ ascites and perihepatic collection.  Fevers/leukocytosis thought possibly related to malignancy, patient sent home without antibiotics.  She presented to the ER with worsening SOB found to have worsening JASON, increased R pleural effusion with locations.  Morning of 7/1 pt became altered, SOB, hypotensive to 70s/40s and noted to be in dKA transferred to the ICU Thoracentesis 7/1 significant 800ml of purulent fluid drained from the pleural space, 173K WBC 95% segs, gram stain with GNR. Growing Ecoli , now s/p chest tube placement  - continue meropenem given MRSA growth, can stop vancomycin  - discussed with team will follow        Anticipated Disposition: pending    Thank you for your consult. I will follow-up with patient. Please contact us if you have any additional questions.    Ivan Epstein MD  Infectious Disease  [facility] - Cardiac Medical ICU    Subjective:     Principal Problem:Diabetic ketoacidosis without coma associated with type 2 diabetes mellitus    HPI: 58 stage 4 ovarian ca (3/2024, on C3D32 of carbo/taxol/bevacizumab) s/p debulking, omentectomy, partial hepatectomy admitted last month for SOB 2/2 pleural effusion. CXR showed RLL opacitiy and CT chest showing R sided loculated subdiaphragmatic  fluid 7.3x11.2cm which was not amendable to drainage by IR. Fevers resolved on pip-tazo. Repeat CT with worsening metastatic disease w/ ascites and perihepatic collection.  Fevers/leukocytosis thought possibly related to malignancy, patient sent home without antibiotics.  She presented to the ER with worsening SOB found to have worsening JASON, increased R pleural effusion with locations.  Morning of 7/1 pt became altered, SOB, hypotensive to 70s/40s and noted to be in dKA transferred to the ICU Thoracentesis 7/1 significant 800ml of purulent fluid drained from the pleural space, 173K WBC 95% segs, gram stain with GNR.   Interval History: s/p chest tube, clinically improving transferring out of ICU    Review of Systems   Constitutional:  Negative for activity change, chills and fever.   HENT:  Negative for congestion, mouth sores, rhinorrhea, sinus pressure and sore throat.    Eyes:  Negative for photophobia, pain and redness.   Respiratory:  Positive for shortness of breath. Negative for cough, chest tightness and wheezing.    Cardiovascular:  Negative for chest pain and leg swelling.   Gastrointestinal:  Negative for abdominal distention, abdominal pain, diarrhea, nausea and vomiting.   Endocrine: Negative for polyuria.   Genitourinary:  Negative for decreased urine volume, dysuria and flank pain.   Musculoskeletal:  Negative for joint swelling and neck pain.   Skin:  Negative for color change.   Allergic/Immunologic: Negative for food allergies.   Neurological:  Negative for dizziness, weakness and headaches.   Hematological:  Negative for adenopathy.   Psychiatric/Behavioral:  Negative for agitation and confusion. The patient is not nervous/anxious.      Objective:     Vital Signs (Most Recent):  Temp: 98.8 °F (37.1 °C) (07/03/24 0930)  Pulse: 93 (07/03/24 1700)  Resp: 16 (07/03/24 1700)  BP: (!) 172/70 (07/03/24 1700)  SpO2: 99 % (07/03/24 1700) Vital Signs (24h Range):  Temp:  [97.9 °F (36.6 °C)-99 °F (37.2 °C)]  98.8 °F (37.1 °C)  Pulse:  [82-97] 93  Resp:  [10-24] 16  SpO2:  [97 %-100 %] 99 %  BP: (104-182)/(53-81) 172/70     Weight: 86.9 kg (191 lb 9.3 oz)  Body mass index is 30.01 kg/m².    Estimated Creatinine Clearance: 23.6 mL/min (A) (based on SCr of 2.9 mg/dL (H)).     Physical Exam  Constitutional:       Appearance: She is well-developed.   HENT:      Head: Normocephalic and atraumatic.   Eyes:      Pupils: Pupils are equal, round, and reactive to light.   Cardiovascular:      Rate and Rhythm: Normal rate.   Pulmonary:      Effort: Pulmonary effort is normal.      Breath sounds: Rhonchi present.   Abdominal:      General: Bowel sounds are normal.      Palpations: Abdomen is soft.   Musculoskeletal:         General: No tenderness.      Cervical back: Normal range of motion and neck supple.   Skin:     General: Skin is warm and dry.      Comments: Midline abdominal wound healing well, R chest tube   Neurological:      Mental Status: She is alert and oriented to person, place, and time.          Significant Labs: CBC:   Recent Labs   Lab 07/02/24  0314 07/03/24  0331 07/03/24  1046   WBC 17.15* 15.79* 14.28*   HGB 7.0* 6.2* 7.7*   HCT 21.3* 20.0* 22.8*   * 437 446     CMP:   Recent Labs   Lab 07/03/24  0331 07/03/24  0949 07/03/24  1046   * 133* 134*   K 3.6 3.7 3.6    103 104   CO2 20* 19* 20*   GLU 92 135* 132*   BUN 70* 65* 67*   CREATININE 3.1* 2.9* 2.9*   CALCIUM 7.9* 8.1* 8.2*   PROT 5.8* 6.1 6.1   ALBUMIN 1.7* 1.8* 1.7*   BILITOT 0.3 0.4 0.4   ALKPHOS 121 122 131   AST 24 26 24   ALT 6* 7* 5*   ANIONGAP 11 11 10       Significant Imaging: I have reviewed all pertinent imaging results/findings within the past 24 hours.

## 2024-07-03 NOTE — SUBJECTIVE & OBJECTIVE
Interval History: NAEON. Patient resting comfortably this morning. Chest tube in place without air leak. Morning CXR pending.    Medications:  Continuous Infusions:  Scheduled Meds:   heparin (porcine)  5,000 Units Subcutaneous Q8H    insulin aspart U-100  3 Units Subcutaneous TIDWM    insulin glargine U-100  15 Units Subcutaneous Daily    lamoTRIgine  25 mg Oral BID    magnesium sulfate IVPB  2 g Intravenous Once    meropenem IV (PEDS and ADULTS)  1 g Intravenous Q12H    mupirocin   Nasal BID    potassium bicarbonate  40 mEq Oral Once     PRN Meds:  Current Facility-Administered Medications:     0.9%  NaCl infusion (for blood administration), , Intravenous, Q24H PRN    acetaminophen, 650 mg, Oral, Q6H PRN    D10W, , Intravenous, PRN    D10W, , Intravenous, PRN    dextrose 10%, 12.5 g, Intravenous, PRN    dextrose 10%, 25 g, Intravenous, PRN    diphenhydrAMINE, 25 mg, Oral, Q6H PRN    glucagon (human recombinant), 1 mg, Intramuscular, PRN    glucose, 16 g, Oral, PRN    glucose, 24 g, Oral, PRN    HYDROmorphone, 0.2 mg, Intravenous, Q6H PRN    insulin aspart U-100, 0-10 Units, Subcutaneous, QID (AC + HS) PRN    ondansetron, 4 mg, Oral, Q12H PRN    QUEtiapine, 25 mg, Oral, Daily PRN    sodium chloride 0.9%, 10 mL, Intravenous, PRN    Pharmacy to dose Vancomycin consult, , , Once **AND** vancomycin - pharmacy to dose, , Intravenous, pharmacy to manage frequency     Review of patient's allergies indicates:   Allergen Reactions    Codeine Other (See Comments)     Flu like s/s    Tramadol Other (See Comments)     Flu like symptoms similar to codeine reaction     Objective:     Vital Signs (Most Recent):  Temp: 98.2 °F (36.8 °C) (07/03/24 0729)  Pulse: 97 (07/03/24 0930)  Resp: (!) 23 (07/03/24 0930)  BP: (!) 151/72 (07/03/24 0930)  SpO2: 100 % (07/03/24 0930) Vital Signs (24h Range):  Temp:  [97.9 °F (36.6 °C)-99 °F (37.2 °C)] 98.2 °F (36.8 °C)  Pulse:  [] 97  Resp:  [10-26] 23  SpO2:  [97 %-100 %] 100 %  BP:  (104-182)/(53-84) 151/72     Intake/Output - Last 3 Shifts         07/01 0700 07/02 0659 07/02 0700 07/03 0659 07/03 0700 07/04 0659    P.O.  240     I.V. (mL/kg) 2951.4 (34) 1737.9 (20) 8.6 (0.1)    Blood   350    IV Piggyback 1339.1 343.3     Total Intake(mL/kg) 4290.4 (49.4) 2321.2 (26.7) 358.6 (4.1)    Urine (mL/kg/hr) 375 (0.2) 810 (0.4)     Other 800      Stool 0 0     Chest Tube  450     Total Output 1175 1260     Net +3115.4 +1061.2 +358.6           Stool Occurrence 3 x 0 x             SpO2: 100 %        Physical Exam  Vitals reviewed.   Constitutional:       Appearance: Normal appearance.   Cardiovascular:      Rate and Rhythm: Normal rate.      Pulses: Normal pulses.   Pulmonary:      Effort: Pulmonary effort is normal.      Comments: Right chest tube in place and to suction. No air leak. 450 mL since placement  Abdominal:      General: There is no distension.      Palpations: Abdomen is soft.   Skin:     General: Skin is warm and dry.   Neurological:      General: No focal deficit present.      Mental Status: She is alert and oriented to person, place, and time.            Significant Labs:  CBC:   Recent Labs   Lab 07/03/24  0331   WBC 15.79*   RBC 2.38*   HGB 6.2*   HCT 20.0*      MCV 84   MCH 26.1*   MCHC 31.0*     CMP:   Recent Labs   Lab 07/03/24  0331   GLU 92   CALCIUM 7.9*   ALBUMIN 1.7*   PROT 5.8*   *   K 3.6   CO2 20*      BUN 70*   CREATININE 3.1*   ALKPHOS 121   ALT 6*   AST 24   BILITOT 0.3       Significant Diagnostics:  I have reviewed and interpreted all pertinent imaging results/findings within the past 24 hours.    VTE Risk Mitigation (From admission, onward)           Ordered     heparin (porcine) injection 5,000 Units  Every 8 hours         07/03/24 0931     IP VTE HIGH RISK PATIENT  Once         07/01/24 0540     Place sequential compression device  Until discontinued         07/01/24 0540

## 2024-07-03 NOTE — PROGRESS NOTES
Therapy with vancomycin was discontinued by the provider.  Pharmacy will sign off, please re-consult as needed.    Thank you for the consult,   Sally Taylor, PharmD, [facility]  [phone]

## 2024-07-03 NOTE — ASSESSMENT & PLAN NOTE
Creatinine up to 4.6 on admission; baseline 0.7. Likely prerenal in the setting of DKA; dehydration. Also possible component ATN 2/2 contrast nephropathy at end of last admission.    -- Cr down trending 3.1  -- Nephrology consulted: recommend 1L NS bolus, LR @100 ml x 24 hours, Junior for I&Os  --Strict I&O, maintain junior  --Avoid nephrotoxic agents

## 2024-07-03 NOTE — PLAN OF CARE
Hospital Medicine ICU Acceptance Note    Date of Admit: 2024  Date of Transfer / Stepdown: 7/3/2024  Loree, RAGHU/J, L, Onc (IV chemo w/in 1 month), Gyn/Onc, or other special case?: No  ICU team stepping patient down: MICU  Accepting  team: SHELBI    History of Present Illness:     58 y.o.  with stage IVB ovarian carcinosarcoma s/p PDS on 3/25/24 with Dr Washington (CARLOS ALBERTO/BSO/OMX/liver mobilization/peritoneal & R diaphragm stripping/ hepatic wedge resection/cholecystectomy) on C3D32 of carbo/taxol/bevacizumab who presented to the ED with worsening SOB for 1 day. She also has a known right PE, persistent right pleural effusion, HTN, HLD, diabetes, anemia, and seizure disorder. She was recently admitted from - for management of this issue where IR reported inability to drain collection and was treated with multiple days of zosyn without any clear source of infection.      She reports that her shortness of breath was overall stable since discharge, however, worsened yesterday. She denies any fevers/chills, reports baseline nausea without vomiting. Reports her cough is unchanged from discharge. She denies sick contacts. She has been compliant with her medication since discharge, however, reports persistent hyperglycemia at home with sugars in the 300s-400s for the past 1-2 days.      She was admitted to Gyn- Oncology for pleural effusion and JASON but transferred to MICU the following day for DKA requiring insulin drip.    Hospital/ICU Course:     Treated for DKAn and transitioned to subq.Thoracentesis 24; drained 800 ml cloudy (yellow cloudy/chylous looking fluid vs purulence). Cytology and cultures sent. Chest tube placed 7/3. Thoracic surgery consulted for recommendations on lytic therapy.   Pleural fluid with E coli. ID consulted. On meropenem.     Deemed appropriate for transfer to the floor on 7/3/2024, and was accepted to  team Z for further care and management.    Consultants and Procedures:      Consultants:  Consults (From admission, onward)          Status Ordering Provider     Inpatient consult to Pulmonology  Once        Provider:  (Not yet assigned)    Ordered GUDELIA CR     Inpatient consult to Cardiothoracic Surgery  Once        Provider:  (Not yet assigned)    Completed YADI CASTILLO     Inpatient consult to Infectious Diseases  Once        Provider:  (Not yet assigned)    Completed JULIANA HASKINS     Inpatient consult to Nephrology  Once        Provider:  (Not yet assigned)    Completed GUDELIA CR     Inpatient consult to Critical Care Medicine  Once        Provider:  (Not yet assigned)    Completed JULIANA MORA     Inpatient consult to [facility] Medicine-General  Once        Provider:  (Not yet assigned)    Completed JAMIL WHITE     Inpatient consult to Gynecologic Oncology  Once        Provider:  (Not yet assigned)    Completed VICTOR MANUEL HERNANDEZ            Procedures:    As documented    Transfer Information:     Diet:  As ordered    Physical Activity:  As tolerated      Pending plan at time of transfer to the floor:  Continue current plan initiated by ICU, will further monitor and adjust as clinically indicated upon arrival to the floor.    Patient has been accepted by [facility] Medicine Team Z, who will assume care of the patient upon arrival to the floor from the ICU. Please contact ICU team with any concerns prior to transfer to the floor.

## 2024-07-03 NOTE — PLAN OF CARE
MICU DAILY GOALS     Family/Goals of care/Code Status   Code Status: Full Code    24H Vital Sign Range  Temp:  [97.9 °F (36.6 °C)-99 °F (37.2 °C)]   Pulse:  [82-97]   Resp:  [10-24]   BP: (104-182)/(53-81)   SpO2:  [97 %-100 %]      Shift Events (include procedures and significant events)   No acute events throughout shift. Pt family at bedside.     AWAKE RASS: Goal - RASS Goal: 0-->alert and calm  Actual - RASS (Robles Agitation-Sedation Scale): alert and calm    Restraint necessity: Not necessary   BREATHE SBT: Not intubated    Coordinate A & B, analgesics/sedatives Pain: managed   SAT: Not intubated   Delirium CAM-ICU: Overall CAM-ICU: Negative   Early(intubated/ Progressive (non-intubated) Mobility MOVE Screen (INTUBATED ONLY): Not intubated    Activity: Activity Management: Rolling - L1, Arm raise - L1, Leg kicks - L2   Feeding/Nutrition Diet order: Diet/Nutrition Received: consistent carb/diabetic diet,     Thrombus DVT prophylaxis: VTE Required Core Measure: Pharmacological prophylaxis initiated/maintained   HOB Elevation Head of Bed (HOB) Positioning: HOB at 30-45 degrees   Ulcer Prophylaxis GI: yes   Glucose control managed Glycemic Management: blood glucose monitored   Skin Skin assessed during: Daily Assessment    Sacrum intact/not altered? Yes  Heels intact/not altered? Yes  Surgical wound? No    CHECK ONE!   (no altered skin or altered skin) and sub boxes:  [] No Altered Skin Integrity Present    []Prevention Measures Documented    [x] Altered Skin Integrity Present or Discovered   [x] LDA present in EPIC, daily doc completed              [] LDA added if not in EPIC (describe wound).                    When describing wound, do not stage, use descriptive words only.    [] Wound Image Taken (required on admit,                   transfer/discharge and every Tuesday)    Wound Care Consulted? No    4 EYES:  Attending Nurse (1st set of eyes): ALISSON Enciso    Second RN/Staff Member (2nd set of eyes): ALISSON Aguirre    Bowel Function no issues    Indwelling Catheter Necessity [REMOVED]      Urethral Catheter 07/01/24 1901-Reason for Continuing Urinary Catheterization: Critically ill in ICU and requiring hourly monitoring of intake/output    Port A Cath Single Lumen Subclavian Left-Line Necessity Review: Longterm central access required     De-escalation Antibiotics No        VS and assessment per flow sheet, patient progressing towards goals as tolerated, plan of care reviewed with family, all concerns addressed, will continue to monitor.

## 2024-07-03 NOTE — ASSESSMENT & PLAN NOTE
JASON on Normal Renal Fx  -Baseline SCr 0.8.  57 yo female with recent admission from 6/20-6/27 for SOB 2/2 malignant effusion who presents on 6/30 for N/V/SOB on 7/1, found to be in DKA with JASON with SCr of 4.6.  I/O's not accurately recorded since admission, stepped up to ICU on 7/1 for higher level of care.  Multiple hypotensive episodes recorded.  Discharged on 6/27 during last admission, on that date had an JASON with SCr of 1.5, suspicious for ANNE as she had a CT with contrast 6/24 and also receiving Ibuprofen and ACEi.    Suspect a component of ANNE from previous admission playing a role, but also volume depletion from DKA and osmotic diuresis from glucosuria.    May have developed an iATN from hypotensive events.  Home meds including Enalapril, HCTZ, Ibuprofen PRN, Metformin    Mohsen 17  7/1:  POCUS @ bedside with collapsible IJ > 50%  Renal US negative for hydronephrosis; acute findings  Urine microscopy with numerous WBCs, leucine crystals    Plan/Recommendations:  -Recommend to continue junior  -recommend discontinuing maintenance fluids, becoming hypervolemic.  Allow patient to drink to thirst.  -strict I/O's  -no indication for RRT at this time, we will continue following

## 2024-07-03 NOTE — PROGRESS NOTES
[facility] - Cardiac Medical ICU  Nephrology  Progress Note    Patient Name: Anette Ricci  MRN: [id number]  Admission Date: 6/30/2024  Hospital Length of Stay: 2 days  Attending Provider: Maria Antonia Pierce MD   Primary Care Physician: Mark Chua MD  Principal Problem:Diabetic ketoacidosis without coma associated with type 2 diabetes mellitus    Subjective:     HPI: Ms. Ricci is a 59 yo female with hx of DM, HLD, HTN, PE, Stg IV Ovarian Ca s/p C3 of carbo/taxol/bevacizumab on 5/31 and plans to start Doxorubicin therapy who presents to the hospital on 6/30 with CC of abdominal pain, nausea, vomiting, and SOB x 1 day.  She was recently admitted on 6/20-6/27 for SOB 2/2 pleural effusion (thought to be malignant) that was unable to be drained by IR.  She was treated during that admission with zosyn x 6 days for recurrent fevers, workup negative.  According to family at bedside, she has had relatively poor PO intake since discharge.  On this admission, labs were notable for a WBC of 20k, Na 129, CO2 14, AG 21, , Beta hydroxybutyrate > 4.  UA + ketones.  She was started on DKA protocol and admitted to Gyn/Onc service.  On 7/1, MICU was consulted for worsening mental status, tachypnea and further management of DKA.  Nephrology was consulted at that time for JASON evaluation.    According to records, Ms. Ricci has normal kidney function at baseline with sCR of 0.8.  This admission she arrives with a sCR of 4.6.  UA with 2+ protein, 3+ OB, 40 WBC, + ketones, Mohsen 17.  On chart review, she has a baseline SCr of 0.8, discharged during her last admission with a SCR of 1.5 on 6/27.  Of note, she did have a CT with contrast performed on 6/24.  Family at bedside report decreased PO intake prior to this presentation.  No hx of NSAID use.    Interval History:   Continued improvement in kidney function, SCR downt o 3.1 from 3.4/24h.   ml, net positive 1L.  Hypervolemic on exam.  Oxygenating well on RA.  CT  in-place.    Review of patient's allergies indicates:   Allergen Reactions    Codeine Other (See Comments)     Flu like s/s    Tramadol Other (See Comments)     Flu like symptoms similar to codeine reaction     Current Facility-Administered Medications   Medication Frequency    0.9%  NaCl infusion (for blood administration) Q24H PRN    acetaminophen tablet 650 mg Q6H PRN    amLODIPine tablet 10 mg Daily    dextrose 10 % infusion PRN    dextrose 10 % infusion PRN    dextrose 10% bolus 125 mL 125 mL PRN    dextrose 10% bolus 250 mL 250 mL PRN    diphenhydrAMINE capsule 25 mg Q6H PRN    glucagon (human recombinant) injection 1 mg PRN    glucose chewable tablet 16 g PRN    glucose chewable tablet 24 g PRN    heparin (porcine) injection 5,000 Units Q8H    HYDROmorphone injection 0.2 mg Q6H PRN    insulin aspart U-100 pen 0-10 Units QID (AC + HS) PRN    insulin aspart U-100 pen 3 Units TIDWM    insulin glargine U-100 (Lantus) pen 15 Units Daily    lamoTRIgine tablet 25 mg BID    meropenem (MERREM) 1 g in 0.9% NaCl 100 mL IVPB (MB+) Q12H    mupirocin 2 % ointment BID    ondansetron tablet 4 mg Q12H PRN    QUEtiapine tablet 25 mg Daily PRN    sodium chloride 0.9% flush 10 mL PRN       Objective:     Vital Signs (Most Recent):  Temp: 98.8 °F (37.1 °C) (07/03/24 0930)  Pulse: 92 (07/03/24 1309)  Resp: 16 (07/03/24 1309)  BP: (!) 182/81 (md NOTIFIED) (07/03/24 1309)  SpO2: 99 % (07/03/24 1309) Vital Signs (24h Range):  Temp:  [97.9 °F (36.6 °C)-99 °F (37.2 °C)] 98.8 °F (37.1 °C)  Pulse:  [] 92  Resp:  [10-24] 16  SpO2:  [97 %-100 %] 99 %  BP: (104-182)/(53-81) 182/81     Weight: 86.9 kg (191 lb 9.3 oz) (07/01/24 0620)  Body mass index is 30.01 kg/m².  Body surface area is 2.03 meters squared.    I/O last 3 completed shifts:  In: 4308.6 [P.O.:240; I.V.:2671.4; IV Piggyback:1397.2]  Out: 1535 [Urine:1085; Chest Tube:450]     Physical Exam  Vitals and nursing note reviewed.   Constitutional:       General: She is not in  acute distress.     Appearance: She is well-developed. She is not ill-appearing or toxic-appearing.   HENT:      Head: Normocephalic and atraumatic.      Mouth/Throat:      Mouth: Mucous membranes are dry.   Eyes:      General: No scleral icterus.        Right eye: No discharge.         Left eye: No discharge.      Extraocular Movements: Extraocular movements intact.      Conjunctiva/sclera: Conjunctivae normal.   Cardiovascular:      Rate and Rhythm: Normal rate and regular rhythm.      Heart sounds: No murmur heard.     No friction rub. No gallop.   Pulmonary:      Effort: Tachypnea present. No respiratory distress.      Breath sounds: Decreased breath sounds present. No rhonchi or rales.   Abdominal:      General: There is distension.      Palpations: Abdomen is soft.      Tenderness: There is no abdominal tenderness.   Musculoskeletal:         General: No swelling.      Cervical back: Normal range of motion and neck supple.      Right lower leg: Edema present.      Left lower leg: Edema present.   Skin:     General: Skin is warm and dry.   Neurological:      Mental Status: She is alert, oriented to person, place, and time and easily aroused.          Significant Labs:  CBC:   Recent Labs   Lab 07/03/24  1046   WBC 14.28*   RBC 2.82*   HGB 7.7*   HCT 22.8*      MCV 81*   MCH 27.3   MCHC 33.8     CMP:   Recent Labs   Lab 07/03/24  1046   *   CALCIUM 8.2*   ALBUMIN 1.7*   PROT 6.1   *   K 3.6   CO2 20*      BUN 67*   CREATININE 2.9*   ALKPHOS 131   ALT 5*   AST 24   BILITOT 0.4        Assessment/Plan:     Renal/  JASON (acute kidney injury)  JASON on Normal Renal Fx  -Baseline SCr 0.8.  59 yo female with recent admission from 6/20-6/27 for SOB 2/2 malignant effusion who presents on 6/30 for N/V/SOB on 7/1, found to be in DKA with JASON with SCr of 4.6.  I/O's not accurately recorded since admission, stepped up to ICU on 7/1 for higher level of care.  Multiple hypotensive episodes  recorded.  Discharged on 6/27 during last admission, on that date had an JASON with SCr of 1.5, suspicious for ANNE as she had a CT with contrast 6/24 and also receiving Ibuprofen and ACEi.    Suspect a component of ANNE from previous admission playing a role, but also volume depletion from DKA and osmotic diuresis from glucosuria.    May have developed an iATN from hypotensive events.  Home meds including Enalapril, HCTZ, Ibuprofen PRN, Metformin    Mohsen 17  7/1:  POCUS @ bedside with collapsible IJ > 50%  Renal US negative for hydronephrosis; acute findings  Urine microscopy with numerous WBCs, leucine crystals    Plan/Recommendations:  -Recommend to continue junior  -recommend discontinuing maintenance fluids, becoming hypervolemic.  Allow patient to drink to thirst.  -strict I/O's  -no indication for RRT at this time, we will continue following    Oncology  Stage 4B Carcinoscaroma, Suspected ovarian origin  3/4/24: peritoneal biopsy with carcinosarcoma  3/6/24: CA-125 418  3/25/24: Primary debulking surgery CARLOS ALBERTO/BSO, omentectomy, partial hepatectomy, debulking. (<1cm residual disease: sigmoid, transverse colon, diaphragm, kelvin hepatis)     Adjuvant therapy: Carboplatin AUC 6, paclitaxel 175 mg/m2, bevacizumab 15 mg/kg  C1D1: (holding yaneli) planned for 4/19/24, CA-125 248    -per Gyn/Onc    Endocrine  * Diabetic ketoacidosis without coma associated with type 2 diabetes mellitus  -improving  Mgmt per MICU      Be Raman, NP  Nephrology  [facility] - Cardiac Medical ICU

## 2024-07-04 PROBLEM — J90 PLEURAL EFFUSION: Status: ACTIVE | Noted: 2024-06-20

## 2024-07-04 LAB
ALBUMIN SERPL BCP-MCNC: 1.7 G/DL (ref 3.5–5.2)
ALP SERPL-CCNC: 119 U/L (ref 55–135)
ALT SERPL W/O P-5'-P-CCNC: 6 U/L (ref 10–44)
ANION GAP SERPL CALC-SCNC: 9 MMOL/L (ref 8–16)
AST SERPL-CCNC: 22 U/L (ref 10–40)
BACTERIA FLD CULT: ABNORMAL
BASOPHILS # BLD AUTO: 0.07 K/UL (ref 0–0.2)
BASOPHILS NFR BLD: 0.5 % (ref 0–1.9)
BILIRUB SERPL-MCNC: 0.4 MG/DL (ref 0.1–1)
BUN SERPL-MCNC: 55 MG/DL (ref 6–20)
CALCIUM SERPL-MCNC: 8.3 MG/DL (ref 8.7–10.5)
CHLORIDE SERPL-SCNC: 106 MMOL/L (ref 95–110)
CO2 SERPL-SCNC: 20 MMOL/L (ref 23–29)
CREAT SERPL-MCNC: 2 MG/DL (ref 0.5–1.4)
DIFFERENTIAL METHOD BLD: ABNORMAL
EOSINOPHIL # BLD AUTO: 0.3 K/UL (ref 0–0.5)
EOSINOPHIL NFR BLD: 1.8 % (ref 0–8)
ERYTHROCYTE [DISTWIDTH] IN BLOOD BY AUTOMATED COUNT: 19.5 % (ref 11.5–14.5)
EST. GFR  (NO RACE VARIABLE): 28.2 ML/MIN/1.73 M^2
GLUCOSE SERPL-MCNC: 76 MG/DL (ref 70–110)
HCT VFR BLD AUTO: 25.9 % (ref 37–48.5)
HGB BLD-MCNC: 8.1 G/DL (ref 12–16)
IMM GRANULOCYTES # BLD AUTO: 0.59 K/UL (ref 0–0.04)
IMM GRANULOCYTES NFR BLD AUTO: 4 % (ref 0–0.5)
LYMPHOCYTES # BLD AUTO: 1.5 K/UL (ref 1–4.8)
LYMPHOCYTES NFR BLD: 10.3 % (ref 18–48)
MAGNESIUM SERPL-MCNC: 2 MG/DL (ref 1.6–2.6)
MCH RBC QN AUTO: 26.1 PG (ref 27–31)
MCHC RBC AUTO-ENTMCNC: 31.3 G/DL (ref 32–36)
MCV RBC AUTO: 84 FL (ref 82–98)
MONOCYTES # BLD AUTO: 1.3 K/UL (ref 0.3–1)
MONOCYTES NFR BLD: 8.5 % (ref 4–15)
NEUTROPHILS # BLD AUTO: 11.1 K/UL (ref 1.8–7.7)
NEUTROPHILS NFR BLD: 74.9 % (ref 38–73)
NRBC BLD-RTO: 1 /100 WBC
PHOSPHATE SERPL-MCNC: 2.9 MG/DL (ref 2.7–4.5)
PLATELET # BLD AUTO: 506 K/UL (ref 150–450)
PMV BLD AUTO: 10.4 FL (ref 9.2–12.9)
POCT GLUCOSE: 111 MG/DL (ref 70–110)
POCT GLUCOSE: 131 MG/DL (ref 70–110)
POCT GLUCOSE: 179 MG/DL (ref 70–110)
POCT GLUCOSE: 89 MG/DL (ref 70–110)
POTASSIUM SERPL-SCNC: 3.9 MMOL/L (ref 3.5–5.1)
PROT SERPL-MCNC: 6 G/DL (ref 6–8.4)
RBC # BLD AUTO: 3.1 M/UL (ref 4–5.4)
SODIUM SERPL-SCNC: 135 MMOL/L (ref 136–145)
WBC # BLD AUTO: 14.82 K/UL (ref 3.9–12.7)

## 2024-07-04 PROCEDURE — 36415 COLL VENOUS BLD VENIPUNCTURE: CPT | Mod: HCNC | Performed by: NURSE PRACTITIONER

## 2024-07-04 PROCEDURE — 63600175 PHARM REV CODE 636 W HCPCS: Mod: HCNC | Performed by: STUDENT IN AN ORGANIZED HEALTH CARE EDUCATION/TRAINING PROGRAM

## 2024-07-04 PROCEDURE — 83735 ASSAY OF MAGNESIUM: CPT | Mod: HCNC | Performed by: NURSE PRACTITIONER

## 2024-07-04 PROCEDURE — 80053 COMPREHEN METABOLIC PANEL: CPT | Mod: HCNC | Performed by: NURSE PRACTITIONER

## 2024-07-04 PROCEDURE — 25000003 PHARM REV CODE 250: Mod: HCNC | Performed by: STUDENT IN AN ORGANIZED HEALTH CARE EDUCATION/TRAINING PROGRAM

## 2024-07-04 PROCEDURE — 32551 INSERTION OF CHEST TUBE: CPT | Mod: HCNC

## 2024-07-04 PROCEDURE — 99233 SBSQ HOSP IP/OBS HIGH 50: CPT | Mod: HCNC,GC,, | Performed by: EMERGENCY MEDICINE

## 2024-07-04 PROCEDURE — 25000003 PHARM REV CODE 250: Mod: HCNC

## 2024-07-04 PROCEDURE — 20600001 HC STEP DOWN PRIVATE ROOM: Mod: HCNC

## 2024-07-04 PROCEDURE — 25000003 PHARM REV CODE 250: Mod: HCNC | Performed by: INTERNAL MEDICINE

## 2024-07-04 PROCEDURE — 63600175 PHARM REV CODE 636 W HCPCS: Mod: HCNC

## 2024-07-04 PROCEDURE — 27000207 HC ISOLATION: Mod: HCNC

## 2024-07-04 PROCEDURE — 97116 GAIT TRAINING THERAPY: CPT | Mod: HCNC

## 2024-07-04 PROCEDURE — 99233 SBSQ HOSP IP/OBS HIGH 50: CPT | Mod: HCNC,,, | Performed by: INTERNAL MEDICINE

## 2024-07-04 PROCEDURE — 97161 PT EVAL LOW COMPLEX 20 MIN: CPT | Mod: HCNC

## 2024-07-04 PROCEDURE — 99233 SBSQ HOSP IP/OBS HIGH 50: CPT | Mod: HCNC,,, | Performed by: OBSTETRICS & GYNECOLOGY

## 2024-07-04 PROCEDURE — 85025 COMPLETE CBC W/AUTO DIFF WBC: CPT | Mod: HCNC | Performed by: NURSE PRACTITIONER

## 2024-07-04 PROCEDURE — 97530 THERAPEUTIC ACTIVITIES: CPT | Mod: HCNC

## 2024-07-04 PROCEDURE — 84100 ASSAY OF PHOSPHORUS: CPT | Mod: HCNC | Performed by: NURSE PRACTITIONER

## 2024-07-04 RX ORDER — ATORVASTATIN CALCIUM 40 MG/1
40 TABLET, FILM COATED ORAL DAILY
Status: DISCONTINUED | OUTPATIENT
Start: 2024-07-05 | End: 2024-07-11

## 2024-07-04 RX ORDER — GUAIFENESIN 600 MG/1
600 TABLET, EXTENDED RELEASE ORAL 2 TIMES DAILY
Status: DISCONTINUED | OUTPATIENT
Start: 2024-07-04 | End: 2024-07-11

## 2024-07-04 RX ORDER — INSULIN GLARGINE 100 [IU]/ML
9 INJECTION, SOLUTION SUBCUTANEOUS DAILY
Status: DISCONTINUED | OUTPATIENT
Start: 2024-07-04 | End: 2024-07-07

## 2024-07-04 RX ADMIN — INSULIN GLARGINE 9 UNITS: 100 INJECTION, SOLUTION SUBCUTANEOUS at 10:07

## 2024-07-04 RX ADMIN — INSULIN ASPART 3 UNITS: 100 INJECTION, SOLUTION INTRAVENOUS; SUBCUTANEOUS at 12:07

## 2024-07-04 RX ADMIN — AMLODIPINE BESYLATE 10 MG: 10 TABLET ORAL at 09:07

## 2024-07-04 RX ADMIN — INSULIN ASPART 2 UNITS: 100 INJECTION, SOLUTION INTRAVENOUS; SUBCUTANEOUS at 12:07

## 2024-07-04 RX ADMIN — HEPARIN SODIUM 5000 UNITS: 5000 INJECTION INTRAVENOUS; SUBCUTANEOUS at 04:07

## 2024-07-04 RX ADMIN — LAMOTRIGINE 25 MG: 25 TABLET ORAL at 09:07

## 2024-07-04 RX ADMIN — MUPIROCIN: 20 OINTMENT TOPICAL at 09:07

## 2024-07-04 RX ADMIN — MEROPENEM 1 G: 1 INJECTION INTRAVENOUS at 10:07

## 2024-07-04 RX ADMIN — HEPARIN SODIUM 5000 UNITS: 5000 INJECTION INTRAVENOUS; SUBCUTANEOUS at 09:07

## 2024-07-04 RX ADMIN — HYDROMORPHONE HYDROCHLORIDE 0.2 MG: 1 INJECTION, SOLUTION INTRAMUSCULAR; INTRAVENOUS; SUBCUTANEOUS at 09:07

## 2024-07-04 RX ADMIN — GUAIFENESIN 600 MG: 600 TABLET, EXTENDED RELEASE ORAL at 09:07

## 2024-07-04 RX ADMIN — DIPHENHYDRAMINE HYDROCHLORIDE 25 MG: 25 CAPSULE ORAL at 05:07

## 2024-07-04 RX ADMIN — MEROPENEM 1 G: 1 INJECTION INTRAVENOUS at 09:07

## 2024-07-04 RX ADMIN — HEPARIN SODIUM 5000 UNITS: 5000 INJECTION INTRAVENOUS; SUBCUTANEOUS at 05:07

## 2024-07-04 RX ADMIN — INSULIN ASPART 3 UNITS: 100 INJECTION, SOLUTION INTRAVENOUS; SUBCUTANEOUS at 05:07

## 2024-07-04 NOTE — PLAN OF CARE
Eval completed; POC established    Problem: Physical Therapy  Goal: Physical Therapy Goal  Description: Goals to be met by: 2024     Patient will increase functional independence with mobility by performin. Supine to sit with Modified [place]  2. Sit to stand transfer with Supervision  3. Bed to chair transfer with Supervision using LRAD  4. Gait  x 100 feet with Stand-by Assistance using LRAD.     Outcome: Progressing

## 2024-07-04 NOTE — ASSESSMENT & PLAN NOTE
- Stage IVB ovarian carcinosarcoma s/p PDS on 3/25/24 with Dr Washington (CARLOS ALBERTO/BSO/OMX/liver mobilization/peritoneal & R diaphragm stripping/hepatic wedge resection/cholecystectomy) s/p C3 of carbo/taxol/bevacizumab on 5/31   - Gyn/Onc following  - Doxorubicin C1 scheduled for 7/8, will most likely be delayed due to recent infection

## 2024-07-04 NOTE — ASSESSMENT & PLAN NOTE
Patient's FSGs are controlled on current medication regimen.  Last A1c reviewed-   Lab Results   Component Value Date    HGBA1C 7.3 (H) 03/19/2024     Most recent fingerstick glucose reviewed-   Recent Labs   Lab 07/03/24  2138 07/04/24  0818 07/04/24  1132 07/04/24  1646   POCTGLUCOSE 158* 89 179* 111*     Current correctional scale  Low  Maintain anti-hyperglycemic dose as follows-   Antihyperglycemics (From admission, onward)      Start     Stop Route Frequency Ordered    07/04/24 0945  insulin glargine U-100 (Lantus) pen 9 Units         -- SubQ Daily 07/04/24 0936    07/03/24 0930  insulin aspart U-100 pen 0-10 Units         -- SubQ Before meals & nightly PRN 07/03/24 0931    07/02/24 1215  insulin aspart U-100 pen 3 Units         -- SubQ 3 times daily with meals 07/02/24 1114          Hold Oral hypoglycemics while patient is in the hospital.

## 2024-07-04 NOTE — PROGRESS NOTES
[facility] - Tele[facility] Step[facility]  Infectious Disease  Progress Note    Patient Name: Anette Ricci  MRN: [id number]  Admission Date: 6/30/2024  Length of Stay: 3 days  Attending Physician: Tia Wallace MD  Primary Care Provider: Mark Chua MD    Isolation Status: Special Contact  Assessment/Plan:      Pulmonary  Empyema  58 stage 4 ovarian ca (3/2024, on C3D32 of carbo/taxol/bevacizumab) s/p debulking, omentectomy, partial hepatectomy admitted last month for SOB 2/2 pleural effusion. CXR showed RLL opacitiy and CT chest showing R sided loculated subdiaphragmatic fluid 7.3x11.2cm which was not amendable to drainage by IR. Fevers resolved on pip-tazo. Repeat CT with worsening metastatic disease w/ ascites and perihepatic collection.  Fevers/leukocytosis thought possibly related to malignancy, patient sent home without antibiotics.  She presented to the ER with worsening SOB found to have worsening JASON, increased R pleural effusion with loculations.  Morning of 7/1 pt became altered, SOB, hypotensive to 70s/40s and noted to be in dKA transferred to the ICU Thoracentesis 7/1 significant 800ml of purulent fluid drained from the pleural space, 173K WBC 95% segs, gram stain with GNR. Growing ESBL Ecoli , now s/p chest tube placement  - continue meropenem and f/u pending GNR  - discussed with team will follow        Anticipated Disposition: pending    Thank you for your consult. I will follow-up with patient. Please contact us if you have any additional questions.    Ivan Epstein MD  Infectious Disease  [facility]    Subjective:     Principal Problem:Diabetic ketoacidosis without coma associated with type 2 diabetes mellitus    HPI: 58 stage 4 ovarian ca (3/2024, on C3D32 of carbo/taxol/bevacizumab) s/p debulking, omentectomy, partial hepatectomy admitted last month for SOB 2/2 pleural effusion. CXR showed RLL opacitiy and CT chest showing R sided loculated subdiaphragmatic fluid 7.3x11.2cm  which was not amendable to drainage by IR. Fevers resolved on pip-tazo. Repeat CT with worsening metastatic disease w/ ascites and perihepatic collection.  Fevers/leukocytosis thought possibly related to malignancy, patient sent home without antibiotics.  She presented to the ER with worsening SOB found to have worsening JASON, increased R pleural effusion with locations.  Morning of 7/1 pt became altered, SOB, hypotensive to 70s/40s and noted to be in dKA transferred to the ICU Thoracentesis 7/1 significant 800ml of purulent fluid drained from the pleural space, 173K WBC 95% segs, gram stain with GNR.   Interval History: ESBL Ecoli growing from pleural fluid    Review of Systems   Constitutional:  Negative for activity change, chills and fever.   HENT:  Negative for congestion, mouth sores, rhinorrhea, sinus pressure and sore throat.    Eyes:  Negative for photophobia, pain and redness.   Respiratory:  Positive for shortness of breath. Negative for cough, chest tightness and wheezing.    Cardiovascular:  Negative for chest pain and leg swelling.   Gastrointestinal:  Negative for abdominal distention, abdominal pain, diarrhea, nausea and vomiting.   Endocrine: Negative for polyuria.   Genitourinary:  Negative for decreased urine volume, dysuria and flank pain.   Musculoskeletal:  Negative for joint swelling and neck pain.   Skin:  Negative for color change.   Allergic/Immunologic: Negative for food allergies.   Neurological:  Negative for dizziness, weakness and headaches.   Hematological:  Negative for adenopathy.   Psychiatric/Behavioral:  Negative for agitation and confusion. The patient is not nervous/anxious.      Objective:     Vital Signs (Most Recent):  Temp: 98.2 °F (36.8 °C) (07/04/24 0820)  Pulse: 85 (07/04/24 0820)  Resp: 18 (07/04/24 0820)  BP: (!) 163/81 (07/04/24 0820)  SpO2: 100 % (07/04/24 0820) Vital Signs (24h Range):  Temp:  [98.1 °F (36.7 °C)-98.8 °F (37.1 °C)] 98.2 °F (36.8 °C)  Pulse:  [85-99]  85  Resp:  [10-23] 18  SpO2:  [96 %-100 %] 100 %  BP: (118-182)/(60-90) 163/81     Weight: 86.9 kg (191 lb 9.3 oz)  Body mass index is 30.01 kg/m².    Estimated Creatinine Clearance: 34.3 mL/min (A) (based on SCr of 2 mg/dL (H)).     Physical Exam  Constitutional:       Appearance: She is well-developed.   HENT:      Head: Normocephalic and atraumatic.   Eyes:      Pupils: Pupils are equal, round, and reactive to light.   Cardiovascular:      Rate and Rhythm: Normal rate.   Pulmonary:      Effort: Pulmonary effort is normal.      Breath sounds: Rhonchi present.   Abdominal:      General: Bowel sounds are normal.      Palpations: Abdomen is soft.   Musculoskeletal:         General: No tenderness.      Cervical back: Normal range of motion and neck supple.   Skin:     General: Skin is warm and dry.      Comments: Midline abdominal wound healing well, R chest tube   Neurological:      Mental Status: She is alert and oriented to person, place, and time.          Significant Labs: CBC:   Recent Labs   Lab 07/03/24  0331 07/03/24  1046 07/04/24  0335   WBC 15.79* 14.28* 14.82*   HGB 6.2* 7.7* 8.1*   HCT 20.0* 22.8* 25.9*    446 506*     CMP:   Recent Labs   Lab 07/03/24  0949 07/03/24  1046 07/04/24  0335   * 134* 135*   K 3.7 3.6 3.9    104 106   CO2 19* 20* 20*   * 132* 76   BUN 65* 67* 55*   CREATININE 2.9* 2.9* 2.0*   CALCIUM 8.1* 8.2* 8.3*   PROT 6.1 6.1 6.0   ALBUMIN 1.8* 1.7* 1.7*   BILITOT 0.4 0.4 0.4   ALKPHOS 122 131 119   AST 26 24 22   ALT 7* 5* 6*   ANIONGAP 11 10 9       Significant Imaging: I have reviewed all pertinent imaging results/findings within the past 24 hours.

## 2024-07-04 NOTE — SUBJECTIVE & OBJECTIVE
Past Medical History:   Diagnosis Date    Breast cancer 2013    Diabetes mellitus     Genetic testing 05/29/2013    VUS    Hyperlipidemia     Hypertension     Malignant neoplasm of upper-outer quadrant of right breast in female, estrogen receptor positive 12/02/2015    Seizure disorder        Past Surgical History:   Procedure Laterality Date    BILATERAL SALPINGO-OOPHORECTOMY (BSO) N/A 3/25/2024    Procedure: SALPINGO-OOPHORECTOMY, BILATERAL;  Surgeon: Александр Washington MD;  Location: [facility] OR [address] [place] [address];  Service: OB/GYN;  Laterality: N/A;    BREAST BIOPSY      BREAST LUMPECTOMY Right 2013    CHOLECYSTECTOMY  3/25/2024    Procedure: CHOLECYSTECTOMY;  Surgeon: Bo Farmer MD;  Location: [facility] OR [address] [place] [address];  Service: General;;    DEBULKING OF TUMOR N/A 3/25/2024    Procedure: DEBULKING, NEOPLASM;  Surgeon: Александр Washington MD;  Location: [facility] OR [address] [place] [address];  Service: OB/GYN;  Laterality: N/A;    EXCISION, LIVER N/A 3/25/2024    Procedure: EXCISION, LIVER - partial;  Surgeon: Bo Farmer MD;  Location: [facility] OR [address] [place] [address];  Service: General;  Laterality: N/A;  bk ultrasound  [place] book by TA on 3-21-24  7:00 a.m.  Conf #  [id number]    EYE SURGERY      LAPAROTOMY, EXPLORATORY N/A 3/25/2024    Procedure: LAPAROTOMY, EXPLORATORY;  Surgeon: Александр Washington MD;  Location: [facility] OR [address] [place] [address];  Service: OB/GYN;  Laterality: N/A;    OMENTECTOMY N/A 3/25/2024    Procedure: OMENTECTOMY;  Surgeon: Александр Washington MD;  Location: [facility] OR [address] [place] [address];  Service: OB/GYN;  Laterality: N/A;    PERITONEOCENTESIS N/A 3/13/2024    Procedure: PARACENTESIS, ABDOMINAL;  Surgeon: Flavia Moore MD;  Location: [facility] CATH LAB;  Service: Radiology;  Laterality: N/A;    PERITONEOCENTESIS N/A 3/21/2024    Procedure: PARACENTESIS, ABDOMINAL;  Surgeon: Jorge Jolley MD;  Location: [facility] CATH LAB;  Service: Radiology;  Laterality: N/A;    PERITONEOCENTESIS N/A 6/10/2024    Procedure: PARACENTESIS, ABDOMINAL;  Surgeon: Frieda  Rogelio KELLEY MD;  Location: [facility] CATH LAB;  Service: Radiology;  Laterality: N/A;    TOTAL ABDOMINAL HYSTERECTOMY N/A 3/25/2024    Procedure: HYSTERECTOMY, TOTAL, ABDOMINAL;  Surgeon: Александр Washington MD;  Location: [facility] OR [address] [place] [address];  Service: OB/GYN;  Laterality: N/A;    TOTAL REDUCTION MAMMOPLASTY Bilateral 2016       Review of patient's allergies indicates:   Allergen Reactions    Codeine Other (See Comments)     Flu like s/s    Tramadol Other (See Comments)     Flu like symptoms similar to codeine reaction       Family History       Problem Relation (Age of Onset)    Breast cancer Sister (48)    Colon cancer Sister    Hypertension Mother, Brother, Brother    Seizures Father          Tobacco Use    Smoking status: Never    Smokeless tobacco: Never   Substance and Sexual Activity    Alcohol use: Yes     Alcohol/week: 0.0 standard drinks of alcohol     Comment: ocassional    Drug use: No    Sexual activity: Not Currently     Partners: Male     Birth control/protection: None         Review of Systems   Constitutional:  Positive for fatigue. Negative for fever.   Respiratory:  Positive for cough and shortness of breath.    Gastrointestinal:  Positive for diarrhea, nausea and vomiting. Negative for constipation.   Genitourinary:  Negative for dysuria and flank pain.   Skin:  Negative for color change and wound.   Neurological:  Negative for seizures and syncope.   Psychiatric/Behavioral:  Negative for agitation and confusion.      Objective:     Vital Signs (Most Recent):  Temp: 98 °F (36.7 °C) (07/04/24 1133)  Pulse: 91 (07/04/24 1133)  Resp: 18 (07/04/24 1133)  BP: (!) 170/93 (07/04/24 1133)  SpO2: 99 % (07/04/24 1133) Vital Signs (24h Range):  Temp:  [98 °F (36.7 °C)-98.6 °F (37 °C)] 98 °F (36.7 °C)  Pulse:  [85-99] 91  Resp:  [14-23] 18  SpO2:  [96 %-100 %] 99 %  BP: (118-182)/(60-93) 170/93     Weight: 86.9 kg (191 lb 9.3 oz)  Body mass index is 30.01 kg/m².      Intake/Output Summary (Last 24 hours) at  7/4/2024 1304  Last data filed at 7/4/2024 0935  Gross per 24 hour   Intake 340 ml   Output 895 ml   Net -555 ml        Physical Exam  Vitals and nursing note reviewed.   Constitutional:       General: She is not in acute distress.     Appearance: She is not toxic-appearing.   HENT:      Head: Normocephalic and atraumatic.      Nose: Nose normal.      Mouth/Throat:      Mouth: Mucous membranes are moist.   Eyes:      General: No scleral icterus.     Extraocular Movements: Extraocular movements intact.   Cardiovascular:      Rate and Rhythm: Normal rate and regular rhythm.      Heart sounds: Normal heart sounds.   Pulmonary:      Effort: Pulmonary effort is normal.      Comments: Right chest tube with around 700cc of fluid in container.  Abdominal:      General: There is no distension.      Palpations: Abdomen is soft.      Tenderness: There is no abdominal tenderness.   Musculoskeletal:      Right lower leg: No edema.      Left lower leg: No edema.   Skin:     General: Skin is warm and dry.   Neurological:      General: No focal deficit present.      Mental Status: She is alert and oriented to person, place, and time.   Psychiatric:         Mood and Affect: Mood normal.         Behavior: Behavior normal.          Vents:       Lines/Drains/Airways       Central Venous Catheter Line  Duration             Port A Cath Single Lumen Subclavian Left -- days              Drain  Duration                  Chest Tube 07/02/24 1855 Tube - 1 Right Pleural 14 Fr. 1 day    Female External Urinary Catheter w/ Suction 07/03/24 1 day              Peripheral Intravenous Line  Duration                  Peripheral IV - Single Lumen 07/01/24 0032 20 G Left Upper Arm 3 days                    Significant Labs:    CBC/Anemia Profile:  Recent Labs   Lab 07/03/24  0331 07/03/24  1046 07/04/24  0335   WBC 15.79* 14.28* 14.82*   HGB 6.2* 7.7* 8.1*   HCT 20.0* 22.8* 25.9*    446 506*   MCV 84 81* 84   RDW 20.6* 19.5* 19.5*         Chemistries:  Recent Labs   Lab 07/03/24  0949 07/03/24  1046 07/04/24  0335   * 134* 135*   K 3.7 3.6 3.9    104 106   CO2 19* 20* 20*   BUN 65* 67* 55*   CREATININE 2.9* 2.9* 2.0*   CALCIUM 8.1* 8.2* 8.3*   ALBUMIN 1.8* 1.7* 1.7*   PROT 6.1 6.1 6.0   BILITOT 0.4 0.4 0.4   ALKPHOS 122 131 119   ALT 7* 5* 6*   AST 26 24 22   MG 1.8 1.8 2.0   PHOS 2.9 3.1 2.9       All pertinent labs within the past 24 hours have been reviewed.    Significant Imaging:   I have reviewed all pertinent imaging results/findings within the past 24 hours.    X-Ray Chest 1 View  Narrative: EXAMINATION:  XR CHEST 1 VIEW    CLINICAL HISTORY:  chest tube;    TECHNIQUE:  Single frontal view of the chest was performed.    COMPARISON:  07/03/2024.    FINDINGS:  There is a right-sided pigtail chest tube overlying the right lung base.  There is no visible pneumothorax.  There is a small loculated right pleural effusion, stable.  There is a left sided port which appears accessed.  There are surgical clips.  Continued mild opacities noted in the right lung, stable.  The left lung is clear.  There is no new focal consolidation.  The cardiac silhouette is unremarkable.  There are calcifications of the aortic arch.  Osseous structures are intact.  Impression: No significant detrimental change from prior.    Electronically signed by: Hany Parker  Date:    07/04/2024  Time:    11:05

## 2024-07-04 NOTE — ASSESSMENT & PLAN NOTE
Patient found to have moderate pleural effusion on imaging. I have personally reviewed and interpreted the following imaging: Xray and CT. A thoracentesis was performed. Pleural fluid was sent for analysis. Exudative consistent with empyema   - Cytology in process  - empyema Ecoli growing. ID recommend cont meropenem   Management to include chest tube  - CT in place to suction- pulm and CTS following and managing CT- may need lytic therapy   - repeat CT done - Small right hydropneumothorax with a pigtail chest tube in place.

## 2024-07-04 NOTE — SUBJECTIVE & OBJECTIVE
Interval History: ESBL Ecoli growing from pleural fluid    Review of Systems   Constitutional:  Negative for activity change, chills and fever.   HENT:  Negative for congestion, mouth sores, rhinorrhea, sinus pressure and sore throat.    Eyes:  Negative for photophobia, pain and redness.   Respiratory:  Positive for shortness of breath. Negative for cough, chest tightness and wheezing.    Cardiovascular:  Negative for chest pain and leg swelling.   Gastrointestinal:  Negative for abdominal distention, abdominal pain, diarrhea, nausea and vomiting.   Endocrine: Negative for polyuria.   Genitourinary:  Negative for decreased urine volume, dysuria and flank pain.   Musculoskeletal:  Negative for joint swelling and neck pain.   Skin:  Negative for color change.   Allergic/Immunologic: Negative for food allergies.   Neurological:  Negative for dizziness, weakness and headaches.   Hematological:  Negative for adenopathy.   Psychiatric/Behavioral:  Negative for agitation and confusion. The patient is not nervous/anxious.      Objective:     Vital Signs (Most Recent):  Temp: 98.2 °F (36.8 °C) (07/04/24 0820)  Pulse: 85 (07/04/24 0820)  Resp: 18 (07/04/24 0820)  BP: (!) 163/81 (07/04/24 0820)  SpO2: 100 % (07/04/24 0820) Vital Signs (24h Range):  Temp:  [98.1 °F (36.7 °C)-98.8 °F (37.1 °C)] 98.2 °F (36.8 °C)  Pulse:  [85-99] 85  Resp:  [10-23] 18  SpO2:  [96 %-100 %] 100 %  BP: (118-182)/(60-90) 163/81     Weight: 86.9 kg (191 lb 9.3 oz)  Body mass index is 30.01 kg/m².    Estimated Creatinine Clearance: 34.3 mL/min (A) (based on SCr of 2 mg/dL (H)).     Physical Exam  Constitutional:       Appearance: She is well-developed.   HENT:      Head: Normocephalic and atraumatic.   Eyes:      Pupils: Pupils are equal, round, and reactive to light.   Cardiovascular:      Rate and Rhythm: Normal rate.   Pulmonary:      Effort: Pulmonary effort is normal.      Breath sounds: Rhonchi present.   Abdominal:      General: Bowel sounds are  normal.      Palpations: Abdomen is soft.   Musculoskeletal:         General: No tenderness.      Cervical back: Normal range of motion and neck supple.   Skin:     General: Skin is warm and dry.      Comments: Midline abdominal wound healing well, R chest tube   Neurological:      Mental Status: She is alert and oriented to person, place, and time.          Significant Labs: CBC:   Recent Labs   Lab 07/03/24  0331 07/03/24  1046 07/04/24  0335   WBC 15.79* 14.28* 14.82*   HGB 6.2* 7.7* 8.1*   HCT 20.0* 22.8* 25.9*    446 506*     CMP:   Recent Labs   Lab 07/03/24  0949 07/03/24  1046 07/04/24  0335   * 134* 135*   K 3.7 3.6 3.9    104 106   CO2 19* 20* 20*   * 132* 76   BUN 65* 67* 55*   CREATININE 2.9* 2.9* 2.0*   CALCIUM 8.1* 8.2* 8.3*   PROT 6.1 6.1 6.0   ALBUMIN 1.8* 1.7* 1.7*   BILITOT 0.4 0.4 0.4   ALKPHOS 122 131 119   AST 26 24 22   ALT 7* 5* 6*   ANIONGAP 11 10 9       Significant Imaging: I have reviewed all pertinent imaging results/findings within the past 24 hours.

## 2024-07-04 NOTE — PROGRESS NOTES
[facility] Tele[facility] Stepdown  Thoracic Surgery  Progress Note    Subjective:     History of Present Illness:  58F w/ stage 4 ovarian cancer with R pleural effusion presumed malignant who was admitted to MICU for management of DKA. Thoracentesis yesterday 07/02/24; drained 800 ml cloudy (yellow cloudy/chylous looking fluid vs purulence) yesterday. Cytology and cultures sent. Plan to place chest tube today. Thoracic surgery consulted for recommendations on lytic therapy.     Post-Op Info:  * No surgery found *         Interval History: NAEON. Patient resting comfortably this morning. On room air. Chest tube in place without air leak. Morning CXR with significantly decreased left pleural fluid.    Medications:  Continuous Infusions:  Scheduled Meds:   amLODIPine  10 mg Oral Daily    heparin (porcine)  5,000 Units Subcutaneous Q8H    insulin aspart U-100  3 Units Subcutaneous TIDWM    insulin glargine U-100  9 Units Subcutaneous Daily    lamoTRIgine  25 mg Oral BID    meropenem IV (PEDS and ADULTS)  1 g Intravenous Q12H    mupirocin   Nasal BID     PRN Meds:  Current Facility-Administered Medications:     0.9%  NaCl infusion (for blood administration), , Intravenous, Q24H PRN    acetaminophen, 650 mg, Oral, Q6H PRN    D10W, , Intravenous, PRN    D10W, , Intravenous, PRN    dextrose 10%, 12.5 g, Intravenous, PRN    dextrose 10%, 25 g, Intravenous, PRN    diphenhydrAMINE, 25 mg, Oral, Q6H PRN    glucagon (human recombinant), 1 mg, Intramuscular, PRN    glucose, 16 g, Oral, PRN    glucose, 24 g, Oral, PRN    HYDROmorphone, 0.2 mg, Intravenous, Q6H PRN    insulin aspart U-100, 0-10 Units, Subcutaneous, QID (AC + HS) PRN    ondansetron, 4 mg, Oral, Q12H PRN    QUEtiapine, 25 mg, Oral, Daily PRN    simethicone, 1 tablet, Oral, TID PRN    sodium chloride 0.9%, 10 mL, Intravenous, PRN     Review of patient's allergies indicates:   Allergen Reactions    Codeine Other (See Comments)     Flu like s/s    Tramadol Other (See  Comments)     Flu like symptoms similar to codeine reaction     Objective:     Vital Signs (Most Recent):  Temp: 98.2 °F (36.8 °C) (07/04/24 0820)  Pulse: 91 (07/04/24 1118)  Resp: 18 (07/04/24 0820)  BP: (!) 163/81 (07/04/24 0820)  SpO2: 100 % (07/04/24 0820) Vital Signs (24h Range):  Temp:  [98.1 °F (36.7 °C)-98.6 °F (37 °C)] 98.2 °F (36.8 °C)  Pulse:  [85-99] 91  Resp:  [10-23] 18  SpO2:  [96 %-100 %] 100 %  BP: (118-182)/(60-90) 163/81     Intake/Output - Last 3 Shifts         07/02 0700  07/03 0659 07/03 0700  07/04 0659 07/04 0700  07/05 0659    P.O. 240 300 240    I.V. (mL/kg) 1737.9 (20) 62.2 (0.7)     Blood  350     IV Piggyback 343.3 197.7     Total Intake(mL/kg) 2321.2 (26.7) 909.9 (10.5) 240 (2.8)    Urine (mL/kg/hr) 810 (0.4) 1325 (0.6)     Other       Stool 0      Chest Tube 450 195     Total Output 1260 1520     Net +1061.2 -610.1 +240           Urine Occurrence  1 x 1 x    Stool Occurrence 0 x  1 x            SpO2: 100 %        Physical Exam  Vitals reviewed.   Constitutional:       Appearance: Normal appearance.   Cardiovascular:      Rate and Rhythm: Normal rate.      Pulses: Normal pulses.   Pulmonary:      Effort: Pulmonary effort is normal.      Comments: Right chest tube in place and to suction. No air leak. Roughly 600 mL since placement  Abdominal:      General: There is no distension.      Palpations: Abdomen is soft.   Skin:     General: Skin is warm and dry.   Neurological:      General: No focal deficit present.      Mental Status: She is alert and oriented to person, place, and time.            Significant Labs:  CBC:   Recent Labs   Lab 07/04/24  0335   WBC 14.82*   RBC 3.10*   HGB 8.1*   HCT 25.9*   *   MCV 84   MCH 26.1*   MCHC 31.3*     CMP:   Recent Labs   Lab 07/04/24  0335   GLU 76   CALCIUM 8.3*   ALBUMIN 1.7*   PROT 6.0   *   K 3.9   CO2 20*      BUN 55*   CREATININE 2.0*   ALKPHOS 119   ALT 6*   AST 22   BILITOT 0.4       Significant Diagnostics:  I have  reviewed and interpreted all pertinent imaging results/findings within the past 24 hours.    VTE Risk Mitigation (From admission, onward)           Ordered     heparin (porcine) injection 5,000 Units  Every 8 hours         07/03/24 0931     IP VTE HIGH RISK PATIENT  Once         07/01/24 0540     Place sequential compression device  Until discontinued         07/01/24 0540                  Assessment/Plan:     Pleural effusion, malignant  58F w/ stage 4 ovarian cancer with R pleural effusion presumed malignant who was admitted to MICU for management of DKA. Now s/p Chest ube placement on 7/2.    - Leave chest tube to suction today  - Follow up chest CT ordered to evaluate for un drained loculations   - Based on the significant improvement demonstrated on her CXR, likely she will not require lytic therapy  - If no need for lytics, can transition the chest tube to water seal and remove the tube when output is minimal  - Rest of care per primary  - Will follow to reassess new imaging        Robbin Arceo MD  Thoracic Surgery  [facility] - Tele[facility]down

## 2024-07-04 NOTE — ASSESSMENT & PLAN NOTE
Chronic, controlled. Latest blood pressure and vitals reviewed-     Temp:  [98 °F (36.7 °C)-98.6 °F (37 °C)]   Pulse:  [85-99]   Resp:  [14-23]   BP: (118-170)/(60-93)   SpO2:  [96 %-100 %] .   Home meds for hypertension were reviewed and noted below.   Hypertension Medications               amlodipine (NORVASC) 10 MG tablet Take 10 mg by mouth once daily.     enalapril (VASOTEC) 20 MG tablet Take 20 mg by mouth once daily.    hydrochlorothiazide (HYDRODIURIL) 25 MG tablet Take 25 mg by mouth once daily.             While in the hospital, will manage blood pressure as follows; Continue home antihypertensive regimen    Will utilize p.r.n. blood pressure medication only if patient's blood pressure greater than 180/110 and she develops symptoms such as worsening chest pain or shortness of breath.

## 2024-07-04 NOTE — ASSESSMENT & PLAN NOTE
Patient with acute kidney injury/acute renal failure likely due to  Suspect a component of ANNE from previous admission playing a role, but also volume depletion from DKA and osmotic diuresis from glucosuria.    JASON is currently improving. Baseline creatinine  0.8  - Labs reviewed- Renal function/electrolytes with Estimated Creatinine Clearance: 34.3 mL/min (A) (based on SCr of 2 mg/dL (H)). according to latest data. Monitor urine output and serial BMP and adjust therapy as needed. Avoid nephrotoxins and renally dose meds for GFR listed above.  - renal following

## 2024-07-04 NOTE — HOSPITAL COURSE
Admitted to ICU for DKA, started on insulin drip. Acidosis resolved and transitioned to subq insulin. Stable for step down.   Thoracentesis 07/02/24; drained 800 ml cloudy (yellow cloudy/chylous looking fluid vs purulence). Cytology and cultures sent. Chest tube placed 7/3. Thoracic surgery consulted for recommendations on lytic therapy.   Pleural fluid with E coli. ID consulted. Cont meropenem per ID recs.   Chest tube to suction Pulm and CTS following. Repeat CT chest Small right hydropneumothorax with a pigtail chest tube in place. Also showing new right hydronephrosis, repeat CT abd pelvis ordered and showed: right-sided hydroureteronephrosis which appears to result from a soft tissue mass at the level of the right common iliac artery. Urology consulted. Per urology Patient will need retrograde pyelograms and right ureteral stent placement given extrinsic compression. If patient fails ureteral stent she will likely need nephrostomy tube to preserve renal function.  Pleural effusion improved and pulm removed chest tube.   Worsening leukocytosis since CT removal, repeating infectious workup. CXR with loculated right pleural reaction/pleural effusion. No interval detrimental change noted. Continues to be stable on RA. If WBC rises consider attempting to evaluate for IR drainage of subdiaphragmatic collection.   Cont fanta while inpt and transition to erta at discharge per ID recs.   No BM in days despite aggressive bowel regimen, enema added. Abd u/s with ascites- para ordered but not enough fluid for safe tap.   H/H drop, transfusion ordered. Spiked fever overnight, possibly due to blood transfusion but CRP also rising. IR consulted for assessment of subdiaphragmatic collection and need for drainage. Recommended repeating CT CAP.   Still with no BM despite aggressive bowel regimen and enema. Cont to monitor for obstruction. Stopped passing gas and episode of emesis- CT done and concerning for partial SBO- NG placed  to suction and surgery consulted.   Hypoglycemia so scheduled insulin stopped and cont SSI.   Worsening inflammatory markers so discussed with IR and CTS about need to repeat thora/chest tube, awaiting CTS recs. IR report the subdiaphragmatic collection has no safe window for aspiration.     Gyn/onc and palliative following: plans to transition to home hospice; palliative PEG to help with GI decompression placed on 7/12/2024. SW/CM helping to arrange home hospice.    Pt agreed to enroll with [facility] on Pt deemed appropriate for discharge; seen and examined prior to departure. Plan discussed with pt, and , who were agreeable and amenable; medications were discussed and reviewed, all questions were answered to the pt's satisfaction, and Mrs. Ricci was subsequently discharged.

## 2024-07-04 NOTE — PLAN OF CARE
Problem: Adult Inpatient Plan of Care  Goal: Absence of Hospital-Acquired Illness or Injury  Outcome: Progressing     Problem: Adult Inpatient Plan of Care  Goal: Optimal Comfort and Wellbeing  Outcome: Progressing     Problem: Infection  Goal: Absence of Infection Signs and Symptoms  Outcome: Progressing     Problem: Acute Kidney Injury/Impairment  Goal: Improved Oral Intake  Outcome: Progressing     Problem: Fall Injury Risk  Goal: Absence of Fall and Fall-Related Injury  Outcome: Progressing

## 2024-07-04 NOTE — ASSESSMENT & PLAN NOTE
58 stage 4 ovarian ca (3/2024, on C3D32 of carbo/taxol/bevacizumab) s/p debulking, omentectomy, partial hepatectomy admitted last month for SOB 2/2 pleural effusion. CXR showed RLL opacitiy and CT chest showing R sided loculated subdiaphragmatic fluid 7.3x11.2cm which was not amendable to drainage by IR. Fevers resolved on pip-tazo. Repeat CT with worsening metastatic disease w/ ascites and perihepatic collection.  Fevers/leukocytosis thought possibly related to malignancy, patient sent home without antibiotics.  She presented to the ER with worsening SOB found to have worsening JASON, increased R pleural effusion with loculations.  Morning of 7/1 pt became altered, SOB, hypotensive to 70s/40s and noted to be in dKA transferred to the ICU Thoracentesis 7/1 significant 800ml of purulent fluid drained from the pleural space, 173K WBC 95% segs, gram stain with GNR. Growing ESBL Ecoli , now s/p chest tube placement  - continue meropenem and f/u pending GNR  - discussed with team will follow

## 2024-07-04 NOTE — CONSULTS
[facility] - Telemetry Stepdown  Pulmonology  Consult Note    Patient Name: Anette Ricci  MRN: [id number]  Admission Date: 2024  Hospital Length of Stay: 3 days  Code Status: Full Code  Attending Physician: Tia Wallace MD  Primary Care Provider: Mark Chua MD   Principal Problem: Diabetic ketoacidosis without coma associated with type 2 diabetes mellitus    Inpatient consult to Pulmonology  Consult performed by: Mirza Quintana MD  Consult ordered by: Sacha Hilario MD        Subjective:     HPI:  Per critical care H&P:  Anette Ricci is a 58 y.o.  with stage IVB ovarian carcinosarcoma s/p PDS on 3/25/24 with Dr Washington (CARLOS ALBERTO/BSO/OMX/liver mobilization/peritoneal & R diaphragm stripping/ hepatic wedge resection/cholecystectomy) on C3D32 of carbo/taxol/bevacizumab who presented to the ED with worsening SOB for 1 day. She also has a known right PE, persistent right pleural effusion, HTN, HLD, diabetes, anemia, and seizure disorder. She was recently admitted from - for management of this issue where IR reported inability to drain collection and was treated with multiple days of zosyn without any clear source of infection.      She reports that her shortness of breath was overall stable since discharge, however, worsened yesterday. She denies any fevers/chills, reports baseline nausea without vomiting. Reports her cough is unchanged from discharge. She denies sick contacts. She has been compliant with her medication since discharge, however, reports persistent hyperglycemia at home with sugars in the 300s-400s for the past 1-2 days.      She was admitted to Gyn- Oncology for pleural effusion and JASON but transferred to MICU the following day for DKA requiring insulin drip.    Thoracentesis was performed while in the MICU consistent with empyema, subsequently grew E.coli. A chest tube was placed on 7/3 and patient stepped down to hospital medicine.    Past Medical History:    Diagnosis Date    Breast cancer 2013    Diabetes mellitus     Genetic testing 05/29/2013    VUS    Hyperlipidemia     Hypertension     Malignant neoplasm of upper-outer quadrant of right breast in female, estrogen receptor positive 12/02/2015    Seizure disorder        Past Surgical History:   Procedure Laterality Date    BILATERAL SALPINGO-OOPHORECTOMY (BSO) N/A 3/25/2024    Procedure: SALPINGO-OOPHORECTOMY, BILATERAL;  Surgeon: Александр Washington MD;  Location: [facility] OR [address] [place] [address];  Service: OB/GYN;  Laterality: N/A;    BREAST BIOPSY      BREAST LUMPECTOMY Right 2013    CHOLECYSTECTOMY  3/25/2024    Procedure: CHOLECYSTECTOMY;  Surgeon: Bo Farmer MD;  Location: [facility] OR [address] [place] [address];  Service: General;;    DEBULKING OF TUMOR N/A 3/25/2024    Procedure: DEBULKING, NEOPLASM;  Surgeon: Александр Washington MD;  Location: [facility] OR [address] [place] [address];  Service: OB/GYN;  Laterality: N/A;    EXCISION, LIVER N/A 3/25/2024    Procedure: EXCISION, LIVER - partial;  Surgeon: Bo Farmer MD;  Location: [facility] OR [address] [place] [address];  Service: General;  Laterality: N/A;  bk ultrasound  [place] book by TA on 3-21-24  7:00 a.m.  Conf #  [id number]    EYE SURGERY      LAPAROTOMY, EXPLORATORY N/A 3/25/2024    Procedure: LAPAROTOMY, EXPLORATORY;  Surgeon: Александр Washington MD;  Location: [facility] OR [address] [place] [address];  Service: OB/GYN;  Laterality: N/A;    OMENTECTOMY N/A 3/25/2024    Procedure: OMENTECTOMY;  Surgeon: Александр Washington MD;  Location: [facility] OR [address] [place] [address];  Service: OB/GYN;  Laterality: N/A;    PERITONEOCENTESIS N/A 3/13/2024    Procedure: PARACENTESIS, ABDOMINAL;  Surgeon: Flavia Moore MD;  Location: [facility] CATH LAB;  Service: Radiology;  Laterality: N/A;    PERITONEOCENTESIS N/A 3/21/2024    Procedure: PARACENTESIS, ABDOMINAL;  Surgeon: Jorge Jolley MD;  Location: [facility] CATH LAB;  Service: Radiology;  Laterality: N/A;    PERITONEOCENTESIS N/A 6/10/2024    Procedure: PARACENTESIS, ABDOMINAL;  Surgeon: Rogelio Malave MD;   Location: [facility] CATH LAB;  Service: Radiology;  Laterality: N/A;    TOTAL ABDOMINAL HYSTERECTOMY N/A 3/25/2024    Procedure: HYSTERECTOMY, TOTAL, ABDOMINAL;  Surgeon: Александр Washington MD;  Location: [facility] OR [address] [place] [address];  Service: OB/GYN;  Laterality: N/A;    TOTAL REDUCTION MAMMOPLASTY Bilateral 2016       Review of patient's allergies indicates:   Allergen Reactions    Codeine Other (See Comments)     Flu like s/s    Tramadol Other (See Comments)     Flu like symptoms similar to codeine reaction       Family History       Problem Relation (Age of Onset)    Breast cancer Sister (48)    Colon cancer Sister    Hypertension Mother, Brother, Brother    Seizures Father          Tobacco Use    Smoking status: Never    Smokeless tobacco: Never   Substance and Sexual Activity    Alcohol use: Yes     Alcohol/week: 0.0 standard drinks of alcohol     Comment: ocassional    Drug use: No    Sexual activity: Not Currently     Partners: Male     Birth control/protection: None         Review of Systems   Constitutional:  Positive for fatigue. Negative for fever.   Respiratory:  Positive for cough and shortness of breath.    Gastrointestinal:  Positive for diarrhea, nausea and vomiting. Negative for constipation.   Genitourinary:  Negative for dysuria and flank pain.   Skin:  Negative for color change and wound.   Neurological:  Negative for seizures and syncope.   Psychiatric/Behavioral:  Negative for agitation and confusion.      Objective:     Vital Signs (Most Recent):  Temp: 98 °F (36.7 °C) (07/04/24 1133)  Pulse: 91 (07/04/24 1133)  Resp: 18 (07/04/24 1133)  BP: (!) 170/93 (07/04/24 1133)  SpO2: 99 % (07/04/24 1133) Vital Signs (24h Range):  Temp:  [98 °F (36.7 °C)-98.6 °F (37 °C)] 98 °F (36.7 °C)  Pulse:  [85-99] 91  Resp:  [14-23] 18  SpO2:  [96 %-100 %] 99 %  BP: (118-182)/(60-93) 170/93     Weight: 86.9 kg (191 lb 9.3 oz)  Body mass index is 30.01 kg/m².      Intake/Output Summary (Last 24 hours) at 7/4/2024 1304  Last  data filed at 7/4/2024 0935  Gross per 24 hour   Intake 340 ml   Output 895 ml   Net -555 ml        Physical Exam  Vitals and nursing note reviewed.   Constitutional:       General: She is not in acute distress.     Appearance: She is not toxic-appearing.   HENT:      Head: Normocephalic and atraumatic.      Nose: Nose normal.      Mouth/Throat:      Mouth: Mucous membranes are moist.   Eyes:      General: No scleral icterus.     Extraocular Movements: Extraocular movements intact.   Cardiovascular:      Rate and Rhythm: Normal rate and regular rhythm.      Heart sounds: Normal heart sounds.   Pulmonary:      Effort: Pulmonary effort is normal.      Comments: Right chest tube with around 700cc of fluid in container.  Abdominal:      General: There is no distension.      Palpations: Abdomen is soft.      Tenderness: There is no abdominal tenderness.   Musculoskeletal:      Right lower leg: No edema.      Left lower leg: No edema.   Skin:     General: Skin is warm and dry.   Neurological:      General: No focal deficit present.      Mental Status: She is alert and oriented to person, place, and time.   Psychiatric:         Mood and Affect: Mood normal.         Behavior: Behavior normal.          Vents:       Lines/Drains/Airways       Central Venous Catheter Line  Duration             Port A Cath Single Lumen Subclavian Left -- days              Drain  Duration                  Chest Tube 07/02/24 1855 Tube - 1 Right Pleural 14 Fr. 1 day    Female External Urinary Catheter w/ Suction 07/03/24 1 day              Peripheral Intravenous Line  Duration                  Peripheral IV - Single Lumen 07/01/24 0032 20 G Left Upper Arm 3 days                    Significant Labs:    CBC/Anemia Profile:  Recent Labs   Lab 07/03/24  0331 07/03/24  1046 07/04/24  0335   WBC 15.79* 14.28* 14.82*   HGB 6.2* 7.7* 8.1*   HCT 20.0* 22.8* 25.9*    446 506*   MCV 84 81* 84   RDW 20.6* 19.5* 19.5*        Chemistries:  Recent Labs    Lab 07/03/24  0949 07/03/24  1046 07/04/24  0335   * 134* 135*   K 3.7 3.6 3.9    104 106   CO2 19* 20* 20*   BUN 65* 67* 55*   CREATININE 2.9* 2.9* 2.0*   CALCIUM 8.1* 8.2* 8.3*   ALBUMIN 1.8* 1.7* 1.7*   PROT 6.1 6.1 6.0   BILITOT 0.4 0.4 0.4   ALKPHOS 122 131 119   ALT 7* 5* 6*   AST 26 24 22   MG 1.8 1.8 2.0   PHOS 2.9 3.1 2.9       All pertinent labs within the past 24 hours have been reviewed.    Significant Imaging:   I have reviewed all pertinent imaging results/findings within the past 24 hours.    X-Ray Chest 1 View  Narrative: EXAMINATION:  XR CHEST 1 VIEW    CLINICAL HISTORY:  chest tube;    TECHNIQUE:  Single frontal view of the chest was performed.    COMPARISON:  07/03/2024.    FINDINGS:  There is a right-sided pigtail chest tube overlying the right lung base.  There is no visible pneumothorax.  There is a small loculated right pleural effusion, stable.  There is a left sided port which appears accessed.  There are surgical clips.  Continued mild opacities noted in the right lung, stable.  The left lung is clear.  There is no new focal consolidation.  The cardiac silhouette is unremarkable.  There are calcifications of the aortic arch.  Osseous structures are intact.  Impression: No significant detrimental change from prior.    Electronically signed by: Hany Parker  Date:    07/04/2024  Time:    11:05      Assessment/Plan:     Pulmonary  Empyema  Patient with right sided empyema per thoracentesis on 7/2, s/p chest tube placement on 7/3. Drained purulent fluid, now with minimal drainage. Cultures growing ESBL E.coli.     - Will follow up CT chest for today and discuss with thoracic surgery next steps (VATS vs. Lytic therapy)  - Continue antibiotics, chest tube to suction          Thank you for your consult. I will follow-up with patient. Please contact us if you have any additional questions.     Mirza Quintana MD  Pulmonology  [facility] - Telemetry Stepdown

## 2024-07-04 NOTE — PT/OT/SLP EVAL
Physical Therapy Evaluation    Patient Name:  Anette Ricci   MRN:  [id number]    Recommendations:     Discharge Recommendations: No Therapy Indicated   Discharge Equipment Recommendations: none   Barriers to discharge: None    Assessment:     Anette Ricci is a 58 y.o. female admitted with a medical diagnosis of Diabetic ketoacidosis without coma associated with type 2 diabetes mellitus.  She presents with the following impairments/functional limitations: weakness, impaired self care skills, impaired endurance, impaired functional mobility, pain, gait instability.  Pt tolerated session well. Pt transferred to BSC and across room to bedside chair. Further mobility limited by chest tube suction length. Will progress mobility once suction discontinued.     Rehab Prognosis: Good; patient would benefit from acute skilled PT services to address these deficits and reach maximum level of function.    Recent Surgery: * No surgery found *      Plan:     During this hospitalization, patient to be seen 3 x/week to address the identified rehab impairments via gait training, therapeutic activities, therapeutic exercises, neuromuscular re-education and progress toward the following goals:    Plan of Care Expires:  07/25/24    Subjective     Chief Complaint: Pain at chest tube site  Patient/Family Comments/goals: Pt requesting to use BSC for BM  Pain/Comfort:  Pain Rating 1:  (unrated)  Location 1:  (chest tube site)  Pain Addressed 1: Reposition, Distraction  Pain Rating Post-Intervention 1:  (unrated)    Patients cultural, spiritual, Sabianist conflicts given the current situation: no    Living Environment:  Pt lives with  in [facility] with threshold to enter.   Prior to admission, patients level of function was independent.  Equipment used at home: none.  DME owned (not currently used): rolling walker.  Upon discharge, patient will have assistance from .    Objective:     Communicated with nurse prior to  session.  Patient found supine with blood pressure cuff, chest tube, PureWick, peripheral IV  upon PT entry to room.    General Precautions: Standard, fall, special contact  Orthopedic Precautions:N/A   Braces: N/A  Respiratory Status: Room air    Exams:  Cognitive Exam:  Patient is oriented to Person, Place, Time, and Situation  Sensation:    -       Intact  RLE ROM: WFL  RLE Strength: WFL  LLE ROM: WFL  LLE Strength: WFL    Functional Mobility:  Bed Mobility:     Supine to Sit: moderate assistance  Transfers:     Sit to Stand:  contact guard assistance with rolling walker  Bed to Chair: contact guard assistance with  rolling walker  using  Step Transfer  Toilet Transfer: contact guard assistance with rolling walker using  Step Transfer  Gait: ~25ft in room using RW with CGA  No LoB or SoB    AM-PAC 6 CLICK MOBILITY  Total Score:17       Treatment & Education:  Pt completed toileting and pericare in standing with CGA.    Patient educated on role of therapy, goals of session, and benefits of mobilizing.   Patient educated on importance/benefits of sitting up in chair.  Discussed PT plan of care during hospitalization.   All questions answered within PT scope of practice.     Patient left up in chair with all lines intact, call button in reach, nurse notified, and  present.    GOALS:   Multidisciplinary Problems       Physical Therapy Goals          Problem: Physical Therapy    Goal Priority Disciplines Outcome Goal Variances Interventions   Physical Therapy Goal     PT, PT/OT Progressing     Description: Goals to be met by: 2024     Patient will increase functional independence with mobility by performin. Supine to sit with Modified [place]  2. Sit to stand transfer with Supervision  3. Bed to chair transfer with Supervision using LRAD  4. Gait  x 100 feet with Stand-by Assistance using LRAD.                          History:     Past Medical History:   Diagnosis Date    Breast cancer      Diabetes mellitus     Genetic testing 05/29/2013    VUS    Hyperlipidemia     Hypertension     Malignant neoplasm of upper-outer quadrant of right breast in female, estrogen receptor positive 12/02/2015    Seizure disorder        Past Surgical History:   Procedure Laterality Date    BILATERAL SALPINGO-OOPHORECTOMY (BSO) N/A 3/25/2024    Procedure: SALPINGO-OOPHORECTOMY, BILATERAL;  Surgeon: Александр Washington MD;  Location: [facility] OR [address] [place] [address];  Service: OB/GYN;  Laterality: N/A;    BREAST BIOPSY      BREAST LUMPECTOMY Right 2013    CHOLECYSTECTOMY  3/25/2024    Procedure: CHOLECYSTECTOMY;  Surgeon: Bo Farmer MD;  Location: [facility] OR [address] [place] [address];  Service: General;;    DEBULKING OF TUMOR N/A 3/25/2024    Procedure: DEBULKING, NEOPLASM;  Surgeon: Александр Washington MD;  Location: [facility] OR [address] [place] [address];  Service: OB/GYN;  Laterality: N/A;    EXCISION, LIVER N/A 3/25/2024    Procedure: EXCISION, LIVER - partial;  Surgeon: Bo Farmer MD;  Location: [facility] OR [address] [place] [address];  Service: General;  Laterality: N/A;  bk ultrasound  [place] book by TA on 3-21-24  7:00 a.m.  Conf #  [id number]    EYE SURGERY      LAPAROTOMY, EXPLORATORY N/A 3/25/2024    Procedure: LAPAROTOMY, EXPLORATORY;  Surgeon: Александр Washington MD;  Location: [facility] OR [address] [place] [address];  Service: OB/GYN;  Laterality: N/A;    OMENTECTOMY N/A 3/25/2024    Procedure: OMENTECTOMY;  Surgeon: Александр Washington MD;  [facility];  Service: OB/GYN;  Laterality: N/A;    PERITONEOCENTESIS N/A 3/13/2024    Procedure: PARACENTESIS, ABDOMINAL;  Surgeon: Flavia Moore MD;  Location: [facility] CATH LAB;  Service: Radiology;  Laterality: N/A;    PERITONEOCENTESIS N/A 3/21/2024    Procedure: PARACENTESIS, ABDOMINAL;  Surgeon: Jorge Jolley MD;  Location: [facility] CATH LAB;  Service: Radiology;  Laterality: N/A;    PERITONEOCENTESIS N/A 6/10/2024    Procedure: PARACENTESIS, ABDOMINAL;  Surgeon: Rogelio Malave MD;  Location: [facility] CATH LAB;  Service: Radiology;   Laterality: N/A;    TOTAL ABDOMINAL HYSTERECTOMY N/A 3/25/2024    Procedure: HYSTERECTOMY, TOTAL, ABDOMINAL;  Surgeon: Александр Washington MD;  Location: [facility] OR [address] [place] [address];  Service: OB/GYN;  Laterality: N/A;    TOTAL REDUCTION MAMMOPLASTY Bilateral 2016       Time Tracking:     PT Received On: 07/04/24  PT Start Time: 0836     PT Stop Time: 0850  PT Start Time: 0905    PT Stop Time: 0924  PT Total Time (min): 33 min     Billable Minutes: Evaluation 10, Gait Training 10, and Therapeutic Activity 13      07/04/2024

## 2024-07-04 NOTE — ASSESSMENT & PLAN NOTE
Patient with right sided empyema per thoracentesis on 7/2, s/p chest tube placement on 7/3. Drained purulent fluid, now with minimal drainage. Cultures growing ESBL E.coli.     - Will follow up CT chest for today and discuss with thoracic surgery next steps (VATS vs. Lytic therapy)  - Continue antibiotics, chest tube to suction

## 2024-07-04 NOTE — ASSESSMENT & PLAN NOTE
58F w/ stage 4 ovarian cancer with R pleural effusion presumed malignant who was admitted to MICU for management of DKA. Now s/p Chest ube placement on 7/2.    - Leave chest tube to suction today  - Follow up chest CT ordered to evaluate for un drained loculations   - Based on the significant improvement demonstrated on her CXR, likely she will not require lytic therapy  - If no need for lytics, can transition the chest tube to water seal and remove the tube when output is minimal  - Rest of care per primary  - Will follow to reassess new imaging

## 2024-07-04 NOTE — PROGRESS NOTES
[facility] - Telemetry [facility] Medicine  Progress Note    Patient Name: Anette Ricci  MRN: [id number]  Patient Class: IP- Inpatient   Admission Date: 6/30/2024  Length of Stay: 3 days  Attending Physician: Tia Wallace MD  Primary Care Provider: Mark Chua MD        Subjective:     Principal Problem:Diabetic ketoacidosis without coma associated with type 2 diabetes mellitus        HPI:  57 yo F PMH diabetes, hyperlipidemia, hypertension, chronic pulmonary embolism stage 4 ovarian ca (3/2024) s/p debulking, omentectomy, partial hepatectomy on chemotherapy (carboplatin paclitaxel bevacizumab) admitted for SOB 2/2 pleural effusion with a recent       Patient had a recent 7 day hospital stay (admitted 06/20) in the setting of malignant pleural effusion (not amenable for drainage per IR).  She was discharged on 06/27.  During hospitalization she was treated with Zosyn for 6 days due to recurrent fevers but infectious workup was negative      On admission:   Pulse 100 saturating 99%, afebrile, blood pressure 88/48.  CBC notable for marked leukocytosis 20,000 hemoglobin of 7.4 (hematocrit 23) with increase in granulocyte percentage, RDW 19.9.  CMP notable for a sodium of 129 CO2 14, anion gap 21, BUN 76 creatinine 4.6 GFR 10,  glucose of 403 alk-phos 170, normal AST ALT, uric acid of 9.3    Imaging notable for:  CT scan indicates significant pleural fluid accumulation at the right lung base with suspected lung consolidation and mediastinal shift. The Chest X-ray confirms worsening opacity in the right hemithorax consistent with pleural fluid, along with parenchymal changes suggesting atelectasis and possible infiltrates. There are no signs of pneumothorax, but mild changes are noted in the left lung base.    In the ED received vanc and Zosyn and insulin pending.          Overview/Hospital Course:  Admitted to ICU for DKA, started on insulin drip. Acidosis resolved and transitioned to subq insulin.  Stable for step down.   Thoracentesis 07/02/24; drained 800 ml cloudy (yellow cloudy/chylous looking fluid vs purulence). Cytology and cultures sent. Chest tube placed 7/3. Thoracic surgery consulted for recommendations on lytic therapy.   Pleural fluid with E coli. ID consulted. Cont meropenem per ID recs.   Chest tube to suction Pulm and CTS following. Repeat CT chest Small right hydropneumothorax with a pigtail chest tube in place. Also showing new right hydronephrosis, repeat CT abd pelvis ordered.     Interval History: Patient reports breathing improved. CT in place with 195 purulent cloudy outpt.   BG 80s, scheduled insulin decreased.     Review of Systems   Constitutional:  Negative for fever.   Respiratory:  Negative for shortness of breath.    Cardiovascular:  Negative for leg swelling.   Gastrointestinal:  Negative for abdominal pain.     Objective:     Vital Signs (Most Recent):  Temp: 98 °F (36.7 °C) (07/04/24 1133)  Pulse: 91 (07/04/24 1517)  Resp: 18 (07/04/24 1133)  BP: (!) 170/93 (07/04/24 1133)  SpO2: 99 % (07/04/24 1133) Vital Signs (24h Range):  Temp:  [98 °F (36.7 °C)-98.6 °F (37 °C)] 98 °F (36.7 °C)  Pulse:  [85-99] 91  Resp:  [14-23] 18  SpO2:  [96 %-100 %] 99 %  BP: (118-172)/(60-93) 170/93     Weight: 86.9 kg (191 lb 9.3 oz)  Body mass index is 30.01 kg/m².    Intake/Output Summary (Last 24 hours) at 7/4/2024 1645  Last data filed at 7/4/2024 1458  Gross per 24 hour   Intake 580 ml   Output 1695 ml   Net -1115 ml         Physical Exam  Vitals and nursing note reviewed.   Constitutional:       General: She is not in acute distress.     Appearance: Normal appearance.   HENT:      Head: Normocephalic and atraumatic.      Nose: Nose normal.      Mouth/Throat:      Mouth: Mucous membranes are moist.      Pharynx: Oropharynx is clear.   Eyes:      Extraocular Movements: Extraocular movements intact.      Conjunctiva/sclera: Conjunctivae normal.      Pupils: Pupils are equal, round, and reactive to  light.   Cardiovascular:      Rate and Rhythm: Normal rate and regular rhythm.      Pulses: Normal pulses.      Heart sounds: Normal heart sounds.   Pulmonary:      Effort: Pulmonary effort is normal.      Comments: Decreased breath sounds R base, CT in place draining purulent output  Abdominal:      General: Abdomen is flat. Bowel sounds are normal.      Palpations: Abdomen is soft.   Musculoskeletal:         General: Normal range of motion.      Cervical back: Normal range of motion and neck supple.   Skin:     General: Skin is warm and dry.   Neurological:      General: No focal deficit present.      Mental Status: She is alert and oriented to person, place, and time.   Psychiatric:         Mood and Affect: Mood normal.         Behavior: Behavior normal.             Significant Labs: All pertinent labs within the past 24 hours have been reviewed.    Significant Imaging: I have reviewed all pertinent imaging results/findings within the past 24 hours.    Assessment/Plan:      * Diabetic ketoacidosis without coma associated with type 2 diabetes mellitus  - ICU on insulin drip, gap now closed and acidosis improving, transitioned to subq insulin  - monitor BG and adjust insulin as needed      Empyema  - Ecoli empyema s/p thoracentesis and chest tube placement  - cont meropenem per ID recs  - CT in place to suction- CTS and pulm following for management and lytic recommendations       High anion gap metabolic acidosis  - gap closed, acidosis improving       Chronic pulmonary embolism without acute cor pulmonale  - previously on AC, held for decreasing Hgb and vaginal bleeding   - cont heparin ppx      Pleural effusion  Patient found to have moderate pleural effusion on imaging. I have personally reviewed and interpreted the following imaging: Xray and CT. A thoracentesis was performed. Pleural fluid was sent for analysis. Exudative consistent with empyema   - Cytology in process  - empyema Ecoli growing. ID recommend  cont meropenem   Management to include chest tube  - CT in place to suction- pulm and CTS following and managing CT- may need lytic therapy   - repeat CT done - Small right hydropneumothorax with a pigtail chest tube in place.       S/p CARLOS ALBERTO/BSO/OMX/Debulking/Partial liver resection/Cholecystectomy  - noted      JASON (acute kidney injury)  Patient with acute kidney injury/acute renal failure likely due to  Suspect a component of ANNE from previous admission playing a role, but also volume depletion from DKA and osmotic diuresis from glucosuria.    JASON is currently improving. Baseline creatinine  0.8  - Labs reviewed- Renal function/electrolytes with Estimated Creatinine Clearance: 34.3 mL/min (A) (based on SCr of 2 mg/dL (H)). according to latest data. Monitor urine output and serial BMP and adjust therapy as needed. Avoid nephrotoxins and renally dose meds for GFR listed above.  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 Will Continue anti-depressant medications. We will not consult psychiatry at this time. Patient does not display psychosis at this time. Continue to monitor closely and adjust plan of care as needed.        Hypertension  Chronic, controlled. Latest blood pressure and vitals reviewed-     Temp:  [98 °F (36.7 °C)-98.6 °F (37 °C)]   Pulse:  [85-99]   Resp:  [14-23]   BP: (118-170)/(60-93)   SpO2:  [96 %-100 %] .   Home meds for hypertension were reviewed and noted below.    Hypertension Medications               amlodipine (NORVASC) 10 MG tablet Take 10 mg by mouth once daily.     enalapril (VASOTEC) 20 MG tablet Take 20 mg by mouth once daily.    hydrochlorothiazide (HYDRODIURIL) 25 MG tablet Take 25 mg by mouth once daily.             While in the hospital, will manage blood pressure as follows; Continue home antihypertensive regimen    Will utilize p.r.n. blood pressure medication only if patient's blood pressure greater than 180/110 and she develops symptoms such as worsening chest pain or shortness of breath.    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  Last A1c reviewed-   Lab Results   Component Value Date    HGBA1C 7.3 (H) 03/19/2024     Most recent fingerstick glucose reviewed-   Recent Labs   Lab 07/03/24  2138 07/04/24  0818 07/04/24  1132 07/04/24  1646   POCTGLUCOSE 158* 89 179* 111*     Current correctional scale  Low  Maintain anti-hyperglycemic dose as follows-   Antihyperglycemics (From admission, onward)      Start     Stop Route Frequency Ordered    07/04/24 0945  insulin glargine U-100 (Lantus) pen 9 Units         -- SubQ Daily 07/04/24 0936    07/03/24 0930  insulin aspart U-100 pen 0-10 Units         -- SubQ Before meals & nightly PRN 07/03/24 0931    07/02/24 1215  insulin aspart U-100 pen 3 Units         -- SubQ 3 times daily with meals 07/02/24 1114          Hold Oral hypoglycemics while patient is in the hospital.      VTE Risk Mitigation (From admission, onward)           Ordered     heparin (porcine) injection 5,000 Units  Every 8 hours         07/03/24 0931     IP VTE HIGH RISK PATIENT  Once         07/01/24 0540     Place sequential compression device  Until discontinued         07/01/24 0540                    Discharge Planning   ROS: 7/8/2024     Code Status: Full Code   Is the patient medically ready for discharge?:     Reason for patient  still in hospital (select all that apply): Patient trending condition and Consult recommendations  Discharge Plan A: Home with family, Home Health (Current with STAT )   Discharge Delays: None known at this time              Tia Wallace MD  Department of Hospital Medicine   [facility] - Telemetry Stepdown

## 2024-07-04 NOTE — PROGRESS NOTES
Progress Note  Gynecology Oncology    Admit Date: 2024  LOS: 4    Reason for Admission:  Diabetic ketoacidosis without coma associated with type 2 diabetes mellitus    SUBJECTIVE:     Anette Ricci is a 58 y.o.  with Stage IVB ovarian carcinosarcoma s/p PDS on 3/25/24 with Dr Washington (CARLOS ALBERTO/BSO/OMX/liver mobilization/peritoneal & R diaphragm stripping/hepatic wedge resection/cholecystectomy) s/p C3 of carbo/taxol/bevacizumab on . Admitted to MICU  for the management of DKA (resolved), JASON (improved), and symptomatic malignant pleural effusion vs empyema, s/p thoracentesis and chest tube placement.     Patient started experiencing vaginal spotting yesterday afternoon, she describes as similar to previous admission where she notes pink mucous when wiping after using the toilet. No continuous bleeding during the day, she has not used any pads. Her abdominal pain is well controlled, Denies fevers/chills, nausea/vomiting, denies SOB of chest pain. She is passing flatus, last BM 2 days ago. Denies dysuria or pelvic discomfort.    OBJECTIVE:     Vital Signs   Temp:  [98 °F (36.7 °C)-99 °F (37.2 °C)] 99 °F (37.2 °C)  Pulse:  [85-93] 88  Resp:  [17-18] 18  SpO2:  [91 %-100 %] 99 %  BP: (104-170)/(51-93) 117/71      Intake/Output Summary (Last 24 hours) at 2024 0554  Last data filed at 2024 0551  Gross per 24 hour   Intake 720 ml   Output 1190 ml   Net -470 ml       Physical Exam:  Gen: Pt alert, A&Ox4  CV: RRR, normal heart sounds and intact distal pulses.  No edema on exam.   Pulm: Lung sounds reduced at right base although stable from yesterday. No stridor. No respiratory distress, normal respiratory effort.   Abd: Mild-moderate distention although non-tender to palpation without rebound or guarding. Firm mass noted on left mid-abdomen which is nontender to palpation. Bandage removed from superior aspect of midline vertical incision, subcutaneous tissue intact although pigmentation  diminished. No erythema, induration, or discharge.   Back: Chest tube in place with surrounding bandage draining serous fluid.   Ext: PPP, no peripheral edema, SCDs in place  : Pure wic in place draining clear yellow urine. No blood on current pad.  Skin: Skin is warm and dry.     Chest tube output: 100 cc (12h)    Laboratory:  Recent Labs   Lab 24  0331 24  1046 24  0335   WBC 15.79* 14.28* 14.82*   HGB 6.2* 7.7* 8.1*   HCT 20.0* 22.8* 25.9*   MCV 84 81* 84    446 506*       Recent Labs   Lab 24  0949 24  1046 24  0335   * 134* 135*   K 3.7 3.6 3.9    104 106   CO2 19* 20* 20*   BUN 65* 67* 55*   CREATININE 2.9* 2.9* 2.0*   * 132* 76   PROT 6.1 6.1 6.0   BILITOT 0.4 0.4 0.4   ALKPHOS 122 131 119   ALT 7* 5* 6*   AST 26 24 22   MG 1.8 1.8 2.0   PHOS 2.9 3.1 2.9       Blood Sugars (AccuCheck): 130-180      ASSESSMENT/PLAN:       Assessment: Anette Ricci is a 58 y.o.  with Stage IVB ovarian carcinosarcoma s/p PDS on 3/25/24 with Dr Washington (CARLOS ALBERTO/BSO/OMX/liver mobilization/peritoneal & R diaphragm stripping/hepatic wedge resection/cholecystectomy) s/p C3 of carbo/taxol/bevacizumab on . Admitted to MICU  for the management of DKA (resolved), JASON (improved), and symptomatic malignant pleural effusion vs empyema, s/p thoracentesis and chest tube placement ().      Pleural effusion (right)  - S/p right thoracentesis  (purulent) and chest tube placement on  (serosanguinous output)  - s/p vanc/zosyn. Continue IV Abx per ID recs: day 3 of meropenem   - CXR : continues to show stable improvement fo right pleural effusion.   - CT : Small right hydropneumothorax with a pigtail chest tube in place. Stable patchy ground-glass opacities in the anterior left upper lobe, suspicious for pneumonia.   Interval development of right sided hydronephrosis.   - Chest tube output continues to decrease. Chest tube management deferred to  Pulmonology and CT surgery.    Vaginal bleeding   - minimal, stable vaginal bleeding.   - Strict pad count   - Will follow up with AM CBC    JASON  - 2/2 dehydration and DKA     - Patient's baseline Cr 0.7-0.8     - Initial Cr on admit 4.6 >>> 2.1 yesterday, AM CMP pending     - Renal US remarkable for elevated intraparenchymal indices and 2cm mass adjacent to right kidney     - CT chest  with interval development of right sided hydronephrosis.     -  CT A/P ordered     - Nephrology following.     UTI  - asymptomatic  - Urine Cx with candida glabrata   - management by ID     Anemia     - s/p 1u pRBC with H/H 8. yesterday > AM CBC pending     Hx of PE (stable)  - currently on heparin drip      DKA     - Upon admission AG 21 > now closed, glucose 420, beta-hydroxybutyrate 4.0     - S/p insulin drip     - Overall resolved since      Diabetes Mellitus     - Current regimen lantus 9u, aspart 3u TIDWM, SSI as needed     - B-150 in 24h period     Stage IVB ovarian carcinosarcoma     - See onc history as above     - Doxorubicin C1 scheduled for , will most likely be delayed due to recent infection.      Hypertension  - Hold home norvasc, enalapril, HCTZ     Hyperlipidemia  - Hold home statin     Seizure disorder  - Continue home lamictal     Depression  - Continue home seroquel, ativan prmiracle Dave MD  Obstetrics and Gynecology- PGY2

## 2024-07-04 NOTE — NURSING
Nurses Note -- 4 Eyes      7/4/2024   2:54 AM      Skin assessed during: Transfer      [] No Altered Skin Integrity Present    []Prevention Measures Documented      [x] Yes- Altered Skin Integrity Present or Discovered   [] LDA Added if Not in Epic (Describe Wound)   [] New Altered Skin Integrity was Present on Admit and Documented in LDA   [] Wound Image Taken    Wound Care Consulted? No    Attending Nurse:  Blessing VINSON    Second RN/Staff Member:  ALISSON Marie

## 2024-07-04 NOTE — HPI
Per critical care H&P:  Anette Ricci is a 58 y.o.  with stage IVB ovarian carcinosarcoma s/p PDS on 3/25/24 with Dr Washington (CARLOS ALBERTO/BSO/OMX/liver mobilization/peritoneal & R diaphragm stripping/ hepatic wedge resection/cholecystectomy) on C3D32 of carbo/taxol/bevacizumab who presented to the ED with worsening SOB for 1 day. She also has a known right PE, persistent right pleural effusion, HTN, HLD, diabetes, anemia, and seizure disorder. She was recently admitted from - for management of this issue where IR reported inability to drain collection and was treated with multiple days of zosyn without any clear source of infection.      She reports that her shortness of breath was overall stable since discharge, however, worsened yesterday. She denies any fevers/chills, reports baseline nausea without vomiting. Reports her cough is unchanged from discharge. She denies sick contacts. She has been compliant with her medication since discharge, however, reports persistent hyperglycemia at home with sugars in the 300s-400s for the past 1-2 days.      She was admitted to Gyn- Oncology for pleural effusion and JASON but transferred to MICU the following day for DKA requiring insulin drip.    Thoracentesis was performed while in the MICU consistent with empyema, subsequently grew E.coli. A chest tube was placed on 7/3 and patient stepped down to hospital medicine.

## 2024-07-04 NOTE — SUBJECTIVE & OBJECTIVE
Interval History: NAEON. Patient resting comfortably this morning. On room air. Chest tube in place without air leak. Morning CXR with significantly decreased left pleural fluid.    Medications:  Continuous Infusions:  Scheduled Meds:   amLODIPine  10 mg Oral Daily    heparin (porcine)  5,000 Units Subcutaneous Q8H    insulin aspart U-100  3 Units Subcutaneous TIDWM    insulin glargine U-100  9 Units Subcutaneous Daily    lamoTRIgine  25 mg Oral BID    meropenem IV (PEDS and ADULTS)  1 g Intravenous Q12H    mupirocin   Nasal BID     PRN Meds:  Current Facility-Administered Medications:     0.9%  NaCl infusion (for blood administration), , Intravenous, Q24H PRN    acetaminophen, 650 mg, Oral, Q6H PRN    D10W, , Intravenous, PRN    D10W, , Intravenous, PRN    dextrose 10%, 12.5 g, Intravenous, PRN    dextrose 10%, 25 g, Intravenous, PRN    diphenhydrAMINE, 25 mg, Oral, Q6H PRN    glucagon (human recombinant), 1 mg, Intramuscular, PRN    glucose, 16 g, Oral, PRN    glucose, 24 g, Oral, PRN    HYDROmorphone, 0.2 mg, Intravenous, Q6H PRN    insulin aspart U-100, 0-10 Units, Subcutaneous, QID (AC + HS) PRN    ondansetron, 4 mg, Oral, Q12H PRN    QUEtiapine, 25 mg, Oral, Daily PRN    simethicone, 1 tablet, Oral, TID PRN    sodium chloride 0.9%, 10 mL, Intravenous, PRN     Review of patient's allergies indicates:   Allergen Reactions    Codeine Other (See Comments)     Flu like s/s    Tramadol Other (See Comments)     Flu like symptoms similar to codeine reaction     Objective:     Vital Signs (Most Recent):  Temp: 98.2 °F (36.8 °C) (07/04/24 0820)  Pulse: 91 (07/04/24 1118)  Resp: 18 (07/04/24 0820)  BP: (!) 163/81 (07/04/24 0820)  SpO2: 100 % (07/04/24 0820) Vital Signs (24h Range):  Temp:  [98.1 °F (36.7 °C)-98.6 °F (37 °C)] 98.2 °F (36.8 °C)  Pulse:  [85-99] 91  Resp:  [10-23] 18  SpO2:  [96 %-100 %] 100 %  BP: (118-182)/(60-90) 163/81     Intake/Output - Last 3 Shifts         07/02 0700  07/03 0659 07/03 0700  07/04  0659 07/04 0700 07/05 0659    P.O. 240 300 240    I.V. (mL/kg) 1737.9 (20) 62.2 (0.7)     Blood  350     IV Piggyback 343.3 197.7     Total Intake(mL/kg) 2321.2 (26.7) 909.9 (10.5) 240 (2.8)    Urine (mL/kg/hr) 810 (0.4) 1325 (0.6)     Other       Stool 0      Chest Tube 450 195     Total Output 1260 1520     Net +1061.2 -610.1 +240           Urine Occurrence  1 x 1 x    Stool Occurrence 0 x  1 x            SpO2: 100 %        Physical Exam  Vitals reviewed.   Constitutional:       Appearance: Normal appearance.   Cardiovascular:      Rate and Rhythm: Normal rate.      Pulses: Normal pulses.   Pulmonary:      Effort: Pulmonary effort is normal.      Comments: Right chest tube in place and to suction. No air leak. Roughly 600 mL since placement  Abdominal:      General: There is no distension.      Palpations: Abdomen is soft.   Skin:     General: Skin is warm and dry.   Neurological:      General: No focal deficit present.      Mental Status: She is alert and oriented to person, place, and time.            Significant Labs:  CBC:   Recent Labs   Lab 07/04/24  0335   WBC 14.82*   RBC 3.10*   HGB 8.1*   HCT 25.9*   *   MCV 84   MCH 26.1*   MCHC 31.3*     CMP:   Recent Labs   Lab 07/04/24 0335   GLU 76   CALCIUM 8.3*   ALBUMIN 1.7*   PROT 6.0   *   K 3.9   CO2 20*      BUN 55*   CREATININE 2.0*   ALKPHOS 119   ALT 6*   AST 22   BILITOT 0.4       Significant Diagnostics:  I have reviewed and interpreted all pertinent imaging results/findings within the past 24 hours.    VTE Risk Mitigation (From admission, onward)           Ordered     heparin (porcine) injection 5,000 Units  Every 8 hours         07/03/24 0931     IP VTE HIGH RISK PATIENT  Once         07/01/24 0540     Place sequential compression device  Until discontinued         07/01/24 0540

## 2024-07-04 NOTE — PLAN OF CARE
Patient stable, AOX4 , VSS. Chest tubes on right side on suction. Safety measures ensured.call light within reach.bed in low position. No distress at night.

## 2024-07-04 NOTE — SUBJECTIVE & OBJECTIVE
Interval History: Patient reports breathing improved. CT in place with 195 purulent cloudy outpt.   BG 80s, scheduled insulin decreased.     Review of Systems   Constitutional:  Negative for fever.   Respiratory:  Negative for shortness of breath.    Cardiovascular:  Negative for leg swelling.   Gastrointestinal:  Negative for abdominal pain.     Objective:     Vital Signs (Most Recent):  Temp: 98 °F (36.7 °C) (07/04/24 1133)  Pulse: 91 (07/04/24 1517)  Resp: 18 (07/04/24 1133)  BP: (!) 170/93 (07/04/24 1133)  SpO2: 99 % (07/04/24 1133) Vital Signs (24h Range):  Temp:  [98 °F (36.7 °C)-98.6 °F (37 °C)] 98 °F (36.7 °C)  Pulse:  [85-99] 91  Resp:  [14-23] 18  SpO2:  [96 %-100 %] 99 %  BP: (118-172)/(60-93) 170/93     Weight: 86.9 kg (191 lb 9.3 oz)  Body mass index is 30.01 kg/m².    Intake/Output Summary (Last 24 hours) at 7/4/2024 1645  Last data filed at 7/4/2024 1458  Gross per 24 hour   Intake 580 ml   Output 1695 ml   Net -1115 ml         Physical Exam  Vitals and nursing note reviewed.   Constitutional:       General: She is not in acute distress.     Appearance: Normal appearance.   HENT:      Head: Normocephalic and atraumatic.      Nose: Nose normal.      Mouth/Throat:      Mouth: Mucous membranes are moist.      Pharynx: Oropharynx is clear.   Eyes:      Extraocular Movements: Extraocular movements intact.      Conjunctiva/sclera: Conjunctivae normal.      Pupils: Pupils are equal, round, and reactive to light.   Cardiovascular:      Rate and Rhythm: Normal rate and regular rhythm.      Pulses: Normal pulses.      Heart sounds: Normal heart sounds.   Pulmonary:      Effort: Pulmonary effort is normal.      Comments: Decreased breath sounds R base, CT in place draining purulent output  Abdominal:      General: Abdomen is flat. Bowel sounds are normal.      Palpations: Abdomen is soft.   Musculoskeletal:         General: Normal range of motion.      Cervical back: Normal range of motion and neck supple.    Skin:     General: Skin is warm and dry.   Neurological:      General: No focal deficit present.      Mental Status: She is alert and oriented to person, place, and time.   Psychiatric:         Mood and Affect: Mood normal.         Behavior: Behavior normal.             Significant Labs: All pertinent labs within the past 24 hours have been reviewed.    Significant Imaging: I have reviewed all pertinent imaging results/findings within the past 24 hours.

## 2024-07-04 NOTE — ASSESSMENT & PLAN NOTE
- Ecoli empyema s/p thoracentesis and chest tube placement  - cont meropenem per ID recs  - CT in place to suction- CTS and pulm following for management and lytic recommendations

## 2024-07-04 NOTE — ASSESSMENT & PLAN NOTE
Patient has  depression which is unknown and is currently controlled. Will Continue anti-depressant medications. We will not consult psychiatry at this time. Patient does not display psychosis at this time. Continue to monitor closely and adjust plan of care as needed.

## 2024-07-04 NOTE — CARE UPDATE
Care Update  Gynecology Oncology      Resident at bedside for afternoon assessment.  Patient was sitting on the chair and reports doing well this afternoon. Cough improved, denies SOB, tolerating diet without N/V and had 1 small BM. Continues to void without difficulty with purewic.   She was seen by PT today and walked in the room, they recommended patient to be seen 3 x/week to address the identified rehab impairments via gait training,  she is going to have a walker to help her move in the room.       Vital Signs   Temp:  [98 °F (36.7 °C)-98.6 °F (37 °C)] 98 °F (36.7 °C)  Pulse:  [85-99] 91  Resp:  [14-23] 18  SpO2:  [96 %-100 %] 99 %  BP: (118-182)/(60-93) 170/93      Intake/Output Summary (Last 24 hours) at 7/4/2024 1307  Last data filed at 7/4/2024 0935  Gross per 24 hour   Intake 340 ml   Output 895 ml   Net -555 ml       Physical Exam:  Gen: Pt alert, A&Ox4  CV: RRR, normal heart sounds and intact distal pulses.  No edema on exam.   Pulm: Lung sounds reduced at right base although stable from yesterday. No stridor. No respiratory distress, normal respiratory effort.   Abd: Mild-moderate distention although non-tender to palpation without rebound or guarding. Firm mass noted on left mid-abdomen which is nontender to palpation. Bandage removed from superior aspect of midline vertical incision, subcutaneous tissue intact although pigmentation diminished. No erythema, induration, or discharge.   Back: Chest tube in place with surrounding bandage draining serous fluid.   Ext: PPP, no peripheral edema, SCDs in place  : Pure wic in place draining yellow urine   Skin: Skin is warm and dry.     Laboratory:  Recent Labs   Lab 07/03/24  0331 07/03/24  1046 07/04/24  0335   WBC 15.79* 14.28* 14.82*   HGB 6.2* 7.7* 8.1*   HCT 20.0* 22.8* 25.9*   MCV 84 81* 84    446 506*       Recent Labs   Lab 07/03/24  0949 07/03/24  1046 07/04/24  0335   * 134* 135*   K 3.7 3.6 3.9    104 106   CO2 19* 20* 20*    BUN 65* 67* 55*   CREATININE 2.9* 2.9* 2.0*   * 132* 76   PROT 6.1 6.1 6.0   BILITOT 0.4 0.4 0.4   ALKPHOS 122 131 119   ALT 7* 5* 6*   AST 26 24 22   MG 1.8 1.8 2.0   PHOS 2.9 3.1 2.9       Blood Sugars (AccuCheck):       ASSESSMENT/PLAN:       Assessment: Anette Ricci is a 58 y.o.  with Stage IVB ovarian carcinosarcoma s/p PDS on 3/25/24 with Dr Washington (CARLOS ALBERTO/BSO/OMX/liver mobilization/peritoneal & R diaphragm stripping/hepatic wedge resection/cholecystectomy) s/p C3 of carbo/taxol/bevacizumab on . Admitted to MICU  for the management of DKA (resolved), JASON (improved), and symptomatic malignant pleural effusion vs empyema, s/p thoracentesis and chest tube placement ().     Pleural effusion (right)  - S/p right thoracentesis  (purulent) and chest tube placement on  (serosanguinous)   - KOH prep: negative for yeast/fungus   - Gram stain: rare gram negative rods > Thoracentesis Cx grew E.coli   - AFB prelim result negative  - Fungal cultures pending  - CXR : improvement in right pleural effusion  - CXR : continues to show stable improvement.   - CT : Right lower lobe distal airway occlusion, loculated right pleural effusion with right hemithorax atelectasis right upper lobe findings concerning for infection v inflammation v edema.  - CT chest  pending.  - Chest tube output continues to decrease. Chest tube management deferred to Pulmonology and CT surgery.  - s/p vanc/zosyn. Continue IV Abx per ID recs: meropenem.      JASON  - 2/2 dehydration and DKA     - Patient's baseline Cr 0.7-0.8     - Initial Cr on admit 4.6 >>> 2.1 this AM     - Renal US remarkable for elevated intraparenchymal indices and 2cm mass adjacent to right kidney     - Strict I/Os, junior placed for further monitoring     - UOP 0.6 cc/kg/hr     - Nephrology following.    UTI  - asymptomatic  - Urine Cx with candida glabrata   - management by ID    Anemia     - s/p 1u pRBC with H/H   this AM    Hx of PE (stable)  - currently on heparin drip      DKA     - Upon admission AG 21 > now closed, glucose 420, beta-hydroxybutyrate 4.0     - S/p insulin drip     - Overall resolved since     Diabetes Mellitus     - Current regimen lantus 9u, aspart 3u TIDWM, SSI as needed     - B-150 in 24h period    Stage IVB ovarian carcinosarcoma     - See onc history as above     - Doxorubicin C1 scheduled for , will most likely be delayed due to recent infection.     Hypertension  - Hold home norvasc, enalapril, HCTZ    Hyperlipidemia  - Hold home statin    Seizure disorder  - Continue home lamictal    Depression  - Continue home seroquel, ativan prn     Dispo:  - continue care in the ICU

## 2024-07-04 NOTE — PROGRESS NOTES
Progress Note  Gynecology Oncology    Admit Date: 2024  LOS: 3    Reason for Admission:  Diabetic ketoacidosis without coma associated with type 2 diabetes mellitus    SUBJECTIVE:     Anette Ricci is a 58 y.o.  with Stage IVB ovarian carcinosarcoma s/p PDS on 3/25/24 with Dr Washington (CARLOS ALBERTO/BSO/OMX/liver mobilization/peritoneal & R diaphragm stripping/hepatic wedge resection/cholecystectomy) s/p C3 of carbo/taxol/bevacizumab on . Admitted to MICU  for the management of DKA (resolved), JASON (improved), and symptomatic malignant pleural effusion vs empyema, s/p thoracentesis and chest tube placement.     Overnight patient reports doing well. Cough is improved. Her abdominal pain is well controlled, Denies fevers/chills, nausea/vomiting, denies SOB of chest pain. She is passing flatus, last BM 2 days ago. Denies dysuria or pelvic discomfort.    OBJECTIVE:     Vital Signs   Temp:  [97.9 °F (36.6 °C)-98.8 °F (37.1 °C)] 98.1 °F (36.7 °C)  Pulse:  [85-99] 94  Resp:  [10-23] 18  SpO2:  [96 %-100 %] 96 %  BP: (118-182)/(60-90) 118/90      Intake/Output Summary (Last 24 hours) at 2024 0659  Last data filed at 2024 0546  Gross per 24 hour   Intake 909.94 ml   Output 1520 ml   Net -610.06 ml       Physical Exam:  Gen: Pt alert, A&Ox4, more alert than yesterday.  CV: RRR, normal heart sounds and intact distal pulses.  No edema on exam.   Pulm: Lung sounds reduced at right base although stable from yesterday. No stridor. No respiratory distress, normal respiratory effort.   Abd: Mild-moderate distention although non-tender to palpation without rebound or guarding. Firm mass noted on left mid-abdomen which is nontender to palpation. Bandage removed from superior aspect of midline vertical incision, subcutaneous tissue intact although pigmentation diminished. No erythema, induration, or discharge. Bandage replaced per patient preference.  Back: Chest tube in place with surrounding bandage draining  serous fluid.   Ext: PPP, no peripheral edema, SCDs in place  : Mccartney in place draining yellow urine   Skin: Skin is warm and dry.     Laboratory:  Recent Labs   Lab 24  0331 24  1046 24  0335   WBC 15.79* 14.28* 14.82*   HGB 6.2* 7.7* 8.1*   HCT 20.0* 22.8* 25.9*   MCV 84 81* 84    446 506*       Recent Labs   Lab 24  0949 24  1046 24  0335   * 134* 135*   K 3.7 3.6 3.9    104 106   CO2 19* 20* 20*   BUN 65* 67* 55*   CREATININE 2.9* 2.9* 2.0*   * 132* 76   PROT 6.1 6.1 6.0   BILITOT 0.4 0.4 0.4   ALKPHOS 122 131 119   ALT 7* 5* 6*   AST 26 24 22   MG 1.8 1.8 2.0   PHOS 2.9 3.1 2.9       Blood Sugars (AccuCheck):       ASSESSMENT/PLAN:       Assessment: Anette Ricci is a 58 y.o.  with Stage IVB ovarian carcinosarcoma s/p PDS on 3/25/24 with Dr Washington (CARLOS ALBERTO/BSO/OMX/liver mobilization/peritoneal & R diaphragm stripping/hepatic wedge resection/cholecystectomy) s/p C3 of carbo/taxol/bevacizumab on . Admitted to MICU  for the management of DKA (resolved), JASON (improved), and symptomatic malignant pleural effusion vs empyema, s/p thoracentesis and chest tube placement ().     Pleural effusion (right)  - S/p right thoracentesis  (purulent) and chest tube placement on  (serosanguinous)   - KOH prep: negative for yeast/fungus   - Gram stain: rare gram negative rods > Thoracentesis Cx grew E.coli   - AFB prelim result negative  - Fungal cultures pending  - CXR : improvement in right pleural effusion  - CT : Right lower lobe distal airway occlusion, loculated right pleural effusion with right hemithorax atelectasis right upper lobe findings concerning for infection v inflammation v edema   - s/p vanc/zosyn. Continue IV Abx per ID recs: meropenem.      JASON  - 2/2 dehydration and DKA     - Patient's baseline Cr 0.7-0.8     - Initial Cr on admit 4.6 >>> 2.1 this AM     - Renal US remarkable for elevated intraparenchymal  indices and 2cm mass adjacent to right kidney     - Strict I/Os, junior placed for further monitoring     - UOP 0.6 cc/kg/hr     - Nephrology recs: place junior catheter and discontinue mIVF.     UTI  - asymptomatic  - Urine Cx with candida glabrata   - Consider additional antifungal for glabrata UTI    Anemia     - s/p 1u pRBC with H/H 8. this AM    Hx of PE (stable)  - currently on heparin drip      DKA     - Upon admission AG 21, glucose 420, beta-hydroxybutyrate 4.0     - S/p insulin drip     - Overall resolved since     Diabetes Mellitus     - Home regimen: lantus 9u, novolog 6u TIDWM     - Current regimen lantus 15u, aspart 3u TIDWM, SSI as needed     - B-150 in 24h period    Stage IVB ovarian carcinosarcoma     - See onc history as above     - Doxorubicin C1 scheduled for , will most likely be delayed due to recent infection.     Hypertension  - Hold home norvasc, enalapril, HCTZ    Hyperlipidemia  - Hold home statin    Seizure disorder  - Continue home lamictal    Depression  - Continue home seroquel, ativan prn     Dispo:  - continue care in the ICU

## 2024-07-04 NOTE — ASSESSMENT & PLAN NOTE
- ICU on insulin drip, gap now closed and acidosis improving, transitioned to subq insulin  - monitor BG and adjust insulin as needed

## 2024-07-05 PROBLEM — N13.30 HYDROURETERONEPHROSIS: Status: ACTIVE | Noted: 2024-07-05

## 2024-07-05 PROBLEM — E83.42 HYPOMAGNESEMIA: Status: ACTIVE | Noted: 2024-07-05

## 2024-07-05 LAB
ALBUMIN SERPL BCP-MCNC: 1.6 G/DL (ref 3.5–5.2)
ALP SERPL-CCNC: 114 U/L (ref 55–135)
ALT SERPL W/O P-5'-P-CCNC: 6 U/L (ref 10–44)
ANION GAP SERPL CALC-SCNC: 8 MMOL/L (ref 8–16)
AST SERPL-CCNC: 24 U/L (ref 10–40)
BASOPHILS # BLD AUTO: 0.13 K/UL (ref 0–0.2)
BASOPHILS NFR BLD: 0.8 % (ref 0–1.9)
BILIRUB SERPL-MCNC: 0.5 MG/DL (ref 0.1–1)
BODY FLD TYPE: NORMAL
BUN SERPL-MCNC: 39 MG/DL (ref 6–20)
CALCIUM SERPL-MCNC: 8.4 MG/DL (ref 8.7–10.5)
CHLORIDE SERPL-SCNC: 106 MMOL/L (ref 95–110)
CHOLEST FLD-MCNC: 78 MG/DL
CO2 SERPL-SCNC: 22 MMOL/L (ref 23–29)
CREAT SERPL-MCNC: 1 MG/DL (ref 0.5–1.4)
DIFFERENTIAL METHOD BLD: ABNORMAL
EOSINOPHIL # BLD AUTO: 0.3 K/UL (ref 0–0.5)
EOSINOPHIL NFR BLD: 1.7 % (ref 0–8)
ERYTHROCYTE [DISTWIDTH] IN BLOOD BY AUTOMATED COUNT: 20.4 % (ref 11.5–14.5)
EST. GFR  (NO RACE VARIABLE): >60 ML/MIN/1.73 M^2
GLUCOSE SERPL-MCNC: 79 MG/DL (ref 70–110)
HCT VFR BLD AUTO: 23.5 % (ref 37–48.5)
HGB BLD-MCNC: 7.4 G/DL (ref 12–16)
IMM GRANULOCYTES # BLD AUTO: 0.48 K/UL (ref 0–0.04)
IMM GRANULOCYTES NFR BLD AUTO: 3.1 % (ref 0–0.5)
LYMPHOCYTES # BLD AUTO: 1.4 K/UL (ref 1–4.8)
LYMPHOCYTES NFR BLD: 8.6 % (ref 18–48)
MAGNESIUM SERPL-MCNC: 1.5 MG/DL (ref 1.6–2.6)
MCH RBC QN AUTO: 27.5 PG (ref 27–31)
MCHC RBC AUTO-ENTMCNC: 31.5 G/DL (ref 32–36)
MCV RBC AUTO: 87 FL (ref 82–98)
MONOCYTES # BLD AUTO: 1.1 K/UL (ref 0.3–1)
MONOCYTES NFR BLD: 7.2 % (ref 4–15)
NEUTROPHILS # BLD AUTO: 12.3 K/UL (ref 1.8–7.7)
NEUTROPHILS NFR BLD: 78.6 % (ref 38–73)
NRBC BLD-RTO: 0 /100 WBC
PATH INTERP FLD-IMP: NORMAL
PHOSPHATE SERPL-MCNC: 3 MG/DL (ref 2.7–4.5)
PLATELET # BLD AUTO: 414 K/UL (ref 150–450)
PMV BLD AUTO: 9.7 FL (ref 9.2–12.9)
POCT GLUCOSE: 135 MG/DL (ref 70–110)
POCT GLUCOSE: 185 MG/DL (ref 70–110)
POCT GLUCOSE: 246 MG/DL (ref 70–110)
POTASSIUM SERPL-SCNC: 4.4 MMOL/L (ref 3.5–5.1)
PROT SERPL-MCNC: 5.7 G/DL (ref 6–8.4)
RBC # BLD AUTO: 2.69 M/UL (ref 4–5.4)
SODIUM SERPL-SCNC: 136 MMOL/L (ref 136–145)
TRIGL FLD-MCNC: 96 MG/DL
WBC # BLD AUTO: 15.64 K/UL (ref 3.9–12.7)

## 2024-07-05 PROCEDURE — 99232 SBSQ HOSP IP/OBS MODERATE 35: CPT | Mod: HCNC,FS,, | Performed by: HOSPITALIST

## 2024-07-05 PROCEDURE — 97116 GAIT TRAINING THERAPY: CPT | Mod: HCNC,CQ

## 2024-07-05 PROCEDURE — 36415 COLL VENOUS BLD VENIPUNCTURE: CPT | Mod: HCNC | Performed by: NURSE PRACTITIONER

## 2024-07-05 PROCEDURE — 83735 ASSAY OF MAGNESIUM: CPT | Mod: HCNC | Performed by: NURSE PRACTITIONER

## 2024-07-05 PROCEDURE — 27000207 HC ISOLATION: Mod: HCNC

## 2024-07-05 PROCEDURE — 25000003 PHARM REV CODE 250: Mod: HCNC

## 2024-07-05 PROCEDURE — 84100 ASSAY OF PHOSPHORUS: CPT | Mod: HCNC | Performed by: NURSE PRACTITIONER

## 2024-07-05 PROCEDURE — 85025 COMPLETE CBC W/AUTO DIFF WBC: CPT | Mod: HCNC | Performed by: NURSE PRACTITIONER

## 2024-07-05 PROCEDURE — 99233 SBSQ HOSP IP/OBS HIGH 50: CPT | Mod: HCNC,,, | Performed by: OBSTETRICS & GYNECOLOGY

## 2024-07-05 PROCEDURE — 25000003 PHARM REV CODE 250: Mod: HCNC | Performed by: STUDENT IN AN ORGANIZED HEALTH CARE EDUCATION/TRAINING PROGRAM

## 2024-07-05 PROCEDURE — 63600175 PHARM REV CODE 636 W HCPCS: Mod: HCNC | Performed by: STUDENT IN AN ORGANIZED HEALTH CARE EDUCATION/TRAINING PROGRAM

## 2024-07-05 PROCEDURE — 97530 THERAPEUTIC ACTIVITIES: CPT | Mod: HCNC,CQ

## 2024-07-05 PROCEDURE — 80053 COMPREHEN METABOLIC PANEL: CPT | Mod: HCNC | Performed by: NURSE PRACTITIONER

## 2024-07-05 PROCEDURE — 63600175 PHARM REV CODE 636 W HCPCS: Mod: HCNC

## 2024-07-05 PROCEDURE — 20600001 HC STEP DOWN PRIVATE ROOM: Mod: HCNC

## 2024-07-05 RX ORDER — FUROSEMIDE 40 MG/1
40 TABLET ORAL DAILY
Status: DISCONTINUED | OUTPATIENT
Start: 2024-07-05 | End: 2024-07-10

## 2024-07-05 RX ORDER — MAGNESIUM SULFATE HEPTAHYDRATE 40 MG/ML
2 INJECTION, SOLUTION INTRAVENOUS ONCE
Status: COMPLETED | OUTPATIENT
Start: 2024-07-05 | End: 2024-07-05

## 2024-07-05 RX ORDER — AMOXICILLIN 250 MG
1 CAPSULE ORAL DAILY
Status: DISCONTINUED | OUTPATIENT
Start: 2024-07-05 | End: 2024-07-07

## 2024-07-05 RX ORDER — POLYETHYLENE GLYCOL 3350 17 G/17G
17 POWDER, FOR SOLUTION ORAL DAILY
Status: DISCONTINUED | OUTPATIENT
Start: 2024-07-05 | End: 2024-07-07

## 2024-07-05 RX ADMIN — HEPARIN SODIUM 5000 UNITS: 5000 INJECTION INTRAVENOUS; SUBCUTANEOUS at 03:07

## 2024-07-05 RX ADMIN — HEPARIN SODIUM 5000 UNITS: 5000 INJECTION INTRAVENOUS; SUBCUTANEOUS at 09:07

## 2024-07-05 RX ADMIN — HYDROMORPHONE HYDROCHLORIDE 0.2 MG: 1 INJECTION, SOLUTION INTRAMUSCULAR; INTRAVENOUS; SUBCUTANEOUS at 03:07

## 2024-07-05 RX ADMIN — LAMOTRIGINE 25 MG: 25 TABLET ORAL at 08:07

## 2024-07-05 RX ADMIN — INSULIN ASPART 3 UNITS: 100 INJECTION, SOLUTION INTRAVENOUS; SUBCUTANEOUS at 12:07

## 2024-07-05 RX ADMIN — GUAIFENESIN 600 MG: 600 TABLET, EXTENDED RELEASE ORAL at 08:07

## 2024-07-05 RX ADMIN — GUAIFENESIN 600 MG: 600 TABLET, EXTENDED RELEASE ORAL at 09:07

## 2024-07-05 RX ADMIN — MEROPENEM 1 G: 1 INJECTION INTRAVENOUS at 07:07

## 2024-07-05 RX ADMIN — HYDROMORPHONE HYDROCHLORIDE 0.2 MG: 1 INJECTION, SOLUTION INTRAMUSCULAR; INTRAVENOUS; SUBCUTANEOUS at 12:07

## 2024-07-05 RX ADMIN — MEROPENEM 1 G: 1 INJECTION INTRAVENOUS at 10:07

## 2024-07-05 RX ADMIN — SIMETHICONE 80 MG: 80 TABLET, CHEWABLE ORAL at 09:07

## 2024-07-05 RX ADMIN — INSULIN ASPART 4 UNITS: 100 INJECTION, SOLUTION INTRAVENOUS; SUBCUTANEOUS at 12:07

## 2024-07-05 RX ADMIN — LAMOTRIGINE 25 MG: 25 TABLET ORAL at 09:07

## 2024-07-05 RX ADMIN — HEPARIN SODIUM 5000 UNITS: 5000 INJECTION INTRAVENOUS; SUBCUTANEOUS at 05:07

## 2024-07-05 RX ADMIN — INSULIN GLARGINE 9 UNITS: 100 INJECTION, SOLUTION SUBCUTANEOUS at 09:07

## 2024-07-05 RX ADMIN — POLYETHYLENE GLYCOL 3350 17 G: 17 POWDER, FOR SOLUTION ORAL at 12:07

## 2024-07-05 RX ADMIN — MUPIROCIN: 20 OINTMENT TOPICAL at 08:07

## 2024-07-05 RX ADMIN — INSULIN ASPART 1 UNITS: 100 INJECTION, SOLUTION INTRAVENOUS; SUBCUTANEOUS at 09:07

## 2024-07-05 RX ADMIN — AMLODIPINE BESYLATE 10 MG: 10 TABLET ORAL at 08:07

## 2024-07-05 RX ADMIN — ATORVASTATIN CALCIUM 40 MG: 40 TABLET, FILM COATED ORAL at 08:07

## 2024-07-05 RX ADMIN — MAGNESIUM SULFATE HEPTAHYDRATE 2 G: 40 INJECTION, SOLUTION INTRAVENOUS at 12:07

## 2024-07-05 RX ADMIN — MUPIROCIN: 20 OINTMENT TOPICAL at 09:07

## 2024-07-05 RX ADMIN — FUROSEMIDE 40 MG: 40 TABLET ORAL at 07:07

## 2024-07-05 RX ADMIN — HYDROMORPHONE HYDROCHLORIDE 0.2 MG: 1 INJECTION, SOLUTION INTRAMUSCULAR; INTRAVENOUS; SUBCUTANEOUS at 09:07

## 2024-07-05 RX ADMIN — SENNOSIDES AND DOCUSATE SODIUM 1 TABLET: 50; 8.6 TABLET ORAL at 12:07

## 2024-07-05 RX ADMIN — SIMETHICONE 80 MG: 80 TABLET, CHEWABLE ORAL at 02:07

## 2024-07-05 NOTE — ASSESSMENT & PLAN NOTE
Patient found to have moderate pleural effusion on imaging. I have personally reviewed and interpreted the following imaging: Xray and CT. A thoracentesis was performed. Pleural fluid was sent for analysis. Exudative consistent with empyema   - Cytology in process  - empyema Ecoli growing. ID recommend cont meropenem   Management to include chest tube  - CT in place to suction- pulm and CTS following and managing CT- may need lytic therapy   - repeat CT done - Small right hydropneumothorax with a pigtail chest tube in place.   - pulm removed chest tube

## 2024-07-05 NOTE — PLAN OF CARE
Problem: Adult Inpatient Plan of Care  Goal: Plan of Care Review  Outcome: Progressing  Goal: Patient-Specific Goal (Individualized)  Outcome: Progressing  Goal: Absence of Hospital-Acquired Illness or Injury  Outcome: Progressing  Goal: Optimal Comfort and Wellbeing  Outcome: Progressing  Goal: Readiness for Transition of Care  Outcome: Progressing     Problem: Infection  Goal: Absence of Infection Signs and Symptoms  Outcome: Progressing     Problem: Skin Injury Risk Increased  Goal: Skin Health and Integrity  Outcome: Progressing     Problem: Diabetes Comorbidity  Goal: Blood Glucose Level Within Targeted Range  Outcome: Progressing     Problem: Acute Kidney Injury/Impairment  Goal: Fluid and Electrolyte Balance  Outcome: Progressing  Goal: Improved Oral Intake  Outcome: Progressing  Goal: Effective Renal Function  Outcome: Progressing     Problem: Fall Injury Risk  Goal: Absence of Fall and Fall-Related Injury  Outcome: Progressing

## 2024-07-05 NOTE — ASSESSMENT & PLAN NOTE
Patient with acute kidney injury/acute renal failure likely due to  Suspect a component of ANNE from previous admission playing a role, but also volume depletion from DKA and osmotic diuresis from glucosuria.    JASON is currently improving. Baseline creatinine  0.8  - Labs reviewed- Renal function/electrolytes with Estimated Creatinine Clearance: 68.6 mL/min (based on SCr of 1 mg/dL). according to latest data. Monitor urine output and serial BMP and adjust therapy as needed. Avoid nephrotoxins and renally dose meds for GFR listed above.  - renal following

## 2024-07-05 NOTE — ASSESSMENT & PLAN NOTE
Patient with right sided empyema per thoracentesis on 7/2, chest tube placed on 7/3. Drained purulent fluid, cultures showing ESBL E.coli. Chest tube pulled 7/5 after decreased output. Continued doses of meropenem administered.     Bedside US failed to show a pocket of fluid large enough to drain. Consider CTA Abd and Pelvis to evaluate for other sources of infection if necessary.     Will sign off on patient, but please do not hesitate to reach out with any questions or concerns.

## 2024-07-05 NOTE — ASSESSMENT & PLAN NOTE
JASON on Normal Renal Fx  -Baseline SCr 0.8.  57 yo female with recent admission from 6/20-6/27 for SOB 2/2 malignant effusion who presents on 6/30 for N/V/SOB on 7/1, found to be in DKA with JASON with SCr of 4.6.  I/O's not accurately recorded since admission, stepped up to ICU on 7/1 for higher level of care.  Multiple hypotensive episodes recorded.  Discharged on 6/27 during last admission, on that date had an JASON with SCr of 1.5, suspicious for ANNE as she had a CT with contrast 6/24 and also receiving Ibuprofen and ACEi.    Suspect a component of ANNE from previous admission playing a role, but also volume depletion from DKA and osmotic diuresis from glucosuria.    May have developed an iATN from hypotensive events.  Home meds including Enalapril, HCTZ, Ibuprofen PRN, Metformin    Mohsen 17  7/1:  POCUS @ bedside with collapsible IJ > 50%  Renal US negative for hydronephrosis; acute findings  Urine microscopy with numerous WBCs, leucine crystals    7.5:  Kidney function returned to baseline although AM CT with Mild right-sided hydroureteronephrosis which is new with soft tissue mass likely now obstructing the right ureter. No renal stones. No left hydronephrosis.     Plan/Recommendations:  -recommend urology consult and evaluation of new mild hydro seen on CT   -strict I/o's  -avoid NSAIDs, CT contrasted studies unless emergently indicated.    -will follow from afar, please reach out with any questions.

## 2024-07-05 NOTE — ASSESSMENT & PLAN NOTE
Patient has Abnormal Magnesium: hypomagnesemia. Will continue to monitor electrolytes closely. Will replace the affected electrolytes and repeat labs to be done after interventions completed. The patient's magnesium results have been reviewed and are listed below.  Recent Labs   Lab 07/05/24  0457   MG 1.5*

## 2024-07-05 NOTE — ASSESSMENT & PLAN NOTE
- Ecoli empyema s/p thoracentesis and chest tube placement  - CT in place to suction- CTS and pulm following for management and lytic recommendations   - chest tube removed per pulm recommendations  - cont meropenem per ID recs

## 2024-07-05 NOTE — ASSESSMENT & PLAN NOTE
58F w/ stage 4 ovarian cancer with R empyema. S/p Chest ube placement on 7/2.    - Pulmonology following, appreciate their recs  - CT scan yesterday with near resolution of empyema  - No role for surgical intervention  - Will defer to pulmonology recommendations regarding lytic therapy  - Thoracic surgery to sign off, please call with any questions or change in status

## 2024-07-05 NOTE — SUBJECTIVE & OBJECTIVE
Interval History:   Kidney function has returned to baseline, junior removed and urinating without difficulty per patient.  CT obtained yesterday and this morning with new R sided hydronephrosis with soft tissue suspected to be obstructing the right ureter.     Review of patient's allergies indicates:   Allergen Reactions    Codeine Other (See Comments)     Flu like s/s    Tramadol Other (See Comments)     Flu like symptoms similar to codeine reaction     Current Facility-Administered Medications   Medication Frequency    0.9%  NaCl infusion (for blood administration) Q24H PRN    acetaminophen tablet 650 mg Q6H PRN    amLODIPine tablet 10 mg Daily    atorvastatin tablet 40 mg Daily    dextrose 10 % infusion PRN    dextrose 10 % infusion PRN    dextrose 10% bolus 125 mL 125 mL PRN    dextrose 10% bolus 250 mL 250 mL PRN    diphenhydrAMINE capsule 25 mg Q6H PRN    glucagon (human recombinant) injection 1 mg PRN    glucose chewable tablet 16 g PRN    glucose chewable tablet 24 g PRN    guaiFENesin 12 hr tablet 600 mg BID    heparin (porcine) injection 5,000 Units Q8H    HYDROmorphone injection 0.2 mg Q6H PRN    insulin aspart U-100 pen 0-10 Units QID (AC + HS) PRN    insulin aspart U-100 pen 3 Units TIDWM    insulin glargine U-100 (Lantus) pen 9 Units Daily    lamoTRIgine tablet 25 mg BID    magnesium sulfate 2g in water 50mL IVPB (premix) Once    meropenem (MERREM) 1 g in 0.9% NaCl 100 mL IVPB (MB+) Q12H    mupirocin 2 % ointment BID    ondansetron tablet 4 mg Q12H PRN    QUEtiapine tablet 25 mg Daily PRN    simethicone chewable tablet 80 mg TID PRN    sodium chloride 0.9% flush 10 mL PRN       Objective:     Vital Signs (Most Recent):  Temp: 99.4 °F (37.4 °C) (07/05/24 0817)  Pulse: 92 (07/05/24 1053)  Resp: 18 (07/05/24 0817)  BP: 133/61 (07/05/24 0817)  SpO2: 99 % (07/05/24 0817) Vital Signs (24h Range):  Temp:  [98 °F (36.7 °C)-99.4 °F (37.4 °C)] 99.4 °F (37.4 °C)  Pulse:  [88-93] 92  Resp:  [17-18] 18  SpO2:  [91  %-100 %] 99 %  BP: (104-170)/(51-93) 133/61     Weight: 86.9 kg (191 lb 9.3 oz) (07/01/24 0620)  Body mass index is 30.01 kg/m².  Body surface area is 2.03 meters squared.    I/O last 3 completed shifts:  In: 820 [P.O.:720; IV Piggyback:100]  Out: 1920 [Urine:1800; Chest Tube:120]     Physical Exam  Vitals and nursing note reviewed.   Constitutional:       General: She is not in acute distress.     Appearance: She is well-developed. She is not ill-appearing or toxic-appearing.   HENT:      Head: Normocephalic and atraumatic.      Mouth/Throat:      Mouth: Mucous membranes are dry.   Eyes:      General: No scleral icterus.        Right eye: No discharge.         Left eye: No discharge.      Extraocular Movements: Extraocular movements intact.      Conjunctiva/sclera: Conjunctivae normal.   Cardiovascular:      Rate and Rhythm: Normal rate and regular rhythm.      Heart sounds: No murmur heard.     No friction rub. No gallop.   Pulmonary:      Effort: Tachypnea present. No respiratory distress.      Breath sounds: Decreased breath sounds present. No rhonchi or rales.   Abdominal:      General: There is distension.      Palpations: Abdomen is soft.      Tenderness: There is no abdominal tenderness.   Musculoskeletal:         General: No swelling.      Cervical back: Normal range of motion and neck supple.      Right lower leg: Edema present.      Left lower leg: Edema present.   Skin:     General: Skin is warm and dry.   Neurological:      Mental Status: She is alert, oriented to person, place, and time and easily aroused.          Significant Labs:  CBC:   Recent Labs   Lab 07/05/24 0457   WBC 15.64*   RBC 2.69*   HGB 7.4*   HCT 23.5*      MCV 87   MCH 27.5   MCHC 31.5*     CMP:   Recent Labs   Lab 07/05/24 0457   GLU 79   CALCIUM 8.4*   ALBUMIN 1.6*   PROT 5.7*      K 4.4   CO2 22*      BUN 39*   CREATININE 1.0   ALKPHOS 114   ALT 6*   AST 24   BILITOT 0.5

## 2024-07-05 NOTE — PLAN OF CARE
Pt seen at bedside on room air. Chest tube has had minimal output, flushed at bedside with no occlusion noted. Chest tubed removed 7/5. Continue antibiotics per ID.   Complains of back pain due to presence of right sided chest tube. Endorses fatigue and chills but denies any fevers, n/v.     Pulmonology will sign off. Please do not hesitate to reach out for any further assistance.         Kasey Medina MD PGY1  Pulmonology  [facility] - Telemetry Stepdown

## 2024-07-05 NOTE — PT/OT/SLP PROGRESS
Physical Therapy Treatment    Patient Name:  Anetet Ricci   MRN:  [id number]    Recommendations:     Discharge Recommendations: No Therapy Indicated  Discharge Equipment Recommendations: none  Barriers to discharge: None    Assessment:     Anette Ricci is a 58 y.o. female admitted with a medical diagnosis of Diabetic ketoacidosis without coma associated with type 2 diabetes mellitus.  She presents with the following impairments/functional limitations: weakness, impaired endurance, impaired self care skills, impaired functional mobility, gait instability, impaired balance, decreased upper extremity function, decreased lower extremity function requiring min/CG assistance and verbal cues for bed mob, sit < > stand transitions, and gait to prevent falls due to weakness, fatigue.   Pt remains motivated to participate in PT session and will cont to benefit from skilled PT intervention..  .    Rehab Prognosis: Good; patient would benefit from acute skilled PT services to address these deficits and reach maximum level of function.    Recent Surgery: * No surgery found *      Plan:     During this hospitalization, patient to be seen 3 x/week to address the identified rehab impairments via gait training, therapeutic activities, therapeutic exercises, neuromuscular re-education and progress toward the following goals:    Plan of Care Expires:  07/25/24    Subjective     Chief Complaint: fatigue  Pain/Comfort:  Pain Rating 1: 0/10  Pain Rating Post-Intervention 1: 0/10      Objective:     Communicated with nurse (Najma SHELBY) prior to session.  Patient found HOB elevated with PureWick, peripheral IV (sister present) upon PT entry to room.     General Precautions: Standard, fall, special contact (ESBL)  Orthopedic Precautions: N/A  Braces: N/A  Respiratory Status: Room air     Functional Mobility:  Bed Mobility:     Supine to Sit: min A for trunk elevation and to guide LE's, exiting on the L side, HOB  flat  Transfers:     Sit to Stand:  from EOB/toilet seat with CGA with rolling walker  Gait: RW CGA for balance and safety 12ft x2 trials within room.  Pt demonstrates mild instability, but LOB   Balance: static standing at sink while washing hands with RW SBA      AM-PAC 6 CLICK MOBILITY  Turning over in bed (including adjusting bedclothes, sheets and blankets)?: 3  Sitting down on and standing up from a chair with arms (e.g., wheelchair, bedside commode, etc.): 3  Moving from lying on back to sitting on the side of the bed?: 3  Moving to and from a bed to a chair (including a wheelchair)?: 3  Need to walk in hospital room?: 3  Climbing 3-5 steps with a railing?: 2  Basic Mobility Total Score: 17       Treatment & Education:  Patient provided with daily orientation and goals of this PT session. They were educated to call for assistance and to transfer with hospital staff only.  Also, pt was educated on the effects of prolonged immobility and the importance of performing OOB activity and exercises to promote healing and reduce recovery time    Patient left up in chair with all lines intact, call button in reach, nurse notified, and sister present..    GOALS:   Multidisciplinary Problems       Physical Therapy Goals          Problem: Physical Therapy    Goal Priority Disciplines Outcome Goal Variances Interventions   Physical Therapy Goal     PT, PT/OT Progressing     Description: Goals to be met by: 2024     Patient will increase functional independence with mobility by performin. Supine to sit with Modified [place]  2. Sit to stand transfer with Supervision  3. Bed to chair transfer with Supervision using LRAD  4. Gait  x 100 feet with Stand-by Assistance using LRAD.                          Time Tracking:     PT Received On: 24  PT Start Time: 1435     PT Stop Time: 1503  PT Total Time (min): 28 min     Billable Minutes: Gait Training 12 and Therapeutic Activity 16    Treatment Type:  Treatment  PT/PTA: PTA     Number of PTA visits since last PT visit: 1 07/05/2024

## 2024-07-05 NOTE — PLAN OF CARE
[facility] - Tele[facility] Step[facility]  Discharge Reassessment    Primary Care Provider: Mark Chua MD    Expected Discharge Date: 7/8/2024    Reassessment (most recent)       Discharge Reassessment - 07/05/24 1530          Discharge Reassessment    Assessment Type Discharge Planning Reassessment     Did the patient's condition or plan change since previous assessment? No     Discharge Plan discussed with: Patient     Communicated ROS with patient/caregiver Yes     Discharge Plan A Home with family;Home Health;Home   [facility]    Discharge Plan B Home;Home with family;Home Health     DME Needed Upon Discharge  other (see comments)   TBD    Transition of Care Barriers None     Why the patient remains in the hospital Requires continued medical care        Post-Acute Status    Home Health Status Referrals Sent     Discharge Delays None known at this time                     Pt re-assessment completed. [facility]. No other needs noted at this time. Will continue to follow and offer support as needed.  Discharge Plan A and Plan B have been determined by review of patient's clinical status, future medical and therapeutic needs, and coverage/benefits for post-acute care in coordination with multidisciplinary team members.  Yovany Rhodes, [facility]    [facility]  [phone]

## 2024-07-05 NOTE — PROGRESS NOTES
Pharmacist Renal Dose Adjustment Note    Anette Ricci is a 58 y.o. female being treated with the medication meropenem.    Patient Data:    Vital Signs (Most Recent):  Temp: 98.8 °F (37.1 °C) (07/05/24 1140)  Pulse: 93 (07/05/24 1140)  Resp: 19 (07/05/24 1258)  BP: (!) 126/58 (07/05/24 1140)  SpO2: 100 % (07/05/24 1140) Vital Signs (72h Range):  Temp:  [97.9 °F (36.6 °C)-99.4 °F (37.4 °C)]   Pulse:  []   Resp:  [10-24]   BP: (104-182)/(51-93)   SpO2:  [91 %-100 %]      Recent Labs   Lab 07/03/24  1046 07/04/24  0335 07/05/24  0457   CREATININE 2.9* 2.0* 1.0     Serum creatinine: 1 mg/dL 07/05/24 0457  Estimated creatinine clearance: 68.6 mL/min    Meropenem 1g every 12 hours is being changed to 1g ever 8 hours based on current renal function.     Pharmacist's Name: Fátima Aceves  Pharmacist's Extension: [employer]

## 2024-07-05 NOTE — CARE UPDATE
Resident to bedside for afternoon evaluation.    S: Patient resting upright in chair. Tolerating PO without nausea or vomiting. Voiding normally, passing flatus, and ambulating independently.  Reports improvement in SOB, cough. Denies pain and is happy since chest tube removal.     O:   Temp:  [98.4 °F (36.9 °C)-99.4 °F (37.4 °C)] 99.1 °F (37.3 °C)  Pulse:  [88-93] 93  Resp:  [17-19] 18  SpO2:  [91 %-100 %] 100 %  BP: (104-133)/(51-71) 118/59     PE:  Gen: AAO x3, NAD  CVS: regular rate  Resp: normal work of breathing  Abd: mildly distended, nontender to palpation    Labs/Imaging:  Blood Sugars (AccuCheck):    Recent Labs     07/03/24  1700 07/03/24  2138 07/04/24  0818 07/04/24  1132 07/04/24  1646 07/04/24  2158 07/05/24  0821 07/05/24  1142   POCTGLUCOSE 92 158* 89 179* 111* 131* 135* 246*        CT Abdomen Pelvis  Without Contrast    Impression    History of ovarian malignancy.  Continued bulky abdominopelvic soft tissue masses consistent with peritoneal carcinomatosis and probable lymphadenopathy, similar to prior CT.    Mild right-sided hydroureteronephrosis which appears to result from a soft tissue mass at the level of the right common iliac artery.    Stable appearing subdiaphragmatic collection with additional smaller collection or peritoneal metastasis along the margin of the right hepatic lobe.    Small volume ascites, increased from prior CT.    Right-sided pigtail chest tube with unchanged right small volume hydropneumothorax.    Other findings as detailed in the body of the report.    Electronically signed by resident: Claude Ortiz  Date:    07/05/2024  Time:    09:13    Electronically signed by: Jose Kaur MD  Date:    07/05/2024  Time:    10:44        A/P:   - Nephrology recommend urology consult secondary to new right hydronephrosis with possible obstructing mass near right ureter  - Chest tube removed, pulmonology and CT surgery signed off as no need for further intervention at this time.   -  Continued care per primary. Will continue to follow over the weekend.     Argentina Mccloud MD  OB/GYN PGY-2

## 2024-07-05 NOTE — PROGRESS NOTES
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  She was discharged on 06/27.  During hospitalization she was treated with Zosyn for 6 days due to recurrent fevers but infectious workup was negative      On admission:   Pulse 100 saturating 99%, afebrile, blood pressure 88/48.  CBC notable for marked leukocytosis 20,000 hemoglobin of 7.4 (hematocrit 23) with increase in granulocyte percentage, RDW 19.9.  CMP notable for a sodium of 129 CO2 14, anion gap 21, BUN 76 creatinine 4.6 GFR 10,  glucose of 403 alk-phos 170, normal AST ALT, uric acid of 9.3    Imaging notable for:  CT scan indicates significant pleural fluid accumulation at the right lung base with suspected lung consolidation and mediastinal shift. The Chest X-ray confirms worsening opacity in the right hemithorax consistent with pleural fluid, along with parenchymal changes suggesting atelectasis and possible infiltrates. There are no signs of pneumothorax, but mild changes are noted in the left lung base.    In the ED received vanc and Zosyn and insulin pending.          Overview/Hospital Course:  Admitted to ICU for DKA, started on insulin drip. Acidosis resolved and transitioned to subq insulin.  Stable for step down.   Thoracentesis 07/02/24; drained 800 ml cloudy (yellow cloudy/chylous looking fluid vs purulence). Cytology and cultures sent. Chest tube placed 7/3. Thoracic surgery consulted for recommendations on lytic therapy.   Pleural fluid with E coli. ID consulted. Cont meropenem per ID recs.   Chest tube to suction Pulm and CTS following. Repeat CT chest Small right hydropneumothorax with a pigtail chest tube in place. Also showing new right hydronephrosis, repeat CT abd pelvis ordered and showed: right-sided hydroureteronephrosis which appears to result from a soft tissue mass at the level of the right common iliac artery. Urology consulted.   Pleural effusion improved and pulm removed chest tube.     Interval History: Stable on RA. Chest tube removed today after assessment    BG stable.   Vaginal bleeding noted, monitoring H/H and gynonc following     Review of Systems   Constitutional:  Negative for fever.   Respiratory:  Negative for shortness of breath.    Cardiovascular:  Positive for leg swelling.   Gastrointestinal:  Negative for abdominal pain.   Genitourinary:  Positive for vaginal bleeding.     Objective:     Vital Signs (Most Recent):  Temp: 99.1 °F (37.3 °C) (07/05/24 1525)  Pulse: 93 (07/05/24 1525)  Resp: 18 (07/05/24 1525)  BP: (!) 118/59 (07/05/24 1525)  SpO2: 100 % (07/05/24 1525) Vital Signs (24h Range):  Temp:  [98.4 °F (36.9 °C)-99.4 °F (37.4 °C)] 99.1 °F (37.3 °C)  Pulse:  [88-93] 93  Resp:  [17-19] 18  SpO2:  [91 %-100 %] 100 %  BP: (104-133)/(51-71) 118/59     Weight: 86.9 kg (191 lb 9.3 oz)  Body mass index is 30.01 kg/m².    Intake/Output Summary (Last 24 hours) at 7/5/2024 1836  Last data filed at 7/5/2024 1437  Gross per 24 hour   Intake 1080 ml   Output 1090 ml   Net -10 ml         Physical Exam  Vitals and nursing note reviewed.   Constitutional:       General: She is not in acute distress.     Appearance: Normal appearance.   HENT:      Head: Normocephalic and  atraumatic.      Nose: Nose normal.      Mouth/Throat:      Mouth: Mucous membranes are moist.      Pharynx: Oropharynx is clear.   Eyes:      Extraocular Movements: Extraocular movements intact.      Conjunctiva/sclera: Conjunctivae normal.      Pupils: Pupils are equal, round, and reactive to light.   Cardiovascular:      Rate and Rhythm: Normal rate and regular rhythm.      Pulses: Normal pulses.      Heart sounds: Normal heart sounds.   Pulmonary:      Effort: Pulmonary effort is normal.      Comments: Decreased breath sounds R base, CT in place draining purulent output (removed by pulm this afternoon but still present on my assessment this morning)  Abdominal:      General: Abdomen is flat. Bowel sounds are normal.      Palpations: Abdomen is soft.   Musculoskeletal:         General: Normal range of motion.      Cervical back: Normal range of motion and neck supple.      Right lower leg: Edema present.      Left lower leg: Edema present.   Skin:     General: Skin is warm and dry.   Neurological:      General: No focal deficit present.      Mental Status: She is alert and oriented to person, place, and time.   Psychiatric:         Mood and Affect: Mood normal.         Behavior: Behavior normal.             Significant Labs: All pertinent labs within the past 24 hours have been reviewed.    Significant Imaging: I have reviewed all pertinent imaging results/findings within the past 24 hours.    Assessment/Plan:      * Diabetic ketoacidosis without coma associated with type 2 diabetes mellitus  - ICU on insulin drip, gap now closed and acidosis improving, transitioned to subq insulin  - monitor BG and adjust insulin as needed      Hydroureteronephrosis  - Mild right-sided hydroureteronephrosis which appears to result from a soft tissue mass at the level of the right common iliac artery.   - urology consulted      Hypomagnesemia  Patient has Abnormal Magnesium: hypomagnesemia. Will continue to monitor electrolytes  closely. Will replace the affected electrolytes and repeat labs to be done after interventions completed. The patient's magnesium results have been reviewed and are listed below.  Recent Labs   Lab 07/05/24  0457   MG 1.5*        Empyema  - Ecoli empyema s/p thoracentesis and chest tube placement  - CT in place to suction- CTS and pulm following for management and lytic recommendations   - chest tube removed per pulm recommendations  - cont meropenem per ID recs      High anion gap metabolic acidosis  - gap closed, acidosis improving       Chronic pulmonary embolism without acute cor pulmonale  - previously on AC, held for decreasing Hgb and vaginal bleeding   - cont heparin ppx      Pleural effusion  Patient found to have moderate pleural effusion on imaging. I have personally reviewed and interpreted the following imaging: Xray and CT. A thoracentesis was performed. Pleural fluid was sent for analysis. Exudative consistent with empyema   - Cytology in process  - empyema Ecoli growing. ID recommend cont meropenem   Management to include chest tube  - CT in place to suction- pulm and CTS following and managing CT- may need lytic therapy   - repeat CT done - Small right hydropneumothorax with a pigtail chest tube in place.   - pulm removed chest tube      S/p CARLOS ALBERTO/BSO/OMX/Debulking/Partial liver resection/Cholecystectomy  - noted      JASON (acute kidney injury)  Patient with acute kidney injury/acute renal failure likely due to  Suspect a component of ANNE from previous admission playing a role, but also volume depletion from DKA and osmotic diuresis from glucosuria.    JASON is currently improving. Baseline creatinine  0.8  - Labs reviewed- Renal function/electrolytes with Estimated Creatinine Clearance: 68.6 mL/min (based on SCr of 1 mg/dL). according to latest data. Monitor urine output and serial BMP and adjust therapy as needed. Avoid nephrotoxins and renally dose meds for GFR listed above.  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 Will Continue anti-depressant medications. We will not consult psychiatry at this time. Patient does not display psychosis at this time. Continue to monitor closely and adjust plan of care as needed.        Hypertension  Chronic, controlled. Latest blood pressure and vitals reviewed-     Temp:  [98.4 °F (36.9 °C)-99.4 °F (37.4 °C)]   Pulse:  [88-93]   Resp:  [17-19]   BP: (104-133)/(51-71)   SpO2:  [91 %-100 %] .   Home meds for hypertension were reviewed and noted below.   Hypertension Medications               amlodipine (NORVASC) 10 MG tablet Take 10 mg by mouth once daily.     enalapril (VASOTEC) 20 MG tablet Take 20 mg by mouth once daily.    hydrochlorothiazide (HYDRODIURIL) 25 MG tablet Take 25 mg by mouth once daily.             While in the hospital, will manage blood pressure as follows; Continue home antihypertensive regimen    Will utilize p.r.n. blood pressure medication only if patient's blood pressure greater than 180/110 and she develops symptoms such as worsening chest pain or shortness of breath.    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  Last A1c reviewed-   Lab Results   Component Value Date    HGBA1C 7.3 (H) 03/19/2024     Most recent fingerstick glucose reviewed-   Recent Labs   Lab 07/04/24  2158 07/05/24  0821 07/05/24  1142    POCTGLUCOSE 131* 135* 246*       Current correctional scale  Low  Maintain anti-hyperglycemic dose as follows-   Antihyperglycemics (From admission, onward)      Start     Stop Route Frequency Ordered    07/04/24 0945  insulin glargine U-100 (Lantus) pen 9 Units         -- SubQ Daily 07/04/24 0936    07/03/24 0930  insulin aspart U-100 pen 0-10 Units         -- SubQ Before meals & nightly PRN 07/03/24 0931    07/02/24 1215  insulin aspart U-100 pen 3 Units         -- SubQ 3 times daily with meals 07/02/24 1114          Hold Oral hypoglycemics while patient is in the hospital.      VTE Risk Mitigation (From admission, onward)           Ordered     heparin (porcine) injection 5,000 Units  Every 8 hours         07/03/24 0931     IP VTE HIGH RISK PATIENT  Once         07/01/24 0540     Place sequential compression device  Until discontinued         07/01/24 0540                    Discharge Planning   ROS: 7/8/2024     Code Status: Full Code   Is the patient medically ready for discharge?:     Reason for patient still in hospital (select all that apply): Patient trending condition and Consult recommendations  Discharge Plan A: Home with family, Home Health, Home ([facility])   Discharge Delays: None known at this time              Tia Wallace MD  Department of Hospital Medicine   [facility] - Tele[facility]

## 2024-07-05 NOTE — SUBJECTIVE & OBJECTIVE
Interval History: NAEON. On room air. Chest tube with 90 mL of output. CT yesterday with near resolution of empyema.     Medications:  Continuous Infusions:  Scheduled Meds:   amLODIPine  10 mg Oral Daily    atorvastatin  40 mg Oral Daily    guaiFENesin  600 mg Oral BID    heparin (porcine)  5,000 Units Subcutaneous Q8H    insulin aspart U-100  3 Units Subcutaneous TIDWM    insulin glargine U-100  9 Units Subcutaneous Daily    lamoTRIgine  25 mg Oral BID    meropenem IV (PEDS and ADULTS)  1 g Intravenous Q12H    mupirocin   Nasal BID     PRN Meds:  Current Facility-Administered Medications:     0.9%  NaCl infusion (for blood administration), , Intravenous, Q24H PRN    acetaminophen, 650 mg, Oral, Q6H PRN    D10W, , Intravenous, PRN    D10W, , Intravenous, PRN    dextrose 10%, 12.5 g, Intravenous, PRN    dextrose 10%, 25 g, Intravenous, PRN    diphenhydrAMINE, 25 mg, Oral, Q6H PRN    glucagon (human recombinant), 1 mg, Intramuscular, PRN    glucose, 16 g, Oral, PRN    glucose, 24 g, Oral, PRN    HYDROmorphone, 0.2 mg, Intravenous, Q6H PRN    insulin aspart U-100, 0-10 Units, Subcutaneous, QID (AC + HS) PRN    ondansetron, 4 mg, Oral, Q12H PRN    QUEtiapine, 25 mg, Oral, Daily PRN    simethicone, 1 tablet, Oral, TID PRN    sodium chloride 0.9%, 10 mL, Intravenous, PRN     Review of patient's allergies indicates:   Allergen Reactions    Codeine Other (See Comments)     Flu like s/s    Tramadol Other (See Comments)     Flu like symptoms similar to codeine reaction     Objective:     Vital Signs (Most Recent):  Temp: 99 °F (37.2 °C) (07/05/24 0413)  Pulse: 92 (07/05/24 0817)  Resp: 18 (07/05/24 0817)  BP: 133/61 (07/05/24 0817)  SpO2: 99 % (07/05/24 0817) Vital Signs (24h Range):  Temp:  [98 °F (36.7 °C)-99 °F (37.2 °C)] 99 °F (37.2 °C)  Pulse:  [88-93] 92  Resp:  [17-18] 18  SpO2:  [91 %-100 %] 99 %  BP: (104-170)/(51-93) 133/61     Intake/Output - Last 3 Shifts         07/03 0700  07/04 0659 07/04 0700  07/05 0659  07/05 0700  07/06 0659    P.O. 300 720     I.V. (mL/kg) 62.2 (0.7)      Blood 350      IV Piggyback 197.7      Total Intake(mL/kg) 909.9 (10.5) 720 (8.3)     Urine (mL/kg/hr) 1325 (0.6) 1100 (0.5)     Stool  0     Chest Tube 195 90     Total Output 1520 1190     Net -610.1 -470            Urine Occurrence 1 x 3 x     Stool Occurrence  3 x             SpO2: 99 %        Physical Exam  Vitals reviewed.   Constitutional:       Appearance: Normal appearance.   Cardiovascular:      Rate and Rhythm: Normal rate.      Pulses: Normal pulses.   Pulmonary:      Effort: Pulmonary effort is normal.      Comments: Right chest tube in place and to suction. No air leak.  Abdominal:      General: There is no distension.      Palpations: Abdomen is soft.   Skin:     General: Skin is warm and dry.   Neurological:      General: No focal deficit present.      Mental Status: She is alert and oriented to person, place, and time.            Significant Labs:  CBC:   Recent Labs   Lab 07/05/24  0457   WBC 15.64*   RBC 2.69*   HGB 7.4*   HCT 23.5*      MCV 87   MCH 27.5   MCHC 31.5*     CMP:   Recent Labs   Lab 07/05/24  0457   GLU 79   CALCIUM 8.4*   ALBUMIN 1.6*   PROT 5.7*      K 4.4   CO2 22*      BUN 39*   CREATININE 1.0   ALKPHOS 114   ALT 6*   AST 24   BILITOT 0.5       Significant Diagnostics:  I have reviewed and interpreted all pertinent imaging results/findings within the past 24 hours.    VTE Risk Mitigation (From admission, onward)           Ordered     heparin (porcine) injection 5,000 Units  Every 8 hours         07/03/24 0931     IP VTE HIGH RISK PATIENT  Once         07/01/24 0540     Place sequential compression device  Until discontinued         07/01/24 0540

## 2024-07-05 NOTE — PROGRESS NOTES
[facility] - Tele[facility]  Nephrology  Progress Note    Patient Name: Anette Ricci  MRN: [id number]  Admission Date: 6/30/2024  Hospital Length of Stay: 4 days  Attending Provider: Tia Wallace MD   Primary Care Physician: Mark Chua MD  Principal Problem:Diabetic ketoacidosis without coma associated with type 2 diabetes mellitus    Subjective:     HPI: Ms. Ricci is a 57 yo female with hx of DM, HLD, HTN, PE, Stg IV Ovarian Ca s/p C3 of carbo/taxol/bevacizumab on 5/31 and plans to start Doxorubicin therapy who presents to the hospital on 6/30 with CC of abdominal pain, nausea, vomiting, and SOB x 1 day.  She was recently admitted on 6/20-6/27 for SOB 2/2 pleural effusion (thought to be malignant) that was unable to be drained by IR.  She was treated during that admission with zosyn x 6 days for recurrent fevers, workup negative.  According to family at bedside, she has had relatively poor PO intake since discharge.  On this admission, labs were notable for a WBC of 20k, Na 129, CO2 14, AG 21, , Beta hydroxybutyrate > 4.  UA + ketones.  She was started on DKA protocol and admitted to Gyn/Onc service.  On 7/1, MICU was consulted for worsening mental status, tachypnea and further management of DKA.  Nephrology was consulted at that time for JASON evaluation.    According to records, Ms. Ricci has normal kidney function at baseline with sCR of 0.8.  This admission she arrives with a sCR of 4.6.  UA with 2+ protein, 3+ OB, 40 WBC, + ketones, Mohsen 17.  On chart review, she has a baseline SCr of 0.8, discharged during her last admission with a SCR of 1.5 on 6/27.  Of note, she did have a CT with contrast performed on 6/24.  Family at bedside report decreased PO intake prior to this presentation.  No hx of NSAID use.    Interval History:   Kidney function has returned to baseline, junior removed and urinating without difficulty per patient.  CT obtained yesterday and this morning with new R sided  hydronephrosis with soft tissue suspected to be obstructing the right ureter.     Review of patient's allergies indicates:   Allergen Reactions    Codeine Other (See Comments)     Flu like s/s    Tramadol Other (See Comments)     Flu like symptoms similar to codeine reaction     Current Facility-Administered Medications   Medication Frequency    0.9%  NaCl infusion (for blood administration) Q24H PRN    acetaminophen tablet 650 mg Q6H PRN    amLODIPine tablet 10 mg Daily    atorvastatin tablet 40 mg Daily    dextrose 10 % infusion PRN    dextrose 10 % infusion PRN    dextrose 10% bolus 125 mL 125 mL PRN    dextrose 10% bolus 250 mL 250 mL PRN    diphenhydrAMINE capsule 25 mg Q6H PRN    glucagon (human recombinant) injection 1 mg PRN    glucose chewable tablet 16 g PRN    glucose chewable tablet 24 g PRN    guaiFENesin 12 hr tablet 600 mg BID    heparin (porcine) injection 5,000 Units Q8H    HYDROmorphone injection 0.2 mg Q6H PRN    insulin aspart U-100 pen 0-10 Units QID (AC + HS) PRN    insulin aspart U-100 pen 3 Units TIDWM    insulin glargine U-100 (Lantus) pen 9 Units Daily    lamoTRIgine tablet 25 mg BID    magnesium sulfate 2g in water 50mL IVPB (premix) Once    meropenem (MERREM) 1 g in 0.9% NaCl 100 mL IVPB (MB+) Q12H    mupirocin 2 % ointment BID    ondansetron tablet 4 mg Q12H PRN    QUEtiapine tablet 25 mg Daily PRN    simethicone chewable tablet 80 mg TID PRN    sodium chloride 0.9% flush 10 mL PRN       Objective:     Vital Signs (Most Recent):  Temp: 99.4 °F (37.4 °C) (07/05/24 0817)  Pulse: 92 (07/05/24 1053)  Resp: 18 (07/05/24 0817)  BP: 133/61 (07/05/24 0817)  SpO2: 99 % (07/05/24 0817) Vital Signs (24h Range):  Temp:  [98 °F (36.7 °C)-99.4 °F (37.4 °C)] 99.4 °F (37.4 °C)  Pulse:  [88-93] 92  Resp:  [17-18] 18  SpO2:  [91 %-100 %] 99 %  BP: (104-170)/(51-93) 133/61     Weight: 86.9 kg (191 lb 9.3 oz) (07/01/24 0620)  Body mass index is 30.01 kg/m².  Body surface area is 2.03 meters squared.    I/O  last 3 completed shifts:  In: 820 [P.O.:720; IV Piggyback:100]  Out: 1920 [Urine:1800; Chest Tube:120]     Physical Exam  Vitals and nursing note reviewed.   Constitutional:       General: She is not in acute distress.     Appearance: She is well-developed. She is not ill-appearing or toxic-appearing.   HENT:      Head: Normocephalic and atraumatic.      Mouth/Throat:      Mouth: Mucous membranes are dry.   Eyes:      General: No scleral icterus.        Right eye: No discharge.         Left eye: No discharge.      Extraocular Movements: Extraocular movements intact.      Conjunctiva/sclera: Conjunctivae normal.   Cardiovascular:      Rate and Rhythm: Normal rate and regular rhythm.      Heart sounds: No murmur heard.     No friction rub. No gallop.   Pulmonary:      Effort: Tachypnea present. No respiratory distress.      Breath sounds: Decreased breath sounds present. No rhonchi or rales.   Abdominal:      General: There is distension.      Palpations: Abdomen is soft.      Tenderness: There is no abdominal tenderness.   Musculoskeletal:         General: No swelling.      Cervical back: Normal range of motion and neck supple.      Right lower leg: Edema present.      Left lower leg: Edema present.   Skin:     General: Skin is warm and dry.   Neurological:      Mental Status: She is alert, oriented to person, place, and time and easily aroused.          Significant Labs:  CBC:   Recent Labs   Lab 07/05/24  0457   WBC 15.64*   RBC 2.69*   HGB 7.4*   HCT 23.5*      MCV 87   MCH 27.5   MCHC 31.5*     CMP:   Recent Labs   Lab 07/05/24  0457   GLU 79   CALCIUM 8.4*   ALBUMIN 1.6*   PROT 5.7*      K 4.4   CO2 22*      BUN 39*   CREATININE 1.0   ALKPHOS 114   ALT 6*   AST 24   BILITOT 0.5        Assessment/Plan:     Renal/  JASON (acute kidney injury)  JASON on Normal Renal Fx  -Baseline SCr 0.8.  57 yo female with recent admission from 6/20-6/27 for SOB 2/2 malignant effusion who presents on 6/30 for  N/V/SOB on 7/1, found to be in DKA with JASON with SCr of 4.6.  I/O's not accurately recorded since admission, stepped up to ICU on 7/1 for higher level of care.  Multiple hypotensive episodes recorded.  Discharged on 6/27 during last admission, on that date had an JASON with SCr of 1.5, suspicious for ANNE as she had a CT with contrast 6/24 and also receiving Ibuprofen and ACEi.    Suspect a component of ANNE from previous admission playing a role, but also volume depletion from DKA and osmotic diuresis from glucosuria.    May have developed an iATN from hypotensive events.  Home meds including Enalapril, HCTZ, Ibuprofen PRN, Metformin    Mohsen 17  7/1:  POCUS @ bedside with collapsible IJ > 50%  Renal US negative for hydronephrosis; acute findings  Urine microscopy with numerous WBCs, leucine crystals    7.5:  Kidney function returned to baseline although AM CT with Mild right-sided hydroureteronephrosis which is new with soft tissue mass likely now obstructing the right ureter. No renal stones. No left hydronephrosis.     Plan/Recommendations:  -recommend urology consult and evaluation of new mild hydro seen on CT   -strict I/o's  -avoid NSAIDs, CT contrasted studies unless emergently indicated.    -will follow from afar, please reach out with any questions.        Be Raman NP  Nephrology  [facility] - Tele[facility]

## 2024-07-05 NOTE — SUBJECTIVE & OBJECTIVE
Interval History: Pt resting on room air. Patient states that she was told she was running fevers, but did not feel particularly feverish. She denies any change in her quality of breathing.She denies any discomfort aside from constipation and bloating.       Objective:     Vital Signs (Most Recent):  Temp: 99 °F (37.2 °C) (07/09/24 0837)  Pulse: 96 (07/09/24 0837)  Resp: 18 (07/09/24 0837)  BP: 131/77 (07/09/24 0849)  SpO2: 97 % (07/09/24 0837) Vital Signs (24h Range):  Temp:  [99 °F (37.2 °C)-100.8 °F (38.2 °C)] 99 °F (37.2 °C)  Pulse:  [] 96  Resp:  [16-18] 18  SpO2:  [93 %-99 %] 97 %  BP: ()/(53-78) 131/77     Weight: 86.9 kg (191 lb 9.3 oz)  Body mass index is 30.01 kg/m².      Intake/Output Summary (Last 24 hours) at 7/9/2024 1118  Last data filed at 7/8/2024 1850  Gross per 24 hour   Intake 521.25 ml   Output --   Net 521.25 ml        Physical Exam  Constitutional:       General: She is not in acute distress.     Appearance: Normal appearance. She is not diaphoretic.   HENT:      Head: Normocephalic and atraumatic.      Nose: Nose normal.   Eyes:      Conjunctiva/sclera: Conjunctivae normal.   Cardiovascular:      Rate and Rhythm: Normal rate and regular rhythm.      Heart sounds: Normal heart sounds.   Pulmonary:      Effort: Pulmonary effort is normal. No respiratory distress.      Breath sounds: No wheezing.   Chest:      Chest wall: No tenderness.   Abdominal:      General: There is distension.   Skin:     General: Skin is warm and dry.   Neurological:      Mental Status: She is alert. Mental status is at baseline.   Psychiatric:         Mood and Affect: Mood normal.         Behavior: Behavior normal.         Thought Content: Thought content normal.         Judgment: Judgment normal.           Review of Systems    Vents:       Lines/Drains/Airways       Central Venous Catheter Line  Duration             Port A Cath Single Lumen Subclavian Left -- days              Drain  Duration              Female External Urinary Catheter w/ Suction 07/03/24 6 days              Peripheral Intravenous Line  Duration                  Peripheral IV - Single Lumen 07/01/24 0032 20 G Left Upper Arm 8 days                    Significant Labs:    CBC/Anemia Profile:  Recent Labs   Lab 07/07/24  1359 07/08/24  0811 07/09/24  0503   WBC 20.74* 18.80* 24.67*   HGB 8.0* 6.4* 7.5*   HCT 25.5* 21.1* 23.2*   * 547* 553*   MCV 85 87 84   RDW 20.8* 21.2* 20.2*        Chemistries:  Recent Labs   Lab 07/08/24  0811 07/09/24  0503   * 133*   K 3.9 3.7    100   CO2 27 24   BUN 13 13   CREATININE 0.8 0.8   CALCIUM 8.1* 8.2*   ALBUMIN 1.8* 1.9*   PROT 5.9* 6.2   BILITOT 0.6 0.9   ALKPHOS 90 91   ALT 5* 5*   AST 30 35   MG 1.5* 1.4*   PHOS 2.9 3.4       POCT Glucose:   Recent Labs   Lab 07/08/24  1742 07/08/24  2156 07/08/24  2158   POCTGLUCOSE 159* 74 72     All pertinent labs within the past 24 hours have been reviewed.    Significant Imaging:  I have reviewed all pertinent imaging results/findings within the past 24 hours.  CXR 7/8: Right-sided pleural effusion associated atelectatic changes remain unchanged as compared to the previous study. The left lung is clear.   CT Chest 7/4:   -History of ovarian malignancy.  Continued bulky abdominopelvic soft tissue masses consistent with peritoneal carcinomatosis and probable lymphadenopathy, similar to prior CT.  -Mild right-sided hydroureteronephrosis which appears to result from a soft tissue mass at the level of the right common iliac artery.  -Stable appearing subdiaphragmatic collection with additional smaller collection or peritoneal metastasis along the margin of the right hepatic lobe.  -Small volume ascites, increased from prior CT.  -Right-sided pigtail chest tube with unchanged right small volume hydropneumothorax.

## 2024-07-05 NOTE — PROGRESS NOTES
Progress Note  Gynecology Oncology    Admit Date: 2024  LOS: 5    Reason for Admission:  Diabetic ketoacidosis without coma associated with type 2 diabetes mellitus    SUBJECTIVE:     Anette Ricci is a 58 y.o.  with Stage IVB ovarian carcinosarcoma s/p PDS on 3/25/24 with Dr Washington (CARLOS ALBERTO/BSO/OMX/liver mobilization/peritoneal & R diaphragm stripping/hepatic wedge resection/cholecystectomy) s/p C3 of carbo/taxol/bevacizumab on . Admitted to MICU  for the management of DKA (resolved), JASON (improved), and symptomatic malignant pleural effusion vs empyema, s/p thoracentesis and chest tube placement.     Patient reports vaginal spotting is stable from yesterday. Reports pain is well controlled. Denies fevers/chills, nausea/vomiting. Reports SOB improved and minimal cough. Reports flatus, but has not had BM for 3 days.     OBJECTIVE:     Vital Signs   Temp:  [98.4 °F (36.9 °C)-99.1 °F (37.3 °C)] 98.4 °F (36.9 °C)  Pulse:  [85-93] 91  Resp:  [18-19] 19  SpO2:  [98 %-100 %] 100 %  BP: (117-139)/(58-75) 118/64      Intake/Output Summary (Last 24 hours) at 2024 0831  Last data filed at 2024 0633  Gross per 24 hour   Intake 840 ml   Output 725 ml   Net 115 ml       Physical Exam:  Gen: Pt alert, A&Ox4  CV: RRR, normal heart sounds and intact distal pulses.  No edema on exam.   Pulm: Lung sounds reduced at right base although improved from prior eval. No stridor. No respiratory distress, normal respiratory effort.   Abd: Mild-moderate distention although non-tender to palpation without rebound or guarding. Firm mass noted on left mid-abdomen which is nontender to palpation. No erythema, induration, or discharge on bandage.   Ext: PPP, no peripheral edema, SCDs in place  : Pure wick in place draining clear yellow urine. No blood on current pad.  Skin: Skin is warm and dry.     Laboratory:  Recent Labs   Lab 24  0335 24  0457 24  0436   WBC 14.82* 15.64* 21.03*   HGB 8.1*  7.4* 8.7*   HCT 25.9* 23.5* 28.4*   MCV 84 87 86   * 414 565*       Recent Labs   Lab 24  0335 24  0457 24  0436   * 136 136   K 3.9 4.4 4.0    106 104   CO2 20* 22* 20*   BUN 55* 39* 26*   CREATININE 2.0* 1.0 0.8   GLU 76 79 133*   PROT 6.0 5.7* 6.9   BILITOT 0.4 0.5 0.8   ALKPHOS 119 114 117   ALT 6* 6* 5*   AST 22 24 26   MG 2.0 1.5* 1.5*   PHOS 2.9 3.0 3.0       Blood Sugars (AccuCheck):       ASSESSMENT/PLAN:     Assessment: Anette Ricci is a 58 y.o.  with Stage IVB ovarian carcinosarcoma s/p PDS on 3/25/24 with Dr Washington (CARLOS ALBERTO/BSO/OMX/liver mobilization/peritoneal & R diaphragm stripping/hepatic wedge resection/cholecystectomy) s/p C3 of carbo/taxol/bevacizumab on . Admitted to MICU  for the management of DKA (resolved), JASON (improved), and symptomatic malignant pleural effusion vs empyema, s/p thoracentesis and chest tube placement ().      Pleural effusion (right)  - S/p right thoracentesis  (purulent) and chest tube placement on  (serosanguinous output)  - s/p vanc/zosyn. Continue IV Abx per ID recs: day 5 of meropenem   - CXR : continues to show stable improvement fo right pleural effusion.   - CT : Small right hydropneumothorax with a pigtail chest tube in place. Stable patchy ground-glass opacities in the anterior left upper lobe, suspicious for pneumonia.   Interval development of right sided hydronephrosis.   - Chest tube removed yesterday, CT surgery and pulmonology signed off  - Continue to monitor patient symptoms and vitals for possible re-accumulation of fluid    Vaginal bleeding   - minimal, stable vaginal bleeding.   - Strict pad count   - AM CBC improved from yesterday, 7.4/23.5>8.7/28.4 (suspect some dehydration/hemoconcentration as patient has nor received a blood transfusion since 7/3     UTI  - asymptomatic  - Urine Cx with candida glabrata   - management by ID     Anemia     - Most recent H/H .     -  Continue to monitor for goal Hgb > 8     Hx of PE (stable)  - currently on heparin drip     Diabetes Mellitus     - Current regimen lantus 9u, aspart 3u TIDWM, SSI as needed     - B-246 in 24h period    Constipation     - Patient reports last BM 3 days ago     - Currently receiving miralax, however, consider milk of mag if no BM occurs     Stage IVB ovarian carcinosarcoma     - See onc history as above     - Doxorubicin C1 scheduled for , will defer until patient is discharged and infection has resolved    Hypertension  - Continue home norvasc  - Holding home enalapril, HCTZ     Hyperlipidemia  - Hold home statin     Seizure disorder  - Continue home lamictal     Depression  - Continue home seroquel, ativan prn    JASON  - 2/2 dehydration and DKA     - Patient's baseline Cr 0.7-0.8     - Initial Cr on admit 4.6 >>> 2.1 yesterday, AM CMP pending     - Renal US remarkable for elevated intraparenchymal indices and 2cm mass adjacent to right kidney     - CT chest  with interval development of right sided hydronephrosis.     -  CT A/P ordered     - Nephrology following.     - Resolved     DKA     - Upon admission AG 21 > now closed, glucose 420, beta-hydroxybutyrate 4.0     - S/p insulin drip     - Resolved     Argentina Mccloud MD  OB/GYN PGY-3

## 2024-07-05 NOTE — PROGRESS NOTES
[facility] - Tele[facility] Step[facility]  Thoracic Surgery  Progress Note    Subjective:     History of Present Illness:  58F w/ stage 4 ovarian cancer with R pleural effusion presumed malignant who was admitted to MICU for management of DKA. Thoracentesis yesterday 07/02/24; drained 800 ml cloudy (yellow cloudy/chylous looking fluid vs purulence) yesterday. Cytology and cultures sent. Plan to place chest tube today. Thoracic surgery consulted for recommendations on lytic therapy.     Post-Op Info:  * No surgery found *         Interval History: NAEON. On room air. Chest tube with 90 mL of output. CT yesterday with near resolution of empyema.     Medications:  Continuous Infusions:  Scheduled Meds:   amLODIPine  10 mg Oral Daily    atorvastatin  40 mg Oral Daily    guaiFENesin  600 mg Oral BID    heparin (porcine)  5,000 Units Subcutaneous Q8H    insulin aspart U-100  3 Units Subcutaneous TIDWM    insulin glargine U-100  9 Units Subcutaneous Daily    lamoTRIgine  25 mg Oral BID    meropenem IV (PEDS and ADULTS)  1 g Intravenous Q12H    mupirocin   Nasal BID     PRN Meds:  Current Facility-Administered Medications:     0.9%  NaCl infusion (for blood administration), , Intravenous, Q24H PRN    acetaminophen, 650 mg, Oral, Q6H PRN    D10W, , Intravenous, PRN    D10W, , Intravenous, PRN    dextrose 10%, 12.5 g, Intravenous, PRN    dextrose 10%, 25 g, Intravenous, PRN    diphenhydrAMINE, 25 mg, Oral, Q6H PRN    glucagon (human recombinant), 1 mg, Intramuscular, PRN    glucose, 16 g, Oral, PRN    glucose, 24 g, Oral, PRN    HYDROmorphone, 0.2 mg, Intravenous, Q6H PRN    insulin aspart U-100, 0-10 Units, Subcutaneous, QID (AC + HS) PRN    ondansetron, 4 mg, Oral, Q12H PRN    QUEtiapine, 25 mg, Oral, Daily PRN    simethicone, 1 tablet, Oral, TID PRN    sodium chloride 0.9%, 10 mL, Intravenous, PRN     Review of patient's allergies indicates:   Allergen Reactions    Codeine Other (See Comments)     Flu like s/s    Tramadol Other (See  Comments)     Flu like symptoms similar to codeine reaction     Objective:     Vital Signs (Most Recent):  Temp: 99 °F (37.2 °C) (07/05/24 0413)  Pulse: 92 (07/05/24 0817)  Resp: 18 (07/05/24 0817)  BP: 133/61 (07/05/24 0817)  SpO2: 99 % (07/05/24 0817) Vital Signs (24h Range):  Temp:  [98 °F (36.7 °C)-99 °F (37.2 °C)] 99 °F (37.2 °C)  Pulse:  [88-93] 92  Resp:  [17-18] 18  SpO2:  [91 %-100 %] 99 %  BP: (104-170)/(51-93) 133/61     Intake/Output - Last 3 Shifts         07/03 0700  07/04 0659 07/04 0700 07/05 0659 07/05 0700 07/06 0659    P.O. 300 720     I.V. (mL/kg) 62.2 (0.7)      Blood 350      IV Piggyback 197.7      Total Intake(mL/kg) 909.9 (10.5) 720 (8.3)     Urine (mL/kg/hr) 1325 (0.6) 1100 (0.5)     Stool  0     Chest Tube 195 90     Total Output 1520 1190     Net -610.1 -470            Urine Occurrence 1 x 3 x     Stool Occurrence  3 x             SpO2: 99 %        Physical Exam  Vitals reviewed.   Constitutional:       Appearance: Normal appearance.   Cardiovascular:      Rate and Rhythm: Normal rate.      Pulses: Normal pulses.   Pulmonary:      Effort: Pulmonary effort is normal.      Comments: Right chest tube in place and to suction. No air leak.  Abdominal:      General: There is no distension.      Palpations: Abdomen is soft.   Skin:     General: Skin is warm and dry.   Neurological:      General: No focal deficit present.      Mental Status: She is alert and oriented to person, place, and time.            Significant Labs:  CBC:   Recent Labs   Lab 07/05/24 0457   WBC 15.64*   RBC 2.69*   HGB 7.4*   HCT 23.5*      MCV 87   MCH 27.5   MCHC 31.5*     CMP:   Recent Labs   Lab 07/05/24 0457   GLU 79   CALCIUM 8.4*   ALBUMIN 1.6*   PROT 5.7*      K 4.4   CO2 22*      BUN 39*   CREATININE 1.0   ALKPHOS 114   ALT 6*   AST 24   BILITOT 0.5       Significant Diagnostics:  I have reviewed and interpreted all pertinent imaging results/findings within the past 24 hours.    VTE Risk  Mitigation (From admission, onward)           Ordered     heparin (porcine) injection 5,000 Units  Every 8 hours         07/03/24 0931     IP VTE HIGH RISK PATIENT  Once         07/01/24 0540     Place sequential compression device  Until discontinued         07/01/24 0540                  Assessment/Plan:     Pleural effusion  58F w/ stage 4 ovarian cancer with R empyema. S/p Chest ube placement on 7/2.    - Pulmonology following, appreciate their recs  - CT scan yesterday with near resolution of empyema  - No role for surgical intervention  - Will defer to pulmonology recommendations regarding lytic therapy  - Thoracic surgery to sign off, please call with any questions or change in status        Robbin Arceo MD  Thoracic Surgery  [facility] - Tele[facility]

## 2024-07-05 NOTE — ASSESSMENT & PLAN NOTE
Patient's FSGs are controlled on current medication regimen.  Last A1c reviewed-   Lab Results   Component Value Date    HGBA1C 7.3 (H) 03/19/2024     Most recent fingerstick glucose reviewed-   Recent Labs   Lab 07/04/24  2158 07/05/24  0821 07/05/24  1142   POCTGLUCOSE 131* 135* 246*       Current correctional scale  Low  Maintain anti-hyperglycemic dose as follows-   Antihyperglycemics (From admission, onward)    Start     Stop Route Frequency Ordered    07/04/24 0945  insulin glargine U-100 (Lantus) pen 9 Units         -- SubQ Daily 07/04/24 0936    07/03/24 0930  insulin aspart U-100 pen 0-10 Units         -- SubQ Before meals & nightly PRN 07/03/24 0931    07/02/24 1215  insulin aspart U-100 pen 3 Units         -- SubQ 3 times daily with meals 07/02/24 1114        Hold Oral hypoglycemics while patient is in the hospital.

## 2024-07-05 NOTE — SUBJECTIVE & OBJECTIVE
Interval History: Stable on RA. Chest tube removed today after assessment    BG stable.   Vaginal bleeding noted, monitoring H/H and gynonc following     Review of Systems   Constitutional:  Negative for fever.   Respiratory:  Negative for shortness of breath.    Cardiovascular:  Positive for leg swelling.   Gastrointestinal:  Negative for abdominal pain.   Genitourinary:  Positive for vaginal bleeding.     Objective:     Vital Signs (Most Recent):  Temp: 99.1 °F (37.3 °C) (07/05/24 1525)  Pulse: 93 (07/05/24 1525)  Resp: 18 (07/05/24 1525)  BP: (!) 118/59 (07/05/24 1525)  SpO2: 100 % (07/05/24 1525) Vital Signs (24h Range):  Temp:  [98.4 °F (36.9 °C)-99.4 °F (37.4 °C)] 99.1 °F (37.3 °C)  Pulse:  [88-93] 93  Resp:  [17-19] 18  SpO2:  [91 %-100 %] 100 %  BP: (104-133)/(51-71) 118/59     Weight: 86.9 kg (191 lb 9.3 oz)  Body mass index is 30.01 kg/m².    Intake/Output Summary (Last 24 hours) at 7/5/2024 1836  Last data filed at 7/5/2024 1437  Gross per 24 hour   Intake 1080 ml   Output 1090 ml   Net -10 ml         Physical Exam  Vitals and nursing note reviewed.   Constitutional:       General: She is not in acute distress.     Appearance: Normal appearance.   HENT:      Head: Normocephalic and atraumatic.      Nose: Nose normal.      Mouth/Throat:      Mouth: Mucous membranes are moist.      Pharynx: Oropharynx is clear.   Eyes:      Extraocular Movements: Extraocular movements intact.      Conjunctiva/sclera: Conjunctivae normal.      Pupils: Pupils are equal, round, and reactive to light.   Cardiovascular:      Rate and Rhythm: Normal rate and regular rhythm.      Pulses: Normal pulses.      Heart sounds: Normal heart sounds.   Pulmonary:      Effort: Pulmonary effort is normal.      Comments: Decreased breath sounds R base, CT in place draining purulent output (removed by pulm this afternoon but still present on my assessment this morning)  Abdominal:      General: Abdomen is flat. Bowel sounds are normal.       Palpations: Abdomen is soft.   Musculoskeletal:         General: Normal range of motion.      Cervical back: Normal range of motion and neck supple.      Right lower leg: Edema present.      Left lower leg: Edema present.   Skin:     General: Skin is warm and dry.   Neurological:      General: No focal deficit present.      Mental Status: She is alert and oriented to person, place, and time.   Psychiatric:         Mood and Affect: Mood normal.         Behavior: Behavior normal.             Significant Labs: All pertinent labs within the past 24 hours have been reviewed.    Significant Imaging: I have reviewed all pertinent imaging results/findings within the past 24 hours.

## 2024-07-05 NOTE — ASSESSMENT & PLAN NOTE
Chronic, controlled. Latest blood pressure and vitals reviewed-     Temp:  [98.4 °F (36.9 °C)-99.4 °F (37.4 °C)]   Pulse:  [88-93]   Resp:  [17-19]   BP: (104-133)/(51-71)   SpO2:  [91 %-100 %] .   Home meds for hypertension were reviewed and noted below.   Hypertension Medications               amlodipine (NORVASC) 10 MG tablet Take 10 mg by mouth once daily.     enalapril (VASOTEC) 20 MG tablet Take 20 mg by mouth once daily.    hydrochlorothiazide (HYDRODIURIL) 25 MG tablet Take 25 mg by mouth once daily.             While in the hospital, will manage blood pressure as follows; Continue home antihypertensive regimen    Will utilize p.r.n. blood pressure medication only if patient's blood pressure greater than 180/110 and she develops symptoms such as worsening chest pain or shortness of breath.

## 2024-07-05 NOTE — PLAN OF CARE
Problem: Adult Inpatient Plan of Care  Goal: Plan of Care Review  Outcome: Progressing  Goal: Patient-Specific Goal (Individualized)  Outcome: Progressing  Goal: Absence of Hospital-Acquired Illness or Injury  Outcome: Progressing  Intervention: Prevent Skin Injury  Flowsheets (Taken 7/5/2024 1746)  Skin Protection: protective footwear used  Device Skin Pressure Protection: absorbent pad utilized/changed  Intervention: Prevent and Manage VTE (Venous Thromboembolism) Risk  Flowsheets (Taken 7/5/2024 1746)  VTE Prevention/Management:   bleeding precations maintained   bleeding risk assessed   fluids promoted   ROM (active) performed  Goal: Optimal Comfort and Wellbeing  Outcome: Progressing  Goal: Readiness for Transition of Care  Outcome: Progressing     Problem: Skin Injury Risk Increased  Goal: Skin Health and Integrity  Outcome: Progressing     Problem: Diabetes Comorbidity  Goal: Blood Glucose Level Within Targeted Range  Outcome: Progressing  Intervention: Monitor and Manage Glycemia  Flowsheets (Taken 7/5/2024 1746)  Glycemic Management:   blood glucose monitored   supplemental insulin given     Problem: Acute Kidney Injury/Impairment  Goal: Fluid and Electrolyte Balance  Outcome: Progressing  Goal: Improved Oral Intake  Outcome: Progressing  Goal: Effective Renal Function  Outcome: Progressing     Problem: Fall Injury Risk  Goal: Absence of Fall and Fall-Related Injury  Outcome: Progressing  Intervention: Promote Injury-Free Environment  Flowsheets (Taken 7/5/2024 1746)  Safety Promotion/Fall Prevention:   assistive device/personal item within reach   bed alarm set   Fall Risk signage in place   Fall Risk reviewed with patient/family   family to remain at bedside   nonskid shoes/socks when out of bed   side rails raised x 3   instructed to call staff for mobility   bedside commode chair     POC in progress. Address questions and concerns. AAOX4. VVS. Up in chair tolerate well. Dilaudid,prn for pain. Right  posterior back drsg C/D/I.  SAFETY CHECKS PERFORMED.  Call light in reach.

## 2024-07-05 NOTE — ASSESSMENT & PLAN NOTE
- Mild right-sided hydroureteronephrosis which appears to result from a soft tissue mass at the level of the right common iliac artery.   - urology consulted

## 2024-07-06 PROBLEM — R60.0 PERIPHERAL EDEMA: Status: ACTIVE | Noted: 2024-07-06

## 2024-07-06 LAB
ALBUMIN SERPL BCP-MCNC: 2.1 G/DL (ref 3.5–5.2)
ALP SERPL-CCNC: 117 U/L (ref 55–135)
ALT SERPL W/O P-5'-P-CCNC: 5 U/L (ref 10–44)
ANION GAP SERPL CALC-SCNC: 12 MMOL/L (ref 8–16)
AST SERPL-CCNC: 26 U/L (ref 10–40)
BACTERIA #/AREA URNS AUTO: NORMAL /HPF
BACTERIA BLD CULT: NORMAL
BACTERIA BLD CULT: NORMAL
BASOPHILS # BLD AUTO: 0.09 K/UL (ref 0–0.2)
BASOPHILS NFR BLD: 0.4 % (ref 0–1.9)
BILIRUB SERPL-MCNC: 0.8 MG/DL (ref 0.1–1)
BILIRUB UR QL STRIP: NEGATIVE
BUN SERPL-MCNC: 26 MG/DL (ref 6–20)
CALCIUM SERPL-MCNC: 9 MG/DL (ref 8.7–10.5)
CHLORIDE SERPL-SCNC: 104 MMOL/L (ref 95–110)
CLARITY UR REFRACT.AUTO: CLEAR
CO2 SERPL-SCNC: 20 MMOL/L (ref 23–29)
COLOR UR AUTO: YELLOW
CREAT SERPL-MCNC: 0.8 MG/DL (ref 0.5–1.4)
CRP SERPL-MCNC: 176.6 MG/L (ref 0–8.2)
DIFFERENTIAL METHOD BLD: ABNORMAL
EOSINOPHIL # BLD AUTO: 0.3 K/UL (ref 0–0.5)
EOSINOPHIL NFR BLD: 1.3 % (ref 0–8)
ERYTHROCYTE [DISTWIDTH] IN BLOOD BY AUTOMATED COUNT: 20.4 % (ref 11.5–14.5)
EST. GFR  (NO RACE VARIABLE): >60 ML/MIN/1.73 M^2
GLUCOSE SERPL-MCNC: 133 MG/DL (ref 70–110)
GLUCOSE UR QL STRIP: NEGATIVE
HCT VFR BLD AUTO: 28.4 % (ref 37–48.5)
HGB BLD-MCNC: 8.7 G/DL (ref 12–16)
HGB UR QL STRIP: ABNORMAL
IMM GRANULOCYTES # BLD AUTO: 0.5 K/UL (ref 0–0.04)
IMM GRANULOCYTES NFR BLD AUTO: 2.4 % (ref 0–0.5)
KETONES UR QL STRIP: NEGATIVE
LEUKOCYTE ESTERASE UR QL STRIP: NEGATIVE
LYMPHOCYTES # BLD AUTO: 2.7 K/UL (ref 1–4.8)
LYMPHOCYTES NFR BLD: 12.8 % (ref 18–48)
MAGNESIUM SERPL-MCNC: 1.5 MG/DL (ref 1.6–2.6)
MCH RBC QN AUTO: 26.4 PG (ref 27–31)
MCHC RBC AUTO-ENTMCNC: 30.6 G/DL (ref 32–36)
MCV RBC AUTO: 86 FL (ref 82–98)
MICROSCOPIC COMMENT: NORMAL
MONOCYTES # BLD AUTO: 1.4 K/UL (ref 0.3–1)
MONOCYTES NFR BLD: 6.8 % (ref 4–15)
NEUTROPHILS # BLD AUTO: 16 K/UL (ref 1.8–7.7)
NEUTROPHILS NFR BLD: 76.3 % (ref 38–73)
NITRITE UR QL STRIP: NEGATIVE
NRBC BLD-RTO: 0 /100 WBC
PH UR STRIP: 5 [PH] (ref 5–8)
PHOSPHATE SERPL-MCNC: 3 MG/DL (ref 2.7–4.5)
PLATELET # BLD AUTO: 565 K/UL (ref 150–450)
PMV BLD AUTO: 10.4 FL (ref 9.2–12.9)
POCT GLUCOSE: 147 MG/DL (ref 70–110)
POCT GLUCOSE: 212 MG/DL (ref 70–110)
POCT GLUCOSE: 225 MG/DL (ref 70–110)
POCT GLUCOSE: 233 MG/DL (ref 70–110)
POTASSIUM SERPL-SCNC: 4 MMOL/L (ref 3.5–5.1)
PROT SERPL-MCNC: 6.9 G/DL (ref 6–8.4)
PROT UR QL STRIP: NEGATIVE
RBC # BLD AUTO: 3.29 M/UL (ref 4–5.4)
RBC #/AREA URNS AUTO: 2 /HPF (ref 0–4)
SODIUM SERPL-SCNC: 136 MMOL/L (ref 136–145)
SP GR UR STRIP: 1.01 (ref 1–1.03)
SQUAMOUS #/AREA URNS AUTO: 0 /HPF
URN SPEC COLLECT METH UR: ABNORMAL
WBC # BLD AUTO: 21.03 K/UL (ref 3.9–12.7)
WBC #/AREA URNS AUTO: 3 /HPF (ref 0–5)

## 2024-07-06 PROCEDURE — 36415 COLL VENOUS BLD VENIPUNCTURE: CPT | Mod: HCNC | Performed by: STUDENT IN AN ORGANIZED HEALTH CARE EDUCATION/TRAINING PROGRAM

## 2024-07-06 PROCEDURE — 36415 COLL VENOUS BLD VENIPUNCTURE: CPT | Mod: HCNC | Performed by: NURSE PRACTITIONER

## 2024-07-06 PROCEDURE — 25000003 PHARM REV CODE 250: Mod: HCNC | Performed by: HOSPITALIST

## 2024-07-06 PROCEDURE — 25000003 PHARM REV CODE 250: Mod: HCNC

## 2024-07-06 PROCEDURE — 80053 COMPREHEN METABOLIC PANEL: CPT | Mod: HCNC | Performed by: NURSE PRACTITIONER

## 2024-07-06 PROCEDURE — 85025 COMPLETE CBC W/AUTO DIFF WBC: CPT | Mod: HCNC | Performed by: NURSE PRACTITIONER

## 2024-07-06 PROCEDURE — 25000003 PHARM REV CODE 250: Mod: HCNC | Performed by: STUDENT IN AN ORGANIZED HEALTH CARE EDUCATION/TRAINING PROGRAM

## 2024-07-06 PROCEDURE — 27000207 HC ISOLATION: Mod: HCNC

## 2024-07-06 PROCEDURE — 83735 ASSAY OF MAGNESIUM: CPT | Mod: HCNC | Performed by: NURSE PRACTITIONER

## 2024-07-06 PROCEDURE — 84100 ASSAY OF PHOSPHORUS: CPT | Mod: HCNC | Performed by: NURSE PRACTITIONER

## 2024-07-06 PROCEDURE — 63600175 PHARM REV CODE 636 W HCPCS: Mod: HCNC | Performed by: STUDENT IN AN ORGANIZED HEALTH CARE EDUCATION/TRAINING PROGRAM

## 2024-07-06 PROCEDURE — 81001 URINALYSIS AUTO W/SCOPE: CPT | Mod: HCNC | Performed by: STUDENT IN AN ORGANIZED HEALTH CARE EDUCATION/TRAINING PROGRAM

## 2024-07-06 PROCEDURE — 20600001 HC STEP DOWN PRIVATE ROOM: Mod: HCNC

## 2024-07-06 PROCEDURE — 87040 BLOOD CULTURE FOR BACTERIA: CPT | Mod: HCNC | Performed by: STUDENT IN AN ORGANIZED HEALTH CARE EDUCATION/TRAINING PROGRAM

## 2024-07-06 PROCEDURE — 63600175 PHARM REV CODE 636 W HCPCS: Mod: HCNC | Performed by: HOSPITALIST

## 2024-07-06 PROCEDURE — 87449 NOS EACH ORGANISM AG IA: CPT | Mod: HCNC | Performed by: STUDENT IN AN ORGANIZED HEALTH CARE EDUCATION/TRAINING PROGRAM

## 2024-07-06 PROCEDURE — 86140 C-REACTIVE PROTEIN: CPT | Mod: HCNC | Performed by: STUDENT IN AN ORGANIZED HEALTH CARE EDUCATION/TRAINING PROGRAM

## 2024-07-06 PROCEDURE — 99233 SBSQ HOSP IP/OBS HIGH 50: CPT | Mod: HCNC,,, | Performed by: OBSTETRICS & GYNECOLOGY

## 2024-07-06 PROCEDURE — 99233 SBSQ HOSP IP/OBS HIGH 50: CPT | Mod: HCNC,,, | Performed by: INTERNAL MEDICINE

## 2024-07-06 RX ORDER — LACTULOSE 10 G/15ML
20 SOLUTION ORAL EVERY 6 HOURS PRN
Status: DISCONTINUED | OUTPATIENT
Start: 2024-07-06 | End: 2024-07-10

## 2024-07-06 RX ORDER — BISACODYL 10 MG/1
10 SUPPOSITORY RECTAL DAILY PRN
Status: DISCONTINUED | OUTPATIENT
Start: 2024-07-06 | End: 2024-07-10

## 2024-07-06 RX ORDER — INSULIN ASPART 100 [IU]/ML
4 INJECTION, SOLUTION INTRAVENOUS; SUBCUTANEOUS
Status: DISCONTINUED | OUTPATIENT
Start: 2024-07-06 | End: 2024-07-07

## 2024-07-06 RX ORDER — MAGNESIUM SULFATE HEPTAHYDRATE 40 MG/ML
2 INJECTION, SOLUTION INTRAVENOUS ONCE
Status: COMPLETED | OUTPATIENT
Start: 2024-07-06 | End: 2024-07-06

## 2024-07-06 RX ADMIN — HEPARIN SODIUM 5000 UNITS: 5000 INJECTION INTRAVENOUS; SUBCUTANEOUS at 01:07

## 2024-07-06 RX ADMIN — SIMETHICONE 80 MG: 80 TABLET, CHEWABLE ORAL at 04:07

## 2024-07-06 RX ADMIN — GUAIFENESIN 600 MG: 600 TABLET, EXTENDED RELEASE ORAL at 09:07

## 2024-07-06 RX ADMIN — HEPARIN SODIUM 5000 UNITS: 5000 INJECTION INTRAVENOUS; SUBCUTANEOUS at 06:07

## 2024-07-06 RX ADMIN — HEPARIN SODIUM 5000 UNITS: 5000 INJECTION INTRAVENOUS; SUBCUTANEOUS at 09:07

## 2024-07-06 RX ADMIN — ATORVASTATIN CALCIUM 40 MG: 40 TABLET, FILM COATED ORAL at 09:07

## 2024-07-06 RX ADMIN — ONDANSETRON HYDROCHLORIDE 4 MG: 4 TABLET, FILM COATED ORAL at 04:07

## 2024-07-06 RX ADMIN — INSULIN ASPART 3 UNITS: 100 INJECTION, SOLUTION INTRAVENOUS; SUBCUTANEOUS at 09:07

## 2024-07-06 RX ADMIN — AMLODIPINE BESYLATE 10 MG: 10 TABLET ORAL at 09:07

## 2024-07-06 RX ADMIN — MEROPENEM 1 G: 1 INJECTION INTRAVENOUS at 11:07

## 2024-07-06 RX ADMIN — INSULIN ASPART 7 UNITS: 100 INJECTION, SOLUTION INTRAVENOUS; SUBCUTANEOUS at 11:07

## 2024-07-06 RX ADMIN — INSULIN GLARGINE 9 UNITS: 100 INJECTION, SOLUTION SUBCUTANEOUS at 09:07

## 2024-07-06 RX ADMIN — MUPIROCIN: 20 OINTMENT TOPICAL at 09:07

## 2024-07-06 RX ADMIN — SENNOSIDES AND DOCUSATE SODIUM 1 TABLET: 50; 8.6 TABLET ORAL at 09:07

## 2024-07-06 RX ADMIN — FUROSEMIDE 40 MG: 40 TABLET ORAL at 09:07

## 2024-07-06 RX ADMIN — LAMOTRIGINE 25 MG: 25 TABLET ORAL at 09:07

## 2024-07-06 RX ADMIN — BISACODYL 10 MG: 10 SUPPOSITORY RECTAL at 11:07

## 2024-07-06 RX ADMIN — MAGNESIUM SULFATE HEPTAHYDRATE 2 G: 40 INJECTION, SOLUTION INTRAVENOUS at 10:07

## 2024-07-06 RX ADMIN — INSULIN ASPART 8 UNITS: 100 INJECTION, SOLUTION INTRAVENOUS; SUBCUTANEOUS at 04:07

## 2024-07-06 RX ADMIN — INSULIN ASPART 2 UNITS: 100 INJECTION, SOLUTION INTRAVENOUS; SUBCUTANEOUS at 10:07

## 2024-07-06 RX ADMIN — PROMETHAZINE HYDROCHLORIDE 12.5 MG: 25 INJECTION INTRAMUSCULAR; INTRAVENOUS at 06:07

## 2024-07-06 RX ADMIN — POLYETHYLENE GLYCOL 3350 17 G: 17 POWDER, FOR SOLUTION ORAL at 09:07

## 2024-07-06 RX ADMIN — LACTULOSE 20 G: 20 SOLUTION ORAL at 09:07

## 2024-07-06 RX ADMIN — MEROPENEM 1 G: 1 INJECTION INTRAVENOUS at 09:07

## 2024-07-06 RX ADMIN — MEROPENEM 1 G: 1 INJECTION INTRAVENOUS at 03:07

## 2024-07-06 NOTE — ASSESSMENT & PLAN NOTE
- Ecoli empyema s/p thoracentesis and chest tube placement  - s/p CT in place to suction- CTS and pulm following for management and lytic recommendations - now signed off  - chest tube removed 7/5 per pulm recommendations. Repeat CXR without detrimental change, stable on RA, cont to monitor resp status  - cont meropenem per ID recs

## 2024-07-06 NOTE — ASSESSMENT & PLAN NOTE
Patient with acute kidney injury/acute renal failure likely due to  Suspect a component of ANNE from previous admission playing a role, but also volume depletion from DKA and osmotic diuresis from glucosuria.    JASON is currently improving. Baseline creatinine  0.8  - Labs reviewed- Renal function/electrolytes with Estimated Creatinine Clearance: 85.7 mL/min (based on SCr of 0.8 mg/dL). according to latest data. Monitor urine output and serial BMP and adjust therapy as needed. Avoid nephrotoxins and renally dose meds for GFR listed above.  - renal following   - resolved

## 2024-07-06 NOTE — ASSESSMENT & PLAN NOTE
- leukocytosis noted to be worsening after CT removal- s/p chest tube for Ecoli empyema- currently on fanta per ID recs  - given worsening leukocytosis repeating infectious workup with Bcx, fungitell  - UA not infectious and CXR without detrimental changes  - noted: subdiaphragmatic collection measuring 7.7 x 3.0 cm seen on CT- will assess need for drainage if leukocytosis continues to worsen may need IR drainage if infection

## 2024-07-06 NOTE — HPI
58yoF w/pmhx most notable for metastatic ovarian cancer s/p platinum based chemo who was recently admitted to the hospital with DKA, JASON, and infected malignant pleural effusion. Had CT scan today found to have mild right hydronephrosis though to be secondary to new pelvic mass near right iliacs potentially extrinsically compressing her right ureter. Appears to be new, not present on prior CT from March. Denies any symptoms, no flank pain, n/v/f/c, dysuria, hematuria. No history of kidney stones. JASON has resolved over hospital course, Cr 1.0 today. AFVSS on exam. UA 7/1 with few bacteria, contaminated with 6 squams. UCx 7/1 growing candida. Currently on meropenem for ESBL E.coli pleural effusion. Urology consulted for management of hydronephrosis.

## 2024-07-06 NOTE — ASSESSMENT & PLAN NOTE
"- previously on AC, held for decreasing Hgb and vaginal bleeding   - "Patient with small right lower lobe pulmonary embolism noted on June 2024 CT chest and also noted on prior films as well. Patient has not been on full-dose anticoagulation only prophylaxis dosing. Given her advanced cancer and PE noted on CT, it would not be unreasonable to treat with full-dose anticoagulation. However patient is breathing well on room air and her prior respiratory issues are likely related to her right-sided pleural effusion. If a decision is made to treat with full-dose anticoagulation, patient should be treated with Lovenox 1 milligram/kilogram b.i.d and sent home on this regimen. I see no contraindications to anticoagulation however if other procedures are planned then it may be beneficial to wait until discharge. "  - cont heparin ppx, resume lovenox once able and no further procedures planned    " Posterior Auricular Interpolation Flap Text: A decision was made to reconstruct the defect utilizing an interpolation axial flap and a staged reconstruction.  A telfa template was made of the defect.  This telfa template was then used to outline the posterior auricular interpolation flap.  The donor area for the pedicle flap was then injected with anesthesia.  The flap was excised through the skin and subcutaneous tissue down to the layer of the underlying musculature.  The pedicle flap was carefully excised within this deep plane to maintain its blood supply.  The edges of the donor site were undermined.   The donor site was closed in a primary fashion.  The pedicle was then rotated into position and sutured.  Once the tube was sutured into place, adequate blood supply was confirmed with blanching and refill.  The pedicle was then wrapped with xeroform gauze and dressed appropriately with a telfa and gauze bandage to ensure continued blood supply and protect the attached pedicle.

## 2024-07-06 NOTE — PROGRESS NOTES
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  She was discharged on 06/27.  During hospitalization she was treated with Zosyn for 6 days due to recurrent fevers but infectious workup was negative      On admission:   Pulse 100 saturating 99%, afebrile, blood pressure 88/48.  CBC notable for marked leukocytosis 20,000 hemoglobin of 7.4 (hematocrit 23) with increase in granulocyte percentage, RDW 19.9.  CMP notable for a sodium of 129 CO2 14, anion gap 21, BUN 76 creatinine 4.6 GFR 10,  glucose of 403 alk-phos 170, normal AST ALT, uric acid of 9.3    Imaging notable for:  CT scan indicates significant pleural fluid accumulation at the right lung base with suspected lung consolidation and mediastinal shift. The Chest X-ray confirms worsening opacity in the right hemithorax consistent with pleural fluid, along with parenchymal changes suggesting atelectasis and possible infiltrates. There are no signs of pneumothorax, but mild changes are noted in the left lung base.    In the ED received vanc and Zosyn and insulin pending.          Overview/Hospital Course:  Admitted to ICU for DKA, started on insulin drip. Acidosis resolved and transitioned to subq insulin.  Stable for step down.   Thoracentesis 07/02/24; drained 800 ml cloudy (yellow cloudy/chylous looking fluid vs purulence). Cytology and cultures sent. Chest tube placed 7/3. Thoracic surgery consulted for recommendations on lytic therapy.   Pleural fluid with E coli. ID consulted. Cont meropenem per ID recs.   Chest tube to suction Pulm and CTS following. Repeat CT chest Small right hydropneumothorax with a pigtail chest tube in place. Also showing new right hydronephrosis, repeat CT abd pelvis ordered and showed: right-sided hydroureteronephrosis which appears to result from a soft tissue mass at the level of the right common iliac artery. Urology consulted. Per urology Patient will need retrograde pyelograms and right ureteral stent placement given extrinsic compression. If patient fails ureteral stent she will likely need nephrostomy tube to preserve renal function.  Pleural effusion improved and pulm removed chest tube.   Worsening leukocytosis since CT removal, repeating infectious workup. CXR with loculated right pleural reaction/pleural effusion. No interval detrimental change noted. Continues to be stable on RA.      Interval History: No concerns, increasing bowel regimen as  no BM and feels constipated. Stable on RA, CXR with no detrimental change but given leukocytosis repeating some infectious labs. Bleeding resolved H/H stable.     Review of Systems   Constitutional:  Negative for fever.   Respiratory:  Negative for shortness of breath.    Cardiovascular:  Positive for leg swelling.   Gastrointestinal:  Negative for abdominal pain.   Genitourinary:  Negative for vaginal bleeding.     Objective:     Vital Signs (Most Recent):  Temp: 98.2 °F (36.8 °C) (07/06/24 1137)  Pulse: 98 (07/06/24 1144)  Resp: 19 (07/06/24 1137)  BP: 122/74 (07/06/24 1137)  SpO2: 98 % (07/06/24 1137) Vital Signs (24h Range):  Temp:  [98.2 °F (36.8 °C)-98.6 °F (37 °C)] 98.2 °F (36.8 °C)  Pulse:  [85-98] 98  Resp:  [18-19] 19  SpO2:   [98 %-100 %] 98 %  BP: (117-139)/(58-75) 122/74     Weight: 86.9 kg (191 lb 9.3 oz)  Body mass index is 30.01 kg/m².    Intake/Output Summary (Last 24 hours) at 7/6/2024 1549  Last data filed at 7/6/2024 0633  Gross per 24 hour   Intake --   Output 425 ml   Net -425 ml         Physical Exam  Vitals and nursing note reviewed.   Constitutional:       General: She is not in acute distress.     Appearance: Normal appearance.   HENT:      Head: Normocephalic and atraumatic.      Nose: Nose normal.      Mouth/Throat:      Mouth: Mucous membranes are moist.      Pharynx: Oropharynx is clear.   Eyes:      Extraocular Movements: Extraocular movements intact.      Conjunctiva/sclera: Conjunctivae normal.      Pupils: Pupils are equal, round, and reactive to light.   Cardiovascular:      Rate and Rhythm: Normal rate and regular rhythm.      Pulses: Normal pulses.      Heart sounds: Normal heart sounds.   Pulmonary:      Effort: Pulmonary effort is normal.      Comments: Decreased breath sounds R base, s/p CT  Abdominal:      General: Abdomen is flat. Bowel sounds are normal. There is distension.      Palpations: Abdomen is soft.      Tenderness: There is no abdominal tenderness.   Musculoskeletal:         General: Normal range of motion.      Cervical back: Normal range of motion and neck supple.      Right lower leg: Edema (pitting edema to knees) present.      Left lower leg: Edema (pitting edema to knees) present.   Skin:     General: Skin is warm and dry.   Neurological:      General: No focal deficit present.      Mental Status: She is alert and oriented to person, place, and time.   Psychiatric:         Mood and Affect: Mood normal.         Behavior: Behavior normal.             Significant Labs: All pertinent labs within the past 24 hours have been reviewed.    Significant Imaging: I have reviewed all pertinent imaging results/findings within the past 24 hours.    Assessment/Plan:      * Diabetic ketoacidosis without  coma associated with type 2 diabetes mellitus  - ICU on insulin drip, gap now closed and acidosis improving, transitioned to subq insulin  - monitor BG and adjust insulin as needed      Peripheral edema  - reviewed prior ECHO: EF low normal.   Left Ventricle: There is low normal systolic function with a visually estimated ejection fraction of 50 - 55%. Biplane (2D) method of discs ejection fraction is 50%. Global longitudinal strain is -18.0%.    Right Ventricle: Normal right ventricular cavity size. Wall thickness is normal. Right ventricle wall motion  is normal. Systolic function is normal.    Left Atrium: Left atrium is mildly dilated.    Mitral Valve: There is mild regurgitation.    IVC/SVC: Normal venous pressure at 3 mmHg.    Pericardium: Left pleural effusion. Ascites present.    - start lasix and IMANI hose      Hydroureteronephrosis  - Mild right-sided hydroureteronephrosis which appears to result from a soft tissue mass at the level of the right common iliac artery.   - urology consulted  - per urology: Patient will need retrograde pyelograms and right ureteral stent placement given extrinsic compression. If patient fails ureteral stent she will likely need nephrostomy tube to preserve renal function, f/u if this will be done this admission awaiting urology decision       Hypomagnesemia  Patient has Abnormal Magnesium: hypomagnesemia. Will continue to monitor electrolytes closely. Will replace the affected electrolytes and repeat labs to be done after interventions completed. The patient's magnesium results have been reviewed and are listed below.  Recent Labs   Lab 07/06/24  0436   MG 1.5*          Empyema  - Ecoli empyema s/p thoracentesis and chest tube placement  - s/p CT in place to suction- CTS and pulm following for management and lytic recommendations - now signed off  - chest tube removed 7/5 per pulm recommendations. Repeat CXR without detrimental change, stable on RA, cont to monitor resp  "status  - cont meropenem per ID recs      High anion gap metabolic acidosis  - gap closed, acidosis improving       Chronic pulmonary embolism without acute cor pulmonale  - previously on AC, held for decreasing Hgb and vaginal bleeding   - "Patient with small right lower lobe pulmonary embolism noted on June 2024 CT chest and also noted on prior films as well. Patient has not been on full-dose anticoagulation only prophylaxis dosing. Given her advanced cancer and PE noted on CT, it would not be unreasonable to treat with full-dose anticoagulation. However patient is breathing well on room air and her prior respiratory issues are likely related to her right-sided pleural effusion. If a decision is made to treat with full-dose anticoagulation, patient should be treated with Lovenox 1 milligram/kilogram b.i.d and sent home on this regimen. I see no contraindications to anticoagulation however if other procedures are planned then it may be beneficial to wait until discharge. "  - cont heparin ppx, resume lovenox once able and no further procedures planned      Pleural effusion  Patient found to have moderate pleural effusion on imaging. I have personally reviewed and interpreted the following imaging: Xray and CT. A thoracentesis was performed. Pleural fluid was sent for analysis. Exudative consistent with empyema   - Cytology in process  - empyema Ecoli growing. ID recommend cont meropenem   Management to include chest tube  - CT in place to suction- pulm and CTS following and managing CT- may need lytic therapy   - repeat CT done - Small right hydropneumothorax with a pigtail chest tube in place.   - pulm removed chest tube 7/5- remains stable on RA post CT removal  - CXR without detrimental changes, cont to monitor respiratory status      Leukocytosis  - leukocytosis noted to be worsening after CT removal- s/p chest tube for Ecoli empyema- currently on fanta per ID recs  - given worsening leukocytosis repeating " infectious workup with Bcx, fungitell  - UA not infectious and CXR without detrimental changes  - noted: subdiaphragmatic collection measuring 7.7 x 3.0 cm seen on CT- will assess need for drainage if leukocytosis continues to worsen may need IR drainage if infection       S/p CARLOS ALBERTO/BSO/OMX/Debulking/Partial liver resection/Cholecystectomy  - noted      JASON (acute kidney injury)  Patient with acute kidney injury/acute renal failure likely due to  Suspect a component of ANNE from previous admission playing a role, but also volume depletion from DKA and osmotic diuresis from glucosuria.    JASON is currently improving. Baseline creatinine  0.8  - Labs reviewed- Renal function/electrolytes with Estimated Creatinine Clearance: 85.7 mL/min (based on SCr of 0.8 mg/dL). according to latest data. Monitor urine output and serial BMP and adjust therapy as needed. Avoid nephrotoxins and renally dose meds for GFR listed above.  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 Will Continue anti-depressant medications. We will not consult psychiatry at this time. Patient does not display psychosis at this time. Continue to monitor closely and adjust plan of care as needed.        Hypertension  Chronic, controlled. Latest blood pressure and vitals reviewed-     Temp:  [98.2 °F (36.8 °C)-98.6 °F (37 °C)]   Pulse:  []   Resp:  [18-19]   BP: (117-139)/(58-75)   SpO2:  [98 %-100 %] .   Home meds  for hypertension were reviewed and noted below.   Hypertension Medications               amlodipine (NORVASC) 10 MG tablet Take 10 mg by mouth once daily.     enalapril (VASOTEC) 20 MG tablet Take 20 mg by mouth once daily.    hydrochlorothiazide (HYDRODIURIL) 25 MG tablet Take 25 mg by mouth once daily.             While in the hospital, will manage blood pressure as follows; Continue home antihypertensive regimen- was holding ACE in setting of JASON - BP now controlled without ACE will cont to hold    Will utilize p.r.n. blood pressure medication only if patient's blood pressure greater than 180/110 and she develops symptoms such as worsening chest pain or shortness of breath.    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  Last A1c reviewed-   Lab Results   Component Value Date    HGBA1C 7.3 (H) 03/19/2024     Most recent fingerstick glucose reviewed-   Recent Labs   Lab 07/05/24  1653 07/06/24  0823 07/06/24  1136   POCTGLUCOSE 185* 147* 225*     Current correctional scale  Low  Maintain anti-hyperglycemic dose as follows-   Antihyperglycemics (From admission, onward)      Start     Stop Route Frequency Ordered    07/06/24 1645  insulin aspart U-100 pen 4 Units         -- SubQ 3 times daily with meals 07/06/24 1554    07/04/24 0945  insulin glargine U-100 (Lantus) pen 9 Units         -- SubQ Daily 07/04/24 0936    07/03/24 0930  insulin aspart U-100 pen 0-10 Units         -- SubQ Before meals & nightly PRN 07/03/24 0931          Hold Oral hypoglycemics while patient is in the hospital.      VTE Risk Mitigation (From admission, onward)           Ordered     Place IAMNI hose  Until discontinued         07/06/24 1550     heparin (porcine) injection 5,000 Units  Every 8 hours         07/03/24 0931     IP VTE HIGH RISK PATIENT  Once         07/01/24 0540     Place sequential compression device  Until discontinued          07/01/24 0540                    Discharge Planning   ROS: 7/8/2024     Code Status: Full Code   Is the patient medically ready for discharge?:     Reason for patient still in hospital (select all that apply): Patient trending condition and Consult recommendations  Discharge Plan A: Home with family, Home Health, Home ([facility])   Discharge Delays: None known at this time              Tia Wallace MD  Department of Hospital Medicine   [facility] - Telemetry Stepdown

## 2024-07-06 NOTE — ASSESSMENT & PLAN NOTE
Patient's FSGs are controlled on current medication regimen.  Last A1c reviewed-   Lab Results   Component Value Date    HGBA1C 7.3 (H) 03/19/2024     Most recent fingerstick glucose reviewed-   Recent Labs   Lab 07/05/24  1653 07/06/24  0823 07/06/24  1136   POCTGLUCOSE 185* 147* 225*     Current correctional scale  Low  Maintain anti-hyperglycemic dose as follows-   Antihyperglycemics (From admission, onward)      Start     Stop Route Frequency Ordered    07/06/24 1645  insulin aspart U-100 pen 4 Units         -- SubQ 3 times daily with meals 07/06/24 1554    07/04/24 0945  insulin glargine U-100 (Lantus) pen 9 Units         -- SubQ Daily 07/04/24 0936    07/03/24 0930  insulin aspart U-100 pen 0-10 Units         -- SubQ Before meals & nightly PRN 07/03/24 0931          Hold Oral hypoglycemics while patient is in the hospital.

## 2024-07-06 NOTE — ASSESSMENT & PLAN NOTE
58yoF w/new right hydronephrosis likely secondary to extrinsic compression from mass, likely metastatic ovarian cancer     - No acute urological intervention indicated   - JASON appears to have resolved    - Patient will need retrograde pyelograms and right ureteral stent placement given extrinsic compression. If patient fails ureteral stent she will likely need nephrostomy tube to preserve renal function   - Please place ambulatory referral to outpatient urology on discharge    - Rest of care per primary

## 2024-07-06 NOTE — ASSESSMENT & PLAN NOTE
Patient has Abnormal Magnesium: hypomagnesemia. Will continue to monitor electrolytes closely. Will replace the affected electrolytes and repeat labs to be done after interventions completed. The patient's magnesium results have been reviewed and are listed below.  Recent Labs   Lab 07/06/24  0436   MG 1.5*

## 2024-07-06 NOTE — CONSULTS
[facility] Tele[facility] Stepdown  Urology  Consult Note    Patient Name: Anette Ricci  MRN: [id number]  Admission Date: 6/30/2024  Hospital Length of Stay: 4   Code Status: Full Code   Attending Provider: Tia Wallace MD   Consulting Provider: Simone Akhtar MD  Primary Care Physician: Mark Chua MD  Principal Problem:Diabetic ketoacidosis without coma associated with type 2 diabetes mellitus    Inpatient consult to Urology  Consult performed by: Simone Akhtar MD  Consult ordered by: Tia Wallace MD          Subjective:     HPI:  58yoF w/pmhx most notable for metastatic ovarian cancer s/p platinum based chemo who was recently admitted to the hospital with DKA, JASON, and infected malignant pleural effusion. Had CT scan today found to have mild right hydronephrosis though to be secondary to new pelvic mass near right iliacs potentially extrinsically compressing her right ureter. Appears to be new, not present on prior CT from March. Denies any symptoms, no flank pain, n/v/f/c, dysuria, hematuria. No history of kidney stones. JASON has resolved over hospital course, Cr 1.0 today. AFVSS on exam. UA 7/1 with few bacteria, contaminated with 6 squams. UCx 7/1 growing candida. Currently on meropenem for ESBL E.coli pleural effusion. Urology consulted for management of hydronephrosis.    Past Medical History:   Diagnosis Date    Breast cancer 2013    Diabetes mellitus     Genetic testing 05/29/2013    VUS    Hyperlipidemia     Hypertension     Malignant neoplasm of upper-outer quadrant of right breast in female, estrogen receptor positive 12/02/2015    Seizure disorder        Past Surgical History:   Procedure Laterality Date    BILATERAL SALPINGO-OOPHORECTOMY (BSO) N/A 3/25/2024    Procedure: SALPINGO-OOPHORECTOMY, BILATERAL;  Surgeon: Александр Washington MD;  Location: [facility] OR [address] [place] [address];  Service: OB/GYN;  Laterality: N/A;    BREAST BIOPSY      BREAST LUMPECTOMY Right 2013    CHOLECYSTECTOMY  3/25/2024     Procedure: CHOLECYSTECTOMY;  Surgeon: Bo Farmer MD;  [facility];  Service: General;;    DEBULKING OF TUMOR N/A 3/25/2024    Procedure: DEBULKING, NEOPLASM;  Surgeon: Александр Washington MD;  [facility];  Service: OB/GYN;  Laterality: N/A;    EXCISION, LIVER N/A 3/25/2024    Procedure: EXCISION, LIVER - partial;  Surgeon: Bo Farmer MD;  [facility];  Service: General;  Laterality: N/A;  bk ultrasound  [place] book by TA on 3-21-24  7:00 a.m.  Conf #  [id number]    EYE SURGERY      LAPAROTOMY, EXPLORATORY N/A 3/25/2024    Procedure: LAPAROTOMY, EXPLORATORY;  Surgeon: Александр Washington MD;  Location: [facility] OR [address] [place] [address];  Service: OB/GYN;  Laterality: N/A;    OMENTECTOMY N/A 3/25/2024    Procedure: OMENTECTOMY;  Surgeon: Александр Washington MD;  Location: [facility] OR [address] [place] [address];  Service: OB/GYN;  Laterality: N/A;    PERITONEOCENTESIS N/A 3/13/2024    Procedure: PARACENTESIS, ABDOMINAL;  Surgeon: Flavia Moore MD;  Location: [facility] CATH LAB;  Service: Radiology;  Laterality: N/A;    PERITONEOCENTESIS N/A 3/21/2024    Procedure: PARACENTESIS, ABDOMINAL;  Surgeon: Jorge Jolley MD;  Location: [facility] CATH LAB;  Service: Radiology;  Laterality: N/A;    PERITONEOCENTESIS N/A 6/10/2024    Procedure: PARACENTESIS, ABDOMINAL;  Surgeon: Rogelio Malave MD;  Location: [facility] CATH LAB;  Service: Radiology;  Laterality: N/A;    TOTAL ABDOMINAL HYSTERECTOMY N/A 3/25/2024    Procedure: HYSTERECTOMY, TOTAL, ABDOMINAL;  Surgeon: Александр Washington MD;  Location: [facility] OR [address] [place] [address];  Service: OB/GYN;  Laterality: N/A;    TOTAL REDUCTION MAMMOPLASTY Bilateral 2016       Review of patient's allergies indicates:   Allergen Reactions    Codeine Other (See Comments)     Flu like s/s    Tramadol Other (See Comments)     Flu like symptoms similar to codeine reaction       Family History       Problem Relation (Age of Onset)    Breast cancer Sister (48)    Colon cancer Sister     Hypertension Mother, Brother, Brother    Seizures Father            Tobacco Use    Smoking status: Never    Smokeless tobacco: Never   Substance and Sexual Activity    Alcohol use: Yes     Alcohol/week: 0.0 standard drinks of alcohol     Comment: ocassional    Drug use: No    Sexual activity: Not Currently     Partners: Male     Birth control/protection: None           Objective:     Temp:  [98.6 °F (37 °C)-99.4 °F (37.4 °C)] 98.6 °F (37 °C)  Pulse:  [88-93] 90  Resp:  [17-19] 18  SpO2:  [91 %-100 %] 100 %  BP: (110-133)/(58-71) 120/62  Weight: 86.9 kg (191 lb 9.3 oz)  Body mass index is 30.01 kg/m².    Date 07/05/24 0700 - 07/06/24 0659   Shift 8124-1128 8835-3833 7544-7598 24 Hour Total   INTAKE   P.O. 840   840   Shift Total(mL/kg) 840(9.7)   840(9.7)   OUTPUT   Urine(mL/kg/hr) 700(1)   700   Shift Total(mL/kg) 700(8.1)   700(8.1)   Weight (kg) 86.9 86.9 86.9 86.9     Bladder Scan Volume (mL): (S) 31 mL (07/01/24 0357)    Drains       Drain  Duration             Female External Urinary Catheter w/ Suction 07/03/24 2 days                     Physical Exam  Constitutional:       General: She is not in acute distress.  HENT:      Head: Normocephalic and atraumatic.      Nose: Nose normal.   Eyes:      Conjunctiva/sclera: Conjunctivae normal.   Cardiovascular:      Rate and Rhythm: Normal rate.   Pulmonary:      Effort: Pulmonary effort is normal. No respiratory distress.   Abdominal:      General: There is no distension.      Tenderness: There is no right CVA tenderness or left CVA tenderness.   Musculoskeletal:         General: No deformity.   Skin:     General: Skin is warm and dry.   Neurological:      General: No focal deficit present.      Mental Status: She is alert.   Psychiatric:         Mood and Affect: Mood normal.         Behavior: Behavior normal.          Significant Labs:    BMP:  Recent Labs   Lab 07/03/24  1046 07/04/24  0335 07/05/24  0457   * 135* 136   K 3.6 3.9 4.4    106 106   CO2  20* 20* 22*   BUN 67* 55* 39*   CREATININE 2.9* 2.0* 1.0   CALCIUM 8.2* 8.3* 8.4*       CBC:  Recent Labs   Lab 07/03/24  1046 07/04/24  0335 07/05/24  0457   WBC 14.28* 14.82* 15.64*   HGB 7.7* 8.1* 7.4*   HCT 22.8* 25.9* 23.5*    506* 414       All pertinent labs results from the past 24 hours have been reviewed.    Significant Imaging:  All pertinent imaging results/findings from the past 24 hours have been reviewed.                    Assessment and Plan:     Hydroureteronephrosis  58yoF w/new right hydronephrosis likely secondary to extrinsic compression from mass, likely metastatic ovarian cancer     - No acute urological intervention indicated   - JASON appears to have resolved    - Patient will need retrograde pyelograms and right ureteral stent placement given extrinsic compression. If patient fails ureteral stent she will likely need nephrostomy tube to preserve renal function   - Please place ambulatory referral to outpatient urology on discharge    - Rest of care per primary         VTE Risk Mitigation (From admission, onward)           Ordered     heparin (porcine) injection 5,000 Units  Every 8 hours         07/03/24 0931     IP VTE HIGH RISK PATIENT  Once         07/01/24 0540     Place sequential compression device  Until discontinued         07/01/24 0540                    Thank you for your consult. I will sign off. Please contact us if you have any additional questions.    Simone Akhtar MD  Urology  [facility] - Telemetry Stepdown

## 2024-07-06 NOTE — SUBJECTIVE & OBJECTIVE
Interval History: rising WBC, no new complaints, she continues to feel better,particularly now that chest tube is out    Review of Systems   Constitutional:  Negative for activity change, chills and fever.   HENT:  Negative for congestion, mouth sores, rhinorrhea, sinus pressure and sore throat.    Eyes:  Negative for photophobia, pain and redness.   Respiratory:  Negative for cough, chest tightness and wheezing.    Cardiovascular:  Negative for chest pain and leg swelling.   Gastrointestinal:  Negative for abdominal distention, abdominal pain, diarrhea, nausea and vomiting.   Endocrine: Negative for polyuria.   Genitourinary:  Negative for decreased urine volume, dysuria and flank pain.   Musculoskeletal:  Negative for joint swelling and neck pain.   Skin:  Negative for color change.   Allergic/Immunologic: Negative for food allergies.   Neurological:  Negative for dizziness, weakness and headaches.   Hematological:  Negative for adenopathy.   Psychiatric/Behavioral:  Negative for agitation and confusion. The patient is not nervous/anxious.      Objective:     Vital Signs (Most Recent):  Temp: 98.2 °F (36.8 °C) (07/06/24 1137)  Pulse: 93 (07/06/24 1137)  Resp: 19 (07/06/24 1137)  BP: 122/74 (07/06/24 1137)  SpO2: 98 % (07/06/24 1137) Vital Signs (24h Range):  Temp:  [98.2 °F (36.8 °C)-99.1 °F (37.3 °C)] 98.2 °F (36.8 °C)  Pulse:  [85-93] 93  Resp:  [18-19] 19  SpO2:  [98 %-100 %] 98 %  BP: (117-139)/(58-75) 122/74     Weight: 86.9 kg (191 lb 9.3 oz)  Body mass index is 30.01 kg/m².    Estimated Creatinine Clearance: 85.7 mL/min (based on SCr of 0.8 mg/dL).     Physical Exam  Constitutional:       Appearance: She is well-developed.   HENT:      Head: Normocephalic and atraumatic.   Eyes:      Pupils: Pupils are equal, round, and reactive to light.   Cardiovascular:      Rate and Rhythm: Normal rate.   Pulmonary:      Effort: Pulmonary effort is normal.      Breath sounds: Normal breath sounds.   Abdominal:       General: Bowel sounds are normal.      Palpations: Abdomen is soft.   Musculoskeletal:         General: No tenderness.      Cervical back: Normal range of motion and neck supple.   Skin:     General: Skin is warm and dry.      Comments: R side of chest tube removal site c/d/i   Neurological:      Mental Status: She is alert and oriented to person, place, and time.          Significant Labs: CBC:   Recent Labs   Lab 07/05/24 0457 07/06/24  0436   WBC 15.64* 21.03*   HGB 7.4* 8.7*   HCT 23.5* 28.4*    565*     CMP:   Recent Labs   Lab 07/05/24 0457 07/06/24  0436    136   K 4.4 4.0    104   CO2 22* 20*   GLU 79 133*   BUN 39* 26*   CREATININE 1.0 0.8   CALCIUM 8.4* 9.0   PROT 5.7* 6.9   ALBUMIN 1.6* 2.1*   BILITOT 0.5 0.8   ALKPHOS 114 117   AST 24 26   ALT 6* 5*   ANIONGAP 8 12       Significant Imaging: I have reviewed all pertinent imaging results/findings within the past 24 hours.

## 2024-07-06 NOTE — PLAN OF CARE
Patient had one episode of vomiting and gave PRN zofran PO. Telemetry NSR. Patient reports flatulence and bloating. Dilaudid given for co/ pain right sided abdominal pain. No vaginal bleeding. Patient . Pt in chair.    Problem: Adult Inpatient Plan of Care  Goal: Plan of Care Review  Outcome: Progressing  Goal: Patient-Specific Goal (Individualized)  Outcome: Progressing  Goal: Absence of Hospital-Acquired Illness or Injury  Outcome: Progressing  Goal: Optimal Comfort and Wellbeing  Outcome: Progressing  Goal: Readiness for Transition of Care  Outcome: Progressing     Problem: Infection  Goal: Absence of Infection Signs and Symptoms  Outcome: Progressing     Problem: Skin Injury Risk Increased  Goal: Skin Health and Integrity  Outcome: Progressing     Problem: Diabetes Comorbidity  Goal: Blood Glucose Level Within Targeted Range  Outcome: Progressing     Problem: Acute Kidney Injury/Impairment  Goal: Fluid and Electrolyte Balance  Outcome: Progressing  Goal: Improved Oral Intake  Outcome: Progressing  Goal: Effective Renal Function  Outcome: Progressing     Problem: Fall Injury Risk  Goal: Absence of Fall and Fall-Related Injury  Outcome: Progressing

## 2024-07-06 NOTE — PROGRESS NOTES
[facility] - Telemetry Stepdown  Infectious Disease  Progress Note    Patient Name: Anette Ricci  MRN: [id number]  Admission Date: 6/30/2024  Length of Stay: 5 days  Attending Physician: Tia Wallace MD  Primary Care Provider: Mark Chua MD    Isolation Status: Special Contact  Assessment/Plan:      Pulmonary  Empyema  58 stage 4 ovarian ca (3/2024, on C3D32 of carbo/taxol/bevacizumab) s/p debulking, omentectomy, partial hepatectomy admitted last month for SOB 2/2 pleural effusion. CXR showed RLL opacitiy and CT chest showing R sided loculated subdiaphragmatic fluid 7.3x11.2cm which was not amendable to drainage by IR. Fevers resolved on pip-tazo. Repeat CT with worsening metastatic disease w/ ascites and perihepatic collection.  Fevers/leukocytosis thought possibly related to malignancy, patient sent home without antibiotics.  She presented to the ER with worsening SOB found to have worsening JASON, increased R pleural effusion with loculations.  Morning of 7/1 pt became altered, SOB, hypotensive to 70s/40s and noted to be in dKA transferred to the ICU Thoracentesis 7/1 significant 800ml of purulent fluid drained from the pleural space, 173K WBC 95% segs, gram stain with GNR. Growing ESBL Ecoli , now s/p chest tube placement and subsequent removal  - WBC elevated today - checking labs and CXR can also check blood cultures and fungitell  - continue meropenem   - discussed with team will follow        Anticipated Disposition: pending    Thank you for your consult. I will follow-up with patient. Please contact us if you have any additional questions.    Ivan Epstein MD  Infectious Disease  [facility] - Telemetry Stepdown    Subjective:     Principal Problem:Diabetic ketoacidosis without coma associated with type 2 diabetes mellitus    HPI: 58 stage 4 ovarian ca (3/2024, on C3D32 of carbo/taxol/bevacizumab) s/p debulking, omentectomy, partial hepatectomy admitted last month for SOB 2/2 pleural effusion.  CXR showed RLL opacitiy and CT chest showing R sided loculated subdiaphragmatic fluid 7.3x11.2cm which was not amendable to drainage by IR. Fevers resolved on pip-tazo. Repeat CT with worsening metastatic disease w/ ascites and perihepatic collection.  Fevers/leukocytosis thought possibly related to malignancy, patient sent home without antibiotics.  She presented to the ER with worsening SOB found to have worsening JASON, increased R pleural effusion with locations.  Morning of 7/1 pt became altered, SOB, hypotensive to 70s/40s and noted to be in dKA transferred to the ICU Thoracentesis 7/1 significant 800ml of purulent fluid drained from the pleural space, 173K WBC 95% segs, gram stain with GNR.   Interval History: rising WBC, no new complaints, she continues to feel better,particularly now that chest tube is out    Review of Systems   Constitutional:  Negative for activity change, chills and fever.   HENT:  Negative for congestion, mouth sores, rhinorrhea, sinus pressure and sore throat.    Eyes:  Negative for photophobia, pain and redness.   Respiratory:  Negative for cough, chest tightness and wheezing.    Cardiovascular:  Negative for chest pain and leg swelling.   Gastrointestinal:  Negative for abdominal distention, abdominal pain, diarrhea, nausea and vomiting.   Endocrine: Negative for polyuria.   Genitourinary:  Negative for decreased urine volume, dysuria and flank pain.   Musculoskeletal:  Negative for joint swelling and neck pain.   Skin:  Negative for color change.   Allergic/Immunologic: Negative for food allergies.   Neurological:  Negative for dizziness, weakness and headaches.   Hematological:  Negative for adenopathy.   Psychiatric/Behavioral:  Negative for agitation and confusion. The patient is not nervous/anxious.      Objective:     Vital Signs (Most Recent):  Temp: 98.2 °F (36.8 °C) (07/06/24 1137)  Pulse: 93 (07/06/24 1137)  Resp: 19 (07/06/24 1137)  BP: 122/74 (07/06/24 1137)  SpO2: 98 %  (07/06/24 1137) Vital Signs (24h Range):  Temp:  [98.2 °F (36.8 °C)-99.1 °F (37.3 °C)] 98.2 °F (36.8 °C)  Pulse:  [85-93] 93  Resp:  [18-19] 19  SpO2:  [98 %-100 %] 98 %  BP: (117-139)/(58-75) 122/74     Weight: 86.9 kg (191 lb 9.3 oz)  Body mass index is 30.01 kg/m².    Estimated Creatinine Clearance: 85.7 mL/min (based on SCr of 0.8 mg/dL).     Physical Exam  Constitutional:       Appearance: She is well-developed.   HENT:      Head: Normocephalic and atraumatic.   Eyes:      Pupils: Pupils are equal, round, and reactive to light.   Cardiovascular:      Rate and Rhythm: Normal rate.   Pulmonary:      Effort: Pulmonary effort is normal.      Breath sounds: Normal breath sounds.   Abdominal:      General: Bowel sounds are normal.      Palpations: Abdomen is soft.   Musculoskeletal:         General: No tenderness.      Cervical back: Normal range of motion and neck supple.   Skin:     General: Skin is warm and dry.      Comments: R side of chest tube removal site c/d/i   Neurological:      Mental Status: She is alert and oriented to person, place, and time.          Significant Labs: CBC:   Recent Labs   Lab 07/05/24 0457 07/06/24  0436   WBC 15.64* 21.03*   HGB 7.4* 8.7*   HCT 23.5* 28.4*    565*     CMP:   Recent Labs   Lab 07/05/24 0457 07/06/24  0436    136   K 4.4 4.0    104   CO2 22* 20*   GLU 79 133*   BUN 39* 26*   CREATININE 1.0 0.8   CALCIUM 8.4* 9.0   PROT 5.7* 6.9   ALBUMIN 1.6* 2.1*   BILITOT 0.5 0.8   ALKPHOS 114 117   AST 24 26   ALT 6* 5*   ANIONGAP 8 12       Significant Imaging: I have reviewed all pertinent imaging results/findings within the past 24 hours.

## 2024-07-06 NOTE — ASSESSMENT & PLAN NOTE
58 stage 4 ovarian ca (3/2024, on C3D32 of carbo/taxol/bevacizumab) s/p debulking, omentectomy, partial hepatectomy admitted last month for SOB 2/2 pleural effusion. CXR showed RLL opacitiy and CT chest showing R sided loculated subdiaphragmatic fluid 7.3x11.2cm which was not amendable to drainage by IR. Fevers resolved on pip-tazo. Repeat CT with worsening metastatic disease w/ ascites and perihepatic collection.  Fevers/leukocytosis thought possibly related to malignancy, patient sent home without antibiotics.  She presented to the ER with worsening SOB found to have worsening JASON, increased R pleural effusion with loculations.  Morning of 7/1 pt became altered, SOB, hypotensive to 70s/40s and noted to be in dKA transferred to the ICU Thoracentesis 7/1 significant 800ml of purulent fluid drained from the pleural space, 173K WBC 95% segs, gram stain with GNR. Growing ESBL Ecoli , now s/p chest tube placement and subsequent removal  - WBC elevated today - checking labs and CXR can also check blood cultures and fungitell  - continue meropenem   - discussed with team will follow

## 2024-07-06 NOTE — ASSESSMENT & PLAN NOTE
Chronic, controlled. Latest blood pressure and vitals reviewed-     Temp:  [98.2 °F (36.8 °C)-98.6 °F (37 °C)]   Pulse:  []   Resp:  [18-19]   BP: (117-139)/(58-75)   SpO2:  [98 %-100 %] .   Home meds for hypertension were reviewed and noted below.   Hypertension Medications               amlodipine (NORVASC) 10 MG tablet Take 10 mg by mouth once daily.     enalapril (VASOTEC) 20 MG tablet Take 20 mg by mouth once daily.    hydrochlorothiazide (HYDRODIURIL) 25 MG tablet Take 25 mg by mouth once daily.             While in the hospital, will manage blood pressure as follows; Continue home antihypertensive regimen- was holding ACE in setting of JASON - BP now controlled without ACE will cont to hold    Will utilize p.r.n. blood pressure medication only if patient's blood pressure greater than 180/110 and she develops symptoms such as worsening chest pain or shortness of breath.

## 2024-07-06 NOTE — ASSESSMENT & PLAN NOTE
Patient found to have moderate pleural effusion on imaging. I have personally reviewed and interpreted the following imaging: Xray and CT. A thoracentesis was performed. Pleural fluid was sent for analysis. Exudative consistent with empyema   - Cytology in process  - empyema Ecoli growing. ID recommend cont meropenem   Management to include chest tube  - CT in place to suction- pulm and CTS following and managing CT- may need lytic therapy   - repeat CT done - Small right hydropneumothorax with a pigtail chest tube in place.   - pulm removed chest tube 7/5- remains stable on RA post CT removal  - CXR without detrimental changes, cont to monitor respiratory status

## 2024-07-06 NOTE — SUBJECTIVE & OBJECTIVE
Interval History: No concerns, increasing bowel regimen as  no BM and feels constipated. Stable on RA, CXR with no detrimental change but given leukocytosis repeating some infectious labs. Bleeding resolved H/H stable.     Review of Systems   Constitutional:  Negative for fever.   Respiratory:  Negative for shortness of breath.    Cardiovascular:  Positive for leg swelling.   Gastrointestinal:  Negative for abdominal pain.   Genitourinary:  Negative for vaginal bleeding.     Objective:     Vital Signs (Most Recent):  Temp: 98.2 °F (36.8 °C) (07/06/24 1137)  Pulse: 98 (07/06/24 1144)  Resp: 19 (07/06/24 1137)  BP: 122/74 (07/06/24 1137)  SpO2: 98 % (07/06/24 1137) Vital Signs (24h Range):  Temp:  [98.2 °F (36.8 °C)-98.6 °F (37 °C)] 98.2 °F (36.8 °C)  Pulse:  [85-98] 98  Resp:  [18-19] 19  SpO2:  [98 %-100 %] 98 %  BP: (117-139)/(58-75) 122/74     Weight: 86.9 kg (191 lb 9.3 oz)  Body mass index is 30.01 kg/m².    Intake/Output Summary (Last 24 hours) at 7/6/2024 1549  Last data filed at 7/6/2024 0633  Gross per 24 hour   Intake --   Output 425 ml   Net -425 ml         Physical Exam  Vitals and nursing note reviewed.   Constitutional:       General: She is not in acute distress.     Appearance: Normal appearance.   HENT:      Head: Normocephalic and atraumatic.      Nose: Nose normal.      Mouth/Throat:      Mouth: Mucous membranes are moist.      Pharynx: Oropharynx is clear.   Eyes:      Extraocular Movements: Extraocular movements intact.      Conjunctiva/sclera: Conjunctivae normal.      Pupils: Pupils are equal, round, and reactive to light.   Cardiovascular:      Rate and Rhythm: Normal rate and regular rhythm.      Pulses: Normal pulses.      Heart sounds: Normal heart sounds.   Pulmonary:      Effort: Pulmonary effort is normal.      Comments: Decreased breath sounds R base, s/p CT  Abdominal:      General: Abdomen is flat. Bowel sounds are normal. There is distension.      Palpations: Abdomen is soft.       Tenderness: There is no abdominal tenderness.   Musculoskeletal:         General: Normal range of motion.      Cervical back: Normal range of motion and neck supple.      Right lower leg: Edema (pitting edema to knees) present.      Left lower leg: Edema (pitting edema to knees) present.   Skin:     General: Skin is warm and dry.   Neurological:      General: No focal deficit present.      Mental Status: She is alert and oriented to person, place, and time.   Psychiatric:         Mood and Affect: Mood normal.         Behavior: Behavior normal.             Significant Labs: All pertinent labs within the past 24 hours have been reviewed.    Significant Imaging: I have reviewed all pertinent imaging results/findings within the past 24 hours.

## 2024-07-06 NOTE — ASSESSMENT & PLAN NOTE
- Mild right-sided hydroureteronephrosis which appears to result from a soft tissue mass at the level of the right common iliac artery.   - urology consulted  - per urology: Patient will need retrograde pyelograms and right ureteral stent placement given extrinsic compression. If patient fails ureteral stent she will likely need nephrostomy tube to preserve renal function, f/u if this will be done this admission awaiting urology decision

## 2024-07-06 NOTE — ASSESSMENT & PLAN NOTE
- reviewed prior ECHO: EF low normal.   Left Ventricle: There is low normal systolic function with a visually estimated ejection fraction of 50 - 55%. Biplane (2D) method of discs ejection fraction is 50%. Global longitudinal strain is -18.0%.    Right Ventricle: Normal right ventricular cavity size. Wall thickness is normal. Right ventricle wall motion  is normal. Systolic function is normal.    Left Atrium: Left atrium is mildly dilated.    Mitral Valve: There is mild regurgitation.    IVC/SVC: Normal venous pressure at 3 mmHg.    Pericardium: Left pleural effusion. Ascites present.    - start lasix and IMANI hose

## 2024-07-06 NOTE — SUBJECTIVE & OBJECTIVE
Past Medical History:   Diagnosis Date    Breast cancer 2013    Diabetes mellitus     Genetic testing 05/29/2013    VUS    Hyperlipidemia     Hypertension     Malignant neoplasm of upper-outer quadrant of right breast in female, estrogen receptor positive 12/02/2015    Seizure disorder        Past Surgical History:   Procedure Laterality Date    BILATERAL SALPINGO-OOPHORECTOMY (BSO) N/A 3/25/2024    Procedure: SALPINGO-OOPHORECTOMY, BILATERAL;  Surgeon: Александр Washington MD;  Location: [facility] OR [address] [place] [address];  Service: OB/GYN;  Laterality: N/A;    BREAST BIOPSY      BREAST LUMPECTOMY Right 2013    CHOLECYSTECTOMY  3/25/2024    Procedure: CHOLECYSTECTOMY;  Surgeon: Bo Farmer MD;  Location: [facility] OR [address] [place] [address];  Service: General;;    DEBULKING OF TUMOR N/A 3/25/2024    Procedure: DEBULKING, NEOPLASM;  Surgeon: Александр Washington MD;  Location: [facility] OR [address] [place] [address];  Service: OB/GYN;  Laterality: N/A;    EXCISION, LIVER N/A 3/25/2024    Procedure: EXCISION, LIVER - partial;  Surgeon: Bo Farmer MD;  Location: [facility] OR [address] [place] [address];  Service: General;  Laterality: N/A;  bk ultrasound  [place] book by TA on 3-21-24  7:00 a.m.  Conf #  [id number]    EYE SURGERY      LAPAROTOMY, EXPLORATORY N/A 3/25/2024    Procedure: LAPAROTOMY, EXPLORATORY;  Surgeon: Александр Washington MD;  Location: [facility] OR [address] [place] [address];  Service: OB/GYN;  Laterality: N/A;    OMENTECTOMY N/A 3/25/2024    Procedure: OMENTECTOMY;  Surgeon: Александр Washington MD;  Location: [facility] OR [address] [place] [address];  Service: OB/GYN;  Laterality: N/A;    PERITONEOCENTESIS N/A 3/13/2024    Procedure: PARACENTESIS, ABDOMINAL;  Surgeon: Flavia Moore MD;  Location: [facility] C[facility] LAB;  Service: Radiology;  Laterality: N/A;    PERITONEOCENTESIS N/A 3/21/2024    Procedure: PARACENTESIS, ABDOMINAL;  Surgeon: Jorge Jolley MD;  Location: [facility] CATH LAB;  Service: Radiology;  Laterality: N/A;    PERITONEOCENTESIS N/A 6/10/2024    Procedure: PARACENTESIS, ABDOMINAL;  Surgeon: Frieda  Rogelio KELLEY MD;  Location: [facility] CATH LAB;  Service: Radiology;  Laterality: N/A;    TOTAL ABDOMINAL HYSTERECTOMY N/A 3/25/2024    Procedure: HYSTERECTOMY, TOTAL, ABDOMINAL;  Surgeon: Александр Washington MD;  Location: [facility] OR [address] [place] [address];  Service: OB/GYN;  Laterality: N/A;    TOTAL REDUCTION MAMMOPLASTY Bilateral 2016       Review of patient's allergies indicates:   Allergen Reactions    Codeine Other (See Comments)     Flu like s/s    Tramadol Other (See Comments)     Flu like symptoms similar to codeine reaction       Family History       Problem Relation (Age of Onset)    Breast cancer Sister (48)    Colon cancer Sister    Hypertension Mother, Brother, Brother    Seizures Father            Tobacco Use    Smoking status: Never    Smokeless tobacco: Never   Substance and Sexual Activity    Alcohol use: Yes     Alcohol/week: 0.0 standard drinks of alcohol     Comment: ocassional    Drug use: No    Sexual activity: Not Currently     Partners: Male     Birth control/protection: None           Objective:     Temp:  [98.6 °F (37 °C)-99.4 °F (37.4 °C)] 98.6 °F (37 °C)  Pulse:  [88-93] 90  Resp:  [17-19] 18  SpO2:  [91 %-100 %] 100 %  BP: (110-133)/(58-71) 120/62  Weight: 86.9 kg (191 lb 9.3 oz)  Body mass index is 30.01 kg/m².    Date 07/05/24 0700 - 07/06/24 0659   Shift 9358-5001 0626-0177 4965-0346 24 Hour Total   INTAKE   P.O. 840   840   Shift Total(mL/kg) 840(9.7)   840(9.7)   OUTPUT   Urine(mL/kg/hr) 700(1)   700   Shift Total(mL/kg) 700(8.1)   700(8.1)   Weight (kg) 86.9 86.9 86.9 86.9     Bladder Scan Volume (mL): (S) 31 mL (07/01/24 0357)    Drains       Drain  Duration             Female External Urinary Catheter w/ Suction 07/03/24 2 days                     Physical Exam  Constitutional:       General: She is not in acute distress.  HENT:      Head: Normocephalic and atraumatic.      Nose: Nose normal.   Eyes:      Conjunctiva/sclera: Conjunctivae normal.   Cardiovascular:      Rate and Rhythm: Normal rate.    Pulmonary:      Effort: Pulmonary effort is normal. No respiratory distress.   Abdominal:      General: There is no distension.      Tenderness: There is no right CVA tenderness or left CVA tenderness.   Musculoskeletal:         General: No deformity.   Skin:     General: Skin is warm and dry.   Neurological:      General: No focal deficit present.      Mental Status: She is alert.   Psychiatric:         Mood and Affect: Mood normal.         Behavior: Behavior normal.          Significant Labs:    BMP:  Recent Labs   Lab 07/03/24  1046 07/04/24 0335 07/05/24 0457   * 135* 136   K 3.6 3.9 4.4    106 106   CO2 20* 20* 22*   BUN 67* 55* 39*   CREATININE 2.9* 2.0* 1.0   CALCIUM 8.2* 8.3* 8.4*       CBC:  Recent Labs   Lab 07/03/24  1046 07/04/24 0335 07/05/24 0457   WBC 14.28* 14.82* 15.64*   HGB 7.7* 8.1* 7.4*   HCT 22.8* 25.9* 23.5*    506* 414       All pertinent labs results from the past 24 hours have been reviewed.    Significant Imaging:  All pertinent imaging results/findings from the past 24 hours have been reviewed.

## 2024-07-07 PROBLEM — K59.00 CONSTIPATION: Status: ACTIVE | Noted: 2024-07-07

## 2024-07-07 LAB
ALBUMIN SERPL BCP-MCNC: 1.7 G/DL (ref 3.5–5.2)
ALP SERPL-CCNC: 99 U/L (ref 55–135)
ALT SERPL W/O P-5'-P-CCNC: 6 U/L (ref 10–44)
ANION GAP SERPL CALC-SCNC: 9 MMOL/L (ref 8–16)
AST SERPL-CCNC: 25 U/L (ref 10–40)
BASOPHILS # BLD AUTO: 0.08 K/UL (ref 0–0.2)
BASOPHILS # BLD AUTO: 0.09 K/UL (ref 0–0.2)
BASOPHILS NFR BLD: 0.4 % (ref 0–1.9)
BASOPHILS NFR BLD: 0.5 % (ref 0–1.9)
BILIRUB SERPL-MCNC: 0.7 MG/DL (ref 0.1–1)
BUN SERPL-MCNC: 16 MG/DL (ref 6–20)
CALCIUM SERPL-MCNC: 8.2 MG/DL (ref 8.7–10.5)
CHLORIDE SERPL-SCNC: 103 MMOL/L (ref 95–110)
CO2 SERPL-SCNC: 23 MMOL/L (ref 23–29)
CREAT SERPL-MCNC: 0.8 MG/DL (ref 0.5–1.4)
DIFFERENTIAL METHOD BLD: ABNORMAL
DIFFERENTIAL METHOD BLD: ABNORMAL
EOSINOPHIL # BLD AUTO: 0.2 K/UL (ref 0–0.5)
EOSINOPHIL # BLD AUTO: 0.2 K/UL (ref 0–0.5)
EOSINOPHIL NFR BLD: 0.8 % (ref 0–8)
EOSINOPHIL NFR BLD: 1.3 % (ref 0–8)
ERYTHROCYTE [DISTWIDTH] IN BLOOD BY AUTOMATED COUNT: 20.8 % (ref 11.5–14.5)
ERYTHROCYTE [DISTWIDTH] IN BLOOD BY AUTOMATED COUNT: 21.2 % (ref 11.5–14.5)
EST. GFR  (NO RACE VARIABLE): >60 ML/MIN/1.73 M^2
GLUCOSE SERPL-MCNC: 201 MG/DL (ref 70–110)
HCT VFR BLD AUTO: 21.8 % (ref 37–48.5)
HCT VFR BLD AUTO: 25.5 % (ref 37–48.5)
HGB BLD-MCNC: 6.5 G/DL (ref 12–16)
HGB BLD-MCNC: 8 G/DL (ref 12–16)
IMM GRANULOCYTES # BLD AUTO: 0.25 K/UL (ref 0–0.04)
IMM GRANULOCYTES # BLD AUTO: 0.31 K/UL (ref 0–0.04)
IMM GRANULOCYTES NFR BLD AUTO: 1.3 % (ref 0–0.5)
IMM GRANULOCYTES NFR BLD AUTO: 1.5 % (ref 0–0.5)
LYMPHOCYTES # BLD AUTO: 1.5 K/UL (ref 1–4.8)
LYMPHOCYTES # BLD AUTO: 1.8 K/UL (ref 1–4.8)
LYMPHOCYTES NFR BLD: 7.7 % (ref 18–48)
LYMPHOCYTES NFR BLD: 8.7 % (ref 18–48)
MAGNESIUM SERPL-MCNC: 1.4 MG/DL (ref 1.6–2.6)
MCH RBC QN AUTO: 26.2 PG (ref 27–31)
MCH RBC QN AUTO: 26.6 PG (ref 27–31)
MCHC RBC AUTO-ENTMCNC: 29.8 G/DL (ref 32–36)
MCHC RBC AUTO-ENTMCNC: 31.4 G/DL (ref 32–36)
MCV RBC AUTO: 85 FL (ref 82–98)
MCV RBC AUTO: 88 FL (ref 82–98)
MONOCYTES # BLD AUTO: 1.3 K/UL (ref 0.3–1)
MONOCYTES # BLD AUTO: 1.5 K/UL (ref 0.3–1)
MONOCYTES NFR BLD: 6.9 % (ref 4–15)
MONOCYTES NFR BLD: 7.2 % (ref 4–15)
NEUTROPHILS # BLD AUTO: 15.8 K/UL (ref 1.8–7.7)
NEUTROPHILS # BLD AUTO: 16.9 K/UL (ref 1.8–7.7)
NEUTROPHILS NFR BLD: 81.4 % (ref 38–73)
NEUTROPHILS NFR BLD: 82.3 % (ref 38–73)
NRBC BLD-RTO: 0 /100 WBC
NRBC BLD-RTO: 0 /100 WBC
PHOSPHATE SERPL-MCNC: 2.7 MG/DL (ref 2.7–4.5)
PLATELET # BLD AUTO: 463 K/UL (ref 150–450)
PLATELET # BLD AUTO: 506 K/UL (ref 150–450)
PMV BLD AUTO: 10.5 FL (ref 9.2–12.9)
PMV BLD AUTO: 11.2 FL (ref 9.2–12.9)
POCT GLUCOSE: 113 MG/DL (ref 70–110)
POCT GLUCOSE: 227 MG/DL (ref 70–110)
POCT GLUCOSE: 256 MG/DL (ref 70–110)
POCT GLUCOSE: 334 MG/DL (ref 70–110)
POTASSIUM SERPL-SCNC: 3.8 MMOL/L (ref 3.5–5.1)
PROT SERPL-MCNC: 5.8 G/DL (ref 6–8.4)
RBC # BLD AUTO: 2.48 M/UL (ref 4–5.4)
RBC # BLD AUTO: 3.01 M/UL (ref 4–5.4)
SODIUM SERPL-SCNC: 135 MMOL/L (ref 136–145)
WBC # BLD AUTO: 19.18 K/UL (ref 3.9–12.7)
WBC # BLD AUTO: 20.74 K/UL (ref 3.9–12.7)

## 2024-07-07 PROCEDURE — 63600175 PHARM REV CODE 636 W HCPCS: Mod: HCNC | Performed by: STUDENT IN AN ORGANIZED HEALTH CARE EDUCATION/TRAINING PROGRAM

## 2024-07-07 PROCEDURE — 99233 SBSQ HOSP IP/OBS HIGH 50: CPT | Mod: HCNC,,, | Performed by: OBSTETRICS & GYNECOLOGY

## 2024-07-07 PROCEDURE — 83735 ASSAY OF MAGNESIUM: CPT | Mod: HCNC | Performed by: NURSE PRACTITIONER

## 2024-07-07 PROCEDURE — 80053 COMPREHEN METABOLIC PANEL: CPT | Mod: HCNC | Performed by: NURSE PRACTITIONER

## 2024-07-07 PROCEDURE — 84100 ASSAY OF PHOSPHORUS: CPT | Mod: HCNC | Performed by: NURSE PRACTITIONER

## 2024-07-07 PROCEDURE — 63600175 PHARM REV CODE 636 W HCPCS: Mod: HCNC

## 2024-07-07 PROCEDURE — 25000003 PHARM REV CODE 250: Mod: HCNC | Performed by: STUDENT IN AN ORGANIZED HEALTH CARE EDUCATION/TRAINING PROGRAM

## 2024-07-07 PROCEDURE — 36415 COLL VENOUS BLD VENIPUNCTURE: CPT | Mod: HCNC | Performed by: NURSE PRACTITIONER

## 2024-07-07 PROCEDURE — 85025 COMPLETE CBC W/AUTO DIFF WBC: CPT | Mod: 91,HCNC | Performed by: STUDENT IN AN ORGANIZED HEALTH CARE EDUCATION/TRAINING PROGRAM

## 2024-07-07 PROCEDURE — 25000003 PHARM REV CODE 250: Mod: HCNC

## 2024-07-07 PROCEDURE — 20600001 HC STEP DOWN PRIVATE ROOM: Mod: HCNC

## 2024-07-07 PROCEDURE — 27000207 HC ISOLATION: Mod: HCNC

## 2024-07-07 PROCEDURE — 36415 COLL VENOUS BLD VENIPUNCTURE: CPT | Mod: HCNC | Performed by: STUDENT IN AN ORGANIZED HEALTH CARE EDUCATION/TRAINING PROGRAM

## 2024-07-07 PROCEDURE — 85025 COMPLETE CBC W/AUTO DIFF WBC: CPT | Mod: HCNC | Performed by: NURSE PRACTITIONER

## 2024-07-07 PROCEDURE — 25000003 PHARM REV CODE 250: Mod: HCNC | Performed by: HOSPITALIST

## 2024-07-07 RX ORDER — HYDROMORPHONE HYDROCHLORIDE 1 MG/ML
0.2 INJECTION, SOLUTION INTRAMUSCULAR; INTRAVENOUS; SUBCUTANEOUS EVERY 6 HOURS PRN
Status: DISCONTINUED | OUTPATIENT
Start: 2024-07-07 | End: 2024-07-08

## 2024-07-07 RX ORDER — AMOXICILLIN 250 MG
1 CAPSULE ORAL 2 TIMES DAILY
Status: DISCONTINUED | OUTPATIENT
Start: 2024-07-07 | End: 2024-07-07

## 2024-07-07 RX ORDER — INSULIN GLARGINE 100 [IU]/ML
10 INJECTION, SOLUTION SUBCUTANEOUS DAILY
Status: DISCONTINUED | OUTPATIENT
Start: 2024-07-08 | End: 2024-07-09

## 2024-07-07 RX ORDER — MAGNESIUM SULFATE HEPTAHYDRATE 40 MG/ML
2 INJECTION, SOLUTION INTRAVENOUS ONCE
Status: COMPLETED | OUTPATIENT
Start: 2024-07-07 | End: 2024-07-07

## 2024-07-07 RX ORDER — POLYETHYLENE GLYCOL 3350 17 G/17G
17 POWDER, FOR SOLUTION ORAL 2 TIMES DAILY
Status: DISCONTINUED | OUTPATIENT
Start: 2024-07-07 | End: 2024-07-07

## 2024-07-07 RX ORDER — AMOXICILLIN 250 MG
1 CAPSULE ORAL 2 TIMES DAILY
Status: DISCONTINUED | OUTPATIENT
Start: 2024-07-07 | End: 2024-07-09

## 2024-07-07 RX ORDER — INSULIN ASPART 100 [IU]/ML
6 INJECTION, SOLUTION INTRAVENOUS; SUBCUTANEOUS
Status: DISCONTINUED | OUTPATIENT
Start: 2024-07-07 | End: 2024-07-09

## 2024-07-07 RX ORDER — OXYCODONE HYDROCHLORIDE 5 MG/1
5 TABLET ORAL EVERY 6 HOURS PRN
Status: DISCONTINUED | OUTPATIENT
Start: 2024-07-07 | End: 2024-07-13 | Stop reason: HOSPADM

## 2024-07-07 RX ORDER — POLYETHYLENE GLYCOL 3350 17 G/17G
17 POWDER, FOR SOLUTION ORAL 2 TIMES DAILY
Status: DISCONTINUED | OUTPATIENT
Start: 2024-07-07 | End: 2024-07-09

## 2024-07-07 RX ADMIN — INSULIN ASPART 8 UNITS: 100 INJECTION, SOLUTION INTRAVENOUS; SUBCUTANEOUS at 12:07

## 2024-07-07 RX ADMIN — MEROPENEM 1 G: 1 INJECTION INTRAVENOUS at 03:07

## 2024-07-07 RX ADMIN — HEPARIN SODIUM 5000 UNITS: 5000 INJECTION INTRAVENOUS; SUBCUTANEOUS at 05:07

## 2024-07-07 RX ADMIN — AMLODIPINE BESYLATE 10 MG: 10 TABLET ORAL at 08:07

## 2024-07-07 RX ADMIN — INSULIN ASPART 4 UNITS: 100 INJECTION, SOLUTION INTRAVENOUS; SUBCUTANEOUS at 11:07

## 2024-07-07 RX ADMIN — INSULIN GLARGINE 9 UNITS: 100 INJECTION, SOLUTION SUBCUTANEOUS at 08:07

## 2024-07-07 RX ADMIN — MUPIROCIN: 20 OINTMENT TOPICAL at 08:07

## 2024-07-07 RX ADMIN — SENNOSIDES AND DOCUSATE SODIUM 1 TABLET: 50; 8.6 TABLET ORAL at 05:07

## 2024-07-07 RX ADMIN — MEROPENEM 1 G: 1 INJECTION INTRAVENOUS at 01:07

## 2024-07-07 RX ADMIN — POLYETHYLENE GLYCOL 3350 17 G: 17 POWDER, FOR SOLUTION ORAL at 08:07

## 2024-07-07 RX ADMIN — ATORVASTATIN CALCIUM 40 MG: 40 TABLET, FILM COATED ORAL at 08:07

## 2024-07-07 RX ADMIN — HEPARIN SODIUM 5000 UNITS: 5000 INJECTION INTRAVENOUS; SUBCUTANEOUS at 09:07

## 2024-07-07 RX ADMIN — INSULIN ASPART 6 UNITS: 100 INJECTION, SOLUTION INTRAVENOUS; SUBCUTANEOUS at 04:07

## 2024-07-07 RX ADMIN — BISACODYL 10 MG: 10 SUPPOSITORY RECTAL at 08:07

## 2024-07-07 RX ADMIN — HYDROMORPHONE HYDROCHLORIDE 0.2 MG: 1 INJECTION, SOLUTION INTRAMUSCULAR; INTRAVENOUS; SUBCUTANEOUS at 11:07

## 2024-07-07 RX ADMIN — HEPARIN SODIUM 5000 UNITS: 5000 INJECTION INTRAVENOUS; SUBCUTANEOUS at 02:07

## 2024-07-07 RX ADMIN — GUAIFENESIN 600 MG: 600 TABLET, EXTENDED RELEASE ORAL at 08:07

## 2024-07-07 RX ADMIN — POLYETHYLENE GLYCOL 3350 17 G: 17 POWDER, FOR SOLUTION ORAL at 05:07

## 2024-07-07 RX ADMIN — FUROSEMIDE 40 MG: 40 TABLET ORAL at 08:07

## 2024-07-07 RX ADMIN — OXYCODONE 5 MG: 5 TABLET ORAL at 08:07

## 2024-07-07 RX ADMIN — LACTULOSE 20 G: 20 SOLUTION ORAL at 08:07

## 2024-07-07 RX ADMIN — SENNOSIDES AND DOCUSATE SODIUM 1 TABLET: 50; 8.6 TABLET ORAL at 08:07

## 2024-07-07 RX ADMIN — INSULIN ASPART 6 UNITS: 100 INJECTION, SOLUTION INTRAVENOUS; SUBCUTANEOUS at 08:07

## 2024-07-07 RX ADMIN — LAMOTRIGINE 25 MG: 25 TABLET ORAL at 08:07

## 2024-07-07 RX ADMIN — INSULIN ASPART 4 UNITS: 100 INJECTION, SOLUTION INTRAVENOUS; SUBCUTANEOUS at 07:07

## 2024-07-07 RX ADMIN — MAGNESIUM SULFATE HEPTAHYDRATE 2 G: 40 INJECTION, SOLUTION INTRAVENOUS at 11:07

## 2024-07-07 RX ADMIN — INSULIN ASPART 4 UNITS: 100 INJECTION, SOLUTION INTRAVENOUS; SUBCUTANEOUS at 05:07

## 2024-07-07 RX ADMIN — ONDANSETRON HYDROCHLORIDE 4 MG: 4 TABLET, FILM COATED ORAL at 08:07

## 2024-07-07 RX ADMIN — HYDROMORPHONE HYDROCHLORIDE 0.2 MG: 1 INJECTION, SOLUTION INTRAMUSCULAR; INTRAVENOUS; SUBCUTANEOUS at 01:07

## 2024-07-07 RX ADMIN — MEROPENEM 1 G: 1 INJECTION INTRAVENOUS at 08:07

## 2024-07-07 NOTE — ASSESSMENT & PLAN NOTE
Chronic, controlled. Latest blood pressure and vitals reviewed-     Temp:  [97.5 °F (36.4 °C)-99.4 °F (37.4 °C)]   Pulse:  []   Resp:  [18-19]   BP: (114-144)/(62-81)   SpO2:  [97 %-100 %] .   Home meds for hypertension were reviewed and noted below.   Hypertension Medications               amlodipine (NORVASC) 10 MG tablet Take 10 mg by mouth once daily.     enalapril (VASOTEC) 20 MG tablet Take 20 mg by mouth once daily.    hydrochlorothiazide (HYDRODIURIL) 25 MG tablet Take 25 mg by mouth once daily.             While in the hospital, will manage blood pressure as follows; Continue home antihypertensive regimen- was holding ACE/HCTZ in setting of JASON - BP now controlled without, will cont to hold    Will utilize p.r.n. blood pressure medication only if patient's blood pressure greater than 180/110 and she develops symptoms such as worsening chest pain or shortness of breath.

## 2024-07-07 NOTE — PLAN OF CARE
Problem: Adult Inpatient Plan of Care  Goal: Plan of Care Review  Outcome: Progressing  Goal: Patient-Specific Goal (Individualized)  Outcome: Progressing  Goal: Absence of Hospital-Acquired Illness or Injury  Outcome: Progressing  Goal: Optimal Comfort and Wellbeing  Outcome: Progressing  Goal: Readiness for Transition of Care  Outcome: Progressing     Problem: Infection  Goal: Absence of Infection Signs and Symptoms  Outcome: Progressing     Problem: Skin Injury Risk Increased  Goal: Skin Health and Integrity  Outcome: Progressing   Patient on her way to US. BID doses of Miralax and Senna given this shift.

## 2024-07-07 NOTE — NURSING
Nurses Note -- 4 Eyes      7/7/2024   0704 AM      Skin assessed during: Q Shift Change      [x] No Altered Skin Integrity Present    []Prevention Measures Documented      [] Yes- Altered Skin Integrity Present or Discovered   [] LDA Added if Not in Epic (Describe Wound)   [] New Altered Skin Integrity was Present on Admit and Documented in LDA   [] Wound Image Taken    Wound Care Consulted? No    Attending Nurse:  Najma Leach RN/Staff Member:  ALISSON Oquendo and Argentina Saenz

## 2024-07-07 NOTE — ASSESSMENT & PLAN NOTE
- no BM in multiple days  - CT with: Continued bulky abdominopelvic soft tissue masses consistent with peritoneal carcinomatosis and probable lymphadenopathy, similar to prior CT   - started on aggressive bowel regimen, enema added  - no N/V, bowel sounds present   - monitor for obstructive sxs

## 2024-07-07 NOTE — PROGRESS NOTES
Progress Note  Gynecology Oncology    Admit Date: 2024  LOS: 6    Reason for Admission:  Diabetic ketoacidosis without coma associated with type 2 diabetes mellitus    SUBJECTIVE:     Anette Ricci is a 58 y.o.  with Stage IVB ovarian carcinosarcoma s/p PDS on 3/25/24 with Dr Washington (CARLOS ALBERTO/BSO/OMX/liver mobilization/peritoneal & R diaphragm stripping/hepatic wedge resection/cholecystectomy) s/p C3 of carbo/taxol/bevacizumab on . Admitted to MICU  for the management of DKA (resolved), JASON (improved), and symptomatic malignant pleural effusion vs empyema, s/p thoracentesis and chest tube placement.     Today she reports no changes in her vaginal spotting. Reports shortness of breath improved, no cough with mucinex. Ednorses belching, denies flatus yesterday, no BM for 4 days. Denies pain, fevers/chills, nausea/vomiting.     OBJECTIVE:     Vital Signs   Temp:  [97.5 °F (36.4 °C)-99.4 °F (37.4 °C)] 98.8 °F (37.1 °C)  Pulse:  [] 95  Resp:  [18-19] 19  SpO2:  [97 %-100 %] 98 %  BP: (114-144)/(62-81) 124/70      Intake/Output Summary (Last 24 hours) at 2024 0929  Last data filed at 2024 2129  Gross per 24 hour   Intake --   Output 800 ml   Net -800 ml       Physical Exam:  Gen: Pt alert, A&Ox4  CV: RRR, normal heart sounds and intact distal pulses.  No edema on exam.   Pulm: Lung sounds reduced at right base although improved from prior eval. No stridor. No respiratory distress, normal respiratory effort.   Abd: Mild-moderate distention although non-tender to palpation without rebound or guarding. Firm mass noted on left mid-abdomen which is nontender to palpation. No erythema, induration, or discharge on bandage.   Ext: PPP, no peripheral edema, SCDs in place  : deferred  Skin: Skin is warm and dry.     Laboratory:  Recent Labs   Lab 24  0457 24  0436 24  0339   WBC 15.64* 21.03* 19.18*   HGB 7.4* 8.7* 6.5*   HCT 23.5* 28.4* 21.8*   MCV 87 86 88    565*  506*       Recent Labs   Lab 24  0457 24  0436 24  0339    136 135*   K 4.4 4.0 3.8    104 103   CO2 22* 20* 23   BUN 39* 26* 16   CREATININE 1.0 0.8 0.8   GLU 79 133* 201*   PROT 5.7* 6.9 5.8*   BILITOT 0.5 0.8 0.7   ALKPHOS 114 117 99   ALT 6* 5* 6*   AST 24 26 25   MG 1.5* 1.5* 1.4*   PHOS 3.0 3.0 2.7       Blood Sugars (AccuCheck):       ASSESSMENT/PLAN:     Assessment: Anette Ricci is a 58 y.o.  with Stage IVB ovarian carcinosarcoma s/p PDS on 3/25/24 with Dr Washington (CARLOS ALBERTO/BSO/OMX/liver mobilization/peritoneal & R diaphragm stripping/hepatic wedge resection/cholecystectomy) s/p C3 of carbo/taxol/bevacizumab on . Admitted to MICU  for the management of DKA (resolved), JASON (improved), and symptomatic malignant pleural effusion vs empyema, s/p thoracentesis and chest tube placement ().      Pleural effusion (right)  - S/p right thoracentesis  (purulent) and chest tube placement on  (serosanguinous output)  - s/p vanc/zosyn. Continue IV Abx per ID recs: day 5 of meropenem   - CXR : continues to show stable right pleural effusion   - CT : Small right hydropneumothorax with a pigtail chest tube in place. Stable patchy ground-glass opacities in the anterior left upper lobe, suspicious for pneumonia.   Interval development of right sided hydronephrosis.   - Chest tube removed yesterday, CT surgery and pulmonology signed off  - Continue to monitor patient symptoms and vitals for possible re-accumulation of fluid    Vaginal bleeding   - minimal, stable vaginal bleeding.   - Strict pad count   - AM CBC 6.5/21.8, recommend 1u pRBC    UTI  - asymptomatic  - Urine Cx with candida glabrata   - management by ID     Anemia     - Most recent H/H 8.7/28.4     - Continue to monitor for goal Hgb > 8, recommend 1u pRBC today     Constipation     - Patient reports last BM 3 days ago     - Currently receiving daily miralax, sennakot, s/p 2 dulcolax  suppositories     - Recommend addition of milk of magnesia or lactulose, consider enema if patient amenable     - Currently denies nausea/vomiting, however, if persistent N/V develops could consider KUB to exclude bowel obstruction    Hx of PE (stable)  - currently on heparin drip     Diabetes Mellitus     - Current regimen lantus 9u, aspart 4u TIDWM, SSI as needed     - B-233 in 24h period    Stage IVB ovarian carcinosarcoma     - See onc history as above     - Doxorubicin C1 scheduled for , will defer until patient is discharged and infection has resolved    Hypertension  - Continue home norvasc  - Holding home enalapril, HCTZ     Hyperlipidemia  - Hold home statin     Seizure disorder  - Continue home lamictal     Depression  - Continue home seroquel, ativan prn    JASON  - 2/2 dehydration and DKA     - Patient's baseline Cr 0.7-0.8     - Initial Cr on admit 4.6 >>> 0.8 today     - Renal US remarkable for elevated intraparenchymal indices and 2cm mass adjacent to right kidney, repeat yesterday without significant changes     - CT chest  with interval development of right sided hydronephrosis.     -  CT A/P ordered     - Nephrology following.     - Resolved     DKA     - Upon admission AG 21 > now closed, glucose 420, beta-hydroxybutyrate 4.0     - S/p insulin drip     - Resolved     Argentina Mccloud MD  OB/GYN PGY-3

## 2024-07-07 NOTE — PROGRESS NOTES
[facility] Tele[facility] Medicine  Progress Note    Patient Name: Anette Ricci  MRN: [id number]  Patient Class: IP- Inpatient   Admission Date: 6/30/2024  Length of Stay: 6 days  Attending Physician: Tia Wallace MD  Primary Care Provider: Mark Chua MD        Subjective:     Principal Problem:Diabetic ketoacidosis without coma associated with type 2 diabetes mellitus        HPI:  59 yo F PMH diabetes, hyperlipidemia, hypertension, chronic pulmonary embolism stage 4 ovarian ca (3/2024) s/p debulking, omentectomy, partial hepatectomy on chemotherapy (carboplatin paclitaxel bevacizumab) admitted for SOB 2/2 pleural effusion with a recent       Patient had a recent 7 day hospital stay (admitted 06/20) in the setting of malignant pleural effusion (not amenable for drainage per IR).  She was discharged on 06/27.  During hospitalization she was treated with Zosyn for 6 days due to recurrent fevers but infectious workup was negative      On admission:   Pulse 100 saturating 99%, afebrile, blood pressure 88/48.  CBC notable for marked leukocytosis 20,000 hemoglobin of 7.4 (hematocrit 23) with increase in granulocyte percentage, RDW 19.9.  CMP notable for a sodium of 129 CO2 14, anion gap 21, BUN 76 creatinine 4.6 GFR 10,  glucose of 403 alk-phos 170, normal AST ALT, uric acid of 9.3    Imaging notable for:  CT scan indicates significant pleural fluid accumulation at the right lung base with suspected lung consolidation and mediastinal shift. The Chest X-ray confirms worsening opacity in the right hemithorax consistent with pleural fluid, along with parenchymal changes suggesting atelectasis and possible infiltrates. There are no signs of pneumothorax, but mild changes are noted in the left lung base.    In the ED received vanc and Zosyn and insulin pending.          Overview/Hospital Course:  Admitted to ICU for DKA, started on insulin drip. Acidosis resolved and transitioned to subq insulin.  Stable for step down.   Thoracentesis 07/02/24; drained 800 ml cloudy (yellow cloudy/chylous looking fluid vs purulence). Cytology and cultures sent. Chest tube placed 7/3. Thoracic surgery consulted for recommendations on lytic therapy.   Pleural fluid with E coli. ID consulted. Cont meropenem per ID recs.   Chest tube to suction Pulm and CTS following. Repeat CT chest Small right hydropneumothorax with a pigtail chest tube in place. Also showing new right hydronephrosis, repeat CT abd pelvis ordered and showed: right-sided hydroureteronephrosis which appears to result from a soft tissue mass at the level of the right common iliac artery. Urology consulted. Per urology Patient will need retrograde pyelograms and right ureteral stent placement given extrinsic compression. If patient fails ureteral stent she will likely need nephrostomy tube to preserve renal function.  Pleural effusion improved and pulm removed chest tube.   Worsening leukocytosis since CT removal, repeating infectious workup. CXR with loculated right pleural reaction/pleural effusion. No interval detrimental change noted. Continues to be stable on RA. If WBC rises consider attempting to evaluate for IR drainage of subdiaphragmatic collection.   Cont fanta while inpt and transition to erta at discharge per ID recs.   No BM in days despite aggressive bowel regimen, enema added.     Interval History: Still without BM despite bowel regimen increased. Enema ordered. Denies N/V.     Stable on RA.   H/H drop on am labs but on repeat 8, no need for blood at this time.     Review of Systems   Constitutional:  Negative for fever.   Respiratory:  Negative for shortness of breath.    Cardiovascular:  Positive for leg swelling.   Gastrointestinal:  Negative for abdominal pain.   Genitourinary:  Negative for vaginal bleeding.     Objective:     Vital Signs (Most Recent):  Temp: 99.3 °F (37.4 °C) (07/07/24 1542)  Pulse: 91 (07/07/24 1542)  Resp: 19 (07/07/24  1542)  BP: 124/77 (07/07/24 1542)  SpO2: 97 % (07/07/24 1542) Vital Signs (24h Range):  Temp:  [97.5 °F (36.4 °C)-99.4 °F (37.4 °C)] 99.3 °F (37.4 °C)  Pulse:  [] 91  Resp:  [18-19] 19  SpO2:  [97 %-100 %] 97 %  BP: (114-144)/(62-81) 124/77     Weight: 86.9 kg (191 lb 9.3 oz)  Body mass index is 30.01 kg/m².    Intake/Output Summary (Last 24 hours) at 7/7/2024 1627  Last data filed at 7/6/2024 2129  Gross per 24 hour   Intake --   Output 800 ml   Net -800 ml         Physical Exam  Vitals and nursing note reviewed.   Constitutional:       General: She is not in acute distress.     Appearance: Normal appearance.   HENT:      Head: Normocephalic and atraumatic.      Nose: Nose normal.      Mouth/Throat:      Mouth: Mucous membranes are moist.      Pharynx: Oropharynx is clear.   Eyes:      Extraocular Movements: Extraocular movements intact.      Conjunctiva/sclera: Conjunctivae normal.      Pupils: Pupils are equal, round, and reactive to light.   Cardiovascular:      Rate and Rhythm: Normal rate and regular rhythm.      Pulses: Normal pulses.      Heart sounds: Normal heart sounds.   Pulmonary:      Effort: Pulmonary effort is normal.      Comments: Decreased breath sounds R base, s/p CT  Abdominal:      General: Abdomen is flat. Bowel sounds are normal. There is distension.      Tenderness: There is no abdominal tenderness.      Comments: Worsening distension and hard abdomen, not rigid and nontender no guarding    Musculoskeletal:         General: Normal range of motion.      Cervical back: Normal range of motion and neck supple.      Right lower leg: Edema (pitting edema to knees) present.      Left lower leg: Edema (pitting edema to knees) present.   Skin:     General: Skin is warm and dry.   Neurological:      General: No focal deficit present.      Mental Status: She is alert and oriented to person, place, and time.   Psychiatric:         Mood and Affect: Mood normal.         Behavior: Behavior normal.              Significant Labs: All pertinent labs within the past 24 hours have been reviewed.    Significant Imaging: I have reviewed all pertinent imaging results/findings within the past 24 hours.    Assessment/Plan:      * Diabetic ketoacidosis without coma associated with type 2 diabetes mellitus  - ICU on insulin drip, gap now closed and acidosis improving, transitioned to subq insulin  - monitor BG and adjust insulin as needed      Constipation  - no BM in multiple days  - CT with: Continued bulky abdominopelvic soft tissue masses consistent with peritoneal carcinomatosis and probable lymphadenopathy, similar to prior CT   - started on aggressive bowel regimen, enema added  - no N/V, bowel sounds present   - monitor for obstructive sxs      Peripheral edema  - reviewed prior ECHO: EF low normal.   Left Ventricle: There is low normal systolic function with a visually estimated ejection fraction of 50 - 55%. Biplane (2D) method of discs ejection fraction is 50%. Global longitudinal strain is -18.0%.    Right Ventricle: Normal right ventricular cavity size. Wall thickness is normal. Right ventricle wall motion  is normal. Systolic function is normal.    Left Atrium: Left atrium is mildly dilated.    Mitral Valve: There is mild regurgitation.    IVC/SVC: Normal venous pressure at 3 mmHg.    Pericardium: Left pleural effusion. Ascites present.    - start lasix and IMANI hose      Hydroureteronephrosis  - Mild right-sided hydroureteronephrosis which appears to result from a soft tissue mass at the level of the right common iliac artery.   - urology consulted  - per urology: Patient will need retrograde pyelograms and right ureteral stent placement given extrinsic compression. If patient fails ureteral stent she will likely need nephrostomy tube to preserve renal function  - urology will not do this admission given stable renal function, they recommend referral at discharge       Hypomagnesemia  Patient has Abnormal  "Magnesium: hypomagnesemia. Will continue to monitor electrolytes closely. Will replace the affected electrolytes and repeat labs to be done after interventions completed. The patient's magnesium results have been reviewed and are listed below.  Recent Labs   Lab 07/07/24  0339   MG 1.4*          Empyema  - Ecoli empyema s/p thoracentesis and chest tube placement  - s/p CT in place to suction- CTS and pulm following for management and lytic recommendations - now signed off  - chest tube removed 7/5 per pulm recommendations. Repeat CXR without detrimental change, stable on RA, cont to monitor resp status  - cont meropenem per ID recs- transition to erta at discharge      High anion gap metabolic acidosis  - gap closed, acidosis improving       Chronic pulmonary embolism without acute cor pulmonale  - previously on AC, held for decreasing Hgb and vaginal bleeding   - "Patient with small right lower lobe pulmonary embolism noted on June 2024 CT chest and also noted on prior films as well. Patient has not been on full-dose anticoagulation only prophylaxis dosing. Given her advanced cancer and PE noted on CT, it would not be unreasonable to treat with full-dose anticoagulation. However patient is breathing well on room air and her prior respiratory issues are likely related to her right-sided pleural effusion. If a decision is made to treat with full-dose anticoagulation, patient should be treated with Lovenox 1 milligram/kilogram b.i.d and sent home on this regimen. I see no contraindications to anticoagulation however if other procedures are planned then it may be beneficial to wait until discharge. "  - cont heparin ppx, resume lovenox once able and no further procedures planned      Pleural effusion  Patient found to have moderate pleural effusion on imaging. I have personally reviewed and interpreted the following imaging: Xray and CT. A thoracentesis was performed. Pleural fluid was sent for analysis. Exudative " consistent with empyema   - Cytology in process  - empyema Ecoli growing. ID recommend cont meropenem   Management to include chest tube  - CT in place to suction- pulm and CTS following and managing CT- may need lytic therapy   - repeat CT done - Small right hydropneumothorax with a pigtail chest tube in place.   - pulm removed chest tube 7/5- remains stable on RA post CT removal  - CXR without detrimental changes, cont to monitor respiratory status, stable on RA breathing comfortably       Leukocytosis  - leukocytosis noted to be worsening after CT removal- s/p chest tube for Ecoli empyema- currently on fanta per ID recs  - given worsening leukocytosis repeating infectious workup with Bcx NGTD, fungitell- in process  - UA not infectious and CXR without detrimental changes  - noted: subdiaphragmatic collection measuring 7.7 x 3.0 cm seen on CT- will assess need for drainage if leukocytosis continues to worsen may need IR drainage if infection       S/p CARLOS ALBERTO/BSO/OMX/Debulking/Partial liver resection/Cholecystectomy  - noted      JASON (acute kidney injury)  Patient with acute kidney injury/acute renal failure likely due to  Suspect a component of ANNE from previous admission playing a role, but also volume depletion from DKA and osmotic diuresis from glucosuria.    JASON is currently improving. Baseline creatinine  0.8  - Labs reviewed- Renal function/electrolytes with Estimated Creatinine Clearance: 85.7 mL/min (based on SCr of 0.8 mg/dL). according to latest data. Monitor urine output and serial BMP and adjust therapy as needed. Avoid nephrotoxins and renally dose meds for GFR listed above.  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  - Stage IVB ovarian carcinosarcoma s/p PDS on 3/25/24 with Dr Washington (CARLOS ALBERTO/BSO/OMX/liver mobilization/peritoneal & R diaphragm stripping/hepatic wedge resection/cholecystectomy) s/p C3 of carbo/taxol/bevacizumab on 5/31   - Gyn/Onc following  -  Doxorubicin C1 scheduled for 7/8, will most likely be delayed due to recent infection   - abdominopelvic soft tissue masses consistent with peritoneal carcinomatosis noted on CT      Malignant ascites  - CT with Continued bulky abdominopelvic soft tissue masses consistent with peritoneal carcinomatosis and probable lymphadenopathy, similar to prior CT   - abd more distended and hard with noted constipation  - has gotten repeated para in the past  - denies abd pain  - abd u/s ordered to eval ascites and need for para      Hyperlipidemia  - cont statin      Seizure disorder  - cont lamotrigine       Depression, reactive  Patient has  depression which is unknown and is currently controlled. Will Continue anti-depressant medications. We will not consult psychiatry at this time. Patient does not display psychosis at this time. Continue to monitor closely and adjust plan of care as needed.        Hypertension  Chronic, controlled. Latest blood pressure and vitals reviewed-     Temp:  [97.5 °F (36.4 °C)-99.4 °F (37.4 °C)]   Pulse:  []   Resp:  [18-19]   BP: (114-144)/(62-81)   SpO2:  [97 %-100 %] .   Home meds for hypertension were reviewed and noted below.   Hypertension Medications               amlodipine (NORVASC) 10 MG tablet Take 10 mg by mouth once daily.     enalapril (VASOTEC) 20 MG tablet Take 20 mg by mouth once daily.    hydrochlorothiazide (HYDRODIURIL) 25 MG tablet Take 25 mg by mouth once daily.             While in the hospital, will manage blood pressure as follows; Continue home antihypertensive regimen- was holding ACE/HCTZ in setting of JASON - BP now controlled without, will cont to hold    Will utilize p.r.n. blood pressure medication only if patient's blood pressure greater than 180/110 and she develops symptoms such as worsening chest pain or shortness of breath.    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  Last A1c reviewed-   Lab Results   Component Value Date    HGBA1C 7.3 (H) 03/19/2024     Most recent fingerstick glucose reviewed-   Recent Labs   Lab 07/06/24  2106 07/07/24  0737 07/07/24  1155 07/07/24  1618   POCTGLUCOSE 212* 256* 334* 227*     Current correctional scale  Low  Maintain anti-hyperglycemic dose as follows-   Antihyperglycemics (From admission, onward)      Start     Stop Route Frequency Ordered    07/08/24 0900  insulin glargine U-100 (Lantus) pen 10 Units         -- SubQ Daily 07/07/24 1631    07/07/24 1645  insulin aspart U-100 pen 6 Units         -- SubQ 3 times daily with meals 07/07/24 1631    07/03/24 0930  insulin aspart U-100 pen 0-10 Units         -- SubQ Before meals & nightly PRN 07/03/24 0931          Hold Oral hypoglycemics while patient is in the hospital.      VTE Risk Mitigation (From admission, onward)           Ordered     Place IMANI hose  Until discontinued         07/06/24 1550     heparin (porcine) injection 5,000 Units  Every 8 hours         07/03/24 0931     IP VTE HIGH RISK PATIENT  Once         07/01/24 0540     Place sequential compression device  Until discontinued         07/01/24 0540                    Discharge Planning   ROS: 7/9/2024     Code Status: Full Code   Is the patient medically ready for discharge?:     Reason for patient still in hospital (select all that apply): Patient trending condition and Consult recommendations  Discharge Plan A: Home with family, Home Health, Home ([facility])   Discharge Delays: None known at this time              Tia Wallace MD  Department of Hospital Medicine   [facility] - Telemetry Stepdown

## 2024-07-07 NOTE — ASSESSMENT & PLAN NOTE
Patient found to have moderate pleural effusion on imaging. I have personally reviewed and interpreted the following imaging: Xray and CT. A thoracentesis was performed. Pleural fluid was sent for analysis. Exudative consistent with empyema   - Cytology in process  - empyema Ecoli growing. ID recommend cont meropenem   Management to include chest tube  - CT in place to suction- pulm and CTS following and managing CT- may need lytic therapy   - repeat CT done - Small right hydropneumothorax with a pigtail chest tube in place.   - pulm removed chest tube 7/5- remains stable on RA post CT removal  - CXR without detrimental changes, cont to monitor respiratory status, stable on RA breathing comfortably

## 2024-07-07 NOTE — PLAN OF CARE
TID plan of care    - ESBL Ecoli empyema s/p chest tube - continue meropenem. Can transition to ertapenem on discharge for ease of once daily dosing.  Plan for at least 4 weeks of ertapenem  - if WBC rises consider attempting to evaluate for IR drainage of subdiaphragmatic collection    Outpatient Antibiotic Therapy Plan:    Please send referral to [facility].    1) Infection: ESBL Ecoli empyema    2) Discharge Antibiotics:    Intravenous antibiotics:  Ertapenem 1g q24 hours    3) Therapy Duration:  4 weeks    Estimated end date of IV antibiotics: 82/24    4) Outpatient Weekly Labs:    Order the following labs to be drawn on Mondays:   CBC  CMP   CRP      5) Fax Lab Results to Infectious Diseases Provider: Dr Epstein    [facility] Fax Number: [phone]    6) Outpatient Infectious Diseases Follow-up    Follow-up appointment will be arranged by the ID clinic and will be found in the patient's appointments tab.    Prior to discharge, please ensure the patient's follow-up has been scheduled.    If there is still no follow-up scheduled prior to discharge, please send an [email or web address] message to [facility] Clinical Pool or Call Infectious Diseases Dept.    Will sign off for now call back if questions, f/u with me in clinic

## 2024-07-07 NOTE — ASSESSMENT & PLAN NOTE
- leukocytosis noted to be worsening after CT removal- s/p chest tube for Ecoli empyema- currently on fanta per ID recs  - given worsening leukocytosis repeating infectious workup with Bcx NGTD, fungitell- in process  - UA not infectious and CXR without detrimental changes  - noted: subdiaphragmatic collection measuring 7.7 x 3.0 cm seen on CT- will assess need for drainage if leukocytosis continues to worsen may need IR drainage if infection

## 2024-07-07 NOTE — SUBJECTIVE & OBJECTIVE
Interval History: Still without BM despite bowel regimen increased. Enema ordered. Denies N/V.     Stable on RA.   H/H drop on am labs but on repeat 8, no need for blood at this time.     Review of Systems   Constitutional:  Negative for fever.   Respiratory:  Negative for shortness of breath.    Cardiovascular:  Positive for leg swelling.   Gastrointestinal:  Negative for abdominal pain.   Genitourinary:  Negative for vaginal bleeding.     Objective:     Vital Signs (Most Recent):  Temp: 99.3 °F (37.4 °C) (07/07/24 1542)  Pulse: 91 (07/07/24 1542)  Resp: 19 (07/07/24 1542)  BP: 124/77 (07/07/24 1542)  SpO2: 97 % (07/07/24 1542) Vital Signs (24h Range):  Temp:  [97.5 °F (36.4 °C)-99.4 °F (37.4 °C)] 99.3 °F (37.4 °C)  Pulse:  [] 91  Resp:  [18-19] 19  SpO2:  [97 %-100 %] 97 %  BP: (114-144)/(62-81) 124/77     Weight: 86.9 kg (191 lb 9.3 oz)  Body mass index is 30.01 kg/m².    Intake/Output Summary (Last 24 hours) at 7/7/2024 1627  Last data filed at 7/6/2024 2129  Gross per 24 hour   Intake --   Output 800 ml   Net -800 ml         Physical Exam  Vitals and nursing note reviewed.   Constitutional:       General: She is not in acute distress.     Appearance: Normal appearance.   HENT:      Head: Normocephalic and atraumatic.      Nose: Nose normal.      Mouth/Throat:      Mouth: Mucous membranes are moist.      Pharynx: Oropharynx is clear.   Eyes:      Extraocular Movements: Extraocular movements intact.      Conjunctiva/sclera: Conjunctivae normal.      Pupils: Pupils are equal, round, and reactive to light.   Cardiovascular:      Rate and Rhythm: Normal rate and regular rhythm.      Pulses: Normal pulses.      Heart sounds: Normal heart sounds.   Pulmonary:      Effort: Pulmonary effort is normal.      Comments: Decreased breath sounds R base, s/p CT  Abdominal:      General: Abdomen is flat. Bowel sounds are normal. There is distension.      Tenderness: There is no abdominal tenderness.      Comments:  Worsening distension and hard abdomen, not rigid and nontender no guarding    Musculoskeletal:         General: Normal range of motion.      Cervical back: Normal range of motion and neck supple.      Right lower leg: Edema (pitting edema to knees) present.      Left lower leg: Edema (pitting edema to knees) present.   Skin:     General: Skin is warm and dry.   Neurological:      General: No focal deficit present.      Mental Status: She is alert and oriented to person, place, and time.   Psychiatric:         Mood and Affect: Mood normal.         Behavior: Behavior normal.             Significant Labs: All pertinent labs within the past 24 hours have been reviewed.    Significant Imaging: I have reviewed all pertinent imaging results/findings within the past 24 hours.

## 2024-07-07 NOTE — ASSESSMENT & PLAN NOTE
- Ecoli empyema s/p thoracentesis and chest tube placement  - s/p CT in place to suction- CTS and pulm following for management and lytic recommendations - now signed off  - chest tube removed 7/5 per pulm recommendations. Repeat CXR without detrimental change, stable on RA, cont to monitor resp status  - cont meropenem per ID recs- transition to erta at discharge

## 2024-07-07 NOTE — ASSESSMENT & PLAN NOTE
- reviewed prior ECHO: EF low normal.   Left Ventricle: There is low normal systolic function with a visually estimated ejection fraction of 50 - 55%. Biplane (2D) method of discs ejection fraction is 50%. Global longitudinal strain is -18.0%.    Right Ventricle: Normal right ventricular cavity size. Wall thickness is normal. Right ventricle wall motion  is normal. Systolic function is normal.    Left Atrium: Left atrium is mildly dilated.    Mitral Valve: There is mild regurgitation.    IVC/SVC: Normal venous pressure at 3 mmHg.    Pericardium: Left pleural effusion. Ascites present.    - start lasix and IMANI hose

## 2024-07-07 NOTE — ASSESSMENT & PLAN NOTE
- Stage IVB ovarian carcinosarcoma s/p PDS on 3/25/24 with Dr Washington (CARLOS ALBERTO/BSO/OMX/liver mobilization/peritoneal & R diaphragm stripping/hepatic wedge resection/cholecystectomy) s/p C3 of carbo/taxol/bevacizumab on 5/31   - Gyn/Onc following  - Doxorubicin C1 scheduled for 7/8, will most likely be delayed due to recent infection   - abdominopelvic soft tissue masses consistent with peritoneal carcinomatosis noted on CT

## 2024-07-07 NOTE — ASSESSMENT & PLAN NOTE
Patient's FSGs are controlled on current medication regimen.  Last A1c reviewed-   Lab Results   Component Value Date    HGBA1C 7.3 (H) 03/19/2024     Most recent fingerstick glucose reviewed-   Recent Labs   Lab 07/06/24  2106 07/07/24  0737 07/07/24  1155 07/07/24  1618   POCTGLUCOSE 212* 256* 334* 227*     Current correctional scale  Low  Maintain anti-hyperglycemic dose as follows-   Antihyperglycemics (From admission, onward)      Start     Stop Route Frequency Ordered    07/08/24 0900  insulin glargine U-100 (Lantus) pen 10 Units         -- SubQ Daily 07/07/24 1631    07/07/24 1645  insulin aspart U-100 pen 6 Units         -- SubQ 3 times daily with meals 07/07/24 1631    07/03/24 0930  insulin aspart U-100 pen 0-10 Units         -- SubQ Before meals & nightly PRN 07/03/24 0931          Hold Oral hypoglycemics while patient is in the hospital.

## 2024-07-07 NOTE — ASSESSMENT & PLAN NOTE
- Mild right-sided hydroureteronephrosis which appears to result from a soft tissue mass at the level of the right common iliac artery.   - urology consulted  - per urology: Patient will need retrograde pyelograms and right ureteral stent placement given extrinsic compression. If patient fails ureteral stent she will likely need nephrostomy tube to preserve renal function  - urology will not do this admission given stable renal function, they recommend referral at discharge

## 2024-07-07 NOTE — ASSESSMENT & PLAN NOTE
Patient has Abnormal Magnesium: hypomagnesemia. Will continue to monitor electrolytes closely. Will replace the affected electrolytes and repeat labs to be done after interventions completed. The patient's magnesium results have been reviewed and are listed below.  Recent Labs   Lab 07/07/24  0339   MG 1.4*

## 2024-07-07 NOTE — ASSESSMENT & PLAN NOTE
- CT with Continued bulky abdominopelvic soft tissue masses consistent with peritoneal carcinomatosis and probable lymphadenopathy, similar to prior CT   - abd more distended and hard with noted constipation  - has gotten repeated para in the past  - denies abd pain  - abd u/s ordered to eval ascites and need for para

## 2024-07-07 NOTE — ASSESSMENT & PLAN NOTE
"- previously on AC, held for decreasing Hgb and vaginal bleeding   - "Patient with small right lower lobe pulmonary embolism noted on June 2024 CT chest and also noted on prior films as well. Patient has not been on full-dose anticoagulation only prophylaxis dosing. Given her advanced cancer and PE noted on CT, it would not be unreasonable to treat with full-dose anticoagulation. However patient is breathing well on room air and her prior respiratory issues are likely related to her right-sided pleural effusion. If a decision is made to treat with full-dose anticoagulation, patient should be treated with Lovenox 1 milligram/kilogram b.i.d and sent home on this regimen. I see no contraindications to anticoagulation however if other procedures are planned then it may be beneficial to wait until discharge. "  - cont heparin ppx, resume lovenox once able and no further procedures planned    "

## 2024-07-08 PROBLEM — D64.9 ANEMIA: Status: ACTIVE | Noted: 2024-07-08

## 2024-07-08 PROBLEM — N39.0 UTI (URINARY TRACT INFECTION): Status: RESOLVED | Noted: 2024-04-05 | Resolved: 2024-07-08

## 2024-07-08 LAB
ABO + RH BLD: NORMAL
ALBUMIN SERPL BCP-MCNC: 1.8 G/DL (ref 3.5–5.2)
ALP SERPL-CCNC: 90 U/L (ref 55–135)
ALT SERPL W/O P-5'-P-CCNC: 5 U/L (ref 10–44)
ANION GAP SERPL CALC-SCNC: 6 MMOL/L (ref 8–16)
AST SERPL-CCNC: 30 U/L (ref 10–40)
BASOPHILS # BLD AUTO: 0.09 K/UL (ref 0–0.2)
BASOPHILS NFR BLD: 0.5 % (ref 0–1.9)
BILIRUB SERPL-MCNC: 0.6 MG/DL (ref 0.1–1)
BLD GP AB SCN CELLS X3 SERPL QL: NORMAL
BLD PROD TYP BPU: NORMAL
BLOOD UNIT EXPIRATION DATE: NORMAL
BLOOD UNIT TYPE CODE: 7300
BLOOD UNIT TYPE: NORMAL
BUN SERPL-MCNC: 13 MG/DL (ref 6–20)
CALCIUM SERPL-MCNC: 8.1 MG/DL (ref 8.7–10.5)
CHLORIDE SERPL-SCNC: 102 MMOL/L (ref 95–110)
CO2 SERPL-SCNC: 27 MMOL/L (ref 23–29)
CODING SYSTEM: NORMAL
CREAT SERPL-MCNC: 0.8 MG/DL (ref 0.5–1.4)
CROSSMATCH INTERPRETATION: NORMAL
CRP SERPL-MCNC: 189.6 MG/L (ref 0–8.2)
DIFFERENTIAL METHOD BLD: ABNORMAL
DISPENSE STATUS: NORMAL
EOSINOPHIL # BLD AUTO: 0.2 K/UL (ref 0–0.5)
EOSINOPHIL NFR BLD: 1.2 % (ref 0–8)
ERYTHROCYTE [DISTWIDTH] IN BLOOD BY AUTOMATED COUNT: 21.2 % (ref 11.5–14.5)
EST. GFR  (NO RACE VARIABLE): >60 ML/MIN/1.73 M^2
GLUCOSE SERPL-MCNC: 97 MG/DL (ref 70–110)
HCT VFR BLD AUTO: 21.1 % (ref 37–48.5)
HGB BLD-MCNC: 6.4 G/DL (ref 12–16)
IMM GRANULOCYTES # BLD AUTO: 0.18 K/UL (ref 0–0.04)
IMM GRANULOCYTES NFR BLD AUTO: 1 % (ref 0–0.5)
LYMPHOCYTES # BLD AUTO: 1.8 K/UL (ref 1–4.8)
LYMPHOCYTES NFR BLD: 9.7 % (ref 18–48)
MAGNESIUM SERPL-MCNC: 1.5 MG/DL (ref 1.6–2.6)
MCH RBC QN AUTO: 26.4 PG (ref 27–31)
MCHC RBC AUTO-ENTMCNC: 30.3 G/DL (ref 32–36)
MCV RBC AUTO: 87 FL (ref 82–98)
MONOCYTES # BLD AUTO: 1.5 K/UL (ref 0.3–1)
MONOCYTES NFR BLD: 7.8 % (ref 4–15)
NEUTROPHILS # BLD AUTO: 15 K/UL (ref 1.8–7.7)
NEUTROPHILS NFR BLD: 79.8 % (ref 38–73)
NRBC BLD-RTO: 0 /100 WBC
NUM UNITS TRANS PACKED RBC: NORMAL
PHOSPHATE SERPL-MCNC: 2.9 MG/DL (ref 2.7–4.5)
PLATELET # BLD AUTO: 547 K/UL (ref 150–450)
PMV BLD AUTO: 11.2 FL (ref 9.2–12.9)
POCT GLUCOSE: 107 MG/DL (ref 70–110)
POCT GLUCOSE: 137 MG/DL (ref 70–110)
POCT GLUCOSE: 159 MG/DL (ref 70–110)
POCT GLUCOSE: 72 MG/DL (ref 70–110)
POCT GLUCOSE: 74 MG/DL (ref 70–110)
POTASSIUM SERPL-SCNC: 3.9 MMOL/L (ref 3.5–5.1)
PROT SERPL-MCNC: 5.9 G/DL (ref 6–8.4)
RBC # BLD AUTO: 2.42 M/UL (ref 4–5.4)
SODIUM SERPL-SCNC: 135 MMOL/L (ref 136–145)
SPECIMEN OUTDATE: NORMAL
WBC # BLD AUTO: 18.8 K/UL (ref 3.9–12.7)

## 2024-07-08 PROCEDURE — 25000003 PHARM REV CODE 250: Mod: HCNC | Performed by: HOSPITALIST

## 2024-07-08 PROCEDURE — 83735 ASSAY OF MAGNESIUM: CPT | Mod: HCNC | Performed by: NURSE PRACTITIONER

## 2024-07-08 PROCEDURE — 36415 COLL VENOUS BLD VENIPUNCTURE: CPT | Mod: HCNC | Performed by: STUDENT IN AN ORGANIZED HEALTH CARE EDUCATION/TRAINING PROGRAM

## 2024-07-08 PROCEDURE — 25000003 PHARM REV CODE 250: Mod: HCNC

## 2024-07-08 PROCEDURE — 86900 BLOOD TYPING SEROLOGIC ABO: CPT | Mod: HCNC | Performed by: STUDENT IN AN ORGANIZED HEALTH CARE EDUCATION/TRAINING PROGRAM

## 2024-07-08 PROCEDURE — 63600175 PHARM REV CODE 636 W HCPCS: Mod: HCNC | Performed by: HOSPITALIST

## 2024-07-08 PROCEDURE — 86140 C-REACTIVE PROTEIN: CPT | Mod: HCNC | Performed by: STUDENT IN AN ORGANIZED HEALTH CARE EDUCATION/TRAINING PROGRAM

## 2024-07-08 PROCEDURE — 36430 TRANSFUSION BLD/BLD COMPNT: CPT | Mod: HCNC

## 2024-07-08 PROCEDURE — P9016 RBC LEUKOCYTES REDUCED: HCPCS | Mod: HCNC | Performed by: STUDENT IN AN ORGANIZED HEALTH CARE EDUCATION/TRAINING PROGRAM

## 2024-07-08 PROCEDURE — 99232 SBSQ HOSP IP/OBS MODERATE 35: CPT | Mod: HCNC,,, | Performed by: NURSE PRACTITIONER

## 2024-07-08 PROCEDURE — 20600001 HC STEP DOWN PRIVATE ROOM: Mod: HCNC

## 2024-07-08 PROCEDURE — 63600175 PHARM REV CODE 636 W HCPCS: Mod: HCNC | Performed by: STUDENT IN AN ORGANIZED HEALTH CARE EDUCATION/TRAINING PROGRAM

## 2024-07-08 PROCEDURE — 86850 RBC ANTIBODY SCREEN: CPT | Mod: HCNC | Performed by: STUDENT IN AN ORGANIZED HEALTH CARE EDUCATION/TRAINING PROGRAM

## 2024-07-08 PROCEDURE — 86920 COMPATIBILITY TEST SPIN: CPT | Mod: HCNC | Performed by: STUDENT IN AN ORGANIZED HEALTH CARE EDUCATION/TRAINING PROGRAM

## 2024-07-08 PROCEDURE — 80053 COMPREHEN METABOLIC PANEL: CPT | Mod: HCNC | Performed by: NURSE PRACTITIONER

## 2024-07-08 PROCEDURE — 85025 COMPLETE CBC W/AUTO DIFF WBC: CPT | Mod: HCNC | Performed by: NURSE PRACTITIONER

## 2024-07-08 PROCEDURE — 25000003 PHARM REV CODE 250: Mod: HCNC | Performed by: STUDENT IN AN ORGANIZED HEALTH CARE EDUCATION/TRAINING PROGRAM

## 2024-07-08 PROCEDURE — 84100 ASSAY OF PHOSPHORUS: CPT | Mod: HCNC | Performed by: NURSE PRACTITIONER

## 2024-07-08 PROCEDURE — 86901 BLOOD TYPING SEROLOGIC RH(D): CPT | Mod: HCNC | Performed by: STUDENT IN AN ORGANIZED HEALTH CARE EDUCATION/TRAINING PROGRAM

## 2024-07-08 PROCEDURE — 27000207 HC ISOLATION: Mod: HCNC

## 2024-07-08 RX ORDER — HYDROMORPHONE HYDROCHLORIDE 1 MG/ML
0.5 INJECTION, SOLUTION INTRAMUSCULAR; INTRAVENOUS; SUBCUTANEOUS EVERY 4 HOURS PRN
Status: DISCONTINUED | OUTPATIENT
Start: 2024-07-08 | End: 2024-07-13 | Stop reason: HOSPADM

## 2024-07-08 RX ORDER — ONDANSETRON HYDROCHLORIDE 2 MG/ML
4 INJECTION, SOLUTION INTRAVENOUS EVERY 6 HOURS PRN
Status: DISCONTINUED | OUTPATIENT
Start: 2024-07-08 | End: 2024-07-13 | Stop reason: HOSPADM

## 2024-07-08 RX ORDER — HYDROCODONE BITARTRATE AND ACETAMINOPHEN 500; 5 MG/1; MG/1
TABLET ORAL
Status: DISCONTINUED | OUTPATIENT
Start: 2024-07-08 | End: 2024-07-13 | Stop reason: HOSPADM

## 2024-07-08 RX ORDER — MAGNESIUM SULFATE HEPTAHYDRATE 40 MG/ML
2 INJECTION, SOLUTION INTRAVENOUS ONCE
Status: DISCONTINUED | OUTPATIENT
Start: 2024-07-08 | End: 2024-07-08

## 2024-07-08 RX ORDER — LANOLIN ALCOHOL/MO/W.PET/CERES
400 CREAM (GRAM) TOPICAL DAILY
Status: DISCONTINUED | OUTPATIENT
Start: 2024-07-08 | End: 2024-07-09

## 2024-07-08 RX ADMIN — SENNOSIDES AND DOCUSATE SODIUM 1 TABLET: 50; 8.6 TABLET ORAL at 10:07

## 2024-07-08 RX ADMIN — MEROPENEM 1 G: 1 INJECTION INTRAVENOUS at 12:07

## 2024-07-08 RX ADMIN — Medication 1 ENEMA: at 08:07

## 2024-07-08 RX ADMIN — OXYCODONE 5 MG: 5 TABLET ORAL at 05:07

## 2024-07-08 RX ADMIN — LAMOTRIGINE 25 MG: 25 TABLET ORAL at 10:07

## 2024-07-08 RX ADMIN — AMLODIPINE BESYLATE 10 MG: 10 TABLET ORAL at 08:07

## 2024-07-08 RX ADMIN — MEROPENEM 1 G: 1 INJECTION INTRAVENOUS at 10:07

## 2024-07-08 RX ADMIN — POLYETHYLENE GLYCOL 3350 17 G: 17 POWDER, FOR SOLUTION ORAL at 08:07

## 2024-07-08 RX ADMIN — INSULIN ASPART 6 UNITS: 100 INJECTION, SOLUTION INTRAVENOUS; SUBCUTANEOUS at 05:07

## 2024-07-08 RX ADMIN — INSULIN ASPART 6 UNITS: 100 INJECTION, SOLUTION INTRAVENOUS; SUBCUTANEOUS at 09:07

## 2024-07-08 RX ADMIN — HEPARIN SODIUM 5000 UNITS: 5000 INJECTION INTRAVENOUS; SUBCUTANEOUS at 05:07

## 2024-07-08 RX ADMIN — ATORVASTATIN CALCIUM 40 MG: 40 TABLET, FILM COATED ORAL at 08:07

## 2024-07-08 RX ADMIN — HEPARIN SODIUM 5000 UNITS: 5000 INJECTION INTRAVENOUS; SUBCUTANEOUS at 04:07

## 2024-07-08 RX ADMIN — ONDANSETRON 4 MG: 2 INJECTION INTRAMUSCULAR; INTRAVENOUS at 04:07

## 2024-07-08 RX ADMIN — LAMOTRIGINE 25 MG: 25 TABLET ORAL at 08:07

## 2024-07-08 RX ADMIN — HEPARIN SODIUM 5000 UNITS: 5000 INJECTION INTRAVENOUS; SUBCUTANEOUS at 10:07

## 2024-07-08 RX ADMIN — GUAIFENESIN 600 MG: 600 TABLET, EXTENDED RELEASE ORAL at 10:07

## 2024-07-08 RX ADMIN — SIMETHICONE 80 MG: 80 TABLET, CHEWABLE ORAL at 10:07

## 2024-07-08 RX ADMIN — SENNOSIDES AND DOCUSATE SODIUM 1 TABLET: 50; 8.6 TABLET ORAL at 08:07

## 2024-07-08 RX ADMIN — INSULIN GLARGINE 10 UNITS: 100 INJECTION, SOLUTION SUBCUTANEOUS at 08:07

## 2024-07-08 RX ADMIN — POLYETHYLENE GLYCOL 3350 17 G: 17 POWDER, FOR SOLUTION ORAL at 10:07

## 2024-07-08 RX ADMIN — LACTULOSE 20 G: 20 SOLUTION ORAL at 10:07

## 2024-07-08 RX ADMIN — PROMETHAZINE HYDROCHLORIDE 12.5 MG: 25 INJECTION INTRAMUSCULAR; INTRAVENOUS at 12:07

## 2024-07-08 RX ADMIN — OXYCODONE 5 MG: 5 TABLET ORAL at 03:07

## 2024-07-08 RX ADMIN — GUAIFENESIN 600 MG: 600 TABLET, EXTENDED RELEASE ORAL at 08:07

## 2024-07-08 RX ADMIN — MEROPENEM 1 G: 1 INJECTION INTRAVENOUS at 02:07

## 2024-07-08 RX ADMIN — HYDROMORPHONE HYDROCHLORIDE 0.5 MG: 1 INJECTION, SOLUTION INTRAMUSCULAR; INTRAVENOUS; SUBCUTANEOUS at 04:07

## 2024-07-08 RX ADMIN — Medication 400 MG: at 11:07

## 2024-07-08 RX ADMIN — HYDROMORPHONE HYDROCHLORIDE 0.5 MG: 1 INJECTION, SOLUTION INTRAMUSCULAR; INTRAVENOUS; SUBCUTANEOUS at 10:07

## 2024-07-08 RX ADMIN — FUROSEMIDE 40 MG: 40 TABLET ORAL at 08:07

## 2024-07-08 NOTE — PT/OT/SLP PROGRESS
Physical Therapy      Patient Name:  Anette Ricci   MRN:  [id number]    Patient not seen today secondary to patient awaiting transfusion, critical H&H on first therapy attempt at 9am; patient off the floor for procedure at 13:30. Will follow-up as appropriate.

## 2024-07-08 NOTE — SUBJECTIVE & OBJECTIVE
Interval History: renal function remain at bedside. sCr 0.8. Other electrolytes non emergent. No distress on exam, oxygenating well on RA.   Urology following for R hydronephrosis. Plans for OP ambulatory referral for retrograde pyelograms and right ureteral stent placement, no acute urological intervention while inpatient.     Review of patient's allergies indicates:   Allergen Reactions    Codeine Other (See Comments)     Flu like s/s    Tramadol Other (See Comments)     Flu like symptoms similar to codeine reaction     Current Facility-Administered Medications   Medication Frequency    0.9%  NaCl infusion (for blood administration) Q24H PRN    0.9%  NaCl infusion (for blood administration) Q24H PRN    acetaminophen tablet 650 mg Q6H PRN    amLODIPine tablet 10 mg Daily    atorvastatin tablet 40 mg Daily    bisacodyL suppository 10 mg Daily PRN    dextrose 10 % infusion PRN    dextrose 10 % infusion PRN    dextrose 10% bolus 125 mL 125 mL PRN    dextrose 10% bolus 250 mL 250 mL PRN    diphenhydrAMINE capsule 25 mg Q6H PRN    furosemide tablet 40 mg Daily    glucagon (human recombinant) injection 1 mg PRN    glucose chewable tablet 16 g PRN    glucose chewable tablet 24 g PRN    guaiFENesin 12 hr tablet 600 mg BID    heparin (porcine) injection 5,000 Units Q8H    HYDROmorphone injection 0.5 mg Q4H PRN    insulin aspart U-100 pen 0-10 Units QID (AC + HS) PRN    insulin aspart U-100 pen 6 Units TIDWM    insulin glargine U-100 (Lantus) pen 10 Units Daily    lactulose 20 gram/30 mL solution Soln 20 g Q6H PRN    lamoTRIgine tablet 25 mg BID    magnesium oxide tablet 400 mg Daily    meropenem (MERREM) 1 g in 0.9% NaCl 100 mL IVPB (MB+) Q8H    ondansetron injection 4 mg Q6H PRN    oxyCODONE immediate release tablet 5 mg Q6H PRN    polyethylene glycol packet 17 g BID    promethazine (PHENERGAN) 12.5 mg in 0.9% NaCl 50 mL IVPB Q6H PRN    QUEtiapine tablet 25 mg Daily PRN    senna-docusate 8.6-50 mg per tablet 1 tablet BID     simethicone chewable tablet 80 mg TID PRN    sodium chloride 0.9% flush 10 mL PRN    sodium phosphates 19-7 gram/118 mL enema 1 enema Once       Objective:     Vital Signs (Most Recent):  Temp: 98.1 °F (36.7 °C) (07/08/24 0825)  Pulse: 92 (07/08/24 0825)  Resp: 17 (07/08/24 0825)  BP: 117/64 (07/08/24 0845)  SpO2: 100 % (07/08/24 0825) Vital Signs (24h Range):  Temp:  [97.6 °F (36.4 °C)-99.3 °F (37.4 °C)] 98.1 °F (36.7 °C)  Pulse:  [88-99] 92  Resp:  [17-19] 17  SpO2:  [97 %-100 %] 100 %  BP: (117-127)/(64-77) 117/64     Weight: 86.9 kg (191 lb 9.3 oz) (07/01/24 0620)  Body mass index is 30.01 kg/m².  Body surface area is 2.03 meters squared.    I/O last 3 completed shifts:  In: 890 [P.O.:740; I.V.:50; IV Piggyback:100]  Out: 1800 [Urine:1800]     Physical Exam  Vitals and nursing note reviewed.   Constitutional:       General: She is not in acute distress.     Appearance: She is well-developed. She is not ill-appearing or toxic-appearing.   HENT:      Head: Normocephalic and atraumatic.      Mouth/Throat:      Mouth: Mucous membranes are dry.   Eyes:      General: No scleral icterus.        Right eye: No discharge.         Left eye: No discharge.      Extraocular Movements: Extraocular movements intact.      Conjunctiva/sclera: Conjunctivae normal.   Cardiovascular:      Rate and Rhythm: Normal rate and regular rhythm.   Pulmonary:      Effort: No respiratory distress.      Breath sounds: No rales.   Abdominal:      General: There is distension.      Palpations: Abdomen is soft.      Tenderness: There is no abdominal tenderness.   Musculoskeletal:         General: No swelling.      Cervical back: Normal range of motion and neck supple.      Right lower leg: Edema present.      Left lower leg: Edema present.   Skin:     General: Skin is warm and dry.   Neurological:      Mental Status: She is alert, oriented to person, place, and time and easily aroused.          Significant Labs:  CBC:   Recent Labs   Lab  07/08/24  0811   WBC 18.80*   RBC 2.42*   HGB 6.4*   HCT 21.1*   *   MCV 87   MCH 26.4*   MCHC 30.3*     CMP:   Recent Labs   Lab 07/08/24  0811   GLU 97   CALCIUM 8.1*   ALBUMIN 1.8*   PROT 5.9*   *   K 3.9   CO2 27      BUN 13   CREATININE 0.8   ALKPHOS 90   ALT 5*   AST 30   BILITOT 0.6     All labs within the past 24 hours have been reviewed.

## 2024-07-08 NOTE — PROGRESS NOTES
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  She was discharged on 06/27.  During hospitalization she was treated with Zosyn for 6 days due to recurrent fevers but infectious workup was negative      On admission:   Pulse 100 saturating 99%, afebrile, blood pressure 88/48.  CBC notable for marked leukocytosis 20,000 hemoglobin of 7.4 (hematocrit 23) with increase in granulocyte percentage, RDW 19.9.  CMP notable for a sodium of 129 CO2 14, anion gap 21, BUN 76 creatinine 4.6 GFR 10,  glucose of 403 alk-phos 170, normal AST ALT, uric acid of 9.3    Imaging notable for:  CT scan indicates significant pleural fluid accumulation at the right lung base with suspected lung consolidation and mediastinal shift. The Chest X-ray confirms worsening opacity in the right hemithorax consistent with pleural fluid, along with parenchymal changes suggesting atelectasis and possible infiltrates. There are no signs of pneumothorax, but mild changes are noted in the left lung base.    In the ED received vanc and Zosyn and insulin pending.          Overview/Hospital Course:  Admitted to ICU for DKA, started on insulin drip. Acidosis resolved and transitioned to subq insulin.  Stable for step down.   Thoracentesis 07/02/24; drained 800 ml cloudy (yellow cloudy/chylous looking fluid vs purulence). Cytology and cultures sent. Chest tube placed 7/3. Thoracic surgery consulted for recommendations on lytic therapy.   Pleural fluid with E coli. ID consulted. Cont meropenem per ID recs.   Chest tube to suction Pulm and CTS following. Repeat CT chest Small right hydropneumothorax with a pigtail chest tube in place. Also showing new right hydronephrosis, repeat CT abd pelvis ordered and showed: right-sided hydroureteronephrosis which appears to result from a soft tissue mass at the level of the right common iliac artery. Urology consulted. Per urology Patient will need retrograde pyelograms and right ureteral stent placement given extrinsic compression. If patient fails ureteral stent she will likely need nephrostomy tube to preserve renal function.  Pleural effusion improved and pulm removed chest tube.   Worsening leukocytosis since CT removal, repeating infectious workup. CXR with loculated right pleural reaction/pleural effusion. No interval detrimental change noted. Continues to be stable on RA. If WBC rises consider attempting to evaluate for IR drainage of subdiaphragmatic collection.   Cont fanta while inpt and transition to erta at discharge per ID recs.   No BM in days despite aggressive bowel regimen, enema added. Abd u/s with ascites- para ordered but not enough fluid for safe tap.   H/H drop, transfusion ordered.     Interval History: Still no BM, denies N.V. and reports passing gas. Stable on RA    Transfusion ordered    Review of Systems   Constitutional:  Negative for fever.   Respiratory:  Negative for shortness of breath.    Cardiovascular:  Positive for leg swelling.   Gastrointestinal:  Negative for abdominal pain.   Genitourinary:  Negative for vaginal bleeding.     Objective:     Vital Signs (Most Recent):  Temp: 99.3 °F (37.4 °C) (07/08/24 1547)  Pulse: 99 (07/08/24  1547)  Resp: 16 (07/08/24 1547)  BP: (!) 121/56 (07/08/24 1547)  SpO2: 99 % (07/08/24 1547) Vital Signs (24h Range):  Temp:  [98.1 °F (36.7 °C)-99.3 °F (37.4 °C)] 99.3 °F (37.4 °C)  Pulse:  [] 99  Resp:  [16-19] 16  SpO2:  [96 %-100 %] 99 %  BP: (114-135)/(55-77) 121/56     Weight: 86.9 kg (191 lb 9.3 oz)  Body mass index is 30.01 kg/m².    Intake/Output Summary (Last 24 hours) at 7/8/2024 1625  Last data filed at 7/8/2024 1449  Gross per 24 hour   Intake 980 ml   Output 1000 ml   Net -20 ml         Physical Exam  Vitals and nursing note reviewed.   Constitutional:       General: She is not in acute distress.     Appearance: Normal appearance.   HENT:      Head: Normocephalic and atraumatic.      Nose: Nose normal.      Mouth/Throat:      Mouth: Mucous membranes are moist.      Pharynx: Oropharynx is clear.   Eyes:      Extraocular Movements: Extraocular movements intact.      Conjunctiva/sclera: Conjunctivae normal.      Pupils: Pupils are equal, round, and reactive to light.   Cardiovascular:      Rate and Rhythm: Normal rate and regular rhythm.      Pulses: Normal pulses.      Heart sounds: Normal heart sounds.   Pulmonary:      Effort: Pulmonary effort is normal.      Comments: Decreased breath sounds R base, s/p CT  Abdominal:      General: Abdomen is flat. Bowel sounds are normal. There is distension.      Tenderness: There is no abdominal tenderness.      Comments: Worsening distension and hard abdomen, not rigid and nontender no guarding    Musculoskeletal:         General: Normal range of motion.      Cervical back: Normal range of motion and neck supple.      Right lower leg: Edema (pitting edema to knees) present.      Left lower leg: Edema (pitting edema to knees) present.      Comments: IMANI hose in place   Skin:     General: Skin is warm and dry.   Neurological:      General: No focal deficit present.      Mental Status: She is alert and oriented to person, place, and time.   Psychiatric:          Mood and Affect: Mood normal.         Behavior: Behavior normal.             Significant Labs: All pertinent labs within the past 24 hours have been reviewed.    Significant Imaging: I have reviewed all pertinent imaging results/findings within the past 24 hours.    Assessment/Plan:      * Diabetic ketoacidosis without coma associated with type 2 diabetes mellitus  - ICU on insulin drip, gap now closed and acidosis improving, transitioned to subq insulin  - monitor BG and adjust insulin as needed      Anemia  Patient's anemia is currently worsening. Has not received any PRBCs to date. Etiology likely d/t chronic disease due to Malignancy  Current CBC reviewed-   Lab Results   Component Value Date    HGB 6.4 (L) 07/08/2024    HCT 21.1 (L) 07/08/2024     Monitor serial CBC and transfuse if patient becomes hemodynamically unstable, symptomatic or H/H drops below 7/21.    - transfusion ordered    Constipation  - no BM in multiple days  - CT with: Continued bulky abdominopelvic soft tissue masses consistent with peritoneal carcinomatosis and probable lymphadenopathy, similar to prior CT   - started on aggressive bowel regimen, enema added  - no N/V, bowel sounds present and passing gas  - monitor for obstructive sxs      Peripheral edema  - reviewed prior ECHO: EF low normal.   Left Ventricle: There is low normal systolic function with a visually estimated ejection fraction of 50 - 55%. Biplane (2D) method of discs ejection fraction is 50%. Global longitudinal strain is -18.0%.    Right Ventricle: Normal right ventricular cavity size. Wall thickness is normal. Right ventricle wall motion  is normal. Systolic function is normal.    Left Atrium: Left atrium is mildly dilated.    Mitral Valve: There is mild regurgitation.    IVC/SVC: Normal venous pressure at 3 mmHg.    Pericardium: Left pleural effusion. Ascites present.    - start lasix and IMANI hose      Hydroureteronephrosis  - Mild right-sided hydroureteronephrosis  "which appears to result from a soft tissue mass at the level of the right common iliac artery.   - urology consulted  - per urology: Patient will need retrograde pyelograms and right ureteral stent placement given extrinsic compression. If patient fails ureteral stent she will likely need nephrostomy tube to preserve renal function  - urology will not do this admission given stable renal function, they recommend referral at discharge       Hypomagnesemia  Patient has Abnormal Magnesium: hypomagnesemia. Will continue to monitor electrolytes closely. Will replace the affected electrolytes and repeat labs to be done after interventions completed. The patient's magnesium results have been reviewed and are listed below.  Recent Labs   Lab 07/08/24  0811   MG 1.5*          Empyema  - Ecoli empyema s/p thoracentesis and chest tube placement  - s/p CT in place to suction- CTS and pulm following for management and lytic recommendations - now signed off  - chest tube removed 7/5 per pulm recommendations. Repeat CXR without detrimental change, stable on RA, cont to monitor resp status  - cont meropenem per ID recs- transition to erta at discharge      High anion gap metabolic acidosis  - gap closed, acidosis improving       Chronic pulmonary embolism without acute cor pulmonale  - previously on AC, held for decreasing Hgb and vaginal bleeding   - "Patient with small right lower lobe pulmonary embolism noted on June 2024 CT chest and also noted on prior films as well. Patient has not been on full-dose anticoagulation only prophylaxis dosing. Given her advanced cancer and PE noted on CT, it would not be unreasonable to treat with full-dose anticoagulation. However patient is breathing well on room air and her prior respiratory issues are likely related to her right-sided pleural effusion. If a decision is made to treat with full-dose anticoagulation, patient should be treated with Lovenox 1 milligram/kilogram b.i.d and sent home " "on this regimen. I see no contraindications to anticoagulation however if other procedures are planned then it may be beneficial to wait until discharge. "  - cont heparin ppx, resume lovenox once able and no further procedures planned      Pleural effusion  Patient found to have moderate pleural effusion on imaging. I have personally reviewed and interpreted the following imaging: Xray and CT. A thoracentesis was performed. Pleural fluid was sent for analysis. Exudative consistent with empyema   - Cytology in process  - empyema Ecoli growing. ID recommend cont meropenem   Management to include chest tube  - CT in place to suction- pulm and CTS following and managing CT- may need lytic therapy   - repeat CT done - Small right hydropneumothorax with a pigtail chest tube in place.   - pulm removed chest tube 7/5- remains stable on RA post CT removal  - CXR without detrimental changes, cont to monitor respiratory status, stable on RA breathing comfortably       Leukocytosis  - leukocytosis noted to be worsening after CT removal- s/p chest tube for Ecoli empyema- currently on fanta per ID recs  - given worsening leukocytosis repeating infectious workup with Bcx NGTD, fungitell- in process  - UA not infectious and CXR without detrimental changes  - noted: subdiaphragmatic collection measuring 7.7 x 3.0 cm seen on CT- will assess need for drainage if leukocytosis continues to worsen may need IR drainage if infection       S/p CARLOS ALBERTO/BSO/OMX/Debulking/Partial liver resection/Cholecystectomy  - noted      JASON (acute kidney injury)  Patient with acute kidney injury/acute renal failure likely due to  Suspect a component of ANNE from previous admission playing a role, but also volume depletion from DKA and osmotic diuresis from glucosuria.    JASON is currently improving. Baseline creatinine  0.8  - Labs reviewed- Renal function/electrolytes with Estimated Creatinine Clearance: 85.7 mL/min (based on SCr of 0.8 mg/dL). according to " latest data. Monitor urine output and serial BMP and adjust therapy as needed. Avoid nephrotoxins and renally dose meds for GFR listed above.  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  - Stage IVB ovarian carcinosarcoma s/p PDS on 3/25/24 with Dr Washington (CARLOS ALBERTO/BSO/OMX/liver mobilization/peritoneal & R diaphragm stripping/hepatic wedge resection/cholecystectomy) s/p C3 of carbo/taxol/bevacizumab on 5/31   - Gyn/Onc following  - Doxorubicin C1 scheduled for 7/8, will most likely be delayed due to recent infection   - abdominopelvic soft tissue masses consistent with peritoneal carcinomatosis noted on CT      Malignant ascites  - CT with Continued bulky abdominopelvic soft tissue masses consistent with peritoneal carcinomatosis and probable lymphadenopathy, similar to prior CT   - abd more distended and hard with noted constipation  - has gotten repeated para in the past  - denies abd pain  - abd u/s ordered to eval ascites and need for para- moderate ascites noted and para ordered but radiology unable to be done- not enough fluid for safe tap      Hyperlipidemia  - cont statin      Seizure disorder  - cont lamotrigine       Depression, reactive  Patient has  depression which is unknown and is currently controlled. Will Continue anti-depressant medications. We will not consult psychiatry at this time. Patient does not display psychosis at this time. Continue to monitor closely and adjust plan of care as needed.        Hypertension  Chronic, controlled. Latest blood pressure and vitals reviewed-     Temp:  [98.1 °F (36.7 °C)-99.3 °F (37.4 °C)]   Pulse:  []   Resp:  [16-19]   BP: (114-135)/(55-77)   SpO2:  [96 %-100 %] .   Home meds for hypertension were reviewed and noted below.   Hypertension Medications               amlodipine (NORVASC) 10 MG tablet Take 10 mg by mouth once daily.     enalapril (VASOTEC) 20 MG tablet Take 20 mg by mouth once daily.     hydrochlorothiazide (HYDRODIURIL) 25 MG tablet Take 25 mg by mouth once daily.             While in the hospital, will manage blood pressure as follows; Continue home antihypertensive regimen- was holding ACE/HCTZ in setting of JASON - BP now controlled without, will cont to hold    Will utilize p.r.n. blood pressure medication only if patient's blood pressure greater than 180/110 and she develops symptoms such as worsening chest pain or shortness of breath.    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  Last A1c reviewed-   Lab Results   Component Value Date    HGBA1C 7.3 (H) 03/19/2024     Most recent fingerstick glucose reviewed-   Recent Labs   Lab 07/07/24  2124 07/08/24  0823 07/08/24  1223   POCTGLUCOSE 113* 107 137*       Current correctional scale  Low  Maintain anti-hyperglycemic dose as follows-   Antihyperglycemics (From admission, onward)      Start     Stop Route Frequency Ordered    07/08/24 0900  insulin glargine U-100 (Lantus) pen 10 Units         -- SubQ Daily 07/07/24 1631    07/07/24 1645  insulin aspart U-100 pen 6 Units         -- SubQ 3 times daily with meals 07/07/24 1631    07/03/24 0930  insulin aspart U-100 pen 0-10 Units         -- SubQ Before meals & nightly PRN 07/03/24 0931          Hold Oral hypoglycemics while patient is in the hospital.      VTE Risk Mitigation (From admission, onward)           Ordered     Place IMANI hose  Until discontinued         07/06/24 1550     heparin (porcine) injection 5,000 Units  Every 8 hours         07/03/24 0931     IP VTE HIGH RISK PATIENT  Once         07/01/24 0540     Place sequential compression device  Until discontinued         07/01/24 0540                    Discharge Planning   ROS: 7/9/2024     Code Status: Full Code   Is the patient medically ready for discharge?:     Reason for patient still in hospital (select all that apply):  Patient trending condition  Discharge Plan A: Home with family, Home Health, Home ([facility])   Discharge Delays: None known at this time              Tia Wallace MD  Department of Hospital Medicine   [facility] - Tele[facility] Stepdown

## 2024-07-08 NOTE — ASSESSMENT & PLAN NOTE
Chronic, controlled. Latest blood pressure and vitals reviewed-     Temp:  [98.1 °F (36.7 °C)-99.3 °F (37.4 °C)]   Pulse:  []   Resp:  [16-19]   BP: (114-135)/(55-77)   SpO2:  [96 %-100 %] .   Home meds for hypertension were reviewed and noted below.   Hypertension Medications               amlodipine (NORVASC) 10 MG tablet Take 10 mg by mouth once daily.     enalapril (VASOTEC) 20 MG tablet Take 20 mg by mouth once daily.    hydrochlorothiazide (HYDRODIURIL) 25 MG tablet Take 25 mg by mouth once daily.             While in the hospital, will manage blood pressure as follows; Continue home antihypertensive regimen- was holding ACE/HCTZ in setting of JASON - BP now controlled without, will cont to hold    Will utilize p.r.n. blood pressure medication only if patient's blood pressure greater than 180/110 and she develops symptoms such as worsening chest pain or shortness of breath.

## 2024-07-08 NOTE — ASSESSMENT & PLAN NOTE
Patient's anemia is currently worsening. Has not received any PRBCs to date. Etiology likely d/t chronic disease due to Malignancy  Current CBC reviewed-   Lab Results   Component Value Date    HGB 6.4 (L) 07/08/2024    HCT 21.1 (L) 07/08/2024     Monitor serial CBC and transfuse if patient becomes hemodynamically unstable, symptomatic or H/H drops below 7/21.    - transfusion ordered

## 2024-07-08 NOTE — PLAN OF CARE
Problem: Adult Inpatient Plan of Care  Goal: Plan of Care Review  Outcome: Progressing  Goal: Patient-Specific Goal (Individualized)  Outcome: Progressing  Goal: Readiness for Transition of Care  Outcome: Progressing     Problem: Skin Injury Risk Increased  Goal: Skin Health and Integrity  Outcome: Progressing     Problem: Diabetes Comorbidity  Goal: Blood Glucose Level Within Targeted Range  Outcome: Progressing     Problem: Acute Kidney Injury/Impairment  Goal: Improved Oral Intake  Outcome: Progressing     Problem: Fall Injury Risk  Goal: Absence of Fall and Fall-Related Injury  Outcome: Progressing         Pt. AAOx4.  at bedside. VS WNL for pt. And on RA throughout night. Pt. Has c/o pain and nausea throughout shift. Pain is to ABD and back 8-10/10 on pain scale. On call MD was contacted for pt.'s increased pain and nausea and new orders were put in and initiated. Pt. requested PRN meds. rather then ordered enema for constipation with her night time meds with no results noted. POC discussed with pt. and . Both Verbalized understanding of care. All safety measures are in place with call bell in reach. Will cont. To monitor until end of shift.

## 2024-07-08 NOTE — SUBJECTIVE & OBJECTIVE
Interval History: Still no BM, denies N.V. and reports passing gas. Stable on RA    Transfusion ordered    Review of Systems   Constitutional:  Negative for fever.   Respiratory:  Negative for shortness of breath.    Cardiovascular:  Positive for leg swelling.   Gastrointestinal:  Negative for abdominal pain.   Genitourinary:  Negative for vaginal bleeding.     Objective:     Vital Signs (Most Recent):  Temp: 99.3 °F (37.4 °C) (07/08/24 1547)  Pulse: 99 (07/08/24 1547)  Resp: 16 (07/08/24 1547)  BP: (!) 121/56 (07/08/24 1547)  SpO2: 99 % (07/08/24 1547) Vital Signs (24h Range):  Temp:  [98.1 °F (36.7 °C)-99.3 °F (37.4 °C)] 99.3 °F (37.4 °C)  Pulse:  [] 99  Resp:  [16-19] 16  SpO2:  [96 %-100 %] 99 %  BP: (114-135)/(55-77) 121/56     Weight: 86.9 kg (191 lb 9.3 oz)  Body mass index is 30.01 kg/m².    Intake/Output Summary (Last 24 hours) at 7/8/2024 1625  Last data filed at 7/8/2024 1449  Gross per 24 hour   Intake 980 ml   Output 1000 ml   Net -20 ml         Physical Exam  Vitals and nursing note reviewed.   Constitutional:       General: She is not in acute distress.     Appearance: Normal appearance.   HENT:      Head: Normocephalic and atraumatic.      Nose: Nose normal.      Mouth/Throat:      Mouth: Mucous membranes are moist.      Pharynx: Oropharynx is clear.   Eyes:      Extraocular Movements: Extraocular movements intact.      Conjunctiva/sclera: Conjunctivae normal.      Pupils: Pupils are equal, round, and reactive to light.   Cardiovascular:      Rate and Rhythm: Normal rate and regular rhythm.      Pulses: Normal pulses.      Heart sounds: Normal heart sounds.   Pulmonary:      Effort: Pulmonary effort is normal.      Comments: Decreased breath sounds R base, s/p CT  Abdominal:      General: Abdomen is flat. Bowel sounds are normal. There is distension.      Tenderness: There is no abdominal tenderness.      Comments: Worsening distension and hard abdomen, not rigid and nontender no guarding     Musculoskeletal:         General: Normal range of motion.      Cervical back: Normal range of motion and neck supple.      Right lower leg: Edema (pitting edema to knees) present.      Left lower leg: Edema (pitting edema to knees) present.      Comments: IMANI hose in place   Skin:     General: Skin is warm and dry.   Neurological:      General: No focal deficit present.      Mental Status: She is alert and oriented to person, place, and time.   Psychiatric:         Mood and Affect: Mood normal.         Behavior: Behavior normal.             Significant Labs: All pertinent labs within the past 24 hours have been reviewed.    Significant Imaging: I have reviewed all pertinent imaging results/findings within the past 24 hours.

## 2024-07-08 NOTE — ASSESSMENT & PLAN NOTE
- CT with Continued bulky abdominopelvic soft tissue masses consistent with peritoneal carcinomatosis and probable lymphadenopathy, similar to prior CT   - abd more distended and hard with noted constipation  - has gotten repeated para in the past  - denies abd pain  - abd u/s ordered to eval ascites and need for para- moderate ascites noted and para ordered but radiology unable to be done- not enough fluid for safe tap

## 2024-07-08 NOTE — PLAN OF CARE
Pt arrived to [facility] for para, no acute distress noted. Orders and labs reviewed on chart. Awaiting consent.

## 2024-07-08 NOTE — ASSESSMENT & PLAN NOTE
- no BM in multiple days  - CT with: Continued bulky abdominopelvic soft tissue masses consistent with peritoneal carcinomatosis and probable lymphadenopathy, similar to prior CT   - started on aggressive bowel regimen, enema added  - no N/V, bowel sounds present and passing gas  - monitor for obstructive sxs

## 2024-07-08 NOTE — PROGRESS NOTES
Progress Note  Gynecology Oncology    Admit Date: 2024  LOS: 7    Reason for Admission:  Diabetic ketoacidosis without coma associated with type 2 diabetes mellitus    SUBJECTIVE:     Anette Ricci is a 58 y.o.  with Stage IVB ovarian carcinosarcoma s/p PDS on 3/25/24 with Dr Washington (CARLOS ALBERTO/BSO/OMX/liver mobilization/peritoneal & R diaphragm stripping/hepatic wedge resection/cholecystectomy) s/p C3 of carbo/taxol/bevacizumab on . Admitted to MICU  for the management of DKA (resolved), JASON (resolved), and symptomatic malignant pleural effusion vs empyema, s/p thoracentesis and chest tube.     Today she reports abdominal pain and pain where th chest tube was, but it improve with pain meds. Denies SOB or chest pain, denies fevers of chills.  She has not had a BM for the last 4 days. But is passing a lot of flatus and tolerating her regular diet, without nausea or vomiting. She says she has not tried a suppository or enema in the last day.       OBJECTIVE:     Vital Signs   Temp:  [97.6 °F (36.4 °C)-99.3 °F (37.4 °C)] 98.5 °F (36.9 °C)  Pulse:  [88-99] 92  Resp:  [17-19] 17  SpO2:  [97 %-100 %] 100 %  BP: (120-127)/(68-77) 127/77      Intake/Output Summary (Last 24 hours) at 2024 0558  Last data filed at 2024 2142  Gross per 24 hour   Intake 890 ml   Output 1000 ml   Net -110 ml       Physical Exam:  Gen: Pt alert, A&Ox4  CV: RRR, normal heart sounds and intact distal pulses.  No edema on exam.   Pulm: Lung sounds reduced at right base although improved from prior eval. No stridor. No respiratory distress, normal respiratory effort. Former Chest tube site looks clean and clear.  Abd: Mild-moderate distention although non-tender to palpation without rebound or guarding. Firm mass noted on left mid-abdomen which is nontender to palpation. No erythema, induration, or discharge on bandage.   Ext: PPP, no peripheral edema, SCDs in place  : deferred  Skin: Skin is warm and dry.      Laboratory:  Recent Labs   Lab 24  0436 24  0339 24  1359   WBC 21.03* 19.18* 20.74*   HGB 8.7* 6.5* 8.0*   HCT 28.4* 21.8* 25.5*   MCV 86 88 85   * 506* 463*       Recent Labs   Lab 24  0457 24  0436 24  0339    136 135*   K 4.4 4.0 3.8    104 103   CO2 22* 20* 23   BUN 39* 26* 16   CREATININE 1.0 0.8 0.8   GLU 79 133* 201*   PROT 5.7* 6.9 5.8*   BILITOT 0.5 0.8 0.7   ALKPHOS 114 117 99   ALT 6* 5* 6*   AST 24 26 25   MG 1.5* 1.5* 1.4*   PHOS 3.0 3.0 2.7       Blood Sugars (AccuCheck):       ASSESSMENT/PLAN:     Assessment: Anette Ricci is a 58 y.o.  with Stage IVB ovarian carcinosarcoma s/p PDS on 3/25/24 with Dr Washington (CARLOS ALBERTO/BSO/OMX/liver mobilization/peritoneal & R diaphragm stripping/hepatic wedge resection/cholecystectomy) s/p C3 of carbo/taxol/bevacizumab on . Admitted to MICU  for the management of DKA (resolved), JASON (improved), and symptomatic malignant pleural effusion vs empyema, s/p thoracentesis and chest tube placement ().      Pleural effusion (right)  - S/p right thoracentesis  (purulent) and s/p chest tube placement on  (serosanguinous output). Now removed. CT surgery and pulmonology signed off  - s/p vanc/zosyn. Continue IV Abx per ID recs: day 5 of meropenem   - CXR : continues to show stable right pleural effusion   - CT : Small right hydropneumothorax with a pigtail chest tube in place. Stable patchy ground-glass opacities in the anterior left upper lobe, suspicious for pneumonia.   Interval development of right sided hydronephrosis.   - Continue to monitor patient symptoms and vitals for possible re-accumulation of fluid    Vaginal bleeding   - minimal, stable vaginal bleeding.   - Strict pad count    UTI  - asymptomatic  - Urine Cx with candida glabrata   - management by ID     Anemia     - Most recent H/H      - Continue to monitor for goal Hgb > 8     Constipation     - Patient  reports last BM 4 days ago     - Currently receiving daily miralax, sennakot, s/p 2 dulcolax suppositories over the weekend     - Recommend addition of milk of magnesia or lactulose, consider enema if patient amenable     - Currently denies nausea/vomiting, and is passing flatus.      Hx of PE (stable)  - currently on heparin drip     Diabetes Mellitus     - Current regimen lantus 9u, aspart 4u TIDWM, SSI as needed     - B-330 in 24h period    Stage IVB ovarian carcinosarcoma     - See onc history as above     - Doxorubicin C1 scheduled for , will defer until patient is discharged and infection has resolved    Hypertension  - Continue home norvasc  - Holding home enalapril, HCTZ     Hyperlipidemia  - Hold home statin     Seizure disorder  - Continue home lamictal     Depression  - Continue home seroquel, ativan prn    JASON     - Resolved     - Renal US remarkable for elevated intraparenchymal indices and 2cm mass adjacent to right kidney, repeat yesterday without significant changes    -  CT A/P showing mild right hydronephrosis and a 3.0 x 2.7 cm soft tissue mass centered anterior to the right common iliac artery (series 2, image 115), which was present on prior CT and likely now obstructing the right ureter.      - Urology evaluated patient and recommended outpatient retrograde pyelogram for possible stent placement.     DKA     - Upon admission AG 21 > now closed, glucose 420, beta-hydroxybutyrate 4.0     - S/p insulin drip     - Resolved     Quinton Dave MD  Obstetrics and Gynecology- PGY2

## 2024-07-08 NOTE — ASSESSMENT & PLAN NOTE
Patient has Abnormal Magnesium: hypomagnesemia. Will continue to monitor electrolytes closely. Will replace the affected electrolytes and repeat labs to be done after interventions completed. The patient's magnesium results have been reviewed and are listed below.  Recent Labs   Lab 07/08/24  0811   MG 1.5*

## 2024-07-08 NOTE — ASSESSMENT & PLAN NOTE
Patient's FSGs are controlled on current medication regimen.  Last A1c reviewed-   Lab Results   Component Value Date    HGBA1C 7.3 (H) 03/19/2024     Most recent fingerstick glucose reviewed-   Recent Labs   Lab 07/07/24  2124 07/08/24  0823 07/08/24  1223   POCTGLUCOSE 113* 107 137*       Current correctional scale  Low  Maintain anti-hyperglycemic dose as follows-   Antihyperglycemics (From admission, onward)    Start     Stop Route Frequency Ordered    07/08/24 0900  insulin glargine U-100 (Lantus) pen 10 Units         -- SubQ Daily 07/07/24 1631    07/07/24 1645  insulin aspart U-100 pen 6 Units         -- SubQ 3 times daily with meals 07/07/24 1631    07/03/24 0930  insulin aspart U-100 pen 0-10 Units         -- SubQ Before meals & nightly PRN 07/03/24 0931        Hold Oral hypoglycemics while patient is in the hospital.

## 2024-07-08 NOTE — PROGRESS NOTES
[facility] - Telemetry Stepdown  Nephrology  Progress Note    Patient Name: Anette Ricci  MRN: [id number]  Admission Date: 6/30/2024  Hospital Length of Stay: 7 days  Attending Provider: Tia Wallace MD   Primary Care Physician: Mark Chua MD  Principal Problem:Diabetic ketoacidosis without coma associated with type 2 diabetes mellitus    Subjective:     Interval History: renal function remain at bedside. sCr 0.8. Other electrolytes non emergent. No distress on exam, oxygenating well on RA.   Urology following for R hydronephrosis. Plans for OP ambulatory referral for retrograde pyelograms and right ureteral stent placement, no acute urological intervention while inpatient.     Review of patient's allergies indicates:   Allergen Reactions    Codeine Other (See Comments)     Flu like s/s    Tramadol Other (See Comments)     Flu like symptoms similar to codeine reaction     Current Facility-Administered Medications   Medication Frequency    0.9%  NaCl infusion (for blood administration) Q24H PRN    0.9%  NaCl infusion (for blood administration) Q24H PRN    acetaminophen tablet 650 mg Q6H PRN    amLODIPine tablet 10 mg Daily    atorvastatin tablet 40 mg Daily    bisacodyL suppository 10 mg Daily PRN    dextrose 10 % infusion PRN    dextrose 10 % infusion PRN    dextrose 10% bolus 125 mL 125 mL PRN    dextrose 10% bolus 250 mL 250 mL PRN    diphenhydrAMINE capsule 25 mg Q6H PRN    furosemide tablet 40 mg Daily    glucagon (human recombinant) injection 1 mg PRN    glucose chewable tablet 16 g PRN    glucose chewable tablet 24 g PRN    guaiFENesin 12 hr tablet 600 mg BID    heparin (porcine) injection 5,000 Units Q8H    HYDROmorphone injection 0.5 mg Q4H PRN    insulin aspart U-100 pen 0-10 Units QID (AC + HS) PRN    insulin aspart U-100 pen 6 Units TIDWM    insulin glargine U-100 (Lantus) pen 10 Units Daily    lactulose 20 gram/30 mL solution Soln 20 g Q6H PRN    lamoTRIgine tablet 25 mg BID    magnesium  oxide tablet 400 mg Daily    meropenem (MERREM) 1 g in 0.9% NaCl 100 mL IVPB (MB+) Q8H    ondansetron injection 4 mg Q6H PRN    oxyCODONE immediate release tablet 5 mg Q6H PRN    polyethylene glycol packet 17 g BID    promethazine (PHENERGAN) 12.5 mg in 0.9% NaCl 50 mL IVPB Q6H PRN    QUEtiapine tablet 25 mg Daily PRN    senna-docusate 8.6-50 mg per tablet 1 tablet BID    simethicone chewable tablet 80 mg TID PRN    sodium chloride 0.9% flush 10 mL PRN    sodium phosphates 19-7 gram/118 mL enema 1 enema Once       Objective:     Vital Signs (Most Recent):  Temp: 98.1 °F (36.7 °C) (07/08/24 0825)  Pulse: 92 (07/08/24 0825)  Resp: 17 (07/08/24 0825)  BP: 117/64 (07/08/24 0845)  SpO2: 100 % (07/08/24 0825) Vital Signs (24h Range):  Temp:  [97.6 °F (36.4 °C)-99.3 °F (37.4 °C)] 98.1 °F (36.7 °C)  Pulse:  [88-99] 92  Resp:  [17-19] 17  SpO2:  [97 %-100 %] 100 %  BP: (117-127)/(64-77) 117/64     Weight: 86.9 kg (191 lb 9.3 oz) (07/01/24 0620)  Body mass index is 30.01 kg/m².  Body surface area is 2.03 meters squared.    I/O last 3 completed shifts:  In: 890 [P.O.:740; I.V.:50; IV Piggyback:100]  Out: 1800 [Urine:1800]     Physical Exam  Vitals and nursing note reviewed.   Constitutional:       General: She is not in acute distress.     Appearance: She is well-developed. She is not ill-appearing or toxic-appearing.   HENT:      Head: Normocephalic and atraumatic.      Mouth/Throat:      Mouth: Mucous membranes are dry.   Eyes:      General: No scleral icterus.        Right eye: No discharge.         Left eye: No discharge.      Extraocular Movements: Extraocular movements intact.      Conjunctiva/sclera: Conjunctivae normal.   Cardiovascular:      Rate and Rhythm: Normal rate and regular rhythm.   Pulmonary:      Effort: No respiratory distress.      Breath sounds: No rales.   Abdominal:      General: There is distension.      Palpations: Abdomen is soft.      Tenderness: There is no abdominal tenderness.   Musculoskeletal:          General: No swelling.      Cervical back: Normal range of motion and neck supple.      Right lower leg: Edema present.      Left lower leg: Edema present.   Skin:     General: Skin is warm and dry.   Neurological:      Mental Status: She is alert, oriented to person, place, and time and easily aroused.          Significant Labs:  CBC:   Recent Labs   Lab 07/08/24  0811   WBC 18.80*   RBC 2.42*   HGB 6.4*   HCT 21.1*   *   MCV 87   MCH 26.4*   MCHC 30.3*     CMP:   Recent Labs   Lab 07/08/24  0811   GLU 97   CALCIUM 8.1*   ALBUMIN 1.8*   PROT 5.9*   *   K 3.9   CO2 27      BUN 13   CREATININE 0.8   ALKPHOS 90   ALT 5*   AST 30   BILITOT 0.6     All labs within the past 24 hours have been reviewed.     Assessment/Plan:     Renal/  High anion gap metabolic acidosis  2/2 DKA  -Ketones +  -Mgmt per primary  -Improved    JASON (acute kidney injury)  JASON on Normal Renal Fx  -Baseline SCr 0.8.  59 yo female with recent admission from 6/20-6/27 for SOB 2/2 malignant effusion who presents on 6/30 for N/V/SOB on 7/1, found to be in DKA with JASON with SCr of 4.6.  I/O's not accurately recorded since admission, stepped up to ICU on 7/1 for higher level of care.  Multiple hypotensive episodes recorded.  Discharged on 6/27 during last admission, on that date had an JASON with SCr of 1.5, suspicious for ANNE as she had a CT with contrast 6/24 and also receiving Ibuprofen and ACEi.    Suspect a component of ANNE from previous admission playing a role, but also volume depletion from DKA and osmotic diuresis from glucosuria.    May have developed an iATN from hypotensive events.  Home meds including Enalapril, HCTZ, Ibuprofen PRN, Metformin    Mohsen 17  7/1:  POCUS @ bedside with collapsible IJ > 50%  Renal US negative for hydronephrosis; acute findings  Urine microscopy with numerous WBCs, leucine crystals    7.5:  Kidney function returned to baseline although AM CT with Mild right-sided hydroureteronephrosis  which is new with soft tissue mass likely now obstructing the right ureter. No renal stones. No left hydronephrosis.     Plan/Recommendations:  -no further nephrology concerns at this time, renal function at bedside.   -urology consulted for evaluation of new mild hydro, plans for OP ambulatory referral for retrograde pyelograms and right ureteral stent placement. No emergent urologic interventions while inpatient.   -strict I/o's  -avoid NSAIDs, CT contrasted studies unless emergently indicated.        Oncology  Stage 4B Carcinoscaroma, Suspected ovarian origin  3/4/24: peritoneal biopsy with carcinosarcoma  3/6/24: CA-125 418  3/25/24: Primary debulking surgery CARLOS ALBERTO/BSO, omentectomy, partial hepatectomy, debulking. (<1cm residual disease: sigmoid, transverse colon, diaphragm, kelvin hepatis)     Adjuvant therapy: Carboplatin AUC 6, paclitaxel 175 mg/m2, bevacizumab 15 mg/kg  C1D1: (holding yaneli) planned for 4/19/24, CA-125 248    -per Gyn/Onc    Endocrine  * Diabetic ketoacidosis without coma associated with type 2 diabetes mellitus  -improved        Nephrology signing off. Plan of care discussed with Nephrology staff and primary team. Thank you for involving us in the care of Mrs. Ricci. Please call with any additional questions, concerns, or changes in the patient's clinical status.    LUIS ARMANDO Renner DNP, APRN, FNP-C  Department of Nephrology  [facility]  Pager: [phone]

## 2024-07-08 NOTE — CONSULTS
[facility] - Telemetry Stepdown  Wound Care    Patient Name:  Anette Ricci   MRN:  [id number]  Date: 7/8/2024  Diagnosis: Diabetic ketoacidosis without coma associated with type 2 diabetes mellitus    History:     Past Medical History:   Diagnosis Date    Breast cancer 2013    Diabetes mellitus     Genetic testing 05/29/2013    VUS    Hyperlipidemia     Hypertension     Malignant neoplasm of upper-outer quadrant of right breast in female, estrogen receptor positive 12/02/2015    Seizure disorder        Social History     Socioeconomic History    Marital status:    Tobacco Use    Smoking status: Never    Smokeless tobacco: Never   Substance and Sexual Activity    Alcohol use: Yes     Alcohol/week: 0.0 standard drinks of alcohol     Comment: ocassional    Drug use: No    Sexual activity: Not Currently     Partners: Male     Birth control/protection: None     Social Determinants of Health     Financial Resource Strain: Low Risk  (7/1/2024)    Overall Financial Resource Strain (CARDIA)     Difficulty of Paying Living Expenses: Not very hard   Food Insecurity: No Food Insecurity (7/1/2024)    Hunger Vital Sign     Worried About Running Out of Food in the Last Year: Never true     Ran Out of Food in the Last Year: Never true   Transportation Needs: No Transportation Needs (7/1/2024)    TRANSPORTATION NEEDS     Transportation : No   Physical Activity: Inactive (7/1/2024)    Exercise Vital Sign     Days of Exercise per Week: 0 days     Minutes of Exercise per Session: 0 min   Stress: Stress Concern Present (7/1/2024)    [employer] - Occupational Stress Questionnaire     Feeling of Stress : Very much   Housing Stability: Low Risk  (7/1/2024)    Housing Stability Vital Sign     Unable to Pay for Housing in the Last Year: No     Homeless in the Last Year: No       Precautions:     Allergies as of 06/30/2024 - Reviewed 06/30/2024   Allergen Reaction Noted    Codeine Other (See Comments)  04/15/2013    Tramadol Other (See Comments) 04/03/2024       [facility] Assessment Details/Treatment     Patient seen for inpatient wound care consult for midline incision. Upon assessment, wound bed pink/red with scant serosanguineous drainage noted on dressing. Midline incision cleansed with vashe for antimicrobial properties and pat dry. Aquacel rope applied to wound bed for antimicrobial properties, silver, and absorption. Cover with mepilex foam border dressing for protection against friction and shear. Change every other day or PRN if soiled. No other issues or concerns at this time. Extra supplies ordered to patient's room. Will follow up 7/15/2024 or sooner if needed.        Reviewed chart for this encounter.  See flowsheet for findings.      Discussed POC with patient and primary nurse.  See EMR for orders and patient education.    Bedside nursing to continue care and monitoring.  Bedside to maintain pressure injury prevention interventions.        07/08/24 0820   WOCN Assessment   WOCN Total Time (mins) 30   Visit Date 07/08/24   Visit Time 0820   Consult Type New   [facility] Speciality Wound   Intervention assessed;changed;applied;chart review;coordination of care;orders   Teaching on-going        Incision/Site 04/06/24 1130 Upper quadrant midline vertical   Date First Assessed/Time First Assessed: 04/06/24 1130   Present Prior to Hospital Arrival?: Yes  Location: Upper quadrant  Orientation: midline  Incision Type: vertical   Wound Image    Incision WDL ex   Dressing Appearance Open to air;Moist drainage   Drainage Amount Scant   Drainage Characteristics/Odor Serosanguineous   Appearance Pink;Red   Care Cleansed with:;Antimicrobial agent   Dressing Applied;Silver;Foam   Dressing Change Due 07/10/24     Orders placed.   Abdullahi PRATERN, RN  07/08/2024

## 2024-07-08 NOTE — ASSESSMENT & PLAN NOTE
JASON on Normal Renal Fx  -Baseline SCr 0.8.  57 yo female with recent admission from 6/20-6/27 for SOB 2/2 malignant effusion who presents on 6/30 for N/V/SOB on 7/1, found to be in DKA with JASON with SCr of 4.6.  I/O's not accurately recorded since admission, stepped up to ICU on 7/1 for higher level of care.  Multiple hypotensive episodes recorded.  Discharged on 6/27 during last admission, on that date had an JASON with SCr of 1.5, suspicious for ANNE as she had a CT with contrast 6/24 and also receiving Ibuprofen and ACEi.    Suspect a component of ANNE from previous admission playing a role, but also volume depletion from DKA and osmotic diuresis from glucosuria.    May have developed an iATN from hypotensive events.  Home meds including Enalapril, HCTZ, Ibuprofen PRN, Metformin    Mohsen 17  7/1:  POCUS @ bedside with collapsible IJ > 50%  Renal US negative for hydronephrosis; acute findings  Urine microscopy with numerous WBCs, leucine crystals    7.5:  Kidney function returned to baseline although AM CT with Mild right-sided hydroureteronephrosis which is new with soft tissue mass likely now obstructing the right ureter. No renal stones. No left hydronephrosis.     Plan/Recommendations:  -no further nephrology concerns at this time, renal function at bedside.   -urology consulted for evaluation of new mild hydro, plans for OP ambulatory referral for retrograde pyelograms and right ureteral stent placement. No emergent urologic interventions while inpatient.   -strict I/o's  -avoid NSAIDs, CT contrasted studies unless emergently indicated.

## 2024-07-08 NOTE — H&P
Inpatient Radiology Pre-procedure Note    History of Present Illness:  Anette Ricci is a 58 y.o. female who presents for ultrasound guided paracentesis.  Admission H&P reviewed.  Past Medical History:   Diagnosis Date    Breast cancer 2013    Diabetes mellitus     Genetic testing 05/29/2013    VUS    Hyperlipidemia     Hypertension     Malignant neoplasm of upper-outer quadrant of right breast in female, estrogen receptor positive 12/02/2015    Seizure disorder      Past Surgical History:   Procedure Laterality Date    BILATERAL SALPINGO-OOPHORECTOMY (BSO) N/A 3/25/2024    Procedure: SALPINGO-OOPHORECTOMY, BILATERAL;  Surgeon: Александр Washington MD;  Location: [facility] OR [address] [place] [address];  Service: OB/GYN;  Laterality: N/A;    BREAST BIOPSY      BREAST LUMPECTOMY Right 2013    CHOLECYSTECTOMY  3/25/2024    Procedure: CHOLECYSTECTOMY;  Surgeon: Bo Farmer MD;  Location: [facility] OR [address] [place] [address];  Service: General;;    DEBULKING OF TUMOR N/A 3/25/2024    Procedure: DEBULKING, NEOPLASM;  Surgeon: Александр Washington MD;  Location: [facility] OR [address] [place] [address];  Service: OB/GYN;  Laterality: N/A;    EXCISION, LIVER N/A 3/25/2024    Procedure: EXCISION, LIVER - partial;  Surgeon: Bo Farmer MD;  Location: [facility] OR [address] [place] [address];  Service: General;  Laterality: N/A;  bk ultrasound  [place] book by TA on 3-21-24  7:00 a.m.  Conf #  [id number]    EYE SURGERY      LAPAROTOMY, EXPLORATORY N/A 3/25/2024    Procedure: LAPAROTOMY, EXPLORATORY;  Surgeon: Александр Washington MD;  Location: [facility] OR [address] [place] [address];  Service: OB/GYN;  Laterality: N/A;    OMENTECTOMY N/A 3/25/2024    Procedure: OMENTECTOMY;  Surgeon: Александр Washington MD;  Location: [facility] OR [address] [place] [address];  Service: OB/GYN;  Laterality: N/A;    PERITONEOCENTESIS N/A 3/13/2024    Procedure: PARACENTESIS, ABDOMINAL;  Surgeon: Flavia Moore MD;  Location: [facility] CATH LAB;  Service: Radiology;  Laterality: N/A;    PERITONEOCENTESIS N/A 3/21/2024    Procedure: PARACENTESIS, ABDOMINAL;   Surgeon: Jorge Jolley MD;  Location: [facility] CATH LAB;  Service: Radiology;  Laterality: N/A;    PERITONEOCENTESIS N/A 6/10/2024    Procedure: PARACENTESIS, ABDOMINAL;  Surgeon: Rogelio Malave MD;  Location: [facility] CATH LAB;  Service: Radiology;  Laterality: N/A;    TOTAL ABDOMINAL HYSTERECTOMY N/A 3/25/2024    Procedure: HYSTERECTOMY, TOTAL, ABDOMINAL;  Surgeon: Александр Washington MD;  Location: [facility] OR [address] [place] [address];  Service: OB/GYN;  Laterality: N/A;    TOTAL REDUCTION MAMMOPLASTY Bilateral 2016       Review of Systems:   As documented in primary team H&P    Home Meds:   Prior to Admission medications    Medication Sig Start Date End Date Taking? Authorizing Provider   acetaminophen (TYLENOL) 325 MG tablet Take 2 tablets (650 mg total) by mouth every 4 (four) hours as needed for Pain. 5/24/24   Art Dao MD   amlodipine (NORVASC) 10 MG tablet Take 10 mg by mouth once daily.  12/4/15   Provider, Historical   atorvastatin (LIPITOR) 40 MG tablet Take 80 mg by mouth once daily.    Provider, Historical   enalapril (VASOTEC) 20 MG tablet Take 20 mg by mouth once daily.    Provider, Historical   enoxaparin (LOVENOX) 80 mg/0.8 mL Syrg Inject 0.8 mLs (80 mg total) into the skin every 12 (twelve) hours. 6/25/24 7/25/24  Quinton Magaña MD   fluticasone propionate (FLONASE) 50 mcg/actuation nasal spray 1 spray by Each Nostril route once daily.    Provider, Historical   hydrochlorothiazide (HYDRODIURIL) 25 MG tablet Take 25 mg by mouth once daily.  5/1/15   Provider, Historical   hydrocodone-acetaminophen (HYCET) solution 7.5-325 mg/15mL Take 30 mLs by mouth every 6 (six) hours as needed for Pain. 4/20/24   Kelsey Ramirez MD   HYDROmorphone (DILAUDID) 2 MG tablet Take 1 tablet (2 mg total) by mouth every 8 (eight) hours as needed for Pain. 4/3/24 4/3/25  Mary Mccloud NP   ibuprofen (ADVIL,MOTRIN) 600 MG tablet Take 1 tablet (600 mg total) by mouth every 6 (six) hours as needed for Pain.  "5/24/24   Art Dao MD   insulin aspart U-100 (NOVOLOG FLEXPEN U-100 INSULIN) 100 unit/mL (3 mL) InPn pen Inject 6 Units into the skin 3 (three) times daily with meals. (Discard pen 28 days after initial use) 6/27/24 6/27/25  Jayla Holguin MD   insulin aspart U-100 (NOVOLOG) 100 unit/mL (3 mL) InPn pen Inject 0-5 Units into the skin before meals and at bedtime as needed (for hyperglycemia by specified ranges). Sliding Scale: Blood Glucose Pre-meal 151-200 +2 units, 201-250 + 4 units, 251-300 + 6 units, 301-350 + 8 units, >350 + 10 units, max TDD 58 units 6/27/24 6/27/25  Jayla Holguin MD   insulin glargine U-100, Lantus, 100 unit/mL (3 mL) SubQ InPn pen Inject 9 Units into the skin every evening. 6/27/24 6/27/25  Jayla Holguin MD   insulin lispro (HUMALOG U-100 INSULIN) 100 unit/mL injection **LOW CORRECTION DOSE**  Blood Glucose  mg/dL                  Pre-meal                  151-200                0 unit                        201-250                2 units                      251-300                3 units                      301-350                4 units                      >350                     5 units                      Administer subcutaneously if needed at times designated by monitoring schedule. 3/30/24   Mel Boateng MD   ketorolac 0.5% (ACULAR) 0.5 % Drop Place 1 drop into both eyes. For pain after eye injections.    Provider, Historical   lamoTRIgine (LAMICTAL) 25 MG tablet Take 25 mg by mouth 2 (two) times daily.    Provider, Historical   lorazepam (ATIVAN) 0.5 MG tablet Take 0.5 mg by mouth every 12 (twelve) hours as needed for Anxiety. 5/31/14   Provider, Historical   metformin (GLUCOPHAGE) 1000 MG tablet Take 1,000 mg by mouth daily with breakfast.     Provider, Historical   ondansetron (ZOFRAN-ODT) 4 MG TbDL Take 1 tablet (4 mg total) by mouth every 6 (six) hours as needed (for nausea). 3/31/24   Mel Boateng MD   pen needle, diabetic 32 gauge x 5/32" Ndle Use to " inject insulin 5 times daily. 6/27/24   Jayla Holguin MD   quetiapine (SEROQUEL) 25 MG Tab Take 25 mg by mouth daily as needed. 2/4/16   Provider, Historical     Scheduled Meds:    amLODIPine  10 mg Oral Daily    atorvastatin  40 mg Oral Daily    furosemide  40 mg Oral Daily    guaiFENesin  600 mg Oral BID    heparin (porcine)  5,000 Units Subcutaneous Q8H    insulin aspart U-100  6 Units Subcutaneous TIDWM    insulin glargine U-100  10 Units Subcutaneous Daily    lamoTRIgine  25 mg Oral BID    magnesium oxide  400 mg Oral Daily    meropenem IV (PEDS and ADULTS)  1 g Intravenous Q8H    polyethylene glycol  17 g Oral BID    senna-docusate 8.6-50 mg  1 tablet Oral BID    sodium phosphates  1 enema Rectal Once     Continuous Infusions:   PRN Meds:  Current Facility-Administered Medications:     0.9%  NaCl infusion (for blood administration), , Intravenous, Q24H PRN    0.9%  NaCl infusion (for blood administration), , Intravenous, Q24H PRN    acetaminophen, 650 mg, Oral, Q6H PRN    bisacodyL, 10 mg, Rectal, Daily PRN    D10W, , Intravenous, PRN    D10W, , Intravenous, PRN    dextrose 10%, 12.5 g, Intravenous, PRN    dextrose 10%, 25 g, Intravenous, PRN    diphenhydrAMINE, 25 mg, Oral, Q6H PRN    glucagon (human recombinant), 1 mg, Intramuscular, PRN    glucose, 16 g, Oral, PRN    glucose, 24 g, Oral, PRN    HYDROmorphone, 0.5 mg, Intravenous, Q4H PRN    insulin aspart U-100, 0-10 Units, Subcutaneous, QID (AC + HS) PRN    lactulose, 20 g, Oral, Q6H PRN    ondansetron, 4 mg, Intravenous, Q6H PRN    oxyCODONE, 5 mg, Oral, Q6H PRN    promethazine (PHENERGAN) 12.5 mg in 0.9% NaCl 50 mL IVPB, 12.5 mg, Intravenous, Q6H PRN    QUEtiapine, 25 mg, Oral, Daily PRN    simethicone, 1 tablet, Oral, TID PRN    sodium chloride 0.9%, 10 mL, Intravenous, PRN  Anticoagulants/Antiplatelets: Heparin    Allergies:   Review of patient's allergies indicates:   Allergen Reactions    Codeine Other (See Comments)     Flu like s/s    Tramadol  Other (See Comments)     Flu like symptoms similar to codeine reaction     Sedation Hx: have not been any systemic reactions    Vitals:  Temp: 99 °F (37.2 °C) (07/08/24 1224)  Pulse: 105 (07/08/24 1349)  Resp: 16 (07/08/24 1349)  BP: 135/65 (07/08/24 1349)  SpO2: 96 % (07/08/24 1349)     Physical Exam:  ASA: 3  Mallampati: n/a    General: no acute distress  Mental Status: alert and oriented to person, place and time  HEENT: normocephalic, atraumatic  Chest: unlabored breathing  Heart: regular heart rate  Abdomen: distended  Extremity: moves all extremities    Plan: ultrasound guided paracentesis  Sedation Plan: local    LEROY Martinez, RIVKAP  Interventional Radiology  [phone] [facility] [place]

## 2024-07-08 NOTE — PLAN OF CARE
Problem: Adult Inpatient Plan of Care  Goal: Plan of Care Review  7/8/2024 1641 by Argentina Jackson, RN  Outcome: Progressing  7/8/2024 1641 by Argentina Jackson, RN  Outcome: Progressing  7/8/2024 1400 by Argentina Jackson, RN  Outcome: Progressing  Goal: Patient-Specific Goal (Individualized)  7/8/2024 1641 by Argentina Jackson, RN  Outcome: Progressing  7/8/2024 1641 by Argentina Jackson, RN  Outcome: Progressing  7/8/2024 1400 by Argentina Jackson RN  Outcome: Progressing  Goal: Absence of Hospital-Acquired Illness or Injury  7/8/2024 1641 by Argentina Jackson, RN  Outcome: Progressing  7/8/2024 1641 by Argentina Jackson, RN  Outcome: Progressing  7/8/2024 1400 by Argentina Jackson RN  Outcome: Progressing  Goal: Optimal Comfort and Wellbeing  7/8/2024 1641 by Argentina Jackson, RN  Outcome: Progressing  7/8/2024 1641 by Argentina Jackson, RN  Outcome: Progressing  7/8/2024 1400 by Argentina Jackson RN  Outcome: Progressing  Goal: Readiness for Transition of Care  7/8/2024 1641 by Argentina Jackson RN  Outcome: Progressing  7/8/2024 1641 by Argentina Jackson, RN  Outcome: Progressing  7/8/2024 1400 by Argentina Jackson RN  Outcome: Progressing     Problem: Infection  Goal: Absence of Infection Signs and Symptoms  7/8/2024 1641 by Argentina Jackson, RN  Outcome: Progressing  7/8/2024 1641 by Argentina Jackson, RN  Outcome: Progressing  7/8/2024 1400 by Argentina Jackson, RN  Outcome: Progressing     Problem: Skin Injury Risk Increased  Goal: Skin Health and Integrity  7/8/2024 1641 by Argentina Jackson, RN  Outcome: Progressing  7/8/2024 1641 by Argentina Jackson, RN  Outcome: Progressing  7/8/2024 1400 by Argentina Jackson, RN  Outcome: Progressing     Problem: Diabetes Comorbidity  Goal: Blood Glucose Level Within Targeted Range  7/8/2024 1641 by Argentina Jackson, RN  Outcome: Progressing  7/8/2024 1641 by Argentina Jackson, RN  Outcome: Progressing  7/8/2024 1400 by Renetta  Argentina COTTON, RN  Outcome: Progressing     Problem: Acute Kidney Injury/Impairment  Goal: Fluid and Electrolyte Balance  7/8/2024 1641 by Argentina Jackson, RN  Outcome: Progressing  7/8/2024 1641 by Argentina Jackson, RN  Outcome: Progressing  7/8/2024 1400 by Argentina Jackson RN  Outcome: Progressing  Goal: Improved Oral Intake  7/8/2024 1641 by Argentina Jackson, RN  Outcome: Progressing  7/8/2024 1641 by Argentina Jackson, RN  Outcome: Progressing  7/8/2024 1400 by Argentina Jackson RN  Outcome: Progressing  Goal: Effective Renal Function  7/8/2024 1641 by Argentina Jackson, RN  Outcome: Progressing  7/8/2024 1400 by Argentina Jackson RN  Outcome: Progressing     Problem: Fall Injury Risk  Goal: Absence of Fall and Fall-Related Injury  7/8/2024 1641 by Argentina Jackson, RN  Outcome: Progressing  7/8/2024 1400 by Argentina Jackson RN  Outcome: Progressing

## 2024-07-08 NOTE — PLAN OF CARE
Per Randi Larry, NP not enough fluid seen via ultrasound to safely proceed with paracentesis. Pt tolerated well. Pt to be transferred back to room. Report given to ALISSON Elaine.

## 2024-07-09 LAB
ALBUMIN SERPL BCP-MCNC: 1.9 G/DL (ref 3.5–5.2)
ALP SERPL-CCNC: 91 U/L (ref 55–135)
ALT SERPL W/O P-5'-P-CCNC: 5 U/L (ref 10–44)
ANION GAP SERPL CALC-SCNC: 9 MMOL/L (ref 8–16)
ANISOCYTOSIS BLD QL SMEAR: SLIGHT
AST SERPL-CCNC: 35 U/L (ref 10–40)
BASOPHILS # BLD AUTO: 0.1 K/UL (ref 0–0.2)
BASOPHILS NFR BLD: 0.4 % (ref 0–1.9)
BILIRUB SERPL-MCNC: 0.9 MG/DL (ref 0.1–1)
BUN SERPL-MCNC: 13 MG/DL (ref 6–20)
CALCIUM SERPL-MCNC: 8.2 MG/DL (ref 8.7–10.5)
CHLORIDE SERPL-SCNC: 100 MMOL/L (ref 95–110)
CO2 SERPL-SCNC: 24 MMOL/L (ref 23–29)
CREAT SERPL-MCNC: 0.8 MG/DL (ref 0.5–1.4)
CRP SERPL-MCNC: 254.3 MG/L (ref 0–8.2)
DIFFERENTIAL METHOD BLD: ABNORMAL
EOSINOPHIL # BLD AUTO: 0.2 K/UL (ref 0–0.5)
EOSINOPHIL NFR BLD: 0.8 % (ref 0–8)
ERYTHROCYTE [DISTWIDTH] IN BLOOD BY AUTOMATED COUNT: 20.2 % (ref 11.5–14.5)
EST. GFR  (NO RACE VARIABLE): >60 ML/MIN/1.73 M^2
GIANT PLATELETS BLD QL SMEAR: PRESENT
GLUCOSE SERPL-MCNC: 86 MG/DL (ref 70–110)
HCT VFR BLD AUTO: 23.2 % (ref 37–48.5)
HGB BLD-MCNC: 7.5 G/DL (ref 12–16)
HYPOCHROMIA BLD QL SMEAR: ABNORMAL
IMM GRANULOCYTES # BLD AUTO: 0.21 K/UL (ref 0–0.04)
IMM GRANULOCYTES NFR BLD AUTO: 0.9 % (ref 0–0.5)
LYMPHOCYTES # BLD AUTO: 1.2 K/UL (ref 1–4.8)
LYMPHOCYTES NFR BLD: 5 % (ref 18–48)
MAGNESIUM SERPL-MCNC: 1.4 MG/DL (ref 1.6–2.6)
MCH RBC QN AUTO: 27.2 PG (ref 27–31)
MCHC RBC AUTO-ENTMCNC: 32.3 G/DL (ref 32–36)
MCV RBC AUTO: 84 FL (ref 82–98)
MONOCYTES # BLD AUTO: 1.7 K/UL (ref 0.3–1)
MONOCYTES NFR BLD: 6.9 % (ref 4–15)
NEUTROPHILS # BLD AUTO: 21.2 K/UL (ref 1.8–7.7)
NEUTROPHILS NFR BLD: 86 % (ref 38–73)
NRBC BLD-RTO: 0 /100 WBC
OHS QRS DURATION: 72 MS
OHS QRS DURATION: 74 MS
OHS QTC CALCULATION: 447 MS
OHS QTC CALCULATION: 452 MS
OVALOCYTES BLD QL SMEAR: ABNORMAL
PHOSPHATE SERPL-MCNC: 3.4 MG/DL (ref 2.7–4.5)
PLATELET # BLD AUTO: 553 K/UL (ref 150–450)
PLATELET BLD QL SMEAR: ABNORMAL
PMV BLD AUTO: 10.8 FL (ref 9.2–12.9)
POCT GLUCOSE: 56 MG/DL (ref 70–110)
POCT GLUCOSE: 62 MG/DL (ref 70–110)
POCT GLUCOSE: 67 MG/DL (ref 70–110)
POCT GLUCOSE: 79 MG/DL (ref 70–110)
POCT GLUCOSE: 83 MG/DL (ref 70–110)
POCT GLUCOSE: 94 MG/DL (ref 70–110)
POCT GLUCOSE: 98 MG/DL (ref 70–110)
POIKILOCYTOSIS BLD QL SMEAR: SLIGHT
POLYCHROMASIA BLD QL SMEAR: ABNORMAL
POTASSIUM SERPL-SCNC: 3.7 MMOL/L (ref 3.5–5.1)
PROT SERPL-MCNC: 6.2 G/DL (ref 6–8.4)
RBC # BLD AUTO: 2.76 M/UL (ref 4–5.4)
SODIUM SERPL-SCNC: 133 MMOL/L (ref 136–145)
SPHEROCYTES BLD QL SMEAR: ABNORMAL
TARGETS BLD QL SMEAR: ABNORMAL
WBC # BLD AUTO: 24.67 K/UL (ref 3.9–12.7)
WBC TOXIC VACUOLES BLD QL SMEAR: PRESENT

## 2024-07-09 PROCEDURE — 27000207 HC ISOLATION: Mod: HCNC

## 2024-07-09 PROCEDURE — 63600175 PHARM REV CODE 636 W HCPCS: Mod: HCNC | Performed by: HOSPITALIST

## 2024-07-09 PROCEDURE — 83735 ASSAY OF MAGNESIUM: CPT | Mod: HCNC | Performed by: NURSE PRACTITIONER

## 2024-07-09 PROCEDURE — 86140 C-REACTIVE PROTEIN: CPT | Mod: HCNC | Performed by: STUDENT IN AN ORGANIZED HEALTH CARE EDUCATION/TRAINING PROGRAM

## 2024-07-09 PROCEDURE — 25000003 PHARM REV CODE 250: Mod: HCNC | Performed by: STUDENT IN AN ORGANIZED HEALTH CARE EDUCATION/TRAINING PROGRAM

## 2024-07-09 PROCEDURE — 36415 COLL VENOUS BLD VENIPUNCTURE: CPT | Mod: HCNC | Performed by: NURSE PRACTITIONER

## 2024-07-09 PROCEDURE — 20600001 HC STEP DOWN PRIVATE ROOM: Mod: HCNC

## 2024-07-09 PROCEDURE — 84100 ASSAY OF PHOSPHORUS: CPT | Mod: HCNC | Performed by: NURSE PRACTITIONER

## 2024-07-09 PROCEDURE — 63600175 PHARM REV CODE 636 W HCPCS: Mod: HCNC | Performed by: STUDENT IN AN ORGANIZED HEALTH CARE EDUCATION/TRAINING PROGRAM

## 2024-07-09 PROCEDURE — 94761 N-INVAS EAR/PLS OXIMETRY MLT: CPT | Mod: HCNC

## 2024-07-09 PROCEDURE — 25000003 PHARM REV CODE 250: Mod: HCNC

## 2024-07-09 PROCEDURE — 99233 SBSQ HOSP IP/OBS HIGH 50: CPT | Mod: HCNC,GC,, | Performed by: INTERNAL MEDICINE

## 2024-07-09 PROCEDURE — 36415 COLL VENOUS BLD VENIPUNCTURE: CPT | Mod: HCNC | Performed by: STUDENT IN AN ORGANIZED HEALTH CARE EDUCATION/TRAINING PROGRAM

## 2024-07-09 PROCEDURE — 80053 COMPREHEN METABOLIC PANEL: CPT | Mod: HCNC | Performed by: NURSE PRACTITIONER

## 2024-07-09 PROCEDURE — 85025 COMPLETE CBC W/AUTO DIFF WBC: CPT | Mod: HCNC | Performed by: NURSE PRACTITIONER

## 2024-07-09 RX ORDER — PROCHLORPERAZINE EDISYLATE 5 MG/ML
10 INJECTION INTRAMUSCULAR; INTRAVENOUS EVERY 6 HOURS PRN
Status: DISCONTINUED | OUTPATIENT
Start: 2024-07-09 | End: 2024-07-13 | Stop reason: HOSPADM

## 2024-07-09 RX ORDER — LANOLIN ALCOHOL/MO/W.PET/CERES
400 CREAM (GRAM) TOPICAL 2 TIMES DAILY
Status: DISCONTINUED | OUTPATIENT
Start: 2024-07-09 | End: 2024-07-11

## 2024-07-09 RX ORDER — INSULIN ASPART 100 [IU]/ML
4 INJECTION, SOLUTION INTRAVENOUS; SUBCUTANEOUS
Status: DISCONTINUED | OUTPATIENT
Start: 2024-07-09 | End: 2024-07-09

## 2024-07-09 RX ORDER — BISACODYL 5 MG
5 TABLET, DELAYED RELEASE (ENTERIC COATED) ORAL DAILY
Status: DISCONTINUED | OUTPATIENT
Start: 2024-07-10 | End: 2024-07-09

## 2024-07-09 RX ORDER — BISACODYL 5 MG
10 TABLET, DELAYED RELEASE (ENTERIC COATED) ORAL 2 TIMES DAILY
Status: DISCONTINUED | OUTPATIENT
Start: 2024-07-09 | End: 2024-07-10

## 2024-07-09 RX ORDER — INSULIN ASPART 100 [IU]/ML
2 INJECTION, SOLUTION INTRAVENOUS; SUBCUTANEOUS
Status: DISCONTINUED | OUTPATIENT
Start: 2024-07-10 | End: 2024-07-10

## 2024-07-09 RX ORDER — POLYETHYLENE GLYCOL 3350 17 G/17G
17 POWDER, FOR SOLUTION ORAL 3 TIMES DAILY
Status: DISCONTINUED | OUTPATIENT
Start: 2024-07-09 | End: 2024-07-10

## 2024-07-09 RX ORDER — SENNOSIDES 8.6 MG/1
17.4 TABLET ORAL 2 TIMES DAILY
Status: DISCONTINUED | OUTPATIENT
Start: 2024-07-09 | End: 2024-07-10

## 2024-07-09 RX ORDER — INSULIN GLARGINE 100 [IU]/ML
7 INJECTION, SOLUTION SUBCUTANEOUS DAILY
Status: DISCONTINUED | OUTPATIENT
Start: 2024-07-10 | End: 2024-07-09

## 2024-07-09 RX ORDER — PANTOPRAZOLE SODIUM 40 MG/1
40 TABLET, DELAYED RELEASE ORAL DAILY
Status: DISCONTINUED | OUTPATIENT
Start: 2024-07-09 | End: 2024-07-11

## 2024-07-09 RX ORDER — AMOXICILLIN 250 MG
2 CAPSULE ORAL 2 TIMES DAILY
Status: DISCONTINUED | OUTPATIENT
Start: 2024-07-09 | End: 2024-07-09

## 2024-07-09 RX ADMIN — POLYETHYLENE GLYCOL 3350 17 G: 17 POWDER, FOR SOLUTION ORAL at 09:07

## 2024-07-09 RX ADMIN — INSULIN ASPART 4 UNITS: 100 INJECTION, SOLUTION INTRAVENOUS; SUBCUTANEOUS at 05:07

## 2024-07-09 RX ADMIN — ONDANSETRON 4 MG: 2 INJECTION INTRAMUSCULAR; INTRAVENOUS at 10:07

## 2024-07-09 RX ADMIN — MEROPENEM 1 G: 1 INJECTION INTRAVENOUS at 03:07

## 2024-07-09 RX ADMIN — Medication 400 MG: at 11:07

## 2024-07-09 RX ADMIN — PANTOPRAZOLE SODIUM 40 MG: 40 TABLET, DELAYED RELEASE ORAL at 03:07

## 2024-07-09 RX ADMIN — LAMOTRIGINE 25 MG: 25 TABLET ORAL at 09:07

## 2024-07-09 RX ADMIN — FUROSEMIDE 40 MG: 40 TABLET ORAL at 08:07

## 2024-07-09 RX ADMIN — DEXTROSE MONOHYDRATE 125 ML: 100 INJECTION, SOLUTION INTRAVENOUS at 10:07

## 2024-07-09 RX ADMIN — HEPARIN SODIUM 5000 UNITS: 5000 INJECTION INTRAVENOUS; SUBCUTANEOUS at 06:07

## 2024-07-09 RX ADMIN — HYDROMORPHONE HYDROCHLORIDE 0.5 MG: 1 INJECTION, SOLUTION INTRAMUSCULAR; INTRAVENOUS; SUBCUTANEOUS at 06:07

## 2024-07-09 RX ADMIN — OXYCODONE 5 MG: 5 TABLET ORAL at 11:07

## 2024-07-09 RX ADMIN — OXYCODONE 5 MG: 5 TABLET ORAL at 03:07

## 2024-07-09 RX ADMIN — PROCHLORPERAZINE EDISYLATE 10 MG: 5 INJECTION INTRAMUSCULAR; INTRAVENOUS at 11:07

## 2024-07-09 RX ADMIN — POLYETHYLENE GLYCOL 3350 17 G: 17 POWDER, FOR SOLUTION ORAL at 03:07

## 2024-07-09 RX ADMIN — MEROPENEM 1 G: 1 INJECTION INTRAVENOUS at 06:07

## 2024-07-09 RX ADMIN — GUAIFENESIN 600 MG: 600 TABLET, EXTENDED RELEASE ORAL at 08:07

## 2024-07-09 RX ADMIN — MEROPENEM 1 G: 1 INJECTION INTRAVENOUS at 11:07

## 2024-07-09 RX ADMIN — INSULIN ASPART 6 UNITS: 100 INJECTION, SOLUTION INTRAVENOUS; SUBCUTANEOUS at 08:07

## 2024-07-09 RX ADMIN — ATORVASTATIN CALCIUM 40 MG: 40 TABLET, FILM COATED ORAL at 08:07

## 2024-07-09 RX ADMIN — INSULIN GLARGINE 10 UNITS: 100 INJECTION, SOLUTION SUBCUTANEOUS at 08:07

## 2024-07-09 RX ADMIN — SENNOSIDES AND DOCUSATE SODIUM 1 TABLET: 50; 8.6 TABLET ORAL at 08:07

## 2024-07-09 RX ADMIN — AMLODIPINE BESYLATE 10 MG: 10 TABLET ORAL at 08:07

## 2024-07-09 RX ADMIN — HYDROMORPHONE HYDROCHLORIDE 0.5 MG: 1 INJECTION, SOLUTION INTRAMUSCULAR; INTRAVENOUS; SUBCUTANEOUS at 03:07

## 2024-07-09 RX ADMIN — Medication 400 MG: at 08:07

## 2024-07-09 RX ADMIN — OXYCODONE 5 MG: 5 TABLET ORAL at 06:07

## 2024-07-09 NOTE — ASSESSMENT & PLAN NOTE
Patient's anemia is currently worsening. Has not received any PRBCs to date. Etiology likely d/t chronic disease due to Malignancy  Current CBC reviewed-   Lab Results   Component Value Date    HGB 7.5 (L) 07/09/2024    HCT 23.2 (L) 07/09/2024     Monitor serial CBC and transfuse if patient becomes hemodynamically unstable, symptomatic or H/H drops below 7/21.    - transfusion done, H/H stable

## 2024-07-09 NOTE — PROGRESS NOTES
Progress Note  Gynecology Oncology    Admit Date: 2024  LOS: 8    Reason for Admission:  Diabetic ketoacidosis without coma associated with type 2 diabetes mellitus    SUBJECTIVE:     Anette Ricci is a 58 y.o.  with stage IVB ovarian carcinosarcoma s/p PDS on 3/25/24 with Dr Washington (CARLOS ALBERTO/BSO/OMX/liver mobilization/peritoneal & R diaphragm stripping/hepatic wedge resection/cholecystectomy) s/p C3 of carbo/taxol/bevacizumab (LD ). Admitted to MICU  for the management of DKA (resolved), JASON (resolved), and malignant pleural effusion c/b empyema, now s/p thoracentesis drainage and subsequent chest tube placement (now removed) currently receiving IV antibiotics. She remains admitted for continued infectious symptoms.    In the last 24H, she received 1u pRBC and attempted IR guided paracentesis however unable to find safe window. Overnight she fevered to 100.8.     Pt reports continued abdominal pain today.  at bedside. Endorses continued flatus, but no BM overnight with enemas and lactulose. Endorses occasional nausea, denies vomiting. Denies chest pain and SOB.       OBJECTIVE:     Vital Signs   Temp:  [98.1 °F (36.7 °C)-100.8 °F (38.2 °C)] 99.5 °F (37.5 °C)  Pulse:  [] 97  Resp:  [16-18] 18  SpO2:  [93 %-100 %] 97 %  BP: ()/(53-78) 134/78      Intake/Output Summary (Last 24 hours) at 2024 0702  Last data filed at 2024 1850  Gross per 24 hour   Intake 641.25 ml   Output --   Net 641.25 ml       Physical Exam:  Gen: Pt alert, A&Ox4  CV: RRR, normal heart sounds and intact distal pulses.  No edema on exam, compression socks in place.   Pulm: Lung sounds reduced at right base with crackles on exam. No stridor. Increased respiratory effort from days prior.  Former Chest tube site looks clean and clear.  Abd: Moderate distention, non-tender to palpation without rebound or guarding. Firm mass remains on left mid-abdomen, which is also nontender to palpation. No  erythema, induration, or discharge on bandage.   Ext: PPP, no peripheral edema, SCDs in place  : deferred  Skin: Skin is warm and dry.     Laboratory:  Recent Labs   Lab 07/07/24  1359 07/08/24  0811 07/09/24  0503   WBC 20.74* 18.80* 24.67*   HGB 8.0* 6.4* 7.5*   HCT 25.5* 21.1* 23.2*   MCV 85 87 84   * 547* 553*       Recent Labs   Lab 07/07/24  0339 07/08/24  0811 07/09/24  0503   * 135* 133*   K 3.8 3.9 3.7    102 100   CO2 23 27 24   BUN 16 13 13   CREATININE 0.8 0.8 0.8   * 97 86   PROT 5.8* 5.9* 6.2   BILITOT 0.7 0.6 0.9   ALKPHOS 99 90 91   ALT 6* 5* 5*   AST 25 30 35   MG 1.4* 1.5* 1.4*   PHOS 2.7 2.9 3.4       Blood Sugars (AccuCheck):    Recent Labs     07/07/24  1155 07/07/24  1618 07/07/24  2124 07/08/24  0823 07/08/24  1223 07/08/24  1742 07/08/24  2156 07/08/24  2158   POCTGLUCOSE 334* 227* 113* 107 137* 159* 74 72     Microbiology Results (last 7 days)       Procedure Component Value Units Date/Time    Blood culture [[id number]] Collected: 07/06/24 1248    Order Status: Completed Specimen: Blood Updated: 07/08/24 1612     Blood Culture, Routine No Growth to date      No Growth to date      No Growth to date    Narrative:      Collection has been rescheduled by VHT at 07/06/2024 09:38 Reason:   Unable to collect, hard stick 2x, nurse Stephanie Pacheco was notified and   asked if another tech can try.  Collection has been rescheduled by VHT at 07/06/2024 09:38 Reason:   Unable to collect, hard stick 2x, nurse Stephanie Pacheco was notified and   asked if another tech can try.    Blood culture [[id number]] Collected: 07/06/24 1248    Order Status: Completed Specimen: Blood Updated: 07/08/24 1612     Blood Culture, Routine No Growth to date      No Growth to date      No Growth to date    Narrative:      Collection has been rescheduled by VHT at 07/06/2024 09:38 Reason:   Unable to collect, hard stick 2x, nurse Stephanie [id number] was notified and   asked if another tech can try.  Collection has  been rescheduled by VHT at 07/06/2024 09:38 Reason:   Unable to collect, hard stick 2x, nurse Stephanie Pacheco was notified and   asked if another tech can try.    (rule out SBP) Aerobic culture [[id number]]     Order Status: Canceled Specimen: Ascites     (rule out SBP) Culture, Anaerobic [[id number]]     Order Status: Canceled Specimen: Ascites     (rule out SBP) Gram stain [[id number]]     Order Status: Canceled Specimen: Ascites     Blood culture x two cultures. Draw prior to antibiotics. [[id number]] Collected: 07/01/24 0028    Order Status: Completed Specimen: Blood from Peripheral, Forearm, Left Updated: 07/06/24 0612     Blood Culture, Routine No growth after 5 days.    Narrative:      Aerobic and anaerobic    Blood culture x two cultures. Draw prior to antibiotics. [[id number]] Collected: 07/01/24 0027    Order Status: Completed Specimen: Blood from Peripheral, Upper Arm, Left Updated: 07/06/24 0612     Blood Culture, Routine No growth after 5 days.    Narrative:      Aerobic and anaerobic    Culture, body fluid - Bactec [[id number]]  (Abnormal)  (Susceptibility) Collected: 07/01/24 1711    Order Status: Completed Specimen: Body Fluid from Thoracentesis Fluid Updated: 07/04/24 0827     Body Fluid Culture, Sterile Gram stain: Gram negative rods      07/02/2024  04:40      ESCHERICHIA COLI ESBL    Culture, Respiratory with Gram Stain [[id number]] Collected: 07/01/24 1505    Order Status: Completed Specimen: Respiratory from Sputum, Induced Updated: 07/03/24 1021     Respiratory Culture Normal respiratory adriano      No S aureus or Pseudomonas isolated.     Gram Stain (Respiratory) <10 epithelial cells per low power field.     Gram Stain (Respiratory) Rare WBC's     Gram Stain (Respiratory) Moderate Gram positive cocci    AFB culture [[id number]] Collected: 07/01/24 1712    Order Status: Completed Specimen: Body Fluid from Pleural Fluid Updated: 07/02/24 2127     AFB Culture & Smear Culture in progress     AFB  CULTURE STAIN No acid fast bacilli seen.    Urine culture [[id number]]  (Abnormal) Collected: 24 0943    Order Status: Completed Specimen: Urine Updated: 24 1446     Urine Culture, Routine ROHIT GLABRATA  > 100,000 cfu/ml  Treatment of asymptomatic candiduria is not recommended (except for   specific populations). Candida isolated in the urine typically   represents colonization. If an indwelling urinary catheter is present  it should be removed or replaced.  Susceptibility testing not routinely performed      Narrative:      Specimen Source->Urine               ASSESSMENT/PLAN:     Assessment: Anette Ricci is a 58 y.o.  with Stage IVB ovarian carcinosarcoma s/p PDS on 3/25/24 with Dr Washington (CARLOS ALBERTO/BSO/OMX/liver mobilization/peritoneal & R diaphragm stripping/hepatic wedge resection/cholecystectomy) s/p C3 of carbo/taxol/bevacizumab on . Admitted to MICU  for the management of DKA (resolved), JASON (improved), and symptomatic malignant pleural effusion vs empyema, s/p thoracentesis and chest tube placement.      Pleural effusion (right) c/b empyema   - S/p right thoracentesis  (purulent) and s/p chest tube placement on  (serosanguinous output) removed on . CT surgery and pulmonology signed off. No lytic therapy performed due to decreased effusion on imaging.   - Cont IV meropenem per ID. Pleural fluid e coli (ESBL) sensitive to meropenem.   - Imaging trend:    - CXR today pending   - CXR : stable right pleural effusion consistent with last xray ()    - CT : Small right hydropneumothorax with a pigtail chest tube in place. Stable patchy ground-glass opacities in the anterior left upper lobe, suspicious for pneumonia.   Interval development of right sided hydronephrosis.   - Continue to monitor patient symptoms and vitals for possible re-accumulation of fluid.    Malignant Ascites  - IR attempted para  unable to perform paracentesis due to inadequate fluid  volume no safe window     UTI C glabrata  - Asymptomatic, likely due prior urinary catheter. No treatment indicated.      Anemia     - S/p 2u pRBC this admission, HH as above      - Continue to monitor for goal Hgb > 8     Constipation     - Patient reports last BM 5 days ago. Abdomen distended. Passing flatus.      - Currently receiving daily miralax, sennakot, s/p 2 dulcolax suppositories and fleets enema without effect.   - Patient high risk for malignant SBO given peritoneal disease, currently has some bowel function. Continue to monitor. Recommend adding PPI for nausea.     Hx of PE (stable)  - Holding home therapeutic Lovenox, in case of any further procedures   - Currently on heparin 5k Q8H   - Transition back to Lovenox therapeutic on DC    Diabetes Mellitus     - Current regimen lantus 10u, aspart 6u TIDWM, SSI as needed     - BG as above     Stage IVB ovarian carcinosarcoma     - See onc history as above     - Doxorubicin C1 scheduled for 7/8, will defer until patient is discharged and infection has resolved.      Hypertension  - Continue home norvasc  - Holding home enalapril, HCTZ     Hyperlipidemia  - Cont home statin     Seizure disorder  - Continue home lamictal     Depression  - Continue home seroquel, ativan prn    JASON  RESOLVED, Cr 0.8      - Renal US remarkable for elevated intraparenchymal indices and 2cm mass adjacent to right kidney, repeat yesterday without significant changes    -  CT A/P showing mild right hydronephrosis and a 3.0 x 2.7 cm soft tissue mass centered anterior to the right common iliac artery (series 2, image 115), which was present on prior CT and likely now obstructing the right ureter.      - Urology evaluated patient and recommended outpatient retrograde pyelogram for possible stent placement.     DKA  RESOLVED     Courtney Serrano MD   Obstetrics and Gynecology, PGY1    I have seen and examined the patient. Agree with plan as above. See my edits in dark blue.     La Bell  MD  PGY 4  Obstetrics and Gynecology

## 2024-07-09 NOTE — ASSESSMENT & PLAN NOTE
Patient has Abnormal Magnesium: hypomagnesemia. Will continue to monitor electrolytes closely. Will replace the affected electrolytes and repeat labs to be done after interventions completed. The patient's magnesium results have been reviewed and are listed below.  Recent Labs   Lab 07/09/24  0503   MG 1.4*

## 2024-07-09 NOTE — ASSESSMENT & PLAN NOTE
- Stage IVB ovarian carcinosarcoma s/p PDS on 3/25/24 with Dr Washington (CARLOS ALBERTO/BSO/OMX/liver mobilization/peritoneal & R diaphragm stripping/hepatic wedge resection/cholecystectomy) s/p C3 of carbo/taxol/bevacizumab on 5/31   - Gyn/Onc following  - Doxorubicin C1 scheduled for 7/8, will most likely be delayed due to recent infection   - abdominopelvic soft tissue masses consistent with peritoneal carcinomatosis noted on CT  - GYN Onc following, recommend palliative discussion

## 2024-07-09 NOTE — CONSULTS
IR consulted for aspiration vs drain placement of a subdiaphragmatic collection noted on imaging from 7/4/24. Imaging reviewed by IR staff - there may not be a window that doesn't cross pleura. Proceeding with aspiration/drain placement has a high risk of pleural transgression. Can consider repeat imaging to re assess the collection. D/w [facility] medicine and gyn onc. Repeat imaging ordered. Please reach out to IR once repeat CT has been done to re-assess.     Kay Polk PA-C  Interventional Radiology  Spectra: [phone]  7/9/2024

## 2024-07-09 NOTE — ASSESSMENT & PLAN NOTE
Patient found to have moderate pleural effusion on imaging. I have personally reviewed and interpreted the following imaging: Xray and CT. A thoracentesis was performed. Pleural fluid was sent for analysis. Exudative consistent with empyema   - Cytology in process  - empyema Ecoli growing. ID recommend cont meropenem   Management to include chest tube  - CT in place to suction- pulm and CTS following and managing CT- may need lytic therapy   - repeat CT done - Small right hydropneumothorax with a pigtail chest tube in place.   - pulm removed chest tube 7/5- remains stable on RA post CT removal  - CXR without detrimental changes, cont to monitor respiratory status, stable on RA breathing comfortably   - pulm reevaluation Bedside US without significant pocket appropriate for bedside drainage or catheter placement. If concerned for persistent pleural effusion would repeat CT scan with possible IR guided catheter placement/thoracentesis and send micro.   - repeat CT ordered and IR made aware

## 2024-07-09 NOTE — PT/OT/SLP PROGRESS
"Physical Therapy      Patient Name:  Anette Ricci   MRN:  [id number]        Patient not seen today secondary to Attempt at 10:40 am and 11:50 am MD present . Attempt at 12:42 Pt politely declines reporting 10/10 back pain and states "I just got meds, I'm so tired, I can't move". Patient declines despite encouragement and education.      Will follow-up 7/10/24.    "

## 2024-07-09 NOTE — ASSESSMENT & PLAN NOTE
- CT with Continued bulky abdominopelvic soft tissue masses consistent with peritoneal carcinomatosis and probable lymphadenopathy, similar to prior CT   - abd more distended and hard with noted constipation  - has gotten repeated para in the past  - denies abd pain  - abd u/s ordered to eval ascites and need for para- moderate ascites noted and para ordered but radiology unable to be done- not enough fluid for safe tap  - cont lasix

## 2024-07-09 NOTE — CARE UPDATE
Afternoon Assessment:    Pt feeling ok this afternoon, endorses continued abdominal pain. Still has not had BM. +Flatus. Denies chest pain and SOB.     Temp:  [99 °F (37.2 °C)-100.8 °F (38.2 °C)] 99.2 °F (37.3 °C)  Pulse:  [] 101  Resp:  [16-18] 18  SpO2:  [93 %-99 %] 96 %  BP: ()/(53-78) 128/68    Physical Exam:   Gen: A&Ox4   Card:RRR  Resp: breathing comfortably on room air   Abdominal: moderate distention, non-tender to palpation with palpable masses in the umbilical region   EXT: PPP, no peripheral edema.     Labs:  Recent Labs   Lab 07/07/24  1359 07/08/24  0811 07/09/24  0503   WBC 20.74* 18.80* 24.67*   HGB 8.0* 6.4* 7.5*   HCT 25.5* 21.1* 23.2*   MCV 85 87 84   * 547* 553*       Recent Labs   Lab 07/07/24  0339 07/08/24  0811 07/09/24  0503   * 135* 133*   K 3.8 3.9 3.7    102 100   CO2 23 27 24   BUN 16 13 13   CREATININE 0.8 0.8 0.8   * 97 86   PROT 5.8* 5.9* 6.2   BILITOT 0.7 0.6 0.9   ALKPHOS 99 90 91   ALT 6* 5* 5*   AST 25 30 35   MG 1.4* 1.5* 1.4*   PHOS 2.7 2.9 3.4       Imaging:   Chest Xray 7/9: large R sided pleural effusion with possible increase in fluid burden     A/P:  -continue bowel regimen as prescribed by primary team. can continue diabetic diet, as pt is passing flatus, has + bowel sounds, and no pain to palpation on exam     -fever of 100.8, WBC 24, and chest xray concerning for remnant empyema on the R lung ISO continued meropenum treatment. Can consider consulting cardiothoracic team regarding infection control and possible persistent empyema of the R lung     -plan for GOC discussion tomorrow am during AM rounds     Courtney Serrano MD   Obstetrics and Gynecology- PGY1

## 2024-07-09 NOTE — ASSESSMENT & PLAN NOTE
Chronic, controlled. Latest blood pressure and vitals reviewed-     Temp:  [99 °F (37.2 °C)-100.8 °F (38.2 °C)]   Pulse:  []   Resp:  [18-21]   BP: ()/(53-86)   SpO2:  [93 %-98 %] .   Home meds for hypertension were reviewed and noted below.   Hypertension Medications               amlodipine (NORVASC) 10 MG tablet Take 10 mg by mouth once daily.     enalapril (VASOTEC) 20 MG tablet Take 20 mg by mouth once daily.    hydrochlorothiazide (HYDRODIURIL) 25 MG tablet Take 25 mg by mouth once daily.             While in the hospital, will manage blood pressure as follows; Continue home antihypertensive regimen- was holding ACE/HCTZ in setting of JASON - BP now controlled without, will cont to hold    Will utilize p.r.n. blood pressure medication only if patient's blood pressure greater than 180/110 and she develops symptoms such as worsening chest pain or shortness of breath.

## 2024-07-09 NOTE — ASSESSMENT & PLAN NOTE
"- previously on AC, held for decreasing Hgb and vaginal bleeding   - "Patient with small right lower lobe pulmonary embolism noted on June 2024 CT chest and also noted on prior films as well. Patient has not been on full-dose anticoagulation only prophylaxis dosing. Given her advanced cancer and PE noted on CT, it would not be unreasonable to treat with full-dose anticoagulation. However patient is breathing well on room air and her prior respiratory issues are likely related to her right-sided pleural effusion. If a decision is made to treat with full-dose anticoagulation, patient should be treated with Lovenox 1 milligram/kilogram b.i.d and sent home on this regimen. I see no contraindications to anticoagulation however if other procedures are planned then it may be beneficial to wait until discharge. "  - cont heparin ppx, resume lovenox once able and no further procedures planned  - Gyn Onc recommend holding heparin given H/H drop    "

## 2024-07-09 NOTE — ASSESSMENT & PLAN NOTE
- Ecoli empyema s/p thoracentesis and chest tube placement  - s/p CT in place to suction- CTS and pulm following for management and lytic recommendations - now signed off  - chest tube removed 7/5 per pulm recommendations. Repeat CXR without detrimental change, stable on RA, cont to monitor resp status  - cont meropenem per ID recs- transition to erta at discharge  - repeat CT orderd

## 2024-07-09 NOTE — PROGRESS NOTES
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  She was discharged on 06/27.  During hospitalization she was treated with Zosyn for 6 days due to recurrent fevers but infectious workup was negative      On admission:   Pulse 100 saturating 99%, afebrile, blood pressure 88/48.  CBC notable for marked leukocytosis 20,000 hemoglobin of 7.4 (hematocrit 23) with increase in granulocyte percentage, RDW 19.9.  CMP notable for a sodium of 129 CO2 14, anion gap 21, BUN 76 creatinine 4.6 GFR 10,  glucose of 403 alk-phos 170, normal AST ALT, uric acid of 9.3    Imaging notable for:  CT scan indicates significant pleural fluid accumulation at the right lung base with suspected lung consolidation and mediastinal shift. The Chest X-ray confirms worsening opacity in the right hemithorax consistent with pleural fluid, along with parenchymal changes suggesting atelectasis and possible infiltrates. There are no signs of pneumothorax, but mild changes are noted in the left lung base.    In the ED received vanc and Zosyn and insulin pending.          Overview/Hospital Course:  Admitted to ICU for DKA, started on insulin drip. Acidosis resolved and transitioned to subq insulin.  Stable for step down.   Thoracentesis 07/02/24; drained 800 ml cloudy (yellow cloudy/chylous looking fluid vs purulence). Cytology and cultures sent. Chest tube placed 7/3. Thoracic surgery consulted for recommendations on lytic therapy.   Pleural fluid with E coli. ID consulted. Cont meropenem per ID recs.   Chest tube to suction Pulm and CTS following. Repeat CT chest Small right hydropneumothorax with a pigtail chest tube in place. Also showing new right hydronephrosis, repeat CT abd pelvis ordered and showed: right-sided hydroureteronephrosis which appears to result from a soft tissue mass at the level of the right common iliac artery. Urology consulted. Per urology Patient will need retrograde pyelograms and right ureteral stent placement given extrinsic compression. If patient fails ureteral stent she will likely need nephrostomy tube to preserve renal function.  Pleural effusion improved and pulm removed chest tube.   Worsening leukocytosis since CT removal, repeating infectious workup. CXR with loculated right pleural reaction/pleural effusion. No interval detrimental change noted. Continues to be stable on RA. If WBC rises consider attempting to evaluate for IR drainage of subdiaphragmatic collection.   Cont fanta while inpt and transition to erta at discharge per ID recs.   No BM in days despite aggressive bowel regimen, enema added. Abd u/s with ascites- para ordered but not enough fluid for safe tap.   H/H drop, transfusion ordered. Spiked fever overnight, possibly due to blood transfusion but CRP also rising. IR consulted for assessment of subdiaphragmatic collection and need for drainage. Recommended repeating CT CAP.   Still with no BM despite aggressive bowel regimen and enema. Not obstructed and passing gas. Cont to monitor for obstruction.     Interval History: Patient reports some abd pain, still passing gas but minimal BM with enema. Stable on RA    H/H stable post transfusion.     Review of  Systems   Constitutional:  Negative for fever.   Respiratory:  Negative for shortness of breath.    Cardiovascular:  Positive for leg swelling (improving).   Gastrointestinal:  Positive for abdominal pain.   Genitourinary:  Negative for vaginal bleeding.     Objective:     Vital Signs (Most Recent):  Temp: 99.1 °F (37.3 °C) (07/09/24 1550)  Pulse: 100 (07/09/24 1550)  Resp: (!) 21 (07/09/24 1550)  BP: 135/86 (07/09/24 1550)  SpO2: 96 % (07/09/24 1550) Vital Signs (24h Range):  Temp:  [99 °F (37.2 °C)-100.8 °F (38.2 °C)] 99.1 °F (37.3 °C)  Pulse:  [] 100  Resp:  [18-21] 21  SpO2:  [93 %-98 %] 96 %  BP: ()/(53-86) 135/86     Weight: 86.9 kg (191 lb 9.3 oz)  Body mass index is 30.01 kg/m².    Intake/Output Summary (Last 24 hours) at 7/9/2024 1708  Last data filed at 7/9/2024 1457  Gross per 24 hour   Intake 521.25 ml   Output --   Net 521.25 ml         Physical Exam  Vitals and nursing note reviewed.   Constitutional:       General: She is not in acute distress.     Appearance: Normal appearance.   HENT:      Head: Normocephalic and atraumatic.      Nose: Nose normal.      Mouth/Throat:      Mouth: Mucous membranes are moist.      Pharynx: Oropharynx is clear.   Eyes:      Extraocular Movements: Extraocular movements intact.      Conjunctiva/sclera: Conjunctivae normal.      Pupils: Pupils are equal, round, and reactive to light.   Cardiovascular:      Rate and Rhythm: Normal rate and regular rhythm.      Pulses: Normal pulses.      Heart sounds: Normal heart sounds.   Pulmonary:      Effort: Pulmonary effort is normal.      Comments: Decreased breath sounds R base, s/p CT  Abdominal:      General: Abdomen is flat. Bowel sounds are normal. There is distension.      Tenderness: There is no abdominal tenderness.      Comments: Worsening distension and hard abdomen, not rigid and nontender no guarding    Musculoskeletal:         General: Normal range of motion.      Cervical back: Normal range of motion and  neck supple.      Right lower leg: Edema (improving) present.      Left lower leg: Edema (improving) present.      Comments: IMANI hose in place   Skin:     General: Skin is warm and dry.   Neurological:      General: No focal deficit present.      Mental Status: She is alert and oriented to person, place, and time.   Psychiatric:         Mood and Affect: Mood normal.         Behavior: Behavior normal.             Significant Labs: All pertinent labs within the past 24 hours have been reviewed.    Significant Imaging: I have reviewed all pertinent imaging results/findings within the past 24 hours.    Assessment/Plan:      * Diabetic ketoacidosis without coma associated with type 2 diabetes mellitus  - ICU on insulin drip, gap now closed and acidosis improving, transitioned to subq insulin  - monitor BG and adjust insulin as needed      Anemia  Patient's anemia is currently worsening. Has not received any PRBCs to date. Etiology likely d/t chronic disease due to Malignancy  Current CBC reviewed-   Lab Results   Component Value Date    HGB 7.5 (L) 07/09/2024    HCT 23.2 (L) 07/09/2024     Monitor serial CBC and transfuse if patient becomes hemodynamically unstable, symptomatic or H/H drops below 7/21.    - transfusion done, H/H stable     Constipation  - no BM in multiple days  - CT with: Continued bulky abdominopelvic soft tissue masses consistent with peritoneal carcinomatosis and probable lymphadenopathy, similar to prior CT   - started on aggressive bowel regimen, enema added  - no N/V, bowel sounds present and passing gas  - monitor for obstructive sxs  - CT abd ordered      Peripheral edema  - reviewed prior ECHO: EF low normal.   Left Ventricle: There is low normal systolic function with a visually estimated ejection fraction of 50 - 55%. Biplane (2D) method of discs ejection fraction is 50%. Global longitudinal strain is -18.0%.    Right Ventricle: Normal right ventricular cavity size. Wall thickness is normal.  "Right ventricle wall motion  is normal. Systolic function is normal.    Left Atrium: Left atrium is mildly dilated.    Mitral Valve: There is mild regurgitation.    IVC/SVC: Normal venous pressure at 3 mmHg.    Pericardium: Left pleural effusion. Ascites present.    - start lasix and IMANI hose      Hydroureteronephrosis  - Mild right-sided hydroureteronephrosis which appears to result from a soft tissue mass at the level of the right common iliac artery.   - urology consulted  - per urology: Patient will need retrograde pyelograms and right ureteral stent placement given extrinsic compression. If patient fails ureteral stent she will likely need nephrostomy tube to preserve renal function  - urology will not do this admission given stable renal function, they recommend referral at discharge       Hypomagnesemia  Patient has Abnormal Magnesium: hypomagnesemia. Will continue to monitor electrolytes closely. Will replace the affected electrolytes and repeat labs to be done after interventions completed. The patient's magnesium results have been reviewed and are listed below.  Recent Labs   Lab 07/09/24  0503   MG 1.4*          Empyema  - Ecoli empyema s/p thoracentesis and chest tube placement  - s/p CT in place to suction- CTS and pulm following for management and lytic recommendations - now signed off  - chest tube removed 7/5 per pulm recommendations. Repeat CXR without detrimental change, stable on RA, cont to monitor resp status  - cont meropenem per ID recs- transition to erta at discharge  - repeat CT orderd      High anion gap metabolic acidosis  - gap closed, acidosis improving       Chronic pulmonary embolism without acute cor pulmonale  - previously on AC, held for decreasing Hgb and vaginal bleeding   - "Patient with small right lower lobe pulmonary embolism noted on June 2024 CT chest and also noted on prior films as well. Patient has not been on full-dose anticoagulation only prophylaxis dosing. Given her " "advanced cancer and PE noted on CT, it would not be unreasonable to treat with full-dose anticoagulation. However patient is breathing well on room air and her prior respiratory issues are likely related to her right-sided pleural effusion. If a decision is made to treat with full-dose anticoagulation, patient should be treated with Lovenox 1 milligram/kilogram b.i.d and sent home on this regimen. I see no contraindications to anticoagulation however if other procedures are planned then it may be beneficial to wait until discharge. "  - cont heparin ppx, resume lovenox once able and no further procedures planned  - Gyn Onc recommend holding heparin given H/H drop      Pleural effusion  Patient found to have moderate pleural effusion on imaging. I have personally reviewed and interpreted the following imaging: Xray and CT. A thoracentesis was performed. Pleural fluid was sent for analysis. Exudative consistent with empyema   - Cytology in process  - empyema Ecoli growing. ID recommend cont meropenem   Management to include chest tube  - CT in place to suction- pulm and CTS following and managing CT- may need lytic therapy   - repeat CT done - Small right hydropneumothorax with a pigtail chest tube in place.   - pulm removed chest tube 7/5- remains stable on RA post CT removal  - CXR without detrimental changes, cont to monitor respiratory status, stable on RA breathing comfortably   - pulm reevaluation Bedside US without significant pocket appropriate for bedside drainage or catheter placement. If concerned for persistent pleural effusion would repeat CT scan with possible IR guided catheter placement/thoracentesis and send micro.   - repeat CT ordered and IR made aware      Leukocytosis  - leukocytosis noted to be worsening after CT removal- s/p chest tube for Ecoli empyema- currently on fanta per ID recs  - given worsening leukocytosis repeating infectious workup with Bcx NGTD, fungitell- in process  - UA not " infectious and CXR without detrimental changes  - noted: subdiaphragmatic collection measuring 7.7 x 3.0 cm seen on CT- will assess need for drainage if leukocytosis continues to worsen may need IR drainage if infection   - CRP rising and leukocytosis worse  - repeat CT CAP  - IR consulted for possible drainage pending imaging review      S/p CARLOS ALBERTO/BSO/OMX/Debulking/Partial liver resection/Cholecystectomy  - noted      JASON (acute kidney injury)  Patient with acute kidney injury/acute renal failure likely due to  Suspect a component of ANNE from previous admission playing a role, but also volume depletion from DKA and osmotic diuresis from glucosuria.    JASON is currently improving. Baseline creatinine  0.8  - Labs reviewed- Renal function/electrolytes with Estimated Creatinine Clearance: 85.7 mL/min (based on SCr of 0.8 mg/dL). according to latest data. Monitor urine output and serial BMP and adjust therapy as needed. Avoid nephrotoxins and renally dose meds for GFR listed above.  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  - Stage IVB ovarian carcinosarcoma s/p PDS on 3/25/24 with Dr Washington (CARLOS ALBERTO/BSO/OMX/liver mobilization/peritoneal & R diaphragm stripping/hepatic wedge resection/cholecystectomy) s/p C3 of carbo/taxol/bevacizumab on 5/31   - Gyn/Onc following  - Doxorubicin C1 scheduled for 7/8, will most likely be delayed due to recent infection   - abdominopelvic soft tissue masses consistent with peritoneal carcinomatosis noted on CT  - GYN Onc following, recommend palliative discussion       Malignant ascites  - CT with Continued bulky abdominopelvic soft tissue masses consistent with peritoneal carcinomatosis and probable lymphadenopathy, similar to prior CT   - abd more distended and hard with noted constipation  - has gotten repeated para in the past  - denies abd pain  - abd u/s ordered to eval ascites and need for para- moderate ascites noted and para ordered but  radiology unable to be done- not enough fluid for safe tap  - cont lasix       Hyperlipidemia  - cont statin      Seizure disorder  - cont lamotrigine       Depression, reactive  Patient has  depression which is unknown and is currently controlled. Will Continue anti-depressant medications. We will not consult psychiatry at this time. Patient does not display psychosis at this time. Continue to monitor closely and adjust plan of care as needed.        Hypertension  Chronic, controlled. Latest blood pressure and vitals reviewed-     Temp:  [99 °F (37.2 °C)-100.8 °F (38.2 °C)]   Pulse:  []   Resp:  [18-21]   BP: ()/(53-86)   SpO2:  [93 %-98 %] .   Home meds for hypertension were reviewed and noted below.   Hypertension Medications               amlodipine (NORVASC) 10 MG tablet Take 10 mg by mouth once daily.     enalapril (VASOTEC) 20 MG tablet Take 20 mg by mouth once daily.    hydrochlorothiazide (HYDRODIURIL) 25 MG tablet Take 25 mg by mouth once daily.             While in the hospital, will manage blood pressure as follows; Continue home antihypertensive regimen- was holding ACE/HCTZ in setting of JASON - BP now controlled without, will cont to hold    Will utilize p.r.n. blood pressure medication only if patient's blood pressure greater than 180/110 and she develops symptoms such as worsening chest pain or shortness of breath.    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  Last A1c reviewed-   Lab Results   Component Value Date    HGBA1C 7.3 (H) 03/19/2024     Most recent fingerstick glucose reviewed-   Recent Labs   Lab 07/08/24  1742 07/08/24  2156 07/08/24  2158 07/09/24  1239   POCTGLUCOSE 159* 74 72 67*       Current correctional scale  Low  Maintain anti-hyperglycemic dose as follows-   Antihyperglycemics (From admission, onward)      Start     Stop Route Frequency Ordered    07/10/24  0900  insulin glargine U-100 (Lantus) pen 7 Units         -- SubQ Daily 07/09/24 1637    07/09/24 1645  insulin aspart U-100 pen 4 Units         -- SubQ 3 times daily with meals 07/09/24 1637    07/03/24 0930  insulin aspart U-100 pen 0-10 Units         -- SubQ Before meals & nightly PRN 07/03/24 0931          Hold Oral hypoglycemics while patient is in the hospital.    - hypoglycemia noted- decreased insulin       VTE Risk Mitigation (From admission, onward)           Ordered     Place IMANI hose  Until discontinued         07/06/24 1550     IP VTE HIGH RISK PATIENT  Once         07/01/24 0540     Place sequential compression device  Until discontinued         07/01/24 0540                    Discharge Planning   ROS: 7/10/2024     Code Status: Full Code   Is the patient medically ready for discharge?:     Reason for patient still in hospital (select all that apply): Patient trending condition and Consult recommendations  Discharge Plan A: Home with family, Home Health, Home ([facility])   Discharge Delays: None known at this time              Tia Wallace MD  Department of Hospital Medicine   [facility] - Telemetry Stepdown

## 2024-07-09 NOTE — ASSESSMENT & PLAN NOTE
Patient's FSGs are controlled on current medication regimen.  Last A1c reviewed-   Lab Results   Component Value Date    HGBA1C 7.3 (H) 03/19/2024     Most recent fingerstick glucose reviewed-   Recent Labs   Lab 07/08/24  1742 07/08/24  2156 07/08/24  2158 07/09/24  1239   POCTGLUCOSE 159* 74 72 67*       Current correctional scale  Low  Maintain anti-hyperglycemic dose as follows-   Antihyperglycemics (From admission, onward)      Start     Stop Route Frequency Ordered    07/10/24 0900  insulin glargine U-100 (Lantus) pen 7 Units         -- SubQ Daily 07/09/24 1637    07/09/24 1645  insulin aspart U-100 pen 4 Units         -- SubQ 3 times daily with meals 07/09/24 1637    07/03/24 0930  insulin aspart U-100 pen 0-10 Units         -- SubQ Before meals & nightly PRN 07/03/24 0931          Hold Oral hypoglycemics while patient is in the hospital.    - hypoglycemia noted- decreased insulin

## 2024-07-09 NOTE — PLAN OF CARE
Problem: Adult Inpatient Plan of Care  Goal: Readiness for Transition of Care  Outcome: Progressing     Problem: Infection  Goal: Absence of Infection Signs and Symptoms  Outcome: Progressing     Problem: Skin Injury Risk Increased  Goal: Skin Health and Integrity  Outcome: Progressing

## 2024-07-09 NOTE — SUBJECTIVE & OBJECTIVE
Interval History: Patient reports some abd pain, still passing gas but minimal BM with enema. Stable on RA    H/H stable post transfusion.     Review of Systems   Constitutional:  Negative for fever.   Respiratory:  Negative for shortness of breath.    Cardiovascular:  Positive for leg swelling (improving).   Gastrointestinal:  Positive for abdominal pain.   Genitourinary:  Negative for vaginal bleeding.     Objective:     Vital Signs (Most Recent):  Temp: 99.1 °F (37.3 °C) (07/09/24 1550)  Pulse: 100 (07/09/24 1550)  Resp: (!) 21 (07/09/24 1550)  BP: 135/86 (07/09/24 1550)  SpO2: 96 % (07/09/24 1550) Vital Signs (24h Range):  Temp:  [99 °F (37.2 °C)-100.8 °F (38.2 °C)] 99.1 °F (37.3 °C)  Pulse:  [] 100  Resp:  [18-21] 21  SpO2:  [93 %-98 %] 96 %  BP: ()/(53-86) 135/86     Weight: 86.9 kg (191 lb 9.3 oz)  Body mass index is 30.01 kg/m².    Intake/Output Summary (Last 24 hours) at 7/9/2024 1708  Last data filed at 7/9/2024 1457  Gross per 24 hour   Intake 521.25 ml   Output --   Net 521.25 ml         Physical Exam  Vitals and nursing note reviewed.   Constitutional:       General: She is not in acute distress.     Appearance: Normal appearance.   HENT:      Head: Normocephalic and atraumatic.      Nose: Nose normal.      Mouth/Throat:      Mouth: Mucous membranes are moist.      Pharynx: Oropharynx is clear.   Eyes:      Extraocular Movements: Extraocular movements intact.      Conjunctiva/sclera: Conjunctivae normal.      Pupils: Pupils are equal, round, and reactive to light.   Cardiovascular:      Rate and Rhythm: Normal rate and regular rhythm.      Pulses: Normal pulses.      Heart sounds: Normal heart sounds.   Pulmonary:      Effort: Pulmonary effort is normal.      Comments: Decreased breath sounds R base, s/p CT  Abdominal:      General: Abdomen is flat. Bowel sounds are normal. There is distension.      Tenderness: There is no abdominal tenderness.      Comments: Worsening distension and hard  abdomen, not rigid and nontender no guarding    Musculoskeletal:         General: Normal range of motion.      Cervical back: Normal range of motion and neck supple.      Right lower leg: Edema (improving) present.      Left lower leg: Edema (improving) present.      Comments: IMANI hose in place   Skin:     General: Skin is warm and dry.   Neurological:      General: No focal deficit present.      Mental Status: She is alert and oriented to person, place, and time.   Psychiatric:         Mood and Affect: Mood normal.         Behavior: Behavior normal.             Significant Labs: All pertinent labs within the past 24 hours have been reviewed.    Significant Imaging: I have reviewed all pertinent imaging results/findings within the past 24 hours.

## 2024-07-09 NOTE — PROGRESS NOTES
[facility] - Telemetry Stepdown  Pulmonology  Progress Note    Patient Name: Anette Ricci  MRN: [id number]  Admission Date: 6/30/2024  Hospital Length of Stay: 8 days  Code Status: Full Code  Attending Provider: Tia Wallace MD  Primary Care Provider: Mark Chua MD   Principal Problem: Diabetic ketoacidosis without coma associated with type 2 diabetes mellitus    Subjective:     Interval History: Pt resting on room air. Patient states that she was told she was running fevers, but did not feel particularly feverish. She denies any change in her quality of breathing.She denies any discomfort aside from constipation and bloating.       Objective:     Vital Signs (Most Recent):  Temp: 99 °F (37.2 °C) (07/09/24 0837)  Pulse: 96 (07/09/24 0837)  Resp: 18 (07/09/24 0837)  BP: 131/77 (07/09/24 0849)  SpO2: 97 % (07/09/24 0837) Vital Signs (24h Range):  Temp:  [99 °F (37.2 °C)-100.8 °F (38.2 °C)] 99 °F (37.2 °C)  Pulse:  [] 96  Resp:  [16-18] 18  SpO2:  [93 %-99 %] 97 %  BP: ()/(53-78) 131/77     Weight: 86.9 kg (191 lb 9.3 oz)  Body mass index is 30.01 kg/m².      Intake/Output Summary (Last 24 hours) at 7/9/2024 1118  Last data filed at 7/8/2024 1850  Gross per 24 hour   Intake 521.25 ml   Output --   Net 521.25 ml        Physical Exam  Constitutional:       General: She is not in acute distress.     Appearance: Normal appearance. She is not diaphoretic.   HENT:      Head: Normocephalic and atraumatic.      Nose: Nose normal.   Eyes:      Conjunctiva/sclera: Conjunctivae normal.   Cardiovascular:      Rate and Rhythm: Normal rate and regular rhythm.      Heart sounds: Normal heart sounds.   Pulmonary:      Effort: Pulmonary effort is normal. No respiratory distress.      Breath sounds: No wheezing.   Chest:      Chest wall: No tenderness.   Abdominal:      General: There is distension.   Skin:     General: Skin is warm and dry.   Neurological:      Mental Status: She is alert. Mental status is at  baseline.   Psychiatric:         Mood and Affect: Mood normal.         Behavior: Behavior normal.         Thought Content: Thought content normal.         Judgment: Judgment normal.           Review of Systems    Vents:       Lines/Drains/Airways       Central Venous Catheter Line  Duration             Port A Cath Single Lumen Subclavian Left -- days              Drain  Duration             Female External Urinary Catheter w/ Suction 07/03/24 6 days              Peripheral Intravenous Line  Duration                  Peripheral IV - Single Lumen 07/01/24 0032 20 G Left Upper Arm 8 days                    Significant Labs:    CBC/Anemia Profile:  Recent Labs   Lab 07/07/24  1359 07/08/24  0811 07/09/24  0503   WBC 20.74* 18.80* 24.67*   HGB 8.0* 6.4* 7.5*   HCT 25.5* 21.1* 23.2*   * 547* 553*   MCV 85 87 84   RDW 20.8* 21.2* 20.2*        Chemistries:  Recent Labs   Lab 07/08/24  0811 07/09/24  0503   * 133*   K 3.9 3.7    100   CO2 27 24   BUN 13 13   CREATININE 0.8 0.8   CALCIUM 8.1* 8.2*   ALBUMIN 1.8* 1.9*   PROT 5.9* 6.2   BILITOT 0.6 0.9   ALKPHOS 90 91   ALT 5* 5*   AST 30 35   MG 1.5* 1.4*   PHOS 2.9 3.4       POCT Glucose:   Recent Labs   Lab 07/08/24  1742 07/08/24  2156 07/08/24  2158   POCTGLUCOSE 159* 74 72     All pertinent labs within the past 24 hours have been reviewed.    Significant Imaging:  I have reviewed all pertinent imaging results/findings within the past 24 hours.  CXR 7/8: Right-sided pleural effusion associated atelectatic changes remain unchanged as compared to the previous study. The left lung is clear.   CT Chest 7/4:   -History of ovarian malignancy.  Continued bulky abdominopelvic soft tissue masses consistent with peritoneal carcinomatosis and probable lymphadenopathy, similar to prior CT.  -Mild right-sided hydroureteronephrosis which appears to result from a soft tissue mass at the level of the right common iliac artery.  -Stable appearing subdiaphragmatic  collection with additional smaller collection or peritoneal metastasis along the margin of the right hepatic lobe.  -Small volume ascites, increased from prior CT.  -Right-sided pigtail chest tube with unchanged right small volume hydropneumothorax.     Assessment/Plan:     Pulmonary  Empyema  Patient with right sided empyema per thoracentesis on 7/2, chest tube placed on 7/3. Drained purulent fluid, cultures showing ESBL E.coli. Chest tube pulled 7/5 after decreased output. Continued doses of meropenem administered.     Bedside US failed to show a pocket of fluid large enough to drain. Consider CTA Abd and Pelvis to evaluate for other sources of infection if necessary.     Will sign off on patient, but please do not hesitate to reach out with any questions or concerns.                  Kasey Medina MD PGY1  Pulmonology  [facility] - Telemetry Stepdown

## 2024-07-09 NOTE — ASSESSMENT & PLAN NOTE
- leukocytosis noted to be worsening after CT removal- s/p chest tube for Ecoli empyema- currently on fanta per ID recs  - given worsening leukocytosis repeating infectious workup with Bcx NGTD, fungitell- in process  - UA not infectious and CXR without detrimental changes  - noted: subdiaphragmatic collection measuring 7.7 x 3.0 cm seen on CT- will assess need for drainage if leukocytosis continues to worsen may need IR drainage if infection   - CRP rising and leukocytosis worse  - repeat CT CAP  - IR consulted for possible drainage pending imaging review

## 2024-07-09 NOTE — PLAN OF CARE
Problem: Adult Inpatient Plan of Care  Goal: Plan of Care Review  Outcome: Progressing  Goal: Patient-Specific Goal (Individualized)  Outcome: Progressing  Goal: Absence of Hospital-Acquired Illness or Injury  Outcome: Progressing  Goal: Optimal Comfort and Wellbeing  Outcome: Progressing  Goal: Readiness for Transition of Care  Outcome: Progressing     Problem: Infection  Goal: Absence of Infection Signs and Symptoms  Outcome: Progressing     Problem: Skin Injury Risk Increased  Goal: Skin Health and Integrity  Outcome: Progressing     Problem: Diabetes Comorbidity  Goal: Blood Glucose Level Within Targeted Range  Outcome: Progressing     Problem: Acute Kidney Injury/Impairment  Goal: Fluid and Electrolyte Balance  Outcome: Progressing  Goal: Improved Oral Intake  Outcome: Progressing  Goal: Effective Renal Function  Outcome: Progressing     Problem: Fall Injury Risk  Goal: Absence of Fall and Fall-Related Injury  Outcome: Progressing     Pt A&Ox4. VSS. Afebrile. Meds given per MAR. No adverse side effects noted. Family at bedside. Pt not voicing any unmet needs or complaints at this time. Continue with plan of care.

## 2024-07-09 NOTE — ASSESSMENT & PLAN NOTE
- no BM in multiple days  - CT with: Continued bulky abdominopelvic soft tissue masses consistent with peritoneal carcinomatosis and probable lymphadenopathy, similar to prior CT   - started on aggressive bowel regimen, enema added  - no N/V, bowel sounds present and passing gas  - monitor for obstructive sxs  - CT abd ordered

## 2024-07-10 ENCOUNTER — TELEPHONE (OUTPATIENT)
Dept: INFECTIOUS DISEASES | Facility: CLINIC | Age: 59
End: 2024-07-10
Payer: MEDICARE

## 2024-07-10 PROBLEM — K56.609 SBO (SMALL BOWEL OBSTRUCTION): Status: ACTIVE | Noted: 2024-07-10

## 2024-07-10 LAB
1,3 BETA GLUCAN SER-MCNC: <31 PG/ML
ALBUMIN SERPL BCP-MCNC: 1.7 G/DL (ref 3.5–5.2)
ALP SERPL-CCNC: 97 U/L (ref 55–135)
ALT SERPL W/O P-5'-P-CCNC: 5 U/L (ref 10–44)
ANION GAP SERPL CALC-SCNC: 10 MMOL/L (ref 8–16)
AST SERPL-CCNC: 36 U/L (ref 10–40)
BASOPHILS # BLD AUTO: 0.13 K/UL (ref 0–0.2)
BASOPHILS NFR BLD: 0.6 % (ref 0–1.9)
BILIRUB SERPL-MCNC: 0.9 MG/DL (ref 0.1–1)
BUN SERPL-MCNC: 13 MG/DL (ref 6–20)
CALCIUM SERPL-MCNC: 8.1 MG/DL (ref 8.7–10.5)
CHLORIDE SERPL-SCNC: 98 MMOL/L (ref 95–110)
CHOLEST FLD-MCNC: NORMAL MG/DL
CO2 SERPL-SCNC: 25 MMOL/L (ref 23–29)
COMMENT: NORMAL
CREAT SERPL-MCNC: 0.9 MG/DL (ref 0.5–1.4)
CRP SERPL-MCNC: 256.5 MG/L (ref 0–8.2)
DIFFERENTIAL METHOD BLD: ABNORMAL
EOSINOPHIL # BLD AUTO: 0.1 K/UL (ref 0–0.5)
EOSINOPHIL NFR BLD: 0.5 % (ref 0–8)
ERYTHROCYTE [DISTWIDTH] IN BLOOD BY AUTOMATED COUNT: 20.4 % (ref 11.5–14.5)
EST. GFR  (NO RACE VARIABLE): >60 ML/MIN/1.73 M^2
FINAL PATHOLOGIC DIAGNOSIS: NORMAL
FUNGITELL COMMENTS: NEGATIVE
GLUCOSE SERPL-MCNC: 70 MG/DL (ref 70–110)
HCT VFR BLD AUTO: 23.3 % (ref 37–48.5)
HGB BLD-MCNC: 7.3 G/DL (ref 12–16)
IMM GRANULOCYTES # BLD AUTO: 0.18 K/UL (ref 0–0.04)
IMM GRANULOCYTES NFR BLD AUTO: 0.8 % (ref 0–0.5)
LYMPHOCYTES # BLD AUTO: 1.3 K/UL (ref 1–4.8)
LYMPHOCYTES NFR BLD: 5.5 % (ref 18–48)
Lab: NORMAL
MAGNESIUM SERPL-MCNC: 1.4 MG/DL (ref 1.6–2.6)
MCH RBC QN AUTO: 27 PG (ref 27–31)
MCHC RBC AUTO-ENTMCNC: 31.3 G/DL (ref 32–36)
MCV RBC AUTO: 86 FL (ref 82–98)
MICROSCOPIC EXAM: NORMAL
MONOCYTES # BLD AUTO: 1.7 K/UL (ref 0.3–1)
MONOCYTES NFR BLD: 7 % (ref 4–15)
NEUTROPHILS # BLD AUTO: 20.2 K/UL (ref 1.8–7.7)
NEUTROPHILS NFR BLD: 85.6 % (ref 38–73)
NRBC BLD-RTO: 0 /100 WBC
PHOSPHATE SERPL-MCNC: 3.3 MG/DL (ref 2.7–4.5)
PLATELET # BLD AUTO: 341 K/UL (ref 150–450)
PMV BLD AUTO: 11.6 FL (ref 9.2–12.9)
POCT GLUCOSE: 124 MG/DL (ref 70–110)
POCT GLUCOSE: 127 MG/DL (ref 70–110)
POCT GLUCOSE: 85 MG/DL (ref 70–110)
POCT GLUCOSE: 91 MG/DL (ref 70–110)
POCT GLUCOSE: 99 MG/DL (ref 70–110)
POTASSIUM SERPL-SCNC: 3.9 MMOL/L (ref 3.5–5.1)
PROT SERPL-MCNC: 6.1 G/DL (ref 6–8.4)
RBC # BLD AUTO: 2.7 M/UL (ref 4–5.4)
SODIUM SERPL-SCNC: 133 MMOL/L (ref 136–145)
TRIGL FLD-MCNC: NORMAL MG/DL
WBC # BLD AUTO: 23.58 K/UL (ref 3.9–12.7)

## 2024-07-10 PROCEDURE — 63600175 PHARM REV CODE 636 W HCPCS: Mod: HCNC | Performed by: HOSPITALIST

## 2024-07-10 PROCEDURE — 20600001 HC STEP DOWN PRIVATE ROOM: Mod: HCNC

## 2024-07-10 PROCEDURE — 25000003 PHARM REV CODE 250: Mod: HCNC

## 2024-07-10 PROCEDURE — 99233 SBSQ HOSP IP/OBS HIGH 50: CPT | Mod: HCNC,,, | Performed by: INTERNAL MEDICINE

## 2024-07-10 PROCEDURE — 27000207 HC ISOLATION: Mod: HCNC

## 2024-07-10 PROCEDURE — 99900035 HC TECH TIME PER 15 MIN (STAT): Mod: HCNC

## 2024-07-10 PROCEDURE — 25000003 PHARM REV CODE 250: Mod: HCNC | Performed by: STUDENT IN AN ORGANIZED HEALTH CARE EDUCATION/TRAINING PROGRAM

## 2024-07-10 PROCEDURE — 94761 N-INVAS EAR/PLS OXIMETRY MLT: CPT | Mod: HCNC

## 2024-07-10 PROCEDURE — 25500020 PHARM REV CODE 255: Mod: HCNC | Performed by: STUDENT IN AN ORGANIZED HEALTH CARE EDUCATION/TRAINING PROGRAM

## 2024-07-10 PROCEDURE — 83735 ASSAY OF MAGNESIUM: CPT | Mod: HCNC | Performed by: NURSE PRACTITIONER

## 2024-07-10 PROCEDURE — 85025 COMPLETE CBC W/AUTO DIFF WBC: CPT | Mod: HCNC | Performed by: NURSE PRACTITIONER

## 2024-07-10 PROCEDURE — 86140 C-REACTIVE PROTEIN: CPT | Mod: HCNC | Performed by: STUDENT IN AN ORGANIZED HEALTH CARE EDUCATION/TRAINING PROGRAM

## 2024-07-10 PROCEDURE — 63600175 PHARM REV CODE 636 W HCPCS: Mod: HCNC | Performed by: STUDENT IN AN ORGANIZED HEALTH CARE EDUCATION/TRAINING PROGRAM

## 2024-07-10 PROCEDURE — 84100 ASSAY OF PHOSPHORUS: CPT | Mod: HCNC | Performed by: NURSE PRACTITIONER

## 2024-07-10 PROCEDURE — 80053 COMPREHEN METABOLIC PANEL: CPT | Mod: HCNC | Performed by: NURSE PRACTITIONER

## 2024-07-10 RX ORDER — HEPARIN 100 UNIT/ML
500 SYRINGE INTRAVENOUS
Status: DISCONTINUED | OUTPATIENT
Start: 2024-07-10 | End: 2024-07-13 | Stop reason: HOSPADM

## 2024-07-10 RX ORDER — CALCIUM CARBONATE 200(500)MG
500 TABLET,CHEWABLE ORAL 3 TIMES DAILY PRN
Status: DISCONTINUED | OUTPATIENT
Start: 2024-07-10 | End: 2024-07-13 | Stop reason: HOSPADM

## 2024-07-10 RX ORDER — MAGNESIUM SULFATE HEPTAHYDRATE 40 MG/ML
2 INJECTION, SOLUTION INTRAVENOUS ONCE
Status: COMPLETED | OUTPATIENT
Start: 2024-07-10 | End: 2024-07-10

## 2024-07-10 RX ORDER — SODIUM CHLORIDE 9 MG/ML
INJECTION, SOLUTION INTRAVENOUS CONTINUOUS
Status: DISCONTINUED | OUTPATIENT
Start: 2024-07-10 | End: 2024-07-13 | Stop reason: HOSPADM

## 2024-07-10 RX ORDER — SODIUM CHLORIDE 0.9 % (FLUSH) 0.9 %
20 SYRINGE (ML) INJECTION
Status: DISCONTINUED | OUTPATIENT
Start: 2024-07-10 | End: 2024-07-13 | Stop reason: HOSPADM

## 2024-07-10 RX ADMIN — GUAIFENESIN 600 MG: 600 TABLET, EXTENDED RELEASE ORAL at 09:07

## 2024-07-10 RX ADMIN — MEROPENEM 1 G: 1 INJECTION INTRAVENOUS at 11:07

## 2024-07-10 RX ADMIN — BISACODYL 10 MG: 5 TABLET, COATED ORAL at 09:07

## 2024-07-10 RX ADMIN — SENNOSIDES 17.2 MG: 8.6 TABLET, FILM COATED ORAL at 09:07

## 2024-07-10 RX ADMIN — HYDROMORPHONE HYDROCHLORIDE 0.5 MG: 1 INJECTION, SOLUTION INTRAMUSCULAR; INTRAVENOUS; SUBCUTANEOUS at 06:07

## 2024-07-10 RX ADMIN — PANTOPRAZOLE SODIUM 40 MG: 40 TABLET, DELAYED RELEASE ORAL at 09:07

## 2024-07-10 RX ADMIN — IOHEXOL 100 ML: 350 INJECTION, SOLUTION INTRAVENOUS at 01:07

## 2024-07-10 RX ADMIN — MEROPENEM 1 G: 1 INJECTION INTRAVENOUS at 03:07

## 2024-07-10 RX ADMIN — SODIUM CHLORIDE: 9 INJECTION, SOLUTION INTRAVENOUS at 08:07

## 2024-07-10 RX ADMIN — OXYCODONE 5 MG: 5 TABLET ORAL at 01:07

## 2024-07-10 RX ADMIN — MEROPENEM 1 G: 1 INJECTION INTRAVENOUS at 08:07

## 2024-07-10 RX ADMIN — AMLODIPINE BESYLATE 10 MG: 10 TABLET ORAL at 09:07

## 2024-07-10 RX ADMIN — FUROSEMIDE 40 MG: 40 TABLET ORAL at 09:07

## 2024-07-10 RX ADMIN — HYDROMORPHONE HYDROCHLORIDE 0.5 MG: 1 INJECTION, SOLUTION INTRAMUSCULAR; INTRAVENOUS; SUBCUTANEOUS at 04:07

## 2024-07-10 RX ADMIN — LAMOTRIGINE 25 MG: 25 TABLET ORAL at 09:07

## 2024-07-10 RX ADMIN — Medication 400 MG: at 09:07

## 2024-07-10 RX ADMIN — MAGNESIUM SULFATE HEPTAHYDRATE 2 G: 40 INJECTION, SOLUTION INTRAVENOUS at 11:07

## 2024-07-10 RX ADMIN — ATORVASTATIN CALCIUM 40 MG: 40 TABLET, FILM COATED ORAL at 09:07

## 2024-07-10 NOTE — CONSULTS
[facility] - Telemetry Stepdown  Infectious Disease  Consult Note    Patient Name: Anette Ricci  MRN: [id number]  Admission Date: 6/30/2024  Hospital Length of Stay: 9 days  Attending Physician: Tia Wallace MD  Primary Care Provider: Mark Chua MD     Isolation Status: Special Contact    Patient information was obtained from patient and past medical records.      Inpatient consult to Infectious Diseases  Consult performed by: Hanny Knight MD  Consult ordered by: Tia Wallace MD          Consult received, see progress note from today

## 2024-07-10 NOTE — ASSESSMENT & PLAN NOTE
- reviewed prior ECHO: EF low normal.   Left Ventricle: There is low normal systolic function with a visually estimated ejection fraction of 50 - 55%. Biplane (2D) method of discs ejection fraction is 50%. Global longitudinal strain is -18.0%.    Right Ventricle: Normal right ventricular cavity size. Wall thickness is normal. Right ventricle wall motion  is normal. Systolic function is normal.    Left Atrium: Left atrium is mildly dilated.    Mitral Valve: There is mild regurgitation.    IVC/SVC: Normal venous pressure at 3 mmHg.    Pericardium: Left pleural effusion. Ascites present.    - start lasix and IMANI hose  - edema improving, hold lasix and starting IVF given she is now NPO for SBO, monitor volume status closely

## 2024-07-10 NOTE — NURSING
"Pt. Is still nauseated and weak at this time. CBG taken and is 94 at this time.  KUB has been ordered but pt. States "she can not stand long enough for xray to be done." Contacted on call MD and new orders have been put in and initiated. X-ray orders canceled and CT orders modified. Will. Cont to monitor.  "

## 2024-07-10 NOTE — SUBJECTIVE & OBJECTIVE
Interval History: ID reconsulted for intermittent low grade fevers, increasing inflammatory markers  Patient reports last BM was 2 days ago  She had vomiting overnight  She was not passing gas today  NG tube placed today      Review of Systems   Constitutional:  Negative for chills, diaphoresis and fever.   HENT:  Negative for rhinorrhea and sore throat.    Respiratory:  Negative for cough and shortness of breath.    Cardiovascular:  Negative for chest pain and leg swelling.   Gastrointestinal:  Positive for abdominal pain, constipation and vomiting. Negative for diarrhea and nausea.   Genitourinary:  Negative for dysuria and hematuria.   Musculoskeletal:  Negative for arthralgias and myalgias.   Skin:  Negative for rash.   Neurological:  Negative for headaches.     Objective:     Vital Signs (Most Recent):  Temp: 98.9 °F (37.2 °C) (07/10/24 0738)  Pulse: 101 (07/10/24 0738)  Resp: 19 (07/10/24 0738)  BP: 107/65 (07/10/24 0905)  SpO2: 97 % (07/10/24 0738) Vital Signs (24h Range):  Temp:  [98.9 °F (37.2 °C)-100.3 °F (37.9 °C)] 98.9 °F (37.2 °C)  Pulse:  [] 101  Resp:  [18-21] 19  SpO2:  [96 %-100 %] 97 %  BP: (107-135)/(52-86) 107/65     Weight: 86.9 kg (191 lb 9.3 oz)  Body mass index is 30.01 kg/m².    Estimated Creatinine Clearance: 76.2 mL/min (based on SCr of 0.9 mg/dL).     Physical Exam  Vitals reviewed.   Constitutional:       General: She is not in acute distress.     Appearance: She is well-developed. She is ill-appearing. She is not diaphoretic.   HENT:      Head: Normocephalic and atraumatic.      Nose: Nose normal.      Comments: NG tube in left nostril  Eyes:      Conjunctiva/sclera: Conjunctivae normal.   Pulmonary:      Effort: Pulmonary effort is normal. No respiratory distress.      Breath sounds: No wheezing or rales.   Abdominal:      General: Abdomen is flat. There is no distension.      Tenderness: There is abdominal tenderness.   Musculoskeletal:      Cervical back: Normal range of  motion.      Right lower leg: No edema.      Left lower leg: No edema.   Skin:     General: Skin is warm and dry.      Findings: No erythema or rash.   Neurological:      Mental Status: She is alert.   Psychiatric:         Behavior: Behavior normal.          Significant Labs: All pertinent labs within the past 24 hours have been reviewed.    Significant Imaging: I have reviewed all pertinent imaging results/findings within the past 24 hours.

## 2024-07-10 NOTE — ASSESSMENT & PLAN NOTE
Patient's FSGs are controlled on current medication regimen.  Last A1c reviewed-   Lab Results   Component Value Date    HGBA1C 7.3 (H) 03/19/2024     Most recent fingerstick glucose reviewed-   Recent Labs   Lab 07/10/24  0023 07/10/24  0737 07/10/24  1127 07/10/24  1634   POCTGLUCOSE 85 91 127* 124*     Current correctional scale  Low  Maintain anti-hyperglycemic dose as follows-   Antihyperglycemics (From admission, onward)      Start     Stop Route Frequency Ordered    07/03/24 0930  insulin aspart U-100 pen 0-10 Units         -- SubQ Before meals & nightly PRN 07/03/24 0931          Hold Oral hypoglycemics while patient is in the hospital.    - hypoglycemia noted- stopped scheduled insulin and just on SSI now that she is NPO

## 2024-07-10 NOTE — ASSESSMENT & PLAN NOTE
Patient's anemia is currently worsening. Has not received any PRBCs to date. Etiology likely d/t chronic disease due to Malignancy  Current CBC reviewed-   Lab Results   Component Value Date    HGB 7.3 (L) 07/10/2024    HCT 23.3 (L) 07/10/2024     Monitor serial CBC and transfuse if patient becomes hemodynamically unstable, symptomatic or H/H drops below 7/21.    - transfusion done, H/H stable

## 2024-07-10 NOTE — ASSESSMENT & PLAN NOTE
- leukocytosis noted to be worsening after CT removal- s/p chest tube for Ecoli empyema- currently on fanta per ID recs  - given worsening leukocytosis repeating infectious workup with Bcx NGTD, fungitell- neg  - UA not infectious and CXR without detrimental changes  - noted: subdiaphragmatic collection measuring 7.7 x 3.0 cm seen on CT- will assess need for drainage if leukocytosis continues to worsen may need IR drainage if infection - IR report unable to aspirated given there is no safe window that isnt through lung  - CRP rising and leukocytosis worse  - repeat CT CAP- showing persistent effusion and SBO  - IR and CTS consulted to eval need for thora versus repeat chest tube insertion, awaiting CTS recs

## 2024-07-10 NOTE — ASSESSMENT & PLAN NOTE
Chronic, controlled. Latest blood pressure and vitals reviewed-     Temp:  [98.7 °F (37.1 °C)-100.3 °F (37.9 °C)]   Pulse:  []   Resp:  [18-20]   BP: (107-124)/(52-77)   SpO2:  [96 %-100 %] .   Home meds for hypertension were reviewed and noted below.   Hypertension Medications               amlodipine (NORVASC) 10 MG tablet Take 10 mg by mouth once daily.     enalapril (VASOTEC) 20 MG tablet Take 20 mg by mouth once daily.    hydrochlorothiazide (HYDRODIURIL) 25 MG tablet Take 25 mg by mouth once daily.             While in the hospital, will manage blood pressure as follows; Continue home antihypertensive regimen- was holding ACE/HCTZ in setting of JASON - BP now controlled without, will cont to hold    Will utilize p.r.n. blood pressure medication only if patient's blood pressure greater than 180/110 and she develops symptoms such as worsening chest pain or shortness of breath.

## 2024-07-10 NOTE — ASSESSMENT & PLAN NOTE
- no BM in multiple days  - CT with: Continued bulky abdominopelvic soft tissue masses consistent with peritoneal carcinomatosis and probable lymphadenopathy, similar to prior CT   - started on aggressive bowel regimen, enema added  - monitor for obstructive sxs  - no N/V, bowel sounds present and passing gas- progressed to no longer passing gas and N/V- CT with concern for SBO - NG placed to suction and surgery consulted

## 2024-07-10 NOTE — PROGRESS NOTES
[facility] - Telemetry Stepdown  Infectious Disease  Progress Note    Patient Name: Anette Ricci  MRN: [id number]  Admission Date: 6/30/2024  Length of Stay: 9 days  Attending Physician: Tia Wallace MD  Primary Care Provider: Mark Chua MD    Isolation Status: Special Contact  Assessment/Plan:      Pulmonary  Empyema  58F stage 4 ovarian cancer (3/2024, on C3D32 of carbo/taxol/bevacizumab) s/p debulking, omentectomy, partial hepatectomy, recent admission for loculated pleural effusion not amenable to IR drainage treated with antibiotics, presented 6/30/2024 with worsening SOB, diagnosed with ESBL E coli empyema with chest tube placement with subsequent removal.     ID consulted for intermittent fevers and increasing inflammatory markers - multiple possible sources including partial SBO, unresolved empyema, infection subdiaphragmatic fluid collection, tumor necrosis.    Recommendations:  - Continue meropenem IV  - If possible sample subdiaphragmatic fluid collection - send for cell count with diff, bacterial, fungal, AFB cultures  - Also consider resampling right pleural effusion to assess for perisistent infection        50 minutes of total time spent on the encounter, which includes face to face time and non-face to face time preparing to see the patient (eg, review of tests), obtaining and reviewing separately obtained history, documenting clinical information in the electronic or other health record, independently interpreting results (not separately reported) and communicating results to the patient/family/caregiver, and care coordination (not separately reported).       Thank you for your consult. I will follow-up with patient. Please contact us if you have any additional questions.    Hanny Knight MD  Infectious Disease  [facility] - Telemetry Step[facility]    Subjective:     Principal Problem:Diabetic ketoacidosis without coma associated with type 2 diabetes mellitus    HPI: 58 stage 4 ovarian ca  (3/2024, on C3D32 of carbo/taxol/bevacizumab) s/p debulking, omentectomy, partial hepatectomy admitted last month for SOB 2/2 pleural effusion. CXR showed RLL opacitiy and CT chest showing R sided loculated subdiaphragmatic fluid 7.3x11.2cm which was not amendable to drainage by IR. Fevers resolved on pip-tazo. Repeat CT with worsening metastatic disease w/ ascites and perihepatic collection.  Fevers/leukocytosis thought possibly related to malignancy, patient sent home without antibiotics.  She presented to the ER with worsening SOB found to have worsening JASON, increased R pleural effusion with locations.  Morning of 7/1 pt became altered, SOB, hypotensive to 70s/40s and noted to be in dKA transferred to the ICU Thoracentesis 7/1 significant 800ml of purulent fluid drained from the pleural space, 173K WBC 95% segs, gram stain with GNR.   Interval History: ID reconsulted for intermittent low grade fevers, increasing inflammatory markers  Patient reports last BM was 2 days ago  She had vomiting overnight  She was not passing gas today  NG tube placed today      Review of Systems   Constitutional:  Negative for chills, diaphoresis and fever.   HENT:  Negative for rhinorrhea and sore throat.    Respiratory:  Negative for cough and shortness of breath.    Cardiovascular:  Negative for chest pain and leg swelling.   Gastrointestinal:  Positive for abdominal pain, constipation and vomiting. Negative for diarrhea and nausea.   Genitourinary:  Negative for dysuria and hematuria.   Musculoskeletal:  Negative for arthralgias and myalgias.   Skin:  Negative for rash.   Neurological:  Negative for headaches.     Objective:     Vital Signs (Most Recent):  Temp: 98.9 °F (37.2 °C) (07/10/24 0738)  Pulse: 101 (07/10/24 0738)  Resp: 19 (07/10/24 0738)  BP: 107/65 (07/10/24 0905)  SpO2: 97 % (07/10/24 0738) Vital Signs (24h Range):  Temp:  [98.9 °F (37.2 °C)-100.3 °F (37.9 °C)] 98.9 °F (37.2 °C)  Pulse:  [] 101  Resp:  [18-21]  19  SpO2:  [96 %-100 %] 97 %  BP: (107-135)/(52-86) 107/65     Weight: 86.9 kg (191 lb 9.3 oz)  Body mass index is 30.01 kg/m².    Estimated Creatinine Clearance: 76.2 mL/min (based on SCr of 0.9 mg/dL).     Physical Exam  Vitals reviewed.   Constitutional:       General: She is not in acute distress.     Appearance: She is well-developed. She is ill-appearing. She is not diaphoretic.   HENT:      Head: Normocephalic and atraumatic.      Nose: Nose normal.      Comments: NG tube in left nostril  Eyes:      Conjunctiva/sclera: Conjunctivae normal.   Pulmonary:      Effort: Pulmonary effort is normal. No respiratory distress.      Breath sounds: No wheezing or rales.   Abdominal:      General: Abdomen is flat. There is no distension.      Tenderness: There is abdominal tenderness.   Musculoskeletal:      Cervical back: Normal range of motion.      Right lower leg: No edema.      Left lower leg: No edema.   Skin:     General: Skin is warm and dry.      Findings: No erythema or rash.   Neurological:      Mental Status: She is alert.   Psychiatric:         Behavior: Behavior normal.          Significant Labs: All pertinent labs within the past 24 hours have been reviewed.    Significant Imaging: I have reviewed all pertinent imaging results/findings within the past 24 hours.

## 2024-07-10 NOTE — PLAN OF CARE
Problem: Adult Inpatient Plan of Care  Goal: Patient-Specific Goal (Individualized)  Outcome: Progressing     Problem: Adult Inpatient Plan of Care  Goal: Absence of Hospital-Acquired Illness or Injury  Outcome: Progressing

## 2024-07-10 NOTE — SUBJECTIVE & OBJECTIVE
Interval History: Hypoglycemic overnight, stopped scheduled insulin and cont SSI. Episode of emesis and reports no longer passing gas- CT with concern for SBO- NG placed to suction and surgery consulted. NPO with NG tube to suction after confirmation on CXR- started low dose IVF and stopped diuretic. Will need long term nutrition solution/TPN pending obstruction resolution and ongoing discussion with oncology. Breeched palliative discussion today but patient's primary oncology will be on service tomorrow so discussion ongoing.    Inflammatory markers rising- discussed with CTS and IR need for repeat thora vs chest tube reinsertion- awaiting recs. Cont fanta per ID.     Afebrile. Patient reports abd pain and N/V. She remains stable on RA without reported shortness of breath.     Updated  at bedside.     Review of Systems   Constitutional:  Negative for fever.   Respiratory:  Negative for shortness of breath.    Cardiovascular:  Positive for leg swelling (improving).   Gastrointestinal:  Positive for abdominal pain, constipation, nausea and vomiting.   Genitourinary:  Negative for vaginal bleeding.     Objective:     Vital Signs (Most Recent):  Temp: 99.3 °F (37.4 °C) (07/10/24 1546)  Pulse: 99 (07/10/24 1546)  Resp: 19 (07/10/24 1546)  BP: 113/65 (07/10/24 1546)  SpO2: 98 % (07/10/24 1546) Vital Signs (24h Range):  Temp:  [98.7 °F (37.1 °C)-100.3 °F (37.9 °C)] 99.3 °F (37.4 °C)  Pulse:  [] 99  Resp:  [18-20] 19  SpO2:  [96 %-100 %] 98 %  BP: (107-124)/(52-77) 113/65     Weight: 86.9 kg (191 lb 9.3 oz)  Body mass index is 30.01 kg/m².  No intake or output data in the 24 hours ending 07/10/24 1744        Physical Exam  Vitals and nursing note reviewed.   Constitutional:       General: She is not in acute distress.     Appearance: Normal appearance. She is ill-appearing.   HENT:      Head: Normocephalic and atraumatic.      Nose: Nose normal.      Comments: NG in place     Mouth/Throat:      Mouth: Mucous  membranes are moist.      Pharynx: Oropharynx is clear.   Eyes:      Extraocular Movements: Extraocular movements intact.      Conjunctiva/sclera: Conjunctivae normal.      Pupils: Pupils are equal, round, and reactive to light.   Cardiovascular:      Rate and Rhythm: Normal rate and regular rhythm.      Pulses: Normal pulses.      Heart sounds: Normal heart sounds.   Pulmonary:      Effort: Pulmonary effort is normal.      Comments: Decreased breath sounds R base, s/p CT. Stable on RA  Abdominal:      General: Bowel sounds are normal. There is distension.      Tenderness: There is abdominal tenderness.      Comments: Distended, hard abdomen   Does still have bowel sounds    Musculoskeletal:         General: Normal range of motion.      Cervical back: Normal range of motion and neck supple.      Right lower leg: Edema (improving) present.      Left lower leg: Edema (improving) present.      Comments: IMANI hose in place   Skin:     General: Skin is warm and dry.   Neurological:      General: No focal deficit present.      Mental Status: She is alert and oriented to person, place, and time.   Psychiatric:      Comments: Depressed mood             Significant Labs: All pertinent labs within the past 24 hours have been reviewed.    Significant Imaging: I have reviewed all pertinent imaging results/findings within the past 24 hours.

## 2024-07-10 NOTE — NURSING
"RT contacted nurse for attempt to get pt. For ordered CT scan. Pt. Refuses at this time states "it is to late and I don't think I can handle drinking all of that contrast right now." RT notified and updated.   "

## 2024-07-10 NOTE — NURSING
Pt. CBG is currently low 1st check was 56 and 2nd was 62 orders followed and PRN meds given. She vomited about 200 ml of brown liquid with a small amount of undigested food. PRN zofran was given with minimum relief. Pt. Is still experiencing increased back and ABD pain. On call MD notified and PRN orders were given and initiated. Will cont. To monitor.

## 2024-07-10 NOTE — ASSESSMENT & PLAN NOTE
- ICU on insulin drip, gap now closed and acidosis improving, transitioned to subq insulin  - monitor BG and adjust insulin as needed  - hypoglycemia noted- stopped scheduled insulin and just SSI

## 2024-07-10 NOTE — ASSESSMENT & PLAN NOTE
Patient found to have moderate pleural effusion on imaging. I have personally reviewed and interpreted the following imaging: Xray and CT. A thoracentesis was performed. Pleural fluid was sent for analysis. Exudative consistent with empyema   - Cytology negative for malignant cells  - empyema Ecoli growing. ID recommend cont meropenem   Management to include chest tube  - s/p CT in place to suction- pulm and CTS following and managing CT- may need lytic therapy   - pulm removed chest tube 7/5- remains stable on RA post CT removal.   - CXR without detrimental changes, cont to monitor respiratory status, stable on RA breathing comfortably   - pulm reevaluation Bedside US without significant pocket appropriate for bedside drainage or catheter placement. If concerned for persistent pleural effusion would repeat CT scan with possible IR guided catheter placement/thoracentesis and send micro.   - repeat CT ordered and IR made aware  - CT with persistent effusion- CTS reconsulted and IR following- awaiting CTS recs may need CT reinsertion vs VATS, pending their recs will follow back up with IR for thora  - ID reconsulted and cont fanta per their recs  - stable on RA without respiratory distress

## 2024-07-10 NOTE — ASSESSMENT & PLAN NOTE
58F stage 4 ovarian cancer (3/2024, on C3D32 of carbo/taxol/bevacizumab) s/p debulking, omentectomy, partial hepatectomy, recent admission for loculated pleural effusion not amenable to IR drainage treated with antibiotics, presented 6/30/2024 with worsening SOB, diagnosed with ESBL E coli empyema with chest tube placement with subsequent removal.     ID consulted for intermittent fevers and increasing inflammatory markers - multiple possible sources including partial SBO, unresolved empyema, infection subdiaphragmatic fluid collection, tumor necrosis.    Recommendations:  - Continue meropenem IV  - If possible sample subdiaphragmatic fluid collection - send for cell count with diff, bacterial, fungal, AFB cultures  - Also consider resampling right pleural effusion to assess for perisistent infection

## 2024-07-10 NOTE — ASSESSMENT & PLAN NOTE
- Ecoli empyema s/p thoracentesis and chest tube placement  - s/p CT in place to suction- CTS and pulm following for management and lytic recommendations - now signed off  - chest tube removed 7/5 per pulm recommendations. Repeat CXR without detrimental change, stable on RA, cont to monitor resp status  - cont meropenem per ID recs- transition to erta at discharge  - repeat CT ordered showing persistent effusion - CTS and IR consulted for reevaluation need for repeat CT/thora/VATS  - ID reconsulted and recommend cont fanta

## 2024-07-10 NOTE — PROGRESS NOTES
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  She was discharged on 06/27.  During hospitalization she was treated with Zosyn for 6 days due to recurrent fevers but infectious workup was negative      On admission:   Pulse 100 saturating 99%, afebrile, blood pressure 88/48.  CBC notable for marked leukocytosis 20,000 hemoglobin of 7.4 (hematocrit 23) with increase in granulocyte percentage, RDW 19.9.  CMP notable for a sodium of 129 CO2 14, anion gap 21, BUN 76 creatinine 4.6 GFR 10,  glucose of 403 alk-phos 170, normal AST ALT, uric acid of 9.3    Imaging notable for:  CT scan indicates significant pleural fluid accumulation at the right lung base with suspected lung consolidation and mediastinal shift. The Chest X-ray confirms worsening opacity in the right hemithorax consistent with pleural fluid, along with parenchymal changes suggesting atelectasis and possible infiltrates. There are no signs of pneumothorax, but mild changes are noted in the left lung base.    In the ED received vanc and Zosyn and insulin pending.          Overview/Hospital Course:  Admitted to ICU for DKA, started on insulin drip. Acidosis resolved and transitioned to subq insulin.  Stable for step down.   Thoracentesis 07/02/24; drained 800 ml cloudy (yellow cloudy/chylous looking fluid vs purulence). Cytology and cultures sent. Chest tube placed 7/3. Thoracic surgery consulted for recommendations on lytic therapy.   Pleural fluid with E coli. ID consulted. Cont meropenem per ID recs.   Chest tube to suction Pulm and CTS following. Repeat CT chest Small right hydropneumothorax with a pigtail chest tube in place. Also showing new right hydronephrosis, repeat CT abd pelvis ordered and showed: right-sided hydroureteronephrosis which appears to result from a soft tissue mass at the level of the right common iliac artery. Urology consulted. Per urology Patient will need retrograde pyelograms and right ureteral stent placement given extrinsic compression. If patient fails ureteral stent she will likely need nephrostomy tube to preserve renal function.  Pleural effusion improved and pulm removed chest tube.   Worsening leukocytosis since CT removal, repeating infectious workup. CXR with loculated right pleural reaction/pleural effusion. No interval detrimental change noted. Continues to be stable on RA. If WBC rises consider attempting to evaluate for IR drainage of subdiaphragmatic collection.   Cont fanta while inpt and transition to erta at discharge per ID recs.   No BM in days despite aggressive bowel regimen, enema added. Abd u/s with ascites- para ordered but not enough fluid for safe tap.   H/H drop, transfusion ordered. Spiked fever overnight, possibly due to blood transfusion but CRP also rising. IR consulted for assessment of subdiaphragmatic collection and need for drainage. Recommended repeating CT CAP.   Still with no BM despite aggressive bowel regimen and enema. Cont to monitor for obstruction. Stopped passing gas and episode of emesis- CT done and concerning for partial SBO- NG placed to suction and surgery consulted.   Hypoglycemia so scheduled insulin stopped and cont SSI.    Worsening inflammatory markers so discussed with IR and CTS about need to repeat thora/chest tube, awaiting CTS recs. IR report the subdiaphragmatic collection has no safe window for aspiration.     Interval History: Hypoglycemic overnight, stopped scheduled insulin and cont SSI. Episode of emesis and reports no longer passing gas- CT with concern for SBO- NG placed to suction and surgery consulted. NPO with NG tube to suction after confirmation on CXR- started low dose IVF and stopped diuretic. Will need long term nutrition solution/TPN pending obstruction resolution and ongoing discussion with oncology. Breeched palliative discussion today but patient's primary oncology will be on service tomorrow so discussion ongoing.    Inflammatory markers rising- discussed with CTS and IR need for repeat thora vs chest tube reinsertion- awaiting recs. Cont fanta per ID.     Afebrile. Patient reports abd pain and N/V. She remains stable on RA without reported shortness of breath.     Updated  at bedside.     Review of Systems   Constitutional:  Negative for fever.   Respiratory:  Negative for shortness of breath.    Cardiovascular:  Positive for leg swelling (improving).   Gastrointestinal:  Positive for abdominal pain, constipation, nausea and vomiting.   Genitourinary:  Negative for vaginal bleeding.     Objective:     Vital Signs (Most Recent):  Temp: 99.3 °F (37.4 °C) (07/10/24 1546)  Pulse: 99 (07/10/24 1546)  Resp: 19 (07/10/24 1546)  BP: 113/65 (07/10/24 1546)  SpO2: 98 % (07/10/24 1546) Vital Signs (24h Range):  Temp:  [98.7 °F (37.1 °C)-100.3 °F (37.9 °C)] 99.3 °F (37.4 °C)  Pulse:  [] 99  Resp:  [18-20] 19  SpO2:  [96 %-100 %] 98 %  BP: (107-124)/(52-77) 113/65     Weight: 86.9 kg (191 lb 9.3 oz)  Body mass index is 30.01 kg/m².  No intake or output data in the 24 hours ending 07/10/24 1744        Physical Exam  Vitals and nursing note reviewed.   Constitutional:       General: She is not in acute  distress.     Appearance: Normal appearance. She is ill-appearing.   HENT:      Head: Normocephalic and atraumatic.      Nose: Nose normal.      Comments: NG in place     Mouth/Throat:      Mouth: Mucous membranes are moist.      Pharynx: Oropharynx is clear.   Eyes:      Extraocular Movements: Extraocular movements intact.      Conjunctiva/sclera: Conjunctivae normal.      Pupils: Pupils are equal, round, and reactive to light.   Cardiovascular:      Rate and Rhythm: Normal rate and regular rhythm.      Pulses: Normal pulses.      Heart sounds: Normal heart sounds.   Pulmonary:      Effort: Pulmonary effort is normal.      Comments: Decreased breath sounds R base, s/p CT. Stable on RA  Abdominal:      General: Bowel sounds are normal. There is distension.      Tenderness: There is abdominal tenderness.      Comments: Distended, hard abdomen   Does still have bowel sounds    Musculoskeletal:         General: Normal range of motion.      Cervical back: Normal range of motion and neck supple.      Right lower leg: Edema (improving) present.      Left lower leg: Edema (improving) present.      Comments: IMANI hose in place   Skin:     General: Skin is warm and dry.   Neurological:      General: No focal deficit present.      Mental Status: She is alert and oriented to person, place, and time.   Psychiatric:      Comments: Depressed mood             Significant Labs: All pertinent labs within the past 24 hours have been reviewed.    Significant Imaging: I have reviewed all pertinent imaging results/findings within the past 24 hours.    Assessment/Plan:      * Diabetic ketoacidosis without coma associated with type 2 diabetes mellitus  - ICU on insulin drip, gap now closed and acidosis improving, transitioned to subq insulin  - monitor BG and adjust insulin as needed  - hypoglycemia noted- stopped scheduled insulin and just SSI       SBO (small bowel obstruction)  - no BM in days despite regimen, hard abdomen that  progressed to not passing gas and N.V  - known peritoneal carcinomatosis   - CT with concern for partial SBO  - NPO, NG placed to suction  - surgery consulted  - bowel rest  - starting low IVF, monitor for resolution of obstruction may need TPN       Anemia  Patient's anemia is currently worsening. Has not received any PRBCs to date. Etiology likely d/t chronic disease due to Malignancy  Current CBC reviewed-   Lab Results   Component Value Date    HGB 7.3 (L) 07/10/2024    HCT 23.3 (L) 07/10/2024     Monitor serial CBC and transfuse if patient becomes hemodynamically unstable, symptomatic or H/H drops below 7/21.    - transfusion done, H/H stable     Constipation  - no BM in multiple days  - CT with: Continued bulky abdominopelvic soft tissue masses consistent with peritoneal carcinomatosis and probable lymphadenopathy, similar to prior CT   - started on aggressive bowel regimen, enema added  - monitor for obstructive sxs  - no N/V, bowel sounds present and passing gas- progressed to no longer passing gas and N/V- CT with concern for SBO - NG placed to suction and surgery consulted        Peripheral edema  - reviewed prior ECHO: EF low normal.   Left Ventricle: There is low normal systolic function with a visually estimated ejection fraction of 50 - 55%. Biplane (2D) method of discs ejection fraction is 50%. Global longitudinal strain is -18.0%.    Right Ventricle: Normal right ventricular cavity size. Wall thickness is normal. Right ventricle wall motion  is normal. Systolic function is normal.    Left Atrium: Left atrium is mildly dilated.    Mitral Valve: There is mild regurgitation.    IVC/SVC: Normal venous pressure at 3 mmHg.    Pericardium: Left pleural effusion. Ascites present.    - start lasix and IMANI hose  - edema improving, hold lasix and starting IVF given she is now NPO for SBO, monitor volume status closely       Hydroureteronephrosis  - Mild right-sided hydroureteronephrosis which appears to result  "from a soft tissue mass at the level of the right common iliac artery.   - urology consulted  - per urology: Patient will need retrograde pyelograms and right ureteral stent placement given extrinsic compression. If patient fails ureteral stent she will likely need nephrostomy tube to preserve renal function  - urology will not do this admission given stable renal function, they recommend referral at discharge       Hypomagnesemia  Patient has Abnormal Magnesium: hypomagnesemia. Will continue to monitor electrolytes closely. Will replace the affected electrolytes and repeat labs to be done after interventions completed. The patient's magnesium results have been reviewed and are listed below.  Recent Labs   Lab 07/10/24  0512   MG 1.4*          Empyema  - Ecoli empyema s/p thoracentesis and chest tube placement  - s/p CT in place to suction- CTS and pulm following for management and lytic recommendations - now signed off  - chest tube removed 7/5 per pulm recommendations. Repeat CXR without detrimental change, stable on RA, cont to monitor resp status  - cont meropenem per ID recs- transition to erta at discharge  - repeat CT ordered showing persistent effusion - CTS and IR consulted for reevaluation need for repeat CT/thora/VATS  - ID reconsulted and recommend cont fanta      High anion gap metabolic acidosis  - gap closed, acidosis improving       Chronic pulmonary embolism without acute cor pulmonale  - previously on AC, held for decreasing Hgb and vaginal bleeding   - "Patient with small right lower lobe pulmonary embolism noted on June 2024 CT chest and also noted on prior films as well. Patient has not been on full-dose anticoagulation only prophylaxis dosing. Given her advanced cancer and PE noted on CT, it would not be unreasonable to treat with full-dose anticoagulation. However patient is breathing well on room air and her prior respiratory issues are likely related to her right-sided pleural effusion. If a " "decision is made to treat with full-dose anticoagulation, patient should be treated with Lovenox 1 milligram/kilogram b.i.d and sent home on this regimen. I see no contraindications to anticoagulation however if other procedures are planned then it may be beneficial to wait until discharge. "  - cont heparin ppx, resume lovenox once able and no further procedures planned  - Gyn Onc recommend holding heparin given H/H drop      Pleural effusion  Patient found to have moderate pleural effusion on imaging. I have personally reviewed and interpreted the following imaging: Xray and CT. A thoracentesis was performed. Pleural fluid was sent for analysis. Exudative consistent with empyema   - Cytology negative for malignant cells  - empyema Ecoli growing. ID recommend cont meropenem   Management to include chest tube  - s/p CT in place to suction- pulm and CTS following and managing CT- may need lytic therapy   - pulm removed chest tube 7/5- remains stable on RA post CT removal.   - CXR without detrimental changes, cont to monitor respiratory status, stable on RA breathing comfortably   - pulm reevaluation Bedside US without significant pocket appropriate for bedside drainage or catheter placement. If concerned for persistent pleural effusion would repeat CT scan with possible IR guided catheter placement/thoracentesis and send micro.   - repeat CT ordered and IR made aware  - CT with persistent effusion- CTS reconsulted and IR following- awaiting CTS recs may need CT reinsertion vs VATS, pending their recs will follow back up with IR for thora  - ID reconsulted and cont fanta per their recs  - stable on RA without respiratory distress      Leukocytosis  - leukocytosis noted to be worsening after CT removal- s/p chest tube for Ecoli empyema- currently on fanta per ID recs  - given worsening leukocytosis repeating infectious workup with Bcx NGTD, fungitell- neg  - UA not infectious and CXR without detrimental changes  - noted: " subdiaphragmatic collection measuring 7.7 x 3.0 cm seen on CT- will assess need for drainage if leukocytosis continues to worsen may need IR drainage if infection - IR report unable to aspirated given there is no safe window that isnt through lung  - CRP rising and leukocytosis worse  - repeat CT CAP- showing persistent effusion and SBO  - IR and CTS consulted to eval need for thora versus repeat chest tube insertion, awaiting CTS recs        S/p CARLOS ALBERTO/BSO/OMX/Debulking/Partial liver resection/Cholecystectomy  - noted      JASON (acute kidney injury)  Patient with acute kidney injury/acute renal failure likely due to  Suspect a component of ANNE from previous admission playing a role, but also volume depletion from DKA and osmotic diuresis from glucosuria.    JASON is currently improving. Baseline creatinine  0.8  - Labs reviewed- Renal function/electrolytes with Estimated Creatinine Clearance: 76.2 mL/min (based on SCr of 0.9 mg/dL). according to latest data. Monitor urine output and serial BMP and adjust therapy as needed. Avoid nephrotoxins and renally dose meds for GFR listed above.  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  - Stage IVB ovarian carcinosarcoma s/p PDS on 3/25/24 with Dr Washington (CARLOS ALBERTO/BSO/OMX/liver mobilization/peritoneal & R diaphragm stripping/hepatic wedge resection/cholecystectomy) s/p C3 of carbo/taxol/bevacizumab on 5/31   - Gyn/Onc following  - Doxorubicin C1 scheduled for 7/8, will most likely be delayed due to recent infection   - abdominopelvic soft tissue masses consistent with peritoneal carcinomatosis noted on CT  - GYN Onc following, recommend palliative discussion       Malignant ascites  - CT with Continued bulky abdominopelvic soft tissue masses consistent with peritoneal carcinomatosis and probable lymphadenopathy, similar to prior CT   - abd more distended and hard with noted constipation  - has gotten repeated para in the past  - abd u/s  ordered to eval ascites and need for para- moderate ascites noted and para ordered but radiology unable to be done- not enough fluid for safe tap  - hold lasix while being NPO and start low dose IVF- monitor volume status closely       Hyperlipidemia  - cont statin      Seizure disorder  - cont lamotrigine       Depression, reactive  Patient has  depression which is unknown and is currently controlled. Will Continue anti-depressant medications. We will not consult psychiatry at this time. Patient does not display psychosis at this time. Continue to monitor closely and adjust plan of care as needed.        Hypertension  Chronic, controlled. Latest blood pressure and vitals reviewed-     Temp:  [98.7 °F (37.1 °C)-100.3 °F (37.9 °C)]   Pulse:  []   Resp:  [18-20]   BP: (107-124)/(52-77)   SpO2:  [96 %-100 %] .   Home meds for hypertension were reviewed and noted below.   Hypertension Medications               amlodipine (NORVASC) 10 MG tablet Take 10 mg by mouth once daily.     enalapril (VASOTEC) 20 MG tablet Take 20 mg by mouth once daily.    hydrochlorothiazide (HYDRODIURIL) 25 MG tablet Take 25 mg by mouth once daily.             While in the hospital, will manage blood pressure as follows; Continue home antihypertensive regimen- was holding ACE/HCTZ in setting of JASON - BP now controlled without, will cont to hold    Will utilize p.r.n. blood pressure medication only if patient's blood pressure greater than 180/110 and she develops symptoms such as worsening chest pain or shortness of breath.    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  Last A1c reviewed-   Lab Results   Component Value Date    HGBA1C 7.3 (H) 03/19/2024     Most recent fingerstick glucose reviewed-   Recent Labs   Lab 07/10/24  0023 07/10/24  0737 07/10/24  1127 07/10/24  1634   POCTGLUCOSE 85 91 127* 124*     Current correctional  scale  Low  Maintain anti-hyperglycemic dose as follows-   Antihyperglycemics (From admission, onward)      Start     Stop Route Frequency Ordered    07/03/24 0930  insulin aspart U-100 pen 0-10 Units         -- SubQ Before meals & nightly PRN 07/03/24 0931          Hold Oral hypoglycemics while patient is in the hospital.    - hypoglycemia noted- stopped scheduled insulin and just on SSI now that she is NPO      VTE Risk Mitigation (From admission, onward)           Ordered     Place IMANI hose  Until discontinued         07/06/24 1550     IP VTE HIGH RISK PATIENT  Once         07/01/24 0540     Place sequential compression device  Until discontinued         07/01/24 0540                    Discharge Planning   ROS: 7/12/2024     Code Status: Full Code   Is the patient medically ready for discharge?:     Reason for patient still in hospital (select all that apply): Patient trending condition and Consult recommendations  Discharge Plan A: Home, Home with family, Home Health   Discharge Delays: None known at this time              Tia Wallace MD  Department of Hospital Medicine   [facility] - Telemetry Stepdown

## 2024-07-10 NOTE — PT/OT/SLP PROGRESS
"Physical Therapy      Patient Name:  Anette Ricci   MRN:  [id number]    Patient not seen today secondary to MD hold (Comment), Patient fatigue, Nursing care.   -First PT attempt at 11:35, patient sitting up in bedside chair and MD at bedside managing leaking NG tube, MD requested to hold PT at that time.   -Second attempt at 12:15, X-ray tech present and PT assisted with positioning patient for imaging, rolled and scooted patient to HOB, at that time patient deferred therapy stated "I might do it later on".   -Third attempt at 14:00, MD at bedside.   -Fourth attempt at 14:15, patient requesting to defer therapy, multiple family members visiting her and she reported fatigue. Attempted to discuss therapy goals with patient, she said she was motivated to mobilize and requested therapy to follow up tomorrow. Will follow-up as appropriate.    "

## 2024-07-10 NOTE — PLAN OF CARE
[facility] - Telemetry Stepdown  Discharge Reassessment    Primary Care Provider: Mark Chua MD    Expected Discharge Date: 7/12/2024    Reassessment (most recent)       Discharge Reassessment - 07/10/24 1010          Discharge Reassessment    Assessment Type Discharge Planning Reassessment     Did the patient's condition or plan change since previous assessment? No     Discharge Plan discussed with: Patient     Discharge Plan A Home;Home with family;Home Health     Discharge Plan B Home;Home with family     DME Needed Upon Discharge  other (see comments)   TBD    Transition of Care Barriers None     Why the patient remains in the hospital Requires continued medical care        Post-Acute Status    Post-Acute Authorization Home Health     Home Health Status Set-up Complete/Auth obtained   Pt already active with [facility] prior to admit    Discharge Delays None known at this time                     Completed re-assessment with patient. No new needs noted at this time. Will continue to follow and offer support as needed.  Discharge Plan A and Plan B have been determined by review of patient's clinical status, future medical and therapeutic needs, and coverage/benefits for post-acute care in coordination with multidisciplinary team members.  Yovany Rhodes, [facility]    [facility]  [phone]

## 2024-07-10 NOTE — ASSESSMENT & PLAN NOTE
- no BM in days despite regimen, hard abdomen that progressed to not passing gas and N.V  - known peritoneal carcinomatosis   - CT with concern for partial SBO  - NPO, NG placed to suction  - surgery consulted  - bowel rest  - starting low IVF, monitor for resolution of obstruction may need TPN

## 2024-07-10 NOTE — ASSESSMENT & PLAN NOTE
Patient with acute kidney injury/acute renal failure likely due to  Suspect a component of ANNE from previous admission playing a role, but also volume depletion from DKA and osmotic diuresis from glucosuria.    JASON is currently improving. Baseline creatinine  0.8  - Labs reviewed- Renal function/electrolytes with Estimated Creatinine Clearance: 76.2 mL/min (based on SCr of 0.9 mg/dL). according to latest data. Monitor urine output and serial BMP and adjust therapy as needed. Avoid nephrotoxins and renally dose meds for GFR listed above.  - renal following   - resolved

## 2024-07-10 NOTE — PROGRESS NOTES
Progress Note  Gynecology Oncology    Admit Date: 2024  LOS: 9    Reason for Admission:  Diabetic ketoacidosis without coma associated with type 2 diabetes mellitus    SUBJECTIVE:     Anette Ricci is a 58 y.o.  with stage IVB ovarian carcinosarcoma s/p PDS on 3/25/24 with Dr Washington (CARLOS ALBERTO/BSO/OMX/liver mobilization/peritoneal & R diaphragm stripping/hepatic wedge resection/cholecystectomy) s/p C3 of carbo/taxol/bevacizumab (LD ). Admitted to MICU  for the management of DKA (resolved), JASON (resolved), and malignant pleural effusion concerning for empyema, now s/p thoracocentesis and subsequent chest tube placement/removal, with continued IV antibiotic care. She remains admitted for infectious symptoms.     Interval hx: Pt had blood glucose of 56 overnight requiring dextrose. PM nurse noted patient appeared weak following this event. Patient was sent to CT overnight by primary team, but could not tolerate PO contrast 2/2 to nausea. Pt unable to stand for KUB ordered by primary team.     Pt reports continued abdominal pain this morning, feels tired. Sitting upright in chair. Continues to endorse flatus, but has had no BM overnight. Pt denied enema overnight secondary to overall fatigue.  Endorses continued sporadic nausea, denies vomiting. Pt made NPO overnight due to bout of emesis while drinking contrast. Denies chest pain or SOB this morning.       OBJECTIVE:     Vital Signs   Temp:  [99 °F (37.2 °C)-100.3 °F (37.9 °C)] 99.3 °F (37.4 °C)  Pulse:  [] 99  Resp:  [18-21] 18  SpO2:  [96 %-100 %] 96 %  BP: (109-135)/(52-86) 114/72      Intake/Output Summary (Last 24 hours) at 7/10/2024 0633  Last data filed at 2024 1457  Gross per 24 hour   Intake 120 ml   Output --   Net 120 ml       Physical Exam:  Gen: Pt alert, A&Ox4  CV: RRR, normal heart sounds.  No edema on exam, compression socks in place.   Pulm: Lung sounds reduced at right base with crackles on exam.  Former Chest tube  site looks clean and clear.  Abd: Moderate distention, non-tender to palpation without rebound or guarding. Firm mass remains on left mid-abdomen, which is also nontender to palpation. No erythema, induration, or discharge on bandage.   Ext: PPP, no peripheral edema  Skin: Skin is warm and dry.     Laboratory:  Recent Labs   Lab 07/07/24  1359 07/08/24  0811 07/09/24  0503   WBC 20.74* 18.80* 24.67*   HGB 8.0* 6.4* 7.5*   HCT 25.5* 21.1* 23.2*   MCV 85 87 84   * 547* 553*       Recent Labs   Lab 07/07/24  0339 07/08/24  0811 07/09/24  0503   * 135* 133*   K 3.8 3.9 3.7    102 100   CO2 23 27 24   BUN 16 13 13   CREATININE 0.8 0.8 0.8   * 97 86   PROT 5.8* 5.9* 6.2   BILITOT 0.7 0.6 0.9   ALKPHOS 99 90 91   ALT 6* 5* 5*   AST 25 30 35   MG 1.4* 1.5* 1.4*   PHOS 2.7 2.9 3.4       Blood Sugars (AccuCheck):    Recent Labs     07/09/24  0835 07/09/24  1239 07/09/24  1715 07/09/24  1733 07/09/24  2217 07/09/24  2223 07/09/24  2258 07/10/24  0023   POCTGLUCOSE 98 67* 83 79 56* 62* 94 85     Microbiology Results (last 7 days)       Procedure Component Value Units Date/Time    Blood culture [[id number]] Collected: 07/06/24 1248    Order Status: Completed Specimen: Blood Updated: 07/09/24 1612     Blood Culture, Routine No Growth to date      No Growth to date      No Growth to date      No Growth to date    Narrative:      Collection has been rescheduled by VHT at 07/06/2024 09:38 Reason:   Unable to collect, hard stick 2x, nurse Stephanie Pacheco was notified and   asked if another tech can try.  Collection has been rescheduled by VHT at 07/06/2024 09:38 Reason:   Unable to collect, hard stick 2x, nurse Stephanie Pacheco was notified and   asked if another tech can try.    Blood culture [[id number]] Collected: 07/06/24 1248    Order Status: Completed Specimen: Blood Updated: 07/09/24 1612     Blood Culture, Routine No Growth to date      No Growth to date      No Growth to date      No Growth to date     Narrative:      Collection has been rescheduled by T at 07/06/2024 09:38 Reason:   Unable to collect, hard stick 2x, nurse Stephanie Junior was notified and   asked if another tech can try.  Collection has been rescheduled by T at 07/06/2024 09:38 Reason:   Unable to collect, hard stick 2x, nurse Stephanie Junior was notified and   asked if another tech can try.    (rule out SBP) Aerobic culture [[id number]]     Order Status: Canceled Specimen: Ascites     (rule out SBP) Culture, Anaerobic [[id number]]     Order Status: Canceled Specimen: Ascites     (rule out SBP) Gram stain [[id number]]     Order Status: Canceled Specimen: Ascites     Blood culture x two cultures. Draw prior to antibiotics. [[id number]] Collected: 07/01/24 0028    Order Status: Completed Specimen: Blood from Peripheral, Forearm, Left Updated: 07/06/24 0612     Blood Culture, Routine No growth after 5 days.    Narrative:      Aerobic and anaerobic    Blood culture x two cultures. Draw prior to antibiotics. [[id number]] Collected: 07/01/24 0027    Order Status: Completed Specimen: Blood from Peripheral, Upper Arm, Left Updated: 07/06/24 0612     Blood Culture, Routine No growth after 5 days.    Narrative:      Aerobic and anaerobic    Culture, body fluid - Bactec [[id number]]  (Abnormal)  (Susceptibility) Collected: 07/01/24 1711    Order Status: Completed Specimen: Body Fluid from Thoracentesis Fluid Updated: 07/04/24 0827     Body Fluid Culture, Sterile Gram stain: Gram negative rods      07/02/2024  04:40      ESCHERICHIA COLI ESBL    Culture, Respiratory with Gram Stain [[id number]] Collected: 07/01/24 1505    Order Status: Completed Specimen: Respiratory from Sputum, Induced Updated: 07/03/24 1021     Respiratory Culture Normal respiratory adriano      No S aureus or Pseudomonas isolated.     Gram Stain (Respiratory) <10 epithelial cells per low power field.     Gram Stain (Respiratory) Rare WBC's     Gram Stain (Respiratory) Moderate Gram positive  cocci               ASSESSMENT/PLAN:     Assessment: Anette Ricci is a 58 y.o.  with Stage IVB ovarian carcinosarcoma s/p PDS on 3/25/24 with Dr Washington (CARLOS ALBERTO/BSO/OMX/liver mobilization/peritoneal & R diaphragm stripping/hepatic wedge resection/cholecystectomy) s/p C3 of carbo/taxol/bevacizumab on . Admitted to MICU  for the management of DKA (resolved), JASON (improved), and symptomatic malignant pleural effusion vs empyema, s/p thoracentesis and chest tube placement.      Pleural effusion (right) c/b empyema   - S/p right thoracentesis  (purulent) and s/p chest tube placement on  (serosanguinous output) removed on . CT surgery and pulmonology signed off. No lytic therapy performed due to decreased effusion on imaging.   - Cont IV meropenem per ID. Pleural fluid e coli (ESBL) sensitive to meropenem.   - Imaging trend:    - CXR  shows possible enlarging pleural effusion with increased fluid burden.  - CXR : stable right pleural effusion consistent with last xray ()    - CT : Small right hydropneumothorax with a pigtail chest tube in place. Stable patchy ground-glass opacities in the anterior left upper lobe, suspicious for pneumonia. Interval development of right sided hydronephrosis.   - Pending CTAP  results, can consider consulting cardiothoracic surgery team regarding pleural effusion vs. empyema as continued source of infectious symptoms   - Continue to monitor patient symptoms and vitals for possible re-accumulation of fluid.    Malignant Ascites  - IR attempted para  unable to perform paracentesis due to inadequate fluid volume no safe window     UTI C glabrata  - Asymptomatic, likely due prior urinary catheter. No treatment indicated.      Anemia     - S/p 2u pRBC this admission, HH as above      - Continue to monitor for goal Hgb > 8     Constipation  - Patient reports last BM 6 days ago. Abdomen distended. Passing flatus. + Nausea, + vomiting overnight   -  Currently receiving daily miralax, sennakot, s/p 2 dulcolax suppositories and 1 fleets enema 7/8 without effect. Pt denied enema overnight    -NPO as of 7/9 in response to vomiting while consuming oral contrast for CT scan.  -Primary team can consider resuming maintenance fluids, as pt is now NPO    -CTAP 7/9 pending final read for further characterization of abdominal disease burden   -continue ppi for nausea   - Patient high risk for malignant SBO given peritoneal disease, currently has some bowel function. Continue to monitor.    Hx of PE (stable)  - Holding home therapeutic Lovenox, in case of any further procedures   - Currently on heparin 5k Q8H   - Transition back to Lovenox therapeutic on DC    Diabetes Mellitus     - Current regimen lantus 10u, aspart 6u TIDWM, SSI as needed     - low BG 56 overnight, needing dextrose rescue     Stage IVB ovarian carcinosarcoma     - See onc history as above     - Doxorubicin C1 scheduled for 7/8, will defer until patient is discharged and infection has resolved.      Hypertension  - Continue home norvasc  - Holding home enalapril, HCTZ     Hyperlipidemia  - Cont home statin     Seizure disorder  - Continue home lamictal     Depression  - Continue home seroquel, ativan prn    JASON  RESOLVED, Cr 0.8      - Renal US remarkable for elevated intraparenchymal indices and 2cm mass adjacent to right kidney, repeat yesterday without significant changes    -  CT A/P showing mild right hydronephrosis and a 3.0 x 2.7 cm soft tissue mass centered anterior to the right common iliac artery (series 2, image 115), which was present on prior CT and likely now obstructing the right ureter.      - Urology evaluated patient and recommended outpatient retrograde pyelogram for possible stent placement.     DKA  RESOLVED     Courtney Serrano MD   Obstetrics and Gynecology, PGY1

## 2024-07-10 NOTE — ASSESSMENT & PLAN NOTE
- CT with Continued bulky abdominopelvic soft tissue masses consistent with peritoneal carcinomatosis and probable lymphadenopathy, similar to prior CT   - abd more distended and hard with noted constipation  - has gotten repeated para in the past  - abd u/s ordered to eval ascites and need for para- moderate ascites noted and para ordered but radiology unable to be done- not enough fluid for safe tap  - hold lasix while being NPO and start low dose IVF- monitor volume status closely

## 2024-07-10 NOTE — ASSESSMENT & PLAN NOTE
Patient has Abnormal Magnesium: hypomagnesemia. Will continue to monitor electrolytes closely. Will replace the affected electrolytes and repeat labs to be done after interventions completed. The patient's magnesium results have been reviewed and are listed below.  Recent Labs   Lab 07/10/24  0512   MG 1.4*

## 2024-07-11 ENCOUNTER — TELEPHONE (OUTPATIENT)
Dept: INFECTIOUS DISEASES | Facility: CLINIC | Age: 59
End: 2024-07-11
Payer: MEDICARE

## 2024-07-11 ENCOUNTER — ANESTHESIA EVENT (OUTPATIENT)
Dept: SURGERY | Facility: HOSPITAL | Age: 59
DRG: 374 | End: 2024-07-11
Payer: MEDICARE

## 2024-07-11 PROBLEM — Z51.5 PALLIATIVE CARE ENCOUNTER: Status: ACTIVE | Noted: 2024-07-11

## 2024-07-11 LAB
ALBUMIN SERPL BCP-MCNC: 1.7 G/DL (ref 3.5–5.2)
ALP SERPL-CCNC: 91 U/L (ref 55–135)
ALT SERPL W/O P-5'-P-CCNC: 5 U/L (ref 10–44)
ANION GAP SERPL CALC-SCNC: 8 MMOL/L (ref 8–16)
AST SERPL-CCNC: 41 U/L (ref 10–40)
BACTERIA BLD CULT: NORMAL
BACTERIA BLD CULT: NORMAL
BASOPHILS # BLD AUTO: 0.11 K/UL (ref 0–0.2)
BASOPHILS NFR BLD: 0.5 % (ref 0–1.9)
BILIRUB SERPL-MCNC: 0.9 MG/DL (ref 0.1–1)
BUN SERPL-MCNC: 18 MG/DL (ref 6–20)
CALCIUM SERPL-MCNC: 8.1 MG/DL (ref 8.7–10.5)
CHLORIDE SERPL-SCNC: 98 MMOL/L (ref 95–110)
CO2 SERPL-SCNC: 27 MMOL/L (ref 23–29)
CREAT SERPL-MCNC: 0.9 MG/DL (ref 0.5–1.4)
DIFFERENTIAL METHOD BLD: ABNORMAL
EOSINOPHIL # BLD AUTO: 0.3 K/UL (ref 0–0.5)
EOSINOPHIL NFR BLD: 1.2 % (ref 0–8)
ERYTHROCYTE [DISTWIDTH] IN BLOOD BY AUTOMATED COUNT: 20.3 % (ref 11.5–14.5)
EST. GFR  (NO RACE VARIABLE): >60 ML/MIN/1.73 M^2
GLUCOSE SERPL-MCNC: 89 MG/DL (ref 70–110)
HCT VFR BLD AUTO: 23.9 % (ref 37–48.5)
HGB BLD-MCNC: 7.4 G/DL (ref 12–16)
IMM GRANULOCYTES # BLD AUTO: 0.13 K/UL (ref 0–0.04)
IMM GRANULOCYTES NFR BLD AUTO: 0.6 % (ref 0–0.5)
LYMPHOCYTES # BLD AUTO: 1 K/UL (ref 1–4.8)
LYMPHOCYTES NFR BLD: 4.8 % (ref 18–48)
MAGNESIUM SERPL-MCNC: 1.7 MG/DL (ref 1.6–2.6)
MCH RBC QN AUTO: 26.7 PG (ref 27–31)
MCHC RBC AUTO-ENTMCNC: 31 G/DL (ref 32–36)
MCV RBC AUTO: 86 FL (ref 82–98)
MONOCYTES # BLD AUTO: 1.4 K/UL (ref 0.3–1)
MONOCYTES NFR BLD: 6.7 % (ref 4–15)
NEUTROPHILS # BLD AUTO: 17.9 K/UL (ref 1.8–7.7)
NEUTROPHILS NFR BLD: 86.2 % (ref 38–73)
NRBC BLD-RTO: 0 /100 WBC
PHOSPHATE SERPL-MCNC: 3.5 MG/DL (ref 2.7–4.5)
PLATELET # BLD AUTO: 480 K/UL (ref 150–450)
PMV BLD AUTO: 11.6 FL (ref 9.2–12.9)
POCT GLUCOSE: 105 MG/DL (ref 70–110)
POCT GLUCOSE: 108 MG/DL (ref 70–110)
POCT GLUCOSE: 97 MG/DL (ref 70–110)
POTASSIUM SERPL-SCNC: 4.3 MMOL/L (ref 3.5–5.1)
PROT SERPL-MCNC: 6.1 G/DL (ref 6–8.4)
RBC # BLD AUTO: 2.77 M/UL (ref 4–5.4)
SODIUM SERPL-SCNC: 133 MMOL/L (ref 136–145)
WBC # BLD AUTO: 20.78 K/UL (ref 3.9–12.7)

## 2024-07-11 PROCEDURE — 80053 COMPREHEN METABOLIC PANEL: CPT | Mod: HCNC | Performed by: NURSE PRACTITIONER

## 2024-07-11 PROCEDURE — 63600175 PHARM REV CODE 636 W HCPCS: Mod: HCNC | Performed by: STUDENT IN AN ORGANIZED HEALTH CARE EDUCATION/TRAINING PROGRAM

## 2024-07-11 PROCEDURE — 25000003 PHARM REV CODE 250: Mod: HCNC | Performed by: STUDENT IN AN ORGANIZED HEALTH CARE EDUCATION/TRAINING PROGRAM

## 2024-07-11 PROCEDURE — 36415 COLL VENOUS BLD VENIPUNCTURE: CPT | Mod: HCNC | Performed by: NURSE PRACTITIONER

## 2024-07-11 PROCEDURE — 99233 SBSQ HOSP IP/OBS HIGH 50: CPT | Mod: HCNC,,, | Performed by: STUDENT IN AN ORGANIZED HEALTH CARE EDUCATION/TRAINING PROGRAM

## 2024-07-11 PROCEDURE — 99223 1ST HOSP IP/OBS HIGH 75: CPT | Mod: HCNC,,, | Performed by: EMERGENCY MEDICINE

## 2024-07-11 PROCEDURE — 84100 ASSAY OF PHOSPHORUS: CPT | Mod: HCNC | Performed by: NURSE PRACTITIONER

## 2024-07-11 PROCEDURE — 94761 N-INVAS EAR/PLS OXIMETRY MLT: CPT | Mod: HCNC

## 2024-07-11 PROCEDURE — 83735 ASSAY OF MAGNESIUM: CPT | Mod: HCNC | Performed by: NURSE PRACTITIONER

## 2024-07-11 PROCEDURE — 25500020 PHARM REV CODE 255: Mod: HCNC

## 2024-07-11 PROCEDURE — 63600175 PHARM REV CODE 636 W HCPCS: Mod: HCNC | Performed by: HOSPITALIST

## 2024-07-11 PROCEDURE — 85025 COMPLETE CBC W/AUTO DIFF WBC: CPT | Mod: HCNC | Performed by: NURSE PRACTITIONER

## 2024-07-11 PROCEDURE — 27000207 HC ISOLATION: Mod: HCNC

## 2024-07-11 PROCEDURE — 99223 1ST HOSP IP/OBS HIGH 75: CPT | Mod: HCNC,GC,, | Performed by: SURGERY

## 2024-07-11 PROCEDURE — 20600001 HC STEP DOWN PRIVATE ROOM: Mod: HCNC

## 2024-07-11 RX ORDER — HYDRALAZINE HYDROCHLORIDE 20 MG/ML
10 INJECTION INTRAMUSCULAR; INTRAVENOUS EVERY 6 HOURS PRN
Status: DISCONTINUED | OUTPATIENT
Start: 2024-07-11 | End: 2024-07-13 | Stop reason: HOSPADM

## 2024-07-11 RX ORDER — LEVETIRACETAM 500 MG/5ML
1000 INJECTION, SOLUTION, CONCENTRATE INTRAVENOUS EVERY 12 HOURS
Status: DISCONTINUED | OUTPATIENT
Start: 2024-07-11 | End: 2024-07-13 | Stop reason: HOSPADM

## 2024-07-11 RX ORDER — PANTOPRAZOLE SODIUM 40 MG/10ML
40 INJECTION, POWDER, LYOPHILIZED, FOR SOLUTION INTRAVENOUS DAILY
Status: DISCONTINUED | OUTPATIENT
Start: 2024-07-11 | End: 2024-07-13 | Stop reason: HOSPADM

## 2024-07-11 RX ADMIN — MEROPENEM 1 G: 1 INJECTION INTRAVENOUS at 11:07

## 2024-07-11 RX ADMIN — HYDROMORPHONE HYDROCHLORIDE 0.5 MG: 1 INJECTION, SOLUTION INTRAMUSCULAR; INTRAVENOUS; SUBCUTANEOUS at 03:07

## 2024-07-11 RX ADMIN — LEVETIRACETAM 1000 MG: 100 INJECTION INTRAVENOUS at 09:07

## 2024-07-11 RX ADMIN — DIATRIZOATE MEGLUMINE AND DIATRIZOATE SODIUM 60 ML: 660; 100 LIQUID ORAL; RECTAL at 03:07

## 2024-07-11 RX ADMIN — HYDROMORPHONE HYDROCHLORIDE 0.5 MG: 1 INJECTION, SOLUTION INTRAMUSCULAR; INTRAVENOUS; SUBCUTANEOUS at 01:07

## 2024-07-11 RX ADMIN — PANTOPRAZOLE SODIUM 40 MG: 40 INJECTION, POWDER, LYOPHILIZED, FOR SOLUTION INTRAVENOUS at 09:07

## 2024-07-11 RX ADMIN — MEROPENEM 1 G: 1 INJECTION INTRAVENOUS at 03:07

## 2024-07-11 RX ADMIN — MEROPENEM 1 G: 1 INJECTION INTRAVENOUS at 08:07

## 2024-07-11 RX ADMIN — HYDROMORPHONE HYDROCHLORIDE 0.5 MG: 1 INJECTION, SOLUTION INTRAMUSCULAR; INTRAVENOUS; SUBCUTANEOUS at 07:07

## 2024-07-11 NOTE — PROGRESS NOTES
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  She was discharged on 06/27.  During hospitalization she was treated with Zosyn for 6 days due to recurrent fevers but infectious workup was negative      On admission:   Pulse 100 saturating 99%, afebrile, blood pressure 88/48.  CBC notable for marked leukocytosis 20,000 hemoglobin of 7.4 (hematocrit 23) with increase in granulocyte percentage, RDW 19.9.  CMP notable for a sodium of 129 CO2 14, anion gap 21, BUN 76 creatinine 4.6 GFR 10,  glucose of 403 alk-phos 170, normal AST ALT, uric acid of 9.3    Imaging notable for:  CT scan indicates significant pleural fluid accumulation at the right lung base with suspected lung consolidation and mediastinal shift. The Chest X-ray confirms worsening opacity in the right hemithorax consistent with pleural fluid, along with parenchymal changes suggesting atelectasis and possible infiltrates. There are no signs of pneumothorax, but mild changes are noted in the left lung base.    In the ED received vanc and Zosyn and insulin pending.          Overview/Hospital Course:  Admitted to ICU for DKA, started on insulin drip. Acidosis resolved and transitioned to subq insulin. Stable for step down.   Thoracentesis 07/02/24; drained 800  ml cloudy (yellow cloudy/chylous looking fluid vs purulence). Cytology and cultures sent. Chest tube placed 7/3. Thoracic surgery consulted for recommendations on lytic therapy.   Pleural fluid with E coli. ID consulted. Cont meropenem per ID recs.   Chest tube to suction Pulm and CTS following. Repeat CT chest Small right hydropneumothorax with a pigtail chest tube in place. Also showing new right hydronephrosis, repeat CT abd pelvis ordered and showed: right-sided hydroureteronephrosis which appears to result from a soft tissue mass at the level of the right common iliac artery. Urology consulted. Per urology Patient will need retrograde pyelograms and right ureteral stent placement given extrinsic compression. If patient fails ureteral stent she will likely need nephrostomy tube to preserve renal function.  Pleural effusion improved and pulm removed chest tube.   Worsening leukocytosis since CT removal, repeating infectious workup. CXR with loculated right pleural reaction/pleural effusion. No interval detrimental change noted. Continues to be stable on RA. If WBC rises consider attempting to evaluate for IR drainage of subdiaphragmatic collection.   Cont fanta while inpt and transition to erta at discharge per ID recs.   No BM in days despite aggressive bowel regimen, enema added. Abd u/s with ascites- para ordered but not enough fluid for safe tap.   H/H drop, transfusion ordered. Spiked fever overnight, possibly due to blood transfusion but CRP also rising. IR consulted for assessment of subdiaphragmatic collection and need for drainage. Recommended repeating CT CAP.   Still with no BM despite aggressive bowel regimen and enema. Cont to monitor for obstruction. Stopped passing gas and episode of emesis- CT done and concerning for partial SBO- NG placed to suction and surgery consulted.   Hypoglycemia so scheduled insulin stopped and cont SSI.   Worsening inflammatory markers so discussed with IR and CTS about  need to repeat thora/chest tube, awaiting CTS recs. IR report the subdiaphragmatic collection has no safe window for aspiration.     Gyn/onc and palliative following: plans to transition to home hospice with potential palliative PEG.    Interval History: Pt seen and examined this morning on rounds. MARTHA. Pain well-controlled. Ongoing palliative talks,  updated at bedside. Care plan reviewed. Otherwise, doing well and with no further complaints at this time.        Objective:     Vital Signs (Most Recent):  Temp: 98.6 °F (37 °C) (07/11/24 1215)  Pulse: 95 (07/11/24 1215)  Resp: 18 (07/11/24 1215)  BP: 132/77 (07/11/24 1215)  SpO2: 99 % (07/11/24 1215) Vital Signs (24h Range):  Temp:  [98 °F (36.7 °C)-99.4 °F (37.4 °C)] 98.6 °F (37 °C)  Pulse:  [] 95  Resp:  [17-20] 18  SpO2:  [97 %-100 %] 99 %  BP: (103-132)/(49-77) 132/77     Weight: 86.9 kg (191 lb 9.3 oz)  Body mass index is 30.01 kg/m².    Intake/Output Summary (Last 24 hours) at 7/11/2024 1334  Last data filed at 7/11/2024 1214  Gross per 24 hour   Intake --   Output 1400 ml   Net -1400 ml         Physical Exam  Vitals and nursing note reviewed.   Constitutional:       General: She is not in acute distress.     Appearance: Normal appearance. She is ill-appearing.   HENT:      Head: Normocephalic and atraumatic.      Nose: Nose normal.      Comments: NG in place     Mouth/Throat:      Mouth: Mucous membranes are moist.      Pharynx: Oropharynx is clear.   Eyes:      Extraocular Movements: Extraocular movements intact.      Conjunctiva/sclera: Conjunctivae normal.      Pupils: Pupils are equal, round, and reactive to light.   Cardiovascular:      Rate and Rhythm: Normal rate and regular rhythm.      Pulses: Normal pulses.      Heart sounds: Normal heart sounds.   Pulmonary:      Effort: Pulmonary effort is normal.      Comments: Decreased breath sounds R base, s/p CT. Stable on RA  Abdominal:      General: Bowel sounds are normal. There is  distension.      Tenderness: There is abdominal tenderness.      Comments: Distended, hard abdomen   Does still have bowel sounds    Musculoskeletal:         General: Normal range of motion.      Cervical back: Normal range of motion and neck supple.      Right lower leg: Edema (improving) present.      Left lower leg: Edema (improving) present.      Comments: IMANI hose in place   Skin:     General: Skin is warm and dry.   Neurological:      General: No focal deficit present.      Mental Status: She is alert and oriented to person, place, and time.   Psychiatric:      Comments: Depressed mood       Significant Labs: All pertinent labs within the past 24 hours have been reviewed.    Significant Imaging: I have reviewed all pertinent imaging results/findings within the past 24 hours.    Assessment/Plan:      * Stage 4B Carcinoscaroma, Suspected ovarian origin  - Stage IVB ovarian carcinosarcoma s/p PDS on 3/25/24 with Dr Washington (CARLOS ALBERTO/BSO/OMX/liver mobilization/peritoneal & R diaphragm stripping/hepatic wedge resection/cholecystectomy) s/p C3 of carbo/taxol/bevacizumab on 5/31   - No more chemo options offered  - abdominopelvic soft tissue masses consistent with peritoneal carcinomatosis noted on CT  - Gyn/onc and palliative following  - Will transition to home hospice once decision for palliative PEG placement is decided    SBO (small bowel obstruction)  - no BM in days despite regimen, hard abdomen that progressed to not passing gas and N.V  - known peritoneal carcinomatosis   - CT with concern for partial SBO  - NPO, NG placed to suction  - surgery consulted  - bowel rest  - starting low IVF, monitor for resolution of obstruction may need TPN       Anemia  Patient's anemia is currently worsening. Has not received any PRBCs to date. Etiology likely d/t chronic disease due to Malignancy  Current CBC reviewed-   Lab Results   Component Value Date    HGB 7.3 (L) 07/10/2024    HCT 23.3 (L) 07/10/2024     Monitor serial  CBC and transfuse if patient becomes hemodynamically unstable, symptomatic or H/H drops below 7/21.    - transfusion done, H/H stable     Constipation  - no BM in multiple days  - CT with: Continued bulky abdominopelvic soft tissue masses consistent with peritoneal carcinomatosis and probable lymphadenopathy, similar to prior CT   - started on aggressive bowel regimen, enema added  - monitor for obstructive sxs  - no N/V, bowel sounds present and passing gas- progressed to no longer passing gas and N/V- CT with concern for SBO - NG placed to suction and surgery consulted        Peripheral edema  - reviewed prior ECHO: EF low normal.   Left Ventricle: There is low normal systolic function with a visually estimated ejection fraction of 50 - 55%. Biplane (2D) method of discs ejection fraction is 50%. Global longitudinal strain is -18.0%.    Right Ventricle: Normal right ventricular cavity size. Wall thickness is normal. Right ventricle wall motion  is normal. Systolic function is normal.    Left Atrium: Left atrium is mildly dilated.    Mitral Valve: There is mild regurgitation.    IVC/SVC: Normal venous pressure at 3 mmHg.    Pericardium: Left pleural effusion. Ascites present.    - start lasix and IMANI hose  - edema improving, hold lasix and starting IVF given she is now NPO for SBO, monitor volume status closely       Hydroureteronephrosis  - Mild right-sided hydroureteronephrosis which appears to result from a soft tissue mass at the level of the right common iliac artery.   - urology consulted  - per urology: Patient will need retrograde pyelograms and right ureteral stent placement given extrinsic compression. If patient fails ureteral stent she will likely need nephrostomy tube to preserve renal function  - urology will not do this admission given stable renal function, they recommend referral at discharge       Hypomagnesemia  Patient has Abnormal Magnesium: hypomagnesemia. Will continue to monitor electrolytes  "closely. Will replace the affected electrolytes and repeat labs to be done after interventions completed. The patient's magnesium results have been reviewed and are listed below.  Recent Labs   Lab 07/10/24  0512   MG 1.4*          Empyema  - Ecoli empyema s/p thoracentesis and chest tube placement  - s/p CT in place to suction- CTS and pulm following for management and lytic recommendations - now signed off  - chest tube removed 7/5 per pulm recommendations. Repeat CXR without detrimental change, stable on RA, cont to monitor resp status  - cont meropenem per ID recs- transition to erta at discharge  - repeat CT ordered showing persistent effusion - CTS and IR consulted for reevaluation need for repeat CT/thora/VATS  - ID reconsulted and recommend cont fanta      High anion gap metabolic acidosis  - gap closed, acidosis improving       Diabetic ketoacidosis without coma associated with type 2 diabetes mellitus  - ICU on insulin drip, gap now closed and acidosis improving, transitioned to subq insulin  - monitor BG and adjust insulin as needed  - hypoglycemia noted- stopped scheduled insulin and just SSI       Chronic pulmonary embolism without acute cor pulmonale  - previously on AC, held for decreasing Hgb and vaginal bleeding   - "Patient with small right lower lobe pulmonary embolism noted on June 2024 CT chest and also noted on prior films as well. Patient has not been on full-dose anticoagulation only prophylaxis dosing. Given her advanced cancer and PE noted on CT, it would not be unreasonable to treat with full-dose anticoagulation. However patient is breathing well on room air and her prior respiratory issues are likely related to her right-sided pleural effusion. If a decision is made to treat with full-dose anticoagulation, patient should be treated with Lovenox 1 milligram/kilogram b.i.d and sent home on this regimen. I see no contraindications to anticoagulation however if other procedures are planned " "then it may be beneficial to wait until discharge. "  - cont heparin ppx, resume lovenox once able and no further procedures planned  - Gyn Onc recommend holding heparin given H/H drop      Pleural effusion  Patient found to have moderate pleural effusion on imaging. I have personally reviewed and interpreted the following imaging: Xray and CT. A thoracentesis was performed. Pleural fluid was sent for analysis. Exudative consistent with empyema   - Cytology negative for malignant cells  - empyema Ecoli growing. ID recommend cont meropenem   Management to include chest tube  - s/p CT in place to suction- pulm and CTS following and managing CT- may need lytic therapy   - pulm removed chest tube 7/5- remains stable on RA post CT removal.   - CXR without detrimental changes, cont to monitor respiratory status, stable on RA breathing comfortably   - pulm reevaluation Bedside US without significant pocket appropriate for bedside drainage or catheter placement. If concerned for persistent pleural effusion would repeat CT scan with possible IR guided catheter placement/thoracentesis and send micro.   - repeat CT ordered and IR made aware  - CT with persistent effusion- CTS reconsulted and IR following- awaiting CTS recs may need CT reinsertion vs VATS, pending their recs will follow back up with IR for thora  - ID reconsulted and cont fanta per their recs  - stable on RA without respiratory distress      Leukocytosis  - leukocytosis noted to be worsening after CT removal- s/p chest tube for Ecoli empyema- currently on fanta per ID recs  - given worsening leukocytosis repeating infectious workup with Bcx NGTD, fungitell- neg  - UA not infectious and CXR without detrimental changes  - noted: subdiaphragmatic collection measuring 7.7 x 3.0 cm seen on CT- will assess need for drainage if leukocytosis continues to worsen may need IR drainage if infection - IR report unable to aspirated given there is no safe window that isnt " through lung  - CRP rising and leukocytosis worse  - repeat CT CAP- showing persistent effusion and SBO  - IR and CTS consulted to eval need for thora versus repeat chest tube insertion, awaiting CTS recs        S/p CARLOS ALBERTO/BSO/OMX/Debulking/Partial liver resection/Cholecystectomy  - noted      JASON (acute kidney injury)  Patient with acute kidney injury/acute renal failure likely due to  Suspect a component of ANNE from previous admission playing a role, but also volume depletion from DKA and osmotic diuresis from glucosuria.    JASON is currently improving. Baseline creatinine  0.8  - Labs reviewed- Renal function/electrolytes with Estimated Creatinine Clearance: 76.2 mL/min (based on SCr of 0.9 mg/dL). according to latest data. Monitor urine output and serial BMP and adjust therapy as needed. Avoid nephrotoxins and renally dose meds for GFR listed above.  - renal following   - resolved    Malignant ascites  - CT with Continued bulky abdominopelvic soft tissue masses consistent with peritoneal carcinomatosis and probable lymphadenopathy, similar to prior CT   - abd more distended and hard with noted constipation  - has gotten repeated para in the past  - abd u/s ordered to eval ascites and need for para- moderate ascites noted and para ordered but radiology unable to be done- not enough fluid for safe tap  - hold lasix while being NPO and start low dose IVF- monitor volume status closely       Hyperlipidemia  - cont statin      Seizure disorder  - cont lamotrigine       Depression, reactive  Patient has  depression which is unknown and is currently controlled. Will Continue anti-depressant medications. We will not consult psychiatry at this time. Patient does not display psychosis at this time. Continue to monitor closely and adjust plan of care as needed.        Hypertension  Chronic, controlled. Latest blood pressure and vitals reviewed-     Temp:  [98.7 °F (37.1 °C)-100.3 °F (37.9 °C)]   Pulse:  []   Resp:   [18-20]   BP: (107-124)/(52-77)   SpO2:  [96 %-100 %] .   Home meds for hypertension were reviewed and noted below.   Hypertension Medications               amlodipine (NORVASC) 10 MG tablet Take 10 mg by mouth once daily.     enalapril (VASOTEC) 20 MG tablet Take 20 mg by mouth once daily.    hydrochlorothiazide (HYDRODIURIL) 25 MG tablet Take 25 mg by mouth once daily.             While in the hospital, will manage blood pressure as follows; Continue home antihypertensive regimen- was holding ACE/HCTZ in setting of JASON - BP now controlled without, will cont to hold    Will utilize p.r.n. blood pressure medication only if patient's blood pressure greater than 180/110 and she develops symptoms such as worsening chest pain or shortness of breath.    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  Last A1c reviewed-   Lab Results   Component Value Date    HGBA1C 7.3 (H) 03/19/2024     Most recent fingerstick glucose reviewed-   Recent Labs   Lab 07/10/24  0023 07/10/24  0737 07/10/24  1127 07/10/24  1634   POCTGLUCOSE 85 91 127* 124*     Current correctional scale  Low  Maintain anti-hyperglycemic dose as follows-   Antihyperglycemics (From admission, onward)      Start     Stop Route Frequency Ordered    07/03/24 0930  insulin aspart U-100 pen 0-10 Units         -- SubQ Before meals & nightly PRN 07/03/24 0931          Hold Oral hypoglycemics while patient is in the hospital.    - hypoglycemia noted- stopped scheduled insulin and just on SSI now that she is NPO      VTE Risk Mitigation (From admission, onward)           Ordered     heparin, porcine (PF) 100 unit/mL injection flush 500 Units  As needed (PRN)         07/10/24 1926     Place IMANI hose  Until discontinued         07/06/24 1550     IP VTE HIGH RISK PATIENT  Once         07/01/24 0540     Place sequential compression device  Until discontinued          07/01/24 0540                    Discharge Planning   ROS: 7/12/2024     Code Status: Full Code   Is the patient medically ready for discharge?:     Reason for patient still in hospital (select all that apply): Patient trending condition  Discharge Plan A: (P) Hospice/home   Discharge Delays: (P) None known at this time        Jorge Santana MD  Attending Physician  Medical Director - [facility]  Department of Hospital Medicine  7/11/2024

## 2024-07-11 NOTE — CONSULTS
Radiology Consult    Anette Ricci is a 58 y.o. 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 Patient has reportedly not tolerated NGT, and now has plans for comfort care with discharge home with hospice. IR was consulted for placement of a venting G tube prior to discharge.    Past Medical History:   Diagnosis Date    Breast cancer 2013    Diabetes mellitus     Genetic testing 05/29/2013    VUS    Hyperlipidemia     Hypertension     Malignant neoplasm of upper-outer quadrant of right breast in female, estrogen receptor positive 12/02/2015    Seizure disorder      Past Surgical History:   Procedure Laterality Date    BILATERAL SALPINGO-OOPHORECTOMY (BSO) N/A 3/25/2024    Procedure: SALPINGO-OOPHORECTOMY, BILATERAL;  Surgeon: Александр Washington MD;  Location: [facility] OR [address] [place] [address];  Service: OB/GYN;  Laterality: N/A;    BREAST BIOPSY      BREAST LUMPECTOMY Right 2013    CHOLECYSTECTOMY  3/25/2024    Procedure: CHOLECYSTECTOMY;  Surgeon: Bo Farmer MD;  Location: [facility] OR [address] [place] [address];  Service: General;;    DEBULKING OF TUMOR N/A 3/25/2024    Procedure: DEBULKING, NEOPLASM;  Surgeon: Александр Washington MD;  Location: [facility] OR [address] [place] [address];  Service: OB/GYN;  Laterality: N/A;    EXCISION, LIVER N/A 3/25/2024    Procedure: EXCISION, LIVER - partial;  Surgeon: Bo Farmer MD;  [facility];  Service: General;  Laterality: N/A;  bk ultrasound  [employer] book by SAMINA on 3-21-24  7:00 a.m.  Conf #  [id number]    EYE SURGERY      LAPAROTOMY, EXPLORATORY N/A 3/25/2024    Procedure: LAPAROTOMY, EXPLORATORY;  Surgeon: Александр Washington MD;  Location: [facility] OR [address] [place] [address];  Service: OB/GYN;  Laterality: N/A;    OMENTECTOMY N/A 3/25/2024     Procedure: OMENTECTOMY;  Surgeon: Александр Washington MD;  Location: [facility] OR 2ND FLR;  Service: OB/GYN;  Laterality: N/A;    PERITONEOCENTESIS N/A 3/13/2024    Procedure: PARACENTESIS, ABDOMINAL;  Surgeon: Flavia Moore MD;  Location: [facility] CATH LAB;  Service: Radiology;  Laterality: N/A;    PERITONEOCENTESIS N/A 3/21/2024    Procedure: PARACENTESIS, ABDOMINAL;  Surgeon: Jorge Jolley MD;  Location: [facility] CATH LAB;  Service: Radiology;  Laterality: N/A;    PERITONEOCENTESIS N/A 6/10/2024    Procedure: PARACENTESIS, ABDOMINAL;  Surgeon: Rogelio Malave MD;  Location: [facility] CATH LAB;  Service: Radiology;  Laterality: N/A;    TOTAL ABDOMINAL HYSTERECTOMY N/A 3/25/2024    Procedure: HYSTERECTOMY, TOTAL, ABDOMINAL;  Surgeon: Александр Washington MD;  Location: [facility] OR 2ND FLR;  Service: OB/GYN;  Laterality: N/A;    TOTAL REDUCTION MAMMOPLASTY Bilateral 2016       Discussed with primary team, Dr. Serrano.    Imaging reviewed with Radiology staff, Dr. Moore.     Scheduled Meds:    levETIRAcetam (Keppra) IV (PEDS and ADULTS)  1,000 mg Intravenous Q12H    meropenem IV (PEDS and ADULTS)  1 g Intravenous Q8H    pantoprazole  40 mg Intravenous Daily     Continuous Infusions:    0.9% NaCl   Intravenous Continuous 50 mL/hr at 07/10/24 2046 New Bag at 07/10/24 2046     PRN Meds:  Current Facility-Administered Medications:     0.9%  NaCl infusion (for blood administration), , Intravenous, Q24H PRN    acetaminophen, 650 mg, Oral, Q6H PRN    calcium carbonate, 500 mg, Oral, TID PRN    D10W, , Intravenous, PRN    D10W, , Intravenous, PRN    dextrose 10%, 12.5 g, Intravenous, PRN    dextrose 10%, 25 g, Intravenous, PRN    diphenhydrAMINE, 25 mg, Oral, Q6H PRN    glucagon (human recombinant), 1 mg, Intramuscular, PRN    glucose, 16 g, Oral, PRN    glucose, 24 g, Oral, PRN    heparin, porcine (PF), 500 Units, Intravenous, PRN    hydrALAZINE, 10 mg, Intravenous, Q6H PRN    HYDROmorphone, 0.5 mg, Intravenous, Q4H PRN     "insulin aspart U-100, 0-10 Units, Subcutaneous, QID (AC + HS) PRN    iohexol, 15 mL, Oral, PRN    ondansetron, 4 mg, Intravenous, Q6H PRN    oxyCODONE, 5 mg, Oral, Q6H PRN    prochlorperazine, 10 mg, Intravenous, Q6H PRN    QUEtiapine, 25 mg, Oral, Daily PRN    simethicone, 1 tablet, Oral, TID PRN    sodium chloride 0.9%, 10 mL, Intravenous, PRN    sodium chloride 0.9%, 20 mL, Intravenous, PRN    Allergies:   Review of patient's allergies indicates:   Allergen Reactions    Codeine Other (See Comments)     Flu like s/s    Tramadol Other (See Comments)     Flu like symptoms similar to codeine reaction       Labs:  No results for input(s): "INR", "PT", "PTT" in the last 168 hours.    Recent Labs   Lab 07/11/24  0655   WBC 20.78*   HGB 7.4*   HCT 23.9*   MCV 86   *      Recent Labs   Lab 07/11/24  0655   GLU 89   *   K 4.3   CL 98   CO2 27   BUN 18   CREATININE 0.9   CALCIUM 8.1*   MG 1.7   ALT 5*   AST 41*   ALBUMIN 1.7*   BILITOT 0.9         Vitals (Most Recent):  Temp: 98 °F (36.7 °C) (07/11/24 0732)  Pulse: 99 (07/11/24 0732)  Resp: 18 (07/11/24 0756)  BP: 124/72 (07/11/24 0732)  SpO2: 100 % (07/11/24 0732)    Imaging reviewed    Assessment: Mrs. Ricci is a 59yo F w/ a hx of ovarian cancer c/b malignant ascites admitted for DKA, hospital course c/b SBO requiring NGT. Patient not tolerating NGT at this moment and primary team requesting venting G tube prior to discharge home with hospice. After review of recent CT imaging, patient has malignant ascites abutting window that would be used for access. Unfortunately this would predispose patient to constant leakage, infection and poor healing of G tube tract. Given this, risks outweigh benefits for image guided venting G tube placement.     Thank you for the consult, we will sign off,    Milton Rosenthal  Diagnostic Radiology PGY-2   "

## 2024-07-11 NOTE — ASSESSMENT & PLAN NOTE
59 yo female with advanced ovarian ca, peritoneal carcinomatosis, malignant effusion with empyema, and SBO.     Has seen [facility] care as an outpt at [facility].     Advance Care Planning     Medicolegal  Decision-making:   -Pt does have decision-making capacity  -Pt has not appointed a HCPOA.  -Marital status:     Darrell Ricci (Spouse)  [phone] ([place])     -Children: none    Advance care planning/Advance directives:  -The patient has not previously engaged in advance care planning  -Living will, HCPOA, DNR, [place]POST not reviewed  -Pt has no scanned advance directives in [employer]    Psychosocial  Support system:  -Spiritual: yes;  Patient is part of a particular ed[place]o background: Ra  The palliative care  will be consulted to assess the patient.  -Family: large supportive    Health status prior to this hospitalization  -Functional status: good.  Pt was able to manage ADLs  -Cognitive status: good   -QOL: good    Prognosis:  -Time and potential for recovery: poor given SBO and need for venting PEG    GOC Discussion :  -the patient and/or family gave permission to discuss condition, prognosis, and discussions of advance care planning in the event the patient's condition worsens  -discussed with gyn onc team and /pt  [-goal is to get home with hospice  -pt understands chemotx is no longer going to be helpful and with SBO her prognosis is expected to be short in terms of days to weeks;  -hoping to have as much time possible at home;   -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 Woul dstart dex 4 mg IV bid, add scop patch, and start octreotide Sq 100 mcg tid to decrease gastric output and decrease peristalsis    Summary/Recommendation:  -Most important goals at this time: comfort and QOL  -Code status: did not discuss today but recommend DNR  -symptoms as above  -once PEG performed please ask SW arrange for home hospice    -please provide orders for hospice comfort medications at home including:    -roxanol 20 mg/1 mL; 5 mg po q 1 hour prn perceived pain or dyspnea  -haloperidol 2mg/1mL: 2 mg po q 1 hours prn agitation or terminal delirium (1st agent)  -lorazepam 2 mg 1/ml: 0.5-1 mg po q 2 hours prn agitation or terminal delirium    (2nd agent)  -scop patch

## 2024-07-11 NOTE — HPI
Anette Ricci is a 58F stage 4 ovarian cancer (3/2024, on C3D32 of carbo/taxol/bevacizumab) s/p debulking, omentectomy, partial hepatectomy, recent admission for loculated pleural effusion not amenable to IR drainage treated with antibiotics, presented 6/30/2024 with worsening SOB, diagnosed with DKA and ESBL E coli empyema Chest tube placement and subsequent removal. She has significant abdominal tumor burden and has recently stopped having bowel function. She states that she was having bowel movements and passing flatus until two days ago. CT scan revealed some mild dilation of her small bowel and significant right colon stool burden. She did not have a significantly dilated stomach on this imaging. NGT was placed yesterday with 700 cc bilious output today. Unclear how much came out yesterday as this was not charted. She is bothered by the NGT and would like it removed.

## 2024-07-11 NOTE — SUBJECTIVE & OBJECTIVE
Interval History: Pt seen and examined this morning on rounds. MARTHA. Pain well-controlled. Ongoing palliative talks,  updated at bedside. Care plan reviewed. Otherwise, doing well and with no further complaints at this time.        Objective:     Vital Signs (Most Recent):  Temp: 98.6 °F (37 °C) (07/11/24 1215)  Pulse: 95 (07/11/24 1215)  Resp: 18 (07/11/24 1215)  BP: 132/77 (07/11/24 1215)  SpO2: 99 % (07/11/24 1215) Vital Signs (24h Range):  Temp:  [98 °F (36.7 °C)-99.4 °F (37.4 °C)] 98.6 °F (37 °C)  Pulse:  [] 95  Resp:  [17-20] 18  SpO2:  [97 %-100 %] 99 %  BP: (103-132)/(49-77) 132/77     Weight: 86.9 kg (191 lb 9.3 oz)  Body mass index is 30.01 kg/m².    Intake/Output Summary (Last 24 hours) at 7/11/2024 1334  Last data filed at 7/11/2024 1214  Gross per 24 hour   Intake --   Output 1400 ml   Net -1400 ml         Physical Exam  Vitals and nursing note reviewed.   Constitutional:       General: She is not in acute distress.     Appearance: Normal appearance. She is ill-appearing.   HENT:      Head: Normocephalic and atraumatic.      Nose: Nose normal.      Comments: NG in place     Mouth/Throat:      Mouth: Mucous membranes are moist.      Pharynx: Oropharynx is clear.   Eyes:      Extraocular Movements: Extraocular movements intact.      Conjunctiva/sclera: Conjunctivae normal.      Pupils: Pupils are equal, round, and reactive to light.   Cardiovascular:      Rate and Rhythm: Normal rate and regular rhythm.      Pulses: Normal pulses.      Heart sounds: Normal heart sounds.   Pulmonary:      Effort: Pulmonary effort is normal.      Comments: Decreased breath sounds R base, s/p CT. Stable on RA  Abdominal:      General: Bowel sounds are normal. There is distension.      Tenderness: There is abdominal tenderness.      Comments: Distended, hard abdomen   Does still have bowel sounds    Musculoskeletal:         General: Normal range of motion.      Cervical back: Normal range of motion and neck  supple.      Right lower leg: Edema (improving) present.      Left lower leg: Edema (improving) present.      Comments: IMANI hose in place   Skin:     General: Skin is warm and dry.   Neurological:      General: No focal deficit present.      Mental Status: She is alert and oriented to person, place, and time.   Psychiatric:      Comments: Depressed mood       Significant Labs: All pertinent labs within the past 24 hours have been reviewed.    Significant Imaging: I have reviewed all pertinent imaging results/findings within the past 24 hours.

## 2024-07-11 NOTE — CONSULTS
[facility] - Telemetry Stepdown  General Surgery  Consult Note    Patient Name: Anette Ricci  MRN: [id number]  Code Status: Full Code  Admission Date: 6/30/2024  Hospital Length of Stay: 10 days  Attending Physician: Jorge Santana MD  Primary Care Provider: Mark Chua MD    Patient information was obtained from patient, past medical records, and primary team.     Inpatient consult to Surgical Oncology  Consult performed by: Hernán Panda MD  Consult ordered by: Tia Wallace MD        Subjective:     Principal Problem: Carcinosarcoma    History of Present Illness: Anette Ricci is a 58F stage 4 ovarian cancer (3/2024, on C3D32 of carbo/taxol/bevacizumab) s/p debulking, omentectomy, partial hepatectomy, recent admission for loculated pleural effusion not amenable to IR drainage treated with antibiotics, presented 6/30/2024 with worsening SOB, diagnosed with DKA and ESBL E coli empyema Chest tube placement and subsequent removal. She has significant abdominal tumor burden and has recently stopped having bowel function. She states that she was having bowel movements and passing flatus until two days ago. CT scan revealed some mild dilation of her small bowel and significant right colon stool burden. She did not have a significantly dilated stomach on this imaging. NGT was placed yesterday with 700 cc bilious output today. Unclear how much came out yesterday as this was not charted. She is bothered by the NGT and would like it removed.     No current facility-administered medications on file prior to encounter.     Current Outpatient Medications on File Prior to Encounter   Medication Sig    acetaminophen (TYLENOL) 325 MG tablet Take 2 tablets (650 mg total) by mouth every 4 (four) hours as needed for Pain.    amlodipine (NORVASC) 10 MG tablet Take 10 mg by mouth once daily.     atorvastatin (LIPITOR) 40 MG tablet Take 80 mg by mouth once daily.    enalapril (VASOTEC) 20 MG tablet Take 20 mg by mouth  once daily.    enoxaparin (LOVENOX) 80 mg/0.8 mL Syrg Inject 0.8 mLs (80 mg total) into the skin every 12 (twelve) hours.    fluticasone propionate (FLONASE) 50 mcg/actuation nasal spray 1 spray by Each Nostril route once daily.    hydrochlorothiazide (HYDRODIURIL) 25 MG tablet Take 25 mg by mouth once daily.     hydrocodone-acetaminophen (HYCET) solution 7.5-325 mg/15mL Take 30 mLs by mouth every 6 (six) hours as needed for Pain.    HYDROmorphone (DILAUDID) 2 MG tablet Take 1 tablet (2 mg total) by mouth every 8 (eight) hours as needed for Pain.    ibuprofen (ADVIL,MOTRIN) 600 MG tablet Take 1 tablet (600 mg total) by mouth every 6 (six) hours as needed for Pain.    insulin aspart U-100 (NOVOLOG FLEXPEN U-100 INSULIN) 100 unit/mL (3 mL) InPn pen Inject 6 Units into the skin 3 (three) times daily with meals. (Discard pen 28 days after initial use)    insulin aspart U-100 (NOVOLOG) 100 unit/mL (3 mL) InPn pen Inject 0-5 Units into the skin before meals and at bedtime as needed (for hyperglycemia by specified ranges). Sliding Scale: Blood Glucose Pre-meal 151-200 +2 units, 201-250 + 4 units, 251-300 + 6 units, 301-350 + 8 units, >350 + 10 units, max TDD 58 units    insulin glargine U-100, Lantus, 100 unit/mL (3 mL) SubQ InPn pen Inject 9 Units into the skin every evening.    insulin lispro (HUMALOG U-100 INSULIN) 100 unit/mL injection **LOW CORRECTION DOSE**  Blood Glucose  mg/dL                  Pre-meal                  151-200                0 unit                        201-250                2 units                      251-300                3 units                      301-350                4 units                      >350                     5 units                      Administer subcutaneously if needed at times designated by monitoring schedule.    ketorolac 0.5% (ACULAR) 0.5 % Drop Place 1 drop into both eyes. For pain after eye injections.    lamoTRIgine (LAMICTAL) 25 MG tablet Take 25 mg by mouth 2  "(two) times daily.    lorazepam (ATIVAN) 0.5 MG tablet Take 0.5 mg by mouth every 12 (twelve) hours as needed for Anxiety.    metformin (GLUCOPHAGE) 1000 MG tablet Take 1,000 mg by mouth daily with breakfast.     ondansetron (ZOFRAN-ODT) 4 MG TbDL Take 1 tablet (4 mg total) by mouth every 6 (six) hours as needed (for nausea).    pen needle, diabetic 32 gauge x 5/32" Ndle Use to inject insulin 5 times daily.    quetiapine (SEROQUEL) 25 MG Tab Take 25 mg by mouth daily as needed.       Review of patient's allergies indicates:   Allergen Reactions    Codeine Other (See Comments)     Flu like s/s    Tramadol Other (See Comments)     Flu like symptoms similar to codeine reaction       Past Medical History:   Diagnosis Date    Breast cancer 2013    Diabetes mellitus     Genetic testing 05/29/2013    VUS    Hyperlipidemia     Hypertension     Malignant neoplasm of upper-outer quadrant of right breast in female, estrogen receptor positive 12/02/2015    Seizure disorder      Past Surgical History:   Procedure Laterality Date    BILATERAL SALPINGO-OOPHORECTOMY (BSO) N/A 3/25/2024    Procedure: SALPINGO-OOPHORECTOMY, BILATERAL;  Surgeon: Александр Washington MD;  Location: [facility] OR [address] [place] [address];  Service: OB/GYN;  Laterality: N/A;    BREAST BIOPSY      BREAST LUMPECTOMY Right 2013    CHOLECYSTECTOMY  3/25/2024    Procedure: CHOLECYSTECTOMY;  Surgeon: Bo Farmer MD;  Location: [facility] OR [address] [place] [address];  Service: General;;    DEBULKING OF TUMOR N/A 3/25/2024    Procedure: DEBULKING, NEOPLASM;  Surgeon: Александр Washington MD;  [facility];  Service: OB/GYN;  Laterality: N/A;    EXCISION, LIVER N/A 3/25/2024    Procedure: EXCISION, LIVER - partial;  Surgeon: Bo Farmer MD;  Location: [facility] OR [address] [place] [address];  Service: General;  Laterality: N/A;  bk ultrasound  [employer] book by SAMINA on 3-21-24  7:00 a.m.  Conf #  [id number]    EYE SURGERY      LAPAROTOMY, EXPLORATORY N/A 3/25/2024    Procedure: LAPAROTOMY, EXPLORATORY;  " Surgeon: Александр Washington MD;  Location: [facility] OR [facility] FLR;  Service: OB/GYN;  Laterality: N/A;    OMENTECTOMY N/A 3/25/2024    Procedure: OMENTECTOMY;  Surgeon: Александр Washington MD;  Location: [facility] OR 2ND FLR;  Service: OB/GYN;  Laterality: N/A;    PERITONEOCENTESIS N/A 3/13/2024    Procedure: PARACENTESIS, ABDOMINAL;  Surgeon: Flaiva Moore MD;  Location: [facility] CATH LAB;  Service: Radiology;  Laterality: N/A;    PERITONEOCENTESIS N/A 3/21/2024    Procedure: PARACENTESIS, ABDOMINAL;  Surgeon: Jorge Jolley MD;  Location: [facility] CATH LAB;  Service: Radiology;  Laterality: N/A;    PERITONEOCENTESIS N/A 6/10/2024    Procedure: PARACENTESIS, ABDOMINAL;  Surgeon: Rogelio Malave MD;  Location: [facility] CATH LAB;  Service: Radiology;  Laterality: N/A;    TOTAL ABDOMINAL HYSTERECTOMY N/A 3/25/2024    Procedure: HYSTERECTOMY, TOTAL, ABDOMINAL;  Surgeon: Александр Washington MD;  [facility];  Service: OB/GYN;  Laterality: N/A;    TOTAL REDUCTION MAMMOPLASTY Bilateral 2016     Family History       Problem Relation (Age of Onset)    Breast cancer Sister (48)    Colon cancer Sister    Hypertension Mother, Brother, Brother    Seizures Father          Tobacco Use    Smoking status: Never    Smokeless tobacco: Never   Substance and Sexual Activity    Alcohol use: Yes     Alcohol/week: 0.0 standard drinks of alcohol     Comment: ocassional    Drug use: No    Sexual activity: Not Currently     Partners: Male     Birth control/protection: None     Review of Systems   Constitutional:  Negative for fever.   Respiratory:  Negative for shortness of breath.    Cardiovascular:  Positive for leg swelling (improving).   Gastrointestinal:  Positive for abdominal pain, constipation, nausea and vomiting.   Genitourinary:  Negative for vaginal bleeding.     Objective:     Vital Signs (Most Recent):  Temp: 98.6 °F (37 °C) (07/11/24 1215)  Pulse: 95 (07/11/24 1215)  Resp: 18 (07/11/24 1215)  BP: 132/77 (07/11/24  1215)  SpO2: 99 % (07/11/24 1215) Vital Signs (24h Range):  Temp:  [98 °F (36.7 °C)-99.4 °F (37.4 °C)] 98.6 °F (37 °C)  Pulse:  [] 95  Resp:  [17-20] 18  SpO2:  [97 %-100 %] 99 %  BP: (103-132)/(49-77) 132/77     Weight: 86.9 kg (191 lb 9.3 oz)  Body mass index is 30.01 kg/m².     Physical Exam  Vitals reviewed.   Constitutional:       Appearance: She is ill-appearing.   HENT:      Nose:      Comments: NGT in place with bilious output   Cardiovascular:      Rate and Rhythm: Normal rate.      Pulses: Normal pulses.   Pulmonary:      Effort: Pulmonary effort is normal. No respiratory distress.      Comments: Room air  Abdominal:      General: There is distension.      Palpations: Abdomen is soft.      Tenderness: There is no abdominal tenderness.   Skin:     General: Skin is warm and dry.   Neurological:      General: No focal deficit present.      Mental Status: She is alert and oriented to person, place, and time.            I have reviewed all pertinent lab results within the past 24 hours.  CBC:   Recent Labs   Lab 07/11/24  0655   WBC 20.78*   RBC 2.77*   HGB 7.4*   HCT 23.9*   *   MCV 86   MCH 26.7*   MCHC 31.0*     CMP:   Recent Labs   Lab 07/11/24  0655   GLU 89   CALCIUM 8.1*   ALBUMIN 1.7*   PROT 6.1   *   K 4.3   CO2 27   CL 98   BUN 18   CREATININE 0.9   ALKPHOS 91   ALT 5*   AST 41*   BILITOT 0.9       Significant Diagnostics:  I have reviewed all pertinent imaging results/findings within the past 24 hours.    Assessment/Plan:     SBO (small bowel obstruction)  Anette Ricci is a 57 yo F who unfortunately is hospitalized with significant peritoneal tumor burden. I have spoken to gyn/onc who have had a goals of care discussion with the patient and her family earlier today. They are no longer planning to pursue any treatment and are transitioning to home hospice. Regarding her potential bowel obstruction, she does not have significantly dilated stomach, small bowel or colon. Im concern  that she has multifocal partial obstructions due to the peritoneal disease. Her NGT has put out about 700cc though she does state that she feels a little better since it was placed.     - she has significant right colon stool burden without much appreciated on the left, so unfortunately I dont think enemas will help her much.   - Given that she has been decompressing for the last day, I will order gastrograffin, purely for its therapeutic effect. This may help her.   - She is not an ideal candidate for a PEG as there appears to be some ascites/tumor near our possible window; however, given the palliative nature of this procedure, we can give it a shot if she does not open up within the next few days. She may require an open G tube if we cannot find a window in the OR.   - will discuss timing with staff but likely planning for Monday, 7/15       VTE Risk Mitigation (From admission, onward)           Ordered     heparin, porcine (PF) 100 unit/mL injection flush 500 Units  As needed (PRN)         07/10/24 1926     Place IMANI hose  Until discontinued         07/06/24 1550     IP VTE HIGH RISK PATIENT  Once         07/01/24 0540     Place sequential compression device  Until discontinued         07/01/24 0540                    Thank you for your consult. I will follow-up with patient. Please contact us if you have any additional questions.    Hernán Panda MD  General Surgery  [facility] - Telemetry Stepdown

## 2024-07-11 NOTE — PLAN OF CARE
07/11/24 1337   Post-Acute Status   Post-Acute Authorization Hospice   Hospice Status Referrals Sent   Discharge Delays None known at this time   Discharge Plan   Discharge Plan A Hospice/home   Discharge Plan B Hospice/home     Met with patient to review discharge recommendation of Hospice  and is agreeable to plan    Patient/family provided list of facilities in-network with patient's payor plan. Providers that are owned, operated, or affiliated with [facility] are included on the list.     Notified that referral sent to below listed facilities from in-network list based on proximity to home/family support:   [facility]  2.   [facility]  3.   [facility]  4.  5. (can send more than 5)    Patient/family instructed to identify preference.    Preferred Facility: (if more than 1, listed in order of descending preference)  No preference    If an additional preferred facility not listed above is identified, additional referral to be sent. If above facilities unable to accept, will send additional referrals to in-network providers.    Discharge Plan A and Plan B have been determined by review of patient's clinical status, future medical and therapeutic needs, and coverage/benefits for post-acute care in coordination with multidisciplinary team members.  Yovany Rhodes, [facility] [place]    [facility]  [phone]

## 2024-07-11 NOTE — PLAN OF CARE
Per primary team, patient and family is moving towards hospice.  If consistent with goals of care to continue antibiotics, then can transition to po cipro 500 mg PO BID  ID will sign off

## 2024-07-11 NOTE — PLAN OF CARE
PT alert and oriented x 4. Able to voice all wants and needs. All needs met. Cont to have NG tube in place to low intermittent suction. Appx 350cc out this shift. C/O pain with PRN pain medication administered as ordered.  She has remained free of falls and injuries. Safety eduction and plan of care reviewed with PT and she voiced understanding. Bed is low and locked with call light with in easy reach.  Spouse at bedside.

## 2024-07-11 NOTE — ASSESSMENT & PLAN NOTE
58F w/ stage 4 ovarian cancer with R empyema. S/p Chest tube placement on 7/2 and subsequent removal on 7/5. Repeat chest CT on 7/10 with similar sized collection to pre-pull CT, but with scattered foci of air. Patient did not receive lytics with pulm prior to removal. CT also concerning for perihepatic collections and SBO secondary to progression of her metastatic ovarian cancer. Gyn Onc recommending palliative care. ID following. Surg onc consulted for potential palliative G-tube. Case discussed with Dr. Palencia.    - Pleural fluid volume stable and she is relatively asymptomatic from this residual collection from a respiratory stand point  - Given poor overall prognosis and minimal potential benefit of a VATS, we recommend against any surgical intervention for her right pleural collection, which at this point would likely entail a decortication that she would not tolerate well  - Can consider repeat thoracentesis with cultures if recommended by ID  - Recommend continued antibiotic therapy per ID recommendations  - Recommend engagement of the palliative care team  - Thoracic surgery to sign off, please call with any questions or change in status

## 2024-07-11 NOTE — HPI
Anette Ricci is a 58 y.o.  with stage IVB ovarian carcinosarcoma s/p PDS on 3/25/24 with Dr Washington (CARLOS ALBERTO/BSO/OMX/liver mobilization/peritoneal & R diaphragm stripping/hepatic wedge resection/cholecystectomy) s/p C3D42 of carbo/taxol/bevacizumab (LD ). Admitted to MICU  for the management of DKA (resolved), JASON (resolved), and malignant pleural effusion concerning for empyema, now s/p thoracocentesis and subsequent chest tube placement/removal, with continued IV antibiotic care. She remains admitted for infectious symptoms.      NG tube with high amount of bilious output due to severe multifocal SBO confirmed on CTAP      Plan is now home with hospice after GOC discussion with Dr. Washington and team.

## 2024-07-11 NOTE — PROGRESS NOTES
Physical Therapy Missed Treatment Note      Patient Name:  Anette Ricci   MRN:  [id number]  Admitting Diagnosis:  Carcinosarcoma   Recent Surgery: Procedure(s) (LRB):  EGD, WITH PEG TUBE INSERTION (N/A)    Admit Date: 6/30/2024  Length of Stay: 10 days    Patient not seen today due to (P) Patient fatigue and awaiting meeting with MD for important discussions upon AM + PM attempts. Reports still desiring PT services, perhaps with ability to participate at next date. Anette Ricci's plan of care and PT goals reviewed on this date and remain appropriate. Will follow-up for progressive mobility pending continued medical stability and patient participation.    7/11/2024

## 2024-07-11 NOTE — ACP (ADVANCE CARE PLANNING)
Advance Care Planning     Date: 07/11/2024    Today a voluntary meeting took place: bedside    Patient Participation: Patient is able to participate     Attendees (Name and  Relationship to patient):  Pt, , MD    Staff attendees (Name and  Role): attending    ACP Conversation (General): Other (specify below) Transition to hospice    Code Status: Full Code, though will likely pivot to DNR soon     ACP Documents: None    Goals of care: The patient endorses that what is most important right now is to focus on spending time at home and quality of life, even if it means sacrificing a little time    Accordingly, we have decided that the best plan to meet the patient's goals includes enrolling in hospice care      Recommendations/  Follow-up tasks: Other (specify below) NA       Length of ACP   conversation in minutes: 16 minutes

## 2024-07-11 NOTE — TELEPHONE ENCOUNTER
----- Message from Hanny Knight MD sent at 7/11/2024  1:49 PM CDT -----  Going to hospice.   No need for OPAT

## 2024-07-11 NOTE — ASSESSMENT & PLAN NOTE
Anette Ricci is a 59 yo F who unfortunately is hospitalized with significant peritoneal tumor burden. I have spoken to gyn/onc who have had a goals of care discussion with the patient and her family earlier today. They are no longer planning to pursue any treatment and are transitioning to home hospice. Regarding her potential bowel obstruction, she does not have significantly dilated stomach, small bowel or colon. Im concern that she has multifocal partial obstructions due to the peritoneal disease. Her NGT has put out about 700cc though she does state that she feels a little better since it was placed.     - she has significant right colon stool burden without much appreciated on the left, so unfortunately I dont think enemas will help her much.   - Given that she has been decompressing for the last day, I will order gastrograffin, purely for its therapeutic effect. This may help her.   - She is not an ideal candidate for a PEG as there appears to be some ascites/tumor near our possible window; however, given the palliative nature of this procedure, we can give it a shot if she does not open up within the next few days. She may require an open G tube if we cannot find a window in the OR.   - will discuss timing with staff but likely planning for Monday, 7/15

## 2024-07-11 NOTE — CARE UPDATE
Afternoon Assessment:    Ms. Ricci is resting comfortably with NGT in place. Discussed plans to place venting PEG tube with IR or surgery teams either today or tomorrow.   All her questions and her family questions were answered.      Temp:  [98 °F (36.7 °C)-99.8 °F (37.7 °C)] 99.8 °F (37.7 °C)  Pulse:  [] 93  Resp:  [17-20] 19  SpO2:  [97 %-100 %] 99 %  BP: (101-132)/(49-77) 101/57    PE:  Constitutional:       Appearance: She is ill-appearing.   HENT:      Nose:      Comments: NGT in place with bilious output   Cardiovascular:      Rate and Rhythm: Normal rate.      Pulses: Normal pulses.   Pulmonary:      Effort: Pulmonary effort is normal. No respiratory distress.      Comments: Room air  Abdominal:      General: There is distension.      Palpations: Abdomen is soft.      Tenderness: There is no abdominal tenderness.   Skin:     General: Skin is warm and dry.     Labs:  Recent Labs   Lab 07/11/24  0655   WBC 20.78*   RBC 2.77*   HGB 7.4*   HCT 23.9*   *   MCV 86   MCH 26.7*   MCHC 31.0*       A/P:  Transition to hospice care today.    Quinton Dave MD  Obstetrics and Gynecology- PGY2

## 2024-07-11 NOTE — NURSING
Patient alert and awake. No fall this shift. Family at  bedside. Call light within reach. Patient care ongoing.

## 2024-07-11 NOTE — PROGRESS NOTES
Progress Note  Gynecology Oncology    Admit Date: 2024  LOS: 10    Reason for Admission:  Diabetic ketoacidosis without coma associated with type 2 diabetes mellitus    SUBJECTIVE:     Anette Ricci is a 58 y.o.  with stage IVB ovarian carcinosarcoma s/p PDS on 3/25/24 with Dr Washington (CARLOS ALBERTO/BSO/OMX/liver mobilization/peritoneal & R diaphragm stripping/hepatic wedge resection/cholecystectomy) s/p C3D42 of carbo/taxol/bevacizumab (LD ). Admitted to MICU  for the management of DKA (resolved), JASON (resolved), and malignant pleural effusion concerning for empyema, now s/p thoracocentesis and subsequent chest tube placement/removal, with continued IV antibiotic care. She remains admitted for infectious symptoms.     Interval hx: NGT hooked up overnight. Pt now NPO 2/2 SBO confirmed on CTAP     Pt reports feeling exhausted this morning. Does not want NGT, as it causes her pain. Endorses continued abdominal discomfort. +N/V. Pt not passing flatus or BM.  at bedside.       OBJECTIVE:     Vital Signs   Temp:  [98.1 °F (36.7 °C)-99.4 °F (37.4 °C)] 98.1 °F (36.7 °C)  Pulse:  [] 95  Resp:  [17-19] 17  SpO2:  [97 %-99 %] 99 %  BP: (103-119)/(49-72) 105/49    No intake or output data in the 24 hours ending 24 0546      Physical Exam:  Gen: Pt alert, A&Ox4  CV: RRR, normal heart sounds   Pulm: R lung sounds reduced at base, with crackles. L lung CTA. Former Chest tube site looks clean and clear.   Abd: Moderate distention, slightly tender to palpation without rebound or guarding. No erythema, induration, or discharge on bandage.   Ext: PPP+1 peripheral edema. Compression socks removed with rings of edema present   Skin: Skin is warm and dry.     Laboratory:  Recent Labs   Lab 24  0811 24  0503 07/10/24  0512   WBC 18.80* 24.67* 23.58*   HGB 6.4* 7.5* 7.3*   HCT 21.1* 23.2* 23.3*   MCV 87 84 86   * 553* 341      Recent Labs   Lab 24  0811 24  0505  07/10/24  0512   * 133* 133*   K 3.9 3.7 3.9    100 98   CO2 27 24 25   BUN 13 13 13   CREATININE 0.8 0.8 0.9   GLU 97 86 70   PROT 5.9* 6.2 6.1   BILITOT 0.6 0.9 0.9   ALKPHOS 90 91 97   ALT 5* 5* 5*   AST 30 35 36   MG 1.5* 1.4* 1.4*   PHOS 2.9 3.4 3.3            Blood Sugars (AccuCheck):    Recent Labs     24  2217 24  2223 24  2258 07/10/24  0023 07/10/24  0737 07/10/24  1127 07/10/24  1634 07/10/24  2231   POCTGLUCOSE 56* 62* 94 85 91 127* 124* 99             ASSESSMENT/PLAN:     Assessment: Anette Ricci is a 58 y.o.  with recurrent ovarian carcinosarcoma s/p PDS on 3/25/24 with Dr Washington (CARLOS ALBERTO/BSO/OMX/liver mobilization/peritoneal & R diaphragm stripping/hepatic wedge resection/cholecystectomy) s/p C3 of carbo/taxol/bevacizumab on . Admitted to MICU  for the management of DKA (resolved), JASON (improved), symptomatic malignant pleural effusion vs empyema (s/p thoracentesis and chest tube placement), and SBO.      SBO  -CTAP  concerning for increased metastatic disease burden, with possible LUQ SBO 2/2 bulky disease    -S/P NGT placement yesterday afternoon per primary team, 350cc outpt overnight   - Patient reports last BM 7 days ago. Abdomen distended. - Passing flatus. + Nausea, + vomiting overnight   - Currently receiving daily miralax, sennakot, s/p 2 dulcolax suppositories and 1 fleets enema  without effect. Pt denied enema overnight    -on 100ml/hr maintenance fluid while npo  - continue ppi for nausea   - Pt has not been asking for pain regimen (MME days prior of 42, yesterday MME of 27.5) despite endorsing increasing pain and discomfort secondary to NGT placement and abdominal distention. Recommend transitioning pt to scheduled pain regimen to ensure overall comfort      Pleural effusion (right) c/b empyema   - S/p right thoracentesis  (purulent) and s/p chest tube placement on  (serosanguinous output) removed on . CT surgery and  pulmonology signed off. No lytic therapy performed due to decreased effusion on imaging.   - ID following. Cont IV meropenem per ID. Pleural fluid e coli (ESBL) sensitive to meropenem.   - Imaging trend:    - CXR 7/9 shows possible enlarging pleural effusion with increased fluid burden.  - CXR 7/8: stable right pleural effusion consistent with last xray (7/6)    - CTAP 7/4: Small right hydropneumothorax with a pigtail chest tube in place. Stable patchy ground-glass opacities in the anterior left upper lobe, suspicious for pneumonia. Interval development of right sided hydronephrosis.   -  CTAP 7/9: continued loculation of R lung base, progression of subdiaphragmatic fluid collection   - Continue to monitor patient symptoms and vitals for possible re-accumulation of fluid.  - CT surgery consulted 7/10 regarding continued loculations in RLL by primary team     Malignant Ascites  - IR attempted para 7/8 unable to perform paracentesis due to inadequate fluid volume no safe window     UTI C glabrata  - Asymptomatic, likely due prior urinary catheter. No treatment indicated.      Anemia     - S/p 2u pRBC this admission, HH as above      - Continue to monitor for goal Hgb > 8    Hx of PE (stable)  - Holding home therapeutic Lovenox, in case of any further procedures   - Currently on heparin 5k Q8H   - Transition back to Lovenox therapeutic on DC    Diabetes Mellitus     - Current regimen lantus 10u, aspart 6u TIDWM, SSI as needed     - low BG 56 overnight, needing dextrose rescue     Stage IVB ovarian carcinosarcoma     - See onc history as above     - Doxorubicin C1 scheduled for 7/8, will defer until patient is discharged and infection has resolved.      Hypertension  - Continue home norvasc  - Holding home enalapril, HCTZ     Hyperlipidemia  - Cont home statin     Seizure disorder  - Continue home lamictal     Depression  - Continue home seroquel, ativan prn    JASON  RESOLVED, Cr 0.8      - Renal US remarkable for  elevated intraparenchymal indices and 2cm mass adjacent to right kidney, repeat yesterday without significant changes    -  CT A/P showing mild right hydronephrosis and a 3.0 x 2.7 cm soft tissue mass centered anterior to the right common iliac artery (series 2, image 115), which was present on prior CT and likely now obstructing the right ureter.      - Urology evaluated patient and recommended outpatient retrograde pyelogram for possible stent placement.     DKA  RESOLVED     Courtney Serrano MD   Obstetrics and Gynecology, PGY1

## 2024-07-11 NOTE — CONSULTS
[facility] - Telemetry Stepdown  Palliative Medicine  Consult Note    Patient Name: Anette Ricci  MRN: [id number]  Admission Date: 6/30/2024  Hospital Length of Stay: 10 days  Code Status: Full Code   Attending Provider: Jorge Santana MD  Consulting Provider: Eliud Cruz MD  Primary Care Physician: Mark Chua MD  Principal Problem:Carcinosarcoma    Patient information was obtained from patient, past medical records, and primary team.      Inpatient consult to Palliative Care  Consult performed by: Eliud Cruz MD  Consult ordered by: Jorge Santana MD        Assessment/Plan:     Palliative Care  Palliative care encounter  59 yo female with advanced ovarian ca, peritoneal carcinomatosis, malignant effusion with empyema, and SBO.     Has seen [facility] care as an outpt at [facility], [place], operated by [facility].     Advance Care Planning    Medicolegal  Decision-making:   -Pt does have decision-making capacity  -Pt has not appointed a HCPOA.  -Marital status:     Darrell Ricci (Spouse)  [phone] ([place])     -Children: none    Advance care planning/Advance directives:  -The patient has not previously engaged in advance care planning  -Living will, HCPOA, DNR, [place]POST not reviewed  -Pt has no scanned advance directives in [facility]    Psychosocial  Support system:  -Spiritual: yes;  Patient is part of a particular edwardo background: [employer]  The palliative care  will be consulted to assess the patient.  -Family: large supportive    Health status prior to this hospitalization  -Functional status: good.  Pt was able to manage ADLs  -Cognitive status: good   -QOL: good    Prognosis:  -Time and potential for recovery: poor given SBO and need for venting PEG    GOC Discussion :  -the patient and/or family gave permission to discuss condition, prognosis, and discussions of advance care planning in the event the patient's condition worsens  -discussed with gyn onc team and /pt  [-goal is to get home with  hospice  -pt understands chemotx is no longer going to be helpful and with SBO her prognosis is expected to be short in terms of days to weeks;  -hoping to have as much time possible at home;   -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 Woul dstart dex 4 mg IV bid, add scop patch, and start octreotide Sq 100 mcg tid to decrease gastric output and decrease peristalsis    Summary/Recommendation:  -Most important goals at this time: comfort and QOL  -Code status: did not discuss today but recommend DNR  -symptoms as above  -once PEG performed please ask SW arrange for home hospice    -please provide orders for hospice comfort medications at home including:    -roxanol 20 mg/1 mL; 5 mg po q 1 hour prn perceived pain or dyspnea  -haloperidol 2mg/1mL: 2 mg po q 1 hours prn agitation or terminal delirium (1st agent)  -lorazepam 2 mg 1/ml: 0.5-1 mg po q 2 hours prn agitation or terminal delirium    (2nd agent)  -scop patch                   Thank you for your consult. I will follow-up with patient. Please contact us if you have any additional questions.    Subjective:     HPI:   Anette Ricci is a 58 y.o.  with stage IVB ovarian carcinosarcoma s/p PDS on 3/25/24 with Dr Washington (CARLOS ALBERTO/BSO/OMX/liver mobilization/peritoneal & R diaphragm stripping/hepatic wedge resection/cholecystectomy) s/p C3D42 of carbo/taxol/bevacizumab (LD ). Admitted to MICU  for the management of DKA (resolved), JASON (resolved),  and malignant pleural effusion concerning for empyema, now s/p thoracocentesis and subsequent chest tube placement/removal, with continued IV antibiotic care. She remains admitted for infectious symptoms.      NG tube with high amount of bilious output due to severe multifocal SBO confirmed on CTAP      Plan is now home with hospice after GOC discussion with Dr. Washington and team.    Hospital Course:  No notes on file    Interval History: pt seen at bedside with  present    Past Medical History:   Diagnosis Date    Breast cancer 2013    Diabetes mellitus     Genetic testing 05/29/2013    VUS    Hyperlipidemia     Hypertension     Malignant neoplasm of upper-outer quadrant of right breast in female, estrogen receptor positive 12/02/2015    Seizure disorder        Past Surgical History:   Procedure Laterality Date    BILATERAL SALPINGO-OOPHORECTOMY (BSO) N/A 3/25/2024    Procedure: SALPINGO-OOPHORECTOMY, BILATERAL;  Surgeon: Александр Washington MD;  Location: [facility] OR [address] [place] [address];  Service: OB/GYN;  Laterality: N/A;    BREAST BIOPSY      BREAST LUMPECTOMY Right 2013    CHOLECYSTECTOMY  3/25/2024    Procedure: CHOLECYSTECTOMY;  Surgeon: Bo Farmer MD;  Location: [facility] OR [address] [place] [address];  Service: General;;    DEBULKING OF TUMOR N/A 3/25/2024    Procedure: DEBULKING, NEOPLASM;  Surgeon: Александр Washington MD;  Location: [facility] OR [address] [place] [address];  Service: OB/GYN;  Laterality: N/A;    EXCISION, LIVER N/A 3/25/2024    Procedure: EXCISION, LIVER - partial;  Surgeon: Bo Farmer MD;  Location: [facility] OR [address] [place] [address];  Service: General;  Laterality: N/A;  bk ultrasound  [place] book by SAMINA on 3-21-24  7:00 a.m.  Conf #  [id number]    EYE SURGERY      LAPAROTOMY, EXPLORATORY N/A 3/25/2024    Procedure: LAPAROTOMY, EXPLORATORY;  Surgeon: Александр Washington MD;  Location: [facility] OR [address] [place] [address];  Service: OB/GYN;  Laterality: N/A;    OMENTECTOMY N/A 3/25/2024    Procedure: OMENTECTOMY;  Surgeon: Александр Washington MD;  Location:  [facility] OR 2ND FLR;  Service: OB/GYN;  Laterality: N/A;    PERITONEOCENTESIS N/A 3/13/2024    Procedure: PARACENTESIS, ABDOMINAL;  Surgeon: Flavia Moore MD;  Location: [facility] CATH LAB;  Service: Radiology;  Laterality: N/A;    PERITONEOCENTESIS N/A 3/21/2024    Procedure: PARACENTESIS, ABDOMINAL;  Surgeon: Jorge Jolley MD;  Location: [facility] CATH LAB;  Service: Radiology;  Laterality: N/A;    PERITONEOCENTESIS N/A 6/10/2024    Procedure: PARACENTESIS, ABDOMINAL;  Surgeon: Roeglio Malave MD;  Location: [facility] CATH LAB;  Service: Radiology;  Laterality: N/A;    TOTAL ABDOMINAL HYSTERECTOMY N/A 3/25/2024    Procedure: HYSTERECTOMY, TOTAL, ABDOMINAL;  Surgeon: Александр Washington MD;  Location: [facility] OR [facility] FLR;  Service: OB/GYN;  Laterality: N/A;    TOTAL REDUCTION MAMMOPLASTY Bilateral 2016       Review of patient's allergies indicates:   Allergen Reactions    Codeine Other (See Comments)     Flu like s/s    Tramadol Other (See Comments)     Flu like symptoms similar to codeine reaction       Medications:  Continuous Infusions:   0.9% NaCl   Intravenous Continuous 50 mL/hr at 07/10/24 2046 New Bag at 07/10/24 2046     Scheduled Meds:   levETIRAcetam (Keppra) IV (PEDS and ADULTS)  1,000 mg Intravenous Q12H    meropenem IV (PEDS and ADULTS)  1 g Intravenous Q8H    pantoprazole  40 mg Intravenous Daily     PRN Meds:  Current Facility-Administered Medications:     0.9%  NaCl infusion (for blood administration), , Intravenous, Q24H PRN    acetaminophen, 650 mg, Oral, Q6H PRN    calcium carbonate, 500 mg, Oral, TID PRN    D10W, , Intravenous, PRN    D10W, , Intravenous, PRN    dextrose 10%, 12.5 g, Intravenous, PRN    dextrose 10%, 25 g, Intravenous, PRN    diphenhydrAMINE, 25 mg, Oral, Q6H PRN    glucagon (human recombinant), 1 mg, Intramuscular, PRN    glucose, 16 g, Oral, PRN    glucose, 24 g, Oral, PRN    heparin, porcine (PF), 500 Units, Intravenous, PRN    hydrALAZINE, 10 mg, Intravenous, Q6H PRN     HYDROmorphone, 0.5 mg, Intravenous, Q4H PRN    insulin aspart U-100, 0-10 Units, Subcutaneous, QID (AC + HS) PRN    iohexol, 15 mL, Oral, PRN    ondansetron, 4 mg, Intravenous, Q6H PRN    oxyCODONE, 5 mg, Oral, Q6H PRN    prochlorperazine, 10 mg, Intravenous, Q6H PRN    QUEtiapine, 25 mg, Oral, Daily PRN    simethicone, 1 tablet, Oral, TID PRN    sodium chloride 0.9%, 10 mL, Intravenous, PRN    sodium chloride 0.9%, 20 mL, Intravenous, PRN    Family History       Problem Relation (Age of Onset)    Breast cancer Sister (48)    Colon cancer Sister    Hypertension Mother, Brother, Brother    Seizures Father          Tobacco Use    Smoking status: Never    Smokeless tobacco: Never   Substance and Sexual Activity    Alcohol use: Yes     Alcohol/week: 0.0 standard drinks of alcohol     Comment: ocassional    Drug use: No    Sexual activity: Not Currently     Partners: Male     Birth control/protection: None       Review of Systems   Constitutional:  Positive for appetite change.   HENT:          NG tube   Respiratory:  Negative for shortness of breath.    Gastrointestinal:  Positive for abdominal distention, abdominal pain, nausea and vomiting.     Objective:     Vital Signs (Most Recent):  Temp: 99.8 °F (37.7 °C) (07/11/24 1545)  Pulse: 93 (07/11/24 1545)  Resp: 19 (07/11/24 1545)  BP: (!) 101/57 (07/11/24 1545)  SpO2: 99 % (07/11/24 1545) Vital Signs (24h Range):  Temp:  [98 °F (36.7 °C)-99.8 °F (37.7 °C)] 99.8 °F (37.7 °C)  Pulse:  [] 93  Resp:  [17-20] 19  SpO2:  [97 %-100 %] 99 %  BP: (101-132)/(49-77) 101/57     Weight: 86.9 kg (191 lb 9.3 oz)  Body mass index is 30.01 kg/m².       Physical Exam  Constitutional:       General: She is not in acute distress.     Appearance: She is ill-appearing.   HENT:      Nose:      Comments: NGT  Pulmonary:      Effort: Pulmonary effort is normal.   Abdominal:      General: There is no distension.      Palpations: Abdomen is soft.   Skin:     Capillary Refill: Capillary  refill takes less than 2 seconds.            Review of Symptoms      Symptom Assessment (ESAS 0-10 Scale)  Pain:  4  Dyspnea:  0  Anxiety:  0  Nausea:  3  Depression:  0  Anorexia:  0  Fatigue:  0  Insomnia:  0  Restlessness:  0  Agitation:  0     CAM / Delirium:  Negative  Constipation:  Positive  Diarrhea:  Negative      Bowel Management Plan (BMP):  No      Pain Assessment:  OME in 24 hours:  ~22  Location(s): abdomen    Abdomen       Location: generalized        Quality: Dull and pressure-like        Quantity: 8/10 in intensity        Chronicity: Onset 4 day(s) ago, stable        Aggravating Factors: None        Alleviating Factors: None (NGtube)        Associated Symptoms: Nausea and vomiting    ECOG Performance Status thGthrthathdtheth:th th4th Living Arrangements:  Lives with family    Psychosocial/Cultural:   See Palliative Psychosocial Note: No  Lives with  of many years    No kids    Many siblings, nieces and nephews    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 Accordingly, we have decided that the best plan to meet the patient's goals includes enrolling in hospice care.         Significant Labs: All pertinent labs within the past 24 hours have been reviewed.  CBC:   Recent Labs   Lab 07/11/24  0655   WBC 20.78*   HGB 7.4*   HCT 23.9*   MCV 86   *     BMP:  Recent Labs   Lab 07/11/24  0655   GLU 89   *   K 4.3   CL 98   CO2 27   BUN 18   CREATININE 0.9   CALCIUM 8.1*   MG 1.7     LFT:  Lab Results   Component Value Date    AST 41 (H) 07/11/2024    ALKPHOS 91 07/11/2024    BILITOT 0.9  07/11/2024     Albumin:   Albumin   Date Value Ref Range Status   07/11/2024 1.7 (L) 3.5 - 5.2 g/dL Final     Protein:   Total Protein   Date Value Ref Range Status   07/11/2024 6.1 6.0 - 8.4 g/dL Final     Lactic acid:   Lab Results   Component Value Date    LACTATE 1.7 05/23/2024    LACTATE 1.8 05/21/2024       Significant Imaging: I have reviewed all pertinent imaging results/findings within the past 24 hours.    CT Abd/Pelv:   Severe small bowel obstruction    I spent a total of 80 minutes on the day of the visit. This includes face to face time in discussion of goals of care, symptom assessment, coordination of care and emotional support.  This also includes non-face to face time preparing to see the patient (eg, review of tests/imaging), obtaining and/or reviewing separately obtained history, documenting clinical information in the electronic or other health record, independently interpreting results and communicating results to the patient/family/caregiver, or care coordinator.    Eliud Cruz MD  Palliative Medicine  [facility] - [facility]

## 2024-07-11 NOTE — ANESTHESIA PREPROCEDURE EVALUATION
[facility]  Anesthesia Pre-Operative Evaluation        Patient Name: Anette Ricci  YOB: 1965  MRN: [id number]    SUBJECTIVE:     Pre-operative Evaluation for Procedure(s) (LRB):  EGD, WITH PEG TUBE INSERTION (N/A)     07/11/2024    Anette Ricci is a 58 y.o. female with a PMHx significant for stage 4 ovarian cancer (3/2024, on C3D32 of carbo/taxol/bevacizumab) s/p debulking, omentectomy, partial hepatectomy, and recent admission for loculated pleural effusion not amenable to IR drainage treated with antibiotics who was admitted for SOB 2/2 bilateral pleural effusions. Hospital stay has been c/b small bowel obstruction now s/p NG tube placement. She now presents for EGD w/ palliative PEG tube insertion.       Previous Airway   Intubation:     Induction:  Rapid sequence induction    Intubated:  Postinduction    Mask Ventilation:  Not attempted    Attempts:  1    Attempted By:  Resident anesthesiologist    Method of Intubation:  Video laryngoscopy    Blade:  Dickson 3    Laryngeal View Grade: Grade I - full view of cords      Difficult Airway Encountered?: No      Complications:  None    Airway Device:  Oral endotracheal tube    Airway Device Size:  7.0    Style/Cuff Inflation:  Cuffed    Inflation Amount (mL):  5    Tube secured:  22    Secured at:  The lips    Placement Verified By:  Capnometry    Complicating Factors:  None    Findings Post-Intubation:  BS equal bilateral and atraumatic/condition of teeth unchanged         Patient Active Problem List   Diagnosis    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    Hypertension    Depression, reactive    Seizure disorder    Hyperlipidemia    Other convulsions    Macromastia    History of breast cancer in female    Malignant ascites    Metastasis to peritoneal cavity    Stage 4B Carcinoscaroma, Suspected ovarian origin    SOB (shortness of breath)    JASON (acute kidney  injury)    S/p CARLOS ALBERTO/BSO/OMX/Debulking/Partial liver resection/Cholecystectomy    Acute postoperative anemia due to expected blood loss    Antineoplastic chemotherapy induced anemia    Hypoalbuminemia    On supplemental oxygen by nasal cannula    Postoperative state    Hyperglycemia    Hypokalemia    Leukocytosis    Superficial dehiscence of wound    Pelvic abscess in female    De Quervain's syndrome (tenosynovitis)    Trigger middle finger of right hand    Viral respiratory infection    Parainfluenza infection    Pleural effusion    Chronic pulmonary embolism without acute cor pulmonale    Diabetic ketoacidosis without coma associated with type 2 diabetes mellitus    High anion gap metabolic acidosis    Empyema    Hypomagnesemia    Hydroureteronephrosis    Peripheral edema    Constipation    Anemia    SBO (small bowel obstruction)       Review of patient's allergies indicates:   Allergen Reactions    Codeine Other (See Comments)     Flu like s/s    Tramadol Other (See Comments)     Flu like symptoms similar to codeine reaction       Current Outpatient Medications   Medication Instructions    acetaminophen (TYLENOL) 650 mg, Oral, Every 4 hours PRN    amLODIPine (NORVASC) 10 mg, Oral, Daily    atorvastatin (LIPITOR) 80 mg, Oral, Daily    enalapril (VASOTEC) 20 mg, Daily    enoxaparin (LOVENOX) 1 mg/kg, Subcutaneous, Every 12 hours    fluticasone propionate (FLONASE) 50 mcg/actuation nasal spray 1 spray, Each Nostril, Daily    hydroCHLOROthiazide (HYDRODIURIL) 25 mg, Oral, Daily    hydrocodone-acetaminophen (HYCET) solution 7.5-325 mg/15mL 30 mLs, Oral, Every 6 hours PRN    HYDROmorphone (DILAUDID) 2 mg, Oral, Every 8 hours PRN    ibuprofen (ADVIL,MOTRIN) 600 mg, Oral, Every 6 hours PRN    insulin aspart U-100 (NOVOLOG FLEXPEN U-100 INSULIN) 6 Units, Subcutaneous, 3 times daily with meals, (Discard pen 28 days after initial use)    insulin aspart U-100 (NOVOLOG) 0-5 Units, Subcutaneous, Before meals & nightly PRN,  "Sliding Scale: Blood Glucose Pre-meal 151-200 +2 units, 201-250 + 4 units, 251-300 + 6 units, 301-350 + 8 units, >350 + 10 units, max TDD 58 units    insulin glargine U-100 (Lantus) 9 Units, Subcutaneous, Nightly    insulin lispro (HUMALOG U-100 INSULIN) 100 unit/mL injection **LOW CORRECTION DOSE**<BR>Blood Glucose<BR>mg/dL                  Pre-meal                <BR>151-200                0 unit                      <BR>201-250                2 units                    <BR>251-300                3 units                    <BR>301-350                4 units                    <BR>>350                     5 units                    <BR>Administer subcutaneously if needed at times designated by monitoring schedule.    ketorolac 0.5% (ACULAR) 0.5 % Drop 1 drop, Both Eyes, For pain after eye injections.    lamoTRIgine (LAMICTAL) 25 mg, Oral, 2 times daily    LORazepam (ATIVAN) 0.5 mg, Oral, Every 12 hours PRN    metFORMIN (GLUCOPHAGE) 1,000 mg, Oral, With breakfast    ondansetron (ZOFRAN-ODT) 4 mg, Oral, Every 6 hours PRN    pen needle, diabetic 32 gauge x 5/32" Ndle Use to inject insulin 5 times daily.    QUEtiapine (SEROQUEL) 25 mg, Oral, Daily PRN       Past Surgical History:   Procedure Laterality Date    BILATERAL SALPINGO-OOPHORECTOMY (BSO) N/A 3/25/2024    Procedure: SALPINGO-OOPHORECTOMY, BILATERAL;  Surgeon: Александр Washington MD;  Location: [address];  Service: OB/GYN;  Laterality: N/A;    BREAST BIOPSY      BREAST LUMPECTOMY Right 2013    CHOLECYSTECTOMY  3/25/2024    Procedure: CHOLECYSTECTOMY;  Surgeon: Bo Farmer MD;  Location: [address];  Service: General;;    DEBULKING OF TUMOR N/A 3/25/2024    Procedure: DEBULKING, NEOPLASM;  Surgeon: Александр Washington MD;  Location: [address];  Service: OB/GYN;  Laterality: N/A;    EXCISION, LIVER N/A 3/25/2024    Procedure: EXCISION, LIVER - partial;  Surgeon: Bo Farmer MD;  Location: [facility] OR 2ND FLR;  Service: General;  " Laterality: N/A;  bk ultrasound  [place] book by TA on 3-21-24  7:00 a.m.  Conf #  [id number]    EYE SURGERY      LAPAROTOMY, EXPLORATORY N/A 3/25/2024    Procedure: LAPAROTOMY, EXPLORATORY;  Surgeon: Александр Washington MD;  [facility];  Service: OB/GYN;  Laterality: N/A;    OMENTECTOMY N/A 3/25/2024    Procedure: OMENTECTOMY;  Surgeon: Александр Washington MD;  [facility];  Service: OB/GYN;  Laterality: N/A;    PERITONEOCENTESIS N/A 3/13/2024    Procedure: PARACENTESIS, ABDOMINAL;  Surgeon: Flavia Moore MD;  Location: [facility] CATH LAB;  Service: Radiology;  Laterality: N/A;    PERITONEOCENTESIS N/A 3/21/2024    Procedure: PARACENTESIS, ABDOMINAL;  Surgeon: Jorge Jolley MD;  Location: [facility] CATH LAB;  Service: Radiology;  Laterality: N/A;    PERITONEOCENTESIS N/A 6/10/2024    Procedure: PARACENTESIS, ABDOMINAL;  Surgeon: Rogelio Malave MD;  Location: [facility] CATH LAB;  Service: Radiology;  Laterality: N/A;    TOTAL ABDOMINAL HYSTERECTOMY N/A 3/25/2024    Procedure: HYSTERECTOMY, TOTAL, ABDOMINAL;  Surgeon: Александр Washington MD;  Location: [address];  Service: OB/GYN;  Laterality: N/A;    TOTAL REDUCTION MAMMOPLASTY Bilateral 2016       Social History     Substance and Sexual Activity   Drug Use No     Alcohol Use: Not At Risk (7/1/2024)    AUDIT-C     Frequency of Alcohol Consumption: Never     Average Number of Drinks: Patient does not drink     Frequency of Binge Drinking: Never     Tobacco Use: Low Risk  (6/10/2024)    Patient History     Smoking Tobacco Use: Never     Smokeless Tobacco Use: Never     Passive Exposure: Not on file       OBJECTIVE:     Vital Signs Range (Last 24H):  Temp:  [36.7 °C (98 °F)-37.7 °C (99.8 °F)]   Pulse:  []   Resp:  [17-20]   BP: (101-132)/(49-77)   SpO2:  [97 %-100 %]       Significant Labs    Heme Profile  Lab Results   Component Value Date    WBC 20.78 (H) 07/11/2024    HGB 7.4 (L) 07/11/2024    HCT 23.9 (L) 07/11/2024     " (H) 07/11/2024       Coagulation Studies  Lab Results   Component Value Date    LABPROT 11.9 07/02/2024    INR 1.1 07/02/2024    APTT 29.4 07/03/2024       BMP  Lab Results   Component Value Date     (L) 07/11/2024    K 4.3 07/11/2024    CL 98 07/11/2024    CO2 27 07/11/2024    BUN 18 07/11/2024    CREATININE 0.9 07/11/2024    MG 1.7 07/11/2024    PHOS 3.5 07/11/2024    CAION 0.95 (L) 03/30/2024       Liver Function Tests  Lab Results   Component Value Date    AST 41 (H) 07/11/2024    ALT 5 (L) 07/11/2024    ALKPHOS 91 07/11/2024    BILITOT 0.9 07/11/2024    PROT 6.1 07/11/2024    ALBUMIN 1.7 (L) 07/11/2024       Lipid Profile  No results found for: "CHOL", "HDL", "LDLDIRECT", "TRIG"    Endocrine Profile  Lab Results   Component Value Date    HGBA1C 7.3 (H) 03/19/2024    TSH 3.793 06/27/2024               Cardiac Studies    EKG:   Results for orders placed or performed during the hospital encounter of 06/30/24   EKG 12-lead    Collection Time: 07/03/24 12:33 PM   Result Value Ref Range    QRS Duration 72 ms    [facility] QTC Calculation 452 ms    Narrative    Test Reason : Z91.89,    Vent. Rate : 091 BPM     Atrial Rate : 091 BPM     P-R Int : 124 ms          QRS Dur : 072 ms      QT Int : 368 ms       P-R-T Axes : 061 024 054 degrees     QTc Int : 452 ms    Normal sinus rhythm  Low voltage QRS  Nonspecific T wave abnormality  Abnormal ECG  When compared with ECG of 03-JUL-2024 08:18,  No significant change was found  Confirmed by Saud Burden MD ([id number]) on 7/9/2024 4:45:42 PM    Referred By: [email or web address]   SELF           Confirmed By:Saud Burden MD       Transthoracic Echo   Results for orders placed during the hospital encounter of 03/17/24    Echo Saline Bubble? No    Interpretation Summary    Left Ventricle: There is low normal systolic function with a visually estimated ejection fraction of 50 - 55%. Biplane (2D) method of discs ejection fraction is 50%. Global longitudinal strain is -18.0%.    Right " Ventricle: Normal right ventricular cavity size. Wall thickness is normal. Right ventricle wall motion  is normal. Systolic function is normal.    Left Atrium: Left atrium is mildly dilated.    Mitral Valve: There is mild regurgitation.    IVC/SVC: Normal venous pressure at 3 mmHg.    Pericardium: Left pleural effusion. Ascites present.            ASSESSMENT/PLAN:             Pre-op Assessment    I have reviewed the Patient Summary Reports.     I have reviewed the Nursing Notes. I have reviewed the NPO Status.   I have reviewed the Medications.     Review of Systems  Anesthesia Hx:  No problems with previous Anesthesia   History of prior surgery of interest to airway management or planning:          Denies Family Hx of Anesthesia complications.    Denies Personal Hx of Anesthesia complications.                    Hematology/Oncology:                      Current/Recent Cancer.  --  Cancer in past history:                 Oncology Comments: Stage 4 ovarian ca     Cardiovascular:     Hypertension                                  Hypertension         Pulmonary:      Shortness of breath                  Renal/:  Chronic Renal Disease        Kidney Function/Disease             Neurological:       Seizures           Seizure Disorder                          Endocrine:  Diabetes    Diabetes                      Psych:  Psychiatric History                Physical Exam  General: Cooperative, Alert, Oriented and Lethargic    Airway:  Mallampati: III / II  Mouth Opening: Normal  TM Distance: Normal  Tongue: Normal  Neck ROM: Normal ROM    Dental:  Intact        Anesthesia Plan  Type of Anesthesia, risks & benefits discussed:    Anesthesia Type: Gen ETT, MAC  Intra-op Monitoring Plan: Standard ASA Monitors  Post Op Pain Control Plan: multimodal analgesia and IV/PO Opioids PRN  Induction:  IV  Airway Plan: Direct and Video, Post-Induction  Informed Consent: Informed consent signed with the Patient and all parties understand  the risks and agree with anesthesia plan.  All questions answered.   ASA Score: 3  Day of Surgery Review of History & Physical: H&P Update referred to the surgeon/provider.    Ready For Surgery From Anesthesia Perspective.     .

## 2024-07-11 NOTE — SUBJECTIVE & OBJECTIVE
No current facility-administered medications on file prior to encounter.     Current Outpatient Medications on File Prior to Encounter   Medication Sig    acetaminophen (TYLENOL) 325 MG tablet Take 2 tablets (650 mg total) by mouth every 4 (four) hours as needed for Pain.    amlodipine (NORVASC) 10 MG tablet Take 10 mg by mouth once daily.     atorvastatin (LIPITOR) 40 MG tablet Take 80 mg by mouth once daily.    enalapril (VASOTEC) 20 MG tablet Take 20 mg by mouth once daily.    enoxaparin (LOVENOX) 80 mg/0.8 mL Syrg Inject 0.8 mLs (80 mg total) into the skin every 12 (twelve) hours.    fluticasone propionate (FLONASE) 50 mcg/actuation nasal spray 1 spray by Each Nostril route once daily.    hydrochlorothiazide (HYDRODIURIL) 25 MG tablet Take 25 mg by mouth once daily.     hydrocodone-acetaminophen (HYCET) solution 7.5-325 mg/15mL Take 30 mLs by mouth every 6 (six) hours as needed for Pain.    HYDROmorphone (DILAUDID) 2 MG tablet Take 1 tablet (2 mg total) by mouth every 8 (eight) hours as needed for Pain.    ibuprofen (ADVIL,MOTRIN) 600 MG tablet Take 1 tablet (600 mg total) by mouth every 6 (six) hours as needed for Pain.    insulin aspart U-100 (NOVOLOG FLEXPEN U-100 INSULIN) 100 unit/mL (3 mL) InPn pen Inject 6 Units into the skin 3 (three) times daily with meals. (Discard pen 28 days after initial use)    insulin aspart U-100 (NOVOLOG) 100 unit/mL (3 mL) InPn pen Inject 0-5 Units into the skin before meals and at bedtime as needed (for hyperglycemia by specified ranges). Sliding Scale: Blood Glucose Pre-meal 151-200 +2 units, 201-250 + 4 units, 251-300 + 6 units, 301-350 + 8 units, >350 + 10 units, max TDD 58 units    insulin glargine U-100, Lantus, 100 unit/mL (3 mL) SubQ InPn pen Inject 9 Units into the skin every evening.    insulin lispro (HUMALOG U-100 INSULIN) 100 unit/mL injection **LOW CORRECTION DOSE**  Blood Glucose  mg/dL                  Pre-meal                  151-200                0 unit        "                 201-250                2 units                      251-300                3 units                      301-350                4 units                      >350                     5 units                      Administer subcutaneously if needed at times designated by monitoring schedule.    ketorolac 0.5% (ACULAR) 0.5 % Drop Place 1 drop into both eyes. For pain after eye injections.    lamoTRIgine (LAMICTAL) 25 MG tablet Take 25 mg by mouth 2 (two) times daily.    lorazepam (ATIVAN) 0.5 MG tablet Take 0.5 mg by mouth every 12 (twelve) hours as needed for Anxiety.    metformin (GLUCOPHAGE) 1000 MG tablet Take 1,000 mg by mouth daily with breakfast.     ondansetron (ZOFRAN-ODT) 4 MG TbDL Take 1 tablet (4 mg total) by mouth every 6 (six) hours as needed (for nausea).    pen needle, diabetic 32 gauge x 5/32" Ndle Use to inject insulin 5 times daily.    quetiapine (SEROQUEL) 25 MG Tab Take 25 mg by mouth daily as needed.       Review of patient's allergies indicates:   Allergen Reactions    Codeine Other (See Comments)     Flu like s/s    Tramadol Other (See Comments)     Flu like symptoms similar to codeine reaction       Past Medical History:   Diagnosis Date    Breast cancer 2013    Diabetes mellitus     Genetic testing 05/29/2013    VUS    Hyperlipidemia     Hypertension     Malignant neoplasm of upper-outer quadrant of right breast in female, estrogen receptor positive 12/02/2015    Seizure disorder      Past Surgical History:   Procedure Laterality Date    BILATERAL SALPINGO-OOPHORECTOMY (BSO) N/A 3/25/2024    Procedure: SALPINGO-OOPHORECTOMY, BILATERAL;  Surgeon: Александр Washington MD;  Location: [facility] OR [address] [place] [address];  Service: OB/GYN;  Laterality: N/A;    BREAST BIOPSY      BREAST LUMPECTOMY Right 2013    CHOLECYSTECTOMY  3/25/2024    Procedure: CHOLECYSTECTOMY;  Surgeon: Bo Farmer MD;  Location: [facility] OR [address] [place] [address];  Service: General;;    DEBULKING OF TUMOR N/A 3/25/2024    Procedure: " DEBULKING, NEOPLASM;  Surgeon: Александр Washington MD;  [facility];  Service: OB/GYN;  Laterality: N/A;    EXCISION, LIVER N/A 3/25/2024    Procedure: EXCISION, LIVER - partial;  Surgeon: Bo Farmer MD;  [facility];  Service: General;  Laterality: N/A;  bk ultrasound  [place] book by TA on 3-21-24  7:00 a.m.  Conf #  [id number]    EYE SURGERY      LAPAROTOMY, EXPLORATORY N/A 3/25/2024    Procedure: LAPAROTOMY, EXPLORATORY;  Surgeon: Александр Washington MD;  [facility];  Service: OB/GYN;  Laterality: N/A;    OMENTECTOMY N/A 3/25/2024    Procedure: OMENTECTOMY;  Surgeon: Александр Washington MD;  Location: [facility] OR [address] [place] [address];  Service: OB/GYN;  Laterality: N/A;    PERITONEOCENTESIS N/A 3/13/2024    Procedure: PARACENTESIS, ABDOMINAL;  Surgeon: Flavia Moore MD;  Location: [facility] CATH LAB;  Service: Radiology;  Laterality: N/A;    PERITONEOCENTESIS N/A 3/21/2024    Procedure: PARACENTESIS, ABDOMINAL;  Surgeon: Jorge Jolley MD;  Location: [facility] CATH LAB;  Service: Radiology;  Laterality: N/A;    PERITONEOCENTESIS N/A 6/10/2024    Procedure: PARACENTESIS, ABDOMINAL;  Surgeon: Rogelio Malave MD;  Location: [facility] CATH LAB;  Service: Radiology;  Laterality: N/A;    TOTAL ABDOMINAL HYSTERECTOMY N/A 3/25/2024    Procedure: HYSTERECTOMY, TOTAL, ABDOMINAL;  Surgeon: Александр Washington MD;  Location: [facility] OR [address] [place] [address];  Service: OB/GYN;  Laterality: N/A;    TOTAL REDUCTION MAMMOPLASTY Bilateral 2016     Family History       Problem Relation (Age of Onset)    Breast cancer Sister (48)    Colon cancer Sister    Hypertension Mother, Brother, Brother    Seizures Father          Tobacco Use    Smoking status: Never    Smokeless tobacco: Never   Substance and Sexual Activity    Alcohol use: Yes     Alcohol/week: 0.0 standard drinks of alcohol     Comment: ocassional    Drug use: No    Sexual activity: Not Currently     Partners: Male     Birth control/protection: None      Review of Systems   Constitutional:  Negative for fever.   Respiratory:  Negative for shortness of breath.    Cardiovascular:  Positive for leg swelling (improving).   Gastrointestinal:  Positive for abdominal pain, constipation, nausea and vomiting.   Genitourinary:  Negative for vaginal bleeding.     Objective:     Vital Signs (Most Recent):  Temp: 98.6 °F (37 °C) (07/11/24 1215)  Pulse: 95 (07/11/24 1215)  Resp: 18 (07/11/24 1215)  BP: 132/77 (07/11/24 1215)  SpO2: 99 % (07/11/24 1215) Vital Signs (24h Range):  Temp:  [98 °F (36.7 °C)-99.4 °F (37.4 °C)] 98.6 °F (37 °C)  Pulse:  [] 95  Resp:  [17-20] 18  SpO2:  [97 %-100 %] 99 %  BP: (103-132)/(49-77) 132/77     Weight: 86.9 kg (191 lb 9.3 oz)  Body mass index is 30.01 kg/m².     Physical Exam  Vitals reviewed.   Constitutional:       Appearance: She is ill-appearing.   HENT:      Nose:      Comments: NGT in place with bilious output   Cardiovascular:      Rate and Rhythm: Normal rate.      Pulses: Normal pulses.   Pulmonary:      Effort: Pulmonary effort is normal. No respiratory distress.      Comments: Room air  Abdominal:      General: There is distension.      Palpations: Abdomen is soft.      Tenderness: There is no abdominal tenderness.   Skin:     General: Skin is warm and dry.   Neurological:      General: No focal deficit present.      Mental Status: She is alert and oriented to person, place, and time.            I have reviewed all pertinent lab results within the past 24 hours.  CBC:   Recent Labs   Lab 07/11/24  0655   WBC 20.78*   RBC 2.77*   HGB 7.4*   HCT 23.9*   *   MCV 86   MCH 26.7*   MCHC 31.0*     CMP:   Recent Labs   Lab 07/11/24  0655   GLU 89   CALCIUM 8.1*   ALBUMIN 1.7*   PROT 6.1   *   K 4.3   CO2 27   CL 98   BUN 18   CREATININE 0.9   ALKPHOS 91   ALT 5*   AST 41*   BILITOT 0.9       Significant Diagnostics:  I have reviewed all pertinent imaging results/findings within the past 24 hours.

## 2024-07-11 NOTE — ASSESSMENT & PLAN NOTE
- Stage IVB ovarian carcinosarcoma s/p PDS on 3/25/24 with Dr Washington (CARLOS ALBERTO/BSO/OMX/liver mobilization/peritoneal & R diaphragm stripping/hepatic wedge resection/cholecystectomy) s/p C3 of carbo/taxol/bevacizumab on 5/31   - No more chemo options offered  - abdominopelvic soft tissue masses consistent with peritoneal carcinomatosis noted on CT  - Gyn/onc and palliative following  - Will transition to home hospice once decision for palliative PEG placement is decided

## 2024-07-11 NOTE — SUBJECTIVE & OBJECTIVE
Interval History: NAEON. NGT in place now. AVSS, saturating well on room air without distress.     Medications:  Continuous Infusions:   0.9% NaCl   Intravenous Continuous 50 mL/hr at 07/10/24 2046 New Bag at 07/10/24 2046     Scheduled Meds:   levETIRAcetam (Keppra) IV (PEDS and ADULTS)  1,000 mg Intravenous Q12H    meropenem IV (PEDS and ADULTS)  1 g Intravenous Q8H    pantoprazole  40 mg Intravenous Daily     PRN Meds:  Current Facility-Administered Medications:     0.9%  NaCl infusion (for blood administration), , Intravenous, Q24H PRN    acetaminophen, 650 mg, Oral, Q6H PRN    calcium carbonate, 500 mg, Oral, TID PRN    D10W, , Intravenous, PRN    D10W, , Intravenous, PRN    dextrose 10%, 12.5 g, Intravenous, PRN    dextrose 10%, 25 g, Intravenous, PRN    diphenhydrAMINE, 25 mg, Oral, Q6H PRN    glucagon (human recombinant), 1 mg, Intramuscular, PRN    glucose, 16 g, Oral, PRN    glucose, 24 g, Oral, PRN    heparin, porcine (PF), 500 Units, Intravenous, PRN    hydrALAZINE, 10 mg, Intravenous, Q6H PRN    HYDROmorphone, 0.5 mg, Intravenous, Q4H PRN    insulin aspart U-100, 0-10 Units, Subcutaneous, QID (AC + HS) PRN    iohexol, 15 mL, Oral, PRN    ondansetron, 4 mg, Intravenous, Q6H PRN    oxyCODONE, 5 mg, Oral, Q6H PRN    prochlorperazine, 10 mg, Intravenous, Q6H PRN    QUEtiapine, 25 mg, Oral, Daily PRN    simethicone, 1 tablet, Oral, TID PRN    sodium chloride 0.9%, 10 mL, Intravenous, PRN    sodium chloride 0.9%, 20 mL, Intravenous, PRN     Review of patient's allergies indicates:   Allergen Reactions    Codeine Other (See Comments)     Flu like s/s    Tramadol Other (See Comments)     Flu like symptoms similar to codeine reaction     Objective:     Vital Signs (Most Recent):  Temp: 98.6 °F (37 °C) (07/11/24 1215)  Pulse: 95 (07/11/24 1215)  Resp: 18 (07/11/24 1215)  BP: 132/77 (07/11/24 1215)  SpO2: 99 % (07/11/24 1215) Vital Signs (24h Range):  Temp:  [98 °F (36.7 °C)-99.4 °F (37.4 °C)] 98.6 °F (37  °C)  Pulse:  [] 95  Resp:  [17-20] 18  SpO2:  [97 %-100 %] 99 %  BP: (103-132)/(49-77) 132/77     Intake/Output - Last 3 Shifts         07/09 0700  07/10 0659 07/10 0700 07/11 0659 07/11 0700 07/12 0659    P.O. 120      Blood       Total Intake(mL/kg) 120 (1.4)      Urine (mL/kg/hr)  700 (0.3) 700 (1.5)    Total Output  700 700    Net +120 -700 -700           Urine Occurrence 2 x 2 x     Stool Occurrence 0 x 0 x             SpO2: 99 %        Physical Exam  Vitals reviewed.   Constitutional:       Appearance: She is ill-appearing.   HENT:      Nose:      Comments: NGT in place with 300 mL of gastric output  Cardiovascular:      Rate and Rhythm: Normal rate.      Pulses: Normal pulses.   Pulmonary:      Effort: Pulmonary effort is normal. No respiratory distress.      Comments: Room air  Abdominal:      General: There is distension.      Palpations: Abdomen is soft.      Tenderness: There is no abdominal tenderness.   Skin:     General: Skin is warm and dry.   Neurological:      General: No focal deficit present.      Mental Status: She is alert and oriented to person, place, and time.            Significant Labs:  CBC:   Recent Labs   Lab 07/11/24  0655   WBC 20.78*   RBC 2.77*   HGB 7.4*   HCT 23.9*   *   MCV 86   MCH 26.7*   MCHC 31.0*     CMP:   Recent Labs   Lab 07/11/24  0655   GLU 89   CALCIUM 8.1*   ALBUMIN 1.7*   PROT 6.1   *   K 4.3   CO2 27   CL 98   BUN 18   CREATININE 0.9   ALKPHOS 91   ALT 5*   AST 41*   BILITOT 0.9         VTE Risk Mitigation (From admission, onward)           Ordered     heparin, porcine (PF) 100 unit/mL injection flush 500 Units  As needed (PRN)         07/10/24 1926     Place IMANI hose  Until discontinued         07/06/24 1550     IP VTE HIGH RISK PATIENT  Once         07/01/24 0540     Place sequential compression device  Until discontinued         07/01/24 0540

## 2024-07-11 NOTE — NURSING
MD has ok'd for NG tube to be hooked to suction. Gastric content is present in tube and in canister.  MD stated to cont to hold all po medications.

## 2024-07-11 NOTE — SUBJECTIVE & OBJECTIVE
Interval History: pt seen at bedside with  present    Past Medical History:   Diagnosis Date    Breast cancer 2013    Diabetes mellitus     Genetic testing 05/29/2013    VUS    Hyperlipidemia     Hypertension     Malignant neoplasm of upper-outer quadrant of right breast in female, estrogen receptor positive 12/02/2015    Seizure disorder        Past Surgical History:   Procedure Laterality Date    BILATERAL SALPINGO-OOPHORECTOMY (BSO) N/A 3/25/2024    Procedure: SALPINGO-OOPHORECTOMY, BILATERAL;  Surgeon: Александр Washington MD;  [facility];  Service: OB/GYN;  Laterality: N/A;    BREAST BIOPSY      BREAST LUMPECTOMY Right 2013    CHOLECYSTECTOMY  3/25/2024    Procedure: CHOLECYSTECTOMY;  Surgeon: Bo Farmer MD;  Location: [facility] OR [address] [place] [address];  Service: General;;    DEBULKING OF TUMOR N/A 3/25/2024    Procedure: DEBULKING, NEOPLASM;  Surgeon: Александр Washington MD;  Location: [facility] OR [address] [place] [address];  Service: OB/GYN;  Laterality: N/A;    EXCISION, LIVER N/A 3/25/2024    Procedure: EXCISION, LIVER - partial;  Surgeon: Bo Farmer MD;  Location: [facility] OR [address] [place] [address];  Service: General;  Laterality: N/A;  bk ultrasound  [place] book by TA on 3-21-24  7:00 a.m.  Conf #  [id number]    EYE SURGERY      LAPAROTOMY, EXPLORATORY N/A 3/25/2024    Procedure: LAPAROTOMY, EXPLORATORY;  Surgeon: Александр Washington MD;  Location: [facility] OR [address] [place] [address];  Service: OB/GYN;  Laterality: N/A;    OMENTECTOMY N/A 3/25/2024    Procedure: OMENTECTOMY;  Surgeon: Александр Washington MD;  [facility];  Service: OB/GYN;  Laterality: N/A;    PERITONEOCENTESIS N/A 3/13/2024    Procedure: PARACENTESIS, ABDOMINAL;  Surgeon: Flavia Moore MD;  Location: [facility] [place] CATH LAB;  Service: Radiology;  Laterality: N/A;    PERITONEOCENTESIS N/A 3/21/2024    Procedure: PARACENTESIS, ABDOMINAL;  Surgeon: Jorge Jolley MD;  Location: [facility] [place] CATH LAB;  Service: Radiology;  Laterality: N/A;    PERITONEOCENTESIS N/A  6/10/2024    Procedure: PARACENTESIS, ABDOMINAL;  Surgeon: Rogelio Malave MD;  Location: [facility] CATH LAB;  Service: Radiology;  Laterality: N/A;    TOTAL ABDOMINAL HYSTERECTOMY N/A 3/25/2024    Procedure: HYSTERECTOMY, TOTAL, ABDOMINAL;  Surgeon: Александр Washington MD;  Location: [facility] OR [address] [place] [address];  Service: OB/GYN;  Laterality: N/A;    TOTAL REDUCTION MAMMOPLASTY Bilateral 2016       Review of patient's allergies indicates:   Allergen Reactions    Codeine Other (See Comments)     Flu like s/s    Tramadol Other (See Comments)     Flu like symptoms similar to codeine reaction       Medications:  Continuous Infusions:   0.9% NaCl   Intravenous Continuous 50 mL/hr at 07/10/24 2046 New Bag at 07/10/24 2046     Scheduled Meds:   levETIRAcetam (Keppra) IV (PEDS and ADULTS)  1,000 mg Intravenous Q12H    meropenem IV (PEDS and ADULTS)  1 g Intravenous Q8H    pantoprazole  40 mg Intravenous Daily     PRN Meds:  Current Facility-Administered Medications:     0.9%  NaCl infusion (for blood administration), , Intravenous, Q24H PRN    acetaminophen, 650 mg, Oral, Q6H PRN    calcium carbonate, 500 mg, Oral, TID PRN    D10W, , Intravenous, PRN    D10W, , Intravenous, PRN    dextrose 10%, 12.5 g, Intravenous, PRN    dextrose 10%, 25 g, Intravenous, PRN    diphenhydrAMINE, 25 mg, Oral, Q6H PRN    glucagon (human recombinant), 1 mg, Intramuscular, PRN    glucose, 16 g, Oral, PRN    glucose, 24 g, Oral, PRN    heparin, porcine (PF), 500 Units, Intravenous, PRN    hydrALAZINE, 10 mg, Intravenous, Q6H PRN    HYDROmorphone, 0.5 mg, Intravenous, Q4H PRN    insulin aspart U-100, 0-10 Units, Subcutaneous, QID (AC + HS) PRN    iohexol, 15 mL, Oral, PRN    ondansetron, 4 mg, Intravenous, Q6H PRN    oxyCODONE, 5 mg, Oral, Q6H PRN    prochlorperazine, 10 mg, Intravenous, Q6H PRN    QUEtiapine, 25 mg, Oral, Daily PRN    simethicone, 1 tablet, Oral, TID PRN    sodium chloride 0.9%, 10 mL, Intravenous, PRN    sodium chloride 0.9%, 20 mL,  Intravenous, PRN    Family History       Problem Relation (Age of Onset)    Breast cancer Sister (48)    Colon cancer Sister    Hypertension Mother, Brother, Brother    Seizures Father          Tobacco Use    Smoking status: Never    Smokeless tobacco: Never   Substance and Sexual Activity    Alcohol use: Yes     Alcohol/week: 0.0 standard drinks of alcohol     Comment: ocassional    Drug use: No    Sexual activity: Not Currently     Partners: Male     Birth control/protection: None       Review of Systems   Constitutional:  Positive for appetite change.   HENT:          NG tube   Respiratory:  Negative for shortness of breath.    Gastrointestinal:  Positive for abdominal distention, abdominal pain, nausea and vomiting.     Objective:     Vital Signs (Most Recent):  Temp: 99.8 °F (37.7 °C) (07/11/24 1545)  Pulse: 93 (07/11/24 1545)  Resp: 19 (07/11/24 1545)  BP: (!) 101/57 (07/11/24 1545)  SpO2: 99 % (07/11/24 1545) Vital Signs (24h Range):  Temp:  [98 °F (36.7 °C)-99.8 °F (37.7 °C)] 99.8 °F (37.7 °C)  Pulse:  [] 93  Resp:  [17-20] 19  SpO2:  [97 %-100 %] 99 %  BP: (101-132)/(49-77) 101/57     Weight: 86.9 kg (191 lb 9.3 oz)  Body mass index is 30.01 kg/m².       Physical Exam  Constitutional:       General: She is not in acute distress.     Appearance: She is ill-appearing.   HENT:      Nose:      Comments: NGT  Pulmonary:      Effort: Pulmonary effort is normal.   Abdominal:      General: There is no distension.      Palpations: Abdomen is soft.   Skin:     Capillary Refill: Capillary refill takes less than 2 seconds.            Review of Symptoms      Symptom Assessment (ESAS 0-10 Scale)  Pain:  4  Dyspnea:  0  Anxiety:  0  Nausea:  3  Depression:  0  Anorexia:  0  Fatigue:  0  Insomnia:  0  Restlessness:  0  Agitation:  0     CAM / Delirium:  Negative  Constipation:  Positive  Diarrhea:  Negative      Bowel Management Plan (BMP):  No      Pain Assessment:  OME in 24 hours:  ~22  Location(s):  abdomen    Abdomen       Location: generalized        Quality: Dull and pressure-like        Quantity: 8/10 in intensity        Chronicity: Onset 4 day(s) ago, stable        Aggravating Factors: None        Alleviating Factors: None (NGtube)        Associated Symptoms: Nausea and vomiting    ECOG Performance Status ndGndrndanddndend:nd nd2nd Living Arrangements:  Lives with family    Psychosocial/Cultural:   See Palliative Psychosocial Note: No  Lives with  of many years    No kids    Many siblings, nieces and nephews    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 Accordingly, we have decided that the best plan to meet the patient's goals includes enrolling in hospice care.         Significant Labs: All pertinent labs within the past 24 hours have been reviewed.  CBC:   Recent Labs   Lab 07/11/24  0655   WBC 20.78*   HGB 7.4*   HCT 23.9*   MCV 86   *     BMP:  Recent Labs   Lab 07/11/24  0655   GLU 89   *   K 4.3   CL 98   CO2 27   BUN 18   CREATININE 0.9   CALCIUM 8.1*   MG 1.7     LFT:  Lab Results   Component Value Date    AST 41 (H) 07/11/2024    ALKPHOS 91 07/11/2024    BILITOT 0.9 07/11/2024     Albumin:   Albumin   Date Value Ref Range Status   07/11/2024 1.7 (L) 3.5 - 5.2 g/dL Final     Protein:   Total Protein   Date Value Ref Range Status   07/11/2024 6.1 6.0 - 8.4 g/dL Final     Lactic acid:   Lab Results   Component Value Date    LACTATE 1.7 05/23/2024    LACTATE 1.8 05/21/2024       Significant Imaging: I have reviewed all pertinent imaging results/findings within the past 24  hours.    CT Abd/Pelv:   Severe small bowel obstruction

## 2024-07-11 NOTE — PROGRESS NOTES
[facility] - Telemetry Stepdown  Thoracic Surgery  Progress Note    Subjective:     History of Present Illness:  58F w/ stage 4 ovarian cancer with R pleural effusion presumed malignant who was admitted to MICU for management of DKA. Thoracentesis yesterday 07/02/24; drained 800 ml cloudy (yellow cloudy/chylous looking fluid vs purulence). Micro positive for E coli, path negative for malignancy. She was managed with the chest tube and pulmonology was on board for possible lytic therapy. The drain output decreased and repeat chest CT on 7/4 demonstrated significant decrease in the volume of the right pleural fluid collection. The chest tube was removed by pulmonology on 7/5. Since that time she has had occasional fevers and a persistently elevated WBC. Repeat CT chest on 7/10 with re demonstration of the right pleural fluid collection, stable in size from the 7/4 CT, but with new scattered foci of air. CTS re consulted for potential surgical intervention.    Post-Op Info:  * No surgery found *         Interval History: NAEON. NGT in place now. AVSS, saturating well on room air without distress.     Medications:  Continuous Infusions:   0.9% NaCl   Intravenous Continuous 50 mL/hr at 07/10/24 2046 New Bag at 07/10/24 2046     Scheduled Meds:   levETIRAcetam (Keppra) IV (PEDS and ADULTS)  1,000 mg Intravenous Q12H    meropenem IV (PEDS and ADULTS)  1 g Intravenous Q8H    pantoprazole  40 mg Intravenous Daily     PRN Meds:  Current Facility-Administered Medications:     0.9%  NaCl infusion (for blood administration), , Intravenous, Q24H PRN    acetaminophen, 650 mg, Oral, Q6H PRN    calcium carbonate, 500 mg, Oral, TID PRN    D10W, , Intravenous, PRN    D10W, , Intravenous, PRN    dextrose 10%, 12.5 g, Intravenous, PRN    dextrose 10%, 25 g, Intravenous, PRN    diphenhydrAMINE, 25 mg, Oral, Q6H PRN    glucagon (human recombinant), 1 mg, Intramuscular, PRN    glucose, 16 g, Oral, PRN    glucose, 24 g, Oral, PRN    heparin,  porcine (PF), 500 Units, Intravenous, PRN    hydrALAZINE, 10 mg, Intravenous, Q6H PRN    HYDROmorphone, 0.5 mg, Intravenous, Q4H PRN    insulin aspart U-100, 0-10 Units, Subcutaneous, QID (AC + HS) PRN    iohexol, 15 mL, Oral, PRN    ondansetron, 4 mg, Intravenous, Q6H PRN    oxyCODONE, 5 mg, Oral, Q6H PRN    prochlorperazine, 10 mg, Intravenous, Q6H PRN    QUEtiapine, 25 mg, Oral, Daily PRN    simethicone, 1 tablet, Oral, TID PRN    sodium chloride 0.9%, 10 mL, Intravenous, PRN    sodium chloride 0.9%, 20 mL, Intravenous, PRN     Review of patient's allergies indicates:   Allergen Reactions    Codeine Other (See Comments)     Flu like s/s    Tramadol Other (See Comments)     Flu like symptoms similar to codeine reaction     Objective:     Vital Signs (Most Recent):  Temp: 98.6 °F (37 °C) (07/11/24 1215)  Pulse: 95 (07/11/24 1215)  Resp: 18 (07/11/24 1215)  BP: 132/77 (07/11/24 1215)  SpO2: 99 % (07/11/24 1215) Vital Signs (24h Range):  Temp:  [98 °F (36.7 °C)-99.4 °F (37.4 °C)] 98.6 °F (37 °C)  Pulse:  [] 95  Resp:  [17-20] 18  SpO2:  [97 %-100 %] 99 %  BP: (103-132)/(49-77) 132/77     Intake/Output - Last 3 Shifts         07/09 0700  07/10 0659 07/10 0700 07/11 0659 07/11 0700  07/12 0659    P.O. 120      Blood       Total Intake(mL/kg) 120 (1.4)      Urine (mL/kg/hr)  700 (0.3) 700 (1.5)    Total Output  700 700    Net +120 -700 -700           Urine Occurrence 2 x 2 x     Stool Occurrence 0 x 0 x             SpO2: 99 %        Physical Exam  Vitals reviewed.   Constitutional:       Appearance: She is ill-appearing.   HENT:      Nose:      Comments: NGT in place with 300 mL of gastric output  Cardiovascular:      Rate and Rhythm: Normal rate.      Pulses: Normal pulses.   Pulmonary:      Effort: Pulmonary effort is normal. No respiratory distress.      Comments: Room air  Abdominal:      General: There is distension.      Palpations: Abdomen is soft.      Tenderness: There is no abdominal tenderness.    Skin:     General: Skin is warm and dry.   Neurological:      General: No focal deficit present.      Mental Status: She is alert and oriented to person, place, and time.            Significant Labs:  CBC:   Recent Labs   Lab 07/11/24  0655   WBC 20.78*   RBC 2.77*   HGB 7.4*   HCT 23.9*   *   MCV 86   MCH 26.7*   MCHC 31.0*     CMP:   Recent Labs   Lab 07/11/24  0655   GLU 89   CALCIUM 8.1*   ALBUMIN 1.7*   PROT 6.1   *   K 4.3   CO2 27   CL 98   BUN 18   CREATININE 0.9   ALKPHOS 91   ALT 5*   AST 41*   BILITOT 0.9         VTE Risk Mitigation (From admission, onward)           Ordered     heparin, porcine (PF) 100 unit/mL injection flush 500 Units  As needed (PRN)         07/10/24 1926     Place IMANI hose  Until discontinued         07/06/24 1550     IP VTE HIGH RISK PATIENT  Once         07/01/24 0540     Place sequential compression device  Until discontinued         07/01/24 0540                  Assessment/Plan:     Pleural effusion  58F w/ stage 4 ovarian cancer with R empyema. S/p Chest tube placement on 7/2 and subsequent removal on 7/5. Repeat chest CT on 7/10 with similar sized collection to pre-pull CT, but with scattered foci of air. Patient did not receive lytics with pulm prior to removal. CT also concerning for perihepatic collections and SBO secondary to progression of her metastatic ovarian cancer. Gyn Onc recommending palliative care. ID following. Surg onc consulted for potential palliative G-tube. Case discussed with Dr. Palencia.    - Pleural fluid volume stable and she is relatively asymptomatic from this residual collection from a respiratory stand point  - Given poor overall prognosis and minimal potential benefit of a VATS, we recommend against any surgical intervention for her right pleural collection, which at this point would likely entail a decortication that she would not tolerate well  - Can consider repeat thoracentesis with cultures if recommended by ID  - Recommend  continued antibiotic therapy per ID recommendations  - Recommend engagement of the palliative care team  - Thoracic surgery to sign off, please call with any questions or change in status        Robbin Arceo MD  Thoracic Surgery  [facility] - Telemetry Stepdown

## 2024-07-12 ENCOUNTER — ANESTHESIA (OUTPATIENT)
Dept: SURGERY | Facility: HOSPITAL | Age: 59
DRG: 374 | End: 2024-07-12
Payer: MEDICARE

## 2024-07-12 LAB
ABO + RH BLD: NORMAL
ALBUMIN SERPL BCP-MCNC: 1.7 G/DL (ref 3.5–5.2)
ALP SERPL-CCNC: 92 U/L (ref 55–135)
ALT SERPL W/O P-5'-P-CCNC: <5 U/L (ref 10–44)
ANION GAP SERPL CALC-SCNC: 10 MMOL/L (ref 8–16)
AST SERPL-CCNC: 45 U/L (ref 10–40)
BASOPHILS # BLD AUTO: 0.16 K/UL (ref 0–0.2)
BASOPHILS NFR BLD: 0.9 % (ref 0–1.9)
BILIRUB SERPL-MCNC: 0.8 MG/DL (ref 0.1–1)
BLD GP AB SCN CELLS X3 SERPL QL: NORMAL
BUN SERPL-MCNC: 19 MG/DL (ref 6–20)
CALCIUM SERPL-MCNC: 8.1 MG/DL (ref 8.7–10.5)
CHLORIDE SERPL-SCNC: 100 MMOL/L (ref 95–110)
CO2 SERPL-SCNC: 27 MMOL/L (ref 23–29)
CREAT SERPL-MCNC: 0.9 MG/DL (ref 0.5–1.4)
DIFFERENTIAL METHOD BLD: ABNORMAL
EOSINOPHIL # BLD AUTO: 0.4 K/UL (ref 0–0.5)
EOSINOPHIL NFR BLD: 2.2 % (ref 0–8)
ERYTHROCYTE [DISTWIDTH] IN BLOOD BY AUTOMATED COUNT: 19.9 % (ref 11.5–14.5)
EST. GFR  (NO RACE VARIABLE): >60 ML/MIN/1.73 M^2
GLUCOSE SERPL-MCNC: 71 MG/DL (ref 70–110)
HCT VFR BLD AUTO: 24.6 % (ref 37–48.5)
HGB BLD-MCNC: 7.6 G/DL (ref 12–16)
IMM GRANULOCYTES # BLD AUTO: 0.11 K/UL (ref 0–0.04)
IMM GRANULOCYTES NFR BLD AUTO: 0.6 % (ref 0–0.5)
LYMPHOCYTES # BLD AUTO: 1.2 K/UL (ref 1–4.8)
LYMPHOCYTES NFR BLD: 6.9 % (ref 18–48)
MAGNESIUM SERPL-MCNC: 1.8 MG/DL (ref 1.6–2.6)
MCH RBC QN AUTO: 27.4 PG (ref 27–31)
MCHC RBC AUTO-ENTMCNC: 30.9 G/DL (ref 32–36)
MCV RBC AUTO: 89 FL (ref 82–98)
MONOCYTES # BLD AUTO: 1.5 K/UL (ref 0.3–1)
MONOCYTES NFR BLD: 8.7 % (ref 4–15)
NEUTROPHILS # BLD AUTO: 13.9 K/UL (ref 1.8–7.7)
NEUTROPHILS NFR BLD: 80.7 % (ref 38–73)
NRBC BLD-RTO: 0 /100 WBC
PHOSPHATE SERPL-MCNC: 2.9 MG/DL (ref 2.7–4.5)
PLATELET # BLD AUTO: 525 K/UL (ref 150–450)
PMV BLD AUTO: 10.5 FL (ref 9.2–12.9)
POCT GLUCOSE: 100 MG/DL (ref 70–110)
POCT GLUCOSE: 139 MG/DL (ref 70–110)
POCT GLUCOSE: 175 MG/DL (ref 70–110)
POCT GLUCOSE: 88 MG/DL (ref 70–110)
POCT GLUCOSE: 95 MG/DL (ref 70–110)
POCT GLUCOSE: 97 MG/DL (ref 70–110)
POTASSIUM SERPL-SCNC: 3.9 MMOL/L (ref 3.5–5.1)
PROT SERPL-MCNC: 6.2 G/DL (ref 6–8.4)
RBC # BLD AUTO: 2.77 M/UL (ref 4–5.4)
SODIUM SERPL-SCNC: 137 MMOL/L (ref 136–145)
SPECIMEN OUTDATE: NORMAL
WBC # BLD AUTO: 17.19 K/UL (ref 3.9–12.7)

## 2024-07-12 PROCEDURE — 63600175 PHARM REV CODE 636 W HCPCS: Mod: HCNC | Performed by: STUDENT IN AN ORGANIZED HEALTH CARE EDUCATION/TRAINING PROGRAM

## 2024-07-12 PROCEDURE — 94761 N-INVAS EAR/PLS OXIMETRY MLT: CPT | Mod: HCNC

## 2024-07-12 PROCEDURE — 27201423 OPTIME MED/SURG SUP & DEVICES STERILE SUPPLY: Mod: HCNC | Performed by: SURGERY

## 2024-07-12 PROCEDURE — 25000003 PHARM REV CODE 250: Mod: HCNC | Performed by: STUDENT IN AN ORGANIZED HEALTH CARE EDUCATION/TRAINING PROGRAM

## 2024-07-12 PROCEDURE — 99900035 HC TECH TIME PER 15 MIN (STAT): Mod: HCNC

## 2024-07-12 PROCEDURE — 36000707: Mod: HCNC | Performed by: SURGERY

## 2024-07-12 PROCEDURE — 25000003 PHARM REV CODE 250: Mod: HCNC

## 2024-07-12 PROCEDURE — 85025 COMPLETE CBC W/AUTO DIFF WBC: CPT | Mod: HCNC | Performed by: NURSE PRACTITIONER

## 2024-07-12 PROCEDURE — 86850 RBC ANTIBODY SCREEN: CPT | Mod: HCNC | Performed by: STUDENT IN AN ORGANIZED HEALTH CARE EDUCATION/TRAINING PROGRAM

## 2024-07-12 PROCEDURE — 37000009 HC ANESTHESIA EA ADD 15 MINS: Mod: HCNC | Performed by: SURGERY

## 2024-07-12 PROCEDURE — 71000015 HC POSTOP RECOV 1ST HR: Mod: HCNC | Performed by: SURGERY

## 2024-07-12 PROCEDURE — 71000033 HC RECOVERY, INTIAL HOUR: Mod: HCNC | Performed by: SURGERY

## 2024-07-12 PROCEDURE — 37000008 HC ANESTHESIA 1ST 15 MINUTES: Mod: HCNC | Performed by: SURGERY

## 2024-07-12 PROCEDURE — 63600175 PHARM REV CODE 636 W HCPCS: Mod: HCNC | Performed by: HOSPITALIST

## 2024-07-12 PROCEDURE — 83735 ASSAY OF MAGNESIUM: CPT | Mod: HCNC | Performed by: NURSE PRACTITIONER

## 2024-07-12 PROCEDURE — 80053 COMPREHEN METABOLIC PANEL: CPT | Mod: HCNC | Performed by: NURSE PRACTITIONER

## 2024-07-12 PROCEDURE — 71000016 HC POSTOP RECOV ADDL HR: Mod: HCNC | Performed by: SURGERY

## 2024-07-12 PROCEDURE — 0DH63UZ INSERTION OF FEEDING DEVICE INTO STOMACH, PERCUTANEOUS APPROACH: ICD-10-PCS | Performed by: SURGERY

## 2024-07-12 PROCEDURE — 99232 SBSQ HOSP IP/OBS MODERATE 35: CPT | Mod: HCNC,,, | Performed by: OBSTETRICS & GYNECOLOGY

## 2024-07-12 PROCEDURE — 63600175 PHARM REV CODE 636 W HCPCS: Mod: HCNC

## 2024-07-12 PROCEDURE — 36000706: Mod: HCNC | Performed by: SURGERY

## 2024-07-12 PROCEDURE — 82962 GLUCOSE BLOOD TEST: CPT | Mod: HCNC | Performed by: SURGERY

## 2024-07-12 PROCEDURE — 84100 ASSAY OF PHOSPHORUS: CPT | Mod: HCNC | Performed by: NURSE PRACTITIONER

## 2024-07-12 PROCEDURE — 20600001 HC STEP DOWN PRIVATE ROOM: Mod: HCNC

## 2024-07-12 PROCEDURE — 86900 BLOOD TYPING SEROLOGIC ABO: CPT | Mod: HCNC | Performed by: STUDENT IN AN ORGANIZED HEALTH CARE EDUCATION/TRAINING PROGRAM

## 2024-07-12 PROCEDURE — 27000207 HC ISOLATION: Mod: HCNC

## 2024-07-12 PROCEDURE — 43246 EGD PLACE GASTROSTOMY TUBE: CPT | Mod: HCNC,,, | Performed by: SURGERY

## 2024-07-12 RX ORDER — SUCCINYLCHOLINE CHLORIDE 20 MG/ML
INJECTION INTRAMUSCULAR; INTRAVENOUS
Status: DISCONTINUED | OUTPATIENT
Start: 2024-07-12 | End: 2024-07-12

## 2024-07-12 RX ORDER — LIDOCAINE HYDROCHLORIDE 20 MG/ML
INJECTION, SOLUTION EPIDURAL; INFILTRATION; INTRACAUDAL; PERINEURAL
Status: DISCONTINUED | OUTPATIENT
Start: 2024-07-12 | End: 2024-07-12

## 2024-07-12 RX ORDER — HYDROMORPHONE HYDROCHLORIDE 2 MG/ML
INJECTION, SOLUTION INTRAMUSCULAR; INTRAVENOUS; SUBCUTANEOUS
Status: DISPENSED
Start: 2024-07-12 | End: 2024-07-13

## 2024-07-12 RX ORDER — FENTANYL CITRATE 50 UG/ML
INJECTION, SOLUTION INTRAMUSCULAR; INTRAVENOUS
Status: DISCONTINUED | OUTPATIENT
Start: 2024-07-12 | End: 2024-07-12

## 2024-07-12 RX ORDER — ROCURONIUM BROMIDE 10 MG/ML
INJECTION, SOLUTION INTRAVENOUS
Status: DISCONTINUED | OUTPATIENT
Start: 2024-07-12 | End: 2024-07-12

## 2024-07-12 RX ORDER — HYDROMORPHONE HYDROCHLORIDE 1 MG/ML
0.2 INJECTION, SOLUTION INTRAMUSCULAR; INTRAVENOUS; SUBCUTANEOUS EVERY 5 MIN PRN
Status: DISCONTINUED | OUTPATIENT
Start: 2024-07-12 | End: 2024-07-13 | Stop reason: HOSPADM

## 2024-07-12 RX ORDER — PROPOFOL 10 MG/ML
VIAL (ML) INTRAVENOUS
Status: DISCONTINUED | OUTPATIENT
Start: 2024-07-12 | End: 2024-07-12

## 2024-07-12 RX ORDER — HYDROMORPHONE HYDROCHLORIDE 1 MG/ML
INJECTION, SOLUTION INTRAMUSCULAR; INTRAVENOUS; SUBCUTANEOUS
Status: COMPLETED
Start: 2024-07-12 | End: 2024-07-12

## 2024-07-12 RX ORDER — ONDANSETRON HYDROCHLORIDE 2 MG/ML
INJECTION, SOLUTION INTRAVENOUS
Status: DISCONTINUED | OUTPATIENT
Start: 2024-07-12 | End: 2024-07-12

## 2024-07-12 RX ORDER — HYDROMORPHONE HYDROCHLORIDE 1 MG/ML
1 INJECTION, SOLUTION INTRAMUSCULAR; INTRAVENOUS; SUBCUTANEOUS ONCE
Status: COMPLETED | OUTPATIENT
Start: 2024-07-12 | End: 2024-07-12

## 2024-07-12 RX ORDER — DEXAMETHASONE SODIUM PHOSPHATE 4 MG/ML
INJECTION, SOLUTION INTRA-ARTICULAR; INTRALESIONAL; INTRAMUSCULAR; INTRAVENOUS; SOFT TISSUE
Status: DISCONTINUED | OUTPATIENT
Start: 2024-07-12 | End: 2024-07-12

## 2024-07-12 RX ORDER — HYDROMORPHONE HYDROCHLORIDE 2 MG/ML
2 INJECTION, SOLUTION INTRAMUSCULAR; INTRAVENOUS; SUBCUTANEOUS ONCE
Status: COMPLETED | OUTPATIENT
Start: 2024-07-12 | End: 2024-07-12

## 2024-07-12 RX ORDER — HYDROMORPHONE HYDROCHLORIDE 1 MG/ML
INJECTION, SOLUTION INTRAMUSCULAR; INTRAVENOUS; SUBCUTANEOUS
Status: DISPENSED
Start: 2024-07-12 | End: 2024-07-13

## 2024-07-12 RX ORDER — PHENYLEPHRINE HYDROCHLORIDE 10 MG/ML
INJECTION INTRAVENOUS
Status: DISCONTINUED | OUTPATIENT
Start: 2024-07-12 | End: 2024-07-12

## 2024-07-12 RX ADMIN — SUCCINYLCHOLINE CHLORIDE 180 MG: 20 INJECTION, SOLUTION INTRAMUSCULAR; INTRAVENOUS at 11:07

## 2024-07-12 RX ADMIN — ROCURONIUM BROMIDE 20 MG: 10 INJECTION, SOLUTION INTRAVENOUS at 11:07

## 2024-07-12 RX ADMIN — SODIUM CHLORIDE: 0.9 INJECTION, SOLUTION INTRAVENOUS at 11:07

## 2024-07-12 RX ADMIN — PHENYLEPHRINE HYDROCHLORIDE 100 MCG: 10 INJECTION INTRAVENOUS at 11:07

## 2024-07-12 RX ADMIN — FENTANYL CITRATE 25 MCG: 50 INJECTION, SOLUTION INTRAMUSCULAR; INTRAVENOUS at 11:07

## 2024-07-12 RX ADMIN — SUGAMMADEX 200 MG: 100 INJECTION, SOLUTION INTRAVENOUS at 12:07

## 2024-07-12 RX ADMIN — HYDROMORPHONE HYDROCHLORIDE 0.2 MG: 1 INJECTION, SOLUTION INTRAMUSCULAR; INTRAVENOUS; SUBCUTANEOUS at 12:07

## 2024-07-12 RX ADMIN — PHENYLEPHRINE HYDROCHLORIDE 100 MCG: 10 INJECTION INTRAVENOUS at 12:07

## 2024-07-12 RX ADMIN — ROCURONIUM BROMIDE 10 MG: 10 INJECTION, SOLUTION INTRAVENOUS at 11:07

## 2024-07-12 RX ADMIN — HYDROMORPHONE HYDROCHLORIDE 2 MG: 2 INJECTION INTRAMUSCULAR; INTRAVENOUS; SUBCUTANEOUS at 07:07

## 2024-07-12 RX ADMIN — MEROPENEM 1 G: 1 INJECTION INTRAVENOUS at 03:07

## 2024-07-12 RX ADMIN — ONDANSETRON 4 MG: 2 INJECTION INTRAMUSCULAR; INTRAVENOUS at 12:07

## 2024-07-12 RX ADMIN — FENTANYL CITRATE 25 MCG: 50 INJECTION, SOLUTION INTRAMUSCULAR; INTRAVENOUS at 12:07

## 2024-07-12 RX ADMIN — HYDROMORPHONE HYDROCHLORIDE 0.5 MG: 1 INJECTION, SOLUTION INTRAMUSCULAR; INTRAVENOUS; SUBCUTANEOUS at 01:07

## 2024-07-12 RX ADMIN — LEVETIRACETAM 1000 MG: 100 INJECTION INTRAVENOUS at 09:07

## 2024-07-12 RX ADMIN — INSULIN ASPART 1 UNITS: 100 INJECTION, SOLUTION INTRAVENOUS; SUBCUTANEOUS at 09:07

## 2024-07-12 RX ADMIN — LIDOCAINE HYDROCHLORIDE 90 MG: 20 INJECTION, SOLUTION EPIDURAL; INFILTRATION; INTRACAUDAL; PERINEURAL at 11:07

## 2024-07-12 RX ADMIN — MEROPENEM 1 G: 1 INJECTION INTRAVENOUS at 10:07

## 2024-07-12 RX ADMIN — HYDROMORPHONE HYDROCHLORIDE 0.5 MG: 1 INJECTION, SOLUTION INTRAMUSCULAR; INTRAVENOUS; SUBCUTANEOUS at 06:07

## 2024-07-12 RX ADMIN — HYDROMORPHONE HYDROCHLORIDE 0.5 MG: 1 INJECTION, SOLUTION INTRAMUSCULAR; INTRAVENOUS; SUBCUTANEOUS at 04:07

## 2024-07-12 RX ADMIN — OXYCODONE 5 MG: 5 TABLET ORAL at 10:07

## 2024-07-12 RX ADMIN — SODIUM CHLORIDE: 9 INJECTION, SOLUTION INTRAVENOUS at 10:07

## 2024-07-12 RX ADMIN — HYDROMORPHONE HYDROCHLORIDE 0.5 MG: 1 INJECTION, SOLUTION INTRAMUSCULAR; INTRAVENOUS; SUBCUTANEOUS at 09:07

## 2024-07-12 RX ADMIN — HYDROMORPHONE HYDROCHLORIDE 1 MG: 1 INJECTION, SOLUTION INTRAMUSCULAR; INTRAVENOUS; SUBCUTANEOUS at 06:07

## 2024-07-12 RX ADMIN — PROPOFOL 130 MG: 10 INJECTION, EMULSION INTRAVENOUS at 11:07

## 2024-07-12 RX ADMIN — DEXAMETHASONE SODIUM PHOSPHATE 4 MG: 4 INJECTION INTRA-ARTICULAR; INTRALESIONAL; INTRAMUSCULAR; INTRAVENOUS; SOFT TISSUE at 11:07

## 2024-07-12 NOTE — SUBJECTIVE & OBJECTIVE
Interval History: MARTHA patient did have a bowel movement with gastrograffin     Medications:  Continuous Infusions:   0.9% NaCl   Intravenous Continuous 50 mL/hr at 07/10/24 2046 New Bag at 07/10/24 2046     Scheduled Meds:   levETIRAcetam (Keppra) IV (PEDS and ADULTS)  1,000 mg Intravenous Q12H    meropenem IV (PEDS and ADULTS)  1 g Intravenous Q8H    pantoprazole  40 mg Intravenous Daily     PRN Meds:  Current Facility-Administered Medications:     0.9%  NaCl infusion (for blood administration), , Intravenous, Q24H PRN    acetaminophen, 650 mg, Oral, Q6H PRN    calcium carbonate, 500 mg, Oral, TID PRN    D10W, , Intravenous, PRN    D10W, , Intravenous, PRN    dextrose 10%, 12.5 g, Intravenous, PRN    dextrose 10%, 25 g, Intravenous, PRN    diphenhydrAMINE, 25 mg, Oral, Q6H PRN    glucagon (human recombinant), 1 mg, Intramuscular, PRN    glucose, 16 g, Oral, PRN    glucose, 24 g, Oral, PRN    heparin, porcine (PF), 500 Units, Intravenous, PRN    hydrALAZINE, 10 mg, Intravenous, Q6H PRN    HYDROmorphone, 0.5 mg, Intravenous, Q4H PRN    insulin aspart U-100, 0-10 Units, Subcutaneous, QID (AC + HS) PRN    iohexol, 15 mL, Oral, PRN    ondansetron, 4 mg, Intravenous, Q6H PRN    oxyCODONE, 5 mg, Oral, Q6H PRN    prochlorperazine, 10 mg, Intravenous, Q6H PRN    QUEtiapine, 25 mg, Oral, Daily PRN    simethicone, 1 tablet, Oral, TID PRN    sodium chloride 0.9%, 10 mL, Intravenous, PRN    sodium chloride 0.9%, 20 mL, Intravenous, PRN     Review of patient's allergies indicates:   Allergen Reactions    Codeine Other (See Comments)     Flu like s/s    Tramadol Other (See Comments)     Flu like symptoms similar to codeine reaction     Objective:     Vital Signs (Most Recent):  Temp: 98.7 °F (37.1 °C) (07/12/24 0813)  Pulse: 92 (07/12/24 0813)  Resp: 19 (07/12/24 0813)  BP: 127/73 (07/12/24 0813)  SpO2: 96 % (07/12/24 0813) Vital Signs (24h Range):  Temp:  [98.6 °F (37 °C)-99.8 °F (37.7 °C)] 98.7 °F (37.1 °C)  Pulse:  []  92  Resp:  [17-20] 19  SpO2:  [95 %-99 %] 96 %  BP: (100-132)/(57-77) 127/73     Weight: 86.9 kg (191 lb 9.3 oz)  Body mass index is 30.01 kg/m².    Intake/Output - Last 3 Shifts         07/10 0700  07/11 0659 07/11 0700  07/12 0659 07/12 0700  07/13 0659    P.O.       Total Intake(mL/kg)       Urine (mL/kg/hr) 700 (0.3) 1200 (0.6)     Other  900     Stool  1     Total Output 700 2101     Net -700 -2101            Urine Occurrence 2 x      Stool Occurrence 0 x 1 x              Physical Exam  Vitals reviewed.   Constitutional:       Appearance: She is ill-appearing.   HENT:      Nose:      Comments: NGT in place with bilious output   Cardiovascular:      Rate and Rhythm: Normal rate.      Pulses: Normal pulses.   Pulmonary:      Effort: Pulmonary effort is normal. No respiratory distress.      Comments: Room air  Abdominal:      General: There is distension.      Palpations: Abdomen is soft.      Tenderness: There is no abdominal tenderness.   Skin:     General: Skin is warm and dry.   Neurological:      General: No focal deficit present.      Mental Status: She is alert and oriented to person, place, and time.          Significant Labs:  I have reviewed all pertinent lab results within the past 24 hours.  CBC:   Recent Labs   Lab 07/12/24  0506   WBC 17.19*   RBC 2.77*   HGB 7.6*   HCT 24.6*   *   MCV 89   MCH 27.4   MCHC 30.9*     CMP:   Recent Labs   Lab 07/12/24  0506   GLU 71   CALCIUM 8.1*   ALBUMIN 1.7*   PROT 6.2      K 3.9   CO2 27      BUN 19   CREATININE 0.9   ALKPHOS 92   ALT <5*   AST 45*   BILITOT 0.8       Significant Diagnostics:  I have reviewed all pertinent imaging results/findings within the past 24 hours.

## 2024-07-12 NOTE — ANESTHESIA PROCEDURE NOTES
Intubation    Date/Time: 7/12/2024 11:47 AM    Performed by: Van Miguel MD  Authorized by: Lew Siu MD    Intubation:     Induction:  Intravenous    Intubated:  Postinduction    Mask Ventilation:  Not attempted    Attempts:  1    Attempted By:  Resident anesthesiologist    Method of Intubation:  Video laryngoscopy    Blade:  Dickson 3    Laryngeal View Grade: Grade I - full view of cords      Difficult Airway Encountered?: No      Complications:  None    Airway Device:  Oral endotracheal tube    Airway Device Size:  7.5    Style/Cuff Inflation:  Cuffed (inflated to minimal occlusive pressure)    Tube secured:  21    Secured at:  The lips    Placement Verified By:  Capnometry    Complicating Factors:  None    Findings Post-Intubation:  BS equal bilateral and atraumatic/condition of teeth unchanged

## 2024-07-12 NOTE — PT/OT/SLP PROGRESS
Physical Therapy      Patient Name:  Anette Ricci   MRN:  [id number]    Patient not seen today secondary to attempts x 2 made by writing PTA in AM and PM. Pt initially off floor for procedure scheduled for 10 AM (PEG tube insertion). Upon attempting in PM, pt found to be asleep. Dilaudid recently administered. Pt still able to meet weekly POC. Will follow-up next available treatment date.      Bushra Sinha, PTA  7/12/2024

## 2024-07-12 NOTE — SUBJECTIVE & OBJECTIVE
Interval History: Pt seen and examined this morning on enrique THOMAS. Pain well-controlled, ready for PEG and eager to return home with hospice. Care plan reviewed. Otherwise, doing well and with no further complaints at this time.        Objective:     Vital Signs (Most Recent):  Temp: 98.2 °F (36.8 °C) (07/12/24 1219)  Pulse: 98 (07/12/24 1230)  Resp: 17 (07/12/24 1230)  BP: 123/69 (07/12/24 1230)  SpO2: 100 % (07/12/24 1230) Vital Signs (24h Range):  Temp:  [98.2 °F (36.8 °C)-99.8 °F (37.7 °C)] 98.2 °F (36.8 °C)  Pulse:  [] 98  Resp:  [14-20] 17  SpO2:  [95 %-100 %] 100 %  BP: (100-129)/(57-73) 123/69     Weight: 86.6 kg (191 lb)  Body mass index is 29.91 kg/m².    Intake/Output Summary (Last 24 hours) at 7/12/2024 1311  Last data filed at 7/12/2024 1205  Gross per 24 hour   Intake 400 ml   Output 1401 ml   Net -1001 ml         Physical Exam  Vitals and nursing note reviewed.   Constitutional:       General: She is not in acute distress.     Appearance: Normal appearance. She is ill-appearing.   HENT:      Head: Normocephalic and atraumatic.      Nose: Nose normal.      Comments: NG in place     Mouth/Throat:      Mouth: Mucous membranes are moist.      Pharynx: Oropharynx is clear.   Eyes:      Extraocular Movements: Extraocular movements intact.      Conjunctiva/sclera: Conjunctivae normal.      Pupils: Pupils are equal, round, and reactive to light.   Cardiovascular:      Rate and Rhythm: Normal rate and regular rhythm.      Pulses: Normal pulses.      Heart sounds: Normal heart sounds.   Pulmonary:      Effort: Pulmonary effort is normal.      Comments: Decreased breath sounds R base, s/p CT. Stable on RA  Abdominal:      General: Bowel sounds are normal. There is distension.      Tenderness: There is abdominal tenderness.      Comments: Distended, hard abdomen   Does still have bowel sounds    Musculoskeletal:         General: Normal range of motion.      Cervical back: Normal range of motion and neck  supple.      Right lower leg: Edema (improving) present.      Left lower leg: Edema (improving) present.      Comments: IMANI hose in place   Skin:     General: Skin is warm and dry.   Neurological:      General: No focal deficit present.      Mental Status: She is alert and oriented to person, place, and time.   Psychiatric:      Comments: Depressed mood       Significant Labs: All pertinent labs within the past 24 hours have been reviewed.    Significant Imaging: I have reviewed all pertinent imaging results/findings within the past 24 hours.

## 2024-07-12 NOTE — CARE UPDATE
Gyn Onc Resident at bedside:     Ms. Ricci reports feeling well since PEG tube placement procedure. Denies pain at this time. She is having some CLD for comfort.     PE:   Temp:  [98.2 °F (36.8 °C)-98.9 °F (37.2 °C)] 98.6 °F (37 °C)  Pulse:  [] 98  Resp:  [13-20] 18  SpO2:  [95 %-100 %] 98 %  BP: (100-129)/(57-76) 128/76    Baseline abdominal distention  PEG tube site looks clean clear and intact, no leakage, PEG tube on gravity per surgery recommendations. Bag containing 600 cc of stomach content.     Will continue to follow until patient transitions to home hospice.     Quinton Dave MD  Obstetrics and Gynecology- PGY2

## 2024-07-12 NOTE — PROGRESS NOTES
[facility] - Telemetry Stepdown  General Surgery  Progress Note    Subjective:     History of Present Illness:  Anette Ricci is a 58F stage 4 ovarian cancer (3/2024, on C3D32 of carbo/taxol/bevacizumab) s/p debulking, omentectomy, partial hepatectomy, recent admission for loculated pleural effusion not amenable to IR drainage treated with antibiotics, presented 6/30/2024 with worsening SOB, diagnosed with DKA and ESBL E coli empyema Chest tube placement and subsequent removal. She has significant abdominal tumor burden and has recently stopped having bowel function. She states that she was having bowel movements and passing flatus until two days ago. CT scan revealed some mild dilation of her small bowel and significant right colon stool burden. She did not have a significantly dilated stomach on this imaging. NGT was placed yesterday with 700 cc bilious output today. Unclear how much came out yesterday as this was not charted. She is bothered by the NGT and would like it removed.     Post-Op Info:  Procedure(s) (LRB):  EGD, WITH PEG TUBE INSERTION (N/A)         Interval History: NAEON patient did have a bowel movement with gastrograffin     Medications:  Continuous Infusions:   0.9% NaCl   Intravenous Continuous 50 mL/hr at 07/10/24 2046 New Bag at 07/10/24 2046     Scheduled Meds:   levETIRAcetam (Keppra) IV (PEDS and ADULTS)  1,000 mg Intravenous Q12H    meropenem IV (PEDS and ADULTS)  1 g Intravenous Q8H    pantoprazole  40 mg Intravenous Daily     PRN Meds:  Current Facility-Administered Medications:     0.9%  NaCl infusion (for blood administration), , Intravenous, Q24H PRN    acetaminophen, 650 mg, Oral, Q6H PRN    calcium carbonate, 500 mg, Oral, TID PRN    D10W, , Intravenous, PRN    D10W, , Intravenous, PRN    dextrose 10%, 12.5 g, Intravenous, PRN    dextrose 10%, 25 g, Intravenous, PRN    diphenhydrAMINE, 25 mg, Oral, Q6H PRN    glucagon (human recombinant), 1 mg, Intramuscular, PRN    glucose, 16 g,  Oral, PRN    glucose, 24 g, Oral, PRN    heparin, porcine (PF), 500 Units, Intravenous, PRN    hydrALAZINE, 10 mg, Intravenous, Q6H PRN    HYDROmorphone, 0.5 mg, Intravenous, Q4H PRN    insulin aspart U-100, 0-10 Units, Subcutaneous, QID (AC + HS) PRN    iohexol, 15 mL, Oral, PRN    ondansetron, 4 mg, Intravenous, Q6H PRN    oxyCODONE, 5 mg, Oral, Q6H PRN    prochlorperazine, 10 mg, Intravenous, Q6H PRN    QUEtiapine, 25 mg, Oral, Daily PRN    simethicone, 1 tablet, Oral, TID PRN    sodium chloride 0.9%, 10 mL, Intravenous, PRN    sodium chloride 0.9%, 20 mL, Intravenous, PRN     Review of patient's allergies indicates:   Allergen Reactions    Codeine Other (See Comments)     Flu like s/s    Tramadol Other (See Comments)     Flu like symptoms similar to codeine reaction     Objective:     Vital Signs (Most Recent):  Temp: 98.7 °F (37.1 °C) (07/12/24 0813)  Pulse: 92 (07/12/24 0813)  Resp: 19 (07/12/24 0813)  BP: 127/73 (07/12/24 0813)  SpO2: 96 % (07/12/24 0813) Vital Signs (24h Range):  Temp:  [98.6 °F (37 °C)-99.8 °F (37.7 °C)] 98.7 °F (37.1 °C)  Pulse:  [] 92  Resp:  [17-20] 19  SpO2:  [95 %-99 %] 96 %  BP: (100-132)/(57-77) 127/73     Weight: 86.9 kg (191 lb 9.3 oz)  Body mass index is 30.01 kg/m².    Intake/Output - Last 3 Shifts         07/10 0700 07/11 0659 07/11 0700 07/12 0659 07/12 0700 07/13 0659    P.O.       Total Intake(mL/kg)       Urine (mL/kg/hr) 700 (0.3) 1200 (0.6)     Other  900     Stool  1     Total Output 700 2101     Net -700 -2101            Urine Occurrence 2 x      Stool Occurrence 0 x 1 x              Physical Exam  Vitals reviewed.   Constitutional:       Appearance: She is ill-appearing.   HENT:      Nose:      Comments: NGT in place with bilious output   Cardiovascular:      Rate and Rhythm: Normal rate.      Pulses: Normal pulses.   Pulmonary:      Effort: Pulmonary effort is normal. No respiratory distress.      Comments: Room air  Abdominal:      General: There is  distension.      Palpations: Abdomen is soft.      Tenderness: There is no abdominal tenderness.   Skin:     General: Skin is warm and dry.   Neurological:      General: No focal deficit present.      Mental Status: She is alert and oriented to person, place, and time.          Significant Labs:  I have reviewed all pertinent lab results within the past 24 hours.  CBC:   Recent Labs   Lab 07/12/24  0506   WBC 17.19*   RBC 2.77*   HGB 7.6*   HCT 24.6*   *   MCV 89   MCH 27.4   MCHC 30.9*     CMP:   Recent Labs   Lab 07/12/24  0506   GLU 71   CALCIUM 8.1*   ALBUMIN 1.7*   PROT 6.2      K 3.9   CO2 27      BUN 19   CREATININE 0.9   ALKPHOS 92   ALT <5*   AST 45*   BILITOT 0.8       Significant Diagnostics:  I have reviewed all pertinent imaging results/findings within the past 24 hours.  Assessment/Plan:     SBO (small bowel obstruction)  Anette Ricci is a 59 yo F who unfortunately is hospitalized with significant peritoneal tumor burden. I have spoken to gyn/onc who have had a goals of care discussion with the patient and her family earlier today. They are no longer planning to pursue any treatment and are transitioning to home hospice. Regarding her potential bowel obstruction, she does not have significantly dilated stomach, small bowel or colon. Im concern that she has multifocal partial obstructions due to the peritoneal disease. Her NGT has put out about 700cc though she does state that she feels a little better since it was placed.       - She is not an ideal candidate for a PEG as there appears to be some ascites/tumor near our possible window; however, given the palliative nature of this procedure, we can give it a shot if she does not open up within the next few days. She may require an open G tube if we cannot find a window in the OR.   - Likely plan for OR today          Phoebe Tobin MD  General Surgery  [facility] - Telemetry Stepdown

## 2024-07-12 NOTE — PLAN OF CARE
07/12/24 0933   Post-Acute Status   Post-Acute Authorization Hospice   Hospice Status Referrals Sent   Discharge Delays None known at this time   Discharge Plan   Discharge Plan A Hospice/home   Discharge Plan B Hospice/home     Pt met with [facility] and [facility] hospice yesterday. Pt still hasn't made up her mind on which hospice to go with at this time. Waiting for her procedure to get a PEG placed today. Will continue to follow up with patient.   3:40 PM  Went back to talk with the patient and she still hasn't made up her mind on which Hospice company to go with. MD notified.  Discharge Plan A and Plan B have been determined by review of patient's clinical status, future medical and therapeutic needs, and coverage/benefits for post-acute care in coordination with multidisciplinary team members.  Yovany Rhodes, [facility] [place]    [facility]  [phone]

## 2024-07-12 NOTE — CARE UPDATE
Surgical Oncology Care Update    Routine Post-Op PEG Care:   Please keep tube to gravity for the next 4 hours.  May administer meds after 6 hours and clamp for 30 minutes after medication administration.  If output remains low, can clamp and begin clears. Diet ok as tolerated. Ok for tube feeding starting 24hrs after placement     Please call with any other questions.      Eric Lyn MD  [facility]  PGY - 5

## 2024-07-12 NOTE — PROGRESS NOTES
Nursing Transfer Note      Reason patient is being transferred: Post procedure    Transfer To: [id number]    Transfer via bed    Transfer with cardiac monitoring, TTX # [id number]    Transported by Transport    Medicines sent: None    Any special needs or follow-up needed: Routine    Chart send with patient: Yes    Notified: spouse    Patient reassessed at: 1320 7/12/2024

## 2024-07-12 NOTE — PROGRESS NOTES
Attempted to see patient several times throughout the day, in perioperative phase of care.     Recs per note.  Please have SW/CM begin to arrange home with hospice ASAP as time is limited.     Available to help as needed over the weekend.     Eliud Cruz MD  Palliative Medicine   [facility]  [phone] (cell)

## 2024-07-12 NOTE — PROGRESS NOTES
Progress Note  Gynecology Oncology    Admit Date: 2024  LOS: 11    Reason for Admission:  Carcinosarcoma    SUBJECTIVE:     Anette Ricci is a 58 y.o.  with stage IVB ovarian carcinosarcoma s/p PDS on 3/25/24 with Dr Washington (CARLOS ALBERTO/BSO/OMX/liver mobilization/peritoneal & R diaphragm stripping/hepatic wedge resection/cholecystectomy) s/p C3D43 of carbo/taxol/bevacizumab (LD ). Admitted to MICU  for the management of DKA (resolved), JASON (resolved), and malignant pleural effusion concerning for empyema, now s/p thoracocentesis and subsequent chest tube placement/removal, with continued IV antibiotic care. She remains admitted for infectious symptoms.       Pt reports feeling tired but ok this morning.  Endorses continued abdominal discomfort. No N/V, NGT in place with throat and nose discomfort. Pt not passing flatus or BM.  at bedside.       OBJECTIVE:     Vital Signs   Temp:  [98 °F (36.7 °C)-99.8 °F (37.7 °C)] 98.8 °F (37.1 °C)  Pulse:  [] 95  Resp:  [17-20] 18  SpO2:  [95 %-100 %] 95 %  BP: (100-132)/(57-77) 100/68      Intake/Output Summary (Last 24 hours) at 2024 0532  Last data filed at 2024 2201  Gross per 24 hour   Intake --   Output 2801 ml   Net -2801 ml         Physical Exam:  Gen: Pt alert, A&Ox4  CV: RRR, normal heart sounds   Pulm: R lung sounds reduced at base, with crackles. L lung CTA.  Abd: Moderate distention,without rebound or guarding. No erythema, induration, or discharge on bandage.    Ext: PPP+1 peripheral edema. Compression socks removed with rings of edema present   Skin: Skin is warm and dry.   Other: NGT in place     Laboratory:  Recent Labs   Lab 24  0503 07/10/24  0512 24  0655   WBC 24.67* 23.58* 20.78*   HGB 7.5* 7.3* 7.4*   HCT 23.2* 23.3* 23.9*   MCV 84 86 86   * 341 480*      Recent Labs   Lab 24  0503 07/10/24  0512 24  0655   * 133* 133*   K 3.7 3.9 4.3    98 98   CO2 24 25 27   BUN 13 13  18   CREATININE 0.8 0.9 0.9   GLU 86 70 89   PROT 6.2 6.1 6.1   BILITOT 0.9 0.9 0.9   ALKPHOS 91 97 91   ALT 5* 5* 5*   AST 35 36 41*   MG 1.4* 1.4* 1.7   PHOS 3.4 3.3 3.5            Blood Sugars (AccuCheck):    Recent Labs     07/10/24  0737 07/10/24  1127 07/10/24  1634 07/10/24  2231 24  0731 24  1208 24  1635 24  2147   POCTGLUCOSE 91 127* 124* 99 105 108 97 95             ASSESSMENT/PLAN:     Assessment: Anette Ricci is a 58 y.o.  with recurrent ovarian carcinosarcoma s/p PDS on 3/25/24 with Dr Washington (CARLOS ALBERTO/BSO/OMX/liver mobilization/peritoneal & R diaphragm stripping/hepatic wedge resection/cholecystectomy) s/p C3 of carbo/taxol/bevacizumab on . Admitted to MICU  for the management of DKA (resolved), JASON (improved), symptomatic malignant pleural effusion vs empyema (s/p thoracentesis and chest tube placement), and SBO.      SBO  -CTAP  concerning for increased metastatic disease burden, with possible LUQ SBO 2/2 bulky disease    -S/P NGT placement yesterday afternoon per primary team, 350cc outpt overnight   - Patient reports last BM 7 days ago. Abdomen distended. No flatus or BM   -on 100ml/hr maintenance fluid while npo  - continue ppi for nausea   - pt scheduled for palliative peg tube placement today with general surgery @ 11:00    Pleural effusion (right) c/b empyema   - S/p right thoracentesis  (purulent) and s/p chest tube placement on  (serosanguinous output) removed on . CT surgery and pulmonology signed off. No lytic therapy performed due to decreased effusion on imaging.   - ID following. Cont IV meropenem per ID. Pleural fluid e coli (ESBL) sensitive to meropenem.   - Imaging trend:    - CXR  shows possible enlarging pleural effusion with increased fluid burden.  - CXR : stable right pleural effusion consistent with last xray ()    - CTAP : Small right hydropneumothorax with a pigtail chest tube in place. Stable patchy  ground-glass opacities in the anterior left upper lobe, suspicious for pneumonia. Interval development of right sided hydronephrosis.   -  CTAP 7/9: continued loculation of R lung base, progression of subdiaphragmatic fluid collection   - Continue to monitor patient symptoms and vitals for possible re-accumulation of fluid.    Malignant Ascites  - IR attempted para 7/8 unable to perform paracentesis due to inadequate fluid volume no safe window     UTI C glabrata  - Asymptomatic, likely due prior urinary catheter. No treatment indicated.      Anemia     - S/p 2u pRBC this admission, HH as above      - Continue to monitor for goal Hgb > 8    Hx of PE (stable)  - Holding home therapeutic Lovenox, in case of any further procedures   - Currently on heparin 5k Q8H   - Transition back to Lovenox therapeutic on DC    Diabetes Mellitus     - Current regimen lantus 10u, aspart 6u TIDWM, SSI as needed     - low BG 56 overnight, needing dextrose rescue     Stage IVB ovarian carcinosarcoma     - See onc history as above     - Doxorubicin C1 scheduled for 7/8, will defer until patient is discharged and infection has resolved.      Hypertension  - Continue home norvasc  - Holding home enalapril, HCTZ     Hyperlipidemia  - Cont home statin     Seizure disorder  - Continue home lamictal     Depression  - Continue home seroquel, ativan prn    JASON  RESOLVED, Cr 0.8      - Renal US remarkable for elevated intraparenchymal indices and 2cm mass adjacent to right kidney, repeat yesterday without significant changes    -  CT A/P showing mild right hydronephrosis and a 3.0 x 2.7 cm soft tissue mass centered anterior to the right common iliac artery (series 2, image 115), which was present on prior CT and likely now obstructing the right ureter.      - Urology evaluated patient and recommended outpatient retrograde pyelogram for possible stent placement.     DKA  RESOLVED     Courtney Serrano MD   Obstetrics and Gynecology, PGY1

## 2024-07-12 NOTE — PLAN OF CARE
PT alert and oriented x 4. Able to voice all wants and needs. All needs met. Pt has had c/o severe pain even with use of PRN pain medication. She has remained free of falls and injuries. 600 ml liquid emptied from NG tube.  Safety eduction and plan of care reviewed with PT and she voiced understanding. Bed is low and locked with call light with in easy reach.  Bed alarm set and sound audible.

## 2024-07-12 NOTE — ASSESSMENT & PLAN NOTE
- Stage IVB ovarian carcinosarcoma s/p PDS on 3/25/24 with Dr Washington (CARLOS ALBERTO/BSO/OMX/liver mobilization/peritoneal & R diaphragm stripping/hepatic wedge resection/cholecystectomy) s/p C3 of carbo/taxol/bevacizumab on 5/31   - No more chemo options offered  - abdominopelvic soft tissue masses consistent with peritoneal carcinomatosis noted on CT  - Gyn/onc and palliative following  - Will transition to home hospice; palliative PEG placed on 7/12/2024

## 2024-07-12 NOTE — ASSESSMENT & PLAN NOTE
Anette Ricci is a 59 yo F who unfortunately is hospitalized with significant peritoneal tumor burden. I have spoken to gyn/onc who have had a goals of care discussion with the patient and her family earlier today. They are no longer planning to pursue any treatment and are transitioning to home hospice. Regarding her potential bowel obstruction, she does not have significantly dilated stomach, small bowel or colon. Im concern that she has multifocal partial obstructions due to the peritoneal disease. Her NGT has put out about 700cc though she does state that she feels a little better since it was placed.       - She is not an ideal candidate for a PEG as there appears to be some ascites/tumor near our possible window; however, given the palliative nature of this procedure, we can give it a shot if she does not open up within the next few days. She may require an open G tube if we cannot find a window in the OR.   - Likely plan for OR today

## 2024-07-12 NOTE — NURSING
Nurses Note -- 4 Eyes      7/11/24  1900      Skin assessed during: Q Shift Change      [x] No Altered Skin Integrity Present    []Prevention Measures Documented      [] Yes- Altered Skin Integrity Present or Discovered   [] LDA Added if Not in Epic (Describe Wound)   [] New Altered Skin Integrity was Present on Admit and Documented in LDA   [] Wound Image Taken    Wound Care Consulted? No    Attending Nurse:  Elly Leach RN/Staff Member:  ALISSON Elaine

## 2024-07-12 NOTE — TRANSFER OF CARE
"Anesthesia Transfer of Care Note    Patient: Anette Ricci    Procedure(s) Performed: Procedure(s) (LRB):  EGD, WITH PEG TUBE INSERTION (N/A)    Patient location: PACU    Anesthesia Type: general    Transport from OR: Transported from OR on 6-10 L/min O2 by face mask with adequate spontaneous ventilation    Post pain: adequate analgesia    Post assessment: no apparent anesthetic complications    Post vital signs: stable    Level of consciousness: awake    Nausea/Vomiting: no nausea/vomiting    Complications: none    Transfer of care protocol was followed      Last vitals: Visit Vitals  /60   Pulse 97   Temp 36.9 °C (98.5 °F) (Oral)   Resp 18   Ht 5' 7" (1.702 m)   Wt 86.6 kg (191 lb)   LMP 02/09/2015   SpO2 97%   Breastfeeding No   BMI 29.91 kg/m²     "

## 2024-07-12 NOTE — PLAN OF CARE
Problem: Adult Inpatient Plan of Care  Goal: Plan of Care Review  Outcome: Progressing  Goal: Patient-Specific Goal (Individualized)  Outcome: Progressing  Goal: Absence of Hospital-Acquired Illness or Injury  Outcome: Progressing  Goal: Optimal Comfort and Wellbeing  Outcome: Progressing  Goal: Readiness for Transition of Care  Outcome: Progressing     Problem: Infection  Goal: Absence of Infection Signs and Symptoms  Outcome: Progressing     Problem: Skin Injury Risk Increased  Goal: Skin Health and Integrity  Outcome: Progressing     Problem: Diabetes Comorbidity  Goal: Blood Glucose Level Within Targeted Range  Outcome: Progressing     Problem: Acute Kidney Injury/Impairment  Goal: Fluid and Electrolyte Balance  Outcome: Progressing  Goal: Improved Oral Intake  Outcome: Progressing  Goal: Effective Renal Function  Outcome: Progressing     Problem: Fall Injury Risk  Goal: Absence of Fall and Fall-Related Injury  Outcome: Progressing     Problem: Coping Ineffective  Goal: Effective Coping  Outcome: Progressing

## 2024-07-12 NOTE — NURSING
Patient back from procedure and upset because she states she has not eaten in 3days. Patient also c/o pain 8/10, Md notified to change NPO order and patient medicated with PRN Dilaudid. Jade bedside RN notified

## 2024-07-12 NOTE — OP NOTE
Y[facility] Acuna - Surgery ([facility])  Surgery Department      Operative Note    Date of Procedure: 7/12/2024     Surgeons and Role:     * Bo Farmer MD - Primary     * Eric Lyn MD    Assisting Surgeon: None    Pre-Operative Diagnosis:   SBO (small bowel obstruction) [K56.609]    Post-Operative Diagnosis: Post-Op Diagnosis Codes:     * SBO (small bowel obstruction) [K56.609]  Same    Anesthesia: General    Operative Findings:   20-Persian percutaneous gastrostomy @ 5 cm placed at skin    Procedures:  Esophagogastroduodenoscopy (EGD) with Directed Placement of Percutaneous Endoscopic Gastrostomy Tube Placement (PEG)    Estimated Blood Loss (EBL):  <5 mL           Indications:  Anette Ricci is a 58 year old woman with 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 We were consulted for palliative PEG placement. She did have small volume ascites, but given that this was a palliative procedure, I thought the benefits of decompressive PEG outweighed the risks. Risks and benefits were reviewed including but not limited to: bleeding, infection, damage to local structures, perforated viscus, need for additional procedures, death, and imponderables.  She understands and signed written informed consent to proceed.    Details: Following induction of adequate anesthesia, a timeout was performed    An adult gastroscope was introduced into the oropharynx and guided down into the esophagus and stomach. The stomach was insufflated with air. The scope was advanced to the pylorus. We withdrew the endoscope into the stomach and identified an appropriate position for gastrostomy tube placement below the left subcostal margin. Palpation of the anterior abdominal wall at this point was visualized endoscopically with 1:1 motion.  Transillumination from the endoscope was visualized through the anterior abdominal wall. We made a skin incision. At this point, the  stomach was cannulated with a catheter loaded on a needle using the Ponsky technique. There was no evidence of intervening viscera. The catheter was grasped by a endoscopic snare. The needle was removed, and a guidewire was placed, and the snare was used to grasp the guidewire. The endoscope, snare, and guidewire were all withdrawn from the patient's mouth. A 20-Singaporean gastrostomy tube was loaded onto the guidewire and pulled through the anterior abdominal wall. Repeat endoscopy was performed with the gastrostomy tube at the 5 cm ba at the skin. There was no blanching of the gastric mucosa, and when the tube was twisted, the button did not grab the mucosa. The insufflation in the stomach was evacuated, and the endoscope was removed. The gastrostomy tube was secured in place using the supplied devices and connected to a bag for gravity drainage.    Implants:   Implant Name Type Inv. Item Serial No.  Lot No. LRB No. Used Action   Endovive standard peg kit    [employer] [id number]/A 1 Implanted       Specimens:   Specimen (24h ago, onward)      None                    Condition: Good    Disposition: PACU - hemodynamically stable.    Attestation: I was present and scrubbed for the key portions of the procedure.    Bo Farmer MD  Staff Surgeon  Surgical Oncology

## 2024-07-12 NOTE — NURSING
Patient in room c/o pain given pain medication, with noted relief.  Patient peg tube output, 1500 in total.  Patient tolerating liquid diet well denies N/V.  Will continue with current plan of care.

## 2024-07-12 NOTE — PROGRESS NOTES
[facility] Acuna - Surgery ([facility])  [facility] Medicine  Progress Note    Patient Name: Anette Ricci  MRN: [id number]  Patient Class: IP- Inpatient   Admission Date: 6/30/2024  Length of Stay: 11 days  Attending Physician: Jorge Santana MD  Primary Care Provider: Mark Chua MD        Subjective:     Principal Problem:Carcinosarcoma        HPI:  57 yo F PMH diabetes, hyperlipidemia, hypertension, chronic pulmonary embolism stage 4 ovarian ca (3/2024) s/p debulking, omentectomy, partial hepatectomy on chemotherapy (carboplatin paclitaxel bevacizumab) admitted for SOB 2/2 pleural effusion with a recent       Patient had a recent 7 day hospital stay (admitted 06/20) in the setting of malignant pleural effusion (not amenable for drainage per IR).  She was discharged on 06/27.  During hospitalization she was treated with Zosyn for 6 days due to recurrent fevers but infectious workup was negative      On admission:   Pulse 100 saturating 99%, afebrile, blood pressure 88/48.  CBC notable for marked leukocytosis 20,000 hemoglobin of 7.4 (hematocrit 23) with increase in granulocyte percentage, RDW 19.9.  CMP notable for a sodium of 129 CO2 14, anion gap 21, BUN 76 creatinine 4.6 GFR 10,  glucose of 403 alk-phos 170, normal AST ALT, uric acid of 9.3    Imaging notable for:  CT scan indicates significant pleural fluid accumulation at the right lung base with suspected lung consolidation and mediastinal shift. The Chest X-ray confirms worsening opacity in the right hemithorax consistent with pleural fluid, along with parenchymal changes suggesting atelectasis and possible infiltrates. There are no signs of pneumothorax, but mild changes are noted in the left lung base.    In the ED received vanc and Zosyn and insulin pending.          Overview/Hospital Course:  Admitted to ICU for DKA, started on insulin drip. Acidosis resolved and transitioned to subq insulin. Stable for step down.   Thoracentesis 07/02/24; drained 800 ml  cloudy (yellow cloudy/chylous looking fluid vs purulence). Cytology and cultures sent. Chest tube placed 7/3. Thoracic surgery consulted for recommendations on lytic therapy.   Pleural fluid with E coli. ID consulted. Cont meropenem per ID recs.   Chest tube to suction Pulm and CTS following. Repeat CT chest Small right hydropneumothorax with a pigtail chest tube in place. Also showing new right hydronephrosis, repeat CT abd pelvis ordered and showed: right-sided hydroureteronephrosis which appears to result from a soft tissue mass at the level of the right common iliac artery. Urology consulted. Per urology Patient will need retrograde pyelograms and right ureteral stent placement given extrinsic compression. If patient fails ureteral stent she will likely need nephrostomy tube to preserve renal function.  Pleural effusion improved and pulm removed chest tube.   Worsening leukocytosis since CT removal, repeating infectious workup. CXR with loculated right pleural reaction/pleural effusion. No interval detrimental change noted. Continues to be stable on RA. If WBC rises consider attempting to evaluate for IR drainage of subdiaphragmatic collection.   Cont fanta while inpt and transition to erta at discharge per ID recs.   No BM in days despite aggressive bowel regimen, enema added. Abd u/s with ascites- para ordered but not enough fluid for safe tap.   H/H drop, transfusion ordered. Spiked fever overnight, possibly due to blood transfusion but CRP also rising. IR consulted for assessment of subdiaphragmatic collection and need for drainage. Recommended repeating CT CAP.   Still with no BM despite aggressive bowel regimen and enema. Cont to monitor for obstruction. Stopped passing gas and episode of emesis- CT done and concerning for partial SBO- NG placed to suction and surgery consulted.   Hypoglycemia so scheduled insulin stopped and cont SSI.   Worsening inflammatory markers so discussed with IR and CTS about need  to repeat thora/chest tube, awaiting CTS recs. IR report the subdiaphragmatic collection has no safe window for aspiration.     Gyn/onc and palliative following: plans to transition to home hospice; palliative PEG to help with GI decompression placed on 7/12/2024. SW/CM helping to arrange home hospice.    Interval History: Pt seen and examined this morning on enrique THOMAS. Pain well-controlled, ready for PEG and eager to return home with hospice. Care plan reviewed. Otherwise, doing well and with no further complaints at this time.        Objective:     Vital Signs (Most Recent):  Temp: 98.2 °F (36.8 °C) (07/12/24 1219)  Pulse: 98 (07/12/24 1230)  Resp: 17 (07/12/24 1230)  BP: 123/69 (07/12/24 1230)  SpO2: 100 % (07/12/24 1230) Vital Signs (24h Range):  Temp:  [98.2 °F (36.8 °C)-99.8 °F (37.7 °C)] 98.2 °F (36.8 °C)  Pulse:  [] 98  Resp:  [14-20] 17  SpO2:  [95 %-100 %] 100 %  BP: (100-129)/(57-73) 123/69     Weight: 86.6 kg (191 lb)  Body mass index is 29.91 kg/m².    Intake/Output Summary (Last 24 hours) at 7/12/2024 1311  Last data filed at 7/12/2024 1205  Gross per 24 hour   Intake 400 ml   Output 1401 ml   Net -1001 ml         Physical Exam  Vitals and nursing note reviewed.   Constitutional:       General: She is not in acute distress.     Appearance: Normal appearance. She is ill-appearing.   HENT:      Head: Normocephalic and atraumatic.      Nose: Nose normal.      Comments: NG in place     Mouth/Throat:      Mouth: Mucous membranes are moist.      Pharynx: Oropharynx is clear.   Eyes:      Extraocular Movements: Extraocular movements intact.      Conjunctiva/sclera: Conjunctivae normal.      Pupils: Pupils are equal, round, and reactive to light.   Cardiovascular:      Rate and Rhythm: Normal rate and regular rhythm.      Pulses: Normal pulses.      Heart sounds: Normal heart sounds.   Pulmonary:      Effort: Pulmonary effort is normal.      Comments: Decreased breath sounds R base, s/p CT. Stable on  RA  Abdominal:      General: Bowel sounds are normal. There is distension.      Tenderness: There is abdominal tenderness.      Comments: Distended, hard abdomen   Does still have bowel sounds    Musculoskeletal:         General: Normal range of motion.      Cervical back: Normal range of motion and neck supple.      Right lower leg: Edema (improving) present.      Left lower leg: Edema (improving) present.      Comments: IMANI hose in place   Skin:     General: Skin is warm and dry.   Neurological:      General: No focal deficit present.      Mental Status: She is alert and oriented to person, place, and time.   Psychiatric:      Comments: Depressed mood       Significant Labs: All pertinent labs within the past 24 hours have been reviewed.    Significant Imaging: I have reviewed all pertinent imaging results/findings within the past 24 hours.    Assessment/Plan:      * Stage 4B Carcinoscaroma, Suspected ovarian origin  - Stage IVB ovarian carcinosarcoma s/p PDS on 3/25/24 with Dr Washington (CARLOS ALBERTO/BSO/OMX/liver mobilization/peritoneal & R diaphragm stripping/hepatic wedge resection/cholecystectomy) s/p C3 of carbo/taxol/bevacizumab on 5/31   - No more chemo options offered  - abdominopelvic soft tissue masses consistent with peritoneal carcinomatosis noted on CT  - Gyn/onc and palliative following  - Will transition to home hospice; palliative PEG placed on 7/12/2024      SBO (small bowel obstruction)  - no BM in days despite regimen, hard abdomen that progressed to not passing gas and N.V  - known peritoneal carcinomatosis   - CT with concern for partial SBO  - NPO, NG placed to suction  - surgery consulted  - bowel rest  - starting low IVF, monitor for resolution of obstruction may need TPN       Anemia  Patient's anemia is currently worsening. Has not received any PRBCs to date. Etiology likely d/t chronic disease due to Malignancy  Current CBC reviewed-   Lab Results   Component Value Date    HGB 7.3 (L)  07/10/2024    HCT 23.3 (L) 07/10/2024     Monitor serial CBC and transfuse if patient becomes hemodynamically unstable, symptomatic or H/H drops below 7/21.    - transfusion done, H/H stable     Constipation  - no BM in multiple days  - CT with: Continued bulky abdominopelvic soft tissue masses consistent with peritoneal carcinomatosis and probable lymphadenopathy, similar to prior CT   - started on aggressive bowel regimen, enema added  - monitor for obstructive sxs  - no N/V, bowel sounds present and passing gas- progressed to no longer passing gas and N/V- CT with concern for SBO - NG placed to suction and surgery consulted        Peripheral edema  - reviewed prior ECHO: EF low normal.   Left Ventricle: There is low normal systolic function with a visually estimated ejection fraction of 50 - 55%. Biplane (2D) method of discs ejection fraction is 50%. Global longitudinal strain is -18.0%.    Right Ventricle: Normal right ventricular cavity size. Wall thickness is normal. Right ventricle wall motion  is normal. Systolic function is normal.    Left Atrium: Left atrium is mildly dilated.    Mitral Valve: There is mild regurgitation.    IVC/SVC: Normal venous pressure at 3 mmHg.    Pericardium: Left pleural effusion. Ascites present.    - start lasix and IMANI hose  - edema improving, hold lasix and starting IVF given she is now NPO for SBO, monitor volume status closely       Hydroureteronephrosis  - Mild right-sided hydroureteronephrosis which appears to result from a soft tissue mass at the level of the right common iliac artery.   - urology consulted  - per urology: Patient will need retrograde pyelograms and right ureteral stent placement given extrinsic compression. If patient fails ureteral stent she will likely need nephrostomy tube to preserve renal function  - urology will not do this admission given stable renal function, they recommend referral at discharge       Hypomagnesemia  Patient has Abnormal  "Magnesium: hypomagnesemia. Will continue to monitor electrolytes closely. Will replace the affected electrolytes and repeat labs to be done after interventions completed. The patient's magnesium results have been reviewed and are listed below.  Recent Labs   Lab 07/10/24  0512   MG 1.4*          Empyema  - Ecoli empyema s/p thoracentesis and chest tube placement  - s/p CT in place to suction- CTS and pulm following for management and lytic recommendations - now signed off  - chest tube removed 7/5 per pulm recommendations. Repeat CXR without detrimental change, stable on RA, cont to monitor resp status  - cont meropenem per ID recs- transition to erta at discharge  - repeat CT ordered showing persistent effusion - CTS and IR consulted for reevaluation need for repeat CT/thora/VATS  - ID reconsulted and recommend cont fanta      High anion gap metabolic acidosis  - gap closed, acidosis improving       Diabetic ketoacidosis without coma associated with type 2 diabetes mellitus  - ICU on insulin drip, gap now closed and acidosis improving, transitioned to subq insulin  - monitor BG and adjust insulin as needed  - hypoglycemia noted- stopped scheduled insulin and just SSI       Chronic pulmonary embolism without acute cor pulmonale  - previously on AC, held for decreasing Hgb and vaginal bleeding   - "Patient with small right lower lobe pulmonary embolism noted on June 2024 CT chest and also noted on prior films as well. Patient has not been on full-dose anticoagulation only prophylaxis dosing. Given her advanced cancer and PE noted on CT, it would not be unreasonable to treat with full-dose anticoagulation. However patient is breathing well on room air and her prior respiratory issues are likely related to her right-sided pleural effusion. If a decision is made to treat with full-dose anticoagulation, patient should be treated with Lovenox 1 milligram/kilogram b.i.d and sent home on this regimen. I see no " "contraindications to anticoagulation however if other procedures are planned then it may be beneficial to wait until discharge. "  - cont heparin ppx, resume lovenox once able and no further procedures planned  - Gyn Onc recommend holding heparin given H/H drop      Pleural effusion  Patient found to have moderate pleural effusion on imaging. I have personally reviewed and interpreted the following imaging: Xray and CT. A thoracentesis was performed. Pleural fluid was sent for analysis. Exudative consistent with empyema   - Cytology negative for malignant cells  - empyema Ecoli growing. ID recommend cont meropenem   Management to include chest tube  - s/p CT in place to suction- pulm and CTS following and managing CT- may need lytic therapy   - pulm removed chest tube 7/5- remains stable on RA post CT removal.   - CXR without detrimental changes, cont to monitor respiratory status, stable on RA breathing comfortably   - pulm reevaluation Bedside US without significant pocket appropriate for bedside drainage or catheter placement. If concerned for persistent pleural effusion would repeat CT scan with possible IR guided catheter placement/thoracentesis and send micro.   - repeat CT ordered and IR made aware  - CT with persistent effusion- CTS reconsulted and IR following- awaiting CTS recs may need CT reinsertion vs VATS, pending their recs will follow back up with IR for thora  - ID reconsulted and cont fanta per their recs  - stable on RA without respiratory distress      Leukocytosis  - leukocytosis noted to be worsening after CT removal- s/p chest tube for Ecoli empyema- currently on fanta per ID recs  - given worsening leukocytosis repeating infectious workup with Bcx NGTD, fungitell- neg  - UA not infectious and CXR without detrimental changes  - noted: subdiaphragmatic collection measuring 7.7 x 3.0 cm seen on CT- will assess need for drainage if leukocytosis continues to worsen may need IR drainage if infection " - IR report unable to aspirated given there is no safe window that isnt through lung  - CRP rising and leukocytosis worse  - repeat CT CAP- showing persistent effusion and SBO  - IR and CTS consulted to eval need for thora versus repeat chest tube insertion, awaiting CTS recs        S/p CARLOS ALBERTO/BSO/OMX/Debulking/Partial liver resection/Cholecystectomy  - noted      JASON (acute kidney injury)  Patient with acute kidney injury/acute renal failure likely due to  Suspect a component of ANNE from previous admission playing a role, but also volume depletion from DKA and osmotic diuresis from glucosuria.    JASON is currently improving. Baseline creatinine  0.8  - Labs reviewed- Renal function/electrolytes with Estimated Creatinine Clearance: 76.2 mL/min (based on SCr of 0.9 mg/dL). according to latest data. Monitor urine output and serial BMP and adjust therapy as needed. Avoid nephrotoxins and renally dose meds for GFR listed above.  - renal following   - resolved    Malignant ascites  - CT with Continued bulky abdominopelvic soft tissue masses consistent with peritoneal carcinomatosis and probable lymphadenopathy, similar to prior CT   - abd more distended and hard with noted constipation  - has gotten repeated para in the past  - abd u/s ordered to eval ascites and need for para- moderate ascites noted and para ordered but radiology unable to be done- not enough fluid for safe tap  - hold lasix while being NPO and start low dose IVF- monitor volume status closely       Hyperlipidemia  - cont statin      Seizure disorder  - cont lamotrigine       Depression, reactive  Patient has  depression which is unknown and is currently controlled. Will Continue anti-depressant medications. We will not consult psychiatry at this time. Patient does not display psychosis at this time. Continue to monitor closely and adjust plan of care as needed.        Hypertension  Chronic, controlled. Latest blood pressure and vitals reviewed-     Temp:   [98.7 °F (37.1 °C)-100.3 °F (37.9 °C)]   Pulse:  []   Resp:  [18-20]   BP: (107-124)/(52-77)   SpO2:  [96 %-100 %] .   Home meds for hypertension were reviewed and noted below.   Hypertension Medications               amlodipine (NORVASC) 10 MG tablet Take 10 mg by mouth once daily.     enalapril (VASOTEC) 20 MG tablet Take 20 mg by mouth once daily.    hydrochlorothiazide (HYDRODIURIL) 25 MG tablet Take 25 mg by mouth once daily.             While in the hospital, will manage blood pressure as follows; Continue home antihypertensive regimen- was holding ACE/HCTZ in setting of JASON - BP now controlled without, will cont to hold    Will utilize p.r.n. blood pressure medication only if patient's blood pressure greater than 180/110 and she develops symptoms such as worsening chest pain or shortness of breath.    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  Last A1c reviewed-   Lab Results   Component Value Date    HGBA1C 7.3 (H) 03/19/2024     Most recent fingerstick glucose reviewed-   Recent Labs   Lab 07/10/24  0023 07/10/24  0737 07/10/24  1127 07/10/24  1634   POCTGLUCOSE 85 91 127* 124*     Current correctional scale  Low  Maintain anti-hyperglycemic dose as follows-   Antihyperglycemics (From admission, onward)      Start     Stop Route Frequency Ordered    07/03/24 0930  insulin aspart U-100 pen 0-10 Units         -- SubQ Before meals & nightly PRN 07/03/24 0931          Hold Oral hypoglycemics while patient is in the hospital.    - hypoglycemia noted- stopped scheduled insulin and just on SSI now that she is NPO      VTE Risk Mitigation (From admission, onward)           Ordered     heparin, porcine (PF) 100 unit/mL injection flush 500 Units  As needed (PRN)         07/10/24 1926     Place IMANI hose  Until discontinued         07/06/24 1550     IP VTE HIGH RISK PATIENT  Once         07/01/24 0540      Place sequential compression device  Until discontinued         07/01/24 0540                    Discharge Planning   ROS: 7/12/2024     Code Status: Full Code   Is the patient medically ready for discharge?:     Reason for patient still in hospital (select all that apply): Patient trending condition  Discharge Plan A: Hospice/home   Discharge Delays: None known at this time      Jorge Santana MD  Attending Physician  Medical Director - [facility]  Department of Hospital Medicine  7/12/2024

## 2024-07-12 NOTE — BRIEF OP NOTE
[facility] Acuna - Surgery ([facility])  Brief Operative Note    SUMMARY     Surgery Date: 7/12/2024     Surgeons and Role:     * Bo Farmer MD - Primary     * Eric Lyn MD    Assisting Surgeon: None    Pre-op Diagnosis:  SBO (small bowel obstruction) [K56.609]    Post-op Diagnosis:  Post-Op Diagnosis Codes:     * SBO (small bowel obstruction) [K56.609]    Procedure(s) (LRB):  EGD, WITH PEG TUBE INSERTION (N/A)    Anesthesia: General    Implants:  Implant Name Type Inv. Item Serial No.  Lot No. LRB No. Used Action   Endovive standard peg kit    [employer] [id number] N/A 1 Implanted       Operative Findings: PEG tube placed. Bumper at 5cm. To junior drainage.     Estimated Blood Loss: None    Estimated Blood Loss has been documented.         Specimens:   Specimen (24h ago, onward)      None            [id number]

## 2024-07-13 VITALS
TEMPERATURE: 98 F | RESPIRATION RATE: 18 BRPM | OXYGEN SATURATION: 98 % | BODY MASS INDEX: 29.98 KG/M2 | DIASTOLIC BLOOD PRESSURE: 73 MMHG | HEART RATE: 93 BPM | HEIGHT: 67 IN | SYSTOLIC BLOOD PRESSURE: 114 MMHG | WEIGHT: 191 LBS

## 2024-07-13 LAB
ALBUMIN SERPL BCP-MCNC: 1.7 G/DL (ref 3.5–5.2)
ALP SERPL-CCNC: 90 U/L (ref 55–135)
ALT SERPL W/O P-5'-P-CCNC: 6 U/L (ref 10–44)
ANION GAP SERPL CALC-SCNC: 11 MMOL/L (ref 8–16)
AST SERPL-CCNC: 43 U/L (ref 10–40)
BASOPHILS # BLD AUTO: 0.03 K/UL (ref 0–0.2)
BASOPHILS NFR BLD: 0.2 % (ref 0–1.9)
BILIRUB SERPL-MCNC: 0.7 MG/DL (ref 0.1–1)
BUN SERPL-MCNC: 23 MG/DL (ref 6–20)
CALCIUM SERPL-MCNC: 8 MG/DL (ref 8.7–10.5)
CHLORIDE SERPL-SCNC: 102 MMOL/L (ref 95–110)
CO2 SERPL-SCNC: 22 MMOL/L (ref 23–29)
CREAT SERPL-MCNC: 0.9 MG/DL (ref 0.5–1.4)
DIFFERENTIAL METHOD BLD: ABNORMAL
EOSINOPHIL # BLD AUTO: 0 K/UL (ref 0–0.5)
EOSINOPHIL NFR BLD: 0 % (ref 0–8)
ERYTHROCYTE [DISTWIDTH] IN BLOOD BY AUTOMATED COUNT: 19.8 % (ref 11.5–14.5)
EST. GFR  (NO RACE VARIABLE): >60 ML/MIN/1.73 M^2
GLUCOSE SERPL-MCNC: 154 MG/DL (ref 70–110)
HCT VFR BLD AUTO: 23.1 % (ref 37–48.5)
HGB BLD-MCNC: 7.3 G/DL (ref 12–16)
IMM GRANULOCYTES # BLD AUTO: 0.11 K/UL (ref 0–0.04)
IMM GRANULOCYTES NFR BLD AUTO: 0.8 % (ref 0–0.5)
LYMPHOCYTES # BLD AUTO: 1 K/UL (ref 1–4.8)
LYMPHOCYTES NFR BLD: 7 % (ref 18–48)
MAGNESIUM SERPL-MCNC: 1.7 MG/DL (ref 1.6–2.6)
MCH RBC QN AUTO: 27.2 PG (ref 27–31)
MCHC RBC AUTO-ENTMCNC: 31.6 G/DL (ref 32–36)
MCV RBC AUTO: 86 FL (ref 82–98)
MONOCYTES # BLD AUTO: 1 K/UL (ref 0.3–1)
MONOCYTES NFR BLD: 7.4 % (ref 4–15)
NEUTROPHILS # BLD AUTO: 11.9 K/UL (ref 1.8–7.7)
NEUTROPHILS NFR BLD: 84.6 % (ref 38–73)
NRBC BLD-RTO: 0 /100 WBC
PHOSPHATE SERPL-MCNC: 3.1 MG/DL (ref 2.7–4.5)
PLATELET # BLD AUTO: 516 K/UL (ref 150–450)
PMV BLD AUTO: 11.3 FL (ref 9.2–12.9)
POCT GLUCOSE: 183 MG/DL (ref 70–110)
POCT GLUCOSE: 185 MG/DL (ref 70–110)
POTASSIUM SERPL-SCNC: 4.3 MMOL/L (ref 3.5–5.1)
PROT SERPL-MCNC: 6.3 G/DL (ref 6–8.4)
RBC # BLD AUTO: 2.68 M/UL (ref 4–5.4)
SODIUM SERPL-SCNC: 135 MMOL/L (ref 136–145)
WBC # BLD AUTO: 14.04 K/UL (ref 3.9–12.7)

## 2024-07-13 PROCEDURE — 36415 COLL VENOUS BLD VENIPUNCTURE: CPT | Mod: HCNC | Performed by: NURSE PRACTITIONER

## 2024-07-13 PROCEDURE — 63600175 PHARM REV CODE 636 W HCPCS: Mod: HCNC | Performed by: HOSPITALIST

## 2024-07-13 PROCEDURE — 99231 SBSQ HOSP IP/OBS SF/LOW 25: CPT | Mod: HCNC,,, | Performed by: OBSTETRICS & GYNECOLOGY

## 2024-07-13 PROCEDURE — 25000003 PHARM REV CODE 250: Mod: HCNC

## 2024-07-13 PROCEDURE — 83735 ASSAY OF MAGNESIUM: CPT | Mod: HCNC | Performed by: NURSE PRACTITIONER

## 2024-07-13 PROCEDURE — 97530 THERAPEUTIC ACTIVITIES: CPT | Mod: HCNC,CQ

## 2024-07-13 PROCEDURE — 63600175 PHARM REV CODE 636 W HCPCS: Mod: HCNC | Performed by: STUDENT IN AN ORGANIZED HEALTH CARE EDUCATION/TRAINING PROGRAM

## 2024-07-13 PROCEDURE — 97116 GAIT TRAINING THERAPY: CPT | Mod: HCNC,CQ

## 2024-07-13 PROCEDURE — 85025 COMPLETE CBC W/AUTO DIFF WBC: CPT | Mod: HCNC | Performed by: NURSE PRACTITIONER

## 2024-07-13 PROCEDURE — 80053 COMPREHEN METABOLIC PANEL: CPT | Mod: HCNC | Performed by: NURSE PRACTITIONER

## 2024-07-13 PROCEDURE — 25000003 PHARM REV CODE 250: Mod: HCNC | Performed by: STUDENT IN AN ORGANIZED HEALTH CARE EDUCATION/TRAINING PROGRAM

## 2024-07-13 PROCEDURE — 84100 ASSAY OF PHOSPHORUS: CPT | Mod: HCNC | Performed by: NURSE PRACTITIONER

## 2024-07-13 RX ORDER — HYDROMORPHONE HYDROCHLORIDE 2 MG/1
4 TABLET ORAL EVERY 4 HOURS PRN
Qty: 56 TABLET | Refills: 0 | Status: SHIPPED | OUTPATIENT
Start: 2024-07-13 | End: 2024-07-20

## 2024-07-13 RX ORDER — ONDANSETRON 4 MG/1
8 TABLET, ORALLY DISINTEGRATING ORAL 2 TIMES DAILY
Qty: 30 TABLET | Refills: 0 | Status: ON HOLD | OUTPATIENT
Start: 2024-07-13 | End: 2024-08-12

## 2024-07-13 RX ADMIN — LEVETIRACETAM 1000 MG: 100 INJECTION INTRAVENOUS at 08:07

## 2024-07-13 RX ADMIN — OXYCODONE 5 MG: 5 TABLET ORAL at 04:07

## 2024-07-13 RX ADMIN — INSULIN ASPART 2 UNITS: 100 INJECTION, SOLUTION INTRAVENOUS; SUBCUTANEOUS at 12:07

## 2024-07-13 RX ADMIN — INSULIN ASPART 2 UNITS: 100 INJECTION, SOLUTION INTRAVENOUS; SUBCUTANEOUS at 09:07

## 2024-07-13 RX ADMIN — DIPHENHYDRAMINE HYDROCHLORIDE 25 MG: 25 CAPSULE ORAL at 01:07

## 2024-07-13 RX ADMIN — MEROPENEM 1 G: 1 INJECTION INTRAVENOUS at 05:07

## 2024-07-13 RX ADMIN — PANTOPRAZOLE SODIUM 40 MG: 40 INJECTION, POWDER, LYOPHILIZED, FOR SOLUTION INTRAVENOUS at 08:07

## 2024-07-13 RX ADMIN — HYDROMORPHONE HYDROCHLORIDE 0.5 MG: 1 INJECTION, SOLUTION INTRAMUSCULAR; INTRAVENOUS; SUBCUTANEOUS at 01:07

## 2024-07-13 NOTE — PLAN OF CARE
SW met with patient to discuss hospice agency choice. Patient and family chose [facility]. SW updated [facility] and sent orders in careport.       10:16am--[facility] will be here at 12:30pm to sign consents with family.

## 2024-07-13 NOTE — PLAN OF CARE
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 (<1cm residual disease: sigmoid, transverse colon, diaphragm, kelvin hepatis)    Adjuvant therapy: Carboplatin AUC 6, paclitaxel 175 mg/m2, bevacizumab 15 mg/kg  C1D1: (holding yaneli) planned for 4/19/24, CA-125 248      Palliative care encounter [Z51.5]  Not Applicable    SBO (small bowel obstruction) [K56.609]  No    Anemia [D64.9]  Unknown    Constipation [K59.00]  No    Peripheral edema [R60.0]  Yes    Hypomagnesemia [E83.42]  No    Hydroureteronephrosis [N13.30]  No    Empyema [J86.9]  Yes    Diabetic ketoacidosis without coma associated with type 2 diabetes mellitus [E11.10]  No    High anion gap metabolic acidosis [E87.29]  Yes    Chronic pulmonary embolism without acute cor pulmonale [I27.82]  Yes     Patient with small right lower lobe pulmonary embolism noted on June 2024 CT chest and also noted on prior films as well.  Patient has not been on full-dose anticoagulation only prophylaxis dosing.  Given her advanced cancer and PE noted on CT, it would not be unreasonable to treat with full-dose anticoagulation.  However patient is breathing well on room air and her prior respiratory issues are likely related to her right-sided pleural effusion.  If a decision is made to treat with full-dose anticoagulation, patient should be treated with Lovenox 1 milligram/kilogram b.i.d and sent home on this regimen.  I see no contraindications to anticoagulation however if other procedures  are planned then it may be beneficial to wait until discharge.      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       Hospice Qualifying Diagnoses:        Patient has a life expectancy < 6 months due to:  Primary Hospice Diagnosis:  Stage 4b ovarian carcinosarcoma  Comorbid Conditions Contributing to Decline:  small bowel obstruction, hydroureteronephrosis, empyema, pleural effusion, malignant ascites    Vital Signs: Routine per Hospice Protocol.    Code Status: Full, though wants to transition to DNR once at home    Allergies:   Review of patient's allergies indicates:   Allergen Reactions    Codeine Other (See Comments)     Flu like s/s    Tramadol Other (See Comments)     Flu like symptoms similar to codeine reaction       Diet: Pleasure feeds (technically NPO with SBO, though has venting PEG and pt desires ice chips and some liquids)    Activities: As tolerated    Goals of Care Treatment Preferences:        What is most important right now is to focus on spending time at home, quality of life, even if it means sacrificing a little time.  Accordingly, we have decided that the best plan to meet the patient's goals includes enrolling in hospice care.      Nursing: Per Hospice Routine.      PEG Care:  Clean site daily.  Monitor skin integrity.    Mccartney Care: Empty Mccartney bag Q shift and PRN.  Change Mccartney every month.    Routine Skin for Bedridden Patients: Apply moisture barrier cream to all  "skin folds and   wet areas in perineal area daily and after baths and all bowel movements.                     Oxygen: NA    Other Miscellaneous Care: Venting PEG for PRN decompression        Medications:        Medication List        CHANGE how you take these medications      HYDROmorphone 4 MG tablet  Commonly known as: DILAUDID  Take 1 tablet (4 mg total) by mouth every 4 (four) hours as needed for Pain.  What changed:   medication strength  how much to take  when to take this     ondansetron 4 MG Tbdl  Commonly known as: ZOFRAN-ODT  Take 2 tablets (8 mg total) by mouth 2 (two) times daily.  What changed:   how much to take  when to take this  reasons to take this            CONTINUE taking these medications      fluticasone propionate 50 mcg/actuation nasal spray  Commonly known as: FLONASE  1 spray by Each Nostril route once daily.     lamoTRIgine 25 MG tablet  Commonly known as: LAMICTAL  Take 25 mg by mouth 2 (two) times daily.            STOP taking these medications      acetaminophen 325 MG tablet  Commonly known as: TYLENOL     amLODIPine 10 MG tablet  Commonly known as: NORVASC     atorvastatin 40 MG tablet  Commonly known as: LIPITOR     enalapril 20 MG tablet  Commonly known as: VASOTEC     enoxaparin 80 mg/0.8 mL Syrg  Commonly known as: LOVENOX     hydroCHLOROthiazide 25 MG tablet  Commonly known as: HYDRODIURIL     hydrocodone-apap 7.5-325 MG/15 ML oral solution  Commonly known as: HYCET     ibuprofen 600 MG tablet  Commonly known as: ADVIL,MOTRIN     insulin aspart U-100 100 unit/mL (3 mL) Inpn pen  Commonly known as: NovoLOG     insulin glargine U-100 (Lantus) 100 unit/mL (3 mL) Inpn pen     insulin lispro 100 unit/mL injection  Commonly known as: HumaLOG U-100 Insulin     ketorolac 0.5% 0.5 % Drop  Commonly known as: ACULAR     LORazepam 0.5 MG tablet  Commonly known as: ATIVAN     metFORMIN 1000 MG tablet  Commonly known as: GLUCOPHAGE     pen needle, diabetic 32 gauge x 5/32" Ndle   "   QUEtiapine 25 MG Tab  Commonly known as: SEROQUEL                Future Orders:  Hospice Medical Director may dictate new orders for comfortable care measures & sign death certificate.        Jorge Santana MD  Attending Physician  Medical Director - [facility]  Department of [facility] [place]  7/13/2024

## 2024-07-13 NOTE — PROGRESS NOTES
[facility] - Telemetry Stepdown  General Surgery  Progress Note    Subjective:     History of Present Illness:  Anette Ricci is a 58F stage 4 ovarian cancer (3/2024, on C3D32 of carbo/taxol/bevacizumab) s/p debulking, omentectomy, partial hepatectomy, recent admission for loculated pleural effusion not amenable to IR drainage treated with antibiotics, presented 6/30/2024 with worsening SOB, diagnosed with DKA and ESBL E coli empyema Chest tube placement and subsequent removal. She has significant abdominal tumor burden and has recently stopped having bowel function. She states that she was having bowel movements and passing flatus until two days ago. CT scan revealed some mild dilation of her small bowel and significant right colon stool burden. She did not have a significantly dilated stomach on this imaging. NGT was placed yesterday with 700 cc bilious output today. Unclear how much came out yesterday as this was not charted. She is bothered by the NGT and would like it removed.     Post-Op Info:  Procedure(s) (LRB):  EGD, WITH PEG TUBE INSERTION (N/A)   1 Day Post-Op     Interval History: Looks good this morning. No complications with the tube. Pain controlled.     Medications:  Continuous Infusions:   0.9% NaCl   Intravenous Continuous 50 mL/hr at 07/13/24 0538 Rate Verify at 07/13/24 0538     Scheduled Meds:   levETIRAcetam (Keppra) IV (PEDS and ADULTS)  1,000 mg Intravenous Q12H    meropenem IV (PEDS and ADULTS)  1 g Intravenous Q8H    pantoprazole  40 mg Intravenous Daily     PRN Meds:  Current Facility-Administered Medications:     0.9%  NaCl infusion (for blood administration), , Intravenous, Q24H PRN    acetaminophen, 650 mg, Oral, Q6H PRN    calcium carbonate, 500 mg, Oral, TID PRN    D10W, , Intravenous, PRN    D10W, , Intravenous, PRN    dextrose 10%, 12.5 g, Intravenous, PRN    dextrose 10%, 25 g, Intravenous, PRN    diphenhydrAMINE, 25 mg, Oral, Q6H PRN    glucagon (human recombinant), 1 mg,  Intramuscular, PRN    glucose, 16 g, Oral, PRN    glucose, 24 g, Oral, PRN    heparin, porcine (PF), 500 Units, Intravenous, PRN    hydrALAZINE, 10 mg, Intravenous, Q6H PRN    HYDROmorphone, 0.2 mg, Intravenous, Q5 Min PRN    HYDROmorphone, 0.5 mg, Intravenous, Q4H PRN    insulin aspart U-100, 0-10 Units, Subcutaneous, QID (AC + HS) PRN    iohexol, 15 mL, Oral, PRN    ondansetron, 4 mg, Intravenous, Q6H PRN    oxyCODONE, 5 mg, Oral, Q6H PRN    prochlorperazine, 10 mg, Intravenous, Q6H PRN    QUEtiapine, 25 mg, Oral, Daily PRN    simethicone, 1 tablet, Oral, TID PRN    sodium chloride 0.9%, 10 mL, Intravenous, PRN    sodium chloride 0.9%, 20 mL, Intravenous, PRN     Review of patient's allergies indicates:   Allergen Reactions    Codeine Other (See Comments)     Flu like s/s    Tramadol Other (See Comments)     Flu like symptoms similar to codeine reaction     Objective:     Vital Signs (Most Recent):  Temp: 98.6 °F (37 °C) (07/13/24 0416)  Pulse: 92 (07/13/24 0416)  Resp: 18 (07/13/24 0422)  BP: 118/74 (07/13/24 0416)  SpO2: 98 % (07/13/24 0416) Vital Signs (24h Range):  Temp:  [98.2 °F (36.8 °C)-98.8 °F (37.1 °C)] 98.6 °F (37 °C)  Pulse:  [] 92  Resp:  [13-19] 18  SpO2:  [96 %-100 %] 98 %  BP: (100-129)/(47-76) 118/74     Weight: 86.6 kg (191 lb)  Body mass index is 29.91 kg/m².    Intake/Output - Last 3 Shifts         07/11 0700  07/12 0659 07/12 0700 07/13 0659 07/13 0700  07/14 0659    P.O.  750     I.V. (mL/kg)  2028.9 (23.4)     IV Piggyback  1989.4     Total Intake(mL/kg)  4768.3 (55.1)     Urine (mL/kg/hr) 1200 (0.6) 1000 (0.5)     Other 900 3500     Stool 1 0     Total Output 2101 4500     Net -2101 +268.3            Urine Occurrence  2 x     Stool Occurrence 1 x 1 x              Physical Exam  Vitals reviewed.   Constitutional:       Appearance: She is ill-appearing.   Cardiovascular:      Rate and Rhythm: Normal rate.      Pulses: Normal pulses.   Pulmonary:      Effort: Pulmonary effort is normal.  No respiratory distress.      Comments: Room air  Abdominal:      General: There is distension.      Palpations: Abdomen is soft.      Tenderness: There is abdominal tenderness.      Comments: Mass palpated in the bilateral lower quadrants. G tube in LUQ with minimal surrounding drainage. Appropriate TTP.    Skin:     General: Skin is warm and dry.   Neurological:      General: No focal deficit present.      Mental Status: She is alert and oriented to person, place, and time.          Significant Labs:  I have reviewed all pertinent lab results within the past 24 hours.  CBC:   Recent Labs   Lab 07/13/24  0433   WBC 14.04*   RBC 2.68*   HGB 7.3*   HCT 23.1*   *   MCV 86   MCH 27.2   MCHC 31.6*     CMP:   Recent Labs   Lab 07/13/24  0433   *   CALCIUM 8.0*   ALBUMIN 1.7*   PROT 6.3   *   K 4.3   CO2 22*      BUN 23*   CREATININE 0.9   ALKPHOS 90   ALT 6*   AST 43*   BILITOT 0.7       Significant Diagnostics:  I have reviewed all pertinent imaging results/findings within the past 24 hours.  Assessment/Plan:     SBO (small bowel obstruction)  Anette Ricci is a 59 yo F who unfortunately is hospitalized with significant peritoneal tumor burden. I have spoken to gyn/onc who have had a goals of care discussion with the patient and her family earlier today. They are no longer planning to pursue any treatment and are transitioning to home hospice. Regarding her potential bowel obstruction, she does not have significantly dilated stomach, small bowel or colon. Im concern that she has multifocal partial obstructions due to the peritoneal disease. Her NGT has put out about 700cc though she does state that she feels a little better since it was placed.       - Now s/p PEG placement on 7/12/23. Recovering well  - OK to clamp tube and remove bag for better ambulation. Can also keep to gravity bag as needed.  - OK to discharge from surgical perspective          Eric Lyn MD  General  Surgery  [facility] - Telemetry Stepdown

## 2024-07-13 NOTE — NURSING
Patient discharged to home with family, patient escorted downstairs per nurse via wheelchair, patient AAO, VSS, nad noted upon discharge

## 2024-07-13 NOTE — NURSING
Report received from Franco.  Patient remains free from fall and injury. NAD noted, VSS, Questions encouraged and answered.  Patient verbalized understanding. Bed locked and in low position, Safety maintained. Call light in reach, white board updated and explained, no c/o pain n/v, diarrhea or sob, will cont with POC

## 2024-07-13 NOTE — NURSING
Nurses Note -- 4 Eyes      7/13/2024   12:08 AM      Skin assessed during: Q Shift Change      [] No Altered Skin Integrity Present    []Prevention Measures Documented      [x] Yes- Altered Skin Integrity Present or Discovered   [] LDA Added if Not in Epic (Describe Wound)   [] New Altered Skin Integrity was Present on Admit and Documented in LDA   [] Wound Image Taken    Wound Care Consulted? Yes    Attending Nurse:  Franco Leach RN/Staff Member:  Najma

## 2024-07-13 NOTE — PT/OT/SLP PROGRESS
Physical Therapy Treatment    Patient Name:  Anette Ricci   MRN:  [id number]    Recommendations:     Discharge Recommendations: No Therapy Indicated  Discharge Equipment Recommendations: none (TBD)  Barriers to discharge: None    Assessment:     Anette Ricci is a 58 y.o. female admitted with a medical diagnosis of Carcinosarcoma.  She presents with the following impairments/functional limitations: weakness, impaired endurance, impaired self care skills, impaired functional mobility, gait instability, impaired cardiopulmonary response to activity, edema . Patient required time to mobilize, as she likes to take her time. Patient ambulates with min gait instability, with min sway, probably caused by swelling of her legs.    Rehab Prognosis: Good; patient would benefit from acute skilled PT services to address these deficits and reach maximum level of function.    Recent Surgery: Procedure(s) (LRB):  EGD, WITH PEG TUBE INSERTION (N/A) 1 Day Post-Op    Plan:     During this hospitalization, patient to be seen 3 x/week to address the identified rehab impairments via gait training, therapeutic activities, therapeutic exercises, neuromuscular re-education and progress toward the following goals:    Plan of Care Expires:  07/25/24    Subjective     Chief Complaint: fatigue  Patient/Family Comments/goals: to go home today  Pain/Comfort:  Pain Rating 1: 0/10  Pain Rating Post-Intervention 1: 0/10      Objective:     Communicated with NSG prior to session.  Patient found up in chair with peripheral IV, G/J tube upon PT entry to room.     General Precautions: Standard, fall, special contact (ESBL)  Orthopedic Precautions: N/A  Braces: N/A  Respiratory Status: Room air     Functional Mobility:  Transfers:     Sit to Stand:  minimum assistance with rolling walker  Gait: 38 ft x 2 trials with RW and CGA, with Chair follow by patient's spouse and a long rest break siting      AM-PAC 6 CLICK MOBILITY  Turning over in  bed (including adjusting bedclothes, sheets and blankets)?: 3  Sitting down on and standing up from a chair with arms (e.g., wheelchair, bedside commode, etc.): 3  Moving from lying on back to sitting on the side of the bed?: 3  Moving to and from a bed to a chair (including a wheelchair)?: 3  Need to walk in hospital room?: 3  Climbing 3-5 steps with a railing?: 2  Basic Mobility Total Score: 17       Treatment & Education:  Donned a second gown. Treatment interrupted by call, then MD and then Nurse to disconnect IV pole. Patient assisted to the bathroom and left with spouse present.    Patient left  sitting on toilette  with all lines intact, call button in reach, and Spouse present..    GOALS:   Multidisciplinary Problems       Physical Therapy Goals          Problem: Physical Therapy    Goal Priority Disciplines Outcome Goal Variances Interventions   Physical Therapy Goal     PT, PT/OT Progressing     Description: Goals to be met by: 2024     Patient will increase functional independence with mobility by performin. Supine to sit with Modified [place]  2. Sit to stand transfer with Supervision  3. Bed to chair transfer with Supervision using LRAD  4. Gait  x 100 feet with Stand-by Assistance using LRAD.                          Time Tracking:     PT Received On: 24  PT Start Time: 0950     PT Stop Time: 1029  PT Total Time (min): 39 min     Billable Minutes: Gait Training 15 and Therapeutic Activity 24    Treatment Type: Treatment  PT/PTA: PTA     Number of PTA visits since last PT visit: 2     2024

## 2024-07-13 NOTE — ASSESSMENT & PLAN NOTE
Anette Ricci is a 59 yo F who unfortunately is hospitalized with significant peritoneal tumor burden. I have spoken to gyn/onc who have had a goals of care discussion with the patient and her family earlier today. They are no longer planning to pursue any treatment and are transitioning to home hospice. Regarding her potential bowel obstruction, she does not have significantly dilated stomach, small bowel or colon. Im concern that she has multifocal partial obstructions due to the peritoneal disease. Her NGT has put out about 700cc though she does state that she feels a little better since it was placed.       - Now s/p PEG placement on 7/12/23. Recovering well  - OK to clamp tube and remove bag for better ambulation. Can also keep to gravity bag as needed.  - OK to discharge from surgical perspective

## 2024-07-13 NOTE — PLAN OF CARE
Patient will discharge with [facility]. Patient  will transport patient home at discharge. All CM needs have been met.    07/13/24 1049   Final Note   Assessment Type Final Discharge Note   Anticipated Discharge Disposition [facility]   Hospital Resources/Appts/Education Provided Provided patient/caregiver with written discharge plan information   Post-Acute Status   Hospice Status Set-up Complete/Auth obtained   Discharge Delays (!) Post-Acute Set-up     [facility] - Telemetry Stepdown  Discharge Final Note    Primary Care Provider: Mark Chua MD    Expected Discharge Date: 7/13/2024    Final Discharge Note (most recent)       Final Note - 07/13/24 1049          Final Note    Assessment Type Final Discharge Note (P)      Anticipated Discharge Disposition Hospice/Home (P)      Hospital Resources/Appts/Education Provided Provided patient/caregiver with written discharge plan information (P)         Post-Acute Status    Hospice Status Set-up Complete/Auth obtained (P)      Discharge Delays Post-Acute Set-up (P)                      Important Message from Medicare  Important Message from Medicare regarding Discharge Appeal Rights: Other (comments) (patient going home with hospice)     Date IMM was signed: 07/13/24  Time IMM was signed: 0820    Contact Info       Mark Chua MD   Specialty: Family Medicine   Relationship: PCP - General    [address] [place] [address]   Phone: [phone]       Next Steps: Follow up    Hospice agency        Next Steps: Follow up    [facility]   Specialty: Hospice and Palliative Medicine, Hospice Services    [address] [facility] [address]   Phone: [phone]       Next Steps: Follow up

## 2024-07-13 NOTE — SUBJECTIVE & OBJECTIVE
Interval History: Looks good this morning. No complications with the tube. Pain controlled.     Medications:  Continuous Infusions:   0.9% NaCl   Intravenous Continuous 50 mL/hr at 07/13/24 0538 Rate Verify at 07/13/24 0538     Scheduled Meds:   levETIRAcetam (Keppra) IV (PEDS and ADULTS)  1,000 mg Intravenous Q12H    meropenem IV (PEDS and ADULTS)  1 g Intravenous Q8H    pantoprazole  40 mg Intravenous Daily     PRN Meds:  Current Facility-Administered Medications:     0.9%  NaCl infusion (for blood administration), , Intravenous, Q24H PRN    acetaminophen, 650 mg, Oral, Q6H PRN    calcium carbonate, 500 mg, Oral, TID PRN    D10W, , Intravenous, PRN    D10W, , Intravenous, PRN    dextrose 10%, 12.5 g, Intravenous, PRN    dextrose 10%, 25 g, Intravenous, PRN    diphenhydrAMINE, 25 mg, Oral, Q6H PRN    glucagon (human recombinant), 1 mg, Intramuscular, PRN    glucose, 16 g, Oral, PRN    glucose, 24 g, Oral, PRN    heparin, porcine (PF), 500 Units, Intravenous, PRN    hydrALAZINE, 10 mg, Intravenous, Q6H PRN    HYDROmorphone, 0.2 mg, Intravenous, Q5 Min PRN    HYDROmorphone, 0.5 mg, Intravenous, Q4H PRN    insulin aspart U-100, 0-10 Units, Subcutaneous, QID (AC + HS) PRN    iohexol, 15 mL, Oral, PRN    ondansetron, 4 mg, Intravenous, Q6H PRN    oxyCODONE, 5 mg, Oral, Q6H PRN    prochlorperazine, 10 mg, Intravenous, Q6H PRN    QUEtiapine, 25 mg, Oral, Daily PRN    simethicone, 1 tablet, Oral, TID PRN    sodium chloride 0.9%, 10 mL, Intravenous, PRN    sodium chloride 0.9%, 20 mL, Intravenous, PRN     Review of patient's allergies indicates:   Allergen Reactions    Codeine Other (See Comments)     Flu like s/s    Tramadol Other (See Comments)     Flu like symptoms similar to codeine reaction     Objective:     Vital Signs (Most Recent):  Temp: 98.6 °F (37 °C) (07/13/24 0416)  Pulse: 92 (07/13/24 0416)  Resp: 18 (07/13/24 0422)  BP: 118/74 (07/13/24 0416)  SpO2: 98 % (07/13/24 0416) Vital Signs (24h Range):  Temp:  [98.2  °F (36.8 °C)-98.8 °F (37.1 °C)] 98.6 °F (37 °C)  Pulse:  [] 92  Resp:  [13-19] 18  SpO2:  [96 %-100 %] 98 %  BP: (100-129)/(47-76) 118/74     Weight: 86.6 kg (191 lb)  Body mass index is 29.91 kg/m².    Intake/Output - Last 3 Shifts         07/11 0700 07/12 0659 07/12 0700 07/13 0659 07/13 0700 07/14 0659    P.O.  750     I.V. (mL/kg)  2028.9 (23.4)     IV Piggyback  1989.4     Total Intake(mL/kg)  4768.3 (55.1)     Urine (mL/kg/hr) 1200 (0.6) 1000 (0.5)     Other 900 3500     Stool 1 0     Total Output 2101 4500     Net -2101 +268.3            Urine Occurrence  2 x     Stool Occurrence 1 x 1 x              Physical Exam  Vitals reviewed.   Constitutional:       Appearance: She is ill-appearing.   Cardiovascular:      Rate and Rhythm: Normal rate.      Pulses: Normal pulses.   Pulmonary:      Effort: Pulmonary effort is normal. No respiratory distress.      Comments: Room air  Abdominal:      General: There is distension.      Palpations: Abdomen is soft.      Tenderness: There is abdominal tenderness.      Comments: Mass palpated in the bilateral lower quadrants. G tube in LUQ with minimal surrounding drainage. Appropriate TTP.    Skin:     General: Skin is warm and dry.   Neurological:      General: No focal deficit present.      Mental Status: She is alert and oriented to person, place, and time.          Significant Labs:  I have reviewed all pertinent lab results within the past 24 hours.  CBC:   Recent Labs   Lab 07/13/24  0433   WBC 14.04*   RBC 2.68*   HGB 7.3*   HCT 23.1*   *   MCV 86   MCH 27.2   MCHC 31.6*     CMP:   Recent Labs   Lab 07/13/24  0433   *   CALCIUM 8.0*   ALBUMIN 1.7*   PROT 6.3   *   K 4.3   CO2 22*      BUN 23*   CREATININE 0.9   ALKPHOS 90   ALT 6*   AST 43*   BILITOT 0.7       Significant Diagnostics:  I have reviewed all pertinent imaging results/findings within the past 24 hours.

## 2024-07-13 NOTE — PLAN OF CARE
Problem: Adult Inpatient Plan of Care  Goal: Plan of Care Review  7/13/2024 1012 by Toby Mccrary RN  Outcome: Progressing  7/13/2024 1011 by Toby Mccrary RN  Outcome: Not Progressing  Goal: Patient-Specific Goal (Individualized)  7/13/2024 1012 by Toby Mccrary RN  Outcome: Progressing  7/13/2024 1011 by Toby Mccrary RN  Outcome: Not Progressing  Goal: Absence of Hospital-Acquired Illness or Injury  7/13/2024 1012 by Toby Mccrary RN  Outcome: Progressing  7/13/2024 1011 by Toby Mccrary RN  Outcome: Not Progressing  Goal: Optimal Comfort and Wellbeing  7/13/2024 1012 by Toby Mccrary RN  Outcome: Progressing  7/13/2024 1011 by Toby Mccrary RN  Outcome: Not Progressing  Goal: Readiness for Transition of Care  7/13/2024 1012 by Toby Mccrary RN  Outcome: Progressing  7/13/2024 1011 by Toby Mccrary RN  Outcome: Not Progressing     Problem: Infection  Goal: Absence of Infection Signs and Symptoms  7/13/2024 1012 by Toby Mccrary RN  Outcome: Progressing  7/13/2024 1011 by Toby Mccrary RN  Outcome: Not Progressing     Problem: Skin Injury Risk Increased  Goal: Skin Health and Integrity  7/13/2024 1012 by Toby Mccrary RN  Outcome: Progressing  7/13/2024 1011 by Toby Mccrary RN  Outcome: Not Progressing     Problem: Diabetes Comorbidity  Goal: Blood Glucose Level Within Targeted Range  7/13/2024 1012 by Toby Mccrary RN  Outcome: Progressing  7/13/2024 1011 by Toby Mccrary RN  Outcome: Not Progressing     Problem: Acute Kidney Injury/Impairment  Goal: Fluid and Electrolyte Balance  7/13/2024 1012 by Toby Mccrary RN  Outcome: Progressing  7/13/2024 1011 by Toby Mccrary RN  Outcome: Not Progressing  Goal: Improved Oral Intake  7/13/2024 1012 by Toby Mccrary RN  Outcome: Progressing  7/13/2024 1011 by Toby Mccrary RN  Outcome: Not Progressing  Goal: Effective Renal Function  7/13/2024 1012 by Toby Mccrary RN  Outcome: Progressing  7/13/2024 1011 by Toby Mccrary RN  Outcome: Not  Progressing     Problem: Fall Injury Risk  Goal: Absence of Fall and Fall-Related Injury  7/13/2024 1012 by Toby Mccrary RN  Outcome: Progressing  7/13/2024 1011 by Toby Mccrary RN  Outcome: Not Progressing     Problem: Coping Ineffective  Goal: Effective Coping  7/13/2024 1012 by Toby Mccrary RN  Outcome: Progressing  7/13/2024 1011 by Toby Mccrary RN  Outcome: Not Progressing     Problem: Wound  Goal: Optimal Coping  7/13/2024 1012 by Toby Mccrary RN  Outcome: Progressing  7/13/2024 1011 by Toby Mccrary RN  Outcome: Not Progressing  Goal: Optimal Functional Ability  7/13/2024 1012 by Toby Mccrary RN  Outcome: Progressing  7/13/2024 1011 by Toby Mccrary RN  Outcome: Not Progressing  Goal: Absence of Infection Signs and Symptoms  7/13/2024 1012 by Toby Mccrary RN  Outcome: Progressing  7/13/2024 1011 by Toby Mccrary RN  Outcome: Not Progressing  Goal: Improved Oral Intake  7/13/2024 1012 by Toby Mccrary RN  Outcome: Progressing  7/13/2024 1011 by Toby Mccrary RN  Outcome: Not Progressing  Goal: Optimal Pain Control and Function  7/13/2024 1012 by Toby Mccrary RN  Outcome: Progressing  7/13/2024 1011 by Toby Mccrary RN  Outcome: Not Progressing  Goal: Skin Health and Integrity  7/13/2024 1012 by Toby Mccrary RN  Outcome: Progressing  7/13/2024 1011 by Toby Mccrary RN  Outcome: Not Progressing  Goal: Optimal Wound Healing  7/13/2024 1012 by Toby Mccrary RN  Outcome: Progressing  7/13/2024 1011 by Toby Mccrary RN  Outcome: Not Progressing

## 2024-07-13 NOTE — PROGRESS NOTES
Progress Note  Gynecology Oncology    Admit Date: 2024  LOS: 12    Reason for Admission:  Carcinosarcoma    SUBJECTIVE:     Anette Ricci is a 58 y.o.  with stage IVB ovarian carcinosarcoma, complicated by pleural effusion and malignant SBO, transitioning to home hospice today. POD1 of palliative PEG tube placement.    Pt reports feeling tired but ok this morning.  Endorses continued abdominal discomfort. No N/V, PEG tube in place. She reports having 2 BM overnight.     OBJECTIVE:     Vital Signs   Temp:  [98.2 °F (36.8 °C)-98.8 °F (37.1 °C)] 98.4 °F (36.9 °C)  Pulse:  [] 93  Resp:  [13-19] 18  SpO2:  [97 %-100 %] 98 %  BP: (100-129)/(47-76) 114/73      Intake/Output Summary (Last 24 hours) at 2024 1015  Last data filed at 2024 0538  Gross per 24 hour   Intake 4768.34 ml   Output 4500 ml   Net 268.34 ml         Physical Exam:  Gen: Pt alert, A&Ox4  CV: RRR, normal heart sounds   Pulm: R lung sounds reduced at base, with crackles. L lung CTA.  Abd: Moderate distention,without rebound or guarding. No erythema, induration, or discharge on bandage.    Ext: PPP+1 peripheral edema  Skin: Skin is warm and dry.   PEG tube in place draining stomach content    Laboratory:  Recent Labs   Lab 24  0655 24  0506 24  0433   WBC 20.78* 17.19* 14.04*   HGB 7.4* 7.6* 7.3*   HCT 23.9* 24.6* 23.1*   MCV 86 89 86   * 525* 516*      Recent Labs   Lab 24  0655 24  0506 24  0433   * 137 135*   K 4.3 3.9 4.3   CL 98 100 102   CO2 27 27 22*   BUN 18 19 23*   CREATININE 0.9 0.9 0.9   GLU 89 71 154*   PROT 6.1 6.2 6.3   BILITOT 0.9 0.8 0.7   ALKPHOS 91 92 90   ALT 5* <5* 6*   AST 41* 45* 43*   MG 1.7 1.8 1.7   PHOS 3.5 2.9 3.1          Blood Sugars (AccuCheck):    Recent Labs     24  1635 24  2147 24  0810 24  1108 24  1237 24  1705 24  2143 24  0830   POCTGLUCOSE 97 95 88 100 97 139* 175* 185*          ASSESSMENT/PLAN:     Assessment: Anette Ricci is a 58 y.o.  with recurrent ovarian carcinosarcoma s/p PDS on 3/25/24 with Dr Washington (CARLOS ALBERTO/BSO/OMX/liver mobilization/peritoneal & R diaphragm stripping/hepatic wedge resection/cholecystectomy) s/p C3 of carbo/taxol/bevacizumab on . Admitted to MICU  for the management of DKA (resolved), JASON (improved), symptomatic malignant pleural effusion vs empyema (s/p thoracentesis and chest tube placement), and SBO.      SBO  - s/p PEG tube placement. Patent and draining stomach contents today.   - Plan for home hospice and oral intake for comfort.  - Patient received education.    Pleural effusion (right) c/b empyema   - ID plans to DC with oral meropenem     Stage IVB ovarian carcinosarcoma     - Care transitioned to hospice team. Plan to discharge home today.      Seizure disorder  - Continue home lamictal     Depression  - Continue home seroquel, ativan prn    Quinton Dave MD  Obstetrics and Gynecology- PGY2

## 2024-07-13 NOTE — DISCHARGE SUMMARY
[facility] - Telemetry [facility] Medicine  Discharge Summary      Patient Name: Anette Ricci  MRN: [id number]  JAYLA: [id number]  Patient Class: IP- Inpatient  Admission Date: 6/30/2024  Hospital Length of Stay: 12 days  Discharge Date and Time:  07/13/2024 12:57 PM  Attending Physician: Jorge Santana MD   Discharging Provider: Jorge Santana MD  Primary Care Provider: Mark Chua MD  [facility] Medicine Team: [facility] Jorge Santana MD  Primary Care Team: [facility]    HPI:   59 yo F PMH diabetes, hyperlipidemia, hypertension, chronic pulmonary embolism stage 4 ovarian ca (3/2024) s/p debulking, omentectomy, partial hepatectomy on chemotherapy (carboplatin paclitaxel bevacizumab) admitted for SOB 2/2 pleural effusion with a recent       Patient had a recent 7 day hospital stay (admitted 06/20) in the setting of malignant pleural effusion (not amenable for drainage per IR).  She was discharged on 06/27.  During hospitalization she was treated with Zosyn for 6 days due to recurrent fevers but infectious workup was negative      On admission:   Pulse 100 saturating 99%, afebrile, blood pressure 88/48.  CBC notable for marked leukocytosis 20,000 hemoglobin of 7.4 (hematocrit 23) with increase in granulocyte percentage, RDW 19.9.  CMP notable for a sodium of 129 CO2 14, anion gap 21, BUN 76 creatinine 4.6 GFR 10,  glucose of 403 alk-phos 170, normal AST ALT, uric acid of 9.3    Imaging notable for:  CT scan indicates significant pleural fluid accumulation at the right lung base with suspected lung consolidation and mediastinal shift. The Chest X-ray confirms worsening opacity in the right hemithorax consistent with pleural fluid, along with parenchymal changes suggesting atelectasis and possible infiltrates. There are no signs of pneumothorax, but mild changes are noted in the left lung base.    In the ED received vanc and Zosyn and insulin pending.          Procedure(s) (LRB):  EGD, WITH  PEG TUBE INSERTION (N/A)      Hospital Course:   Admitted to ICU for DKA, started on insulin drip. Acidosis resolved and transitioned to subq insulin. Stable for step down.   Thoracentesis 07/02/24; drained 800 ml cloudy (yellow cloudy/chylous looking fluid vs purulence). Cytology and cultures sent. Chest tube placed 7/3. Thoracic surgery consulted for recommendations on lytic therapy.   Pleural fluid with E coli. ID consulted. Cont meropenem per ID recs.   Chest tube to suction Pulm and CTS following. Repeat CT chest Small right hydropneumothorax with a pigtail chest tube in place. Also showing new right hydronephrosis, repeat CT abd pelvis ordered and showed: right-sided hydroureteronephrosis which appears to result from a soft tissue mass at the level of the right common iliac artery. Urology consulted. Per urology Patient will need retrograde pyelograms and right ureteral stent placement given extrinsic compression. If patient fails ureteral stent she will likely need nephrostomy tube to preserve renal function.  Pleural effusion improved and pulm removed chest tube.   Worsening leukocytosis since CT removal, repeating infectious workup. CXR with loculated right pleural reaction/pleural effusion. No interval detrimental change noted. Continues to be stable on RA. If WBC rises consider attempting to evaluate for IR drainage of subdiaphragmatic collection.   Cont fanta while inpt and transition to erta at discharge per ID recs.   No BM in days despite aggressive bowel regimen, enema added. Abd u/s with ascites- para ordered but not enough fluid for safe tap.   H/H drop, transfusion ordered. Spiked fever overnight, possibly due to blood transfusion but CRP also rising. IR consulted for assessment of subdiaphragmatic collection and need for drainage. Recommended repeating CT CAP.   Still with no BM despite aggressive bowel regimen and enema. Cont to monitor for obstruction. Stopped passing gas and episode of emesis-  CT done and concerning for partial SBO- NG placed to suction and surgery consulted.   Hypoglycemia so scheduled insulin stopped and cont SSI.   Worsening inflammatory markers so discussed with IR and CTS about need to repeat thora/chest tube, awaiting CTS recs. IR report the subdiaphragmatic collection has no safe window for aspiration.     Gyn/onc and palliative following: plans to transition to home hospice; palliative PEG to help with GI decompression placed on 7/12/2024. SW/CM helping to arrange home hospice.    Pt agreed to enroll with [facility] on Pt deemed appropriate for discharge; seen and examined prior to departure. Plan discussed with pt, and , who were agreeable and amenable; medications were discussed and reviewed, all questions were answered to the pt's satisfaction, and Mrs. Ricci was subsequently discharged.       Goals of Care Treatment Preferences:  Code Status: Full Code          What is most important right now is to focus on spending time at home, quality of life, even if it means sacrificing a little time.  Accordingly, we have decided that the best plan to meet the patient's goals includes enrolling in hospice care.      Consults:   Consults (From admission, onward)          Status Ordering Provider     Inpatient consult to Interventional Radiology  Once        Provider:  (Not yet assigned)    Completed ISELA GIBSON     Inpatient consult to Palliative Care  Once        Provider:  (Not yet assigned)    Completed SUZAN BARAHONA     Inpatient consult to Surgical Oncology  Once        Provider:  (Not yet assigned)    Completed PEDRO JOHNSON     Inpatient consult to Infectious Diseases  Once        Provider:  (Not yet assigned)    Completed PEDRO JOHNSON     Inpatient consult to Interventional Radiology  Once        Provider:  (Not yet assigned)    Completed PEDRO JOHNSON     Inpatient consult to Urology  Once        Provider:  (Not yet assigned)    Completed ALEX  PEDRO INMAN     Inpatient consult to Pulmonology  Once        Provider:  (Not yet assigned)    Completed GUDELIA CR     Inpatient consult to Cardiothoracic Surgery  Once        Provider:  (Not yet assigned)    Completed YADI CASTILLO     Inpatient consult to Infectious Diseases  Once        Provider:  (Not yet assigned)    Completed JULIANA HASKINS     Inpatient consult to Nephrology  Once        Provider:  (Not yet assigned)    Completed GUDELIA CR     Inpatient consult to Critical Care Medicine  Once        Provider:  (Not yet assigned)    Completed JULIANA MORA     Inpatient consult to Hospital Medicine-General  Once        Provider:  (Not yet assigned)    Completed JAMIL WHITE     Inpatient consult to Gynecologic Oncology  Once        Provider:  (Not yet assigned)    Completed VICTOR MANUEL HERNANDEZ            No new Assessment & Plan notes have been filed under this hospital service since the last note was generated.  Service: Hospital Medicine    Final Active Diagnoses:    Diagnosis Date Noted POA    PRINCIPAL PROBLEM:  Stage 4B Carcinoscaroma, Suspected ovarian origin [C80.1] 03/11/2024 Yes    Palliative care encounter [Z51.5] 07/11/2024 Not Applicable    SBO (small bowel obstruction) [K56.609] 07/10/2024 No    Anemia [D64.9] 07/08/2024 Unknown    Constipation [K59.00] 07/07/2024 No    Peripheral edema [R60.0] 07/06/2024 Yes    Hypomagnesemia [E83.42] 07/05/2024 No    Hydroureteronephrosis [N13.30] 07/05/2024 No    Empyema [J86.9] 07/02/2024 Yes    Diabetic ketoacidosis without coma associated with type 2 diabetes mellitus [E11.10] 07/01/2024 No    High anion gap metabolic acidosis [E87.29] 07/01/2024 Yes    Chronic pulmonary embolism without acute cor pulmonale [I27.82] 06/22/2024 Yes    Pleural effusion [J90] 06/20/2024 Yes    Leukocytosis [D72.829] 04/05/2024 Yes    S/p CARLOS ALBERTO/BSO/OMX/Debulking/Partial liver resection/Cholecystectomy [Z90.710, Z90.79, Z90.722] 03/25/2024 Yes    JASON (acute kidney  injury) [N17.9] 03/18/2024 Yes    Malignant ascites [R18.0] 02/27/2024 Yes    Hyperlipidemia [E78.5]  Yes    Seizure disorder [G40.909]  Yes    Depression, reactive [F32.9] 05/20/2013 Yes    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    Specialty: Family Medicine  Contact information:  [address]  [place] [id number] [phone]               Hospice agency Follow up.               [facility].    Specialties: Hospice and Palliative Medicine, Hospice Services  Contact information:  [address] [facility] [address] [phone]                           Patient Instructions:      Ambulatory referral/consult to Outpatient Case Management   Referral Priority: Routine Referral Type: Consultation   Referral Reason: Specialty Services Required   Number of Visits Requested: 1     Diet Adult Regular   Order Comments: Pleasure feeds in hospice patient with SBO     Notify your health care provider if you experience any of the following:   Order Comments: Direct all questions towards hospice agency     Activity as tolerated       Significant Diagnostic Studies: Labs: All labs within the past 24 hours have been reviewed    Pending Diagnostic Studies:       Procedure Component Value Units Date/Time    Comprehensive metabolic panel [[id number]] Collected: 07/03/24 0331    Order Status: Sent Lab Status: No result     Specimen: Blood     Magnesium [[id number]] Collected: 07/03/24 0331    Order Status: Sent Lab Status: No result     Specimen: Blood     Phosphorus [[id number]] Collected: 07/03/24 0331    Order Status: Sent Lab Status: No result     Specimen: Blood            Medications:  Reconciled Home Medications:     "  Medication List        CHANGE how you take these medications      HYDROmorphone 2 MG tablet  Commonly known as: DILAUDID  Take 2 tablets (4 mg total) by mouth every 4 (four) hours as needed for Pain.  What changed:   how much to take  when to take this     ondansetron 4 MG Tbdl  Commonly known as: ZOFRAN-ODT  Take 2 tablets (8 mg total) by mouth 2 (two) times daily.  What changed:   how much to take  when to take this  reasons to take this            CONTINUE taking these medications      fluticasone propionate 50 mcg/actuation nasal spray  Commonly known as: FLONASE  1 spray by Each Nostril route once daily.     lamoTRIgine 25 MG tablet  Commonly known as: LAMICTAL  Take 25 mg by mouth 2 (two) times daily.            STOP taking these medications      acetaminophen 325 MG tablet  Commonly known as: TYLENOL     amLODIPine 10 MG tablet  Commonly known as: NORVASC     atorvastatin 40 MG tablet  Commonly known as: LIPITOR     enalapril 20 MG tablet  Commonly known as: VASOTEC     enoxaparin 80 mg/0.8 mL Syrg  Commonly known as: LOVENOX     hydroCHLOROthiazide 25 MG tablet  Commonly known as: HYDRODIURIL     hydrocodone-apap 7.5-325 MG/15 ML oral solution  Commonly known as: HYCET     ibuprofen 600 MG tablet  Commonly known as: ADVIL,MOTRIN     insulin aspart U-100 100 unit/mL (3 mL) Inpn pen  Commonly known as: NovoLOG     insulin glargine U-100 (Lantus) 100 unit/mL (3 mL) Inpn pen     insulin lispro 100 unit/mL injection  Commonly known as: HumaLOG U-100 Insulin     ketorolac 0.5% 0.5 % Drop  Commonly known as: ACULAR     LORazepam 0.5 MG tablet  Commonly known as: ATIVAN     metFORMIN 1000 MG tablet  Commonly known as: GLUCOPHAGE     pen needle, diabetic 32 gauge x 5/32" Ndle     QUEtiapine 25 MG Tab  Commonly known as: SEROQUEL              Indwelling Lines/Drains at time of discharge:   Lines/Drains/Airways       Central Venous Catheter Line  Duration             Port A Cath Single Lumen Subclavian Left -- days "              Drain  Duration             Female External Urinary Catheter w/ Suction 07/03/24 10 days                    Time spent on the discharge of patient: 35 minutes         Jorge Santana MD  Attending Physician  Medical Director - [facility]  Department of Hospital Medicine  7/13/2024

## 2024-07-13 NOTE — PLAN OF CARE
Pt AAOx4, c/o abdominal pain, PRN Oxycodone and Dilaudid given per pt request. PEG vented to junior bag, tolerated clear liquid so far. Large loose BM this morning per BSC. + for vaginal bleeding. VSS, no acute distress noted. POC on going.    Problem: Adult Inpatient Plan of Care  Goal: Plan of Care Review  Outcome: Progressing  Goal: Absence of Hospital-Acquired Illness or Injury  Outcome: Progressing  Goal: Optimal Comfort and Wellbeing  Outcome: Progressing  Goal: Readiness for Transition of Care  Outcome: Progressing     Problem: Skin Injury Risk Increased  Goal: Skin Health and Integrity  Outcome: Progressing     Problem: Diabetes Comorbidity  Goal: Blood Glucose Level Within Targeted Range  Outcome: Progressing     Problem: Fall Injury Risk  Goal: Absence of Fall and Fall-Related Injury  Outcome: Progressing     Problem: Coping Ineffective  Goal: Effective Coping  Outcome: Progressing

## 2024-07-15 ENCOUNTER — OUTPATIENT CASE MANAGEMENT (OUTPATIENT)
Dept: ADMINISTRATIVE | Facility: OTHER | Age: 59
End: 2024-07-15
Payer: MEDICARE

## 2024-07-15 NOTE — ANESTHESIA POSTPROCEDURE EVALUATION
Anesthesia Post Evaluation    Patient: Anette Ricci    Procedure(s) Performed: Procedure(s) (LRB):  EGD, WITH PEG TUBE INSERTION (N/A)    Final Anesthesia Type: general      Patient location during evaluation: PACU  Patient participation: Yes- Able to Participate  Level of consciousness: awake and alert  Post-procedure vital signs: reviewed and stable  Pain management: adequate  Airway patency: patent    PONV status at discharge: No PONV  Anesthetic complications: no      Cardiovascular status: blood pressure returned to baseline  Respiratory status: unassisted  Hydration status: euvolemic  Follow-up not needed.              Vitals Value Taken Time   /73 07/13/24 0830   Temp 36.9 °C (98.4 °F) 07/13/24 0830   Pulse 93 07/13/24 0830   Resp 18 07/13/24 1325   SpO2 98 % 07/13/24 0830         Event Time   Out of Recovery 07/12/2024 12:45:00         Pain/Hugo Score: No data recorded

## 2024-07-22 LAB
FUNGUS SPEC CULT: NORMAL
FUNGUS SPEC CULT: NORMAL

## 2024-07-25 PROBLEM — Z51.5 COMFORT MEASURES ONLY STATUS: Status: ACTIVE | Noted: 2024-07-25

## 2024-07-25 PROBLEM — C56.9: Status: ACTIVE | Noted: 2024-03-11

## 2024-07-25 NOTE — PLAN OF CARE
Discharge Planning Assessment:  Patient admitted on: 7-24-24  Chart reviewed today  Care plan discussed with ER treatment team,   attending Dr Sanabria    Current Dispo: GIP bed today  [facility] team at bedside  Case management to follow     Met with Mrs Simpson and family to review discharge recommendation of Hospice care and is agreeable to plan  Patient/family provided list of facilities in-network with patient's payor plan. Providers that are owned, operated, or affiliated with [facility] are included on the list.   Notified that referral sent to below listed facilities from in-network list based on proximity to home/family support:   [facility]  2.   [facility]  3.    Preferred Facility: (if more than 1, listed in order of descending preference)  [facility]    If an additional preferred facility not listed above is identified, additional referral to be sent. If above facilities unable to accept, will send additional referrals to in-network providers.

## 2024-07-25 NOTE — ED NOTES
Received report from Be VINSON. Assumed care of patient. Patient is alert and resting comfortably in bed in NAD. BP, cardiac, and O2 monitoring continued. Family member at bedside. Patient updated on plan of care, pt denies any needs or complaints at this time. Bed locked in lowest position, side rails up x2, call light within reach. VSS. Will continue to monitor.

## 2024-07-25 NOTE — CONSULTS
Consult Note  Gynecology Oncology    Admit Date: 7/24/2024  LOS: 1    Reason for Admission:  End of life care     SUBJECTIVE:     Anette Ricci is a 58 y.o. 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 She presented to the ED with dislodgement of her PEG tube and reports overall fatigue, SOB and worsening pain. Gyn onc was consulted for admission.     Ms. Ricci was recently discharged from our service on 7/13 following palliative venting PEG tube placement by surg onc, and patient decision to initiate home hospice. Her , Darrell, is her primary caregiver and noted her PEG tube was dislodged with gastric content leakage last evening. In the ED general surgery was consulted and successfully repositioned her PEG tube. Following this she reports worsening SOB fatigue and desires inpatient admission for her end of life care.     OBJECTIVE:     Vital Signs   Temp:  [97.6 °F (36.4 °C)-98.7 °F (37.1 °C)] 97.6 °F (36.4 °C)  Pulse:  [100-109] 104  Resp:  [10-20] 10  SpO2:  [95 %-100 %] 100 %  BP: (117-139)/(57-70) 117/60      Intake/Output Summary (Last 24 hours) at 7/25/2024 1055  Last data filed at 7/25/2024 0221  Gross per 24 hour   Intake 250 ml   Output --   Net 250 ml       Physical Exam:  Gen: not alert, minimally responsive; appears cachetic with labored breathing   CV: extremities warm well perfused  Pulm: accessory muscle usage   Abd: soft nontender     Net IO Since Admission: 250 mL [07/25/24 1055]     Laboratory:  Recent Labs   Lab 07/25/24  0032   WBC 18.27*   HGB 5.2*   HCT 17.5*   MCV 96         Recent Labs   Lab 07/25/24  0032   *   K 5.0   CL 96   CO2 18*   BUN 51*   CREATININE 2.8*   GLU 68*   PROT 6.8   BILITOT 1.7*   ALKPHOS 110   ALT 10   *   MG 2.0        Diagnostic Results:    Imaging Results              X-Ray Abdomen AP 1 View (Final result)  Result time 07/25/24 05:40:21      Final result by Keith Constantino  MD ABRAHAM (07/25/24 05:40:21)                   Impression:      As above      Electronically signed by: Keith Constantino MD  Date:    07/25/2024  Time:    05:40               Narrative:    EXAMINATION:  XR ABDOMEN AP 1 VIEW    CLINICAL HISTORY:  peg;    TECHNIQUE:  One view    COMPARISON:  Comparison is made to the abdominal radiograph of 07/25/2024 at 01:33.    FINDINGS:  Contrast material has been introduced through the indwelling gastrostomy tube, and is seen to lie within the stomach, particularly the gastric fundus.  No definite extraluminal extravasation of contrast is appreciated.  No significant gaseous distention of large or small bowel loops or other significant detrimental interval change in the conventional radiographic appearance of the abdomen since 07/25/2024 at 01:33 appreciated.                                       X-Ray Abdomen AP 1 View (KUB) (Final result)  Result time 07/25/24 01:49:29      Final result by Brandon Palencia MD (07/25/24 01:49:29)                   Impression:      Radiographic findings as above.      Electronically signed by: Brandon Palencia  Date:    07/25/2024  Time:    01:49               Narrative:    EXAMINATION:  XR ABDOMEN AP 1 VIEW    CLINICAL HISTORY:  Gastrostomy malfunction    TECHNIQUE:  AP View(s) of the abdomen was performed.    COMPARISON:  Radiograph abdomen July 11, 2024    FINDINGS:  AP abdominal radiographic examination is submitted.  Limited imaging of the lung bases demonstrates mild elevation of the right hemidiaphragm and small pleural effusion on the right.  Suspected percutaneous gastrostomy tube noted.    There is no evidence for abnormal gastric distention.  There are a few air-filled bowel loops however there is a relative paucity of bowel gas.  There is no abnormal bowel distention appreciated radiographically.    The osseous structures demonstrate chronic change.  Vascular calcifications are noted.  The entirety of the lower pelvis to include the symphysis  pubis and ischium not included on the field of view.                                        CTA Chest Non-Coronary (PE Studies) (Final result)  Result time 07/25/24 01:56:53      Final result by Александр Sibley MD (07/25/24 01:56:53)                   Impression:      1. Small volume pulmonary thromboembolism in both lower lobes.  2. Consolidation and effusion in the right lower lung.  Pleural effusion appears to have increased.  While this could represent focal pneumonia or metastatic disease, pulmonary infarct is difficult to exclude but considered less likely.  3. Decreased size of nodular opacity at the thoracic inlet on the right.  4. Patchy airspace disease with pleural base mass in the left lung base stable..  5. Worsening of mesenteric masses throughout the peritoneal cavity and worsening ascites.  Given the short time span since the prior CT, this could represent flare response from chemotherapy or immunotherapy.  However findings are worrisome for progression of disease.  6. Worsening of 3rd spacing and ascites throughout the abdominal and abdominal wall .  7. Stable appearance of the devices and bowel-gas pattern allowing for ileus and constipation.  8. This report was flagged in Epic as abnormal.      Electronically signed by: Александр Sibley  Date:    07/25/2024  Time:    01:56               Narrative:    EXAMINATION:  CTA CHEST NON CORONARY (PE STUDIES); CT ABDOMEN PELVIS WITH IV CONTRAST    CLINICAL HISTORY:  Pulmonary embolism (PE) suspected, high prob;; Abdominal pain, acute, nonlocalized;PEG removed accidentally, concerned with PE given cancer and shortness of breath please use runoff for abd eval;    TECHNIQUE:  Following the intravenous administration of 100 cc of Omnipaque 350 contrast material, 1.25 mm contiguous axial images were acquired through the chest utilizing the CT pulmonary angiogram protocol.  2-D coronal and sagittal reconstructed images of the chest and sagittal oblique MIP  images of the pulmonary arterial system were generated from the source data.  This was followed by CT of the abdomen and pelvis using the same bolus of contrast.    COMPARISON:  CT chest abdomen and pelvis, 07/10/2019.    FINDINGS:  Pulmonary arterial system: This is a good quality examination for the evaluation of pulmonary thromboembolism with good opacification of the pulmonary arteries to the level of the segmental branches of the pulmonary arterial system.  Low volume pulmonary thrombo embolus is noted in the posterior basilar segmental branches of the left lower lobe pulmonary arterial system.  Additional thrombus is suggested in the distal segment of the interlobular pulmonary artery on the right.  No large saddle pulmonary embolism, enlargement of the main pulmonary artery, or secondary findings of right ventricular strain.    Aorta and heart: The heart is normal in size.  No pericardial fluid.  No thoracic aortic aneurysm.  No thoracic aortic dissection.    Thoracic inlet: Masslike density at the base of the neck on the right adjacent to the superior vena cava appears to have decreased in size since the prior exam..    Lymph nodes: No pathologically enlarged lymph nodes in the chest or axilla.    Lungs and pleural: Patchy ground-glass opacities throughout both lungs with more focal consolidation and parapneumonic effusion in the right lower lobe..  Pleural base mass in the posterior left lung base.    Abdomen: The innumerable peritoneal masses with ascites and omental caking appear to have increased especially the ascites.    The perihepatic masses have increased in size.    The hydronephrosis appears unchanged.    There is increase in 3rd spacing throughout the abdominal wall.  Calcific densities within the left breast appear dystrophic as well as in the left breast in the Antionette areolar region.    Mccartney catheter balloon in the urinary bladder.  Vascular calcifications throughout normal caliber vessels  remain.    Bones: There is degenerative change of the thoracic spine.                                        CT Abdomen Pelvis With IV Contrast NO Oral Contrast (Final result)  Result time 07/25/24 01:56:53      Final result by Александр Sibley MD (07/25/24 01:56:53)                   Impression:      1. Small volume pulmonary thromboembolism in both lower lobes.  2. Consolidation and effusion in the right lower lung.  Pleural effusion appears to have increased.  While this could represent focal pneumonia or metastatic disease, pulmonary infarct is difficult to exclude but considered less likely.  3. Decreased size of nodular opacity at the thoracic inlet on the right.  4. Patchy airspace disease with pleural base mass in the left lung base stable..  5. Worsening of mesenteric masses throughout the peritoneal cavity and worsening ascites.  Given the short time span since the prior CT, this could represent flare response from chemotherapy or immunotherapy.  However findings are worrisome for progression of disease.  6. Worsening of 3rd spacing and ascites throughout the abdominal and abdominal wall .  7. Stable appearance of the devices and bowel-gas pattern allowing for ileus and constipation.  8. This report was flagged in Epic as abnormal.      Electronically signed by: Александр Sibley  Date:    07/25/2024  Time:    01:56               Narrative:    EXAMINATION:  CTA CHEST NON CORONARY (PE STUDIES); CT ABDOMEN PELVIS WITH IV CONTRAST    CLINICAL HISTORY:  Pulmonary embolism (PE) suspected, high prob;; Abdominal pain, acute, nonlocalized;PEG removed accidentally, concerned with PE given cancer and shortness of breath please use runoff for abd eval;    TECHNIQUE:  Following the intravenous administration of 100 cc of Omnipaque 350 contrast material, 1.25 mm contiguous axial images were acquired through the chest utilizing the CT pulmonary angiogram protocol.  2-D coronal and sagittal reconstructed images of the  chest and sagittal oblique MIP images of the pulmonary arterial system were generated from the source data.  This was followed by CT of the abdomen and pelvis using the same bolus of contrast.    COMPARISON:  CT chest abdomen and pelvis, 07/10/2019.    FINDINGS:  Pulmonary arterial system: This is a good quality examination for the evaluation of pulmonary thromboembolism with good opacification of the pulmonary arteries to the level of the segmental branches of the pulmonary arterial system.  Low volume pulmonary thrombo embolus is noted in the posterior basilar segmental branches of the left lower lobe pulmonary arterial system.  Additional thrombus is suggested in the distal segment of the interlobular pulmonary artery on the right.  No large saddle pulmonary embolism, enlargement of the main pulmonary artery, or secondary findings of right ventricular strain.    Aorta and heart: The heart is normal in size.  No pericardial fluid.  No thoracic aortic aneurysm.  No thoracic aortic dissection.    Thoracic inlet: Masslike density at the base of the neck on the right adjacent to the superior vena cava appears to have decreased in size since the prior exam..    Lymph nodes: No pathologically enlarged lymph nodes in the chest or axilla.    Lungs and pleural: Patchy ground-glass opacities throughout both lungs with more focal consolidation and parapneumonic effusion in the right lower lobe..  Pleural base mass in the posterior left lung base.    Abdomen: The innumerable peritoneal masses with ascites and omental caking appear to have increased especially the ascites.    The perihepatic masses have increased in size.    The hydronephrosis appears unchanged.    There is increase in 3rd spacing throughout the abdominal wall.  Calcific densities within the left breast appear dystrophic as well as in the left breast in the Antionette areolar region.    Mccartney catheter balloon in the urinary bladder.  Vascular calcifications  throughout normal caliber vessels remain.    Bones: There is degenerative change of the thoracic spine.                                       X-Ray Chest 1 View (Final result)  Result time 07/25/24 01:47:22      Final result by Brandon Palencia MD (07/25/24 01:47:22)                   Impression:      Radiographic findings as above.      Electronically signed by: Brandon Palencia  Date:    07/25/2024  Time:    01:47               Narrative:    EXAMINATION:  XR CHEST 1 VIEW    CLINICAL HISTORY:  Shortness of breath    TECHNIQUE:  Single frontal view of the chest was performed.    COMPARISON:  Chest radiograph July 9, 2024    FINDINGS:  Single chest view is submitted.  Subcutaneous port central venous catheter again noted.  There is diminished depth of inspiration and there is elevation of the right hemidiaphragm.  When accounting for position and technique and depth of inspiration, the appearance of the cardiomediastinal silhouette appears stable.  Aortic atherosclerotic change noted.    Right-sided pleural effusion again noted, there is diminished volume of pleural fluid on the right, there is no evidence for left-sided pleural effusion and there is no evidence for pneumothorax bilaterally.    Accentuation of pulmonary bronchovascular markings consistent with diminished depth of inspiration noted.  Atelectatic change noted particularly at the right lung base.  There is no focal consolidation.    The osseous structures appear intact.  Chronic change noted.                                    Microbiology Results (last 7 days)       Procedure Component Value Units Date/Time    Blood culture x two cultures. Draw prior to antibiotics. [[id number]] Collected: 07/25/24 0156    Order Status: Completed Specimen: Blood from Peripheral, Antecubital, Left Updated: 07/25/24 0915     Blood Culture, Routine No Growth to date    Narrative:      Aerobic and anaerobic    Blood culture x two cultures. Draw prior to antibiotics.  [[id number]] Collected: 07/25/24 0156    Order Status: Completed Specimen: Blood from Peripheral, Upper Arm, Left Updated: 07/25/24 0915     Blood Culture, Routine No Growth to date    Narrative:      Aerobic and anaerobic    Urine culture [[id number]] Collected: 07/25/24 0151    Order Status: No result Specimen: Urine Updated: 07/25/24 0309             ASSESSMENT/PLAN:     Active Hospital Problems    Diagnosis  POA    Comfort measures only status [Z51.5]  Not Applicable    Palliative care encounter [Z51.5]  Not Applicable      Resolved Hospital Problems   No resolved problems to display.     Ms Ricci is a 59yo with recurrent ovarian carcinosarcoma currently on hospice with palliative venting PEG tube. Today she presents with desire for inpatient management of her hospice/end of life care.     Plan:   1. End of life care  - Patient and her  have previously seen palliative during prior admission. She has been receiving hospice care at home. Her  mostly responds for her as she is not oriented or alert. He reports her final wish was to receive hospice in the hospital rather than at home and he would like to honor this.   - Discussed with palliative care; plan for inpatient hospice admission. Requires [facility] acceptance; ED SW to coordinate.   - Discussed with Darrell and Anette that given her overall appearance and quick decline since our last encounter, I am concerned she may have hours to days left to live. Offered my condolences and support. Recommended they notify family members and offered spiritual services which they would like.   - Discussed with Dr Washington, agrees with admission for comfort care.   - DNR/DNI discussed with ED attending and updated in the chart.   - Greatly appreciate our palliative care colleagues and their expertise; as well as our ED colleagues assistance in making Anette comfortable while awaiting for an inpatient bed.     La Bell MD  PGY 4  Obstetrics and  Gynecology

## 2024-07-25 NOTE — ED PROVIDER NOTES
Encounter Date: 7/24/2024       History     Chief Complaint   Patient presents with    Colostomy tube issue     Pt coming from home after her colostomy tube was accidentally removed while family was attempting to change it 2 hours prior to arrival. Site is not bleeding and pt has no pain to site currently     HPI    59 yo F PMH diabetes, hyperlipidemia, hypertension, chronic pulmonary embolism stage 4 ovarian ca (3/2024) s/p debulking, s.p C3 of carbo/tax/bevacizumab 5/31/24, omentectomy, partial hepatectomy on chemotherapy (carboplatin paclitaxel bevacizumab) admitted for SOB 2/2 malignant pleural effusion presenting for removed PEG tube.     Patient reports she was standing and pivoting in the bathroom when she put her hand down.  Unclear how but the PEG tube recently placed was completely removed on accident.  There was no bleeding from the PEG tube site but she has complaints 10/10 generalized abdominal pain.  She also notes progressive worsening shortness of breath, nausea, generalized fatigue and malaise for the last several days.  She 1st noted the shortness of breath 2 days after her discharge, 07/15/2024.  She endorses subjective chills but denies fever, she denies hematuria, diarrhea, hematochezia, melena.  She was decreased oral intake and appetite.  She recently had increased her oxygen from 2 L to 4 L in the last couple of days due to her subjective shortness of breath.  She also endorses orthopnea and dry cough.      On chart review, was recently admitted 7/1/24-7/13/24, noted to be in DKA,, worsening malignant pleural effusion of R hemithorax.  PEG tube placed on 7/12/24 by general surgery.       Review of patient's allergies indicates:   Allergen Reactions    Codeine Other (See Comments)     Flu like s/s    Tramadol Other (See Comments)     Flu like symptoms similar to codeine reaction     Past Medical History:   Diagnosis Date    Breast cancer 2013    Diabetes mellitus     Genetic testing 05/29/2013     VUS    Hyperlipidemia     Hypertension     Malignant neoplasm of upper-outer quadrant of right breast in female, estrogen receptor positive 12/02/2015    Seizure disorder      Past Surgical History:   Procedure Laterality Date    BILATERAL SALPINGO-OOPHORECTOMY (BSO) N/A 3/25/2024    Procedure: SALPINGO-OOPHORECTOMY, BILATERAL;  Surgeon: Александр Washington MD;  Location: [facility] OR [address] [place] [address];  Service: OB/GYN;  Laterality: N/A;    BREAST BIOPSY      BREAST LUMPECTOMY Right 2013    CHOLECYSTECTOMY  3/25/2024    Procedure: CHOLECYSTECTOMY;  Surgeon: Bo Farmer MD;  Location: [facility] OR [address] [place] [address];  Service: General;;    DEBULKING OF TUMOR N/A 3/25/2024    Procedure: DEBULKING, NEOPLASM;  Surgeon: Александр Washington MD;  Location: [facility] OR [address] [place] [address];  Service: OB/GYN;  Laterality: N/A;    ESOPHAGOGASTRODUODENOSCOPY W/ PEG N/A 7/12/2024    Procedure: EGD, WITH PEG TUBE INSERTION;  Surgeon: Bo Farmer MD;  Location: [facility] OR [address] [place] [address];  Service: General;  Laterality: N/A;    EXCISION, LIVER N/A 3/25/2024    Procedure: EXCISION, LIVER - partial;  Surgeon: Bo Farmer MD;  Location: [facility] OR [address] [place] [address];  Service: General;  Laterality: N/A;  bk ultrasound  [place] book by TA on 3-21-24  7:00 a.m.  Conf #  [id number]    EYE SURGERY      LAPAROTOMY, EXPLORATORY N/A 3/25/2024    Procedure: LAPAROTOMY, EXPLORATORY;  Surgeon: Александр Washington MD;  [facility];  Service: OB/GYN;  Laterality: N/A;    OMENTECTOMY N/A 3/25/2024    Procedure: OMENTECTOMY;  Surgeon: Александр Washington MD;  [facility];  Service: OB/GYN;  Laterality: N/A;    PERITONEOCENTESIS N/A 3/13/2024    Procedure: PARACENTESIS, ABDOMINAL;  Surgeon: Flavia Moore MD;  Location: [facility] CATH LAB;  Service: Radiology;  Laterality: N/A;    PERITONEOCENTESIS N/A 3/21/2024    Procedure: PARACENTESIS, ABDOMINAL;  Surgeon: Jorge Jolley MD;  Location: [facility] CATH LAB;  Service: Radiology;  Laterality: N/A;     PERITONEOCENTESIS N/A 6/10/2024    Procedure: PARACENTESIS, ABDOMINAL;  Surgeon: Rogelio Malave MD;  Location: [facility] CATH LAB;  Service: Radiology;  Laterality: N/A;    TOTAL ABDOMINAL HYSTERECTOMY N/A 3/25/2024    Procedure: HYSTERECTOMY, TOTAL, ABDOMINAL;  Surgeon: Александр Washington MD;  Location: [facility] OR [address] [place] [address];  Service: OB/GYN;  Laterality: N/A;    TOTAL REDUCTION MAMMOPLASTY Bilateral 2016     Family History   Problem Relation Name Age of Onset    Hypertension Mother      Seizures Father      Breast cancer Sister  48    Colon cancer Sister      Hypertension Brother      Hypertension Brother      Cancer Neg Hx      Ovarian cancer Neg Hx       Social History     Tobacco Use    Smoking status: Never    Smokeless tobacco: Never   Substance Use Topics    Alcohol use: Yes     Alcohol/week: 0.0 standard drinks of alcohol     Comment: ocassional    Drug use: No     Review of Systems  See HPI for pertinent ROS.   Physical Exam     Initial Vitals [07/24/24 2131]   BP Pulse Resp Temp SpO2   125/70 106 17 98.7 °F (37.1 °C) 95 %      MAP       --         Physical Exam    Constitutional: She appears well-developed and well-nourished.   Appears to feel unwell   HENT:   Head: Normocephalic and atraumatic.   Eyes: Conjunctivae are normal. No scleral icterus.   Neck: No JVD present.   Cardiovascular:  Normal rate, regular rhythm and normal heart sounds.     Exam reveals no gallop and no friction rub.       No murmur heard.  Pulmonary/Chest: No stridor. She has no wheezes. She has no rhonchi. She has no rales.   Increased work of breathing, breath sounds soft in bilateral bases    Abdominal: Abdomen is soft. There is no abdominal tenderness.   Abd with midline surgical incision well healed, firm LUQ and LLQ abdomen, generalized TTP.  PEG site with murky brown fluid but no surrounding erythema or purulence.  There is no rebound and no guarding.   Musculoskeletal:         General: No tenderness or edema.     Neurological:  She is alert.   Skin: Skin is warm. Capillary refill takes less than 2 seconds.   Psychiatric: She has a normal mood and affect.         ED Course   Procedures  Labs Reviewed   CBC W/ AUTO DIFFERENTIAL - Abnormal       Result Value    WBC 18.27 (*)     RBC 1.83 (*)     Hemoglobin 5.2 (*)     Hematocrit 17.5 (*)     MCV 96      MCH 28.4      MCHC 29.7 (*)     RDW 28.4 (*)     Platelets 217      MPV 11.2      Immature Granulocytes 1.9 (*)     Gran # (ANC) 16.0 (*)     Immature Grans (Abs) 0.34 (*)     Lymph # 1.1      Mono # 0.8      Eos # 0.0      Baso # 0.02      nRBC 1 (*)     Gran % 87.4 (*)     Lymph % 5.9 (*)     Mono % 4.5      Eosinophil % 0.2      Basophil % 0.1      Platelet Estimate Clumped (*)     Aniso Slight      Poik Slight      Poly Occasional      Hypo Moderate      Ovalocytes Occasional      Target Cells Occasional      Tear Drop Cells Occasional      Justina Cells Occasional      Spherocytes Occasional      Schistocytes Present      Basophilic Stippling Occasional      Dohle Bodies Present      Toxic Granulation Present      Fragmented Cells Occasional      Vacuolated Granulocytes Present      Differential Method Automated      Narrative:       H and H critical result(s) called and verbal readback obtained from   Be Romano RN. by ADR 07/25/2024 00:55   COMPREHENSIVE METABOLIC PANEL - Abnormal    Sodium 132 (*)     Potassium 5.0      Chloride 96      CO2 18 (*)     Glucose 68 (*)     BUN 51 (*)     Creatinine 2.8 (*)     Calcium 8.2 (*)     Total Protein 6.8      Albumin 1.8 (*)     Total Bilirubin 1.7 (*)     Alkaline Phosphatase 110       (*)     ALT 10      eGFR 18.9 (*)     Anion Gap 18 (*)    URINALYSIS, REFLEX TO URINE CULTURE - Abnormal    Specimen UA Urine, Clean Catch      Color, UA Yellow      Appearance, UA Hazy (*)     pH, UA 5.0      Specific Gravity, UA 1.015      Protein, UA Trace (*)     Glucose, UA Negative      Ketones, UA Negative      Bilirubin (UA) Negative      Occult  Blood UA 1+ (*)     Nitrite, UA Negative      Leukocytes, UA 3+ (*)     Narrative:     Specimen Source->Urine   URINALYSIS MICROSCOPIC - Abnormal    RBC, UA 22 (*)     WBC, UA 41 (*)     WBC Clumps, UA Few (*)     Bacteria Few (*)     Yeast, UA Rare (*)     Squam Epithel, UA 0      Hyaline Casts, UA 2 (*)     Unclass Eva UA 38      Microscopic Comment SEE COMMENT      Narrative:     Specimen Source->Urine   ISTAT LACTATE - Abnormal    POC Lactate 3.69 (*)     Sample VENOUS      Site Other      Allens Test N/A     POCT GLUCOSE - Abnormal    POCT Glucose 175 (*)    CULTURE, BLOOD   CULTURE, BLOOD   CULTURE, URINE   MAGNESIUM    Magnesium 2.0     B-TYPE NATRIURETIC PEPTIDE    BNP 81     TROPONIN I    Troponin I 0.009     LIPASE    Lipase 12     SARS-COV-2 RNA AMPLIFICATION, QUAL    SARS-CoV-2 RNA, Amplification, Qual Negative     TYPE & SCREEN    Group & Rh B POS      Indirect Jaun NEG      Specimen Outdate 07/28/2024 23:59     ISTAT PROCEDURE    POC PH 7.388      POC PCO2 40.0      POC PO2 55      POC HCO3 24.1      POC BE -1      POC SATURATED O2 88      POC TCO2 25      Sample VENOUS      Site Other      Allens Test N/A     POCT GLUCOSE MONITORING CONTINUOUS   PREPARE RBC SOFT    UNIT NUMBER [id number]      Product Code W1793Y50      DISPENSE STATUS ISSUED      CODING SYSTEM JTUF867      Unit Blood Type Code 7300      Unit Blood Type B POS      Unit Expiration [id number]      CROSSMATCH INTERPRETATION Compatible      UNIT NUMBER [id number]      Product Code A4564K24      DISPENSE STATUS CROSSMATCHED      CODING SYSTEM VZBS269      Unit Blood Type Code 7300      Unit Blood Type B POS      Unit Expiration [id number]      CROSSMATCH INTERPRETATION Compatible            Imaging Results              X-Ray Abdomen AP 1 View (Final result)  Result time 07/25/24 05:40:21      Final result by Keith Constantino MD (07/25/24 05:40:21)                   Impression:      As above      Electronically signed by: Keith  MD Vira  Date:    07/25/2024  Time:    05:40               Narrative:    EXAMINATION:  XR ABDOMEN AP 1 VIEW    CLINICAL HISTORY:  peg;    TECHNIQUE:  One view    COMPARISON:  Comparison is made to the abdominal radiograph of 07/25/2024 at 01:33.    FINDINGS:  Contrast material has been introduced through the indwelling gastrostomy tube, and is seen to lie within the stomach, particularly the gastric fundus.  No definite extraluminal extravasation of contrast is appreciated.  No significant gaseous distention of large or small bowel loops or other significant detrimental interval change in the conventional radiographic appearance of the abdomen since 07/25/2024 at 01:33 appreciated.                                       X-Ray Abdomen AP 1 View (KUB) (Final result)  Result time 07/25/24 01:49:29      Final result by Brandon Palencia MD (07/25/24 01:49:29)                   Impression:      Radiographic findings as above.      Electronically signed by: Brandon Palencia  Date:    07/25/2024  Time:    01:49               Narrative:    EXAMINATION:  XR ABDOMEN AP 1 VIEW    CLINICAL HISTORY:  Gastrostomy malfunction    TECHNIQUE:  AP View(s) of the abdomen was performed.    COMPARISON:  Radiograph abdomen July 11, 2024    FINDINGS:  AP abdominal radiographic examination is submitted.  Limited imaging of the lung bases demonstrates mild elevation of the right hemidiaphragm and small pleural effusion on the right.  Suspected percutaneous gastrostomy tube noted.    There is no evidence for abnormal gastric distention.  There are a few air-filled bowel loops however there is a relative paucity of bowel gas.  There is no abnormal bowel distention appreciated radiographically.    The osseous structures demonstrate chronic change.  Vascular calcifications are noted.  The entirety of the lower pelvis to include the symphysis pubis and ischium not included on the field of view.                                        CTA Chest  Non-Coronary (PE Studies) (Final result)  Result time 07/25/24 01:56:53      Final result by Александр Sibley MD (07/25/24 01:56:53)                   Impression:      1. Small volume pulmonary thromboembolism in both lower lobes.  2. Consolidation and effusion in the right lower lung.  Pleural effusion appears to have increased.  While this could represent focal pneumonia or metastatic disease, pulmonary infarct is difficult to exclude but considered less likely.  3. Decreased size of nodular opacity at the thoracic inlet on the right.  4. Patchy airspace disease with pleural base mass in the left lung base stable..  5. Worsening of mesenteric masses throughout the peritoneal cavity and worsening ascites.  Given the short time span since the prior CT, this could represent flare response from chemotherapy or immunotherapy.  However findings are worrisome for progression of disease.  6. Worsening of 3rd spacing and ascites throughout the abdominal and abdominal wall .  7. Stable appearance of the devices and bowel-gas pattern allowing for ileus and constipation.  8. This report was flagged in Epic as abnormal.      Electronically signed by: Александр Sibley  Date:    07/25/2024  Time:    01:56               Narrative:    EXAMINATION:  CTA CHEST NON CORONARY (PE STUDIES); CT ABDOMEN PELVIS WITH IV CONTRAST    CLINICAL HISTORY:  Pulmonary embolism (PE) suspected, high prob;; Abdominal pain, acute, nonlocalized;PEG removed accidentally, concerned with PE given cancer and shortness of breath please use runoff for abd eval;    TECHNIQUE:  Following the intravenous administration of 100 cc of Omnipaque 350 contrast material, 1.25 mm contiguous axial images were acquired through the chest utilizing the CT pulmonary angiogram protocol.  2-D coronal and sagittal reconstructed images of the chest and sagittal oblique MIP images of the pulmonary arterial system were generated from the source data.  This was followed by CT of  the abdomen and pelvis using the same bolus of contrast.    COMPARISON:  CT chest abdomen and pelvis, 07/10/2019.    FINDINGS:  Pulmonary arterial system: This is a good quality examination for the evaluation of pulmonary thromboembolism with good opacification of the pulmonary arteries to the level of the segmental branches of the pulmonary arterial system.  Low volume pulmonary thrombo embolus is noted in the posterior basilar segmental branches of the left lower lobe pulmonary arterial system.  Additional thrombus is suggested in the distal segment of the interlobular pulmonary artery on the right.  No large saddle pulmonary embolism, enlargement of the main pulmonary artery, or secondary findings of right ventricular strain.    Aorta and heart: The heart is normal in size.  No pericardial fluid.  No thoracic aortic aneurysm.  No thoracic aortic dissection.    Thoracic inlet: Masslike density at the base of the neck on the right adjacent to the superior vena cava appears to have decreased in size since the prior exam..    Lymph nodes: No pathologically enlarged lymph nodes in the chest or axilla.    Lungs and pleural: Patchy ground-glass opacities throughout both lungs with more focal consolidation and parapneumonic effusion in the right lower lobe..  Pleural base mass in the posterior left lung base.    Abdomen: The innumerable peritoneal masses with ascites and omental caking appear to have increased especially the ascites.    The perihepatic masses have increased in size.    The hydronephrosis appears unchanged.    There is increase in 3rd spacing throughout the abdominal wall.  Calcific densities within the left breast appear dystrophic as well as in the left breast in the Antionette areolar region.    Mccartney catheter balloon in the urinary bladder.  Vascular calcifications throughout normal caliber vessels remain.    Bones: There is degenerative change of the thoracic spine.                                         CT Abdomen Pelvis With IV Contrast NO Oral Contrast (Final result)  Result time 07/25/24 01:56:53      Final result by Александр Sibley MD (07/25/24 01:56:53)                   Impression:      1. Small volume pulmonary thromboembolism in both lower lobes.  2. Consolidation and effusion in the right lower lung.  Pleural effusion appears to have increased.  While this could represent focal pneumonia or metastatic disease, pulmonary infarct is difficult to exclude but considered less likely.  3. Decreased size of nodular opacity at the thoracic inlet on the right.  4. Patchy airspace disease with pleural base mass in the left lung base stable..  5. Worsening of mesenteric masses throughout the peritoneal cavity and worsening ascites.  Given the short time span since the prior CT, this could represent flare response from chemotherapy or immunotherapy.  However findings are worrisome for progression of disease.  6. Worsening of 3rd spacing and ascites throughout the abdominal and abdominal wall .  7. Stable appearance of the devices and bowel-gas pattern allowing for ileus and constipation.  8. This report was flagged in Epic as abnormal.      Electronically signed by: Александр Sibley  Date:    07/25/2024  Time:    01:56               Narrative:    EXAMINATION:  CTA CHEST NON CORONARY (PE STUDIES); CT ABDOMEN PELVIS WITH IV CONTRAST    CLINICAL HISTORY:  Pulmonary embolism (PE) suspected, high prob;; Abdominal pain, acute, nonlocalized;PEG removed accidentally, concerned with PE given cancer and shortness of breath please use runoff for abd eval;    TECHNIQUE:  Following the intravenous administration of 100 cc of Omnipaque 350 contrast material, 1.25 mm contiguous axial images were acquired through the chest utilizing the CT pulmonary angiogram protocol.  2-D coronal and sagittal reconstructed images of the chest and sagittal oblique MIP images of the pulmonary arterial system were generated from the source data.   This was followed by CT of the abdomen and pelvis using the same bolus of contrast.    COMPARISON:  CT chest abdomen and pelvis, 07/10/2019.    FINDINGS:  Pulmonary arterial system: This is a good quality examination for the evaluation of pulmonary thromboembolism with good opacification of the pulmonary arteries to the level of the segmental branches of the pulmonary arterial system.  Low volume pulmonary thrombo embolus is noted in the posterior basilar segmental branches of the left lower lobe pulmonary arterial system.  Additional thrombus is suggested in the distal segment of the interlobular pulmonary artery on the right.  No large saddle pulmonary embolism, enlargement of the main pulmonary artery, or secondary findings of right ventricular strain.    Aorta and heart: The heart is normal in size.  No pericardial fluid.  No thoracic aortic aneurysm.  No thoracic aortic dissection.    Thoracic inlet: Masslike density at the base of the neck on the right adjacent to the superior vena cava appears to have decreased in size since the prior exam..    Lymph nodes: No pathologically enlarged lymph nodes in the chest or axilla.    Lungs and pleural: Patchy ground-glass opacities throughout both lungs with more focal consolidation and parapneumonic effusion in the right lower lobe..  Pleural base mass in the posterior left lung base.    Abdomen: The innumerable peritoneal masses with ascites and omental caking appear to have increased especially the ascites.    The perihepatic masses have increased in size.    The hydronephrosis appears unchanged.    There is increase in 3rd spacing throughout the abdominal wall.  Calcific densities within the left breast appear dystrophic as well as in the left breast in the Antionette areolar region.    Mccartney catheter balloon in the urinary bladder.  Vascular calcifications throughout normal caliber vessels remain.    Bones: There is degenerative change of the thoracic spine.                                        X-Ray Chest 1 View (Final result)  Result time 07/25/24 01:47:22      Final result by Brandon Palencia MD (07/25/24 01:47:22)                   Impression:      Radiographic findings as above.      Electronically signed by: Brandon Palencia  Date:    07/25/2024  Time:    01:47               Narrative:    EXAMINATION:  XR CHEST 1 VIEW    CLINICAL HISTORY:  Shortness of breath    TECHNIQUE:  Single frontal view of the chest was performed.    COMPARISON:  Chest radiograph July 9, 2024    FINDINGS:  Single chest view is submitted.  Subcutaneous port central venous catheter again noted.  There is diminished depth of inspiration and there is elevation of the right hemidiaphragm.  When accounting for position and technique and depth of inspiration, the appearance of the cardiomediastinal silhouette appears stable.  Aortic atherosclerotic change noted.    Right-sided pleural effusion again noted, there is diminished volume of pleural fluid on the right, there is no evidence for left-sided pleural effusion and there is no evidence for pneumothorax bilaterally.    Accentuation of pulmonary bronchovascular markings consistent with diminished depth of inspiration noted.  Atelectatic change noted particularly at the right lung base.  There is no focal consolidation.    The osseous structures appear intact.  Chronic change noted.                                       Medications   0.9%  NaCl infusion (for blood administration) (has no administration in time range)   LORazepam injection 1 mg (has no administration in time range)   morphine injection 4 mg (has no administration in time range)   ondansetron injection 8 mg (has no administration in time range)   metoclopramide injection 5 mg (has no administration in time range)   morphine injection 2 mg (has no administration in time range)   glycopyrrolate injection 0.1 mg (has no administration in time range)   LORazepam injection 0.5 mg (has no  administration in time range)   morphine injection 4 mg (4 mg Intravenous Given 7/25/24 0028)   ondansetron injection 4 mg (4 mg Intravenous Given 7/25/24 0029)   iohexoL (OMNIPAQUE 350) injection 100 mL (100 mLs Intravenous Given 7/25/24 0136)   iohexol (Omnipaque 350) oral solution 30 mL (30 mLs Per G Tube Given 7/25/24 0100)   dextrose 10% bolus 250 mL 250 mL (0 mLs Intravenous Stopped 7/25/24 0221)   HYDROmorphone injection 1 mg (1 mg Intravenous Given 7/25/24 0158)     Medical Decision Making  Amount and/or Complexity of Data Reviewed  Labs: ordered.  Radiology: ordered. Decision-making details documented in ED Course.    Risk  Prescription drug management.  Decision regarding hospitalization.               ED Course as of 07/25/24 0730   Thu Jul 25, 2024   0221 Held code status discussion with patient and her , she wishes to be DNR/DNI, does not want antibiotics, or blood thinners.  She wants admission at this time    [KB]   0181 CTA Chest Non-Coronary (PE Studies)(!)  1. Small volume pulmonary thromboembolism in both lower lobes.  2. Consolidation and effusion in the right lower lung.  Pleural effusion appears to have increased.  While this could represent focal pneumonia or metastatic disease, pulmonary infarct is difficult to exclude but considered less likely.  3. Decreased size of nodular opacity at the thoracic inlet on the right.  4. Patchy airspace disease with pleural base mass in the left lung base stable..  5. Worsening of mesenteric masses throughout the peritoneal cavity and worsening ascites.  Given the short time span since the prior CT, this could represent flare response from chemotherapy or immunotherapy.  However findings are worrisome for progression of disease.  6. Worsening of 3rd spacing and ascites throughout the abdominal and abdominal wall .  7. Stable appearance of the devices and bowel-gas pattern allowing for ileus and constipation.  8. This report was flagged in Epic as  abnormal.      [KB]   9602 X-Ray Abdomen AP 1 View  On my personal interpretation, contrast present within the stomach indicating successful PEG placement  [KB]   8533 X-Ray Abdomen AP 1 View  Contrast material has been introduced through the indwelling gastrostomy tube, and is seen to lie within the stomach, particularly the gastric fundus. [KB]      ED Course User Index  [KB] Jossy Hollis MD                           58-year-old female described as above with ovarian cancer with recent discharge presenting for progressive worsening shortness of breath, generalized malaise and accidentally removed PEG tube.  On presentation, she is tachycardic, tachypneic with increased work of breathing. PEG tube replaced by general surgery given recent placement on the 12th.  KUB with contrast completed demonstrating adequate placement.      CTA completed to evaluate for pulmonary embolism, it was notable for bilateral lower lobe PE.  Consolidation and effusion present in the right lung, increased from previous.  Labs are notable for acute normocytic anemia with decreased hemoglobin to 5.2 from baseline of 7s.  She was consented and transfused 2 units PRBCs.  She has a moderate leukocytosis, however patient has chronic leukocytosis and only 4 points above baseline.  CMP notable for low bicarb, mild hypoglycemia, she was then given D10 bolus.  She was also noted to have JASON with significant increasing creatinine and decrease of GFR.  Cultures x2 initiated, point of care lactate completed with elevation concerning for tissue hypoperfusion versus sepsis.  Patient requested to hold anticoagulation and to hold on antibiotics based on goals of care.  Following patient's centered care decision making process, patient was requesting admission despite home hospice.  She requests do not intubate and do not resuscitate in the event of cardiorespiratory arrest.  Discussed patient with Gy[facility] Onc resident on-call who accepts for admission,  appreciated gyn-onc involving palliative care.  Following extensive discussion, patient admitted to 3rd floor inpatient hospice for further management of symptoms.         This patient does not have evidence of infective focus  My overall impression is  initial concern for sepsis .  Source: Unknown  Antibiotics given- patient declined abx  Antibiotics (72h ago, onward)      None          Latest lactate reviewed-  Recent Labs   Lab 07/25/24  0159   POCLAC 3.69*     Organ dysfunction indicated by Acute kidney injury and Acute respiratory failure    Fluid challenge Not needed - patient is not hypotensive      Post- resuscitation assessment No - Post resuscitation assessment not needed       Will Not start Pressors- Levophed for MAP of 65  Source control achieved by: as above, patient declined abx      Clinical Impression:  Final diagnoses:  [R06.02] Shortness of breath  [K94.23] PEG tube malfunction  [N17.9] Acute kidney injury (Primary)  [E16.2] Hypoglycemia  [C56.9] Malignant neoplasm of ovary, unspecified laterality  [N17.9] JASON (acute kidney injury)  [D64.9] Anemia, unspecified type  [Z51.5] End of life care          ED Disposition Condition    Observation                 Jossy Hollis MD  Resident  07/25/24 0245

## 2024-07-25 NOTE — CONSULTS
[facility] - Emergency Dept  Palliative Medicine  Consult Note    Patient Name: Anette Ricci  MRN: [id number]  Admission Date: 7/24/2024  Hospital Length of Stay: 1 days  Code Status: DNR   Attending Provider: Irma Sanabria MD  Consulting Provider: Irma Sanabria MD  Primary Care Physician: Mark Chua MD  Principal Problem:<principal problem not specified>    Patient information was obtained from spouse/SO and primary team.      Palliative Care  Consult performed by: Irma Sanabria MD  Consult ordered by: Supriya Bell MD        Assessment/Plan:     Palliative Care  Palliative care encounter  Anette Ricci is a 58-year-old woman with a history of stage IV ovarian carcinosarcoma s/p debulking, omentectomy, partial hepatectomy, recurrent malignant pleural effusions, recurrent malignant ascites, pulmonary embolism, home hospice patient with [facility] who presented to the Emergency Department after her venting PEG was dislodged. General surgery was consulted and graciously fixed the G tube. Unfortunately, Mrs. Ricci is likely in her final short days of life and Palliative/Supportive Care was consulted for admission to inpatient hospice unit for aggressive symptom management at the end of life.    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 Requiring IV opioids and benzodiazepines for pain behaviors, respiratory distress, and agitation. Likely prognosis of short days    Dispo: anticipate end-of-life care on the unit, if patient unexpectedly stabilizes, will approach family regarding care at  home or alternative in-facility/long-term placement with hospice support    Symptom Assessment  - Pain/ pain behaviors: controlled  - Dyspnea/tachypnea: uncontrolled  - Agitation: controlled  - Mentation: minimally responsive    Symptom Oriented Management:    Tachypnea/Dyspnea:   - Discontinue home fentanyl patch  - Hydromorphone 0.5 mg IV q30min PRN pain behavior (groaning, grimacing, guarding), labored breathing  - Lorazepam 0.5 mg IV q30min PRN anxiety, labored breathing refractory to opioids  - Fan at bedside  - Avoid artificial hydration  - Treat fevers symptomatically with PRN APAP, NSAID's, and if refractory to these measures, dexamethasone     Pain Behaviors:   - Opioids as above  - Turn only as tolerated     Agitation/Anxiety/Encephalopathy:   - Treat for pain/labored breathing as above  - Lorazepam 0.5 mg IV q30min PRN anxiety/agitation, labored breathing refractory to opioids  - Haloperidol 1 mg IV q4h PRN agitation, nausea/vomiting refractory to ondansetron    Profuse Oropharyngeal Secretions:  - Turn head side to side to help shift secretions out of airway, allow to pool to cheeks and out of mouth  - Gentle, frequent oral care - encourage family at bedside to participate  - Yankauer suction at bedside  - Glycopyrrolate 0.3 mg IV q4h PRN profuse oropharyngeal secretions    Nausea/Vomiting/Retching:  - Ondansetron 8 mg IV q6h PRN nausea/vomiting  - Haloperidol 1 mg IV q4h PRN nausea/vomiting refractory to ondansetron    Nutrition / Hydration  - No IV fluids and artificial nutrition  - Liberalize diet in order to permit comfort feedings as desired and tolerated  - Simplified medication regimen by eliminating those medications that provide little to no benefit at end-of-life, including no therapeutic anticoagulation    Bowel Regimen:  - Last bowel movement 7/25/24  - Will not prioritize at end of life    Fever  - Apply cold compresses  - Fan at bedside  - Acetaminophen 650 mg suppository q4h PRN fever  -  If turning triggers severe pain, avoid suppository administration and trial ketorolac 15 mg IV q4h PRN fever  - If febrile despite above measures, trial dexamethasone 2 mg IV q6h PRN refractory fevers        Subjective:     HPI:   Anette Ricci is a 58-year-old woman with a history of stage IV ovarian carcinosarcoma s/p debulking, omentectomy, partial hepatectomy, recurrent malignant pleural effusions, recurrent malignant ascites, pulmonary embolism, home hospice patient with [facility] who presented to the Emergency Department after her venting PEG was dislodged. General surgery was consulted and graciously fixed the G tube. Unfortunately, Mrs. Ricci is likely in her final short days of life and Palliative/Supportive Care was consulted for admission to inpatient hospice unit for aggressive symptom management at the end of life.      Interval History: Observation admission to inpatient hospice/palliative care unit.    Past Medical History:   Diagnosis Date    Breast cancer 2013    Diabetes mellitus     Genetic testing 05/29/2013    VUS    Hyperlipidemia     Hypertension     Malignant neoplasm of upper-outer quadrant of right breast in female, estrogen receptor positive 12/02/2015    Seizure disorder        Past Surgical History:   Procedure Laterality Date    BILATERAL SALPINGO-OOPHORECTOMY (BSO) N/A 3/25/2024    Procedure: SALPINGO-OOPHORECTOMY, BILATERAL;  Surgeon: Александр Washington MD;  Location: [facility] OR [address] [place] [address];  Service: OB/GYN;  Laterality: N/A;    BREAST BIOPSY      BREAST LUMPECTOMY Right 2013    CHOLECYSTECTOMY  3/25/2024    Procedure: CHOLECYSTECTOMY;  Surgeon: Bo Farmer MD;  Location: [facility] OR [address] [place] [address];  Service: General;;    DEBULKING OF TUMOR N/A 3/25/2024    Procedure: DEBULKING, NEOPLASM;  Surgeon: Александр Washington MD;  Location: [facility] OR [address] [place] [address];  Service: OB/GYN;  Laterality: N/A;    ESOPHAGOGASTRODUODENOSCOPY W/ PEG N/A 7/12/2024    Procedure: EGD, WITH PEG TUBE INSERTION;   Surgeon: Bo Farmer MD;  Location: [facility] OR [facility] FLR;  Service: General;  Laterality: N/A;    EXCISION, LIVER N/A 3/25/2024    Procedure: EXCISION, LIVER - partial;  Surgeon: Bo Farmer MD;  Location: [facility] OR [facility] FLR;  Service: General;  Laterality: N/A;  bk ultrasound  [place] book by TA on 3-21-24  7:00 a.m.  Conf #  [id number]    EYE SURGERY      LAPAROTOMY, EXPLORATORY N/A 3/25/2024    Procedure: LAPAROTOMY, EXPLORATORY;  Surgeon: Александр Washington MD;  [facility];  Service: OB/GYN;  Laterality: N/A;    OMENTECTOMY N/A 3/25/2024    Procedure: OMENTECTOMY;  Surgeon: Александр Washington MD;  [facility];  Service: OB/GYN;  Laterality: N/A;    PERITONEOCENTESIS N/A 3/13/2024    Procedure: PARACENTESIS, ABDOMINAL;  Surgeon: Flavia Moore MD;  Location: [facility] CATH LAB;  Service: Radiology;  Laterality: N/A;    PERITONEOCENTESIS N/A 3/21/2024    Procedure: PARACENTESIS, ABDOMINAL;  Surgeon: Jorge Jolley MD;  Location: [facility] CATH LAB;  Service: Radiology;  Laterality: N/A;    PERITONEOCENTESIS N/A 6/10/2024    Procedure: PARACENTESIS, ABDOMINAL;  Surgeon: Rgoelio Malave MD;  Location: [facility] CATH LAB;  Service: Radiology;  Laterality: N/A;    TOTAL ABDOMINAL HYSTERECTOMY N/A 3/25/2024    Procedure: HYSTERECTOMY, TOTAL, ABDOMINAL;  Surgeon: Александр Washington MD;  Location: [facility] OR [address] [place] [address];  Service: OB/GYN;  Laterality: N/A;    TOTAL REDUCTION MAMMOPLASTY Bilateral 2016       Review of patient's allergies indicates:   Allergen Reactions    Codeine Other (See Comments)     Flu like s/s    Tramadol Other (See Comments)     Flu like symptoms similar to codeine reaction       Medications:  Continuous Infusions:  Scheduled Meds:  PRN Meds:  Current Facility-Administered Medications:     0.9%  NaCl infusion (for blood administration), , Intravenous, Q24H PRN    glycopyrrolate, 0.3 mg, Intravenous, Q4H PRN    haloperidol lactate, 1 mg, Intravenous, Q6H PRN     HYDROmorphone, 0.5 mg, Intravenous, Q30 Min PRN    lorazepam, 0.5 mg, Intravenous, Q30 Min PRN    ondansetron, 8 mg, Intravenous, Q6H PRN    Family History       Problem Relation (Age of Onset)    Breast cancer Sister (48)    Colon cancer Sister    Hypertension Mother, Brother, Brother    Seizures Father          Tobacco Use    Smoking status: Never    Smokeless tobacco: Never   Substance and Sexual Activity    Alcohol use: Yes     Alcohol/week: 0.0 standard drinks of alcohol     Comment: ocassional    Drug use: No    Sexual activity: Not Currently     Partners: Male     Birth control/protection: None       Review of Systems   Unable to perform ROS: Mental status change     Objective:     Vital Signs (Most Recent):  Temp: 97.6 °F (36.4 °C) (07/25/24 0405)  Pulse: 103 (07/25/24 0800)  Resp: 20 (07/25/24 0800)  BP: 117/60 (07/25/24 0800)  SpO2: 100 % (07/25/24 0800) Vital Signs (24h Range):  Temp:  [97.6 °F (36.4 °C)-98.7 °F (37.1 °C)] 97.6 °F (36.4 °C)  Pulse:  [100-106] 103  Resp:  [16-20] 20  SpO2:  [95 %-100 %] 100 %  BP: (117-139)/(57-70) 117/60     Weight: 86.6 kg (191 lb)  Body mass index is 29.91 kg/m².       Physical Exam  Constitutional:       General: She is in acute distress.      Appearance: She is ill-appearing.   HENT:      Head: Normocephalic and atraumatic.      Right Ear: External ear normal.      Left Ear: External ear normal.      Nose: Nose normal.      Mouth/Throat:      Mouth: Mucous membranes are dry.   Eyes:      Conjunctiva/sclera: Conjunctivae normal.   Cardiovascular:      Rate and Rhythm: Tachycardia present.   Pulmonary:      Breath sounds: No wheezing.      Comments: Cheyne Muller breathing with apneic pauses, guppy breathing  Abdominal:      General: There is distension.      Tenderness: There is no guarding.   Musculoskeletal:         General: No swelling.   Skin:     General: Skin is warm and dry.   Neurological:      Mental Status: She is disoriented.            Review of  Symptoms      Symptom Assessment (ESAS 0-10 Scale)  Unable to complete assessment due to Mental status change         Pain Assessment in Advanced Demential Scale (PAINAD)   Breathing - Independent of vocalization:  1  Negative vocalization:  0  Facial expression:  0  Body language:  0  Consolability:  0  Total:  1    Living Arrangements:  Lives with spouse    Psychosocial/Cultural:   See Palliative Psychosocial Note: No   to  Darrell for 38 years. They had one son who unfortunately passed away in his teens.  **Primary  to Follow**  Palliative Care  Consult: No        Advance Care Planning  Advance Directives:   Living Will: No    LaPOST: No    Do Not Resuscitate Status: Yes    Medical Power of : No      Decision Making:  Family answered questions and Patient unable to communicate due to disease severity/cognitive impairment  Goals of Care: Emotional, supportive conversation held with  Darrell at Mrs. Ricci's bedside. She is unable to participate due to encephalopathy. Darrell confirms for me that she was utilizing home hospice support with [facility]. She had been alert, coherent, but getting weaker. She had a bowel movement and while he was turning her and cleaning her, her PEG tube dislodged. He brought her to the hospital because it wouldn't get back in place. He is her primary caregiver,  for 38 years. He shares that they had one son who unfortunately passed away in his teens. He is appropriately tearful during this conversation - admits that he doesn't know what he would do without her. He shares that Mrs. Ricci had asked him to bring her to the hospital, wanting to spend her final days in a facility rather than at home. She has severe pain, for which she is on a fentanyl patch. She hasn't been eating as much, only preferring small sips of water, juice, tea, etc. He was told by a doctor in the Emergency Department that his wife was dying, likely  in her final hours to short days. He confirms for me that he would want her to be as comfortable as possible. There are other family members (siblings) who are very important to them as well.  - I shared with him about our inpatient hospice unit, including the focus on providing medicines to reduce the intensity of her symptom burden (pain, air hunger, nausea, anxiety, etc). I was transparent about the limitations as well - no labs/imaging/diagnostics, no telemetry, no IV fluids or artificial nutrition, mimicking what would be otherwise done at home. I shared that during this time of life, she will struggle to swallow and in fact, forcing drink/food/medicines in her mouth may make her choke, and she may reflexively shut her mouth to prevent us from putting food/drink/medicine in her mouth. I shared that we would use her IV's to administer medicines at appropriate doses to reduce the intensity of her pain, air hunger, anxiety, nausea, etc, but not to force IV fluids that would end up in her lungs, causing her to feel like she's drowning. Darrell nodded in understanding. Emotional support provided. I asked Darrell if he needed anything else, and he admitted that he needed support. He is aware that she will be moved to the 3rd floor [facility] hospice unit as soon as possible.         Significant Labs: CBC:   Recent Labs   Lab 07/25/24 0032   WBC 18.27*   HGB 5.2*   HCT 17.5*        CMP:   Recent Labs   Lab 07/25/24 0032   *   K 5.0   CL 96   CO2 18*   GLU 68*   BUN 51*   CREATININE 2.8*   CALCIUM 8.2*   PROT 6.8   ALBUMIN 1.8*   BILITOT 1.7*   ALKPHOS 110   *   ALT 10   ANIONGAP 18*     CBC:   Recent Labs   Lab 07/25/24 0032   WBC 18.27*   HGB 5.2*   HCT 17.5*   MCV 96        BMP:  Recent Labs   Lab 07/25/24  0032   GLU 68*   *   K 5.0   CL 96   CO2 18*   BUN 51*   CREATININE 2.8*   CALCIUM 8.2*   MG 2.0     LFT:  Lab Results   Component Value Date     (H) 07/25/2024    ALKPHOS  110 07/25/2024    BILITOT 1.7 (H) 07/25/2024     Albumin:   Albumin   Date Value Ref Range Status   07/25/2024 1.8 (L) 3.5 - 5.2 g/dL Final     Protein:   Total Protein   Date Value Ref Range Status   07/25/2024 6.8 6.0 - 8.4 g/dL Final     Lactic acid:   Lab Results   Component Value Date    LACTATE 1.7 05/23/2024    LACTATE 1.8 05/21/2024       Significant Imaging: I have reviewed all pertinent imaging results/findings within the past 24 hours.    7/25/24 CTA Chest and Abdomen/Pelvis with IV Contrast Impression:  1. Small volume pulmonary thromboembolism in both lower lobes.  2. Consolidation and effusion in the right lower lung.  Pleural effusion appears to have increased.  While this could represent focal pneumonia or metastatic disease, pulmonary infarct is difficult to exclude but considered less likely.  3. Decreased size of nodular opacity at the thoracic inlet on the right.  4. Patchy airspace disease with pleural base mass in the left lung base stable..  5. Worsening of mesenteric masses throughout the peritoneal cavity and worsening ascites.  Given the short time span since the prior CT, this could represent flare response from chemotherapy or immunotherapy.  However findings are worrisome for progression of disease.  6. Worsening of 3rd spacing and ascites throughout the abdominal and abdominal wall .  7. Stable appearance of the devices and bowel-gas pattern allowing for ileus and constipation.  8. This report was flagged in Epic as abnormal.    I spent a total of 75 minutes on the day of the visit. This includes face to face time in discussion of goals of care, symptom assessment, coordination of care and emotional support.  This also includes non-face to face time preparing to see the patient (eg, review of tests/imaging), obtaining and/or reviewing separately obtained history, documenting clinical information in the electronic or other health record, independently interpreting results and  communicating results to the patient/family/caregiver, or care coordinator.    Irma Sanabria MD  Palliative Medicine  [facility] - Emergency Dept

## 2024-07-25 NOTE — SUBJECTIVE & OBJECTIVE
Interval History: Observation admission to inpatient hospice/palliative care unit.    Past Medical History:   Diagnosis Date    Breast cancer 2013    Diabetes mellitus     Genetic testing 05/29/2013    VUS    Hyperlipidemia     Hypertension     Malignant neoplasm of upper-outer quadrant of right breast in female, estrogen receptor positive 12/02/2015    Seizure disorder        Past Surgical History:   Procedure Laterality Date    BILATERAL SALPINGO-OOPHORECTOMY (BSO) N/A 3/25/2024    Procedure: SALPINGO-OOPHORECTOMY, BILATERAL;  Surgeon: Александр Washington MD;  [facility];  Service: OB/GYN;  Laterality: N/A;    BREAST BIOPSY      BREAST LUMPECTOMY Right 2013    CHOLECYSTECTOMY  3/25/2024    Procedure: CHOLECYSTECTOMY;  Surgeon: Bo Farmer MD;  Location: [facility] OR [address] [place] [address];  Service: General;;    DEBULKING OF TUMOR N/A 3/25/2024    Procedure: DEBULKING, NEOPLASM;  Surgeon: Александр Washington MD;  Location: [facility] OR [address] [place] [address];  Service: OB/GYN;  Laterality: N/A;    ESOPHAGOGASTRODUODENOSCOPY W/ PEG N/A 7/12/2024    Procedure: EGD, WITH PEG TUBE INSERTION;  Surgeon: Bo Framer MD;  [facility];  Service: General;  Laterality: N/A;    EXCISION, LIVER N/A 3/25/2024    Procedure: EXCISION, LIVER - partial;  Surgeon: Bo Farmer MD;  Location: [facility] OR [address] [place] [address];  Service: General;  Laterality: N/A;  bk ultrasound  [place] book by SAMINA on 3-21-24  7:00 a.m.  Conf #  [id number]    EYE SURGERY      LAPAROTOMY, EXPLORATORY N/A 3/25/2024    Procedure: LAPAROTOMY, EXPLORATORY;  Surgeon: Александр Washington MD;  [facility];  Service: OB/GYN;  Laterality: N/A;    OMENTECTOMY N/A 3/25/2024    Procedure: OMENTECTOMY;  Surgeon: Александр Washington MD;  Location: [facility] OR [address] [place] [address];  Service: OB/GYN;  Laterality: N/A;    PERITONEOCENTESIS N/A 3/13/2024    Procedure: PARACENTESIS, ABDOMINAL;  Surgeon: Flavia Moore MD;  Location: [facility] CATH LAB;  Service: Radiology;   Laterality: N/A;    PERITONEOCENTESIS N/A 3/21/2024    Procedure: PARACENTESIS, ABDOMINAL;  Surgeon: Jorge Jolley MD;  Location: [facility] CATH LAB;  Service: Radiology;  Laterality: N/A;    PERITONEOCENTESIS N/A 6/10/2024    Procedure: PARACENTESIS, ABDOMINAL;  Surgeon: Rogelio Malave MD;  Location: [facility] CATH LAB;  Service: Radiology;  Laterality: N/A;    TOTAL ABDOMINAL HYSTERECTOMY N/A 3/25/2024    Procedure: HYSTERECTOMY, TOTAL, ABDOMINAL;  Surgeon: Александр Washington MD;  Location: [facility] OR [address] [place] [address];  Service: OB/GYN;  Laterality: N/A;    TOTAL REDUCTION MAMMOPLASTY Bilateral 2016       Review of patient's allergies indicates:   Allergen Reactions    Codeine Other (See Comments)     Flu like s/s    Tramadol Other (See Comments)     Flu like symptoms similar to codeine reaction       Medications:  Continuous Infusions:  Scheduled Meds:  PRN Meds:  Current Facility-Administered Medications:     0.9%  NaCl infusion (for blood administration), , Intravenous, Q24H PRN    glycopyrrolate, 0.3 mg, Intravenous, Q4H PRN    haloperidol lactate, 1 mg, Intravenous, Q6H PRN    HYDROmorphone, 0.5 mg, Intravenous, Q30 Min PRN    lorazepam, 0.5 mg, Intravenous, Q30 Min PRN    ondansetron, 8 mg, Intravenous, Q6H PRN    Family History       Problem Relation (Age of Onset)    Breast cancer Sister (48)    Colon cancer Sister    Hypertension Mother, Brother, Brother    Seizures Father          Tobacco Use    Smoking status: Never    Smokeless tobacco: Never   Substance and Sexual Activity    Alcohol use: Yes     Alcohol/week: 0.0 standard drinks of alcohol     Comment: ocassional    Drug use: No    Sexual activity: Not Currently     Partners: Male     Birth control/protection: None       Review of Systems   Unable to perform ROS: Mental status change     Objective:     Vital Signs (Most Recent):  Temp: 97.6 °F (36.4 °C) (07/25/24 0405)  Pulse: 103 (07/25/24 0800)  Resp: 20 (07/25/24 0800)  BP: 117/60 (07/25/24 0800)  SpO2:  100 % (07/25/24 0800) Vital Signs (24h Range):  Temp:  [97.6 °F (36.4 °C)-98.7 °F (37.1 °C)] 97.6 °F (36.4 °C)  Pulse:  [100-106] 103  Resp:  [16-20] 20  SpO2:  [95 %-100 %] 100 %  BP: (117-139)/(57-70) 117/60     Weight: 86.6 kg (191 lb)  Body mass index is 29.91 kg/m².       Physical Exam  Constitutional:       General: She is in acute distress.      Appearance: She is ill-appearing.   HENT:      Head: Normocephalic and atraumatic.      Right Ear: External ear normal.      Left Ear: External ear normal.      Nose: Nose normal.      Mouth/Throat:      Mouth: Mucous membranes are dry.   Eyes:      Conjunctiva/sclera: Conjunctivae normal.   Cardiovascular:      Rate and Rhythm: Tachycardia present.   Pulmonary:      Breath sounds: No wheezing.      Comments: Cheyne Muller breathing with apneic pauses, guppy breathing  Abdominal:      General: There is distension.      Tenderness: There is no guarding.   Musculoskeletal:         General: No swelling.   Skin:     General: Skin is warm and dry.   Neurological:      Mental Status: She is disoriented.            Review of Symptoms      Symptom Assessment (ESAS 0-10 Scale)  Unable to complete assessment due to Mental status change         Pain Assessment in Advanced Demential Scale (PAINAD)   Breathing - Independent of vocalization:  1  Negative vocalization:  0  Facial expression:  0  Body language:  0  Consolability:  0  Total:  1    Living Arrangements:  Lives with spouse    Psychosocial/Cultural:   See Palliative Psychosocial Note: No   to  Darrell for 38 years. They had one son who unfortunately passed away in his teens.  **Primary  to Follow**  Palliative Care  Consult: No        Advance Care Planning   Advance Directives:   Living Will: No    LaPOST: No    Do Not Resuscitate Status: Yes    Medical Power of : No      Decision Making:  Family answered questions and Patient unable to communicate due to disease  severity/cognitive impairment  Goals of Care: Emotional, supportive conversation held with  Darrell at Mrs. Ricci's bedside. She is unable to participate due to encephalopathy. Darrell confirms for me that she was utilizing home hospice support with [facility]. She had been alert, coherent, but getting weaker. She had a bowel movement and while he was turning her and cleaning her, her PEG tube dislodged. He brought her to the hospital because it wouldn't get back in place. He is her primary caregiver,  for 38 years. He shares that they had one son who unfortunately passed away in his teens. He is appropriately tearful during this conversation - admits that he doesn't know what he would do without her. He shares that Mrs. Ricci had asked him to bring her to the hospital, wanting to spend her final days in a facility rather than at home. She has severe pain, for which she is on a fentanyl patch. She hasn't been eating as much, only preferring small sips of water, juice, tea, etc. He was told by a doctor in the Emergency Department that his wife was dying, likely in her final hours to short days. He confirms for me that he would want her to be as comfortable as possible. There are other family members (siblings) who are very important to them as well.  - I shared with him about our inpatient hospice unit, including the focus on providing medicines to reduce the intensity of her symptom burden (pain, air hunger, nausea, anxiety, etc). I was transparent about the limitations as well - no labs/imaging/diagnostics, no telemetry, no IV fluids or artificial nutrition, mimicking what would be otherwise done at home. I shared that during this time of life, she will struggle to swallow and in fact, forcing drink/food/medicines in her mouth may make her choke, and she may reflexively shut her mouth to prevent us from putting food/drink/medicine in her mouth. I shared that we would use her IV's to administer  medicines at appropriate doses to reduce the intensity of her pain, air hunger, anxiety, nausea, etc, but not to force IV fluids that would end up in her lungs, causing her to feel like she's drowning. Darrell nodded in understanding. Emotional support provided. I asked Darrell if he needed anything else, and he admitted that he needed support. He is aware that she will be moved to the 3rd floor [facility] hospice unit as soon as possible.         Significant Labs: CBC:   Recent Labs   Lab 07/25/24  0032   WBC 18.27*   HGB 5.2*   HCT 17.5*        CMP:   Recent Labs   Lab 07/25/24  0032   *   K 5.0   CL 96   CO2 18*   GLU 68*   BUN 51*   CREATININE 2.8*   CALCIUM 8.2*   PROT 6.8   ALBUMIN 1.8*   BILITOT 1.7*   ALKPHOS 110   *   ALT 10   ANIONGAP 18*     CBC:   Recent Labs   Lab 07/25/24 0032   WBC 18.27*   HGB 5.2*   HCT 17.5*   MCV 96        BMP:  Recent Labs   Lab 07/25/24  0032   GLU 68*   *   K 5.0   CL 96   CO2 18*   BUN 51*   CREATININE 2.8*   CALCIUM 8.2*   MG 2.0     LFT:  Lab Results   Component Value Date     (H) 07/25/2024    ALKPHOS 110 07/25/2024    BILITOT 1.7 (H) 07/25/2024     Albumin:   Albumin   Date Value Ref Range Status   07/25/2024 1.8 (L) 3.5 - 5.2 g/dL Final     Protein:   Total Protein   Date Value Ref Range Status   07/25/2024 6.8 6.0 - 8.4 g/dL Final     Lactic acid:   Lab Results   Component Value Date    LACTATE 1.7 05/23/2024    LACTATE 1.8 05/21/2024       Significant Imaging: I have reviewed all pertinent imaging results/findings within the past 24 hours.    7/25/24 CTA Chest and Abdomen/Pelvis with IV Contrast Impression:  1. Small volume pulmonary thromboembolism in both lower lobes.  2. Consolidation and effusion in the right lower lung.  Pleural effusion appears to have increased.  While this could represent focal pneumonia or metastatic disease, pulmonary infarct is difficult to exclude but considered less likely.  3. Decreased size of nodular  opacity at the thoracic inlet on the right.  4. Patchy airspace disease with pleural base mass in the left lung base stable..  5. Worsening of mesenteric masses throughout the peritoneal cavity and worsening ascites.  Given the short time span since the prior CT, this could represent flare response from chemotherapy or immunotherapy.  However findings are worrisome for progression of disease.  6. Worsening of 3rd spacing and ascites throughout the abdominal and abdominal wall .  7. Stable appearance of the devices and bowel-gas pattern allowing for ileus and constipation.  8. This report was flagged in Epic as abnormal.

## 2024-07-25 NOTE — DISCHARGE SUMMARY
[facility]  Palliative Medicine  Discharge Summary      Patient Name: Anette Ricci  MRN: [id number]  Admission Date: 7/24/2024  Hospital Length of Stay: 1 days  Discharge Date and Time:  07/25/2024 11:15 AM  Attending Physician: Irma Sanabria MD   Discharging Provider: Irma Sanabria MD  Primary Care Provider: Mark Chua MD    HPI:   Anette Ricci is a 58-year-old woman with a history of stage IV ovarian carcinosarcoma s/p debulking, omentectomy, partial hepatectomy, recurrent malignant pleural effusions, recurrent malignant ascites, pulmonary embolism, home hospice patient with [facility] who presented to the Emergency Department after her venting PEG was dislodged. General surgery was consulted and graciously fixed the G tube. Unfortunately, Mrs. Ricci is likely in her final short days of life and Palliative/Supportive Care was consulted for admission to inpatient hospice unit for aggressive symptom management at the end of life.      Hospital Course:   Evaluated by inpatient hospice [facility], agreed that patient meets criteria for intractable pain and dyspnea. Consents signed by . Flipping to inpatient hospice.    Goals of Care Treatment Preferences:  Code Status: DNR          What is most important right now is to focus on spending time at home, quality of life, even if it means sacrificing a little time.  Accordingly, we have decided that the best plan to meet the patient's goals includes enrolling in hospice care.      Consults:   Consults (From admission, onward)          Status Ordering Provider     Palliative Care  Once        Provider:  (Not yet assigned)    Completed MANNY JUAREZ     Case Management/Social Work  Once        Provider:  (Not yet assigned)    Completed MANNY JUAREZ     Consult to Spiritual Care  Once        Provider:  (Not yet assigned)    Completed MANNY JUAREZ     Inpatient consult to General Surgery  Once        Provider:   (Not yet assigned)    Completed LAURENT ROSS            No new Assessment & Plan notes have been filed under this hospital service since the last note was generated.  Service: Palliative Medicine    Final Active Diagnoses:    Diagnosis Date Noted POA    PRINCIPAL PROBLEM:  Ovarian carcinosarcoma [C56.9] 03/11/2024 Yes    Comfort measures only status [Z51.5] 07/25/2024 Not Applicable    Palliative care encounter [Z51.5] 07/11/2024 Not Applicable      Problems Resolved During this Admission:       Discharged Condition: poor    Disposition: inpatient hospice GIP    Follow Up: inpatient hospice    Patient Instructions: N/A    Significant Diagnostic Studies: N/A    Pending Diagnostic Studies:       Procedure Component Value Units Date/Time    EKG 12-lead [[id number]]     Order Status: Sent Lab Status: No result            Medications:  Transfer Medications (for Discharge Readmit only):   Current Facility-Administered Medications   Medication Dose Route Frequency Provider Last Rate Last Admin    0.9%  NaCl infusion (for blood administration)   Intravenous Q24H PRN Laurent Ross MD        glycopyrrolate injection 0.3 mg  0.3 mg Intravenous Q4H PRIrma Zhou MD        haloperidol lactate injection 1 mg  1 mg Intravenous Q6H PRIrma Zhou MD        HYDROmorphone injection 0.5 mg  0.5 mg Intravenous Q30 Min PRIrma Zhou MD   0.5 mg at 07/25/24 1005    LORazepam injection 0.5 mg  0.5 mg Intravenous Q30 Min PRIrma Zhou MD   0.5 mg at 07/25/24 0913    ondansetron injection 8 mg  8 mg Intravenous Q6H PRIrma Zhou MD           Indwelling Lines/Drains at time of discharge:   Lines/Drains/Airways       Central Venous Catheter Line  Duration             Port A Cath Single Lumen Subclavian Left -- days                    Time spent on the discharge of patient: 30 minutes  Patient was seen and examined on the date of discharge and determined to be suitable for discharge.    Irma Sanabria MD  Department of  Palliative Medicine  [facility]

## 2024-07-25 NOTE — CHAPLAIN
07/25/24 1010   Clinical Encounter Type   Visit Type Initial Visit   Visit Category Hospice   Visited With Family   Number of Family Visited 1   Length of Visit 15 Minutes   Continue Visiting Yes   Referral From Physician  (via Epic Consult)   Family Spiritual Encounters   Care Provided Prayer support;Reflective listening;Compassionate presence;Grief care;Decisions near end of life   Family Coping Sadness;Open/discussion;Living with uncertainty;Family/ friends resources   Comments - Family Chp met with Spouse of 38 years, Darrell. Chp explored Spouse's expressions of shock, sadness and living with uncertainty; Chp validated emotions, affirmed experience, and provided grief care. Pt is of [employer] edwardo. Chp provided prayer for Pt per Spouse request. Chp will f/u later today following Pt transition to IP hospice.

## 2024-07-25 NOTE — CONSULTS
Consult Note  Gynecology Oncology    Admit Date: 7/24/2024  LOS: 1    Reason for Admission:  End of life care     SUBJECTIVE:     Anette Ricci is a 58 y.o. 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 She presented to the ED with dislodgement of her PEG tube and reports overall fatigue, SOB and worsening pain. Gyn onc was consulted for admission.     Ms. Ricci was recently discharged from our service on 7/13 following palliative venting PEG tube placement by surg onc, and patient decision to initiate home hospice. Her , Darrell, is her primary caregiver and noted her PEG tube was dislodged with gastric content leakage last evening. In the ED general surgery was consulted and successfully repositioned her PEG tube. Following this she reports worsening SOB fatigue and desires inpatient admission for her end of life care.     OBJECTIVE:     Vital Signs   Temp:  [97.6 °F (36.4 °C)-98.7 °F (37.1 °C)] 97.6 °F (36.4 °C)  Pulse:  [100-109] 104  Resp:  [10-20] 10  SpO2:  [95 %-100 %] 100 %  BP: (117-139)/(57-70) 117/60      Intake/Output Summary (Last 24 hours) at 7/25/2024 1042  Last data filed at 7/25/2024 0221  Gross per 24 hour   Intake 250 ml   Output --   Net 250 ml       Physical Exam:  Gen: not alert, minimally responsive; appears cachetic with labored breathing   CV: extremities warm well perfused  Pulm: accessory muscle usage   Abd: soft nontender     Net IO Since Admission: 250 mL [07/25/24 1042]     Laboratory:  Recent Labs   Lab 07/25/24  0032   WBC 18.27*   HGB 5.2*   HCT 17.5*   MCV 96         Recent Labs   Lab 07/25/24  0032   *   K 5.0   CL 96   CO2 18*   BUN 51*   CREATININE 2.8*   GLU 68*   PROT 6.8   BILITOT 1.7*   ALKPHOS 110   ALT 10   *   MG 2.0        Diagnostic Results:    Imaging Results              X-Ray Abdomen AP 1 View (Final result)  Result time 07/25/24 05:40:21      Final result by Keith Constantino  MD ABRAHAM (07/25/24 05:40:21)                   Impression:      As above      Electronically signed by: Keith Constantino MD  Date:    07/25/2024  Time:    05:40               Narrative:    EXAMINATION:  XR ABDOMEN AP 1 VIEW    CLINICAL HISTORY:  peg;    TECHNIQUE:  One view    COMPARISON:  Comparison is made to the abdominal radiograph of 07/25/2024 at 01:33.    FINDINGS:  Contrast material has been introduced through the indwelling gastrostomy tube, and is seen to lie within the stomach, particularly the gastric fundus.  No definite extraluminal extravasation of contrast is appreciated.  No significant gaseous distention of large or small bowel loops or other significant detrimental interval change in the conventional radiographic appearance of the abdomen since 07/25/2024 at 01:33 appreciated.                                       X-Ray Abdomen AP 1 View (KUB) (Final result)  Result time 07/25/24 01:49:29      Final result by Brandon Palencia MD (07/25/24 01:49:29)                   Impression:      Radiographic findings as above.      Electronically signed by: Brandon Palencia  Date:    07/25/2024  Time:    01:49               Narrative:    EXAMINATION:  XR ABDOMEN AP 1 VIEW    CLINICAL HISTORY:  Gastrostomy malfunction    TECHNIQUE:  AP View(s) of the abdomen was performed.    COMPARISON:  Radiograph abdomen July 11, 2024    FINDINGS:  AP abdominal radiographic examination is submitted.  Limited imaging of the lung bases demonstrates mild elevation of the right hemidiaphragm and small pleural effusion on the right.  Suspected percutaneous gastrostomy tube noted.    There is no evidence for abnormal gastric distention.  There are a few air-filled bowel loops however there is a relative paucity of bowel gas.  There is no abnormal bowel distention appreciated radiographically.    The osseous structures demonstrate chronic change.  Vascular calcifications are noted.  The entirety of the lower pelvis to include the symphysis  pubis and ischium not included on the field of view.                                        CTA Chest Non-Coronary (PE Studies) (Final result)  Result time 07/25/24 01:56:53      Final result by Александр Sibley MD (07/25/24 01:56:53)                   Impression:      1. Small volume pulmonary thromboembolism in both lower lobes.  2. Consolidation and effusion in the right lower lung.  Pleural effusion appears to have increased.  While this could represent focal pneumonia or metastatic disease, pulmonary infarct is difficult to exclude but considered less likely.  3. Decreased size of nodular opacity at the thoracic inlet on the right.  4. Patchy airspace disease with pleural base mass in the left lung base stable..  5. Worsening of mesenteric masses throughout the peritoneal cavity and worsening ascites.  Given the short time span since the prior CT, this could represent flare response from chemotherapy or immunotherapy.  However findings are worrisome for progression of disease.  6. Worsening of 3rd spacing and ascites throughout the abdominal and abdominal wall .  7. Stable appearance of the devices and bowel-gas pattern allowing for ileus and constipation.  8. This report was flagged in Epic as abnormal.      Electronically signed by: Александр Sibley  Date:    07/25/2024  Time:    01:56               Narrative:    EXAMINATION:  CTA CHEST NON CORONARY (PE STUDIES); CT ABDOMEN PELVIS WITH IV CONTRAST    CLINICAL HISTORY:  Pulmonary embolism (PE) suspected, high prob;; Abdominal pain, acute, nonlocalized;PEG removed accidentally, concerned with PE given cancer and shortness of breath please use runoff for abd eval;    TECHNIQUE:  Following the intravenous administration of 100 cc of Omnipaque 350 contrast material, 1.25 mm contiguous axial images were acquired through the chest utilizing the CT pulmonary angiogram protocol.  2-D coronal and sagittal reconstructed images of the chest and sagittal oblique MIP  images of the pulmonary arterial system were generated from the source data.  This was followed by CT of the abdomen and pelvis using the same bolus of contrast.    COMPARISON:  CT chest abdomen and pelvis, 07/10/2019.    FINDINGS:  Pulmonary arterial system: This is a good quality examination for the evaluation of pulmonary thromboembolism with good opacification of the pulmonary arteries to the level of the segmental branches of the pulmonary arterial system.  Low volume pulmonary thrombo embolus is noted in the posterior basilar segmental branches of the left lower lobe pulmonary arterial system.  Additional thrombus is suggested in the distal segment of the interlobular pulmonary artery on the right.  No large saddle pulmonary embolism, enlargement of the main pulmonary artery, or secondary findings of right ventricular strain.    Aorta and heart: The heart is normal in size.  No pericardial fluid.  No thoracic aortic aneurysm.  No thoracic aortic dissection.    Thoracic inlet: Masslike density at the base of the neck on the right adjacent to the superior vena cava appears to have decreased in size since the prior exam..    Lymph nodes: No pathologically enlarged lymph nodes in the chest or axilla.    Lungs and pleural: Patchy ground-glass opacities throughout both lungs with more focal consolidation and parapneumonic effusion in the right lower lobe..  Pleural base mass in the posterior left lung base.    Abdomen: The innumerable peritoneal masses with ascites and omental caking appear to have increased especially the ascites.    The perihepatic masses have increased in size.    The hydronephrosis appears unchanged.    There is increase in 3rd spacing throughout the abdominal wall.  Calcific densities within the left breast appear dystrophic as well as in the left breast in the Antionette areolar region.    Mccarteny catheter balloon in the urinary bladder.  Vascular calcifications throughout normal caliber vessels  remain.    Bones: There is degenerative change of the thoracic spine.                                        CT Abdomen Pelvis With IV Contrast NO Oral Contrast (Final result)  Result time 07/25/24 01:56:53      Final result by Александр Sibley MD (07/25/24 01:56:53)                   Impression:      1. Small volume pulmonary thromboembolism in both lower lobes.  2. Consolidation and effusion in the right lower lung.  Pleural effusion appears to have increased.  While this could represent focal pneumonia or metastatic disease, pulmonary infarct is difficult to exclude but considered less likely.  3. Decreased size of nodular opacity at the thoracic inlet on the right.  4. Patchy airspace disease with pleural base mass in the left lung base stable..  5. Worsening of mesenteric masses throughout the peritoneal cavity and worsening ascites.  Given the short time span since the prior CT, this could represent flare response from chemotherapy or immunotherapy.  However findings are worrisome for progression of disease.  6. Worsening of 3rd spacing and ascites throughout the abdominal and abdominal wall .  7. Stable appearance of the devices and bowel-gas pattern allowing for ileus and constipation.  8. This report was flagged in Epic as abnormal.      Electronically signed by: Александр Sibley  Date:    07/25/2024  Time:    01:56               Narrative:    EXAMINATION:  CTA CHEST NON CORONARY (PE STUDIES); CT ABDOMEN PELVIS WITH IV CONTRAST    CLINICAL HISTORY:  Pulmonary embolism (PE) suspected, high prob;; Abdominal pain, acute, nonlocalized;PEG removed accidentally, concerned with PE given cancer and shortness of breath please use runoff for abd eval;    TECHNIQUE:  Following the intravenous administration of 100 cc of Omnipaque 350 contrast material, 1.25 mm contiguous axial images were acquired through the chest utilizing the CT pulmonary angiogram protocol.  2-D coronal and sagittal reconstructed images of the  chest and sagittal oblique MIP images of the pulmonary arterial system were generated from the source data.  This was followed by CT of the abdomen and pelvis using the same bolus of contrast.    COMPARISON:  CT chest abdomen and pelvis, 07/10/2019.    FINDINGS:  Pulmonary arterial system: This is a good quality examination for the evaluation of pulmonary thromboembolism with good opacification of the pulmonary arteries to the level of the segmental branches of the pulmonary arterial system.  Low volume pulmonary thrombo embolus is noted in the posterior basilar segmental branches of the left lower lobe pulmonary arterial system.  Additional thrombus is suggested in the distal segment of the interlobular pulmonary artery on the right.  No large saddle pulmonary embolism, enlargement of the main pulmonary artery, or secondary findings of right ventricular strain.    Aorta and heart: The heart is normal in size.  No pericardial fluid.  No thoracic aortic aneurysm.  No thoracic aortic dissection.    Thoracic inlet: Masslike density at the base of the neck on the right adjacent to the superior vena cava appears to have decreased in size since the prior exam..    Lymph nodes: No pathologically enlarged lymph nodes in the chest or axilla.    Lungs and pleural: Patchy ground-glass opacities throughout both lungs with more focal consolidation and parapneumonic effusion in the right lower lobe..  Pleural base mass in the posterior left lung base.    Abdomen: The innumerable peritoneal masses with ascites and omental caking appear to have increased especially the ascites.    The perihepatic masses have increased in size.    The hydronephrosis appears unchanged.    There is increase in 3rd spacing throughout the abdominal wall.  Calcific densities within the left breast appear dystrophic as well as in the left breast in the Antionette areolar region.    Mccartney catheter balloon in the urinary bladder.  Vascular calcifications  throughout normal caliber vessels remain.    Bones: There is degenerative change of the thoracic spine.                                       X-Ray Chest 1 View (Final result)  Result time 07/25/24 01:47:22      Final result by Brandon Palencia MD (07/25/24 01:47:22)                   Impression:      Radiographic findings as above.      Electronically signed by: Brandon Palencia  Date:    07/25/2024  Time:    01:47               Narrative:    EXAMINATION:  XR CHEST 1 VIEW    CLINICAL HISTORY:  Shortness of breath    TECHNIQUE:  Single frontal view of the chest was performed.    COMPARISON:  Chest radiograph July 9, 2024    FINDINGS:  Single chest view is submitted.  Subcutaneous port central venous catheter again noted.  There is diminished depth of inspiration and there is elevation of the right hemidiaphragm.  When accounting for position and technique and depth of inspiration, the appearance of the cardiomediastinal silhouette appears stable.  Aortic atherosclerotic change noted.    Right-sided pleural effusion again noted, there is diminished volume of pleural fluid on the right, there is no evidence for left-sided pleural effusion and there is no evidence for pneumothorax bilaterally.    Accentuation of pulmonary bronchovascular markings consistent with diminished depth of inspiration noted.  Atelectatic change noted particularly at the right lung base.  There is no focal consolidation.    The osseous structures appear intact.  Chronic change noted.                                    Microbiology Results (last 7 days)       Procedure Component Value Units Date/Time    Blood culture x two cultures. Draw prior to antibiotics. [[id number]] Collected: 07/25/24 0156    Order Status: Completed Specimen: Blood from Peripheral, Antecubital, Left Updated: 07/25/24 0915     Blood Culture, Routine No Growth to date    Narrative:      Aerobic and anaerobic    Blood culture x two cultures. Draw prior to antibiotics.  [[id number]] Collected: 07/25/24 0156    Order Status: Completed Specimen: Blood from Peripheral, Upper Arm, Left Updated: 07/25/24 0915     Blood Culture, Routine No Growth to date    Narrative:      Aerobic and anaerobic    Urine culture [[id number]] Collected: 07/25/24 0151    Order Status: No result Specimen: Urine Updated: 07/25/24 0309             ASSESSMENT/PLAN:     Active Hospital Problems    Diagnosis  POA    Comfort measures only status [Z51.5]  Not Applicable    Palliative care encounter [Z51.5]  Not Applicable      Resolved Hospital Problems   No resolved problems to display.     Ms Ricci is a 59yo with recurrent ovarian carcinosarcoma currently on hospice with palliative venting PEG tube. Today she presents with desire for inpatient management of her hospice/end of life care.     Plan:   1. End of life care  - Patient and her  have previously seen palliative during prior admission. She has been receiving hospice care at home. Her  mostly responds for her as she is not oriented or alert. He reports her final wish was to receive hospice in the hospital rather than at home and he would like to honor this.   - Discussed with palliative care; plan for inpatient hospice admission. Requires [facility] acceptance; ED SW to coordinate.   - Discussed with Darrell and Anette that given her overall appearance and quick decline since our last encounter, I am concerned she may have hours to days left to live. Offered my condolences and support. Recommended they notify family members and offered spiritual services which they would like.   - Discussed with Dr Washington, agrees with admission for comfort care.   - DNR/DNI discussed with ED attending and updated in the chart.     La Bell MD  PGY 4  Obstetrics and Gynecology

## 2024-07-25 NOTE — ASSESSMENT & PLAN NOTE
Anette Ricci is a 58-year-old woman with a history of stage IV ovarian carcinosarcoma s/p debulking, omentectomy, partial hepatectomy, recurrent malignant pleural effusions, recurrent malignant ascites, malignant obstruction s/p venting PEG, pulmonary embolism, home hospice patient with Passages who presented to the Emergency Department after her venting PEG was dislodged. General surgery was consulted and graciously fixed the G tube. Unfortunately, Mrs. Ricci is likely in her final short days of life and Palliative/Supportive Care was consulted for admission to inpatient hospice unit for aggressive symptom management at the end of life.    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 Requiring IV opioids and benzodiazepines for pain behaviors, respiratory distress, and agitation. Likely prognosis of hours to short days    Dispo: anticipate end-of-life care on the unit, if patient unexpectedly stabilizes, will approach family regarding care at home or alternative in-facility/skilled nursing placement with hospice support    Symptom Assessment  - Pain/ pain behaviors: uncontrolled  - Dyspnea/tachypnea: uncontrolled  - Agitation: controlled  - Mentation: minimally responsive    Symptom Oriented Management:    Tachypnea/Dyspnea:   - Discontinue home fentanyl patch  - Initiate hydromorphone titratable gtt  - Hydromorphone 0.5 mg IV q15min PRN pain behavior (groaning, grimacing, guarding), labored breathing  - Lorazepam 0.5 mg IV q30min PRN anxiety, labored breathing refractory to opioids  - Fan at bedside  -  Avoid artificial hydration  - Treat fevers symptomatically with PRN APAP, NSAID's, and if refractory to these measures, dexamethasone     Pain Behaviors:   - Opioids as above  - Turn only as tolerated     Agitation/Anxiety/Encephalopathy:   - Treat for pain/labored breathing as above  - Lorazepam 0.5 mg IV q30min PRN anxiety/agitation, labored breathing refractory to opioids  - Haloperidol 1 mg IV q4h PRN agitation, nausea/vomiting refractory to ondansetron     Profuse Oropharyngeal Secretions:  - Turn head side to side to help shift secretions out of airway, allow to pool to cheeks and out of mouth  - Gentle, frequent oral care - encourage family at bedside to participate  - Yankauer suction at bedside  - Glycopyrrolate 0.3 mg IV q4h PRN profuse oropharyngeal secretions     Nausea/Vomiting/Retching:  - Ondansetron 8 mg IV q6h PRN nausea/vomiting  - Haloperidol 1 mg IV q4h PRN nausea/vomiting refractory to ondansetron     Nutrition / Hydration  - No IV fluids and artificial nutrition  - Liberalize diet in order to permit comfort feedings as desired and tolerated  - Simplified medication regimen by eliminating those medications that provide little to no benefit at end-of-life, including no therapeutic anticoagulation     Bowel Regimen:  - Last bowel movement 7/25/24  - Will not prioritize at end of life

## 2024-07-25 NOTE — ASSESSMENT & PLAN NOTE
Anette Ricci is a 58-year-old woman with a history of stage IV ovarian carcinosarcoma s/p debulking, omentectomy, partial hepatectomy, recurrent malignant pleural effusions, recurrent malignant ascites, malignant obstruction s/p venting PEG, pulmonary embolism, home hospice patient with Passages who presented to the Emergency Department after her venting PEG was dislodged. General surgery was consulted and graciously fixed the G tube. Unfortunately, Mrs. Ricci is likely in her final short days of life and Palliative/Supportive Care was consulted for admission to inpatient hospice unit for aggressive symptom management at the end of life.    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 Requiring IV opioids and benzodiazepines for pain behaviors, respiratory distress, and agitation. Likely prognosis of short days    Dispo: anticipate end-of-life care on the unit, if patient unexpectedly stabilizes, will approach family regarding care at home or alternative in-facility/assisted placement with hospice support    Symptom Assessment  - Pain/ pain behaviors: controlled  - Dyspnea/tachypnea: uncontrolled  - Agitation: controlled  - Mentation: minimally responsive    Symptom Oriented Management:    Tachypnea/Dyspnea:   - Discontinue home fentanyl patch  - Hydromorphone 0.5 mg IV q30min PRN pain behavior (groaning, grimacing, guarding), labored breathing  - Lorazepam 0.5 mg IV q30min PRN anxiety, labored breathing refractory to opioids  - Fan at bedside  - Avoid artificial hydration  - Treat fevers  symptomatically with PRN APAP, NSAID's, and if refractory to these measures, dexamethasone     Pain Behaviors:   - Opioids as above  - Turn only as tolerated     Agitation/Anxiety/Encephalopathy:   - Treat for pain/labored breathing as above  - Lorazepam 0.5 mg IV q30min PRN anxiety/agitation, labored breathing refractory to opioids  - Haloperidol 1 mg IV q4h PRN agitation, nausea/vomiting refractory to ondansetron    Profuse Oropharyngeal Secretions:  - Turn head side to side to help shift secretions out of airway, allow to pool to cheeks and out of mouth  - Gentle, frequent oral care - encourage family at bedside to participate  - Yankauer suction at bedside  - Glycopyrrolate 0.3 mg IV q4h PRN profuse oropharyngeal secretions    Nausea/Vomiting/Retching:  - Ondansetron 8 mg IV q6h PRN nausea/vomiting  - Haloperidol 1 mg IV q4h PRN nausea/vomiting refractory to ondansetron    Nutrition / Hydration  - No IV fluids and artificial nutrition  - Liberalize diet in order to permit comfort feedings as desired and tolerated  - Simplified medication regimen by eliminating those medications that provide little to no benefit at end-of-life, including no therapeutic anticoagulation    Bowel Regimen:  - Last bowel movement 7/25/24  - Will not prioritize at end of life    Fever  - Apply cold compresses  - Fan at bedside  - Acetaminophen 650 mg suppository q4h PRN fever  - If turning triggers severe pain, avoid suppository administration and trial ketorolac 15 mg IV q4h PRN fever  - If febrile despite above measures, trial dexamethasone 2 mg IV q6h PRN refractory fevers

## 2024-07-25 NOTE — H&P
[facility] - Emergency Dept  Palliative Medicine  History & Physical    Patient Name: Anette Ricci  MRN: [id number]  Admission Date: 7/24/2024  Attending Physician: Irma Sanabria MD   Primary Care Provider: Mark Chua MD    Patient information was obtained from spouse/SO and primary team.     Subjective:     Chief Complaint/Reason for Admission: Dyspnea    History of Present Illness: Anette Ricci is a 58-year-old woman with a history of stage IV ovarian carcinosarcoma s/p debulking, omentectomy, partial hepatectomy, recurrent malignant pleural effusions, recurrent malignant ascites, pulmonary embolism, home hospice patient with [facility] who presented to the Emergency Department after her venting PEG was dislodged. General surgery was consulted and graciously fixed the G tube. Unfortunately, Mrs. Ricci is likely in her final short days of life and Palliative/Supportive Care was consulted for admission to inpatient hospice unit for aggressive symptom management at the end of life.    Interval History: Observation admission to inpatient hospice/palliative care unit.    Past Medical History:   Diagnosis Date    Breast cancer 2013    Diabetes mellitus     Genetic testing 05/29/2013    VUS    Hyperlipidemia     Hypertension     Malignant neoplasm of upper-outer quadrant of right breast in female, estrogen receptor positive 12/02/2015    Seizure disorder        Past Surgical History:   Procedure Laterality Date    BILATERAL SALPINGO-OOPHORECTOMY (BSO) N/A 3/25/2024    Procedure: SALPINGO-OOPHORECTOMY, BILATERAL;  Surgeon: Александр Washington MD;  Location: [address];  Service: OB/GYN;  Laterality: N/A;    BREAST BIOPSY      BREAST LUMPECTOMY Right 2013    CHOLECYSTECTOMY  3/25/2024    Procedure: CHOLECYSTECTOMY;  Surgeon: Bo Farmer MD;  Location: [facility] OR [address] [place] [address];  Service: General;;    DEBULKING OF TUMOR N/A 3/25/2024    Procedure: DEBULKING, NEOPLASM;  Surgeon: Александр Washington  MD;  [facility];  Service: OB/GYN;  Laterality: N/A;    ESOPHAGOGASTRODUODENOSCOPY W/ PEG N/A 7/12/2024    Procedure: EGD, WITH PEG TUBE INSERTION;  Surgeon: Bo Farmer MD;  [facility];  Service: General;  Laterality: N/A;    EXCISION, LIVER N/A 3/25/2024    Procedure: EXCISION, LIVER - partial;  Surgeon: Bo Farmer MD;  [facility];  Service: General;  Laterality: N/A;  bk ultrasound  [place] book by TA on 3-21-24  7:00 a.m.  Conf #  [id number]    EYE SURGERY      LAPAROTOMY, EXPLORATORY N/A 3/25/2024    Procedure: LAPAROTOMY, EXPLORATORY;  Surgeon: Александр Washington MD;  Location: [facility] OR [address] [place] [address];  Service: OB/GYN;  Laterality: N/A;    OMENTECTOMY N/A 3/25/2024    Procedure: OMENTECTOMY;  Surgeon: Александр Washington MD;  [facility];  Service: OB/GYN;  Laterality: N/A;    PERITONEOCENTESIS N/A 3/13/2024    Procedure: PARACENTESIS, ABDOMINAL;  Surgeon: Flavia Moore MD;  Location: [facility] CATH LAB;  Service: Radiology;  Laterality: N/A;    PERITONEOCENTESIS N/A 3/21/2024    Procedure: PARACENTESIS, ABDOMINAL;  Surgeon: Jorge Jolley MD;  Location: [facility] CATH LAB;  Service: Radiology;  Laterality: N/A;    PERITONEOCENTESIS N/A 6/10/2024    Procedure: PARACENTESIS, ABDOMINAL;  Surgeon: Rogelio Malave MD;  Location: [facility] CATH LAB;  Service: Radiology;  Laterality: N/A;    TOTAL ABDOMINAL HYSTERECTOMY N/A 3/25/2024    Procedure: HYSTERECTOMY, TOTAL, ABDOMINAL;  Surgeon: Александр Washington MD;  [facility];  Service: OB/GYN;  Laterality: N/A;    TOTAL REDUCTION MAMMOPLASTY Bilateral 2016       Review of patient's allergies indicates:   Allergen Reactions    Codeine Other (See Comments)     Flu like s/s    Tramadol Other (See Comments)     Flu like symptoms similar to codeine reaction       Medications:  Continuous Infusions:  Scheduled Meds:  PRN Meds:  Current Facility-Administered Medications:     0.9%  NaCl  infusion (for blood administration), , Intravenous, Q24H PRN    glycopyrrolate, 0.3 mg, Intravenous, Q4H PRN    haloperidol lactate, 1 mg, Intravenous, Q6H PRN    HYDROmorphone, 0.5 mg, Intravenous, Q30 Min PRN    lorazepam, 0.5 mg, Intravenous, Q30 Min PRN    ondansetron, 8 mg, Intravenous, Q6H PRN    Family History       Problem Relation (Age of Onset)    Breast cancer Sister (48)    Colon cancer Sister    Hypertension Mother, Brother, Brother    Seizures Father          Tobacco Use    Smoking status: Never    Smokeless tobacco: Never   Substance and Sexual Activity    Alcohol use: Yes     Alcohol/week: 0.0 standard drinks of alcohol     Comment: ocassional    Drug use: No    Sexual activity: Not Currently     Partners: Male     Birth control/protection: None       Review of Systems   Unable to perform ROS: Mental status change     Objective:     Vital Signs (Most Recent):  Temp: 97.6 °F (36.4 °C) (07/25/24 0405)  Pulse: 103 (07/25/24 0800)  Resp: 20 (07/25/24 0800)  BP: 117/60 (07/25/24 0800)  SpO2: 100 % (07/25/24 0800) Vital Signs (24h Range):  Temp:  [97.6 °F (36.4 °C)-98.7 °F (37.1 °C)] 97.6 °F (36.4 °C)  Pulse:  [100-106] 103  Resp:  [16-20] 20  SpO2:  [95 %-100 %] 100 %  BP: (117-139)/(57-70) 117/60     Weight: 86.6 kg (191 lb)  Body mass index is 29.91 kg/m².       Physical Exam  Constitutional:       General: She is in acute distress.      Appearance: She is ill-appearing.   HENT:      Head: Normocephalic and atraumatic.      Right Ear: External ear normal.      Left Ear: External ear normal.      Nose: Nose normal.      Mouth/Throat:      Mouth: Mucous membranes are dry.   Eyes:      Conjunctiva/sclera: Conjunctivae normal.   Cardiovascular:      Rate and Rhythm: Tachycardia present.   Pulmonary:      Breath sounds: No wheezing.      Comments: Cheyne Muller breathing with apneic pauses, guppy breathing  Abdominal:      General: There is distension.      Tenderness: There is no guarding.    Musculoskeletal:         General: No swelling.   Skin:     General: Skin is warm and dry.   Neurological:      Mental Status: She is disoriented.            Review of Symptoms      Symptom Assessment (ESAS 0-10 Scale)  Unable to complete assessment due to Mental status change         Pain Assessment in Advanced Demential Scale (PAINAD)   Breathing - Independent of vocalization:  1  Negative vocalization:  0  Facial expression:  0  Body language:  0  Consolability:  0  Total:  1    Living Arrangements:  Lives with spouse    Psychosocial/Cultural:   See Palliative Psychosocial Note: No   to  Darrell for 38 years. They had one son who unfortunately passed away in his teens.  **Primary  to Follow**  Palliative Care  Consult: No        Advance Care Planning  Advance Directives:   Living Will: No    LaPOST: No    Do Not Resuscitate Status: Yes    Medical Power of : No      Decision Making:  Family answered questions and Patient unable to communicate due to disease severity/cognitive impairment  Goals of Care: Emotional, supportive conversation held with  Darrell at Mrs. Ricci's bedside. She is unable to participate due to encephalopathy. Darrell confirms for me that she was utilizing home hospice support with [facility]. She had been alert, coherent, but getting weaker. She had a bowel movement and while he was turning her and cleaning her, her PEG tube dislodged. He brought her to the hospital because it wouldn't get back in place. He is her primary caregiver,  for 38 years. He shares that they had one son who unfortunately passed away in his teens. He is appropriately tearful during this conversation - admits that he doesn't know what he would do without her. He shares that Mrs. Ricci had asked him to bring her to the hospital, wanting to spend her final days in a facility rather than at home. She has severe pain, for which she is on a fentanyl patch.  She hasn't been eating as much, only preferring small sips of water, juice, tea, etc. He was told by a doctor in the Emergency Department that his wife was dying, likely in her final hours to short days. He confirms for me that he would want her to be as comfortable as possible. There are other family members (siblings) who are very important to them as well.  - I shared with him about our inpatient hospice unit, including the focus on providing medicines to reduce the intensity of her symptom burden (pain, air hunger, nausea, anxiety, etc). I was transparent about the limitations as well - no labs/imaging/diagnostics, no telemetry, no IV fluids or artificial nutrition, mimicking what would be otherwise done at home. I shared that during this time of life, she will struggle to swallow and in fact, forcing drink/food/medicines in her mouth may make her choke, and she may reflexively shut her mouth to prevent us from putting food/drink/medicine in her mouth. I shared that we would use her IV's to administer medicines at appropriate doses to reduce the intensity of her pain, air hunger, anxiety, nausea, etc, but not to force IV fluids that would end up in her lungs, causing her to feel like she's drowning. Darrell nodded in understanding. Emotional support provided. I asked Darrell if he needed anything else, and he admitted that he needed support. He is aware that she will be moved to the 3rd floor [facility] hospice unit as soon as possible.         Significant Labs: CBC:   Recent Labs   Lab 07/25/24 0032   WBC 18.27*   HGB 5.2*   HCT 17.5*        CMP:   Recent Labs   Lab 07/25/24 0032   *   K 5.0   CL 96   CO2 18*   GLU 68*   BUN 51*   CREATININE 2.8*   CALCIUM 8.2*   PROT 6.8   ALBUMIN 1.8*   BILITOT 1.7*   ALKPHOS 110   *   ALT 10   ANIONGAP 18*     CBC:   Recent Labs   Lab 07/25/24 0032   WBC 18.27*   HGB 5.2*   HCT 17.5*   MCV 96        BMP:  Recent Labs   Lab 07/25/24 0032   GLU 68*    *   K 5.0   CL 96   CO2 18*   BUN 51*   CREATININE 2.8*   CALCIUM 8.2*   MG 2.0     LFT:  Lab Results   Component Value Date     (H) 07/25/2024    ALKPHOS 110 07/25/2024    BILITOT 1.7 (H) 07/25/2024     Albumin:   Albumin   Date Value Ref Range Status   07/25/2024 1.8 (L) 3.5 - 5.2 g/dL Final     Protein:   Total Protein   Date Value Ref Range Status   07/25/2024 6.8 6.0 - 8.4 g/dL Final     Lactic acid:   Lab Results   Component Value Date    LACTATE 1.7 05/23/2024    LACTATE 1.8 05/21/2024       Significant Imaging: I have reviewed all pertinent imaging results/findings within the past 24 hours.    7/25/24 CTA Chest and Abdomen/Pelvis with IV Contrast Impression:  1. Small volume pulmonary thromboembolism in both lower lobes.  2. Consolidation and effusion in the right lower lung.  Pleural effusion appears to have increased.  While this could represent focal pneumonia or metastatic disease, pulmonary infarct is difficult to exclude but considered less likely.  3. Decreased size of nodular opacity at the thoracic inlet on the right.  4. Patchy airspace disease with pleural base mass in the left lung base stable..  5. Worsening of mesenteric masses throughout the peritoneal cavity and worsening ascites.  Given the short time span since the prior CT, this could represent flare response from chemotherapy or immunotherapy.  However findings are worrisome for progression of disease.  6. Worsening of 3rd spacing and ascites throughout the abdominal and abdominal wall .  7. Stable appearance of the devices and bowel-gas pattern allowing for ileus and constipation.  8. This report was flagged in Epic as abnormal.  Assessment/Plan:     Palliative care encounter  Anette Ricci is a 58-year-old woman with a history of stage IV ovarian carcinosarcoma s/p debulking, omentectomy, partial hepatectomy, recurrent malignant pleural effusions, recurrent malignant ascites, malignant obstruction s/p venting PEG,  pulmonary embolism, home hospice patient with [facility] who presented to the Emergency Department after her venting PEG was dislodged. General surgery was consulted and graciously fixed the G tube. Unfortunately, Mrs. Ricci is likely in her final short days of life and Palliative/Supportive Care was consulted for admission to inpatient hospice unit for aggressive symptom management at the end of life.    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 Requiring IV opioids and benzodiazepines for pain behaviors, respiratory distress, and agitation. Likely prognosis of short days    Dispo: anticipate end-of-life care on the unit, if patient unexpectedly stabilizes, will approach family regarding care at home or alternative in-facility/jail placement with hospice support    Symptom Assessment  - Pain/ pain behaviors: controlled  - Dyspnea/tachypnea: uncontrolled  - Agitation: controlled  - Mentation: minimally responsive    Symptom Oriented Management:    Tachypnea/Dyspnea:   - Discontinue home fentanyl patch  - Hydromorphone 0.5 mg IV q30min PRN pain behavior (groaning, grimacing, guarding), labored breathing  - Lorazepam 0.5 mg IV q30min PRN anxiety, labored breathing refractory to opioids  - Fan at bedside  - Avoid artificial hydration  - Treat fevers symptomatically with PRN APAP, NSAID's, and if refractory to these measures, dexamethasone     Pain Behaviors:   - Opioids as above  - Turn only as tolerated     Agitation/Anxiety/Encephalopathy:   - Treat for pain/labored breathing as above  - Lorazepam 0.5  mg IV q30min PRN anxiety/agitation, labored breathing refractory to opioids  - Haloperidol 1 mg IV q4h PRN agitation, nausea/vomiting refractory to ondansetron    Profuse Oropharyngeal Secretions:  - Turn head side to side to help shift secretions out of airway, allow to pool to cheeks and out of mouth  - Gentle, frequent oral care - encourage family at bedside to participate  - Yankauer suction at bedside  - Glycopyrrolate 0.3 mg IV q4h PRN profuse oropharyngeal secretions    Nausea/Vomiting/Retching:  - Ondansetron 8 mg IV q6h PRN nausea/vomiting  - Haloperidol 1 mg IV q4h PRN nausea/vomiting refractory to ondansetron    Nutrition / Hydration  - No IV fluids and artificial nutrition  - Liberalize diet in order to permit comfort feedings as desired and tolerated  - Simplified medication regimen by eliminating those medications that provide little to no benefit at end-of-life, including no therapeutic anticoagulation    Bowel Regimen:  - Last bowel movement 7/25/24  - Will not prioritize at end of life    Fever  - Apply cold compresses  - Fan at bedside  - Acetaminophen 650 mg suppository q4h PRN fever  - If turning triggers severe pain, avoid suppository administration and trial ketorolac 15 mg IV q4h PRN fever  - If febrile despite above measures, trial dexamethasone 2 mg IV q6h PRN refractory fevers      VTE Risk Mitigation (From admission, onward)      None            Irma Sanabria MD  Palliative Medicine  [facility] - Emergency Dept

## 2024-07-25 NOTE — ACP (ADVANCE CARE PLANNING)
Advance Care Planning  Code Status  In light of the patients advanced and terminal illness, we reviewed what the patients preferences for care would be at the very end of life.  The patient wishes to have a natural, peaceful death.  Along those lines, the patient does not wish to have CPR or other invasive treatments performed when her heart and/or breathing stops.  I communicated to the patient that a DNR order would be placed in her medical record to reflect this preference.  A DNR form was completed and will be scanned into EPIC.  In addition, a LaPOST form was not completed to reflect other EOL preferences of the patient such as she wants transfusions, does not want antibiotics and blood thinners at this time.     Jossy Hollis  EM PGY2     Detail Level: Zone Detail Level: Detailed

## 2024-07-25 NOTE — ASSESSMENT & PLAN NOTE
Anette Ricci is a 58-year-old woman with a history of stage IV ovarian carcinosarcoma s/p debulking, omentectomy, partial hepatectomy, recurrent malignant pleural effusions, recurrent malignant ascites, pulmonary embolism, home hospice patient with [facility] who presented to the Emergency Department after her venting PEG was dislodged. General surgery was consulted and graciously fixed the G tube. Unfortunately, Mrs. Ricci is likely in her final short days of life and Palliative/Supportive Care was consulted for admission to inpatient hospice unit for aggressive symptom management at the end of life.    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 Requiring IV opioids and benzodiazepines for pain behaviors, respiratory distress, and agitation. Likely prognosis of short days    Dispo: anticipate end-of-life care on the unit, if patient unexpectedly stabilizes, will approach family regarding care at home or alternative in-facility/halfway placement with hospice support    Symptom Assessment  - Pain/ pain behaviors: controlled  - Dyspnea/tachypnea: uncontrolled  - Agitation: controlled  - Mentation: minimally responsive    Symptom Oriented Management:    Tachypnea/Dyspnea:   - Discontinue home fentanyl patch  - Hydromorphone 0.5 mg IV q30min PRN pain behavior (groaning, grimacing, guarding), labored breathing  - Lorazepam 0.5 mg IV q30min PRN anxiety, labored breathing refractory to opioids  - Fan at bedside  - Avoid artificial hydration  - Treat fevers symptomatically with PRN APAP, NSAID's, and if  refractory to these measures, dexamethasone     Pain Behaviors:   - Opioids as above  - Turn only as tolerated     Agitation/Anxiety/Encephalopathy:   - Treat for pain/labored breathing as above  - Lorazepam 0.5 mg IV q30min PRN anxiety/agitation, labored breathing refractory to opioids  - Haloperidol 1 mg IV q4h PRN agitation, nausea/vomiting refractory to ondansetron    Profuse Oropharyngeal Secretions:  - Turn head side to side to help shift secretions out of airway, allow to pool to cheeks and out of mouth  - Gentle, frequent oral care - encourage family at bedside to participate  - Yankauer suction at bedside  - Glycopyrrolate 0.3 mg IV q4h PRN profuse oropharyngeal secretions    Nausea/Vomiting/Retching:  - Ondansetron 8 mg IV q6h PRN nausea/vomiting  - Haloperidol 1 mg IV q4h PRN nausea/vomiting refractory to ondansetron    Nutrition / Hydration  - No IV fluids and artificial nutrition  - Liberalize diet in order to permit comfort feedings as desired and tolerated  - Simplified medication regimen by eliminating those medications that provide little to no benefit at end-of-life, including no therapeutic anticoagulation    Bowel Regimen:  - Last bowel movement 7/25/24  - Will not prioritize at end of life    Fever  - Apply cold compresses  - Fan at bedside  - Acetaminophen 650 mg suppository q4h PRN fever  - If turning triggers severe pain, avoid suppository administration and trial ketorolac 15 mg IV q4h PRN fever  - If febrile despite above measures, trial dexamethasone 2 mg IV q6h PRN refractory fevers

## 2024-07-25 NOTE — SUBJECTIVE & OBJECTIVE
Interval History: Observation admission to inpatient hospice/palliative care unit.    Past Medical History:   Diagnosis Date    Breast cancer 2013    Diabetes mellitus     Genetic testing 05/29/2013    VUS    Hyperlipidemia     Hypertension     Malignant neoplasm of upper-outer quadrant of right breast in female, estrogen receptor positive 12/02/2015    Seizure disorder        Past Surgical History:   Procedure Laterality Date    BILATERAL SALPINGO-OOPHORECTOMY (BSO) N/A 3/25/2024    Procedure: SALPINGO-OOPHORECTOMY, BILATERAL;  Surgeon: Александр Washignton MD;  [facility] [facility];  Service: OB/GYN;  Laterality: N/A;    BREAST BIOPSY      BREAST LUMPECTOMY Right 2013    CHOLECYSTECTOMY  3/25/2024    Procedure: CHOLECYSTECTOMY;  Surgeon: Bo Farmer MD;  [place] [facility] [place] [address] [place] [address];  Service: General;;    DEBULKING OF TUMOR N/A 3/25/2024    Procedure: DEBULKING, NEOPLASM;  Surgeon: Александр Washington MD;  [place] [facility] [place] [address] [place] [address];  Service: OB/GYN;  Laterality: N/A;    ESOPHAGOGASTRODUODENOSCOPY W/ PEG N/A 7/12/2024    Procedure: EGD, WITH PEG TUBE INSERTION;  Surgeon: Bo Farmer MD;  [facility];  Service: General;  Laterality: N/A;    EXCISION, LIVER N/A 3/25/2024    Procedure: EXCISION, LIVER - partial;  Surgeon: Bo Farmer MD;  [place] [facility] [place] [address] [place] [address];  Service: General;  Laterality: N/A;  bk ultrasound  [employer] book by SAMINA on 3-21-24  7:00 a.m.  Conf #  [id number]    EYE SURGERY      LAPAROTOMY, EXPLORATORY N/A 3/25/2024    Procedure: LAPAROTOMY, EXPLORATORY;  Surgeon: Александр Washington MD;  [facility];  Service: OB/GYN;  Laterality: N/A;    OMENTECTOMY N/A 3/25/2024    Procedure: OMENTECTOMY;  Surgeon: Александр Washington MD;  [place] [facility] [place] [address] [place] [address];  Service: OB/GYN;  Laterality: N/A;    PERITONEOCENTESIS N/A 3/13/2024    Procedure: PARACENTESIS, ABDOMINAL;  Surgeon: Flavia Moore MD;  Location: [facility] CATH LAB;  Service: Radiology;   Laterality: N/A;    PERITONEOCENTESIS N/A 3/21/2024    Procedure: PARACENTESIS, ABDOMINAL;  Surgeon: Jorge Jolley MD;  Location: [facility] CATH LAB;  Service: Radiology;  Laterality: N/A;    PERITONEOCENTESIS N/A 6/10/2024    Procedure: PARACENTESIS, ABDOMINAL;  Surgeon: Rogelio Malave MD;  Location: [facility] CATH LAB;  Service: Radiology;  Laterality: N/A;    TOTAL ABDOMINAL HYSTERECTOMY N/A 3/25/2024    Procedure: HYSTERECTOMY, TOTAL, ABDOMINAL;  Surgeon: Александр Washington MD;  Location: [facility] OR [address] [place] [address];  Service: OB/GYN;  Laterality: N/A;    TOTAL REDUCTION MAMMOPLASTY Bilateral 2016       Review of patient's allergies indicates:   Allergen Reactions    Codeine Other (See Comments)     Flu like s/s    Tramadol Other (See Comments)     Flu like symptoms similar to codeine reaction       Medications:  Continuous Infusions:  Scheduled Meds:  PRN Meds:  Current Facility-Administered Medications:     0.9%  NaCl infusion (for blood administration), , Intravenous, Q24H PRN    glycopyrrolate, 0.3 mg, Intravenous, Q4H PRN    haloperidol lactate, 1 mg, Intravenous, Q6H PRN    HYDROmorphone, 0.5 mg, Intravenous, Q30 Min PRN    lorazepam, 0.5 mg, Intravenous, Q30 Min PRN    ondansetron, 8 mg, Intravenous, Q6H PRN    Family History       Problem Relation (Age of Onset)    Breast cancer Sister (48)    Colon cancer Sister    Hypertension Mother, Brother, Brother    Seizures Father          Tobacco Use    Smoking status: Never    Smokeless tobacco: Never   Substance and Sexual Activity    Alcohol use: Yes     Alcohol/week: 0.0 standard drinks of alcohol     Comment: ocassional    Drug use: No    Sexual activity: Not Currently     Partners: Male     Birth control/protection: None       Review of Systems   Unable to perform ROS: Mental status change     Objective:     Vital Signs (Most Recent):  Temp: 97.6 °F (36.4 °C) (07/25/24 0405)  Pulse: 103 (07/25/24 0800)  Resp: 20 (07/25/24 0800)  BP: 117/60 (07/25/24 0800)  SpO2:  100 % (07/25/24 0800) Vital Signs (24h Range):  Temp:  [97.6 °F (36.4 °C)-98.7 °F (37.1 °C)] 97.6 °F (36.4 °C)  Pulse:  [100-106] 103  Resp:  [16-20] 20  SpO2:  [95 %-100 %] 100 %  BP: (117-139)/(57-70) 117/60     Weight: 86.6 kg (191 lb)  Body mass index is 29.91 kg/m².       Physical Exam  Constitutional:       General: She is in acute distress.      Appearance: She is ill-appearing.   HENT:      Head: Normocephalic and atraumatic.      Right Ear: External ear normal.      Left Ear: External ear normal.      Nose: Nose normal.      Mouth/Throat:      Mouth: Mucous membranes are dry.   Eyes:      Conjunctiva/sclera: Conjunctivae normal.   Cardiovascular:      Rate and Rhythm: Tachycardia present.   Pulmonary:      Breath sounds: No wheezing.      Comments: Cheyne Muller breathing with apneic pauses, guppy breathing  Abdominal:      General: There is distension.      Tenderness: There is no guarding.   Musculoskeletal:         General: No swelling.   Skin:     General: Skin is warm and dry.   Neurological:      Mental Status: She is disoriented.            Review of Symptoms      Symptom Assessment (ESAS 0-10 Scale)  Unable to complete assessment due to Mental status change         Pain Assessment in Advanced Demential Scale (PAINAD)   Breathing - Independent of vocalization:  1  Negative vocalization:  0  Facial expression:  0  Body language:  0  Consolability:  0  Total:  1    Living Arrangements:  Lives with spouse    Psychosocial/Cultural:   See Palliative Psychosocial Note: No   to  Darrell for 38 years. They had one son who unfortunately passed away in his teens.  **Primary  to Follow**  Palliative Care  Consult: No        Advance Care Planning   Advance Directives:   Living Will: No    LaPOST: No    Do Not Resuscitate Status: Yes    Medical Power of : No      Decision Making:  Family answered questions and Patient unable to communicate due to disease  severity/cognitive impairment  Goals of Care: Emotional, supportive conversation held with  Darrell at Mrs. Ricci's bedside. She is unable to participate due to encephalopathy. Darrell confirms for me that she was utilizing home hospice support with [facility]. She had been alert, coherent, but getting weaker. She had a bowel movement and while he was turning her and cleaning her, her PEG tube dislodged. He brought her to the hospital because it wouldn't get back in place. He is her primary caregiver,  for 38 years. He shares that they had one son who unfortunately passed away in his teens. He is appropriately tearful during this conversation - admits that he doesn't know what he would do without her. He shares that Mrs. Ricci had asked him to bring her to the hospital, wanting to spend her final days in a facility rather than at home. She has severe pain, for which she is on a fentanyl patch. She hasn't been eating as much, only preferring small sips of water, juice, tea, etc. He was told by a doctor in the Emergency Department that his wife was dying, likely in her final hours to short days. He confirms for me that he would want her to be as comfortable as possible. There are other family members (siblings) who are very important to them as well.  - I shared with him about our inpatient hospice unit, including the focus on providing medicines to reduce the intensity of her symptom burden (pain, air hunger, nausea, anxiety, etc). I was transparent about the limitations as well - no labs/imaging/diagnostics, no telemetry, no IV fluids or artificial nutrition, mimicking what would be otherwise done at home. I shared that during this time of life, she will struggle to swallow and in fact, forcing drink/food/medicines in her mouth may make her choke, and she may reflexively shut her mouth to prevent us from putting food/drink/medicine in her mouth. I shared that we would use her IV's to administer  medicines at appropriate doses to reduce the intensity of her pain, air hunger, anxiety, nausea, etc, but not to force IV fluids that would end up in her lungs, causing her to feel like she's drowning. Darrell nodded in understanding. Emotional support provided. I asked Darrell if he needed anything else, and he admitted that he needed support. He is aware that she will be moved to the 3rd floor [facility] hospice unit as soon as possible.         Significant Labs: CBC:   Recent Labs   Lab 07/25/24  0032   WBC 18.27*   HGB 5.2*   HCT 17.5*        CMP:   Recent Labs   Lab 07/25/24  0032   *   K 5.0   CL 96   CO2 18*   GLU 68*   BUN 51*   CREATININE 2.8*   CALCIUM 8.2*   PROT 6.8   ALBUMIN 1.8*   BILITOT 1.7*   ALKPHOS 110   *   ALT 10   ANIONGAP 18*     CBC:   Recent Labs   Lab 07/25/24 0032   WBC 18.27*   HGB 5.2*   HCT 17.5*   MCV 96        BMP:  Recent Labs   Lab 07/25/24  0032   GLU 68*   *   K 5.0   CL 96   CO2 18*   BUN 51*   CREATININE 2.8*   CALCIUM 8.2*   MG 2.0     LFT:  Lab Results   Component Value Date     (H) 07/25/2024    ALKPHOS 110 07/25/2024    BILITOT 1.7 (H) 07/25/2024     Albumin:   Albumin   Date Value Ref Range Status   07/25/2024 1.8 (L) 3.5 - 5.2 g/dL Final     Protein:   Total Protein   Date Value Ref Range Status   07/25/2024 6.8 6.0 - 8.4 g/dL Final     Lactic acid:   Lab Results   Component Value Date    LACTATE 1.7 05/23/2024    LACTATE 1.8 05/21/2024       Significant Imaging: I have reviewed all pertinent imaging results/findings within the past 24 hours.    7/25/24 CTA Chest and Abdomen/Pelvis with IV Contrast Impression:  1. Small volume pulmonary thromboembolism in both lower lobes.  2. Consolidation and effusion in the right lower lung.  Pleural effusion appears to have increased.  While this could represent focal pneumonia or metastatic disease, pulmonary infarct is difficult to exclude but considered less likely.  3. Decreased size of nodular  opacity at the thoracic inlet on the right.  4. Patchy airspace disease with pleural base mass in the left lung base stable..  5. Worsening of mesenteric masses throughout the peritoneal cavity and worsening ascites.  Given the short time span since the prior CT, this could represent flare response from chemotherapy or immunotherapy.  However findings are worrisome for progression of disease.  6. Worsening of 3rd spacing and ascites throughout the abdominal and abdominal wall .  7. Stable appearance of the devices and bowel-gas pattern allowing for ileus and constipation.  8. This report was flagged in Epic as abnormal.

## 2024-07-25 NOTE — CONSULTS
[facility] - Emergency Dept  General Surgery  Consult Note    Inpatient consult to General Surgery  Consult performed by: Kia Peres MD  Consult ordered by: Jossy Hollis MD        Subjective:     Chief Complaint/Reason for Admission: PEG issue    History of Present Illness: Patient is a 58y F w/ hx of ovarian cancer w/ recent placement of venting G-tube with Dr. Farmer who presents with dislodged PEG. She reports it was accidentally removed when she was being bathed earlier. She only uses it for venting. No other issues. On home hospice.     Tube at bedside is an 18f. Patient is HDS and feeling poorly, hurting all over - which is her baseline.     No current facility-administered medications on file prior to encounter.     Current Outpatient Medications on File Prior to Encounter   Medication Sig    fluticasone propionate (FLONASE) 50 mcg/actuation nasal spray 1 spray by Each Nostril route once daily.    lamoTRIgine (LAMICTAL) 25 MG tablet Take 25 mg by mouth 2 (two) times daily.    ondansetron (ZOFRAN-ODT) 4 MG TbDL Take 2 tablets (8 mg total) by mouth 2 (two) times daily.       Review of patient's allergies indicates:   Allergen Reactions    Codeine Other (See Comments)     Flu like s/s    Tramadol Other (See Comments)     Flu like symptoms similar to codeine reaction       Past Medical History:   Diagnosis Date    Breast cancer 2013    Diabetes mellitus     Genetic testing 05/29/2013    VUS    Hyperlipidemia     Hypertension     Malignant neoplasm of upper-outer quadrant of right breast in female, estrogen receptor positive 12/02/2015    Seizure disorder      Past Surgical History:   Procedure Laterality Date    BILATERAL SALPINGO-OOPHORECTOMY (BSO) N/A 3/25/2024    Procedure: SALPINGO-OOPHORECTOMY, BILATERAL;  Surgeon: Александр Washington MD;  Location: [facility] OR [address] [place] [address];  Service: OB/GYN;  Laterality: N/A;    BREAST BIOPSY      BREAST LUMPECTOMY Right 2013    CHOLECYSTECTOMY  3/25/2024    Procedure:  CHOLECYSTECTOMY;  Surgeon: Bo Farmer MD;  [facility];  Service: General;;    DEBULKING OF TUMOR N/A 3/25/2024    Procedure: DEBULKING, NEOPLASM;  Surgeon: Александр Washington MD;  [facility];  Service: OB/GYN;  Laterality: N/A;    ESOPHAGOGASTRODUODENOSCOPY W/ PEG N/A 7/12/2024    Procedure: EGD, WITH PEG TUBE INSERTION;  Surgeon: Bo Farmer MD;  [facility];  Service: General;  Laterality: N/A;    EXCISION, LIVER N/A 3/25/2024    Procedure: EXCISION, LIVER - partial;  Surgeon: Bo Farmer MD;  [facility];  Service: General;  Laterality: N/A;  bk ultrasound  [employer] book by TA on 3-21-24  7:00 a.m.  Conf #  [id number]    EYE SURGERY      LAPAROTOMY, EXPLORATORY N/A 3/25/2024    Procedure: LAPAROTOMY, EXPLORATORY;  Surgeon: Александр Washington MD;  [facility];  Service: OB/GYN;  Laterality: N/A;    OMENTECTOMY N/A 3/25/2024    Procedure: OMENTECTOMY;  Surgeon: Александр Washington MD;  Location: [facility] OR [address] [place] [address];  Service: OB/GYN;  Laterality: N/A;    PERITONEOCENTESIS N/A 3/13/2024    Procedure: PARACENTESIS, ABDOMINAL;  Surgeon: Flavia Moore MD;  Location: [facility] CATH LAB;  Service: Radiology;  Laterality: N/A;    PERITONEOCENTESIS N/A 3/21/2024    Procedure: PARACENTESIS, ABDOMINAL;  Surgeon: Jorge Jolley MD;  Location: [facility] CATH LAB;  Service: Radiology;  Laterality: N/A;    PERITONEOCENTESIS N/A 6/10/2024    Procedure: PARACENTESIS, ABDOMINAL;  Surgeon: Rogelio Malave MD;  Location: [facility] CATH LAB;  Service: Radiology;  Laterality: N/A;    TOTAL ABDOMINAL HYSTERECTOMY N/A 3/25/2024    Procedure: HYSTERECTOMY, TOTAL, ABDOMINAL;  Surgeon: Александр Washington MD;  Location: [facility] OR [facility]ND [facility];  Service: OB/GYN;  Laterality: N/A;    TOTAL REDUCTION MAMMOPLASTY Bilateral 2016     Family History       Problem Relation (Age of Onset)    Breast cancer Sister (48)    Colon cancer Sister    Hypertension Mother,  Brother, Brother    Seizures Father          Tobacco Use    Smoking status: Never    Smokeless tobacco: Never   Substance and Sexual Activity    Alcohol use: Yes     Alcohol/week: 0.0 standard drinks of alcohol     Comment: ocassional    Drug use: No    Sexual activity: Not Currently     Partners: Male     Birth control/protection: None     Review of Systems   Constitutional:  Negative for fatigue and unexpected weight change.   HENT:  Negative for facial swelling.    Eyes:  Negative for redness.   Respiratory:  Negative for chest tightness and shortness of breath.    Cardiovascular:  Negative for chest pain and leg swelling.   Gastrointestinal:  Positive for abdominal pain and constipation. Negative for blood in stool, diarrhea and vomiting.   Neurological:  Negative for dizziness and headaches.     Objective:     Vital Signs (Most Recent):  Temp: 98.7 °F (37.1 °C) (07/24/24 2131)  Pulse: 106 (07/24/24 2131)  Resp: 16 (07/25/24 0028)  BP: 125/70 (07/24/24 2131)  SpO2: 95 % (07/24/24 2131) Vital Signs (24h Range):  Temp:  [98.7 °F (37.1 °C)] 98.7 °F (37.1 °C)  Pulse:  [106] 106  Resp:  [16-17] 16  SpO2:  [95 %] 95 %  BP: (125)/(70) 125/70        There is no height or weight on file to calculate BMI.    No intake or output data in the 24 hours ending 07/25/24 0043    Physical Exam  Constitutional:       General: She is not in acute distress.     Appearance: Normal appearance.   HENT:      Head: Normocephalic and atraumatic.      Mouth/Throat:      Mouth: Mucous membranes are moist.   Eyes:      Pupils: Pupils are equal, round, and reactive to light.   Cardiovascular:      Rate and Rhythm: Normal rate.   Pulmonary:      Effort: Pulmonary effort is normal. No respiratory distress.   Abdominal:      General: Abdomen is flat. There is distension.      Palpations: Abdomen is soft.      Tenderness: There is abdominal tenderness. There is no guarding.      Comments: Abdomen distended, firm, tender   Musculoskeletal:          General: Normal range of motion.      Cervical back: Normal range of motion.   Skin:     General: Skin is warm and dry.   Neurological:      General: No focal deficit present.      Mental Status: She is alert and oriented to person, place, and time.   Psychiatric:         Mood and Affect: Mood normal.         Behavior: Behavior normal.       Significant Labs:  All pertinent labs from the last 24 hours have been reviewed.    Significant Diagnostics:  I have reviewed all pertinent imaging results/findings within the past 24 hours.    Assessment/Plan:     Patient is a 58 y F w/ end stage ovarian cancer w/ bowel obstruction s/p PEG placement for venting on 7/12. Consult to surgery to replace tube.     Replaced with 18F gastrostomy tube w/ balloon. Balloon inflated with 8cc water. Immediate return of gastric contents. Recommend injecting contrast and taking XR to confirm placement prior to use.      Plan discussed with ER MD and patient/family at bedside.     Thank you for your consult. I will sign off. Please contact us if you have any additional questions.    Kia Peres MD  General Surgery  [facility] - Emergency Dept

## 2024-07-25 NOTE — HPI
Anette Ricci is a 58-year-old woman with a history of stage IV ovarian carcinosarcoma s/p debulking, omentectomy, partial hepatectomy, recurrent malignant pleural effusions, recurrent malignant ascites, pulmonary embolism, home hospice patient with [facility] who presented to the Emergency Department after her venting PEG was dislodged. General surgery was consulted and graciously fixed the G tube. Unfortunately, Mrs. Ricci is likely in her final short days of life and Palliative/Supportive Care was consulted for admission to inpatient hospice unit for aggressive symptom management at the end of life.

## 2024-07-25 NOTE — PLAN OF CARE
Problem: Skin Injury Risk Increased  Goal: Skin Health and Integrity  Outcome: Progressing     Problem: End-of-Life Care  Goal: Comfort, Peace and Preserved Dignity  Outcome: Progressing     Problem: Pain Acute  Goal: Optimal Pain Control and Function  Outcome: Progressing    Pt on Hospice Care.

## 2024-07-25 NOTE — H&P
[facility]  Palliative Medicine  History & Physical    Patient Name: Anette Ricci  MRN: [id number]  Admission Date: 7/25/2024  Attending Physician: Irma Sanabria MD   Primary Care Provider: Mark Chua MD    Patient information was obtained from spouse/SO and primary team.     Subjective:     Chief Complaint/Reason for Admission: Intractable pain, dyspnea    History of Present Illness: Anette Ricci is a 58-year-old woman with a history of stage IV ovarian carcinosarcoma s/p debulking, omentectomy, partial hepatectomy, recurrent malignant pleural effusions, recurrent malignant ascites, malignant obstruction s/p venting PEG, pulmonary embolism, home hospice patient with [facility] who presented to the Emergency Department after her venting PEG was dislodged. General surgery was consulted and graciously fixed the G tube. Unfortunately, Mrs. Ricci is likely in her final short days of life and Palliative/Supportive Care was consulted for admission to inpatient hospice unit for aggressive symptom management at the end of life.      Past Medical History:   Diagnosis Date    Breast cancer 2013    Diabetes mellitus     Genetic testing 05/29/2013    VUS    Hyperlipidemia     Hypertension     Malignant neoplasm of upper-outer quadrant of right breast in female, estrogen receptor positive 12/02/2015    Seizure disorder        Past Surgical History:   Procedure Laterality Date    BILATERAL SALPINGO-OOPHORECTOMY (BSO) N/A 3/25/2024    Procedure: SALPINGO-OOPHORECTOMY, BILATERAL;  Surgeon: Александр Washington MD;  Location: [address];  Service: OB/GYN;  Laterality: N/A;    BREAST BIOPSY      BREAST LUMPECTOMY Right 2013    CHOLECYSTECTOMY  3/25/2024    Procedure: CHOLECYSTECTOMY;  Surgeon: Bo Farmer MD;  Location: [facility] OR [address] [place] [address];  Service: General;;    DEBULKING OF TUMOR N/A 3/25/2024    Procedure: DEBULKING, NEOPLASM;  Surgeon: Александр Washington MD;   Location: [facility] OR [facility];  Service: OB/GYN;  Laterality: N/A;    ESOPHAGOGASTRODUODENOSCOPY W/ PEG N/A 7/12/2024    Procedure: EGD, WITH PEG TUBE INSERTION;  Surgeon: Bo Farmer MD;  [facility];  Service: General;  Laterality: N/A;    EXCISION, LIVER N/A 3/25/2024    Procedure: EXCISION, LIVER - partial;  Surgeon: Bo Farmer MD;  [facility];  Service: General;  Laterality: N/A;  bk ultrasound  [place] book by TA on 3-21-24  7:00 a.m.  Conf #  [id number]    EYE SURGERY      LAPAROTOMY, EXPLORATORY N/A 3/25/2024    Procedure: LAPAROTOMY, EXPLORATORY;  Surgeon: Александр Washington MD;  [facility];  Service: OB/GYN;  Laterality: N/A;    OMENTECTOMY N/A 3/25/2024    Procedure: OMENTECTOMY;  Surgeon: Александр Washington MD;  [facility];  Service: OB/GYN;  Laterality: N/A;    PERITONEOCENTESIS N/A 3/13/2024    Procedure: PARACENTESIS, ABDOMINAL;  Surgeon: Flavia Moore MD;  Location: [facility] CATH LAB;  Service: Radiology;  Laterality: N/A;    PERITONEOCENTESIS N/A 3/21/2024    Procedure: PARACENTESIS, ABDOMINAL;  Surgeon: Jorge Jolley MD;  Location: [facility] CATH LAB;  Service: Radiology;  Laterality: N/A;    PERITONEOCENTESIS N/A 6/10/2024    Procedure: PARACENTESIS, ABDOMINAL;  Surgeon: Rogelio Malave MD;  Location: [facility] CATH LAB;  Service: Radiology;  Laterality: N/A;    TOTAL ABDOMINAL HYSTERECTOMY N/A 3/25/2024    Procedure: HYSTERECTOMY, TOTAL, ABDOMINAL;  Surgeon: Александр Washington MD;  [facility];  Service: OB/GYN;  Laterality: N/A;    TOTAL REDUCTION MAMMOPLASTY Bilateral 2016       Review of patient's allergies indicates:   Allergen Reactions    Codeine Other (See Comments)     Flu like s/s    Tramadol Other (See Comments)     Flu like symptoms similar to codeine reaction       Medications:  Continuous Infusions:  Scheduled Meds:  PRN Meds:    Family History       Problem Relation (Age of Onset)    Breast  cancer Sister (48)    Colon cancer Sister    Hypertension Mother, Brother, Brother    Seizures Father          Tobacco Use    Smoking status: Never    Smokeless tobacco: Never   Substance and Sexual Activity    Alcohol use: Yes     Alcohol/week: 0.0 standard drinks of alcohol     Comment: ocassional    Drug use: No    Sexual activity: Not Currently     Partners: Male     Birth control/protection: None       Review of Systems   Unable to perform ROS: Mental status change     Objective:     Vital Signs (Most Recent):    Vital Signs (24h Range):  Temp:  [97.6 °F (36.4 °C)-98.7 °F (37.1 °C)] 97.6 °F (36.4 °C)  Pulse:  [100-109] 104  Resp:  [10-20] 10  SpO2:  [95 %-100 %] 100 %  BP: (117-139)/(57-70) 117/60        There is no height or weight on file to calculate BMI.       Physical Exam  Constitutional:       General: She is in acute distress.   HENT:      Head: Normocephalic and atraumatic.      Right Ear: External ear normal.      Left Ear: External ear normal.      Nose: Nose normal.      Mouth/Throat:      Mouth: Mucous membranes are dry.   Eyes:      Conjunctiva/sclera: Conjunctivae normal.   Cardiovascular:      Rate and Rhythm: Tachycardia present.   Pulmonary:      Breath sounds: Normal breath sounds.      Comments: Tachypneic  Abdominal:      General: There is distension.      Tenderness: There is abdominal tenderness. There is guarding.   Musculoskeletal:         General: No swelling.   Skin:     General: Skin is warm and dry.   Neurological:      Mental Status: She is disoriented.            Review of Symptoms      Symptom Assessment (ESAS 0-10 Scale)  Unable to complete assessment due to Mental status change         Pain Assessment in Advanced Demential Scale (PAINAD)   Breathing - Independent of vocalization:  2  Negative vocalization:  1  Facial expression:  1  Body language:  1  Consolability:  2  Total:  7    Living Arrangements:  Lives with spouse    Psychosocial/Cultural:   See Palliative Psychosocial  Note: No   to  Darrell for 38 years. They had one son who unfortunately passed away in his teens.  **Primary  to Follow**  Palliative Care  Consult: No        Advance Care Planning  Advance Directives:   Living Will: No    LaPOST: No    Do Not Resuscitate Status: Yes    Medical Power of : No      Decision Making:  Family answered questions  Goals of Care: 7/25/24: Emotional, supportive conversation held with  Darrell at Mrs. Ricci's bedside. She is unable to participate due to encephalopathy. Darrell confirms for me that she was utilizing home hospice support with [facility]. She had been alert, coherent, but getting weaker. She had a bowel movement and while he was turning her and cleaning her, her PEG tube dislodged. He brought her to the hospital because it wouldn't get back in place. He is her primary caregiver,  for 38 years. He shares that they had one son who unfortunately passed away in his teens. He is appropriately tearful during this conversation - admits that he doesn't know what he would do without her. He shares that Mrs. Ricci had asked him to bring her to the hospital, wanting to spend her final days in a facility rather than at home. She has severe pain, for which she is on a fentanyl patch. She hasn't been eating as much, only preferring small sips of water, juice, tea, etc. He was told by a doctor in the Emergency Department that his wife was dying, likely in her final hours to short days. He confirms for me that he would want her to be as comfortable as possible. There are other family members (siblings) who are very important to them as well.  - I shared with him about our inpatient hospice unit, including the focus on providing medicines to reduce the intensity of her symptom burden (pain, air hunger, nausea, anxiety, etc). I was transparent about the limitations as well - no labs/imaging/diagnostics, no telemetry, no IV fluids  or artificial nutrition, mimicking what would be otherwise done at home. I shared that during this time of life, she will struggle to swallow and in fact, forcing drink/food/medicines in her mouth may make her choke, and she may reflexively shut her mouth to prevent us from putting food/drink/medicine in her mouth. I shared that we would use her IV's to administer medicines at appropriate doses to reduce the intensity of her pain, air hunger, anxiety, nausea, etc, but not to force IV fluids that would end up in her lungs, causing her to feel like she's drowning. Darrell nodded in understanding. Emotional support provided. I asked Darrell if he needed anything else, and he admitted that he needed support. He is aware that she will be moved to the 3rd floor [facility] hospice unit as soon as possible.         Significant Labs: CBC:   Recent Labs   Lab 07/25/24 0032   WBC 18.27*   HGB 5.2*   HCT 17.5*        CMP:   Recent Labs   Lab 07/25/24 0032   *   K 5.0   CL 96   CO2 18*   GLU 68*   BUN 51*   CREATININE 2.8*   CALCIUM 8.2*   PROT 6.8   ALBUMIN 1.8*   BILITOT 1.7*   ALKPHOS 110   *   ALT 10   ANIONGAP 18*     CBC:   Recent Labs   Lab 07/25/24 0032   WBC 18.27*   HGB 5.2*   HCT 17.5*   MCV 96        BMP:  Recent Labs   Lab 07/25/24 0032   GLU 68*   *   K 5.0   CL 96   CO2 18*   BUN 51*   CREATININE 2.8*   CALCIUM 8.2*   MG 2.0     LFT:  Lab Results   Component Value Date     (H) 07/25/2024    ALKPHOS 110 07/25/2024    BILITOT 1.7 (H) 07/25/2024     Albumin:   Albumin   Date Value Ref Range Status   07/25/2024 1.8 (L) 3.5 - 5.2 g/dL Final     Protein:   Total Protein   Date Value Ref Range Status   07/25/2024 6.8 6.0 - 8.4 g/dL Final     Lactic acid:   Lab Results   Component Value Date    LACTATE 1.7 05/23/2024    LACTATE 1.8 05/21/2024       Significant Imaging: I have reviewed all pertinent imaging results/findings within the past 24 hours.    7/25/24 CTA Chest and  Abdomen/Pelvis with IV Contrast Impression:  1. Small volume pulmonary thromboembolism in both lower lobes.  2. Consolidation and effusion in the right lower lung.  Pleural effusion appears to have increased.  While this could represent focal pneumonia or metastatic disease, pulmonary infarct is difficult to exclude but considered less likely.  3. Decreased size of nodular opacity at the thoracic inlet on the right.  4. Patchy airspace disease with pleural base mass in the left lung base stable..  5. Worsening of mesenteric masses throughout the peritoneal cavity and worsening ascites.  Given the short time span since the prior CT, this could represent flare response from chemotherapy or immunotherapy.  However findings are worrisome for progression of disease.  6. Worsening of 3rd spacing and ascites throughout the abdominal and abdominal wall .  7. Stable appearance of the devices and bowel-gas pattern allowing for ileus and constipation.  8. This report was flagged in Epic as abnormal.    Assessment/Plan:     Ovarian carcinosarcoma  Diagnosed and confirmation via peritoneal biopsy on 3/4/24. S/p debulking surgery, CARLOS ALBERTO/BSO, omentectomy, partial hepatectomy on 3/25/24 with chemotherapy, but unfortunately progressed.    Palliative care encounter  Anette Ricci is a 58-year-old woman with a history of stage IV ovarian carcinosarcoma s/p debulking, omentectomy, partial hepatectomy, recurrent malignant pleural effusions, recurrent malignant ascites, malignant obstruction s/p venting PEG, pulmonary embolism, home hospice patient with [facility] who presented to the Emergency Department after her venting PEG was dislodged. General surgery was consulted and graciously fixed the G tube. Unfortunately, Mrs. Ricci is likely in her final short days of life and Palliative/Supportive Care was consulted for admission to inpatient hospice unit for aggressive symptom management at the end of life.    Interval update  07/25/2024: Admitted to hospice unit,  Darrell at bedside    Terminal diagnosis: Stage IV ovarian carcinosarcoma with metastasis to the liver, pleura and omentum         - Supporting diagnoses: symptomatic anemia, acute pulmonary embolism, acute renal failure, severe protein calorie malnutrition    Need for inpatient care: Active dying process as evidenced by loss of consciousness, cool extremities, and irregular breathing pattern. Requiring IV opioids and benzodiazepines for pain behaviors, respiratory distress, and agitation. Likely prognosis of hours to short days    Dispo: anticipate end-of-life care on the unit, if patient unexpectedly stabilizes, will approach family regarding care at home or alternative in-facility/senior living placement with hospice support    Symptom Assessment  - Pain/ pain behaviors: uncontrolled  - Dyspnea/tachypnea: uncontrolled  - Agitation: controlled  - Mentation: minimally responsive    Symptom Oriented Management:    Tachypnea/Dyspnea:   - Discontinue home fentanyl patch  - Initiate hydromorphone titratable gtt  - Hydromorphone 0.5 mg IV q15min PRN pain behavior (groaning, grimacing, guarding), labored breathing  - Lorazepam 0.5 mg IV q30min PRN anxiety, labored breathing refractory to opioids  - Fan at bedside  - Avoid artificial hydration  - Treat fevers symptomatically with PRN APAP, NSAID's, and if refractory to these measures, dexamethasone     Pain Behaviors:   - Opioids as above  - Turn only as tolerated     Agitation/Anxiety/Encephalopathy:   - Treat for pain/labored breathing as above  - Lorazepam 0.5 mg IV q30min PRN anxiety/agitation, labored breathing refractory to opioids  - Haloperidol 1 mg IV q4h PRN agitation, nausea/vomiting refractory to ondansetron     Profuse Oropharyngeal Secretions:  - Turn head side to side to help shift secretions out of airway, allow to pool to cheeks and out of mouth  - Gentle, frequent oral care - encourage family at bedside to  participate  - Yankauer suction at bedside  - Glycopyrrolate 0.3 mg IV q4h PRN profuse oropharyngeal secretions     Nausea/Vomiting/Retching:  - Ondansetron 8 mg IV q6h PRN nausea/vomiting  - Haloperidol 1 mg IV q4h PRN nausea/vomiting refractory to ondansetron     Nutrition / Hydration  - No IV fluids and artificial nutrition  - Liberalize diet in order to permit comfort feedings as desired and tolerated  - Simplified medication regimen by eliminating those medications that provide little to no benefit at end-of-life, including no therapeutic anticoagulation     Bowel Regimen:  - Last bowel movement 7/25/24  - Will not prioritize at end of life      VTE Risk Mitigation (From admission, onward)      None            Irma Sanabria MD  Palliative Medicine  [facility]

## 2024-07-25 NOTE — PLAN OF CARE
[facility] - Emergency Dept  Initial Discharge Assessment       Primary Care Provider: Mark Chua MD    Admission Diagnosis: Acute kidney injury [N17.9]  End of life care [Z51.5]  Ovarian carcinosarcoma [C56.9]    Admission Date: 7/24/2024  Expected Discharge Date:          Payor: HU[employer] MANAGED MEDICARE / Plan: HU[employer] MEDICARE HMO / Product Type: Capitation /     Extended Emergency Contact Information  Primary Emergency Contact: Darrell Ricci  Address: [address] [place] [address] [facility]  Home Phone: [phone]  Work Phone: [phone]  Mobile Phone: [phone]  Relation: Spouse   needed? No  Secondary Emergency Contact: BethkokoBruno  Address: [address] [place] [address] [place]  Mobile Phone: [phone]  Relation: Brother    Discharge Plan A: (P) Comfort care/withdrawal  Discharge Plan B: (P) Comfort care/withdrawal      [facility] [place] - [address]  [address] [place]) [place] [address]  Phone: [phone] Fax: [phone]    [facility] [address] [place] [address]  Phone: [phone] Fax: [phone]    [facility] [address] [place], [place] [address], [place] - [address]  [address] [facility]  [place] [address]  Phone: [phone] Fax: [phone]      Initial Assessment (most recent)       Adult Discharge Assessment - 07/25/24 0850          Discharge Assessment    Assessment Type Discharge Planning Assessment (P)      Source of Information patient;family (P)      Current cognitive status: Coma/Sedated/Intubated (P)      Patient currently being followed by outpatient case management? No (P)      Do you currently have service(s) that help you manage your care at home? No (P)      Do you take prescription medications? Yes (P)      Do you have prescription coverage? Yes (P)      Do you have any problems affording any of your  prescribed medications? No (P)      Is the patient taking medications as prescribed? yes (P)      Discharge Plan A Comfort care/withdrawal (P)      Discharge Plan B Comfort care/withdrawal (P)      DME Needed Upon Discharge  none (P)      Discharge Plan discussed with: Patient;Spouse/sig other (P)         Physical Activity    On average, how many days per week do you engage in moderate to strenuous exercise (like a brisk walk)? 0 days (P)         Financial Resource Strain    How hard is it for you to pay for the very basics like food, housing, medical care, and heating? Not very hard (P)         Housing Stability    In the last 12 months, was there a time when you were not able to pay the mortgage or rent on time? No (P)         Transportation Needs    Has the lack of transportation kept you from medical appointments, meetings, work or from getting things needed for daily living? No (P)         Food Insecurity    Within the past 12 months, you worried that your food would run out before you got the money to buy more. Never true (P)         Alcohol Use    Q1: How often do you have a drink containing alcohol? Never (P)      Q2: How many drinks containing alcohol do you have on a typical day when you are drinking? Patient does not drink (P)         Utilities    In the past 12 months has the electric, gas, oil, or water company threatened to shut off services in your home? No (P)         Health Literacy    How often do you need to have someone help you when you read instructions, pamphlets, or other written material from your doctor or pharmacy? Sometimes (P)

## 2024-07-25 NOTE — ED NOTES
Nurses Note -- 4 Eyes  7/25/2024   9:44 AM  Skin assessed during: Q Shift Change      [] No Altered Skin Integrity Present    []Prevention Measures Documented      [x] Yes- Altered Skin Integrity Present or Discovered   [] LDA Added if Not in Epic (Describe Wound)   [] New Altered Skin Integrity was Present on Admit and Documented in LDA   [] Wound Image Taken    Wound Care Consulted? No    Attending Nurse:  Jaden Leach RN/Staff Member:

## 2024-07-25 NOTE — SUBJECTIVE & OBJECTIVE
Past Medical History:   Diagnosis Date    Breast cancer 2013    Diabetes mellitus     Genetic testing 05/29/2013    VUS    Hyperlipidemia     Hypertension     Malignant neoplasm of upper-outer quadrant of right breast in female, estrogen receptor positive 12/02/2015    Seizure disorder        Past Surgical History:   Procedure Laterality Date    BILATERAL SALPINGO-OOPHORECTOMY (BSO) N/A 3/25/2024    Procedure: SALPINGO-OOPHORECTOMY, BILATERAL;  Surgeon: Александр Washington MD;  Location: [facility] OR [address] [place] [address];  Service: OB/GYN;  Laterality: N/A;    BREAST BIOPSY      BREAST LUMPECTOMY Right 2013    CHOLECYSTECTOMY  3/25/2024    Procedure: CHOLECYSTECTOMY;  Surgeon: Bo Farmer MD;  Location: [facility] OR [address] [place] [address];  Service: General;;    DEBULKING OF TUMOR N/A 3/25/2024    Procedure: DEBULKING, NEOPLASM;  Surgeon: Александр Washington MD;  Location: [facility] OR [address] [place] [address];  Service: OB/GYN;  Laterality: N/A;    ESOPHAGOGASTRODUODENOSCOPY W/ PEG N/A 7/12/2024    Procedure: EGD, WITH PEG TUBE INSERTION;  Surgeon: Bo Farmer MD;  Location: [facility] OR [address] [place] [address];  Service: General;  Laterality: N/A;    EXCISION, LIVER N/A 3/25/2024    Procedure: EXCISION, LIVER - partial;  Surgeon: Bo Farmer MD;  Location: [facility] OR [address] [place] [address];  Service: General;  Laterality: N/A;  bk ultrasound  [employer] book by TA on 3-21-24  7:00 a.m.  Conf #  [id number]    EYE SURGERY      LAPAROTOMY, EXPLORATORY N/A 3/25/2024    Procedure: LAPAROTOMY, EXPLORATORY;  Surgeon: Александр Washington MD;  Location: [facility] OR [address] [place] [address];  Service: OB/GYN;  Laterality: N/A;    OMENTECTOMY N/A 3/25/2024    Procedure: OMENTECTOMY;  Surgeon: Александр Washington MD;  Location: [facility] OR [address] [place] [address];  Service: OB/GYN;  Laterality: N/A;    PERITONEOCENTESIS N/A 3/13/2024    Procedure: PARACENTESIS, ABDOMINAL;  Surgeon: Flavia Moore MD;  Location: [facility] CATH LAB;  Service: Radiology;  Laterality: N/A;    PERITONEOCENTESIS N/A 3/21/2024    Procedure: PARACENTESIS,  ABDOMINAL;  Surgeon: Jorge Jolley MD;  Location: [facility] CATH LAB;  Service: Radiology;  Laterality: N/A;    PERITONEOCENTESIS N/A 6/10/2024    Procedure: PARACENTESIS, ABDOMINAL;  Surgeon: Rogelio Malave MD;  Location: [facility] CATH LAB;  Service: Radiology;  Laterality: N/A;    TOTAL ABDOMINAL HYSTERECTOMY N/A 3/25/2024    Procedure: HYSTERECTOMY, TOTAL, ABDOMINAL;  Surgeon: Александр Washington MD;  Location: [facility] OR [address] [place] [address];  Service: OB/GYN;  Laterality: N/A;    TOTAL REDUCTION MAMMOPLASTY Bilateral 2016       Review of patient's allergies indicates:   Allergen Reactions    Codeine Other (See Comments)     Flu like s/s    Tramadol Other (See Comments)     Flu like symptoms similar to codeine reaction       Medications:  Continuous Infusions:  Scheduled Meds:  PRN Meds:    Family History       Problem Relation (Age of Onset)    Breast cancer Sister (48)    Colon cancer Sister    Hypertension Mother, Brother, Brother    Seizures Father          Tobacco Use    Smoking status: Never    Smokeless tobacco: Never   Substance and Sexual Activity    Alcohol use: Yes     Alcohol/week: 0.0 standard drinks of alcohol     Comment: ocassional    Drug use: No    Sexual activity: Not Currently     Partners: Male     Birth control/protection: None       Review of Systems   Unable to perform ROS: Mental status change     Objective:     Vital Signs (Most Recent):    Vital Signs (24h Range):  Temp:  [97.6 °F (36.4 °C)-98.7 °F (37.1 °C)] 97.6 °F (36.4 °C)  Pulse:  [100-109] 104  Resp:  [10-20] 10  SpO2:  [95 %-100 %] 100 %  BP: (117-139)/(57-70) 117/60        There is no height or weight on file to calculate BMI.       Physical Exam  Constitutional:       General: She is in acute distress.   HENT:      Head: Normocephalic and atraumatic.      Right Ear: External ear normal.      Left Ear: External ear normal.      Nose: Nose normal.      Mouth/Throat:      Mouth: Mucous membranes are dry.   Eyes:      Conjunctiva/sclera:  Conjunctivae normal.   Cardiovascular:      Rate and Rhythm: Tachycardia present.   Pulmonary:      Breath sounds: Normal breath sounds.      Comments: Tachypneic  Abdominal:      General: There is distension.      Tenderness: There is abdominal tenderness. There is guarding.   Musculoskeletal:         General: No swelling.   Skin:     General: Skin is warm and dry.   Neurological:      Mental Status: She is disoriented.            Review of Symptoms      Symptom Assessment (ESAS 0-10 Scale)  Unable to complete assessment due to Mental status change         Pain Assessment in Advanced Demential Scale (PAINAD)   Breathing - Independent of vocalization:  2  Negative vocalization:  1  Facial expression:  1  Body language:  1  Consolability:  2  Total:  7    Living Arrangements:  Lives with spouse    Psychosocial/Cultural:   See Palliative Psychosocial Note: No   to  Darrell for 38 years. They had one son who unfortunately passed away in his teens.  **Primary  to Follow**  Palliative Care  Consult: No        Advance Care Planning   Advance Directives:   Living Will: No    LaPOST: No    Do Not Resuscitate Status: Yes    Medical Power of : No      Decision Making:  Family answered questions  Goals of Care: 7/25/24: Emotional, supportive conversation held with  Darrell at Mrs. Ricci's bedside. She is unable to participate due to encephalopathy. Darrell confirms for me that she was utilizing home hospice support with [facility]. She had been alert, coherent, but getting weaker. She had a bowel movement and while he was turning her and cleaning her, her PEG tube dislodged. He brought her to the hospital because it wouldn't get back in place. He is her primary caregiver,  for 38 years. He shares that they had one son who unfortunately passed away in his teens. He is appropriately tearful during this conversation - admits that he doesn't know what he would do  without her. He shares that Mrs. Ricci had asked him to bring her to the hospital, wanting to spend her final days in a facility rather than at home. She has severe pain, for which she is on a fentanyl patch. She hasn't been eating as much, only preferring small sips of water, juice, tea, etc. He was told by a doctor in the Emergency Department that his wife was dying, likely in her final hours to short days. He confirms for me that he would want her to be as comfortable as possible. There are other family members (siblings) who are very important to them as well.  - I shared with him about our inpatient hospice unit, including the focus on providing medicines to reduce the intensity of her symptom burden (pain, air hunger, nausea, anxiety, etc). I was transparent about the limitations as well - no labs/imaging/diagnostics, no telemetry, no IV fluids or artificial nutrition, mimicking what would be otherwise done at home. I shared that during this time of life, she will struggle to swallow and in fact, forcing drink/food/medicines in her mouth may make her choke, and she may reflexively shut her mouth to prevent us from putting food/drink/medicine in her mouth. I shared that we would use her IV's to administer medicines at appropriate doses to reduce the intensity of her pain, air hunger, anxiety, nausea, etc, but not to force IV fluids that would end up in her lungs, causing her to feel like she's drowning. Darrell nodded in understanding. Emotional support provided. I asked Darrell if he needed anything else, and he admitted that he needed support. He is aware that she will be moved to the 3rd floor [facility] hospice unit as soon as possible.         Significant Labs: CBC:   Recent Labs   Lab 07/25/24  0032   WBC 18.27*   HGB 5.2*   HCT 17.5*        CMP:   Recent Labs   Lab 07/25/24 0032   *   K 5.0   CL 96   CO2 18*   GLU 68*   BUN 51*   CREATININE 2.8*   CALCIUM 8.2*   PROT 6.8   ALBUMIN 1.8*    BILITOT 1.7*   ALKPHOS 110   *   ALT 10   ANIONGAP 18*     CBC:   Recent Labs   Lab 07/25/24  0032   WBC 18.27*   HGB 5.2*   HCT 17.5*   MCV 96        BMP:  Recent Labs   Lab 07/25/24  0032   GLU 68*   *   K 5.0   CL 96   CO2 18*   BUN 51*   CREATININE 2.8*   CALCIUM 8.2*   MG 2.0     LFT:  Lab Results   Component Value Date     (H) 07/25/2024    ALKPHOS 110 07/25/2024    BILITOT 1.7 (H) 07/25/2024     Albumin:   Albumin   Date Value Ref Range Status   07/25/2024 1.8 (L) 3.5 - 5.2 g/dL Final     Protein:   Total Protein   Date Value Ref Range Status   07/25/2024 6.8 6.0 - 8.4 g/dL Final     Lactic acid:   Lab Results   Component Value Date    LACTATE 1.7 05/23/2024    LACTATE 1.8 05/21/2024       Significant Imaging: I have reviewed all pertinent imaging results/findings within the past 24 hours.    7/25/24 CTA Chest and Abdomen/Pelvis with IV Contrast Impression:  1. Small volume pulmonary thromboembolism in both lower lobes.  2. Consolidation and effusion in the right lower lung.  Pleural effusion appears to have increased.  While this could represent focal pneumonia or metastatic disease, pulmonary infarct is difficult to exclude but considered less likely.  3. Decreased size of nodular opacity at the thoracic inlet on the right.  4. Patchy airspace disease with pleural base mass in the left lung base stable..  5. Worsening of mesenteric masses throughout the peritoneal cavity and worsening ascites.  Given the short time span since the prior CT, this could represent flare response from chemotherapy or immunotherapy.  However findings are worrisome for progression of disease.  6. Worsening of 3rd spacing and ascites throughout the abdominal and abdominal wall .  7. Stable appearance of the devices and bowel-gas pattern allowing for ileus and constipation.  8. This report was flagged in Epic as abnormal.

## 2024-07-25 NOTE — NURSING
11:25 pt arrived to room 378, via stretcher, joseph Stanley RN (Ed RN).   accompanying. Transferred to bed without incident.  LUQ G-tube in place with green drainage noted to drainage bag to gravity.  Drainage at insertion site observed.  Site cleaned with NS and clean dry gauze applied.  Pt riri well.  Mccartney cath intact and draining brown liquid to gravity drainage bag.  Abdomen firm and distended. Small amount vaginal bleeding and brown drainage observed.  Pericare performed and peripad placed.  Pt moaning during activity.  See MAR for med admin.  Awaiting dilaudid gtt from pharmacy.    12:20 ,Oncology at bedside.  12:55 sister arrived to bedside.   remains at bedside.  Pt resting with eyes closed. No response to verbal stimuli or light tactile stimuli.  No moaning, no facial grimacing.  RR 10, pattern deep.  Dilaudid gtt infusing at 0.5mg/hr to SRINIVAS PIV.  No distress noted at this time.  Will cont. To monitor.     15:15  and pt's sister remain at bedside. Gown and bottom sheet changed.  Pt repositioned.  Moans loud with any activity.  Moves arms, legs remain flacid.  Dilaudid 0.5mg bolus given, per md orders.    Dilaudid gtt infusing, see MAR for med admin.  Will cont. To monitor.   17:40  Dr. Sanabria at bedside.  Sister at bedside,  at bedside.  RR 7, agonally breathing.  Dilaudid gtt infusing at 0.5mg/hr.   will cont. To monitor.

## 2024-07-25 NOTE — PLAN OF CARE
07/25/24 0852   Post-Acute Status   Post-Acute Authorization Hospice   Hospice Status Referrals Sent   Discharge Plan   Discharge Plan A Comfort care/withdrawal   Discharge Plan B [facility] care/withdrawal

## 2024-07-25 NOTE — HPI
Anette Rcici is a 58-year-old woman with a history of stage IV ovarian carcinosarcoma s/p debulking, omentectomy, partial hepatectomy, recurrent malignant pleural effusions, recurrent malignant ascites, malignant obstruction s/p venting PEG, pulmonary embolism, home hospice patient with [facility] who presented to the Emergency Department after her venting PEG was dislodged. General surgery was consulted and graciously fixed the G tube. Unfortunately, Mrs. Ricci is likely in her final short days of life and Palliative/Supportive Care was consulted for admission to inpatient hospice unit for aggressive symptom management at the end of life.

## 2024-07-25 NOTE — NURSING
Called [facility] to have nurse sent out to evaluate and consent to [facility] service   - oncall going check with supervisor to see who will be coming out as shift change

## 2024-07-26 NOTE — NURSING
Pt resting.  Bed in lowest position. Side rails up x3.  Call bell and personal belongings within reach.  Safety precautions maintained.  Family at bedside.  Plan of care continues.

## 2024-07-26 NOTE — NURSING
FELIZ Shepherd NP is on call for Hospice, informed of the current concerns from family and that they are ok currently with plan of care. States to call if any other assistance is needed.

## 2024-07-26 NOTE — SUBJECTIVE & OBJECTIVE
Past Medical History:   Diagnosis Date    Breast cancer 2013    Diabetes mellitus     Genetic testing 05/29/2013    VUS    Hyperlipidemia     Hypertension     Malignant neoplasm of upper-outer quadrant of right breast in female, estrogen receptor positive 12/02/2015    Seizure disorder        Past Surgical History:   Procedure Laterality Date    BILATERAL SALPINGO-OOPHORECTOMY (BSO) N/A 3/25/2024    Procedure: SALPINGO-OOPHORECTOMY, BILATERAL;  Surgeon: Александр Washington MD;  Location: [facility] OR [address] [place] [address];  Service: OB/GYN;  Laterality: N/A;    BREAST BIOPSY      BREAST LUMPECTOMY Right 2013    CHOLECYSTECTOMY  3/25/2024    Procedure: CHOLECYSTECTOMY;  Surgeon: Bo Farmer MD;  Location: [facility] OR [address] [place] [address];  Service: General;;    DEBULKING OF TUMOR N/A 3/25/2024    Procedure: DEBULKING, NEOPLASM;  Surgeon: Александр Washington MD;  Location: [facility] OR [address] [place] [address];  Service: OB/GYN;  Laterality: N/A;    ESOPHAGOGASTRODUODENOSCOPY W/ PEG N/A 7/12/2024    Procedure: EGD, WITH PEG TUBE INSERTION;  Surgeon: Bo Farmer MD;  Location: [facility] OR [address] [place] [address];  Service: General;  Laterality: N/A;    EXCISION, LIVER N/A 3/25/2024    Procedure: EXCISION, LIVER - partial;  Surgeon: Bo Farmer MD;  Location: [facility] OR [address] [place] [address];  Service: General;  Laterality: N/A;  bk ultrasound  [employer] book by TA on 3-21-24  7:00 a.m.  Conf #  [id number]    EYE SURGERY      LAPAROTOMY, EXPLORATORY N/A 3/25/2024    Procedure: LAPAROTOMY, EXPLORATORY;  Surgeon: Александр Washington MD;  Location: [facility] OR [address] [place] [address];  Service: OB/GYN;  Laterality: N/A;    OMENTECTOMY N/A 3/25/2024    Procedure: OMENTECTOMY;  Surgeon: Александр Washington MD;  Location: [facility] OR [address] [place] [address];  Service: OB/GYN;  Laterality: N/A;    PERITONEOCENTESIS N/A 3/13/2024    Procedure: PARACENTESIS, ABDOMINAL;  Surgeon: Flavia Moore MD;  Location: [facility] CATH LAB;  Service: Radiology;  Laterality: N/A;    PERITONEOCENTESIS N/A 3/21/2024    Procedure: PARACENTESIS,  ABDOMINAL;  Surgeon: Jorge Jolley MD;  Location: [facility] CATH LAB;  Service: Radiology;  Laterality: N/A;    PERITONEOCENTESIS N/A 6/10/2024    Procedure: PARACENTESIS, ABDOMINAL;  Surgeon: Rogeloi Malave MD;  Location: [facility] CATH LAB;  Service: Radiology;  Laterality: N/A;    TOTAL ABDOMINAL HYSTERECTOMY N/A 3/25/2024    Procedure: HYSTERECTOMY, TOTAL, ABDOMINAL;  Surgeon: Александр Washington MD;  Location: [address];  Service: OB/GYN;  Laterality: N/A;    TOTAL REDUCTION MAMMOPLASTY Bilateral 2016       Review of patient's allergies indicates:   Allergen Reactions    Codeine Other (See Comments)     Flu like s/s    Tramadol Other (See Comments)     Flu like symptoms similar to codeine reaction       Medications:  Continuous Infusions:   HYDROmorphone  0-4 mg/hr Intravenous Continuous 3.5 mL/hr at 07/25/24 1802 0.7 mg/hr at 07/25/24 1802     Scheduled Meds:  PRN Meds:  Current Facility-Administered Medications:     glycopyrrolate, 0.3 mg, Intravenous, Q4H PRN    haloperidol lactate, 1 mg, Intravenous, Q6H PRN    HYDROmorphone, 0.5 mg, Intravenous, Q15 Min PRN    lorazepam, 0.5 mg, Intravenous, Q30 Min PRN    ondansetron, 8 mg, Intravenous, Q6H PRN    Family History       Problem Relation (Age of Onset)    Breast cancer Sister (48)    Colon cancer Sister    Hypertension Mother, Brother, Brother    Seizures Father          Tobacco Use    Smoking status: Never    Smokeless tobacco: Never   Substance and Sexual Activity    Alcohol use: Yes     Alcohol/week: 0.0 standard drinks of alcohol     Comment: ocassional    Drug use: No    Sexual activity: Not Currently     Partners: Male     Birth control/protection: None       Review of Systems   Unable to perform ROS: Patient unresponsive     Objective:     Vital Signs (Most Recent):  Temp: 97.6 °F (36.4 °C) (07/26/24 0814)  Pulse: 97 (07/26/24 0814)  Resp: 15 (07/26/24 0814)  BP: (!) 106/50 (07/26/24 0814)  SpO2: 96 % (07/26/24 0814) Vital Signs (24h  Range):  Temp:  [97.6 °F (36.4 °C)-98.1 °F (36.7 °C)] 97.6 °F (36.4 °C)  Pulse:  [] 97  Resp:  [6-15] 15  SpO2:  [96 %-100 %] 96 %  BP: (101-117)/(50-60) 106/50     Weight: 92 kg (202 lb 13.2 oz)  Body mass index is 31.77 kg/m².       Physical Exam  Constitutional:       General: She is in acute distress.   HENT:      Head: Normocephalic and atraumatic.      Right Ear: External ear normal.      Left Ear: External ear normal.      Nose: Nose normal.      Mouth/Throat:      Mouth: Mucous membranes are dry.   Eyes:      Conjunctiva/sclera: Conjunctivae normal.   Cardiovascular:      Rate and Rhythm: Tachycardia present.   Pulmonary:      Breath sounds: Normal breath sounds.      Comments: Tachypneic  Abdominal:      General: There is distension.      Tenderness: There is abdominal tenderness. There is guarding.   Musculoskeletal:         General: No swelling.   Skin:     General: Skin is warm and dry.   Neurological:      Mental Status: She is disoriented.            Review of Symptoms      Symptom Assessment (ESAS 0-10 Scale)  Pain:  0  Dyspnea:  0  Anxiety:  0  Nausea:  0  Depression:  0  Anorexia:  0  Fatigue:  0  Insomnia:  0  Restlessness:  0  Agitation:  0  Unable to complete assessment due to Patient unresponsive         Pain Assessment in Advanced Demential Scale (PAINAD)   Breathing - Independent of vocalization:  2  Negative vocalization:  1  Facial expression:  1  Body language:  1  Consolability:  2  Total:  7    Living Arrangements:  Lives with spouse    Psychosocial/Cultural:   See Palliative Psychosocial Note: No   to  Darrell for 38 years. They had one son who unfortunately passed away in his teens.  **Primary  to Follow**  Palliative Care  Consult: No        Advance Care Planning   Advance Directives:   Living Will: No    LaPOST: No    Do Not Resuscitate Status: Yes    Medical Power of : No      Decision Making:  Family answered questions  Goals of  Care: 7/25/24: Emotional, supportive conversation held with  Darrell at Mrs. Ricci's bedside. She is unable to participate due to encephalopathy. Darrell confirms for me that she was utilizing home hospice support with [facility]. She had been alert, coherent, but getting weaker. She had a bowel movement and while he was turning her and cleaning her, her PEG tube dislodged. He brought her to the hospital because it wouldn't get back in place. He is her primary caregiver,  for 38 years. He shares that they had one son who unfortunately passed away in his teens. He is appropriately tearful during this conversation - admits that he doesn't know what he would do without her. He shares that Mrs. Ricci had asked him to bring her to the hospital, wanting to spend her final days in a facility rather than at home. She has severe pain, for which she is on a fentanyl patch. She hasn't been eating as much, only preferring small sips of water, juice, tea, etc. He was told by a doctor in the Emergency Department that his wife was dying, likely in her final hours to short days. He confirms for me that he would want her to be as comfortable as possible. There are other family members (siblings) who are very important to them as well.  - I shared with him about our inpatient hospice unit, including the focus on providing medicines to reduce the intensity of her symptom burden (pain, air hunger, nausea, anxiety, etc). I was transparent about the limitations as well - no labs/imaging/diagnostics, no telemetry, no IV fluids or artificial nutrition, mimicking what would be otherwise done at home. I shared that during this time of life, she will struggle to swallow and in fact, forcing drink/food/medicines in her mouth may make her choke, and she may reflexively shut her mouth to prevent us from putting food/drink/medicine in her mouth. I shared that we would use her IV's to administer medicines at appropriate doses to  "reduce the intensity of her pain, air hunger, anxiety, nausea, etc, but not to force IV fluids that would end up in her lungs, causing her to feel like she's drowning. Darrell nodded in understanding. Emotional support provided. I asked Darrell if he needed anything else, and he admitted that he needed support. He is aware that she will be moved to the 3rd floor [facility] hospice unit as soon as possible.         Significant Labs: CBC:   Recent Labs   Lab 07/25/24  0032   WBC 18.27*   HGB 5.2*   HCT 17.5*        CMP:   Recent Labs   Lab 07/25/24  0032   *   K 5.0   CL 96   CO2 18*   GLU 68*   BUN 51*   CREATININE 2.8*   CALCIUM 8.2*   PROT 6.8   ALBUMIN 1.8*   BILITOT 1.7*   ALKPHOS 110   *   ALT 10   ANIONGAP 18*     CBC:   Recent Labs   Lab 07/25/24 0032   WBC 18.27*   HGB 5.2*   HCT 17.5*   MCV 96        BMP:  No results for input(s): "GLU", "NA", "K", "CL", "CO2", "BUN", "CREATININE", "CALCIUM", "MG" in the last 24 hours.    LFT:  Lab Results   Component Value Date     (H) 07/25/2024    ALKPHOS 110 07/25/2024    BILITOT 1.7 (H) 07/25/2024     Albumin:   Albumin   Date Value Ref Range Status   07/25/2024 1.8 (L) 3.5 - 5.2 g/dL Final     Protein:   Total Protein   Date Value Ref Range Status   07/25/2024 6.8 6.0 - 8.4 g/dL Final     Lactic acid:   Lab Results   Component Value Date    LACTATE 1.7 05/23/2024    LACTATE 1.8 05/21/2024       Significant Imaging: I have reviewed all pertinent imaging results/findings within the past 24 hours.    7/25/24 CTA Chest and Abdomen/Pelvis with IV Contrast Impression:  1. Small volume pulmonary thromboembolism in both lower lobes.  2. Consolidation and effusion in the right lower lung.  Pleural effusion appears to have increased.  While this could represent focal pneumonia or metastatic disease, pulmonary infarct is difficult to exclude but considered less likely.  3. Decreased size of nodular opacity at the thoracic inlet on the right.  4. " Patchy airspace disease with pleural base mass in the left lung base stable..  5. Worsening of mesenteric masses throughout the peritoneal cavity and worsening ascites.  Given the short time span since the prior CT, this could represent flare response from chemotherapy or immunotherapy.  However findings are worrisome for progression of disease.  6. Worsening of 3rd spacing and ascites throughout the abdominal and abdominal wall .  7. Stable appearance of the devices and bowel-gas pattern allowing for ileus and constipation.  8. This report was flagged in Epic as abnormal.

## 2024-07-26 NOTE — ASSESSMENT & PLAN NOTE
Anette Ricci is a 58-year-old woman with a history of stage IV ovarian carcinosarcoma s/p debulking, omentectomy, partial hepatectomy, recurrent malignant pleural effusions, recurrent malignant ascites, malignant obstruction s/p venting PEG, pulmonary embolism, home hospice patient with [facility] who presented to the Emergency Department after her venting PEG was dislodged. General surgery was consulted and graciously fixed the G tube. Unfortunately, Mrs. Ricci is likely in her final short days of life and Palliative/Supportive Care was consulted for admission to inpatient hospice unit for aggressive symptom management at the end of life.    Interval update 07/26/2024: Thirty minute supportive conversation held at bedside with sisters and . Sisters admitted that last night, they had many concerns and questions, including why she wasn't getting any treatment for her blood clots, cancer, etc. They share that after having a long talk with the nurses last night, they began to understand that Ms. Ricci was transitioning, and believe that ultimately this was their sister's decision to go to her eternal home in [facility]. Answered more questions, including recognizing that she is demonstrating signs that she is indeed in her final moments of life. Emotional support provided. At the end of our visit, their  and first lady came to visit.    RN's and family do share that Ms. Ricci remains in significant distress with severe pain behaviors (moaning, grimacing) even with light touch. Continuing aggressive pain management.    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 Requiring  IV opioids and benzodiazepines for pain behaviors, respiratory distress, and agitation. Likely prognosis of hours to short days    Dispo: anticipate end-of-life care on the unit, if patient unexpectedly stabilizes, will approach family regarding care at home or alternative in-facility/MCC placement with hospice support    Symptom Assessment  - Pain/ pain behaviors: uncontrolled  - Dyspnea/tachypnea: uncontrolled  - Agitation: controlled  - Mentation: minimally responsive    Symptom Oriented Management:    Tachypnea/Dyspnea:   - Discontinued home fentanyl patch  - Continue hydromorphone titratable gtt (0.7 mg/hr)  - Hydromorphone 0.5 mg IV q15min PRN pain behavior (groaning, grimacing, guarding), labored breathing  - Lorazepam 0.5 mg IV q30min PRN anxiety, labored breathing refractory to opioids  - Fan at bedside  - Avoid artificial hydration  - Treat fevers symptomatically with PRN APAP, NSAID's, and if refractory to these measures, dexamethasone     Pain Behaviors:   - Opioids as above  - Turn only as tolerated     Agitation/Anxiety/Encephalopathy:   - Treat for pain/labored breathing as above  - Lorazepam 0.5 mg IV q30min PRN anxiety/agitation, labored breathing refractory to opioids  - Haloperidol 1 mg IV q4h PRN agitation, nausea/vomiting refractory to ondansetron     Profuse Oropharyngeal Secretions:  - Turn head side to side to help shift secretions out of airway, allow to pool to cheeks and out of mouth  - Gentle, frequent oral care - encourage family at bedside to participate  - Yankauer suction at bedside  - Glycopyrrolate 0.3 mg IV q4h PRN profuse oropharyngeal secretions     Nausea/Vomiting/Retching:  - Ondansetron 8 mg IV q6h PRN nausea/vomiting  - Haloperidol 1 mg IV q4h PRN nausea/vomiting refractory to ondansetron     Nutrition / Hydration  - No IV fluids and artificial nutrition  - Liberalize diet in order to permit comfort feedings as desired and tolerated  - Simplified medication regimen by  eliminating those medications that provide little to no benefit at end-of-life, including no therapeutic anticoagulation     Bowel Regimen:  - Venting PEG with continued GI output  - Will not prioritize at end of life

## 2024-07-26 NOTE — NURSING
Family in room at patients bedside. Pt in bed with eyes open with minimal blinking. Pt responds to pain by moaning, is nonverbal. Pt assessed per flowsheet. Plan of care discussed with family. Peg site to LUQ dressing saturated due to tubing disconnected from Mccartney bag tubing. Site cleaned, New Mccartney bag obtained and connected. Split gauze placed to Peg site, secured with tape. 200 ml of brown odorous drainage noted in bag. Hospital gown changed. Dilaudid infusion infusing to LUE PIV at 3.5 ml/hr. Mccartney in place, secured to R thigh per protocol; No urine noted. Abdomen distended and taut with healed dry flaky Midline incision, open to air. Generalized edema noted. BLE elevated on pillow. 4lnc in place. Comfort and safety maintained. Instructed family to call for assist.

## 2024-07-26 NOTE — PROGRESS NOTES
[facility]  Palliative Medicine  Progress Note    Patient Name: Anette Ricci  MRN: [id number]  Admission Date: 7/25/2024  Hospital Length of Stay: 1 days  Code Status: DNR   Attending Provider: Irma Sanabria MD  Consulting Provider: Irma Sanabria MD  Primary Care Physician: Mark Chua MD  Principal Problem:<principal problem not specified>    Patient information was obtained from spouse/SO, relative(s), and primary team.      Assessment/Plan:     Palliative Care  Palliative care encounter  Anette Ricci is a 58-year-old woman with a history of stage IV ovarian carcinosarcoma s/p debulking, omentectomy, partial hepatectomy, recurrent malignant pleural effusions, recurrent malignant ascites, malignant obstruction s/p venting PEG, pulmonary embolism, home hospice patient with [facility] who presented to the Emergency Department after her venting PEG was dislodged. General surgery was consulted and graciously fixed the G tube. Unfortunately, Mrs. Ricci is likely in her final short days of life and Palliative/Supportive Care was consulted for admission to inpatient hospice unit for aggressive symptom management at the end of life.    Interval update 07/26/2024: Thirty minute supportive conversation held at bedside with sisters and . Sisters admitted that last night, they had many concerns and questions, including why she wasn't getting any treatment for her blood clots, cancer, etc. They share that after having a long talk with the nurses last night, they began to understand that Ms. Ricci was transitioning, and believe that ultimately this was their sister's decision to go to her eternal home in [facility]. Answered more questions, including recognizing that she is demonstrating signs that she is indeed in her final moments of life. Emotional support provided. At the end of our visit, their  and first lady came to visit.    RN's and family do share that Ms.  Radhames remains in significant distress with severe pain behaviors (moaning, grimacing) even with light touch. Continuing aggressive pain management.    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 Requiring IV opioids and benzodiazepines for pain behaviors, respiratory distress, and agitation. Likely prognosis of hours to short days    Dispo: anticipate end-of-life care on the unit, if patient unexpectedly stabilizes, will approach family regarding care at home or alternative in-facility/MCC placement with hospice support    Symptom Assessment  - Pain/ pain behaviors: uncontrolled  - Dyspnea/tachypnea: uncontrolled  - Agitation: controlled  - Mentation: minimally responsive    Symptom Oriented Management:    Tachypnea/Dyspnea:   - Discontinued home fentanyl patch  - Continue hydromorphone titratable gtt (0.7 mg/hr)  - Hydromorphone 0.5 mg IV q15min PRN pain behavior (groaning, grimacing, guarding), labored breathing  - Lorazepam 0.5 mg IV q30min PRN anxiety, labored breathing refractory to opioids  - Fan at bedside  - Avoid artificial hydration  - Treat fevers symptomatically with PRN APAP, NSAID's, and if refractory to these measures, dexamethasone     Pain Behaviors:   - Opioids as above  - Turn only as tolerated     Agitation/Anxiety/Encephalopathy:   - Treat for pain/labored breathing as above  - Lorazepam 0.5 mg IV q30min PRN anxiety/agitation, labored breathing refractory to opioids  - Haloperidol 1 mg IV q4h PRN agitation, nausea/vomiting refractory to ondansetron     Profuse Oropharyngeal Secretions:  - Turn head side to side to help shift secretions out of airway, allow to  pool to cheeks and out of mouth  - Gentle, frequent oral care - encourage family at bedside to participate  - Yankauer suction at bedside  - Glycopyrrolate 0.3 mg IV q4h PRN profuse oropharyngeal secretions     Nausea/Vomiting/Retching:  - Ondansetron 8 mg IV q6h PRN nausea/vomiting  - Haloperidol 1 mg IV q4h PRN nausea/vomiting refractory to ondansetron     Nutrition / Hydration  - No IV fluids and artificial nutrition  - Liberalize diet in order to permit comfort feedings as desired and tolerated  - Simplified medication regimen by eliminating those medications that provide little to no benefit at end-of-life, including no therapeutic anticoagulation     Bowel Regimen:  - Venting PEG with continued GI output  - Will not prioritize at end of life        Subjective:     Chief Complaint: Grimacing, groaning, pain behavior      HPI:   Anette Ricci is a 58-year-old woman with a history of stage IV ovarian carcinosarcoma s/p debulking, omentectomy, partial hepatectomy, recurrent malignant pleural effusions, recurrent malignant ascites, malignant obstruction s/p venting PEG, pulmonary embolism, home hospice patient with [facility] who presented to the Emergency Department after her venting PEG was dislodged. General surgery was consulted and graciously fixed the G tube. Unfortunately, Mrs. Ricci is likely in her final short days of life and Palliative/Supportive Care was consulted for admission to inpatient hospice unit for aggressive symptom management at the end of life.    Hospital Course:  No notes on file      Past Medical History:   Diagnosis Date    Breast cancer 2013    Diabetes mellitus     Genetic testing 05/29/2013    VUS    Hyperlipidemia     Hypertension     Malignant neoplasm of upper-outer quadrant of right breast in female, estrogen receptor positive 12/02/2015    Seizure disorder        Past Surgical History:   Procedure Laterality Date    BILATERAL SALPINGO-OOPHORECTOMY (BSO) N/A 3/25/2024     Procedure: SALPINGO-OOPHORECTOMY, BILATERAL;  Surgeon: Александр Washington MD;  [facility];  Service: OB/GYN;  Laterality: N/A;    BREAST BIOPSY      BREAST LUMPECTOMY Right 2013    CHOLECYSTECTOMY  3/25/2024    Procedure: CHOLECYSTECTOMY;  Surgeon: Bo Farmer MD;  [facility];  Service: General;;    DEBULKING OF TUMOR N/A 3/25/2024    Procedure: DEBULKING, NEOPLASM;  Surgeon: Александр Washington MD;  [facility];  Service: OB/GYN;  Laterality: N/A;    ESOPHAGOGASTRODUODENOSCOPY W/ PEG N/A 7/12/2024    Procedure: EGD, WITH PEG TUBE INSERTION;  Surgeon: Bo Farmer MD;  [facility];  Service: General;  Laterality: N/A;    EXCISION, LIVER N/A 3/25/2024    Procedure: EXCISION, LIVER - partial;  Surgeon: Bo Farmer MD;  [facility];  Service: General;  Laterality: N/A;  bk ultrasound  [place] book by TA on 3-21-24  7:00 a.m.  Conf #  [id number]    EYE SURGERY      LAPAROTOMY, EXPLORATORY N/A 3/25/2024    Procedure: LAPAROTOMY, EXPLORATORY;  Surgeon: Александр Washington MD;  [facility];  Service: OB/GYN;  Laterality: N/A;    OMENTECTOMY N/A 3/25/2024    Procedure: OMENTECTOMY;  Surgeon: Александр Washington MD;  [facility];  Service: OB/GYN;  Laterality: N/A;    PERITONEOCENTESIS N/A 3/13/2024    Procedure: PARACENTESIS, ABDOMINAL;  Surgeon: Flavia Moore MD;  Location: [facility] CATH LAB;  Service: Radiology;  Laterality: N/A;    PERITONEOCENTESIS N/A 3/21/2024    Procedure: PARACENTESIS, ABDOMINAL;  Surgeon: Jorge Jolley MD;  Location: [facility] CATH LAB;  Service: Radiology;  Laterality: N/A;    PERITONEOCENTESIS N/A 6/10/2024    Procedure: PARACENTESIS, ABDOMINAL;  Surgeon: Rogelio Malave MD;  Location: [facility] CATH LAB;  Service: Radiology;  Laterality: N/A;    TOTAL ABDOMINAL HYSTERECTOMY N/A 3/25/2024    Procedure: HYSTERECTOMY, TOTAL, ABDOMINAL;  Surgeon: Александр Washington MD;   Location: [facility] OR [address] [place] [address];  Service: OB/GYN;  Laterality: N/A;    TOTAL REDUCTION MAMMOPLASTY Bilateral 2016       Review of patient's allergies indicates:   Allergen Reactions    Codeine Other (See Comments)     Flu like s/s    Tramadol Other (See Comments)     Flu like symptoms similar to codeine reaction       Medications:  Continuous Infusions:   HYDROmorphone  0-4 mg/hr Intravenous Continuous 3.5 mL/hr at 07/25/24 1802 0.7 mg/hr at 07/25/24 1802     Scheduled Meds:  PRN Meds:  Current Facility-Administered Medications:     glycopyrrolate, 0.3 mg, Intravenous, Q4H PRN    haloperidol lactate, 1 mg, Intravenous, Q6H PRN    HYDROmorphone, 0.5 mg, Intravenous, Q15 Min PRN    lorazepam, 0.5 mg, Intravenous, Q30 Min PRN    ondansetron, 8 mg, Intravenous, Q6H PRN    Family History       Problem Relation (Age of Onset)    Breast cancer Sister (48)    Colon cancer Sister    Hypertension Mother, Brother, Brother    Seizures Father          Tobacco Use    Smoking status: Never    Smokeless tobacco: Never   Substance and Sexual Activity    Alcohol use: Yes     Alcohol/week: 0.0 standard drinks of alcohol     Comment: ocassional    Drug use: No    Sexual activity: Not Currently     Partners: Male     Birth control/protection: None       Review of Systems   Unable to perform ROS: Patient unresponsive     Objective:     Vital Signs (Most Recent):  Temp: 97.6 °F (36.4 °C) (07/26/24 0814)  Pulse: 97 (07/26/24 0814)  Resp: 15 (07/26/24 0814)  BP: (!) 106/50 (07/26/24 0814)  SpO2: 96 % (07/26/24 0814) Vital Signs (24h Range):  Temp:  [97.6 °F (36.4 °C)-98.1 °F (36.7 °C)] 97.6 °F (36.4 °C)  Pulse:  [] 97  Resp:  [6-15] 15  SpO2:  [96 %-100 %] 96 %  BP: (101-117)/(50-60) 106/50     Weight: 92 kg (202 lb 13.2 oz)  Body mass index is 31.77 kg/m².       Physical Exam  Constitutional:       General: She is in acute distress.   HENT:      Head: Normocephalic and atraumatic.      Right Ear: External ear normal.      Left Ear:  External ear normal.      Nose: Nose normal.      Mouth/Throat:      Mouth: Mucous membranes are dry.   Eyes:      Conjunctiva/sclera: Conjunctivae normal.   Cardiovascular:      Rate and Rhythm: Tachycardia present.   Pulmonary:      Breath sounds: Normal breath sounds.      Comments: Tachypneic  Abdominal:      General: There is distension.      Tenderness: There is abdominal tenderness. There is guarding.   Musculoskeletal:         General: No swelling.   Skin:     General: Skin is warm and dry.   Neurological:      Mental Status: She is disoriented.            Review of Symptoms      Symptom Assessment (ESAS 0-10 Scale)  Pain:  0  Dyspnea:  0  Anxiety:  0  Nausea:  0  Depression:  0  Anorexia:  0  Fatigue:  0  Insomnia:  0  Restlessness:  0  Agitation:  0  Unable to complete assessment due to Patient unresponsive         Pain Assessment in Advanced Demential Scale (PAINAD)   Breathing - Independent of vocalization:  2  Negative vocalization:  1  Facial expression:  1  Body language:  1  Consolability:  2  Total:  7    Living Arrangements:  Lives with spouse    Psychosocial/Cultural:   See Palliative Psychosocial Note: No   to  Darrell for 38 years. They had one son who unfortunately passed away in his teens.  **Primary  to Follow**  Palliative Care  Consult: No        Advance Care Planning  Advance Directives:   Living Will: No    LaPOST: No    Do Not Resuscitate Status: Yes    Medical Power of : No      Decision Making:  Family answered questions  Goals of Care: 7/25/24: Emotional, supportive conversation held with  Darrell at Mrs. Ricci's bedside. She is unable to participate due to encephalopathy. Darrell confirms for me that she was utilizing home hospice support with [facility]. She had been alert, coherent, but getting weaker. She had a bowel movement and while he was turning her and cleaning her, her PEG tube dislodged. He brought her to the  hospital because it wouldn't get back in place. He is her primary caregiver,  for 38 years. He shares that they had one son who unfortunately passed away in his teens. He is appropriately tearful during this conversation - admits that he doesn't know what he would do without her. He shares that Mrs. Ricci had asked him to bring her to the hospital, wanting to spend her final days in a facility rather than at home. She has severe pain, for which she is on a fentanyl patch. She hasn't been eating as much, only preferring small sips of water, juice, tea, etc. He was told by a doctor in the Emergency Department that his wife was dying, likely in her final hours to short days. He confirms for me that he would want her to be as comfortable as possible. There are other family members (siblings) who are very important to them as well.  - I shared with him about our inpatient hospice unit, including the focus on providing medicines to reduce the intensity of her symptom burden (pain, air hunger, nausea, anxiety, etc). I was transparent about the limitations as well - no labs/imaging/diagnostics, no telemetry, no IV fluids or artificial nutrition, mimicking what would be otherwise done at home. I shared that during this time of life, she will struggle to swallow and in fact, forcing drink/food/medicines in her mouth may make her choke, and she may reflexively shut her mouth to prevent us from putting food/drink/medicine in her mouth. I shared that we would use her IV's to administer medicines at appropriate doses to reduce the intensity of her pain, air hunger, anxiety, nausea, etc, but not to force IV fluids that would end up in her lungs, causing her to feel like she's drowning. Darrell nodded in understanding. Emotional support provided. I asked Darrell if he needed anything else, and he admitted that he needed support. He is aware that she will be moved to the 3rd floor [facility] hospice unit as soon as  "possible.         Significant Labs: CBC:   Recent Labs   Lab 07/25/24  0032   WBC 18.27*   HGB 5.2*   HCT 17.5*        CMP:   Recent Labs   Lab 07/25/24  0032   *   K 5.0   CL 96   CO2 18*   GLU 68*   BUN 51*   CREATININE 2.8*   CALCIUM 8.2*   PROT 6.8   ALBUMIN 1.8*   BILITOT 1.7*   ALKPHOS 110   *   ALT 10   ANIONGAP 18*     CBC:   Recent Labs   Lab 07/25/24  0032   WBC 18.27*   HGB 5.2*   HCT 17.5*   MCV 96        BMP:  No results for input(s): "GLU", "NA", "K", "CL", "CO2", "BUN", "CREATININE", "CALCIUM", "MG" in the last 24 hours.    LFT:  Lab Results   Component Value Date     (H) 07/25/2024    ALKPHOS 110 07/25/2024    BILITOT 1.7 (H) 07/25/2024     Albumin:   Albumin   Date Value Ref Range Status   07/25/2024 1.8 (L) 3.5 - 5.2 g/dL Final     Protein:   Total Protein   Date Value Ref Range Status   07/25/2024 6.8 6.0 - 8.4 g/dL Final     Lactic acid:   Lab Results   Component Value Date    LACTATE 1.7 05/23/2024    LACTATE 1.8 05/21/2024       Significant Imaging: I have reviewed all pertinent imaging results/findings within the past 24 hours.    7/25/24 CTA Chest and Abdomen/Pelvis with IV Contrast Impression:  1. Small volume pulmonary thromboembolism in both lower lobes.  2. Consolidation and effusion in the right lower lung.  Pleural effusion appears to have increased.  While this could represent focal pneumonia or metastatic disease, pulmonary infarct is difficult to exclude but considered less likely.  3. Decreased size of nodular opacity at the thoracic inlet on the right.  4. Patchy airspace disease with pleural base mass in the left lung base stable..  5. Worsening of mesenteric masses throughout the peritoneal cavity and worsening ascites.  Given the short time span since the prior CT, this could represent flare response from chemotherapy or immunotherapy.  However findings are worrisome for progression of disease.  6. Worsening of 3rd spacing and ascites throughout " the abdominal and abdominal wall .  7. Stable appearance of the devices and bowel-gas pattern allowing for ileus and constipation.  8. This report was flagged in Epic as abnormal.    I spent a total of 60 minutes on the day of the visit. This includes face to face time in discussion of goals of care, symptom assessment, coordination of care and emotional support. This also includes non-face to face time preparing to see the patient (eg, review of tests/imaging), obtaining and/or reviewing separately obtained history, documenting clinical information in the electronic or other health record, independently interpreting results and communicating results to the patient/family/caregiver, or care coordinator.       Irma Sanabria MD  Palliative Medicine  [facility] - Hospice and Palliative Medicine

## 2024-07-26 NOTE — PLAN OF CARE
Problem: Adult Inpatient Plan of Care  Goal: Plan of Care Review  Outcome: Progressing     Problem: Palliative Care  Goal: Enhanced Quality of Life  Outcome: Progressing     Problem: End-of-Life Care  Goal: Comfort, Peace and Preserved Dignity  Outcome: Progressing     Problem: Pain Acute  Goal: Optimal Pain Control and Function  Outcome: Progressing     Problem: Fall Injury Risk  Goal: Absence of Fall and Fall-Related Injury  Outcome: Progressing       End of Life care continued during the night. Family at bedside, informed that the doctor will be rounding today and can answer any other questions that they may have. Sister states a lot of her questions were answered last night. Reassurance given. 4lnc in place for comfort since the start of the shift. Respirations 4-6 per minute with apnea and irregular. No urine out put during the night in junior. 350 ml of brown drainage emptied from Peg site.  Dilaudid infusion in place at 0.7 mg/hr. Bolus given one time during the night due to pain symptoms after turning pt. Large watery stool this morning, pt cleaned, Mepilex removed - Barrier cream applied, new Mepilex place, bed linen, gown, and absorbent pads changed. Pt turned with use of wedge and pillows. Comfort care maintained. Instructed family to call for any assistance.

## 2024-07-26 NOTE — NURSING
2300    Upon assessing patient on rounds, 3 sisters and  are in the room. Sisters have questions about Plan of Care for this unit and if it was more that can be done for the patient regarding treating the Pulmonary Embolism, DM, No urine output, the current Cancer diagnosis, Irregular breathing, Too much pain medication, etc. I attempted to explain the Hospice Unit Plan of Care, medications, and the End of Life process. Family member on the phone has questions regarding treating blood clot and transferring patient to another unit for treatment.   One of the sisters understands the Plan of Care for Hospice from ED but still has questions regarding Comfort Care only and the quick change in the patients orientation.   Family agreed to have a discussion with the  in the room regarding patients care and will inform me on the decision when I return.    2338    Dr Sanabria and Dr Blakely informed.    2347    I returned to room with Charge RN and had a discussion with family again. Charge RN explained the Plan of Care again and answered questions. I gave the family the End of Life book. Sisters now state they found out things while having talk with  and understands a little more of what's going on but they still have some reservations. Sisters and  state they are ok with the Hospice Plan of Care currently.    Will continue to assess and reassure the family.

## 2024-07-26 NOTE — NURSING
Upon rounds, pts peg site noted to have Odorous brown drainage on gauze dressing. Dressing removed. Site cleaned. Drain gauze and regular gauze placed, secured with Mepilex and tape. Pt placed in the Supine position due to Peg site clean all night but drainage noted when turned to the left side where the Peg site is this morning.

## 2024-07-26 NOTE — NURSING
1400 - Pt noted to have significant bloody drainage from her vagina.  PCT helped clean pt and change pads.  Pt then had liquid stool from rectum and pads were changed again.    1600 - Pt continued to have bloody drainage/clots from her vagina.  Area cleaned again and pads changed.

## 2024-07-26 NOTE — PLAN OF CARE
Problem: Adult Inpatient Plan of Care  Goal: Plan of Care Review  Outcome: Progressing     Problem: Palliative Care  Goal: Enhanced Quality of Life  Outcome: Progressing     Problem: Skin Injury Risk Increased  Goal: Skin Health and Integrity  Outcome: Progressing     Problem: End-of-Life Care  Goal: Comfort, Peace and Preserved Dignity  Outcome: Progressing     Problem: Pain Acute  Goal: Optimal Pain Control and Function  Outcome: Progressing     Problem: Fall Injury Risk  Goal: Absence of Fall and Fall-Related Injury  Outcome: Progressing

## 2024-07-26 NOTE — NURSING
Pt turned with use of wedge and pillows. Pt grimaced and moaned with pain like behaviors. Pt given a Bolus of Dilaudid via pump. Comfort and safety maintained.

## 2024-07-27 NOTE — PLAN OF CARE
Problem: Adult Inpatient Plan of Care  Goal: Plan of Care Review  Outcome: Progressing  Goal: Patient-Specific Goal (Individualized)  Outcome: Progressing  Goal: Absence of Hospital-Acquired Illness or Injury  Outcome: Progressing  Goal: Optimal Comfort and Wellbeing  Outcome: Progressing     Problem: Palliative Care  Goal: Enhanced Quality of Life  Outcome: Progressing     Problem: Wound  Goal: Optimal Pain Control and Function  Outcome: Progressing  Goal: Skin Health and Integrity  Outcome: Progressing     Problem: Skin Injury Risk Increased  Goal: Skin Health and Integrity  Outcome: Progressing     Problem: End-of-Life Care  Goal: Comfort, Peace and Preserved Dignity  Outcome: Progressing     Problem: Pain Acute  Goal: Optimal Pain Control and Function  Outcome: Progressing     Problem: Fall Injury Risk  Goal: Absence of Fall and Fall-Related Injury  Outcome: Progressing

## 2024-07-27 NOTE — NURSING
Bed in lowest position. Side rails up x2.  Call bell and personal belongings within reach.  Safety precautions maintained.  Family at bedside.  Plan of care continues.

## 2024-07-27 NOTE — NURSING
Pt continues to have bloody drainage from her vagina.  Pt had watery bowel movement.  Pt cleaned, turned, and pads changed with help of PCT.  Pt premedicated with bolus dose of pain medication but still exhibited pain behaviors of moaning/groaning and grimacing.  Pt's family educated about the signs of pain behaviors and the need for proper pain management.

## 2024-07-27 NOTE — PLAN OF CARE
Problem: Adult Inpatient Plan of Care  Goal: Plan of Care Review  Outcome: Progressing  Goal: Patient-Specific Goal (Individualized)  Outcome: Progressing  Goal: Absence of Hospital-Acquired Illness or Injury  Outcome: Progressing  Goal: Optimal Comfort and Wellbeing  Outcome: Progressing  Goal: Readiness for Transition of Care  Outcome: Progressing     Problem: Acute Kidney Injury/Impairment  Goal: Fluid and Electrolyte Balance  Outcome: Progressing     Problem: Palliative Care  Goal: Enhanced Quality of Life  Outcome: Progressing     Problem: Infection  Goal: Absence of Infection Signs and Symptoms  Outcome: Progressing     Problem: Wound  Goal: Optimal Coping  Outcome: Progressing  Goal: Absence of Infection Signs and Symptoms  Outcome: Progressing  Goal: Optimal Pain Control and Function  Outcome: Progressing  Goal: Skin Health and Integrity  Outcome: Progressing     Problem: Skin Injury Risk Increased  Goal: Skin Health and Integrity  Outcome: Progressing     Problem: End-of-Life Care  Goal: Comfort, Peace and Preserved Dignity  Outcome: Progressing     Problem: Pain Acute  Goal: Optimal Pain Control and Function  Outcome: Progressing     Problem: Fall Injury Risk  Goal: Absence of Fall and Fall-Related Injury  Outcome: Progressing

## 2024-07-27 NOTE — ASSESSMENT & PLAN NOTE
Anette Ricci is a 58-year-old woman with a history of stage IV ovarian carcinosarcoma s/p debulking, omentectomy, partial hepatectomy, recurrent malignant pleural effusions, recurrent malignant ascites, malignant obstruction s/p venting PEG, pulmonary embolism, home hospice patient with [facility] who presented to the Emergency Department after her venting PEG was dislodged. General surgery was consulted and graciously fixed the G tube. Unfortunately, Mrs. Ricci is likely in her final short days of life and Palliative/Supportive Care was consulted for admission to inpatient hospice unit for aggressive symptom management at the end of life.    Interval update 07/27/2024: Meeting held today with patient's  and niece at bedside. They report no incidents overnight. Patient's  notes that patient continues to cry out with turning or cleaning. Discussed patient's medications availability for PRN dosing on top of her current needs of hydromorphone continuous infusion. Family expressed concern about over sedation. Patient is alerting intermittently to family when they tell her how much they love her. Discussed increasing PRN bolus dose for cleaning and changes to 0.9 mg for better relief during those times. If patient is oversedated, the medication may be lowered. Family in agreement to change only bolus dose at this time.     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 Requiring IV opioids and benzodiazepines for pain behaviors, respiratory distress, and agitation. Likely prognosis of hours to short days    Dispo: anticipate end-of-life care on the unit, if patient  unexpectedly stabilizes, will approach family regarding care at home or alternative in-facility/intermediate placement with hospice support    Symptom Assessment  - Pain/ pain behaviors: uncontrolled  - Dyspnea/tachypnea: uncontrolled  - Agitation: controlled  - Mentation: minimally responsive    Symptom Oriented Management:    Tachypnea/Dyspnea:   - Discontinued home fentanyl patch  - Continue hydromorphone titratable gtt (0.9 mg/hr)  - Increase Hydromorphone 0.9 mg IV q15min PRN pain behavior (groaning, grimacing, guarding), labored breathing  - Lorazepam 0.5 mg IV q30min PRN anxiety, labored breathing refractory to opioids  - Fan at bedside  - Avoid artificial hydration  - Treat fevers symptomatically with PRN APAP, NSAID's, and if refractory to these measures, dexamethasone     Pain Behaviors:   - Opioids as above  - Turn only as tolerated     Agitation/Anxiety/Encephalopathy:   - Treat for pain/labored breathing as above  - Lorazepam 0.5 mg IV q30min PRN anxiety/agitation, labored breathing refractory to opioids  - Haloperidol 1 mg IV q4h PRN agitation, nausea/vomiting refractory to ondansetron     Profuse Oropharyngeal Secretions:  - Turn head side to side to help shift secretions out of airway, allow to pool to cheeks and out of mouth  - Gentle, frequent oral care - encourage family at bedside to participate  - Yankauer suction at bedside  - Glycopyrrolate 0.3 mg IV q4h PRN profuse oropharyngeal secretions     Nausea/Vomiting/Retching:  - Ondansetron 8 mg IV q6h PRN nausea/vomiting  - Haloperidol 1 mg IV q4h PRN nausea/vomiting refractory to ondansetron     Nutrition / Hydration  - No IV fluids and artificial nutrition  - Liberalize diet in order to permit comfort feedings as desired and tolerated  - Simplified medication regimen by eliminating those medications that provide little to no benefit at end-of-life, including no therapeutic anticoagulation     Bowel Regimen:  - Venting PEG with continued GI output  -  Will not prioritize at end of life

## 2024-07-27 NOTE — SUBJECTIVE & OBJECTIVE
Patient seen at bedside this morning. Patient lying comfortably in bed. Patient's  and niece at bedside this morning. Discussion held with patient's  about medications for better symptom control with turning/cleaning.  in agreement to increase bolus dosing PRN for cleaning/turning. All questions/concerns were addressed.     Past Medical History:   Diagnosis Date    Breast cancer 2013    Diabetes mellitus     Genetic testing 05/29/2013    VUS    Hyperlipidemia     Hypertension     Malignant neoplasm of upper-outer quadrant of right breast in female, estrogen receptor positive 12/02/2015    Seizure disorder        Past Surgical History:   Procedure Laterality Date    BILATERAL SALPINGO-OOPHORECTOMY (BSO) N/A 3/25/2024    Procedure: SALPINGO-OOPHORECTOMY, BILATERAL;  Surgeon: Александр Washington MD;  Location: [facility] OR [address] [place] [address];  Service: OB/GYN;  Laterality: N/A;    BREAST BIOPSY      BREAST LUMPECTOMY Right 2013    CHOLECYSTECTOMY  3/25/2024    Procedure: CHOLECYSTECTOMY;  Surgeon: Bo Farmer MD;  Location: [facility] OR [address] [place] [address];  Service: General;;    DEBULKING OF TUMOR N/A 3/25/2024    Procedure: DEBULKING, NEOPLASM;  Surgeon: Александр Washington MD;  Location: [facility] OR [address] [place] [address];  Service: OB/GYN;  Laterality: N/A;    ESOPHAGOGASTRODUODENOSCOPY W/ PEG N/A 7/12/2024    Procedure: EGD, WITH PEG TUBE INSERTION;  Surgeon: Bo Farmer MD;  Location: [facility] OR [address] [place] [address];  Service: General;  Laterality: N/A;    EXCISION, LIVER N/A 3/25/2024    Procedure: EXCISION, LIVER - partial;  Surgeon: Bo Farmer MD;  Location: [facility] OR [address] [place] [address];  Service: General;  Laterality: N/A;  bk ultrasound  [place] book by TA on 3-21-24  7:00 a.m.  Conf #  [id number]    EYE SURGERY      LAPAROTOMY, EXPLORATORY N/A 3/25/2024    Procedure: LAPAROTOMY, EXPLORATORY;  Surgeon: Александр Washington MD;  Location: [facility] OR [address] [place] [address];  Service: OB/GYN;  Laterality: N/A;    OMENTECTOMY N/A 3/25/2024    Procedure:  OMENTECTOMY;  Surgeon: Александр Washington MD;  Location: [facility] OR [facility];  Service: OB/GYN;  Laterality: N/A;    PERITONEOCENTESIS N/A 3/13/2024    Procedure: PARACENTESIS, ABDOMINAL;  Surgeon: Flavia Moore MD;  Location: [facility] CATH LAB;  Service: Radiology;  Laterality: N/A;    PERITONEOCENTESIS N/A 3/21/2024    Procedure: PARACENTESIS, ABDOMINAL;  Surgeon: Jorge Jolley MD;  Location: [facility] CATH LAB;  Service: Radiology;  Laterality: N/A;    PERITONEOCENTESIS N/A 6/10/2024    Procedure: PARACENTESIS, ABDOMINAL;  Surgeon: Rogelio Malave MD;  Location: [facility] CATH LAB;  Service: Radiology;  Laterality: N/A;    TOTAL ABDOMINAL HYSTERECTOMY N/A 3/25/2024    Procedure: HYSTERECTOMY, TOTAL, ABDOMINAL;  Surgeon: Александр Washington MD;  [facility];  Service: OB/GYN;  Laterality: N/A;    TOTAL REDUCTION MAMMOPLASTY Bilateral 2016       Review of patient's allergies indicates:   Allergen Reactions    Codeine Other (See Comments)     Flu like s/s    Tramadol Other (See Comments)     Flu like symptoms similar to codeine reaction       Medications:  Continuous Infusions:   HYDROmorphone  0-4 mg/hr Intravenous Continuous 4.5 mL/hr at 07/27/24 1128 0.9 mg/hr at 07/27/24 1128     Scheduled Meds:  PRN Meds:  Current Facility-Administered Medications:     glycopyrrolate, 0.3 mg, Intravenous, Q4H PRN    haloperidol lactate, 1 mg, Intravenous, Q6H PRN    HYDROmorphone, 0.9 mg, Intravenous, Q15 Min PRN    lorazepam, 0.5 mg, Intravenous, Q30 Min PRN    ondansetron, 8 mg, Intravenous, Q6H PRN    Family History       Problem Relation (Age of Onset)    Breast cancer Sister (48)    Colon cancer Sister    Hypertension Mother, Brother, Brother    Seizures Father          Tobacco Use    Smoking status: Never    Smokeless tobacco: Never   Substance and Sexual Activity    Alcohol use: Yes     Alcohol/week: 0.0 standard drinks of alcohol     Comment: ocassional    Drug use: No    Sexual activity: Not Currently      Partners: Male     Birth control/protection: None       Review of Systems   Unable to perform ROS: Patient unresponsive     Objective:     Vital Signs (Most Recent):  Temp: 97.7 °F (36.5 °C) (07/26/24 2014)  Pulse: 87 (07/26/24 2014)  Resp: (!) 9 (07/27/24 1048)  BP: (!) 73/37 (07/27/24 0736)  SpO2: (!) 39 % (07/27/24 0736) Vital Signs (24h Range):  Temp:  [97.7 °F (36.5 °C)] 97.7 °F (36.5 °C)  Pulse:  [87] 87  Resp:  [6-9] 9  SpO2:  [39 %] 39 %  BP: ()/(37-49) 73/37     Weight: 92 kg (202 lb 13.2 oz)  Body mass index is 31.77 kg/m².       Physical Exam  Vitals reviewed.   Constitutional:       Appearance: She is ill-appearing.   HENT:      Head: Normocephalic and atraumatic.      Right Ear: External ear normal.      Left Ear: External ear normal.      Nose: Nose normal.      Mouth/Throat:      Mouth: Mucous membranes are dry.   Eyes:      General: No scleral icterus.     Comments: Eyes mostly closed; no eyelid erythema.    Cardiovascular:      Rate and Rhythm: Normal rate.   Pulmonary:      Comments: Shallow breath sounds; some mild pauses noted on exam  Abdominal:      General: There is distension.      Tenderness: There is abdominal tenderness. There is guarding.   Musculoskeletal:         General: No swelling or tenderness.   Skin:     General: Skin is warm and dry.      Findings: Bruising present.   Neurological:      Mental Status: She is disoriented.            Review of Symptoms      Symptom Assessment (ESAS 0-10 Scale)  Pain:  0  Dyspnea:  0  Anxiety:  0  Nausea:  0  Depression:  0  Anorexia:  0  Fatigue:  0  Insomnia:  0  Restlessness:  0  Agitation:  0  Unable to complete assessment due to Patient unresponsive         Pain Assessment in Advanced Demential Scale (PAINAD)   Breathing - Independent of vocalization:  1  Negative vocalization:  1  Facial expression:  0  Body language:  0  Consolability:  1  Total:  3    Living Arrangements:  Lives with spouse    Psychosocial/Cultural:   See Palliative  Psychosocial Note: No   to  Darrell for 38 years. They had one son who unfortunately passed away in his teens.  **Primary  to Follow**  Palliative Care  Consult: No        Advance Care Planning   Advance Directives:   Living Will: No    LaPOST: No    Do Not Resuscitate Status: Yes    Medical Power of : No      Decision Making:  Family answered questions and Patient unable to communicate due to disease severity/cognitive impairment  Goals of Care: 7/25/24: Emotional, supportive conversation held with  Darrell at Mrs. Ricci's bedside. She is unable to participate due to encephalopathy. Darrell confirms for me that she was utilizing home hospice support with [facility]. She had been alert, coherent, but getting weaker. She had a bowel movement and while he was turning her and cleaning her, her PEG tube dislodged. He brought her to the hospital because it wouldn't get back in place. He is her primary caregiver,  for 38 years. He shares that they had one son who unfortunately passed away in his teens. He is appropriately tearful during this conversation - admits that he doesn't know what he would do without her. He shares that Mrs. Ricci had asked him to bring her to the hospital, wanting to spend her final days in a facility rather than at home. She has severe pain, for which she is on a fentanyl patch. She hasn't been eating as much, only preferring small sips of water, juice, tea, etc. He was told by a doctor in the Emergency Department that his wife was dying, likely in her final hours to short days. He confirms for me that he would want her to be as comfortable as possible. There are other family members (siblings) who are very important to them as well.  - I shared with him about our inpatient hospice unit, including the focus on providing medicines to reduce the intensity of her symptom burden (pain, air hunger, nausea, anxiety, etc). I was  "transparent about the limitations as well - no labs/imaging/diagnostics, no telemetry, no IV fluids or artificial nutrition, mimicking what would be otherwise done at home. I shared that during this time of life, she will struggle to swallow and in fact, forcing drink/food/medicines in her mouth may make her choke, and she may reflexively shut her mouth to prevent us from putting food/drink/medicine in her mouth. I shared that we would use her IV's to administer medicines at appropriate doses to reduce the intensity of her pain, air hunger, anxiety, nausea, etc, but not to force IV fluids that would end up in her lungs, causing her to feel like she's drowning. Darrell nodded in understanding. Emotional support provided. I asked Darrell if he needed anything else, and he admitted that he needed support. He is aware that she will be moved to the 3rd floor [facility] hospice unit as soon as possible.         Significant Labs: CBC:   No results for input(s): "WBC", "HGB", "HCT", "PLT" in the last 48 hours.    CMP:   No results for input(s): "NA", "K", "CL", "CO2", "GLU", "BUN", "CREATININE", "CALCIUM", "PROT", "ALBUMIN", "BILITOT", "ALKPHOS", "AST", "ALT", "ANIONGAP", "EGFRNONAA" in the last 48 hours.    Invalid input(s): "ESTGFAFRICA"    CBC:   Recent Labs   Lab 07/25/24  0032   WBC 18.27*   HGB 5.2*   HCT 17.5*   MCV 96        BMP:  No results for input(s): "GLU", "NA", "K", "CL", "CO2", "BUN", "CREATININE", "CALCIUM", "MG" in the last 24 hours.    LFT:  Lab Results   Component Value Date     (H) 07/25/2024    ALKPHOS 110 07/25/2024    BILITOT 1.7 (H) 07/25/2024     Albumin:   Albumin   Date Value Ref Range Status   07/25/2024 1.8 (L) 3.5 - 5.2 g/dL Final     Protein:   Total Protein   Date Value Ref Range Status   07/25/2024 6.8 6.0 - 8.4 g/dL Final     Lactic acid:   Lab Results   Component Value Date    LACTATE 1.7 05/23/2024    LACTATE 1.8 05/21/2024       Significant Imaging: I have reviewed all " pertinent imaging results/findings within the past 24 hours.    7/25/24 CTA Chest and Abdomen/Pelvis with IV Contrast Impression:  1. Small volume pulmonary thromboembolism in both lower lobes.  2. Consolidation and effusion in the right lower lung.  Pleural effusion appears to have increased.  While this could represent focal pneumonia or metastatic disease, pulmonary infarct is difficult to exclude but considered less likely.  3. Decreased size of nodular opacity at the thoracic inlet on the right.  4. Patchy airspace disease with pleural base mass in the left lung base stable..  5. Worsening of mesenteric masses throughout the peritoneal cavity and worsening ascites.  Given the short time span since the prior CT, this could represent flare response from chemotherapy or immunotherapy.  However findings are worrisome for progression of disease.  6. Worsening of 3rd spacing and ascites throughout the abdominal and abdominal wall .  7. Stable appearance of the devices and bowel-gas pattern allowing for ileus and constipation.  8. This report was flagged in Epic as abnormal.

## 2024-07-27 NOTE — PROGRESS NOTES
[facility]  Palliative Medicine  Progress Note    Patient Name: Anette Ricci  MRN: [id number]  Admission Date: 7/25/2024  Hospital Length of Stay: 2 days  Code Status: DNR   Attending Provider: Irma Sanabria MD  Consulting Provider: Asuncion Jurado MD  Primary Care Physician: Mark Chua MD  Principal Problem:<principal problem not specified>    Patient information was obtained from spouse/SO, relative(s), past medical records, and bedside nurse .      Assessment/Plan:     Palliative Care  Palliative care encounter  Anette Ricci is a 58-year-old woman with a history of stage IV ovarian carcinosarcoma s/p debulking, omentectomy, partial hepatectomy, recurrent malignant pleural effusions, recurrent malignant ascites, malignant obstruction s/p venting PEG, pulmonary embolism, home hospice patient with [facility] who presented to the Emergency Department after her venting PEG was dislodged. General surgery was consulted and graciously fixed the G tube. Unfortunately, Mrs. Ricci is likely in her final short days of life and Palliative/Supportive Care was consulted for admission to inpatient hospice unit for aggressive symptom management at the end of life.    Interval update 07/27/2024: Meeting held today with patient's  and niece at bedside. They report no incidents overnight. Patient's  notes that patient continues to cry out with turning or cleaning. Discussed patient's medications availability for PRN dosing on top of her current needs of hydromorphone continuous infusion. Family expressed concern about over sedation. Patient is alerting intermittently to family when they tell her how much they love her. Discussed increasing PRN bolus dose for cleaning and changes to 0.9 mg for better relief during those times. If patient is oversedated, the medication may be lowered. Family in agreement to change only bolus dose at this time.     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 Requiring IV opioids and benzodiazepines for pain behaviors, respiratory distress, and agitation. Likely prognosis of hours to short days    Dispo: anticipate end-of-life care on the unit, if patient unexpectedly stabilizes, will approach family regarding care at home or alternative in-facility/penitentiary placement with hospice support    Symptom Assessment  - Pain/ pain behaviors: uncontrolled  - Dyspnea/tachypnea: uncontrolled  - Agitation: controlled  - Mentation: minimally responsive    Symptom Oriented Management:    Tachypnea/Dyspnea:   - Discontinued home fentanyl patch  - Continue hydromorphone titratable gtt (0.9 mg/hr)  - Increase Hydromorphone 0.9 mg IV q15min PRN pain behavior (groaning, grimacing, guarding), labored breathing  - Lorazepam 0.5 mg IV q30min PRN anxiety, labored breathing refractory to opioids  - Fan at bedside  - Avoid artificial hydration  - Treat fevers symptomatically with PRN APAP, NSAID's, and if refractory to these measures, dexamethasone     Pain Behaviors:   - Opioids as above  - Turn only as tolerated     Agitation/Anxiety/Encephalopathy:   - Treat for pain/labored breathing as above  - Lorazepam 0.5 mg IV q30min PRN anxiety/agitation, labored breathing refractory to opioids  - Haloperidol 1 mg IV q4h PRN agitation, nausea/vomiting refractory to ondansetron     Profuse Oropharyngeal Secretions:  - Turn head side to side to help shift secretions out of airway, allow to pool to cheeks and out of mouth  - Gentle, frequent oral care - encourage family at bedside to participate  - Yankauer suction at bedside  - Glycopyrrolate  0.3 mg IV q4h PRN profuse oropharyngeal secretions     Nausea/Vomiting/Retching:  - Ondansetron 8 mg IV q6h PRN nausea/vomiting  - Haloperidol 1 mg IV q4h PRN nausea/vomiting refractory to ondansetron     Nutrition / Hydration  - No IV fluids and artificial nutrition  - Liberalize diet in order to permit comfort feedings as desired and tolerated  - Simplified medication regimen by eliminating those medications that provide little to no benefit at end-of-life, including no therapeutic anticoagulation     Bowel Regimen:  - Venting PEG with continued GI output  - Will not prioritize at end of life        Subjective:     Chief Complaint: No chief complaint on file.      HPI:   Anette Ricci is a 58-year-old woman with a history of stage IV ovarian carcinosarcoma s/p debulking, omentectomy, partial hepatectomy, recurrent malignant pleural effusions, recurrent malignant ascites, malignant obstruction s/p venting PEG, pulmonary embolism, home hospice patient with [facility] who presented to the Emergency Department after her venting PEG was dislodged. General surgery was consulted and graciously fixed the G tube. Unfortunately, Mrs. Ricci is likely in her final short days of life and Palliative/Supportive Care was consulted for admission to inpatient hospice unit for aggressive symptom management at the end of life.    Hospital Course:  No notes on file    Patient seen at bedside this morning. Patient lying comfortably in bed. Patient's  and niece at bedside this morning. Discussion held with patient's  about medications for better symptom control with turning/cleaning.  in agreement to increase bolus dosing PRN for cleaning/turning. All questions/concerns were addressed.     Past Medical History:   Diagnosis Date    Breast cancer 2013    Diabetes mellitus     Genetic testing 05/29/2013    VUS    Hyperlipidemia     Hypertension     Malignant neoplasm of upper-outer quadrant of right breast in  female, estrogen receptor positive 12/02/2015    Seizure disorder        Past Surgical History:   Procedure Laterality Date    BILATERAL SALPINGO-OOPHORECTOMY (BSO) N/A 3/25/2024    Procedure: SALPINGO-OOPHORECTOMY, BILATERAL;  Surgeon: Александр Washington MD;  [facility];  Service: OB/GYN;  Laterality: N/A;    BREAST BIOPSY      BREAST LUMPECTOMY Right 2013    CHOLECYSTECTOMY  3/25/2024    Procedure: CHOLECYSTECTOMY;  Surgeon: Bo Farmer MD;  [facility];  Service: General;;    DEBULKING OF TUMOR N/A 3/25/2024    Procedure: DEBULKING, NEOPLASM;  Surgeon: Александр Washington MD;  [facility];  Service: OB/GYN;  Laterality: N/A;    ESOPHAGOGASTRODUODENOSCOPY W/ PEG N/A 7/12/2024    Procedure: EGD, WITH PEG TUBE INSERTION;  Surgeon: Bo Farmer MD;  [facility];  Service: General;  Laterality: N/A;    EXCISION, LIVER N/A 3/25/2024    Procedure: EXCISION, LIVER - partial;  Surgeon: Bo Farmer MD;  [facility];  Service: General;  Laterality: N/A;  bk ultrasound  [place] book by TA on 3-21-24  7:00 a.m.  Conf #  [id number]    EYE SURGERY      LAPAROTOMY, EXPLORATORY N/A 3/25/2024    Procedure: LAPAROTOMY, EXPLORATORY;  Surgeon: Александр Washington MD;  [facility];  Service: OB/GYN;  Laterality: N/A;    OMENTECTOMY N/A 3/25/2024    Procedure: OMENTECTOMY;  Surgeon: Александр Washington MD;  [facility];  Service: OB/GYN;  Laterality: N/A;    PERITONEOCENTESIS N/A 3/13/2024    Procedure: PARACENTESIS, ABDOMINAL;  Surgeon: Flavia Moore MD;  Location: [facility] CATH LAB;  Service: Radiology;  Laterality: N/A;    PERITONEOCENTESIS N/A 3/21/2024    Procedure: PARACENTESIS, ABDOMINAL;  Surgeon: Jorge Jolley MD;  Location: [facility] CATH LAB;  Service: Radiology;  Laterality: N/A;    PERITONEOCENTESIS N/A 6/10/2024    Procedure: PARACENTESIS, ABDOMINAL;  Surgeon: Rogelio Malave MD;  Location:  [facility] CATH LAB;  Service: Radiology;  Laterality: N/A;    TOTAL ABDOMINAL HYSTERECTOMY N/A 3/25/2024    Procedure: HYSTERECTOMY, TOTAL, ABDOMINAL;  Surgeon: Александр Washington MD;  Location: [facility] OR [address] [place] [address];  Service: OB/GYN;  Laterality: N/A;    TOTAL REDUCTION MAMMOPLASTY Bilateral 2016       Review of patient's allergies indicates:   Allergen Reactions    Codeine Other (See Comments)     Flu like s/s    Tramadol Other (See Comments)     Flu like symptoms similar to codeine reaction       Medications:  Continuous Infusions:   HYDROmorphone  0-4 mg/hr Intravenous Continuous 4.5 mL/hr at 07/27/24 1128 0.9 mg/hr at 07/27/24 1128     Scheduled Meds:  PRN Meds:  Current Facility-Administered Medications:     glycopyrrolate, 0.3 mg, Intravenous, Q4H PRN    haloperidol lactate, 1 mg, Intravenous, Q6H PRN    HYDROmorphone, 0.9 mg, Intravenous, Q15 Min PRN    lorazepam, 0.5 mg, Intravenous, Q30 Min PRN    ondansetron, 8 mg, Intravenous, Q6H PRN    Family History       Problem Relation (Age of Onset)    Breast cancer Sister (48)    Colon cancer Sister    Hypertension Mother, Brother, Brother    Seizures Father          Tobacco Use    Smoking status: Never    Smokeless tobacco: Never   Substance and Sexual Activity    Alcohol use: Yes     Alcohol/week: 0.0 standard drinks of alcohol     Comment: ocassional    Drug use: No    Sexual activity: Not Currently     Partners: Male     Birth control/protection: None       Review of Systems   Unable to perform ROS: Patient unresponsive     Objective:     Vital Signs (Most Recent):  Temp: 97.7 °F (36.5 °C) (07/26/24 2014)  Pulse: 87 (07/26/24 2014)  Resp: (!) 9 (07/27/24 1048)  BP: (!) 73/37 (07/27/24 0736)  SpO2: (!) 39 % (07/27/24 0736) Vital Signs (24h Range):  Temp:  [97.7 °F (36.5 °C)] 97.7 °F (36.5 °C)  Pulse:  [87] 87  Resp:  [6-9] 9  SpO2:  [39 %] 39 %  BP: ()/(37-49) 73/37     Weight: 92 kg (202 lb 13.2 oz)  Body mass index is 31.77 kg/m².       Physical Exam  Vitals  reviewed.   Constitutional:       Appearance: She is ill-appearing.   HENT:      Head: Normocephalic and atraumatic.      Right Ear: External ear normal.      Left Ear: External ear normal.      Nose: Nose normal.      Mouth/Throat:      Mouth: Mucous membranes are dry.   Eyes:      General: No scleral icterus.     Comments: Eyes mostly closed; no eyelid erythema.    Cardiovascular:      Rate and Rhythm: Normal rate.   Pulmonary:      Comments: Shallow breath sounds; some mild pauses noted on exam  Abdominal:      General: There is distension.      Tenderness: There is abdominal tenderness. There is guarding.   Musculoskeletal:         General: No swelling or tenderness.   Skin:     General: Skin is warm and dry.      Findings: Bruising present.   Neurological:      Mental Status: She is disoriented.            Review of Symptoms      Symptom Assessment (ESAS 0-10 Scale)  Pain:  0  Dyspnea:  0  Anxiety:  0  Nausea:  0  Depression:  0  Anorexia:  0  Fatigue:  0  Insomnia:  0  Restlessness:  0  Agitation:  0  Unable to complete assessment due to Patient unresponsive         Pain Assessment in Advanced Demential Scale (PAINAD)   Breathing - Independent of vocalization:  1  Negative vocalization:  1  Facial expression:  0  Body language:  0  Consolability:  1  Total:  3    Living Arrangements:  Lives with spouse    Psychosocial/Cultural:   See Palliative Psychosocial Note: No   to  Darrell for 38 years. They had one son who unfortunately passed away in his teens.  **Primary  to Follow**  Palliative Care  Consult: No        Advance Care Planning  Advance Directives:   Living Will: No    LaPOST: No    Do Not Resuscitate Status: Yes    Medical Power of : No      Decision Making:  Family answered questions and Patient unable to communicate due to disease severity/cognitive impairment  Goals of Care: 7/25/24: Emotional, supportive conversation held with  Darrell at  Mrs. Ricci's bedside. She is unable to participate due to encephalopathy. Darrell confirms for me that she was utilizing home hospice support with [facility]. She had been alert, coherent, but getting weaker. She had a bowel movement and while he was turning her and cleaning her, her PEG tube dislodged. He brought her to the hospital because it wouldn't get back in place. He is her primary caregiver,  for 38 years. He shares that they had one son who unfortunately passed away in his teens. He is appropriately tearful during this conversation - admits that he doesn't know what he would do without her. He shares that Mrs. Ricci had asked him to bring her to the hospital, wanting to spend her final days in a facility rather than at home. She has severe pain, for which she is on a fentanyl patch. She hasn't been eating as much, only preferring small sips of water, juice, tea, etc. He was told by a doctor in the Emergency Department that his wife was dying, likely in her final hours to short days. He confirms for me that he would want her to be as comfortable as possible. There are other family members (siblings) who are very important to them as well.  - I shared with him about our inpatient hospice unit, including the focus on providing medicines to reduce the intensity of her symptom burden (pain, air hunger, nausea, anxiety, etc). I was transparent about the limitations as well - no labs/imaging/diagnostics, no telemetry, no IV fluids or artificial nutrition, mimicking what would be otherwise done at home. I shared that during this time of life, she will struggle to swallow and in fact, forcing drink/food/medicines in her mouth may make her choke, and she may reflexively shut her mouth to prevent us from putting food/drink/medicine in her mouth. I shared that we would use her IV's to administer medicines at appropriate doses to reduce the intensity of her pain, air hunger, anxiety, nausea, etc, but not to  "force IV fluids that would end up in her lungs, causing her to feel like she's drowning. Darrell nodded in understanding. Emotional support provided. I asked Darrell if he needed anything else, and he admitted that he needed support. He is aware that she will be moved to the 3rd floor annex hospice unit as soon as possible.         Significant Labs: CBC:   No results for input(s): "WBC", "HGB", "HCT", "PLT" in the last 48 hours.    CMP:   No results for input(s): "NA", "K", "CL", "CO2", "GLU", "BUN", "CREATININE", "CALCIUM", "PROT", "ALBUMIN", "BILITOT", "ALKPHOS", "AST", "ALT", "ANIONGAP", "EGFRNONAA" in the last 48 hours.    Invalid input(s): "ESTGFAFRICA"    CBC:   Recent Labs   Lab 07/25/24  0032   WBC 18.27*   HGB 5.2*   HCT 17.5*   MCV 96        BMP:  No results for input(s): "GLU", "NA", "K", "CL", "CO2", "BUN", "CREATININE", "CALCIUM", "MG" in the last 24 hours.    LFT:  Lab Results   Component Value Date     (H) 07/25/2024    ALKPHOS 110 07/25/2024    BILITOT 1.7 (H) 07/25/2024     Albumin:   Albumin   Date Value Ref Range Status   07/25/2024 1.8 (L) 3.5 - 5.2 g/dL Final     Protein:   Total Protein   Date Value Ref Range Status   07/25/2024 6.8 6.0 - 8.4 g/dL Final     Lactic acid:   Lab Results   Component Value Date    LACTATE 1.7 05/23/2024    LACTATE 1.8 05/21/2024       Significant Imaging: I have reviewed all pertinent imaging results/findings within the past 24 hours.    7/25/24 CTA Chest and Abdomen/Pelvis with IV Contrast Impression:  1. Small volume pulmonary thromboembolism in both lower lobes.  2. Consolidation and effusion in the right lower lung.  Pleural effusion appears to have increased.  While this could represent focal pneumonia or metastatic disease, pulmonary infarct is difficult to exclude but considered less likely.  3. Decreased size of nodular opacity at the thoracic inlet on the right.  4. Patchy airspace disease with pleural base mass in the left lung base " stable..  5. Worsening of mesenteric masses throughout the peritoneal cavity and worsening ascites.  Given the short time span since the prior CT, this could represent flare response from chemotherapy or immunotherapy.  However findings are worrisome for progression of disease.  6. Worsening of 3rd spacing and ascites throughout the abdominal and abdominal wall .  7. Stable appearance of the devices and bowel-gas pattern allowing for ileus and constipation.  8. This report was flagged in Epic as abnormal.      Asuncion Jurado MD  Palliative Medicine  [facility] - Hospice and Palliative Medicine

## 2024-07-28 NOTE — PLAN OF CARE
Problem: Adult Inpatient Plan of Care  Goal: Plan of Care Review  Outcome: Progressing  Goal: Patient-Specific Goal (Individualized)  Outcome: Progressing  Goal: Absence of Hospital-Acquired Illness or Injury  Outcome: Progressing  Goal: Optimal Comfort and Wellbeing  Outcome: Progressing     Problem: Palliative Care  Goal: Enhanced Quality of Life  Outcome: Progressing     Problem: Infection  Goal: Absence of Infection Signs and Symptoms  Outcome: Progressing     Problem: Wound  Goal: Optimal Pain Control and Function  Outcome: Progressing  Goal: Skin Health and Integrity  Outcome: Progressing     Problem: Skin Injury Risk Increased  Goal: Skin Health and Integrity  Outcome: Progressing     Problem: End-of-Life Care  Goal: Comfort, Peace and Preserved Dignity  Outcome: Progressing     Problem: Pain Acute  Goal: Optimal Pain Control and Function  Outcome: Progressing     Problem: Fall Injury Risk  Goal: Absence of Fall and Fall-Related Injury  Outcome: Progressing

## 2024-07-28 NOTE — PROGRESS NOTES
[facility]  Palliative Medicine  Progress Note    Patient Name: Anette Ricci  MRN: [id number]  Admission Date: 7/25/2024  Hospital Length of Stay: 3 days  Code Status: DNR   Attending Provider: Asuncion Jurado MD  Consulting Provider: Asuncion Jurado MD  Primary Care Physician: Mark Chua MD  Principal Problem:<principal problem not specified>    Patient information was obtained from spouse/SO, past medical records, and bedside RN .      Assessment/Plan:     Palliative Care  Palliative care encounter  Anette Ricci is a 58-year-old woman with a history of stage IV ovarian carcinosarcoma s/p debulking, omentectomy, partial hepatectomy, recurrent malignant pleural effusions, recurrent malignant ascites, malignant obstruction s/p venting PEG, pulmonary embolism, home hospice patient with [facility] who presented to the Emergency Department after her venting PEG was dislodged. General surgery was consulted and graciously fixed the G tube. Unfortunately, Mrs. Ricci is likely in her final short days of life and Palliative/Supportive Care was consulted for admission to inpatient hospice unit for aggressive symptom management at the end of life.    Interval update 07/28/2024: Meeting held today with patient's  at bedside. They report no incidents overnight. Patient's  notes that patient continues to moan with breathing. Discussed patient's medications availability for PRN dosing on top of her current needs of hydromorphone continuous infusion. Discussed that continuous infusion can be increased in dosing. Also that these medications would help patient's suffering and lessen stress on her body.  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 Requiring IV opioids and benzodiazepines for pain behaviors, respiratory distress, and agitation. Likely prognosis of hours to short days    Dispo: anticipate end-of-life care on the unit, if patient unexpectedly stabilizes, will approach family regarding care at home or alternative in-facility/[facility] placement with hospice support    Symptom Assessment  - Pain/ pain behaviors: uncontrolled  - Dyspnea/tachypnea: uncontrolled  - Agitation: controlled  - Mentation: minimally responsive    Symptom Oriented Management:    Tachypnea/Dyspnea:   - Discontinued home fentanyl patch  - Continue hydromorphone titratable gtt (1 mg/hr)  - Continue Hydromorphone 0.9 mg IV bolus q15min PRN pain behavior (groaning, grimacing, guarding), labored breathing  - Hydromorphone 1 mg IV q15 min prn for medication exchange, turning, cleaning, etc  - Lorazepam 0.5 mg IV q30min PRN anxiety, labored breathing refractory to opioids  - Fan at bedside  - Avoid artificial hydration  - Treat fevers symptomatically with PRN APAP, NSAID's, and if refractory to these measures, dexamethasone     Pain Behaviors:   - Opioids as above  - Turn only as tolerated     Agitation/Anxiety/Encephalopathy:   - Treat for pain/labored breathing as above  - Lorazepam 0.5 mg IV q30min PRN anxiety/agitation, labored breathing refractory to opioids  - Haloperidol 1 mg IV q4h PRN agitation, nausea/vomiting refractory to ondansetron     Profuse Oropharyngeal Secretions:  - Turn head side to side to help shift secretions out of airway, allow to pool to cheeks and out of mouth  - Gentle, frequent oral care - encourage family at bedside to participate  - Yankauer suction at bedside  - Glycopyrrolate 0.3 mg IV q4h PRN profuse oropharyngeal secretions     Nausea/Vomiting/Retching:  - Ondansetron 8 mg  IV q6h PRN nausea/vomiting  - Haloperidol 1 mg IV q4h PRN nausea/vomiting refractory to ondansetron     Nutrition / Hydration  - No IV fluids and artificial nutrition  - Liberalize diet in order to permit comfort feedings as desired and tolerated  - Simplified medication regimen by eliminating those medications that provide little to no benefit at end-of-life, including no therapeutic anticoagulation     Bowel Regimen:  - Venting PEG with continued GI output  - Will not prioritize at end of life        Subjective:     Chief Complaint: No chief complaint on file.      HPI:   Anette Ricci is a 58-year-old woman with a history of stage IV ovarian carcinosarcoma s/p debulking, omentectomy, partial hepatectomy, recurrent malignant pleural effusions, recurrent malignant ascites, malignant obstruction s/p venting PEG, pulmonary embolism, home hospice patient with [facility] who presented to the Emergency Department after her venting PEG was dislodged. General surgery was consulted and graciously fixed the G tube. Unfortunately, Mrs. Ricci is likely in her final short days of life and Palliative/Supportive Care was consulted for admission to inpatient hospice unit for aggressive symptom management at the end of life.    Hospital Course:  No notes on file    Patient seen at bedside this morning. Patient lying in bed, unresponsive. Some moaning noted with breathing.  at bedside. Discussion held today about increasing hydromorphone dose on infusion for discomfort. Family hesitant to increase medications.     Past Medical History:   Diagnosis Date    Breast cancer 2013    Diabetes mellitus     Genetic testing 05/29/2013    VUS    Hyperlipidemia     Hypertension     Malignant neoplasm of upper-outer quadrant of right breast in female, estrogen receptor positive 12/02/2015    Seizure disorder        Past Surgical History:   Procedure Laterality Date    BILATERAL SALPINGO-OOPHORECTOMY (BSO) N/A 3/25/2024     Procedure: SALPINGO-OOPHORECTOMY, BILATERAL;  Surgeon: Александр Washington MD;  [facility];  Service: OB/GYN;  Laterality: N/A;    BREAST BIOPSY      BREAST LUMPECTOMY Right 2013    CHOLECYSTECTOMY  3/25/2024    Procedure: CHOLECYSTECTOMY;  Surgeon: Bo Famrer MD;  [facility];  Service: General;;    DEBULKING OF TUMOR N/A 3/25/2024    Procedure: DEBULKING, NEOPLASM;  Surgeon: Александр Washington MD;  [facility];  Service: OB/GYN;  Laterality: N/A;    ESOPHAGOGASTRODUODENOSCOPY W/ PEG N/A 7/12/2024    Procedure: EGD, WITH PEG TUBE INSERTION;  Surgeon: Bo Farmer MD;  [facility];  Service: General;  Laterality: N/A;    EXCISION, LIVER N/A 3/25/2024    Procedure: EXCISION, LIVER - partial;  Surgeon: Bo Farmer MD;  [facility];  Service: General;  Laterality: N/A;  bk ultrasound  [place] book by TA on 3-21-24  7:00 a.m.  Conf #  [id number]    EYE SURGERY      LAPAROTOMY, EXPLORATORY N/A 3/25/2024    Procedure: LAPAROTOMY, EXPLORATORY;  Surgeon: Александр Washington MD;  [facility];  Service: OB/GYN;  Laterality: N/A;    OMENTECTOMY N/A 3/25/2024    Procedure: OMENTECTOMY;  Surgeon: Александр Washington MD;  [facility];  Service: OB/GYN;  Laterality: N/A;    PERITONEOCENTESIS N/A 3/13/2024    Procedure: PARACENTESIS, ABDOMINAL;  Surgeon: Flavia Moore MD;  Location: [facility] CATH LAB;  Service: Radiology;  Laterality: N/A;    PERITONEOCENTESIS N/A 3/21/2024    Procedure: PARACENTESIS, ABDOMINAL;  Surgeon: Jorge Jolley MD;  Location: [facility] CATH LAB;  Service: Radiology;  Laterality: N/A;    PERITONEOCENTESIS N/A 6/10/2024    Procedure: PARACENTESIS, ABDOMINAL;  Surgeon: Rogelio Malave MD;  Location: [facility] CATH LAB;  Service: Radiology;  Laterality: N/A;    TOTAL ABDOMINAL HYSTERECTOMY N/A 3/25/2024    Procedure: HYSTERECTOMY, TOTAL, ABDOMINAL;  Surgeon: Александр Washington MD;   Location: [facility] OR [address] [place] [address];  Service: OB/GYN;  Laterality: N/A;    TOTAL REDUCTION MAMMOPLASTY Bilateral 2016       Review of patient's allergies indicates:   Allergen Reactions    Codeine Other (See Comments)     Flu like s/s    Tramadol Other (See Comments)     Flu like symptoms similar to codeine reaction       Medications:  Continuous Infusions:   HYDROmorphone  0-4 mg/hr Intravenous Continuous 5 mL/hr at 07/28/24 1203 1 mg/hr at 07/28/24 1203     Scheduled Meds:  PRN Meds:  Current Facility-Administered Medications:     glycopyrrolate, 0.3 mg, Intravenous, Q4H PRN    haloperidol lactate, 1 mg, Intravenous, Q6H PRN    HYDROmorphone, 1 mg, Intravenous, Q15 Min PRN    HYDROmorphone, 0.9 mg, Intravenous, Q15 Min PRN    lorazepam, 0.5 mg, Intravenous, Q30 Min PRN    ondansetron, 8 mg, Intravenous, Q6H PRN    Family History       Problem Relation (Age of Onset)    Breast cancer Sister (48)    Colon cancer Sister    Hypertension Mother, Brother, Brother    Seizures Father          Tobacco Use    Smoking status: Never    Smokeless tobacco: Never   Substance and Sexual Activity    Alcohol use: Yes     Alcohol/week: 0.0 standard drinks of alcohol     Comment: ocassional    Drug use: No    Sexual activity: Not Currently     Partners: Male     Birth control/protection: None       Review of Systems   Unable to perform ROS: Patient unresponsive     Objective:     Vital Signs (Most Recent):  Temp: 97.7 °F (36.5 °C) (07/26/24 2014)  Pulse: 73 (07/28/24 0720)  Resp: (!) 8 (07/28/24 1203)  BP: (!) 87/39 (07/28/24 0720)  SpO2: (!) 39 % (07/27/24 0736) Vital Signs (24h Range):  Pulse:  [73-75] 73  Resp:  [6-12] 8  BP: (87-99)/(39-54) 87/39     Weight: 92 kg (202 lb 13.2 oz)  Body mass index is 31.77 kg/m².       Physical Exam  Vitals and nursing note reviewed.   Constitutional:       Appearance: She is ill-appearing.   HENT:      Head: Normocephalic and atraumatic.      Right Ear: External ear normal.      Left Ear: External ear  normal.      Nose: Nose normal.      Mouth/Throat:      Mouth: Mucous membranes are dry.   Eyes:      General: No scleral icterus.     Comments: Eyes mostly closed; no eyelid erythema.    Cardiovascular:      Rate and Rhythm: Normal rate.   Pulmonary:      Comments: Shallow breath sounds; some mild pauses noted on exam  Abdominal:      General: There is distension.      Tenderness: There is abdominal tenderness. There is guarding.   Musculoskeletal:         General: No swelling or tenderness.   Skin:     General: Skin is warm and dry.      Findings: Bruising present.   Neurological:      Comments: unresponsive            Review of Symptoms      Symptom Assessment (ESAS 0-10 Scale)  Pain:  0  Dyspnea:  0  Anxiety:  0  Nausea:  0  Depression:  0  Anorexia:  0  Fatigue:  0  Insomnia:  0  Restlessness:  0  Agitation:  0  Unable to complete assessment due to Patient unresponsive         Pain Assessment in Advanced Demential Scale (PAINAD)   Breathing - Independent of vocalization:  2  Negative vocalization:  2  Facial expression:  0  Body language:  0  Consolability:  2  Total:  6    Living Arrangements:  Lives with spouse    Psychosocial/Cultural:   See Palliative Psychosocial Note: No   to  Darrell for 38 years. They had one son who unfortunately passed away in his teens.  **Primary  to Follow**  Palliative Care  Consult: No        Advance Care Planning  Advance Directives:   Living Will: No    LaPOST: No    Do Not Resuscitate Status: Yes    Medical Power of : No      Decision Making:  Family answered questions and Patient unable to communicate due to disease severity/cognitive impairment  Goals of Care: 7/25/24: Emotional, supportive conversation held with  Darrell at Mrs. Ricci's bedside. She is unable to participate due to encephalopathy. Darrell confirms for me that she was utilizing home hospice support with [facility]. She had been alert, coherent, but  getting weaker. She had a bowel movement and while he was turning her and cleaning her, her PEG tube dislodged. He brought her to the hospital because it wouldn't get back in place. He is her primary caregiver,  for 38 years. He shares that they had one son who unfortunately passed away in his teens. He is appropriately tearful during this conversation - admits that he doesn't know what he would do without her. He shares that Mrs. Ricci had asked him to bring her to the hospital, wanting to spend her final days in a facility rather than at home. She has severe pain, for which she is on a fentanyl patch. She hasn't been eating as much, only preferring small sips of water, juice, tea, etc. He was told by a doctor in the Emergency Department that his wife was dying, likely in her final hours to short days. He confirms for me that he would want her to be as comfortable as possible. There are other family members (siblings) who are very important to them as well.  - I shared with him about our inpatient hospice unit, including the focus on providing medicines to reduce the intensity of her symptom burden (pain, air hunger, nausea, anxiety, etc). I was transparent about the limitations as well - no labs/imaging/diagnostics, no telemetry, no IV fluids or artificial nutrition, mimicking what would be otherwise done at home. I shared that during this time of life, she will struggle to swallow and in fact, forcing drink/food/medicines in her mouth may make her choke, and she may reflexively shut her mouth to prevent us from putting food/drink/medicine in her mouth. I shared that we would use her IV's to administer medicines at appropriate doses to reduce the intensity of her pain, air hunger, anxiety, nausea, etc, but not to force IV fluids that would end up in her lungs, causing her to feel like she's drowning. Darrell nodded in understanding. Emotional support provided. I asked Darrell if he needed anything else,  "and he admitted that he needed support. He is aware that she will be moved to the 3rd floor annex hospice unit as soon as possible.         Significant Labs: CBC:   No results for input(s): "WBC", "HGB", "HCT", "PLT" in the last 48 hours.    CMP:   No results for input(s): "NA", "K", "CL", "CO2", "GLU", "BUN", "CREATININE", "CALCIUM", "PROT", "ALBUMIN", "BILITOT", "ALKPHOS", "AST", "ALT", "ANIONGAP", "EGFRNONAA" in the last 48 hours.    Invalid input(s): "ESTGFAFRICA"    CBC:   Recent Labs   Lab 07/25/24  0032   WBC 18.27*   HGB 5.2*   HCT 17.5*   MCV 96        BMP:  No results for input(s): "GLU", "NA", "K", "CL", "CO2", "BUN", "CREATININE", "CALCIUM", "MG" in the last 24 hours.    LFT:  Lab Results   Component Value Date     (H) 07/25/2024    ALKPHOS 110 07/25/2024    BILITOT 1.7 (H) 07/25/2024     Albumin:   Albumin   Date Value Ref Range Status   07/25/2024 1.8 (L) 3.5 - 5.2 g/dL Final     Protein:   Total Protein   Date Value Ref Range Status   07/25/2024 6.8 6.0 - 8.4 g/dL Final     Lactic acid:   Lab Results   Component Value Date    LACTATE 1.7 05/23/2024    LACTATE 1.8 05/21/2024       Significant Imaging: I have reviewed all pertinent imaging results/findings within the past 24 hours.    7/25/24 CTA Chest and Abdomen/Pelvis with IV Contrast Impression:  1. Small volume pulmonary thromboembolism in both lower lobes.  2. Consolidation and effusion in the right lower lung.  Pleural effusion appears to have increased.  While this could represent focal pneumonia or metastatic disease, pulmonary infarct is difficult to exclude but considered less likely.  3. Decreased size of nodular opacity at the thoracic inlet on the right.  4. Patchy airspace disease with pleural base mass in the left lung base stable..  5. Worsening of mesenteric masses throughout the peritoneal cavity and worsening ascites.  Given the short time span since the prior CT, this could represent flare response from chemotherapy or " immunotherapy.  However findings are worrisome for progression of disease.  6. Worsening of 3rd spacing and ascites throughout the abdominal and abdominal wall .  7. Stable appearance of the devices and bowel-gas pattern allowing for ileus and constipation.  8. This report was flagged in Epic as abnormal.      Asuncion Jurado MD  Palliative Medicine  [facility] - Hospice and Palliative Medicine

## 2024-07-28 NOTE — PLAN OF CARE
Problem: Adult Inpatient Plan of Care  Goal: Plan of Care Review  Outcome: Progressing  Goal: Patient-Specific Goal (Individualized)  Outcome: Progressing  Goal: Absence of Hospital-Acquired Illness or Injury  Outcome: Progressing  Goal: Readiness for Transition of Care  Outcome: Progressing     Problem: Palliative Care  Goal: Enhanced Quality of Life  Outcome: Progressing     Problem: Infection  Goal: Absence of Infection Signs and Symptoms  Outcome: Progressing     Problem: Wound  Goal: Optimal Coping  Outcome: Progressing  Goal: Absence of Infection Signs and Symptoms  Outcome: Progressing  Goal: Skin Health and Integrity  Outcome: Progressing  Goal: Optimal Wound Healing  Outcome: Progressing     Problem: Skin Injury Risk Increased  Goal: Skin Health and Integrity  Outcome: Progressing     Problem: End-of-Life Care  Goal: Comfort, Peace and Preserved Dignity  Outcome: Progressing     Problem: Fall Injury Risk  Goal: Absence of Fall and Fall-Related Injury  Outcome: Progressing

## 2024-07-28 NOTE — SUBJECTIVE & OBJECTIVE
Patient seen at bedside this morning. Patient lying in bed, unresponsive. Some moaning noted with breathing.  at bedside. Discussion held today about increasing hydromorphone dose on infusion for discomfort. Family hesitant to increase medications.     Past Medical History:   Diagnosis Date    Breast cancer 2013    Diabetes mellitus     Genetic testing 05/29/2013    VUS    Hyperlipidemia     Hypertension     Malignant neoplasm of upper-outer quadrant of right breast in female, estrogen receptor positive 12/02/2015    Seizure disorder        Past Surgical History:   Procedure Laterality Date    BILATERAL SALPINGO-OOPHORECTOMY (BSO) N/A 3/25/2024    Procedure: SALPINGO-OOPHORECTOMY, BILATERAL;  Surgeon: Александр Washington MD;  [facility];  Service: OB/GYN;  Laterality: N/A;    BREAST BIOPSY      BREAST LUMPECTOMY Right 2013    CHOLECYSTECTOMY  3/25/2024    Procedure: CHOLECYSTECTOMY;  Surgeon: Bo Farmer MD;  Location: [facility] OR [address] [place] [address];  Service: General;;    DEBULKING OF TUMOR N/A 3/25/2024    Procedure: DEBULKING, NEOPLASM;  Surgeon: Александр Washington MD;  Location: [facility] OR [address] [place] [address];  Service: OB/GYN;  Laterality: N/A;    ESOPHAGOGASTRODUODENOSCOPY W/ PEG N/A 7/12/2024    Procedure: EGD, WITH PEG TUBE INSERTION;  Surgeon: Bo Farmer MD;  Location: [facility] OR [address] [place] [address];  Service: General;  Laterality: N/A;    EXCISION, LIVER N/A 3/25/2024    Procedure: EXCISION, LIVER - partial;  Surgeon: Bo Farmer MD;  [facility];  Service: General;  Laterality: N/A;  bk ultrasound  [place] book by SAMINA on 3-21-24  7:00 a.m.  Conf #  [id number]    EYE SURGERY      LAPAROTOMY, EXPLORATORY N/A 3/25/2024    Procedure: LAPAROTOMY, EXPLORATORY;  Surgeon: Александр Washington MD;  Location: [facility] OR [address] [place] [address];  Service: OB/GYN;  Laterality: N/A;    OMENTECTOMY N/A 3/25/2024    Procedure: OMENTECTOMY;  Surgeon: Александр Washington MD;  Location: [facility] OR [address] [place] [address];  Service: OB/GYN;   Laterality: N/A;    PERITONEOCENTESIS N/A 3/13/2024    Procedure: PARACENTESIS, ABDOMINAL;  Surgeon: Flavia Moore MD;  Location: [facility] CATH LAB;  Service: Radiology;  Laterality: N/A;    PERITONEOCENTESIS N/A 3/21/2024    Procedure: PARACENTESIS, ABDOMINAL;  Surgeon: Jorge Jolley MD;  Location: [facility] CATH LAB;  Service: Radiology;  Laterality: N/A;    PERITONEOCENTESIS N/A 6/10/2024    Procedure: PARACENTESIS, ABDOMINAL;  Surgeon: Rogelio Malave MD;  Location: [facility] CATH LAB;  Service: Radiology;  Laterality: N/A;    TOTAL ABDOMINAL HYSTERECTOMY N/A 3/25/2024    Procedure: HYSTERECTOMY, TOTAL, ABDOMINAL;  Surgeon: Александр Washington MD;  Location: [address];  Service: OB/GYN;  Laterality: N/A;    TOTAL REDUCTION MAMMOPLASTY Bilateral 2016       Review of patient's allergies indicates:   Allergen Reactions    Codeine Other (See Comments)     Flu like s/s    Tramadol Other (See Comments)     Flu like symptoms similar to codeine reaction       Medications:  Continuous Infusions:   HYDROmorphone  0-4 mg/hr Intravenous Continuous 5 mL/hr at 07/28/24 1203 1 mg/hr at 07/28/24 1203     Scheduled Meds:  PRN Meds:  Current Facility-Administered Medications:     glycopyrrolate, 0.3 mg, Intravenous, Q4H PRN    haloperidol lactate, 1 mg, Intravenous, Q6H PRN    HYDROmorphone, 1 mg, Intravenous, Q15 Min PRN    HYDROmorphone, 0.9 mg, Intravenous, Q15 Min PRN    lorazepam, 0.5 mg, Intravenous, Q30 Min PRN    ondansetron, 8 mg, Intravenous, Q6H PRN    Family History       Problem Relation (Age of Onset)    Breast cancer Sister (48)    Colon cancer Sister    Hypertension Mother, Brother, Brother    Seizures Father          Tobacco Use    Smoking status: Never    Smokeless tobacco: Never   Substance and Sexual Activity    Alcohol use: Yes     Alcohol/week: 0.0 standard drinks of alcohol     Comment: ocassional    Drug use: No    Sexual activity: Not Currently     Partners: Male     Birth control/protection: None        Review of Systems   Unable to perform ROS: Patient unresponsive     Objective:     Vital Signs (Most Recent):  Temp: 97.7 °F (36.5 °C) (07/26/24 2014)  Pulse: 73 (07/28/24 0720)  Resp: (!) 8 (07/28/24 1203)  BP: (!) 87/39 (07/28/24 0720)  SpO2: (!) 39 % (07/27/24 0736) Vital Signs (24h Range):  Pulse:  [73-75] 73  Resp:  [6-12] 8  BP: (87-99)/(39-54) 87/39     Weight: 92 kg (202 lb 13.2 oz)  Body mass index is 31.77 kg/m².       Physical Exam  Vitals and nursing note reviewed.   Constitutional:       Appearance: She is ill-appearing.   HENT:      Head: Normocephalic and atraumatic.      Right Ear: External ear normal.      Left Ear: External ear normal.      Nose: Nose normal.      Mouth/Throat:      Mouth: Mucous membranes are dry.   Eyes:      General: No scleral icterus.     Comments: Eyes mostly closed; no eyelid erythema.    Cardiovascular:      Rate and Rhythm: Normal rate.   Pulmonary:      Comments: Shallow breath sounds; some mild pauses noted on exam  Abdominal:      General: There is distension.      Tenderness: There is abdominal tenderness. There is guarding.   Musculoskeletal:         General: No swelling or tenderness.   Skin:     General: Skin is warm and dry.      Findings: Bruising present.   Neurological:      Comments: unresponsive            Review of Symptoms      Symptom Assessment (ESAS 0-10 Scale)  Pain:  0  Dyspnea:  0  Anxiety:  0  Nausea:  0  Depression:  0  Anorexia:  0  Fatigue:  0  Insomnia:  0  Restlessness:  0  Agitation:  0  Unable to complete assessment due to Patient unresponsive         Pain Assessment in Advanced Demential Scale (PAINAD)   Breathing - Independent of vocalization:  2  Negative vocalization:  2  Facial expression:  0  Body language:  0  Consolability:  2  Total:  6    Living Arrangements:  Lives with spouse    Psychosocial/Cultural:   See Palliative Psychosocial Note: No   to  Darrell for 38 years. They had one son who unfortunately passed  away in his teens.  **Primary  to Follow**  Palliative Care  Consult: No        Advance Care Planning   Advance Directives:   Living Will: No    LaPOST: No    Do Not Resuscitate Status: Yes    Medical Power of : No      Decision Making:  Family answered questions and Patient unable to communicate due to disease severity/cognitive impairment  Goals of Care: 7/25/24: Emotional, supportive conversation held with  Darrell at Mrs. Ricci's bedside. She is unable to participate due to encephalopathy. Darrell confirms for me that she was utilizing home hospice support with [facility]. She had been alert, coherent, but getting weaker. She had a bowel movement and while he was turning her and cleaning her, her PEG tube dislodged. He brought her to the hospital because it wouldn't get back in place. He is her primary caregiver,  for 38 years. He shares that they had one son who unfortunately passed away in his teens. He is appropriately tearful during this conversation - admits that he doesn't know what he would do without her. He shares that Mrs. Ricci had asked him to bring her to the hospital, wanting to spend her final days in a facility rather than at home. She has severe pain, for which she is on a fentanyl patch. She hasn't been eating as much, only preferring small sips of water, juice, tea, etc. He was told by a doctor in the Emergency Department that his wife was dying, likely in her final hours to short days. He confirms for me that he would want her to be as comfortable as possible. There are other family members (siblings) who are very important to them as well.  - I shared with him about our inpatient hospice unit, including the focus on providing medicines to reduce the intensity of her symptom burden (pain, air hunger, nausea, anxiety, etc). I was transparent about the limitations as well - no labs/imaging/diagnostics, no telemetry, no IV fluids or artificial  "nutrition, mimicking what would be otherwise done at home. I shared that during this time of life, she will struggle to swallow and in fact, forcing drink/food/medicines in her mouth may make her choke, and she may reflexively shut her mouth to prevent us from putting food/drink/medicine in her mouth. I shared that we would use her IV's to administer medicines at appropriate doses to reduce the intensity of her pain, air hunger, anxiety, nausea, etc, but not to force IV fluids that would end up in her lungs, causing her to feel like she's drowning. Darrell nodded in understanding. Emotional support provided. I asked Darrell if he needed anything else, and he admitted that he needed support. He is aware that she will be moved to the 3rd floor [facility] hospice unit as soon as possible.         Significant Labs: CBC:   No results for input(s): "WBC", "HGB", "HCT", "PLT" in the last 48 hours.    CMP:   No results for input(s): "NA", "K", "CL", "CO2", "GLU", "BUN", "CREATININE", "CALCIUM", "PROT", "ALBUMIN", "BILITOT", "ALKPHOS", "AST", "ALT", "ANIONGAP", "EGFRNONAA" in the last 48 hours.    Invalid input(s): "ESTGFAFRICA"    CBC:   Recent Labs   Lab 07/25/24  0032   WBC 18.27*   HGB 5.2*   HCT 17.5*   MCV 96        BMP:  No results for input(s): "GLU", "NA", "K", "CL", "CO2", "BUN", "CREATININE", "CALCIUM", "MG" in the last 24 hours.    LFT:  Lab Results   Component Value Date     (H) 07/25/2024    ALKPHOS 110 07/25/2024    BILITOT 1.7 (H) 07/25/2024     Albumin:   Albumin   Date Value Ref Range Status   07/25/2024 1.8 (L) 3.5 - 5.2 g/dL Final     Protein:   Total Protein   Date Value Ref Range Status   07/25/2024 6.8 6.0 - 8.4 g/dL Final     Lactic acid:   Lab Results   Component Value Date    LACTATE 1.7 05/23/2024    LACTATE 1.8 05/21/2024       Significant Imaging: I have reviewed all pertinent imaging results/findings within the past 24 hours.    7/25/24 CTA Chest and Abdomen/Pelvis with IV Contrast " Impression:  1. Small volume pulmonary thromboembolism in both lower lobes.  2. Consolidation and effusion in the right lower lung.  Pleural effusion appears to have increased.  While this could represent focal pneumonia or metastatic disease, pulmonary infarct is difficult to exclude but considered less likely.  3. Decreased size of nodular opacity at the thoracic inlet on the right.  4. Patchy airspace disease with pleural base mass in the left lung base stable..  5. Worsening of mesenteric masses throughout the peritoneal cavity and worsening ascites.  Given the short time span since the prior CT, this could represent flare response from chemotherapy or immunotherapy.  However findings are worrisome for progression of disease.  6. Worsening of 3rd spacing and ascites throughout the abdominal and abdominal wall .  7. Stable appearance of the devices and bowel-gas pattern allowing for ileus and constipation.  8. This report was flagged in Epic as abnormal.

## 2024-07-28 NOTE — NURSING
Pt's respirations are 9 breaths per minute.  Hydromorphone running at 1mg/hr.  Bed in lowest position. Side rails up x2.  Call bell and personal belongings within reach.  Safety precautions maintained.  Family at bedside.

## 2024-07-28 NOTE — ASSESSMENT & PLAN NOTE
Anette Ricci is a 58-year-old woman with a history of stage IV ovarian carcinosarcoma s/p debulking, omentectomy, partial hepatectomy, recurrent malignant pleural effusions, recurrent malignant ascites, malignant obstruction s/p venting PEG, pulmonary embolism, home hospice patient with [facility] who presented to the Emergency Department after her venting PEG was dislodged. General surgery was consulted and graciously fixed the G tube. Unfortunately, Mrs. Rcici is likely in her final short days of life and Palliative/Supportive Care was consulted for admission to inpatient hospice unit for aggressive symptom management at the end of life.    Interval update 07/28/2024: Meeting held today with patient's  at bedside. They report no incidents overnight. Patient's  notes that patient continues to moan with breathing. Discussed patient's medications availability for PRN dosing on top of her current needs of hydromorphone continuous infusion. Discussed that continuous infusion can be increased in dosing. Also that these medications would help patient's suffering and lessen stress on her body.  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 Requiring IV opioids and benzodiazepines for pain behaviors, respiratory distress, and agitation. Likely prognosis of hours to short days    Dispo: anticipate end-of-life care on the unit, if patient unexpectedly stabilizes, will approach family regarding care at home or alternative in-facility/retirement placement with hospice support    Symptom Assessment  - Pain/ pain behaviors:  uncontrolled  - Dyspnea/tachypnea: uncontrolled  - Agitation: controlled  - Mentation: minimally responsive    Symptom Oriented Management:    Tachypnea/Dyspnea:   - Discontinued home fentanyl patch  - Continue hydromorphone titratable gtt (1 mg/hr)  - Continue Hydromorphone 0.9 mg IV bolus q15min PRN pain behavior (groaning, grimacing, guarding), labored breathing  - Hydromorphone 1 mg IV q15 min prn for medication exchange, turning, cleaning, etc  - Lorazepam 0.5 mg IV q30min PRN anxiety, labored breathing refractory to opioids  - Fan at bedside  - Avoid artificial hydration  - Treat fevers symptomatically with PRN APAP, NSAID's, and if refractory to these measures, dexamethasone     Pain Behaviors:   - Opioids as above  - Turn only as tolerated     Agitation/Anxiety/Encephalopathy:   - Treat for pain/labored breathing as above  - Lorazepam 0.5 mg IV q30min PRN anxiety/agitation, labored breathing refractory to opioids  - Haloperidol 1 mg IV q4h PRN agitation, nausea/vomiting refractory to ondansetron     Profuse Oropharyngeal Secretions:  - Turn head side to side to help shift secretions out of airway, allow to pool to cheeks and out of mouth  - Gentle, frequent oral care - encourage family at bedside to participate  - Yankauer suction at bedside  - Glycopyrrolate 0.3 mg IV q4h PRN profuse oropharyngeal secretions     Nausea/Vomiting/Retching:  - Ondansetron 8 mg IV q6h PRN nausea/vomiting  - Haloperidol 1 mg IV q4h PRN nausea/vomiting refractory to ondansetron     Nutrition / Hydration  - No IV fluids and artificial nutrition  - Liberalize diet in order to permit comfort feedings as desired and tolerated  - Simplified medication regimen by eliminating those medications that provide little to no benefit at end-of-life, including no therapeutic anticoagulation     Bowel Regimen:  - Venting PEG with continued GI output  - Will not prioritize at end of life

## 2024-07-28 NOTE — NURSING
Port to left side of chest accessed for Dilaudid infusion per family request, 20 gauge inserted by ALISSON Renner with no complications, pain medication infusion restarted with bolus given, pain behaviors present, medication plan for pain management explained to family with suggestion to manage  her pain more aggressively,  reluctant to allow for increases in titration rate. It was allowed to raise to 1 mg per hour per protocol and will continue to monitor.

## 2024-07-29 NOTE — NURSING
Discussed 's fear of controlling her pain, he has concerns the pain medication will kill her. Explained the process and how we manage pain for end of life. Allowing more aggressive pain management.

## 2024-07-29 NOTE — ASSESSMENT & PLAN NOTE
Diagnosed and confirmation via peritoneal biopsy on 3/4/24. S/p debulking surgery, CARLOS ALBERTO/BSO, omentectomy, partial hepatectomy on 3/25/24 with chemotherapy, but unfortunately progressed.

## 2024-07-29 NOTE — NURSING
Pt groaning frequently, respirations 6 bpm, dilauded drip infusing at ordered rate, PRN blous administered, care plan discussed with spouse at bedside, care continued

## 2024-07-29 NOTE — NURSING
Difficulty achieving relief of pain behaviors due to family intervention, bolus allowed at times, with small increases in basal rate after distress in patient is observed by her , will attempt to raise basal rate but have started giving IV dilaudid while waiting for new bag from pharmacy and  could see she had some pain relief. I will continue to try to achieve a more pain free state for her while attempting to convince him to allow interventions. Moaning loudly for most of the night, labored breathing, bradypnea and apneic for the last 3 days, not related to sedation level.

## 2024-07-29 NOTE — NURSING
Care provided as ordered shift. Pain managed with continuous dilaudid infusiomn and PRN meds as per MAR. Family present at bedside. Discussed patient actively transitioning. Care continued

## 2024-07-29 NOTE — NURSING
Called to room by family, breathing changes, increased laboring with apneic pauses still present, allowed to change basal rate by  and gave bolus for pain behaviors.

## 2024-07-29 NOTE — SUBJECTIVE & OBJECTIVE
Patient seen at bedside this morning. Patient lying in bed, unresponsive.   at bedside. Discussion held yesterday about increasing hydromorphone dose on infusion for discomfort and giving more frequent prns due to concern for pain behaviors.  agreed.     Dilaudid gtt increased from 1 to 2.4 in the past 24 hours   Patient given 4 doses prn 1mg IV dilaudid and 7 doses of 0.9mg bolus from the bag doses in the past 24 hours   Past Medical History:   Diagnosis Date    Breast cancer 2013    Diabetes mellitus     Genetic testing 05/29/2013    VUS    Hyperlipidemia     Hypertension     Malignant neoplasm of upper-outer quadrant of right breast in female, estrogen receptor positive 12/02/2015    Seizure disorder        Past Surgical History:   Procedure Laterality Date    BILATERAL SALPINGO-OOPHORECTOMY (BSO) N/A 3/25/2024    Procedure: SALPINGO-OOPHORECTOMY, BILATERAL;  Surgeon: Александр Washington MD;  [facility];  Service: OB/GYN;  Laterality: N/A;    BREAST BIOPSY      BREAST LUMPECTOMY Right 2013    CHOLECYSTECTOMY  3/25/2024    Procedure: CHOLECYSTECTOMY;  Surgeon: Bo Farmer MD;  [facility];  Service: General;;    DEBULKING OF TUMOR N/A 3/25/2024    Procedure: DEBULKING, NEOPLASM;  Surgeon: Александр Washington MD;  [facility];  Service: OB/GYN;  Laterality: N/A;    ESOPHAGOGASTRODUODENOSCOPY W/ PEG N/A 7/12/2024    Procedure: EGD, WITH PEG TUBE INSERTION;  Surgeon: Bo Farmer MD;  [facility];  Service: General;  Laterality: N/A;    EXCISION, LIVER N/A 3/25/2024    Procedure: EXCISION, LIVER - partial;  Surgeon: Bo Farmer MD;  [facility];  Service: General;  Laterality: N/A;  bk ultrasound  [place] book by SAMINA on 3-21-24  7:00 a.m.  Conf #  [id number]    EYE SURGERY      LAPAROTOMY, EXPLORATORY N/A 3/25/2024    Procedure: LAPAROTOMY, EXPLORATORY;  Surgeon: Александр Washington MD;  Location: [address]  [facility];  Service: OB/GYN;  Laterality: N/A;    OMENTECTOMY N/A 3/25/2024    Procedure: OMENTECTOMY;  Surgeon: Александр Washington MD;  Location: [facility] OR 2ND [facility];  Service: OB/GYN;  Laterality: N/A;    PERITONEOCENTESIS N/A 3/13/2024    Procedure: PARACENTESIS, ABDOMINAL;  Surgeon: Flavia Moore MD;  Location: [facility] CATH LAB;  Service: Radiology;  Laterality: N/A;    PERITONEOCENTESIS N/A 3/21/2024    Procedure: PARACENTESIS, ABDOMINAL;  Surgeon: Jorge Jolley MD;  Location: [facility] CATH LAB;  Service: Radiology;  Laterality: N/A;    PERITONEOCENTESIS N/A 6/10/2024    Procedure: PARACENTESIS, ABDOMINAL;  Surgeon: Rogelio Malave MD;  Location: [facility] CATH LAB;  Service: Radiology;  Laterality: N/A;    TOTAL ABDOMINAL HYSTERECTOMY N/A 3/25/2024    Procedure: HYSTERECTOMY, TOTAL, ABDOMINAL;  Surgeon: Александр Washington MD;  Location: [facility] OR [facility];  Service: OB/GYN;  Laterality: N/A;    TOTAL REDUCTION MAMMOPLASTY Bilateral 2016       Review of patient's allergies indicates:   Allergen Reactions    Codeine Other (See Comments)     Flu like s/s    Tramadol Other (See Comments)     Flu like symptoms similar to codeine reaction       Medications:  Continuous Infusions:   HYDROmorphone  0-4 mg/hr Intravenous Continuous 12 mL/hr at 07/29/24 0948 2.4 mg/hr at 07/29/24 0948     Scheduled Meds:  PRN Meds:  Current Facility-Administered Medications:     glycopyrrolate, 0.3 mg, Intravenous, Q4H PRN    haloperidol lactate, 1 mg, Intravenous, Q6H PRN    HYDROmorphone, 1 mg, Intravenous, Q15 Min PRN    HYDROmorphone, 0.9 mg, Intravenous, Q15 Min PRN    lorazepam, 0.5 mg, Intravenous, Q30 Min PRN    ondansetron, 8 mg, Intravenous, Q6H PRN    Family History       Problem Relation (Age of Onset)    Breast cancer Sister (48)    Colon cancer Sister    Hypertension Mother, Brother, Brother    Seizures Father          Tobacco Use    Smoking status: Never    Smokeless tobacco: Never   Substance and Sexual Activity    Alcohol  use: Yes     Alcohol/week: 0.0 standard drinks of alcohol     Comment: ocassional    Drug use: No    Sexual activity: Not Currently     Partners: Male     Birth control/protection: None       Review of Systems   Unable to perform ROS: Patient unresponsive     Objective:     Vital Signs (Most Recent):  Temp: 97.7 °F (36.5 °C) (07/26/24 2014)  Pulse: 64 (07/28/24 1907)  Resp: (!) 6 (07/29/24 1201)  BP: (!) 87/50 (07/28/24 1907)  SpO2: (!) 39 % (07/27/24 0736) Vital Signs (24h Range):  Pulse:  [64] 64  Resp:  [6-14] 6  BP: (87)/(50) 87/50     Weight: 92 kg (202 lb 13.2 oz)  Body mass index is 31.77 kg/m².       Physical Exam  Vitals and nursing note reviewed.   Constitutional:       Appearance: She is ill-appearing.   HENT:      Head: Normocephalic and atraumatic.      Right Ear: External ear normal.      Left Ear: External ear normal.      Nose: Nose normal.      Mouth/Throat:      Mouth: Mucous membranes are dry.   Eyes:      General: No scleral icterus.     Comments: Eyes mostly closed; no eyelid erythema.    Cardiovascular:      Rate and Rhythm: Normal rate.   Pulmonary:      Comments: Shallow breath sounds; some moderate pauses noted on exam  Abdominal:      General: There is distension.      Tenderness: There is abdominal tenderness. There is guarding.   Musculoskeletal:         General: No swelling or tenderness.   Skin:     General: Skin is warm and dry.      Findings: Bruising present.   Neurological:      Comments: unresponsive            Review of Symptoms      Symptom Assessment (ESAS 0-10 Scale)  Pain:  0  Dyspnea:  0  Anxiety:  0  Nausea:  0  Depression:  0  Anorexia:  0  Fatigue:  0  Insomnia:  0  Restlessness:  0  Agitation:  0  Unable to complete assessment due to Patient unresponsive         Pain Assessment in Advanced Demential Scale (PAINAD)   Breathing - Independent of vocalization:  2  Negative vocalization:  2  Facial expression:  0  Body language:  0  Consolability:  2  Total:  6    Living  Arrangements:  Lives with spouse    Psychosocial/Cultural:   See Palliative Psychosocial Note: No   to  Darrell for 38 years. They had one son who unfortunately passed away in his teens.  **Primary  to Follow**  Palliative Care  Consult: No        Advance Care Planning   Advance Directives:   Living Will: No    LaPOST: No    Do Not Resuscitate Status: Yes    Medical Power of : No      Decision Making:  Family answered questions and Patient unable to communicate due to disease severity/cognitive impairment  Goals of Care: 7/25/24: Emotional, supportive conversation held with  Darrell at Mrs. Ricci's bedside. She is unable to participate due to encephalopathy. Darrell confirms for me that she was utilizing home hospice support with [facility]. She had been alert, coherent, but getting weaker. She had a bowel movement and while he was turning her and cleaning her, her PEG tube dislodged. He brought her to the hospital because it wouldn't get back in place. He is her primary caregiver,  for 38 years. He shares that they had one son who unfortunately passed away in his teens. He is appropriately tearful during this conversation - admits that he doesn't know what he would do without her. He shares that Mrs. Ricci had asked him to bring her to the hospital, wanting to spend her final days in a facility rather than at home. She has severe pain, for which she is on a fentanyl patch. She hasn't been eating as much, only preferring small sips of water, juice, tea, etc. He was told by a doctor in the Emergency Department that his wife was dying, likely in her final hours to short days. He confirms for me that he would want her to be as comfortable as possible. There are other family members (siblings) who are very important to them as well.  - I shared with him about our inpatient hospice unit, including the focus on providing medicines to reduce the intensity  "of her symptom burden (pain, air hunger, nausea, anxiety, etc). I was transparent about the limitations as well - no labs/imaging/diagnostics, no telemetry, no IV fluids or artificial nutrition, mimicking what would be otherwise done at home. I shared that during this time of life, she will struggle to swallow and in fact, forcing drink/food/medicines in her mouth may make her choke, and she may reflexively shut her mouth to prevent us from putting food/drink/medicine in her mouth. I shared that we would use her IV's to administer medicines at appropriate doses to reduce the intensity of her pain, air hunger, anxiety, nausea, etc, but not to force IV fluids that would end up in her lungs, causing her to feel like she's drowning. Darrell nodded in understanding. Emotional support provided. I asked Darrell if he needed anything else, and he admitted that he needed support. He is aware that she will be moved to the 3rd floor [facility] hospice unit as soon as possible.         Significant Labs: CBC:   No results for input(s): "WBC", "HGB", "HCT", "PLT" in the last 48 hours.    CMP:   No results for input(s): "NA", "K", "CL", "CO2", "GLU", "BUN", "CREATININE", "CALCIUM", "PROT", "ALBUMIN", "BILITOT", "ALKPHOS", "AST", "ALT", "ANIONGAP", "EGFRNONAA" in the last 48 hours.    Invalid input(s): "ESTGFAFRICA"    CBC:   Recent Labs   Lab 07/25/24  0032   WBC 18.27*   HGB 5.2*   HCT 17.5*   MCV 96        BMP:  No results for input(s): "GLU", "NA", "K", "CL", "CO2", "BUN", "CREATININE", "CALCIUM", "MG" in the last 24 hours.    LFT:  Lab Results   Component Value Date     (H) 07/25/2024    ALKPHOS 110 07/25/2024    BILITOT 1.7 (H) 07/25/2024     Albumin:   Albumin   Date Value Ref Range Status   07/25/2024 1.8 (L) 3.5 - 5.2 g/dL Final     Protein:   Total Protein   Date Value Ref Range Status   07/25/2024 6.8 6.0 - 8.4 g/dL Final     Lactic acid:   Lab Results   Component Value Date    LACTATE 1.7 05/23/2024    " LACTATE 1.8 05/21/2024       Significant Imaging: I have reviewed all pertinent imaging results/findings within the past 24 hours.    7/25/24 CTA Chest and Abdomen/Pelvis with IV Contrast Impression:  1. Small volume pulmonary thromboembolism in both lower lobes.  2. Consolidation and effusion in the right lower lung.  Pleural effusion appears to have increased.  While this could represent focal pneumonia or metastatic disease, pulmonary infarct is difficult to exclude but considered less likely.  3. Decreased size of nodular opacity at the thoracic inlet on the right.  4. Patchy airspace disease with pleural base mass in the left lung base stable..  5. Worsening of mesenteric masses throughout the peritoneal cavity and worsening ascites.  Given the short time span since the prior CT, this could represent flare response from chemotherapy or immunotherapy.  However findings are worrisome for progression of disease.  6. Worsening of 3rd spacing and ascites throughout the abdominal and abdominal wall .  7. Stable appearance of the devices and bowel-gas pattern allowing for ileus and constipation.  8. This report was flagged in Epic as abnormal.

## 2024-07-29 NOTE — PLAN OF CARE
Problem: Adult Inpatient Plan of Care  Goal: Plan of Care Review  Outcome: Progressing  Goal: Patient-Specific Goal (Individualized)  Outcome: Progressing  Goal: Absence of Hospital-Acquired Illness or Injury  Outcome: Progressing  Goal: Readiness for Transition of Care  Outcome: Progressing     Problem: Infection  Goal: Absence of Infection Signs and Symptoms  Outcome: Progressing     Problem: Wound  Goal: Optimal Coping  Outcome: Progressing  Goal: Absence of Infection Signs and Symptoms  Outcome: Progressing  Goal: Skin Health and Integrity  Outcome: Progressing  Goal: Optimal Wound Healing  Outcome: Progressing     Problem: Skin Injury Risk Increased  Goal: Skin Health and Integrity  Outcome: Progressing     Problem: Fall Injury Risk  Goal: Absence of Fall and Fall-Related Injury  Outcome: Progressing

## 2024-07-29 NOTE — CHAPLAIN
"Palliative Care     Patient: Anette Ricci  MRN: [id number]  : 1965  Age: 58 y.o.  Hospital Length of Stay: 4  Code Status: Code Status Discussion Note  Attending Provider: Lashawn Levine MD  Principal Problem: <principal problem not specified>    Non-clinical observations of patient/room: Visited pall/hospice pt who is actively dying;  and one sister bedside; pt looked distressed and uncomfortable. 10 min visit    Provided compassionate presence and listening ear as  shared the pt's illness journey since 2024. The sister was quite dismissive of me so I focused on the  and patient; referenced other chaplains as well since I saw SC  had visited last week.    Pt was moaning and agitated, shifting in bed and her eyes would widened big, then close, roll back in her head, etc. I shared with  that although he knows he's wife best, she doesn't seem comfortable and that the nurse can give the pt a little medicine to help her relax. The sister interjected and said, "he'll talk to the nurse about it himself."  said nothing.     Together we made small talk and I reminded pt that she is so loved (in which the family agreed).  said "yes, the nurse said I could have her give a little more medicine if needed (almost if he was asking me permission) and I said of course-- please don't hesitate to ask-- you are her best advocate when she can't speak for herself. The sister chimed in again and said, "he will let the nurse know, thank you."       The pt and family are [employer] and  states that a couple of the sisters are pastors as well.     After the brief visit I saw the [facility] weekend provider's notes about this pt/family and evidently the pt's sisters are against "too much" pain medicine. This seems evident in my visit. Palliative  will discuss with [facility] provider on duty this week. Lord, in your mercy.       **Spiritual " Care Dept. Chaplains are available evenings, overnight and weekends **    In Peace,    Rev. Yasmin Arias MDiv, [facility]  Board Certified   Palliative Medicine Department  [phone]

## 2024-07-29 NOTE — ASSESSMENT & PLAN NOTE
Anette Ricci is a 58-year-old woman with a history of stage IV ovarian carcinosarcoma s/p debulking, omentectomy, partial hepatectomy, recurrent malignant pleural effusions, recurrent malignant ascites, malignant obstruction s/p venting PEG, pulmonary embolism, home hospice patient with [facility] who presented to the Emergency Department after her venting PEG was dislodged. General surgery was consulted and graciously fixed the G tube. Unfortunately, Mrs. Ricci is likely in her final short days of life and Palliative/Supportive Care was consulted for admission to inpatient hospice unit for aggressive symptom management at the end of life.        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 Requiring increasing IV opioids and benzodiazepines for pain behaviors, respiratory distress, and agitation. Likely prognosis of hours to short days    Dispo: anticipate end-of-life care on the unit, if patient unexpectedly stabilizes, will approach family regarding care at home or alternative in-facility/snf placement with hospice support    Symptom Assessment  - Pain/ pain behaviors: uncontrolled  - Dyspnea/tachypnea: uncontrolled  - Agitation: controlled  - Mentation: minimally responsive    Symptom Oriented Management:    Tachypnea/Dyspnea:   - Discontinued home fentanyl patch  - Continue hydromorphone titratable gtt (2.4 mg/hr)  - Continue Hydromorphone 0.9 mg IV bolus q15min PRN pain behavior (groaning, grimacing, guarding), labored breathing  - Hydromorphone 1 mg IV q15 min prn for medication exchange, turning, cleaning, etc  - Lorazepam 0.5 mg IV q30min PRN anxiety, labored breathing  refractory to opioids  - Fan at bedside  - Avoid artificial hydration  - Treat fevers symptomatically with PRN APAP, NSAID's, and if refractory to these measures, dexamethasone     Pain Behaviors:   - Opioids as above  - Turn only as tolerated     Agitation/Anxiety/Encephalopathy:   - Treat for pain/labored breathing as above  - Lorazepam 0.5 mg IV q30min PRN anxiety/agitation, labored breathing refractory to opioids  - Haloperidol 1 mg IV q4h PRN agitation, nausea/vomiting refractory to ondansetron     Profuse Oropharyngeal Secretions:  - Turn head side to side to help shift secretions out of airway, allow to pool to cheeks and out of mouth  - Gentle, frequent oral care - encourage family at bedside to participate  - Yankauer suction at bedside  - Glycopyrrolate 0.3 mg IV q4h PRN profuse oropharyngeal secretions     Nausea/Vomiting/Retching:  - Ondansetron 8 mg IV q6h PRN nausea/vomiting  - Haloperidol 1 mg IV q4h PRN nausea/vomiting refractory to ondansetron     Nutrition / Hydration  - No IV fluids and artificial nutrition  - Liberalize diet in order to permit comfort feedings as desired and tolerated  - Simplified medication regimen by eliminating those medications that provide little to no benefit at end-of-life, including no therapeutic anticoagulation     Bowel Regimen:  - Venting PEG with continued GI output  - Will not prioritize at end of life

## 2024-07-29 NOTE — PROGRESS NOTES
[facility]  Palliative Medicine  Progress Note    Patient Name: Anette Ricci  MRN: [id number]  Admission Date: 7/25/2024  Hospital Length of Stay: 4 days  Code Status: DNR   Attending Provider: Lashawn Levine MD  Consulting Provider: Lashawn Levine MD  Primary Care Physician: Mark Chua MD  Principal Problem:<principal problem not specified>    Patient information was obtained from patient, spouse/SO, and primary team.      Assessment/Plan:     Oncology  Ovarian carcinosarcoma  Diagnosed and confirmation via peritoneal biopsy on 3/4/24. S/p debulking surgery, CARLOS ALBERTO/BSO, omentectomy, partial hepatectomy on 3/25/24 with chemotherapy, but unfortunately progressed.    Palliative Care  Palliative care encounter  Anette Ricci is a 58-year-old woman with a history of stage IV ovarian carcinosarcoma s/p debulking, omentectomy, partial hepatectomy, recurrent malignant pleural effusions, recurrent malignant ascites, malignant obstruction s/p venting PEG, pulmonary embolism, home hospice patient with [facility] who presented to the Emergency Department after her venting PEG was dislodged. General surgery was consulted and graciously fixed the G tube. Unfortunately, Mrs. Ricci is likely in her final short days of life and Palliative/Supportive Care was consulted for admission to inpatient hospice unit for aggressive symptom management at the end of life.        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 Requiring increasing IV opioids and benzodiazepines for pain behaviors, respiratory distress, and agitation. Likely prognosis of hours to short  days    Dispo: anticipate end-of-life care on the unit, if patient unexpectedly stabilizes, will approach family regarding care at home or alternative in-facility/residential placement with hospice support    Symptom Assessment  - Pain/ pain behaviors: uncontrolled  - Dyspnea/tachypnea: uncontrolled  - Agitation: controlled  - Mentation: minimally responsive    Symptom Oriented Management:    Tachypnea/Dyspnea:   - Discontinued home fentanyl patch  - Continue hydromorphone titratable gtt (2.4 mg/hr)  - Continue Hydromorphone 0.9 mg IV bolus q15min PRN pain behavior (groaning, grimacing, guarding), labored breathing  - Hydromorphone 1 mg IV q15 min prn for medication exchange, turning, cleaning, etc  - Lorazepam 0.5 mg IV q30min PRN anxiety, labored breathing refractory to opioids  - Fan at bedside  - Avoid artificial hydration  - Treat fevers symptomatically with PRN APAP, NSAID's, and if refractory to these measures, dexamethasone     Pain Behaviors:   - Opioids as above  - Turn only as tolerated     Agitation/Anxiety/Encephalopathy:   - Treat for pain/labored breathing as above  - Lorazepam 0.5 mg IV q30min PRN anxiety/agitation, labored breathing refractory to opioids  - Haloperidol 1 mg IV q4h PRN agitation, nausea/vomiting refractory to ondansetron     Profuse Oropharyngeal Secretions:  - Turn head side to side to help shift secretions out of airway, allow to pool to cheeks and out of mouth  - Gentle, frequent oral care - encourage family at bedside to participate  - Yankauer suction at bedside  - Glycopyrrolate 0.3 mg IV q4h PRN profuse oropharyngeal secretions     Nausea/Vomiting/Retching:  - Ondansetron 8 mg IV q6h PRN nausea/vomiting  - Haloperidol 1 mg IV q4h PRN nausea/vomiting refractory to ondansetron     Nutrition / Hydration  - No IV fluids and artificial nutrition  - Liberalize diet in order to permit comfort feedings as desired and tolerated  - Simplified medication regimen by eliminating those  medications that provide little to no benefit at end-of-life, including no therapeutic anticoagulation     Bowel Regimen:  - Venting PEG with continued GI output  - Will not prioritize at end of life        Subjective:     Chief Complaint: No chief complaint on file.      HPI:   Anette Ricci is a 58-year-old woman with a history of stage IV ovarian carcinosarcoma s/p debulking, omentectomy, partial hepatectomy, recurrent malignant pleural effusions, recurrent malignant ascites, malignant obstruction s/p venting PEG, pulmonary embolism, home hospice patient with [facility] who presented to the Emergency Department after her venting PEG was dislodged. General surgery was consulted and graciously fixed the G tube. Unfortunately, Mrs. Ricci is likely in her final short days of life and Palliative/Supportive Care was consulted for admission to inpatient hospice unit for aggressive symptom management at the end of life.    Hospital Course:  No notes on file    Patient seen at bedside this morning. Patient lying in bed, unresponsive.   at bedside. Discussion held yesterday about increasing hydromorphone dose on infusion for discomfort and giving more frequent prns due to concern for pain behaviors.  agreed.     Dilaudid gtt increased from 1 to 2.4 in the past 24 hours   Patient given 4 doses prn 1mg IV dilaudid and 7 doses of 0.9mg bolus from the bag doses in the past 24 hours   Past Medical History:   Diagnosis Date    Breast cancer 2013    Diabetes mellitus     Genetic testing 05/29/2013    VUS    Hyperlipidemia     Hypertension     Malignant neoplasm of upper-outer quadrant of right breast in female, estrogen receptor positive 12/02/2015    Seizure disorder        Past Surgical History:   Procedure Laterality Date    BILATERAL SALPINGO-OOPHORECTOMY (BSO) N/A 3/25/2024    Procedure: SALPINGO-OOPHORECTOMY, BILATERAL;  Surgeon: Александр Washington MD;  Location: [facility] OR [address] [place] [address];  Service: OB/GYN;   Laterality: N/A;    BREAST BIOPSY      BREAST LUMPECTOMY Right 2013    CHOLECYSTECTOMY  3/25/2024    Procedure: CHOLECYSTECTOMY;  Surgeon: Bo Farmer MD;  Location: [facility] OR 2ND FLR;  Service: General;;    DEBULKING OF TUMOR N/A 3/25/2024    Procedure: DEBULKING, NEOPLASM;  Surgeon: Александр Washington MD;  Location: [facility] OR 2ND FLR;  Service: OB/GYN;  Laterality: N/A;    ESOPHAGOGASTRODUODENOSCOPY W/ PEG N/A 7/12/2024    Procedure: EGD, WITH PEG TUBE INSERTION;  Surgeon: Bo Farmer MD;  Location: [facility] OR 2ND FLR;  Service: General;  Laterality: N/A;    EXCISION, LIVER N/A 3/25/2024    Procedure: EXCISION, LIVER - partial;  Surgeon: Bo Farmer MD;  Location: [facility] OR 2ND FLR;  Service: General;  Laterality: N/A;  bk ultrasound  [place] book by TA on 3-21-24  7:00 a.m.  Conf #  [id number]    EYE SURGERY      LAPAROTOMY, EXPLORATORY N/A 3/25/2024    Procedure: LAPAROTOMY, EXPLORATORY;  Surgeon: Александр Washington MD;  [facility];  Service: OB/GYN;  Laterality: N/A;    OMENTECTOMY N/A 3/25/2024    Procedure: OMENTECTOMY;  Surgeon: Александр Washington MD;  [facility];  Service: OB/GYN;  Laterality: N/A;    PERITONEOCENTESIS N/A 3/13/2024    Procedure: PARACENTESIS, ABDOMINAL;  Surgeon: Flavia Moore MD;  Location: [facility] CATH LAB;  Service: Radiology;  Laterality: N/A;    PERITONEOCENTESIS N/A 3/21/2024    Procedure: PARACENTESIS, ABDOMINAL;  Surgeon: Jorge Jolley MD;  Location: [facility] CATH LAB;  Service: Radiology;  Laterality: N/A;    PERITONEOCENTESIS N/A 6/10/2024    Procedure: PARACENTESIS, ABDOMINAL;  Surgeon: Rogelio Malave MD;  Location: [facility] CATH LAB;  Service: Radiology;  Laterality: N/A;    TOTAL ABDOMINAL HYSTERECTOMY N/A 3/25/2024    Procedure: HYSTERECTOMY, TOTAL, ABDOMINAL;  Surgeon: Александр Washington MD;  Location: [facility] OR [address] [place] [address];  Service: OB/GYN;  Laterality: N/A;    TOTAL REDUCTION MAMMOPLASTY Bilateral 2016       Review of  patient's allergies indicates:   Allergen Reactions    Codeine Other (See Comments)     Flu like s/s    Tramadol Other (See Comments)     Flu like symptoms similar to codeine reaction       Medications:  Continuous Infusions:   HYDROmorphone  0-4 mg/hr Intravenous Continuous 12 mL/hr at 07/29/24 0948 2.4 mg/hr at 07/29/24 0948     Scheduled Meds:  PRN Meds:  Current Facility-Administered Medications:     glycopyrrolate, 0.3 mg, Intravenous, Q4H PRN    haloperidol lactate, 1 mg, Intravenous, Q6H PRN    HYDROmorphone, 1 mg, Intravenous, Q15 Min PRN    HYDROmorphone, 0.9 mg, Intravenous, Q15 Min PRN    lorazepam, 0.5 mg, Intravenous, Q30 Min PRN    ondansetron, 8 mg, Intravenous, Q6H PRN    Family History       Problem Relation (Age of Onset)    Breast cancer Sister (48)    Colon cancer Sister    Hypertension Mother, Brother, Brother    Seizures Father          Tobacco Use    Smoking status: Never    Smokeless tobacco: Never   Substance and Sexual Activity    Alcohol use: Yes     Alcohol/week: 0.0 standard drinks of alcohol     Comment: ocassional    Drug use: No    Sexual activity: Not Currently     Partners: Male     Birth control/protection: None       Review of Systems   Unable to perform ROS: Patient unresponsive     Objective:     Vital Signs (Most Recent):  Temp: 97.7 °F (36.5 °C) (07/26/24 2014)  Pulse: 64 (07/28/24 1907)  Resp: (!) 6 (07/29/24 1201)  BP: (!) 87/50 (07/28/24 1907)  SpO2: (!) 39 % (07/27/24 0736) Vital Signs (24h Range):  Pulse:  [64] 64  Resp:  [6-14] 6  BP: (87)/(50) 87/50     Weight: 92 kg (202 lb 13.2 oz)  Body mass index is 31.77 kg/m².       Physical Exam  Vitals and nursing note reviewed.   Constitutional:       Appearance: She is ill-appearing.   HENT:      Head: Normocephalic and atraumatic.      Right Ear: External ear normal.      Left Ear: External ear normal.      Nose: Nose normal.      Mouth/Throat:      Mouth: Mucous membranes are dry.   Eyes:      General: No scleral icterus.      Comments: Eyes mostly closed; no eyelid erythema.    Cardiovascular:      Rate and Rhythm: Normal rate.   Pulmonary:      Comments: Shallow breath sounds; some moderate pauses noted on exam  Abdominal:      General: There is distension.      Tenderness: There is abdominal tenderness. There is guarding.   Musculoskeletal:         General: No swelling or tenderness.   Skin:     General: Skin is warm and dry.      Findings: Bruising present.   Neurological:      Comments: unresponsive            Review of Symptoms      Symptom Assessment (ESAS 0-10 Scale)  Pain:  0  Dyspnea:  0  Anxiety:  0  Nausea:  0  Depression:  0  Anorexia:  0  Fatigue:  0  Insomnia:  0  Restlessness:  0  Agitation:  0  Unable to complete assessment due to Patient unresponsive         Pain Assessment in Advanced Demential Scale (PAINAD)   Breathing - Independent of vocalization:  2  Negative vocalization:  2  Facial expression:  0  Body language:  0  Consolability:  2  Total:  6    Living Arrangements:  Lives with spouse    Psychosocial/Cultural:   See Palliative Psychosocial Note: No   to  Darrell for 38 years. They had one son who unfortunately passed away in his teens.  **Primary  to Follow**  Palliative Care  Consult: No        Advance Care Planning  Advance Directives:   Living Will: No    LaPOST: No    Do Not Resuscitate Status: Yes    Medical Power of : No      Decision Making:  Family answered questions and Patient unable to communicate due to disease severity/cognitive impairment  Goals of Care: 7/25/24: Emotional, supportive conversation held with  Darrell at Mrs. Ricci's bedside. She is unable to participate due to encephalopathy. Darrell confirms for me that she was utilizing home hospice support with [facility]. She had been alert, coherent, but getting weaker. She had a bowel movement and while he was turning her and cleaning her, her PEG tube dislodged. He brought her to the  hospital because it wouldn't get back in place. He is her primary caregiver,  for 38 years. He shares that they had one son who unfortunately passed away in his teens. He is appropriately tearful during this conversation - admits that he doesn't know what he would do without her. He shares that Mrs. Ricci had asked him to bring her to the hospital, wanting to spend her final days in a facility rather than at home. She has severe pain, for which she is on a fentanyl patch. She hasn't been eating as much, only preferring small sips of water, juice, tea, etc. He was told by a doctor in the Emergency Department that his wife was dying, likely in her final hours to short days. He confirms for me that he would want her to be as comfortable as possible. There are other family members (siblings) who are very important to them as well.  - I shared with him about our inpatient hospice unit, including the focus on providing medicines to reduce the intensity of her symptom burden (pain, air hunger, nausea, anxiety, etc). I was transparent about the limitations as well - no labs/imaging/diagnostics, no telemetry, no IV fluids or artificial nutrition, mimicking what would be otherwise done at home. I shared that during this time of life, she will struggle to swallow and in fact, forcing drink/food/medicines in her mouth may make her choke, and she may reflexively shut her mouth to prevent us from putting food/drink/medicine in her mouth. I shared that we would use her IV's to administer medicines at appropriate doses to reduce the intensity of her pain, air hunger, anxiety, nausea, etc, but not to force IV fluids that would end up in her lungs, causing her to feel like she's drowning. Darrell nodded in understanding. Emotional support provided. I asked Darrell if he needed anything else, and he admitted that he needed support. He is aware that she will be moved to the 3rd floor [facility] hospice unit as soon as  "possible.         Significant Labs: CBC:   No results for input(s): "WBC", "HGB", "HCT", "PLT" in the last 48 hours.    CMP:   No results for input(s): "NA", "K", "CL", "CO2", "GLU", "BUN", "CREATININE", "CALCIUM", "PROT", "ALBUMIN", "BILITOT", "ALKPHOS", "AST", "ALT", "ANIONGAP", "EGFRNONAA" in the last 48 hours.    Invalid input(s): "ESTGFAFRICA"    CBC:   Recent Labs   Lab 07/25/24  0032   WBC 18.27*   HGB 5.2*   HCT 17.5*   MCV 96        BMP:  No results for input(s): "GLU", "NA", "K", "CL", "CO2", "BUN", "CREATININE", "CALCIUM", "MG" in the last 24 hours.    LFT:  Lab Results   Component Value Date     (H) 07/25/2024    ALKPHOS 110 07/25/2024    BILITOT 1.7 (H) 07/25/2024     Albumin:   Albumin   Date Value Ref Range Status   07/25/2024 1.8 (L) 3.5 - 5.2 g/dL Final     Protein:   Total Protein   Date Value Ref Range Status   07/25/2024 6.8 6.0 - 8.4 g/dL Final     Lactic acid:   Lab Results   Component Value Date    LACTATE 1.7 05/23/2024    LACTATE 1.8 05/21/2024       Significant Imaging: I have reviewed all pertinent imaging results/findings within the past 24 hours.    7/25/24 CTA Chest and Abdomen/Pelvis with IV Contrast Impression:  1. Small volume pulmonary thromboembolism in both lower lobes.  2. Consolidation and effusion in the right lower lung.  Pleural effusion appears to have increased.  While this could represent focal pneumonia or metastatic disease, pulmonary infarct is difficult to exclude but considered less likely.  3. Decreased size of nodular opacity at the thoracic inlet on the right.  4. Patchy airspace disease with pleural base mass in the left lung base stable..  5. Worsening of mesenteric masses throughout the peritoneal cavity and worsening ascites.  Given the short time span since the prior CT, this could represent flare response from chemotherapy or immunotherapy.  However findings are worrisome for progression of disease.  6. Worsening of 3rd spacing and ascites " throughout the abdominal and abdominal wall .  7. Stable appearance of the devices and bowel-gas pattern allowing for ileus and constipation.  8. This report was flagged in Epic as abnormal.      Lashawn Levine MD  Palliative Medicine  [facility] - Hospice and Palliative Medicine

## 2024-07-30 NOTE — NURSING
07:15 pt supine in bed.  No facial grimacing, no moaing.  Dilaudid gtt infusing at 0.8mg/hr to left chest port a cath.  2LNC intact to bilateral nares.  No distress noted.  See flow sheet for vitals and full assess.   at bedside.  Will cont. To  monitor.   11:14 pt sister at bedside.  Music playing and rubbing pt's head.  Called for nurse to assess pt.  Resp. 2.  Sister called  to return to hospital.  11:20 no respirations, no apical pulse to auscultation.  Sister at bedside.   notified.  MD notified.  Awaiting MD to pronounce.   11:30 MD at bedside.  Time of death 11:30.  15:05  transport at bedside [place] [facility]

## 2024-07-30 NOTE — ASSESSMENT & PLAN NOTE
Anette Ricci is a 58-year-old woman with a history of stage IV ovarian carcinosarcoma s/p debulking, omentectomy, partial hepatectomy, recurrent malignant pleural effusions, recurrent malignant ascites, malignant obstruction s/p venting PEG, pulmonary embolism, home hospice patient with [facility] who presented to the Emergency Department after her venting PEG was dislodged. General surgery was consulted and graciously fixed the G tube. Unfortunately, Mrs. Ricci is likely in her final short days of life and Palliative/Supportive Care was consulted for admission to inpatient hospice unit for aggressive symptom management at the end of life.        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 Requiring increasing IV opioids and benzodiazepines for pain behaviors, respiratory distress, and agitation. Likely prognosis of hours to short days    Dispo: anticipate end-of-life care on the unit, if patient unexpectedly stabilizes, will approach family regarding care at home or alternative in-facility/FCI placement with hospice support    Symptom Assessment  - Pain/ pain behaviors: uncontrolled  - Dyspnea/tachypnea: uncontrolled  - Agitation: controlled  - Mentation: minimally responsive    Symptom Oriented Management:    Tachypnea/Dyspnea:   - Discontinued home fentanyl patch  - Continue hydromorphone titratable gtt (2.8 mg/hr)  - Continue Hydromorphone 0.9 mg IV bolus q15min PRN pain behavior (groaning, grimacing, guarding), labored breathing  - Hydromorphone 1 mg IV q15 min prn for medication exchange, turning, cleaning, etc  - Lorazepam 0.5 mg IV q30min PRN anxiety, labored breathing  refractory to opioids  - Fan at bedside  - Avoid artificial hydration  - Treat fevers symptomatically with PRN APAP, NSAID's, and if refractory to these measures, dexamethasone     Pain Behaviors:   - Opioids as above  - Turn only as tolerated     Agitation/Anxiety/Encephalopathy:   - Treat for pain/labored breathing as above  - Lorazepam 0.5 mg IV q30min PRN anxiety/agitation, labored breathing refractory to opioids  - Haloperidol 1 mg IV q4h PRN agitation, nausea/vomiting refractory to ondansetron     Profuse Oropharyngeal Secretions:  - Turn head side to side to help shift secretions out of airway, allow to pool to cheeks and out of mouth  - Gentle, frequent oral care - encourage family at bedside to participate  - Yankauer suction at bedside  - Glycopyrrolate 0.3 mg IV q4h PRN profuse oropharyngeal secretions     Nausea/Vomiting/Retching:  - Ondansetron 8 mg IV q6h PRN nausea/vomiting  - Haloperidol 1 mg IV q4h PRN nausea/vomiting refractory to ondansetron     Nutrition / Hydration  - No IV fluids and artificial nutrition  - Liberalize diet in order to permit comfort feedings as desired and tolerated  - Simplified medication regimen by eliminating those medications that provide little to no benefit at end-of-life, including no therapeutic anticoagulation     Bowel Regimen:  - Venting PEG with continued GI output  - Will not prioritize at end of life

## 2024-07-30 NOTE — NURSING
Spouse in room at patients bedside. Pt responds to pain by moaning, is nonverbal. Pt assessed per flowsheet. Plan of care discussed with family. Peg site to LUQ connected to M[place]artney bag tubing; Dressing clean dry and intact. Small amount of Brown drainage noted in bag.  Dilaudid infusion infusing to L Chest Wall PAC at 13 ml/hr. Mccartney in place, secured to R thigh per protocol; Small amount of Honey colored urine noted. Abdomen distended and taut with healed dry flaky Midline incision, open to air. Generalized edema noted. BLE elevated on pillow. 4lnc in place. Comfort and safety maintained. Instructed spouse to call for assist.

## 2024-07-30 NOTE — PLAN OF CARE
Problem: Adult Inpatient Plan of Care  Goal: Plan of Care Review  Outcome: Progressing     Problem: Palliative Care  Goal: Enhanced Quality of Life  Outcome: Progressing     Problem: Wound  Goal: Optimal Pain Control and Function  Outcome: Progressing     Problem: End-of-Life Care  Goal: Comfort, Peace and Preserved Dignity  Outcome: Progressing     Problem: Pain Acute  Goal: Optimal Pain Control and Function  Outcome: Progressing       No change in patients assessment. Pt medicated once with Bolus of Dilaudid infusion due to Pain Behaviors including moaning with eyes open. Dilaudid infusion increased to 2.8 mg/hr. Pt appears more comfortable since. Pt turned during the night as needed with pillows and wedge for support. Spouse at bedside. Dressing to LMQ Peg site changed. Oral care done on pt and pt bit the sponge. Respirations are 6-8 during the night. Comfort care maintained.

## 2024-07-30 NOTE — DISCHARGE SUMMARY
[facility]  Palliative Medicine  Discharge Summary      Patient Name: Anette Ricci  MRN: [id number]  Admission Date: 2024  Hospital Length of Stay: 5 days  Discharge Date and Time:  2024  11:30  Attending Physician: Lashawn Levine MD   Discharging Provider: Lashawn Levine MD  Primary Care Provider: Mark Chua MD    HPI:   Anette Ricci is a 58-year-old woman with a history of stage IV ovarian carcinosarcoma s/p debulking, omentectomy, partial hepatectomy, recurrent malignant pleural effusions, recurrent malignant ascites, malignant obstruction s/p venting PEG, pulmonary embolism, home hospice patient with [facility] who presented to the Emergency Department after her venting PEG was dislodged. General surgery was consulted and graciously fixed the G tube. Unfortunately, Mrs. Ricci is likely in her final short days of life and Palliative/Supportive Care was consulted for admission to inpatient hospice unit for aggressive symptom management at the end of life.    * No surgery found *      Hospital Course:   Patient was admitted to GIP unit. Patient's end of life symptoms were managed aggressively and appropriately. Patient  peacefully surrounded by family     Goals of Care Treatment Preferences:  Code Status: DNR          What is most important right now is to focus on spending time at home, quality of life, even if it means sacrificing a little time.  Accordingly, we have decided that the best plan to meet the patient's goals includes enrolling in hospice care.      Consults:     No new Assessment & Plan notes have been filed under this hospital service since the last note was generated.  Service: Palliative Medicine    Final Active Diagnoses:    Diagnosis Date Noted POA    Palliative care encounter [Z51.5] 2024 Not Applicable    Ovarian carcinosarcoma [C56.9] 2024 Yes      Problems Resolved During this Admission:       Discharged  Condition:     Disposition:     Follow Up:    Patient Instructions:   No discharge procedures on file.    Significant Diagnostic Studies: N/A    Pending Diagnostic Studies:       None           Medications:  None    Indwelling Lines/Drains at time of discharge:   Lines/Drains/Airways       Central Venous Catheter Line  Duration             Port A Cath Single Lumen Subclavian Left -- days              Drain  Duration                  Gastrostomy/Enterostomy 24 0043 Percutaneous endoscopic gastrostomy (PEG) LUQ decompression 5 days         Urethral Catheter 24 0040 5 days                    Time spent on the discharge of patient: 5 minutes  Patient was seen and examined on the date of discharge and determined to be suitable for discharge.    Lashawn Levine MD  Department of Palliative Medicine  [facility] - Hospice and Palliative Medicine

## 2024-07-30 NOTE — PROGRESS NOTES
[facility]  Palliative Medicine  Progress Note    Patient Name: Anette Ricci  MRN: [id number]  Admission Date: 7/25/2024  Hospital Length of Stay: 5 days  Code Status: DNR   Attending Provider: Lashawn Levine MD  Consulting Provider: Lashawn Levine MD  Primary Care Physician: Mark Chua MD  Principal Problem:<principal problem not specified>    Patient information was obtained from patient, spouse/SO, and primary team.      Assessment/Plan:     Oncology  Ovarian carcinosarcoma  Diagnosed and confirmation via peritoneal biopsy on 3/4/24. S/p debulking surgery, CARLOS ALBERTO/BSO, omentectomy, partial hepatectomy on 3/25/24 with chemotherapy, but unfortunately progressed.    Palliative Care  Palliative care encounter  Anette Ricci is a 58-year-old woman with a history of stage IV ovarian carcinosarcoma s/p debulking, omentectomy, partial hepatectomy, recurrent malignant pleural effusions, recurrent malignant ascites, malignant obstruction s/p venting PEG, pulmonary embolism, home hospice patient with [facility] who presented to the Emergency Department after her venting PEG was dislodged. General surgery was consulted and graciously fixed the G tube. Unfortunately, Mrs. Ricci is likely in her final short days of life and Palliative/Supportive Care was consulted for admission to inpatient hospice unit for aggressive symptom management at the end of life.        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 Requiring increasing IV opioids and benzodiazepines for pain behaviors, respiratory distress, and agitation. Likely prognosis of hours to short  days    Dispo: anticipate end-of-life care on the unit, if patient unexpectedly stabilizes, will approach family regarding care at home or alternative in-facility/[facility] placement with hospice support    Symptom Assessment  - Pain/ pain behaviors: uncontrolled  - Dyspnea/tachypnea: uncontrolled  - Agitation: controlled  - Mentation: minimally responsive    Symptom Oriented Management:    Tachypnea/Dyspnea:   - Discontinued home fentanyl patch  - Continue hydromorphone titratable gtt (2.8 mg/hr)  - Continue Hydromorphone 0.9 mg IV bolus q15min PRN pain behavior (groaning, grimacing, guarding), labored breathing  - Hydromorphone 1 mg IV q15 min prn for medication exchange, turning, cleaning, etc  - Lorazepam 0.5 mg IV q30min PRN anxiety, labored breathing refractory to opioids  - Fan at bedside  - Avoid artificial hydration  - Treat fevers symptomatically with PRN APAP, NSAID's, and if refractory to these measures, dexamethasone     Pain Behaviors:   - Opioids as above  - Turn only as tolerated     Agitation/Anxiety/Encephalopathy:   - Treat for pain/labored breathing as above  - Lorazepam 0.5 mg IV q30min PRN anxiety/agitation, labored breathing refractory to opioids  - Haloperidol 1 mg IV q4h PRN agitation, nausea/vomiting refractory to ondansetron     Profuse Oropharyngeal Secretions:  - Turn head side to side to help shift secretions out of airway, allow to pool to cheeks and out of mouth  - Gentle, frequent oral care - encourage family at bedside to participate  - Yankauer suction at bedside  - Glycopyrrolate 0.3 mg IV q4h PRN profuse oropharyngeal secretions     Nausea/Vomiting/Retching:  - Ondansetron 8 mg IV q6h PRN nausea/vomiting  - Haloperidol 1 mg IV q4h PRN nausea/vomiting refractory to ondansetron     Nutrition / Hydration  - No IV fluids and artificial nutrition  - Liberalize diet in order to permit comfort feedings as desired and tolerated  - Simplified medication regimen by eliminating those  medications that provide little to no benefit at end-of-life, including no therapeutic anticoagulation     Bowel Regimen:  - Venting PEG with continued GI output  - Will not prioritize at end of life        Subjective:     Chief Complaint: No chief complaint on file.      HPI:   Anette Ricci is a 58-year-old woman with a history of stage IV ovarian carcinosarcoma s/p debulking, omentectomy, partial hepatectomy, recurrent malignant pleural effusions, recurrent malignant ascites, malignant obstruction s/p venting PEG, pulmonary embolism, home hospice patient with [facility] who presented to the Emergency Department after her venting PEG was dislodged. General surgery was consulted and graciously fixed the G tube. Unfortunately, Mrs. Ricci is likely in her final short days of life and Palliative/Supportive Care was consulted for admission to inpatient hospice unit for aggressive symptom management at the end of life.    Hospital Course:  No notes on file    Patient seen at bedside this morning. Patient lying in bed, unresponsive.   at bedside. Dilaudid gtt increased overnight with increasing distress.     Dilaudid gtt increased from 2.4 to 2.8 in the past 24 hours   Patient given 1 doses prn 1mg IV dilaudid and 2 doses of 0.9mg bolus from the bag doses in the past 24 hours   Given 2 doses 0.5mg IV ativan in 24 hours   Past Medical History:   Diagnosis Date    Breast cancer 2013    Diabetes mellitus     Genetic testing 05/29/2013    VUS    Hyperlipidemia     Hypertension     Malignant neoplasm of upper-outer quadrant of right breast in female, estrogen receptor positive 12/02/2015    Seizure disorder        Past Surgical History:   Procedure Laterality Date    BILATERAL SALPINGO-OOPHORECTOMY (BSO) N/A 3/25/2024    Procedure: SALPINGO-OOPHORECTOMY, BILATERAL;  Surgeon: Александр [facility] MD;  Location: [facility] OR [address] [place] [address];  Service: OB/GYN;  Laterality: N/A;    BREAST BIOPSY      BREAST LUMPECTOMY Right  2013    CHOLECYSTECTOMY  3/25/2024    Procedure: CHOLECYSTECTOMY;  Surgeon: Bo Farmer MD;  Location: [facility] OR 2ND FLR;  Service: General;;    DEBULKING OF TUMOR N/A 3/25/2024    Procedure: DEBULKING, NEOPLASM;  Surgeon: Александр [facility] MD;  Location: [facility] OR 2ND FLR;  Service: OB/GYN;  Laterality: N/A;    ESOPHAGOGASTRODUODENOSCOPY W/ PEG N/A 7/12/2024    Procedure: EGD, WITH PEG TUBE INSERTION;  Surgeon: Bo Farmer MD;  Location: [facility] OR 2ND FLR;  Service: General;  Laterality: N/A;    EXCISION, LIVER N/A 3/25/2024    Procedure: EXCISION, LIVER - partial;  Surgeon: Bo Farmer MD;  Location: [facility] OR 2ND FLR;  Service: General;  Laterality: N/A;  bk ultrasound  [place] book by TA on 3-21-24  7:00 a.m.  Conf #  [id number]    EYE SURGERY      LAPAROTOMY, EXPLORATORY N/A 3/25/2024    Procedure: LAPAROTOMY, EXPLORATORY;  Surgeon: Александр Washington MD;  [facility];  Service: OB/GYN;  Laterality: N/A;    OMENTECTOMY N/A 3/25/2024    Procedure: OMENTECTOMY;  Surgeon: Александр Washington MD;  Location: [facility] OR 2ND FLR;  Service: OB/GYN;  Laterality: N/A;    PERITONEOCENTESIS N/A 3/13/2024    Procedure: PARACENTESIS, ABDOMINAL;  Surgeon: Flavia Moore MD;  Location: [facility] CATH LAB;  Service: Radiology;  Laterality: N/A;    PERITONEOCENTESIS N/A 3/21/2024    Procedure: PARACENTESIS, ABDOMINAL;  Surgeon: Jorge Jolley MD;  Location: [facility] CATH LAB;  Service: Radiology;  Laterality: N/A;    PERITONEOCENTESIS N/A 6/10/2024    Procedure: PARACENTESIS, ABDOMINAL;  Surgeon: Rogelio Malave MD;  Location: [facility] CATH LAB;  Service: Radiology;  Laterality: N/A;    TOTAL ABDOMINAL HYSTERECTOMY N/A 3/25/2024    Procedure: HYSTERECTOMY, TOTAL, ABDOMINAL;  Surgeon: Александр Washington MD;  Location: [facility] OR [address] [place] [address];  Service: OB/GYN;  Laterality: N/A;    TOTAL REDUCTION MAMMOPLASTY Bilateral 2016       Review of patient's allergies indicates:   Allergen Reactions    Codeine  Other (See Comments)     Flu like s/s    Tramadol Other (See Comments)     Flu like symptoms similar to codeine reaction       Medications:  Continuous Infusions:   HYDROmorphone  0-4 mg/hr Intravenous Continuous   Stopped at 07/30/24 1115     Scheduled Meds:  PRN Meds:  Current Facility-Administered Medications:     glycopyrrolate, 0.3 mg, Intravenous, Q4H PRN    haloperidol lactate, 1 mg, Intravenous, Q6H PRN    HYDROmorphone, 1 mg, Intravenous, Q15 Min PRN    HYDROmorphone, 0.9 mg, Intravenous, Q15 Min PRN    lorazepam, 0.5 mg, Intravenous, Q30 Min PRN    ondansetron, 8 mg, Intravenous, Q6H PRN    Family History       Problem Relation (Age of Onset)    Breast cancer Sister (48)    Colon cancer Sister    Hypertension Mother, Brother, Brother    Seizures Father          Tobacco Use    Smoking status: Never    Smokeless tobacco: Never   Substance and Sexual Activity    Alcohol use: Yes     Alcohol/week: 0.0 standard drinks of alcohol     Comment: ocassional    Drug use: No    Sexual activity: Not Currently     Partners: Male     Birth control/protection: None       Review of Systems   Unable to perform ROS: Patient unresponsive     Objective:     Vital Signs (Most Recent):  Temp:  (Unable to obtain) (07/29/24 1948)  Pulse: (!) 0 (07/30/24 1115)  Resp: (!) 0 (07/30/24 1115)  BP: (!) 59/29 (07/30/24 0723)  SpO2: (!) 20 % (07/29/24 0736) Vital Signs (24h Range):  Pulse:  [0-59] 0  Resp:  [0-8] 0  BP: (59-65)/(29) 59/29     Weight: 92 kg (202 lb 13.2 oz)  Body mass index is 31.77 kg/m².       Physical Exam  Vitals and nursing note reviewed.   Constitutional:       Appearance: She is ill-appearing.   HENT:      Head: Normocephalic and atraumatic.      Right Ear: External ear normal.      Left Ear: External ear normal.      Nose: Nose normal.      Mouth/Throat:      Mouth: Mucous membranes are dry.   Eyes:      General: No scleral icterus.     Comments: Eyes mostly closed; no eyelid erythema.    Cardiovascular:      Rate  and Rhythm: Normal rate.   Pulmonary:      Comments: Shallow breath sounds; some moderate pauses noted on exam  Abdominal:      General: There is distension.      Tenderness: There is abdominal tenderness. There is guarding.   Musculoskeletal:         General: No swelling or tenderness.   Skin:     General: Skin is warm and dry.      Findings: Bruising present.   Neurological:      Comments: unresponsive            Review of Symptoms      Symptom Assessment (ESAS 0-10 Scale)  Pain:  0  Dyspnea:  0  Anxiety:  0  Nausea:  0  Depression:  0  Anorexia:  0  Fatigue:  0  Insomnia:  0  Restlessness:  0  Agitation:  0  Unable to complete assessment due to Patient unresponsive         Pain Assessment in Advanced Demential Scale (PAINAD)   Breathing - Independent of vocalization:  2  Negative vocalization:  2  Facial expression:  0  Body language:  0  Consolability:  2  Total:  6    Living Arrangements:  Lives with spouse    Psychosocial/Cultural:   See Palliative Psychosocial Note: No   to  Darrell for 38 years. They had one son who unfortunately passed away in his teens.  **Primary  to Follow**  Palliative Care  Consult: No        Advance Care Planning  Advance Directives:   Living Will: No    LaPOST: No    Do Not Resuscitate Status: Yes    Medical Power of : No      Decision Making:  Family answered questions and Patient unable to communicate due to disease severity/cognitive impairment  Goals of Care: 7/25/24: Emotional, supportive conversation held with  Darrell at Mrs. Ricci's bedside. She is unable to participate due to encephalopathy. Darrell confirms for me that she was utilizing home hospice support with [facility]. She had been alert, coherent, but getting weaker. She had a bowel movement and while he was turning her and cleaning her, her PEG tube dislodged. He brought her to the hospital because it wouldn't get back in place. He is her primary caregiver,  " for 38 years. He shares that they had one son who unfortunately passed away in his teens. He is appropriately tearful during this conversation - admits that he doesn't know what he would do without her. He shares that Mrs. Ricci had asked him to bring her to the hospital, wanting to spend her final days in a facility rather than at home. She has severe pain, for which she is on a fentanyl patch. She hasn't been eating as much, only preferring small sips of water, juice, tea, etc. He was told by a doctor in the Emergency Department that his wife was dying, likely in her final hours to short days. He confirms for me that he would want her to be as comfortable as possible. There are other family members (siblings) who are very important to them as well.  - I shared with him about our inpatient hospice unit, including the focus on providing medicines to reduce the intensity of her symptom burden (pain, air hunger, nausea, anxiety, etc). I was transparent about the limitations as well - no labs/imaging/diagnostics, no telemetry, no IV fluids or artificial nutrition, mimicking what would be otherwise done at home. I shared that during this time of life, she will struggle to swallow and in fact, forcing drink/food/medicines in her mouth may make her choke, and she may reflexively shut her mouth to prevent us from putting food/drink/medicine in her mouth. I shared that we would use her IV's to administer medicines at appropriate doses to reduce the intensity of her pain, air hunger, anxiety, nausea, etc, but not to force IV fluids that would end up in her lungs, causing her to feel like she's drowning. Darrell nodded in understanding. Emotional support provided. I asked Darrell if he needed anything else, and he admitted that he needed support. He is aware that she will be moved to the 3rd floor [facility] hospice unit as soon as possible.         Significant Labs: CBC:   No results for input(s): "WBC", "HGB", "HCT", " ""PLT" in the last 48 hours.    CMP:   No results for input(s): "NA", "K", "CL", "CO2", "GLU", "BUN", "CREATININE", "CALCIUM", "PROT", "ALBUMIN", "BILITOT", "ALKPHOS", "AST", "ALT", "ANIONGAP", "EGFRNONAA" in the last 48 hours.    Invalid input(s): "ESTGFAFRICA"    CBC:   Recent Labs   Lab 07/25/24  0032   WBC 18.27*   HGB 5.2*   HCT 17.5*   MCV 96        BMP:  No results for input(s): "GLU", "NA", "K", "CL", "CO2", "BUN", "CREATININE", "CALCIUM", "MG" in the last 24 hours.    LFT:  Lab Results   Component Value Date     (H) 07/25/2024    ALKPHOS 110 07/25/2024    BILITOT 1.7 (H) 07/25/2024     Albumin:   Albumin   Date Value Ref Range Status   07/25/2024 1.8 (L) 3.5 - 5.2 g/dL Final     Protein:   Total Protein   Date Value Ref Range Status   07/25/2024 6.8 6.0 - 8.4 g/dL Final     Lactic acid:   Lab Results   Component Value Date    LACTATE 1.7 05/23/2024    LACTATE 1.8 05/21/2024       Significant Imaging: I have reviewed all pertinent imaging results/findings within the past 24 hours.    7/25/24 CTA Chest and Abdomen/Pelvis with IV Contrast Impression:  1. Small volume pulmonary thromboembolism in both lower lobes.  2. Consolidation and effusion in the right lower lung.  Pleural effusion appears to have increased.  While this could represent focal pneumonia or metastatic disease, pulmonary infarct is difficult to exclude but considered less likely.  3. Decreased size of nodular opacity at the thoracic inlet on the right.  4. Patchy airspace disease with pleural base mass in the left lung base stable..  5. Worsening of mesenteric masses throughout the peritoneal cavity and worsening ascites.  Given the short time span since the prior CT, this could represent flare response from chemotherapy or immunotherapy.  However findings are worrisome for progression of disease.  6. Worsening of 3rd spacing and ascites throughout the abdominal and abdominal wall .  7. Stable appearance of the devices and " bowel-gas pattern allowing for ileus and constipation.  8. This report was flagged in Epic as abnormal.      Lashawn Levine MD  Palliative Medicine  [facility]

## 2024-07-30 NOTE — PLAN OF CARE
Problem: Adult Inpatient Plan of Care  Goal: Plan of Care Review  Outcome: Met  Goal: Patient-Specific Goal (Individualized)  Outcome: Met  Goal: Absence of Hospital-Acquired Illness or Injury  Outcome: Met  Goal: Optimal Comfort and Wellbeing  Outcome: Met  Goal: Readiness for Transition of Care  Outcome: Met     Problem: Diabetes Comorbidity  Goal: Blood Glucose Level Within Targeted Range  Outcome: Met     Problem: Acute Kidney Injury/Impairment  Goal: Fluid and Electrolyte Balance  Outcome: Met  Goal: Improved Oral Intake  Outcome: Met  Goal: Effective Renal Function  Outcome: Met     Problem: Palliative Care  Goal: Enhanced Quality of Life  Outcome: Met     Problem: Infection  Goal: Absence of Infection Signs and Symptoms  Outcome: Met     Problem: Wound  Goal: Optimal Coping  Outcome: Met  Goal: Optimal Functional Ability  Outcome: Met  Goal: Absence of Infection Signs and Symptoms  Outcome: Met  Goal: Improved Oral Intake  Outcome: Met  Goal: Optimal Pain Control and Function  Outcome: Met  Goal: Skin Health and Integrity  Outcome: Met  Goal: Optimal Wound Healing  Outcome: Met     Problem: Skin Injury Risk Increased  Goal: Skin Health and Integrity  Outcome: Met     Problem: End-of-Life Care  Goal: Comfort, Peace and Preserved Dignity  Outcome: Met     Problem: Pain Acute  Goal: Optimal Pain Control and Function  Outcome: Met     Problem: Fall Injury Risk  Goal: Absence of Fall and Fall-Related Injury  Outcome: Met    Pt .

## 2024-07-30 NOTE — NURSING
During safety rounds, pt in bed moaning with labored breathing, eyes partially open. Pt medicated with PRN Dialudid and Continuous Dilaudid increased to 2.8 mg/hr. Pt cleaned due to small amount of blood noted on Antionette pad. Pt turned with wedge and pillows for support. LMQ Peg dressing clean dry and intact. Comfort care maintained.

## 2024-07-30 NOTE — CHAPLAIN
"Palliative Care     Patient: Anette Ricci  MRN: [id number]  : 1965  Age: 58 y.o.  Hospital Length of Stay: 5  Code Status: Code Status Discussion Note  Attending Provider: Lashawn Levine MD  Principal Problem: <principal problem not specified>    Visited family a couple times since learning of the pt's death around 11:15-11:30a. Provided emotional support for distraught sister, Urmila.  Darrell had not arrived yet, was in route to hospital, but did not know until he arrived. I came back a little later to comfort him as well and offer condolences.    Sister Urmila and (I think) the brother, Bruno, welcomed me to say prayer over pt committing her spirit to heave/The Lord; the pt and family are [employer] Rast[employer]. Family was thankful for the visit and they constantly had people on speaker phone; much crying. Urmila said pt was her baby sister and "she took care of all her older siblings."  I reminded her that you too have been caring for and have been present for her baby sister and baby sister knew that. She agreed.     Fetched better tissues for the family per their request and hospice nurse brought in another chair. More people in route. I shared the next steps with them, what to expect and how the body will be cleaned and tended to with dignity and cathie.  Hugs given and received. Lord, in your mercy.     **Spiritual Care Dept. Chaplains are available evenings, overnight and weekends **    In Peace,    Rev. Yasmin Arias MDiv, [facility]  Board Certified   Palliative Medicine Department  [phone]  "

## (undated) DEVICE — SUT PROLENE 3-0 SH DA 36 BL

## (undated) DEVICE — SUT SILK 3-0 SH DETACH 30IN

## (undated) DEVICE — BOTTLE  EVOLUTION EVAC SUC

## (undated) DEVICE — SPONGE COTTON TRAY 4X4IN

## (undated) DEVICE — CONTAINER SPECIMEN OR STER 4OZ

## (undated) DEVICE — TAPE UMBILICAL 1/8 X 18 STER

## (undated) DEVICE — SEALER LIGASURE IMPACT 18CM

## (undated) DEVICE — LEGGING CLEAR POLY 2/PACK

## (undated) DEVICE — SUT SILK 3-0 STRANDS 30IN

## (undated) DEVICE — SUT 0 36IN PDS II VIO MONO

## (undated) DEVICE — DRAPE ABDOMINAL TIBURON 14X11

## (undated) DEVICE — SUT PROLENE 4-0 RB-1 BL MO

## (undated) DEVICE — GOWN SURGICAL X-LARGE

## (undated) DEVICE — SUT 1 48IN PDS II VIO MONO

## (undated) DEVICE — SUT 0 VICRYL / CT-1

## (undated) DEVICE — CATH CENTESIS ONESTEP 5FRX10CM

## (undated) DEVICE — TRAY CATH FOL SIL URIMTR 16FR

## (undated) DEVICE — SUT SILK SH 2-0 30IN MP BLK

## (undated) DEVICE — ELECTRODE EXTENDED BLADE

## (undated) DEVICE — KIT ENDOVIVE SAF PEG PULL 20FR

## (undated) DEVICE — ELECTRODE REM PLYHSV RETURN 9

## (undated) DEVICE — DECANTER FLUID TRNSF WHITE 9IN

## (undated) DEVICE — CLIP LIGATION MEDIUM CLIPS 1

## (undated) DEVICE — GAUZE SPONGE PEANUT STRL

## (undated) DEVICE — DRESSING AQUACEL SACRAL 9 X 9

## (undated) DEVICE — SYR SLIP TIP 1CC

## (undated) DEVICE — SUT MCRYL PLUS 4-0 PS2 27IN

## (undated) DEVICE — APPLICATOR CHLORAPREP ORN 26ML

## (undated) DEVICE — SPONGE LAP 18X18 PREWASHED

## (undated) DEVICE — NDL 22GA X1 1/2 REG BEVEL

## (undated) DEVICE — SYR 10CC LUER LOCK

## (undated) DEVICE — STAPLER ENDO GIA ULT UNIV 12MM

## (undated) DEVICE — PAD K-THERMIA 24IN X 60IN

## (undated) DEVICE — KIT SAHARA DRAPE DRAW/LIFT

## (undated) DEVICE — SUT 0 18IN SILK BLK BRAIDE

## (undated) DEVICE — TRAY SAFE-T PLUS THORA-PARA 8F

## (undated) DEVICE — SUT VICRYL BR 1 GEN 27 CT-1

## (undated) DEVICE — SUT PROLENE 4-0 SH BLU 36IN

## (undated) DEVICE — TOWEL OR NONABSORB ADH 17X26

## (undated) DEVICE — APPLIER CLIP LIAGCLIP 9.375IN

## (undated) DEVICE — PACK ECLIPSE SET-UP W/O DRAPE

## (undated) DEVICE — TIP YANKAUERS BULB NO VENT

## (undated) DEVICE — SUT CTD VICRYL 0 UND BR

## (undated) DEVICE — SUT SILK 2-0 SH 18IN BLACK

## (undated) DEVICE — SUT 2/0 36IN COATED VICRYL

## (undated) DEVICE — KIT SURGIFLO HEMOSTATIC MATRIX

## (undated) DEVICE — GOWN SMART IMP BREATHABLE XXLG

## (undated) DEVICE — TOWEL OR DISP STRL BLUE 4/PK

## (undated) DEVICE — LOOP VESSEL BLUE MAXI

## (undated) DEVICE — LUBRICANT SURGILUBE 2 OZ

## (undated) DEVICE — COVER LIGHT HANDLE 80/CA

## (undated) DEVICE — TUBING SUC UNIV W/CONN 12FT

## (undated) DEVICE — SUT 2-0 12-18IN SILK

## (undated) DEVICE — DRAPE HALF SURGICAL 40X58IN

## (undated) DEVICE — CUTTER PROXIMATE BLUE 75MM

## (undated) DEVICE — TUBE PENROSE DRAIN 18INX5/8IN

## (undated) DEVICE — SUT SILK 2-0 STRANDS 30IN

## (undated) DEVICE — BAG DRAIN ANTI REFLUX 2000ML

## (undated) DEVICE — DRAPE UINDERBUT GRAD PCH

## (undated) DEVICE — DRAPE STERI INSTRUMENT 1018

## (undated) DEVICE — STAPLER SKIN PROXIMATE WIDE

## (undated) DEVICE — SOL 9P NACL IRR PIC IL

## (undated) DEVICE — LOOP VESSEL YELLOW MAXI

## (undated) DEVICE — RELOAD PROXIMATE CUT BLUE 75MM

## (undated) DEVICE — BLADE SURG CARBON STEEL SZ11

## (undated) DEVICE — COVER MAYO STND XL 30X57IN

## (undated) DEVICE — SUT PROLENE 5-0 36 V-5 V-5

## (undated) DEVICE — TRAY SKIN SCRUB WET PREMIUM

## (undated) DEVICE — HANDPIECE ABC SNGL FUNC DISP

## (undated) DEVICE — SEE MEDLINE ITEM 156902

## (undated) DEVICE — SYR 30CC LUER LOCK

## (undated) DEVICE — SYR SLIP TIP 20CC

## (undated) DEVICE — SYR 60CC W/GAUGE

## (undated) DEVICE — SUT 3-0 12-18IN SILK

## (undated) DEVICE — SUT 2-0 SILK 30IN BLK BRAID

## (undated) DEVICE — DRAPE THREE-QTR REINF 53X77IN

## (undated) DEVICE — SUT CTD VICRYL VIL BR CR/SH

## (undated) DEVICE — TOWEL OR XRAY WHITE 17X26IN

## (undated) DEVICE — KIT PROBE COVER WITH GEL

## (undated) DEVICE — HEMOSTAT SURGICEL NU-KNIT 6X9

## (undated) DEVICE — SUT SILK 3-0 SH 18IN BLACK

## (undated) DEVICE — COVER PROXIMA MAYO STAND